# Patient Record
Sex: FEMALE | Race: WHITE | NOT HISPANIC OR LATINO | Employment: OTHER | URBAN - METROPOLITAN AREA
[De-identification: names, ages, dates, MRNs, and addresses within clinical notes are randomized per-mention and may not be internally consistent; named-entity substitution may affect disease eponyms.]

---

## 2017-01-19 ENCOUNTER — GENERIC CONVERSION - ENCOUNTER (OUTPATIENT)
Dept: OTHER | Facility: OTHER | Age: 82
End: 2017-01-19

## 2017-02-08 ENCOUNTER — LAB CONVERSION - ENCOUNTER (OUTPATIENT)
Dept: OTHER | Facility: OTHER | Age: 82
End: 2017-02-08

## 2017-02-08 LAB
HBA1C MFR BLD HPLC: 5.9 % OF TOTAL HGB
TSH SERPL DL<=0.05 MIU/L-ACNC: 5.99 MIU/L (ref 0.4–4.5)

## 2017-02-13 ENCOUNTER — ALLSCRIPTS OFFICE VISIT (OUTPATIENT)
Dept: OTHER | Facility: OTHER | Age: 82
End: 2017-02-13

## 2017-02-13 DIAGNOSIS — M85.80 OTHER SPECIFIED DISORDERS OF BONE DENSITY AND STRUCTURE, UNSPECIFIED SITE: ICD-10-CM

## 2017-02-13 DIAGNOSIS — M89.9 DISORDER OF BONE: ICD-10-CM

## 2017-02-14 ENCOUNTER — HOSPITAL ENCOUNTER (OUTPATIENT)
Dept: RADIOLOGY | Age: 82
Discharge: HOME | End: 2017-02-14
Payer: MEDICARE

## 2017-02-14 DIAGNOSIS — M85.80 OTHER SPECIFIED DISORDERS OF BONE DENSITY AND STRUCTURE, UNSPECIFIED SITE: ICD-10-CM

## 2017-02-14 DIAGNOSIS — M89.9 DISORDER OF BONE: ICD-10-CM

## 2017-02-14 PROCEDURE — 77080 DXA BONE DENSITY AXIAL: CPT

## 2017-02-16 ENCOUNTER — GENERIC CONVERSION - ENCOUNTER (OUTPATIENT)
Dept: OTHER | Facility: OTHER | Age: 82
End: 2017-02-16

## 2017-04-11 ENCOUNTER — HOSPITAL ENCOUNTER (OUTPATIENT)
Dept: RADIOLOGY | Age: 82
Discharge: HOME | End: 2017-04-11
Payer: MEDICARE

## 2017-04-11 DIAGNOSIS — Z12.31 ENCOUNTER FOR SCREENING MAMMOGRAM FOR MALIGNANT NEOPLASM OF BREAST: ICD-10-CM

## 2017-04-11 PROCEDURE — G0202 SCR MAMMO BI INCL CAD: HCPCS

## 2017-06-01 ENCOUNTER — GENERIC CONVERSION - ENCOUNTER (OUTPATIENT)
Dept: OTHER | Facility: OTHER | Age: 82
End: 2017-06-01

## 2017-06-15 ENCOUNTER — LAB CONVERSION - ENCOUNTER (OUTPATIENT)
Dept: OTHER | Facility: OTHER | Age: 82
End: 2017-06-15

## 2017-06-15 LAB
A/G RATIO (HISTORICAL): 1.6 (CALC) (ref 1–2.5)
ALBUMIN SERPL BCP-MCNC: 3.7 G/DL (ref 3.6–5.1)
ALP SERPL-CCNC: 78 U/L (ref 33–130)
ALT SERPL W P-5'-P-CCNC: 21 U/L (ref 6–29)
AST SERPL W P-5'-P-CCNC: 23 U/L (ref 10–35)
B-NP NT PRO (HISTORICAL): 1540 PG/ML
BASOPHILS # BLD AUTO: 0.3 %
BASOPHILS # BLD AUTO: 15 CELLS/UL (ref 0–200)
BILIRUB SERPL-MCNC: 0.5 MG/DL (ref 0.2–1.2)
BUN SERPL-MCNC: 19 MG/DL (ref 7–25)
BUN/CREA RATIO (HISTORICAL): 21 (CALC) (ref 6–22)
CALCIUM SERPL-MCNC: 9 MG/DL (ref 8.6–10.4)
CHLORIDE SERPL-SCNC: 105 MMOL/L (ref 98–110)
CO2 SERPL-SCNC: 32 MMOL/L (ref 20–31)
CREAT SERPL-MCNC: 0.89 MG/DL (ref 0.6–0.88)
DEPRECATED RDW RBC AUTO: 15 % (ref 11–15)
EGFR AFRICAN AMERICAN (HISTORICAL): 69 ML/MIN/1.73M2
EGFR-AMERICAN CALC (HISTORICAL): 60 ML/MIN/1.73M2
EOSINOPHIL # BLD AUTO: 12.7 %
EOSINOPHIL # BLD AUTO: 648 CELLS/UL (ref 15–500)
GAMMA GLOBULIN (HISTORICAL): 2.3 G/DL (CALC) (ref 1.9–3.7)
GLUCOSE (HISTORICAL): 89 MG/DL (ref 65–99)
HCT VFR BLD AUTO: 37.4 % (ref 35–45)
HGB BLD-MCNC: 12.5 G/DL (ref 11.7–15.5)
LYMPHOCYTES # BLD AUTO: 1642 CELLS/UL (ref 850–3900)
LYMPHOCYTES # BLD AUTO: 32.2 %
MCH RBC QN AUTO: 33.5 PG (ref 27–33)
MCHC RBC AUTO-ENTMCNC: 33.4 G/DL (ref 32–36)
MCV RBC AUTO: 100.4 FL (ref 80–100)
MONOCYTES # BLD AUTO: 377 CELLS/UL (ref 200–950)
MONOCYTES (HISTORICAL): 7.4 %
NEUTROPHILS # BLD AUTO: 2417 CELLS/UL (ref 1500–7800)
NEUTROPHILS # BLD AUTO: 47.4 %
PLATELET # BLD AUTO: 221 THOUSAND/UL (ref 140–400)
PMV BLD AUTO: 8.4 FL (ref 7.5–12.5)
POTASSIUM SERPL-SCNC: 3.7 MMOL/L (ref 3.5–5.3)
RBC # BLD AUTO: 3.73 MILLION/UL (ref 3.8–5.1)
SODIUM SERPL-SCNC: 143 MMOL/L (ref 135–146)
TOTAL PROTEIN (HISTORICAL): 6 G/DL (ref 6.1–8.1)
TSH SERPL DL<=0.05 MIU/L-ACNC: 2.98 MIU/L (ref 0.4–4.5)
WBC # BLD AUTO: 5.1 THOUSAND/UL (ref 3.8–10.8)

## 2017-06-20 ENCOUNTER — ALLSCRIPTS OFFICE VISIT (OUTPATIENT)
Dept: OTHER | Facility: OTHER | Age: 82
End: 2017-06-20

## 2017-06-20 ENCOUNTER — TRANSCRIBE ORDERS (OUTPATIENT)
Dept: ADMINISTRATIVE | Facility: HOSPITAL | Age: 82
End: 2017-06-20

## 2017-06-20 DIAGNOSIS — K86.2 PANCREATIC CYST: Primary | ICD-10-CM

## 2017-06-22 ENCOUNTER — TRANSCRIBE ORDERS (OUTPATIENT)
Dept: ADMINISTRATIVE | Facility: HOSPITAL | Age: 82
End: 2017-06-22

## 2017-06-30 ENCOUNTER — HOSPITAL ENCOUNTER (OUTPATIENT)
Dept: RADIOLOGY | Facility: HOSPITAL | Age: 82
Discharge: HOME | End: 2017-06-30
Attending: INTERNAL MEDICINE
Payer: MEDICARE

## 2017-06-30 DIAGNOSIS — K86.2 PANCREATIC CYST: ICD-10-CM

## 2017-06-30 PROCEDURE — 74183 MRI ABD W/O CNTR FLWD CNTR: CPT

## 2017-06-30 PROCEDURE — A9585 GADOBUTROL INJECTION: HCPCS | Performed by: INTERNAL MEDICINE

## 2017-06-30 RX ADMIN — GADOBUTROL 7 ML: 604.72 INJECTION INTRAVENOUS at 14:37

## 2017-10-01 DIAGNOSIS — J45.909 UNCOMPLICATED ASTHMA: ICD-10-CM

## 2017-10-01 DIAGNOSIS — I10 ESSENTIAL (PRIMARY) HYPERTENSION: ICD-10-CM

## 2017-10-01 DIAGNOSIS — E03.9 HYPOTHYROIDISM: ICD-10-CM

## 2017-10-01 DIAGNOSIS — I48.91 ATRIAL FIBRILLATION (HCC): ICD-10-CM

## 2017-10-02 ENCOUNTER — ALLSCRIPTS OFFICE VISIT (OUTPATIENT)
Dept: OTHER | Facility: OTHER | Age: 82
End: 2017-10-02

## 2017-10-03 ENCOUNTER — TRANSCRIBE ORDERS (OUTPATIENT)
Dept: ADMINISTRATIVE | Age: 82
End: 2017-10-03

## 2017-10-03 ENCOUNTER — APPOINTMENT (OUTPATIENT)
Dept: RADIOLOGY | Age: 82
End: 2017-10-03
Payer: MEDICARE

## 2017-10-03 DIAGNOSIS — J45.909 UNCOMPLICATED ASTHMA: ICD-10-CM

## 2017-10-03 PROCEDURE — 71020 HB CHEST X-RAY 2VW FRONTAL&LATL: CPT

## 2017-10-19 ENCOUNTER — LAB CONVERSION - ENCOUNTER (OUTPATIENT)
Dept: OTHER | Facility: OTHER | Age: 82
End: 2017-10-19

## 2017-10-19 LAB
A/G RATIO (HISTORICAL): 1.4 (CALC) (ref 1–2.5)
ALBUMIN SERPL BCP-MCNC: 3.7 G/DL (ref 3.6–5.1)
ALP SERPL-CCNC: 74 U/L (ref 33–130)
ALT SERPL W P-5'-P-CCNC: 18 U/L (ref 6–29)
AST SERPL W P-5'-P-CCNC: 23 U/L (ref 10–35)
B-NP NT PRO (HISTORICAL): 2324 PG/ML
BASOPHILS # BLD AUTO: 0.8 %
BASOPHILS # BLD AUTO: 62 CELLS/UL (ref 0–200)
BILIRUB SERPL-MCNC: 0.5 MG/DL (ref 0.2–1.2)
BUN SERPL-MCNC: 20 MG/DL (ref 7–25)
BUN/CREA RATIO (HISTORICAL): 18 (CALC) (ref 6–22)
CALCIUM SERPL-MCNC: 9.1 MG/DL (ref 8.6–10.4)
CHLORIDE SERPL-SCNC: 103 MMOL/L (ref 98–110)
CO2 SERPL-SCNC: 28 MMOL/L (ref 20–31)
CREAT SERPL-MCNC: 1.14 MG/DL (ref 0.6–0.88)
DEPRECATED RDW RBC AUTO: 12.8 % (ref 11–15)
EGFR AFRICAN AMERICAN (HISTORICAL): 51 ML/MIN/1.73M2
EGFR-AMERICAN CALC (HISTORICAL): 44 ML/MIN/1.73M2
EOSINOPHIL # BLD AUTO: 254 CELLS/UL (ref 15–500)
EOSINOPHIL # BLD AUTO: 3.3 %
GAMMA GLOBULIN (HISTORICAL): 2.7 G/DL (CALC) (ref 1.9–3.7)
GLUCOSE (HISTORICAL): 142 MG/DL (ref 65–99)
HCT VFR BLD AUTO: 39 % (ref 35–45)
HGB BLD-MCNC: 13 G/DL (ref 11.7–15.5)
LYMPHOCYTES # BLD AUTO: 1964 CELLS/UL (ref 850–3900)
LYMPHOCYTES # BLD AUTO: 25.5 %
MCH RBC QN AUTO: 32.4 PG (ref 27–33)
MCHC RBC AUTO-ENTMCNC: 33.3 G/DL (ref 32–36)
MCV RBC AUTO: 97.3 FL (ref 80–100)
MONOCYTES # BLD AUTO: 454 CELLS/UL (ref 200–950)
MONOCYTES (HISTORICAL): 5.9 %
NEUTROPHILS # BLD AUTO: 4967 CELLS/UL (ref 1500–7800)
NEUTROPHILS # BLD AUTO: 64.5 %
PLATELET # BLD AUTO: 281 THOUSAND/UL (ref 140–400)
PMV BLD AUTO: 10.2 FL (ref 7.5–12.5)
POTASSIUM SERPL-SCNC: 3.5 MMOL/L (ref 3.5–5.3)
RBC # BLD AUTO: 4.01 MILLION/UL (ref 3.8–5.1)
SODIUM SERPL-SCNC: 141 MMOL/L (ref 135–146)
TOTAL PROTEIN (HISTORICAL): 6.4 G/DL (ref 6.1–8.1)
TSH SERPL DL<=0.05 MIU/L-ACNC: 1.95 MIU/L (ref 0.4–4.5)
WBC # BLD AUTO: 7.7 THOUSAND/UL (ref 3.8–10.8)

## 2017-10-24 ENCOUNTER — GENERIC CONVERSION - ENCOUNTER (OUTPATIENT)
Dept: OTHER | Facility: OTHER | Age: 82
End: 2017-10-24

## 2017-10-24 ENCOUNTER — GENERIC CONVERSION - ENCOUNTER (OUTPATIENT)
Dept: INTERNAL MEDICINE CLINIC | Facility: CLINIC | Age: 82
End: 2017-10-24

## 2017-10-27 NOTE — PROGRESS NOTES
Assessment    1  Cough (786 2) (R05)  2  Difficulty breathing (786 09) (R06 89)  3  Congestive heart failure (428 0) (I50 9)   #1 upper respiratory tract infection exacerbating underlying asthma  Suspect recurrent shortness of breath is not from CHF  Because of duration of symptoms and expect to infection more likely  She also has underlying COPD-asthma  Placed on doxycycline 100 mg twice a day for week  Placed on Medrol is 20 mg twice a day for 4 days  She will go for chest x-ray  She will continue with her baseline pulmonary regimen  #2 CHF-etiology diastolic dysfunction aggravated by atrial fibrillation  Echo shows LVH with normal EF of 75%, marked right left atrial dilatation, mild aortic sclerosis, moderate TR with pulmonary artery pressure in the vicinity 40 mm  Has adequate rate control  Stable on current dose of diuretic  Do not feel current symptoms are from exacerbation of CHF  All other problems as per note of June 2017, total of 2015, 20/50 May 2014  Medical regimen as per baseline with addition of doxycycline and prednisone  Await results of chest x-ray  This patient will be seen at previously scheduled appointment with previously scheduled labs  Addendum-chest x-ray shows no infiltrate but does show possible small right pleural effusion and cardiomegaly associated with her history of CHF-she will call she does not continue to improve     Plan  Asthma    · * XR CHEST PA & LATERAL; Status:Active - Retrospective Authorization; Requested for:02Oct2017;   Cough    · Start: Doxycycline Monohydrate 100 MG Oral Tablet; TAKE 1 TABLET TWICE DAILY   · Start: PredniSONE 10 MG Oral Tablet; take 2 tablet twice daily  Chest x-ray  Doxycycline 100 mg twice a day for a week  Prednisone 20 mg twice a day for 4 days  History of Present Illness  HPI: This patient is seen today as an emergency appointment  She is felt poorly for 2 weeks and is worsening  She has prominent moist cough  She has not had any fever   She had a sore throat over the last 3-4 weeks   has been over the [facility]I  She has not been visiting any hospitals or skilled nursing facilities  She does live in an assisted living facility  She has baseline asthma as well as CHF  She was afraid this represented CHF and on her own took extra diuretics  This patient denies any systemic symptoms  Specifically there has been no evidence of fever, night sweats, significant weight loss or significant decrease in appetite  ? Her cough is moist and productive  She has not had any rash or diarrhea with the above      Review of Systems  Complete-Female:  Constitutional: feeling tired, but-- No fever, no chills, feels well, no tiredness, no recent weight gain or weight loss,-- no fever,-- not feeling poorly-- and-- no chills  Eyes: No complaints of eye pain, no red eyes, no eyesight problems, no discharge, no dry eyes, no itching of eyes  ENT: Increasing nasal congestion, but-- no complaints of earache, no loss of hearing, no nose bleeds, no nasal discharge, no sore throat, no hoarseness,-- no earache,-- no nosebleeds,-- no sore throat,-- no hearing loss,-- no nasal discharge-- and-- no hoarseness  Cardiovascular: No complaints of slow heart rate, no fast heart rate, no chest pain, no palpitations, no leg claudication, no lower extremity edema  Respiratory: shortness of breath,-- cough,-- wheezing,-- shortness of breath during exertion-- and-- Moist cough, but-- no orthopnea-- and-- no PND  Gastrointestinal: No complaints of abdominal pain, no constipation, no nausea or vomiting, no diarrhea, no bloody stools  Genitourinary: No complaints of dysuria, no incontinence, no pelvic pain, no dysmenorrhea, no vaginal discharge or bleeding  Musculoskeletal: arthralgias,-- joint stiffness-- and-- Bilateral knee pain, but-- no joint swelling,-- no limb pain,-- no myalgias-- and-- no limb swelling    Integumentary: No complaints of skin rash or lesions, no itching, no skin wounds, no breast pain or lump  Neurological: No complaints of headache, no confusion, no convulsions, no numbness, no dizziness or fainting, no tingling, no limb weakness, no difficulty walking  Psychiatric: Not suicidal, no sleep disturbance, no anxiety or depression, no change in personality, no emotional problems  Endocrine: No complaints of proptosis, no hot flashes, no muscle weakness, no deepening of the voice, no feelings of weakness  Hematologic/Lymphatic: No complaints of swollen glands, no swollen glands in the neck, does not bleed easily, does not bruise easily  Active Problems  1  Abdominal pain (789 00) (R10 9)  2  Abnormal findings on diagnostic imaging of other specified body structures (793 99) (R93 8)  3  Abnormal findings on diagnostic imaging of urinary organs (793 5) (R93 49)  4  Abnormal movement in bone (733 90) (M89 9)  5  Allergic rhinitis (477 9) (J30 9)  6  Anticoagulant long-term use (V58 61) (Z79 01)  7  Arthritis (716 90) (M19 90)  8  Asthma (493 90) (J45 909)  9  Atherosclerosis (440 9) (I70 90)  10  Atrial fibrillation (427 31) (I48 91)  11  Backache (724 5) (M54 9)  12  Benign colon polyp (211 3) (K63 5)  13  Bifascicular bundle branch block (426 53) (I45 2)  14  Chronic obstructive pulmonary disease (496) (J44 9)  15  Congestive heart failure (428 0) (I50 9)  16  Cough (786 2) (R05)  17  Cyst of left ovary (620 2) (N83 202)  18  Cyst of ovary, right (620 2) (N83 201)  19  Difficulty breathing (786 09) (R06 89)  20  Edema (782 3) (R60 9)  21  Esophageal reflux (530 81) (K21 9)  22  Essential tremor (333 1) (G25 0)  23  Hoarseness (784 42) (R49 0)  24  Hyperlipidemia (272 4) (E78 5)  25  Hypertension (401 9) (I10)  26  Hypothyroidism (244 9) (E03 9)  27  Knee pain (719 46) (M25 569)  28  Leg pain (729 5) (M79 606)  29  Nasal polyp (471 9) (J33 9)  30  History of Need for vaccination for DTP (V06 1) (Z23)  31  Onychomycosis (110 1) (B35 1)  32  Osteopenia (863 90) (M54 80)  33   Ovarian cyst (620 2) (N83 20)  34  Pancreatic cyst (577 2) (K86 2)  35  Plantar fasciitis (728 71) (M72 2)  36  Pleural effusion (511 9) (J90)  37  Prediabetes (790 29) (R73 09)  38  Pulmonary nodule (793 11) (R91 1)  39  Thyroid nodule (241 0) (E04 1)  40  Vitamin D deficiency (268 9) (E55 9)    Past Medical History  1  History of Abnormal ultrasound (793 99) (R93 8)  2  History of Asthma with acute exacerbation (493 92) (J45 901)  3  History of Asymptomatic menopausal state (V49 81) (Z78 0)  4  History of Atherosclerosis (440 9) (I70 90)  5  History of Cuboid fracture (825 23) (S92 213A)  6  History of Dyspepsia (536 8) (K30)  7  History of Encounter for routine gynecological examination (V72 31) (Z01 419)  8  History of Encounter for screening mammogram for malignant neoplasm of breast (V76 12) (Z12 31)  9  History of Flu-like symptoms (780 99) (R68 89)  10  History of Flu-like symptoms (780 99) (R68 89)  11  History of High risk medication use (V58 69) (Z79 899)  12  History Of 2  Previous Pregnancies (V61 5)  13  History of chronic obstructive lung disease (V12 69) (Z87 09)  14  History of congestive heart failure (V12 59) (Z86 79)  15  History of gastroesophageal reflux (GERD) (V12 79) (Z87 19)  16  History of hypertension (V12 59) (Z86 79)  17  History of hypothyroidism (V12 29) (Z86 39)  18  History of influenza (V12 09) (Z87 09)  19  History of low back pain (V13 59) (Z87 39)  20  History of onychomycosis (V12 09) (Z86 19)  21  History of osteopenia (V13 59) (Z87 39)  22  History of peripheral vascular disease (V12 59) (Z86 79)  23  Impacted cerumen of both ears (380 4) (H61 23)  24  Impacted cerumen of right ear (380 4) (H61 21)  25  History of IPMN (intraductal papillary mucinous neoplasm) (239 0) (D49 0)  26  History of Limb swelling (729 81) (M79 89)  27  History of Navicular fracture (825 22) (S92 737A)  28  History of Need for vaccination for DTP (V06 1) (Z23)  29  History of Osteoarthritis (V13 4)  30  History of Osteoporosis (733 00) (M81 0)  31  History of Pain in upper limb (729 5) (M79 603)  32  History of Pain, ankle (719 47) (M25 579)  33  History of Paronychia of finger, unspecified laterality (681 02) (L03 019)  34  History of Paroxysmal atrial fibrillation (427 31) (I48 0)  35  History of Plantar fasciitis (728 71) (M72 2)  36  History of Postoperative examination (V67 00) (Z09)  37  History of Previous Pregnancies Resulted In 2  Living Children  45  History of Talus fracture (825 21) (S92 109A)  39  History of Vascular headache (784 0) (G44 1)  40  History of Visit for screening mammogram (D62 03) (Z12 31)  Active Problems And Past Medical History Reviewed: The active problems and past medical history were reviewed and updated today  Surgical History  1  History of Appendectomy  2  History of Cataract Surgery  3  History of Cholecystectomy  4  History of Knee Replacement  5  History of Neuroplasty Decompression Median Nerve At Carpal Tunnel  6  History of Sinus Surgery  Surgical History Reviewed: The surgical history was reviewed and updated today  Family History  Mother   1  Family history of Carcinoma Of The Pancreas  Son   2  Family history of Brain Cancer (V16 8)  Family History   3  Family history of Atherosclerosis (V17 49)  4  Family history of Breast Cancer (V16 3)  5  Family history of Breast Disorders  6  Family history of Carcinoma Of The Uterus (V16 49)  7  Family history of Gastrointestinal Cancer  8  Family history of Hyperlipidemia  9  Family history of Osteoarthritis (V17 7)  10  Family history of Ovarian Cancer (V16 41)    Social History     · Being A Social Drinker   · Daily Coffee Consumption (2  Cups/Day)   · Denied: History of Drug Use   · Exercising Regularly   · Never A Smoker  Social History Reviewed: The social history was reviewed and updated today  The social history was reviewed and is unchanged  Current Meds  1  Calcium TABS; 1 tablet a day;  Therapy: (Recorded:12Nov2012) to Recorded  2  Cardizem  MG CP24 (DilTIAZem HCl ER Coated Beads); TAKE 1 CAPSULE DAILY; Therapy: 57KFZ4339 to Recorded  3  Claritin 10 MG Oral Tablet; TAKE 1 TABLET DAILY AS NEEDED; Therapy: (Recorded:12Nov2012) to Recorded  4  Fish Oil 1200 MG Oral Capsule; Take 1 tablet daily; Therapy: (Recorded:08Feb2016) to Recorded  5  Fluticasone Propionate 50 MCG/ACT Nasal Suspension; 2 SQUIRTS IN EACH NOSTRIL DAILY; Therapy: 50Bwg4106 to  Requested for: 47FUI0249 Recorded  6  Furosemide 40 MG Oral Tablet; Take 1 1/2 tablets by mouth daily; Therapy: 04QZD2354 to (Loja Lemon)  Requested for: 84XSD3912; Last Rx:27Hes4061 Ordered  7  Hydrocodone-Acetaminophen 5-300 MG Oral Tablet; take a half tablet before bedtime; Therapy: 65JMQ6424 to Recorded  8  Levothyroxine Sodium 50 MCG Oral Tablet; take 1 by mouth daily , except 1 and a half on Sundays; Therapy: 30DWG8376 to (Last Rx:25Kxd6528)  Requested for: 02EUF5637 Ordered  9  Metoprolol Succinate ER 25 MG Oral Tablet Extended Release 24 Hour; take 1 1/2 tablets daily; Therapy: 18IZF6373 to Recorded  10  Multi-Day Vitamins TABS; TAKE 1 TABLET DAILY; Therapy: (Recorded:12Nov2012) to Recorded  11  Potassium Chloride ER 10 MEQ Oral Capsule Extended Release; take 1 capsule daily; Therapy: 56DXW4801 to Recorded  12  Pronutrients Vitamin D3 1000 UNIT Oral Capsule; Take daily; Therapy: (Recorded:08Feb2016) to Recorded  13  Pulmicort Flexhaler 180 MCG/ACT Inhalation Aerosol Powder Breath Activated; INHALE 1 PUFF  TWICE DAILY  RINSE MOUTH AFTER USE; Therapy: 47YYX5909 to (Evaluate:45Rvk9426)  Requested for: 14Jdm2582; Last Rx:88Yem6835 Ordered  14  Singulair 10 MG Oral Tablet (Montelukast Sodium); TAKE 1 TABLET DAILY; Therapy: 62OHT6999 to (Last Rx:07Zui9757)  Requested for: 96TOR8575 Ordered  15  Ventolin  (90 Base) MCG/ACT Inhalation Aerosol Solution; Therapy: (Recorded:10Nov2012) to Recorded  16   Vitamin D3 2000 UNIT Oral Capsule; take 1 capsule daily; Therapy: 21Jun2017 to (Evaluate:16Jun2018); Last Rx:21Jun2017 Ordered  17  Warfarin Sodium 5 MG Oral Tablet; Therapy: 74MFP5562 to (Last Rx:83Glg7612)  Requested for: 25KHX4748 Ordered  Medication List Reviewed: The medication list was reviewed and updated today  Allergies  1  Accolate TABS  2  Doxycycline Hyclate CAPS  3  Aspirin TABS  4  NSAIDs    Vitals  Vital Signs    Recorded: 29TOG6459 08:11PM Recorded: 61FIZ5017 05:47PM   Temperature  98 2 F   Heart Rate 72    Respiration 14    Systolic 093, RUE, Sitting    Diastolic 80, RUE, Sitting    Height  5 ft 5 in   Weight  148 lb 2 oz   BMI Calculated  24 65   BSA Calculated  1 74       Physical Exam   Constitutional  General appearance: No acute distress, well appearing and well nourished  Head and Face  Head and face: Normal    Palpation of the face and sinuses: No sinus tenderness  Eyes  Conjunctiva and lids: No swelling, erythema or discharge  Pupils and irises: Equal, round, reactive to light  Ears, Nose, Mouth, and Throat  External inspection of ears and nose: Normal    Otoscopic examination: Tympanic membranes translucent with normal light reflex  Canals patent without erythema  Hearing: Normal    Nasal mucosa, septum, and turbinates: Normal without edema or erythema  Lips, teeth, and gums: Normal, good dentition  Oropharynx: Normal with no erythema, edema, exudate or lesions  Neck  Neck: Supple, symmetric, trachea midline, no masses  Thyroid: Normal, no thyromegaly  Pulmonary  Respiratory effort: No increased work of breathing or signs of respiratory distress  Percussion of chest: Normal    Palpation of chest: Normal    Auscultation of lungs: Abnormal  -- Scattered rhonchi-wheezing  Cardiovascular  Palpation of heart: Abnormal  -- Cardiomegaly to percussion  Auscultation of heart: Abnormal  -- Irregular irregular rhythm-heart rate climbs rapidly with walking up and down the steps  Carotid pulses: 2+ bilaterally  Abdominal aorta: Normal    Femoral pulses: 2+ bilaterally  Pedal pulses: 2+ bilaterally  Peripheral vascular exam: Normal    Examination of extremities for edema and/or varicosities: Normal    Chest  Breasts: Normal, no dimpling or skin changes appreciated  Palpation of breasts and axillae: Normal, no masses palpated  Abdomen  Abdomen: Non-tender, no masses  Liver and spleen: No hepatomegaly or splenomegaly  Examination for hernias: No hernia appreciated  Anus, perineum, and rectum: Normal sphincter tone, no masses, no prolapse  Genitourinary  External genitalia and vagina: Normal, no lesions appreciated  Lymphatic  Palpation of lymph nodes in neck: No lymphadenopathy  Palpation of lymph nodes in axillae: No lymphadenopathy  Palpation of lymph nodes in groin: No lymphadenopathy  Palpation of lymph nodes in other areas: No lymphadenopathy  Musculoskeletal  Gait and station: Normal    Digits and nails: Normal without clubbing or cyanosis  Joints, bones, and muscles: Abnormal  -- Heberden's nodes  Range of motion: Normal    Stability: Normal    Muscle strength/tone: Normal    Skin  Skin and subcutaneous tissue: Normal without rashes or lesions  Palpation of skin and subcutaneous tissue: Normal turgor  Neurologic  Cranial nerves: Cranial nerves II-XII intact  Reflexes: 2+ and symmetric  Sensation: No sensory loss  Psychiatric  Judgment and insight: Normal    Orientation to person, place, and time: Normal    Recent and remote memory: Intact  Mood and affect: Normal        Future Appointments    Date/Time Provider Specialty Site   10/24/2017 09:00 AM JONNIE Bradford   [facility] Medicine [facility]       Signatures   Electronically signed by : JONNIE Leon ; Oct  2 2017  8:15PM EST                       (Author)    Electronically signed by : JONNIE Leon ; Oct  9 2017  8:34PM EST                       (Author)    Electronically signed by : Dipti Hooper JONNIE Berkowitz ; Oct 14 2017  8:14AM EST                       (Author)

## 2017-10-31 ENCOUNTER — HOSPITAL ENCOUNTER (OUTPATIENT)
Dept: NON INVASIVE DIAGNOSTICS | Facility: CLINIC | Age: 82
Discharge: HOME | End: 2017-10-31
Payer: MEDICARE

## 2017-10-31 DIAGNOSIS — I48.91 ATRIAL FIBRILLATION (HCC): ICD-10-CM

## 2017-10-31 PROCEDURE — 93226 XTRNL ECG REC<48 HR SCAN A/R: CPT

## 2017-10-31 PROCEDURE — 93225 XTRNL ECG REC<48 HRS REC: CPT

## 2017-11-16 ENCOUNTER — GENERIC CONVERSION - ENCOUNTER (OUTPATIENT)
Dept: OTHER | Facility: OTHER | Age: 82
End: 2017-11-16

## 2017-12-16 ENCOUNTER — ALLSCRIPTS OFFICE VISIT (OUTPATIENT)
Dept: OTHER | Facility: OTHER | Age: 82
End: 2017-12-16

## 2017-12-17 NOTE — PROGRESS NOTES
Assessment   1  Asthma (493 90) (J45 909)  2  Congestive heart failure (428 0) (I50 9)  3  Chronic obstructive pulmonary disease (496) (J44 9)  4  Atrial fibrillation (427 31) (I48 91)  5  Anticoagulant long-term use (V58 61) (Z79 01)  6  Hypertension (401 9) (I10)       1  Shortness of breath-suspect upper respiratory tract infection-bronchitis exacerbating underlying COPD-asthma  Rule out pneumonia  As noted she had been ill several months ago and had a URTI then with chest x-ray benign other than small right pleural effusion felt related to CHF  She will complete doxycycline 100 milligrams b i d  for week  She will have a chest x-ray  I added Ceftin 500 milligrams b i d  for week as well as prednisone 10 milligrams b i d  for 4 days and 10 milligrams daily for 4 days  In addition her dose of diuretic was increased     2  Congestive heart failure-diastolic dysfunction aggravated by atrial fibrillation  Most recent echo shows LVH with EF of 75%, marked right and left atrial dilatation, mild aortic valve disease, moderate TR with pulmonary artery pressure 40 millimeters  Today felt to be in mild volume overload  She will increase her Lasix to 40 milligrams in a m  20 milligrams in the p m  for 3 days and resume baseline dose of 80 milligrams every morning  She will also simultaneously increase her potassium supplement     3  Atrial fibrillation-adequate rate control on current dose of Cardizem and beta-blocker  Remains on anticoagulation with Coumadin     4  Osteopenia-recent DEXA scan shows L-spine -2 2 and hip of -1  Subsequent bone density study do 2 years after last which would be March 2019       Plantar fasciitis-response of the physical therapy and exercise      asthma-stable on current therapy  Shortness of breath felt to be mostly cardiac in origin but stable     #7 atrial t fibrillation-adequate rate control on current dose of Cardizem and beta blocker   Remains on anticoagulation with Coumadin     #8 hypertension-continue current regimen-adequate control     #9 prediabetes-most recent hemoglobin A1c 5 9-continue conservative therapy     #10 hypothyroidism-TSH stable on current dose of thyroid supplement     #11 tremor-likely benign essential tremor-monitor     #12 given degenerative arthritis of the hands that she uses acetaminophen     #13 abnormal area the pancreas and CAT scan-full discussion as per note of every 2015  Follow-up ultrasound unchanged  GI physician ordered MRI without contrast and an MRCP done at every 2016 which showed that the pancreatic cystic lesions in the head, uncinate process and neck is unchanged suggesting sidebranch IPMN-no main pancreatic duct dilatation  Liver enzymes normal  I encouraged her to go back to the GI group     14  Knee pain-had prior bilateral total knee replacement   A 1 point was going back to Ortho to check positioning of her prosthesis-REVIEW NEXT VISIT          All other problems as per note of October 2015, 2015, May 2014          MEDICAL REGIMEN: Fluticasone nasal spray 2 squirts each nostril once daily, Lasix 80 milligrams in a m  and 40 milligrams in the p m  for 3 days then back to baseline dose of 80 milligrams every morning, potassium chloride-10 milliequivalent dose of 30 milliequivalents for 3 days and back to 20 milliequivalents daily, Coumadin with dose adjustments, Singulair using generic 10 mg daily, generic Synthroid 50 Âµg daily but 75 Âµg and Sunday, Claritin 10 mg daily as needed, calcium with vitamin D daily, Ventolin inhaler as needed, metoprolol succinate 25 MGs-2 tablets in the a m  and 1-1/2 tablets in the p m , Cardizem CD-300 mg a day, Pulmicort 100 Âµg-one puff twice a day, multivitamin, vitamin D 3/2000 units daily, fish oil 1200 mg a day, rare use of Vicodin 5-300 half tablet twice a day when necessary for her significant back pain doxycycline 100 milligrams b i d  for week and Ceftin 500 milligrams b i d  for week, prednisone 10 milligrams b i d  for 4 days and 10 milligrams daily for 4 days and Tussionex a teaspoon full before bed as needed for severe cough          Await results of chest x-ray  This patient will be seen at previously scheduled appointment with previously scheduled labs  Addendum-called patient on December 23rd-chest x-ray benign  She is feeling significantly better                                    Plan   Chest x-ray  Complete doxycycline as 100 milligrams b i d  for 1 week  Ceftin 500 milligrams b i d  for 1 week  Increase Lasix to 80 milligrams in a m  and 40 milligrams in the p m  for 3 days then resume baseline dose of 80 milligrams a day  Increase potassium to dose of 30 milliequivalents for 3 days then resume baseline dose  Prednisone 10 milligrams b i d  for 4 days and 10 milligrams daily for 4 days and stop  Take Tussionex as a teaspoon full before bed as needed for severe cough               History of Present Illness   HPI:  patient is seen today as an emergency appointment  She began to feel poorly over the last couple days  She had called the office productive cough and discomfort over the chest and sinuses  She notes worsening shortness of breath  This was more of an issue last night  When she called the office she had been started on doxycycline 100 milligrams b i d  for week  She has some nausea with that  Denies any vomiting  Denies any diarrhea  She has slight lower chest fullness  She is worried that she has esophageal of thoracic cancer  She denies fever chills  She has some questionable wheezing  Has a known history of underlying COPD-asthma but also has a known history of underlying congestive heart failure  She has not been using excess salt  She was not visiting [place] [address] or [facility]  She has not traveled  has a known history of controlled atrial fibrillation with CHF-diastolic   She has not had any major issues lately although does feel more short of breath over the last 2 days  patient denies any systemic symptoms  Specifically there has been no evidence of fever, night sweats, significant weight loss or significant decrease in appetite  She has a history of hypertension and is fearful that would be exacerbated  Avoiding salt and decongestants  Denies hematemesis pounding of her heart sweats and headache   denies severe sore throat  She denies severe ear pain  Her cough is mostly nonproductive   has chest fullness but no definite chest pressure  She denies chest pain or pressure with ambulation  Review of Systems   Complete-Female:      Constitutional: feeling tired, but-- No fever, no chills, feels well, no tiredness, no recent weight gain or weight loss,-- no fever,-- not feeling poorly-- and-- no chills  Eyes: No complaints of eye pain, no red eyes, no eyesight problems, no discharge, no dry eyes, no itching of eyes  ENT: Increasing nasal congestion, but-- no complaints of earache, no loss of hearing, no nose bleeds, no nasal discharge, no sore throat, no hoarseness,-- no earache,-- no nosebleeds,-- no sore throat,-- no hearing loss,-- no nasal discharge-- and-- no hoarseness  Cardiovascular: No complaints of slow heart rate, no fast heart rate, no chest pain, no palpitations, no leg claudication, no lower extremity edema  Respiratory: shortness of breath,-- cough,-- wheezing,-- shortness of breath during exertion-- and-- Moist cough, but-- no orthopnea-- and-- no PND  Gastrointestinal: No complaints of abdominal pain, no constipation, no nausea or vomiting, no diarrhea, no bloody stools  Genitourinary: No complaints of dysuria, no incontinence, no pelvic pain, no dysmenorrhea, no vaginal discharge or bleeding  Musculoskeletal: arthralgias,-- joint stiffness-- and-- Bilateral knee pain, but-- no joint swelling,-- no limb pain,-- no myalgias-- and-- no limb swelling        Integumentary: No complaints of skin rash or lesions, no itching, no skin wounds, no breast pain or lump  Neurological: No complaints of headache, no confusion, no convulsions, no numbness, no dizziness or fainting, no tingling, no limb weakness, no difficulty walking  Psychiatric: Not suicidal, no sleep disturbance, no anxiety or depression, no change in personality, no emotional problems  Endocrine: No complaints of proptosis, no hot flashes, no muscle weakness, no deepening of the voice, no feelings of weakness  Hematologic/Lymphatic: No complaints of swollen glands, no swollen glands in the neck, does not bleed easily, does not bruise easily  Active Problems   1  Abdominal pain (789 00) (R10 9)  2  Abnormal findings on diagnostic imaging of other specified body structures (793 99) (R93 8)  3  Abnormal findings on diagnostic imaging of urinary organs (793 5) (R93 49)  4  Abnormal movement in bone (733 90) (M89 9)  5  Allergic rhinitis (477 9) (J30 9)  6  Anticoagulant long-term use (V58 61) (Z79 01)  7  Arthritis (716 90) (M19 90)  8  Asthma (493 90) (J45 909)  9  Atherosclerosis (440 9) (I70 90)  10  Atrial fibrillation (427 31) (I48 91)  11  Backache (724 5) (M54 9)  12  Benign colon polyp (211 3) (K63 5)  13  Bifascicular bundle branch block (426 53) (I45 2)  14  Chronic obstructive pulmonary disease (496) (J44 9)  15  Congestive heart failure (428 0) (I50 9)  16  Cough (786 2) (R05)  17  Cyst of left ovary (620 2) (N83 202)  18  Cyst of ovary, right (620 2) (N83 201)  19  Difficulty breathing (786 09) (R06 89)  20  Edema (782 3) (R60 9)  21  Esophageal reflux (530 81) (K21 9)  22  Essential tremor (333 1) (G25 0)  23  Hoarseness (784 42) (R49 0)  24  Hyperlipidemia (272 4) (E78 5)  25  Hypertension (401 9) (I10)  26  Hypothyroidism (244 9) (E03 9)  27  Knee pain (719 46) (M25 569)  28  Leg pain (729 5) (M79 606)  29  Nasal polyp (471 9) (J33 9)  30  History of Need for vaccination for DTP (V06 1) (Z23)  31   Onychomycosis (110 1) (B35 1)  32  Osteopenia (733 90) (M85 80)  33  Ovarian cyst (620 2) (N83 20)  34  Pancreatic cyst (577 2) (K86 2)  35  Plantar fasciitis (728 71) (M72 2)  36  Pleural effusion (511 9) (J90)  37  Prediabetes (790 29) (R73 09)  38  Pulmonary nodule (793 11) (R91 1)  39  Thyroid nodule (241 0) (E04 1)  40  Vitamin D deficiency (268 9) (E55 9)    Past Medical History   1  History of Abnormal ultrasound (793 99) (R93 8)  2  History of Asthma with acute exacerbation (493 92) (J45 901)  3  History of Asymptomatic menopausal state (V49 81) (Z78 0)  4  History of Atherosclerosis (440 9) (I70 90)  5  History of Cuboid fracture (825 23) (S92 213A)  6  History of Dyspepsia (536 8) (K30)  7  History of Encounter for routine gynecological examination (V72 31) (Z01 419)  8  History of Encounter for screening mammogram for malignant neoplasm of breast (V76 12) (Z12 31)  9  History of Flu-like symptoms (780 99) (R68 89)  10  History of Flu-like symptoms (780 99) (R68 89)  11  History of High risk medication use (V58 69) (Z79 899)  12  History Of 2  Previous Pregnancies (V61 5)  13  History of chronic obstructive lung disease (V12 69) (Z87 09)  14  History of congestive heart failure (V12 59) (Z86 79)  15  History of gastroesophageal reflux (GERD) (V12 79) (Z87 19)  16  History of hypertension (V12 59) (Z86 79)  17  History of hypothyroidism (V12 29) (Z86 39)  18  History of influenza (V12 09) (Z87 09)  19  History of low back pain (V13 59) (Z87 39)  20  History of onychomycosis (V12 09) (Z86 19)  21  History of osteopenia (V13 59) (Z87 39)  22  History of peripheral vascular disease (V12 59) (Z86 79)  23  Impacted cerumen of both ears (380 4) (H61 23)  24  Impacted cerumen of right ear (380 4) (H61 21)  25  History of IPMN (intraductal papillary mucinous neoplasm) (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 0) (D49 0)  26  History of Limb swelling (729 81) (M79 89)  27  History of Navicular fracture (825 22) (S92 437A)  28   History of Need for vaccination for DTP (V06 1) (Z23)  29  History of Osteoarthritis (V13 4)  30  History of Osteoporosis (733 00) (M81 0)  31  History of Pain in upper limb (729 5) (M79 603)  32  History of Pain, ankle (719 47) (M25 579)  33  History of Paronychia of finger, unspecified laterality (681 02) (L03 019)  34  History of Paroxysmal atrial fibrillation (427 31) (I48 0)  35  History of Plantar fasciitis (728 71) (M72 2)  36  History of Postoperative examination (V67 00) (Z09)  37  History of Previous Pregnancies Resulted In 2  Living Children  45  History of Talus fracture (825 21) (S92 109A)  39  History of Vascular headache (784 0) (G44 1)  40  History of Visit for screening mammogram (P12 07) (Z12 31)  Active Problems And Past Medical History Reviewed: The active problems and past medical history were reviewed and updated today  Surgical History   1  History of Appendectomy  2  History of Cataract Surgery  3  History of Cholecystectomy  4  History of Knee Replacement  5  History of Neuroplasty Decompression Median Nerve At Carpal Tunnel  6  History of Sinus Surgery  Surgical History Reviewed: The surgical history was reviewed and updated today  Family History   Mother   1  Family history of Carcinoma Of The Pancreas  Son   2  Family history of Brain Cancer (V16 8)  Family History   3  Family history of Atherosclerosis (V17 49)  4  Family history of Breast Cancer (V16 3)  5  Family history of Breast Disorders  6  Family history of Carcinoma Of The Uterus (V16 49)  7  Family history of Gastrointestinal Cancer  8  Family history of Hyperlipidemia  9  Family history of Osteoarthritis (V17 7)  10  Family history of Ovarian Cancer (V16 41)    Social History    · Being A Social Drinker   · Daily Coffee Consumption (2  Cups/Day)   · Denied: History of Drug Use   · Exercising Regularly   · Never A Smoker  Social History Reviewed: The social history was reviewed and updated today  The social history was reviewed and is unchanged        Current Meds   1  Calcium TABS; 1 tablet a day; Therapy: (Recorded:12Nov2012) to Recorded  2  Cardizem  MG CP24 (DilTIAZem HCl ER Coated Beads); TAKE 1 CAPSULE DAILY; Therapy: 63PZS9322 to Recorded  3  Claritin 10 MG Oral Tablet; TAKE 1 TABLET DAILY AS NEEDED; Therapy: (Recorded:12Nov2012) to Recorded  4  Doxycycline Hyclate 100 MG Oral Capsule; TAKE 1 CAPSULE TWICE DAILY UNTIL GONE;     Therapy: 86TEZ4184 to (Evaluate:80Mal5139)  Requested for: 74Vfs7192; Last Rx:61Xon8599     Ordered  5  Doxycycline Monohydrate 100 MG Oral Tablet; TAKE 1 TABLET TWICE DAILY; Therapy: 74OWX6761 to (Evaluate:09Oct2017)  Requested for: 03Oct2017; Last Rx:66Zto8555     Ordered  6  Fish Oil 1200 MG Oral Capsule; Take 1 tablet daily; Therapy: (Recorded:42Kje6885) to Recorded  7  Fluticasone Propionate 50 MCG/ACT Nasal Suspension; 2 SQUIRTS IN EACH NOSTRIL DAILY; Therapy: 62Fdm9942 to  Requested for: 55UAQ0487 Recorded  8  Furosemide 40 MG Oral Tablet; Take 1 1/2 tablets by mouth daily; Therapy: 96XEN3591 to (Eri Levin)  Requested for: 87GXZ8942; Last Rx:67Uuw2089     Ordered  9  Hydrocodone-Acetaminophen 5-300 MG Oral Tablet; take a half tablet before bedtime; Therapy: 41DWN3777 to Recorded  10  Levothyroxine Sodium 50 MCG Oral Tablet; take 1 by mouth daily , except 1 and a half on Sundays; Therapy: 86CZY5451 to (Last Rx:78Avb0373)  Requested for: 01LBI2652 Ordered  11  Metoprolol Succinate ER 25 MG Oral Tablet Extended Release 24 Hour; take 1 1/2 tablets daily; Therapy: 90YFH5235 to Recorded  12  Multi-Day Vitamins TABS; TAKE 1 TABLET DAILY; Therapy: (Recorded:12Nov2012) to Recorded  13  Potassium Chloride ER 10 MEQ Oral Capsule Extended Release; take 1 capsule daily; Therapy: 43XJZ0041 to Recorded  14  PredniSONE 10 MG Oral Tablet; take 2 tablet twice daily; Therapy: 53GAD0867 to (Jesus Echeverria)  Requested for: 83KVG6198; Last Rx:02Oct2017      Ordered  15  Pronutrients Vitamin D3 1000 UNIT Oral Capsule; Take daily; Therapy: (Recorded:79Sfm7113) to Recorded  16  Pulmicort Flexhaler 180 MCG/ACT Inhalation Aerosol Powder Breath Activated; INHALE 1 PUFF      TWICE DAILY  RINSE MOUTH AFTER USE; Therapy: 98JCT3599 to (Evaluate:62Ybe6558)  Requested for: 35Rue9799; Last Rx:73Nxe1306 Ordered  17  Singulair 10 MG Oral Tablet (Montelukast Sodium); TAKE 1 TABLET DAILY; Therapy: 63JZE1713 to (Last Rx:38Nth7901)  Requested for: 87SIJ3529 Ordered  18  Ventolin  (90 Base) MCG/ACT Inhalation Aerosol Solution; Therapy: (Recorded:10Nov2012) to Recorded  19  Vitamin D3 2000 UNIT Oral Capsule; take 1 capsule daily; Therapy: 21Jun2017 to (Evaluate:16Jun2018); Last Rx:21Jun2017 Ordered  20  Warfarin Sodium 5 MG Oral Tablet; Therapy: 98LQZ7087 to (Last Rx:33Nes8469)  Requested for: 26HGO8355 Ordered  Medication List Reviewed: The medication list was reviewed and updated today  Allergies   1  Accolate TABS  2  Doxycycline Hyclate CAPS  3  Aspirin TABS  4  NSAIDs    Vitals   Vital Signs    Recorded: 43BVO9506 12:20PM   Heart Rate 88   Respiration 14   Systolic 325, RUE, Sitting   Diastolic 78, RUE, Sitting     Physical Exam        Constitutional      General appearance: No acute distress, well appearing and well nourished  Head and Face      Head and face: Normal        Palpation of the face and sinuses: No sinus tenderness  Eyes      Conjunctiva and lids: No swelling, erythema or discharge  Pupils and irises: Equal, round, reactive to light  Ears, Nose, Mouth, and Throat      External inspection of ears and nose: Normal        Otoscopic examination: Tympanic membranes translucent with normal light reflex  Canals patent without erythema  Hearing: Normal        Nasal mucosa, septum, and turbinates: Normal without edema or erythema  Lips, teeth, and gums: Normal, good dentition         Oropharynx: Normal with no erythema, edema, exudate or lesions  Neck      Neck: Supple, symmetric, trachea midline, no masses  Thyroid: Normal, no thyromegaly  Pulmonary      Respiratory effort: No increased work of breathing or signs of respiratory distress  Percussion of chest: Normal        Palpation of chest: Normal        Auscultation of lungs: Abnormal  -- Rales at the left base  Cardiovascular      Palpation of heart: Abnormal  -- Cardiomegaly to percussion  Auscultation of heart: Abnormal  -- I prominent P2 rregular irregular rhythm-heart rate climbs rapidly with walking up and down the steps  Carotid pulses: 2+ bilaterally  Abdominal aorta: Normal        Femoral pulses: 2+ bilaterally  Pedal pulses: 2+ bilaterally  Peripheral vascular exam: Normal        Examination of extremities for edema and/or varicosities: Normal        Chest      Breasts: Normal, no dimpling or skin changes appreciated  Palpation of breasts and axillae: Normal, no masses palpated  Abdomen      Abdomen: Non-tender, no masses  Liver and spleen: No hepatomegaly or splenomegaly  Examination for hernias: No hernia appreciated  Anus, perineum, and rectum: Normal sphincter tone, no masses, no prolapse  Genitourinary      External genitalia and vagina: Normal, no lesions appreciated  Lymphatic      Palpation of lymph nodes in neck: No lymphadenopathy  Palpation of lymph nodes in axillae: No lymphadenopathy  Palpation of lymph nodes in groin: No lymphadenopathy  Palpation of lymph nodes in other areas: No lymphadenopathy  Musculoskeletal      Gait and station: Normal        Digits and nails: Normal without clubbing or cyanosis  Joints, bones, and muscles: Abnormal  -- Heberden's nodes        Range of motion: Normal        Stability: Normal        Muscle strength/tone: Normal        Skin      Skin and subcutaneous tissue: Normal without rashes or lesions  Palpation of skin and subcutaneous tissue: Normal turgor  Neurologic      Cranial nerves: Cranial nerves II-XII intact  Reflexes: 2+ and symmetric  Sensation: No sensory loss  Psychiatric      Judgment and insight: Normal        Orientation to person, place, and time: Normal        Recent and remote memory: Intact  Mood and affect: Normal        Future Appointments      Date/Time Provider Specialty Site   02/28/2018 09:30 AM JONNIE Clark   Internal Medicine Augustin Payton MD     Signatures    Electronically signed by : JONNIE Arzate ; Dec 16 2017 12:31PM EST                       (Author)     Electronically signed by : JONNIE Arzate ; Dec 23 2017 12:52PM EST                       (Author)

## 2017-12-18 ENCOUNTER — APPOINTMENT (OUTPATIENT)
Dept: RADIOLOGY | Age: 82
End: 2017-12-18
Payer: MEDICARE

## 2017-12-18 ENCOUNTER — TRANSCRIBE ORDERS (OUTPATIENT)
Dept: ADMINISTRATIVE | Age: 82
End: 2017-12-18

## 2017-12-18 DIAGNOSIS — R05.9 COUGH: ICD-10-CM

## 2017-12-18 DIAGNOSIS — R05.9 COUGH: Primary | ICD-10-CM

## 2017-12-18 PROCEDURE — 71020 HB CHEST X-RAY 2VW FRONTAL&LATL: CPT

## 2018-01-11 NOTE — RESULT NOTES
Verified Results  (1) THIN PREP PAP WITH IMAGING 08Apr2016 11:20AM [facility] Mcmullen     Test Name Result Flag Reference   LAB AP CASE REPORT (Report)     Gynecologic Cytology Report            Case: [id number]                  Authorizing Provider: Veronique Yeung MD     Collected:      04/08/2016 1120        First Screen:     Matthias Rogers, CT    Received:      04/10/2016 1120        Specimen:  LIQUID-BASED PAP, SCREENING, Cervix   LAB AP GYN PRIMARY INTERPRETATION      Negative for intraepithelial lesion or malignancy   LAB AP GYN SPECIMEN ADEQUACY      Satisfactory for evaluation  (See note)   LAB AP GYN NOTE      No endocervical cells identified  It is difficult to differentiate between   squamous metaplastic cells and parabasal cells in atrophic specimens due   to numerous causes including menopause, postpartum changes and   progestational agents  LAB AP GYN ADDITIONAL INFORMATION (Report)     [employer] FDA approved ,  and ThinPrep Imaging System are   utilized with strict adherence to the 's instruction manual to   prepare gynecologic and non-gynecologic cytology specimens for the   production of ThinPrep slides as well as for gynecologic ThinPrep imaging  These processes have been validated by our laboratory and/or by the     The Pap test is not a diagnostic procedure and should not be used as the   sole means to detect cervical cancer  It is only a screening procedure to   aid in the detection of cervical cancer and its precursors  Both   false-negative and false-positive results have been experienced  Your   patient's test result should be interpreted in this context together with   the history and clinical findings     LAB AP LMP

## 2018-01-11 NOTE — RESULT NOTES
Verified Results  * MAMMO SCREENING BILATERAL W CAD 32Enx3116 09:10AM Wan Hdez Order Number: [id number]     Test Name Result Flag Reference   MAMMO SCREENING BILATERAL W CAD (Report)     Patient History:   Patient is postmenopausal    Family history of breast cancer at age 48 in maternal cousin,    breast cancer at age 48 in maternal cousin, ovarian cancer at age   52 in maternal niece, pancreatic cancer at age 80 in mother  Took estrogen for 20 years  Patient has never smoked  Patient's BMI is 25 0  Reason for exam: screening, asymptomatic  Mammo Screening Bilateral W CAD: April 11, 2017 - Check In #:    [de-identified]   Bilateral CC and MLO view(s) were taken  Technologist: RT Kerry(R)(M)   Prior study comparison: April 5, 2016, mammo screening bilateral    performed at [facility]  March 31, 2015,    digital bilateral screening mammogram performed at [facility]  March 25, 2014, digital bilateral screening    mammogram performed at [facility]  March 19, 2013, digital bilateral screening mammogram performed at [address] [place] [address]  March 13, 2012, digital bilateral    screening mammogram performed at [facility]  There are scattered fibroglandular densities  The parenchymal pattern appears stable  No dominant soft tissue    mass or suspicious calcifications are noted  The skin and nipple   contours are within normal limits  No mammographic evidence of malignancy  No    significant changes when compared with prior studies  ACR BI-RADSï¾® Assessments: BiRad:1 - Negative     Recommendation:   Routine screening mammogram in 1 year  A reminder letter will be   scheduled  Analyzed by CAD     8-10% of cancers will be missed on mammography  Management of a    palpable abnormality must be based on clinical grounds  Patients   will be notified of their results via letter from our facility  Accredited by [employer] of Radiology and FDA  Transcription Location: [facility]: [id number]     Risk Value(s):   Tyrer-Cuzick 10 Year: 0 400%, Tyrer-Cuzick Lifetime: 0 400%,    Myriad Table: 3 0%, EFREN 5 Year: 1 4%, NCI Lifetime: 1 5%, MRS    : Based on personal and/or family history,    consideration of hereditary risk assessment may be warranted     Signed by:   Shasha Waldrop MD   4/11/17

## 2018-01-12 NOTE — RESULT NOTES
Verified Results  (1) THIN PREP PAP WITH IMAGING 08Apr2016 11:20AM [place]     Test Name Result Flag Reference   LAB AP CASE REPORT (Report)     Gynecologic Cytology Report            Case: [id number]                  Authorizing Provider: Veronique Yeung MD     Collected:      04/08/2016 1120        First Screen:     Matthias Mccloud, CT    Received:      04/10/2016 1120        Specimen:  LIQUID-BASED PAP, SCREENING, Cervix   LAB AP GYN PRIMARY INTERPRETATION      Negative for intraepithelial lesion or malignancy   LAB AP GYN SPECIMEN ADEQUACY      Satisfactory for evaluation  (See note)   LAB AP GYN NOTE      No endocervical cells identified  It is difficult to differentiate between   squamous metaplastic cells and parabasal cells in atrophic specimens due   to numerous causes including menopause, postpartum changes and   progestational agents  LAB AP GYN ADDITIONAL INFORMATION (Report)     [employer]'s FDA approved ,  and ThinPrep Imaging System are   utilized with strict adherence to the 's instruction manual to   prepare gynecologic and non-gynecologic cytology specimens for the   production of ThinPrep slides as well as for gynecologic ThinPrep imaging  These processes have been validated by our laboratory and/or by the     The Pap test is not a diagnostic procedure and should not be used as the   sole means to detect cervical cancer  It is only a screening procedure to   aid in the detection of cervical cancer and its precursors  Both   false-negative and false-positive results have been experienced  Your   patient's test result should be interpreted in this context together with   the history and clinical findings     LAB AP LMP not given     not given

## 2018-01-13 VITALS
DIASTOLIC BLOOD PRESSURE: 80 MMHG | SYSTOLIC BLOOD PRESSURE: 130 MMHG | HEIGHT: 65 IN | HEART RATE: 72 BPM | TEMPERATURE: 98.2 F | BODY MASS INDEX: 24.68 KG/M2 | WEIGHT: 148.13 LBS | RESPIRATION RATE: 14 BRPM

## 2018-01-13 VITALS — SYSTOLIC BLOOD PRESSURE: 130 MMHG | RESPIRATION RATE: 14 BRPM | DIASTOLIC BLOOD PRESSURE: 80 MMHG | HEART RATE: 74 BPM

## 2018-01-14 VITALS
HEIGHT: 65 IN | SYSTOLIC BLOOD PRESSURE: 130 MMHG | RESPIRATION RATE: 14 BRPM | DIASTOLIC BLOOD PRESSURE: 80 MMHG | BODY MASS INDEX: 26.55 KG/M2 | WEIGHT: 159.38 LBS | HEART RATE: 78 BPM

## 2018-01-22 VITALS — HEIGHT: 65 IN | WEIGHT: 149.25 LBS | BODY MASS INDEX: 24.87 KG/M2

## 2018-01-22 VITALS — HEART RATE: 78 BPM | DIASTOLIC BLOOD PRESSURE: 76 MMHG | SYSTOLIC BLOOD PRESSURE: 122 MMHG | RESPIRATION RATE: 14 BRPM

## 2018-01-23 VITALS — SYSTOLIC BLOOD PRESSURE: 122 MMHG | DIASTOLIC BLOOD PRESSURE: 78 MMHG | RESPIRATION RATE: 14 BRPM | HEART RATE: 88 BPM

## 2018-02-03 ENCOUNTER — DOCUMENTATION (OUTPATIENT)
Dept: INTERNAL MEDICINE CLINIC | Facility: CLINIC | Age: 83
End: 2018-02-03

## 2018-02-03 NOTE — PROGRESS NOTES
Patient had an echocardiogram performed by Janeen Valentine in January 2018 showed mild LVH with normal EF, mild mitral valve sclerosis, marked right left atrial dilatation, mild aortic sclerosis-pulmonary artery pressure in the vicinity of 40 millimeters DMITRIY CHART NEXT VISIT PATIENT HAD ECHO IN JANUARY 2018

## 2018-02-22 LAB
ALBUMIN SERPL-MCNC: 3.6 G/DL (ref 3.6–5.1)
ALBUMIN/GLOB SERPL: 1.2 (CALC) (ref 1–2.5)
ALP SERPL-CCNC: 78 U/L (ref 33–130)
ALT SERPL-CCNC: 11 U/L (ref 6–29)
AST SERPL-CCNC: 19 U/L (ref 10–35)
BASOPHILS # BLD AUTO: 38 CELLS/UL (ref 0–200)
BASOPHILS NFR BLD AUTO: 0.6 %
BILIRUB SERPL-MCNC: 0.5 MG/DL (ref 0.2–1.2)
BUN SERPL-MCNC: 17 MG/DL (ref 7–25)
BUN/CREAT SERPL: 17 (CALC) (ref 6–22)
CALCIUM SERPL-MCNC: 9.2 MG/DL (ref 8.6–10.4)
CHLORIDE SERPL-SCNC: 102 MMOL/L (ref 98–110)
CHOLEST SERPL-MCNC: 190 MG/DL
CO2 SERPL-SCNC: 31 MMOL/L (ref 20–31)
CREAT SERPL-MCNC: 0.99 MG/DL (ref 0.6–0.88)
EOSINOPHIL # BLD AUTO: 1714 CELLS/UL (ref 15–500)
EOSINOPHIL NFR BLD AUTO: 27.2 %
ERYTHROCYTE [DISTWIDTH] IN BLOOD BY AUTOMATED COUNT: 12.5 % (ref 11–15)
GLOBULIN SER CALC-MCNC: 2.9 G/DL (CALC) (ref 1.9–3.7)
GLUCOSE SERPL-MCNC: 91 MG/DL (ref 65–99)
HCT VFR BLD AUTO: 37.8 % (ref 35–45)
HDLC SERPL-MCNC: 74 MG/DL
HGB BLD-MCNC: 12.5 G/DL (ref 11.7–15.5)
LDLC SERPL DIRECT ASSAY-MCNC: 102 MG/DL
LYMPHOCYTES # BLD AUTO: 1644 CELLS/UL (ref 850–3900)
LYMPHOCYTES NFR BLD AUTO: 26.1 %
MCH RBC QN AUTO: 32.2 PG (ref 27–33)
MCHC RBC AUTO-ENTMCNC: 33.1 G/DL (ref 32–36)
MCV RBC AUTO: 97.4 FL (ref 80–100)
MONOCYTES # BLD AUTO: 447 CELLS/UL (ref 200–950)
MONOCYTES NFR BLD AUTO: 7.1 %
NEUTROPHILS # BLD AUTO: 2457 CELLS/UL (ref 1500–7800)
NEUTROPHILS NFR BLD AUTO: 39 %
NT-PROBNP SERPL-MCNC: 1720 PG/ML
PLATELET # BLD AUTO: 264 THOUSAND/UL (ref 140–400)
PMV BLD REES-ECKER: 10.2 FL (ref 7.5–12.5)
POTASSIUM SERPL-SCNC: 4.1 MMOL/L (ref 3.5–5.3)
PROT SERPL-MCNC: 6.5 G/DL (ref 6.1–8.1)
RBC # BLD AUTO: 3.88 MILLION/UL (ref 3.8–5.1)
SL AMB EGFR AFRICAN AMERICAN: 60 ML/MIN/1.73M2
SL AMB EGFR NON AFRICAN AMERICAN: 52 ML/MIN/1.73M2
SODIUM SERPL-SCNC: 141 MMOL/L (ref 135–146)
TRIGL SERPL-MCNC: 66 MG/DL
TSH SERPL-ACNC: 4.21 MIU/L (ref 0.4–4.5)
WBC # BLD AUTO: 6.3 THOUSAND/UL (ref 3.8–10.8)

## 2018-02-25 PROBLEM — I50.9 CONGESTIVE HEART FAILURE (HCC): Status: ACTIVE | Noted: 2017-02-13

## 2018-02-25 PROBLEM — I50.9 CONGESTIVE HEART FAILURE (HCC): Chronic | Status: ACTIVE | Noted: 2017-02-13

## 2018-02-27 RX ORDER — PREDNISONE 10 MG/1
2 TABLET ORAL 2 TIMES DAILY
COMMUNITY
Start: 2017-10-02 | End: 2018-04-18 | Stop reason: ALTCHOICE

## 2018-02-27 RX ORDER — LEVOTHYROXINE SODIUM 0.05 MG/1
TABLET ORAL
COMMUNITY
Start: 2017-07-07 | End: 2018-06-26 | Stop reason: SDUPTHER

## 2018-02-27 RX ORDER — ALBUTEROL SULFATE 90 UG/1
AEROSOL, METERED RESPIRATORY (INHALATION)
COMMUNITY

## 2018-02-27 RX ORDER — METOPROLOL SUCCINATE 25 MG/1
25 TABLET, EXTENDED RELEASE ORAL
COMMUNITY
Start: 2016-02-08 | End: 2018-06-26 | Stop reason: SDUPTHER

## 2018-02-27 RX ORDER — WARFARIN SODIUM 5 MG/1
TABLET ORAL
COMMUNITY
Start: 2011-02-16 | End: 2019-05-09 | Stop reason: SDUPTHER

## 2018-02-27 RX ORDER — BIOTIN 1 MG
TABLET ORAL DAILY
COMMUNITY

## 2018-02-27 RX ORDER — DILTIAZEM HYDROCHLORIDE 300 MG/1
1 CAPSULE, COATED, EXTENDED RELEASE ORAL DAILY
COMMUNITY
Start: 2016-02-08 | End: 2018-06-26 | Stop reason: SDUPTHER

## 2018-02-27 RX ORDER — FUROSEMIDE 40 MG/1
40 TABLET ORAL 2 TIMES DAILY
COMMUNITY
Start: 2014-07-01 | End: 2018-04-18 | Stop reason: ALTCHOICE

## 2018-02-27 RX ORDER — DOXYCYCLINE HYCLATE 100 MG/1
1 CAPSULE ORAL 2 TIMES DAILY
COMMUNITY
Start: 2017-12-13 | End: 2018-04-18 | Stop reason: ALTCHOICE

## 2018-02-27 RX ORDER — HYDROCODONE BITARTRATE AND ACETAMINOPHEN 5; 300 MG/1; MG/1
1 TABLET ORAL EVERY 6 HOURS PRN
COMMUNITY
Start: 2016-02-08 | End: 2018-04-18 | Stop reason: ALTCHOICE

## 2018-02-27 RX ORDER — LORATADINE 10 MG/1
1 TABLET ORAL DAILY PRN
COMMUNITY
End: 2018-06-26 | Stop reason: ALTCHOICE

## 2018-02-27 RX ORDER — AMOXICILLIN 500 MG
1 CAPSULE ORAL DAILY
COMMUNITY
End: 2019-03-01

## 2018-02-27 RX ORDER — MONTELUKAST SODIUM 10 MG/1
1 TABLET ORAL DAILY
COMMUNITY
Start: 2011-02-26 | End: 2018-12-21 | Stop reason: SDUPTHER

## 2018-02-27 RX ORDER — POTASSIUM CHLORIDE 750 MG/1
1 CAPSULE, EXTENDED RELEASE ORAL DAILY
COMMUNITY
Start: 2016-02-08 | End: 2018-12-21 | Stop reason: SDUPTHER

## 2018-02-27 RX ORDER — FLUTICASONE PROPIONATE 50 MCG
2 SPRAY, SUSPENSION (ML) NASAL DAILY
COMMUNITY
Start: 2011-02-26 | End: 2019-05-09 | Stop reason: ALTCHOICE

## 2018-02-28 ENCOUNTER — OFFICE VISIT (OUTPATIENT)
Dept: INTERNAL MEDICINE CLINIC | Facility: CLINIC | Age: 83
End: 2018-02-28
Payer: MEDICARE

## 2018-02-28 VITALS
WEIGHT: 146 LBS | DIASTOLIC BLOOD PRESSURE: 72 MMHG | BODY MASS INDEX: 23.46 KG/M2 | SYSTOLIC BLOOD PRESSURE: 120 MMHG | HEART RATE: 78 BPM | HEIGHT: 66 IN | RESPIRATION RATE: 14 BRPM

## 2018-02-28 DIAGNOSIS — I10 ESSENTIAL HYPERTENSION: Chronic | ICD-10-CM

## 2018-02-28 DIAGNOSIS — E55.9 VITAMIN D DEFICIENCY: Chronic | ICD-10-CM

## 2018-02-28 DIAGNOSIS — I50.9 CONGESTIVE HEART FAILURE, UNSPECIFIED CONGESTIVE HEART FAILURE CHRONICITY, UNSPECIFIED CONGESTIVE HEART FAILURE TYPE: ICD-10-CM

## 2018-02-28 DIAGNOSIS — M79.605 PAIN OF LEFT LOWER EXTREMITY: Chronic | ICD-10-CM

## 2018-02-28 DIAGNOSIS — E03.9 HYPOTHYROIDISM, UNSPECIFIED TYPE: Chronic | ICD-10-CM

## 2018-02-28 DIAGNOSIS — M70.62 TROCHANTERIC BURSITIS OF LEFT HIP: Primary | ICD-10-CM

## 2018-02-28 DIAGNOSIS — J44.9 CHRONIC OBSTRUCTIVE PULMONARY DISEASE, UNSPECIFIED COPD TYPE (HCC): Chronic | ICD-10-CM

## 2018-02-28 DIAGNOSIS — I48.20 CHRONIC ATRIAL FIBRILLATION (HCC): Chronic | ICD-10-CM

## 2018-02-28 PROCEDURE — 99215 OFFICE O/P EST HI 40 MIN: CPT | Performed by: INTERNAL MEDICINE

## 2018-02-28 NOTE — PATIENT INSTRUCTIONS
TROCHANTERIC BURSITIS INJECT It    Do exercise  Switch from Furosemide 80mg to Torsemide 40mg (2- 20mg tablets) every morning  Add spirolactone as one twice per week  Decrease Potassium to once per day  Blood work 2 weeks after switch  Continue to weigh yourself daily  Repeat exam in 1 month

## 2018-02-28 NOTE — PROGRESS NOTES
Assessment/Plan:  1  Congestive heart failure-diastolic dysfunction aggravated by atrial fibrillation  Echocardiogram just done in January 2018 shows mild LVH with normal EF, mild mitral valve sclerosis with mild MR, marked left atrial and right atrial dilatation, mild aortic sclerosis without significant aortic valve disease, moderate TR with pulmonary artery pressure 40 millimeters  I suspect she has some ongoing low-grade CHF  I recommended she switch from Lasix 80 milligrams a day to torsemide 40 milligrams daily  She will add spironolactone 25 milligrams every 3 days  In 2 weeks will have follow-up labs  She will restrict salt  She will be re-evaluated in a month  2  Atrial fibrillation-adequate rate control on current dose of Cardizem and beta-blocker  Remains on anticoagulation with Coumadin  Will have a follow-up protime when she has her labs in 2 weeks  3  Shortness of breath-felt related to the above  Has a history COPD asthma but at this point predominantly appears to be from her CHF  4  Left leg pain-clinically this appears to be a trochanteric bursitis  She was counseled on this  She will perform appropriate exercises  Set up for an injection of left trochanteric bursa under fluoroscopic guidance  5  Hypertension-adequate control on current regimen  6  Osteopenia-most recent DEXA scan shows L-spine -2 2 and hip of -1  Subsequent bone density study due 2 years after last which would be March 2019  7  Plantar fasciitis-responded to physical therapy and exercise  8  Asthma-stable on current regimen  9  Pre diabetes-A1c 5 9-continue current therapy  10  Hypothyroidism-TSH stable on current dose of thyroid supplement  11  Tremor-likely benign essential tremor-monitor  12  Degenerative arthritis of the hands-she has seen him in a fan  13  Knee pain-had prior bilateral total knee replacement    1 point she was going back to orthopedic physician to check positioning of her prosthesis-review next visit  14  Abnormal area the pancreas on CT scan-t full discussion as per noted February 2015  Follow-up ultrasound unchanged  GI physician ordered MRI without contrast an MRCP done in 2016 which showed the pancreatic cystic lesions in the head, uncinate process and neck were unchanged suggesting side branch IPMN  There is no main pancreatic duct dilatation  Liver enzymes normal   I HAD ENCOURAGED HER TO GO BACK TO GI-CONSIDER REPEAT RADIOGRAPHIC STUDY NEXT VISIT    All other problems as per note of October 2015, 20 15 May 2014      MEDICAL REGIMEN:      Flonase nasal spray 2 squirts in each nostril once daily, torsemide 40 milligrams every morning using 20 milligram dosing, spironolactone 25 milligrams every 3 days, potassium chloride 10 milliequivalents daily, Coumadin with dose adjustments, generic Singulair 10 milligrams daily, levothyroxine 50 micrograms daily but 75 micrograms on Sunday, Claritin 10 milligrams daily as needed, calcium with vitamin-D daily, Ventolin inhaler as needed, metoprolol succinate 25 milligrams -2 tabs in the a m  and 1/2 tabs in the p m , Cardizem CD 3 100 milligrams a day, Pulmicort 100 micrograms-1 puff b i d , multivitamin, vitamin D3/2000 units a day, fish oil 1200 milligrams a day, rare use of Vicodin 5-300 half tablet b i d  p r n  for severe back pain    In 2 weeks random SMA and protime  Appointment  in 4-5 weeks    Addendum-laboratory testing on approximately March 15th shows BUN climbed to 30 with creatinine 1 18, potassium 4 1,-will continue current medicines and re-evaluate at next visit  No problem-specific Assessment & Plan notes found for this encounter  Diagnoses and all orders for this visit:    Trochanteric bursitis of left hip  -     FL injection left hip (non arthrogram);  Future    Congestive heart failure, unspecified congestive heart failure chronicity, unspecified congestive heart failure type ([facility] [address] )  -     Comprehensive metabolic panel; Future  -     Comprehensive metabolic panel    Hypothyroidism, unspecified type    Chronic obstructive pulmonary disease, unspecified COPD type ([facility] [address] )    Essential hypertension    Chronic atrial fibrillation (HCC)    Vitamin D deficiency    Pain of left lower extremity    Other orders  -     Calcium Carb-Cholecalciferol (CALCIUM 1000 + D) 1000-800 MG-UNIT TABS; Take by mouth  -     diltiazem (CARDIZEM CD) 300 mg 24 hr capsule; Take 1 capsule by mouth daily  -     loratadine (CLARITIN) 10 mg tablet; Take 1 tablet by mouth daily as needed  -     doxycycline hyclate (VIBRAMYCIN) 100 mg capsule; Take 1 capsule by mouth 2 (two) times a day  -     Omega-3 Fatty Acids (FISH OIL) 1200 MG CAPS; Take 1 tablet by mouth daily  -     fluticasone (FLONASE) 50 mcg/act nasal spray; 2 sprays into each nostril daily    -     furosemide (LASIX) 40 mg tablet; Take 40 mg by mouth 2 (two) times a day    -     HYDROcodone-acetaminophen (XODOL) 5-300 MG per tablet; Take 1 tablet by mouth every 6 (six) hours as needed    -     levothyroxine 50 mcg tablet; Take by mouth Take 1 tab daily expect 1 and 1/2 on sunday   -     metoprolol succinate (TOPROL-XL) 25 mg 24 hr tablet; Take 25 mg by mouth Take 2 tablets in the am and 1 and 1/2 in the evening daily   -     Multiple Vitamin (MULTI-DAY PO); Take 1 tablet by mouth daily  -     potassium chloride (MICRO-K) 10 MEQ CR capsule; Take 1 capsule by mouth 2 (two) times a day    -     predniSONE 10 mg tablet; Take 2 tablets by mouth 2 (two) times a day  -     Cholecalciferol (VITAMIN D3) 1000 units CAPS; Take by mouth daily  -     budesonide (PULMICORT FLEXHALER) 180 MCG/ACT inhaler; Inhale 1 puff 2 (two) times a day  -     montelukast (SINGULAIR) 10 mg tablet; Take 1 tablet by mouth daily  -     albuterol (VENTOLIN HFA) 90 mcg/act inhaler; Inhale  -     warfarin (COUMADIN) 5 mg tablet; Take by mouth          Subjective:      Patient ID: Elier Sorenson is a 80 y o  female      We went over multiple issues today  We had a long discussion today regarding her congestive heart failure  She had a follow-up echocardiogram performed by Cardiology that was reviewed  This showed mild LVH with normal EF, mitral valve sclerosis which was mild, marked right and left atrial dilatation, mild aortic sclerosis without significant stenosis with pulmonary artery pressure in the vicinity of 40 millimeters  On exam by I am concerned she still has some degree of volume overload  She had dullness to percussion at the right base and has minimal edema  She says she has increasing edema by the end of the day  She feels as though she is having increasing shortness of breath with activity  She denies pain or pressure in her chest   She denies any palpitations syncope or near syncope  We reviewed that sometimes with long-term Lasix therapy can be less effective when we elected to switch to a loop diuretic  We also elected to supplement with spironolactone  She will begin 25 milligrams twice per week  She will switch to dose equivalent of torsemide-she is currently on Lasix 80 milligrams a day and will switch to torsemide 40 milligrams a day  We reviewed that because of the potassium attention effect of spironolactone she will need less potassium supplement  In 2 weeks she will have follow-up labs    We reviewed mechanism of action of defer diuretics  I quizzed her on any other potential causes of failure  Specifically she denies any symptoms of sleep apnea  She does not have loud snoring, gasping while asleep etc   She adamantly denies any chest pain or pressure  She always has a history of asthma we have to watch that this does not contribute to her shortness of breath but based on exam today I suspect it is CHF  We had a long discussion regarding the above  This was a 45 minutes visit  More than 50% of the time was spent counseling the patient formulating a treatment plan    We went over drawing of the anatomy    She talked about a new left leg pain  She notes that in the upper lateral leg area  It is worse if she goes up steps as opposed to down  She said if she gets an in out of a car it hurts her  She can notice it at night when lying on her left hip  This but present for a month  It was relatively abrupt in onset  She wondered if it did began after exercise class at her assisted living facility  She has stop the exercise but her pain persists  She has occasional low back pain but not frequent  She denies numbness or tingling  She denies any urine or fecal incontinence  She denies any symptoms of the right leg  Laboratory testing prior to this visit reviewed  ProBNP is high at 1700  TSH 4 2 CBC is normal with a hemoglobin of 12 5 HDL 74 BUN 17 with a creatinine of 0 99 and a potassium of 4 1  Triglycerides are 66  Cholesterol 190 with an     She has known hypertension  BP adequately controlled on current regimen  She knows to avoid salt and decongestants  Denies hematemesis pounding of her heart sweats and headache  Her goal BP is under 130/80  She is on a complicated medical regimen and we reviewed that in detail as well  She was grossly wondering as to whether not the loop diuretic was still effective  This patient wanted to know their preferred analgesic agent  Because of their various comorbidities I recommended that this be acetaminophen  This patient has no history of chronic liver disease that would put them at greater risk for use of acetaminophen  This patient may use up to 500-650 mg of acetaminophen at a time and no more than 3 g a day total  Nonsteroidal anti-inflammatory agents have the potential to exacerbate hypertension, hypercoagulability, chronic renal failure, congestive heart failure, and various allergic tendencies  Talked about that previously in reviewed that again today  As noted she is on chronic Coumadin therapy          The following portions of the patient's history were reviewed and updated as appropriate: allergies, current medications, past family history, past medical history, past social history, past surgical history and problem list     Review of Systems   Constitutional: Positive for fatigue  Respiratory: Positive for shortness of breath  Cardiovascular: Positive for leg swelling  Gastrointestinal: Negative  Endocrine: Negative  Genitourinary: Negative  Musculoskeletal: Positive for back pain  Left lateral leg pain   Neurological: Negative  Hematological: Negative  Psychiatric/Behavioral: Negative  Objective:      /72   Pulse 78   Resp 14   Ht 5' 5 8" (1 671 m)   Wt 66 2 kg (146 lb)   BMI 23 71 kg/m²          Physical Exam   Constitutional: She is oriented to person, place, and time  She appears well-developed and well-nourished  No distress  HENT:   Head: Normocephalic and atraumatic  Right Ear: External ear normal    Left Ear: External ear normal    Nose: Nose normal    Mouth/Throat: Oropharynx is clear and moist  No oropharyngeal exudate  Eyes: Conjunctivae and EOM are normal  Pupils are equal, round, and reactive to light  Right eye exhibits no discharge  Left eye exhibits no discharge  No scleral icterus  Neck: Normal range of motion  Neck supple  No JVD present  No tracheal deviation present  No thyromegaly present  Cardiovascular: Normal rate and intact distal pulses  Exam reveals no gallop and no friction rub  No murmur heard  Irregularly irregular rhythm  2nd heart sound increased specifically P2   Pulmonary/Chest: Effort normal  No stridor  No respiratory distress  She has no wheezes  She has no rales  She exhibits no tenderness  Dullness to percussion at the right base   Abdominal: Soft  Bowel sounds are normal  She exhibits no distension and no mass  There is no tenderness  There is no rebound and no guarding  Musculoskeletal: Normal range of motion   She exhibits tenderness  She exhibits no edema or deformity  Tenderness over the left trochanteric bursa-some decrease in range of motion of both hips EVIDENCE OF PRIOR BILATERAL TOTAL KNEE REPLACEMENT   Lymphadenopathy:     She has no cervical adenopathy  Neurological: She is alert and oriented to person, place, and time  She has normal reflexes  She displays normal reflexes  No cranial nerve deficit  She exhibits normal muscle tone  Coordination normal    Skin: Skin is warm and dry  No rash noted  No erythema  No pallor  Psychiatric: She has a normal mood and affect  Her behavior is normal  Judgment and thought content normal    Vitals reviewed

## 2018-03-07 ENCOUNTER — HOSPITAL ENCOUNTER (OUTPATIENT)
Dept: RADIOLOGY | Facility: HOSPITAL | Age: 83
Discharge: HOME | End: 2018-03-07
Admitting: RADIOLOGY
Payer: MEDICARE

## 2018-03-07 DIAGNOSIS — M70.62 TROCHANTERIC BURSITIS OF LEFT HIP: ICD-10-CM

## 2018-03-07 PROCEDURE — 77002 NEEDLE LOCALIZATION BY XRAY: CPT

## 2018-03-07 PROCEDURE — 20610 DRAIN/INJ JOINT/BURSA W/O US: CPT

## 2018-03-07 RX ORDER — METHYLPREDNISOLONE ACETATE 80 MG/ML
80 INJECTION, SUSPENSION INTRA-ARTICULAR; INTRALESIONAL; INTRAMUSCULAR; SOFT TISSUE
Status: COMPLETED | OUTPATIENT
Start: 2018-03-07 | End: 2018-03-07

## 2018-03-07 RX ORDER — LIDOCAINE HYDROCHLORIDE 10 MG/ML
20 INJECTION, SOLUTION INFILTRATION; PERINEURAL
Status: COMPLETED | OUTPATIENT
Start: 2018-03-07 | End: 2018-03-07

## 2018-03-07 RX ORDER — BUPIVACAINE HYDROCHLORIDE 2.5 MG/ML
30 INJECTION, SOLUTION EPIDURAL; INFILTRATION; INTRACAUDAL
Status: COMPLETED | OUTPATIENT
Start: 2018-03-07 | End: 2018-03-07

## 2018-03-07 RX ADMIN — METHYLPREDNISOLONE ACETATE 80 MG: 80 INJECTION, SUSPENSION INTRA-ARTICULAR; INTRALESIONAL; INTRAMUSCULAR; SOFT TISSUE at 13:22

## 2018-03-07 RX ADMIN — IOHEXOL 3 ML: 300 INJECTION, SOLUTION INTRAVENOUS at 13:20

## 2018-03-07 RX ADMIN — BUPIVACAINE HYDROCHLORIDE 2 ML: 2.5 INJECTION, SOLUTION EPIDURAL; INFILTRATION; INTRACAUDAL at 13:21

## 2018-03-07 RX ADMIN — LIDOCAINE HYDROCHLORIDE 2 ML: 10 INJECTION, SOLUTION INFILTRATION; PERINEURAL at 13:21

## 2018-03-15 LAB
ALBUMIN SERPL-MCNC: 3.9 G/DL (ref 3.6–5.1)
ALBUMIN/GLOB SERPL: 1.3 (CALC) (ref 1–2.5)
ALP SERPL-CCNC: 73 U/L (ref 33–130)
ALT SERPL-CCNC: 12 U/L (ref 6–29)
AST SERPL-CCNC: 16 U/L (ref 10–35)
BILIRUB SERPL-MCNC: 0.6 MG/DL (ref 0.2–1.2)
BUN SERPL-MCNC: 30 MG/DL (ref 7–25)
BUN/CREAT SERPL: 25 (CALC) (ref 6–22)
CALCIUM SERPL-MCNC: 9.3 MG/DL (ref 8.6–10.4)
CHLORIDE SERPL-SCNC: 99 MMOL/L (ref 98–110)
CO2 SERPL-SCNC: 34 MMOL/L (ref 20–31)
CREAT SERPL-MCNC: 1.18 MG/DL (ref 0.6–0.88)
GLOBULIN SER CALC-MCNC: 3 G/DL (CALC) (ref 1.9–3.7)
GLUCOSE SERPL-MCNC: 82 MG/DL (ref 65–99)
POTASSIUM SERPL-SCNC: 4.1 MMOL/L (ref 3.5–5.3)
PROT SERPL-MCNC: 6.9 G/DL (ref 6.1–8.1)
SL AMB EGFR AFRICAN AMERICAN: 49 ML/MIN/1.73M2
SL AMB EGFR NON AFRICAN AMERICAN: 42 ML/MIN/1.73M2
SODIUM SERPL-SCNC: 141 MMOL/L (ref 135–146)

## 2018-03-19 ENCOUNTER — TELEPHONE (OUTPATIENT)
Dept: INTERNAL MEDICINE CLINIC | Facility: CLINIC | Age: 83
End: 2018-03-19

## 2018-03-19 NOTE — TELEPHONE ENCOUNTER
----- Message from Raj Sims MD sent at 3/18/2018  8:35 AM EDT -----  Call patient and let her know blood work just done stable-continue current meds    Dr  will review at next visit

## 2018-03-22 DIAGNOSIS — I50.32 CHRONIC DIASTOLIC CONGESTIVE HEART FAILURE (HCC): Primary | ICD-10-CM

## 2018-03-22 RX ORDER — SPIRONOLACTONE 25 MG/1
TABLET ORAL
Qty: 10 TABLET | Refills: 0 | Status: SHIPPED | OUTPATIENT
Start: 2018-03-22 | End: 2018-07-02 | Stop reason: SDUPTHER

## 2018-03-22 NOTE — TELEPHONE ENCOUNTER
PT STATED THAT SHE WANTS A 30 DAY SUPPLY UNTIL SHE KNOWS IF SHE LIKES IT AND WANTS TO CONTINUE TO TAKE IT

## 2018-04-16 RX ORDER — TORSEMIDE 20 MG/1
TABLET ORAL 2 TIMES DAILY
Refills: 1 | COMMUNITY
Start: 2018-03-28 | End: 2018-04-24 | Stop reason: SDUPTHER

## 2018-04-18 ENCOUNTER — OFFICE VISIT (OUTPATIENT)
Dept: INTERNAL MEDICINE CLINIC | Facility: CLINIC | Age: 83
End: 2018-04-18
Payer: MEDICARE

## 2018-04-18 VITALS
DIASTOLIC BLOOD PRESSURE: 76 MMHG | SYSTOLIC BLOOD PRESSURE: 120 MMHG | HEART RATE: 72 BPM | WEIGHT: 143.8 LBS | HEIGHT: 66 IN | BODY MASS INDEX: 23.11 KG/M2 | RESPIRATION RATE: 14 BRPM

## 2018-04-18 DIAGNOSIS — I50.32 CHRONIC DIASTOLIC CONGESTIVE HEART FAILURE (HCC): Chronic | ICD-10-CM

## 2018-04-18 DIAGNOSIS — I10 ESSENTIAL HYPERTENSION: Chronic | ICD-10-CM

## 2018-04-18 DIAGNOSIS — I45.2 BIFASCICULAR BUNDLE BRANCH BLOCK: Chronic | ICD-10-CM

## 2018-04-18 DIAGNOSIS — I48.20 CHRONIC ATRIAL FIBRILLATION (HCC): Chronic | ICD-10-CM

## 2018-04-18 DIAGNOSIS — G25.0 ESSENTIAL TREMOR: Chronic | ICD-10-CM

## 2018-04-18 DIAGNOSIS — E03.9 HYPOTHYROIDISM, UNSPECIFIED TYPE: Primary | Chronic | ICD-10-CM

## 2018-04-18 DIAGNOSIS — J45.20 MILD INTERMITTENT ASTHMA WITHOUT COMPLICATION: Chronic | ICD-10-CM

## 2018-04-18 DIAGNOSIS — E78.00 PURE HYPERCHOLESTEROLEMIA: Chronic | ICD-10-CM

## 2018-04-18 DIAGNOSIS — K21.9 GASTROESOPHAGEAL REFLUX DISEASE, ESOPHAGITIS PRESENCE NOT SPECIFIED: Chronic | ICD-10-CM

## 2018-04-18 DIAGNOSIS — R73.03 PREDIABETES: Chronic | ICD-10-CM

## 2018-04-18 PROCEDURE — 99214 OFFICE O/P EST MOD 30 MIN: CPT | Performed by: INTERNAL MEDICINE

## 2018-04-19 NOTE — PROGRESS NOTES
Assessment/Plan:  1  Congestive heart qwuqrqy-fgutcqdhw-phbidcyfcm by atrial fibrillation  Echo done January 2018 shows mild LVH with normal EF, mild mitral valve sclerosis with mild MR, marked left atrial right atrial dilatation, mild aortic sclerosis without significant aortic valve disease, moderate TR with mild pulmonary artery hypertension with a pressure 40 millimeters  Improved with switch last visit from Lasix to torsemide and addition of spironolactone  Follow-up labs showed minimal elevation of her BU N  She will continue current regimen  She denies any symptoms of sleep apnea  2  Atrial fibrillation-adequate rate control on current dose of Cardizem a beta-blocker  We made no adjustments in that regard today  She remains on anticoagulation with Coumadin  3  Shortness of breath-felt related to the above  Has a history COPD asthma but with treatment of her CHF she is clearly better  4  Left leg pain-clinically appears to be a trochanteric bursitis  Responded to injection under fluoroscopic guidance and is much improved  5  Hypertension-adequate control on current regimen  6  Osteopenia-most recent DEXA scan shows L-spine -2 2 and hip of -1  Subsequent bone density study due 2 years after last which would be March 2019  7  Plantar fasciitis-responded to physical therapy and exercise  8  Asthma-stable on current regimen  9  Pre diabetes-A1c 5 9-continue current therapy  10  Hypothyroidism-TSH stable on current dose of thyroid supplement  11  Tremor-likely benign essential tremor-monitor  12  Degenerative arthritis of the hands-she has seen him in a fan  13  Knee pain-had prior bilateral total knee replacement  1 point she was going back to orthopedic physician to check positioning of her prosthesis-review next visit  14  Abnormal area the pancreas on CT scan-t full discussion as per noted February 2015  Follow-up ultrasound unchanged    GI physician ordered MRI without contrast an MRCP done in 2016 which showed the pancreatic cystic lesions in the head, uncinate process and neck were unchanged suggesting side branch IPMN  There is no main pancreatic duct dilatation  Liver enzymes normal   I HAD ENCOURAGED HER TO GO BACK TO GI-CONSIDER REPEAT RADIOGRAPHIC STUDY NEXT VISIT     All other problems as per note of October 2015, 20 15 May 2014        MEDICAL REGIMEN:                                                  Flonase nasal spray 2 squirts in each nostril once daily, torsemide 40 milligrams every morning using 20 milligram dosing, spironolactone 25 milligrams every 3 days, potassium chloride 10 milliequivalents daily, Coumadin with dose adjustments, generic Singulair 10 milligrams daily, levothyroxine 50 micrograms daily but 75 micrograms on Sunday, Claritin 10 milligrams daily as needed, calcium with vitamin-D daily, Ventolin inhaler as needed, metoprolol succinate 25 milligrams -2 tabs in the a m  and 1/2 tabs in the p m , Cardizem CD 3 100 milligrams a day, Pulmicort 100 micrograms-1 puff b i d , multivitamin, vitamin D3/2000 units a day, fish oil 1200 milligrams a day, rare use of Vicodin 5-300 half tablet b i d  p r n  for severe back pain    Appointment over the next several months with prior chemistry profile cholesterol profile hemoglobin A1c     No problem-specific Assessment & Plan notes found for this encounter  Diagnoses and all orders for this visit:    Hypothyroidism, unspecified type  -     Comprehensive metabolic panel; Future  -     Cholesterol, total; Future  -     Triglycerides; Future  -     HDL cholesterol; Future  -     TSH, 3rd generation; Future  -     LDL cholesterol, direct;  Future  -     Comprehensive metabolic panel  -     Cholesterol, total  -     Triglycerides  -     HDL cholesterol  -     TSH, 3rd generation  -     LDL cholesterol, direct    Chronic diastolic congestive heart failure (HCC)    Chronic atrial fibrillation (HCC)    Bifascicular bundle branch block    Essential hypertension    Gastroesophageal reflux disease, esophagitis presence not specified    Mild intermittent asthma without complication    Essential tremor    Pure hypercholesterolemia  -     Comprehensive metabolic panel; Future  -     Cholesterol, total; Future  -     Triglycerides; Future  -     HDL cholesterol; Future  -     TSH, 3rd generation; Future  -     LDL cholesterol, direct; Future  -     Comprehensive metabolic panel  -     Cholesterol, total  -     Triglycerides  -     HDL cholesterol  -     TSH, 3rd generation  -     LDL cholesterol, direct    Prediabetes    Other orders  -     torsemide (DEMADEX) 20 mg tablet; Take by mouth 2 (two) times a day            Subjective:      Patient ID: Vivien Dey is a 80 y o  female  She returns for repeat evaluation of her congestive heart failure  Prior visit we switched her from Lasix to torsemide  In addition we added spironolactone twice weekly  She says on this regimen she is overall definitely improved  She had follow-up laboratory testing done on March 15th with BUN of 30 and creatinine 1 18  Potassium was normal at 4 1  We reviewed that her BUN is mildly elevated felt related to her diuretic therapy but she clearly feels significantly better  We elected therefore to continue current regimen  She knows to restrict fluids  She knows to restrict salt  He has not had any chest pain or pressure  Her weight is down 3 pounds with treatment of her CHF-she denies fever chills or other systemic symptoms    Her prior visit clinically she was felt to have a trochanteric bursitis  She had been referred to interventional Radiology had a successful left trochanteric bursa injection in March 2018  She says her hip pain is significantly improved since that time  We had a long discussion today regarding [address]  We reviewed the difference between systolic and diastolic heart failure  She clearly appears to understand    She knows we need to control her heart rate with her atrial fib  She knows that she should continue her diuretic therapy  I quizzed her on any symptoms of sleep apnea and she denies this  She is well aware this sleep apnea could exacerbate all the above  She has known pre diabetes  Most recent A1c 5 9  She is trying to substitute fruits and vegetables for breads etc     This was a 30 minutes visit  More than 50 percent of the time was spent counseling the patient today regarding her CHF  Treatment plan formulated  She has known hypertension  BP adequately controlled on current regimen  Avoiding salt and decongestants  Denies hematemesis pounding of her heart sweats and headache        The following portions of the patient's history were reviewed and updated as appropriate: current medications, past family history, past medical history, past social history, past surgical history and problem list     Review of Systems   Constitutional: Negative  Respiratory: Positive for shortness of breath  Cardiovascular: Positive for leg swelling  Gastrointestinal: Negative  Endocrine: Negative  Genitourinary: Negative  Musculoskeletal: Positive for arthralgias  Neurological: Negative  Hematological: Negative  Psychiatric/Behavioral: Negative  Objective:      /76   Pulse 72   Resp 14   Ht 5' 5 8" (1 671 m)   Wt 65 2 kg (143 lb 12 8 oz)   BMI 23 35 kg/m²          Physical Exam   Constitutional: She is oriented to person, place, and time  She appears well-developed and well-nourished  No distress  HENT:   Head: Normocephalic and atraumatic  Right Ear: External ear normal    Left Ear: External ear normal    Nose: Nose normal    Mouth/Throat: Oropharynx is clear and moist  No oropharyngeal exudate  Eyes: Conjunctivae and EOM are normal  Pupils are equal, round, and reactive to light  Right eye exhibits no discharge  Left eye exhibits no discharge  No scleral icterus     Neck: Normal range of motion  Neck supple  No JVD present  No tracheal deviation present  No thyromegaly present  Cardiovascular: Normal rate and intact distal pulses  Exam reveals no gallop and no friction rub  No murmur heard  Irregular rhythm  Increase 2nd heart sound   Pulmonary/Chest: Effort normal and breath sounds normal  No respiratory distress  She has no wheezes  She has no rales  She exhibits no tenderness  Abdominal: Soft  Bowel sounds are normal  She exhibits no distension and no mass  There is no tenderness  There is no rebound and no guarding  Musculoskeletal: Normal range of motion  She exhibits no edema or deformity  Lymphadenopathy:     She has no cervical adenopathy  Neurological: She is alert and oriented to person, place, and time  She has normal reflexes  No cranial nerve deficit  She exhibits normal muscle tone  Coordination normal    Skin: Skin is warm and dry  No rash noted  No erythema  Psychiatric: She has a normal mood and affect  Her behavior is normal  Judgment and thought content normal    Vitals reviewed

## 2018-04-24 DIAGNOSIS — I50.32 CHRONIC DIASTOLIC CONGESTIVE HEART FAILURE (HCC): Primary | ICD-10-CM

## 2018-04-24 RX ORDER — TORSEMIDE 20 MG/1
TABLET ORAL
Qty: 60 TABLET | Refills: 1 | Status: SHIPPED | OUTPATIENT
Start: 2018-04-24 | End: 2018-07-02 | Stop reason: SDUPTHER

## 2018-06-25 ENCOUNTER — TELEPHONE (OUTPATIENT)
Dept: INTERNAL MEDICINE CLINIC | Facility: CLINIC | Age: 83
End: 2018-06-25

## 2018-06-25 NOTE — TELEPHONE ENCOUNTER
[address] for 1 week    -especially while laying down  -head congestion  - brining up clear mucus  - very tired  - edema has been decreased  Pharmacy: [facility]  Medication allergies listed  Patient has not taken anything otc, patient states she hasn't had any medication changes other than discontinuing lasix & putting patient torsemide

## 2018-06-25 NOTE — TELEPHONE ENCOUNTER
A she will need an appointment with her history of CHF-schedule her with Dr James Roblero for Tuesday June 26

## 2018-06-26 ENCOUNTER — APPOINTMENT (OUTPATIENT)
Dept: RADIOLOGY | Age: 83
End: 2018-06-26
Payer: MEDICARE

## 2018-06-26 ENCOUNTER — APPOINTMENT (OUTPATIENT)
Dept: LAB | Age: 83
End: 2018-06-26
Payer: MEDICARE

## 2018-06-26 ENCOUNTER — TELEPHONE (OUTPATIENT)
Dept: INTERNAL MEDICINE CLINIC | Facility: CLINIC | Age: 83
End: 2018-06-26

## 2018-06-26 ENCOUNTER — TRANSCRIBE ORDERS (OUTPATIENT)
Dept: ADMINISTRATIVE | Age: 83
End: 2018-06-26

## 2018-06-26 ENCOUNTER — OFFICE VISIT (OUTPATIENT)
Dept: INTERNAL MEDICINE CLINIC | Facility: CLINIC | Age: 83
End: 2018-06-26
Payer: MEDICARE

## 2018-06-26 VITALS
HEIGHT: 66 IN | OXYGEN SATURATION: 95 % | SYSTOLIC BLOOD PRESSURE: 110 MMHG | DIASTOLIC BLOOD PRESSURE: 80 MMHG | BODY MASS INDEX: 22.92 KG/M2 | HEART RATE: 62 BPM | TEMPERATURE: 97.8 F | WEIGHT: 142.6 LBS

## 2018-06-26 DIAGNOSIS — R06.00 DOE (DYSPNEA ON EXERTION): ICD-10-CM

## 2018-06-26 DIAGNOSIS — I50.32 CHRONIC DIASTOLIC CONGESTIVE HEART FAILURE (HCC): Chronic | ICD-10-CM

## 2018-06-26 DIAGNOSIS — R06.00 DOE (DYSPNEA ON EXERTION): Primary | ICD-10-CM

## 2018-06-26 DIAGNOSIS — J01.90 ACUTE NON-RECURRENT SINUSITIS, UNSPECIFIED LOCATION: ICD-10-CM

## 2018-06-26 DIAGNOSIS — E03.9 HYPOTHYROIDISM, UNSPECIFIED TYPE: Chronic | ICD-10-CM

## 2018-06-26 DIAGNOSIS — I48.20 CHRONIC ATRIAL FIBRILLATION (HCC): Chronic | ICD-10-CM

## 2018-06-26 DIAGNOSIS — I50.32 CHRONIC DIASTOLIC CONGESTIVE HEART FAILURE (HCC): Primary | Chronic | ICD-10-CM

## 2018-06-26 LAB
ANION GAP SERPL CALCULATED.3IONS-SCNC: 8 MMOL/L (ref 4–13)
BASOPHILS # BLD AUTO: 0.04 THOUSANDS/ΜL (ref 0–0.1)
BASOPHILS NFR BLD AUTO: 1 % (ref 0–1)
BUN SERPL-MCNC: 17 MG/DL (ref 5–25)
CALCIUM SERPL-MCNC: 9.2 MG/DL (ref 8.3–10.1)
CHLORIDE SERPL-SCNC: 102 MMOL/L (ref 100–108)
CO2 SERPL-SCNC: 30 MMOL/L (ref 21–32)
CREAT SERPL-MCNC: 1.12 MG/DL (ref 0.6–1.3)
EOSINOPHIL # BLD AUTO: 1.69 THOUSAND/ΜL (ref 0–0.61)
EOSINOPHIL NFR BLD AUTO: 19 % (ref 0–6)
ERYTHROCYTE [DISTWIDTH] IN BLOOD BY AUTOMATED COUNT: 13.3 % (ref 11.6–15.1)
GFR SERPL CREATININE-BSD FRML MDRD: 45 ML/MIN/1.73SQ M
GLUCOSE SERPL-MCNC: 94 MG/DL (ref 65–140)
HCT VFR BLD AUTO: 42.6 % (ref 34.8–46.1)
HGB BLD-MCNC: 13.6 G/DL (ref 11.5–15.4)
IMM GRANULOCYTES # BLD AUTO: 0.03 THOUSAND/UL (ref 0–0.2)
IMM GRANULOCYTES NFR BLD AUTO: 0 % (ref 0–2)
LYMPHOCYTES # BLD AUTO: 1.65 THOUSANDS/ΜL (ref 0.6–4.47)
LYMPHOCYTES NFR BLD AUTO: 19 % (ref 14–44)
MCH RBC QN AUTO: 31.9 PG (ref 26.8–34.3)
MCHC RBC AUTO-ENTMCNC: 31.9 G/DL (ref 31.4–37.4)
MCV RBC AUTO: 100 FL (ref 82–98)
MONOCYTES # BLD AUTO: 0.56 THOUSAND/ΜL (ref 0.17–1.22)
MONOCYTES NFR BLD AUTO: 6 % (ref 4–12)
NEUTROPHILS # BLD AUTO: 4.72 THOUSANDS/ΜL (ref 1.85–7.62)
NEUTS SEG NFR BLD AUTO: 55 % (ref 43–75)
NRBC BLD AUTO-RTO: 0 /100 WBCS
NT-PROBNP SERPL-MCNC: 4231 PG/ML
PLATELET # BLD AUTO: 300 THOUSANDS/UL (ref 149–390)
PMV BLD AUTO: 10.9 FL (ref 8.9–12.7)
POTASSIUM SERPL-SCNC: 3.5 MMOL/L (ref 3.5–5.3)
RBC # BLD AUTO: 4.26 MILLION/UL (ref 3.81–5.12)
SODIUM SERPL-SCNC: 140 MMOL/L (ref 136–145)
WBC # BLD AUTO: 8.69 THOUSAND/UL (ref 4.31–10.16)

## 2018-06-26 PROCEDURE — 36415 COLL VENOUS BLD VENIPUNCTURE: CPT | Performed by: INTERNAL MEDICINE

## 2018-06-26 PROCEDURE — 85025 COMPLETE CBC W/AUTO DIFF WBC: CPT | Performed by: INTERNAL MEDICINE

## 2018-06-26 PROCEDURE — 80048 BASIC METABOLIC PNL TOTAL CA: CPT

## 2018-06-26 PROCEDURE — 99214 OFFICE O/P EST MOD 30 MIN: CPT | Performed by: INTERNAL MEDICINE

## 2018-06-26 PROCEDURE — 71046 X-RAY EXAM CHEST 2 VIEWS: CPT

## 2018-06-26 PROCEDURE — 83880 ASSAY OF NATRIURETIC PEPTIDE: CPT | Performed by: INTERNAL MEDICINE

## 2018-06-26 RX ORDER — METOPROLOL SUCCINATE 25 MG/1
TABLET, EXTENDED RELEASE ORAL
Qty: 315 TABLET | Refills: 1 | Status: SHIPPED | OUTPATIENT
Start: 2018-06-26 | End: 2018-12-21 | Stop reason: SDUPTHER

## 2018-06-26 RX ORDER — CEFUROXIME AXETIL 250 MG/1
250 TABLET ORAL EVERY 12 HOURS SCHEDULED
Qty: 20 TABLET | Refills: 0 | Status: SHIPPED | OUTPATIENT
Start: 2018-06-26 | End: 2018-07-06

## 2018-06-26 RX ORDER — DILTIAZEM HYDROCHLORIDE 300 MG/1
300 CAPSULE, COATED, EXTENDED RELEASE ORAL DAILY
Qty: 90 CAPSULE | Refills: 1 | Status: SHIPPED | OUTPATIENT
Start: 2018-06-26 | End: 2018-12-21 | Stop reason: SDUPTHER

## 2018-06-26 RX ORDER — LEVOTHYROXINE SODIUM 0.05 MG/1
TABLET ORAL
Qty: 97 TABLET | Refills: 1 | Status: SHIPPED | OUTPATIENT
Start: 2018-06-26 | End: 2018-12-21 | Stop reason: SDUPTHER

## 2018-06-26 NOTE — TELEPHONE ENCOUNTER
----- Message from Blair Pimentel DO sent at 6/26/2018  5:07 PM EDT -----  To yonathan ACEVEDO call pt:  BW and x-ray shows signs of CHF  Please continue with current plan and schedule a follow up visit with Dr Delfino Goins next Monday    If symptoms worsen, go to ER

## 2018-06-26 NOTE — PATIENT INSTRUCTIONS
1  Chest x-ray today  2  Blood work today  3  Take antibiotic as prescribed  4  Take an additional torsemide tablet this morning and for next 2 days, then resume previous dose  5  If symptoms worsen, go to Emergency Room    Dyspnea   AMBULATORY CARE:   What is dyspnea? Dyspnea is breathing difficulty or discomfort  You may have labored, painful, or shallow breathing  You may feel breathless or short of breath  Dyspnea can occur during rest or with activity  You may have dyspnea for a short time, or it might become chronic  Dyspnea is often a symptom of a disease or condition  An allergic reaction, anxiety, or travel to high altitudes can increase your risk for dyspnea  Your risk is also increased by a lung condition such as asthma, a heart condition such as heart failure, or a nerve condition  Being overweight or smoking cigarettes can also lead to dyspnea  Signs and symptoms that can occur with dyspnea:   · Chest tightness or pain    · Cough or a coarse or high-pitched noise when you breathe    · Pale and sweaty, cool skin    · Confusion and tiredness    · Bluish-gray lips or nails  Seek care immediately if:   · Your signs and symptoms are the same or worse within 24 hours of treatment  · You have shaking chills or a fever over 102°F      · You have new pain, pressure, or tightness in your chest      · You have a new or worse cough or wheezing, or you cough up blood  · You feel like you cannot get enough air  · The skin over your ribs or on your neck sinks in when you breathe  · You have a severe headache with vomiting and abdominal pain  · You feel confused or dizzy  Contact your healthcare provider if:   · You have questions or concerns about your condition or care  Treatment:  You will work with your healthcare provider to treat the condition causing your dyspnea  You may need the following to improve your symptoms:  · Oxygen therapy  may be used to help you breathe easier   You may need oxygen if your blood oxygen level is lower than it should be  · Medicines  may be used to treat the cause of your dyspnea  Medicines may reduce swelling in your airway or decrease extra fluid from around your heart or lungs  Other medicines may be used to decrease anxiety and help you feel calm and relaxed  · Pulmonary rehabilitation  is used to reduce your symptoms while keeping you active  You may learn breathing techniques, muscle strengthening, and how to pace yourself when you are active  Manage long-term dyspnea:   · Create an action plan  You and your healthcare provider can work together to create a plan for how to handle episodes of dyspnea  The plan can include daily activities, treatment changes, and what to do if you have severe breathing problems  · Lean forward on your elbows when you sit  This helps your lungs expand and may make it easier to breathe  · Use pursed-lip breathing any time you feel short of breath  Breathe in through your nose and then slowly breathe out through your mouth with your lips slightly puckered  It should take you twice as long to breathe out as it did to breathe in  · Do not smoke  Nicotine and other chemicals in cigarettes and cigars can cause lung damage and make it harder to breathe  Ask your healthcare provider for information if you currently smoke and need help to quit  E-cigarettes or smokeless tobacco still contain nicotine  Talk to your healthcare provider before you use these products  · Reach or maintain a healthy weight  Your healthcare provider can help you create a safe weight loss plan if you are overweight  · Exercise as directed  Exercise can help your lungs work more easily  Exercise can also help you lose weight if needed  Try to get at least 30 minutes of exercise most days of the week  Your healthcare provider can help you create an exercise plan that is safe for you    Follow up with your healthcare provider or specialist as directed:  Write down your questions so you remember to ask them during your visits  © 2017 [address] [employer] Information is for End User's use only and may not be sold, redistributed or otherwise used for commercial purposes  All illustrations and images included in CareNotes® are the copyrighted property of A D A M , Inc  or [employer]  The above information is an  only  It is not intended as medical advice for individual conditions or treatments  Talk to your doctor, nurse or pharmacist before following any medical regimen to see if it is safe and effective for you

## 2018-06-26 NOTE — PROGRESS NOTES
Assessment/Plan:     Diagnoses and all orders for this visit:    Chronic diastolic congestive heart failure ([facility] )  Comments:  suspect CHF, BW and X-ray now and increase torsemide dose for 3 days  precuations advised should sx worsen -> ER  Orders:  -     XR chest pa & lateral; Future  -     Basic metabolic panel; Future  -     CBC and differential  -     B-Type Natriuretic Peptide; Future    TANG (dyspnea on exertion)  -     XR chest pa & lateral; Future  -     Basic metabolic panel; Future  -     CBC and differential  -     B-Type Natriuretic Peptide; Future    Acute non-recurrent sinusitis, unspecified location  Comments:  abx rx, rest, nasal saline prn  Orders:  -     cefuroxime (CEFTIN) 250 mg tablet; Take 1 tablet (250 mg total) by mouth every 12 (twelve) hours for 10 days    Hypothyroidism, unspecified type  Comments:  needs refill on TRH, taking daily and no side effects  Orders:  -     levothyroxine 50 mcg tablet; Take 1 tab daily expect 1 and 1/2 on sunday    Chronic atrial fibrillation ([facility] )  Comments:  takes BB/CCB with adequate heart rate control and no side effects, rx refilled per pt request  Orders:  -     diltiazem (CARDIZEM CD) 300 mg 24 hr capsule; Take 1 capsule (300 mg total) by mouth daily  -     metoprolol succinate (TOPROL-XL) 25 mg 24 hr tablet; Take 2 tablets in the am and 1 and 1/2 in the evening daily          Subjective:      Patient ID: Jeevan Pinzon is a 80 y o  female  HPI    New to me here with c/o SOB/TANG for last 1 week  Denies fever, chills, wheezing or chest tightness  No productive cough but coughing and SOB when lying down(orthopnea)  Hx of diastolic HF in past and feels like the same again  No chest pain, hx of COPD Or asthma  Sees Dr Jaqueline Lord as PCP  Cardiology(Dr sanches?) manages her warfarin, for Afib  Had nuclear stress test last year which was WNL per pt    Also having sinus congestion/postnasal drip and sinus pressure with feeling unwell and aches for last 1 week   Pt worried she also has a sinus infection  Able to speak in clear, concise sentences and in no acute distress  Past Medical History:   Diagnosis Date    Abnormal ultrasound     RESOLVED: [id number]    Asthma with acute exacerbation     LAST ASSESSED: [id number]    Asymptomatic menopausal state     Atherosclerosis     Chronic obstructive lung disease (HCC)     Congestive heart failure (HCC)     LAST ASSESSED: [id number]    Cuboid fracture     LAST ASSESSED: [id number]    Dyspepsia     GERD (gastroesophageal reflux disease)     High risk medication use     LAST ASSESSED: [id number]    Hypertension     Hypothyroidism     Impacted cerumen of both ears     LAST ASSESSED: [id number]    Impacted cerumen of right ear     LAST ASSESSED: [id number]    Influenza     LAST ASSESSED: [id number]    IPMN (intraductal papillary mucinous neoplasm)     RESOLVED: [id number]    Limb swelling     LAST ASSESSED: [id number]    Navicular fracture of ankle     LAST ASSESSED: [id number]    Onychomycosis     LAST ASSESSED: [id number]    Osteoarthritis     Osteopenia     Osteoporosis     Paronychia, finger, unspecified laterality     LAST ASSESSED: [id number]    Paroxysmal atrial fibrillation (HCC)     LAST ASSESSED: [id number]    Peripheral vascular disease (HCC)     Plantar fasciitis     Talus fracture     LAST ASSESSED: [id number]    Vascular headache      Vitals:    06/26/18 0859   BP: 110/80   BP Location: Left arm   Patient Position: Sitting   Cuff Size: Adult   Pulse: 62   Temp: 97 8 °F (36 6 °C)   TempSrc: Tympanic   SpO2: 95%   Weight: 64 7 kg (142 lb 9 6 oz)   Height: 5' 5 8" (1 671 m)     Body mass index is 23 16 kg/m²      Current Outpatient Prescriptions:     albuterol (VENTOLIN HFA) 90 mcg/act inhaler, Inhale, Disp: , Rfl:     budesonide (PULMICORT FLEXHALER) 180 MCG/ACT inhaler, Inhale 1 puff 2 (two) times a day, Disp: , Rfl:     Calcium Carb-Cholecalciferol (CALCIUM 1000 + D) 1000-800 MG-UNIT TABS, Take by mouth, Disp: , Rfl:     cefuroxime (CEFTIN) 250 mg tablet, Take 1 tablet (250 mg total) by mouth every 12 (twelve) hours for 10 days, Disp: 20 tablet, Rfl: 0    Cholecalciferol (VITAMIN D3) 1000 units CAPS, Take by mouth daily, Disp: , Rfl:     diltiazem (CARDIZEM CD) 300 mg 24 hr capsule, Take 1 capsule (300 mg total) by mouth daily, Disp: 90 capsule, Rfl: 1    fluticasone (FLONASE) 50 mcg/act nasal spray, 2 sprays into each nostril 2 (two) times a day as needed  , Disp: , Rfl:     levothyroxine 50 mcg tablet, Take 1 tab daily expect 1 and 1/2 on sunday, Disp: 97 tablet, Rfl: 1    metoprolol succinate (TOPROL-XL) 25 mg 24 hr tablet, Take 2 tablets in the am and 1 and 1/2 in the evening daily, Disp: 315 tablet, Rfl: 1    montelukast (SINGULAIR) 10 mg tablet, Take 1 tablet by mouth daily, Disp: , Rfl:     Multiple Vitamin (MULTI-DAY PO), Take 1 tablet by mouth daily, Disp: , Rfl:     Omega-3 Fatty Acids (FISH OIL) 1200 MG CAPS, Take 1 tablet by mouth daily, Disp: , Rfl:     potassium chloride (MICRO-K) 10 MEQ CR capsule, Take 1 capsule by mouth daily  , Disp: , Rfl:     spironolactone (ALDACTONE) 25 mg tablet, TAKE 1 TABLET TWICE WEEKLY, Disp: 10 tablet, Rfl: 0    torsemide (DEMADEX) 20 mg tablet, TAKE 2 TABLETS BY MOUTH IN THE MORNING, Disp: 60 tablet, Rfl: 1    warfarin (COUMADIN) 5 mg tablet, Take by mouth, Disp: , Rfl:   Allergies   Allergen Reactions    Asa [Aspirin]     Doxycycline GI Intolerance    Nsaids     Zafirlukast Headache         Review of Systems   Constitutional: Negative for fever  HENT: Positive for congestion, postnasal drip and sinus pressure  Respiratory: Positive for cough  Negative for wheezing  Cardiovascular: Negative for chest pain  Gastrointestinal: Negative for abdominal pain  Genitourinary: Negative for difficulty urinating  Musculoskeletal: Negative for arthralgias  Allergic/Immunologic: Positive for environmental allergies     Neurological: Negative for headaches  Psychiatric/Behavioral: Negative for dysphoric mood  Objective:      /80 (BP Location: Left arm, Patient Position: Sitting, Cuff Size: Adult)   Pulse 62   Temp 97 8 °F (36 6 °C) (Tympanic)   Ht 5' 5 8" (1 671 m)   Wt 64 7 kg (142 lb 9 6 oz)   SpO2 95%   BMI 23 16 kg/m²          Physical Exam   Constitutional: She appears well-developed and well-nourished  No distress  HENT:   Head: Normocephalic and atraumatic  Nose: Mucosal edema present  Right sinus exhibits maxillary sinus tenderness and frontal sinus tenderness  Left sinus exhibits maxillary sinus tenderness and frontal sinus tenderness  Mouth/Throat: Posterior oropharyngeal erythema present  Eyes: Conjunctivae are normal  Pupils are equal, round, and reactive to light  Cardiovascular: Normal rate, regular rhythm and normal heart sounds  No murmur heard  Pulmonary/Chest: Effort normal  She has decreased breath sounds  She has no wheezes  Abdominal: Soft  Bowel sounds are normal  There is no tenderness  Musculoskeletal: She exhibits no edema  Lymphadenopathy:     She has no cervical adenopathy  Neurological: She is alert  Psychiatric: She has a normal mood and affect  Her behavior is normal    Vitals reviewed

## 2018-06-26 NOTE — TELEPHONE ENCOUNTER
Sp/w pt and made aware of results,  Pt is unable to come at [facility] on monday and unable to come Tuesday to follow up with Cynthia Sharma, however she can come Thursday 7/5  Let me know if this is ok    Thanks

## 2018-06-30 ENCOUNTER — OFFICE VISIT (OUTPATIENT)
Dept: INTERNAL MEDICINE CLINIC | Facility: CLINIC | Age: 83
End: 2018-06-30
Payer: MEDICARE

## 2018-06-30 VITALS
BODY MASS INDEX: 22.67 KG/M2 | SYSTOLIC BLOOD PRESSURE: 128 MMHG | DIASTOLIC BLOOD PRESSURE: 80 MMHG | RESPIRATION RATE: 14 BRPM | HEART RATE: 84 BPM | WEIGHT: 139.6 LBS

## 2018-06-30 DIAGNOSIS — I10 ESSENTIAL HYPERTENSION: Chronic | ICD-10-CM

## 2018-06-30 DIAGNOSIS — J44.9 CHRONIC OBSTRUCTIVE PULMONARY DISEASE, UNSPECIFIED COPD TYPE (HCC): Chronic | ICD-10-CM

## 2018-06-30 DIAGNOSIS — I50.9 CONGESTIVE HEART FAILURE, UNSPECIFIED HF CHRONICITY, UNSPECIFIED HEART FAILURE TYPE (HCC): ICD-10-CM

## 2018-06-30 DIAGNOSIS — E03.9 HYPOTHYROIDISM, UNSPECIFIED TYPE: Chronic | ICD-10-CM

## 2018-06-30 DIAGNOSIS — I48.91 ATRIAL FIBRILLATION, UNSPECIFIED TYPE (HCC): ICD-10-CM

## 2018-06-30 DIAGNOSIS — I50.33 ACUTE ON CHRONIC DIASTOLIC HEART FAILURE (HCC): Primary | ICD-10-CM

## 2018-06-30 PROCEDURE — 99215 OFFICE O/P EST HI 40 MIN: CPT | Performed by: INTERNAL MEDICINE

## 2018-06-30 PROCEDURE — 93000 ELECTROCARDIOGRAM COMPLETE: CPT | Performed by: INTERNAL MEDICINE

## 2018-06-30 NOTE — PROGRESS NOTES
Assessment/Plan:   1  Acute on chronic diastolic heart failure -echo done in January shows mild LVH with normal EF, mild mitral valve sclerosis, mild MR, marked left and right atrial dilatation, mild aortic sclerosis without significant aortic valve disease, moderate TR with mild pulmonary artery hypertension with pressure 40 millimeters  Improved with switch to torsemide but now with exacerbation  She will increase her baseline torsemide 60 milligrams  She will increase spironolactone to daily  She will have repeat Holter monitored demonstrate adequate rate control  Denies symptoms of sleep apnea  Myoview over the last 2 years negative  She will have labs in 2 weeks to make sure we are not over diuresing  2  Atrial fibrillation -adequate rate control on current dose of Cardizem and beta-blocker  Remains on anticoagulation with Coumadin  24 Holter ordered to make sure we have adequate control  3  Shortness of breath -felt related to the above  Has history COPD and asthma but current issues are related to her CHF   4  Recent possible sinusitis -covering physician treated her with Ceftin and she will complete therapy with that  5    Hypertension-adequate control on current regimen  6   Osteopenia-most recent DEXA scan shows L-spine -2 2 and hip of -1   Subsequent bone density study due 2 years after last which would be March 2019  7   Plantar fasciitis-responded to physical therapy and exercise  8   Asthma-stable on current regimen  9   Pre diabetes-A1c 5 9-continue current therapy  10   Hypothyroidism-TSH stable on current dose of thyroid supplement  11   Tremor-likely benign essential tremor-monitor  12   Degenerative arthritis of the hands-she has seen him in a fan  13   Knee pain-had prior bilateral total knee replacement   1 point she was going back to orthopedic physician to check positioning of her prosthesis-review next visit  14   Abnormal area the pancreas on CT scan-t full discussion as per noted February 2015   Follow-up ultrasound unchanged  [address] physician ordered MRI without contrast an MRCP done in 2016 which showed the pancreatic cystic lesions in the head, uncinate process and neck were unchanged suggesting side branch IPMN   There is no main pancreatic duct dilatation   Liver enzymes normal   I HAD ENCOURAGED HER TO GO BACK TO GI-CONSIDER REPEAT RADIOGRAPHIC STUDY NEXT VISIT  5  Left leg pain -felt to be trochanteric bursitis-responded to injection and improved     All other problems as per note of October 2015, 20 15 May 2014        MEDICAL REGIMEN:                                                  Flonase nasal spray 2 squirts in each nostril once daily, torsemide 40 milligrams in a m  And 20 milligrams in the p m  Using 20 milligram dosing -if weight increased 3 pounds  In 1 day or 5 pounds overall will increase to torsemide 40 milligrams b i d  For 3 days spironolactone 25  Milligrams daily potassium chloride 10 milliequivalents daily, Coumadin with dose adjustments, generic Singulair 10 milligrams daily, levothyroxine 50 micrograms daily but 75 micrograms on Sunday, Claritin 10 milligrams daily as needed, calcium with vitamin-D daily, Ventolin inhaler as needed, metoprolol succinate 25 milligrams -2 tabs in the a m  and 1/2 tabs in the p m , Cardizem CD 3 100 milligrams a day, Pulmicort 100 micrograms-1 puff b i d , multivitamin, vitamin D3/2000 units a day, fish oil 1200 milligrams a day, rare use of Vicodin 5-300 half tablet b i d  p r n  for severe back pain    Scheduled for chemistry profile TSH over the next 2-3 weeks    Addendum-24 Holter shows adequate control of her atrial fibrillation-maximal heart rate 120 low heart rate in the high 40s while asleep but overall control appears adequate    Addendum-laboratory testing done in July 2018 shows TSH normal magnesium normal total protein mildly elevated at 8 4    BUN climbed to 41 and creatinine climbed to 1 79-she will decrease her torsemide to an alternating day regimen of 60 milligrams on the 1st day and 40 milligrams on the 2nd day-she will decrease her spironolactone to every other day     No problem-specific Assessment & Plan notes found for this encounter  Diagnoses and all orders for this visit:    Acute on chronic diastolic heart failure (HCC)    Chronic obstructive pulmonary disease, unspecified COPD type ([facility] )    Essential hypertension    Hypothyroidism, unspecified type    Congestive heart failure, unspecified HF chronicity, unspecified heart failure type ([facility] )  -     POCT ECG          Subjective:      Patient ID: Libby Allen is a 80 y o  female  This patient is seen today as an emergency appointment  she recently had worsening shortness of breath  She was seen by the covering physician in felt to have exacerbation of congestive heart failure her  Chest x-ray was done a consistent with CHF  Lab work consistent with CHF  Shortness of breath was gradual in onset - she did not have any associated chest pain or pressure  She normally restrict salt  She will trying restrict fluids more  As a baseline she was on torsemide 40 milligrams with spironolactone 25 milligrams    She has a history of atrial fibrillation  Holter monitor done less than a year ago showed adequate rate control  Had a negative nuclear stress test over the past 2 years  She denies any symptoms of sleep apnea  She denies snoring  She denies any known apnea while asleep  Her  has not noted any symptoms  This patient denies any systemic symptoms  Specifically there has been no evidence of fever, night sweats, significant weight loss or significant decrease in appetite  We had a long discussion today regarding the  Difference between systolic and diastolic heart failure  We went over in detail possible contributing mechanisms  She understands the importance of salt water restriction  She will weigh herself daily at home    We will repeat the 24 Holter  Follow-up labs done also showed stable CBC and SMA  Covering physician also put her on Ceftin for potential sinusitis -she is on a 10 day course  She denies large amounts of purulent sputum  She has baseline asthma  She wanted to know if any of her shortness of breath was from her asthma whether not she should continue her asthma treatment  I explained her she shortness of breath for both issues and she should continue treatment for her asthma  We reviewed the addition of spironolactone  We reviewed that we have to be careful with over diuresis  She will increase her torsemide to daily 60 milligrams and increase her spironolactone to daily  She will have follow-up labs over the next 2 weeks  She will weigh herself daily at home and increase torsemide if for weight is increasing 3 pounds in a day or 5 pounds overall        The following portions of the patient's history were reviewed and updated as appropriate: current medications, past family history, past medical history, past social history, past surgical history and problem list     Review of Systems   Constitutional: Negative  Respiratory: Positive for shortness of breath  Cardiovascular: Negative  Gastrointestinal: Negative  Endocrine: Negative  Genitourinary: Negative  Musculoskeletal: Negative  Neurological: Negative  Hematological: Negative  Psychiatric/Behavioral: Negative  Objective:      /80   Pulse 84   Resp 14   Wt 63 3 kg (139 lb 9 6 oz)   BMI 22 67 kg/m²          Physical Exam   Constitutional: She is oriented to person, place, and time  She appears well-developed and well-nourished  No distress  HENT:   Head: Normocephalic and atraumatic  Right Ear: External ear normal    Left Ear: External ear normal    Nose: Nose normal    Mouth/Throat: Oropharynx is clear and moist  No oropharyngeal exudate     Eyes: Conjunctivae and EOM are normal  Pupils are equal, round, and reactive to light  Right eye exhibits no discharge  Left eye exhibits no discharge  No scleral icterus  Neck: Normal range of motion  Neck supple  No JVD present  No tracheal deviation present  No thyromegaly present  Cardiovascular: Normal rate and intact distal pulses  Exam reveals no gallop and no friction rub  No murmur heard  Increased 2nd heart soundIrregular rhythm   Pulmonary/Chest: Effort normal and breath sounds normal  No respiratory distress  She has no wheezes  She has no rales  She exhibits no tenderness  Abdominal: Soft  Bowel sounds are normal  She exhibits no distension and no mass  There is no tenderness  There is no rebound and no guarding  Musculoskeletal: Normal range of motion  She exhibits no edema or deformity  Lymphadenopathy:     She has no cervical adenopathy  Neurological: She is alert and oriented to person, place, and time  She has normal reflexes  No cranial nerve deficit  She exhibits normal muscle tone  Coordination normal    Skin: Skin is warm and dry  No rash noted  No erythema  Psychiatric: She has a normal mood and affect   Her behavior is normal  Judgment and thought content normal

## 2018-07-02 DIAGNOSIS — I50.32 CHRONIC DIASTOLIC CONGESTIVE HEART FAILURE (HCC): ICD-10-CM

## 2018-07-02 RX ORDER — SPIRONOLACTONE 25 MG/1
25 TABLET ORAL DAILY
Qty: 90 TABLET | Refills: 3 | Status: SHIPPED | OUTPATIENT
Start: 2018-07-02 | End: 2019-12-20 | Stop reason: SDUPTHER

## 2018-07-02 RX ORDER — TORSEMIDE 20 MG/1
TABLET ORAL
Qty: 270 TABLET | Refills: 3 | Status: SHIPPED | OUTPATIENT
Start: 2018-07-02 | End: 2018-08-14 | Stop reason: SDUPTHER

## 2018-07-06 ENCOUNTER — HOSPITAL ENCOUNTER (OUTPATIENT)
Dept: NON INVASIVE DIAGNOSTICS | Facility: CLINIC | Age: 83
Discharge: HOME | End: 2018-07-06
Payer: MEDICARE

## 2018-07-06 DIAGNOSIS — I48.91 ATRIAL FIBRILLATION, UNSPECIFIED TYPE (HCC): ICD-10-CM

## 2018-07-06 DIAGNOSIS — I50.9 CONGESTIVE HEART FAILURE, UNSPECIFIED HF CHRONICITY, UNSPECIFIED HEART FAILURE TYPE (HCC): ICD-10-CM

## 2018-07-06 PROCEDURE — 93226 XTRNL ECG REC<48 HR SCAN A/R: CPT

## 2018-07-06 PROCEDURE — 93225 XTRNL ECG REC<48 HRS REC: CPT

## 2018-07-09 ENCOUNTER — TELEPHONE (OUTPATIENT)
Dept: INTERNAL MEDICINE CLINIC | Facility: CLINIC | Age: 83
End: 2018-07-09

## 2018-07-09 NOTE — TELEPHONE ENCOUNTER
I FAXED A PAPER SCRIPT FOR PT'S SPIRONOLACTONE 25MGS 1 PO EVERY THREE DAYS DISP 90 WITH 3 REFILLS TO [facility]

## 2018-07-10 ENCOUNTER — TELEPHONE (OUTPATIENT)
Dept: INTERNAL MEDICINE CLINIC | Facility: CLINIC | Age: 83
End: 2018-07-10

## 2018-07-10 PROCEDURE — 93227 XTRNL ECG REC<48 HR R&I: CPT | Performed by: INTERNAL MEDICINE

## 2018-07-10 NOTE — TELEPHONE ENCOUNTER
----- Message from Yordan Ramirez MD sent at 7/10/2018  2:25 PM EDT -----  Please call the patient regarding her Holter monitor-shows adequate control of her atrial fibrillation

## 2018-07-10 NOTE — PROGRESS NOTES
Please call the patient regarding her Holter monitor-shows adequate control of her atrial fibrillation

## 2018-07-11 ENCOUNTER — TRANSCRIBE ORDERS (OUTPATIENT)
Dept: ADMINISTRATIVE | Age: 83
End: 2018-07-11

## 2018-07-11 ENCOUNTER — APPOINTMENT (OUTPATIENT)
Dept: LAB | Age: 83
End: 2018-07-11
Payer: MEDICARE

## 2018-07-11 DIAGNOSIS — E03.9 HYPOTHYROIDISM, UNSPECIFIED TYPE: Chronic | ICD-10-CM

## 2018-07-11 DIAGNOSIS — I50.9 CONGESTIVE HEART FAILURE, UNSPECIFIED HF CHRONICITY, UNSPECIFIED HEART FAILURE TYPE (HCC): ICD-10-CM

## 2018-07-11 LAB
ALBUMIN SERPL BCP-MCNC: 3.9 G/DL (ref 3.5–5)
ALP SERPL-CCNC: 83 U/L (ref 46–116)
ALT SERPL W P-5'-P-CCNC: 24 U/L (ref 12–78)
ANION GAP SERPL CALCULATED.3IONS-SCNC: 6 MMOL/L (ref 4–13)
AST SERPL W P-5'-P-CCNC: 27 U/L (ref 5–45)
BILIRUB SERPL-MCNC: 0.65 MG/DL (ref 0.2–1)
BUN SERPL-MCNC: 41 MG/DL (ref 5–25)
CALCIUM SERPL-MCNC: 9.3 MG/DL (ref 8.3–10.1)
CHLORIDE SERPL-SCNC: 100 MMOL/L (ref 100–108)
CO2 SERPL-SCNC: 30 MMOL/L (ref 21–32)
CREAT SERPL-MCNC: 1.79 MG/DL (ref 0.6–1.3)
GFR SERPL CREATININE-BSD FRML MDRD: 25 ML/MIN/1.73SQ M
GLUCOSE SERPL-MCNC: 135 MG/DL (ref 65–140)
MAGNESIUM SERPL-MCNC: 2.5 MG/DL (ref 1.6–2.6)
POTASSIUM SERPL-SCNC: 4 MMOL/L (ref 3.5–5.3)
PROT SERPL-MCNC: 8.4 G/DL (ref 6.4–8.2)
SODIUM SERPL-SCNC: 136 MMOL/L (ref 136–145)
TSH SERPL DL<=0.05 MIU/L-ACNC: 2.74 UIU/ML (ref 0.36–3.74)

## 2018-07-11 PROCEDURE — 84443 ASSAY THYROID STIM HORMONE: CPT

## 2018-07-11 PROCEDURE — 83735 ASSAY OF MAGNESIUM: CPT

## 2018-07-11 PROCEDURE — 80053 COMPREHEN METABOLIC PANEL: CPT

## 2018-07-11 PROCEDURE — 36415 COLL VENOUS BLD VENIPUNCTURE: CPT

## 2018-07-13 NOTE — TELEPHONE ENCOUNTER
Spoke with pharmacist     Patient is on spirolactone 25mg I po daily  She got disp 90 with 3 refills

## 2018-07-16 ENCOUNTER — TELEPHONE (OUTPATIENT)
Dept: INTERNAL MEDICINE CLINIC | Facility: CLINIC | Age: 83
End: 2018-07-16

## 2018-07-16 DIAGNOSIS — I50.9 CONGESTIVE HEART FAILURE, UNSPECIFIED HF CHRONICITY, UNSPECIFIED HEART FAILURE TYPE (HCC): Primary | ICD-10-CM

## 2018-07-16 NOTE — TELEPHONE ENCOUNTER
----- Message from Astrid Otto MD sent at 7/16/2018  7:30 AM EDT -----  Please call patient letter no recent labs show we need to decrease her diuretic dose-she should decrease her spironolactone to every other day-potassium dosing stays the same-she should change her torsemide to an alternating 2 day regimen-day 1  Take 60 milligrams and on day 2    Take 40 milligrams-schedule follow-up random chemistry profile in 3 weeks no fast with diagnosis of CHF

## 2018-07-16 NOTE — TELEPHONE ENCOUNTER
LEFT MESSAGE FOR PT TO CALL BACK SO THAT WE COULD DISCUSS THE MEDICATION CHANGES   I ALSO MAILED HOME THE LAB SLIP SHE'LL USE IN THREE WEEKS WHEN SHE HAS THE REPEAT LABS

## 2018-07-24 ENCOUNTER — ANNUAL EXAM (OUTPATIENT)
Dept: OBGYN CLINIC | Facility: CLINIC | Age: 83
End: 2018-07-24
Payer: MEDICARE

## 2018-07-24 VITALS
WEIGHT: 135 LBS | BODY MASS INDEX: 23.05 KG/M2 | SYSTOLIC BLOOD PRESSURE: 110 MMHG | HEIGHT: 64 IN | DIASTOLIC BLOOD PRESSURE: 64 MMHG

## 2018-07-24 DIAGNOSIS — Z12.31 ENCOUNTER FOR SCREENING MAMMOGRAM FOR MALIGNANT NEOPLASM OF BREAST: ICD-10-CM

## 2018-07-24 DIAGNOSIS — Z01.419 ENCOUNTER FOR GYNECOLOGICAL EXAMINATION (GENERAL) (ROUTINE) WITHOUT ABNORMAL FINDINGS: Primary | ICD-10-CM

## 2018-07-24 PROCEDURE — G0145 SCR C/V CYTO,THINLAYER,RESCR: HCPCS | Performed by: OBSTETRICS & GYNECOLOGY

## 2018-07-24 PROCEDURE — G0101 CA SCREEN;PELVIC/BREAST EXAM: HCPCS | Performed by: OBSTETRICS & GYNECOLOGY

## 2018-07-24 NOTE — PATIENT INSTRUCTIONS
This 80year-old patient was told that her pelvic exam and breast exam are normal  She will call if she sees any vaginal bleeding over the next year  She will return in 2 years for followup gyn evaluation due to her urine stress incontinence

## 2018-07-24 NOTE — PROGRESS NOTES
Assessment/Plan: This 80year-old patient is seen today for yearly gyn evaluation  She occasionally experiences urine stress incontinence if she has respiratory infection  No problem-specific Assessment & Plan notes found for this encounter  Subjective:      Patient ID: Rufino Chacon is a 80 y o  female  This 80year-old patient has had no vaginal bleeding or episodes of vaginitis over the past year  She did lose 25 pounds over the past 2 years which is probably due to eating differently as well as diuretics which she has taken for congestive heart failure  Her bowel function is normal   When she has a respiratory infection she will lose urine does wear pads at that time  She has no chronic headaches or fainting spells or hot flashes  She does to bed about 11 at night and gets up about 7 in the morning  If she wakes up at night she may choose to watch television for short period of time  Review of Systems   Constitutional: Negative  HENT: Negative  Eyes: Negative  Respiratory: Negative  Cardiovascular: Negative  Gastrointestinal: Negative  Endocrine: Negative  Genitourinary: Negative  She may wear pads at times when she has urinary stress incontinence associated with respiratory infections  Musculoskeletal: Negative  Skin: Negative  Allergic/Immunologic: Negative  Neurological: Negative  Hematological: Negative  Psychiatric/Behavioral: Negative  Objective:      /64 (BP Location: Left arm, Patient Position: Sitting, Cuff Size: Standard)   Ht 5' 4" (1 626 m)   Wt 61 2 kg (135 lb)   BMI 23 17 kg/m²          Physical Exam   Constitutional: She is oriented to person, place, and time  She appears well-developed and well-nourished  HENT:   Head: Normocephalic  Neck: Normal range of motion  Neck supple  Cardiovascular: Normal rate, regular rhythm, normal heart sounds and intact distal pulses      Pulmonary/Chest: Effort normal and breath sounds normal    Abdominal: Soft  Bowel sounds are normal    Genitourinary: Uterus normal    Musculoskeletal: Normal range of motion  Neurological: She is alert and oriented to person, place, and time  Skin: Skin is warm and dry  Psychiatric: She has a normal mood and affect  Nursing note and vitals reviewed  breast exam is normal   Her pelvic exam shows months of a normal uterus which is well supported  No adnexal masses are identified  Her cervix is normal to appearance  There is no blood or discharge in the vagina  The vulva is normal  Rectal exam shows no bladder masses in the rectum and no nodularity in the cul-de-sac

## 2018-07-26 LAB
LAB AP GYN PRIMARY INTERPRETATION: NORMAL
Lab: NORMAL

## 2018-07-31 ENCOUNTER — HOSPITAL ENCOUNTER (OUTPATIENT)
Dept: RADIOLOGY | Age: 83
Discharge: HOME | End: 2018-07-31
Payer: MEDICARE

## 2018-07-31 ENCOUNTER — TRANSCRIBE ORDERS (OUTPATIENT)
Dept: ADMINISTRATIVE | Age: 83
End: 2018-07-31

## 2018-07-31 ENCOUNTER — APPOINTMENT (OUTPATIENT)
Dept: LAB | Age: 83
End: 2018-07-31
Payer: MEDICARE

## 2018-07-31 DIAGNOSIS — I48.91 ATRIAL FIBRILLATION, UNSPECIFIED TYPE (HCC): ICD-10-CM

## 2018-07-31 DIAGNOSIS — I48.91 ATRIAL FIBRILLATION, UNSPECIFIED TYPE (HCC): Primary | ICD-10-CM

## 2018-07-31 DIAGNOSIS — Z12.31 ENCOUNTER FOR SCREENING MAMMOGRAM FOR MALIGNANT NEOPLASM OF BREAST: ICD-10-CM

## 2018-07-31 LAB
INR PPP: 4.57 (ref 0.86–1.17)
PROTHROMBIN TIME: 43.2 SECONDS (ref 11.8–14.2)

## 2018-07-31 PROCEDURE — 36415 COLL VENOUS BLD VENIPUNCTURE: CPT

## 2018-07-31 PROCEDURE — 77067 SCR MAMMO BI INCL CAD: CPT

## 2018-07-31 PROCEDURE — 85610 PROTHROMBIN TIME: CPT

## 2018-08-07 ENCOUNTER — APPOINTMENT (OUTPATIENT)
Dept: LAB | Age: 83
End: 2018-08-07
Payer: MEDICARE

## 2018-08-07 DIAGNOSIS — I50.9 CONGESTIVE HEART FAILURE, UNSPECIFIED HF CHRONICITY, UNSPECIFIED HEART FAILURE TYPE (HCC): ICD-10-CM

## 2018-08-07 LAB
ALBUMIN SERPL BCP-MCNC: 3.4 G/DL (ref 3.5–5)
ALP SERPL-CCNC: 72 U/L (ref 46–116)
ALT SERPL W P-5'-P-CCNC: 21 U/L (ref 12–78)
ANION GAP SERPL CALCULATED.3IONS-SCNC: 3 MMOL/L (ref 4–13)
AST SERPL W P-5'-P-CCNC: 23 U/L (ref 5–45)
BILIRUB SERPL-MCNC: 0.5 MG/DL (ref 0.2–1)
BUN SERPL-MCNC: 30 MG/DL (ref 5–25)
CALCIUM SERPL-MCNC: 8.9 MG/DL (ref 8.3–10.1)
CHLORIDE SERPL-SCNC: 102 MMOL/L (ref 100–108)
CO2 SERPL-SCNC: 34 MMOL/L (ref 21–32)
CREAT SERPL-MCNC: 1.23 MG/DL (ref 0.6–1.3)
GFR SERPL CREATININE-BSD FRML MDRD: 40 ML/MIN/1.73SQ M
GLUCOSE P FAST SERPL-MCNC: 95 MG/DL (ref 65–99)
POTASSIUM SERPL-SCNC: 3.6 MMOL/L (ref 3.5–5.3)
PROT SERPL-MCNC: 7.4 G/DL (ref 6.4–8.2)
SODIUM SERPL-SCNC: 139 MMOL/L (ref 136–145)

## 2018-08-07 PROCEDURE — 80053 COMPREHEN METABOLIC PANEL: CPT

## 2018-08-07 PROCEDURE — 36415 COLL VENOUS BLD VENIPUNCTURE: CPT

## 2018-08-14 DIAGNOSIS — I50.32 CHRONIC DIASTOLIC CONGESTIVE HEART FAILURE (HCC): ICD-10-CM

## 2018-08-14 RX ORDER — TORSEMIDE 20 MG/1
TABLET ORAL
Qty: 180 TABLET | Refills: 3 | Status: SHIPPED | OUTPATIENT
Start: 2018-08-14 | End: 2018-10-15 | Stop reason: SDUPTHER

## 2018-08-15 ENCOUNTER — OFFICE VISIT (OUTPATIENT)
Dept: INTERNAL MEDICINE CLINIC | Facility: CLINIC | Age: 83
End: 2018-08-15
Payer: MEDICARE

## 2018-08-15 VITALS
WEIGHT: 139 LBS | BODY MASS INDEX: 22.34 KG/M2 | DIASTOLIC BLOOD PRESSURE: 78 MMHG | HEIGHT: 66 IN | HEART RATE: 72 BPM | RESPIRATION RATE: 14 BRPM | SYSTOLIC BLOOD PRESSURE: 112 MMHG

## 2018-08-15 DIAGNOSIS — E78.01 FAMILIAL HYPERCHOLESTEROLEMIA: ICD-10-CM

## 2018-08-15 DIAGNOSIS — I48.20 CHRONIC ATRIAL FIBRILLATION (HCC): Chronic | ICD-10-CM

## 2018-08-15 DIAGNOSIS — I10 ESSENTIAL HYPERTENSION: Chronic | ICD-10-CM

## 2018-08-15 DIAGNOSIS — E03.9 HYPOTHYROIDISM, UNSPECIFIED TYPE: ICD-10-CM

## 2018-08-15 DIAGNOSIS — I50.33 ACUTE ON CHRONIC DIASTOLIC HEART FAILURE (HCC): ICD-10-CM

## 2018-08-15 DIAGNOSIS — E55.9 VITAMIN D DEFICIENCY: Primary | ICD-10-CM

## 2018-08-15 DIAGNOSIS — I50.32 CHRONIC DIASTOLIC CONGESTIVE HEART FAILURE (HCC): Chronic | ICD-10-CM

## 2018-08-15 PROCEDURE — 99214 OFFICE O/P EST MOD 30 MIN: CPT | Performed by: INTERNAL MEDICINE

## 2018-08-15 NOTE — PROGRESS NOTES
Flonase nasal spray 2 squirts in each nostril once daily, torsemide 40 milligrams in a m  And 20 milligrams in the p m  Using 20 milligram dosing -if weight increased 3 pounds  In 1 day or 5 pounds overall will increase to torsemide 40 milligrams b i d  For 3 days spironolactone 25  Milligrams daily potassium chloride 10 milliequivalents daily, Coumadin with dose adjustments, generic Singulair 10 milligrams daily, levothyroxine 50 micrograms daily but 75 micrograms on Sunday, Claritin 10 milligrams daily as needed, calcium with vitamin-D daily, Ventolin inhaler as needed, metoprolol succinate 25 milligrams -2 tabs in the a m  and 1/2 tabs in the p m , Cardizem CD 3 100 milligrams a day, Pulmicort 100 micrograms-1 puff b i d , multivitamin, vitamin D3/2000 units a day, fish oil 1200 milligrams a day, rare use of Vicodin 5-300 half tablet b i d  p r n  for severe back pain      D/C-  Calcium with vitamin D          Changes:     Torsemide 20mg; Day 1 [Take 2 tabs in the am & 1 tab at pm]  Day 2- Take 1 tab in the am and 1 tab in the pm   spironolactone (ALDACTONE) 25 mg tablet- Takes 1 tab every other day    Multi-vitamin- takes occasional  Fish oil- Takes occasional

## 2018-08-15 NOTE — PROGRESS NOTES
Assessment/Plan:  1  Acute on chronic diastolic heart failure -echo done in January shows mild LVH with normal EF, mild mitral valve sclerosis, mild MR, marked left and right atrial dilatation, mild aortic sclerosis without significant aortic valve disease, moderate TR with mild pulmonary artery hypertension with pressure 40 millimeters  Improved with switch to torsemide but with exacerbation at last visit  Diuretic dose increased-initially spironolactone 25 milligrams daily and torsemide 60 milligrams daily but this led to over diuresis  Now on spironolactone 25 milligrams every other day and alternating day regimen of 60 milligrams of torsemide on the 1st day and 40 milligrams on the 2nd day and clearly improved-for repeat labs prior to next visit  2  Atrial fibrillation -adequate rate control on current dose of Cardizem and beta-blocker  Remains on anticoagulation with Coumadin  24 Holter Holter just done shows adequate rate control with maximal heart rate 120 and low heart rate in the 40s while asleep  Overall control adequate  3  Shortness of breath -felt related to the above  Has history COPD and asthma but current issues are related to her CHF   4   Recent possible sinusitis -covering physician treated her with Ceftin and she will complete therapy with that  5   Hypertension-adequate control on current regimen  6   Osteopenia-most recent DEXA scan shows L-spine -2 2 and hip of -1   Subsequent bone density study due 2 years after last which would be March 2019  7   Plantar fasciitis-responded to physical therapy and exercise  8   Asthma-stable on current regimen  9   Pre diabetes-A1c 5 9-continue current therapy  10   Hypothyroidism-TSH stable on current dose of thyroid supplement  11   Tremor-likely benign essential tremor-monitor  12   Degenerative arthritis of the hands-she has seen him in a fan  13   Knee pain-had prior bilateral total knee replacement   1 point she was going back to orthopedic physician to check positioning of her prosthesis-review next visit  14   Abnormal area the pancreas on CT scan-t full discussion as per noted February 2015   Follow-up ultrasound unchanged  [address] physician ordered MRI without contrast an MRCP done in 2016 which showed the pancreatic cystic lesions in the head, uncinate process and neck were unchanged suggesting side branch IPMN   There is no main pancreatic duct dilatation   Liver enzymes normal   I HAD ENCOURAGED HER TO GO BACK TO GI-CONSIDER REPEAT RADIOGRAPHIC STUDY NEXT VISIT  15  Left leg pain -felt to be trochanteric bursitis-responded to injection and improved  16  Health maintenance-HAS PRESCRIPTION FOR [email or web address] [facility] VACCINE     All other problems as per note of October 2015, 20 15 May 2014      MEDICAL REGIMEN:      Flonase nasal spray used intermittently, torsemide on day 140 milligrams in the a m  and 20 milligrams in the p m  on day 2   40 milligrams in a m , spironolactone 25 milligrams every other day, potassium chloride 10 milliequivalents daily, Coumadin with dose adjustments, generic Singulair 10 milligrams daily, levothyroxine 50 micrograms daily but 75 micrograms on Sunday, Claritin 10 milligrams daily as needed, calcium with vitamin-D daily, Ventolin inhaler as needed, metoprolol succinate 25 milligrams-2 tablets in the a m  and 1/2 tablets in the p m , Cardizem  milligrams a day, Pulmicort 100 micrograms 1 puff b i d , multivitamin, vitamin D3/2000 units a day, fish oil 1200 milligrams a day, rare use of Vicodin 5-300 half tablet b i d  p r n  for severe back pain    Appointment in several months with prior labs    Addendum- saw gi patient requested because of age and comorbidities to stop surveillance of pancreatic cysts- no further ultrasounds or mris        No problem-specific Assessment & Plan notes found for this encounter  Diagnoses and all orders for this visit:    Vitamin D deficiency  -     Comprehensive metabolic panel;  Future  - Cholesterol, total; Future  -     Triglycerides; Future  -     HDL cholesterol; Future  -     LDL cholesterol, direct; Future  -     TSH, 3rd generation; Future  -     Vitamin D 25 hydroxy; Future    Familial hypercholesterolemia  -     Comprehensive metabolic panel; Future  -     Cholesterol, total; Future  -     Triglycerides; Future  -     HDL cholesterol; Future  -     LDL cholesterol, direct; Future  -     TSH, 3rd generation; Future  -     Vitamin D 25 hydroxy; Future    Hypothyroidism, unspecified type  -     Comprehensive metabolic panel; Future  -     Cholesterol, total; Future  -     Triglycerides; Future  -     HDL cholesterol; Future  -     LDL cholesterol, direct; Future  -     TSH, 3rd generation; Future  -     Vitamin D 25 hydroxy; Future    Chronic atrial fibrillation (HCC)    Chronic diastolic congestive heart failure (HCC)    Acute on chronic diastolic heart failure (HCC)    Essential hypertension          Subjective:      Patient ID: Francisco Dennis is a 80 y o  female  She responded to higher dose of diuretics  24 Holter was performed  Patient was called with results-specifically this showed adequate control of her AFib-maximal heart rate 120 low heart rate in the high 40s well as sleep but overall controlled  Adequate  Follow-up labs done in July showed TSH normal, magnesium normal, total protein mildly elevated at 8 4  BUN and climbed to 41 and creatinine was 1 79  We then decreased her spironolactone every other day decreased her torsemide to an alternating day regimen of 60 milligrams on the 1st day and 40 milligrams on the 2nd day  Since that time her numbers are improved  Labs done over the last week show a BUN of 30 with a creatinine 1 23 total protein now normal at 7 4 and albumin of 3 4  She has known diastolic heart failure  This is aggravated by atrial fibrillation  Much improved on higher dose of diuretic therapy    She denies worsening shortness of breath with activity  She denies increasing orthopnea or proximal nocturnal dyspnea  She is ready restricting salt    Talked about the new zoster vaccine  She has a prescription for this will be obtaining that  She understands this is a 2 part vaccine  She understands there is a higher incidence of side effects with this vaccine  This patient denies any systemic symptoms  Specifically there has been no evidence of fever, night sweats, significant weight loss or significant decrease in appetite  She has known hypertension  BP adequately controlled on current regimen  Avoiding salt and decongestants  Denies hematemesis pounding of her heart sweats and headache  She has known osteopenia  Subsequent bone density study due in March 2019  She was also scheduled for follow-up vitamin-D level prior to next visit  Cardiology is monitoring her protime  She recently had climb in her INR  We reviewed about potential interaction with see Mary  She knows her preferred analgesic agent is acetaminophen        The following portions of the patient's history were reviewed and updated as appropriate: current medications, past family history, past medical history, past social history, past surgical history and problem list     Review of Systems   Constitutional: Negative  Respiratory: Positive for shortness of breath  Cardiovascular: Negative  Gastrointestinal: Negative  Endocrine: Negative  Genitourinary: Negative  Musculoskeletal: Positive for arthralgias  Neurological: Negative  Hematological: Negative  Psychiatric/Behavioral: Negative  Objective:      /78   Pulse 72   Resp 14   Ht 5' 5 8" (1 671 m)   Wt 63 kg (139 lb)   BMI 22 57 kg/m²          Physical Exam   Constitutional: She is oriented to person, place, and time  She appears well-developed and well-nourished  No distress  HENT:   Head: Normocephalic and atraumatic     Right Ear: External ear normal    Left Ear: External ear normal    Nose: Nose normal    Mouth/Throat: Oropharynx is clear and moist  No oropharyngeal exudate  Eyes: Conjunctivae and EOM are normal  Pupils are equal, round, and reactive to light  Right eye exhibits no discharge  Left eye exhibits no discharge  No scleral icterus  Neck: Normal range of motion  Neck supple  No JVD present  No tracheal deviation present  No thyromegaly present  Cardiovascular: Normal rate, regular rhythm and intact distal pulses  Exam reveals no gallop and no friction rub  No murmur heard  Irregular rhythm  Increase in P2   Pulmonary/Chest: Effort normal and breath sounds normal  No respiratory distress  She has no wheezes  She has no rales  She exhibits no tenderness  Abdominal: Soft  Bowel sounds are normal  She exhibits no distension and no mass  There is no tenderness  There is no rebound and no guarding  Musculoskeletal: Normal range of motion  She exhibits no edema or deformity  Lymphadenopathy:     She has no cervical adenopathy  Neurological: She is alert and oriented to person, place, and time  She has normal reflexes  No cranial nerve deficit  She exhibits normal muscle tone  Coordination normal    Skin: Skin is warm and dry  No rash noted  No erythema  Psychiatric: She has a normal mood and affect  Her behavior is normal  Judgment and thought content normal    Vitals reviewed

## 2018-08-20 ENCOUNTER — APPOINTMENT (OUTPATIENT)
Dept: LAB | Age: 83
End: 2018-08-20
Payer: MEDICARE

## 2018-08-20 DIAGNOSIS — I48.91 ATRIAL FIBRILLATION, UNSPECIFIED TYPE (HCC): ICD-10-CM

## 2018-08-20 LAB
INR PPP: 1.85 (ref 0.86–1.17)
PROTHROMBIN TIME: 21.4 SECONDS (ref 11.8–14.2)

## 2018-08-20 PROCEDURE — 36415 COLL VENOUS BLD VENIPUNCTURE: CPT

## 2018-08-20 PROCEDURE — 85610 PROTHROMBIN TIME: CPT

## 2018-09-12 ENCOUNTER — APPOINTMENT (OUTPATIENT)
Dept: LAB | Age: 83
End: 2018-09-12
Payer: MEDICARE

## 2018-09-12 ENCOUNTER — TRANSCRIBE ORDERS (OUTPATIENT)
Dept: ADMINISTRATIVE | Age: 83
End: 2018-09-12

## 2018-09-12 DIAGNOSIS — I48.91 ATRIAL FIBRILLATION, UNSPECIFIED TYPE (HCC): Primary | ICD-10-CM

## 2018-09-12 DIAGNOSIS — I48.91 ATRIAL FIBRILLATION, UNSPECIFIED TYPE (HCC): ICD-10-CM

## 2018-09-12 LAB
INR PPP: 2.52 (ref 0.86–1.17)
PROTHROMBIN TIME: 27.2 SECONDS (ref 11.8–14.2)

## 2018-09-12 PROCEDURE — 85610 PROTHROMBIN TIME: CPT

## 2018-09-12 PROCEDURE — 36415 COLL VENOUS BLD VENIPUNCTURE: CPT

## 2018-10-15 DIAGNOSIS — I50.32 CHRONIC DIASTOLIC CONGESTIVE HEART FAILURE (HCC): ICD-10-CM

## 2018-10-15 RX ORDER — TORSEMIDE 20 MG/1
TABLET ORAL
Qty: 135 TABLET | Refills: 3 | Status: SHIPPED | OUTPATIENT
Start: 2018-10-15 | End: 2019-03-01

## 2018-10-23 ENCOUNTER — TRANSCRIBE ORDERS (OUTPATIENT)
Dept: ADMINISTRATIVE | Age: 83
End: 2018-10-23

## 2018-10-23 ENCOUNTER — APPOINTMENT (OUTPATIENT)
Dept: LAB | Age: 83
End: 2018-10-23
Payer: MEDICARE

## 2018-10-23 DIAGNOSIS — I48.91 ATRIAL FIBRILLATION, UNSPECIFIED TYPE (HCC): Primary | ICD-10-CM

## 2018-10-23 DIAGNOSIS — I48.91 ATRIAL FIBRILLATION, UNSPECIFIED TYPE (HCC): ICD-10-CM

## 2018-10-23 LAB
INR PPP: 2.29 (ref 0.86–1.17)
PROTHROMBIN TIME: 25.3 SECONDS (ref 11.8–14.2)

## 2018-10-23 PROCEDURE — 36415 COLL VENOUS BLD VENIPUNCTURE: CPT

## 2018-10-23 PROCEDURE — 85610 PROTHROMBIN TIME: CPT

## 2018-10-24 LAB — INR PPP: 2.29 (ref 0.86–1.17)

## 2018-11-06 ENCOUNTER — ANTICOAG VISIT (OUTPATIENT)
Dept: CARDIOLOGY CLINIC | Facility: CLINIC | Age: 83
End: 2018-11-06

## 2018-12-04 LAB — INR PPP: 2.7 (ref 0.86–1.17)

## 2018-12-05 ENCOUNTER — ANTICOAG VISIT (OUTPATIENT)
Dept: CARDIOLOGY CLINIC | Facility: CLINIC | Age: 83
End: 2018-12-05

## 2018-12-05 DIAGNOSIS — I48.91 ATRIAL FIBRILLATION, UNSPECIFIED TYPE (HCC): ICD-10-CM

## 2018-12-05 NOTE — PROGRESS NOTES
Phone call to patient, home number, left message on answering machine, continue current dose, inr due 4 weeks   carmen

## 2018-12-06 ENCOUNTER — TELEPHONE (OUTPATIENT)
Dept: CARDIOLOGY CLINIC | Facility: CLINIC | Age: 83
End: 2018-12-06

## 2018-12-06 DIAGNOSIS — I48.91 ATRIAL FIBRILLATION, UNSPECIFIED TYPE (HCC): Primary | ICD-10-CM

## 2018-12-12 ENCOUNTER — APPOINTMENT (OUTPATIENT)
Dept: LAB | Age: 83
End: 2018-12-12
Payer: MEDICARE

## 2018-12-12 DIAGNOSIS — E55.9 VITAMIN D DEFICIENCY: ICD-10-CM

## 2018-12-12 DIAGNOSIS — E78.01 FAMILIAL HYPERCHOLESTEROLEMIA: ICD-10-CM

## 2018-12-12 DIAGNOSIS — E03.9 HYPOTHYROIDISM, UNSPECIFIED TYPE: ICD-10-CM

## 2018-12-12 LAB
25(OH)D3 SERPL-MCNC: 21 NG/ML (ref 30–100)
ALBUMIN SERPL BCP-MCNC: 3.4 G/DL (ref 3.5–5)
ALP SERPL-CCNC: 75 U/L (ref 46–116)
ALT SERPL W P-5'-P-CCNC: 21 U/L (ref 12–78)
ANION GAP SERPL CALCULATED.3IONS-SCNC: 8 MMOL/L (ref 4–13)
AST SERPL W P-5'-P-CCNC: 18 U/L (ref 5–45)
BILIRUB SERPL-MCNC: 0.49 MG/DL (ref 0.2–1)
BUN SERPL-MCNC: 27 MG/DL (ref 5–25)
CALCIUM SERPL-MCNC: 8.7 MG/DL (ref 8.3–10.1)
CHLORIDE SERPL-SCNC: 101 MMOL/L (ref 100–108)
CHOLEST SERPL-MCNC: 190 MG/DL (ref 50–200)
CO2 SERPL-SCNC: 33 MMOL/L (ref 21–32)
CREAT SERPL-MCNC: 1.25 MG/DL (ref 0.6–1.3)
GFR SERPL CREATININE-BSD FRML MDRD: 39 ML/MIN/1.73SQ M
GLUCOSE P FAST SERPL-MCNC: 84 MG/DL (ref 65–99)
HDLC SERPL-MCNC: 76 MG/DL (ref 40–60)
LDLC SERPL DIRECT ASSAY-MCNC: 97 MG/DL (ref 0–100)
POTASSIUM SERPL-SCNC: 3.9 MMOL/L (ref 3.5–5.3)
PROT SERPL-MCNC: 7.1 G/DL (ref 6.4–8.2)
SODIUM SERPL-SCNC: 142 MMOL/L (ref 136–145)
TRIGL SERPL-MCNC: 70 MG/DL
TSH SERPL DL<=0.05 MIU/L-ACNC: 5.45 UIU/ML (ref 0.36–3.74)

## 2018-12-12 PROCEDURE — 80053 COMPREHEN METABOLIC PANEL: CPT

## 2018-12-12 PROCEDURE — 36415 COLL VENOUS BLD VENIPUNCTURE: CPT

## 2018-12-12 PROCEDURE — 80061 LIPID PANEL: CPT

## 2018-12-12 PROCEDURE — 83721 ASSAY OF BLOOD LIPOPROTEIN: CPT

## 2018-12-12 PROCEDURE — 82306 VITAMIN D 25 HYDROXY: CPT

## 2018-12-12 PROCEDURE — 84443 ASSAY THYROID STIM HORMONE: CPT

## 2018-12-21 ENCOUNTER — OFFICE VISIT (OUTPATIENT)
Dept: INTERNAL MEDICINE CLINIC | Facility: CLINIC | Age: 83
End: 2018-12-21
Payer: MEDICARE

## 2018-12-21 VITALS
HEIGHT: 66 IN | WEIGHT: 141 LBS | HEART RATE: 78 BPM | SYSTOLIC BLOOD PRESSURE: 120 MMHG | RESPIRATION RATE: 14 BRPM | DIASTOLIC BLOOD PRESSURE: 78 MMHG | BODY MASS INDEX: 22.66 KG/M2

## 2018-12-21 DIAGNOSIS — I50.32 CHRONIC DIASTOLIC CONGESTIVE HEART FAILURE (HCC): Chronic | ICD-10-CM

## 2018-12-21 DIAGNOSIS — I48.91 ATRIAL FIBRILLATION, UNSPECIFIED TYPE (HCC): ICD-10-CM

## 2018-12-21 DIAGNOSIS — E78.01 FAMILIAL HYPERCHOLESTEROLEMIA: ICD-10-CM

## 2018-12-21 DIAGNOSIS — E03.9 HYPOTHYROIDISM, UNSPECIFIED TYPE: Chronic | ICD-10-CM

## 2018-12-21 DIAGNOSIS — I48.20 CHRONIC ATRIAL FIBRILLATION (HCC): Chronic | ICD-10-CM

## 2018-12-21 DIAGNOSIS — M79.642 PAIN OF LEFT HAND: Primary | ICD-10-CM

## 2018-12-21 DIAGNOSIS — R25.2 LEG CRAMPS: ICD-10-CM

## 2018-12-21 DIAGNOSIS — J45.20 MILD INTERMITTENT ASTHMA WITHOUT COMPLICATION: Primary | ICD-10-CM

## 2018-12-21 DIAGNOSIS — E87.6 HYPOKALEMIA: ICD-10-CM

## 2018-12-21 PROCEDURE — 99213 OFFICE O/P EST LOW 20 MIN: CPT | Performed by: INTERNAL MEDICINE

## 2018-12-21 PROCEDURE — G0439 PPPS, SUBSEQ VISIT: HCPCS | Performed by: INTERNAL MEDICINE

## 2018-12-21 RX ORDER — METOPROLOL SUCCINATE 25 MG/1
TABLET, EXTENDED RELEASE ORAL
Qty: 315 TABLET | Refills: 3 | Status: SHIPPED | OUTPATIENT
Start: 2018-12-21 | End: 2019-12-20 | Stop reason: SDUPTHER

## 2018-12-21 RX ORDER — LEVOTHYROXINE SODIUM 0.05 MG/1
TABLET ORAL
Qty: 98 TABLET | Refills: 3 | Status: SHIPPED | OUTPATIENT
Start: 2018-12-21 | End: 2019-12-20 | Stop reason: SDUPTHER

## 2018-12-21 RX ORDER — MONTELUKAST SODIUM 10 MG/1
10 TABLET ORAL DAILY
Qty: 90 TABLET | Refills: 3 | Status: SHIPPED | OUTPATIENT
Start: 2018-12-21 | End: 2019-12-20 | Stop reason: SDUPTHER

## 2018-12-21 RX ORDER — DILTIAZEM HYDROCHLORIDE 300 MG/1
300 CAPSULE, COATED, EXTENDED RELEASE ORAL DAILY
Qty: 90 CAPSULE | Refills: 3 | Status: SHIPPED | OUTPATIENT
Start: 2018-12-21 | End: 2019-12-20 | Stop reason: SDUPTHER

## 2018-12-21 RX ORDER — POTASSIUM CHLORIDE 750 MG/1
10 CAPSULE, EXTENDED RELEASE ORAL DAILY
Qty: 180 CAPSULE | Refills: 3 | Status: SHIPPED | OUTPATIENT
Start: 2018-12-21 | End: 2019-12-30 | Stop reason: SDUPTHER

## 2018-12-21 NOTE — PROGRESS NOTES
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-I offered her referral to a hand surgeon but she deferred  2  Leg cramps-potassium normal prior and she will observe affect of increased activity  She will use tonic water before bed  We also reviewed her low vitamin-D level may contribute to this  3  Abnormal area the pancreas on CT scan  Full discussion as per noted February 2015  Follow-up ultrasound unchanged  MRI without contrast an MRCP 2016 showed pancreatic cystic lesion in the head, uncinate process and neck were unchanged suggesting white branch IPM N  No main pancreatic duct dilatation  Liver enzymes normal   Patient and her GI physician decided that would be no more surveillance including endoscopic ultrasound or MRI  4    Acute on chronic diastolic heart failure -echo done in January shows mild LVH with normal EF, mild mitral valve sclerosis, mild MR, marked left and right atrial dilatation, mild aortic sclerosis without significant aortic valve disease, moderate TR with mild pulmonary artery hypertension with pressure 40 millimeters   Improved with switch to torsemide but with exacerbation at last visit  Diuretic dose increased-initially spironolactone 25 milligrams daily and torsemide 60 milligrams daily but this led to over diuresis  Now on spironolactone 25 milligrams every other day and alternating day regimen of 60 milligrams of torsemide on the 1st day and 40 milligrams on the 2nd day and clearly improved-for repeat labs prior to next visit  5  Atrial fibrillation -adequate rate control on current dose of Cardizem and beta-blocker   Remains on anticoagulation with Coumadin   24 Holter Holter just done shows adequate rate control with maximal heart rate 120 and low heart rate in the 40s while asleep  Overall control adequate  6 Shortness of breath -felt related to the above   Has history COPD and asthma but current issues are related to her CHF  7   Hypertension-adequate control on current regimen  8  Osteopenia-most recent DEXA scan shows L-spine -2 2 and hip of -1   Subsequent bone density study due 2 years after last which would be March 2019 SCHEDULE NEXT VISIT7   Plantar fasciitis-responded to physical therapy and exercise  9   Asthma-stable on current regimen  10   Pre diabetes-A1c 5 9-continue current therapy  11   Hypothyroidism-TSH stable on current dose of thyroid supplement  12   Tremor-likely benign essential tremor-monitor  13   Degenerative arthritis of the hands-she has seen him in a fan  14   Knee pain-had prior bilateral total knee replacement   1 point she was going back to orthopedic physician to check positioning of her prosthesis-review next visit  15  Left leg pain -felt to be trochanteric bursitis-responded to injection and improved  16    Health maintenance-HAS PRESCRIPTION FOR [facility] VACCINE  17   Vitamin-D deficiency-she will in resume her supplement of 2000 units daily     All other problems as per note of October 2015, 20 15 May 2014        MEDICAL REGIMEN:                                                  Flonase nasal spray used intermittently, torsemide on day 140 milligrams in the a m  and 20 milligrams in the p m  on day 2   40 milligrams in a m , spironolactone 25 milligrams every other day, potassium chloride 10 milliequivalents daily, Coumadin with dose adjustments, generic Singulair 10 milligrams daily, levothyroxine 50 micrograms daily but 75 micrograms on Sunday, Claritin 10 milligrams daily as needed, calcium with vitamin-D daily, Ventolin inhaler as needed, metoprolol succinate 25 milligrams-2 tablets in the a m  and 1/2 tablets in the p m , Cardizem  milligrams a day, Pulmicort 100 micrograms 1 puff b i d , multivitamin, vitamin D3/2000 units a day, fish oil 1200 milligrams a day, rare use of Vicodin 5-300 half tablet b i d  p r n  for severe back pain    Appointment in several months with prior CBC random SMA TSH      No problem-specific Assessment & Plan notes found for this encounter  Diagnoses and all orders for this visit:    Pain of left hand    Leg cramps    Chronic atrial fibrillation (HCC)    Chronic diastolic congestive heart failure (HCC)  -     CBC and differential; Future  -     Comprehensive metabolic panel; Future  -     TSH, 3rd generation; Future    Familial hypercholesterolemia  -     CBC and differential; Future  -     Comprehensive metabolic panel; Future  -     TSH, 3rd generation; Future    Atrial fibrillation, unspecified type (HCC)  -     CBC and differential; Future  -     Comprehensive metabolic panel; Future  -     TSH, 3rd generation; Future          Subjective:      Patient ID: Willa Calixto is a 80 y o  female  She was here for her Medicare wellness today wanted to talk about a couple of issues  She has intermittent pain in the left hand in the ulnar area  She denies definite numbness or tingling  She denies pain starting in the neck and shooting down the arm  Pain is worse with certain movements of the left hand  She denies significant pain of the PIP or DI P joints    She also talked about leg cramps  These tend to happen on nights when she has not been exercising  She has tried solid by taking extra potassium  On labs prior to this visit her potassium was normal   Reviewed that these are often responsive to some degree of quinine supplement as well as increased activity of the legs etc   She will add tonic water at bed  She will discontinue use any extra potassium    She had a follow-up visit with GI in a made mutual decision not to proceed with further evaluation of her pancreatic lesion-specifically endoscopic ultrasound and/or MRI patient states that even if an abnormality is found she would not want to      This patient denies any systemic symptoms   Specifically there has been no evidence of fever, night sweats, significant weight loss or significant decrease in appetite  The following portions of the patient's history were reviewed and updated as appropriate: allergies, current medications, past family history, past medical history, past social history, past surgical history and problem list     Review of Systems   Constitutional: Negative  Respiratory: Positive for shortness of breath  Cardiovascular: Negative  Gastrointestinal: Negative  Endocrine: Negative  Genitourinary: Negative  Musculoskeletal: Negative  Pain in the left hand and leg cramps   Neurological: Negative  Hematological: Negative  Psychiatric/Behavioral: Negative  Objective:      Ht 5' 5 8" (1 671 m)   Wt 64 kg (141 lb)   BMI 22 90 kg/m²          Physical Exam   Constitutional: She is oriented to person, place, and time  She appears well-developed and well-nourished  No distress  HENT:   Head: Normocephalic and atraumatic  Right Ear: External ear normal    Left Ear: External ear normal    Nose: Nose normal    Mouth/Throat: Oropharynx is clear and moist  No oropharyngeal exudate  Eyes: Pupils are equal, round, and reactive to light  Conjunctivae and EOM are normal  Right eye exhibits no discharge  Left eye exhibits no discharge  No scleral icterus  Neck: Normal range of motion  Neck supple  No JVD present  No tracheal deviation present  No thyromegaly present  Cardiovascular: Normal rate and intact distal pulses  Exam reveals no gallop and no friction rub  No murmur heard  Irregular rhythm  Increased 2nd heart sound   Pulmonary/Chest: Effort normal and breath sounds normal  No respiratory distress  She has no wheezes  She has no rales  She exhibits no tenderness  Abdominal: Soft  Bowel sounds are normal  She exhibits no distension and no mass  There is no tenderness  There is no rebound and no guarding  Musculoskeletal: Normal range of motion  She exhibits no edema or deformity  Lymphadenopathy:     She has no cervical adenopathy  Neurological: She is alert and oriented to person, place, and time  She has normal reflexes  No cranial nerve deficit  She exhibits normal muscle tone  Coordination normal    Skin: Skin is warm and dry  No rash noted  No erythema  Benign keratoses    Psychiatric: She has a normal mood and affect   Her behavior is normal  Judgment and thought content normal

## 2018-12-21 NOTE — PROGRESS NOTES
Philippe Yeboah is here for her Subsequent Wellness visit  Last Medicare Wellness visit information reviewed, patient interviewed and updates made to the record today  Health Risk Assessment:  Patient rates overall health as very good  Patient feels that their physical health rating is Slightly better  Eyesight was rated as Slightly worse  Hearing was rated as Slightly worse  Patient feels that their emotional and mental health rating is Same  Pain experienced by patient in the last 7 days has been Some  Patient's pain rating has been 3/10  Patient states that she has experienced no weight loss or gain in last 6 months  Emotional/Mental Health:  Patient has been feeling nervous/anxious  PHQ-9 Depression Screening:    Frequency of the following problems over the past two weeks:      1  Little interest or pleasure in doing things: 0 - not at all      2  Feeling down, depressed, or hopeless: 0 - not at all  PHQ-2 Score: 0          Broken Bones/Falls: Fall Risk Assessment:    In the past year, patient has experienced: No history of falling in past year          Bladder/Bowel:  Patient has leaked urine accidently in the last six months  Patient reports no loss of bowel control  Immunizations:  Patient has had a flu vaccination within the last year  Patient has received a pneumonia shot  Patient has received a shingles shot  Home Safety:  Patient has trouble with stairs inside or outside of their home  Patient currently reports that there are no safety hazards present in home, working smoke alarms, no working carbon monoxide detectors  Preventative Screenings:   Breast cancer screening performed, cholesterol screen completed, glaucoma eye exam completed,     Nutrition:  Current diet: Regular, No Added Salt and Limited junk food with servings of the following:    Medications:  Patient is currently taking over-the-counter supplements  Patient is able to manage medications      Lifestyle Choices:  Patient reports no tobacco use  Patient has not smoked or used tobacco in the past   Patient reports alcohol use  Alcohol use per week: INFREQUENTLY  Patient drives a vehicle  Patient wears seat belt  Current level of exercise of physical activity described by patient as: EXERCISE CLASS TWICE WEEKLY, OCCASIONAL WALKS   Activities of Daily Living:  Can get out of bed by his or her self, able to dress self, able to make own meals, able to do own shopping, able to bathe self, can do own laundry/housekeeping, can manage own money, pay bills and track expenses    Previous Hospitalizations:  No hospitalization or ED visit in past 12 months        Advanced Directives:  Patient has decided on a power of   Patient has spoken to designated power of   Patient has completed advanced directive  Preventative Screening/Counseling:      Cardiovascular:      General: Risks and Benefits Discussed and Screening Current          Diabetes:      General: Risks and Benefits Discussed and Screening Current          Colorectal Cancer:      General: Risks and Benefits Discussed and Screening Current          Breast Cancer:      General: Risks and Benefits Discussed and Patient Declines          Cervical Cancer:      General: Risks and Benefits Discussed and Screening Not Indicated          Osteoporosis:      General: Risks and Benefits Discussed and Screening Current          AAA:      General: Risks and Benefits Discussed and Screening Current          Glaucoma:      General: Risks and Benefits Discussed and Screening Current          HIV:      General: Risks and Benefits Discussed and Screening Not Indicated          Hepatitis C:      General: Risks and Benefits Discussed and Screening Not Indicated        Advanced Directives:   Patient has living will for healthcare, has durable POA for healthcare, patient has an advanced directive  Information on ACP and/or AD not provided   No 5 wishes given  End of life assessment reviewed with patient  Provider does not agree with end of life deisions       Immunizations:      Influenza: Risks & Benefits Discussed and Influenza UTD This Year      Pneumococcal: Risks & Benefits Discussed and Lifetime Vaccine Completed      Shingrix: Risks & Benefits Discussed and Shingrix Vaccine UTD      Hepatitis B (Low risk patients): Series Not Indicated      Zostavax: Risks & Benefits Discussed and Zostavax Vaccine UTD      TD: Risks & Benefits Discussed and Td Vaccine UTD      TDAP: Risks & Benefits Discussed and Tdap Vaccine UTD

## 2019-01-03 ENCOUNTER — OFFICE VISIT (OUTPATIENT)
Dept: CARDIOLOGY CLINIC | Facility: CLINIC | Age: 84
End: 2019-01-03
Payer: COMMERCIAL

## 2019-01-03 VITALS
SYSTOLIC BLOOD PRESSURE: 110 MMHG | BODY MASS INDEX: 22.85 KG/M2 | DIASTOLIC BLOOD PRESSURE: 70 MMHG | HEIGHT: 66 IN | HEART RATE: 85 BPM | WEIGHT: 142.2 LBS

## 2019-01-03 DIAGNOSIS — I50.32 CHRONIC DIASTOLIC CONGESTIVE HEART FAILURE (HCC): Chronic | ICD-10-CM

## 2019-01-03 DIAGNOSIS — I10 ESSENTIAL HYPERTENSION: Chronic | ICD-10-CM

## 2019-01-03 DIAGNOSIS — I48.20 CHRONIC ATRIAL FIBRILLATION (HCC): Primary | Chronic | ICD-10-CM

## 2019-01-03 PROCEDURE — 99213 OFFICE O/P EST LOW 20 MIN: CPT | Performed by: INTERNAL MEDICINE

## 2019-01-03 NOTE — LETTER
January 3, 2019     Luciano Silva MD  [address]  [address] [facility], [address]    Patient: Jorgito Fung   YOB: 1932   Date of Visit: 1/3/2019       Dear Dr Yoandy Garcia: Thank you for referring Christi Vasquez to me for evaluation  Below are my notes for this consultation  If you have questions, please do not hesitate to call me  I look forward to following your patient along with you  Sincerely,        Vinod Patel MD        CC: No Recipients  Vinod Patel MD  1/3/2019  9:48 AM  Sign at close encounter  Assessment/Plan:    Atrial fibrillation ([address] )  Atrial fibrillation, controlled heart rate  We will continue the same medications  Congestive heart failure (HCC)  Congestive heart failure, diastolic, stable with no symptoms of dyspnea or ankle edema  Hypertension  Hypertension, stable  Diagnoses and all orders for this visit:    Chronic atrial fibrillation (HCC)    Chronic diastolic congestive heart failure (HCC)    Essential hypertension          Subjective:  Feels rather well  Patient ID: Jorgito Fung is a 80 y o  female  Patient presented to this office for cardiac follow-up  She has a history of chronic atrial fibrillation, hypertension, dyslipidemia and the about of congestive heart failure  She is feeling well denying any symptoms of chest pain, shortness of breath, palpitation, dizziness or lightheadedness  She has no leg edema  The following portions of the patient's history were reviewed and updated as appropriate: allergies, current medications, past family history, past medical history, past social history, past surgical history and problem list     Review of Systems   Respiratory: Negative for apnea, cough, chest tightness, shortness of breath and wheezing  Cardiovascular: Negative for chest pain, palpitations and leg swelling  Gastrointestinal: Negative for abdominal pain     Neurological: Negative for dizziness and light-headedness  Objective:  Stable cardiac-wise  /70 (BP Location: Right arm, Patient Position: Sitting)   Pulse 85   Ht 5' 6" (1 676 m)   Wt 64 5 kg (142 lb 3 2 oz)   BMI 22 95 kg/m²           Physical Exam   Constitutional: She is oriented to person, place, and time  She appears well-developed and well-nourished  No distress  HENT:   Head: Normocephalic and atraumatic  Neck: Normal range of motion  Neck supple  No JVD present  No thyromegaly present  Cardiovascular: Normal rate, regular rhythm, S1 normal and S2 normal   Exam reveals no gallop and no friction rub  Murmur heard  Crescendo systolic murmur is present with a grade of 2/6   Pulmonary/Chest: Effort normal  No respiratory distress  She has no wheezes  She has no rales  She exhibits no tenderness  Abdominal: Soft  Musculoskeletal: She exhibits no edema  Neurological: She is alert and oriented to person, place, and time  Skin: Skin is warm  She is not diaphoretic  Vitals reviewed

## 2019-01-03 NOTE — PROGRESS NOTES
Assessment/Plan:    Atrial fibrillation (HCC)  Atrial fibrillation, controlled heart rate  We will continue the same medications  Congestive heart failure (HCC)  Congestive heart failure, diastolic, stable with no symptoms of dyspnea or ankle edema  Hypertension  Hypertension, stable  Diagnoses and all orders for this visit:    Chronic atrial fibrillation (HCC)    Chronic diastolic congestive heart failure (HCC)    Essential hypertension          Subjective:  Feels rather well  Patient ID: Andrew Cooper is a 80 y o  female  Patient presented to this office for cardiac follow-up  She has a history of chronic atrial fibrillation, hypertension, dyslipidemia and the about of congestive heart failure  She is feeling well denying any symptoms of chest pain, shortness of breath, palpitation, dizziness or lightheadedness  She has no leg edema  The following portions of the patient's history were reviewed and updated as appropriate: allergies, current medications, past family history, past medical history, past social history, past surgical history and problem list     Review of Systems   Respiratory: Negative for apnea, cough, chest tightness, shortness of breath and wheezing  Cardiovascular: Negative for chest pain, palpitations and leg swelling  Gastrointestinal: Negative for abdominal pain  Neurological: Negative for dizziness and light-headedness  Objective:  Stable cardiac-wise  /70 (BP Location: Right arm, Patient Position: Sitting)   Pulse 85   Ht 5' 6" (1 676 m)   Wt 64 5 kg (142 lb 3 2 oz)   BMI 22 95 kg/m²          Physical Exam   Constitutional: She is oriented to person, place, and time  She appears well-developed and well-nourished  No distress  HENT:   Head: Normocephalic and atraumatic  Neck: Normal range of motion  Neck supple  No JVD present  No thyromegaly present     Cardiovascular: Normal rate, regular rhythm, S1 normal and S2 normal  Exam reveals no gallop and no friction rub  Murmur heard  Crescendo systolic murmur is present with a grade of 2/6   Pulmonary/Chest: Effort normal  No respiratory distress  She has no wheezes  She has no rales  She exhibits no tenderness  Abdominal: Soft  Musculoskeletal: She exhibits no edema  Neurological: She is alert and oriented to person, place, and time  Skin: Skin is warm  She is not diaphoretic  Vitals reviewed

## 2019-01-11 ENCOUNTER — ANTICOAG VISIT (OUTPATIENT)
Dept: CARDIOLOGY CLINIC | Facility: CLINIC | Age: 84
End: 2019-01-11

## 2019-01-11 DIAGNOSIS — I48.20 CHRONIC ATRIAL FIBRILLATION (HCC): ICD-10-CM

## 2019-01-11 LAB — INR PPP: 2.6 (ref 0.86–1.17)

## 2019-01-22 ENCOUNTER — TELEPHONE (OUTPATIENT)
Dept: INTERNAL MEDICINE CLINIC | Facility: CLINIC | Age: 84
End: 2019-01-22

## 2019-01-22 DIAGNOSIS — M79.642 PAIN OF LEFT HAND: Primary | ICD-10-CM

## 2019-01-22 NOTE — TELEPHONE ENCOUNTER
LEFT MESSAGE INFORMING PT THAT SHE'LL BE HEARING FROM [facility]TH[facility] TO SCHEDULE HER APPT (REFERRAL WAS PUT IN) AND IF SHE DOESN'T HEAR FROM THEM BY NEXT Monday, TO CALL ME BACK SO I CAN CALL THEM

## 2019-01-22 NOTE — TELEPHONE ENCOUNTER
PT STATED THAT ONE OF THE LAST TIMES SHE SAW YOU, SHE MENTIONED THAT HER LEFT HAND WAS BOTHERING HER  SHE SAID THAT YOU TOLD HER THAT IF IT KEEPS UP OR BECOMES WORSE, "YOU'D SEND HER TO THE HAND RONNA"  SHE'S THINKING THAT THE WEIGHT BANDS THEY USE TO WORK OUT WITH HAVE MADE IT WORSE BECAUSE IT'S NOW BOTHERING HER AT NIGHT TIME AND IS MORE CONSTANT   PLEASE ADVISE AS TO THE NEXT STEP

## 2019-02-19 LAB — INR PPP: 2.6 (ref 0.86–1.17)

## 2019-02-20 ENCOUNTER — ANTICOAG VISIT (OUTPATIENT)
Dept: CARDIOLOGY CLINIC | Facility: CLINIC | Age: 84
End: 2019-02-20

## 2019-02-20 DIAGNOSIS — I48.20 CHRONIC ATRIAL FIBRILLATION (HCC): ICD-10-CM

## 2019-02-20 NOTE — PROGRESS NOTES
2/20/19, tc to pt, left message on answering machine, continue current dose, inr due 4 weeks  Pt reminded to call office if any changes in health, medication or bleeding   carmen

## 2019-03-01 ENCOUNTER — HOSPITAL ENCOUNTER (OUTPATIENT)
Dept: RADIOLOGY | Facility: HOSPITAL | Age: 84
Discharge: HOME | End: 2019-03-01
Attending: ORTHOPAEDIC SURGERY
Payer: COMMERCIAL

## 2019-03-01 ENCOUNTER — OFFICE VISIT (OUTPATIENT)
Dept: OBGYN CLINIC | Facility: HOSPITAL | Age: 84
End: 2019-03-01
Payer: COMMERCIAL

## 2019-03-01 VITALS
HEART RATE: 80 BPM | DIASTOLIC BLOOD PRESSURE: 73 MMHG | SYSTOLIC BLOOD PRESSURE: 121 MMHG | BODY MASS INDEX: 24.36 KG/M2 | HEIGHT: 65 IN | WEIGHT: 146.2 LBS

## 2019-03-01 DIAGNOSIS — M77.8 WRIST TENDONITIS: ICD-10-CM

## 2019-03-01 DIAGNOSIS — M79.642 PAIN OF LEFT HAND: ICD-10-CM

## 2019-03-01 PROCEDURE — 20550 NJX 1 TENDON SHEATH/LIGAMENT: CPT | Performed by: ORTHOPAEDIC SURGERY

## 2019-03-01 PROCEDURE — 99214 OFFICE O/P EST MOD 30 MIN: CPT | Performed by: ORTHOPAEDIC SURGERY

## 2019-03-01 PROCEDURE — 73130 X-RAY EXAM OF HAND: CPT

## 2019-03-01 RX ORDER — BETAMETHASONE SODIUM PHOSPHATE AND BETAMETHASONE ACETATE 3; 3 MG/ML; MG/ML
6 INJECTION, SUSPENSION INTRA-ARTICULAR; INTRALESIONAL; INTRAMUSCULAR; SOFT TISSUE
Status: COMPLETED | OUTPATIENT
Start: 2019-03-01 | End: 2019-03-01

## 2019-03-01 RX ORDER — LIDOCAINE HYDROCHLORIDE 20 MG/ML
1 INJECTION, SOLUTION EPIDURAL; INFILTRATION; INTRACAUDAL; PERINEURAL
Status: COMPLETED | OUTPATIENT
Start: 2019-03-01 | End: 2019-03-01

## 2019-03-01 RX ADMIN — BETAMETHASONE SODIUM PHOSPHATE AND BETAMETHASONE ACETATE 6 MG: 3; 3 INJECTION, SUSPENSION INTRA-ARTICULAR; INTRALESIONAL; INTRAMUSCULAR; SOFT TISSUE at 10:52

## 2019-03-01 RX ADMIN — LIDOCAINE HYDROCHLORIDE 1 ML: 20 INJECTION, SOLUTION EPIDURAL; INFILTRATION; INTRACAUDAL; PERINEURAL at 10:52

## 2019-03-01 NOTE — PROGRESS NOTES
ASSESSMENT/PLAN:    Assessment:   L wrist ECU tendonitis    Plan:   The patient would like to proceed with an ECU steroid injection today  She was offered a brace however would not like to proceed with this option at this time  She may resume activities as tolerated at this time  Follow Up:  2  month(s)    To Do Next Visit:         _____________________________________________________  CHIEF COMPLAINT:  Chief Complaint   Patient presents with    Left Hand - Pain         SUBJECTIVE:  Miguel Ángel Jiang is a RHD 80y o  year old female who presents with Pain  Moderate  Intermittant  Sharp and Aching to the left wrist   This started  2 year(s) ago as Chronic  Patient denies numbness and tingling  Patient describes pain and difficulties while pulling up her bed sheets and ulnar deviation when doing weight dumbbell exercises at her gym     Radiation: Yes to the  forearm  Previous Treatments: Resume activities as tolerated without relief  Associated symptoms: No Complaints    PAST MEDICAL HISTORY:  Past Medical History:   Diagnosis Date    Abnormal ultrasound     RESOLVED: [id number]    Asthma with acute exacerbation     LAST ASSESSED: [id number]    Asymptomatic menopausal state     Atherosclerosis     Atrial fibrillation (HCC)     Chronic obstructive lung disease (HCC)     Congestive heart failure (CHF) (HCC)     Congestive heart failure (HCC)     LAST ASSESSED: [id number]    Cuboid fracture     LAST ASSESSED: [id number]    Dyspepsia     GERD (gastroesophageal reflux disease)     High risk medication use     LAST ASSESSED: [id number]    Hyperlipidemia     Hypertension     Hypertension     Hypokalemia     Hypothyroidism     Impacted cerumen of both ears     LAST ASSESSED: [id number]    Impacted cerumen of right ear     LAST ASSESSED: [id number]    Influenza     LAST ASSESSED: [id number]    IPMN (intraductal papillary mucinous neoplasm)     RESOLVED: [id number]    Limb swelling     LAST ASSESSED: [id number]  Navicular fracture of ankle     LAST ASSESSED: [id number]    Onychomycosis     LAST ASSESSED: [id number]    Osteoarthritis     Osteopenia     Osteoporosis     Paronychia, finger, unspecified laterality     LAST ASSESSED: [id number]    Paroxysmal atrial fibrillation (HCC)     LAST ASSESSED: [id number]    Peripheral vascular disease (HCC)     Plantar fasciitis     Talus fracture     LAST ASSESSED: [id number]    Vascular headache        PAST SURGICAL HISTORY:  Past Surgical History:   Procedure Laterality Date    APPENDECTOMY      CATARACT EXTRACTION Bilateral     CHOLECYSTECTOMY      NEUROPLASTY / TRANSPOSITION MEDIAN NERVE AT CARPAL TUNNEL BILATERAL Bilateral     DECOMPRESSION    PELVIC FLOOR REPAIR  2014    SINUS SURGERY      TOTAL KNEE ARTHROPLASTY Bilateral        FAMILY HISTORY:  Family History   Problem Relation Age of Onset    Pancreatic cancer Mother     Coronary artery disease Family     Breast cancer Family     Other Family         BREAST DISORDER, GASTROINTESTINAL CANCER    Uterine cancer Family     Hyperlipidemia Family     Osteoarthritis Family     Ovarian cancer Family     Brain cancer Son        SOCIAL HISTORY:  Social History     Tobacco Use    Smoking status: Never Smoker    Smokeless tobacco: Never Used   Substance Use Topics    Alcohol use: Yes     Comment: SOCIAL    Drug use: No       MEDICATIONS:    Current Outpatient Medications:     albuterol (VENTOLIN HFA) 90 mcg/act inhaler, Inhale, Disp: , Rfl:     Calcium Carb-Cholecalciferol (CALCIUM 1000 + D) 1000-800 MG-UNIT TABS, Take by mouth, Disp: , Rfl:     Cholecalciferol (VITAMIN D3) 1000 units CAPS, Take by mouth daily, Disp: , Rfl:     diltiazem (CARDIZEM CD) 300 mg 24 hr capsule, Take 1 capsule (300 mg total) by mouth daily, Disp: 90 capsule, Rfl: 3    fluticasone (FLONASE) 50 mcg/act nasal spray, 2 sprays into each nostril daily  , Disp: , Rfl:     levothyroxine 50 mcg tablet, Take 1 tab daily expect 1 and 1/2 on sunday, Disp: 98 tablet, Rfl: 3    metoprolol succinate (TOPROL-XL) 25 mg 24 hr tablet, Take 2 tablets in the am and 1 and 1/2 in the evening daily, Disp: 315 tablet, Rfl: 3    montelukast (SINGULAIR) 10 mg tablet, Take 1 tablet (10 mg total) by mouth daily, Disp: 90 tablet, Rfl: 3    potassium chloride (MICRO-K) 10 MEQ CR capsule, Take 1 capsule (10 mEq total) by mouth daily, Disp: 180 capsule, Rfl: 3    spironolactone (ALDACTONE) 25 mg tablet, Take 1 tablet (25 mg total) by mouth daily TAKE 1 TABLET TWICE WEEKLY (Patient taking differently: Take 25 mg by mouth daily  ), Disp: 90 tablet, Rfl: 3    warfarin (COUMADIN) 5 mg tablet, Take by mouth, Disp: , Rfl:     ALLERGIES:  Allergies   Allergen Reactions    Asa [Aspirin]     Nsaids        REVIEW OF SYSTEMS:  Pertinent items are noted in HPI      LABS:  HgA1c:   Lab Results   Component Value Date    HGBA1C 5 9 (H) 02/07/2017     BMP:   Lab Results   Component Value Date    CALCIUM 8 7 12/12/2018     10/17/2017    K 3 9 12/12/2018    CO2 33 (H) 12/12/2018     12/12/2018    BUN 27 (H) 12/12/2018    CREATININE 1 25 12/12/2018         _____________________________________________________  PHYSICAL EXAMINATION:  General: well developed and well nourished, alert, oriented times 3 and appears comfortable  Psychiatric: Normal  HEENT: Trachea Midline, No torticollis  Cardiovascular: No discernable arrhythmia  Pulmonary: No wheezing or stridor  Skin: No masses, erthema, lacerations, fluctation, ulcerations  Neurovascular: Sensation Intact to the Median, Ulnar, Radial Nerve, Motor Intact to the Median, Ulnar, Radial Nerve and Pulses Intact    MUSCULOSKELETAL EXAMINATION:  LEFT SIDE:  Wrist: No druj tenderness, no crepitation with wrist motion, negative LT shuck, no tfcc tenderness, negative SL shuck, no tenderness pre styloid recess, tenderness ECU tendon, positive Synergy test, no ECU subluxation  _____________________________________________________  STUDIES REVIEWED:  Images were reviewd in PACS: xray of left wrist on 3/1/19 demonstrates arthritic changes throughout her hand into her pip and dip joints        PROCEDURES PERFORMED:  Hand/upper extremity injection: L extensor compartment 6  Date/Time: 3/1/2019 10:52 AM  Consent given by: patient  Site marked: site marked  Supporting Documentation  Indications: pain   Procedure Details  Condition:sixth dorsal compartment tendonitis Site: L extensor compartment 6   Medications administered: 1 mL lidocaine (PF) 2 %; 6 mg betamethasone acetate-betamethasone sodium phosphate 6 (3-3) mg/mL    Patient tolerance: patient tolerated the procedure well with no immediate complications  Dressing:  Sterile dressing applied             Scribe Attestation    I,:   Mason Avila am acting as a scribe while in the presence of the attending physician :        I,:   Joe Nunez MD personally performed the services described in this documentation    as scribed in my presence :

## 2019-03-26 LAB — INR PPP: 2 (ref 0.86–1.17)

## 2019-03-27 ENCOUNTER — ANTICOAG VISIT (OUTPATIENT)
Dept: CARDIOLOGY CLINIC | Facility: CLINIC | Age: 84
End: 2019-03-27

## 2019-03-27 DIAGNOSIS — I48.20 CHRONIC ATRIAL FIBRILLATION (HCC): ICD-10-CM

## 2019-03-27 NOTE — PROGRESS NOTES
3/27/19, tc to pt, continue current dose, inr due 1 month  Reminded pt to call if any changes in health, medication or bleeding   carmen

## 2019-04-04 PROBLEM — H61.23 BILATERAL IMPACTED CERUMEN: Status: ACTIVE | Noted: 2019-04-04

## 2019-04-04 PROBLEM — IMO0001 LEFT ASYMMETRICAL SNHL: Status: ACTIVE | Noted: 2019-04-04

## 2019-04-04 PROBLEM — H90.3 SENSORINEURAL HEARING LOSS (SNHL), BILATERAL: Status: ACTIVE | Noted: 2019-04-04

## 2019-04-17 ENCOUNTER — OFFICE VISIT (OUTPATIENT)
Dept: OCCUPATIONAL THERAPY | Facility: HOSPITAL | Age: 84
End: 2019-04-17
Attending: ORTHOPAEDIC SURGERY
Payer: COMMERCIAL

## 2019-04-17 ENCOUNTER — OFFICE VISIT (OUTPATIENT)
Dept: OBGYN CLINIC | Facility: HOSPITAL | Age: 84
End: 2019-04-17
Payer: COMMERCIAL

## 2019-04-17 VITALS
HEART RATE: 85 BPM | HEIGHT: 65 IN | DIASTOLIC BLOOD PRESSURE: 78 MMHG | WEIGHT: 147 LBS | BODY MASS INDEX: 24.49 KG/M2 | SYSTOLIC BLOOD PRESSURE: 110 MMHG

## 2019-04-17 DIAGNOSIS — M77.8 WRIST TENDONITIS: ICD-10-CM

## 2019-04-17 DIAGNOSIS — M77.8 WRIST TENDONITIS: Primary | ICD-10-CM

## 2019-04-17 PROCEDURE — L3906 WHO W/O JOINTS CF: HCPCS | Performed by: OCCUPATIONAL THERAPIST

## 2019-04-17 PROCEDURE — 99213 OFFICE O/P EST LOW 20 MIN: CPT | Performed by: ORTHOPAEDIC SURGERY

## 2019-05-03 LAB — INR PPP: 2.6 (ref 0.86–1.17)

## 2019-05-06 ENCOUNTER — ANTICOAG VISIT (OUTPATIENT)
Dept: CARDIOLOGY CLINIC | Facility: CLINIC | Age: 84
End: 2019-05-06

## 2019-05-06 DIAGNOSIS — I48.20 CHRONIC ATRIAL FIBRILLATION (HCC): ICD-10-CM

## 2019-05-08 ENCOUNTER — OFFICE VISIT (OUTPATIENT)
Dept: INTERNAL MEDICINE CLINIC | Facility: CLINIC | Age: 84
End: 2019-05-08
Payer: COMMERCIAL

## 2019-05-08 VITALS
HEIGHT: 65 IN | RESPIRATION RATE: 14 BRPM | DIASTOLIC BLOOD PRESSURE: 70 MMHG | BODY MASS INDEX: 23.86 KG/M2 | SYSTOLIC BLOOD PRESSURE: 116 MMHG | WEIGHT: 143.2 LBS | HEART RATE: 78 BPM

## 2019-05-08 DIAGNOSIS — I50.32 CHRONIC DIASTOLIC CONGESTIVE HEART FAILURE (HCC): Chronic | ICD-10-CM

## 2019-05-08 DIAGNOSIS — E03.8 OTHER SPECIFIED HYPOTHYROIDISM: Chronic | ICD-10-CM

## 2019-05-08 DIAGNOSIS — I10 ESSENTIAL HYPERTENSION: Primary | Chronic | ICD-10-CM

## 2019-05-08 DIAGNOSIS — E78.2 MIXED HYPERLIPIDEMIA: Chronic | ICD-10-CM

## 2019-05-08 DIAGNOSIS — I48.20 CHRONIC ATRIAL FIBRILLATION (HCC): Chronic | ICD-10-CM

## 2019-05-08 DIAGNOSIS — J44.9 CHRONIC OBSTRUCTIVE PULMONARY DISEASE, UNSPECIFIED COPD TYPE (HCC): ICD-10-CM

## 2019-05-08 PROCEDURE — 99214 OFFICE O/P EST MOD 30 MIN: CPT | Performed by: INTERNAL MEDICINE

## 2019-05-08 RX ORDER — ERYTHROMYCIN 5 MG/G
OINTMENT OPHTHALMIC
COMMUNITY
Start: 2019-05-07 | End: 2019-09-26 | Stop reason: ALTCHOICE

## 2019-05-08 RX ORDER — TORSEMIDE 20 MG/1
TABLET ORAL
COMMUNITY
Start: 2019-03-18 | End: 2019-12-20 | Stop reason: SDUPTHER

## 2019-05-09 ENCOUNTER — OFFICE VISIT (OUTPATIENT)
Dept: CARDIOLOGY CLINIC | Facility: CLINIC | Age: 84
End: 2019-05-09
Payer: COMMERCIAL

## 2019-05-09 VITALS
RESPIRATION RATE: 18 BRPM | SYSTOLIC BLOOD PRESSURE: 100 MMHG | WEIGHT: 142.6 LBS | DIASTOLIC BLOOD PRESSURE: 65 MMHG | BODY MASS INDEX: 23.76 KG/M2 | HEIGHT: 65 IN | HEART RATE: 75 BPM

## 2019-05-09 DIAGNOSIS — I48.20 CHRONIC ATRIAL FIBRILLATION (HCC): Primary | Chronic | ICD-10-CM

## 2019-05-09 DIAGNOSIS — I50.32 CHRONIC DIASTOLIC CONGESTIVE HEART FAILURE (HCC): Chronic | ICD-10-CM

## 2019-05-09 DIAGNOSIS — E78.2 MIXED HYPERLIPIDEMIA: Chronic | ICD-10-CM

## 2019-05-09 DIAGNOSIS — I10 ESSENTIAL HYPERTENSION: Chronic | ICD-10-CM

## 2019-05-09 PROCEDURE — 99213 OFFICE O/P EST LOW 20 MIN: CPT | Performed by: INTERNAL MEDICINE

## 2019-05-09 RX ORDER — WARFARIN SODIUM 5 MG/1
5 TABLET ORAL
Qty: 90 TABLET | Refills: 3 | Status: SHIPPED | OUTPATIENT
Start: 2019-05-09 | End: 2020-10-21 | Stop reason: SDUPTHER

## 2019-06-05 ENCOUNTER — TELEPHONE (OUTPATIENT)
Dept: INTERNAL MEDICINE CLINIC | Facility: CLINIC | Age: 84
End: 2019-06-05

## 2019-06-05 ENCOUNTER — OFFICE VISIT (OUTPATIENT)
Dept: INTERNAL MEDICINE CLINIC | Facility: CLINIC | Age: 84
End: 2019-06-05
Payer: COMMERCIAL

## 2019-06-05 VITALS
DIASTOLIC BLOOD PRESSURE: 80 MMHG | TEMPERATURE: 98.2 F | HEIGHT: 65 IN | SYSTOLIC BLOOD PRESSURE: 130 MMHG | RESPIRATION RATE: 14 BRPM | HEART RATE: 78 BPM | WEIGHT: 140.2 LBS | BODY MASS INDEX: 23.36 KG/M2

## 2019-06-05 DIAGNOSIS — J44.9 CHRONIC OBSTRUCTIVE PULMONARY DISEASE, UNSPECIFIED COPD TYPE (HCC): Chronic | ICD-10-CM

## 2019-06-05 DIAGNOSIS — I50.32 CHRONIC DIASTOLIC CONGESTIVE HEART FAILURE (HCC): Chronic | ICD-10-CM

## 2019-06-05 DIAGNOSIS — R06.89 DIFFICULTY BREATHING: Primary | ICD-10-CM

## 2019-06-05 DIAGNOSIS — J45.20 MILD INTERMITTENT ASTHMA WITHOUT COMPLICATION: Chronic | ICD-10-CM

## 2019-06-05 PROCEDURE — 1160F RVW MEDS BY RX/DR IN RCRD: CPT | Performed by: INTERNAL MEDICINE

## 2019-06-05 PROCEDURE — 99214 OFFICE O/P EST MOD 30 MIN: CPT | Performed by: INTERNAL MEDICINE

## 2019-06-07 LAB — INR PPP: 2.8 (ref 0.86–1.17)

## 2019-06-10 ENCOUNTER — ANTICOAG VISIT (OUTPATIENT)
Dept: CARDIOLOGY CLINIC | Facility: CLINIC | Age: 84
End: 2019-06-10

## 2019-06-10 DIAGNOSIS — I48.20 CHRONIC ATRIAL FIBRILLATION (HCC): ICD-10-CM

## 2019-06-26 ENCOUNTER — OFFICE VISIT (OUTPATIENT)
Dept: OBGYN CLINIC | Facility: HOSPITAL | Age: 84
End: 2019-06-26
Payer: COMMERCIAL

## 2019-06-26 VITALS
HEIGHT: 65 IN | WEIGHT: 142 LBS | HEART RATE: 76 BPM | SYSTOLIC BLOOD PRESSURE: 119 MMHG | DIASTOLIC BLOOD PRESSURE: 72 MMHG | BODY MASS INDEX: 23.66 KG/M2

## 2019-06-26 DIAGNOSIS — M77.8 WRIST TENDONITIS: Primary | ICD-10-CM

## 2019-06-26 PROCEDURE — 99213 OFFICE O/P EST LOW 20 MIN: CPT | Performed by: ORTHOPAEDIC SURGERY

## 2019-07-09 ENCOUNTER — ANTICOAG VISIT (OUTPATIENT)
Dept: CARDIOLOGY CLINIC | Facility: CLINIC | Age: 84
End: 2019-07-09

## 2019-07-09 LAB — INR PPP: 2 (ref 0.84–1.19)

## 2019-07-23 ENCOUNTER — TELEPHONE (OUTPATIENT)
Dept: OBGYN CLINIC | Facility: CLINIC | Age: 84
End: 2019-07-23

## 2019-07-23 DIAGNOSIS — Z12.31 ENCOUNTER FOR SCREENING MAMMOGRAM FOR MALIGNANT NEOPLASM OF BREAST: Primary | ICD-10-CM

## 2019-08-06 ENCOUNTER — HOSPITAL ENCOUNTER (OUTPATIENT)
Dept: RADIOLOGY | Age: 84
Discharge: HOME | End: 2019-08-06
Payer: COMMERCIAL

## 2019-08-06 VITALS — WEIGHT: 143 LBS | HEIGHT: 65 IN | BODY MASS INDEX: 23.82 KG/M2

## 2019-08-06 DIAGNOSIS — Z12.31 ENCOUNTER FOR SCREENING MAMMOGRAM FOR MALIGNANT NEOPLASM OF BREAST: ICD-10-CM

## 2019-08-06 PROCEDURE — 77067 SCR MAMMO BI INCL CAD: CPT

## 2019-08-06 PROCEDURE — 77063 BREAST TOMOSYNTHESIS BI: CPT

## 2019-08-13 LAB — INR PPP: 2.3 (ref 0.84–1.19)

## 2019-08-14 ENCOUNTER — ANTICOAG VISIT (OUTPATIENT)
Dept: CARDIOLOGY CLINIC | Facility: CLINIC | Age: 84
End: 2019-08-14

## 2019-08-14 DIAGNOSIS — I48.91 ATRIAL FIBRILLATION, UNSPECIFIED TYPE (HCC): ICD-10-CM

## 2019-09-10 LAB — INR PPP: 2 (ref 0.84–1.19)

## 2019-09-11 ENCOUNTER — ANTICOAG VISIT (OUTPATIENT)
Dept: CARDIOLOGY CLINIC | Facility: CLINIC | Age: 84
End: 2019-09-11

## 2019-09-11 DIAGNOSIS — I48.91 ATRIAL FIBRILLATION, UNSPECIFIED TYPE (HCC): ICD-10-CM

## 2019-09-18 ENCOUNTER — OFFICE VISIT (OUTPATIENT)
Dept: INTERNAL MEDICINE CLINIC | Facility: CLINIC | Age: 84
End: 2019-09-18
Payer: COMMERCIAL

## 2019-09-18 VITALS
SYSTOLIC BLOOD PRESSURE: 130 MMHG | RESPIRATION RATE: 14 BRPM | HEIGHT: 65 IN | WEIGHT: 141.6 LBS | BODY MASS INDEX: 23.59 KG/M2 | DIASTOLIC BLOOD PRESSURE: 80 MMHG | HEART RATE: 78 BPM

## 2019-09-18 DIAGNOSIS — I45.2 BIFASCICULAR BUNDLE BRANCH BLOCK: Chronic | ICD-10-CM

## 2019-09-18 DIAGNOSIS — I50.9 CONGESTIVE HEART FAILURE, UNSPECIFIED HF CHRONICITY, UNSPECIFIED HEART FAILURE TYPE (HCC): Primary | ICD-10-CM

## 2019-09-18 DIAGNOSIS — Z23 NEEDS FLU SHOT: ICD-10-CM

## 2019-09-18 DIAGNOSIS — J44.9 CHRONIC OBSTRUCTIVE PULMONARY DISEASE, UNSPECIFIED COPD TYPE (HCC): Chronic | ICD-10-CM

## 2019-09-18 DIAGNOSIS — I48.91 ATRIAL FIBRILLATION, UNSPECIFIED TYPE (HCC): Chronic | ICD-10-CM

## 2019-09-18 DIAGNOSIS — E03.9 HYPOTHYROIDISM, UNSPECIFIED TYPE: Chronic | ICD-10-CM

## 2019-09-18 PROCEDURE — 99215 OFFICE O/P EST HI 40 MIN: CPT | Performed by: INTERNAL MEDICINE

## 2019-09-18 RX ORDER — BUDESONIDE 180 UG/1
AEROSOL, POWDER RESPIRATORY (INHALATION)
COMMUNITY
Start: 2019-08-23 | End: 2019-12-20 | Stop reason: SDUPTHER

## 2019-09-18 NOTE — PROGRESS NOTES
Assessment/Plan:   1  Health maintenance -given influenza vaccine  2  Shortness of breath -felt to be combination COPD and CHF but predominantly CHF  Was treated for exacerbation of COPD in June  ProBNP prior to this visit significantly elevated at 2790  3  Elevated creatinine -felt related to diuresis  Sec if icing some degree a creatinine elevation to help maintain decrease in pulmonary congestion  4  Diastolic heart failure -chronic -echo done  Over the past 2 years showed mild LVH with normal EF, mild mitral valve sclerosis, mild MR, marked left and right atrial dilatation, mild aortic sclerosis without significant aortic valve disease, moderate TR with mild pulmonary artery hypertension at 40 millimeters  Improved with switched to torsemide  Then had an exacerbation  Will continue current dose of spironolactone 25 milligrams every other day and torsemide with an alternating day regimen of 40 milligrams on the 1st day and 60 milligrams on the 2nd day  Associated atrial fibrillation is adequately controlled  Always surgeon for exacerbating factors  Rule out cardiac amyloid  Rule out missed sleep apnea  Scheduled for repeat transthoracic echo, sleep study, nuclear medicine scan for amyloid, serum protein electrophoresis, serum for free light chain ratio  She will be re-evaluated after the above  She is considering a 2nd opinion with another cardiologist   I told her we would discuss this in more detail next visit   5  Fatigue-felt related to the above  Rule out miss sleep apnea -scheduled for sleep study  6  Atrial fibrillation -adequate rate control on current dose of Cardizem and beta-blocker  Remains on anticoagulation with Coumadin  Most recent 24 Holter shows adequate rate control with maximum heart rate of 120 and low heart rate in the 40s while asleep    Overall control adequate    All other problems as per noted May 2019, October 2015, 20 15 May 2014       MEDICAL REGIMEN:      Flonase nasal spray used intermittently, torsemide on an alternating day regimen - on the 1st day 40 milligrams in a m  And 20 milligrams in the p m  And on the 2nd day 40 milligrams in a m , spironolactone 25 milligrams every other day, potassium chloride 10 milliequivalents daily, Coumadin with dose adjustments, generic Singulair 10 milligrams daily, levothyroxine 50 micrograms daily but 75 micrograms on Sunday, Claritin 10 milligrams daily as needed, calcium vitamin-D daily, Ventolin inhaler as needed, metoprolol succinate 25 milligrams -2 tablets in the am and one and a half tablets in the pm  -, Cardizem   Milligrams a day, Pulmicort 100 micrograms 1 puff b i d , multivitamin, vitamin D3 / 2000 units a day, fish oil 1200 milligrams a day, rare use of Vicodin 5-300-half tablet b i d  P r n  For severe back pain    The appointment over the next 2 months after the above studies are completed including transthoracic echo, nuclear medicine study for amyloid, sleep study, serum protein electrophoresis, serum for free light chain ratio    Addendum -workup shows protein electrophoresis with questionable spike but immunofixation shows no monoclonal protein  Immunoglobulin levels normal   She does have an elevated free light chain ratio with cap of 45 normal up to 19 and elevated ratio at 3 19 with normal up to 1 65  Urine protein electrophoresis normal   Her most recent CBC is normal other than mild increase in his senna fills  Chemistry profile is normal other than mild increasing creatinine -no elevation of calcium - this is likely subclinical but will need ongoing observation period in 6-12 months will need repeat immunoglobulin levels and protein electrophoresis    Echocardiogram shows EF of 60%, no regional wall motion abnormalities, left and right atrium moderately dilated, mild-to-moderate tricuspid regurg with an estimated peak PA pressure 45 millimeters, inferior vena cava dilated  No problem-specific Assessment & Plan notes found for this encounter  Diagnoses and all orders for this visit:    Congestive heart failure, unspecified HF chronicity, unspecified heart failure type ([place] [address] )  -     Echo complete with contrast if indicated; Future  -     NM CARDIAC AMYLOIDOSIS; Future  -     Protein electrophoresis, urine  -     Kappa/Lambda Light Chains Free With Ratio, Serum; Future    Needs flu shot  -     influenza vaccine, 3500-2236, high-dose, PF 0 5 mL (FLUZONE HIGH-DOSE)    Bifascicular bundle branch block    Atrial fibrillation, unspecified type ([place] [address] )    Chronic obstructive pulmonary disease, unspecified COPD type ([place] [address] )    Hypothyroidism, unspecified type    Other orders  -     PULMICORT FLEXHALER 180 MCG/ACT inhaler          Subjective:      Patient ID: Karol Alicea is a 80 y o  female  We had a long discussion today about her congestive heart failure  Laboratory testing done prior to this visit shows a proBNP of 2790, TSH 4 75 BUN 23 the creatinine 1 25 with an estimated GFR 39 normal CBC  We talked about her diastolic heart failure in detail  She has not had an echo in nearly 3 years  She is stable on current dose of diuretic  I again quizzed her about potential sleep apnea  She says she is not allowed snore but often feels fatigue after sleeping through the night  She says her  would note here if she was snoring because of his difficulty hearing  She denies major gasping  I told her we should screen for sleep apnea arrangements for this were made  We also talked about the potential for cardiac amyloid  EKG does show some tendency towards small complexes  I felt we should screen for this as well  She will have a serum protein electrophoresis with free light chain ratio as well as a nuclear medicine scan for cardiac amyloid  Repeat echocardiogram ordered  We again reviewed the difference between systolic and diastolic heart failure    She has associated atrial fibrillation which is adequately controlled  I made a drawing of the anatomy  We reviewed the whole concept of amyloid  This was a 40 minutes visit with more than 50% of the time spent counseling the patient formulating a treatment plan  Multiple questions were answered  We reviewed the meaning of a BNP  We reviewed the difference between shortness of breath cardiac origin as well pulmonary in origin  She has had issues with both  She had a follow-up mammogram that was done reviewed and done in August of 2019  -Vaccine status reviewed  She was given influenza vaccine today  She has had both types a pneumococcal vaccine  She has had prior Adacel vaccine  She has had both doses of the new zoster vaccine  She understands this is more effective than prior vaccine but with higher incidence of side effects  This patient denies any systemic symptoms  Specifically there has been no evidence of fever, night sweats, significant weight loss or significant decrease in appetite  This patient has a known history of hypothyroidism  We reviewed the difference between brand and generic thyroid supplements  We reviewed that thyroid supplements need to be taken on an empty stomach  We reviewed recent literature showing that the thyroid supplement can be taken in the morning on an empty stomach or before going to bed on an empty stomach  This patient denies any symptoms of hypothyroidism including significant constipation, dry skin or worsening fatigue  Periodic monitoring of this patient's free T4 and TSH will continue to be done  We reviewed the literature showing TSH of up to 5 is often considered normal for octenegarians- she appears clinically and lab wise euthyroid  We talked about rate control her atrial fibrillation  She has adequate control on current dose of Cardizem and beta-blocker  Most recent Holter shows adequate rate control with maximum heart rate of 120 low heart rate in the 40s while asleep      She had an exacerbation of her COPD after her last visit was treated with doxycycline  She responded to this  She has an elevated creatinine  This is felt related to diuretics  We reviewed we are sacrificing some degree of elevated creatinine to maintain decreased pulmonary congestion  She was appreciative of that  This patient remains on chronic therapy with Coumadin  There has been no evidence of any bleeding from any sites-specifically hematemesis, rectal bleeding, hemoptysis epistaxis, hematuria or spontaneous bruising of the chest or back  We reviewed the interaction of many medications with Coumadin and the need to more frequently check the INR when these medications are introduced  We reviewed that nonsteroidal anti-inflammatories are contraindicated because of the increased risk of GI bleeding that occurs with there simultaneous use with Coumadin  This patient is already aware of their goal INR  Pro times have been adjusted  She had a because of her chronic Coumadin you she should not be using nonsteroidals  This was a 40 minutes visit with more than 50% of the time spent counseling the patient formulating a treatment plan  Multiple questions answered      The following portions of the patient's history were reviewed and updated as appropriate: allergies, current medications, past family history, past medical history, past social history, past surgical history and problem list     Review of Systems   Constitutional: Positive for fatigue  Respiratory: Positive for shortness of breath  Cardiovascular: Negative  Gastrointestinal: Negative  Endocrine: Negative  Genitourinary: Negative  Musculoskeletal: Negative  Neurological: Negative  Hematological: Negative  Psychiatric/Behavioral: Negative            Objective:      Ht 5' 5" (1 651 m)   Wt 64 2 kg (141 lb 9 6 oz)   BMI 23 56 kg/m²          Physical Exam   Constitutional: She is oriented to person, place, and time  She appears well-developed and well-nourished  No distress  HENT:   Head: Normocephalic and atraumatic  Right Ear: External ear normal    Left Ear: External ear normal    Nose: Nose normal    Mouth/Throat: Oropharynx is clear and moist  No oropharyngeal exudate  Eyes: Pupils are equal, round, and reactive to light  Conjunctivae and EOM are normal  Right eye exhibits no discharge  Left eye exhibits no discharge  No scleral icterus  Neck: Normal range of motion  Neck supple  No JVD present  No tracheal deviation present  No thyromegaly present  Cardiovascular: Normal rate and intact distal pulses  Exam reveals no gallop and no friction rub  No murmur heard  Audible click  Irregular rhythm  Cardiomegaly to percussion  Adequate upstroke   Pulmonary/Chest: Effort normal and breath sounds normal  No respiratory distress  She has no wheezes  She has no rales  She exhibits no tenderness  Abdominal: Soft  Bowel sounds are normal  She exhibits no distension and no mass  There is no tenderness  There is no rebound and no guarding  Musculoskeletal: Normal range of motion  She exhibits no edema or deformity  Lymphadenopathy:     She has no cervical adenopathy  Neurological: She is alert and oriented to person, place, and time  She has normal reflexes  She displays normal reflexes  No cranial nerve deficit  She exhibits normal muscle tone  Coordination normal    Skin: Skin is warm and dry  No rash noted  No erythema  Psychiatric: She has a normal mood and affect   Her behavior is normal  Judgment and thought content normal

## 2019-09-19 ENCOUNTER — TELEPHONE (OUTPATIENT)
Dept: INTERNAL MEDICINE CLINIC | Facility: CLINIC | Age: 84
End: 2019-09-19

## 2019-09-19 PROCEDURE — 90662 IIV NO PRSV INCREASED AG IM: CPT

## 2019-09-19 PROCEDURE — G0008 ADMIN INFLUENZA VIRUS VAC: HCPCS

## 2019-09-26 ENCOUNTER — APPOINTMENT (OUTPATIENT)
Dept: LAB | Facility: HOSPITAL | Age: 84
End: 2019-09-26
Payer: COMMERCIAL

## 2019-09-26 ENCOUNTER — OFFICE VISIT (OUTPATIENT)
Dept: CARDIOLOGY CLINIC | Facility: CLINIC | Age: 84
End: 2019-09-26
Payer: COMMERCIAL

## 2019-09-26 VITALS
BODY MASS INDEX: 23.43 KG/M2 | WEIGHT: 140.6 LBS | HEART RATE: 75 BPM | SYSTOLIC BLOOD PRESSURE: 115 MMHG | RESPIRATION RATE: 18 BRPM | HEIGHT: 65 IN | DIASTOLIC BLOOD PRESSURE: 70 MMHG

## 2019-09-26 DIAGNOSIS — J44.9 CHRONIC OBSTRUCTIVE PULMONARY DISEASE, UNSPECIFIED COPD TYPE (HCC): Chronic | ICD-10-CM

## 2019-09-26 DIAGNOSIS — I10 ESSENTIAL HYPERTENSION: Chronic | ICD-10-CM

## 2019-09-26 DIAGNOSIS — I50.9 CONGESTIVE HEART FAILURE, UNSPECIFIED HF CHRONICITY, UNSPECIFIED HEART FAILURE TYPE (HCC): ICD-10-CM

## 2019-09-26 DIAGNOSIS — I48.20 CHRONIC ATRIAL FIBRILLATION (HCC): Chronic | ICD-10-CM

## 2019-09-26 DIAGNOSIS — I50.32 CHRONIC DIASTOLIC CONGESTIVE HEART FAILURE (HCC): Primary | Chronic | ICD-10-CM

## 2019-09-26 PROCEDURE — 84166 PROTEIN E-PHORESIS/URINE/CSF: CPT | Performed by: PATHOLOGY

## 2019-09-26 PROCEDURE — 84166 PROTEIN E-PHORESIS/URINE/CSF: CPT | Performed by: INTERNAL MEDICINE

## 2019-09-26 PROCEDURE — 83883 ASSAY NEPHELOMETRY NOT SPEC: CPT

## 2019-09-26 PROCEDURE — 99213 OFFICE O/P EST LOW 20 MIN: CPT | Performed by: INTERNAL MEDICINE

## 2019-09-26 PROCEDURE — 36415 COLL VENOUS BLD VENIPUNCTURE: CPT

## 2019-09-26 NOTE — ASSESSMENT & PLAN NOTE
Wt Readings from Last 3 Encounters:   09/26/19 63 8 kg (140 lb 9 6 oz)   09/18/19 64 2 kg (141 lb 9 6 oz)   08/06/19 64 9 kg (143 lb)       Diastolic congestive heart failure, stable  Patient continues to have dyspnea on exertion which could be multifactorial related to diastolic congestive heart failure as well as chronic obstructive lung disease  The patient is scheduled for echocardiogram and nuclear study to assess for evidence of amyloidosis

## 2019-09-26 NOTE — ASSESSMENT & PLAN NOTE
Chronic obstructive pulmonary disease, stable  May be contributing to the patient's symptom of dyspnea on exertion

## 2019-09-26 NOTE — PROGRESS NOTES
Assessment/Plan:    Hypertension  Hypertension, stable and adequately controlled  Congestive heart failure (HCC)  Wt Readings from Last 3 Encounters:   09/26/19 63 8 kg (140 lb 9 6 oz)   09/18/19 64 2 kg (141 lb 9 6 oz)   08/06/19 64 9 kg (143 lb)       Diastolic congestive heart failure, stable  Patient continues to have dyspnea on exertion which could be multifactorial related to diastolic congestive heart failure as well as chronic obstructive lung disease  The patient is scheduled for echocardiogram and nuclear study to assess for evidence of amyloidosis  Atrial fibrillation (HCC)  Chronic and stable atrial fibrillation  Heart rate is controlled  We will continue present medications  Chronic obstructive pulmonary disease (HCC)  Chronic obstructive pulmonary disease, stable  May be contributing to the patient's symptom of dyspnea on exertion  Diagnoses and all orders for this visit:    Chronic diastolic congestive heart failure (HCC)    Chronic atrial fibrillation    Essential hypertension    Chronic obstructive pulmonary disease, unspecified [facility] )          Subjective:  Dyspnea on exertion  Patient ID: Dory Packer is a 80 y o  female  Patient presented to this office for the purpose of cardiac follow-up  She has a known history of chronic atrial fibrillation, hypertension, chronic obstructive lung disease and diastolic congestive heart failure  The patient has been complaining of dyspnea on exertion but denies any symptoms of chest pain, palpitation, dizziness or lightheadedness  She has no leg edema  The following portions of the patient's history were reviewed and updated as appropriate: allergies, current medications, past family history, past medical history, past social history, past surgical history and problem list     Review of Systems   Respiratory: Positive for shortness of breath  Negative for apnea, cough, chest tightness and wheezing  Cardiovascular: Negative for chest pain, palpitations and leg swelling  Gastrointestinal: Negative for abdominal pain  Neurological: Negative for dizziness and light-headedness  Objective:  Stable cardiac-wise  /70 (BP Location: Right arm, Patient Position: Sitting)   Pulse 75   Resp 18   Ht 5' 5" (1 651 m)   Wt 63 8 kg (140 lb 9 6 oz)   BMI 23 40 kg/m²          Physical Exam   Constitutional: She is oriented to person, place, and time  She appears well-developed and well-nourished  No distress  HENT:   Head: Normocephalic and atraumatic  Neck: Normal range of motion  Neck supple  No JVD present  No thyromegaly present  Cardiovascular: Normal rate, regular rhythm, S1 normal and S2 normal  Exam reveals no gallop and no friction rub  No murmur heard  Pulmonary/Chest: Effort normal  No respiratory distress  She has no wheezes  She has no rales  She exhibits no tenderness  Abdominal: Soft  Musculoskeletal: She exhibits no edema  Neurological: She is alert and oriented to person, place, and time  Skin: Skin is warm  She is not diaphoretic  Psychiatric: She has a normal mood and affect  Vitals reviewed

## 2019-09-26 NOTE — ASSESSMENT & PLAN NOTE
Chronic and stable atrial fibrillation  Heart rate is controlled  We will continue present medications

## 2019-09-26 NOTE — LETTER
September 26, 2019     Ramiro Marcial MD  [address]  [address]  [address] [place] [address]    Patient: Vivien Dey   YOB: 1932   Date of Visit: 9/26/2019       Dear Dr Farmer Citizen: Thank you for referring Grace Lamb to me for evaluation  Below are my notes for this consultation  If you have questions, please do not hesitate to call me  I look forward to following your patient along with you  Sincerely,        Jeovanny Aguirre MD        CC: No Recipients  Jeovanny Aguirre MD  9/26/2019 10:13 AM  Sign at close encounter  Assessment/Plan:    Hypertension  Hypertension, stable and adequately controlled  Congestive heart failure (HCC)  Wt Readings from Last 3 Encounters:   09/26/19 63 8 kg (140 lb 9 6 oz)   09/18/19 64 2 kg (141 lb 9 6 oz)   08/06/19 64 9 kg (143 lb)       Diastolic congestive heart failure, stable  Patient continues to have dyspnea on exertion which could be multifactorial related to diastolic congestive heart failure as well as chronic obstructive lung disease  The patient is scheduled for echocardiogram and nuclear study to assess for evidence of amyloidosis  Atrial fibrillation (HCC)  Chronic and stable atrial fibrillation  Heart rate is controlled  We will continue present medications  Chronic obstructive pulmonary disease (HCC)  Chronic obstructive pulmonary disease, stable  May be contributing to the patient's symptom of dyspnea on exertion  Diagnoses and all orders for this visit:    Chronic diastolic congestive heart failure (HCC)    Chronic atrial fibrillation    Essential hypertension    Chronic obstructive pulmonary disease, unspecified COPD type ([facility] )          Subjective:  Dyspnea on exertion  Patient ID: Vivien Dey is a 80 y o  female  Patient presented to this office for the purpose of cardiac follow-up    She has a known history of chronic atrial fibrillation, hypertension, chronic obstructive lung disease and diastolic congestive heart failure  The patient has been complaining of dyspnea on exertion but denies any symptoms of chest pain, palpitation, dizziness or lightheadedness  She has no leg edema  The following portions of the patient's history were reviewed and updated as appropriate: allergies, current medications, past family history, past medical history, past social history, past surgical history and problem list     Review of Systems   Respiratory: Positive for shortness of breath  Negative for apnea, cough, chest tightness and wheezing  Cardiovascular: Negative for chest pain, palpitations and leg swelling  Gastrointestinal: Negative for abdominal pain  Neurological: Negative for dizziness and light-headedness  Objective:  Stable cardiac-wise  /70 (BP Location: Right arm, Patient Position: Sitting)   Pulse 75   Resp 18   Ht 5' 5" (1 651 m)   Wt 63 8 kg (140 lb 9 6 oz)   BMI 23 40 kg/m²           Physical Exam   Constitutional: She is oriented to person, place, and time  She appears well-developed and well-nourished  No distress  HENT:   Head: Normocephalic and atraumatic  Neck: Normal range of motion  Neck supple  No JVD present  No thyromegaly present  Cardiovascular: Normal rate, regular rhythm, S1 normal and S2 normal  Exam reveals no gallop and no friction rub  No murmur heard  Pulmonary/Chest: Effort normal  No respiratory distress  She has no wheezes  She has no rales  She exhibits no tenderness  Abdominal: Soft  Musculoskeletal: She exhibits no edema  Neurological: She is alert and oriented to person, place, and time  Skin: Skin is warm  She is not diaphoretic  Psychiatric: She has a normal mood and affect  Vitals reviewed

## 2019-09-27 LAB
KAPPA LC FREE SER-MCNC: 45 MG/L (ref 3.3–19.4)
KAPPA LC FREE/LAMBDA FREE SER: 3.19 {RATIO} (ref 0.26–1.65)
LAMBDA LC FREE SERPL-MCNC: 14.1 MG/L (ref 5.7–26.3)

## 2019-10-01 LAB
ALBUMIN UR ELPH-MCNC: 100 %
ALPHA1 GLOB MFR UR ELPH: 0 %
ALPHA2 GLOB MFR UR ELPH: 0 %
B-GLOBULIN MFR UR ELPH: 0 %
GAMMA GLOB MFR UR ELPH: 0 %
PROT PATTERN UR ELPH-IMP: NORMAL
PROT UR-MCNC: 6 MG/DL

## 2019-10-03 DIAGNOSIS — I50.32 CHRONIC DIASTOLIC CONGESTIVE HEART FAILURE (HCC): Primary | Chronic | ICD-10-CM

## 2019-10-03 NOTE — PROGRESS NOTES
PER DOC PATIENT NEEDS PRO ELECTRO SERUM DONE  NO FAST , DX CHF    MAILING LABS TO PT       MAILED ADDITIONAL LABS

## 2019-10-15 ENCOUNTER — ANTICOAG VISIT (OUTPATIENT)
Dept: CARDIOLOGY CLINIC | Facility: CLINIC | Age: 84
End: 2019-10-15

## 2019-10-15 DIAGNOSIS — I48.91 ATRIAL FIBRILLATION, UNSPECIFIED TYPE (HCC): ICD-10-CM

## 2019-10-15 LAB — INR PPP: 2.6 (ref 0.84–1.19)

## 2019-10-15 NOTE — PROGRESS NOTES
10/15/19, tc to pt, will continue current dose, inr due 1 month  denies any changes in health, medication    carmen

## 2019-10-23 ENCOUNTER — HOSPITAL ENCOUNTER (OUTPATIENT)
Dept: NON INVASIVE DIAGNOSTICS | Facility: HOSPITAL | Age: 84
Discharge: HOME | End: 2019-10-23
Payer: COMMERCIAL

## 2019-10-23 DIAGNOSIS — I50.9 CONGESTIVE HEART FAILURE, UNSPECIFIED HF CHRONICITY, UNSPECIFIED HEART FAILURE TYPE (HCC): ICD-10-CM

## 2019-10-23 PROCEDURE — 93306 TTE W/DOPPLER COMPLETE: CPT | Performed by: INTERNAL MEDICINE

## 2019-10-23 PROCEDURE — 93306 TTE W/DOPPLER COMPLETE: CPT

## 2019-10-25 ENCOUNTER — TELEPHONE (OUTPATIENT)
Dept: INTERNAL MEDICINE CLINIC | Facility: CLINIC | Age: 84
End: 2019-10-25

## 2019-10-25 NOTE — TELEPHONE ENCOUNTER
----- Message from Falguni Grove MD sent at 10/25/2019  5:07 AM EDT -----   Call patient-urine testing normal   All blood work normal other than minimal elevation of 1 protein related to age but not felt clinically significant    Echocardiogram shows normal ejection fraction, changes related to her history of congestive heart failure but overall very stable -Good News- I will review in detail at her next visit

## 2019-10-25 NOTE — TELEPHONE ENCOUNTER
TRIED CALLING PATIENT 3 TIME LINE WAS BUSY AT FIRST , THAN KEPT RINGING ON AND ON    WILL TRY AGAIN LATER

## 2019-11-01 ENCOUNTER — HOSPITAL ENCOUNTER (OUTPATIENT)
Dept: RADIOLOGY | Facility: HOSPITAL | Age: 84
Discharge: HOME | End: 2019-11-01
Payer: COMMERCIAL

## 2019-11-01 DIAGNOSIS — I50.9 CONGESTIVE HEART FAILURE, UNSPECIFIED HF CHRONICITY, UNSPECIFIED HEART FAILURE TYPE (HCC): ICD-10-CM

## 2019-11-01 PROCEDURE — A9538 TC99M PYROPHOSPHATE: HCPCS

## 2019-11-01 PROCEDURE — 78800 RP LOCLZJ TUM 1 AREA 1 D IMG: CPT

## 2019-11-19 ENCOUNTER — TELEPHONE (OUTPATIENT)
Dept: CARDIOLOGY CLINIC | Facility: CLINIC | Age: 84
End: 2019-11-19

## 2019-11-19 DIAGNOSIS — I48.91 ATRIAL FIBRILLATION, UNSPECIFIED TYPE (HCC): Primary | ICD-10-CM

## 2019-11-19 LAB — INR PPP: 2.6 (ref 0.84–1.19)

## 2019-11-20 ENCOUNTER — ANTICOAG VISIT (OUTPATIENT)
Dept: CARDIOLOGY CLINIC | Facility: CLINIC | Age: 84
End: 2019-11-20

## 2019-11-20 DIAGNOSIS — I48.91 ATRIAL FIBRILLATION, UNSPECIFIED TYPE (HCC): ICD-10-CM

## 2019-11-20 NOTE — PROGRESS NOTES
11/20/19, tc to pt, left message on answering machine, continue current dose, inr due 4 weeks   carmen

## 2019-11-27 ENCOUNTER — OFFICE VISIT (OUTPATIENT)
Dept: INTERNAL MEDICINE CLINIC | Facility: CLINIC | Age: 84
End: 2019-11-27
Payer: COMMERCIAL

## 2019-11-27 VITALS — WEIGHT: 145.6 LBS | BODY MASS INDEX: 24.26 KG/M2 | HEIGHT: 65 IN

## 2019-11-27 DIAGNOSIS — I50.9 CONGESTIVE HEART FAILURE, UNSPECIFIED HF CHRONICITY, UNSPECIFIED HEART FAILURE TYPE (HCC): ICD-10-CM

## 2019-11-27 DIAGNOSIS — I50.33 ACUTE ON CHRONIC DIASTOLIC HEART FAILURE (HCC): ICD-10-CM

## 2019-11-27 DIAGNOSIS — I50.30 HEART FAILURE WITH PRESERVED EJECTION FRACTION, UNSPECIFIED HF CHRONICITY (HCC): Primary | ICD-10-CM

## 2019-11-27 DIAGNOSIS — R76.8 ELEVATED SERUM IMMUNOGLOBULIN FREE LIGHT CHAINS: ICD-10-CM

## 2019-11-27 DIAGNOSIS — J44.9 CHRONIC OBSTRUCTIVE PULMONARY DISEASE, UNSPECIFIED COPD TYPE (HCC): Chronic | ICD-10-CM

## 2019-11-27 DIAGNOSIS — I48.91 ATRIAL FIBRILLATION, UNSPECIFIED TYPE (HCC): Chronic | ICD-10-CM

## 2019-11-27 PROCEDURE — 99215 OFFICE O/P EST HI 40 MIN: CPT | Performed by: INTERNAL MEDICINE

## 2019-11-28 PROBLEM — R76.8 ELEVATED SERUM IMMUNOGLOBULIN FREE LIGHT CHAINS: Status: ACTIVE | Noted: 2019-11-28

## 2019-11-28 PROCEDURE — 93000 ELECTROCARDIOGRAM COMPLETE: CPT | Performed by: INTERNAL MEDICINE

## 2019-11-28 NOTE — PROGRESS NOTES
Assessment/Plan:   1  Diastolic heart failure -chronic - has had intermittent exacerbation over the past couple years  Echo just done shows EF is 60%, no regional wall motion abnormalities, right ventricle systolic function normal, left right atrium moderately dilated, mild-to-moderate tricuspid regurg with estimated peak PA pressure 45 millimeters  IVC is dilated  Nuclear medicine study for amyloid is equivocal   She denies any symptoms of sleep apnea  She has associated atrial fibrillation with rate that is adequately controlled  At this point I recommended she continue current dose of torsemide and spironolactone  She is requesting 2nd opinion and she was referred to Dr Vyas  2  Rule out cardiac amyloid -as noted  theTTR  Amyloid studies equivocal-await opinion of Cardiology  3  Elevated serum for free light chains-immunoglobulin levels normal    Serum and urine protein electrophoresis  Without a spike  Reviewed that this could be a subtle hematologic abnormality associated with the aging process  At this point I do not think we need to do anything other than monitor  Always watching for potential transition myeloma  She will have his yearly protein electrophoresis immunoglobulin levels and free light chain ratio  4  Atrial fibrillation -adequate rate control on current dose of Cardizem beta-blocker  Remains on anticoagulation with Coumadin which she prefers  Most recent 24 hour Holter shows adequate rate control with maximum rate of 120 low rate in the 40s while asleep  5  Elevated creatinine -felt related diuretic therapy  Sacrificing some degree of creatinine elevation to help decreased pulmonary congestion  6  Shortness of breath -felt to be combination of COPD and CHF but predominantly CHF  Did have exacerbation of COPD or earlier this year  Has chronic significant elevation of her proBNP consistent with cardiac etiology  7   Left wrist pain -tendinitis -ortho treated her with conservative therapy with rest and bracing -she failed steroid injection  8  Hypertension-adequate control on current regimen  9  Osteopenia-most recent DEXA scan shows L-spine -2 2 and hip of -1   Subsequent bone density study due 2 years after last which would be March 2019 SCHEDULE NEXT VISIT  10   Asthma-stable on current regimen  11   Pre diabetes-A1c continue current therapy  12   Hypothyroidism-TSH stable on current dose of thyroid supplement  13   Tremor-likely benign essential tremor-monitor  14   Knee pain-had prior bilateral total knee replacement   1 point she was going back to orthopedic physician to check positioning of her prosthesis-review next visit  15  Left leg pain -felt to be trochanteric bursitis-responded to injection and improved  16   Health maintenance-HAS PRESCRIPTION FOR SHINGRIX VACCINE  17   Vitamin-D deficiency-she will in resume her supplement of 2000 units daily  18  Plantar fasciitis -responded to physical therapy and exercise   19  Leg cramps- uses tonic water before bed  She knows to replace vitamin D   20  Abnormal area the pancreas and CT scan-full discussion as per noted February 2015  Follow-up ultrasound unchanged  MRI without contrast -MRCP in 2016 showed pancreatic cystic lesion of the head, uncinate process and neck were unchanged suggesting side branch IPMN - no main pancreatic duct dilatation  Liver enzymes remained normal   Patient and her GI physician decided they would not do additional surveillance including MRI or endoscopic ultrasound  21Degenerative arthritis of the hands -monitor     All other problems as per note of October 2015, 20 15 May 2014      MEDICAL REGIMEN:MEDICAL REGIMEN:                                                  Flonase nasal spray used intermittently, torsemide on an alternating day regimen - on the 1st day 40 milligrams in a m  And 20 milligrams in the p m   And on the 2nd day 40 milligrams in a m , spironolactone 25 milligrams every other day, potassium chloride 10 milliequivalents daily, Coumadin with dose adjustments, generic Singulair 10 milligrams daily, levothyroxine 50 micrograms daily but 75 micrograms on Sunday, Claritin 10 milligrams daily as needed, calcium vitamin-D daily, Ventolin inhaler as needed, metoprolol succinate 25 milligrams -2 tablets in the am and one and a half tablets in the pm  -, Cardizem   Milligrams a day, Pulmicort 100 micrograms 1 puff b i d , multivitamin, vitamin D3 / 2000 units a day, fish oil 1200 milligrams a day, rare use of Vicodin 5-300-half tablet b i d  P r n  For severe back pain    Await opinion Cardiology  Appointment over the next several months with prior CBC, chemistry profile, TSH SCHEDULE FOLLOW-UP DEXA SCAN NEXT VISIT    Addendum -patient seen by cardiology-Dr Vyas   On March 2nd- he reviewed her echo personally and feels she has low suggestion for infiltrative myopathy  Feels she is compensated clinically under current diuretic regimen and no further workup is needed  He feels her ongoing shortness of breath is related to her chronic atrial fibrillation -diastolic dysfunction  Talked about Watchman device to avoid anticoagulation in the ambulatory dysfunction years but she is very functional avoids aspirin because her asthma -therefore not ideal candidate for Watchman -he recommended she be re-evaluated in 6 months and made no change in her meds            No problem-specific Assessment & Plan notes found for this encounter  Diagnoses and all orders for this visit:    Heart failure with preserved ejection fraction, unspecified HF chronicity ([facility] [address] )  -     Ambulatory referral to Cardiology; Future    Atrial fibrillation, unspecified type (HCC)  -     CBC and differential; Future  -     Comprehensive metabolic panel; Future  -     TSH, 3rd generation; Future  -     NT-BNP PRO;  Future    Acute on chronic diastolic heart failure (HCC)  -     CBC and differential; Future  -     Comprehensive metabolic panel; Future  -     TSH, 3rd generation; Future  -     NT-BNP PRO; Future    Congestive heart failure, unspecified HF chronicity, unspecified heart failure type ([facility] )  -     POCT ECG    Chronic obstructive pulmonary disease, unspecified COPD type ([facility] )    Elevated serum immunoglobulin free light chains          Subjective:      Patient ID: Ronan Gutierrez is a 80 y o  female  She returns for repeat evaluation  As noted she has a history of chronic diastolic heart failure  She underwent evaluation regarding potential other issues  Search was made for cardiac amyloid  Nuclear medicine study was read as equivocal for  TTR  Amyloidosis  She had a hematologic workup  Protein electrophoresis showed a questionable spite but immunofixation shows no monoclonal protein  Immunoglobulin levels were normal   She does have an elevated free light chain ratio  Urine protein electrophoresis normal   Most recent CBC normal other than mild increase in eosinophils  Chemistry profile normal other than mildly increasing creatinine felt related to diuretic therapy  There was no elevation of calcium  Echocardiogram shows an EF is 60%, no regional wall motion abnormalities, left and right atrium moderately dilated, mild-to-moderate tricuspid regurg with an estimated peak PA pressure 45 millimeters and inferior vena cava dilated  She has known atrial fibrillation with a controlled rate  I quizzed or any symptoms of sleep apnea  She does not snore  A she says she does not awaken with gasping  On most nights when she sleeps of the night she feels refreshed  We had a long discussion today regarding her diastolic heart failure  She wants to consider a 2nd opinion  I recommended she see Dr Octavia Haynes -arrangements made for this  Letter dictated  She denies chest pain or pressure  She says when she goes up and down steps she has significant shortness of breath but otherwise feels it is relatively stable  She denies orthopnea or proximal nocturnal dyspnea  She has not had any palpitations  She denies any weakness of 1 arm and leg compared to the other  She denies any numbness of 1 arm and leg compared to the other  She denies blurred or double vision or difficulty with her speech    She understands the difference between heart failure with decreased verses preserved ejection fraction  We had made a drawing of the anatomy previously and we went over that again today  This was a 40 minutes visit with more than 50% of the time spent counseling the patient formulating a treatment plan  Multiple questions were answered    She has known hypertension  BP adequately controlled on current regimen  Avoiding salt and decongestants  She restrict salt with her CHF in knows to restrict salt over the holidays  She remains on her anticoagulant  We talked about use 1 of the newer agent but she prefers to stay on Coumadin  She says she has been responsive to this drug and is very happy in using it  She does not mind going for her pro times  This patient remains on chronic therapy with Coumadin  There has been no evidence of any bleeding from any sites-specifically hematemesis, rectal bleeding, hemoptysis epistaxis, hematuria or spontaneous bruising of the chest or back  We reviewed the interaction of many medications with Coumadin and the need to more frequently check the INR when these medications are introduced  We reviewed that nonsteroidal anti-inflammatories are contraindicated because of the increased risk of GI bleeding that occurs with there simultaneous use with Coumadin  This patient is already aware of their goal INR  There was concerned about her being charged for labs from a pill on September 10th-specifically labs drawn on that day -reviewed on that date she had chemistry profile proBNP TSH and CBC    If this is more of an issue she will let me know      The following portions of the patient's history were reviewed and updated as appropriate: allergies, current medications, past family history, past medical history, past social history, past surgical history and problem list     Review of Systems   Constitutional: Negative  Respiratory: Positive for shortness of breath  Cardiovascular: Negative  Gastrointestinal: Negative  Endocrine: Negative  Genitourinary: Negative  Musculoskeletal: Negative  Neurological: Negative  Hematological: Negative  Psychiatric/Behavioral: Negative  Objective:      Ht 5' 5" (1 651 m)   Wt 66 kg (145 lb 9 6 oz)   BMI 24 23 kg/m²          Physical Exam   Constitutional: She is oriented to person, place, and time  She appears well-developed and well-nourished  No distress  HENT:   Head: Normocephalic and atraumatic  Right Ear: External ear normal    Left Ear: External ear normal    Nose: Nose normal    Mouth/Throat: Oropharynx is clear and moist  No oropharyngeal exudate  Eyes: Pupils are equal, round, and reactive to light  Conjunctivae and EOM are normal  Right eye exhibits no discharge  Left eye exhibits no discharge  No scleral icterus  Neck: Normal range of motion  Neck supple  No JVD present  No tracheal deviation present  No thyromegaly present  Cardiovascular: Normal rate and intact distal pulses  Exam reveals no gallop and no friction rub  No murmur heard  Cardiomegaly to percussion  Irregular rhythm  Pulmonary/Chest: Effort normal and breath sounds normal  No respiratory distress  She has no wheezes  She has no rales  She exhibits no tenderness  Abdominal: Soft  Bowel sounds are normal  She exhibits no distension and no mass  There is no tenderness  There is no rebound and no guarding  Musculoskeletal: Normal range of motion  She exhibits no edema or deformity  Lymphadenopathy:     She has no cervical adenopathy  Neurological: She is alert and oriented to person, place, and time  She has normal reflexes   She displays normal reflexes  No cranial nerve deficit  She exhibits normal muscle tone  Coordination normal    Skin: Skin is warm and dry  No rash noted  No erythema  Psychiatric: She has a normal mood and affect   Her behavior is normal  Judgment and thought content normal

## 2019-12-17 LAB — INR PPP: 2.9 (ref 0.84–1.19)

## 2019-12-18 ENCOUNTER — ANTICOAG VISIT (OUTPATIENT)
Dept: CARDIOLOGY CLINIC | Facility: CLINIC | Age: 84
End: 2019-12-18

## 2019-12-18 DIAGNOSIS — I48.91 ATRIAL FIBRILLATION, UNSPECIFIED TYPE (HCC): ICD-10-CM

## 2019-12-19 DIAGNOSIS — I50.32 CHRONIC DIASTOLIC CONGESTIVE HEART FAILURE (HCC): ICD-10-CM

## 2019-12-19 DIAGNOSIS — I48.20 CHRONIC ATRIAL FIBRILLATION (HCC): Chronic | ICD-10-CM

## 2019-12-19 DIAGNOSIS — J45.20 MILD INTERMITTENT ASTHMA WITHOUT COMPLICATION: ICD-10-CM

## 2019-12-19 DIAGNOSIS — E03.9 HYPOTHYROIDISM, UNSPECIFIED TYPE: Chronic | ICD-10-CM

## 2019-12-19 NOTE — TELEPHONE ENCOUNTER
Pt said she gave you a list of medications that needed refilled at her last office visit and that you said you'd give it to one of the [facility] but she's hasn't heard anything  Just checking if you still have that list and if you did give it to someone to refill  Please advise

## 2019-12-19 NOTE — TELEPHONE ENCOUNTER
I thought this was already done -please apologize to the patient and have her resubmit the list and we will do it immediately -if necessary just have her read it to you over the phone

## 2019-12-21 RX ORDER — LEVOTHYROXINE SODIUM 0.05 MG/1
TABLET ORAL
Qty: 98 TABLET | Refills: 3 | Status: SHIPPED | OUTPATIENT
Start: 2019-12-21 | End: 2020-12-23 | Stop reason: SDUPTHER

## 2019-12-21 RX ORDER — SPIRONOLACTONE 25 MG/1
25 TABLET ORAL EVERY OTHER DAY
Qty: 45 TABLET | Refills: 3 | Status: SHIPPED | OUTPATIENT
Start: 2019-12-21 | End: 2020-12-23 | Stop reason: SDUPTHER

## 2019-12-21 RX ORDER — DILTIAZEM HYDROCHLORIDE 300 MG/1
300 CAPSULE, COATED, EXTENDED RELEASE ORAL DAILY
Qty: 90 CAPSULE | Refills: 3 | Status: SHIPPED | OUTPATIENT
Start: 2019-12-21 | End: 2020-12-23 | Stop reason: SDUPTHER

## 2019-12-21 RX ORDER — TORSEMIDE 20 MG/1
TABLET ORAL
Qty: 216 TABLET | Refills: 3 | Status: SHIPPED | OUTPATIENT
Start: 2019-12-21 | End: 2020-08-19 | Stop reason: SDUPTHER

## 2019-12-21 RX ORDER — METOPROLOL SUCCINATE 25 MG/1
TABLET, EXTENDED RELEASE ORAL
Qty: 315 TABLET | Refills: 3 | Status: SHIPPED | OUTPATIENT
Start: 2019-12-21 | End: 2020-12-23 | Stop reason: SDUPTHER

## 2019-12-21 RX ORDER — MONTELUKAST SODIUM 10 MG/1
10 TABLET ORAL DAILY
Qty: 90 TABLET | Refills: 3 | Status: SHIPPED | OUTPATIENT
Start: 2019-12-21 | End: 2020-12-23 | Stop reason: SDUPTHER

## 2019-12-21 RX ORDER — BUDESONIDE 180 UG/1
1 AEROSOL, POWDER RESPIRATORY (INHALATION) 2 TIMES DAILY
Qty: 3 INHALER | Refills: 3 | Status: SHIPPED | OUTPATIENT
Start: 2019-12-21 | End: 2020-12-23 | Stop reason: SDUPTHER

## 2019-12-30 DIAGNOSIS — E87.6 HYPOKALEMIA: ICD-10-CM

## 2019-12-30 RX ORDER — POTASSIUM CHLORIDE 750 MG/1
10 CAPSULE, EXTENDED RELEASE ORAL DAILY
Qty: 180 CAPSULE | Refills: 3 | Status: SHIPPED | OUTPATIENT
Start: 2019-12-30

## 2020-01-14 ENCOUNTER — ANTICOAG VISIT (OUTPATIENT)
Dept: CARDIOLOGY CLINIC | Facility: CLINIC | Age: 85
End: 2020-01-14

## 2020-01-14 ENCOUNTER — TELEPHONE (OUTPATIENT)
Dept: CARDIOLOGY CLINIC | Facility: CLINIC | Age: 85
End: 2020-01-14

## 2020-01-14 DIAGNOSIS — I48.91 ATRIAL FIBRILLATION, UNSPECIFIED TYPE (HCC): ICD-10-CM

## 2020-01-14 LAB — INR PPP: 2.2 (ref 0.84–1.19)

## 2020-01-14 NOTE — PROGRESS NOTES
Spoke with patient regarding INR of 2 2    Patient advised to stay on same dose and have another in one month  judy

## 2020-02-18 ENCOUNTER — ANTICOAG VISIT (OUTPATIENT)
Dept: CARDIOLOGY CLINIC | Facility: CLINIC | Age: 85
End: 2020-02-18

## 2020-02-18 LAB — INR PPP: 2.4 (ref 0.84–1.19)

## 2020-03-02 ENCOUNTER — CONSULT (OUTPATIENT)
Dept: CARDIOLOGY CLINIC | Facility: CLINIC | Age: 85
End: 2020-03-02
Payer: COMMERCIAL

## 2020-03-02 VITALS
BODY MASS INDEX: 23.82 KG/M2 | WEIGHT: 143 LBS | SYSTOLIC BLOOD PRESSURE: 124 MMHG | HEIGHT: 65 IN | HEART RATE: 76 BPM | DIASTOLIC BLOOD PRESSURE: 78 MMHG

## 2020-03-02 DIAGNOSIS — I48.21 PERMANENT ATRIAL FIBRILLATION (HCC): Primary | Chronic | ICD-10-CM

## 2020-03-02 DIAGNOSIS — I50.32 CHRONIC DIASTOLIC CONGESTIVE HEART FAILURE (HCC): ICD-10-CM

## 2020-03-02 DIAGNOSIS — E78.2 MIXED HYPERLIPIDEMIA: Chronic | ICD-10-CM

## 2020-03-02 DIAGNOSIS — I45.2 BIFASCICULAR BUNDLE BRANCH BLOCK: Chronic | ICD-10-CM

## 2020-03-02 DIAGNOSIS — I50.30 HEART FAILURE WITH PRESERVED EJECTION FRACTION, UNSPECIFIED HF CHRONICITY (HCC): ICD-10-CM

## 2020-03-02 PROCEDURE — 3008F BODY MASS INDEX DOCD: CPT | Performed by: INTERNAL MEDICINE

## 2020-03-02 PROCEDURE — 4040F PNEUMOC VAC/ADMIN/RCVD: CPT | Performed by: INTERNAL MEDICINE

## 2020-03-02 PROCEDURE — 1160F RVW MEDS BY RX/DR IN RCRD: CPT | Performed by: INTERNAL MEDICINE

## 2020-03-02 PROCEDURE — 1036F TOBACCO NON-USER: CPT | Performed by: INTERNAL MEDICINE

## 2020-03-02 PROCEDURE — 3074F SYST BP LT 130 MM HG: CPT | Performed by: INTERNAL MEDICINE

## 2020-03-02 PROCEDURE — 99214 OFFICE O/P EST MOD 30 MIN: CPT | Performed by: INTERNAL MEDICINE

## 2020-03-02 PROCEDURE — 3078F DIAST BP <80 MM HG: CPT | Performed by: INTERNAL MEDICINE

## 2020-03-02 NOTE — PROGRESS NOTES
[facility]  Follow up note  Chris Gray 80 y o  female MRN: [id number]        Problems    1  Permanent atrial fibrillation     2  Heart failure with preserved ejection fraction, unspecified HF chronicity ([address] )  Ambulatory referral to Cardiology   3  Bifascicular bundle branch block     4  Mixed hyperlipidemia     5  Chronic diastolic congestive heart failure (HCC)         Impression:     Chronic diastolic CHF   o  screen for T TR amyloid, equivocal to negative study  o I reviewed her echo personally, low suggestion for infiltrative myopathy   o Blood work screening UPEP/SPEP was negative     o She is compensated clinically, spironolactone and torsemide appear appropriate choices for her diuretic regimen  o No further workup recommended  o  I suspect her ongoing mild dyspnea symptoms, worse with hills, are likely due to her chronic atrial fibrillation,  Diastolic dysfunction, there is no evidence of any pulmonary arterial notching   Permanent atrial fibrillation  o  present since 2011   o Excellent rate control with low-dose metoprolol and high-dose diltiazem  o Warfarin chronically since 2011, and prefers to remain on warfarin, has been clinically stable on it and no indications to switch to novel anticoagulants  o   She does have dilated atria bilaterally, likely due to her chronic atrial fibrillation  o   Discussed the Watchman device, especially as people age, this becomes advantageous to avoid anticoagulation in the ambulatory dysfunction years, however she is very functional, and avoids aspirin because of her asthma, therefore not an ideal Watchman device candidate   Bifascicular bundle branch block   o   No symptoms consistent with higher degree of heart block     Mixed hyperlipidemia  o  no pharmacologic treatment considering age  Plan:       She can continue following up with me every 6 months     Routine blood work is done by her PCP       She appears well optimized from a chronic diastolic CHF standpoint, I do not believe any further cardiovascular testing is warranted, and her symptom severity considering age 80 is quite mild, as she [place]      HPI:   Miguel Landis is a 80y o  year old female  With chronic diastolic CHF, chronic atrial fibrillation on a rate control strategy and anticoagulation with warfarin, bifascicular bundle branch block, mixed hyperlipidemia who has had a recently extensive workup by TTR amyloid SPECT which was equivocal to negative, echocardiogram with normal LV function, diastolic dysfunction but without any pulmonary artery systolic notching, who is still able to tolerate 2 miles of walking on the [facility],  But is symptomatic with hills  Spironolactone and torsemide are highly effective for her diuretic needs, she denies orthopnea or edema  Metoprolol and diltiazem are her rate control agents, heart rate 76 today and excellent, she has no symptoms consistent with AFib  She takes warfarin chronically has been stable on it and has no interest in switching to novel anticoagulant therapy  She is not have mixed hyperlipidemia treated pharmacologically, likely due to age  She has asthma and is intolerant to aspirin and NSAIDs based on this indication primarily  Review of Systems   Constitutional: Positive for fatigue  HENT: Negative  Eyes: Negative  Respiratory: Positive for shortness of breath  Cardiovascular: Negative  Gastrointestinal: Negative  Endocrine: Negative  Genitourinary: Negative  Musculoskeletal: Positive for arthralgias  Skin: Negative  Neurological: Negative  Hematological: Negative  Psychiatric/Behavioral: Negative          Past Medical History:   Diagnosis Date    Abnormal ultrasound     RESOLVED: [id number]    Asthma with acute exacerbation     LAST ASSESSED: [id number]    Asymptomatic menopausal state     Atherosclerosis     Atrial fibrillation (HCC)     Chronic obstructive lung disease (HCC)     Congestive heart failure (CHF) (HCC)     Congestive heart failure (HCC)     LAST ASSESSED: [id number]    Cuboid fracture     LAST ASSESSED: [id number]    Dyspepsia     GERD (gastroesophageal reflux disease)     High risk medication use     LAST ASSESSED: [id number]    Hyperlipidemia     Hypertension     Hypertension     Hypokalemia     Hypothyroidism     Impacted cerumen of both ears     LAST ASSESSED: [id number]    Impacted cerumen of right ear     LAST ASSESSED: [id number]    Influenza     LAST ASSESSED: [id number]    IPMN (intraductal papillary mucinous neoplasm)     RESOLVED: [id number]    Limb swelling     LAST ASSESSED: [id number]    Navicular fracture of ankle     LAST ASSESSED: [id number]    Onychomycosis     LAST ASSESSED: [id number]    Osteoarthritis     Osteopenia     Osteoporosis     Paronychia, finger, unspecified laterality     LAST ASSESSED: [id number]    Paroxysmal atrial fibrillation (HCC)     LAST ASSESSED: [id number]    Peripheral vascular disease (HCC)     Plantar fasciitis     Talus fracture     LAST ASSESSED: [id number]    Vascular headache      Social History     Substance and Sexual Activity   Alcohol Use Yes    Comment: SOCIAL     Social History     Substance and Sexual Activity   Drug Use No     Social History     Tobacco Use   Smoking Status Never Smoker   Smokeless Tobacco Never Used       Allergies:   Allergies   Allergen Reactions    Asa [Aspirin]     Nsaids        Medications:     Current Outpatient Medications:     albuterol (VENTOLIN HFA) 90 mcg/act inhaler, Inhale, Disp: , Rfl:     Cholecalciferol (VITAMIN D3) 1000 units CAPS, Take by mouth daily, Disp: , Rfl:     diltiazem (CARDIZEM CD) 300 mg 24 hr capsule, Take 1 capsule (300 mg total) by mouth daily, Disp: 90 capsule, Rfl: 3    fluticasone (FLONASE) 50 mcg/act nasal spray, 2 sprays into each nostril daily, Disp: 3 Bottle, Rfl: 3   levothyroxine 50 mcg tablet, Take 1 tab daily expect 1 and 1/2 on sunday, Disp: 98 tablet, Rfl: 3    metoprolol succinate (TOPROL-XL) 25 mg 24 hr tablet, Take 2 tablets in the am and 1 and 1/2 in the evening daily, Disp: 315 tablet, Rfl: 3    montelukast (SINGULAIR) 10 mg tablet, Take 1 tablet (10 mg total) by mouth daily, Disp: 90 tablet, Rfl: 3    potassium chloride (MICRO-K) 10 MEQ CR capsule, Take 1 capsule (10 mEq total) by mouth daily, Disp: 180 capsule, Rfl: 3    PULMICORT FLEXHALER 180 MCG/ACT inhaler, Inhale 1 puff 2 (two) times a day, Disp: 3 Inhaler, Rfl: 3    spironolactone (ALDACTONE) 25 mg tablet, Take 1 tablet (25 mg total) by mouth every other day TAKE 1 TABLET TWICE WEEKLY, Disp: 45 tablet, Rfl: 3    torsemide (DEMADEX) 20 mg tablet, Take 3 tablets (60mg total) on the first day and 2 tablets (40mg total) on the second day in alternating fashion  , Disp: 216 tablet, Rfl: 3    warfarin (COUMADIN) 5 mg tablet, Take 1 tablet (5 mg total) by mouth daily, Disp: 90 tablet, Rfl: 3    Calcium Carb-Cholecalciferol (CALCIUM 1000 + D) 1000-800 MG-UNIT TABS, Take by mouth, Disp: , Rfl:       Vitals:    03/02/20 1303   BP: 124/78   Pulse: 76     Weight (last 2 days)     Date/Time   Weight    03/02/20 1303   64 9 (143)            Physical Exam   Constitutional: No distress  HENT:   Head: Normocephalic and atraumatic  Eyes: Conjunctivae are normal  No scleral icterus  Neck: Normal range of motion  No JVD present  Cardiovascular: Normal rate, normal heart sounds and intact distal pulses  No murmur heard  Irregular   Pulmonary/Chest: Effort normal and breath sounds normal  No respiratory distress  She has no wheezes  She has no rales  Musculoskeletal: She exhibits no edema or tenderness  Skin: Skin is warm and dry  She is not diaphoretic         Laboratory Studies:    Laboratory studies personally reviewed    Cardiac testing:     EKG reviewed personally:         Echocardiogram:    10/19- EF 60%, diastolic dysfunction, un graded due to AFib, normal RV size and function, moderate  Left and right atrial dilatation, mild MR, mild TR, mildly elevated pulmonary pressures     T TR SPECT   11/19-1 26 heart to lung ratio, equivocal to negative    Stress tests:      Catheterization:      Holter:         Carmella Jacques MD    Portions of the record may have been created with voice recognition software  Occasional wrong word or "sound a like" substitutions may have occurred due to the inherent limitations of voice recognition software  Read the chart carefully and recognize, using context, where substitutions have occurred

## 2020-03-24 ENCOUNTER — TELEPHONE (OUTPATIENT)
Dept: CARDIOLOGY CLINIC | Facility: CLINIC | Age: 85
End: 2020-03-24

## 2020-03-24 ENCOUNTER — ANTICOAG VISIT (OUTPATIENT)
Dept: CARDIOLOGY CLINIC | Facility: CLINIC | Age: 85
End: 2020-03-24

## 2020-03-24 DIAGNOSIS — I48.21 PERMANENT ATRIAL FIBRILLATION (HCC): Primary | ICD-10-CM

## 2020-03-24 DIAGNOSIS — I48.21 PERMANENT ATRIAL FIBRILLATION (HCC): ICD-10-CM

## 2020-03-24 LAB — INR PPP: 2 (ref 0.84–1.19)

## 2020-03-24 NOTE — PROGRESS NOTES
PC to patient with good INR of 2 0  Advised to stay on the same dose of coumadin and have another INR in 4 weeks    judy

## 2020-04-02 ENCOUNTER — TELEPHONE (OUTPATIENT)
Dept: INTERNAL MEDICINE CLINIC | Facility: CLINIC | Age: 85
End: 2020-04-02

## 2020-04-06 ENCOUNTER — OFFICE VISIT (OUTPATIENT)
Dept: INTERNAL MEDICINE CLINIC | Facility: CLINIC | Age: 85
End: 2020-04-06
Payer: COMMERCIAL

## 2020-04-06 DIAGNOSIS — R79.89 ELEVATED SERUM CREATININE: ICD-10-CM

## 2020-04-06 DIAGNOSIS — Z71.89 ADVICE GIVEN ABOUT COVID-19 VIRUS INFECTION: ICD-10-CM

## 2020-04-06 DIAGNOSIS — E03.9 HYPOTHYROIDISM, UNSPECIFIED TYPE: Chronic | ICD-10-CM

## 2020-04-06 DIAGNOSIS — I50.32 CHRONIC DIASTOLIC CONGESTIVE HEART FAILURE (HCC): Primary | ICD-10-CM

## 2020-04-06 DIAGNOSIS — J44.9 CHRONIC OBSTRUCTIVE PULMONARY DISEASE, UNSPECIFIED COPD TYPE (HCC): Chronic | ICD-10-CM

## 2020-04-06 DIAGNOSIS — E78.2 MIXED HYPERLIPIDEMIA: Chronic | ICD-10-CM

## 2020-04-06 DIAGNOSIS — M85.859 OSTEOPENIA OF HIP, UNSPECIFIED LATERALITY: ICD-10-CM

## 2020-04-06 DIAGNOSIS — I48.21 PERMANENT ATRIAL FIBRILLATION (HCC): Chronic | ICD-10-CM

## 2020-04-06 PROCEDURE — 3074F SYST BP LT 130 MM HG: CPT | Performed by: INTERNAL MEDICINE

## 2020-04-06 PROCEDURE — 3078F DIAST BP <80 MM HG: CPT | Performed by: INTERNAL MEDICINE

## 2020-04-06 PROCEDURE — 99350 HOME/RES VST EST HIGH MDM 60: CPT | Performed by: INTERNAL MEDICINE

## 2020-04-06 PROCEDURE — 4040F PNEUMOC VAC/ADMIN/RCVD: CPT | Performed by: INTERNAL MEDICINE

## 2020-04-06 PROCEDURE — 1160F RVW MEDS BY RX/DR IN RCRD: CPT | Performed by: INTERNAL MEDICINE

## 2020-04-07 ENCOUNTER — TELEPHONE (OUTPATIENT)
Dept: INTERNAL MEDICINE CLINIC | Facility: CLINIC | Age: 85
End: 2020-04-07

## 2020-04-07 VITALS — SYSTOLIC BLOOD PRESSURE: 128 MMHG | HEART RATE: 78 BPM | RESPIRATION RATE: 14 BRPM | DIASTOLIC BLOOD PRESSURE: 78 MMHG

## 2020-04-07 DIAGNOSIS — I10 ESSENTIAL HYPERTENSION: Chronic | ICD-10-CM

## 2020-04-07 DIAGNOSIS — E78.2 MIXED HYPERLIPIDEMIA: Chronic | ICD-10-CM

## 2020-04-07 DIAGNOSIS — E03.9 HYPOTHYROIDISM, UNSPECIFIED TYPE: Chronic | ICD-10-CM

## 2020-04-07 DIAGNOSIS — M85.80 OSTEOPENIA, UNSPECIFIED LOCATION: Primary | Chronic | ICD-10-CM

## 2020-04-07 DIAGNOSIS — R73.03 PREDIABETES: ICD-10-CM

## 2020-04-07 DIAGNOSIS — I50.32 CHRONIC DIASTOLIC CONGESTIVE HEART FAILURE (HCC): Chronic | ICD-10-CM

## 2020-04-07 PROBLEM — R79.89 ELEVATED SERUM CREATININE: Status: ACTIVE | Noted: 2020-04-07

## 2020-04-07 PROBLEM — M85.859 OSTEOPENIA OF HIP: Chronic | Status: ACTIVE | Noted: 2020-04-07

## 2020-04-07 PROBLEM — H61.23 BILATERAL IMPACTED CERUMEN: Status: RESOLVED | Noted: 2019-04-04 | Resolved: 2020-04-07

## 2020-04-07 PROBLEM — M85.859 OSTEOPENIA OF HIP: Status: ACTIVE | Noted: 2020-04-07

## 2020-04-07 PROBLEM — Z71.89 ADVICE GIVEN ABOUT COVID-19 VIRUS INFECTION: Status: ACTIVE | Noted: 2020-04-07

## 2020-04-28 LAB — INR PPP: 2.7 (ref 0.84–1.19)

## 2020-04-29 ENCOUNTER — ANTICOAG VISIT (OUTPATIENT)
Dept: CARDIOLOGY CLINIC | Facility: CLINIC | Age: 85
End: 2020-04-29

## 2020-04-29 DIAGNOSIS — I48.21 PERMANENT ATRIAL FIBRILLATION (HCC): ICD-10-CM

## 2020-05-26 ENCOUNTER — HOSPITAL ENCOUNTER (OUTPATIENT)
Dept: RADIOLOGY | Age: 85
Discharge: HOME | End: 2020-05-26
Payer: COMMERCIAL

## 2020-05-26 DIAGNOSIS — M85.80 OSTEOPENIA, UNSPECIFIED LOCATION: ICD-10-CM

## 2020-05-26 PROCEDURE — 77080 DXA BONE DENSITY AXIAL: CPT

## 2020-05-27 ENCOUNTER — TELEPHONE (OUTPATIENT)
Dept: INTERNAL MEDICINE CLINIC | Facility: CLINIC | Age: 85
End: 2020-05-27

## 2020-06-02 ENCOUNTER — ANTICOAG VISIT (OUTPATIENT)
Dept: CARDIOLOGY CLINIC | Facility: CLINIC | Age: 85
End: 2020-06-02

## 2020-06-02 LAB — INR PPP: 2.4 (ref 0.84–1.19)

## 2020-07-07 LAB — INR PPP: 2.7 (ref 0.84–1.19)

## 2020-07-08 ENCOUNTER — ANTICOAG VISIT (OUTPATIENT)
Dept: CARDIOLOGY CLINIC | Facility: CLINIC | Age: 85
End: 2020-07-08

## 2020-07-08 DIAGNOSIS — I48.91 ATRIAL FIBRILLATION, UNSPECIFIED TYPE (HCC): ICD-10-CM

## 2020-07-09 ENCOUNTER — TELEPHONE (OUTPATIENT)
Dept: OBGYN CLINIC | Facility: CLINIC | Age: 85
End: 2020-07-09

## 2020-07-09 DIAGNOSIS — Z12.31 VISIT FOR SCREENING MAMMOGRAM: Primary | ICD-10-CM

## 2020-08-07 LAB — HBA1C MFR BLD HPLC: 6 %

## 2020-08-11 ENCOUNTER — HOSPITAL ENCOUNTER (OUTPATIENT)
Dept: RADIOLOGY | Age: 85
Discharge: HOME | End: 2020-08-11
Payer: COMMERCIAL

## 2020-08-11 VITALS — BODY MASS INDEX: 23.82 KG/M2 | WEIGHT: 143 LBS | HEIGHT: 65 IN

## 2020-08-11 DIAGNOSIS — Z12.31 ENCOUNTER FOR SCREENING MAMMOGRAM FOR MALIGNANT NEOPLASM OF BREAST: ICD-10-CM

## 2020-08-11 PROCEDURE — 77063 BREAST TOMOSYNTHESIS BI: CPT

## 2020-08-11 PROCEDURE — 77067 SCR MAMMO BI INCL CAD: CPT

## 2020-08-18 LAB — INR PPP: 2.2 (ref 0.84–1.19)

## 2020-08-19 ENCOUNTER — ANTICOAG VISIT (OUTPATIENT)
Dept: CARDIOLOGY CLINIC | Facility: CLINIC | Age: 85
End: 2020-08-19

## 2020-08-19 ENCOUNTER — OFFICE VISIT (OUTPATIENT)
Dept: INTERNAL MEDICINE CLINIC | Facility: CLINIC | Age: 85
End: 2020-08-19
Payer: COMMERCIAL

## 2020-08-19 VITALS
HEART RATE: 82 BPM | WEIGHT: 142.2 LBS | HEIGHT: 65 IN | SYSTOLIC BLOOD PRESSURE: 130 MMHG | TEMPERATURE: 97.9 F | DIASTOLIC BLOOD PRESSURE: 80 MMHG | BODY MASS INDEX: 23.69 KG/M2 | RESPIRATION RATE: 14 BRPM

## 2020-08-19 DIAGNOSIS — Z23 ENCOUNTER FOR IMMUNIZATION: ICD-10-CM

## 2020-08-19 DIAGNOSIS — E03.9 HYPOTHYROIDISM, UNSPECIFIED TYPE: Chronic | ICD-10-CM

## 2020-08-19 DIAGNOSIS — I50.32 CHRONIC DIASTOLIC CONGESTIVE HEART FAILURE (HCC): Primary | Chronic | ICD-10-CM

## 2020-08-19 DIAGNOSIS — I50.32 CHRONIC DIASTOLIC CONGESTIVE HEART FAILURE (HCC): ICD-10-CM

## 2020-08-19 DIAGNOSIS — I48.91 ATRIAL FIBRILLATION, UNSPECIFIED TYPE (HCC): ICD-10-CM

## 2020-08-19 PROCEDURE — 3075F SYST BP GE 130 - 139MM HG: CPT | Performed by: INTERNAL MEDICINE

## 2020-08-19 PROCEDURE — 3008F BODY MASS INDEX DOCD: CPT | Performed by: INTERNAL MEDICINE

## 2020-08-19 PROCEDURE — 99213 OFFICE O/P EST LOW 20 MIN: CPT | Performed by: INTERNAL MEDICINE

## 2020-08-19 PROCEDURE — 90732 PPSV23 VACC 2 YRS+ SUBQ/IM: CPT

## 2020-08-19 PROCEDURE — 1125F AMNT PAIN NOTED PAIN PRSNT: CPT | Performed by: INTERNAL MEDICINE

## 2020-08-19 PROCEDURE — 3725F SCREEN DEPRESSION PERFORMED: CPT | Performed by: INTERNAL MEDICINE

## 2020-08-19 PROCEDURE — 1160F RVW MEDS BY RX/DR IN RCRD: CPT | Performed by: INTERNAL MEDICINE

## 2020-08-19 PROCEDURE — G0009 ADMIN PNEUMOCOCCAL VACCINE: HCPCS

## 2020-08-19 PROCEDURE — 3079F DIAST BP 80-89 MM HG: CPT | Performed by: INTERNAL MEDICINE

## 2020-08-19 PROCEDURE — 1036F TOBACCO NON-USER: CPT | Performed by: INTERNAL MEDICINE

## 2020-08-19 PROCEDURE — 1170F FXNL STATUS ASSESSED: CPT | Performed by: INTERNAL MEDICINE

## 2020-08-19 PROCEDURE — G0439 PPPS, SUBSEQ VISIT: HCPCS | Performed by: INTERNAL MEDICINE

## 2020-08-19 PROCEDURE — 4040F PNEUMOC VAC/ADMIN/RCVD: CPT | Performed by: INTERNAL MEDICINE

## 2020-08-19 RX ORDER — FLUOCINOLONE ACETONIDE 0.25 MG/G
OINTMENT TOPICAL
COMMUNITY
Start: 2020-07-23 | End: 2022-05-06

## 2020-08-19 RX ORDER — TORSEMIDE 20 MG/1
TABLET ORAL
Qty: 225 TABLET | Refills: 1 | Status: SHIPPED | OUTPATIENT
Start: 2020-08-19 | End: 2021-06-16 | Stop reason: SDUPTHER

## 2020-08-19 NOTE — PROGRESS NOTES
Assessment and Plan:     Problem List Items Addressed This Visit     None           Preventive health issues were discussed with patient, and age appropriate screening tests were ordered as noted in patient's After Visit Summary  Personalized health advice and appropriate referrals for health education or preventive services given if needed, as noted in patient's After Visit Summary       History of Present Illness:     Patient presents for Medicare Annual Wellness visit    Patient Care Team:  Mae Hunter MD as PCP - General     Problem List:     Patient Active Problem List   Diagnosis    Abnormal findings on diagnostic imaging of other specified body structures    Abnormal findings on diagnostic imaging of urinary organs    Allergic rhinitis    Arthritis    Asthma    Atherosclerosis    Atrial fibrillation (HCC)    Backache    Benign colon polyp    Bifascicular bundle branch block    Chronic obstructive pulmonary disease (HCC)    Chronic diastolic congestive heart failure (HCC)    Difficulty breathing    Edema    Esophageal reflux    Essential tremor    Hoarseness    Hyperlipidemia    Hypertension    Hypothyroidism    Leg pain    Nasal polyp    Onychomycosis    Osteopenia    Ovarian cyst    Pancreatic cyst    Plantar fasciitis    Pleural effusion    Prediabetes    Pulmonary nodule    Thyroid nodule    Vitamin D deficiency    Acute on chronic diastolic heart failure (HCC)    Pain of left hand    Leg cramps    Sensorineural hearing loss (SNHL), bilateral    Bilateral impacted cerumen    Left asymmetrical SNHL    Elevated serum immunoglobulin free light chains    Advice given about COVID-19 virus infection    Elevated serum creatinine    Osteopenia of hip      Past Medical and Surgical History:     Past Medical History:   Diagnosis Date    Abnormal ultrasound     RESOLVED: [id number]    Asthma with acute exacerbation     LAST ASSESSED: [id number]    Asymptomatic menopausal state     Atherosclerosis     Atrial fibrillation (HCC)     Chronic obstructive lung disease (HCC)     Congestive heart failure (CHF) (HCC)     Congestive heart failure (HCC)     LAST ASSESSED: [id number]    Cuboid fracture     LAST ASSESSED: [id number]    Dyspepsia     GERD (gastroesophageal reflux disease)     High risk medication use     LAST ASSESSED: [id number]    Hyperlipidemia     Hypertension     Hypertension     Hypokalemia     Hypothyroidism     Impacted cerumen of both ears     LAST ASSESSED: [id number]    Impacted cerumen of right ear     LAST ASSESSED: [id number]    Influenza     LAST ASSESSED: [id number]    IPMN (intraductal papillary mucinous neoplasm)     RESOLVED: [id number]    Limb swelling     LAST ASSESSED: [id number]    Navicular fracture of ankle     LAST ASSESSED: [id number]    Onychomycosis     LAST ASSESSED: [id number]    Osteoarthritis     Osteopenia     Osteoporosis     Paronychia, finger, unspecified laterality     LAST ASSESSED: [id number]    Paroxysmal atrial fibrillation (HCC)     LAST ASSESSED: [id number]    Peripheral vascular disease (HCC)     Plantar fasciitis     Talus fracture     LAST ASSESSED: [id number]    Vascular headache      Past Surgical History:   Procedure Laterality Date    APPENDECTOMY      CATARACT EXTRACTION Bilateral     CHOLECYSTECTOMY      NEUROPLASTY / TRANSPOSITION MEDIAN NERVE AT CARPAL TUNNEL BILATERAL Bilateral     DECOMPRESSION    OOPHORECTOMY Bilateral     age 80    PELVIC FLOOR REPAIR  2014    SINUS SURGERY      TOTAL KNEE ARTHROPLASTY Bilateral       Family History:     Family History   Problem Relation Age of Onset    Pancreatic cancer Mother 80    Heart failure Mother     Coronary artery disease Family     Breast cancer Family     Other Family         BREAST DISORDER, GASTROINTESTINAL CANCER    Uterine cancer Family     Hyperlipidemia Family     Osteoarthritis Family     Ovarian cancer Family     Brain cancer Son     Stroke Father     No Known Problems Sister     No Known Problems Daughter     No Known Problems Maternal Grandmother     No Known Problems Maternal Grandfather     No Known Problems Paternal Grandmother     No Known Problems Paternal Grandfather     Breast cancer Cousin 48    Breast cancer Cousin 48    Ovarian cancer Other 52      Social History:        Social History     Socioeconomic History    Marital status: /Civil Union     Spouse name: Not on file    Number of children: Not on file    Years of education: Not on file    Highest education level: Not on file   Occupational History    Occupation: retired   Social Needs    Financial resource strain: Not on file    Food insecurity     Worry: Not on file     Inability: Not on file   [employer] needs     Medical: Not on file     Non-medical: Not on file   Tobacco Use    Smoking status: Never Smoker    Smokeless tobacco: Never Used   Substance and Sexual Activity    Alcohol use: Yes     Comment: SOCIAL    Drug use: No    Sexual activity: Never     Partners: Male   Lifestyle    Physical activity     Days per week: Not on file     Minutes per session: Not on file    Stress: Not on file   Relationships    Social connections     Talks on phone: Not on file     Gets together: Not on file     Attends Baptism service: Not on file     Active member of club or organization: Not on file     Attends meetings of clubs or organizations: Not on file     Relationship status: Not on file    Intimate partner violence     Fear of current or ex partner: Not on file     Emotionally abused: Not on file     Physically abused: Not on file     Forced sexual activity: Not on file   Other Topics Concern    Not on file   Social History Narrative    DAILY COFFEE CONSUMPTION (2 CUPS/DAY)    EXERCISING REGULARLY      Medications and Allergies:     Current Outpatient Medications   Medication Sig Dispense Refill    albuterol (VENTOLIN HFA) 90 mcg/act inhaler Inhale      Calcium Carb-Cholecalciferol (CALCIUM 1000 + D) 1000-800 MG-UNIT TABS Take by mouth      Cholecalciferol (VITAMIN D3) 1000 units CAPS Take by mouth daily      diltiazem (CARDIZEM CD) 300 mg 24 hr capsule Take 1 capsule (300 mg total) by mouth daily 90 capsule 3    fluticasone (FLONASE) 50 mcg/act nasal spray 2 sprays into each nostril daily 3 Bottle 3    levothyroxine 50 mcg tablet Take 1 tab daily expect 1 and 1/2 on sunday 98 tablet 3    metoprolol succinate (TOPROL-XL) 25 mg 24 hr tablet Take 2 tablets in the am and 1 and 1/2 in the evening daily 315 tablet 3    montelukast (SINGULAIR) 10 mg tablet Take 1 tablet (10 mg total) by mouth daily 90 tablet 3    potassium chloride (MICRO-K) 10 MEQ CR capsule Take 1 capsule (10 mEq total) by mouth daily 180 capsule 3    PULMICORT FLEXHALER 180 MCG/ACT inhaler Inhale 1 puff 2 (two) times a day 3 Inhaler 3    spironolactone (ALDACTONE) 25 mg tablet Take 1 tablet (25 mg total) by mouth every other day TAKE 1 TABLET TWICE WEEKLY 45 tablet 3    torsemide (DEMADEX) 20 mg tablet Take 3 tablets (60mg total) on the first day and 2 tablets (40mg total) on the second day in alternating fashion  216 tablet 3    warfarin (COUMADIN) 5 mg tablet Take 1 tablet (5 mg total) by mouth daily 90 tablet 3     No current facility-administered medications for this visit        Allergies   Allergen Reactions    Asa [Aspirin]     Nsaids       Immunizations:     Immunization History   Administered Date(s) Administered    INFLUENZA 11/11/2005, 11/02/2006, 10/09/2007, 10/15/2009, 10/04/2012, 09/23/2015, 09/28/2016, 10/04/2018    Influenza Quadrivalent Preservative Free 3 years and older IM 09/23/2014    Influenza Quadrivalent, 6-35 Months IM 09/23/2015    Influenza Split High Dose Preservative Free IM 09/28/2016    Influenza TIV (IM) 10/01/2011, 10/04/2012, 10/15/2013, 09/23/2015    Influenza, high dose seasonal 0 5 mL 09/19/2019    Pneumococcal Conjugate 13-Valent 03/06/2015    Pneumococcal Polysaccharide PPV23 01/01/2001    Tdap 01/10/2013    Zoster 01/25/2010, 08/23/2018, 11/24/2018    Zoster Vaccine Recombinant 01/25/2010, 08/23/2018, 11/24/2018      Health Maintenance: There are no preventive care reminders to display for this patient  Topic Date Due    Influenza Vaccine  07/01/2020      Medicare Health Risk Assessment:     Temp 97 9 °F (36 6 °C) (Oral)   Ht 5' 5" (1 651 m)   Wt 64 5 kg (142 lb 3 2 oz)   BMI 23 66 kg/m²      Pablito Cordero is here for her Subsequent Wellness visit  Last Medicare Wellness visit information reviewed, patient interviewed and updates made to the record today  Health Risk Assessment:   Patient rates overall health as good  Patient feels that their physical health rating is same  Eyesight was rated as same  Hearing was rated as slightly worse  Patient feels that their emotional and mental health rating is same  Pain experienced in the last 7 days has been none  Patient states that she has experienced no weight loss or gain in last 6 months  Depression Screening:   PHQ-2 Score: 0      Fall Risk Screening: In the past year, patient has experienced: no history of falling in past year      Urinary Incontinence Screening:   Patient has leaked urine accidently in the last six months  Home Safety:  Patient has trouble with stairs inside or outside of their home  Patient has working smoke alarms and has working carbon monoxide detector  Home safety hazards include: none  Nutrition:   Current diet is Regular, No Added Salt and Limited junk food  Medications:   Patient is currently taking over-the-counter supplements  OTC medications include: see medication list  Patient is able to manage medications       Activities of Daily Living (ADLs)/Instrumental Activities of Daily Living (IADLs):   Walk and transfer into and out of bed and chair?: Yes  Dress and groom yourself?: Yes    Bathe or shower yourself?: Yes    Feed yourself? Yes  Do your laundry/housekeeping?: Yes  Manage your money, pay your bills and track your expenses?: Yes  Make your own meals?: Yes    Do your own shopping?: Yes    Previous Hospitalizations:   Any hospitalizations or ED visits within the last 12 months?: No      Advance Care Planning:   Living will: Yes    Durable POA for healthcare:  Yes    Advanced directive: Yes    Advanced directive counseling given: No    Five wishes given: No    End of Life Decisions reviewed with patient: Yes    Provider agrees with end of life decisions: No      Cognitive Screening:   Provider or family/friend/caregiver concerned regarding cognition?: No    PREVENTIVE SCREENINGS      Cardiovascular Screening:    General: History Lipid Disorder and Risks and Benefits Discussed      Diabetes Screening:     General: Screening Current      Colorectal Cancer Screening:     General: Screening Not Indicated      Breast Cancer Screening:     General: Screening Current      Cervical Cancer Screening:    General: Screening Not Indicated      Osteoporosis Screening:    General: Risks and Benefits Discussed and Screening Current      Abdominal Aortic Aneurysm (AAA) Screening:        General: Risks and Benefits Discussed and Screening Current      Lung Cancer Screening:     General: Screening Not Indicated      Hepatitis C Screening:    General: Risks and Benefits Discussed and Screening Not Indicated      Hazel Sheffield MD

## 2020-08-19 NOTE — PATIENT INSTRUCTIONS
Medicare Preventive Visit Patient Instructions  Thank you for completing your Welcome to Medicare Visit or Medicare Annual Wellness Visit today  Your next wellness visit will be due in one year (8/19/2021)  The screening/preventive services that you may require over the next 5-10 years are detailed below  Some tests may not apply to you based off risk factors and/or age  Screening tests ordered at today's visit but not completed yet may show as past due  Also, please note that scanned in results may not display below  Preventive Screenings:  Service Recommendations Previous Testing/Comments   Colorectal Cancer Screening  * Colonoscopy    * Fecal Occult Blood Test (FOBT)/Fecal Immunochemical Test (FIT)  * Fecal DNA/Cologuard Test  * Flexible Sigmoidoscopy Age: 54-65 years old   Colonoscopy: every 10 years (may be performed more frequently if at higher risk)  OR  FOBT/FIT: every 1 year  OR  Cologuard: every 3 years  OR  Sigmoidoscopy: every 5 years  Screening may be recommended earlier than age 48 if at higher risk for colorectal cancer  Also, an individualized decision between you and your healthcare provider will decide whether screening between the ages of 74-80 would be appropriate  Colonoscopy: 08/06/2015  FOBT/FIT: Not on file  Cologuard: Not on file  Sigmoidoscopy: Not on file    Screening Not Indicated     Breast Cancer Screening Age: 36 years old  Frequency: every 1-2 years  Not required if history of left and right mastectomy Mammogram: 08/11/2020    Screening Current   Cervical Cancer Screening Between the ages of 21-29, pap smear recommended once every 3 years  Between the ages of 33-67, can perform pap smear with HPV co-testing every 5 years     Recommendations may differ for women with a history of total hysterectomy, cervical cancer, or abnormal pap smears in past  Pap Smear: 07/24/2018    Screening Not Indicated   Hepatitis C Screening Once for adults born between 1945 and 1965  More frequently in patients at high risk for Hepatitis C Hep C Antibody: Not on file    Risks and Benefits Discussed  Screening Not Indicated   Diabetes Screening 1-2 times per year if you're at risk for diabetes or have pre-diabetes Fasting glucose: 84 mg/dL   A1C: 6 0 %    Screening Current   Cholesterol Screening Once every 5 years if you don't have a lipid disorder  May order more often based on risk factors  Lipid panel: 02/07/2017    Screening Not Indicated  History Lipid Disorder     Other Preventive Screenings Covered by Medicare:  1  Abdominal Aortic Aneurysm (AAA) Screening: covered once if your at risk  You're considered to be at risk if you have a family history of AAA  2  Lung Cancer Screening: covers low dose CT scan once per year if you meet all of the following conditions: (1) Age 50-69; (2) No signs or symptoms of lung cancer; (3) Current smoker or have quit smoking within the last 15 years; (4) You have a tobacco smoking history of at least 30 pack years (packs per day multiplied by number of years you smoked); (5) You get a written order from a healthcare provider  3  Glaucoma Screening: covered annually if you're considered high risk: (1) You have diabetes OR (2) Family history of glaucoma OR (3)  aged 48 and older OR (3)  American aged 72 and older  3  Osteoporosis Screening: covered every 2 years if you meet one of the following conditions: (1) You're estrogen deficient and at risk for osteoporosis based off medical history and other findings; (2) Have a vertebral abnormality; (3) On glucocorticoid therapy for more than 3 months; (4) Have primary hyperparathyroidism; (5) On osteoporosis medications and need to assess response to drug therapy  · Last bone density test (DXA Scan): 05/26/2020   5  HIV Screening: covered annually if you're between the age of 15-65  Also covered annually if you are younger than 13 and older than 72 with risk factors for HIV infection   For pregnant patients, it is covered up to 3 times per pregnancy  Immunizations:  Immunization Recommendations   Influenza Vaccine Annual influenza vaccination during flu season is recommended for all persons aged >= 6 months who do not have contraindications   Pneumococcal Vaccine (Prevnar and Pneumovax)  * Prevnar = PCV13  * Pneumovax = PPSV23   Adults 25-60 years old: 1-3 doses may be recommended based on certain risk factors  Adults 72 years old: Prevnar (PCV13) vaccine recommended followed by Pneumovax (PPSV23) vaccine  If already received PPSV23 since turning 65, then PCV13 recommended at least one year after PPSV23 dose  Hepatitis B Vaccine 3 dose series if at intermediate or high risk (ex: diabetes, end stage renal disease, liver disease)   Tetanus (Td) Vaccine - COST NOT COVERED BY MEDICARE PART B Following completion of primary series, a booster dose should be given every 10 years to maintain immunity against tetanus  Td may also be given as tetanus wound prophylaxis  Tdap Vaccine - COST NOT COVERED BY MEDICARE PART B Recommended at least once for all adults  For pregnant patients, recommended with each pregnancy  Shingles Vaccine (Shingrix) - COST NOT COVERED BY MEDICARE PART B  2 shot series recommended in those aged 48 and above     Health Maintenance Due:  There are no preventive care reminders to display for this patient  Immunizations Due:      Topic Date Due    Influenza Vaccine  07/01/2020     Advance Directives   What are advance directives? Advance directives are legal documents that state your wishes and plans for medical care  These plans are made ahead of time in case you lose your ability to make decisions for yourself  Advance directives can apply to any medical decision, such as the treatments you want, and if you want to donate organs  What are the types of advance directives? There are many types of advance directives, and each state has rules about how to use them   You may choose a combination of any of the following:  · Living will: This is a written record of the treatment you want  You can also choose which treatments you do not want, which to limit, and which to stop at a certain time  This includes surgery, medicine, IV fluid, and tube feedings  · Durable power of  for healthcare [facility]): This is a written record that states who you want to make healthcare choices for you when you are unable to make them for yourself  This person, called a proxy, is usually a family member or a friend  You may choose more than 1 proxy  · Do not resuscitate (DNR) order:  A DNR order is used in case your heart stops beating or you stop breathing  It is a request not to have certain forms of treatment, such as CPR  A DNR order may be included in other types of advance directives  · Medical directive: This covers the care that you want if you are in a coma, near death, or unable to make decisions for yourself  You can list the treatments you want for each condition  Treatment may include pain medicine, surgery, blood transfusions, dialysis, IV or tube feedings, and a ventilator (breathing machine)  · Values history: This document has questions about your views, beliefs, and how you feel and think about life  This information can help others choose the care that you would choose  Why are advance directives important? An advance directive helps you control your care  Although spoken wishes may be used, it is better to have your wishes written down  Spoken wishes can be misunderstood, or not followed  Treatments may be given even if you do not want them  An advance directive may make it easier for your family to make difficult choices about your care  Urinary Incontinence   Urinary incontinence (UI)  is when you lose control of your bladder  UI develops because your bladder cannot store or empty urine properly   The 3 most common types of UI are stress incontinence, urge incontinence, or both   Medicines:   · May be given to help strengthen your bladder control  Report any side effects of medication to your healthcare provider  Do pelvic muscle exercises often:  Your pelvic muscles help you stop urinating  Squeeze these muscles tight for 5 seconds, then relax for 5 seconds  Gradually work up to squeezing for 10 seconds  Do 3 sets of 15 repetitions a day, or as directed  This will help strengthen your pelvic muscles and improve bladder control  Train your bladder:  Go to the bathroom at set times, such as every 2 hours, even if you do not feel the urge to go  You can also try to hold your urine when you feel the urge to go  For example, hold your urine for 5 minutes when you feel the urge to go  As that becomes easier, hold your urine for 10 minutes  Self-care:   · Keep a UI record  Write down how often you leak urine and how much you leak  Make a note of what you were doing when you leaked urine  · Drink liquids as directed  You may need to limit the amount of liquid you drink to help control your urine leakage  Do not drink any liquid right before you go to bed  Limit or do not have drinks that contain caffeine or alcohol  · Prevent constipation  Eat a variety of high-fiber foods  Good examples are high-fiber cereals, beans, vegetables, and whole-grain breads  Walking is the best way to trigger your intestines to have a bowel movement  · Exercise regularly and maintain a healthy weight  Weight loss and exercise will decrease pressure on your bladder and help you control your leakage  · Use a catheter as directed  to help empty your bladder  A catheter is a tiny, plastic tube that is put into your bladder to drain your urine  · Go to behavior therapy as directed  Behavior therapy may be used to help you learn to control your urge to urinate         © Copyright [employer] 2018 Information is for End User's use only and may not be sold, redistributed or otherwise used for commercial purposes   All illustrations and images included in CareNotes® are the copyrighted property of A [employer] M , [employer]  or [facility]

## 2020-08-19 NOTE — PROGRESS NOTES
Assessment/Plan:  1  A a diastolic heart failure-chronic-intermittent exacerbation over the past couple years-now with slight worsening  Most recent echo shows EF is 60%, no regional wall motion abnormalities, RV systolic function normal, left atrium moderately dilated, mild-to-moderate tricuspid regurg with estimated peak PA pressure 45 millimeters an IVC dilated  Nuclear medicine study for amyloid equivocal   Denies any symptoms of sleep apnea  Has associated atrial fibrillation that is rate controlled  She saw Bonny Salinas who feels that her echo shows low suggestion for infiltrative myopathy  At this point increasing her spironolactone 25 milligrams daily  She will have follow-up BMP in 2-3 weeks and be re-evaluated in several months with prior labs  2  Mild elevation in creatinine-felt related to diuretic therapy-sacrificing some degree of creatinine elevation help decreased pulmonary congestion-rechecking in 3 weeks with addition of spironolactone and is noting discontinuing potassium chloride  3  Health maintenance-given Pneumovax 23 vaccine today    All other problems as per note of April 2020, October 2015, 2015 a May 2014                                                  Flonase nasal spray used intermittently, torsemide on an alternating day regimen - on the 1st day 40 milligrams in a m  And 20 milligrams in the p m   And on the 2nd day 40 milligrams in a m , spironolactone 25 milligrams every  day, , Coumadin with dose adjustments, generic Singulair 10 milligrams daily, levothyroxine 50 micrograms daily but 75 micrograms on Sunday, Claritin 10 milligrams daily as needed, calcium vitamin-D daily, Ventolin inhaler as needed, metoprolol succinate 25 milligrams -2 tablets in the am and one and a half tablets in the pm  -, Cardizem   Milligrams a day, Pulmicort 100 micrograms 1 puff b i d  -trying to decrease to 1 puff daily during summer and winter months, multivitamin, vitamin D3 / 2000 units a day, fish oil 1200 milligrams a day, rare use of Vicodin 5-300-half tablet b i d  P r n  For severe back pain a    BMP in 2-3 weeks  Appointment in several months with prior chemistry profile a CBC with diff a TSH     Addendum-BMP done on September 8th shows potassium of 4 2 BUN 30 creatinine 1 41 -will continue current meds and repeat prior to next visit          No problem-specific Assessment & Plan notes found for this encounter  Diagnoses and all orders for this visit:    Chronic diastolic congestive heart failure ([facility] [address] )  -     Basic metabolic panel; Future  -     CBC and differential; Future  -     Comprehensive metabolic panel; Future  -     TSH, 3rd generation; Future    Hypothyroidism, unspecified type  -     CBC and differential; Future  -     Comprehensive metabolic panel; Future  -     TSH, 3rd generation; Future    Encounter for immunization  -     PNEUMOCOCCAL POLYSACCHARIDE VACCINE 23-VALENT =>1YO SQ IM    Other orders  -     fluocinolone (SYNALAR) 0 025 % ointment; APPLY TO AFFECTED AREA TWICE A DAY FOR 14 DAYS          Subjective:      Patient ID: Charmayne Adams is a 80 y o  female  She was here for her Medicare wellness and is concerned about a recent shortness of breath  Her  commented on the same her  She says that she has increasing shortness of breath with routine activities  She has rare wheezing  She has a known history of COPD as well as CHF  She denies increasing orthopnea or proximal nocturnal dyspnea  She denies weight gain or increasing leg edema  She has known significant diastolic CHF as already on higher dose torsemide plus spironolactone every other day  She has associated atrial fibrillation which is rate controlled  I ambulated her in the escobar today and she did not desaturate and her heart rate did not increase  We had a long discussion today regarding the above  Labs show mild azotemia felt related to her diuretic therapy    We reviewed that we will cautiously sacrifice some degree elevation of her creatinine to help decrease pulmonary congestion with her CHF  Results for orders placed or performed in visit on 08/07/20  -Hemoglobin A1C       Result                      Value             Ref Range           Hemoglobin A1C              6 0                            a-laboratory testing done recently had an outside institution shows chemistry profile normal other than creatinine 1 18, HDL 77  triglycerides 75 TSH 4 66  She is on supplemental potassium  Since she is increasing her spironolactone because of his potassium retaining effects she will discontinue her potassium chloride  She will continue to restrict salt she says she is already doing  This patient denies any systemic symptoms  Specifically there has been no evidence of fever, night sweats, significant weight loss or significant decrease in appetite  The following portions of the patient's history were reviewed and updated as appropriate: allergies, current medications, past family history, past medical history, past social history, past surgical history and problem list     Review of Systems   Constitutional: Negative  HENT: Negative  Respiratory: Positive for shortness of breath  Cardiovascular: Negative  Gastrointestinal: Negative  Endocrine: Negative  Genitourinary: Negative  Musculoskeletal: Negative  Skin: Negative  Neurological: Negative  Hematological: Negative  Psychiatric/Behavioral: Negative  Objective:      Temp 97 9 °F (36 6 °C) (Oral)   Ht 5' 5" (1 651 m)   Wt 64 5 kg (142 lb 3 2 oz)   BMI 23 66 kg/m²          Physical Exam  Vitals signs reviewed  Constitutional:       General: She is not in acute distress  Appearance: Normal appearance  She is normal weight  She is not ill-appearing, toxic-appearing or diaphoretic  HENT:      Head: Normocephalic and atraumatic        Right Ear: Tympanic membrane, ear canal and external ear normal       Left Ear: Tympanic membrane, ear canal and external ear normal       Nose: Nose normal  No congestion or rhinorrhea  Mouth/Throat:      Mouth: Mucous membranes are moist       Pharynx: Oropharynx is clear  No oropharyngeal exudate or posterior oropharyngeal erythema  Eyes:      General: No scleral icterus  Right eye: No discharge  Left eye: No discharge  Extraocular Movements: Extraocular movements intact  Pupils: Pupils are equal, round, and reactive to light  Neck:      Musculoskeletal: Normal range of motion and neck supple  No neck rigidity or muscular tenderness  Vascular: No carotid bruit  Cardiovascular:      Rate and Rhythm: Normal rate  Rhythm irregular  Pulses: Normal pulses  Heart sounds: Normal heart sounds  No murmur  No friction rub  No gallop  Comments: Cardiomegaly to percussion  Adequate upstroke  Pulmonary:      Effort: Pulmonary effort is normal  No respiratory distress  Breath sounds: Normal breath sounds  No stridor  No wheezing, rhonchi or rales  Comments: No adventitious breath sounds heard  Chest:      Chest wall: No tenderness  Abdominal:      General: Abdomen is flat  Bowel sounds are normal  There is no distension  Palpations: Abdomen is soft  There is no mass  Tenderness: There is no abdominal tenderness  There is no right CVA tenderness, left CVA tenderness, guarding or rebound  Hernia: No hernia is present  Musculoskeletal: Normal range of motion  General: No swelling, tenderness, deformity or signs of injury  Right lower leg: Edema present  Left lower leg: Edema present  Comments: Trace edema both lower legs   Lymphadenopathy:      Cervical: No cervical adenopathy  Skin:     General: Skin is warm and dry  Coloration: Skin is not jaundiced or pale  Findings: No bruising, erythema, lesion or rash     Neurological:      General: No focal deficit present  Mental Status: She is alert and oriented to person, place, and time  Mental status is at baseline  Cranial Nerves: No cranial nerve deficit  Sensory: No sensory deficit  Motor: No weakness  Coordination: Coordination normal       Gait: Gait normal       Deep Tendon Reflexes: Reflexes normal    Psychiatric:         Mood and Affect: Mood normal          Behavior: Behavior normal          Thought Content:  Thought content normal          Judgment: Judgment normal

## 2020-09-11 ENCOUNTER — TELEPHONE (OUTPATIENT)
Dept: INTERNAL MEDICINE CLINIC | Facility: CLINIC | Age: 85
End: 2020-09-11

## 2020-09-11 NOTE — TELEPHONE ENCOUNTER
----- Message from Ryanne Smith MD sent at 9/10/2020 12:12 PM EDT -----   Please call patient -blood work stable-continue current meds and go for labs prior to next visit

## 2020-09-16 DIAGNOSIS — J33.9 NASAL POLYP: ICD-10-CM

## 2020-09-16 RX ORDER — FLUTICASONE PROPIONATE 50 MCG
2 SPRAY, SUSPENSION (ML) NASAL DAILY
Qty: 3 BOTTLE | Refills: 3 | Status: SHIPPED | OUTPATIENT
Start: 2020-09-16

## 2020-09-22 LAB — INR PPP: 1.8 (ref 0.84–1.19)

## 2020-09-23 ENCOUNTER — ANTICOAG VISIT (OUTPATIENT)
Dept: CARDIOLOGY CLINIC | Facility: CLINIC | Age: 85
End: 2020-09-23

## 2020-09-23 DIAGNOSIS — I48.91 ATRIAL FIBRILLATION, UNSPECIFIED TYPE (HCC): ICD-10-CM

## 2020-09-23 NOTE — PROGRESS NOTES
Tc to pt, reports she eat more salads this week, and was concerned that she has noticed increase in bruising  patient is due for 5 mg today  Will continue current dose, inr due 2 weeks

## 2020-10-06 DIAGNOSIS — I48.91 ATRIAL FIBRILLATION, UNSPECIFIED TYPE (HCC): Primary | ICD-10-CM

## 2020-10-06 LAB — INR PPP: 1.6 (ref 0.84–1.19)

## 2020-10-07 ENCOUNTER — ANTICOAG VISIT (OUTPATIENT)
Dept: CARDIOLOGY CLINIC | Facility: CLINIC | Age: 85
End: 2020-10-07

## 2020-10-07 DIAGNOSIS — I48.91 ATRIAL FIBRILLATION, UNSPECIFIED TYPE (HCC): ICD-10-CM

## 2020-10-14 ENCOUNTER — CLINICAL SUPPORT (OUTPATIENT)
Dept: INTERNAL MEDICINE CLINIC | Facility: CLINIC | Age: 85
End: 2020-10-14
Payer: COMMERCIAL

## 2020-10-14 DIAGNOSIS — Z23 NEEDS FLU SHOT: Primary | ICD-10-CM

## 2020-10-14 PROCEDURE — G0008 ADMIN INFLUENZA VIRUS VAC: HCPCS

## 2020-10-14 PROCEDURE — 90662 IIV NO PRSV INCREASED AG IM: CPT

## 2020-10-16 LAB — INR PPP: 2.8 (ref 0.84–1.19)

## 2020-10-19 ENCOUNTER — ANTICOAG VISIT (OUTPATIENT)
Dept: CARDIOLOGY CLINIC | Facility: CLINIC | Age: 85
End: 2020-10-19

## 2020-10-19 DIAGNOSIS — I48.91 ATRIAL FIBRILLATION, UNSPECIFIED TYPE (HCC): ICD-10-CM

## 2020-10-21 ENCOUNTER — TELEPHONE (OUTPATIENT)
Dept: CARDIOLOGY CLINIC | Facility: CLINIC | Age: 85
End: 2020-10-21

## 2020-10-21 DIAGNOSIS — I48.20 CHRONIC ATRIAL FIBRILLATION (HCC): Chronic | ICD-10-CM

## 2020-10-21 RX ORDER — WARFARIN SODIUM 5 MG/1
TABLET ORAL
Qty: 90 TABLET | Refills: 3 | Status: SHIPPED | OUTPATIENT
Start: 2020-10-21 | End: 2021-12-20 | Stop reason: SDUPTHER

## 2020-10-22 DIAGNOSIS — I48.21 PERMANENT ATRIAL FIBRILLATION (HCC): Primary | ICD-10-CM

## 2020-11-02 ENCOUNTER — TELEPHONE (OUTPATIENT)
Dept: INTERNAL MEDICINE CLINIC | Facility: CLINIC | Age: 85
End: 2020-11-02

## 2020-11-03 ENCOUNTER — ANTICOAG VISIT (OUTPATIENT)
Dept: CARDIOLOGY CLINIC | Facility: CLINIC | Age: 85
End: 2020-11-03

## 2020-11-03 DIAGNOSIS — I48.91 ATRIAL FIBRILLATION, UNSPECIFIED TYPE (HCC): ICD-10-CM

## 2020-11-03 LAB — INR PPP: 3.8 (ref 0.84–1.19)

## 2020-11-04 ENCOUNTER — OFFICE VISIT (OUTPATIENT)
Dept: CARDIOLOGY CLINIC | Facility: CLINIC | Age: 85
End: 2020-11-04
Payer: COMMERCIAL

## 2020-11-04 ENCOUNTER — TELEPHONE (OUTPATIENT)
Dept: CARDIOLOGY CLINIC | Facility: CLINIC | Age: 85
End: 2020-11-04

## 2020-11-04 VITALS
HEART RATE: 71 BPM | HEIGHT: 65 IN | WEIGHT: 139.5 LBS | TEMPERATURE: 97.1 F | BODY MASS INDEX: 23.24 KG/M2 | DIASTOLIC BLOOD PRESSURE: 70 MMHG | SYSTOLIC BLOOD PRESSURE: 120 MMHG

## 2020-11-04 DIAGNOSIS — E78.2 MIXED HYPERLIPIDEMIA: Chronic | ICD-10-CM

## 2020-11-04 DIAGNOSIS — I45.2 BIFASCICULAR BUNDLE BRANCH BLOCK: Chronic | ICD-10-CM

## 2020-11-04 DIAGNOSIS — I10 ESSENTIAL HYPERTENSION: Chronic | ICD-10-CM

## 2020-11-04 DIAGNOSIS — Z01.810 PREOP CARDIOVASCULAR EXAM: ICD-10-CM

## 2020-11-04 DIAGNOSIS — I48.21 PERMANENT ATRIAL FIBRILLATION (HCC): Primary | Chronic | ICD-10-CM

## 2020-11-04 DIAGNOSIS — I50.32 CHRONIC DIASTOLIC CONGESTIVE HEART FAILURE (HCC): Chronic | ICD-10-CM

## 2020-11-04 PROBLEM — I50.33 ACUTE ON CHRONIC DIASTOLIC HEART FAILURE (HCC): Status: RESOLVED | Noted: 2018-06-30 | Resolved: 2020-11-04

## 2020-11-04 PROCEDURE — 93000 ELECTROCARDIOGRAM COMPLETE: CPT | Performed by: INTERNAL MEDICINE

## 2020-11-04 PROCEDURE — 99214 OFFICE O/P EST MOD 30 MIN: CPT | Performed by: INTERNAL MEDICINE

## 2020-11-05 ENCOUNTER — OFFICE VISIT (OUTPATIENT)
Dept: INTERNAL MEDICINE CLINIC | Facility: CLINIC | Age: 85
End: 2020-11-05
Payer: COMMERCIAL

## 2020-11-05 VITALS
DIASTOLIC BLOOD PRESSURE: 70 MMHG | TEMPERATURE: 98.2 F | RESPIRATION RATE: 14 BRPM | SYSTOLIC BLOOD PRESSURE: 108 MMHG | BODY MASS INDEX: 23.36 KG/M2 | HEIGHT: 65 IN | HEART RATE: 78 BPM | WEIGHT: 140.2 LBS

## 2020-11-05 DIAGNOSIS — I50.32 CHRONIC DIASTOLIC CONGESTIVE HEART FAILURE (HCC): Primary | Chronic | ICD-10-CM

## 2020-11-05 DIAGNOSIS — M79.605 PAIN OF LEFT LOWER EXTREMITY: Chronic | ICD-10-CM

## 2020-11-05 DIAGNOSIS — I48.21 PERMANENT ATRIAL FIBRILLATION (HCC): Chronic | ICD-10-CM

## 2020-11-05 DIAGNOSIS — R79.89 ELEVATED SERUM CREATININE: ICD-10-CM

## 2020-11-05 DIAGNOSIS — Z01.818 PREOPERATIVE EXAMINATION: ICD-10-CM

## 2020-11-05 PROCEDURE — 1160F RVW MEDS BY RX/DR IN RCRD: CPT | Performed by: INTERNAL MEDICINE

## 2020-11-05 PROCEDURE — 1036F TOBACCO NON-USER: CPT | Performed by: INTERNAL MEDICINE

## 2020-11-05 PROCEDURE — 99214 OFFICE O/P EST MOD 30 MIN: CPT | Performed by: INTERNAL MEDICINE

## 2020-11-24 ENCOUNTER — ANTICOAG VISIT (OUTPATIENT)
Dept: CARDIOLOGY CLINIC | Facility: CLINIC | Age: 85
End: 2020-11-24

## 2020-11-24 DIAGNOSIS — I48.21 PERMANENT ATRIAL FIBRILLATION (HCC): ICD-10-CM

## 2020-11-24 LAB — INR PPP: 2.2 (ref 0.84–1.19)

## 2020-12-08 LAB — INR PPP: 3.4 (ref 0.84–1.19)

## 2020-12-09 ENCOUNTER — ANTICOAG VISIT (OUTPATIENT)
Dept: CARDIOLOGY CLINIC | Facility: CLINIC | Age: 85
End: 2020-12-09

## 2020-12-09 DIAGNOSIS — I48.21 PERMANENT ATRIAL FIBRILLATION (HCC): ICD-10-CM

## 2020-12-22 LAB — INR PPP: 2.9 (ref 0.84–1.19)

## 2020-12-23 ENCOUNTER — ANTICOAG VISIT (OUTPATIENT)
Dept: CARDIOLOGY CLINIC | Facility: CLINIC | Age: 85
End: 2020-12-23

## 2020-12-23 DIAGNOSIS — J45.20 MILD INTERMITTENT ASTHMA WITHOUT COMPLICATION: ICD-10-CM

## 2020-12-23 DIAGNOSIS — I50.32 CHRONIC DIASTOLIC CONGESTIVE HEART FAILURE (HCC): ICD-10-CM

## 2020-12-23 DIAGNOSIS — E03.9 HYPOTHYROIDISM, UNSPECIFIED TYPE: Chronic | ICD-10-CM

## 2020-12-23 DIAGNOSIS — I48.21 PERMANENT ATRIAL FIBRILLATION (HCC): ICD-10-CM

## 2020-12-23 DIAGNOSIS — I48.20 CHRONIC ATRIAL FIBRILLATION (HCC): Chronic | ICD-10-CM

## 2020-12-23 RX ORDER — SPIRONOLACTONE 25 MG/1
25 TABLET ORAL DAILY
Qty: 90 TABLET | Refills: 3 | Status: SHIPPED | OUTPATIENT
Start: 2020-12-23 | End: 2021-11-09 | Stop reason: SDUPTHER

## 2020-12-23 RX ORDER — DILTIAZEM HYDROCHLORIDE 300 MG/1
300 CAPSULE, COATED, EXTENDED RELEASE ORAL DAILY
Qty: 90 CAPSULE | Refills: 3 | Status: SHIPPED | OUTPATIENT
Start: 2020-12-23 | End: 2021-12-20 | Stop reason: SDUPTHER

## 2020-12-23 RX ORDER — BUDESONIDE 180 UG/1
1 AEROSOL, POWDER RESPIRATORY (INHALATION) 2 TIMES DAILY
Qty: 3 INHALER | Refills: 3 | Status: SHIPPED | OUTPATIENT
Start: 2020-12-23 | End: 2021-12-20 | Stop reason: SDUPTHER

## 2020-12-23 RX ORDER — MONTELUKAST SODIUM 10 MG/1
10 TABLET ORAL DAILY
Qty: 90 TABLET | Refills: 3 | Status: SHIPPED | OUTPATIENT
Start: 2020-12-23 | End: 2021-12-20 | Stop reason: SDUPTHER

## 2020-12-23 RX ORDER — METOPROLOL SUCCINATE 25 MG/1
TABLET, EXTENDED RELEASE ORAL
Qty: 315 TABLET | Refills: 3 | Status: SHIPPED | OUTPATIENT
Start: 2020-12-23 | End: 2021-12-20 | Stop reason: SDUPTHER

## 2020-12-23 RX ORDER — LEVOTHYROXINE SODIUM 0.05 MG/1
TABLET ORAL
Qty: 98 TABLET | Refills: 3 | Status: SHIPPED | OUTPATIENT
Start: 2020-12-23 | End: 2021-12-20 | Stop reason: SDUPTHER

## 2021-01-04 ENCOUNTER — OFFICE VISIT (OUTPATIENT)
Dept: INTERNAL MEDICINE CLINIC | Facility: CLINIC | Age: 86
End: 2021-01-04
Payer: COMMERCIAL

## 2021-01-04 VITALS
WEIGHT: 137 LBS | BODY MASS INDEX: 22.82 KG/M2 | RESPIRATION RATE: 14 BRPM | DIASTOLIC BLOOD PRESSURE: 78 MMHG | HEART RATE: 82 BPM | SYSTOLIC BLOOD PRESSURE: 118 MMHG | HEIGHT: 65 IN | TEMPERATURE: 97.7 F

## 2021-01-04 DIAGNOSIS — E03.9 HYPOTHYROIDISM, UNSPECIFIED TYPE: Primary | Chronic | ICD-10-CM

## 2021-01-04 DIAGNOSIS — I50.32 CHRONIC DIASTOLIC CONGESTIVE HEART FAILURE (HCC): Chronic | ICD-10-CM

## 2021-01-04 DIAGNOSIS — E55.9 VITAMIN D DEFICIENCY: Chronic | ICD-10-CM

## 2021-01-04 DIAGNOSIS — N18.30 STAGE 3 CHRONIC KIDNEY DISEASE, UNSPECIFIED WHETHER STAGE 3A OR 3B CKD (HCC): ICD-10-CM

## 2021-01-04 PROCEDURE — 99214 OFFICE O/P EST MOD 30 MIN: CPT | Performed by: INTERNAL MEDICINE

## 2021-01-04 PROCEDURE — 1160F RVW MEDS BY RX/DR IN RCRD: CPT | Performed by: INTERNAL MEDICINE

## 2021-01-04 PROCEDURE — 1036F TOBACCO NON-USER: CPT | Performed by: INTERNAL MEDICINE

## 2021-01-04 RX ORDER — CEPHALEXIN 500 MG/1
500 CAPSULE ORAL 2 TIMES DAILY
COMMUNITY
Start: 2020-11-13 | End: 2022-01-05

## 2021-01-04 RX ORDER — ACETAMINOPHEN AND CODEINE PHOSPHATE 300; 30 MG/1; MG/1
TABLET ORAL
COMMUNITY
Start: 2020-11-11 | End: 2022-05-06

## 2021-01-04 NOTE — PROGRESS NOTES
Assessment/Plan:   1  Health maintenance - patient encouraged to obtain COVID vaccine  2  Abnormal left foot -she underwent surgery with left 4th metatarsal head resection, 2nd digital arthroplasty and apparently some evidence of MRSA -will be tracking down formal Podiatry notes  3  Diastolic heart failure -chronic -intermittent exacerbation over the past couple years  Echo done in October 2019 shows an EF is 60%, no regional wall motion are abnormality, RV systolic function normal, left atrium moderately dilated, mild-to-moderate tricuspid regurg with estimated peak PA pressure 45 millimeters and IVC dilated  Nuclear medicine study for amyloid equivocal   Has associated atrial fibrillation that is rate controlled  She sees Dr Vyas who feels that her echo shows low suggestion for infiltrative myopathy  She is now on spironolactone 25 milligrams daily and we reviewed the literature regarding that  She continues to monitor her weight and symptoms closely  4  Chronic renal failure - stage III-felt related to diuretic therapy -echo by seeing some degree of creatinine elevation to help decrease pulmonary congestion-for repeat prior to next visit -creatinine prior to this visit stable at 1 35 with BUN of 30  5  Atrial fibrillation -adequate rate control on current regimen- remains on high dose Cardizem and beta-blocker  Remains on anticoagulation with Coumadin which she prefers  Most recent Holter showed adequate rate control of maximum heart rate of 120 in low rate in the 40s while asleep  6  Shortness of breath -felt to be combination of COPD plus CHF but predominantly CHF  Has chronic significant elevation of her proBNP consistent with her cardiac etiology  7  Hypothyroidism-TSH stable on current dose of thyroid supplement  8   Osteopenia -most recent DEXA scan done in May 2020 shows L-spine -2 1 and hip of -2 1-will need repeat 2 years after last which will be due in June 2022 -vitamin-D level ordered prior to next visit  9  Asthma-stable on current regimen - she will try and decrease her dose of Pulmicort to 1 puff daily rather than b i d  During summer and winter when she tends to have much less in the way of symptoms  10  General care- advice given regarding current COVID 19 pandemic  11  Elevated serum for free light chains-immunoglobulin levels normal    Serum and urine protein electrophoresis  Without a spike   Reviewed that this could be a subtle hematologic abnormality associated with the aging process   At this point I do not think we need to do anything other than monitor   Always watching for potential transition myeloma  Rosalia Greene will have his yearly protein electrophoresis immunoglobulin levels and free light chain ratio -these ordered prior to next visit  12  Hypertension -adequate control on current regimen  13 Left wrist pain -tendinitis -ortho treated her with conservative therapy with rest and bracing -she failed steroid injection  14   Pre diabetes-A1c  ORDERED REPEAT NEXT VISIT  15   Tremor-likely benign essential tremor-monitor  16  Knee pain-had prior bilateral total knee replacement   1 point she was going back to orthopedic physician to check positioning of her prosthesis-review next visit  17   Left leg pain -felt to be trochanteric bursitis-responded to injection and improved  18   Vitamin-D deficiency-she will in resume her supplement of 2000 units daily  19  Plantar fasciitis -responded to physical therapy and exercise   20  Leg cramps- uses tonic water before bed   She knows to replace vitamin D   20   Abnormal area the pancreas and CT scan-full discussion as per noted February 221  Follow-up ultrasound unchanged   MRI without contrast -MRCP in 2016 showed pancreatic cystic lesion of the head, uncinate process and neck were unchanged suggesting side branch IPMN - no main pancreatic duct dilatation   Liver enzymes remained normal   Patient and her GI physician decided they would not do additional surveillance including MRI or endoscopic ultrasound  22Degenerative arthritis of the hands -monitor     All other problems as per note of October 2015, 20 15 May 2014      MEDICAL REGIMEN:      Flonase nasal spray used intermittently, torsemide on an alternating day regimen- on the 1st day 40 milligrams in a m  and 20 milligrams in the p m  and on the 2nd day 40 milligrams in a m , spironolactone 25 milligrams daily, Coumadin with dose adjustments, generic Singulair 10 milligrams daily, levothyroxine 50 micrograms daily but 75 micrograms on Sunday, Claritin 10 milligrams daily as needed, calcium with vitamin-D daily, Ventolin inhaler as needed, metoprolol succinate 25 milligrams-2 tablets in a m  and 1/2 tablets in the p m , Cardizem CD 3 100 milligrams a day, Pulmicort 100 micrograms-1 puff b i d  although she often uses it once daily, multivitamin, vitamin D3/ 2000 units a day, fish oil 1200 milligrams a day, rare use of Vicodin 5-300 half tablet b i d  p r n  for severe back pain     Tracking down results of Podiatry  Appointment in several months prior chemistry profile CBC with diff TSH vitamin-D level free light chain ratio protein electrophoresis immunoglobulin levels    Addendum -obtain notes from Podiatry- she had incision and drainage of left foot abscess, left foot 3rd metatarsal head resection, left foot skin plasty and left foot 2nd digit arthroplasty -she was noted preop to have infection of the left foot beginning on the plantar surface and extending dorsally with some cellulitis of the left foot -patient had been on aware this is       addendum -received notes from Podiatry that she had  Presented in November with cellulitis of the left foot  She had incision and drainage a complicated left foot wound, metatarsal head resection a a and 2nd hammertoe correction  Intraoperative wound later showed MRSA infection    She was treated with antibiotics and has had healing other than mild swelling of her left 2nd digit -have a copy of the culture report showing Stapnato Gray is sensitive to oxacillin so by culture this does not correlate with  MRSA  No problem-specific Assessment & Plan notes found for this encounter  Diagnoses and all orders for this visit:    Hypothyroidism, unspecified type  -     CBC and differential; Future  -     Comprehensive metabolic panel; Future  -     TSH, 3rd generation; Future  -     NT-BNP PRO; Future  -     Protein electrophoresis, serum; Future  -     Vitamin D 25 hydroxy; Future  -     IgG, IgA, IgM; Future  -     Immunoglobulin free LT chains blood; Future    Chronic diastolic congestive heart failure (HCC)  -     CBC and differential; Future  -     Comprehensive metabolic panel; Future  -     TSH, 3rd generation; Future  -     NT-BNP PRO; Future  -     Protein electrophoresis, serum; Future  -     Vitamin D 25 hydroxy; Future  -     IgG, IgA, IgM; Future  -     Immunoglobulin free LT chains blood; Future    Vitamin D deficiency  -     CBC and differential; Future  -     Comprehensive metabolic panel; Future  -     TSH, 3rd generation; Future  -     NT-BNP PRO; Future  -     Protein electrophoresis, serum; Future  -     Vitamin D 25 hydroxy; Future  -     IgG, IgA, IgM; Future  -     Immunoglobulin free LT chains blood; Future    Stage 3 chronic kidney disease, unspecified whether stage 3a or 3b CKD  -     CBC and differential; Future  -     Comprehensive metabolic panel; Future  -     TSH, 3rd generation; Future  -     NT-BNP PRO; Future  -     Protein electrophoresis, serum; Future  -     Vitamin D 25 hydroxy; Future  -     IgG, IgA, IgM; Future  -     Immunoglobulin free LT chains blood; Future    Other orders  -     acetaminophen-codeine (TYLENOL #3) 300-30 mg per tablet; TAKE ONE TABLET EVERY 6 HOURS AS NEEDED FOR SEVERE PAIN ONLY  -     cephalexin (KEFLEX) 500 mg capsule;  Take 500 mg by mouth 2 (two) times a day          Subjective: Patient ID: Adalgisa Aguilera is a 80 y o  female  She feels as though her shortness of breath may be slightly worse  On exam she does not appear volume overloaded  She has a history of asthma / COPD as well as diastolic CHF but over the last several years predominant shortness of breath is cardiac  She is on torsemide alternating day regimen of 60 milligrams on the 1st day and 40 milligrams on the 2nd day and spironolactone 25 milligrams daily  We reviewed the literature that now shows a patient with diastolic CHF are encouraged often to use aldosterone antagonist   She knows to restrict salt and fluids  She has associated elevation of her creatinine -we are sacrificing some degree elevated creatinine to help treat her CHF  She underwent successful left foot surgery  She has had however she was told at the time of surgery that she had some accompanying MRSA  To her knowledge she did not have osteomyelitis  She said at the time of surgery she had some streaking of the leg etcetera  Will be tracking down notes from Podiatry  This patient denies any systemic symptoms  Specifically there has been no evidence of fever, night sweats, significant weight loss or significant decrease in appetite  Laboratory testing done prior to this visit shows normal chemistry profile other than a BUN of 30 with a creatinine 1 35 a TSH of 5 63  CBC is normal   We reviewed the literature also showing that her oxygen areas this TSH may be acceptable  We elected not to change her thyroid supplement dose  This patient has a known history of hypothyroidism  We reviewed the difference between brand and generic thyroid supplements  We reviewed that thyroid supplements need to be taken on an empty stomach  We reviewed recent literature showing that the thyroid supplement can be taken in the morning on an empty stomach or before going to bed on an empty stomach   This patient denies any symptoms of hypothyroidism including significant constipation, dry skin or worsening fatigue  Periodic monitoring of this patient's free T4 and TSH will continue to be done  She is taking her meds as directed  She had a DEXA scan done in May 2020 showing an L-spine -2 1 and hip of -2 1  Subsequent bone density study will be due in 2 years  We reviewed COVID vaccine  I encouraged her to obtain this and she will also hopefully be obtaining this with her   There was an issue previously of elevated free light chain ratio-we reviewed this is affected to some degree by her chronic renal failure  She has not had any anemia or abnormalities of her immunoglobulin levels-these ordered prior to next visit  Testing had been ordered previously to rule out TT are amyloid -reading was equivocal -cardiology felt this was unlikely and not pursuing additional investigation  This patient wanted to know their preferred analgesic agent  Because of their various comorbidities I recommended that this be acetaminophen  This patient has no history of chronic liver disease that would put them at greater risk for use of acetaminophen  This patient may use up to 500-650 mg of acetaminophen at a time and no more than 3 g a day total  Nonsteroidal anti-inflammatory agents have the potential to exacerbate hypertension, hypercoagulability, chronic renal failure, congestive heart failure, and various allergic tendencies  Because of her comorbidities we wanted use acetaminophen rather than nonsteroidals      The following portions of the patient's history were reviewed and updated as appropriate: allergies, current medications, past family history, past medical history, past social history, past surgical history and problem list     Review of Systems   Constitutional: Negative  HENT: Negative  Respiratory: Positive for shortness of breath  Cardiovascular: Negative  Gastrointestinal: Negative  Endocrine: Negative  Genitourinary: Negative  Musculoskeletal: Negative  Skin: Negative  Neurological: Negative  Hematological: Negative  Psychiatric/Behavioral: Negative  Objective:      Temp 97 7 °F (36 5 °C) (Oral)   Ht 5' 5" (1 651 m)   Wt 62 1 kg (137 lb)   BMI 22 80 kg/m²          Physical Exam  Vitals signs reviewed  Constitutional:       General: She is not in acute distress  Appearance: Normal appearance  She is not ill-appearing, toxic-appearing or diaphoretic  HENT:      Head: Normocephalic and atraumatic  Right Ear: Tympanic membrane, ear canal and external ear normal       Left Ear: Tympanic membrane, ear canal and external ear normal       Nose: Nose normal  No congestion or rhinorrhea  Mouth/Throat:      Mouth: Mucous membranes are moist       Pharynx: Oropharynx is clear  No oropharyngeal exudate or posterior oropharyngeal erythema  Eyes:      General: No scleral icterus  Right eye: No discharge  Left eye: No discharge  Extraocular Movements: Extraocular movements intact  Pupils: Pupils are equal, round, and reactive to light  Neck:      Musculoskeletal: Normal range of motion and neck supple  No neck rigidity or muscular tenderness  Vascular: No carotid bruit  Cardiovascular:      Rate and Rhythm: Normal rate  Rhythm irregular  Pulses: Normal pulses  Heart sounds: Normal heart sounds  No murmur  No friction rub  No gallop  Comments:  Cardiomegaly to percussion  Irregularly irregular rhythm  Mid systolic click occurred  Adequate upstroke  Questionable increase in P2  Pulmonary:      Effort: Pulmonary effort is normal  No respiratory distress  Breath sounds: Normal breath sounds  No stridor  No wheezing, rhonchi or rales  Chest:      Chest wall: No tenderness  Abdominal:      General: Abdomen is flat  Bowel sounds are normal  There is no distension  Palpations: Abdomen is soft  There is no mass  Tenderness: There is no abdominal tenderness  There is no right CVA tenderness, left CVA tenderness, guarding or rebound  Hernia: No hernia is present  Musculoskeletal: Normal range of motion  General: No swelling, tenderness, deformity or signs of injury  Right lower leg: No edema  Left lower leg: No edema  Comments:  Evidence of recent left foot surgery   Lymphadenopathy:      Cervical: No cervical adenopathy  Skin:     General: Skin is warm and dry  Coloration: Skin is not jaundiced or pale  Findings: No bruising, erythema, lesion or rash  Neurological:      General: No focal deficit present  Mental Status: She is alert and oriented to person, place, and time  Mental status is at baseline  Cranial Nerves: No cranial nerve deficit  Sensory: No sensory deficit  Motor: No weakness  Coordination: Coordination normal       Gait: Gait normal       Deep Tendon Reflexes: Reflexes normal    Psychiatric:         Mood and Affect: Mood normal          Behavior: Behavior normal          Thought Content:  Thought content normal          Judgment: Judgment normal

## 2021-01-05 LAB — INR PPP: 2.5 (ref 0.84–1.19)

## 2021-01-06 ENCOUNTER — TELEPHONE (OUTPATIENT)
Dept: INTERNAL MEDICINE CLINIC | Facility: CLINIC | Age: 86
End: 2021-01-06

## 2021-01-06 ENCOUNTER — ANTICOAG VISIT (OUTPATIENT)
Dept: CARDIOLOGY CLINIC | Facility: CLINIC | Age: 86
End: 2021-01-06

## 2021-01-06 DIAGNOSIS — I48.21 PERMANENT ATRIAL FIBRILLATION (HCC): ICD-10-CM

## 2021-01-06 NOTE — TELEPHONE ENCOUNTER
2nd attempt- sp/w [facility] states they will try to fax it today if possible because their office is or will be closing for 2 weeks   They will fax to ext [phone]

## 2021-01-06 NOTE — TELEPHONE ENCOUNTER
1st attempt- per doc needs OR notes and last office visit from [address] @ [place] [phone]  Unable to reach their office, no provider line is avail and unable to leave message unless it is an emergency per their voicemail  Will try again later

## 2021-02-05 ENCOUNTER — ANTICOAG VISIT (OUTPATIENT)
Dept: CARDIOLOGY CLINIC | Facility: CLINIC | Age: 86
End: 2021-02-05

## 2021-02-05 DIAGNOSIS — I48.21 PERMANENT ATRIAL FIBRILLATION (HCC): ICD-10-CM

## 2021-02-05 LAB — INR PPP: 2 (ref 0.84–1.19)

## 2021-02-12 DIAGNOSIS — Z23 ENCOUNTER FOR IMMUNIZATION: ICD-10-CM

## 2021-03-05 ENCOUNTER — ANTICOAG VISIT (OUTPATIENT)
Dept: CARDIOLOGY CLINIC | Facility: CLINIC | Age: 86
End: 2021-03-05

## 2021-03-05 DIAGNOSIS — I48.21 PERMANENT ATRIAL FIBRILLATION (HCC): ICD-10-CM

## 2021-03-05 LAB — INR PPP: 2.8 (ref 0.84–1.19)

## 2021-04-02 ENCOUNTER — ANTICOAG VISIT (OUTPATIENT)
Dept: CARDIOLOGY CLINIC | Facility: CLINIC | Age: 86
End: 2021-04-02

## 2021-04-02 DIAGNOSIS — I48.21 PERMANENT ATRIAL FIBRILLATION (HCC): ICD-10-CM

## 2021-04-02 LAB — INR PPP: 2.8 (ref 0.84–1.19)

## 2021-04-30 ENCOUNTER — ANTICOAG VISIT (OUTPATIENT)
Dept: CARDIOLOGY CLINIC | Facility: CLINIC | Age: 86
End: 2021-04-30

## 2021-04-30 DIAGNOSIS — I48.21 PERMANENT ATRIAL FIBRILLATION (HCC): ICD-10-CM

## 2021-04-30 LAB — INR PPP: 2.6 (ref 0.84–1.19)

## 2021-05-05 ENCOUNTER — OFFICE VISIT (OUTPATIENT)
Dept: CARDIOLOGY CLINIC | Facility: CLINIC | Age: 86
End: 2021-05-05
Payer: COMMERCIAL

## 2021-05-05 VITALS
SYSTOLIC BLOOD PRESSURE: 106 MMHG | DIASTOLIC BLOOD PRESSURE: 62 MMHG | WEIGHT: 132.9 LBS | HEIGHT: 65 IN | BODY MASS INDEX: 22.14 KG/M2 | HEART RATE: 72 BPM

## 2021-05-05 DIAGNOSIS — I50.32 CHRONIC DIASTOLIC CONGESTIVE HEART FAILURE (HCC): Chronic | ICD-10-CM

## 2021-05-05 DIAGNOSIS — I48.21 PERMANENT ATRIAL FIBRILLATION (HCC): Primary | Chronic | ICD-10-CM

## 2021-05-05 DIAGNOSIS — J90 PLEURAL EFFUSION: ICD-10-CM

## 2021-05-05 DIAGNOSIS — I45.2 BIFASCICULAR BUNDLE BRANCH BLOCK: Chronic | ICD-10-CM

## 2021-05-05 DIAGNOSIS — E78.2 MIXED HYPERLIPIDEMIA: Chronic | ICD-10-CM

## 2021-05-05 DIAGNOSIS — I10 ESSENTIAL HYPERTENSION: Chronic | ICD-10-CM

## 2021-05-05 DIAGNOSIS — J44.9 CHRONIC OBSTRUCTIVE PULMONARY DISEASE, UNSPECIFIED COPD TYPE (HCC): Chronic | ICD-10-CM

## 2021-05-05 PROCEDURE — 99214 OFFICE O/P EST MOD 30 MIN: CPT | Performed by: INTERNAL MEDICINE

## 2021-05-05 NOTE — PROGRESS NOTES
[facility]  Follow up note  Dg Kan 80 y o  female MRN: [id number]        Problems    1  Permanent atrial fibrillation ([facility] [address] )     2  Bifascicular bundle branch block     3  Chronic diastolic congestive heart failure ([facility] [address] )     4  Mixed hyperlipidemia     5  Chronic obstructive pulmonary disease, unspecified COPD type ([facility] )     6  Essential hypertension     7  Pleural effusion         Impression:    Tisha Nissen me for six-month follow-up     Chronic diastolic CHF   o No significant LVH on echo 10/19   o Euvolemic on torsemide and spironolactone with a creatinine 1 35 as of 12/20  o Infiltrative workup negative by free light chain assessment/PYP scan     Permanent atrial fibrillation  o  present since 2011, chronic and rate controlled  o   On dual AV carlos alberto blockers, low-dose metoprolol succinate, and high-dose diltiazem  o  INR is remains stable and therapeutic on chronic warfarin     Bifascicular bundle branch block   o     No symptoms to suggest higher degree of block     Mixed hyperlipidemia  o    No pharmacologic treatment at current age 80    Plan:      continue six-month visits   No change in medical therapy   Dyspnea is mild-to-moderate, multifactorial, but appears to me that no additional medical therapy or any other changes will help us optimize her dyspnea        HPI:   Dg Kan is a 80y o  year old female  With chronic diastolic CHF, chronic atrial fibrillation on a rate control strategy and anticoagulation with warfarin, bifascicular bundle branch block, mixed hyperlipidemia, and a prior negative infiltrative cardiomyopathy workup returns for a follow-up visit and preoperative evaluation prior to foot surgery      Her blood pressure is excellent   She denies lightheadedness or dizziness   Her AFib remains excellent we rate controlled on diltiazem and metoprolol, and INR stable on warfarin   Her chronic dyspnea on exertion is unchanged, mild-to-moderate, mostly with steps, she denies edema, orthopnea, she is on spironolactone and torsemide with a creatinine 1 35 which is marginally elevated over her prior creatinine  She also has COPD  She has had an unremarkable infiltrative cardiomyopathy workup in the past with unremarkable serum light chains, and a negative PYP scan  Review of Systems   Constitutional: Negative for appetite change, diaphoresis, fatigue and fever  Respiratory: Positive for shortness of breath  Negative for chest tightness and wheezing  Cardiovascular: Negative for chest pain, palpitations and leg swelling  Gastrointestinal: Negative for abdominal pain and blood in stool  Musculoskeletal: Negative for arthralgias and joint swelling  Skin: Negative for rash  Neurological: Negative for dizziness, syncope and light-headedness         Past Medical History:   Diagnosis Date    Abnormal ultrasound     RESOLVED: [id number]    Asthma with acute exacerbation     LAST ASSESSED: [id number]    Asymptomatic menopausal state     Atherosclerosis     Atrial fibrillation (HCC)     Chronic obstructive lung disease (HCC)     Congestive heart failure (CHF) (HCC)     Congestive heart failure (HCC)     LAST ASSESSED: [id number]    Cuboid fracture     LAST ASSESSED: [id number]    Dyspepsia     GERD (gastroesophageal reflux disease)     High risk medication use     LAST ASSESSED: [id number]    Hyperlipidemia     Hypertension     Hypertension     Hypokalemia     Hypothyroidism     Impacted cerumen of both ears     LAST ASSESSED: [id number]    Impacted cerumen of right ear     LAST ASSESSED: [id number]    Influenza     LAST ASSESSED: [id number]    IPMN (intraductal papillary mucinous neoplasm)     RESOLVED: [id number]    Limb swelling     LAST ASSESSED: [id number]    Navicular fracture of ankle     LAST ASSESSED: [id number]    Onychomycosis     LAST ASSESSED: [id number]    Osteoarthritis     Osteopenia     Osteoporosis     Paronychia, finger, unspecified laterality     LAST ASSESSED: [id number]    Paroxysmal atrial fibrillation (HCC)     LAST ASSESSED: [id number]    Peripheral vascular disease (HCC)     Plantar fasciitis     Talus fracture     LAST ASSESSED: 44YPQ1589    Vascular headache      Social History     Substance and Sexual Activity   Alcohol Use Yes    Comment: SOCIAL     Social History     Substance and Sexual Activity   Drug Use No     Social History     Tobacco Use   Smoking Status Never Smoker   Smokeless Tobacco Never Used       Allergies:   Allergies   Allergen Reactions    Asa [Aspirin]     Nsaids        Medications:     Current Outpatient Medications:     acetaminophen-codeine (TYLENOL #3) 300-30 mg per tablet, TAKE ONE TABLET EVERY 6 HOURS AS NEEDED FOR SEVERE PAIN ONLY, Disp: , Rfl:     albuterol (VENTOLIN HFA) 90 mcg/act inhaler, Inhale, Disp: , Rfl:     Calcium Carb-Cholecalciferol (CALCIUM 1000 + D) 1000-800 MG-UNIT TABS, Take by mouth, Disp: , Rfl:     Cholecalciferol (VITAMIN D3) 1000 units CAPS, Take by mouth daily, Disp: , Rfl:     diltiazem (Cardizem CD) 300 mg 24 hr capsule, Take 1 capsule (300 mg total) by mouth daily, Disp: 90 capsule, Rfl: 3    fluticasone (FLONASE) 50 mcg/act nasal spray, 2 sprays into each nostril daily, Disp: 3 Bottle, Rfl: 3    levothyroxine 50 mcg tablet, Take 1 tab daily expect 1 and 1/2 on sunday, Disp: 98 tablet, Rfl: 3    metoprolol succinate (TOPROL-XL) 25 mg 24 hr tablet, Take 2 tablets in the am and 1 and 1/2 in the evening daily, Disp: 315 tablet, Rfl: 3    montelukast (Singulair) 10 mg tablet, Take 1 tablet (10 mg total) by mouth daily, Disp: 90 tablet, Rfl: 3    Pulmicort Flexhaler 180 MCG/ACT inhaler, Inhale 1 puff 2 (two) times a day, Disp: 3 Inhaler, Rfl: 3    spironolactone (ALDACTONE) 25 mg tablet, Take 1 tablet (25 mg total) by mouth daily, Disp: 90 tablet, Rfl: 3    torsemide (DEMADEX) 20 mg tablet, Take 2 doses on the first day and take 3 doses on the second day alternating days, Disp: 225 tablet, Rfl: 1    warfarin (COUMADIN) 5 mg tablet, Take one tablet daily or as directed by MD, Disp: 90 tablet, Rfl: 3    cephalexin (KEFLEX) 500 mg capsule, Take 500 mg by mouth 2 (two) times a day, Disp: , Rfl:     fluocinolone (SYNALAR) 0 025 % ointment, APPLY TO AFFECTED AREA TWICE A DAY FOR 14 DAYS, Disp: , Rfl:     potassium chloride (MICRO-K) 10 MEQ CR capsule, Take 1 capsule (10 mEq total) by mouth daily (Patient not taking: Reported on 11/4/2020), Disp: 180 capsule, Rfl: 3      Vitals:    05/05/21 1001   BP: 106/62   Pulse: 72     Weight (last 2 days)     Date/Time   Weight    05/05/21 1001   60 3 (132 9)            Physical Exam  Constitutional:       General: She is not in acute distress  Appearance: She is not diaphoretic  HENT:      Head: Normocephalic and atraumatic  Eyes:      General: No scleral icterus  Conjunctiva/sclera: Conjunctivae normal    Neck:      Musculoskeletal: Normal range of motion  Vascular: No JVD  Cardiovascular:      Rate and Rhythm: Normal rate  Rhythm irregular  Heart sounds: Normal heart sounds  No murmur  Pulmonary:      Effort: Pulmonary effort is normal  No respiratory distress  Breath sounds: Normal breath sounds  No wheezing or rales  Musculoskeletal:         General: No tenderness  Right lower leg: No edema  Left lower leg: No edema  Skin:     General: Skin is warm and dry  Laboratory Studies:       All laboratory studies personally read    Cardiac testing:     EKG reviewed personally:     11/20-atrial fibrillation, RBBB, LAFB      Echocardiogram:    75/78- EF 16%, diastolic dysfunction, un graded due to AFib, normal RV size and function, moderate  Left and right atrial dilatation, mild MR, mild TR, mildly elevated pulmonary pressures     TTR SPECT   11/19-1 26 heart to lung ratio, equivocal to negative    Stress tests:      Catheterization:      Holter:         James Lerner MD    Portions of the record may have been created with voice recognition software  Occasional wrong word or "sound a like" substitutions may have occurred due to the inherent limitations of voice recognition software  Read the chart carefully and recognize, using context, where substitutions have occurred

## 2021-05-13 ENCOUNTER — RA CDI HCC (OUTPATIENT)
Dept: OTHER | Facility: HOSPITAL | Age: 86
End: 2021-05-13

## 2021-05-13 NOTE — PROGRESS NOTES
[place] [address]  coding opportunities             Chart reviewed, (number of) suggestions sent to provider: 1           Patients insurance company: [employer] (Medicare Advantage only)        DX: [phone] Hypertensive heart and chronic kidney disease with heart failure and stage 1 through stage 4 chronic kidney disease, or unspecified chronic kidney disease     Provider never responded to [place] [address]  coding request  DX: [phone] was not used

## 2021-05-19 ENCOUNTER — OFFICE VISIT (OUTPATIENT)
Dept: INTERNAL MEDICINE CLINIC | Facility: CLINIC | Age: 86
End: 2021-05-19
Payer: COMMERCIAL

## 2021-05-19 ENCOUNTER — TELEPHONE (OUTPATIENT)
Dept: INTERNAL MEDICINE CLINIC | Facility: CLINIC | Age: 86
End: 2021-05-19

## 2021-05-19 VITALS
HEART RATE: 82 BPM | WEIGHT: 133 LBS | BODY MASS INDEX: 22.16 KG/M2 | DIASTOLIC BLOOD PRESSURE: 72 MMHG | HEIGHT: 65 IN | RESPIRATION RATE: 14 BRPM | TEMPERATURE: 97.1 F | SYSTOLIC BLOOD PRESSURE: 128 MMHG

## 2021-05-19 DIAGNOSIS — I50.32 CHRONIC DIASTOLIC CONGESTIVE HEART FAILURE (HCC): Chronic | ICD-10-CM

## 2021-05-19 DIAGNOSIS — R76.8 ELEVATED SERUM IMMUNOGLOBULIN FREE LIGHT CHAINS: ICD-10-CM

## 2021-05-19 DIAGNOSIS — I48.21 PERMANENT ATRIAL FIBRILLATION (HCC): Primary | Chronic | ICD-10-CM

## 2021-05-19 DIAGNOSIS — E55.9 VITAMIN D DEFICIENCY: Chronic | ICD-10-CM

## 2021-05-19 DIAGNOSIS — I50.9 CONGESTIVE HEART FAILURE, UNSPECIFIED HF CHRONICITY, UNSPECIFIED HEART FAILURE TYPE (HCC): ICD-10-CM

## 2021-05-19 DIAGNOSIS — E03.9 HYPOTHYROIDISM, UNSPECIFIED TYPE: Chronic | ICD-10-CM

## 2021-05-19 PROCEDURE — 99214 OFFICE O/P EST MOD 30 MIN: CPT | Performed by: INTERNAL MEDICINE

## 2021-05-19 PROCEDURE — 1036F TOBACCO NON-USER: CPT | Performed by: INTERNAL MEDICINE

## 2021-05-19 PROCEDURE — 3725F SCREEN DEPRESSION PERFORMED: CPT | Performed by: INTERNAL MEDICINE

## 2021-05-19 PROCEDURE — 1101F PT FALLS ASSESS-DOCD LE1/YR: CPT | Performed by: INTERNAL MEDICINE

## 2021-05-19 PROCEDURE — 3288F FALL RISK ASSESSMENT DOCD: CPT | Performed by: INTERNAL MEDICINE

## 2021-05-19 PROCEDURE — 1160F RVW MEDS BY RX/DR IN RCRD: CPT | Performed by: INTERNAL MEDICINE

## 2021-05-19 NOTE — PROGRESS NOTES
Assessment/Plan:    A 1  Health maintenance - patient completed COVID vaccine   2  Abnormal left foot-patient underwent surgery with left 4th metatarsal head resection, 2nd digital arthroplasty  There was associated infection with culture showing a staph organism sensitive oxacillin  She was treated with antibiotics and has had subsequent healing  3  Diastolic heart failure -chronic -intermittent exacerbation over the past couple years  Echo done in October 2019 shows an EF is 60%, no regional wall motion are abnormality, RV systolic function normal, left atrium moderately dilated, mild-to-moderate tricuspid regurg with estimated peak PA pressure 45 millimeters and IVC dilated  Nuclear medicine study for amyloid equivocal   Has associated atrial fibrillation that is rate controlled  She sees Dr Vyas who feels that her echo shows low suggestion for infiltrative myopathy  She is now on spironolactone 25 milligrams daily and we reviewed the literature regarding that  She continues to monitor her weight and symptoms closely  -recently saw Cardiology and felt to be stable  4  Chronic renal failure - stage III-felt related to diuretic therapy -echo by seeing some degree of creatinine elevation to help decrease pulmonary congestion-for repeat prior to next visit -creatinine prior to this visit stable at 1 43 with BUN of 30  5  Atrial fibrillation -adequate rate control on current regimen- remains on high dose Cardizem and beta-blocker  Remains on anticoagulation with Coumadin which she prefers  Most recent Holter showed adequate rate control of maximum heart rate of 120 in low rate in the 40s while asleep  6  Shortness of breath -felt to be combination of COPD plus CHF but predominantly CHF   Has chronic significant elevation of her proBNP consistent with her cardiac etiology  7  Hypothyroidism-TSH stable on current dose of thyroid supplement  8   Osteopenia -most recent DEXA scan done in May 2020 shows L-spine -2 1 and hip of -2 1-will need repeat 2 years after last which will be due in June 2022 -vitamin-D level ordered prior to next visit  9  Asthma-stable on current regimen - she will try and decrease her dose of Pulmicort to 1 puff daily rather than b i d  During summer and winter when she tends to have much less in the way of symptoms  10  General care- advice given regarding current COVID 19 pandemic  11  Elevated serum for free light chains-immunoglobulin levels normal    Serum and urine protein electrophoresis  Without a spike   Reviewed that this could be a subtle hematologic abnormality associated with the aging process   At this point I do not think we need to do anything other than monitor   Always watching for potential transition myeloma  Brie Zimmerman will have his yearly protein electrophoresis immunoglobulin levels and free light chain ratio -these ordered prior to next visit  12  Hypertension -adequate control on current regimen  13 Left wrist pain -tendinitis -ortho treated her with conservative therapy with rest and bracing -she failed steroid injection  14   Pre diabetes-A1c   15   Tremor-likely benign essential tremor-monitor  16  Knee pain-had prior bilateral total knee replacement   1 point she was going back to orthopedic physician to check positioning of her prosthesis-review next visit  17   Left leg pain -felt to be trochanteric bursitis-responded to injection and improved  18   Vitamin-D deficiency-she will in resume her supplement of 2000 units daily  19  Plantar fasciitis -responded to physical therapy and exercise   20  Leg cramps- uses tonic water before bed   She knows to replace vitamin D   20   Abnormal area the pancreas and CT scan-full discussion as per noted February 221  Follow-up ultrasound unchanged   MRI without contrast -MRCP in 2016 showed pancreatic cystic lesion of the head, uncinate process and neck were unchanged suggesting side branch IPMN - no main pancreatic duct dilatation   Liver enzymes remained normal   Patient and her GI physician decided they would not do additional surveillance including MRI or endoscopic ultrasound  22Degenerative arthritis of the hands -monitor     All other problems as per note of October 2015, 20 15 May 2014        MEDICAL REGIMEN:                                                  Flonase nasal spray used intermittently, torsemide on an alternating day regimen- on the 1st day 40 milligrams in a m  and 20 milligrams in the p m  and on the 2nd day 40 milligrams in a m , spironolactone 25 milligrams daily, Coumadin with dose adjustments, generic Singulair 10 milligrams daily, levothyroxine 50 micrograms daily but 75 micrograms on Sunday, Claritin 10 milligrams daily as needed, calcium with vitamin-D daily, Ventolin inhaler as needed, metoprolol succinate 25 milligrams-2 tablets in a m  and 1/2 tablets in the p m , Cardizem CD 3 100 milligrams a day, Pulmicort 100 micrograms-1 puff b i d  although she often uses it once daily, multivitamin, vitamin D3/ 2000 units a day, fish oil 1200 milligrams a day, rare use of Vicodin 5-300 half tablet b i d  p r n  for severe back pain     Appointment in several months with prior CBC random chemistry profile TSH     No problem-specific Assessment & Plan notes found for this encounter  Diagnoses and all orders for this visit:    Permanent atrial fibrillation (HCC)  -     CBC and differential; Future  -     Comprehensive metabolic panel; Future  -     TSH, 3rd generation; Future    Congestive heart failure, unspecified HF chronicity, unspecified heart failure type (HCC)  -     CBC and differential; Future  -     Comprehensive metabolic panel; Future  -     TSH, 3rd generation; Future    Hypothyroidism, unspecified type  -     CBC and differential; Future  -     Comprehensive metabolic panel; Future  -     TSH, 3rd generation;  Future    Chronic diastolic congestive heart failure (HCC)    Vitamin D deficiency    Elevated serum immunoglobulin free light chains          Subjective:      Patient ID: Aurelio Benavides is a 80 y o  female  She is overall relatively stable  She has shortness of breath with activity as before  She saw cardiology who felt her CHF was stable  Labs done prior to this visit show BUN of 30 with a creatinine 1 43 proBNP is 2800  TSH 6 11 vitamin-D therapeutic at 43 hemoglobin normal immunoglobulin levels normal she has elevated free light chains as before with hemoglobin electrophoresis pending       This patient denies any systemic symptoms  Specifically there has been no evidence of fever, night sweats, significant weight loss or significant decrease in appetite  She has shortness of breath predominantly from CHF  She has a history of asthma as well  She has noted slight increase in mucus with the change in season but denies sneezing itchy eyes etcetera     We reviewed notes from Podiatry in detail  She had presented in November with cellulitis of the left foot  Had incision and drainage of a complicated left foot wound, metatarsal head resection and 2nd hammertoe correction  She was treated with antibiotics and potential MRSA infection  Culture report showed a staph organism that was sensitive to oxacillin so this was not MRSA  For TSH is 6  We reviewed this is unacceptable TSH for her age in the late [de-identified]  This patient has a known history of hypothyroidism  We reviewed the difference between brand and generic thyroid supplements  We reviewed that thyroid supplements need to be taken on an empty stomach  We reviewed recent literature showing that the thyroid supplement can be taken in the morning on an empty stomach or before going to bed on an empty stomach  This patient denies any symptoms of hypothyroidism including significant constipation, dry skin or worsening fatigue  Periodic monitoring of this patient's free T4 and TSH will continue to be done  She is clinically and lab wise euthyroid            This patient has known chronic renal failure  We reviewed the difference between acute renal failure, chronic renal failure and exacerbations of chronic renal failure  Nonsteroidal anti-inflammatories are contraindicated because of their potential for exacerbating this condition  This patient has to be very careful with the use of any radiographic dye and knows that if it is needed there may need to be additional treatment at the time of the x-ray procedure  We also reviewed about avoidance of extraneous phosphorus supplements  We discussed the influence of chronic renal failure on metabolism of various medications and that dose adjustments often have to be made based on the status and severity of the chronic renal failure  Appropriate volume status is always important and needs to be monitored on a chronic basis  In addition ,we also reviewed that certain medications including ACE inhibitors and angiotensin receptor blockers can exacerbate this condition and are potentially contraindicated and/or may need dose adjustments  Patients with chronic renal failure need aggressive control of blood pressure will preferred values of 125-130/75-80  We also discussed that chronic renal failure can contribute to exacerbation of atherosclerosis  We are sacrificing some degree of creatinine elevation to help with diuretic therapy  Prior creatinine was 1 35  Creatinine prior to this visit is 1 43     She has some tendency towards bruising of the arms but not other areas  She is on chronic therapy with Coumadin which is managed by the Cardiology Clinic  This patient remains on chronic therapy with Coumadin  There has been no evidence of any bleeding from any sites-specifically hematemesis, rectal bleeding, hemoptysis epistaxis, hematuria or spontaneous bruising of the chest or back   We reviewed the interaction of many medications with Coumadin and the need to more frequently check the INR when these medications are introduced  We reviewed that nonsteroidal anti-inflammatories are contraindicated because of the increased risk of GI bleeding that occurs with there simultaneous use with Coumadin  This patient is already aware of their goal INR  She knows to restrict salt  She does not use nonsteroidals  The following portions of the patient's history were reviewed and updated as appropriate: allergies, current medications, past family history, past medical history, past social history, past surgical history and problem list     Review of Systems   Constitutional: Negative  HENT: Negative  Respiratory: Positive for shortness of breath  Cardiovascular: Negative  Gastrointestinal: Negative  Endocrine: Negative  Genitourinary: Negative  Musculoskeletal: Negative  Skin: Negative  Neurological: Negative  Hematological: Negative  Psychiatric/Behavioral: Negative  Objective:      Temp (!) 97 1 °F (36 2 °C) (Tympanic)   Ht 5' 5" (1 651 m)   Wt 60 3 kg (133 lb)   BMI 22 13 kg/m²          Physical Exam  Vitals signs reviewed  Constitutional:       General: She is not in acute distress  Appearance: Normal appearance  She is not ill-appearing, toxic-appearing or diaphoretic  HENT:      Head: Normocephalic and atraumatic  Right Ear: Ear canal and external ear normal       Left Ear: Ear canal and external ear normal       Nose: Nose normal  No congestion or rhinorrhea  Mouth/Throat:      Mouth: Mucous membranes are moist       Pharynx: Oropharynx is clear  No oropharyngeal exudate or posterior oropharyngeal erythema  Eyes:      General: No scleral icterus  Right eye: No discharge  Left eye: No discharge  Extraocular Movements: Extraocular movements intact  Pupils: Pupils are equal, round, and reactive to light  Neck:      Musculoskeletal: Normal range of motion and neck supple   No neck rigidity or muscular tenderness  Vascular: No carotid bruit  Cardiovascular:      Rate and Rhythm: Normal rate  Rhythm irregular  Pulses: Normal pulses  Heart sounds: Normal heart sounds  No murmur  No friction rub  No gallop  Comments: Mid systolic click heard at the apex  Pulmonary:      Effort: Pulmonary effort is normal  No respiratory distress  Breath sounds: Normal breath sounds  No stridor  No wheezing, rhonchi or rales  Chest:      Chest wall: No tenderness  Abdominal:      General: Abdomen is flat  Bowel sounds are normal  There is no distension  Palpations: Abdomen is soft  There is no mass  Tenderness: There is no abdominal tenderness  There is no right CVA tenderness, left CVA tenderness, guarding or rebound  Hernia: No hernia is present  Musculoskeletal: Normal range of motion  General: No swelling, tenderness, deformity or signs of injury  Right lower leg: No edema  Left lower leg: No edema  Comments: Evidence of prior foot surgery   Lymphadenopathy:      Cervical: No cervical adenopathy  Skin:     General: Skin is warm and dry  Coloration: Skin is not jaundiced or pale  Findings: No bruising, erythema, lesion or rash  Comments: Thin skin of  the forearms   Neurological:      General: No focal deficit present  Mental Status: She is alert and oriented to person, place, and time  Mental status is at baseline  Cranial Nerves: No cranial nerve deficit  Sensory: No sensory deficit  Motor: No weakness  Coordination: Coordination normal       Gait: Gait normal       Deep Tendon Reflexes: Reflexes normal    Psychiatric:         Mood and Affect: Mood normal          Behavior: Behavior normal          Thought Content:  Thought content normal          Judgment: Judgment normal

## 2021-05-28 ENCOUNTER — ANTICOAG VISIT (OUTPATIENT)
Dept: CARDIOLOGY CLINIC | Facility: CLINIC | Age: 86
End: 2021-05-28

## 2021-05-28 DIAGNOSIS — I48.21 PERMANENT ATRIAL FIBRILLATION (HCC): ICD-10-CM

## 2021-05-28 LAB — INR PPP: 2.6 (ref 0.84–1.19)

## 2021-06-16 DIAGNOSIS — I50.32 CHRONIC DIASTOLIC CONGESTIVE HEART FAILURE (HCC): ICD-10-CM

## 2021-06-16 RX ORDER — TORSEMIDE 20 MG/1
TABLET ORAL
Qty: 225 TABLET | Refills: 3 | Status: SHIPPED | OUTPATIENT
Start: 2021-06-16 | End: 2022-01-03 | Stop reason: SDUPTHER

## 2021-06-25 ENCOUNTER — ANTICOAG VISIT (OUTPATIENT)
Dept: CARDIOLOGY CLINIC | Facility: CLINIC | Age: 86
End: 2021-06-25

## 2021-06-25 DIAGNOSIS — I48.11 LONGSTANDING PERSISTENT ATRIAL FIBRILLATION (HCC): Primary | ICD-10-CM

## 2021-06-25 LAB — INR PPP: 2.5 (ref 0.84–1.19)

## 2021-07-23 ENCOUNTER — ANTICOAG VISIT (OUTPATIENT)
Dept: CARDIOLOGY CLINIC | Facility: CLINIC | Age: 86
End: 2021-07-23

## 2021-07-23 DIAGNOSIS — I48.11 LONGSTANDING PERSISTENT ATRIAL FIBRILLATION (HCC): Primary | ICD-10-CM

## 2021-07-23 LAB — INR PPP: 2.3 (ref 0.84–1.19)

## 2021-08-07 ENCOUNTER — OFFICE VISIT (OUTPATIENT)
Dept: URGENT CARE | Age: 86
End: 2021-08-07
Payer: COMMERCIAL

## 2021-08-07 VITALS
SYSTOLIC BLOOD PRESSURE: 124 MMHG | HEART RATE: 93 BPM | TEMPERATURE: 97.4 F | RESPIRATION RATE: 16 BRPM | OXYGEN SATURATION: 98 % | DIASTOLIC BLOOD PRESSURE: 71 MMHG

## 2021-08-07 DIAGNOSIS — S41.112A LACERATION OF LEFT UPPER ARM WITHOUT COMPLICATION, INITIAL ENCOUNTER: Primary | ICD-10-CM

## 2021-08-07 PROCEDURE — 99213 OFFICE O/P EST LOW 20 MIN: CPT | Performed by: NURSE PRACTITIONER

## 2021-08-07 PROCEDURE — 12002 RPR S/N/AX/GEN/TRNK2.6-7.5CM: CPT | Performed by: NURSE PRACTITIONER

## 2021-08-07 PROCEDURE — G0463 HOSPITAL OUTPT CLINIC VISIT: HCPCS | Performed by: NURSE PRACTITIONER

## 2021-08-07 NOTE — PROGRESS NOTES
[facility]        NAME: Yajaira Sparrow is a 80 y o  female  : 1932    MRN: [id number]  DATE: 2021  TIME: 4:18 PM    Assessment and Plan   Laceration of left upper arm without complication, initial encounter [S41 112A]  1  Laceration of left upper arm without complication, initial encounter  Laceration repair         Patient Instructions     Wound care instructions  Info on signs of infection discussed  Follow up with PCP in 8-10 days for suture removal  Proceed to  ER if symptoms worsen  Chief Complaint     Chief Complaint   Patient presents with    Facial Laceration     left forearm; cut on a wooden door frame          History of Present Illness       HPI   Reports laceration on the left forearm  Hit the hand on the door  This was less than 6 hrs ago  On blood thinner  Review of Systems   Review of Systems   Musculoskeletal: Positive for myalgias (left arm)  Skin: Positive for wound (left FA)  Negative for color change  Neurological: Negative for tremors, weakness and numbness           Current Medications       Current Outpatient Medications:     acetaminophen-codeine (TYLENOL #3) 300-30 mg per tablet, TAKE ONE TABLET EVERY 6 HOURS AS NEEDED FOR SEVERE PAIN ONLY, Disp: , Rfl:     albuterol (VENTOLIN HFA) 90 mcg/act inhaler, Inhale, Disp: , Rfl:     Calcium Carb-Cholecalciferol (CALCIUM 1000 + D) 1000-800 MG-UNIT TABS, Take by mouth, Disp: , Rfl:     cephalexin (KEFLEX) 500 mg capsule, Take 500 mg by mouth 2 (two) times a day (Patient not taking: Reported on 2021), Disp: , Rfl:     Cholecalciferol (VITAMIN D3) 1000 units CAPS, Take by mouth daily, Disp: , Rfl:     diltiazem (Cardizem CD) 300 mg 24 hr capsule, Take 1 capsule (300 mg total) by mouth daily, Disp: 90 capsule, Rfl: 3    fluocinolone (SYNALAR) 0 025 % ointment, APPLY TO AFFECTED AREA TWICE A DAY FOR 14 DAYS, Disp: , Rfl:     fluticasone (FLONASE) 50 mcg/act nasal spray, 2 sprays into each nostril daily, Disp: 3 Bottle, Rfl: 3    levothyroxine 50 mcg tablet, Take 1 tab daily expect 1 and 1/2 on sunday, Disp: 98 tablet, Rfl: 3    metoprolol succinate (TOPROL-XL) 25 mg 24 hr tablet, Take 2 tablets in the am and 1 and 1/2 in the evening daily, Disp: 315 tablet, Rfl: 3    montelukast (Singulair) 10 mg tablet, Take 1 tablet (10 mg total) by mouth daily, Disp: 90 tablet, Rfl: 3    potassium chloride (MICRO-K) 10 MEQ CR capsule, Take 1 capsule (10 mEq total) by mouth daily (Patient not taking: Reported on 11/4/2020), Disp: 180 capsule, Rfl: 3    Pulmicort Flexhaler 180 MCG/ACT inhaler, Inhale 1 puff 2 (two) times a day, Disp: 3 Inhaler, Rfl: 3    spironolactone (ALDACTONE) 25 mg tablet, Take 1 tablet (25 mg total) by mouth daily, Disp: 90 tablet, Rfl: 3    torsemide (DEMADEX) 20 mg tablet, Take 2 doses on the first day and take 3 doses on the second day alternating days, Disp: 225 tablet, Rfl: 3    warfarin (COUMADIN) 5 mg tablet, Take one tablet daily or as directed by MD, Disp: 90 tablet, Rfl: 3    Current Allergies     Allergies as of 08/07/2021 - Reviewed 08/07/2021   Allergen Reaction Noted    Asa [aspirin]  08/31/2016    Nsaids  08/31/2016            The following portions of the patient's history were reviewed and updated as appropriate: allergies, current medications, past family history, past medical history, past social history, past surgical history and problem list      Past Medical History:   Diagnosis Date    Abnormal ultrasound     RESOLVED: [id number]    Asthma with acute exacerbation     LAST ASSESSED: 26IHX6250    Asymptomatic menopausal state     Atherosclerosis     [facility] [address]     Chronic obstructive lung disease [facility] [address]     Congestive heart failure (CHF) ([facility] [address] [facility]     Congestive heart failure ([facility] [address] [facility]     LAST ASSESSED: [id number]    Cuboid fracture     LAST ASSESSED: [id number]    Dyspepsia     GERD (gastroesophageal reflux disease)     High risk medication use     LAST ASSESSED: [id number]    Hyperlipidemia     Hypertension     Hypertension     Hypokalemia     Hypothyroidism     Impacted cerumen of both ears     LAST ASSESSED: [id number]    Impacted cerumen of right ear     LAST ASSESSED: 87UKN7886    Influenza     LAST ASSESSED: 90SNU4416    IPMN (intraductal papillary mucinous neoplasm)     RESOLVED: 33UKY3430    Limb swelling     LAST ASSESSED: [id number]    Navicular fracture of ankle     LAST ASSESSED: 48VGI3069    Onychomycosis     LAST ASSESSED: [id number]    Osteoarthritis     Osteopenia     Osteoporosis     Paronychia, finger, unspecified laterality     LAST ASSESSED: [id number]    Paroxysmal atrial fibrillation (HCC)     LAST ASSESSED: 88RXM0259    Peripheral vascular disease (HCC)     Plantar fasciitis     Talus fracture     LAST ASSESSED: 77YAY5683    Vascular headache        Past Surgical History:   Procedure Laterality Date    APPENDECTOMY      CATARACT EXTRACTION Bilateral     CHOLECYSTECTOMY      NEUROPLASTY / TRANSPOSITION MEDIAN NERVE AT CARPAL TUNNEL BILATERAL Bilateral     DECOMPRESSION    OOPHORECTOMY Bilateral     age 80    PELVIC FLOOR REPAIR  2014    SINUS SURGERY      TOTAL KNEE ARTHROPLASTY Bilateral        Family History   Problem Relation Age of Onset    Pancreatic cancer Mother 80    Heart failure Mother     Coronary artery disease Family     Breast cancer Family     Other Family         BREAST DISORDER, GASTROINTESTINAL CANCER    Uterine cancer Family     Hyperlipidemia Family     Osteoarthritis Family     Ovarian cancer Family     Brain cancer Son     Stroke Father     No Known Problems Sister     No Known Problems Daughter     No Known Problems Maternal Grandmother     No Known Problems Maternal Grandfather     No Known Problems Paternal Grandmother     No Known Problems Paternal Grandfather     Breast cancer Cousin 48    Breast cancer Cousin 48    Ovarian cancer Other 49         Medications have been verified  Objective   /71   Pulse 93   Temp (!) 97 4 °F (36 3 °C)   Resp 16   SpO2 98%   No LMP recorded  Patient is postmenopausal        Physical Exam     Physical Exam  Musculoskeletal:         General: Tenderness (mild) present  No swelling  Skin:     Capillary Refill: Capillary refill takes less than 2 seconds  Comments: There is a 5 cm wound on the left arm  Mild bruising present  Laceration repair    Date/Time: 8/7/2021 4:15 PM  Performed by: LUZ Doyle  Authorized by: LUZ Doyle   Consent: Verbal consent obtained  Consent given by: patient and spouse  Patient understanding: patient states understanding of the procedure being performed  Body area: upper extremity  Location details: right lower arm  Laceration length: 5 cm  Foreign bodies: no foreign bodies  Tendon involvement: none  Nerve involvement: none  Vascular damage: no  Anesthesia: local infiltration    Anesthesia:  Local Anesthetic: lidocaine 1% with epinephrine  Anesthetic total: 6 mL    Sedation:  Patient sedated: no      Wound Dehiscence:  Superficial Wound Dehiscence: simple closure      Procedure Details:  Irrigation solution: saline  Amount of cleaning: standard  Debridement: none  Degree of undermining: none  Skin closure: 3-0 nylon  Number of sutures: 8  Technique: simple  Approximation: close  Approximation difficulty: simple  Dressing: 4x4 sterile gauze and antibiotic ointment  Patient tolerance: patient tolerated the procedure well with no immediate complications  Foreign body: no foregn body

## 2021-08-16 ENCOUNTER — OFFICE VISIT (OUTPATIENT)
Dept: URGENT CARE | Age: 86
End: 2021-08-16
Payer: COMMERCIAL

## 2021-08-16 VITALS
DIASTOLIC BLOOD PRESSURE: 67 MMHG | RESPIRATION RATE: 14 BRPM | OXYGEN SATURATION: 99 % | SYSTOLIC BLOOD PRESSURE: 118 MMHG | HEART RATE: 68 BPM | TEMPERATURE: 97 F

## 2021-08-16 DIAGNOSIS — Z48.02 ENCOUNTER FOR REMOVAL OF SUTURES: Primary | ICD-10-CM

## 2021-08-16 PROCEDURE — 99213 OFFICE O/P EST LOW 20 MIN: CPT | Performed by: NURSE PRACTITIONER

## 2021-08-16 PROCEDURE — G0463 HOSPITAL OUTPT CLINIC VISIT: HCPCS | Performed by: NURSE PRACTITIONER

## 2021-08-16 NOTE — PROGRESS NOTES
[facility]        NAME: Rufino Chacon is a 80 y o  female  : 1932    MRN: [id number]  DATE: 2021  TIME: 8:33 AM    Assessment and Plan   Encounter for removal of sutures [Z48 02]  1  Encounter for removal of sutures           Patient Instructions       Follow up with PCP in 3-5 days  Proceed to  ER if symptoms worsen  Chief Complaint     Chief Complaint   Patient presents with    Suture / Staple Removal     sutures, left forearm, placed , denies pain, drainage, fevers  History of Present Illness       Patient is a 80year old female presenting for suture removal  She had 8- sutures placed in the left forearm 9 days ago  She denies redness, pain, drainage, swelling, or fever  Review of Systems   Review of Systems   Constitutional: Negative for activity change, chills and fever  Skin: Positive for wound  Negative for color change           Current Medications       Current Outpatient Medications:     acetaminophen-codeine (TYLENOL #3) 300-30 mg per tablet, TAKE ONE TABLET EVERY 6 HOURS AS NEEDED FOR SEVERE PAIN ONLY, Disp: , Rfl:     albuterol (VENTOLIN HFA) 90 mcg/act inhaler, Inhale, Disp: , Rfl:     Calcium Carb-Cholecalciferol (CALCIUM 1000 + D) 1000-800 MG-UNIT TABS, Take by mouth, Disp: , Rfl:     cephalexin (KEFLEX) 500 mg capsule, Take 500 mg by mouth 2 (two) times a day (Patient not taking: Reported on 2021), Disp: , Rfl:     Cholecalciferol (VITAMIN D3) 1000 units CAPS, Take by mouth daily, Disp: , Rfl:     diltiazem (Cardizem CD) 300 mg 24 hr capsule, Take 1 capsule (300 mg total) by mouth daily, Disp: 90 capsule, Rfl: 3    fluocinolone (SYNALAR) 0 025 % ointment, APPLY TO AFFECTED AREA TWICE A DAY FOR 14 DAYS, Disp: , Rfl:     fluticasone (FLONASE) 50 mcg/act nasal spray, 2 sprays into each nostril daily, Disp: 3 Bottle, Rfl: 3    levothyroxine 50 mcg tablet, Take 1 tab daily expect 1 and 1/2 on , Disp: 98 tablet, Rfl: 3   metoprolol succinate (TOPROL-XL) 25 mg 24 hr tablet, Take 2 tablets in the am and 1 and 1/2 in the evening daily, Disp: 315 tablet, Rfl: 3    montelukast (Singulair) 10 mg tablet, Take 1 tablet (10 mg total) by mouth daily, Disp: 90 tablet, Rfl: 3    potassium chloride (MICRO-K) 10 MEQ CR capsule, Take 1 capsule (10 mEq total) by mouth daily (Patient not taking: Reported on 11/4/2020), Disp: 180 capsule, Rfl: 3    Pulmicort Flexhaler 180 MCG/ACT inhaler, Inhale 1 puff 2 (two) times a day, Disp: 3 Inhaler, Rfl: 3    spironolactone (ALDACTONE) 25 mg tablet, Take 1 tablet (25 mg total) by mouth daily, Disp: 90 tablet, Rfl: 3    torsemide (DEMADEX) 20 mg tablet, Take 2 doses on the first day and take 3 doses on the second day alternating days, Disp: 225 tablet, Rfl: 3    warfarin (COUMADIN) 5 mg tablet, Take one tablet daily or as directed by MD, Disp: 90 tablet, Rfl: 3    Current Allergies     Allergies as of 08/16/2021 - Reviewed 08/16/2021   Allergen Reaction Noted    Asa [aspirin]  08/31/2016    Nsaids  08/31/2016            The following portions of the patient's history were reviewed and updated as appropriate: allergies, current medications, past family history, past medical history, past social history, past surgical history and problem list      Past Medical History:   Diagnosis Date    Abnormal ultrasound     RESOLVED: [id number]    Asthma with acute exacerbation     LAST ASSESSED: 49VAW4949    Asymptomatic menopausal state     Atherosclerosis     [facility] [address]     Chronic obstructive lung disease [facility] [address]     Congestive heart failure (CHF) ([facility] [address]     Congestive heart failure ([facility] [address]     LAST ASSESSED: [id number]    Cuboid fracture     LAST ASSESSED: 69PPI7078    Dyspepsia     GERD (gastroesophageal reflux disease)     High risk medication use     LAST ASSESSED: 30AKC6847    Hyperlipidemia     Hypertension     Hypertension     Hypokalemia     Hypothyroidism     Impacted cerumen of both ears     LAST ASSESSED: 18TQV1381    Impacted cerumen of right ear     LAST ASSESSED: 82XHK6464    Influenza     LAST ASSESSED: 91UMI8029    IPMN (intraductal papillary mucinous neoplasm)     RESOLVED: 79VRU6592    Limb swelling     LAST ASSESSED: 12ZPK6220    Navicular fracture of ankle     LAST ASSESSED: [id number]    Onychomycosis     LAST ASSESSED: 80UMX0225    Osteoarthritis     Osteopenia     Osteoporosis     Paronychia, finger, unspecified laterality     LAST ASSESSED: 01FVM3296    Paroxysmal atrial fibrillation (HCC)     LAST ASSESSED: 13IUG0322    Peripheral vascular disease (HCC)     Plantar fasciitis     Talus fracture     LAST ASSESSED: 62WED2039    Vascular headache        Past Surgical History:   Procedure Laterality Date    APPENDECTOMY      CATARACT EXTRACTION Bilateral     CHOLECYSTECTOMY      NEUROPLASTY / TRANSPOSITION MEDIAN NERVE AT CARPAL TUNNEL BILATERAL Bilateral     DECOMPRESSION    OOPHORECTOMY Bilateral     age 80    PELVIC FLOOR REPAIR  2014    SINUS SURGERY      TOTAL KNEE ARTHROPLASTY Bilateral        Family History   Problem Relation Age of Onset    Pancreatic cancer Mother 80    Heart failure Mother     Coronary artery disease Family     Breast cancer Family     Other Family         BREAST DISORDER, GASTROINTESTINAL CANCER    Uterine cancer Family     Hyperlipidemia Family     Osteoarthritis Family     Ovarian cancer Family     Brain cancer Son     Stroke Father     No Known Problems Sister     No Known Problems Daughter     No Known Problems Maternal Grandmother     No Known Problems Maternal Grandfather     No Known Problems Paternal Grandmother     No Known Problems Paternal Grandfather     Breast cancer Cousin 48    Breast cancer Cousin 48    Ovarian cancer Other 49         Medications have been verified          Objective   /67   Pulse 68   Temp (!) 97 °F (36 1 °C)   Resp 14   SpO2 99%          Physical Exam     Physical Exam  Vitals reviewed  Constitutional:       General: She is awake  She is not in acute distress  Appearance: Normal appearance  She is normal weight  Cardiovascular:      Rate and Rhythm: Normal rate  Pulmonary:      Effort: Pulmonary effort is normal    Skin:     General: Skin is moist           Neurological:      General: No focal deficit present  Mental Status: She is alert, oriented to person, place, and time and easily aroused  Psychiatric:         Behavior: Behavior is cooperative  Suture removal    Date/Time: 8/16/2021 8:32 AM  Performed by: LUZ Espino  Authorized by: LUZ Espino   Universal Protocol:  Consent: Verbal consent obtained  Consent given by: patient  Patient understanding: patient states understanding of the procedure being performed  Patient identity confirmed: verbally with patient        Patient location:  Clinic  Location:     Laterality:  Left    Location:  Upper extremity    Upper extremity location:  Arm    Arm location:  L lower arm  Procedure details: Tools used:  Suture removal kit    Wound appearance:  No sign(s) of infection, good wound healing, clean, moist, pink and nontender    Number of sutures removed:  8  Post-procedure details:     Post-removal:  Dressing applied    Patient tolerance of procedure:   Tolerated well, no immediate complications

## 2021-08-20 ENCOUNTER — ANTICOAG VISIT (OUTPATIENT)
Dept: CARDIOLOGY CLINIC | Facility: CLINIC | Age: 86
End: 2021-08-20

## 2021-08-20 LAB — INR PPP: 2.9 (ref 0.84–1.19)

## 2021-08-24 DIAGNOSIS — I48.21 PERMANENT ATRIAL FIBRILLATION (HCC): Primary | ICD-10-CM

## 2021-09-17 ENCOUNTER — RA CDI HCC (OUTPATIENT)
Dept: OTHER | Facility: HOSPITAL | Age: 86
End: 2021-09-17

## 2021-09-17 LAB — INR PPP: 2.2 (ref 0.84–1.19)

## 2021-09-20 ENCOUNTER — ANTICOAG VISIT (OUTPATIENT)
Dept: CARDIOLOGY CLINIC | Facility: CLINIC | Age: 86
End: 2021-09-20

## 2021-09-20 DIAGNOSIS — I48.11 LONGSTANDING PERSISTENT ATRIAL FIBRILLATION (HCC): Primary | ICD-10-CM

## 2021-09-27 ENCOUNTER — OFFICE VISIT (OUTPATIENT)
Dept: INTERNAL MEDICINE CLINIC | Facility: CLINIC | Age: 86
End: 2021-09-27
Payer: COMMERCIAL

## 2021-09-27 DIAGNOSIS — R06.89 DIFFICULTY BREATHING: ICD-10-CM

## 2021-09-27 DIAGNOSIS — I10 ESSENTIAL HYPERTENSION: Chronic | ICD-10-CM

## 2021-09-27 DIAGNOSIS — E03.9 HYPOTHYROIDISM, UNSPECIFIED TYPE: ICD-10-CM

## 2021-09-27 DIAGNOSIS — I50.32 CHRONIC DIASTOLIC CONGESTIVE HEART FAILURE (HCC): ICD-10-CM

## 2021-09-27 DIAGNOSIS — I48.21 PERMANENT ATRIAL FIBRILLATION (HCC): Chronic | ICD-10-CM

## 2021-09-27 DIAGNOSIS — J44.9 CHRONIC OBSTRUCTIVE PULMONARY DISEASE, UNSPECIFIED COPD TYPE (HCC): Chronic | ICD-10-CM

## 2021-09-27 DIAGNOSIS — Z23 NEED FOR VACCINATION: Primary | ICD-10-CM

## 2021-09-27 PROCEDURE — 1160F RVW MEDS BY RX/DR IN RCRD: CPT | Performed by: INTERNAL MEDICINE

## 2021-09-27 PROCEDURE — 90662 IIV NO PRSV INCREASED AG IM: CPT

## 2021-09-27 PROCEDURE — 99215 OFFICE O/P EST HI 40 MIN: CPT | Performed by: INTERNAL MEDICINE

## 2021-09-27 PROCEDURE — 1036F TOBACCO NON-USER: CPT | Performed by: INTERNAL MEDICINE

## 2021-09-27 PROCEDURE — G0008 ADMIN INFLUENZA VIRUS VAC: HCPCS

## 2021-09-28 ENCOUNTER — APPOINTMENT (OUTPATIENT)
Dept: RADIOLOGY | Age: 86
End: 2021-09-28
Payer: COMMERCIAL

## 2021-09-28 VITALS
SYSTOLIC BLOOD PRESSURE: 118 MMHG | DIASTOLIC BLOOD PRESSURE: 72 MMHG | HEIGHT: 65 IN | RESPIRATION RATE: 14 BRPM | WEIGHT: 126.6 LBS | HEART RATE: 82 BPM | BODY MASS INDEX: 21.09 KG/M2

## 2021-09-28 DIAGNOSIS — I50.32 CHRONIC DIASTOLIC CONGESTIVE HEART FAILURE (HCC): ICD-10-CM

## 2021-09-28 PROCEDURE — 71046 X-RAY EXAM CHEST 2 VIEWS: CPT

## 2021-09-28 NOTE — PROGRESS NOTES
Assessment/Plan:   1  Health maintenance - patient was given influenza vaccine today  2  Health maintenance -reviewed the literature regarding blistering with COVID vaccine - she says this will be provided at her independent living facility   3  Shortness of breath -felt to be predominantly CHF -has a known history of asthma  Has chronic significant elevation of her proBNP consistent with her cardiac etiology  I feel her current symptoms are again manifestation of CHF and this is being treated as dictated below  4  CHF-previously felt to be diastolic-chronic with intermittent exacerbation over the past couple years  Echo in October 2019 shows EF is 60%, no regional wall motion abnormality, RV systolic function normal, left atrium moderately dilated, mild-to-moderate tricuspid regurg with estimated peak PA pressure of 45 millimeters and IVC dilated  Nuclear medicine study for amyloid equivocal   Has associated atrial fibrillation that is rate controlled  She sees Dr Vyas who felt that her echo shows low suggestion for infiltrative myopathy  She is on spironolactone  She is currently on torsemide alternating a regimen of 40 milligrams of 1 day and 60 milligrams in the next day-I increased her to 60 milligrams every day taking 40 milligrams in a m  and 20 milligrams in the p m  Rosella Goldmann Repeat echocardiogram ordered as well as follow-up labs including TSH in proBNP  I also ordered a 24 hour Holter to assess rate control  5  Atrial fibrillation -adequate rate control current regimen  She is concerned beta-blockers affected her pulmonary disease and I cautiously decrease the dose  She will continue current dose of Cardizem  She remains on anticoagulation with Coumadin which she prefers  Prior Holter showed adequate control of heart rate with low rate in the 40s while asleep  She was scheduled for repeat 24 hour Holter today  6  Chronic renal failure- stage III-felt related to diuretic therapy    Accepting some degree of creatinine elevation to help decreased pulmonary congestion  As noted creatinine prior to this visit stable at 1 35  Watching carefully with increase in diuretic dose  7  Hypothyroidism -TSH stable on current dose of thyroid supplement -repeat ordered with her next set of labs  8   abnormal left foot -underwent surgery with left 4th metatarsal head resection is 2nd digital arthroplasty  There was associated infection with culture showing Staph which was sensitive to all  She was treated with antibiotics  By Podiatry and has had subsequent healing  9  Osteopenia -most recent DEXA scan done in May 2020 shows L-spine -2 1 and hip of -2 1-will need repeat 2 years after last which will be due in June 2022 -vitamin-D level ordered prior to next visit  10  Asthma-stable on current regimen -  Currently on Pulmicort 1 puff daily - may need higher dose but I suspect clinically her worsening shortness of breath is cardiac in origin  6  Elevated serum for free light chains-immunoglobulin levels normal    Serum and urine protein electrophoresis  Without a spike   Reviewed that this could be a subtle hematologic abnormality associated with the aging process   At this point I do not think we need to do anything other than monitor   Always watching for potential transition to myeloma  Andry Webster will have his yearly protein electrophoresis immunoglobulin levels and free light chain ratio - these were last done in May of 2021  12   Hypertension -adequate control on current regimen  13 Left wrist pain -tendinitis -ortho treated her with conservative therapy with rest and bracing -she failed steroid injection  14   Pre diabetes-A1c  to beture  15   Tremor-likely benign essential tremor-monitor  16  Knee pain-had prior bilateral total knee replacement   1 point she was going back to orthopedic physician to check positioning of her prosthesis-review next visit  17   Left leg pain -felt to be trochanteric bursitis-responded to injection and improved  18   Vitamin-D deficiency-she will in resume her supplement of 2000 units daily  19  Plantar fasciitis -responded to physical therapy and exercise   20  Leg cramps- uses tonic water before bed   She knows to replace vitamin D   20  Abnormal area the pancreas and CT scan-full discussion as per noted February 221  Follow-up ultrasound unchanged   MRI without contrast -MRCP in 2016 showed pancreatic cystic lesion of the head, uncinate process and neck were unchanged suggesting side branch IPMN - no main pancreatic duct dilatation   Liver enzymes remained normal   Patient and her GI physician decided they would not do additional surveillance including MRI or endoscopic ultrasound  22Degenerative arthritis of the hands And low backmonitor  23Nasal polyps -remains on intranasal steroids  24Abnormal CT scan of the chest with multiple nodule-follow-up CT scan showed 1 nodule resolved another nodules unchanged in 1 area bronchiectasis -additional imaging not felt needed  25Thyroid nodules -noted on exam by ENT -aspirated benign  Thyroid function stable  Ultrasound stable additional studies not felt needed  25I colon polyps- most recent colonoscopy was in August 2015 -deferring additional colonoscopies because of age  26Abnormal radiographic study of the gyn organs with bilateral cystic ovarian masses noted on CT scan as well as thickening of the endometrium  Underwent surgery ventrally with laparoscopic bilateral less than 0, hysteroscopy and D&C -all pathology  27 history of hoarseness -ENT exam benign-not an issue times years  28History rash which she felt was reaction of prednisone  Has had issues in the past with prednisone  If steroid therapy needed this needs to be with steroid up the prednisone  29History of dyspepsia -CT scan of abdomen negative  Gastric biopsy benign but she was H pylori positive  This was treated    Not an issue times months  30Bifascicular heart block -always watching for for Progression to complete heart block             Flonase nasal spray used intermittently, torsemide increasing to 40 milligrams in a m  and 20 milligrams in the p m  every day, spironolactone 25 milligrams daily, Coumadin with dose adjustments, generic Singulair 10 milligrams daily, levothyroxine 50 micrograms daily but 75 micrograms on Sunday, Claritin 10 milligrams daily as needed, calcium with vitamin-D daily, Ventolin inhaler as needed, metoprolol succinate decrease to 25 milligrams -1-1/2 tablets twice daily, Cardizem CD- 300 milligrams a day, Pulmicort 100 micrograms 1 puff daily, multivitamin, vitamin D3 / 2000 units a day, fish oil 1200 milligrams a day, rare use of Vicodin 5-300 half tablet b i d  p r n  For severe back pain    Scheduled for echo, chest x-ray 24 hour Holter, appointment over the next few weeks with prior random chemistry profile TSH proBNP          addendum -chest x-ray done in September 2000 21 shows no acute issues     Echocardiogram done in October 2021 shows normal EF grade 2 diastolic dysfunction with mild concentric hypertrophy normal right ventricle left atrium and right atrium mildly dilated moderate tricuspid regurg pulmonary artery pressure is 52 which is moderately increased    Addendum - 24 hour Holter shows Afib throughout the study with average heart rate is 68 -heart rate in the 40s at 2:46 a m  to max of 102 at 9:47 a m  of breath while her Afib was at 92 beats per minute -no significant pauses     No problem-specific Assessment & Plan notes found for this encounter  Diagnoses and all orders for this visit:    Need for vaccination  -     influenza vaccine, high-dose, PF 0 7 mL (FLUZONE HIGH-DOSE)    Chronic diastolic congestive heart failure (HCC)  -     Holter monitor - 24 hour; Future  -     Echo complete with contrast if indicated; Future  -     Comprehensive metabolic panel; Future  -     TSH, 3rd generation; Future  -     NT-BNP PRO;  Future  - XR chest pa & lateral; Future    Hypothyroidism, unspecified type  -     Comprehensive metabolic panel; Future  -     TSH, 3rd generation; Future  -     NT-BNP PRO; Future    Chronic obstructive pulmonary disease, unspecified COPD type ([facility] [address] )    Essential hypertension    Permanent atrial fibrillation (HCC)    Difficulty breathing          Subjective:      Patient ID: Dory Packer is a 80 y o  female  She feels as though she is having worsening shortness of breath with activity  She says if she walks out of her independent living facility 100 yard she has significant shortness of breath and has to stop and rest   She feels as though she may have some tendency towards increasing orthopnea as well as potential paroxysmal nocturnal dyspnea  She has not had chest pain or pressure  She is concerned that her current dose of meds including her beta-blocker are exacerbating her underlying obstructive pulmonary disease  She was removal medical journals that raise this issue  She has a known history of atrial fibrillation  She is concerned that she is on both digoxin and Cardizem for rate control  She has been seeing Cardiology was agreed with current therapy  She  Also has a known history of obstructive pulmonary disease  She remains on Pulmicort 1 puff daily  She has received COVID vaccine  She tolerated that without difficulty  Laboratory testing done in September 17th shows a hemoglobin which is normal at 13 6 normal white count 7 5  Chemistry profile normal other than a BUN of 30 with creatinine 1 35  Albumin at 3 7  She has known hypothyroidism but lab did not run a TSH  We had a long discussion today regarding the above  She ready restrict salt  She was to the ENT in June for cerumen removal   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 -she qualifies for hearing aid is in considering that        She had been to [facility] for laceration of her left upper arm in August -she had laceration repair then eventual suture removal     She has a history of diastolic CHF  Most recent echo was 2 years ago -this was reviewed in detail  [facility]  [facility], [address] [facility]  [phone]     Transthoracic Echocardiogram  2D, M-mode, Doppler, and Color Doppler     Study date:  23-Oct-2019     Patient: Patricia Richard  MR number: [id number]  Account number: [de-identified]  : 1932  Age: 80 years  Gender: Female  Status: Outpatient  Location: Echo lab  Height: 65 in  Weight: 140 lb  BP: 115/ 70 mmHg     Indications: Congestive heart failure     Diagnoses: I50 9 - Heart failure, unspecified     Sonographer:  FRANCI Macdonald  Primary Physician:  Gladis Esquivel MD  Referring Physician:  Gladis Esquivel MD  Group:  [facility]  Interpreting Physician:  Shaun Carrasco MD     SUMMARY     LEFT VENTRICLE:  Systolic function was normal  Ejection fraction was estimated to be 60 %  There were no regional wall motion abnormalities      RIGHT VENTRICLE:  The size was normal   Systolic function was normal      LEFT ATRIUM:  The atrium was moderately dilated      RIGHT ATRIUM:  The atrium was moderately dilated      MITRAL VALVE:  There was mild regurgitation      TRICUSPID VALVE:  There was mild to moderate regurgitation  Estimated peak PA pressure was 45 mmHg      IVC, HEPATIC VEINS:  The inferior vena cava was dilated      HISTORY: PRIOR HISTORY: COPD,AFIB,CHF,HTN,HLD,GERD,Edema,Asthma,Pleural effusion     PROCEDURE: The procedure was performed in the echo lab  This was a routine study  The transthoracic approach was used  The study included complete 2D imaging, M-mode, complete spectral Doppler, and color Doppler  The heart rate was 68 bpm,  at the start of the study   Image quality was adequate      LEFT VENTRICLE: Size was normal  Systolic function was normal  Ejection fraction was estimated to be 60 %  There were no regional wall motion abnormalities  Wall thickness was normal  DOPPLER: Transmitral flow pattern: atrial fibrillation  Left ventricular diastolic function parameters were abnormal      RIGHT VENTRICLE: The size was normal  Systolic function was normal  Wall thickness was normal      LEFT ATRIUM: The atrium was moderately dilated      RIGHT ATRIUM: The atrium was moderately dilated      MITRAL VALVE: Valve structure was normal  There was normal leaflet separation  DOPPLER: The transmitral velocity was within the normal range  There was no evidence for stenosis  There was mild regurgitation      AORTIC VALVE: The valve was trileaflet  Leaflets exhibited normal thickness and normal cuspal separation  DOPPLER: Transaortic velocity was within the normal range  There was no evidence for stenosis  There was no significant  regurgitation      TRICUSPID VALVE: The valve structure was normal  There was normal leaflet separation  DOPPLER: The transtricuspid velocity was within the normal range  There was no evidence for stenosis  There was mild to moderate regurgitation  Estimated  peak PA pressure was 45 mmHg      PULMONIC VALVE: Leaflets exhibited normal thickness, no calcification, and normal cuspal separation  DOPPLER: The transpulmonic velocity was within the normal range   There was mild regurgitation      PERICARDIUM: There was no pericardial effusion      AORTA: The root exhibited normal size      SYSTEMIC VEINS: IVC: The inferior vena cava was dilated      SYSTEM MEASUREMENT TABLES     2D  %FS: 40 39 %  Ao Diam: 2 92 cm  EDV(Teich): 73 23 ml  EF(Cube): 78 82 %  EF(Teich): 71 59 %  ESV(Cube): 14 35 ml  ESV(Teich): 20 8 ml  IVSd: 1 02 cm  LA Area: 20 38 cm2  LA Diam: 4 18 cm  LVEDV MOD A4C: 53 28 ml  LVEF MOD A4C: 68 31 %  LVESV MOD A4C: 16 88 ml  LVIDd: 4 08 cm  LVIDs: 2 43 cm  LVLd A4C: 5 81 cm  LVLs A4C: 4 71 cm  LVPWd: 0 95 cm  RA Area: 22 72 cm2  SV MOD A4C: 36 39 ml  SV(Cube): 53 4 ml  SV(Teich): 52 43 ml     CW  TR Vmax: 3 08 m/s  TR maxP 97 mmHg     PW  E': 0 06 m/s  E/E': 18 6  MV A Hank: 0 5 m/s  MV Dec [place]: 5 77 m/s2  MV DecT: 179 07 ms  MV E Hank: 1 03 m/s  MV E/A Ratio: 2 07     [facility]     Prepared and electronically signed by  Bronson Rivera MD  Signed 23-Oct-2019 15:09:52     Linked Documents    View Image     a as noted she was felt to have diastolic CHF  I had a sense of exam that upstroke may be decreased  This will be reassessed with her repeat echo  shortness of breath in the past was felt to be a combination of COPD plus CHF but predominantly CHF  She has chronic significant elevation of her proBNP consistent with her cardiac etiology  She has a history of hypertension  BP adequately controlled on current regimen  She avoid salt and decongestants  She is not using frequent nonsteroidals  She denies systemic symptoms  She denies fever, night sweats, decrease in appetite or significant weight loss  Her spirits relatively well despite all the above  She is taking all her meds as directed  She has known hypothyroidism  She appears clinically euthyroid  TSH ordered with her next set of labs  A a because of her long-term anticoagulant use she knows to avoid nonsteroidals  She uses acetaminophen for pain  She remains on chronic Coumadin therapy with dose adjustments  Associated with her shortness of breath she has some cough  Time spent preparing today's chart for the visit 10 minutes      The following portions of the patient's history were reviewed and updated as appropriate: allergies, current medications, past family history, past medical history, past social history, past surgical history and problem list     Review of Systems   Constitutional: Negative  HENT: Negative  Respiratory: Positive for cough and shortness of breath  Cardiovascular: Negative  Gastrointestinal: Negative  Endocrine: Negative  Genitourinary: Negative  Musculoskeletal: Negative  Skin: Negative  Neurological: Negative  Hematological: Negative  Psychiatric/Behavioral: Negative  Objective:      Ht 5' 5" (1 651 m)   Wt 57 4 kg (126 lb 9 6 oz)   BMI 21 07 kg/m²          Physical Exam  Vitals reviewed  Constitutional:       General: She is not in acute distress  Appearance: Normal appearance  She is not ill-appearing, toxic-appearing or diaphoretic  HENT:      Head: Normocephalic and atraumatic  Right Ear: Ear canal and external ear normal       Left Ear: Ear canal and external ear normal       Nose: Nose normal  No congestion or rhinorrhea  Mouth/Throat:      Mouth: Mucous membranes are moist       Pharynx: Oropharynx is clear  No oropharyngeal exudate or posterior oropharyngeal erythema  Eyes:      General: No scleral icterus  Right eye: No discharge  Left eye: No discharge  Extraocular Movements: Extraocular movements intact  Conjunctiva/sclera: Conjunctivae normal       Pupils: Pupils are equal, round, and reactive to light  Neck:      Vascular: No carotid bruit  Cardiovascular:      Rate and Rhythm: Normal rate  Rhythm irregular  Pulses: Normal pulses  Heart sounds: Normal heart sounds  No murmur heard  No friction rub  No gallop  Comments: Decreasing carotid upstroke  Pulmonary:      Effort: Pulmonary effort is normal  No respiratory distress  Breath sounds: Normal breath sounds  No stridor  No wheezing, rhonchi or rales  Chest:      Chest wall: No tenderness  Abdominal:      General: Abdomen is flat  Bowel sounds are normal  There is no distension  Palpations: Abdomen is soft  There is no mass  Tenderness: There is no abdominal tenderness  There is no right CVA tenderness, left CVA tenderness, guarding or rebound  Hernia: No hernia is present     Musculoskeletal: General: No swelling, tenderness, deformity or signs of injury  Normal range of motion  Cervical back: Normal range of motion and neck supple  No rigidity  No muscular tenderness  Right lower leg: No edema  Left lower leg: No edema  Lymphadenopathy:      Cervical: No cervical adenopathy  Skin:     General: Skin is warm and dry  Coloration: Skin is not jaundiced or pale  Findings: No bruising, erythema, lesion or rash  Neurological:      General: No focal deficit present  Mental Status: She is alert and oriented to person, place, and time  Mental status is at baseline  Cranial Nerves: No cranial nerve deficit  Sensory: No sensory deficit  Motor: No weakness  Coordination: Coordination normal       Gait: Gait normal       Deep Tendon Reflexes: Reflexes normal    Psychiatric:         Mood and Affect: Mood normal          Behavior: Behavior normal          Thought Content:  Thought content normal          Judgment: Judgment normal

## 2021-10-04 ENCOUNTER — TELEPHONE (OUTPATIENT)
Dept: INTERNAL MEDICINE CLINIC | Facility: CLINIC | Age: 86
End: 2021-10-04

## 2021-10-12 ENCOUNTER — ANTICOAG VISIT (OUTPATIENT)
Dept: CARDIOLOGY CLINIC | Facility: CLINIC | Age: 86
End: 2021-10-12

## 2021-10-12 LAB — INR PPP: 2.9 (ref 0.84–1.19)

## 2021-10-13 ENCOUNTER — OFFICE VISIT (OUTPATIENT)
Dept: OBGYN CLINIC | Facility: CLINIC | Age: 86
End: 2021-10-13
Payer: COMMERCIAL

## 2021-10-13 VITALS
SYSTOLIC BLOOD PRESSURE: 114 MMHG | DIASTOLIC BLOOD PRESSURE: 60 MMHG | HEIGHT: 66 IN | WEIGHT: 125.8 LBS | BODY MASS INDEX: 20.22 KG/M2

## 2021-10-13 DIAGNOSIS — N90.7 VULVAR CYSTS: Primary | ICD-10-CM

## 2021-10-13 PROCEDURE — 1160F RVW MEDS BY RX/DR IN RCRD: CPT | Performed by: OBSTETRICS & GYNECOLOGY

## 2021-10-13 PROCEDURE — 99213 OFFICE O/P EST LOW 20 MIN: CPT | Performed by: OBSTETRICS & GYNECOLOGY

## 2021-10-15 ENCOUNTER — HOSPITAL ENCOUNTER (OUTPATIENT)
Dept: NON INVASIVE DIAGNOSTICS | Facility: HOSPITAL | Age: 86
Discharge: HOME | End: 2021-10-15
Payer: COMMERCIAL

## 2021-10-15 VITALS
DIASTOLIC BLOOD PRESSURE: 60 MMHG | BODY MASS INDEX: 20.99 KG/M2 | WEIGHT: 126 LBS | SYSTOLIC BLOOD PRESSURE: 114 MMHG | HEIGHT: 65 IN

## 2021-10-15 DIAGNOSIS — I50.32 CHRONIC DIASTOLIC CONGESTIVE HEART FAILURE (HCC): ICD-10-CM

## 2021-10-15 LAB
AORTIC ROOT: 2.9 CM
APICAL FOUR CHAMBER EJECTION FRACTION: 53 %
E WAVE DECELERATION TIME: 161 MS
FRACTIONAL SHORTENING: 33 % (ref 28–44)
INTERVENTRICULAR SEPTUM IN DIASTOLE (PARASTERNAL SHORT AXIS VIEW): 0.9 CM
LEFT INTERNAL DIMENSION IN SYSTOLE: 2.6 CM (ref 2.1–4)
LEFT VENTRICULAR INTERNAL DIMENSION IN DIASTOLE: 3.9 CM (ref 3.76–5.59)
LEFT VENTRICULAR POSTERIOR WALL IN END DIASTOLE: 1 CM
LEFT VENTRICULAR STROKE VOLUME: 43 ML
LV EF: 57 %
MV E'TISSUE VEL-SEP: 6 CM/S
MV PEAK A VEL: 0.31 M/S
MV PEAK E VEL: 115 CM/S
MV STENOSIS PRESSURE HALF TIME: 0 MS
PA SYSTOLIC PRESSURE: 52 MMHG
RIGHT VENTRICLE ID DIMENSION: 2.8 CM
SL CV PED ECHO LEFT VENTRICLE DIASTOLIC VOLUME (MOD BIPLANE) 2D: 67 ML
SL CV PED ECHO LEFT VENTRICLE SYSTOLIC VOLUME (MOD BIPLANE) 2D: 24 ML
TR PEAK VELOCITY: 3.2 M/S
TRICUSPID VALVE PEAK REGURGITATION VELOCITY: 3.22 M/S
TRICUSPID VALVE S': 1.2 CM/S
TV PEAK GRADIENT: 41 MMHG
Z-SCORE OF LEFT VENTRICULAR DIMENSION IN END SYSTOLE: -1.61

## 2021-10-15 PROCEDURE — 93226 XTRNL ECG REC<48 HR SCAN A/R: CPT

## 2021-10-15 PROCEDURE — 93225 XTRNL ECG REC<48 HRS REC: CPT

## 2021-10-15 PROCEDURE — 93306 TTE W/DOPPLER COMPLETE: CPT

## 2021-10-15 PROCEDURE — 93306 TTE W/DOPPLER COMPLETE: CPT | Performed by: INTERNAL MEDICINE

## 2021-10-19 ENCOUNTER — TELEPHONE (OUTPATIENT)
Dept: INTERNAL MEDICINE CLINIC | Facility: CLINIC | Age: 86
End: 2021-10-19

## 2021-10-26 PROCEDURE — 93227 XTRNL ECG REC<48 HR R&I: CPT | Performed by: INTERNAL MEDICINE

## 2021-10-27 ENCOUNTER — TELEPHONE (OUTPATIENT)
Dept: INTERNAL MEDICINE CLINIC | Facility: CLINIC | Age: 86
End: 2021-10-27

## 2021-10-28 ENCOUNTER — RA CDI HCC (OUTPATIENT)
Dept: OTHER | Facility: HOSPITAL | Age: 86
End: 2021-10-28

## 2021-11-09 ENCOUNTER — TELEPHONE (OUTPATIENT)
Dept: INTERNAL MEDICINE CLINIC | Facility: CLINIC | Age: 86
End: 2021-11-09

## 2021-11-09 ENCOUNTER — OFFICE VISIT (OUTPATIENT)
Dept: CARDIOLOGY CLINIC | Facility: CLINIC | Age: 86
End: 2021-11-09
Payer: COMMERCIAL

## 2021-11-09 ENCOUNTER — OFFICE VISIT (OUTPATIENT)
Dept: INTERNAL MEDICINE CLINIC | Facility: CLINIC | Age: 86
End: 2021-11-09
Payer: COMMERCIAL

## 2021-11-09 VITALS
DIASTOLIC BLOOD PRESSURE: 60 MMHG | WEIGHT: 126.3 LBS | SYSTOLIC BLOOD PRESSURE: 110 MMHG | BODY MASS INDEX: 21.04 KG/M2 | HEART RATE: 79 BPM | HEIGHT: 65 IN

## 2021-11-09 VITALS
BODY MASS INDEX: 21.09 KG/M2 | WEIGHT: 126.6 LBS | DIASTOLIC BLOOD PRESSURE: 72 MMHG | RESPIRATION RATE: 14 BRPM | SYSTOLIC BLOOD PRESSURE: 110 MMHG | HEIGHT: 65 IN | HEART RATE: 78 BPM

## 2021-11-09 DIAGNOSIS — J44.9 CHRONIC OBSTRUCTIVE PULMONARY DISEASE, UNSPECIFIED COPD TYPE (HCC): Chronic | ICD-10-CM

## 2021-11-09 DIAGNOSIS — E78.2 MIXED HYPERLIPIDEMIA: Chronic | ICD-10-CM

## 2021-11-09 DIAGNOSIS — I50.32 CHRONIC DIASTOLIC CONGESTIVE HEART FAILURE (HCC): Primary | ICD-10-CM

## 2021-11-09 DIAGNOSIS — I50.32 CHRONIC DIASTOLIC CONGESTIVE HEART FAILURE (HCC): ICD-10-CM

## 2021-11-09 DIAGNOSIS — I48.21 PERMANENT ATRIAL FIBRILLATION (HCC): Primary | ICD-10-CM

## 2021-11-09 DIAGNOSIS — I45.2 BIFASCICULAR BUNDLE BRANCH BLOCK: Chronic | ICD-10-CM

## 2021-11-09 DIAGNOSIS — E03.9 HYPOTHYROIDISM, UNSPECIFIED TYPE: Primary | ICD-10-CM

## 2021-11-09 DIAGNOSIS — I10 PRIMARY HYPERTENSION: Chronic | ICD-10-CM

## 2021-11-09 DIAGNOSIS — I48.21 PERMANENT ATRIAL FIBRILLATION (HCC): Chronic | ICD-10-CM

## 2021-11-09 PROCEDURE — 1170F FXNL STATUS ASSESSED: CPT | Performed by: INTERNAL MEDICINE

## 2021-11-09 PROCEDURE — 1125F AMNT PAIN NOTED PAIN PRSNT: CPT | Performed by: INTERNAL MEDICINE

## 2021-11-09 PROCEDURE — 99213 OFFICE O/P EST LOW 20 MIN: CPT | Performed by: INTERNAL MEDICINE

## 2021-11-09 PROCEDURE — 3288F FALL RISK ASSESSMENT DOCD: CPT | Performed by: INTERNAL MEDICINE

## 2021-11-09 PROCEDURE — 99215 OFFICE O/P EST HI 40 MIN: CPT | Performed by: INTERNAL MEDICINE

## 2021-11-09 PROCEDURE — 93000 ELECTROCARDIOGRAM COMPLETE: CPT | Performed by: INTERNAL MEDICINE

## 2021-11-09 PROCEDURE — G0439 PPPS, SUBSEQ VISIT: HCPCS | Performed by: INTERNAL MEDICINE

## 2021-11-09 PROCEDURE — 1160F RVW MEDS BY RX/DR IN RCRD: CPT | Performed by: INTERNAL MEDICINE

## 2021-11-09 RX ORDER — SPIRONOLACTONE 50 MG/1
50 TABLET, FILM COATED ORAL DAILY
Qty: 90 TABLET | Refills: 3 | Status: SHIPPED | OUTPATIENT
Start: 2021-11-09 | End: 2021-12-20 | Stop reason: SDUPTHER

## 2021-11-12 ENCOUNTER — ANTICOAG VISIT (OUTPATIENT)
Dept: CARDIOLOGY CLINIC | Facility: CLINIC | Age: 86
End: 2021-11-12

## 2021-11-12 LAB — INR PPP: 2.8 (ref 0.84–1.19)

## 2021-12-10 ENCOUNTER — ANTICOAG VISIT (OUTPATIENT)
Dept: CARDIOLOGY CLINIC | Facility: CLINIC | Age: 86
End: 2021-12-10

## 2021-12-10 LAB — INR PPP: 2.8 (ref 0.84–1.19)

## 2021-12-20 ENCOUNTER — OFFICE VISIT (OUTPATIENT)
Dept: INTERNAL MEDICINE CLINIC | Facility: CLINIC | Age: 86
End: 2021-12-20
Payer: COMMERCIAL

## 2021-12-20 VITALS
RESPIRATION RATE: 14 BRPM | DIASTOLIC BLOOD PRESSURE: 70 MMHG | BODY MASS INDEX: 20.12 KG/M2 | HEART RATE: 72 BPM | HEIGHT: 65 IN | WEIGHT: 120.8 LBS | SYSTOLIC BLOOD PRESSURE: 110 MMHG

## 2021-12-20 DIAGNOSIS — I48.20 CHRONIC ATRIAL FIBRILLATION (HCC): Chronic | ICD-10-CM

## 2021-12-20 DIAGNOSIS — J45.20 MILD INTERMITTENT ASTHMA WITHOUT COMPLICATION: ICD-10-CM

## 2021-12-20 DIAGNOSIS — R06.89 DIFFICULTY BREATHING: ICD-10-CM

## 2021-12-20 DIAGNOSIS — J44.9 CHRONIC OBSTRUCTIVE PULMONARY DISEASE, UNSPECIFIED COPD TYPE (HCC): Chronic | ICD-10-CM

## 2021-12-20 DIAGNOSIS — R06.02 SOB (SHORTNESS OF BREATH): Primary | ICD-10-CM

## 2021-12-20 DIAGNOSIS — I50.32 CHRONIC DIASTOLIC CONGESTIVE HEART FAILURE (HCC): ICD-10-CM

## 2021-12-20 DIAGNOSIS — I50.32 CHRONIC DIASTOLIC CONGESTIVE HEART FAILURE (HCC): Chronic | ICD-10-CM

## 2021-12-20 DIAGNOSIS — R05.9 COUGH: ICD-10-CM

## 2021-12-20 DIAGNOSIS — E03.9 HYPOTHYROIDISM, UNSPECIFIED TYPE: Chronic | ICD-10-CM

## 2021-12-20 PROCEDURE — 1036F TOBACCO NON-USER: CPT | Performed by: INTERNAL MEDICINE

## 2021-12-20 PROCEDURE — U0003 INFECTIOUS AGENT DETECTION BY NUCLEIC ACID (DNA OR RNA); SEVERE ACUTE RESPIRATORY SYNDROME CORONAVIRUS 2 (SARS-COV-2) (CORONAVIRUS DISEASE [COVID-19]), AMPLIFIED PROBE TECHNIQUE, MAKING USE OF HIGH THROUGHPUT TECHNOLOGIES AS DESCRIBED BY CMS-2020-01-R: HCPCS | Performed by: INTERNAL MEDICINE

## 2021-12-20 PROCEDURE — 99215 OFFICE O/P EST HI 40 MIN: CPT | Performed by: INTERNAL MEDICINE

## 2021-12-20 PROCEDURE — 3725F SCREEN DEPRESSION PERFORMED: CPT | Performed by: INTERNAL MEDICINE

## 2021-12-20 PROCEDURE — 1160F RVW MEDS BY RX/DR IN RCRD: CPT | Performed by: INTERNAL MEDICINE

## 2021-12-20 PROCEDURE — 93000 ELECTROCARDIOGRAM COMPLETE: CPT | Performed by: INTERNAL MEDICINE

## 2021-12-20 PROCEDURE — U0005 INFEC AGEN DETEC AMPLI PROBE: HCPCS | Performed by: INTERNAL MEDICINE

## 2021-12-20 RX ORDER — DOXYCYCLINE HYCLATE 100 MG/1
100 CAPSULE ORAL EVERY 12 HOURS SCHEDULED
Qty: 14 CAPSULE | Refills: 0 | Status: SHIPPED | OUTPATIENT
Start: 2021-12-20 | End: 2021-12-20

## 2021-12-20 RX ORDER — BUDESONIDE 180 UG/1
1 AEROSOL, POWDER RESPIRATORY (INHALATION) 2 TIMES DAILY
Qty: 1 EACH | Refills: 4 | Status: SHIPPED | OUTPATIENT
Start: 2021-12-20 | End: 2022-01-03 | Stop reason: SDUPTHER

## 2021-12-20 RX ORDER — PREDNISONE 10 MG/1
TABLET ORAL
Qty: 21 TABLET | Refills: 0 | Status: SHIPPED | OUTPATIENT
Start: 2021-12-20 | End: 2021-12-20

## 2021-12-20 RX ORDER — PREDNISONE 10 MG/1
TABLET ORAL
Qty: 21 TABLET | Refills: 0 | Status: SHIPPED | OUTPATIENT
Start: 2021-12-20

## 2021-12-20 RX ORDER — BUDESONIDE 180 UG/1
1 AEROSOL, POWDER RESPIRATORY (INHALATION) 2 TIMES DAILY
Qty: 1 EACH | Refills: 4 | Status: SHIPPED | OUTPATIENT
Start: 2021-12-20 | End: 2021-12-20

## 2021-12-20 RX ORDER — DOXYCYCLINE HYCLATE 100 MG/1
100 CAPSULE ORAL EVERY 12 HOURS SCHEDULED
Qty: 14 CAPSULE | Refills: 0 | Status: SHIPPED | OUTPATIENT
Start: 2021-12-20 | End: 2021-12-27

## 2021-12-21 ENCOUNTER — APPOINTMENT (OUTPATIENT)
Dept: LAB | Age: 86
End: 2021-12-21
Payer: COMMERCIAL

## 2021-12-21 ENCOUNTER — TELEPHONE (OUTPATIENT)
Dept: INTERNAL MEDICINE CLINIC | Facility: CLINIC | Age: 86
End: 2021-12-21

## 2021-12-21 ENCOUNTER — APPOINTMENT (OUTPATIENT)
Dept: RADIOLOGY | Age: 86
End: 2021-12-21
Payer: COMMERCIAL

## 2021-12-21 DIAGNOSIS — R06.02 SOB (SHORTNESS OF BREATH): ICD-10-CM

## 2021-12-21 LAB
ALBUMIN SERPL BCP-MCNC: 3.9 G/DL (ref 3.5–5)
ALP SERPL-CCNC: 65 U/L (ref 46–116)
ALT SERPL W P-5'-P-CCNC: 30 U/L (ref 12–78)
ANION GAP SERPL CALCULATED.3IONS-SCNC: 6 MMOL/L (ref 4–13)
AST SERPL W P-5'-P-CCNC: 34 U/L (ref 5–45)
BASOPHILS # BLD AUTO: 0.05 THOUSANDS/ΜL (ref 0–0.1)
BASOPHILS NFR BLD AUTO: 1 % (ref 0–1)
BILIRUB SERPL-MCNC: 0.57 MG/DL (ref 0.2–1)
BUN SERPL-MCNC: 47 MG/DL (ref 5–25)
CALCIUM SERPL-MCNC: 9.6 MG/DL (ref 8.3–10.1)
CHLORIDE SERPL-SCNC: 100 MMOL/L (ref 100–108)
CO2 SERPL-SCNC: 29 MMOL/L (ref 21–32)
CREAT SERPL-MCNC: 1.84 MG/DL (ref 0.6–1.3)
EOSINOPHIL # BLD AUTO: 0.43 THOUSAND/ΜL (ref 0–0.61)
EOSINOPHIL NFR BLD AUTO: 6 % (ref 0–6)
ERYTHROCYTE [DISTWIDTH] IN BLOOD BY AUTOMATED COUNT: 13.1 % (ref 11.6–15.1)
GFR SERPL CREATININE-BSD FRML MDRD: 23 ML/MIN/1.73SQ M
GLUCOSE SERPL-MCNC: 82 MG/DL (ref 65–140)
HCT VFR BLD AUTO: 45.9 % (ref 34.8–46.1)
HGB BLD-MCNC: 15.1 G/DL (ref 11.5–15.4)
IMM GRANULOCYTES # BLD AUTO: 0.01 THOUSAND/UL (ref 0–0.2)
IMM GRANULOCYTES NFR BLD AUTO: 0 % (ref 0–2)
INR PPP: 2.18 (ref 0.84–1.19)
LYMPHOCYTES # BLD AUTO: 2.64 THOUSANDS/ΜL (ref 0.6–4.47)
LYMPHOCYTES NFR BLD AUTO: 36 % (ref 14–44)
MCH RBC QN AUTO: 33.4 PG (ref 26.8–34.3)
MCHC RBC AUTO-ENTMCNC: 32.9 G/DL (ref 31.4–37.4)
MCV RBC AUTO: 102 FL (ref 82–98)
MONOCYTES # BLD AUTO: 0.51 THOUSAND/ΜL (ref 0.17–1.22)
MONOCYTES NFR BLD AUTO: 7 % (ref 4–12)
NEUTROPHILS # BLD AUTO: 3.71 THOUSANDS/ΜL (ref 1.85–7.62)
NEUTS SEG NFR BLD AUTO: 50 % (ref 43–75)
NRBC BLD AUTO-RTO: 0 /100 WBCS
NT-PROBNP SERPL-MCNC: 4328 PG/ML
PLATELET # BLD AUTO: 219 THOUSANDS/UL (ref 149–390)
PMV BLD AUTO: 11.5 FL (ref 8.9–12.7)
POTASSIUM SERPL-SCNC: 4.3 MMOL/L (ref 3.5–5.3)
PROT SERPL-MCNC: 7.8 G/DL (ref 6.4–8.2)
PROTHROMBIN TIME: 23.2 SECONDS (ref 11.6–14.5)
RBC # BLD AUTO: 4.52 MILLION/UL (ref 3.81–5.12)
SARS-COV-2 RNA RESP QL NAA+PROBE: NEGATIVE
SODIUM SERPL-SCNC: 135 MMOL/L (ref 136–145)
WBC # BLD AUTO: 7.35 THOUSAND/UL (ref 4.31–10.16)

## 2021-12-21 PROCEDURE — 83880 ASSAY OF NATRIURETIC PEPTIDE: CPT

## 2021-12-21 PROCEDURE — 36415 COLL VENOUS BLD VENIPUNCTURE: CPT

## 2021-12-21 PROCEDURE — 80053 COMPREHEN METABOLIC PANEL: CPT

## 2021-12-21 PROCEDURE — 71046 X-RAY EXAM CHEST 2 VIEWS: CPT

## 2021-12-21 PROCEDURE — 85610 PROTHROMBIN TIME: CPT

## 2021-12-21 PROCEDURE — 85025 COMPLETE CBC W/AUTO DIFF WBC: CPT

## 2021-12-21 RX ORDER — LEVOTHYROXINE SODIUM 0.05 MG/1
TABLET ORAL
Qty: 98 TABLET | Refills: 3 | Status: SHIPPED | OUTPATIENT
Start: 2021-12-21 | End: 2022-01-03 | Stop reason: SDUPTHER

## 2021-12-21 RX ORDER — SPIRONOLACTONE 50 MG/1
50 TABLET, FILM COATED ORAL DAILY
Qty: 90 TABLET | Refills: 3 | Status: SHIPPED | OUTPATIENT
Start: 2021-12-21 | End: 2022-01-05 | Stop reason: SDUPTHER

## 2021-12-21 RX ORDER — MONTELUKAST SODIUM 10 MG/1
10 TABLET ORAL DAILY
Qty: 90 TABLET | Refills: 3 | Status: SHIPPED | OUTPATIENT
Start: 2021-12-21 | End: 2022-01-03 | Stop reason: SDUPTHER

## 2021-12-21 RX ORDER — METOPROLOL SUCCINATE 25 MG/1
TABLET, EXTENDED RELEASE ORAL
Qty: 315 TABLET | Refills: 3 | Status: SHIPPED | OUTPATIENT
Start: 2021-12-21 | End: 2022-01-03 | Stop reason: SDUPTHER

## 2021-12-21 RX ORDER — DILTIAZEM HYDROCHLORIDE 300 MG/1
300 CAPSULE, COATED, EXTENDED RELEASE ORAL DAILY
Qty: 90 CAPSULE | Refills: 3 | Status: SHIPPED | OUTPATIENT
Start: 2021-12-21 | End: 2022-01-03 | Stop reason: SDUPTHER

## 2021-12-21 RX ORDER — WARFARIN SODIUM 5 MG/1
TABLET ORAL
Qty: 90 TABLET | Refills: 3 | Status: SHIPPED | OUTPATIENT
Start: 2021-12-21

## 2021-12-22 ENCOUNTER — ANTICOAG VISIT (OUTPATIENT)
Dept: CARDIOLOGY CLINIC | Facility: CLINIC | Age: 86
End: 2021-12-22

## 2021-12-23 ENCOUNTER — TELEPHONE (OUTPATIENT)
Dept: INTERNAL MEDICINE CLINIC | Facility: CLINIC | Age: 86
End: 2021-12-23

## 2022-01-03 DIAGNOSIS — E03.9 HYPOTHYROIDISM, UNSPECIFIED TYPE: Chronic | ICD-10-CM

## 2022-01-03 DIAGNOSIS — I50.32 CHRONIC DIASTOLIC CONGESTIVE HEART FAILURE (HCC): ICD-10-CM

## 2022-01-03 DIAGNOSIS — J45.20 MILD INTERMITTENT ASTHMA WITHOUT COMPLICATION: ICD-10-CM

## 2022-01-03 DIAGNOSIS — I48.20 CHRONIC ATRIAL FIBRILLATION (HCC): Chronic | ICD-10-CM

## 2022-01-03 RX ORDER — TORSEMIDE 20 MG/1
TABLET ORAL
Qty: 225 TABLET | Refills: 3 | Status: SHIPPED | OUTPATIENT
Start: 2022-01-03 | End: 2022-04-27 | Stop reason: SDUPTHER

## 2022-01-03 RX ORDER — METOPROLOL SUCCINATE 25 MG/1
TABLET, EXTENDED RELEASE ORAL
Qty: 315 TABLET | Refills: 3 | Status: SHIPPED | OUTPATIENT
Start: 2022-01-03 | End: 2022-04-27 | Stop reason: SDUPTHER

## 2022-01-03 RX ORDER — BUDESONIDE 180 UG/1
1 AEROSOL, POWDER RESPIRATORY (INHALATION) 2 TIMES DAILY
Qty: 1 EACH | Refills: 4 | Status: SHIPPED | OUTPATIENT
Start: 2022-01-03 | End: 2022-01-17 | Stop reason: CLARIF

## 2022-01-03 RX ORDER — MONTELUKAST SODIUM 10 MG/1
10 TABLET ORAL DAILY
Qty: 90 TABLET | Refills: 3 | Status: SHIPPED | OUTPATIENT
Start: 2022-01-03

## 2022-01-03 RX ORDER — DILTIAZEM HYDROCHLORIDE 300 MG/1
300 CAPSULE, COATED, EXTENDED RELEASE ORAL DAILY
Qty: 90 CAPSULE | Refills: 3 | Status: SHIPPED | OUTPATIENT
Start: 2022-01-03 | End: 2022-04-27

## 2022-01-03 RX ORDER — LEVOTHYROXINE SODIUM 0.05 MG/1
TABLET ORAL
Qty: 98 TABLET | Refills: 3 | Status: SHIPPED | OUTPATIENT
Start: 2022-01-03

## 2022-01-03 NOTE — TELEPHONE ENCOUNTER
SPOKE WITH PT, CONFIRMED THAT SHE DID CANCEL THE ORDER WITH [facility]    PLEASE SIGN OFF ON THESE SO THEY GO TO [facility]

## 2022-01-03 NOTE — TELEPHONE ENCOUNTER
LEFT MESSAGE FOR PT TO CALL BACK   NEED TO MAKE SURE SHE DID NOT GET THESE MEDICATIONS FROM [facility] AS THEY WERE JUST REFILLED 2 WEEKS AGO

## 2022-01-05 ENCOUNTER — OFFICE VISIT (OUTPATIENT)
Dept: CARDIOLOGY CLINIC | Facility: CLINIC | Age: 87
End: 2022-01-05
Payer: COMMERCIAL

## 2022-01-05 VITALS
BODY MASS INDEX: 19.49 KG/M2 | DIASTOLIC BLOOD PRESSURE: 60 MMHG | WEIGHT: 117 LBS | HEIGHT: 65 IN | SYSTOLIC BLOOD PRESSURE: 108 MMHG | HEART RATE: 72 BPM

## 2022-01-05 DIAGNOSIS — I50.32 CHRONIC DIASTOLIC CONGESTIVE HEART FAILURE (HCC): Chronic | ICD-10-CM

## 2022-01-05 DIAGNOSIS — I48.21 PERMANENT ATRIAL FIBRILLATION (HCC): Primary | Chronic | ICD-10-CM

## 2022-01-05 DIAGNOSIS — I45.2 BIFASCICULAR BUNDLE BRANCH BLOCK: Chronic | ICD-10-CM

## 2022-01-05 DIAGNOSIS — N18.30 STAGE 3 CHRONIC KIDNEY DISEASE, UNSPECIFIED WHETHER STAGE 3A OR 3B CKD (HCC): ICD-10-CM

## 2022-01-05 DIAGNOSIS — E78.2 MIXED HYPERLIPIDEMIA: Chronic | ICD-10-CM

## 2022-01-05 DIAGNOSIS — N18.4 CHRONIC RENAL DISEASE, STAGE IV (HCC): ICD-10-CM

## 2022-01-05 PROCEDURE — 99214 OFFICE O/P EST MOD 30 MIN: CPT | Performed by: INTERNAL MEDICINE

## 2022-01-05 RX ORDER — SPIRONOLACTONE 25 MG/1
25 TABLET ORAL DAILY
Qty: 90 TABLET | Refills: 3
Start: 2022-01-05 | End: 2022-01-24 | Stop reason: SDUPTHER

## 2022-01-05 NOTE — PROGRESS NOTES
[facility]  Follow up note  Do Evans 80 y o  female MRN: [id number]        Problems    1  Permanent atrial fibrillation ([facility] [address] )     2  Bifascicular bundle branch block     3  Chronic diastolic congestive heart failure ([facility] [address] )     4  Mixed hyperlipidemia         Impression:    [place] me for six-month follow-up     Chronic diastolic CHF   o  no significant LVH on prior echo in 2019, nor on most recent echo 10/15/2021  o Infiltrative workup negative by free light chain assessment/PYP scan  o  slight hepatojugular reflex and dyspnea on exertion prompted increasing spironolactone at last visit from 25 mg to 50 mg   o Within the last month she has become very tired, aches and pains, developed a creatinine rise from 1 25-1 84, and lost 7 lb  o While dyspnea has improved, she is probably over diuresed     Permanent atrial fibrillation  o  INR stable   o AFib well rate controlled     Bifascicular bundle branch block   o  no concerning symptoms of higher degree AV block     Mixed hyperlipidemia  o      No pharmacologic treatment at age 80    Plan:      significant hypovolemia/acute kidney injury likely related to her escalated diuretic regimen, although we only increased spironolactone from 25-50 mg daily, so the 7 lb weight loss seems a little bit out of proportion to the medication adjustment   None the less, stop all diuretics until Friday, reduce spironolactone on Friday back to 25 mg, and resume home torsemide 40 mg a m , 20 mg p m  dose   Labs to be obtained next week to reassess renal function     She will see me back in 2 weeks      HPI:   Do Evans is a 80y o  year old female  With chronic diastolic CHF, chronic atrial fibrillation on a rate control strategy and anticoagulation with warfarin, bifascicular bundle branch block, mixed hyperlipidemia, and a prior negative infiltrative cardiomyopathy workup returns for a follow-up visit and preoperative evaluation prior to foot surgery   blood pressure is a bit low   She is lightheaded, she feels unwell, she has aches and pains, generally some discomfort across her back and shoulder blades, she has acute kidney injury with a creatinine 1 84 after increasing spironolactone from 25 mg to 50 mg daily in conjunction with her baseline regimen torsemide 40 mg a m , 20 mg p m  Her dyspnea did improve however with a escalated diuretic regimen, she has no orthopnea  She has no edema  AFib rates remain well controlled, anticoagulation remains well tolerated  She is on warfarin      Review of Systems   Constitutional: Positive for fatigue  Negative for appetite change, diaphoresis and fever  Respiratory: Negative for chest tightness, shortness of breath and wheezing  Cardiovascular: Negative for chest pain, palpitations and leg swelling  Gastrointestinal: Negative for abdominal pain and blood in stool  Musculoskeletal: Positive for back pain  Negative for arthralgias and joint swelling  Skin: Negative for rash  Neurological: Positive for weakness and light-headedness  Negative for dizziness and syncope         Past Medical History:   Diagnosis Date    Abnormal ultrasound     RESOLVED: [id number]    Asthma with acute exacerbation     LAST ASSESSED: [id number]    Asymptomatic menopausal state     Atherosclerosis     Atrial fibrillation (HCC)     Chronic obstructive lung disease (HCC)     Congestive heart failure (CHF) (HCC)     Congestive heart failure (HCC)     LAST ASSESSED: [id number]    Cuboid fracture     LAST ASSESSED: [id number]    Dyspepsia     GERD (gastroesophageal reflux disease)     High risk medication use     LAST ASSESSED: [id number]    Hyperlipidemia     Hypertension     Hypertension     Hypokalemia     Hypothyroidism     Impacted cerumen of both ears     LAST ASSESSED: [id number]    Impacted cerumen of right ear     LAST ASSESSED: [id number]    Influenza     LAST ASSESSED: [id number]    IPMN (intraductal papillary mucinous neoplasm)     RESOLVED: [id number]    Limb swelling     LAST ASSESSED: [id number]    Navicular fracture of ankle     LAST ASSESSED: [id number]    Onychomycosis     LAST ASSESSED: 74LAQ3503    Osteoarthritis     Osteopenia     Osteoporosis     Paronychia, finger, unspecified laterality     LAST ASSESSED: 60AEQ3027    Paroxysmal atrial fibrillation (HCC)     LAST ASSESSED: [id number]    Peripheral vascular disease (HCC)     Plantar fasciitis     Talus fracture     LAST ASSESSED: [id number]    Vascular headache      Social History     Substance and Sexual Activity   Alcohol Use Yes    Comment: SOCIAL     Social History     Substance and Sexual Activity   Drug Use No     Social History     Tobacco Use   Smoking Status Never Smoker   Smokeless Tobacco Never Used       Allergies:   Allergies   Allergen Reactions    Asa [Aspirin]     Nsaids        Medications:     Current Outpatient Medications:     albuterol (VENTOLIN HFA) 90 mcg/act inhaler, Inhale, Disp: , Rfl:     Cholecalciferol (VITAMIN D3) 1000 units CAPS, Take by mouth daily, Disp: , Rfl:     diltiazem (Cardizem CD) 300 mg 24 hr capsule, Take 1 capsule (300 mg total) by mouth daily, Disp: 90 capsule, Rfl: 3    fluticasone (FLONASE) 50 mcg/act nasal spray, 2 sprays into each nostril daily, Disp: 3 Bottle, Rfl: 3    levothyroxine 50 mcg tablet, Take 1 tab daily expect 1 and 1/2 on sunday, Disp: 98 tablet, Rfl: 3    metoprolol succinate (TOPROL-XL) 25 mg 24 hr tablet, Take 2 tablets in the am and 1 and 1/2 in the evening daily, Disp: 315 tablet, Rfl: 3    montelukast (Singulair) 10 mg tablet, Take 1 tablet (10 mg total) by mouth daily, Disp: 90 tablet, Rfl: 3    Pulmicort Flexhaler 180 MCG/ACT inhaler, Inhale 1 puff 2 (two) times a day, Disp: 1 each, Rfl: 4    spironolactone (ALDACTONE) 50 mg tablet, Take 1 tablet (50 mg total) by mouth daily, Disp: 90 tablet, Rfl: 3    torsemide (DEMADEX) 20 mg tablet, 2 tab in the am and 1 in the pm, Disp: 225 tablet, Rfl: 3    warfarin (COUMADIN) 5 mg tablet, Take one tablet daily or as directed by MD, Disp: 90 tablet, Rfl: 3    acetaminophen-codeine (TYLENOL #3) 300-30 mg per tablet, TAKE ONE TABLET EVERY 6 HOURS AS NEEDED FOR SEVERE PAIN ONLY, Disp: , Rfl:     Calcium Carb-Cholecalciferol (CALCIUM 1000 + D) 1000-800 MG-UNIT TABS, Take by mouth, Disp: , Rfl:     cephalexin (KEFLEX) 500 mg capsule, Take 500 mg by mouth 2 (two) times a day   (Patient not taking: Reported on 11/9/2021 ), Disp: , Rfl:     fluocinolone (SYNALAR) 0 025 % ointment, APPLY TO AFFECTED AREA TWICE A DAY FOR 14 DAYS (Patient not taking: Reported on 1/5/2022), Disp: , Rfl:     potassium chloride (MICRO-K) 10 MEQ CR capsule, Take 1 capsule (10 mEq total) by mouth daily (Patient not taking: Reported on 11/9/2021 ), Disp: 180 capsule, Rfl: 3    predniSONE 10 mg tablet, 1 po bid for 1 week, then 1 po daily for 1 week, then stop, Disp: 21 tablet, Rfl: 0      Vitals:    01/05/22 1002   BP: 108/60   Pulse: 72     Weight (last 2 days)     Date/Time Weight    01/05/22 1002 53 1 (117)        Physical Exam  Constitutional:       General: She is not in acute distress  Appearance: She is not diaphoretic  HENT:      Head: Normocephalic and atraumatic  Eyes:      General: No scleral icterus  Conjunctiva/sclera: Conjunctivae normal    Neck:      Vascular: No JVD  Cardiovascular:      Rate and Rhythm: Normal rate  Rhythm irregular  Heart sounds: Normal heart sounds  No murmur heard  Pulmonary:      Effort: Pulmonary effort is normal  No respiratory distress  Breath sounds: Normal breath sounds  No wheezing or rales  Musculoskeletal:         General: No tenderness  Cervical back: Normal range of motion  Right lower leg: No edema  Left lower leg: No edema  Skin:     General: Skin is warm and dry           Laboratory Studies:    Lab studies personally reviewed    Cardiac testing:     EKG reviewed personally:   No results found for this visit on 01/05/22  Echocardiogram:    08/12- EF 82%, diastolic dysfunction, un graded due to AFib, normal RV size and function, moderate  Left and right atrial dilatation, mild MR, mild TR, mildly elevated pulmonary pressures  10/21-personally reviewed -EF 96%, diastolic dysfunction, left and right atrial dilation, mild-to-moderate TR, moderately elevated pulmonary pressures, dilated IVC       TTR SPECT   11/19-1 26 heart to lung ratio, equivocal to negative    Stress tests:      Catheterization:      Holter:      10/21-excellent AFib rate control, no significant pauses    Mason Ratliff MD    Portions of the record may have been created with voice recognition software  Occasional wrong word or "sound a like" substitutions may have occurred due to the inherent limitations of voice recognition software  Read the chart carefully and recognize, using context, where substitutions have occurred

## 2022-01-05 NOTE — PATIENT INSTRUCTIONS
I do think that your weight loss, and how you are feeling, probably has something to do with the spironolactone being increased to 50 milligrams daily, and her blood work in December definitely shows that we [facility] of quite a bit of fluid  That being said, this seems like a lot for just a double dose of spironolactone to be responsible for          Do not take anymore torsemide today   Do not take any more torsemide tomorrow/Thursday  Do not take spironolactone tomorrow   Please try to drink half a gallon of fluids today and tomorrow if possible   Your home weight should rise by about 3 or 4 pounds by Friday morning, and on Friday you can resume torsemide 40 milligrams a m , 20 milligrams p m , spironolactone 25 milligrams a m , and have labs done next week

## 2022-01-07 ENCOUNTER — ANTICOAG VISIT (OUTPATIENT)
Dept: CARDIOLOGY CLINIC | Facility: CLINIC | Age: 87
End: 2022-01-07

## 2022-01-07 LAB — INR PPP: 3.7 (ref 0.84–1.19)

## 2022-01-11 ENCOUNTER — ANTICOAG VISIT (OUTPATIENT)
Dept: CARDIOLOGY CLINIC | Facility: CLINIC | Age: 87
End: 2022-01-11

## 2022-01-11 LAB — INR PPP: 2.7 (ref 0.84–1.19)

## 2022-01-17 DIAGNOSIS — J44.9 CHRONIC OBSTRUCTIVE PULMONARY DISEASE, UNSPECIFIED COPD TYPE (HCC): Primary | ICD-10-CM

## 2022-01-17 RX ORDER — DEXAMETHASONE 4 MG/1
1 TABLET ORAL 2 TIMES DAILY
Qty: 36 G | Refills: 3 | Status: SHIPPED | OUTPATIENT
Start: 2022-01-17

## 2022-01-17 NOTE — TELEPHONE ENCOUNTER
PT'S INSURANCE WILL NO LONGER COVER THE PULMICORT INHALER AND SHE NEEDS TO BE SWITCHED    I SPOKE WITH PT, SHE'S AWARE   CAN YOU PLEASE UPDATE HER MEDICAL REGIMEN AND SIGN OFF ON THE NEW INHALER SO IT GOES TO HER MAIL ORDER PHARMACY

## 2022-01-24 ENCOUNTER — OFFICE VISIT (OUTPATIENT)
Dept: CARDIOLOGY CLINIC | Facility: CLINIC | Age: 87
End: 2022-01-24
Payer: COMMERCIAL

## 2022-01-24 VITALS
DIASTOLIC BLOOD PRESSURE: 74 MMHG | HEART RATE: 74 BPM | BODY MASS INDEX: 20.61 KG/M2 | HEIGHT: 65 IN | SYSTOLIC BLOOD PRESSURE: 108 MMHG | WEIGHT: 123.7 LBS

## 2022-01-24 DIAGNOSIS — I45.2 BIFASCICULAR BUNDLE BRANCH BLOCK: Chronic | ICD-10-CM

## 2022-01-24 DIAGNOSIS — E78.2 MIXED HYPERLIPIDEMIA: Chronic | ICD-10-CM

## 2022-01-24 DIAGNOSIS — I10 PRIMARY HYPERTENSION: Chronic | ICD-10-CM

## 2022-01-24 DIAGNOSIS — N18.30 STAGE 3 CHRONIC KIDNEY DISEASE, UNSPECIFIED WHETHER STAGE 3A OR 3B CKD (HCC): ICD-10-CM

## 2022-01-24 DIAGNOSIS — I50.32 CHRONIC DIASTOLIC CONGESTIVE HEART FAILURE (HCC): Chronic | ICD-10-CM

## 2022-01-24 DIAGNOSIS — I48.21 PERMANENT ATRIAL FIBRILLATION (HCC): Chronic | ICD-10-CM

## 2022-01-24 PROCEDURE — 99214 OFFICE O/P EST MOD 30 MIN: CPT | Performed by: INTERNAL MEDICINE

## 2022-01-24 PROCEDURE — 1160F RVW MEDS BY RX/DR IN RCRD: CPT | Performed by: INTERNAL MEDICINE

## 2022-01-24 PROCEDURE — 1036F TOBACCO NON-USER: CPT | Performed by: INTERNAL MEDICINE

## 2022-01-24 PROCEDURE — 93000 ELECTROCARDIOGRAM COMPLETE: CPT | Performed by: INTERNAL MEDICINE

## 2022-01-24 RX ORDER — SPIRONOLACTONE 50 MG/1
50 TABLET, FILM COATED ORAL DAILY
Qty: 90 TABLET | Refills: 3
Start: 2022-01-24 | End: 2022-04-05 | Stop reason: SDUPTHER

## 2022-01-24 NOTE — PATIENT INSTRUCTIONS
Please drink about 50-60 ounces of fluid a day   Please continue spironolactone 50 milligrams, torsemide 20 milligrams   Please get blood work in 2 weeks to ensure that we do not have the same rise in kidney numbers that we had back in December, here kidneys looked good as of the blood work last week, creatinine was 1 36, improved from a prior 1 84

## 2022-01-24 NOTE — PROGRESS NOTES
[facility]  Follow up note  Contreras Bowers 80 y o  female MRN: [id number]        Problems    1  Permanent atrial fibrillation (HCC)  POCT ECG   2  Bifascicular bundle branch block     3  Chronic diastolic congestive heart failure (HCC)  spironolactone (ALDACTONE) 50 mg tablet    Basic metabolic panel   4  Mixed hyperlipidemia     5  Primary hypertension     6  Stage 3 chronic kidney disease, unspecified whether stage 3a or 3b CKD (HCC)         Impression:     Chronic diastolic CHF   o  no significant LVH on prior echo in 2019, nor on most recent echo 10/15/2021  o Infiltrative workup negative by free light chain assessment/PYP scan  o Slight recurrence of leg edema on 25 mg a day of spironolactone, torsemide 40 mg a m , 20 mg p m    o Creatinine had previously become elevated at 1 84 on the to 50 mg spironolactone   o It appears she needs a higher dose diuretic to maintain euvolemia, I asked her to be judicious with fluid intake of at least 60 oz today to ensure she does not have dehydration on top of her diuresis     Permanent atrial fibrillation  o Well rate controlled, continues to be therapeutic on warfarin     Bifascicular bundle branch block   o Unchanged, no lightheadedness or other symptoms to suggest higher degree of heart block/bradyarrhythmia     Mixed hyperlipidemia  o      No pharmacologic treatment at age 80    Plan:     Spironolactone 50 mg a day, torsemide unchanged at 40 mg a m , 20 mg p m   Check BMP in 2 weeks to ensure she does not have recurrence of elevated creatinine as occurred back in December   Three-month follow-up      HPI:   Contreras Bowers is a 80y o  year old female  With chronic diastolic CHF, chronic atrial fibrillation on a rate control strategy and anticoagulation with warfarin, bifascicular bundle branch block, mixed hyperlipidemia, and a prior negative infiltrative cardiomyopathy workup returns for a follow-up visit      She had acute kidney injury a prior visit with a creatinine 1 84 in the setting of 50 mg of spironolactone, torsemide 40 mg a m , 20 mg p m  She did not feel well, she had lost about 9 lb, we held diuretics for a day, and then reduce the spironolactone to 25 mg a day   She has gained 6 lb back, 123 lb today, and has developed mild leg edema in response to those changes  Of her own accord, she put spironolactone back to 50 mg a day just a few days ago  On a good note, the previous reduction in diuretics did improve her creatinine to 1 36  She denies palpitations, AFib rates are controlled, INR levels remain therapeutic on warfarin  Review of Systems   Constitutional: Negative for appetite change, diaphoresis, fatigue and fever  Respiratory: Negative for chest tightness, shortness of breath and wheezing  Cardiovascular: Positive for leg swelling  Negative for chest pain and palpitations  Gastrointestinal: Negative for abdominal pain and blood in stool  Musculoskeletal: Negative for arthralgias and joint swelling  Skin: Negative for rash  Neurological: Negative for dizziness, syncope and light-headedness         Past Medical History:   Diagnosis Date    Abnormal ultrasound     RESOLVED: [id number]    Asthma with acute exacerbation     LAST ASSESSED: [id number]    Asymptomatic menopausal state     Atherosclerosis     Atrial fibrillation (HCC)     Chronic obstructive lung disease (HCC)     Congestive heart failure (CHF) (HCC)     Congestive heart failure (HCC)     LAST ASSESSED: [id number]    Cuboid fracture     LAST ASSESSED: [id number]    Dyspepsia     GERD (gastroesophageal reflux disease)     High risk medication use     LAST ASSESSED: [id number]    Hyperlipidemia     Hypertension     Hypertension     Hypokalemia     Hypothyroidism     Impacted cerumen of both ears     LAST ASSESSED: [id number]    Impacted cerumen of right ear     LAST ASSESSED: [id number]    Influenza     LAST ASSESSED: [id number]    IPMN (intraductal papillary mucinous neoplasm)     RESOLVED: [id number]    Limb swelling     LAST ASSESSED: [id number]    Navicular fracture of ankle     LAST ASSESSED: [id number]    Onychomycosis     LAST ASSESSED: 60ZVW1055    Osteoarthritis     Osteopenia     Osteoporosis     Paronychia, finger, unspecified laterality     LAST ASSESSED: [id number]    Paroxysmal atrial fibrillation (HCC)     LAST ASSESSED: [id number]    Peripheral vascular disease (HCC)     Plantar fasciitis     Talus fracture     LAST ASSESSED: [id number]    Vascular headache      Social History     Substance and Sexual Activity   Alcohol Use Yes    Comment: SOCIAL     Social History     Substance and Sexual Activity   Drug Use No     Social History     Tobacco Use   Smoking Status Never Smoker   Smokeless Tobacco Never Used       Allergies:   Allergies   Allergen Reactions    Asa [Aspirin]     Nsaids        Medications:     Current Outpatient Medications:     albuterol (VENTOLIN HFA) 90 mcg/act inhaler, Inhale, Disp: , Rfl:     Cholecalciferol (VITAMIN D3) 1000 units CAPS, Take by mouth daily, Disp: , Rfl:     diltiazem (Cardizem CD) 300 mg 24 hr capsule, Take 1 capsule (300 mg total) by mouth daily, Disp: 90 capsule, Rfl: 3    fluticasone (FLONASE) 50 mcg/act nasal spray, 2 sprays into each nostril daily, Disp: 3 Bottle, Rfl: 3    fluticasone (Flovent HFA) 110 MCG/ACT inhaler, Inhale 1 puff 2 (two) times a day Rinse mouth after use , Disp: 36 g, Rfl: 3    levothyroxine 50 mcg tablet, Take 1 tab daily expect 1 and 1/2 on sunday, Disp: 98 tablet, Rfl: 3    metoprolol succinate (TOPROL-XL) 25 mg 24 hr tablet, Take 2 tablets in the am and 1 and 1/2 in the evening daily, Disp: 315 tablet, Rfl: 3    montelukast (Singulair) 10 mg tablet, Take 1 tablet (10 mg total) by mouth daily, Disp: 90 tablet, Rfl: 3    torsemide (DEMADEX) 20 mg tablet, 2 tab in the am and 1 in the pm, Disp: 225 tablet, Rfl: 3    warfarin (COUMADIN) 5 mg tablet, Take one tablet daily or as directed by MD, Disp: 90 tablet, Rfl: 3    acetaminophen-codeine (TYLENOL #3) 300-30 mg per tablet, TAKE ONE TABLET EVERY 6 HOURS AS NEEDED FOR SEVERE PAIN ONLY (Patient not taking: Reported on 1/24/2022), Disp: , Rfl:     fluocinolone (SYNALAR) 0 025 % ointment, APPLY TO AFFECTED AREA TWICE A DAY FOR 14 DAYS (Patient not taking: Reported on 1/5/2022), Disp: , Rfl:     potassium chloride (MICRO-K) 10 MEQ CR capsule, Take 1 capsule (10 mEq total) by mouth daily (Patient not taking: Reported on 1/24/2022 ), Disp: 180 capsule, Rfl: 3    predniSONE 10 mg tablet, 1 po bid for 1 week, then 1 po daily for 1 week, then stop (Patient not taking: Reported on 1/24/2022 ), Disp: 21 tablet, Rfl: 0    spironolactone (ALDACTONE) 50 mg tablet, Take 1 tablet (50 mg total) by mouth daily, Disp: 90 tablet, Rfl: 3      Vitals:    01/24/22 1139   BP: 108/74   Pulse: 74     Weight (last 2 days)     Date/Time Weight    01/24/22 1139 56 1 (123 7)        Physical Exam  Constitutional:       General: She is not in acute distress  Appearance: She is not diaphoretic  HENT:      Head: Normocephalic and atraumatic  Eyes:      General: No scleral icterus  Conjunctiva/sclera: Conjunctivae normal    Neck:      Vascular: No JVD  Cardiovascular:      Rate and Rhythm: Normal rate  Rhythm irregular  Heart sounds: Normal heart sounds  No murmur heard  Pulmonary:      Effort: Pulmonary effort is normal  No respiratory distress  Breath sounds: Normal breath sounds  No wheezing or rales  Musculoskeletal:         General: No tenderness  Cervical back: Normal range of motion  Right lower leg: Edema present  Left lower leg: Edema present  Skin:     General: Skin is warm and dry           Laboratory Studies:    Laboratory studies personally reviewed     Cardiac testing:     EKG reviewed personally:   Results for orders placed or performed in visit on 01/24/22   POCT ECG    Impression    Atrial fibrillation, normal ventricular rate, right bundle branch block         Echocardiogram:    34/37- EF 87%, diastolic dysfunction, un graded due to AFib, normal RV size and function, moderate  Left and right atrial dilatation, mild MR, mild TR, mildly elevated pulmonary pressures  10/21-personally reviewed -EF 68%, diastolic dysfunction, left and right atrial dilation, mild-to-moderate TR, moderately elevated pulmonary pressures, dilated IVC       TTR SPECT   11/19-1 26 heart to lung ratio, equivocal to negative    Stress tests:      Catheterization:      Holter:      10/21-excellent AFib rate control, no significant pauses    Shauna Desir MD    Portions of the record may have been created with voice recognition software  Occasional wrong word or "sound a like" substitutions may have occurred due to the inherent limitations of voice recognition software  Read the chart carefully and recognize, using context, where substitutions have occurred

## 2022-02-04 ENCOUNTER — ANTICOAG VISIT (OUTPATIENT)
Dept: CARDIOLOGY CLINIC | Facility: CLINIC | Age: 87
End: 2022-02-04

## 2022-02-04 LAB — INR PPP: 2.6 (ref 0.84–1.19)

## 2022-03-04 ENCOUNTER — ANTICOAG VISIT (OUTPATIENT)
Dept: CARDIOLOGY CLINIC | Facility: CLINIC | Age: 87
End: 2022-03-04

## 2022-03-04 LAB — INR PPP: 2.7 (ref 0.84–1.19)

## 2022-04-01 ENCOUNTER — ANTICOAG VISIT (OUTPATIENT)
Dept: CARDIOLOGY CLINIC | Facility: CLINIC | Age: 87
End: 2022-04-01

## 2022-04-01 LAB — INR PPP: 2.5 (ref 0.84–1.19)

## 2022-04-05 DIAGNOSIS — I50.32 CHRONIC DIASTOLIC CONGESTIVE HEART FAILURE (HCC): Chronic | ICD-10-CM

## 2022-04-05 RX ORDER — SPIRONOLACTONE 50 MG/1
50 TABLET, FILM COATED ORAL DAILY
Qty: 90 TABLET | Refills: 3 | Status: SHIPPED | OUTPATIENT
Start: 2022-04-05

## 2022-04-22 ENCOUNTER — RA CDI HCC (OUTPATIENT)
Dept: OTHER | Facility: HOSPITAL | Age: 87
End: 2022-04-22

## 2022-04-22 NOTE — PROGRESS NOTES
[facility]  coding opportunities          Chart Reviewed number of suggestions sent to Provider: 1    [phone]     Patients Insurance     Medicare Insurance: [facility]

## 2022-04-27 ENCOUNTER — OFFICE VISIT (OUTPATIENT)
Dept: CARDIOLOGY CLINIC | Facility: CLINIC | Age: 87
End: 2022-04-27
Payer: COMMERCIAL

## 2022-04-27 VITALS
DIASTOLIC BLOOD PRESSURE: 58 MMHG | WEIGHT: 120.2 LBS | HEART RATE: 67 BPM | BODY MASS INDEX: 20.03 KG/M2 | SYSTOLIC BLOOD PRESSURE: 96 MMHG | HEIGHT: 65 IN

## 2022-04-27 DIAGNOSIS — J44.9 CHRONIC OBSTRUCTIVE PULMONARY DISEASE, UNSPECIFIED COPD TYPE (HCC): Chronic | ICD-10-CM

## 2022-04-27 DIAGNOSIS — N18.4 CHRONIC RENAL DISEASE, STAGE IV (HCC): ICD-10-CM

## 2022-04-27 DIAGNOSIS — I50.32 CHRONIC DIASTOLIC CONGESTIVE HEART FAILURE (HCC): Chronic | ICD-10-CM

## 2022-04-27 DIAGNOSIS — I48.21 PERMANENT ATRIAL FIBRILLATION (HCC): Primary | Chronic | ICD-10-CM

## 2022-04-27 DIAGNOSIS — E78.2 MIXED HYPERLIPIDEMIA: Chronic | ICD-10-CM

## 2022-04-27 DIAGNOSIS — I48.20 CHRONIC ATRIAL FIBRILLATION (HCC): Chronic | ICD-10-CM

## 2022-04-27 DIAGNOSIS — I45.2 BIFASCICULAR BUNDLE BRANCH BLOCK: Chronic | ICD-10-CM

## 2022-04-27 PROCEDURE — 93000 ELECTROCARDIOGRAM COMPLETE: CPT | Performed by: INTERNAL MEDICINE

## 2022-04-27 PROCEDURE — 99215 OFFICE O/P EST HI 40 MIN: CPT | Performed by: INTERNAL MEDICINE

## 2022-04-27 PROCEDURE — 1036F TOBACCO NON-USER: CPT | Performed by: INTERNAL MEDICINE

## 2022-04-27 PROCEDURE — 1160F RVW MEDS BY RX/DR IN RCRD: CPT | Performed by: INTERNAL MEDICINE

## 2022-04-27 RX ORDER — TORSEMIDE 20 MG/1
40 TABLET ORAL DAILY
Qty: 180 TABLET | Refills: 3
Start: 2022-04-27 | End: 2022-06-29 | Stop reason: SDUPTHER

## 2022-04-27 RX ORDER — TORSEMIDE 20 MG/1
40 TABLET ORAL DAILY
Qty: 180 TABLET | Refills: 3
Start: 2022-04-27 | End: 2022-04-27

## 2022-04-27 RX ORDER — METOPROLOL SUCCINATE 100 MG/1
100 TABLET, EXTENDED RELEASE ORAL DAILY
Qty: 90 TABLET | Refills: 3 | Status: SHIPPED | OUTPATIENT
Start: 2022-04-27 | End: 2022-06-29 | Stop reason: SDUPTHER

## 2022-04-27 NOTE — PROGRESS NOTES
[facility] Cardiology  Follow up note  Maye Gaines 80 y o  female MRN: [id number]        Problems    1  Permanent atrial fibrillation (HCC)  POCT ECG   2  Bifascicular bundle branch block     3  Chronic diastolic congestive heart failure (HCC)  torsemide (DEMADEX) 20 mg tablet    DISCONTINUED: torsemide (DEMADEX) 20 mg tablet   4  Mixed hyperlipidemia     5  Chronic obstructive pulmonary disease, unspecified COPD type ([facility] )     6  Chronic renal disease, stage IV ([facility] )     7  Chronic atrial fibrillation (HCC)  metoprolol succinate (TOPROL-XL) 100 mg 24 hr tablet    takes BB/CCB with adequate heart rate control and no side effects, rx refilled per pt request       Impression:    Chronic diastolic CHF    no significant LVH on prior echo in 2019, nor on most recent echo 10/15/2021  Infiltrative workup negative by free light chain assessment/PYP scan   to optimize euvolemia, she had progressive increase in her diuretic regimen to a current torsemide 40 mg a m , 20 mg p m , spironolactone 25 mg increased to 50 mg in a m , unfortunately her last to [facility] showed renal dysfunction with creatinine 1 84 and 1 9 from December and April, previously a baseline closer to 1 2-1 3  Permanent atrial fibrillation    Rate controlled, on diltiazem, metoprolol, warfarin   at 80years of age she is experiencing pill fatigue    Bifascicular bundle branch block    no symptoms to suggest higher degree of AV block    Mixed hyperlipidemia     Avoiding pharmacologic treatment considering age 80    Plan:     stop diltiazem, by reducing to 1 pill every other day for a week and then discontinuing altogether  Pill fatigue is occurring   Increase metoprolol to 100 mg daily   most recent creatinine 1 9, previously 1 8, prior to that 1 23, all in the setting of aggressive diuresis, will start by discontinuing her p m  torsemide 20 mg dose, continue spironolactone 50 mg a m , torsemide 40 mg a m  Roly Schmidt     2 week follow-up with me      HPI:   Jillian Edwards Allyssa Mehta is a 80y o  year old female  With chronic diastolic CHF, chronic atrial fibrillation on a rate control strategy and anticoagulation with warfarin, bifascicular bundle branch block, mixed hyperlipidemia, and a prior negative infiltrative cardiomyopathy workup returns for a follow-up visit  she has had fluctuating renal dysfunction, creatinine anywhere from 1 23/1 36 up to 1 84 most recently 1 9, slight reductions in diuretics at prompted 6 lb weight gain, current creatinine 1 9 is in the presence of torsemide 40 mg a m , spironolactone 50 mg a m , torsemide 20 mg p m  Thelda Fare She is experiencing pill fatigue   Her lipids are no longer treated with statin therapy at advanced age   She denies edema, orthopnea  AFib rates at 65 beats per minute today on diltiazem and metoprolol, continues on warfarin, has no bleeding she   complains of a dry mouth      Review of Systems   Constitutional: Negative for appetite change, diaphoresis, fatigue and fever  Respiratory: Negative for chest tightness, shortness of breath and wheezing  Cardiovascular: Negative for chest pain, palpitations and leg swelling  Gastrointestinal: Negative for abdominal pain and blood in stool  Musculoskeletal: Negative for arthralgias and joint swelling  Skin: Negative for rash  Neurological: Negative for dizziness, syncope and light-headedness  All other systems reviewed and are negative        Past Medical History:   Diagnosis Date    Abnormal ultrasound     RESOLVED: [id number]    Asthma with acute exacerbation     LAST ASSESSED: [id number]    Asymptomatic menopausal state     Atherosclerosis     Atrial fibrillation (HCC)     Chronic obstructive lung disease (HCC)     Congestive heart failure (CHF) (HCC)     Congestive heart failure (HCC)     LAST ASSESSED: [id number]    Cuboid fracture     LAST ASSESSED: [id number]    Dyspepsia     GERD (gastroesophageal reflux disease)     High risk medication use     LAST ASSESSED: [id number]    Hyperlipidemia     Hypertension     Hypertension     Hypokalemia     Hypothyroidism     Impacted cerumen of both ears     LAST ASSESSED: [id number]    Impacted cerumen of right ear     LAST ASSESSED: [id number]    Influenza     LAST ASSESSED: [id number]    IPMN (intraductal papillary mucinous neoplasm)     RESOLVED: [id number]    Limb swelling     LAST ASSESSED: [id number]    Navicular fracture of ankle     LAST ASSESSED: [id number]    Onychomycosis     LAST ASSESSED: [id number]    Osteoarthritis     Osteopenia     Osteoporosis     Paronychia, finger, unspecified laterality     LAST ASSESSED: [id number]    Paroxysmal atrial fibrillation (HCC)     LAST ASSESSED: [id number]    Peripheral vascular disease (HCC)     Plantar fasciitis     Talus fracture     LAST ASSESSED: [id number]    Vascular headache      Social History     Substance and Sexual Activity   Alcohol Use Yes    Comment: SOCIAL     Social History     Substance and Sexual Activity   Drug Use No     Social History     Tobacco Use   Smoking Status Never Smoker   Smokeless Tobacco Never Used       Allergies:   Allergies   Allergen Reactions    Asa [Aspirin]     Nsaids        Medications:     Current Outpatient Medications:     albuterol (VENTOLIN HFA) 90 mcg/act inhaler, Inhale, Disp: , Rfl:     Cholecalciferol (VITAMIN D3) 1000 units CAPS, Take by mouth daily, Disp: , Rfl:     fluticasone (FLONASE) 50 mcg/act nasal spray, 2 sprays into each nostril daily, Disp: 3 Bottle, Rfl: 3    fluticasone (Flovent HFA) 110 MCG/ACT inhaler, Inhale 1 puff 2 (two) times a day Rinse mouth after use , Disp: 36 g, Rfl: 3    levothyroxine 50 mcg tablet, Take 1 tab daily expect 1 and 1/2 on sunday, Disp: 98 tablet, Rfl: 3    metoprolol succinate (TOPROL-XL) 100 mg 24 hr tablet, Take 1 tablet (100 mg total) by mouth daily, Disp: 90 tablet, Rfl: 3    montelukast (Singulair) 10 mg tablet, Take 1 tablet (10 mg total) by mouth daily, Disp: 90 tablet, Rfl: 3    spironolactone (ALDACTONE) 50 mg tablet, Take 1 tablet (50 mg total) by mouth daily, Disp: 90 tablet, Rfl: 3    torsemide (DEMADEX) 20 mg tablet, Take 2 tablets (40 mg total) by mouth daily, Disp: 180 tablet, Rfl: 3    warfarin (COUMADIN) 5 mg tablet, Take one tablet daily or as directed by MD, Disp: 90 tablet, Rfl: 3    acetaminophen-codeine (TYLENOL #3) 300-30 mg per tablet, TAKE ONE TABLET EVERY 6 HOURS AS NEEDED FOR SEVERE PAIN ONLY (Patient not taking: Reported on 1/24/2022), Disp: , Rfl:     fluocinolone (SYNALAR) 0 025 % ointment, APPLY TO AFFECTED AREA TWICE A DAY FOR 14 DAYS (Patient not taking: Reported on 1/5/2022), Disp: , Rfl:     potassium chloride (MICRO-K) 10 MEQ CR capsule, Take 1 capsule (10 mEq total) by mouth daily (Patient not taking: Reported on 1/24/2022 ), Disp: 180 capsule, Rfl: 3    predniSONE 10 mg tablet, 1 po bid for 1 week, then 1 po daily for 1 week, then stop (Patient not taking: Reported on 4/27/2022 ), Disp: 21 tablet, Rfl: 0      Vitals:    04/27/22 1042   BP: 96/58   Pulse: 67     Weight (last 2 days)     Date/Time Weight    04/27/22 1042 54 5 (120 2)        Physical Exam  Constitutional:       General: She is not in acute distress  Appearance: She is well-developed  She is not diaphoretic  HENT:      Head: Normocephalic and atraumatic  Eyes:      General: No scleral icterus  Conjunctiva/sclera: Conjunctivae normal       Pupils: Pupils are equal, round, and reactive to light  Neck:      Thyroid: No thyromegaly  Vascular: No JVD  Trachea: No tracheal deviation  Cardiovascular:      Rate and Rhythm: Normal rate  Rhythm irregular  Heart sounds: Normal heart sounds  No murmur heard  No friction rub  No gallop  Pulmonary:      Effort: Pulmonary effort is normal  No respiratory distress  Breath sounds: Normal breath sounds  No wheezing or rales  Abdominal:      General: Bowel sounds are normal  There is no distension  Palpations: Abdomen is soft  Tenderness: There is no abdominal tenderness  Musculoskeletal:         General: No tenderness or deformity  Normal range of motion  Cervical back: Normal range of motion and neck supple  Right lower leg: No edema  Left lower leg: No edema  Skin:     General: Skin is warm and dry  Findings: No erythema or rash  Neurological:      Mental Status: She is alert and oriented to person, place, and time  Cranial Nerves: No cranial nerve deficit  Psychiatric:         Judgment: Judgment normal          Laboratory Studies:   laboratory studies personally reviewed     Cardiac testing:     EKG reviewed personally:   Results for orders placed or performed in visit on 04/27/22   POCT ECG    Impression     AFib, right bundle branch block, left anterior fascicular block  Echocardiogram:    24/56- EF 73%, diastolic dysfunction, un graded due to AFib, normal RV size and function, moderate  Left and right atrial dilatation, mild MR, mild TR, mildly elevated pulmonary pressures  10/21-personally reviewed -EF 58%, diastolic dysfunction, left and right atrial dilation, mild-to-moderate TR, moderately elevated pulmonary pressures, dilated IVC       TTR SPECT   11/19-1 26 heart to lung ratio, equivocal to negative    Stress tests:      Catheterization:      Holter:      10/21-excellent AFib rate control, no significant pauses    Raul Mock MD    Portions of the record may have been created with voice recognition software  Occasional wrong word or "sound a like" substitutions may have occurred due to the inherent limitations of voice recognition software  Read the chart carefully and recognize, using context, where substitutions have occurred

## 2022-04-27 NOTE — PATIENT INSTRUCTIONS
Please decrease the diltiazem to every other day for 1 week, and then stop it altogether   Please increase the metoprolol tomorrow to 100 mg daily, this will be 4 pills of the 25 mg, and I have sent in a new prescription for a single 100 mg tablet

## 2022-04-29 ENCOUNTER — TELEPHONE (OUTPATIENT)
Dept: INTERNAL MEDICINE CLINIC | Facility: CLINIC | Age: 87
End: 2022-04-29

## 2022-04-29 ENCOUNTER — OFFICE VISIT (OUTPATIENT)
Dept: INTERNAL MEDICINE CLINIC | Facility: CLINIC | Age: 87
End: 2022-04-29
Payer: COMMERCIAL

## 2022-04-29 VITALS
HEART RATE: 78 BPM | WEIGHT: 121.4 LBS | DIASTOLIC BLOOD PRESSURE: 70 MMHG | BODY MASS INDEX: 20.23 KG/M2 | HEIGHT: 65 IN | RESPIRATION RATE: 18 BRPM | SYSTOLIC BLOOD PRESSURE: 102 MMHG

## 2022-04-29 DIAGNOSIS — N18.30 STAGE 3 CHRONIC KIDNEY DISEASE, UNSPECIFIED WHETHER STAGE 3A OR 3B CKD (HCC): ICD-10-CM

## 2022-04-29 DIAGNOSIS — I48.21 PERMANENT ATRIAL FIBRILLATION (HCC): Chronic | ICD-10-CM

## 2022-04-29 DIAGNOSIS — E55.9 VITAMIN D DEFICIENCY: ICD-10-CM

## 2022-04-29 DIAGNOSIS — J44.9 CHRONIC OBSTRUCTIVE PULMONARY DISEASE, UNSPECIFIED COPD TYPE (HCC): Chronic | ICD-10-CM

## 2022-04-29 DIAGNOSIS — E03.9 HYPOTHYROIDISM, UNSPECIFIED TYPE: Primary | ICD-10-CM

## 2022-04-29 DIAGNOSIS — R76.8 ELEVATED SERUM IMMUNOGLOBULIN FREE LIGHT CHAIN LEVEL: ICD-10-CM

## 2022-04-29 DIAGNOSIS — R73.03 PREDIABETES: ICD-10-CM

## 2022-04-29 DIAGNOSIS — I10 PRIMARY HYPERTENSION: Chronic | ICD-10-CM

## 2022-04-29 DIAGNOSIS — I50.32 CHRONIC DIASTOLIC CONGESTIVE HEART FAILURE (HCC): Chronic | ICD-10-CM

## 2022-04-29 DIAGNOSIS — E03.9 HYPOTHYROIDISM, UNSPECIFIED TYPE: Chronic | ICD-10-CM

## 2022-04-29 DIAGNOSIS — N95.9 UNSPECIFIED MENOPAUSAL AND PERIMENOPAUSAL DISORDER: Primary | ICD-10-CM

## 2022-04-29 PROCEDURE — 99215 OFFICE O/P EST HI 40 MIN: CPT | Performed by: INTERNAL MEDICINE

## 2022-04-29 NOTE — TELEPHONE ENCOUNTER
----- Message from Didier Lou MD sent at 4/29/2022 11:07 AM EDT -----  She has slip for labs in 3 months-please add TSH and hemoglobin A1c with diagnosis of hypothyroidism and prediabetes-can still be nonfasting labs

## 2022-04-29 NOTE — TELEPHONE ENCOUNTER
BLOODWORK ORDERED AND THE SLIP WAS MAILED HOME WITH INSTRUCTIONS TO PUT IT WITH THE SLIP SHE ALREADY HAS FOR NEXT TIME

## 2022-04-29 NOTE — PROGRESS NOTES
Assessment/Plan:  1  Health maintenance-patient received 4 doses of COVID vaccine  2  Congestive heart nozbikr-eyobpitbz-elciccmyqd by atrial fibrillation-chronic-echo done in the fall 2021 shows grade 2 diastolic dysfunction, mild concentric hypertrophy, normal right ventricle, left atrium and right atrium mildly dilated, moderate TR with pulmonary artery pressure 52  She denies symptoms of sleep apnea  Prior nuclear medicine study for amyloid equivocal but cardiology feels that on review of echo she has low suggestion for infiltrate myopathy  Was on a regimen of torsemide 40 milligrams in a m  And 20 milligrams in the p m  And spironolactone 50 milligrams daily  When attempting to decrease diuretic dose she has increasing edema but then has increasing creatinine  On his current regimen recently her creatinine is 1 9 with BUN of 54-cardiology dropped her torsemide to 40 milligrams in a m  Only and cap spironolactone the same  They are seeing her again over the next 2 week  I wonder about introduction of low-dose Jardiance based on the literature and will send a note to Cardiology in reference to that  3  Atrial fibrillation-prior 24 hour Holter showed average heart rate in the 60s with maximum of 102  Cardiology is withdrawing Cardizem to simplify her medical regimen and has her on higher dose of metoprolol  They are seeing her again over the next 2 weeks  4  Chronic renal failure-stage III-felt related to diuretic therapy-as noted creatinine climbed to 1 9 on current regimen of torsemide 40, 20 and spironolactone 50 milligrams daily  Torsemide now decreased to 40 milligrams only in the a m     Will have to watch closely if we are introducing Jardiance  5  Shortness of breath-combination of her CHF and COPD  Was treated for potential infection in December with doxycycline and prednisone  Chest x-ray done then showed COPD but otherwise benign     6  Osteopenia-DEXA scan in May 2020 shows L-spine -2 1 and hip-2 1-needs repeat 2 years after last which is due in June 2022-vitamin-D level will be done prior to next visit  7  Asthma-COPD-stable on current regimen  Was using higher dose of Pulmicort and will review that next visit  8  Hypothyroidism-TSH stable on current dose of thyroid supplement-for repeat prior to next visit  9  Elevated serum for free light chains-serum in urine protein electrophoresis without a spike  Might be related to her chronic renal failure  Rule out other  Repeat labs ordered prior to next visit including SPEP immunoglobulin levels and repeat free light chain ratio  10  Bifascicular block-always watching for progression  11  History of dyspepsia  CT scan of abdomen benign -biopsy benign but she was H pylori positive that was treated  Not an issue times months  12  Hypertension -adequate control on current regimen  13 Left wrist pain -tendinitis -ortho treated her with conservative therapy with rest and bracing -she failed steroid injection  14   Pre diabetes-A1c  to beture  15   Tremor-likely benign essential tremor-monitor  16  Knee pain-had prior bilateral total knee replacement   1 point she was going back to orthopedic physician to check positioning of her prosthesis-review next visit  17   Left leg pain -felt to be trochanteric bursitis-responded to injection and improved  18   Vitamin-D deficiency-she will in resume her supplement of 2000 units daily  19  Plantar fasciitis -responded to physical therapy and exercise   20  Leg cramps- uses tonic water before bed   She knows to replace vitamin D   20   Abnormal area the pancreas and CT scan-full discussion as per noted February 221  Follow-up ultrasound unchanged   MRI without contrast -MRCP in 2016 showed pancreatic cystic lesion of the head, uncinate process and neck were unchanged suggesting side branch IPMN - no main pancreatic duct dilatation   Liver enzymes remained normal   Patient and her GI physician decided they would not do additional surveillance including MRI or endoscopic ultrasound  22Degenerative arthritis of the hands And low backmonitor  23Nasal polyps -remains on intranasal steroids  24Abnormal CT scan of the chest with multiple nodule-follow-up CT scan showed 1 nodule resolved another nodules unchanged in 1 area bronchiectasis -additional imaging not felt needed  25Thyroid nodules -noted on exam by ENT -aspirated benign  Thyroid function stable  Ultrasound stable additional studies not felt needed  25I colon polyps- most recent colonoscopy was in August 2015 -deferring additional colonoscopies because of age  26Abnormal radiographic study of the gyn organs with bilateral cystic ovarian masses noted on CT scan as well as thickening of the endometrium  Underwent surgery ventrally with laparoscopic bilateral less than 0, hysteroscopy and D&C -all pathology  27 history of hoarseness -ENT exam benign-not an issue times years  28History rash which she felt was reaction of prednisone  Has had issues in the past with prednisone  If steroid therapy needed this needs to be with steroid up the prednisone     Medical regimen spironolactone 50 milligrams daily, Coumadin with dose adjustments, generic Singulair 10 milligrams daily, levothyroxine 50 micrograms daily but 75 micrograms on Sunday, torsemide 40 milligrams every morning, levothyroxine 50 micrograms daily but 75 micrograms on Sunday, Claritin 10 milligrams daily as needed, calcium with vitamin-D daily, Ventolin inhaler as needed, metoprolol succinate 100 milligrams daily, Pulmicort use previously 180 micrograms per puff but was then switching to Flovent 110 micrograms 1 puffc b i d  Because of cost-REVIEW NEXT VISIT, multivitamin, vitamin D3/2000 units a day, fish oil 1200 milligrams a day, rare use of Vicodin 5-300 half tab b i d  P r n  For severe back pain    Scheduled with cardiology over the next 2 weeks    Await their opinion regarding use of Jardivenita    We reviewed that I will no longer be seeing patients in the office  Because of her severe CHF as well as her 's multiple issues I will see this patient and her  at home as home visit    Addendum-patient saw Cardiology on May the 6 who stated that she is stable with decreasing torsemide by the 20 milligram p m  pill, stopping Cardizem and increase metoprolol-she does not want to entertain use of Jardiance and will perform follow-up BMP in 2 weeks to assess for improvement in renal function on lower dose of diuretic    Uqnfuaxx-tqpifp-lb labs done in May 13th showed BUN 37 creatinine improved to 1 41-potassium 4 0-patient will continue current meds    Addendum- follow-up labs done in July showed BUN of 35 with creatinine 1 3 A1c is 6 0 CBC normal immunoglobulin levels normal -protein electrophoresis without a spike  Has before she has an elevated kappa free light chain with an elevated ratio TSH is borderline at 4 78 -all labs are overall very stable  No problem-specific Assessment & Plan notes found for this encounter  Diagnoses and all orders for this visit:    Unspecified menopausal and perimenopausal disorder  -     DXA bone density spine hip and pelvis; Future    Vitamin D deficiency  -     CBC and differential; Future  -     Comprehensive metabolic panel; Future  -     Protein electrophoresis, serum; Future  -     IgG, IgA, IgM; Future  -     Vitamin D 25 hydroxy; Future  -     Immunoglobulin free LT chains blood; Future    Elevated serum immunoglobulin free light chain level  -     CBC and differential; Future  -     Comprehensive metabolic panel; Future  -     Protein electrophoresis, serum; Future  -     IgG, IgA, IgM; Future  -     Vitamin D 25 hydroxy; Future  -     Immunoglobulin free LT chains blood;  Future    Chronic diastolic congestive heart failure (HCC)    Chronic obstructive pulmonary disease, unspecified COPD type ([facility] [address] )    Primary hypertension    Permanent atrial fibrillation ([facility] )    Hypothyroidism, unspecified type    Stage 3 chronic kidney disease, unspecified whether stage 3a or 3b CKD ([facility] )          Subjective:      Patient ID: Ras Vasquez is a 80 y o  female  We reviewed multiple issues  She has had her 4th dose of COVID vaccine  She saw cardiology in February and her creatinine did increase from 1 25 up to 1 84-she held her diuretics for couple days and decreased her dose of spironolactone from 50 down to 25 milligrams daily and continue torsemide 40/20    She gained back 6 pounds and had developed leg edema cardiology then increase her spironolactone back to 50 milligrams daily as her creatinine had improved to 1 36    She saw cardiology over the past week and on her labs her creatinine has now climbed to 1 9  Cardiology is concerned about the severity of the chronic renal failure-she was previously on torsemide 40 in the a m  And 20 in the evening and spironolactone 50 milligrams daily  He decreased her to 40 milligrams in the a m  Only  Cardiology is also withdrawing her Cardizem and increased dose of beta-blocker as she was attempting to decrease her meds  This has to be watched carefully with her history of COPD  She has some mild chronic cough aggravated by her allergies in the spring  She has a known history of asthma/COPD as before  We talked about the literature regarding use of Jardiance in diastolic heart failure and potential increase in life expectancy-I gave her copy of the recent medical letter in reference to this  She is scheduled to meet with cardiology over the next couple weeks and I told her I would send a note to her cardiologist in reference to this    She has a history of elevated free light chains which may be related to her chronic renal failure  I did order follow-up labs in reference to this  She has known atrial fibrillation  Rate control is adequate at present    As noted cardiology withdrew Cardizem and increased her dose of beta-blocker  This patient denies any systemic symptoms  Specifically there has been no evidence of fever, night sweats, significant weight loss or significant decrease in appetite  She has a history of osteopenia  She is due for follow-up bone density study and this was scheduled  Most recent DEXA scan reviewed in detail  DXA SCAN     CLINICAL HISTORY:  25-year-old woman  Menopause at age 52  OTHER RISK FACTORS:  None  PHARMACOLOGIC THERAPY FOR OSTEOPOROSIS:  None     TECHNIQUE: Bone densitometry was performed using a Hologic Horizon A  bone densitometer  Regions of interest appear properly placed       COMPARISON: 2/14/2017      RESULTS:      LUMBAR SPINE L1-L4 : BMD  0 820  gm/cm2 / T-score -2 1 / Z score 0 8         LEFT  TOTAL HIP: BMD 0 677  gm/cm2 / T-score -2 2 / Z score 0 2  LEFT  FEMORAL NECK: BMD 0 618  gm/cm2 / T score -2 1 / Z score 0 4     CHANGE: Since the last DXA:  LUMBAR SPINE BMD has INCREASED  0 019 gm/cm2 or 2 4%  This change IS statistically significant  HIP BMD has DECREASED  0 158 gm/cm2 or 19 0%  This change IS statistically significant         IMPRESSION:     1  Low bone mass (osteopenia)      2  Since a DXA study from 2/14/2017,  there has been a [address] in bone mineral density  in the left hip and a STATISTICALLY SIGNIFICANT INCREASE in BMD in the lumbar spine      3  The 10 year risk of hip fracture is 4 5% with the 10 year risk of major osteoporotic fracture being 14% as calculated by the [facility]/WHO fracture risk assessment tool (FRAX)     4  The current NOF guidelines recommend treating patients with a T-score of -2 5 or less in the lumbar spine or hips, or in post-menopausal women and men over the age of 48 with low bone mass (osteopenia) and a FRAX 10 year risk score of >3% for hip   fracture and/or >20% for major osteoporotic fracture      5    The NOF recommends follow-up DXA in 1-2 years after initiating therapy for osteoporosis and every 2 years thereafter  More frequent evaluation is appropriate for patients with conditions associated with rapid bone loss, such as glucocorticoid   therapy  The interval between DXA screenings may be longer for individuals without major risk factors and initial T-score in the normal or upper low bone mass range      The FRAX algorithm has certain limitations:  -FRAX has not been validated in patients currently or previously treated with pharmacotherapy for osteoporosis  In such patients, clinical judgment must be exercised in interpreting FRAX scores  -Prior hip, vertebral and humeral fragility fractures appear to confer greater risk of subsequent fracture than fractures at other sites (this is especially true for individuals with severe vertebral fractures), but quantification of this incremental   risk is not possible with FRAX  -FRAX underestimates fracture risk in patients with history of multiple fragility fractures  -FRAX may underestimate fracture risk in patients with history of frequent falls   -It is not appropriate to use FRAX to monitor treatment response         WHO CLASSIFICATION:  Normal (a T-score of -1 0 or higher)  Low bone mineral density (a T-score of less than -1 0 but higher than -2 5)  Osteoporosis (a T-score of -2 5 or less)  Severe osteoporosis (a T-score of -2 5 or less with a fragility fracture)     LEAST SIGNIFICANT CHANGE (AT 95% C  I):  Lumbar spine 0 014 g/cm2; 1 6%  Total hip: 0 020 g/cm2; 2 4%  Forearm: 0 013 g/cm2; 2 6%           This patient wanted to know their preferred analgesic agent  Because of their various comorbidities I recommended that this be acetaminophen  This patient has no history of chronic liver disease that would put them at greater risk for use of acetaminophen   This patient may use up to 500-650 mg of acetaminophen at a time and no more than 3 g a day total  Nonsteroidal anti-inflammatory agents have the potential to exacerbate hypertension, hypercoagulability, chronic renal failure, congestive heart failure, and various allergic tendencies  As before because of her comorbidities we wanted use acetaminophen  Cardiology notes reviewed in detail  Time spent preparing the chart for today's visit was 15 minutes  She says she has some shortness of breath with activity but it is not extreme  She has not had any recent leg edema  She feels as though if she walks at a shorter pace her dyspnea is not a major issue  She denies increasing orthopnea or paroxysmal nocturnal dyspnea  Most recent echo reviewed   Show images for Echo complete w/ contrast if indicated    Study Details    This transthoracic echocardiogram was performed in the echo lab  This was a routine, outpatient study  Study quality was adequate  A complete 2D, color flow Doppler and spectral Doppler transthoracic echocardiogram was performed  The apical, parasternal, subcostal and suprasternal views were obtained  Indications  Priority: Routine  Dx: Chronic diastolic congestive heart failure (HCC) (I50 32 (ICD-10-CM))    History    COPD,Pleural Effusion,CHF,Hypertension,Atherosclerosis    Interpretation Summary         Left Ventricle: Left ventricular cavity size is normal  Systolic function is normal  Wall motion is normal  Diastolic function is moderately abnormal, consistent with grade II (pseudonormal) relaxation  Wall thickness is mild to moderately increased  There is mild concentric hypertrophy    Right Ventricle: Systolic function is normal  Right ventricular cavity size is normal     Left Atrium: The atrium is mildly dilated    Right Atrium: The atrium is mildly dilated    Mitral Valve: There is mild regurgitation  There is mild annular calcification    Tricuspid Valve: There is moderate regurgitation    Pulmonic Valve: There is mild regurgitation    Pulmonary Artery: The estimated pulmonary artery systolic pressure is 38 0 mmHg   The pulmonary artery systolic pressure is moderately increased          Findings    Left Ventricle Left ventricular cavity size is normal  Wall thickness is mild to moderately increased  There is mild concentric hypertrophy  Systolic function is normal   Wall motion is normal  Diastolic function is moderately abnormal, consistent with grade II (pseudonormal) relaxation  Right Ventricle Right ventricular cavity size is normal  Systolic function is normal  Wall thickness is normal   Left Atrium The atrium is mildly dilated  Right Atrium The atrium is mildly dilated  Aortic Valve The aortic valve is trileaflet  The leaflets are mildly thickened  The leaflets are mildly calcified  The leaflets exhibit normal mobility  There is no evidence of regurgitation  There is no evidence of stenosis  Mitral Valve There is mild annular calcification  There is mild regurgitation  There is no evidence of stenosis  The valve has normal function  Tricuspid Valve Tricuspid valve structure is normal  There is moderate regurgitation  There is no evidence of stenosis  Pulmonic Valve Pulmonic valve structure is normal  There is mild regurgitation  There is no evidence of stenosis  Ascending Aorta The aortic root is normal in size  IVC/SVC The inferior vena cava is dilated  Respirophasic changes were blunted (less than 50% variation)  Pericardium There is no pericardial effusion  The pericardium is normal in appearance  Pulmonary Artery The estimated pulmonary artery systolic pressure is 93 5 mmHg  The pulmonary artery systolic pressure is moderately increased      Left Ventricle Measurements    Function/Volumes  A4C EF   53 %    EF   57 %    Dimensions  LVIDd   3 9 cm    LVIDS   2 6 cm    IVSd   0 9 cm    LVPWd   1 cm    FS   33 %    Diastolic Filling  MV E' Tissue Velocity Septal   6 cm/s    E wave deceleration time   161 ms    MV Peak E Hank   115 cm/s    MV Peak A Hank   0 31 m/s         Right Ventricle Measurements    Dimensions  RVID d   2 8 cm    TV S'   1 2 cm/s         Mitral Valve Measurements    Stenosis  MV stenosis pressure 1/2 time   0 ms         Tricuspid Valve Measurements    RVSP Parameters  TR Peak Hank   3 2 m/s    Stenosis  TV peak gradient   41 mmHg         Aorta Measurements    Aortic Dimensions  Ao root   2 9 cm         Pulmonary Artery Measurements    PA Pressure  PASP   52 mmHg         Exam Details    Performed Procedure Technologist Supporting Staff Performing Physician  Echo janes Marian Jair         Appointment Date/Status Modality Department   10/15/2021     Completed BE ECHO RM 2 BE CAR NON INV        Begin Exam End Exam  End Exam Questionnaires  10/15/2021  8:01 AM 10/15/2021  8:44 AM  PATIENT EDUCATION                 The following portions of the patient's history were reviewed and updated as appropriate: allergies, current medications, past family history, past medical history, past social history, past surgical history and problem list     Review of Systems   Constitutional: Positive for fatigue  HENT: Negative  Respiratory: Positive for shortness of breath  Cardiovascular: Negative  Gastrointestinal: Negative  Endocrine: Negative  Genitourinary: Negative  Musculoskeletal: Negative  Skin: Negative  Neurological: Negative  Hematological: Negative  Psychiatric/Behavioral: Negative  Objective:      Ht 5' 5" (1 651 m)   Wt 55 1 kg (121 lb 6 4 oz)   BMI 20 20 kg/m²          Physical Exam  Vitals reviewed  Constitutional:       General: She is not in acute distress  Appearance: Normal appearance  She is not ill-appearing, toxic-appearing or diaphoretic  HENT:      Head: Normocephalic and atraumatic  Right Ear: External ear normal       Left Ear: External ear normal       Nose: Nose normal  No congestion or rhinorrhea  Mouth/Throat:      Mouth: Mucous membranes are moist       Pharynx: Oropharynx is clear   No oropharyngeal exudate or posterior oropharyngeal erythema  Eyes:      General: No scleral icterus  Right eye: No discharge  Left eye: No discharge  Extraocular Movements: Extraocular movements intact  Conjunctiva/sclera: Conjunctivae normal    Neck:      Vascular: No carotid bruit  Cardiovascular:      Rate and Rhythm: Normal rate  Pulses: Normal pulses  Heart sounds: Normal heart sounds  No murmur heard  No friction rub  No gallop  Comments: Cardiomegaly to percussion  Irregular irregular rhythm  Increase in P2  Pulmonary:      Effort: Pulmonary effort is normal  No respiratory distress  Breath sounds: Normal breath sounds  No stridor  No wheezing, rhonchi or rales  Chest:      Chest wall: No tenderness  Abdominal:      General: Abdomen is flat  Bowel sounds are normal  There is no distension  Palpations: Abdomen is soft  There is no mass  Tenderness: There is no abdominal tenderness  There is no right CVA tenderness, left CVA tenderness, guarding or rebound  Hernia: No hernia is present  Musculoskeletal:         General: No swelling, tenderness, deformity or signs of injury  Normal range of motion  Cervical back: Normal range of motion and neck supple  No rigidity  No muscular tenderness  Right lower leg: No edema  Left lower leg: No edema  Lymphadenopathy:      Cervical: No cervical adenopathy  Skin:     General: Skin is warm and dry  Coloration: Skin is not jaundiced or pale  Findings: No bruising, erythema, lesion or rash  Neurological:      General: No focal deficit present  Mental Status: She is alert and oriented to person, place, and time  Mental status is at baseline  Cranial Nerves: No cranial nerve deficit  Sensory: No sensory deficit  Motor: No weakness        Coordination: Coordination normal       Gait: Gait normal       Deep Tendon Reflexes: Reflexes normal    Psychiatric: Mood and Affect: Mood normal          Behavior: Behavior normal          Thought Content:  Thought content normal          Judgment: Judgment normal

## 2022-05-03 ENCOUNTER — ANTICOAG VISIT (OUTPATIENT)
Dept: CARDIOLOGY CLINIC | Facility: CLINIC | Age: 87
End: 2022-05-03

## 2022-05-03 LAB — INR PPP: 2.6 (ref 0.84–1.19)

## 2022-05-06 ENCOUNTER — OFFICE VISIT (OUTPATIENT)
Dept: CARDIOLOGY CLINIC | Facility: CLINIC | Age: 87
End: 2022-05-06
Payer: COMMERCIAL

## 2022-05-06 VITALS
WEIGHT: 120.6 LBS | DIASTOLIC BLOOD PRESSURE: 60 MMHG | HEIGHT: 65 IN | SYSTOLIC BLOOD PRESSURE: 102 MMHG | HEART RATE: 89 BPM | BODY MASS INDEX: 20.09 KG/M2 | OXYGEN SATURATION: 99 %

## 2022-05-06 DIAGNOSIS — I50.32 CHRONIC DIASTOLIC CONGESTIVE HEART FAILURE (HCC): Chronic | ICD-10-CM

## 2022-05-06 DIAGNOSIS — I48.21 PERMANENT ATRIAL FIBRILLATION (HCC): Primary | Chronic | ICD-10-CM

## 2022-05-06 DIAGNOSIS — N18.30 STAGE 3 CHRONIC KIDNEY DISEASE, UNSPECIFIED WHETHER STAGE 3A OR 3B CKD (HCC): ICD-10-CM

## 2022-05-06 PROCEDURE — 1036F TOBACCO NON-USER: CPT | Performed by: INTERNAL MEDICINE

## 2022-05-06 PROCEDURE — 1160F RVW MEDS BY RX/DR IN RCRD: CPT | Performed by: INTERNAL MEDICINE

## 2022-05-06 PROCEDURE — 99214 OFFICE O/P EST MOD 30 MIN: CPT | Performed by: INTERNAL MEDICINE

## 2022-05-06 NOTE — PROGRESS NOTES
[facility]  Follow up note  Akila Masterson 80 y o  female MRN: [id number]        Problems    1  Permanent atrial fibrillation ([address] )     2  Chronic diastolic congestive heart failure (HCC)  Basic metabolic panel   3  Stage 3 chronic kidney disease, unspecified whether stage 3a or 3b CKD (HCC)         Impression:    Chronic diastolic CHF    no significant LVH on prior echo in 2019, nor on most recent echo 10/15/2021  Infiltrative workup negative by free light chain assessment/PYP scan   pill fatigued and recent increased creatinine prompted discontinuation of p m  torsemide 20 mg  Had a discussion with her PCP to consider [place], she would like to hold off for now    Permanent atrial fibrillation   pill fatigue and excellent AFib rate control at 80years of age prompted discontinuation of diltiazem, slight up titration of metoprolol, AFib rates remain well controlled, she is happy with decreased pills, remains on anticoagulation appropriately    Bifascicular bundle branch block      No Symptoms to suggest higher degree AV block    Mixed hyperlipidemia   avoiding pharmacologic treatment at age 80    Plan:     will not make any further med changes, reducing torsemide by discontinuing the 20 mg p m  pill, discontinuing diltiazem, and slight increase metoprolol to improve overall pill burden has been met with clinical stability, continued AFib rate control, no CHF decompensation, she does not want to entertain Jardiance at this time, and will perform a follow-up basic metabolic panel in a couple weeks to  Assess for any improvement in her renal function with less diuretic      HPI:   Akila Masterson is a 80y o  year old female  With chronic diastolic CHF, chronic atrial fibrillation on a rate control strategy and anticoagulation with warfarin, bifascicular bundle branch block, mixed hyperlipidemia, and a prior negative infiltrative cardiomyopathy workup returns for a follow-up visit       recent creatinine up to 1 84 and subsequently 1 9 prompted discontinuation of her p m  torsemide 20 mg   Pill fatigue had us discontinue diltiazem, up titrate metoprolol slightly without adding any further pills, she was happy with the change, and has not developed any weight gain worsening edema, and AFib rates remain well controlled  Review of Systems   Constitutional: Negative for appetite change, diaphoresis, fatigue and fever  Respiratory: Negative for chest tightness, shortness of breath and wheezing  Cardiovascular: Negative for chest pain, palpitations and leg swelling  Gastrointestinal: Negative for abdominal pain and blood in stool  Musculoskeletal: Negative for arthralgias and joint swelling  Skin: Negative for rash  Neurological: Negative for dizziness, syncope and light-headedness         Past Medical History:   Diagnosis Date    Abnormal ultrasound     RESOLVED: [id number]    Asthma with acute exacerbation     LAST ASSESSED: [id number]    Asymptomatic menopausal state     Atherosclerosis     Atrial fibrillation (HCC)     Chronic obstructive lung disease (HCC)     Congestive heart failure (CHF) (HCC)     Congestive heart failure (HCC)     LAST ASSESSED: [id number]    Cuboid fracture     LAST ASSESSED: [id number]    Dyspepsia     GERD (gastroesophageal reflux disease)     High risk medication use     LAST ASSESSED: [id number]    Hyperlipidemia     Hypertension     Hypertension     Hypokalemia     Hypothyroidism     Impacted cerumen of both ears     LAST ASSESSED: [id number]    Impacted cerumen of right ear     LAST ASSESSED: [id number]    Influenza     LAST ASSESSED: [id number]    IPMN (intraductal papillary mucinous neoplasm)     RESOLVED: [id number]    Limb swelling     LAST ASSESSED: [id number]    Navicular fracture of ankle     LAST ASSESSED: 52KAL7512    Onychomycosis     LAST ASSESSED: [id number]    Osteoarthritis     Osteopenia     Osteoporosis     Paronychia, finger, unspecified laterality     LAST ASSESSED: [id number]    Paroxysmal atrial fibrillation (HCC)     LAST ASSESSED: [id number]    Peripheral vascular disease (HCC)     Plantar fasciitis     Talus fracture     LAST ASSESSED: [id number]    Vascular headache      Social History     Substance and Sexual Activity   Alcohol Use Yes    Comment: SOCIAL     Social History     Substance and Sexual Activity   Drug Use No     Social History     Tobacco Use   Smoking Status Never Smoker   Smokeless Tobacco Never Used       Allergies:   Allergies   Allergen Reactions    Asa [Aspirin]     Nsaids        Medications:     Current Outpatient Medications:     albuterol (VENTOLIN HFA) 90 mcg/act inhaler, Inhale, Disp: , Rfl:     Cholecalciferol (VITAMIN D3) 1000 units CAPS, Take by mouth daily, Disp: , Rfl:     fluticasone (FLONASE) 50 mcg/act nasal spray, 2 sprays into each nostril daily, Disp: 3 Bottle, Rfl: 3    fluticasone (Flovent HFA) 110 MCG/ACT inhaler, Inhale 1 puff 2 (two) times a day Rinse mouth after use , Disp: 36 g, Rfl: 3    levothyroxine 50 mcg tablet, Take 1 tab daily expect 1 and 1/2 on sunday, Disp: 98 tablet, Rfl: 3    metoprolol succinate (TOPROL-XL) 100 mg 24 hr tablet, Take 1 tablet (100 mg total) by mouth daily, Disp: 90 tablet, Rfl: 3    montelukast (Singulair) 10 mg tablet, Take 1 tablet (10 mg total) by mouth daily, Disp: 90 tablet, Rfl: 3    spironolactone (ALDACTONE) 50 mg tablet, Take 1 tablet (50 mg total) by mouth daily, Disp: 90 tablet, Rfl: 3    torsemide (DEMADEX) 20 mg tablet, Take 2 tablets (40 mg total) by mouth daily, Disp: 180 tablet, Rfl: 3    warfarin (COUMADIN) 5 mg tablet, Take one tablet daily or as directed by MD, Disp: 90 tablet, Rfl: 3    potassium chloride (MICRO-K) 10 MEQ CR capsule, Take 1 capsule (10 mEq total) by mouth daily (Patient not taking: Reported on 1/24/2022 ), Disp: 180 capsule, Rfl: 3    predniSONE 10 mg tablet, 1 po bid for 1 week, then 1 po daily for 1 week, then stop (Patient not taking: Reported on 4/27/2022 ), Disp: 21 tablet, Rfl: 0      Vitals:    05/06/22 0908   BP: 102/60   Pulse: 89   SpO2: 99%     Weight (last 2 days)     Date/Time Weight    05/06/22 0908 54 7 (120 6)        Physical Exam  Constitutional:       General: She is not in acute distress  Appearance: She is not diaphoretic  HENT:      Head: Normocephalic and atraumatic  Eyes:      General: No scleral icterus  Conjunctiva/sclera: Conjunctivae normal    Neck:      Vascular: No JVD  Cardiovascular:      Rate and Rhythm: Normal rate  Rhythm irregular  Heart sounds: Normal heart sounds  No murmur heard  Pulmonary:      Effort: Pulmonary effort is normal  No respiratory distress  Breath sounds: Normal breath sounds  No wheezing or rales  Musculoskeletal:         General: No tenderness  Cervical back: Normal range of motion  Right lower leg: No edema  Left lower leg: No edema  Skin:     General: Skin is warm and dry  Laboratory Studies:    Lab studies personally reviewed     Cardiac testing:     EKG reviewed personally:   No results found for this visit on 05/06/22  Echocardiogram:    88/64- EF 06%, diastolic dysfunction, un graded due to AFib, normal RV size and function, moderate  Left and right atrial dilatation, mild MR, mild TR, mildly elevated pulmonary pressures  10/21-personally reviewed -EF 88%, diastolic dysfunction, left and right atrial dilation, mild-to-moderate TR, moderately elevated pulmonary pressures, dilated IVC       TTR SPECT   11/19-1 26 heart to lung ratio, equivocal to negative    Stress tests:      Catheterization:      Holter:      10/21-excellent AFib rate control, no significant pauses    Lilly Newell MD    Portions of the record may have been created with voice recognition software  Occasional wrong word or "sound a like" substitutions may have occurred due to the inherent limitations of voice recognition software  Read the chart carefully and recognize, using context, where substitutions have occurred

## 2022-05-19 ENCOUNTER — TELEPHONE (OUTPATIENT)
Dept: CARDIOLOGY CLINIC | Facility: CLINIC | Age: 87
End: 2022-05-19

## 2022-05-19 NOTE — TELEPHONE ENCOUNTER
----- Message from Reginaldo Rice MD sent at 5/19/2022  3:32 PM EDT -----  Please let her know that her kidney function has improved further after the medication adjustments, kidney function previously 1 8-1 9, now down to 1 4 which is improved, showing better kidney filtration

## 2022-05-31 ENCOUNTER — ANTICOAG VISIT (OUTPATIENT)
Dept: CARDIOLOGY CLINIC | Facility: CLINIC | Age: 87
End: 2022-05-31

## 2022-05-31 DIAGNOSIS — I48.11 LONGSTANDING PERSISTENT ATRIAL FIBRILLATION (HCC): Primary | ICD-10-CM

## 2022-05-31 LAB — INR PPP: 2.1 (ref 0.84–1.19)

## 2022-06-27 ENCOUNTER — OFFICE VISIT (OUTPATIENT)
Dept: OBGYN CLINIC | Facility: CLINIC | Age: 87
End: 2022-06-27
Payer: COMMERCIAL

## 2022-06-27 VITALS
BODY MASS INDEX: 20.66 KG/M2 | WEIGHT: 121 LBS | SYSTOLIC BLOOD PRESSURE: 82 MMHG | DIASTOLIC BLOOD PRESSURE: 62 MMHG | HEIGHT: 64 IN

## 2022-06-27 DIAGNOSIS — N76.4 LABIAL ABSCESS: Primary | ICD-10-CM

## 2022-06-27 PROCEDURE — 99213 OFFICE O/P EST LOW 20 MIN: CPT | Performed by: OBSTETRICS & GYNECOLOGY

## 2022-06-27 PROCEDURE — 10060 I&D ABSCESS SIMPLE/SINGLE: CPT | Performed by: OBSTETRICS & GYNECOLOGY

## 2022-06-27 RX ORDER — SULFAMETHOXAZOLE AND TRIMETHOPRIM 800; 160 MG/1; MG/1
1 TABLET ORAL EVERY 12 HOURS SCHEDULED
Qty: 14 TABLET | Refills: 0 | Status: SHIPPED | OUTPATIENT
Start: 2022-06-27 | End: 2022-07-04

## 2022-06-27 NOTE — PROGRESS NOTES
Assessment/Plan:    Discussed with Simon William the finding of an abscess  Further I&D performed to ensure no loculations  Will start antibiotics due to concern for overlying cellulitis/infection  She will follow up in the office later this week for recheck  Packing placed to allow would to continue to drain  Labial abscess  -     sulfamethoxazole-trimethoprim (BACTRIM DS) 800-160 mg per tablet; Take 1 tablet by mouth every 12 (twelve) hours for 7 days  -     Incision and drain        Subjective:      Patient ID: Elsi Ortega is a 80 y o  female  Simon William presents for a problem visit  Last Tuesday noticed a vulvar lump that progressively got more uncomfortable and painful  She used warm compresses on it  It started draining last night - she notes drainage is bloody/brown  She has history of sebaceous cysts, and this feels nothing like that  The following portions of the patient's history were reviewed and updated as appropriate: allergies, current medications and problem list     Review of Systems   Genitourinary: Positive for genital sores  Objective:      BP (!) 82/62 (BP Location: Left arm, Patient Position: Sitting, Cuff Size: Standard)   Ht 5' 4" (1 626 m)   Wt 54 9 kg (121 lb)   BMI 20 77 kg/m²          Physical Exam  Vitals reviewed  Constitutional:       Appearance: Normal appearance  Genitourinary:      Neurological:      Mental Status: She is alert  Incision and drain    Date/Time: 6/27/2022 8:25 AM  Performed by: Berna Sung MD  Authorized by: Berna Sung MD   Universal Protocol:  Consent: Verbal consent obtained    Risks and benefits: risks, benefits and alternatives were discussed  Consent given by: patient  Patient understanding: patient states understanding of the procedure being performed  Patient identity confirmed: verbally with patient      Patient location:  Bedside  Location:     Type:  Abscess    Location:  Anogenital    Anogenital location: right labial   Pre-procedure details:     Skin preparation:  Betadine  Anesthesia (see MAR for exact dosages): Anesthesia method:  Local infiltration    Local anesthetic:  Lidocaine 1% WITH epi  Procedure details:     Complexity:  Simple    Incision types:  Stab incision    Incision depth:  Subcutaneous    Wound management:  Probed and deloculated    Drainage:  Purulent and bloody    Wound treatment:  Packing placed    Packing materials:  1/4 in gauze  Post-procedure details:     Patient tolerance of procedure:   Tolerated well, no immediate complications

## 2022-06-27 NOTE — PROGRESS NOTES
Patient's Lab: [facility]    OVS-Vaginal Lump    When did you first notice it: 6/21 and ever since 6/22 it's been bothering her  Is it painful: Yes  Symptoms: Painful, red, swollen  Pt   Just reports using warm compresses

## 2022-06-29 DIAGNOSIS — I48.20 CHRONIC ATRIAL FIBRILLATION (HCC): Chronic | ICD-10-CM

## 2022-06-29 DIAGNOSIS — I50.32 CHRONIC DIASTOLIC CONGESTIVE HEART FAILURE (HCC): Chronic | ICD-10-CM

## 2022-06-29 PROBLEM — N90.7 VULVAR CYSTS: Status: ACTIVE | Noted: 2022-06-29

## 2022-06-29 PROBLEM — N76.4 LABIAL ABSCESS: Status: ACTIVE | Noted: 2022-06-29

## 2022-06-29 NOTE — PROGRESS NOTES
Assessment/Plan:  Improving labial abscess  Continue Bactrim  Begin warm soaks b i d  Return to the office in 1 week  Notify Dr Freddy Anand of Bactrim use, has INR tomorrow       Diagnoses and all orders for this visit:    Labial abscess    Vulvar cysts              Subjective:        Patient ID: Sara Garrison is a 80 y o  female  Mrs Monico Taylor presents today for a follow-up status post I and D of a right vulvar abscess 3 days ago  Since then she has noted decreased pain and drainage  She can no longer see the packing  She denies any fever or chills  She did have a fever 1 week ago  She read on the package insert that Bactrim could interfere with warfarin  We discussed this  She does have an INR tomorrow  I suggested she reach out to Dr Freddy Anand so he is aware that she is on Bactrim  The following portions of the patient's history were reviewed and updated as appropriate: She  has a past medical history of Abnormal ultrasound, Asthma with acute exacerbation, Asymptomatic menopausal state, Atherosclerosis, Atrial fibrillation ([address] ), Chronic obstructive lung disease ([address] ), Congestive heart failure (CHF) ([address] ), Congestive heart failure ([address] ), Cuboid fracture, Dyspepsia, GERD (gastroesophageal reflux disease), High risk medication use, Hyperlipidemia, Hypertension, Hypertension, Hypokalemia, Hypothyroidism, Impacted cerumen of both ears, Impacted cerumen of right ear, Influenza, IPMN (intraductal papillary mucinous neoplasm), Limb swelling, Navicular fracture of ankle, Onychomycosis, Osteoarthritis, Osteopenia, Osteoporosis, Paronychia, finger, unspecified laterality, Paroxysmal atrial fibrillation ([address] ), Peripheral vascular disease ([address] ), Plantar fasciitis, Talus fracture, and Vascular headache    Patient Active Problem List    Diagnosis Date Noted    Labial abscess 06/29/2022    Vulvar cysts 06/29/2022    Chronic renal disease, stage IV ([address] ) 01/05/2022    Stage 3 chronic kidney disease, unspecified whether stage 3a or 3b CKD [facility] ) 01/05/2022    Advice given about COVID-19 virus infection 04/07/2020    Elevated serum creatinine 04/07/2020    Osteopenia of hip 04/07/2020    Elevated serum immunoglobulin free light chains 11/28/2019    Sensorineural hearing loss (SNHL), bilateral 04/04/2019    Bilateral impacted cerumen 04/04/2019    Left asymmetrical SNHL 04/04/2019    Pain of left hand 12/21/2018    Leg cramps 12/21/2018    Chronic diastolic congestive heart failure ([facility] ) 02/13/2017    Plantar fasciitis 02/05/2016    Essential tremor 10/02/2015    Vitamin D deficiency 10/02/2015    Onychomycosis 07/21/2015    Pleural effusion 03/06/2015    Pulmonary nodule 02/20/2015    Thyroid nodule 02/20/2015    Prediabetes 09/23/2014    Hypertension 05/19/2014    Leg pain 05/19/2014    Abnormal findings on diagnostic imaging of urinary organs 04/04/2014    Abnormal findings on diagnostic imaging of other specified body structures 03/20/2014    Atherosclerosis 03/06/2014    Ovarian cyst 03/06/2014    Pancreatic cyst 03/06/2014    Backache 02/28/2014    Difficulty breathing 02/28/2014    Hoarseness 02/28/2014    Edema 01/14/2014    Allergic rhinitis 06/11/2013    Hyperlipidemia 04/19/2013    Chronic obstructive pulmonary disease ([facility] ) 03/08/2013    Atrial fibrillation ([place] [address] ) 02/14/2013    Arthritis 11/12/2012    Asthma 11/12/2012    Esophageal reflux 11/12/2012    Benign colon polyp 11/10/2012    Bifascicular bundle branch block 11/10/2012    Hypothyroidism 11/10/2012    Nasal polyp 11/10/2012    Osteopenia 11/10/2012     She  has a past surgical history that includes Appendectomy; Cataract extraction (Bilateral); Cholecystectomy; Total knee arthroplasty (Bilateral); Neuroplasty / transposition median nerve at carpal tunnel bilateral (Bilateral); Sinus surgery; Pelvic floor repair (2014); and Oophorectomy (Bilateral)    Her family history includes Brain cancer in her son; Breast cancer in her family; Breast cancer (age of onset: 48) in her cousin and cousin; Coronary artery disease in her family; Heart failure in her mother; Hyperlipidemia in her family; No Known Problems in her daughter, maternal grandfather, maternal grandmother, paternal grandfather, paternal grandmother, and sister; Osteoarthritis in her family; Other in her family; Ovarian cancer in her family; Ovarian cancer (age of onset: 52) in her other; Pancreatic cancer (age of onset: 80) in her mother; Stroke in her father; Uterine cancer in her family  She  reports that she has never smoked  She has never used smokeless tobacco  She reports current alcohol use  She reports that she does not use drugs  Current Outpatient Medications   Medication Sig Dispense Refill    albuterol (PROVENTIL HFA,VENTOLIN HFA) 90 mcg/act inhaler Inhale      Cholecalciferol (VITAMIN D3) 1000 units CAPS Take by mouth daily      fluticasone (FLONASE) 50 mcg/act nasal spray 2 sprays into each nostril daily 3 Bottle 3    fluticasone (Flovent HFA) 110 MCG/ACT inhaler Inhale 1 puff 2 (two) times a day Rinse mouth after use   36 g 3    levothyroxine 50 mcg tablet Take 1 tab daily expect 1 and 1/2 on sunday 98 tablet 3    metoprolol succinate (TOPROL-XL) 100 mg 24 hr tablet Take 1 tablet (100 mg total) by mouth daily 90 tablet 3    montelukast (Singulair) 10 mg tablet Take 1 tablet (10 mg total) by mouth daily 90 tablet 3    potassium chloride (MICRO-K) 10 MEQ CR capsule Take 1 capsule (10 mEq total) by mouth daily (Patient not taking: No sig reported) 180 capsule 3    predniSONE 10 mg tablet 1 po bid for 1 week, then 1 po daily for 1 week, then stop (Patient not taking: No sig reported) 21 tablet 0    spironolactone (ALDACTONE) 50 mg tablet Take 1 tablet (50 mg total) by mouth daily 90 tablet 3    sulfamethoxazole-trimethoprim (BACTRIM DS) 800-160 mg per tablet Take 1 tablet by mouth every 12 (twelve) hours for 7 days 14 tablet 0    torsemide (DEMADEX) 20 mg tablet Take 2 tablets (40 mg total) by mouth daily 180 tablet 3    warfarin (COUMADIN) 5 mg tablet Take one tablet daily or as directed by MD 90 tablet 3     No current facility-administered medications for this visit  Current Outpatient Medications on File Prior to Visit   Medication Sig    albuterol (PROVENTIL HFA,VENTOLIN HFA) 90 mcg/act inhaler Inhale    Cholecalciferol (VITAMIN D3) 1000 units CAPS Take by mouth daily    fluticasone (FLONASE) 50 mcg/act nasal spray 2 sprays into each nostril daily    fluticasone (Flovent HFA) 110 MCG/ACT inhaler Inhale 1 puff 2 (two) times a day Rinse mouth after use   levothyroxine 50 mcg tablet Take 1 tab daily expect 1 and 1/2 on sunday    metoprolol succinate (TOPROL-XL) 100 mg 24 hr tablet Take 1 tablet (100 mg total) by mouth daily    montelukast (Singulair) 10 mg tablet Take 1 tablet (10 mg total) by mouth daily    potassium chloride (MICRO-K) 10 MEQ CR capsule Take 1 capsule (10 mEq total) by mouth daily (Patient not taking: No sig reported)    predniSONE 10 mg tablet 1 po bid for 1 week, then 1 po daily for 1 week, then stop (Patient not taking: No sig reported)    spironolactone (ALDACTONE) 50 mg tablet Take 1 tablet (50 mg total) by mouth daily    sulfamethoxazole-trimethoprim (BACTRIM DS) 800-160 mg per tablet Take 1 tablet by mouth every 12 (twelve) hours for 7 days    torsemide (DEMADEX) 20 mg tablet Take 2 tablets (40 mg total) by mouth daily    warfarin (COUMADIN) 5 mg tablet Take one tablet daily or as directed by MD     No current facility-administered medications on file prior to visit  She is allergic to asa [aspirin] and nsaids       Review of Systems   Constitutional: Negative for chills and fever  Genitourinary: Positive for vaginal discharge and vaginal pain  Negative for pelvic pain and vaginal bleeding  Objective: There were no vitals filed for this visit           Physical Exam  Vitals and nursing note reviewed  Exam conducted with a chaperone present  Constitutional:       Appearance: Normal appearance  Genitourinary:     Comments: 1 5 cm defect at the base of the right labia majora  Mild tenderness and induration  No drainage  There are 3 small sebaceous cyst in the upper half of the labia  The introitus is normal   Lymphadenopathy:      Lower Body: No right inguinal adenopathy  No left inguinal adenopathy  Neurological:      Mental Status: She is alert  Psychiatric:         Mood and Affect: Mood normal          Behavior: Behavior normal          Thought Content:  Thought content normal          Judgment: Judgment normal

## 2022-06-30 ENCOUNTER — OFFICE VISIT (OUTPATIENT)
Dept: OBGYN CLINIC | Facility: CLINIC | Age: 87
End: 2022-06-30
Payer: COMMERCIAL

## 2022-06-30 VITALS — WEIGHT: 120 LBS | DIASTOLIC BLOOD PRESSURE: 70 MMHG | SYSTOLIC BLOOD PRESSURE: 92 MMHG | BODY MASS INDEX: 20.6 KG/M2

## 2022-06-30 DIAGNOSIS — N76.4 LABIAL ABSCESS: Primary | ICD-10-CM

## 2022-06-30 DIAGNOSIS — N90.7 VULVAR CYSTS: ICD-10-CM

## 2022-06-30 PROCEDURE — 1036F TOBACCO NON-USER: CPT | Performed by: OBSTETRICS & GYNECOLOGY

## 2022-06-30 PROCEDURE — 1160F RVW MEDS BY RX/DR IN RCRD: CPT | Performed by: OBSTETRICS & GYNECOLOGY

## 2022-06-30 PROCEDURE — 99212 OFFICE O/P EST SF 10 MIN: CPT | Performed by: OBSTETRICS & GYNECOLOGY

## 2022-06-30 RX ORDER — METOPROLOL SUCCINATE 100 MG/1
100 TABLET, EXTENDED RELEASE ORAL DAILY
Qty: 90 TABLET | Refills: 3 | Status: SHIPPED | OUTPATIENT
Start: 2022-06-30

## 2022-06-30 RX ORDER — TORSEMIDE 20 MG/1
40 TABLET ORAL DAILY
Qty: 180 TABLET | Refills: 3 | Status: SHIPPED | OUTPATIENT
Start: 2022-06-30

## 2022-07-01 ENCOUNTER — ANTICOAG VISIT (OUTPATIENT)
Dept: CARDIOLOGY CLINIC | Facility: CLINIC | Age: 87
End: 2022-07-01

## 2022-07-01 DIAGNOSIS — I48.11 LONGSTANDING PERSISTENT ATRIAL FIBRILLATION (HCC): Primary | ICD-10-CM

## 2022-07-01 LAB — INR PPP: 4.6 (ref 0.84–1.19)

## 2022-07-11 ENCOUNTER — OFFICE VISIT (OUTPATIENT)
Dept: OBGYN CLINIC | Facility: CLINIC | Age: 87
End: 2022-07-11
Payer: COMMERCIAL

## 2022-07-11 VITALS — SYSTOLIC BLOOD PRESSURE: 104 MMHG | BODY MASS INDEX: 20.63 KG/M2 | DIASTOLIC BLOOD PRESSURE: 80 MMHG | WEIGHT: 120.2 LBS

## 2022-07-11 DIAGNOSIS — Z51.89 WOUND CHECK, ABSCESS: Primary | ICD-10-CM

## 2022-07-11 PROCEDURE — 99213 OFFICE O/P EST LOW 20 MIN: CPT | Performed by: PHYSICIAN ASSISTANT

## 2022-07-11 NOTE — PROGRESS NOTES
Patient here for follow up for labial abscess  She states it is much better  She denies any drainage or pain

## 2022-07-11 NOTE — PROGRESS NOTES
Andrew Cooper  7/22/1932    S:  80 y o  female with for follow up of labial abscess  Had I&D of right labial abscess on 6/27/2022 with packing placement  She completed 7 day course of Bactrim, which she tolerated well  Packing removed 6/30/22 and instructed on warm soaks and to monitor  Feels she has fully healed and denies F/C/S, pus formation, drainage, redness, or pain       Past Medical History:   Diagnosis Date    Abnormal ultrasound     RESOLVED: [id number]    Asthma with acute exacerbation     LAST ASSESSED: 13MQA2180    Asymptomatic menopausal state     Atherosclerosis     Atrial fibrillation (HCC)     Chronic obstructive lung disease (HCC)     Congestive heart failure (CHF) (HCC)     Congestive heart failure (HCC)     LAST ASSESSED: 43BGY2737    Cuboid fracture     LAST ASSESSED: 71AUB4737    Dyspepsia     GERD (gastroesophageal reflux disease)     High risk medication use     LAST ASSESSED: 91XOC1253    Hyperlipidemia     Hypertension     Hypertension     Hypokalemia     Hypothyroidism     Impacted cerumen of both ears     LAST ASSESSED: 38OEQ4150    Impacted cerumen of right ear     LAST ASSESSED: 14ELX4191    Influenza     LAST ASSESSED: 17VWZ2962    IPMN (intraductal papillary mucinous neoplasm)     RESOLVED: 80DIV1052    Limb swelling     LAST ASSESSED: 37NHI6216    Navicular fracture of ankle     LAST ASSESSED: 71ZLD8124    Onychomycosis     LAST ASSESSED: 61VXX6549    Osteoarthritis     Osteopenia     Osteoporosis     Paronychia, finger, unspecified laterality     LAST ASSESSED: 78ZZE8166    Paroxysmal atrial fibrillation (HCC)     LAST ASSESSED: 16HCG4730    Per[facility] vascular [facility] ([facility])     Plantar fasciitis     Talus fracture     LAST ASSESSED: 66NTK9358    Vascular headache      Family History   Problem Relation Age of Onset    Pancreatic cancer Mother 80    Heart failure Mother     Coronary artery disease Family     Breast cancer Family     Other Family         BREAST DISORDER, GASTROINTESTINAL CANCER    Uterine cancer Family     Hyperlipidemia Family     Osteoarthritis Family     Ovarian cancer Family     Brain cancer Son     Stroke Father     No Known Problems Sister     No Known Problems Daughter     No Known Problems Maternal Grandmother     No Known Problems Maternal Grandfather     No Known Problems Paternal Grandmother     No Known Problems Paternal Grandfather     Breast cancer Cousin 48    Breast cancer Cousin 48    Ovarian cancer Other 52     Social History     Socioeconomic History    Marital status: /Civil Union     Spouse name: None    Number of children: None    Years of education: None    Highest education level: None   Occupational History    Occupation: retired   Tobacco Use    Smoking status: Never Smoker    Smokeless tobacco: Never Used   Vaping Use    Vaping Use: Never used   Substance and Sexual Activity    Alcohol use: Not Currently     Comment: SOCIAL    Drug use: No    Sexual activity: Not Currently     Partners: Male     Birth control/protection: Post-menopausal   Other Topics Concern    None   Social History Narrative    DAILY COFFEE CONSUMPTION (2 CUPS/DAY)    EXERCISING REGULARLY     Social Determinants of Health     Financial Resource Strain: Not on file   Food Insecurity: Not on file   Transportation Needs: Not on file   Physical Activity: Not on file   Stress: Not on file   Social Connections: Not on file   Intimate Partner Violence: Not on file   Housing Stability: Not on file       Review of Systems   Constitutional: negative   Respiratory: Negative  Cardiovascular: Negative  Gastrointestinal: Negative for constipation and diarrhea  Genitourinary: Negative for mass, redness, drainage, pain, difficulty urinating, pelvic pain, vaginal bleeding, vaginal discharge, itching or odor      O:  /80 (BP Location: Right arm, Patient Position: Sitting, Cuff Size: Standard)   Wt 54 5 kg (120 lb 3 2 oz)   LMP  (LMP Unknown)   BMI 20 63 kg/m²     She appears well and is in no distress  Normocephalic, atraumatic  External genitals:  0 5 cm defect creating a v shaped skin fold at the base of right labia, consistent with recent I&D  The tissue appears well healed without redness or warmth  The inferior portion of the labia is firm, consistent with likely scar tissue  No redness, warmth, drainage or fluctuance  There are 3 small sebaceous cyst in the upper half of the labia  The introitus is normal   No focal neurological deficits  Normal mood, affect, and behavior  A/P:  1  Wound check, abscess  We reviewed the skinfold / pocket of the right labia  Reviewed careful cleansing/vulvar hygiene and monitoring  This area will likely need some time for healing by secondary intent  To RTO with any persistent or worsening symptoms  William Spicer is agreeable  All questions answered  Precautions given

## 2022-07-12 ENCOUNTER — ANTICOAG VISIT (OUTPATIENT)
Dept: CARDIOLOGY CLINIC | Facility: CLINIC | Age: 87
End: 2022-07-12

## 2022-07-12 DIAGNOSIS — I48.11 LONGSTANDING PERSISTENT ATRIAL FIBRILLATION (HCC): Primary | ICD-10-CM

## 2022-07-12 LAB — INR PPP: 2.2 (ref 0.84–1.19)

## 2022-07-29 LAB — HBA1C MFR BLD HPLC: 6 %

## 2022-08-01 DIAGNOSIS — I48.21 PERMANENT ATRIAL FIBRILLATION (HCC): Primary | ICD-10-CM

## 2022-08-05 ENCOUNTER — TELEPHONE (OUTPATIENT)
Dept: INTERNAL MEDICINE CLINIC | Facility: CLINIC | Age: 87
End: 2022-08-05

## 2022-08-05 NOTE — TELEPHONE ENCOUNTER
----- Message from Mae Hunter MD sent at 8/4/2022  8:17 AM EDT -----   Please call patient and let her know her labs show findings very similar to before  She has mild decrease in her kidney function with a creatinine 1 3 - all other labs very similar to before and stable-she should continue all current meds  Let patient know I will be making a home visit on she and her  over the next 3-4 weeks    She should also please tell her  that his labs were entirely normal without any evidence of anemia and a hemoglobin of 15 3

## 2022-08-09 ENCOUNTER — ANTICOAG VISIT (OUTPATIENT)
Dept: CARDIOLOGY CLINIC | Facility: CLINIC | Age: 87
End: 2022-08-09

## 2022-08-09 DIAGNOSIS — I48.21 PERMANENT ATRIAL FIBRILLATION (HCC): Primary | ICD-10-CM

## 2022-08-09 LAB — INR PPP: 3.2 (ref 0.84–1.19)

## 2022-08-10 ENCOUNTER — OFFICE VISIT (OUTPATIENT)
Dept: CARDIOLOGY CLINIC | Facility: CLINIC | Age: 87
End: 2022-08-10
Payer: COMMERCIAL

## 2022-08-10 VITALS
SYSTOLIC BLOOD PRESSURE: 100 MMHG | BODY MASS INDEX: 20.95 KG/M2 | HEART RATE: 91 BPM | HEIGHT: 64 IN | DIASTOLIC BLOOD PRESSURE: 72 MMHG | WEIGHT: 122.7 LBS

## 2022-08-10 DIAGNOSIS — I50.32 CHRONIC DIASTOLIC CONGESTIVE HEART FAILURE (HCC): Chronic | ICD-10-CM

## 2022-08-10 DIAGNOSIS — I48.21 PERMANENT ATRIAL FIBRILLATION (HCC): Primary | ICD-10-CM

## 2022-08-10 DIAGNOSIS — N18.30 STAGE 3 CHRONIC KIDNEY DISEASE, UNSPECIFIED WHETHER STAGE 3A OR 3B CKD (HCC): ICD-10-CM

## 2022-08-10 DIAGNOSIS — I10 PRIMARY HYPERTENSION: Chronic | ICD-10-CM

## 2022-08-10 DIAGNOSIS — E78.2 MIXED HYPERLIPIDEMIA: Chronic | ICD-10-CM

## 2022-08-10 DIAGNOSIS — I45.2 BIFASCICULAR BUNDLE BRANCH BLOCK: Chronic | ICD-10-CM

## 2022-08-10 DIAGNOSIS — N18.4 CHRONIC RENAL DISEASE, STAGE IV (HCC): ICD-10-CM

## 2022-08-10 PROCEDURE — 93000 ELECTROCARDIOGRAM COMPLETE: CPT | Performed by: INTERNAL MEDICINE

## 2022-08-10 PROCEDURE — 99214 OFFICE O/P EST MOD 30 MIN: CPT | Performed by: INTERNAL MEDICINE

## 2022-08-10 NOTE — PROGRESS NOTES
[facility]  Follow up note  Edith Steve 80 y o  female MRN: [id number]        Problems    1  Permanent atrial fibrillation (HCC)  POCT ECG   2  Bifascicular bundle branch block     3  Chronic diastolic congestive heart failure (HCC)  Basic metabolic panel   4  Mixed hyperlipidemia     5  Primary hypertension     6  Chronic renal disease, stage IV (HCC)     7  Stage 3 chronic kidney disease, unspecified whether stage 3a or 3b CKD (HCC)         Impression:    Chronic diastolic CHF    no significant LVH on prior echo in 2019, nor on most recent echo 10/15/2021  Infiltrative workup negative by free light chain assessment/PYP scan  She has not been interested in SGLT2 inhibitors   she is euvolemic   she is on spironolactone and torsemide   she has class 2 dyspnea likely due to grade 2 diastolic dysfunction    Permanent atrial fibrillation   rates are well controlled with metoprolol  We decreased polypharmacy by discontinuing diltiazem at the last visit    Bifascicular bundle branch block     No symptoms or changes on EKG to suggest higher degree of AV block    Mixed hyperlipidemia   avoiding pharmacologic treatment considering age of 80    Plan:     no changes today   Six-month follow-up      HPI:   Edith Steve is a 80y o  year old female  With chronic diastolic CHF, chronic atrial fibrillation on a rate control strategy and anticoagulation with warfarin, bifascicular bundle branch block, mixed hyperlipidemia, and a prior negative infiltrative cardiomyopathy workup returns for a follow-up visit  her creatinine is improved to 1 3  Previous acute kidney injury was due to over aggressive diuresis  Spironolactone 50 mg a day and torsemide 40 mg a day are her current doses   she is on metoprolol succinate 100 mg a day, AFib rates are well controlled   She has RBBB/ LAFB without concerning symptoms for higher degree AV block  She has class 2 dyspnea with steps   She denies edema, orthopnea      Left ventricular function is normal, she has grade 2 diastolic dysfunction  She is on warfarin, INRs are stable, she denies any significant bleeding    Review of Systems   Constitutional: Negative for appetite change, diaphoresis, fatigue and fever  Respiratory: Positive for shortness of breath  Negative for chest tightness and wheezing  Cardiovascular: Negative for chest pain, palpitations and leg swelling  Gastrointestinal: Negative for abdominal pain and blood in stool  Musculoskeletal: Negative for arthralgias and joint swelling  Skin: Negative for rash  Neurological: Negative for dizziness, syncope and light-headedness         Past Medical History:   Diagnosis Date    Abnormal ultrasound     RESOLVED: [id number]    Asthma with acute exacerbation     LAST ASSESSED: 37LQX9056    Asymptomatic menopausal state     Atherosclerosis     Atrial fibrillation (HCC)     Chronic obstructive lung disease (HCC)     Congestive heart failure (CHF) (HCC)     Congestive heart failure (HCC)     LAST ASSESSED: [id number]    Cuboid fracture     LAST ASSESSED: 55CST7041    Dyspepsia     GERD (gastroesophageal reflux disease)     High risk medication use     LAST ASSESSED: 12GQA2009    Hyperlipidemia     Hypertension     Hypertension     Hypokalemia     Hypothyroidism     Impacted cerumen of both ears     LAST ASSESSED: 50VTY2211    Impacted cerumen of right ear     LAST ASSESSED: 67ZNX1783    Influenza     LAST ASSESSED: 56DQL4255    IPMN (intraductal papillary mucinous neoplasm)     RESOLVED: 01IOX9226    Limb swelling     LAST ASSESSED: 02VES3604    Navicular fracture of ankle     LAST ASSESSED: 00BPH9389    Onychomycosis     LAST ASSESSED: 23GEX7248    Osteoarthritis     Osteopenia     Osteoporosis     Paronychia, finger, unspecified laterality     LAST ASSESSED: 81IOB4852    Paroxysmal atrial fibrillation (HCC)     LAST ASSESSED: 58YPL5470    Peripheral vascular disease (HCC)     Plantar fasciitis     Talus fracture     LAST ASSESSED: 28YFF2343    Vascular headache      Social History     Substance and Sexual Activity   Alcohol Use Not Currently    Comment: SOCIAL     Social History     Substance and Sexual Activity   Drug Use No     Social History     Tobacco Use   Smoking Status Never Smoker   Smokeless Tobacco Never Used       Allergies:   Allergies   Allergen Reactions    Asa [Aspirin]     Nsaids        Medications:     Current Outpatient Medications:     albuterol (PROVENTIL HFA,VENTOLIN HFA) 90 mcg/act inhaler, Inhale, Disp: , Rfl:     Cholecalciferol (VITAMIN D3) 1000 units CAPS, Take by mouth daily, Disp: , Rfl:     fluticasone (FLONASE) 50 mcg/act nasal spray, 2 sprays into each nostril daily, Disp: 3 Bottle, Rfl: 3    fluticasone (Flovent HFA) 110 MCG/ACT inhaler, Inhale 1 puff 2 (two) times a day Rinse mouth after use , Disp: 36 g, Rfl: 3    levothyroxine 50 mcg tablet, Take 1 tab daily expect 1 and 1/2 on sunday, Disp: 98 tablet, Rfl: 3    metoprolol succinate (TOPROL-XL) 100 mg 24 hr tablet, Take 1 tablet (100 mg total) by mouth daily, Disp: 90 tablet, Rfl: 3    montelukast (Singulair) 10 mg tablet, Take 1 tablet (10 mg total) by mouth daily, Disp: 90 tablet, Rfl: 3    spironolactone (ALDACTONE) 50 mg tablet, Take 1 tablet (50 mg total) by mouth daily, Disp: 90 tablet, Rfl: 3    torsemide (DEMADEX) 20 mg tablet, Take 2 tablets (40 mg total) by mouth daily, Disp: 180 tablet, Rfl: 3    warfarin (COUMADIN) 5 mg tablet, Take one tablet daily or as directed by MD, Disp: 90 tablet, Rfl: 3    potassium chloride (MICRO-K) 10 MEQ CR capsule, Take 1 capsule (10 mEq total) by mouth daily (Patient not taking: No sig reported), Disp: 180 capsule, Rfl: 3    predniSONE 10 mg tablet, 1 po bid for 1 week, then 1 po daily for 1 week, then stop (Patient not taking: No sig reported), Disp: 21 tablet, Rfl: 0      Vitals:    08/10/22 1039   BP: 100/72   Pulse: 91     Weight (last 2 days) Date/Time Weight    08/10/22 1039 55 7 (122 7)        Physical Exam  Constitutional:       General: She is not in acute distress  Appearance: She is not diaphoretic  HENT:      Head: Normocephalic and atraumatic  Eyes:      General: No scleral icterus  Conjunctiva/sclera: Conjunctivae normal    Neck:      Vascular: No JVD  Cardiovascular:      Rate and Rhythm: Normal rate  Rhythm irregular  Heart sounds: Normal heart sounds  No murmur heard  Pulmonary:      Effort: Pulmonary effort is normal  No respiratory distress  Breath sounds: Normal breath sounds  No wheezing or rales  Musculoskeletal:         General: No tenderness  Cervical back: Normal range of motion  Right lower leg: No edema  Left lower leg: No edema  Skin:     General: Skin is warm and dry  Laboratory Studies:     lab studies personally reviewed     Cardiac testing:     EKG reviewed personally:   Results for orders placed or performed in visit on 08/10/22   POCT ECG    Impression     Atrial fibrillation, RBBB/ AFB, heart rate 91         Echocardiogram:    20/03- EF 39%, diastolic dysfunction, un graded due to AFib, normal RV size and function, moderate  Left and right atrial dilatation, mild MR, mild TR, mildly elevated pulmonary pressures  10/21-personally reviewed -EF 18%, diastolic dysfunction, left and right atrial dilation, mild-to-moderate TR, moderately elevated pulmonary pressures, dilated IVC       TTR SPECT   11/19-1 26 heart to lung ratio, equivocal to negative    Stress tests:      Catheterization:      Holter:      10/21-excellent AFib rate control, no significant pauses    Pat Roberson MD    Portions of the record may have been created with voice recognition software  Occasional wrong word or "sound a like" substitutions may have occurred due to the inherent limitations of voice recognition software    Read the chart carefully and recognize, using context, where substitutions have occurred

## 2022-08-23 ENCOUNTER — ANTICOAG VISIT (OUTPATIENT)
Dept: CARDIOLOGY CLINIC | Facility: CLINIC | Age: 87
End: 2022-08-23

## 2022-08-23 DIAGNOSIS — I48.21 PERMANENT ATRIAL FIBRILLATION (HCC): Primary | ICD-10-CM

## 2022-08-23 LAB — INR PPP: 1.9 (ref 0.84–1.19)

## 2022-08-25 ENCOUNTER — TELEPHONE (OUTPATIENT)
Dept: INTERNAL MEDICINE CLINIC | Facility: CLINIC | Age: 87
End: 2022-08-25

## 2022-08-25 NOTE — TELEPHONE ENCOUNTER
----- Message from Dina Rodriguez MD sent at 8/25/2022 10:02 AM EDT -----  Please let patient and her  know I will make home visit on Monday August 29 between 9 and noon

## 2022-08-25 NOTE — TELEPHONE ENCOUNTER
SPOKE WITH JOHN, MATT WAS NOT HOME, GAVE HOME VISIT INFO BUT TOLD HIM TO HAVE MATT CALL ME IF ANY QUESTIONS

## 2022-08-30 ENCOUNTER — IN HOME VISIT (OUTPATIENT)
Dept: INTERNAL MEDICINE CLINIC | Facility: CLINIC | Age: 87
End: 2022-08-30
Payer: COMMERCIAL

## 2022-08-30 ENCOUNTER — TELEPHONE (OUTPATIENT)
Dept: INTERNAL MEDICINE CLINIC | Facility: CLINIC | Age: 87
End: 2022-08-30

## 2022-08-30 DIAGNOSIS — M85.859 OSTEOPENIA OF HIP, UNSPECIFIED LATERALITY: ICD-10-CM

## 2022-08-30 DIAGNOSIS — I50.32 CHRONIC DIASTOLIC CONGESTIVE HEART FAILURE (HCC): Primary | Chronic | ICD-10-CM

## 2022-08-30 DIAGNOSIS — J44.9 CHRONIC OBSTRUCTIVE PULMONARY DISEASE, UNSPECIFIED COPD TYPE (HCC): Chronic | ICD-10-CM

## 2022-08-30 DIAGNOSIS — M85.859 OSTEOPENIA OF HIP, UNSPECIFIED LATERALITY: Chronic | ICD-10-CM

## 2022-08-30 DIAGNOSIS — I48.21 PERMANENT ATRIAL FIBRILLATION (HCC): Chronic | ICD-10-CM

## 2022-08-30 DIAGNOSIS — I50.32 CHRONIC DIASTOLIC CONGESTIVE HEART FAILURE (HCC): ICD-10-CM

## 2022-08-30 DIAGNOSIS — E03.9 HYPOTHYROIDISM, UNSPECIFIED TYPE: Primary | ICD-10-CM

## 2022-08-30 DIAGNOSIS — E03.9 HYPOTHYROIDISM, UNSPECIFIED TYPE: Chronic | ICD-10-CM

## 2022-08-30 DIAGNOSIS — N18.30 STAGE 3 CHRONIC KIDNEY DISEASE, UNSPECIFIED WHETHER STAGE 3A OR 3B CKD (HCC): ICD-10-CM

## 2022-08-30 DIAGNOSIS — E55.9 VITAMIN D DEFICIENCY: ICD-10-CM

## 2022-08-30 PROCEDURE — 1160F RVW MEDS BY RX/DR IN RCRD: CPT | Performed by: INTERNAL MEDICINE

## 2022-08-30 PROCEDURE — 99350 HOME/RES VST EST HIGH MDM 60: CPT | Performed by: INTERNAL MEDICINE

## 2022-08-30 NOTE — PROGRESS NOTES
Assessment/Plan:  1  Age-related hearing loss-patient now with hearing aid  2  Congestive heart yzepgdn-cqplffyvs-[id number] by atrial fibrillation-chronic-echo done in the fall 2021 shows grade 2 diastolic dysfunction, mild concentric hypertrophy, normal right ventricle, left atrium and right atrium mildly dilated, moderate TR with pulmonary artery pressure 52  She denies symptoms of sleep apnea  Prior nuclear medicine study for amyloid equivocal but cardiology feels that on review of echo she has low suggestion for infiltrate myopathy  Was on a regimen of torsemide 40 milligrams in a m  And 20 milligrams in the p m  And spironolactone 50 milligrams daily  When attempting to decrease diuretic dose she has increasing edema but then has increasing creatinine  Patient defers on SG LT inhibitor  Appears to be euvolemic on exam on current regimen of spironolactone and torsemide  As noted most recent creatinine 1 3  3  Atrial fibrillation-prior 24 hour Holter showed average heart rate in the 60s with maximum of 102  Cardiology withdrew Cardizem to simplify her medical regimen and has her on higher dose of metoprolol  last seen by Cardiology in August of 2022 and scheduled again in 6 months  4  Chronic renal failure-stage III-felt related to diuretic therapy-as noted creatinine climbed to 1 9 on current regimen of torsemide 40, 20 and spironolactone 50 milligrams daily  on current regimen of torsemide 40 milligrams daily and spironolactone 50 milligrams daily creatinine stable at 1 3  5  Shortness of breath-combination of her CHF and COPD  Was treated for potential infection in December with doxycycline and prednisone  Chest x-ray done then showed COPD but otherwise benign  6  Osteopenia-DEXA scan in May 2020 shows L-spine -2 1 and hip-2 1--patient is scheduled for follow-up DEXA scan over the next month  Will be obtaining follow-up labs including PTH level and vitamin-D level  7   Asthma-COPD-stable on current regimen  remains on Flovent-if she notes increasing wheezing she will double her dose of Flovent  8  Hypothyroidism-TSH stable on current dose of thyroid supplement-for repeat prior to next visit  9  Elevated serum for free light chains-serum in urine protein electrophoresis without a spike  Might be related to her chronic renal failure  Rule out other  Repeat labs ordered prior to next visit including SPEP immunoglobulin levels and repeat free light chain ratio  10  Bifascicular block-always watching for progression-cardiology feels she is stable  11  History of dyspepsia  CT scan of abdomen benign -biopsy benign but she was H pylori positive that was treated  Not an issue times months  12  Hypertension -adequate control on current regimen  13 Left wrist pain -tendinitis -ortho treated her with conservative therapy with rest and bracing -she failed steroid injection  14   Pre diabetes-A1c  to beture  15   Tremor-likely benign essential tremor-monitor  16  Knee pain-had prior bilateral total knee replacement   1 point she was going back to orthopedic physician to check positioning of her prosthesis-review next visit  17   Left leg pain -felt to be trochanteric bursitis-responded to injection and improved  18   Vitamin-D deficiency-she will in resume her supplement of 2000 units daily  19  Plantar fasciitis -responded to physical therapy and exercise   20  Leg cramps- uses tonic water before bed   She knows to replace vitamin D   20   Abnormal area the pancreas and CT scan-full discussion as per noted February 221  Follow-up ultrasound unchanged   MRI without contrast -MRCP in 2016 showed pancreatic cystic lesion of the head, uncinate process and neck were unchanged suggesting side branch IPMN - no main pancreatic duct dilatation   Liver enzymes remained normal   Patient and her GI physician decided they would not do additional surveillance including MRI or endoscopic ultrasound  22Degenerative arthritis of the hands And low backmonitor  23Nasal polyps -remains on intranasal steroids  24Abnormal CT scan of the chest with multiple nodule-follow-up CT scan showed 1 nodule resolved another nodules unchanged in 1 area bronchiectasis -additional imaging not felt needed  25Thyroid nodules -noted on exam by ENT -aspirated benign   Thyroid function stable   Ultrasound stable additional studies not felt needed  25I colon polyps- most recent colonoscopy was in August 2015 -deferring additional colonoscopies because of age  26Abnormal radiographic study of the gyn organs with bilateral cystic ovarian masses noted on CT scan as well as thickening of the endometrium   Underwent surgery ventrally with laparoscopic bilateral less than 0, hysteroscopy and D&C -all pathology  27 history of hoarseness -ENT exam benign-not an issue times years  28History rash which she felt was reaction of prednisone   Has had issues in the past with prednisone   If steroid therapy needed this needs to be with steroid up the prednisone      Medical regimen spironolactone 50 milligrams daily, Coumadin with dose adjustments, generic Singulair 10 milligrams daily, , torsemide 40 milligrams every morning, levothyroxine 50 micrograms daily but 75 micrograms on Sunday, Claritin 10 milligrams daily as needed, calcium with vitamin-D daily, Ventolin inhaler as needed, metoprolol succinate 100 milligrams daily, Flovent 110 micrograms-1 puff b i d , multivitamin, vitamin D3/2000 units a day, fish oil 1200 milligrams a day, rare use of Vicodin 5-300 half tab b i d  P r n  For severe back pain    Scheduled for follow-up DEXA scan  Labs over the next 2 months to include CBC with diff random chemistry profile TSH vitamin-D level PTH level  No problem-specific Assessment & Plan notes found for this encounter         Diagnoses and all orders for this visit:    Chronic diastolic congestive heart failure (HCC)    Chronic obstructive pulmonary disease, unspecified COPD type ([facility] )    Permanent atrial fibrillation (HCC)    Hypothyroidism, unspecified type    Stage 3 chronic kidney disease, unspecified whether stage 3a or 3b CKD ([facility] )    Osteopenia of hip, unspecified laterality          Subjective:      Patient ID: Boston Espinoza is a 80 y o  female  Patient seen today as a follow-up home visit  As noted she and her  now both 80+ with progressive overall decline  They live in an independent living center but it is very difficult for them to leave the facility etcetera  This patient has known significant congestive heart failure with shortness of breath with minimal activity  She feels this is about the same  Her shortness of breath is also complicated by her history of COPD and we always half to differentiate as to which is the major cause of her symptoms  She had a follow-up appointment with Cardiology in August who felt she was relatively stable in reference to her diastolic heart failure  He commented that diltiazem had been discontinued at prior visit  He felt she was euvolemic on spironolactone and torsemide  He felt she had bifascicular block which was stable and EKG recommended she return in 6 months  He commented that she did not have significant LVH that echo and prior workup for infiltrative disease was negative    She denies increasing orthopnea, proximal nocturnal dyspnea or edema  She denies chest pain or pressure  She denies palpitations  As noted she does have shortness of breath with activity  She has not been having audible wheezing  This patient denies any systemic symptoms  Specifically there has been no evidence of fever, night sweats, significant weight loss or significant decrease in appetite  Labs done in July showed CBC normal BUN 35 creatinine 1 3 TSH was 4 78  She has a known history of osteopenia  She is due for follow-up bone density study this was scheduled    She has a known history of peripheral venous disease  She is aware that this could contribute to edema  She has not noted increasing edema    A a  This patient wanted to know their preferred analgesic agent  Because of their various comorbidities I recommended that this be acetaminophen  This patient has no history of chronic liver disease that would put them at greater risk for use of acetaminophen  This patient may use up to 500-650 mg of acetaminophen at a time and no more than 3 g a day total  Nonsteroidal anti-inflammatory agents have the potential to exacerbate hypertension, hypercoagulability, chronic renal failure, congestive heart failure, and various allergic tendencies  Patient is aware because of her long-term anticoagulant use she should avoid nonsteroidals  She has documented decrease in hearing  She is now using a left hearing aid  She has known osteopenia  She is due for follow-up bone density study  This has been scheduled in September  This patient has a known history of hypothyroidism  We reviewed the difference between brand and generic thyroid supplements  We reviewed that thyroid supplements need to be taken on an empty stomach  We reviewed recent literature showing that the thyroid supplement can be taken in the morning on an empty stomach or before going to bed on an empty stomach  This patient denies any symptoms of hypothyroidism including significant constipation, dry skin or worsening fatigue  Periodic monitoring of this patient's free T4 and TSH will continue to be done  Most recent TSH is noted was 4 78  This is acceptable for her age of 80 and she will continue current dose of thyroid supplement  She is bothered by the fact that her daughter lives in [place]  She rarely gets to see her but is very excited that she is coming to see them over the next several weeks  Most recent echo reviewed in detail  Study Details    This transthoracic echocardiogram was performed in the echo lab   This was a routine, outpatient study  Study quality was adequate  A complete 2D, color flow Doppler and spectral Doppler transthoracic echocardiogram was performed  The apical, parasternal, subcostal and suprasternal views were obtained  Indications  Priority: Routine  Dx: Chronic diastolic congestive heart failure (HCC) (I50 32 (ICD-10-CM))    History    COPD,Pleural Effusion,CHF,Hypertension,Atherosclerosis    Interpretation Summary         Left Ventricle: Left ventricular cavity size is normal  Systolic function is normal  Wall motion is normal  Diastolic function is moderately abnormal, consistent with grade II (pseudonormal) relaxation  Wall thickness is mild to moderately increased  There is mild concentric hypertrophy    Right Ventricle: Systolic function is normal  Right ventricular cavity size is normal     Left Atrium: The atrium is mildly dilated    Right Atrium: The atrium is mildly dilated    Mitral Valve: There is mild regurgitation  There is mild annular calcification    Tricuspid Valve: There is moderate regurgitation    Pulmonic Valve: There is mild regurgitation    Pulmonary Artery: The estimated pulmonary artery systolic pressure is 95 7 mmHg  The pulmonary artery systolic pressure is moderately increased          Findings    Left Ventricle Left ventricular cavity size is normal  Wall thickness is mild to moderately increased  There is mild concentric hypertrophy  Systolic function is normal   Wall motion is normal  Diastolic function is moderately abnormal, consistent with grade II (pseudonormal) relaxation  Right Ventricle Right ventricular cavity size is normal  Systolic function is normal  Wall thickness is normal   Left Atrium The atrium is mildly dilated  Right Atrium The atrium is mildly dilated  Aortic Valve The aortic valve is trileaflet  The leaflets are mildly thickened  The leaflets are mildly calcified  The leaflets exhibit normal mobility   There is no evidence of regurgitation  There is no evidence of stenosis  Mitral Valve There is mild annular calcification  There is mild regurgitation  There is no evidence of stenosis  The valve has normal function  Tricuspid Valve Tricuspid valve structure is normal  There is moderate regurgitation  There is no evidence of stenosis  Pulmonic Valve Pulmonic valve structure is normal  There is mild regurgitation  There is no evidence of stenosis  Ascending Aorta The aortic root is normal in size  IVC/SVC The inferior vena cava is dilated  Respirophasic changes were blunted (less than 50% variation)  Pericardium There is no pericardial effusion  The pericardium is normal in appearance  Pulmonary Artery The estimated pulmonary artery systolic pressure is 72 8 mmHg  The pulmonary artery systolic pressure is moderately increased      Left Ventricle Measurements    Function/Volumes  A4C EF   53 %      EF   57 %      Dimensions  LVIDd   3 9 cm      LVIDS   2 6 cm      IVSd   0 9 cm      LVPWd   1 cm      FS   33 %      Diastolic Filling  MV E' Tissue Velocity Septal   6 cm/s      E wave deceleration time   161 ms      MV Peak E Hank   115 cm/s      MV Peak A Hank   0 31 m/s           Right Ventricle Measurements    Dimensions  RVID d   2 8 cm      TV S'   1 2 cm/s           Mitral Valve Measurements    Stenosis  MV stenosis pressure 1/2 time   0 ms           Tricuspid Valve Measurements    RVSP Parameters  TR Peak Hank   3 2 m/s      Stenosis  TV peak gradient   41 mmHg           Aorta Measurements    Aortic Dimensions  Ao root   2 9 cm           Pulmonary Artery Measurements    PA Pressure  PASP   52 mmHg           Exam Details    Performed Procedure Technologist Supporting Staff Performing Physician  Echo janes Whatley         Appointment Date/Status Modality Department   10/15/2021     Completed BE ECHO RM 2 BE CAR NON INV        Begin Exam End Exam  End Exam Questionnaires  10/15/2021  8:01 AM 10/15/2021  8:44 AM  PATIENT EDUCATION               The following portions of the patient's history were reviewed and updated as appropriate: allergies, current medications, past family history, past medical history, past social history, past surgical history and problem list     Review of Systems   Constitutional: Negative  HENT: Positive for hearing loss  Respiratory: Positive for shortness of breath  Cardiovascular: Negative  Gastrointestinal: Negative  Endocrine: Negative  Genitourinary: Negative  Musculoskeletal: Negative  Skin: Negative  Neurological: Negative  Hematological: Negative  Psychiatric/Behavioral: Negative  Objective:      LMP  (LMP Unknown)          Physical Exam  Vitals reviewed  Constitutional:       General: She is not in acute distress  Appearance: Normal appearance  She is not ill-appearing, toxic-appearing or diaphoretic  HENT:      Head: Normocephalic and atraumatic  Right Ear: External ear normal       Left Ear: External ear normal       Ears:      Comments: Hearing aid     Nose: Nose normal  No congestion or rhinorrhea  Mouth/Throat:      Mouth: Mucous membranes are moist       Pharynx: Oropharynx is clear  No oropharyngeal exudate or posterior oropharyngeal erythema  Eyes:      General: No scleral icterus  Right eye: No discharge  Left eye: No discharge  Extraocular Movements: Extraocular movements intact  Conjunctiva/sclera: Conjunctivae normal    Neck:      Vascular: No carotid bruit  Cardiovascular:      Rate and Rhythm: Normal rate  Rhythm irregular  Pulses: Normal pulses  Heart sounds: Normal heart sounds  No murmur heard  No friction rub  No gallop  Comments: Borderline cardiomegaly to percussion  Adequate upstroke  No increase in P2  Irregularly irregular rhythm  Pulmonary:      Effort: Pulmonary effort is normal  No respiratory distress  Breath sounds: Normal breath sounds   No stridor  No wheezing, rhonchi or rales  Chest:      Chest wall: No tenderness  Abdominal:      General: Abdomen is flat  Bowel sounds are normal  There is no distension  Palpations: Abdomen is soft  There is no mass  Tenderness: There is no abdominal tenderness  There is no right CVA tenderness, left CVA tenderness, guarding or rebound  Hernia: No hernia is present  Musculoskeletal:         General: No swelling, tenderness, deformity or signs of injury  Normal range of motion  Cervical back: Normal range of motion and neck supple  No rigidity  No muscular tenderness  Right lower leg: No edema  Left lower leg: No edema  Lymphadenopathy:      Cervical: No cervical adenopathy  Skin:     General: Skin is warm and dry  Coloration: Skin is not jaundiced or pale  Findings: No bruising, erythema, lesion or rash  Comments: Peripheral venous disease   Neurological:      General: No focal deficit present  Mental Status: She is alert and oriented to person, place, and time  Mental status is at baseline  Cranial Nerves: No cranial nerve deficit  Sensory: No sensory deficit  Motor: No weakness  Coordination: Coordination normal       Gait: Gait normal       Deep Tendon Reflexes: Reflexes normal    Psychiatric:         Mood and Affect: Mood normal          Behavior: Behavior normal          Thought Content:  Thought content normal          Judgment: Judgment normal  Behavior normal          Thought Content:  Thought content normal          Judgment: Judgment normal

## 2022-08-30 NOTE — TELEPHONE ENCOUNTER
----- Message from Dina Rodriguez MD sent at 8/30/2022  7:13 AM EDT -----  This patient needs labs in 2 months-CBC with diff, random chemistry profile, vitamin-D level, a serum PTH, TSH-no fast with diagnosis of congestive heart failure, hypothyroidism, vitamin-D deficiency, osteopenia-thank you

## 2022-08-31 RX ORDER — ALBUTEROL SULFATE 90 UG/1
2 AEROSOL, METERED RESPIRATORY (INHALATION) EVERY 6 HOURS PRN
Qty: 20.1 G | Refills: 3 | Status: SHIPPED | OUTPATIENT
Start: 2022-08-31

## 2022-09-20 ENCOUNTER — ANTICOAG VISIT (OUTPATIENT)
Dept: CARDIOLOGY CLINIC | Facility: CLINIC | Age: 87
End: 2022-09-20

## 2022-09-20 DIAGNOSIS — I48.21 PERMANENT ATRIAL FIBRILLATION (HCC): Primary | ICD-10-CM

## 2022-09-20 LAB — INR PPP: 2.4 (ref 0.84–1.19)

## 2022-10-07 ENCOUNTER — OFFICE VISIT (OUTPATIENT)
Dept: OBGYN CLINIC | Facility: CLINIC | Age: 87
End: 2022-10-07
Payer: COMMERCIAL

## 2022-10-07 VITALS
BODY MASS INDEX: 19.96 KG/M2 | WEIGHT: 119.8 LBS | HEIGHT: 65 IN | DIASTOLIC BLOOD PRESSURE: 74 MMHG | SYSTOLIC BLOOD PRESSURE: 96 MMHG

## 2022-10-07 DIAGNOSIS — N76.4 LABIAL ABSCESS: Primary | ICD-10-CM

## 2022-10-07 PROCEDURE — 10060 I&D ABSCESS SIMPLE/SINGLE: CPT | Performed by: PHYSICIAN ASSISTANT

## 2022-10-07 RX ORDER — SULFAMETHOXAZOLE AND TRIMETHOPRIM 800; 160 MG/1; MG/1
1 TABLET ORAL EVERY 12 HOURS SCHEDULED
Qty: 14 TABLET | Refills: 0 | Status: SHIPPED | OUTPATIENT
Start: 2022-10-07 | End: 2022-10-14

## 2022-10-07 NOTE — PROGRESS NOTES
Incision and drain    Date/Time: 10/7/2022 2:40 PM  Performed by: Lopez Cummings PA-C  Authorized by: Lopez Cummings PA-C   Universal Protocol:  Procedure performed by: (Dr Brad Garcia present for assistance)  Consent: Verbal consent obtained  Risks and benefits: risks, benefits and alternatives were discussed  Consent given by: patient  Patient understanding: patient states understanding of the procedure being performed  Assessment/Plan:    No problem-specific Assessment & Plan notes found for this encounter  {Assess/PlanSmartLinks:68514}      Subjective:      Patient ID: Vaishnavi Landeros is a 80 y o  female  HPI    {Common ambulatory SmartLinks:57002}    Review of Systems      Objective:      BP 96/74 (BP Location: Left arm, Patient Position: Sitting, Cuff Size: Standard)   Ht 5' 4 75" (1 645 m)   Wt 54 3 kg (119 lb 12 8 oz)   LMP  (LMP Unknown)   BMI 20 09 kg/m²          Physical Exam      Patient location:  Clinic  Location:     Size:  Left labial abscess    Location:  Anogenital    Anogenital location: left labial abscess  Pre-procedure details:     Skin preparation:  Betadine  Anesthesia (see MAR for exact dosages): Anesthesia method:  Local infiltration    Local anesthetic:  Lidocaine 1% WITH epi  Procedure details:     Complexity:  Simple    Incision types:  Stab incision    Scalpel blade:  11    Approach:  Puncture    Incision depth:  Subcutaneous    Wound management:  Probed and deloculated (Deloculation performed by Dr Brad Garcia)    Drainage:  Bloody and purulent    Drainage amount:  Copious    Wound treatment:  Packing placed    Packing materials:  1/4 in gauze (Packing performed by Dr Brad Garcia)  Post-procedure details:     Patient tolerance of procedure:   Tolerated well, no immediate complications

## 2022-10-07 NOTE — PROGRESS NOTES
Assessment/Plan:    1  Labial abscess  - I&D of left labial cyst performed with assistance from Dr Medina Fischer  Packing placed  Will start Abx to cover for suspected peripheral cellulitis  Advised pt to follow up in the office in one week for recheck  Reviewed precautions  - sulfamethoxazole-trimethoprim (BACTRIM DS) 800-160 mg per tablet; Take 1 tablet by mouth every 12 (twelve) hours for 7 days  Dispense: 14 tablet; Refill: 0      Subjective:      Patient ID: Wan Kendall is a 80 y o  female  Pt presents today for a problem visit  Since Monday she is noted a lump on her left labia  The lump is described as hard, red, painful to touch  She had a similar lesion on her right labia in June  She was seen in the office in June for incision and drainage with packing  After having the right labial abscess drained in June she was placed on Bactrim due to surrounding cellulitis  She tolerated this well and had complete resolution of her symptoms  Today she states the labial abscess on her left side feels just like the prior episode  She believes the lesion started to drain this morning  She denies any fevers but states her temperature this morning was 99  She reported some mild chills this morning  The following portions of the patient's history were reviewed and updated as appropriate: allergies, current medications, past family history, past medical history, past social history, past surgical history and problem list     Review of Systems      Objective:      BP 96/74 (BP Location: Left arm, Patient Position: Sitting, Cuff Size: Standard)   Ht 5' 4 75" (1 645 m)   Wt 54 3 kg (119 lb 12 8 oz)   LMP  (LMP Unknown)   BMI 20 09 kg/m²          Physical Exam  Constitutional:       Appearance: Normal appearance  She is normal weight  HENT:      Head: Normocephalic and atraumatic  Genitourinary:     Labia:         Right: No tenderness or lesion  Left: Tenderness and lesion present  Neurological:      Mental Status: She is alert  Incision and drain    Date/Time: 10/7/2022 4:20 PM  Performed by: Rosmery Ulloa PA-C  Authorized by: Rosmery Ulloa PA-C   Universal Protocol:  Procedure performed by: (Dr Jenny Bonilla present for assistance)  Risks and benefits: risks, benefits and alternatives were discussed  Consent given by: patient  Patient understanding: patient states understanding of the procedure being performed      Patient location:  Clinic  Location:     Type:  Abscess    Location:  Anogenital    Anogenital location: Left labial abscess  Pre-procedure details:     Skin preparation:  Betadine  Anesthesia (see MAR for exact dosages): Anesthesia method:  Local infiltration    Local anesthetic:  Lidocaine 1% WITH epi  Procedure details:     Complexity:  Simple    Incision types:  Stab incision    Scalpel blade:  11    Approach:  Puncture    Incision depth:  Subcutaneous    Wound management:  Probed and deloculated (Deloculation performed by RUBÉN)    Drainage:  Bloody and purulent    Wound treatment:  Packing placed    Packing materials:  1/4 in gauze (Packing by RUBÉN)  Post-procedure details:     Patient tolerance of procedure:   Tolerated well, no immediate complications

## 2022-10-09 ENCOUNTER — APPOINTMENT (OUTPATIENT)
Dept: RADIOLOGY | Facility: HOSPITAL | Age: 87
End: 2022-10-09
Payer: COMMERCIAL

## 2022-10-09 ENCOUNTER — HOSPITAL ENCOUNTER (EMERGENCY)
Facility: HOSPITAL | Age: 87
Discharge: HOME | End: 2022-10-09
Attending: EMERGENCY MEDICINE
Payer: COMMERCIAL

## 2022-10-09 VITALS
SYSTOLIC BLOOD PRESSURE: 125 MMHG | RESPIRATION RATE: 18 BRPM | HEART RATE: 115 BPM | TEMPERATURE: 97.4 F | OXYGEN SATURATION: 97 % | DIASTOLIC BLOOD PRESSURE: 66 MMHG
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
## 2022-10-09 DIAGNOSIS — S51.019A SKIN TEAR OF ELBOW WITHOUT COMPLICATION, INITIAL ENCOUNTER: ICD-10-CM

## 2022-10-09 DIAGNOSIS — S41.119A: ICD-10-CM

## 2022-10-09 PROCEDURE — 73080 X-RAY EXAM OF ELBOW: CPT

## 2022-10-09 PROCEDURE — 99283 EMERGENCY DEPT VISIT LOW MDM: CPT

## 2022-10-09 PROCEDURE — 73130 X-RAY EXAM OF HAND: CPT

## 2022-10-09 PROCEDURE — 99282 EMERGENCY DEPT VISIT SF MDM: CPT | Performed by: EMERGENCY MEDICINE

## 2022-10-10 NOTE — ED PROVIDER NOTES
History  Chief Complaint   Patient presents with   • Fall     Pt reports her  was falling and she went to catch him and fell as well  Pt reports hitting her right arm on the table and has abrasions or skin tears, Bleeding controlled  Pt denies head strike, take coumadin      80 y o F on coumadin presents after a fall today with right arm pain and bleeding  She was attempting to catch her  who was about to fall when she ended up falling herself  She struck her R arm against a coffee table and suffered skin tears to her biceps/triceps area as well as her elbow  She notes pain in her elbow and hand but denies other subjective symptoms  She did not hit her head, no LOC, no other complaints at this time  She put a dressing over the arm and when EMS arrived they redressed it before bringing her here  She is up to date on her tetanus  Prior to Admission Medications   Prescriptions Last Dose Informant Patient Reported? Taking? Cholecalciferol (VITAMIN D3) 1000 units CAPS  Self Yes No   Sig: Take by mouth daily   albuterol (PROVENTIL HFA,VENTOLIN HFA) 90 mcg/act inhaler  Self Yes No   Sig: Inhale   albuterol (Proventil HFA) 90 mcg/act inhaler  Self No No   Sig: Inhale 2 puffs every 6 (six) hours as needed for wheezing   fluticasone (FLONASE) 50 mcg/act nasal spray  Self No No   Si sprays into each nostril daily   fluticasone (Flovent HFA) 110 MCG/ACT inhaler  Self No No   Sig: Inhale 1 puff 2 (two) times a day Rinse mouth after use     levothyroxine 50 mcg tablet  Self No No   Sig: Take 1 tab daily expect  and 2 on    metoprolol succinate (TOPROL-XL) 100 mg 24 hr tablet  Self No No   Sig: Take 1 tablet (100 mg total) by mouth daily   montelukast (Singulair) 10 mg tablet  Self No No   Sig: Take 1 tablet (10 mg total) by mouth daily   potassium chloride (MICRO-K) 10 MEQ CR capsule  Self No No   Sig: Take 1 capsule (10 mEq total) by mouth daily   Patient not taking: No sig reported   predniSONE 10 mg tablet  Self No No   Si po bid for 1 week, then 1 po daily for 1 week, then stop   Patient not taking: Reported on 10/7/2022   spironolactone (ALDACTONE) 50 mg tablet  Self No No   Sig: Take 1 tablet (50 mg total) by mouth daily   sulfamethoxazole-trimethoprim (BACTRIM DS) 800-160 mg per tablet   No No   Sig: Take 1 tablet by mouth every 12 (twelve) hours for 7 days   torsemide (DEMADEX) 20 mg tablet  Self No No   Sig: Take 2 tablets (40 mg total) by mouth daily   warfarin (COUMADIN) 5 mg tablet  Self No No   Sig: Take one tablet daily or as directed by MD      Facility-Administered Medications: None       Past Medical History:   Diagnosis Date   • Abnormal ultrasound     RESOLVED: [id number]   • Asthma with acute exacerbation     LAST ASSESSED: [id number]   • Asymptomatic menopausal state    • Atherosclerosis    • Atrial fibrillation (HCC)    • Chronic obstructive lung disease (HCC)    • Congestive heart failure (CHF) (HCC)    • Congestive heart failure (HCC)     LAST ASSESSED: 53GGS3949   • Cuboid fracture     LAST ASSESSED: 16ETY3943   • Dyspepsia    • GERD (gastroesophageal reflux disease)    • High risk medication use     LAST ASSESSED: 16EBQ1530   • Hyperlipidemia    • Hypertension    • Hypertension    • Hypokalemia    • Hypothyroidism    • Impacted cerumen of both ears     LAST ASSESSED: 92RYE7914   • Impacted cerumen of right ear     LAST ASSESSED: 04KJG7867   • Influenza     LAST ASSESSED: 01VZS7020   • IPMN (intraductal papillary mucinous neoplasm)     RESOLVED: 80VZX3129   • Limb swelling     LAST ASSESSED: 10VAI7645   • Navicular fracture of ankle     LAST ASSESSED: 14JJO6675   • Onychomycosis     LAST ASSESSED: 91OWU1950   • Osteoarthritis    • Osteopenia    • Osteoporosis    • Paronychia, finger, unspecified laterality     LAST ASSESSED: 52TPP9360   • Paroxysmal atrial fibrillation (HCC)     LAST ASSESSED: 54IKS0614   • Peripheral vascular disease (HCC)    • Plantar fasciitis    • Talus fracture     LAST ASSESSED: 54KZC2387   • Vascular headache        Past Surgical History:   Procedure Laterality Date   • APPENDECTOMY     • CATARACT EXTRACTION Bilateral    • CHOLECYSTECTOMY     • NEUROPLASTY / TRANSPOSITION MEDIAN NERVE AT CARPAL TUNNEL BILATERAL Bilateral     DECOMPRESSION   • OOPHORECTOMY Bilateral     age 80   • PELVIC FLOOR REPAIR  2014   • SINUS SURGERY     • TOTAL KNEE ARTHROPLASTY Bilateral        Family History   Problem Relation Age of Onset   • Pancreatic cancer Mother 80   • Heart failure Mother    • Coronary artery disease Family    • Breast cancer Family    • Other Family         BREAST DISORDER, GASTROINTESTINAL CANCER   • Uterine cancer Family    • Hyperlipidemia Family    • Osteoarthritis Family    • Ovarian cancer Family    • Brain cancer Son    • Stroke Father    • No Known Problems Sister    • No Known Problems Daughter    • No Known Problems Maternal Grandmother    • No Known Problems Maternal Grandfather    • No Known Problems Paternal Grandmother    • No Known Problems Paternal Grandfather    • Breast cancer Cousin 48   • Breast cancer Cousin 48   • Ovarian cancer Other 52     I have reviewed and agree with the history as documented  E-Cigarette/Vaping   • E-Cigarette Use Never User      E-Cigarette/Vaping Substances   • Nicotine No    • THC No    • CBD No    • Flavoring No    • Other No    • Unknown No      Social History     Tobacco Use   • Smoking status: Never Smoker   • Smokeless tobacco: Never Used   Vaping Use   • Vaping Use: Never used   Substance Use Topics   • Alcohol use: Not Currently     Comment: SOCIAL   • Drug use: No        Review of Systems   Constitutional: Negative for chills and fever  HENT: Negative for ear pain and sore throat  Eyes: Negative for pain and visual disturbance  Respiratory: Negative for cough and shortness of breath  Cardiovascular: Negative for chest pain and palpitations     Gastrointestinal: Negative for abdominal pain and vomiting  Genitourinary: Negative for dysuria and hematuria  Musculoskeletal: Negative for arthralgias and back pain  Skin: Positive for wound  Negative for color change and rash  Neurological: Negative for seizures and syncope  All other systems reviewed and are negative  Physical Exam  ED Triage Vitals [10/09/22 1943]   Temperature Pulse Respirations Blood Pressure SpO2   (!) 97 4 °F (36 3 °C) (!) 115 18 125/66 97 %      Temp Source Heart Rate Source Patient Position - Orthostatic VS BP Location FiO2 (%)   Oral Monitor -- -- --      Pain Score       --             Orthostatic Vital Signs  Vitals:    10/09/22 1943   BP: 125/66   Pulse: (!) 115       Physical Exam  Vitals and nursing note reviewed  Constitutional:       General: She is not in acute distress  Appearance: She is well-developed  HENT:      Head: Normocephalic and atraumatic  Right Ear: External ear normal       Left Ear: External ear normal       Nose: Nose normal    Eyes:      Conjunctiva/sclera: Conjunctivae normal    Cardiovascular:      Rate and Rhythm: Normal rate and regular rhythm  Heart sounds: No murmur heard  Pulmonary:      Effort: Pulmonary effort is normal  No respiratory distress  Breath sounds: Normal breath sounds  Abdominal:      Palpations: Abdomen is soft  Tenderness: There is no abdominal tenderness  Musculoskeletal:         General: Tenderness (palpation of R elbow and metacarpals of R hand) present  Cervical back: Neck supple  Skin:     General: Skin is warm and dry  Comments: Roughly 7cm skin tear to R lateral upper arm with some oozing of blood  Another 2 cm skin tear to R elbow  Neurological:      General: No focal deficit present  Mental Status: She is alert     Psychiatric:         Mood and Affect: Mood normal          ED Medications  Medications - No data to display    Diagnostic Studies  Results Reviewed     None                 XR elbow 3+ views RIGHT (Results Pending)   XR hand 3+ views RIGHT    (Results Pending)         Procedures  Laceration repair    Date/Time: 10/9/2022 9:37 PM  Performed by: Josef Hair MD  Authorized by: Josef Hair MD   Consent: Verbal consent obtained  Risks and benefits: risks, benefits and alternatives were discussed  Consent given by: patient  Required items: required blood products, implants, devices, and special equipment available  Patient identity confirmed: verbally with patient  Body area: upper extremity  Location details: right upper arm  Laceration length: 7 cm  Foreign bodies: no foreign bodies  Tendon involvement: none  Nerve involvement: none  Vascular damage: no    Wound Dehiscence:  Superficial Wound Dehiscence: simple closure      Procedure Details:  Irrigation solution: saline  Irrigation method: syringe  Amount of cleaning: standard  Skin closure: Steri-Strips  Approximation: close  Dressing: 4x4 sterile gauze and gauze roll  Patient tolerance: patient tolerated the procedure well with no immediate complications            ED Course               Identification of Seniors at Risk    Flowsheet Row Most Recent Value   (ISAR) Identification of Seniors at Risk    Before the illness or injury that brought you to the Emergency, did you need someone to help you on a regular basis? 0 Filed at: 10/09/2022 1944   In the last 24 hours, have you needed more help than usual? 0 Filed at: 10/09/2022 1944   Have you been hospitalized for one or more nights during the past 6 months? 1 Filed at: 10/09/2022 1944   In general, do you see well? 0 Filed at: 10/09/2022 1944   In general, do you have serious problems with your memory? 0 Filed at: 10/09/2022 1944   Do you take more than three different medications every day?  1 Filed at: 10/09/2022 1944   ISAR Score 2 Filed at: 10/09/2022 1944                              MDM  Number of Diagnoses or Management Options  Fall, initial encounter  Skin tear of elbow without complication, initial encounter  Skin tear of upper arm without complication  Diagnosis management comments: 80 y o F w/ R arm pain and skin tears due to a fall today  No concerns at this time to other trauma caused by the event  Will obtain xray to evaluate for injuries of RUE  Xrays negative for acute injury  The skin tear was approximated with steri-strips and the site was dressed  Patient discharged with referral to wound care for close followup and provided further return precautions  Patient agreeable with plan and discharged  Disposition  Final diagnoses:   Fall, initial encounter   Skin tear of elbow without complication, initial encounter   Skin tear of upper arm without complication     Time reflects when diagnosis was documented in both MDM as applicable and the Disposition within this note     Time User Action Codes Description Comment    10/9/2022  9:22 PM Kelsey Randle Add [W19  Leland Wei Fall, initial encounter     10/9/2022  9:23 PM Kelsey Randle Add [R97 148H] Skin tear of elbow without complication, initial encounter     10/9/2022  9:23 PM Kelsey Randle Add [F66 [id number]] Skin tear of upper arm without complication       ED Disposition     ED Disposition   Discharge    Condition   Stable    Date/Time   Sun Oct 9, 2022  9:22 PM    Comment   Sammi Dixon discharge to home/self care                 Follow-up Information     Follow up With Specialties Details Why Contact Info Additional Information    [address] [facility]   [place] [address] [id number]  [address], [address] [facility]          Discharge Medication List as of 10/9/2022  9:24 PM      CONTINUE these medications which have NOT CHANGED    Details   !! albuterol (Proventil HFA) 90 mcg/act inhaler Inhale 2 puffs every 6 (six) hours as needed for wheezing, Starting Wed 8/31/2022, Normal      !! albuterol (PROVENTIL HFA,VENTOLIN HFA) 90 mcg/act inhaler Inhale, Historical Med      Cholecalciferol (VITAMIN D3) 1000 units CAPS Take by mouth daily, Historical Med      fluticasone (FLONASE) 50 mcg/act nasal spray 2 sprays into each nostril daily, Starting Wed 9/16/2020, Normal      fluticasone (Flovent HFA) 110 MCG/ACT inhaler Inhale 1 puff 2 (two) times a day Rinse mouth after use , Starting Mon 1/17/2022, Normal      levothyroxine 50 mcg tablet Take 1 tab daily expect 1 and 1/2 on sunday, Normal      metoprolol succinate (TOPROL-XL) 100 mg 24 hr tablet Take 1 tablet (100 mg total) by mouth daily, Starting Thu 6/30/2022, Normal      montelukast (Singulair) 10 mg tablet Take 1 tablet (10 mg total) by mouth daily, Starting Mon 1/3/2022, Normal      potassium chloride (MICRO-K) 10 MEQ CR capsule Take 1 capsule (10 mEq total) by mouth daily, Starting Mon 12/30/2019, Normal      predniSONE 10 mg tablet 1 po bid for 1 week, then 1 po daily for 1 week, then stop, Normal      spironolactone (ALDACTONE) 50 mg tablet Take 1 tablet (50 mg total) by mouth daily, Starting Tue 4/5/2022, Normal      sulfamethoxazole-trimethoprim (BACTRIM DS) 800-160 mg per tablet Take 1 tablet by mouth every 12 (twelve) hours for 7 days, Starting Fri 10/7/2022, Until Fri 10/14/2022, Normal      torsemide (DEMADEX) 20 mg tablet Take 2 tablets (40 mg total) by mouth daily, Starting Thu 6/30/2022, Normal      warfarin (COUMADIN) 5 mg tablet Take one tablet daily or as directed by MD, Normal       !! - Potential duplicate medications found  Please discuss with provider  PDMP Review     None           ED Provider  Attending physically available and evaluated Vaishnavi Landeros I managed the patient along with the ED Attending      Electronically Signed by         Mccoy Angelucci, MD  10/09/22 [id number]

## 2022-10-10 NOTE — ED ATTENDING ATTESTATION
Zakia Escoto MD, saw and evaluated the patient  I have discussed the patient with the resident and agree with the resident's findings, Plan of Care, and MDM as documented in the resident's note, except where noted  All available labs and Radiology studies were reviewed  I was present for key portions of any procedure(s) performed by the resident and I was immediately available to provide assistance  At this point I agree with the current assessment done in the Emergency Department  I have conducted an independent evaluation of this patient a history and physical is as follows:    79 yo RHD female with a complicated pat medical history including asthma, atrial fibrillation on coumadin, COPD, CHF, HTN, and hyperlipidemia presents to the ED with right upper extremity pain s/p a mechanical fall  The patient says she attempted to catch her  when he fell this evening and was subsequently knocked into a table  She struck her right arm on the table but no head strike or LOC  She is now experiencing pain in her left elbow and hand  (+) Several skin tears to the arm  No active bleeding  She denies neck and back pain  No numbness or weakness  No other injuries/complaints  ROS: per resident physician note    Gen: NAD, AA&Ox3  HEENT: PERRL, EOMI, TMs clear bilaterally  Neck: supple, no midline cervical tenderness  CV: RRR  Lungs: CTA B/L  Abdomen: soft, NT/ND  RUE: (+) ttp over lateral elbow joint, no swelling or deformity, (+) several large skin tears, no active bleeding, normal radial pulses, FROM of all joints, distal sensation intact, normal cap refill  Neuro: 5/5 strength all extremities, sensation grossly intact  Skin: (+) skin tears to RUE    ED Course  The patient is well appearing with stable vital signs  She is only complaining of pain in her right elbow and hand  No head strike during the fall  No repairable lacerations  Will check x-rays of the hand and elbow   Wounds cleaned, dressed, and approximated  Tetanus UTD  If no fractures identified then plan for pain control and discharge to home  The patient is agreeable to this plan  Will continue to monitor in the ED        Critical Care Time  Procedures

## 2022-10-10 NOTE — RESULT ENCOUNTER NOTE
Reviewed results with patient, she states the pain in the elbow is improved and she can indeed fully bend and straighten it    I advised her to keep a close eye on it and if pain does not continue to improve to have it rechecked by her PCP to evaluate further for possible fracture

## 2022-10-11 ENCOUNTER — TELEPHONE (OUTPATIENT)
Dept: INTERNAL MEDICINE CLINIC | Facility: CLINIC | Age: 87
End: 2022-10-11

## 2022-10-11 ENCOUNTER — ANTICOAG VISIT (OUTPATIENT)
Dept: CARDIOLOGY CLINIC | Facility: CLINIC | Age: 87
End: 2022-10-11

## 2022-10-11 DIAGNOSIS — I48.21 PERMANENT ATRIAL FIBRILLATION (HCC): Primary | ICD-10-CM

## 2022-10-11 LAB — INR PPP: 4.9 (ref 0.84–1.19)

## 2022-10-11 NOTE — TELEPHONE ENCOUNTER
PT CALLED, SAID THAT SHE AND JOHN FELL THIS PAST WEEKEND, SHE WENT TO THE ER DUE TO INJURING HERSELF    PT SAID THAT HER ARM IS SORE AND THAT SHE HAS NOT YET CHANGED THE BANDAGE BECAUSE SHE'S WORRIED ABOUT DOING IT    PT IS ASKING IF YOU CAN COME SEE HER, H ER APPT WITH WOUND CARE IS NEXT WEEK    PLEASE ADVISE

## 2022-10-11 NOTE — TELEPHONE ENCOUNTER
PT CALLED BACK, SAID THAT SHE WAS TOLD THAT SHE DID NOT HAVE A FRACTURE BUT THEN GOT A CALL SAYING THAT WITH THE FLUID SEEN ON THE FILMS, THERE COULD BE A FRACTURE

## 2022-10-14 ENCOUNTER — OFFICE VISIT (OUTPATIENT)
Dept: OBGYN CLINIC | Facility: CLINIC | Age: 87
End: 2022-10-14

## 2022-10-14 ENCOUNTER — ANTICOAG VISIT (OUTPATIENT)
Dept: CARDIOLOGY CLINIC | Facility: CLINIC | Age: 87
End: 2022-10-14

## 2022-10-14 VITALS
WEIGHT: 118.4 LBS | SYSTOLIC BLOOD PRESSURE: 98 MMHG | HEIGHT: 65 IN | DIASTOLIC BLOOD PRESSURE: 60 MMHG | BODY MASS INDEX: 19.73 KG/M2

## 2022-10-14 DIAGNOSIS — I48.21 PERMANENT ATRIAL FIBRILLATION (HCC): Primary | ICD-10-CM

## 2022-10-14 DIAGNOSIS — Z51.89 WOUND CHECK, ABSCESS: Primary | ICD-10-CM

## 2022-10-14 LAB — INR PPP: 3.1 (ref 0.84–1.19)

## 2022-10-15 NOTE — PROGRESS NOTES
Assessment/Plan:    1  Wound check, abscess  -follow-up left labial abscess  Appears to be healing well  No evidence of surrounding infection/cellulitis  Reviewed careful vulvar hygiene precautions  Advised avoiding any soaks in tubs  Reviewed infection precautions  Advised patient to return to office next week to check the site  Patient is agreeable to plan  Subjective:      Patient ID: Sammi Dixon is a 80 y o  female  Patient presents for a follow-up visit today  She is 1 week s/p a I&D left labial abscess  Abscess was drained in the office by myself and Dr Patricia Archer 1 week ago  Packing was placed after drainage and patient was started on a course 7 day course of Bactrim  Patient is her last dose of Bactrim today  She states the packing fell out 1 day after drainage  Patient reports doing well  She states the area is only mildly tender as it continues to heal   She denies any abnormal drainage, fever, chills, redness to the site  The following portions of the patient's history were reviewed and updated as appropriate: allergies, current medications, past family history, past medical history, past social history, past surgical history and problem list     Review of Systems      Objective:      BP 98/60 (BP Location: Left arm, Patient Position: Sitting, Cuff Size: Standard)   Ht 5' 4 75" (1 645 m)   Wt 53 7 kg (118 lb 6 4 oz)   LMP  (LMP Unknown)   BMI 19 86 kg/m²          Physical Exam  Vitals reviewed  Constitutional:       Appearance: Normal appearance  HENT:      Head: Normocephalic and atraumatic  Pulmonary:      Effort: Pulmonary effort is normal    Genitourinary:         Comments: approx 1 cm defect at the base of left labia c/w recent I&D  The tissue appears to be healing well  Opening probed with sterile cotton swab to about 1/2 cm depth  No surrounding erythema, induration, warmth  Neurological:      Mental Status: She is alert

## 2022-10-17 ENCOUNTER — OFFICE VISIT (OUTPATIENT)
Dept: WOUND CARE | Facility: HOSPITAL | Age: 87
End: 2022-10-17
Payer: COMMERCIAL

## 2022-10-17 VITALS
TEMPERATURE: 97.4 F | SYSTOLIC BLOOD PRESSURE: 126 MMHG | RESPIRATION RATE: 16 BRPM | WEIGHT: 120 LBS | HEIGHT: 64 IN | DIASTOLIC BLOOD PRESSURE: 89 MMHG | BODY MASS INDEX: 20.49 KG/M2 | HEART RATE: 113 BPM

## 2022-10-17 DIAGNOSIS — S41.101A OPEN WOUND OF RIGHT UPPER ARM, INITIAL ENCOUNTER: Primary | ICD-10-CM

## 2022-10-17 PROCEDURE — 99213 OFFICE O/P EST LOW 20 MIN: CPT | Performed by: SURGERY

## 2022-10-17 NOTE — PATIENT INSTRUCTIONS
Orders Placed This Encounter   Procedures    Wound cleansing and dressings     Right Arm wound:  Wash your hands with soap and water  Cleanse the wound with NSS or wound cleanser prior to applying a clean dressing  Shower no---you may sponge bathe   Apply Adaptic to the wound    Cover with gauze, niecy and tape  Change dressing every other day and as needed      Treatments above were completed today at the [facility]     Standing Status:   Future     Standing Expiration Date:   10/17/2023

## 2022-10-17 NOTE — ASSESSMENT & PLAN NOTE
Old steri strip removed and several areas of skin unrolled and laid out  Will use adaptic and gauze for now  Explained to her that some of this skin may still die, but will have to wait and see what happens

## 2022-10-17 NOTE — PROGRESS NOTES
Patient ID: Do Evans is a 80 y o  female Date of Birth 7/22/1932       Chief Complaint   Patient presents with   • New Patient Visit     Right Arm wound       Allergies:  Asa [aspirin] and Nsaids    Diagnosis:  1  Open wound of right upper arm, initial encounter  Assessment & Plan:  Old steri strip removed and several areas of skin unrolled and laid out  Will use adaptic and gauze for now  Explained to her that some of this skin may still die, but will have to wait and see what happens  Orders:  -     Wound cleansing and dressings; Future       Diagnosis ICD-10-CM Associated Orders   1  Open wound of right upper arm, initial encounter  S41 101A Wound cleansing and dressings          Subjective:   80year old female who was assiting her  and struck her right arm causing a skin tear  She was seen in the ED and steri strips applied      Here for wound follow up  The following portions of the patient's history were reviewed and updated as appropriate:   Patient Active Problem List   Diagnosis   • Abnormal findings on diagnostic imaging of other specified body structures   • Abnormal findings on diagnostic imaging of urinary organs   • Allergic rhinitis   • Arthritis   • Asthma   • Atherosclerosis   • Atrial fibrillation ([address] )   • Backache   • Benign colon polyp   • Bifascicular bundle branch block   • Chronic obstructive pulmonary disease (HCC)   • Chronic diastolic congestive heart failure (HCC)   • Difficulty breathing   • Edema   • Esophageal reflux   • Essential tremor   • Hoarseness   • Hyperlipidemia   • Hypertension   • Hypothyroidism   • Leg pain   • Nasal polyp   • Onychomycosis   • Ovarian cyst   • Pancreatic cyst   • Plantar fasciitis   • Pleural effusion   • Prediabetes   • Pulmonary nodule   • Thyroid nodule   • Vitamin D deficiency   • Pain of left hand   • Leg cramps   • Sensorineural hearing loss (SNHL), bilateral   • Bilateral impacted cerumen   • Left asymmetrical SNHL   • Elevated serum immunoglobulin free light chains   • Advice given about COVID-19 virus infection   • Elevated serum creatinine   • Osteopenia of hip   • Chronic renal disease, stage IV (HCC)   • Stage 3 chronic kidney disease, unspecified whether stage 3a or 3b CKD (HCC)   • Labial abscess   • Vulvar cysts   • Open wound of right upper arm     Past Medical History:   Diagnosis Date   • Abnormal ultrasound     RESOLVED: [id number]   • Asthma with acute exacerbation     LAST ASSESSED: 26LOG2919   • Asymptomatic menopausal state    • Atherosclerosis    • Atrial fibrillation (HCC)    • Chronic obstructive lung disease (HCC)    • Congestive heart failure (CHF) ([facility])    • Congestive heart failure (HCC)     LAST ASSESSED: 88KRN9664   • Cuboid fracture     LAST ASSESSED: 46VBZ9742   • Dyspepsia    • GERD (gastroesophageal reflux disease)    • High risk medication use     LAST ASSESSED: 30MHQ5440   • Hyperlipidemia    • Hypertension    • Hypertension    • Hypokalemia    • Hypothyroidism    • Impacted cerumen of both ears     LAST ASSESSED: 78JGP1231   • Impacted cerumen of right ear     LAST ASSESSED: 19OFN9642   • Influenza     LAST ASSESSED: 72NBQ5739   • IPMN (intraductal papillary mucinous neoplasm)     RESOLVED: 89ULE5851   • Limb swelling     LAST ASSESSED: 14UMX8320   • Navicular fracture of ankle     LAST ASSESSED: 24HCQ6307   • Onychomycosis     LAST ASSESSED: 22CRC0054   • Osteoarthritis    • Osteopenia    • Osteoporosis    • Paronychia, finger, unspecified laterality     LAST ASSESSED: 64XPV5006   • Paroxysmal atrial fibrillation ([facility])     LAST ASSESSED: 01YPS3106   • Peripheral vascular disease ([facility])    • Plantar fasciitis    • Talus fracture     LAST ASSESSED: 69AQK5938   • Vascular headache      Past Surgical History:   Procedure Laterality Date   • APPENDECTOMY     • CATARACT EXTRACTION Bilateral    • CHOLECYSTECTOMY     • NEUROPLASTY / TRANSPOSITION MEDIAN NERVE AT CARPAL TUNNEL BILATERAL Bilateral     DECOMPRESSION   • OOPHORECTOMY Bilateral     age 80   • PELVIC FLOOR REPAIR  2014   • SINUS SURGERY     • TOTAL KNEE ARTHROPLASTY Bilateral      Social History     Socioeconomic History   • Marital status: /Civil Union     Spouse name: None   • Number of children: None   • Years of education: None   • Highest education level: None   Occupational History   • Occupation: retired   Tobacco Use   • Smoking status: Never Smoker   • Smokeless tobacco: Never Used   Vaping Use   • Vaping Use: Never used   Substance and Sexual Activity   • Alcohol use: Not Currently     Comment: SOCIAL   • Drug use: No   • Sexual activity: Not Currently     Partners: Male     Birth control/protection: Post-menopausal   Other Topics Concern   • None   Social History Narrative    DAILY COFFEE CONSUMPTION (2 CUPS/DAY)    EXERCISING REGULARLY     Social Determinants of Health     Financial Resource Strain: Not on file   Food Insecurity: Not on file   Transportation Needs: Not on file   Physical Activity: Not on file   Stress: Not on file   Social Connections: Not on file   Intimate Partner Violence: Not on file   Housing Stability: Not on file        Current Outpatient Medications:   •  albuterol (Proventil HFA) 90 mcg/act inhaler, Inhale 2 puffs every 6 (six) hours as needed for wheezing, Disp: 20 1 g, Rfl: 3  •  albuterol (PROVENTIL HFA,VENTOLIN HFA) 90 mcg/act inhaler, Inhale, Disp: , Rfl:   •  Cholecalciferol (VITAMIN D3) 1000 units CAPS, Take by mouth daily, Disp: , Rfl:   •  fluticasone (FLONASE) 50 mcg/act nasal spray, 2 sprays into each nostril daily, Disp: 3 Bottle, Rfl: 3  •  fluticasone (Flovent HFA) 110 MCG/ACT inhaler, Inhale 1 puff 2 (two) times a day Rinse mouth after use , Disp: 36 g, Rfl: 3  •  levothyroxine 50 mcg tablet, Take 1 tab daily expect 1 and 1/2 on sunday, Disp: 98 tablet, Rfl: 3  •  metoprolol succinate (TOPROL-XL) 100 mg 24 hr tablet, Take 1 tablet (100 mg total) by mouth daily, Disp: 90 tablet, Rfl: 3  •  montelukast (Singulair) 10 mg tablet, Take 1 tablet (10 mg total) by mouth daily, Disp: 90 tablet, Rfl: 3  •  potassium chloride (MICRO-K) 10 MEQ CR capsule, Take 1 capsule (10 mEq total) by mouth daily (Patient not taking: No sig reported), Disp: 180 capsule, Rfl: 3  •  predniSONE 10 mg tablet, 1 po bid for 1 week, then 1 po daily for 1 week, then stop (Patient not taking: No sig reported), Disp: 21 tablet, Rfl: 0  •  spironolactone (ALDACTONE) 50 mg tablet, Take 1 tablet (50 mg total) by mouth daily, Disp: 90 tablet, Rfl: 3  •  torsemide (DEMADEX) 20 mg tablet, Take 2 tablets (40 mg total) by mouth daily, Disp: 180 tablet, Rfl: 3  •  warfarin (COUMADIN) 5 mg tablet, Take one tablet daily or as directed by MD, Disp: 90 tablet, Rfl: 3  Family History   Problem Relation Age of Onset   • Pancreatic cancer Mother 80   • Heart failure Mother    • Coronary artery disease Family    • Breast cancer Family    • Other Family         BREAST DISORDER, GASTROINTESTINAL CANCER   • Uterine cancer Family    • Hyperlipidemia Family    • Osteoarthritis Family    • Ovarian cancer Family    • Brain cancer Son    • Stroke Father    • No Known Problems Sister    • No Known Problems Daughter    • No Known Problems Maternal Grandmother    • No Known Problems Maternal Grandfather    • No Known Problems Paternal Grandmother    • No Known Problems Paternal Grandfather    • Breast cancer Cousin 48   • Breast cancer Cousin 48   • Ovarian cancer Other 49      Review of Systems      Objective:  /89   Pulse (!) 113   Temp (!) 97 4 °F (36 3 °C)   Resp 16   Ht 5' 4" (1 626 m)   Wt 54 4 kg (120 lb)   LMP  (LMP Unknown)   BMI 20 60 kg/m²     Physical Exam        Wound 10/17/22 Traumatic Arm Posterior;Right;Upper (Active)   Wound Image   10/17/22 0922   Wound Description Epithelialization; Beefy red; Other (Comment); Yellow;Pink 10/17/22 0923   Susu-wound Assessment Purple;Fragile 10/17/22 0923   Wound Length (cm) 6 5 cm 10/17/22 0923   Wound Width (cm) 6 3 cm 10/17/22 6676   Wound Depth (cm) 0 1 cm 10/17/22 0923   Wound Surface Area (cm^2) 40 95 cm^2 10/17/22 0923   Wound Volume (cm^3) 4 095 cm^3 10/17/22 0923   Calculated Wound Volume (cm^3) 4 1 cm^3 10/17/22 0923   Drainage Amount Small 10/17/22 0923   Drainage Description Bloody 10/17/22 0923   Non-staged Wound Description Full thickness 10/17/22 0923   Dressing Status Intact 10/17/22 0923       Right arm skin tear mostly in place                  Procedures             Wound Instructions:  Orders Placed This Encounter   Procedures   • Wound cleansing and dressings     Right Arm wound:  Wash your hands with soap and water  Cleanse the wound with NSS or wound cleanser prior to applying a clean dressing  Shower no---you may sponge bathe   Apply Adaptic to the wound  Cover with gauze, niecy and tape  Change dressing every other day and as needed      Treatments above were completed today at the [facility]     Standing Status:   Future     Standing Expiration Date:   10/17/2023         Qasim Cummins      Portions of the record may have been created with voice recognition software  Occasional wrong word or "sound a like" substitutions may have occurred due to the inherent limitations of voice recognition software  Read the chart carefully and recognize, using context, where substitutions have occurred

## 2022-10-21 ENCOUNTER — ANTICOAG VISIT (OUTPATIENT)
Dept: CARDIOLOGY CLINIC | Facility: CLINIC | Age: 87
End: 2022-10-21

## 2022-10-21 DIAGNOSIS — I48.21 PERMANENT ATRIAL FIBRILLATION (HCC): Primary | ICD-10-CM

## 2022-10-21 LAB — INR PPP: 2.6 (ref 0.84–1.19)

## 2022-10-24 ENCOUNTER — OFFICE VISIT (OUTPATIENT)
Dept: OBGYN CLINIC | Facility: CLINIC | Age: 87
End: 2022-10-24

## 2022-10-24 ENCOUNTER — OFFICE VISIT (OUTPATIENT)
Dept: WOUND CARE | Facility: HOSPITAL | Age: 87
End: 2022-10-24
Payer: COMMERCIAL

## 2022-10-24 VITALS — WEIGHT: 120 LBS | BODY MASS INDEX: 20.6 KG/M2 | SYSTOLIC BLOOD PRESSURE: 114 MMHG | DIASTOLIC BLOOD PRESSURE: 64 MMHG

## 2022-10-24 VITALS
TEMPERATURE: 97.5 F | RESPIRATION RATE: 16 BRPM | SYSTOLIC BLOOD PRESSURE: 128 MMHG | DIASTOLIC BLOOD PRESSURE: 77 MMHG | HEART RATE: 96 BPM

## 2022-10-24 DIAGNOSIS — S41.101A OPEN WOUND OF RIGHT UPPER ARM, INITIAL ENCOUNTER: Primary | ICD-10-CM

## 2022-10-24 DIAGNOSIS — Z51.89 WOUND CHECK, ABSCESS: Primary | ICD-10-CM

## 2022-10-24 PROCEDURE — 99212 OFFICE O/P EST SF 10 MIN: CPT | Performed by: SURGERY

## 2022-10-24 PROCEDURE — 99213 OFFICE O/P EST LOW 20 MIN: CPT | Performed by: SURGERY

## 2022-10-24 NOTE — PATIENT INSTRUCTIONS
Orders Placed This Encounter   Procedures    Wound cleansing and dressings     Wound is essentially healed today to right arm  Protect with white sleeve for approx  2 weeks  Scabs will fall off, allow to fall off naturally  Do not pull scabs off  You are discharged from wound management center as of today  Please call office with any questions  Thank you for using our Center       Standing Status:   Future     Standing Expiration Date:   10/24/2023

## 2022-10-24 NOTE — PROGRESS NOTES
Assessment/Plan:       1  Wound check, abscess  Left labia I&D abscess site healing well  No s/s infection cellulitis  No further f/u needed    Right labia with tiny comedome, no s/s infection there  Advised warm compresses to area QID and call if worse  Subjective:      Patient ID: Raudel Brock is a 80 y o  female  She is here for Follow-up (Follow-up labial abscess  She still has a tender area that is open  She is having some issues on the other side now  )    HPI    Hx of labial abscess  I&D 10/7 and was started on bactrim  Recheck 10/14 and here again now for recheck of site again  States site if not painful at present  Still noted as open, but no drainage  Does report a possible problem on the right labia  Recently noted a bump there as well  +scant bldy drainage w/ wiping at times  Menstrual History:  No LMP recorded (lmp unknown)  Patient is postmenopausal           The following portions of the patient's history were reviewed and updated as appropriate: allergies, current medications, past family history, past medical history, past social history, past surgical history, and problem list     Review of Systems  See HPI for pertinent positives          Objective:    /64 (BP Location: Left arm, Patient Position: Sitting, Cuff Size: Standard)   Wt 54 4 kg (120 lb)   LMP  (LMP Unknown)   BMI 20 60 kg/m²      Physical Exam  Constitutional:       General: She is not in acute distress  Appearance: Normal appearance  Genitourinary:      Urethral meatus normal       No lesions in the vagina  Right Labia: No rash, lesions or skin changes  Left Labia: No lesions, skin changes or rash  No vaginal discharge, erythema, tenderness, bleeding or ulceration  Right Adnexa: not tender, not full and no mass present  Left Adnexa: not tender, not full and no mass present  No cervical motion tenderness, discharge, friability or lesion        Uterus is not enlarged or tender  Bladder is not tender  Pelvic exam was performed with patient in the lithotomy position  Rectum:      No external hemorrhoid  HENT:      Head: Normocephalic  Cardiovascular:      Rate and Rhythm: Normal rate  Pulmonary:      Effort: Pulmonary effort is normal    Neurological:      Mental Status: She is alert and oriented to person, place, and time  Psychiatric:         Mood and Affect: Mood normal          Behavior: Behavior normal    Vitals reviewed

## 2022-10-24 NOTE — PROGRESS NOTES
Patient ID: Danielle Magallanes is a 80 y o  female Date of Birth 7/22/1932       Chief Complaint   Patient presents with   • Follow Up Wound Care Visit     RUE Wound       Allergies:  Asa [aspirin] and Nsaids    Diagnosis:  1  Open wound of right upper arm, initial encounter  Assessment & Plan:  Skin tear has mostly healed, see back if needed    Orders:  -     Wound cleansing and dressings; Future       Diagnosis ICD-10-CM Associated Orders   1   Open wound of right upper arm, initial encounter  S41 101A Wound cleansing and dressings          Subjective:   HPI  Here for wound follow up  The following portions of the patient's history were reviewed and updated as appropriate:   Patient Active Problem List   Diagnosis   • Abnormal findings on diagnostic imaging of other specified body structures   • Abnormal findings on diagnostic imaging of urinary organs   • Allergic rhinitis   • Arthritis   • Asthma   • Atherosclerosis   • Atrial fibrillation ([facility] [address] )   • Backache   • Benign colon polyp   • Bifascicular bundle branch block   • Chronic obstructive pulmonary disease (HCC)   • Chronic diastolic congestive heart failure (HCC)   • Difficulty breathing   • Edema   • Esophageal reflux   • Essential tremor   • Hoarseness   • Hyperlipidemia   • Hypertension   • Hypothyroidism   • Leg pain   • Nasal polyp   • Onychomycosis   • Ovarian cyst   • Pancreatic cyst   • Plantar fasciitis   • Pleural effusion   • Prediabetes   • Pulmonary nodule   • Thyroid nodule   • Vitamin D deficiency   • Pain of left hand   • Leg cramps   • Sensorineural hearing loss (SNHL), bilateral   • Bilateral impacted cerumen   • Left asymmetrical SNHL   • Elevated serum immunoglobulin free light chains   • Advice given about COVID-19 virus infection   • Elevated serum creatinine   • Osteopenia of hip   • Chronic renal disease, stage IV (HCC)   • Stage 3 chronic kidney disease, unspecified whether stage 3a or 3b CKD (HCC)   • Labial abscess   • Vulvar cysts • Open wound of right upper arm     Past Medical History:   Diagnosis Date   • Abnormal ultrasound     RESOLVED: [id number]   • Asthma with acute exacerbation     LAST ASSESSED: 42RUP9081   • Asymptomatic menopausal state    • Atherosclerosis    • Atrial fibrillation (HCC)    • Chronic obstructive lung disease (HCC)    • Congestive heart failure (CHF) (HCC)    • Congestive heart failure (HCC)     LAST ASSESSED: 33NFS7152   • Cuboid fracture     LAST ASSESSED: 71RVE7004   • Dyspepsia    • GERD (gastroesophageal reflux disease)    • High risk medication use     LAST ASSESSED: 40DNJ9235   • Hyperlipidemia    • Hypertension    • Hypertension    • Hypokalemia    • Hypothyroidism    • Impacted cerumen of both ears     LAST ASSESSED: 30KLR1829   • Impacted cerumen of right ear     LAST ASSESSED: 06TNQ2319   • Influenza     LAST ASSESSED: 09CCY5490   • IPMN (intraductal papillary mucinous neoplasm)     RESOLVED: 62PIE7236   • Limb swelling     LAST ASSESSED: 79CDG0623   • Navicular fracture of ankle     LAST ASSESSED: 76LFL4659   • Onychomycosis     LAST ASSESSED: 33YVY5864   • Osteoarthritis    • Osteopenia    • Osteoporosis    • Paronychia, finger, unspecified laterality     LAST ASSESSED: 82KYQ5726   • Paroxysmal atrial fibrillation (HCC)     LAST ASSESSED: 10WTE7270   • Peripheral vascular disease (HCC)    • Plantar fasciitis    • Talus fracture     LAST ASSESSED: 30RUB5874   • Vascular headache      Past Surgical History:   Procedure Laterality Date   • APPENDECTOMY     • CATARACT EXTRACTION Bilateral    • CHOLECYSTECTOMY     • NEUROPLASTY / TRANSPOSITION MEDIAN NERVE AT CARPAL TUNNEL BILATERAL Bilateral     DECOMPRESSION   • OOPHORECTOMY Bilateral     age 80   • PELVIC FLOOR REPAIR  2014   • SINUS SURGERY     • TOTAL KNEE ARTHROPLASTY Bilateral      Social History     Socioeconomic History   • Marital status: /Civil Union     Spouse name: None   • Number of children: None   • Years of education: None   • Highest education level: None   Occupational History   • Occupation: retired   Tobacco Use   • Smoking status: Never Smoker   • Smokeless tobacco: Never Used   Vaping Use   • Vaping Use: Never used   Substance and Sexual Activity   • Alcohol use: Not Currently     Comment: SOCIAL   • Drug use: No   • Sexual activity: Not Currently     Partners: Male     Birth control/protection: Post-menopausal   Other Topics Concern   • None   Social History Narrative    DAILY COFFEE CONSUMPTION (2 CUPS/DAY)    EXERCISING REGULARLY     Social Determinants of Health     Financial Resource Strain: Not on file   Food Insecurity: Not on file   Transportation Needs: Not on file   Physical Activity: Not on file   Stress: Not on file   Social Connections: Not on file   Intimate Partner Violence: Not on file   Housing Stability: Not on file        Current Outpatient Medications:   •  albuterol (Proventil HFA) 90 mcg/act inhaler, Inhale 2 puffs every 6 (six) hours as needed for wheezing, Disp: 20 1 g, Rfl: 3  •  albuterol (PROVENTIL HFA,VENTOLIN HFA) 90 mcg/act inhaler, Inhale, Disp: , Rfl:   •  Cholecalciferol (VITAMIN D3) 1000 units CAPS, Take by mouth daily, Disp: , Rfl:   •  fluticasone (FLONASE) 50 mcg/act nasal spray, 2 sprays into each nostril daily, Disp: 3 Bottle, Rfl: 3  •  fluticasone (Flovent HFA) 110 MCG/ACT inhaler, Inhale 1 puff 2 (two) times a day Rinse mouth after use , Disp: 36 g, Rfl: 3  •  levothyroxine 50 mcg tablet, Take 1 tab daily expect 1 and 1/2 on sunday, Disp: 98 tablet, Rfl: 3  •  metoprolol succinate (TOPROL-XL) 100 mg 24 hr tablet, Take 1 tablet (100 mg total) by mouth daily, Disp: 90 tablet, Rfl: 3  •  montelukast (Singulair) 10 mg tablet, Take 1 tablet (10 mg total) by mouth daily, Disp: 90 tablet, Rfl: 3  •  potassium chloride (MICRO-K) 10 MEQ CR capsule, Take 1 capsule (10 mEq total) by mouth daily (Patient not taking: No sig reported), Disp: 180 capsule, Rfl: 3  •  predniSONE 10 mg tablet, 1 po bid for 1 week, then 1 po daily for 1 week, then stop (Patient not taking: No sig reported), Disp: 21 tablet, Rfl: 0  •  spironolactone (ALDACTONE) 50 mg tablet, Take 1 tablet (50 mg total) by mouth daily, Disp: 90 tablet, Rfl: 3  •  torsemide (DEMADEX) 20 mg tablet, Take 2 tablets (40 mg total) by mouth daily, Disp: 180 tablet, Rfl: 3  •  warfarin (COUMADIN) 5 mg tablet, Take one tablet daily or as directed by MD, Disp: 90 tablet, Rfl: 3  Family History   Problem Relation Age of Onset   • Pancreatic cancer Mother 80   • Heart failure Mother    • Coronary artery disease Family    • Breast cancer Family    • Other Family         BREAST DISORDER, GASTROINTESTINAL CANCER   • Uterine cancer Family    • Hyperlipidemia Family    • Osteoarthritis Family    • Ovarian cancer Family    • Brain cancer Son    • Stroke Father    • No Known Problems Sister    • No Known Problems Daughter    • No Known Problems Maternal Grandmother    • No Known Problems Maternal Grandfather    • No Known Problems Paternal Grandmother    • No Known Problems Paternal Grandfather    • Breast cancer Cousin 48   • Breast cancer Cousin 48   • Ovarian cancer Other 49      Review of Systems      Objective:  /77   Pulse 96   Temp 97 5 °F (36 4 °C)   Resp 16   LMP  (LMP Unknown)     Physical Exam        Wound 10/17/22 Traumatic Arm Posterior;Right;Upper (Active)   Wound Image   10/24/22 1447   Wound Description Epithelialization; Beefy red; Other (Comment); Yellow;Pink 10/17/22 0923   Susu-wound Assessment Purple;Fragile 10/17/22 0923   Wound Length (cm) 6 5 cm 10/17/22 0923   Wound Width (cm) 6 3 cm 10/17/22 0923   Wound Depth (cm) 0 1 cm 10/17/22 0923   Wound Surface Area (cm^2) 40 95 cm^2 10/17/22 0923   Wound Volume (cm^3) 4 095 cm^3 10/17/22 0923   Calculated Wound Volume (cm^3) 4 1 cm^3 10/17/22 0923   Drainage Amount Small 10/17/22 0923   Drainage Description Bloody 10/17/22 0923   Non-staged Wound Description Full thickness 10/17/22 0923   Dressing Status Intact 10/17/22 0923         Right arm skin tag has skin that is healing well, 2 areas with more eschar                Procedures             Wound Instructions:  Orders Placed This Encounter   Procedures   • Wound cleansing and dressings     Wound is essentially healed today to right arm  Protect with white sleeve for approx  2 weeks  Scabs will fall off, allow to fall off naturally  Do not pull scabs off  You are discharged from wound management center as of today  Please call office with any questions  Thank you for using our Center  Standing Status:   Future     Standing Expiration Date:   10/24/2023         Sherlyn Randle      Portions of the record may have been created with voice recognition software  Occasional wrong word or "sound a like" substitutions may have occurred due to the inherent limitations of voice recognition software  Read the chart carefully and recognize, using context, where substitutions have occurred

## 2022-11-08 ENCOUNTER — ANTICOAG VISIT (OUTPATIENT)
Dept: CARDIOLOGY CLINIC | Facility: CLINIC | Age: 87
End: 2022-11-08

## 2022-11-08 DIAGNOSIS — I48.21 PERMANENT ATRIAL FIBRILLATION (HCC): Primary | ICD-10-CM

## 2022-11-08 LAB — INR PPP: 2.4 (ref 0.84–1.19)

## 2022-11-11 ENCOUNTER — TELEPHONE (OUTPATIENT)
Dept: INTERNAL MEDICINE CLINIC | Facility: CLINIC | Age: 87
End: 2022-11-11

## 2022-11-11 NOTE — TELEPHONE ENCOUNTER
----- Message from Nina Ma MD sent at 11/11/2022  7:02 AM EST -----  Please call patient-let her know her 's blood work was entirely normal and he should keep his meds the same  Her blood work shows mild decrease in kidney function similar to before, normal vitamin-D level, normal blood count  Her hypothyroidism needs treatment-she is currently on levothyroxine 50 micrograms daily but 1 and half tablets 1 day per week-she should increase to 1 tablet daily but 1 and half tablets on Wednesday and Sunday  Also tell her her parathyroid hormone level is elevated related to her chronic kidney disease but does not need treatment at this level    We will be rechecking that in the future

## 2022-11-15 DIAGNOSIS — I48.20 CHRONIC ATRIAL FIBRILLATION (HCC): Primary | Chronic | ICD-10-CM

## 2022-11-15 RX ORDER — WARFARIN SODIUM 5 MG/1
TABLET ORAL
Qty: 90 TABLET | Refills: 3 | Status: SHIPPED | OUTPATIENT
Start: 2022-11-15

## 2022-12-09 ENCOUNTER — ANTICOAG VISIT (OUTPATIENT)
Dept: CARDIOLOGY CLINIC | Facility: CLINIC | Age: 87
End: 2022-12-09

## 2022-12-09 DIAGNOSIS — I48.21 PERMANENT ATRIAL FIBRILLATION (HCC): Primary | Chronic | ICD-10-CM

## 2022-12-09 LAB — INR PPP: 2.6 (ref 0.84–1.19)

## 2022-12-19 ENCOUNTER — TELEPHONE (OUTPATIENT)
Dept: INTERNAL MEDICINE CLINIC | Facility: CLINIC | Age: 87
End: 2022-12-19

## 2022-12-19 NOTE — TELEPHONE ENCOUNTER
----- Message from Elly Boudreaux MD sent at 12/19/2022  6:44 AM EST -----  Please let patient and her  know I will be making home visit on Monday December the 26 between 10:00 a m  and noon

## 2023-01-02 ENCOUNTER — IN HOME VISIT (OUTPATIENT)
Dept: INTERNAL MEDICINE CLINIC | Facility: CLINIC | Age: 88
End: 2023-01-02

## 2023-01-02 VITALS — DIASTOLIC BLOOD PRESSURE: 70 MMHG | SYSTOLIC BLOOD PRESSURE: 104 MMHG | RESPIRATION RATE: 14 BRPM | HEART RATE: 78 BPM

## 2023-01-02 DIAGNOSIS — J44.9 CHRONIC OBSTRUCTIVE PULMONARY DISEASE, UNSPECIFIED COPD TYPE (HCC): Chronic | ICD-10-CM

## 2023-01-02 DIAGNOSIS — R11.2 NAUSEA AND VOMITING, UNSPECIFIED VOMITING TYPE: Primary | ICD-10-CM

## 2023-01-02 DIAGNOSIS — I50.32 CHRONIC DIASTOLIC CONGESTIVE HEART FAILURE (HCC): Chronic | ICD-10-CM

## 2023-01-02 DIAGNOSIS — N25.81 SECONDARY HYPERPARATHYROIDISM (HCC): ICD-10-CM

## 2023-01-02 DIAGNOSIS — E03.9 HYPOTHYROIDISM, UNSPECIFIED TYPE: Chronic | ICD-10-CM

## 2023-01-02 DIAGNOSIS — M85.859 OSTEOPENIA OF HIP, UNSPECIFIED LATERALITY: Chronic | ICD-10-CM

## 2023-01-02 PROBLEM — R11.10 VOMITING: Status: ACTIVE | Noted: 2023-01-02

## 2023-01-02 NOTE — PROGRESS NOTES
Assessment/Plan:  #1 health maintenance-patient did receive influenza vaccine and bivalent COVID-vaccine  2  Recent episode of nausea-vomiting with loose stool-likely transient gastroenteritis-rule out other-at this point observing  3  Unintentional weight loss-patient feels as though she has lost 30 pounds over the last 2 years although this has been a gradual process  Thyroid function stable and being rechecked  Denies any new abdominal pain or other GI symptoms other than the transient symptoms just described  May be related to aging process with overall decline  Patient prefers we defer an additional investigation unless absolutely needed and at this point we will watch closely  4  Annye Inks she has developed osteoporosis  DEXA scan in May 2020 showed L-spine -2 1 and hip of -2 1  She was due for repeat but the study was canceled and she is now rescheduled in February  She has lost height and I am concerned about progression to osteoporosis  She will have follow-up DEXA scan as well as x-ray of thoracic and lumbar spine in search of occult compression fracture  PTH level is elevated but this is likely secondary hyperparathyroidism  We will be rechecking PTH as well as TSH phosphorus and vitamin D level  5  Recent labial abscess- patient wonders if it was not related to potential MRSA as she had this in the past   Was treated by the GYN with I&D and transient course of Bactrim although culture was not obtained  At this point we will watch carefully and if she has any recurrent soft tissue infections culture needs to be done  We reviewed that if she has recurrent MRSA she should be decolonized  6   Congestive heart [id number] by atrial fibrillation  Echo done in the fall thousand 21 shows grade 2 diastolic dysfunction, mild concentric hypertrophy, normal right ventricle, left atrium and right atrium mildly dilated, moderate TR with pulmonary artery pressure 52  She again denies symptoms of sleep apnea  Prior nuclear medicine study for amyloid equivocal but cardiology felt on review of her echo she has low suggestion for infiltrative myopathy  Remains on torsemide and spironolactone  Patient again defers an SGLT inhibitor and we reviewed the literature in reference to that  She appears euvolemic on exam and will be checking follow-up labs  7  Atrial fibrillation- in the past was on both Cardizem and metoprolol but cardiology withdrew Cardizem and patient now on higher dose of metoprolol  24-hour Holter showed average heart rate in the 60s with maximum 102  She is seeing cardiology every 6 months  8  Chronic renal failure-stage III- felt related to age-related nephron loss plus possible component of cardiorenal syndrome  Patient knows to avoid nonsteroidals  Watching carefully on current diuretic therapy  Follow-up laboratory testing ordered  9  Shortness of breath-combination of her CHF and COPD  Overall relatively stable  10  Asthma-COPD-stable on current regimen  Remains on Flovent  If she has increasing wheezing she knows to double her dose of Flovent during potential exacerbations of asthma/COPD  She had been treated with prednisone for flare over the past 2 years although chart lists that she had questionable reaction to prednisone in the past REVIEW NEXT VISIT  11  Hypothyroidism-as noted recent TSH was mildly elevated and thyroid supplement dose was increased-we will be rechecking  12  Elevated serum for free light chains- serum and urine protein electrophoresis without a spike  Might be related to her chronic renal failure    We will be repeating labs with serum protein electrophoresis, immunoglobulin levels and repeat free light chain ratio    All of the problems as per note of August 30, 2022    Medical regimen spironolactone 50 mg daily, Coumadin with dose adjustments, generic Singulair 10 mg daily, torsemide 40 mg daily, levothyroxine 50 mcg daily but 75 mcg on Wednesday and Sunday, Claritin 10 mg daily as needed, calcium with vitamin D daily, Ventolin inhaler as needed, metoprolol succinate 100 mg daily, Flovent 110 mcg 1 puff twice daily, multivitamin, vitamin D3/2000 units a day, fish oil 1200 mg a day, prior rare use of Vicodin 5-300 half tablet twice daily as needed for severe back pain which she has not used in weeks    Will be scheduled for DEXA scan, x-ray of thoracic and lumbar spine, CBC with differential chemistry profile PTH level vitamin D level phosphorus TSH serum protein electrophoresis serum for free light chain ratio serum immunoglobulin levels hemoglobin A1c  No problem-specific Assessment & Plan notes found for this encounter  Diagnoses and all orders for this visit:    Nausea and vomiting, unspecified vomiting type    Hypothyroidism, unspecified type    Chronic diastolic congestive heart failure ([facility] [address] )    Secondary hyperparathyroidism ([facility] [address] )    Chronic obstructive pulmonary disease, unspecified COPD type ([facility] [address] )    Osteopenia of hip, unspecified laterality          Subjective:      Patient ID: Alyson Lucero is a 80 y o  female  We discussed multiple issues today she had a mechanical fall on October 9  She was attempting to catch her  was about to fall when she fell herself striking her right arm against a coffee table with skin tear  She went to the ER  X-ray of elbow was read as no fracture but slight fluid around the joint  She was treated with appropriate therapy with follow-up healing  She was evaluated by her GYN for a left labial abscess  She required I&D  -Culture was not performed but she was treated with Bactrim  This has recovered  The patient states in the past she had a foot infection with complicating MRSA and she wondered if this was potentially related to the labial abscess  We reviewed about the possibility of recurrent MRSA    If she would have additional soft tissue infection she should have culture and if this is a recurrent issue Endor she has household transmission she would be a candidate for decolonization  She feels as though she has had weight loss  Over the last 1 to 2 years she has had 30 pounds of weight loss with a good appetite  She relates this to her aging process as she is now age 80  She has a known history of hypothyroidism and most recently thyroid supplement was increased because TSH was mildly elevated  She said this has been a slow process which she attributes to living in her independent living facility and advancing age  She has known osteopenia with concerned about osteoporosis  She feels as though she has lost height  She is scheduled for bone density study and at the time of the study will also have thoracic and lumbar spine x-ray to rule out occult compression fracture  She does have hyperparathyroidism but this is felt to be secondary hyperparathyroidism associated with her chronic renal failure  Follow-up vitamin D level and phosphorus level will be obtained  She said earlier this week she had lightheadedness with nausea with dry heaves  She had some increase in her stool and wondered about a transient GI process  She is concerned about overdiuresis with her use of torsemide and spironolactone for her diastolic congestive heart failure  She did not appear dehydrated on examination today but follow-up labs will be obtained  She does have some component of chronic renal failure as per prior notes  This is aggravated by her diuretic therapy  Outpatient laboratory testing was done 2 months ago when showed a BUN of 27 with a creatinine of 1 23 and an estimated GFR 42 TSH was 6 38 and is noted thyroid supplement dose was increased  Vitamin D at lab appointment 50  CBC was normal   PTH level was 140 with normal up to 88  This patient wanted to know their preferred analgesic agent   Because of their various comorbidities I recommended that this be acetaminophen  This patient has no history of chronic liver disease that would put them at greater risk for use of acetaminophen  This patient may use up to 500-650 mg of acetaminophen at a time and no more than 3 g a day total  Nonsteroidal anti-inflammatory agents have the potential to exacerbate hypertension, hypercoagulability, chronic renal failure, congestive heart failure, and various allergic tendencies  We have reviewed that in the past and she is aware to avoid nonsteroidals  In reference to her diastolic congestive heart failure she is now seeing cardiology every 6 months  Most recent echo was again reviewed in detail  We reviewed the literature showing that patients in this group are now treated with mineralocorticoid antagonist as well as SGLT inhibitor-she defers on SGLT inhibitor as she feels she is relatively stable in this regard  She knows to restrict salt      Show images for Echo complete w/ contrast if indicated  Study Details    This transthoracic echocardiogram was performed in the echo lab  This was a routine, outpatient study  Study quality was adequate  A complete 2D, color flow Doppler and spectral Doppler transthoracic echocardiogram was performed  The apical, parasternal, subcostal and suprasternal views were obtained  Indications  Priority: Routine  Dx: Chronic diastolic congestive heart failure (HCC) (I50 32 (ICD-10-CM))    History    COPD,Pleural Effusion,CHF,Hypertension,Atherosclerosis    Interpretation Summary       •  Left Ventricle: Left ventricular cavity size is normal  Systolic function is normal  Wall motion is normal  Diastolic function is moderately abnormal, consistent with grade II (pseudonormal) relaxation  Wall thickness is mild to moderately increased  There is mild concentric hypertrophy  •  Right Ventricle: Systolic function is normal  Right ventricular cavity size is normal   •  Left Atrium: The atrium is mildly dilated    •  Right Atrium: The atrium is mildly dilated  •  Mitral Valve: There is mild regurgitation  There is mild annular calcification  •  Tricuspid Valve: There is moderate regurgitation  •  Pulmonic Valve: There is mild regurgitation  •  Pulmonary Artery: The estimated pulmonary artery systolic pressure is 79 2 mmHg  The pulmonary artery systolic pressure is moderately increased          Findings    Left Ventricle Left ventricular cavity size is normal  Wall thickness is mild to moderately increased  There is mild concentric hypertrophy  Systolic function is normal   Wall motion is normal  Diastolic function is moderately abnormal, consistent with grade II (pseudonormal) relaxation  Right Ventricle Right ventricular cavity size is normal  Systolic function is normal  Wall thickness is normal   Left Atrium The atrium is mildly dilated  Right Atrium The atrium is mildly dilated  Aortic Valve The aortic valve is trileaflet  The leaflets are mildly thickened  The leaflets are mildly calcified  The leaflets exhibit normal mobility  There is no evidence of regurgitation  There is no evidence of stenosis  Mitral Valve There is mild annular calcification  There is mild regurgitation  There is no evidence of stenosis  The valve has normal function  Tricuspid Valve Tricuspid valve structure is normal  There is moderate regurgitation  There is no evidence of stenosis  Pulmonic Valve Pulmonic valve structure is normal  There is mild regurgitation  There is no evidence of stenosis  Ascending Aorta The aortic root is normal in size  IVC/SVC The inferior vena cava is dilated  Respirophasic changes were blunted (less than 50% variation)  Pericardium There is no pericardial effusion  The pericardium is normal in appearance  Pulmonary Artery The estimated pulmonary artery systolic pressure is 45 9 mmHg  The pulmonary artery systolic pressure is moderately increased      Left Ventricle Measurements    Function/Volumes  A4C EF   53 %      EF   57 %      Dimensions  LVIDd   3 9 cm      LVIDS   2 6 cm      IVSd   0 9 cm      LVPWd   1 cm      FS   33 %      Diastolic Filling  MV E' Tissue Velocity Septal   6 cm/s      E wave deceleration time   161 ms      MV Peak E Hank   115 cm/s      MV Peak A Hank   0 31 m/s           Right Ventricle Measurements    Dimensions  RVID d   2 8 cm      TV S'   1 2 cm/s           Mitral Valve Measurements    Stenosis  MV stenosis pressure 1/2 time   0 ms           Tricuspid Valve Measurements    RVSP Parameters  TR Peak Hank   3 2 m/s      Stenosis  TV peak gradient   41 mmHg           Aorta Measurements    Aortic Dimensions  Ao root   2 9 cm           Pulmonary Artery Measurements    PA Pressure  PASP   52 mmHg           Exam Details    Performed Procedure Technologist Supporting Staff Performing Physician  Echo complete Irena Chowdhury         Appointment Date/Status Modality Department   10/15/2021     Completed BE ECHO RM 2 BE CAR NON INV        Begin Exam End Exam  End Exam Questionnaires  10/15/2021  8:01 AM 10/15/2021  8:44 AM  PATIENT EDUCATION         All Reviewers List    Avi Campos MD on 10/16/2021  5:54 AM      Her spirits are relatively well  Immunization status reviewed  She did receive influenza vaccine as well as bivalent covid vaccine  Lengthy discussion was held today regarding the entire situation      The following portions of the patient's history were reviewed and updated as appropriate: allergies, current medications, past family history, past medical history, past social history, past surgical history and problem list     Review of Systems   Constitutional: Positive for fatigue and unexpected weight change  HENT: Negative  Respiratory: Positive for shortness of breath  Cardiovascular: Negative  Gastrointestinal: Positive for diarrhea and nausea  Endocrine: Negative  Genitourinary: Negative  Musculoskeletal: Negative  Skin: Negative  Neurological: Negative  Hematological: Negative  Psychiatric/Behavioral: Negative  Objective:      LMP  (LMP Unknown)          Physical Exam  Vitals reviewed  Constitutional:       General: She is not in acute distress  Appearance: Normal appearance  She is not ill-appearing, toxic-appearing or diaphoretic  HENT:      Head: Normocephalic and atraumatic  Right Ear: External ear normal       Left Ear: External ear normal       Nose: Nose normal  No congestion or rhinorrhea  Mouth/Throat:      Mouth: Mucous membranes are moist       Pharynx: Oropharynx is clear  No oropharyngeal exudate or posterior oropharyngeal erythema  Eyes:      General: No scleral icterus  Right eye: No discharge  Left eye: No discharge  Extraocular Movements: Extraocular movements intact  Pupils: Pupils are equal, round, and reactive to light  Neck:      Vascular: No carotid bruit  Cardiovascular:      Rate and Rhythm: Normal rate  Rhythm irregular  Pulses: Normal pulses  Heart sounds: Normal heart sounds  No murmur heard  No friction rub  No gallop  Comments: Cardiomegaly to percussion  Adequate upstroke  Questionable increase in P2  Irregular rhythm  Pulmonary:      Effort: Pulmonary effort is normal  No respiratory distress  Breath sounds: Normal breath sounds  No stridor  No wheezing, rhonchi or rales  Chest:      Chest wall: No tenderness  Abdominal:      General: Abdomen is flat  Bowel sounds are normal  There is no distension  Palpations: Abdomen is soft  There is no mass  Tenderness: There is no abdominal tenderness  There is no right CVA tenderness, left CVA tenderness, guarding or rebound  Hernia: No hernia is present  Musculoskeletal:         General: No swelling, tenderness, deformity or signs of injury  Normal range of motion  Cervical back: Normal range of motion and neck supple  No rigidity  No muscular tenderness        Right lower leg: No edema  Left lower leg: No edema  Comments: Some tendency towards kyphosis   Lymphadenopathy:      Cervical: No cervical adenopathy  Skin:     General: Skin is warm and dry  Coloration: Skin is not jaundiced or pale  Findings: No bruising, erythema, lesion or rash  Neurological:      General: No focal deficit present  Mental Status: She is alert and oriented to person, place, and time  Mental status is at baseline  Cranial Nerves: No cranial nerve deficit  Sensory: No sensory deficit  Motor: No weakness  Coordination: Coordination normal       Gait: Gait normal       Deep Tendon Reflexes: Reflexes normal    Psychiatric:         Mood and Affect: Mood normal          Behavior: Behavior normal          Thought Content:  Thought content normal          Judgment: Judgment normal

## 2023-01-04 ENCOUNTER — TELEPHONE (OUTPATIENT)
Dept: INTERNAL MEDICINE CLINIC | Facility: CLINIC | Age: 88
End: 2023-01-04

## 2023-01-04 DIAGNOSIS — I50.32 CHRONIC DIASTOLIC CONGESTIVE HEART FAILURE (HCC): Chronic | ICD-10-CM

## 2023-01-04 DIAGNOSIS — N25.81 SECONDARY HYPERPARATHYROIDISM (HCC): ICD-10-CM

## 2023-01-04 DIAGNOSIS — N18.30 STAGE 3 CHRONIC KIDNEY DISEASE, UNSPECIFIED WHETHER STAGE 3A OR 3B CKD (HCC): Chronic | ICD-10-CM

## 2023-01-04 DIAGNOSIS — I48.21 PERMANENT ATRIAL FIBRILLATION (HCC): Chronic | ICD-10-CM

## 2023-01-04 DIAGNOSIS — E03.9 HYPOTHYROIDISM, UNSPECIFIED TYPE: Primary | Chronic | ICD-10-CM

## 2023-01-04 DIAGNOSIS — M81.0 OSTEOPOROSIS, UNSPECIFIED OSTEOPOROSIS TYPE, UNSPECIFIED PATHOLOGICAL FRACTURE PRESENCE: ICD-10-CM

## 2023-01-04 DIAGNOSIS — R73.03 PREDIABETES: Chronic | ICD-10-CM

## 2023-01-04 DIAGNOSIS — M54.9 BACK PAIN, UNSPECIFIED BACK LOCATION, UNSPECIFIED BACK PAIN LATERALITY, UNSPECIFIED CHRONICITY: ICD-10-CM

## 2023-01-04 NOTE — TELEPHONE ENCOUNTER
ALL TESTS ORDERED EXCEPT FOR THE DEXA, THAT IS ALREADY ORDERED AND SCHEDULED FOR FEB    ALL PAPERS MAILED HOME TO PT WITH INSTRUCTIONS     PT IS AWARE

## 2023-01-04 NOTE — TELEPHONE ENCOUNTER
----- Message from Hazel Sheffield MD sent at 1/2/2023  5:31 AM EST -----  Please schedule follow-up labs over the next 2 weeks-patient has them drawn at [facility] which is done by [facility] labs-she needs CBC with differential, random chemistry profile, TSH, serum PTH, vitamin D level, serum phosphorus, serum protein electrophoresis, serum for free light chain ratio, immunoglobulins G, IgA and IgM, hemoglobin A1c-no fast with diagnosis of prediabetes, congestive heart failure with preserved ejection fraction, atrial fibrillation, chronic renal failure stage III, hyperparathyroidism, hypothyroidism--also patient is scheduled for a DEXA scan in February-please put in an order for x-ray of thoracic and lumbar spine with diagnosis of back pain and osteoporosis-explained to the patient that besides having the blood work and previously scheduled DEXA scan I am also ordering x-rays of thoracic and lumbar spine to be done at the time of the DEXA scan to rule out occult compression fractures associated with osteoporosis  Thank you  WTS

## 2023-01-10 ENCOUNTER — ANTICOAG VISIT (OUTPATIENT)
Dept: CARDIOLOGY CLINIC | Facility: CLINIC | Age: 88
End: 2023-01-10

## 2023-01-10 DIAGNOSIS — I48.21 PERMANENT ATRIAL FIBRILLATION (HCC): Primary | Chronic | ICD-10-CM

## 2023-01-10 LAB — INR PPP: 2.7 (ref 0.84–1.19)

## 2023-01-13 DIAGNOSIS — E03.9 HYPOTHYROIDISM, UNSPECIFIED TYPE: Chronic | ICD-10-CM

## 2023-01-13 DIAGNOSIS — J45.20 MILD INTERMITTENT ASTHMA WITHOUT COMPLICATION: ICD-10-CM

## 2023-01-13 RX ORDER — LEVOTHYROXINE SODIUM 0.05 MG/1
TABLET ORAL
Qty: 98 TABLET | Refills: 3 | Status: SHIPPED | OUTPATIENT
Start: 2023-01-13

## 2023-01-13 RX ORDER — MONTELUKAST SODIUM 10 MG/1
10 TABLET ORAL DAILY
Qty: 90 TABLET | Refills: 3 | Status: SHIPPED | OUTPATIENT
Start: 2023-01-13

## 2023-02-07 LAB
HBA1C MFR BLD HPLC: 6.2 %
INR PPP: 2.3 (ref 0.84–1.19)

## 2023-02-08 ENCOUNTER — ANTICOAG VISIT (OUTPATIENT)
Dept: CARDIOLOGY CLINIC | Facility: CLINIC | Age: 88
End: 2023-02-08

## 2023-02-08 DIAGNOSIS — I48.21 PERMANENT ATRIAL FIBRILLATION (HCC): Primary | Chronic | ICD-10-CM

## 2023-02-13 ENCOUNTER — TELEPHONE (OUTPATIENT)
Dept: INTERNAL MEDICINE CLINIC | Facility: CLINIC | Age: 88
End: 2023-02-13

## 2023-02-13 NOTE — TELEPHONE ENCOUNTER
----- Message from Terrie Villanueva MD sent at 2/13/2023  8:33 AM EST -----  Please call patient-recent lab work stable with creatinine of 1 32   TSH is mildly elevated and she needs increase in her thyroid dose-she is currently on levothyroxine 50 mcg daily and 75 mcg on Wednesday and Sunday-she is to change to 50 mcg daily but 75 mcg on Wednesday Friday and Sunday  thank you WTS

## 2023-02-14 ENCOUNTER — HOSPITAL ENCOUNTER (OUTPATIENT)
Dept: RADIOLOGY | Age: 88
Discharge: HOME | End: 2023-02-14

## 2023-02-14 ENCOUNTER — APPOINTMENT (OUTPATIENT)
Dept: RADIOLOGY | Age: 88
End: 2023-02-14

## 2023-02-14 VITALS — WEIGHT: 123 LBS | HEIGHT: 64 IN | BODY MASS INDEX: 21 KG/M2

## 2023-02-14 DIAGNOSIS — M54.9 BACK PAIN, UNSPECIFIED BACK LOCATION, UNSPECIFIED BACK PAIN LATERALITY, UNSPECIFIED CHRONICITY: ICD-10-CM

## 2023-02-14 DIAGNOSIS — N95.9 UNSPECIFIED MENOPAUSAL AND PERIMENOPAUSAL DISORDER: ICD-10-CM

## 2023-02-21 ENCOUNTER — TELEPHONE (OUTPATIENT)
Dept: INTERNAL MEDICINE CLINIC | Facility: CLINIC | Age: 88
End: 2023-02-21

## 2023-02-21 NOTE — TELEPHONE ENCOUNTER
----- Message from Vi Castellanos MD sent at 2/20/2023  4:29 AM EST -----  Please call patient-DEXA scan confirms now she has progressed to osteoporosis of the lumbar spine and near osteoporosis of the hip  X-ray of lumbar spine shows degenerative arthritis but no fractures    She is a candidate for injection with Prolia every 6 months for treatment of this -let her know we are seeking approval from Medicare for this and once this is completed she will then receive injections every 6 months which I will administer at her home WTS

## 2023-02-23 NOTE — TELEPHONE ENCOUNTER
PT CALLED, SAID THAT AFTER LOOKING UP INFORMATION ON PROLIA, SHE DOES NOT WANT TO GET IT    SHE SAID THAT IF YOU'D LIKE TO DISCUSS IT, TO GIVE HER A CALL

## 2023-03-07 LAB — INR PPP: 2.5 (ref 0.84–1.19)

## 2023-03-08 ENCOUNTER — ANTICOAG VISIT (OUTPATIENT)
Dept: CARDIOLOGY CLINIC | Facility: CLINIC | Age: 88
End: 2023-03-08

## 2023-03-08 DIAGNOSIS — I48.21 PERMANENT ATRIAL FIBRILLATION (HCC): Primary | Chronic | ICD-10-CM

## 2023-03-17 ENCOUNTER — OFFICE VISIT (OUTPATIENT)
Dept: CARDIOLOGY CLINIC | Facility: CLINIC | Age: 88
End: 2023-03-17

## 2023-03-17 VITALS
BODY MASS INDEX: 20.83 KG/M2 | HEART RATE: 80 BPM | WEIGHT: 122 LBS | SYSTOLIC BLOOD PRESSURE: 108 MMHG | DIASTOLIC BLOOD PRESSURE: 74 MMHG | HEIGHT: 64 IN | OXYGEN SATURATION: 92 %

## 2023-03-17 DIAGNOSIS — E78.2 MIXED HYPERLIPIDEMIA: Chronic | ICD-10-CM

## 2023-03-17 DIAGNOSIS — J44.9 CHRONIC OBSTRUCTIVE PULMONARY DISEASE, UNSPECIFIED COPD TYPE (HCC): Chronic | ICD-10-CM

## 2023-03-17 DIAGNOSIS — I45.2 BIFASCICULAR BUNDLE BRANCH BLOCK: Chronic | ICD-10-CM

## 2023-03-17 DIAGNOSIS — I10 PRIMARY HYPERTENSION: Chronic | ICD-10-CM

## 2023-03-17 DIAGNOSIS — N18.30 STAGE 3 CHRONIC KIDNEY DISEASE, UNSPECIFIED WHETHER STAGE 3A OR 3B CKD (HCC): Chronic | ICD-10-CM

## 2023-03-17 DIAGNOSIS — I50.32 CHRONIC DIASTOLIC CONGESTIVE HEART FAILURE (HCC): Chronic | ICD-10-CM

## 2023-03-17 DIAGNOSIS — I48.21 PERMANENT ATRIAL FIBRILLATION (HCC): Primary | Chronic | ICD-10-CM

## 2023-03-17 NOTE — PROGRESS NOTES
[facility]  Follow up note  Naty Mcbride 80 y o  female MRN: [id number]        Problems    1  Permanent atrial fibrillation (HCC)  Echo complete w/ contrast if indicated      2  Bifascicular bundle branch block        3  Chronic diastolic congestive heart failure (HCC)  Echo complete w/ contrast if indicated      4  Mixed hyperlipidemia        5  Chronic obstructive pulmonary disease, unspecified COPD type ([facility] [address] )        6  Primary hypertension        7  Stage 3 chronic kidney disease, unspecified whether stage 3a or 3b CKD (HCC)            Impression:    Chronic diastolic CHF   Euvolemic, on spironolactone and torsemide with stable renal function  But continues to complain of dyspnea, cough, sometimes sputum which I think is probably noncardiac  She does have grade 2 diastolic dysfunction without significant LVH, and a SPECT TTR study was equivocal in 2019    Permanent atrial fibrillation  Rates remain reasonably well controlled on metoprolol succinate 100 mg a day  Previously on diltiazem which we discontinued because of low blood pressure  He continues on appropriate anticoagulation with warfarin, no significant bleeding    Bifascicular bundle branch block   Nothing to suggest higher degree AV block    Mixed hyperlipidemia   a avoid statin treatment at age 80    Plan:    She examines euvolemic  A-fib is well rate controlled  She had grade 2 diastolic dysfunction and an equivocal TTR amyloidosis SPECT study in 2019, I think it is reasonable with her ongoing dyspnea to repeat an echo  Otherwise no change to any current medications and I will see her back in 6 months as long as her echo shows no other changes        HPI:   Naty Mcbride is a 80y o  year old female  With chronic diastolic CHF, chronic atrial fibrillation on a rate control strategy and anticoagulation with warfarin, bifascicular bundle branch block, mixed hyperlipidemia, and a prior negative infiltrative cardiomyopathy workup returns for a follow-up visit  Creatinine is stable  CKD 3, creatinine 1 3  Blood pressure low normal but asymptomatic  Cough, shortness of breath with exertion and occasional sputum continue to be her complaints  She has grade 2 diastolic dysfunction without LVH and an equivocal SPECT T TR study in 2019  Her kappa lambda studies were all normal   A-fib is well rate controlled, INR remains perfectly stable and therapeutic on warfarin  She has no strokelike complaints  Review of Systems   Constitutional: Negative for appetite change, diaphoresis, fatigue and fever  Respiratory: Positive for cough and shortness of breath  Negative for chest tightness and wheezing  Cardiovascular: Negative for chest pain, palpitations and leg swelling  Gastrointestinal: Negative for abdominal pain and blood in stool  Musculoskeletal: Negative for arthralgias and joint swelling  Skin: Negative for rash  Neurological: Negative for dizziness, syncope and light-headedness         Past Medical History:   Diagnosis Date   • Abnormal ultrasound     RESOLVED: [id number]   • Asthma with acute exacerbation     LAST ASSESSED: [id number]   • Asymptomatic menopausal state    • Atherosclerosis    • Atrial fibrillation (HCC)    • Chronic obstructive lung disease (HCC)    • Congestive heart failure (CHF) (HCC)    • Congestive heart failure (HCC)     LAST ASSESSED: [id number]   • Cuboid fracture     LAST ASSESSED: 22CFX6571   • Dyspepsia    • GERD (gastroesophageal reflux disease)    • High risk medication use     LAST ASSESSED: 85WPT9217   • Hyperlipidemia    • Hypertension    • Hypertension    • Hypokalemia    • Hypothyroidism    • Impacted cerumen of both ears     LAST ASSESSED: 40LTM7589   • Impacted cerumen of right ear     LAST ASSESSED: 56ODO5640   • Influenza     LAST ASSESSED: 79NFA7219   • IPMN (intraductal papillary mucinous neoplasm)     RESOLVED: 78IRF9368   • Limb swelling     LAST ASSESSED: 11DAX5366   • Navicular fracture of ankle     LAST ASSESSED: 54MPN9195   • Onychomycosis     LAST ASSESSED: 91XTR6693   • Osteoarthritis    • Osteopenia    • Osteoporosis    • Paronychia, finger, unspecified laterality     LAST ASSESSED: 10DLJ4155   • Paroxysmal atrial fibrillation (HCC)     LAST ASSESSED: 55FYO4887   • Peripheral vascular disease (HCC)    • Plantar fasciitis    • Talus fracture     LAST ASSESSED: 58MCL6052   • Vascular headache      Social History     Substance and Sexual Activity   Alcohol Use Not Currently    Comment: SOCIAL     Social History     Substance and Sexual Activity   Drug Use No     Social History     Tobacco Use   Smoking Status Never   Smokeless Tobacco Never       Allergies:   Allergies   Allergen Reactions   • Asa [Aspirin]    • Nsaids        Medications:     Current Outpatient Medications:   •  albuterol (Proventil HFA) 90 mcg/act inhaler, Inhale 2 puffs every 6 (six) hours as needed for wheezing, Disp: 20 1 g, Rfl: 3  •  Cholecalciferol (VITAMIN D3) 1000 units CAPS, Take 2,000 Units by mouth daily, Disp: , Rfl:   •  fluticasone (FLONASE) 50 mcg/act nasal spray, 2 sprays into each nostril daily, Disp: 3 Bottle, Rfl: 3  •  fluticasone (FLOVENT HFA) 110 MCG/ACT inhaler, INHALE 1 PUFF TWICE DAILY (RINSE MOUTH AFTER USE), Disp: 24 g, Rfl: 3  •  levothyroxine 50 mcg tablet, Take 1 tab daily expect 1 and 1/2 on Sunday and Wednesday (Patient taking differently: Take 1 tab daily expect 1 and 1/2 on Sunday, Wednesday, and Friday), Disp: 98 tablet, Rfl: 3  •  metoprolol succinate (TOPROL-XL) 100 mg 24 hr tablet, Take 1 tablet (100 mg total) by mouth daily, Disp: 90 tablet, Rfl: 3  •  montelukast (Singulair) 10 mg tablet, Take 1 tablet (10 mg total) by mouth daily, Disp: 90 tablet, Rfl: 3  •  spironolactone (ALDACTONE) 50 mg tablet, Take 1 tablet (50 mg total) by mouth daily, Disp: 90 tablet, Rfl: 3  •  torsemide (DEMADEX) 20 mg tablet, Take 2 tablets (40 mg total) by mouth daily, Disp: 180 tablet, Rfl: 3  • warfarin (COUMADIN) 5 mg tablet, TAKE 1/2 TO 1 TABLET BY MOUTH OR AS ORDERED BY PHYSICIAN , Disp: 90 tablet, Rfl: 3  •  albuterol (PROVENTIL HFA,VENTOLIN HFA) 90 mcg/act inhaler, Inhale, Disp: , Rfl:   •  potassium chloride (MICRO-K) 10 MEQ CR capsule, Take 1 capsule (10 mEq total) by mouth daily (Patient not taking: Reported on 7/11/2022), Disp: 180 capsule, Rfl: 3  •  predniSONE 10 mg tablet, 1 po bid for 1 week, then 1 po daily for 1 week, then stop (Patient not taking: Reported on 10/7/2022), Disp: 21 tablet, Rfl: 0      Vitals:    03/17/23 1102   BP: 108/74   Pulse: 80   SpO2: 92%     Weight (last 2 days)     Date/Time Weight    03/17/23 1102 55 3 (122)        Physical Exam  Constitutional:       General: She is not in acute distress  Appearance: She is not diaphoretic  HENT:      Head: Normocephalic and atraumatic  Eyes:      General: No scleral icterus  Conjunctiva/sclera: Conjunctivae normal    Neck:      Vascular: No JVD  Cardiovascular:      Rate and Rhythm: Normal rate  Rhythm irregular  Heart sounds: Normal heart sounds  No murmur heard  Pulmonary:      Effort: Pulmonary effort is normal  No respiratory distress  Breath sounds: Normal breath sounds  No wheezing or rales  Musculoskeletal:         General: No tenderness  Cervical back: Normal range of motion  Right lower leg: No edema  Left lower leg: No edema  Skin:     General: Skin is warm and dry  Laboratory Studies:    Labs personally reviewed     Cardiac testing:     EKG reviewed personally:   No results found for this visit on 03/17/23        Echocardiogram:    58/21- EF 47%, diastolic dysfunction, un graded due to AFib, normal RV size and function, moderate  Left and right atrial dilatation, mild MR, mild TR, mildly elevated pulmonary pressures  10/21-personally reviewed -EF 48%, diastolic dysfunction, left and right atrial dilation, mild-to-moderate TR, moderately elevated pulmonary pressures, dilated IVC       TTR SPECT   11/19-1 26 heart to lung ratio, equivocal to negative    Stress tests:      Catheterization:      Holter:      10/21-excellent AFib rate control, no significant pauses    Isaac MD Mukesh    Portions of the record may have been created with voice recognition software  Occasional wrong word or "sound a like" substitutions may have occurred due to the inherent limitations of voice recognition software  Read the chart carefully and recognize, using context, where substitutions have occurred

## 2023-03-25 ENCOUNTER — NURSE TRIAGE (OUTPATIENT)
Dept: OTHER | Facility: OTHER | Age: 88
End: 2023-03-25

## 2023-03-25 ENCOUNTER — TELEMEDICINE (OUTPATIENT)
Dept: INTERNAL MEDICINE CLINIC | Facility: CLINIC | Age: 88
End: 2023-03-25

## 2023-03-25 DIAGNOSIS — U07.1 COVID-19 VIRUS INFECTION: Primary | ICD-10-CM

## 2023-03-25 NOTE — TELEPHONE ENCOUNTER
Reason for Disposition  • Message left with person in household      Protocols used: NO CONTACT OR DUPLICATE CONTACT CALL-ADULT-

## 2023-03-25 NOTE — TELEPHONE ENCOUNTER
Regarding: COVID positive/missed a call earlier/was napping  ----- Message from Regan Azul sent at 3/25/2023  6:43 PM EDT -----  Pt called "I called earlier about testing positive for COVID   I was napping and was asked to call in an hour when I got up "

## 2023-03-25 NOTE — TELEPHONE ENCOUNTER
Reason for Disposition  • HIGH RISK for severe COVID complications (e g , weak immune system, age > 59 years, obesity with BMI > 25, pregnant, chronic lung disease or other chronic medical condition)  (Exception: Already seen by PCP and no new or worsening symptoms )    Answer Assessment - Initial Assessment Questions  Were you within 6 feet or less, for up to 15 minutes or more with a person that has a confirmed COVID-19 test?   Denies    What was the date of your exposure? N/A    Are you experiencing any symptoms attributed to the virus?  (Assess for SOB, cough, fever, difficulty breathing)   Yes, fatigue, runny nose, temp 99, congestion, sometimes SOB  Cough with white phlegm, symptoms started yesterday, tested positive today    HIGH RISK: Do you have any history heart or lung conditions, weakened immune system, diabetes, Asthma, CHF, HIV, COPD, Chemo, renal failure, sickle cell, etc?   Yes, CHF, afib, Asthma     PREGNANCY: Are you pregnant or did you recently give birth?    N/A    VACCINE: "Have you gotten the COVID-19 vaccine?" If Yes ask: "Which one, how many shots, when did you get it?"   yes    Protocols used: CORONAVIRUS (COVID-19) DIAGNOSED OR SUSPECTED-ADULT-

## 2023-03-25 NOTE — TELEPHONE ENCOUNTER
Regarding: [facility]-Covid positive  ----- Message from Umang Trotter sent at 3/25/2023  2:08 PM EDT -----  Pt called, " I just tested positive for covid   I am experiencing tiredness, runny nose, cough, and bringing up mucus "

## 2023-03-26 NOTE — PATIENT INSTRUCTIONS
Problem List Items Addressed This Visit          Other    COVID-19 virus infection - Primary     Reviewed with pt treatment options for COVID including oral antiviral Paxlovid  GFR is 38, reviewed meds and possible interactions of meds with pt, possibility of rebound of symptoms, reviewed possible side effects of Paxlovid  Pt reports that she doesn't feel that bad, and is not enthusiastic about taking a medication for COVID  She would rather ride out the illness  I explained that Paxlovid can be given within 5 days of onset of symptoms should she change her mind  I encouraged pt to reach out with any other questions or concerns should they arise over the weekend, and to follow up with PCP with any issues after that  Pt understood instructions and was satisfied with the plan

## 2023-03-26 NOTE — PROGRESS NOTES
COVID-19 Outpatient Progress Note    Assessment/Plan:    Problem List Items Addressed This Visit        Other    COVID-19 virus infection - Primary     Reviewed with pt treatment options for COVID including oral antiviral Paxlovid  GFR is 38, reviewed meds and possible interactions of meds with pt, possibility of rebound of symptoms, reviewed possible side effects of Paxlovid  Pt reports that she doesn't feel that bad, and is not enthusiastic about taking a medication for COVID  She would rather ride out the illness  I explained that Paxlovid can be given within 5 days of onset of symptoms should she change her mind  I encouraged pt to reach out with any other questions or concerns should they arise over the weekend, and to follow up with PCP with any issues after that  Pt understood instructions and was satisfied with the plan  Disposition:     I have spent a total time of 20 minutes on the day of the encounter for this patient including risks and benefits of treatment options, instructions for management, patient and family education, impressions, counseling/coordination of care, documenting in the medical record, reviewing/ordering tests, medicine, procedures and obtaining or reviewing history  Encounter provider: Amber Roque MD     Provider located at: [address] [place] [id number]     Recent Visits  No visits were found meeting these conditions  Showing recent visits within past 7 days and meeting all other requirements  Today's Visits  Date Type Provider Dept   03/25/23 Telemedicine [facility] today's visits and meeting all other requirements  Future Appointments  No visits were found meeting these conditions  Showing future appointments within next 150 days and meeting all other requirements     This virtual check-in was done via telephone and she agrees to proceed      Patient agrees to participate in a virtual check in via telephone or video visit instead of presenting to the office to address urgent/immediate medical needs  Patient is aware this is a billable service  She acknowledged consent and understanding of privacy and security of the video platform  The patient has agreed to participate and understands they can discontinue the visit at any time  After connecting through Telephone, the patient was identified by name and date of birth  Fabiola Baldwin was informed that this was a telemedicine visit and that the exam was being conducted confidentially over secure lines  My office door was closed  No one else was in the room  Fabiola Baldwin acknowledged consent and understanding of privacy and security of the telemedicine visit  I informed the patient that I have reviewed her record in Epic and presented the opportunity for her to ask any questions regarding the visit today  The patient agreed to participate  It was my intent to perform this visit via video technology but the patient was not able to do a video connection so the visit was completed via audio telephone only  Verification of patient location:  Patient is located in the following state in which I hold an active license: [place]    Subjective:   Fabiola Baldwin is a 80 y o  female who is concerned about COVID-19  Patient's symptoms include fatigue, nasal congestion, rhinorrhea, cough, diarrhea (yesterday) and myalgias  Patient denies fever (low grade), chills, sore throat, shortness of breath, chest tightness, abdominal pain, nausea, vomiting and headaches  - Date of symptom onset: 3/24/2023      COVID-19 vaccination status: Fully vaccinated with booster    Lab Results   Component Value Date    SARSCOV2 Negative 12/20/2021       Review of Systems   Constitutional: Positive for fatigue  Negative for chills and fever (low grade)  HENT: Positive for congestion and rhinorrhea  Negative for sore throat  Respiratory: Positive for cough  Negative for chest tightness and shortness of breath  Gastrointestinal: Positive for diarrhea (yesterday)  Negative for abdominal pain, nausea and vomiting  Musculoskeletal: Positive for myalgias  Neurological: Negative for headaches  Current Outpatient Medications on File Prior to Visit   Medication Sig   • albuterol (Proventil HFA) 90 mcg/act inhaler Inhale 2 puffs every 6 (six) hours as needed for wheezing   • Cholecalciferol (VITAMIN D3) 1000 units CAPS Take 2,000 Units by mouth daily   • fluticasone (FLONASE) 50 mcg/act nasal spray 2 sprays into each nostril daily   • fluticasone (FLOVENT HFA) 110 MCG/ACT inhaler INHALE 1 PUFF TWICE DAILY (RINSE MOUTH AFTER USE)   • levothyroxine 50 mcg tablet Take 1 tab daily expect 1 and 1/2 on Sunday and Wednesday (Patient taking differently: Take 1 tab daily expect 1 and 1/2 on Sunday, Wednesday, and Friday)   • metoprolol succinate (TOPROL-XL) 100 mg 24 hr tablet Take 1 tablet (100 mg total) by mouth daily   • montelukast (Singulair) 10 mg tablet Take 1 tablet (10 mg total) by mouth daily   • spironolactone (ALDACTONE) 50 mg tablet Take 1 tablet (50 mg total) by mouth daily   • torsemide (DEMADEX) 20 mg tablet Take 2 tablets (40 mg total) by mouth daily   • warfarin (COUMADIN) 5 mg tablet TAKE 1/2 TO 1 TABLET BY MOUTH OR AS ORDERED BY PHYSICIAN  • albuterol (PROVENTIL HFA,VENTOLIN HFA) 90 mcg/act inhaler Inhale   • potassium chloride (MICRO-K) 10 MEQ CR capsule Take 1 capsule (10 mEq total) by mouth daily (Patient not taking: Reported on 7/11/2022)   • predniSONE 10 mg tablet 1 po bid for 1 week, then 1 po daily for 1 week, then stop (Patient not taking: Reported on 10/7/2022)       Objective:    LMP  (LMP Unknown)      Physical Exam  Constitutional:       General: She is not in acute distress  Pulmonary:      Effort: Pulmonary effort is normal  No respiratory distress  Neurological:      Mental Status: She is alert  Jeanna Huertas MD

## 2023-03-26 NOTE — ASSESSMENT & PLAN NOTE
Reviewed with pt treatment options for COVID including oral antiviral Paxlovid  GFR is 38, reviewed meds and possible interactions of meds with pt, possibility of rebound of symptoms, reviewed possible side effects of Paxlovid  Pt reports that she doesn't feel that bad, and is not enthusiastic about taking a medication for COVID  She would rather ride out the illness  I explained that Paxlovid can be given within 5 days of onset of symptoms should she change her mind  I encouraged pt to reach out with any other questions or concerns should they arise over the weekend, and to follow up with PCP with any issues after that  Pt understood instructions and was satisfied with the plan

## 2023-04-03 ENCOUNTER — TELEPHONE (OUTPATIENT)
Dept: INTERNAL MEDICINE CLINIC | Facility: CLINIC | Age: 88
End: 2023-04-03

## 2023-04-03 NOTE — TELEPHONE ENCOUNTER
This is Jesus marino  My YOB: 1932  Call back phone [phone]  I'm wondering  I tested positive for COVID on the 25th  Of course I just tested myself again and it's still positive  I want to know, I'm wondering, as long as I test positive, am I contagious? I'm assuming I am  Both my  and I have it and we don't have any fever, just a cough and general aches and pains at this point and I'm just wondering I guess, how long this is going to go on  I appreciate a call back when you have time  I'm in no hurry  I'll be here  Thank you   Bye

## 2023-04-06 ENCOUNTER — HOSPITAL ENCOUNTER (OUTPATIENT)
Dept: NON INVASIVE DIAGNOSTICS | Facility: CLINIC | Age: 88
Discharge: HOME | End: 2023-04-06

## 2023-04-06 VITALS
WEIGHT: 122 LBS | HEIGHT: 64 IN | DIASTOLIC BLOOD PRESSURE: 74 MMHG | BODY MASS INDEX: 20.83 KG/M2 | SYSTOLIC BLOOD PRESSURE: 108 MMHG | HEART RATE: 95 BPM

## 2023-04-06 DIAGNOSIS — I50.32 CHRONIC DIASTOLIC CONGESTIVE HEART FAILURE (HCC): Chronic | ICD-10-CM

## 2023-04-06 DIAGNOSIS — I48.21 PERMANENT ATRIAL FIBRILLATION (HCC): Chronic | ICD-10-CM

## 2023-04-06 LAB
AORTIC ROOT: 3.1 CM
APICAL FOUR CHAMBER EJECTION FRACTION: 64 %
ASCENDING AORTA: 2.5 CM
AV LVOT MEAN GRADIENT: 1 MMHG
AV LVOT PEAK GRADIENT: 2 MMHG
DOP CALC LVOT PEAK VEL VTI: 12.38 CM
DOP CALC LVOT PEAK VEL: 0.68 M/S
FRACTIONAL SHORTENING: 27 (ref 28–44)
INTERVENTRICULAR SEPTUM IN DIASTOLE (PARASTERNAL SHORT AXIS VIEW): 1.1 CM
INTERVENTRICULAR SEPTUM: 1.1 CM (ref 0.6–1.1)
LAAS-AP2: 25.6 CM2
LAAS-AP4: 20.9 CM2
LEFT ATRIUM SIZE: 4.1 CM
LEFT INTERNAL DIMENSION IN SYSTOLE: 2.4 CM (ref 2.1–4)
LEFT VENTRICULAR INTERNAL DIMENSION IN DIASTOLE: 3.3 CM (ref 3.5–6)
LEFT VENTRICULAR POSTERIOR WALL IN END DIASTOLE: 1.2 CM
LEFT VENTRICULAR STROKE VOLUME: 23 ML
LVSV (TEICH): 23 ML
RA PRESSURE ESTIMATED: 15 MMHG
RIGHT ATRIUM AREA SYSTOLE A4C: 23.5 CM2
RIGHT VENTRICLE ID DIMENSION: 4 CM
RV PSP: 51 MMHG
SL CV LEFT ATRIUM LENGTH A2C: 6.1 CM
SL CV LV EF: 60
SL CV PED ECHO LEFT VENTRICLE DIASTOLIC VOLUME (MOD BIPLANE) 2D: 43 ML
SL CV PED ECHO LEFT VENTRICLE SYSTOLIC VOLUME (MOD BIPLANE) 2D: 21 ML
TR MAX PG: 36 MMHG
TR PEAK VELOCITY: 3 M/S
TRICUSPID ANNULAR PLANE SYSTOLIC EXCURSION: 1.1 CM
TRICUSPID VALVE PEAK REGURGITATION VELOCITY: 3.37 M/S

## 2023-04-07 ENCOUNTER — ANTICOAG VISIT (OUTPATIENT)
Dept: CARDIOLOGY CLINIC | Facility: CLINIC | Age: 88
End: 2023-04-07

## 2023-04-07 DIAGNOSIS — I48.21 PERMANENT ATRIAL FIBRILLATION (HCC): Primary | Chronic | ICD-10-CM

## 2023-04-07 LAB — INR PPP: 3.3 (ref 0.84–1.19)

## 2023-04-25 ENCOUNTER — IN HOME VISIT (OUTPATIENT)
Dept: INTERNAL MEDICINE CLINIC | Facility: CLINIC | Age: 88
End: 2023-04-25

## 2023-04-25 VITALS — DIASTOLIC BLOOD PRESSURE: 78 MMHG | SYSTOLIC BLOOD PRESSURE: 110 MMHG | HEART RATE: 92 BPM | RESPIRATION RATE: 16 BRPM

## 2023-04-25 DIAGNOSIS — N18.30 STAGE 3 CHRONIC KIDNEY DISEASE, UNSPECIFIED WHETHER STAGE 3A OR 3B CKD (HCC): Chronic | ICD-10-CM

## 2023-04-25 DIAGNOSIS — N90.7 VULVAR CYSTS: ICD-10-CM

## 2023-04-25 DIAGNOSIS — I10 PRIMARY HYPERTENSION: Chronic | ICD-10-CM

## 2023-04-25 DIAGNOSIS — J44.9 CHRONIC OBSTRUCTIVE PULMONARY DISEASE, UNSPECIFIED COPD TYPE (HCC): Primary | Chronic | ICD-10-CM

## 2023-04-25 DIAGNOSIS — I48.21 PERMANENT ATRIAL FIBRILLATION (HCC): Chronic | ICD-10-CM

## 2023-04-25 DIAGNOSIS — I50.32 CHRONIC DIASTOLIC CONGESTIVE HEART FAILURE (HCC): Chronic | ICD-10-CM

## 2023-04-25 NOTE — PROGRESS NOTES
Assessment/Plan:  #1 recently noted cough-some purulence can be nocturnal-suspect related to exacerbation of COPD  As noted also recently had COVID and did not receive antiviral therapy  She will increase her Flovent to 110 mcg-2 puffs twice daily  She will add doxycycline 100 mg twice daily for 1 week  We reviewed that often only 5 days of therapy is used in the situation but because of her simultaneous issue with labial cyst we will treat with doxycycline for 1 week has baseline asthma-COPD  She had been treated with prednisone for flare of this over the past 2 years but feels she had questionable reaction to prednisone in the past-REVIEW NEXT VISIT  2  Congestive heart failure-diastolic- with elevated BNP-aggravated by atrial fibrillation  Echo done in April 2023 shows EF of 60% with wall thickness mildly increased, moderate right atrial enlargement, estimated right ventricular systolic pressure of 51  Cardiology feels she is stable on current regimen of torsemide and spironolactone  Could consider addition of Jardiance 10 mg per the literature to help decrease hospitalization rate although this has not been proven to affect overall mortality  Would have to watch this however with her history of recurrent labial cyst-at this point holding off on prescribing that  4  Atrial fibrillation-in the past was on both Cardizem and metoprolol per cardiology withdrew Cardizem and has been on higher dose of metoprolol  Follow-up 24-hour Holter showed average heart rate in the 60 with maximum 102  Continues to see cardiology every 6 months-remains on warfarin which is monitored by cardiology  5  Recurrent labial cyst-prior labial abscess  Was treated by the GYN with I&D and transient course of Bactrim although culture was not obtained    There was concerned about potential MRSA as she had this in the past   As noted treating with doxycycline for that as well as her COPD with exacerbation-if unimproved may require referral back to GYN  She is applying warm soaks  6  Osteoporosis-DEXA scan done in February 2023 shows L-spine -2 7 and hip of -2 2  Prolia therapy recommended but patient was reluctant to proceed-now agreeable  Remains on vitamin D  Knows to have calcium intake of 1200 mg/day or more  We will be proceeding with potential Prolia injection pending approval by Medicare  7  Previously noted unintentional weight loss-patient felt as though she is lost 30 pounds over the last 2 years although this has been a gradual process  Thyroid function stable  Weight unchanged over the past several months  8  Chronic renal failure-stage IIIb-felt related to age-related nephron loss plus possible component of cardiorenal syndrome  Knows to avoid nonsteroidals  Most recent GFR stable at 38 with a BUN of 37 creatinine of 1 32   9   Shortness of breath-combination of WCW-MHQB-kafjhar relatively stable  10  Hypothyroidism-TSH had been increased and thyroid dose was increased  11  Elevated serum for free light chains- immunofixation negative  Prior urine electrophoresis without a spike  Some component may be related to chronic renal failure    Rule out light chain MGUS-we will continue to monitor    Follow the problems as per note of August 30, 2022    Medical regimen doxycycline 100 mg twice daily for a week, spironolactone 50 mg daily, Coumadin with dose adjustments,, potentially beginning Prolia every 6 months, Singulair 10 mg daily, torsemide 40 mg daily, levothyroxine 50 mcg daily but 75 mcg 3 days/week, Claritin 10 mg daily as needed, calcium with vitamin D daily, Ventolin inhaler as needed, metoprolol succinate 100 mg daily, Flovent 110 mcg - 2 puffs twice daily, multivitamin, vitamin D3/2000 units a day, fish oil 1200 mg a day, prior rare use of Vicodin-5-300 half tablet twice daily for severe back pain which he has not used in months  No problem-specific Assessment & Plan notes found for this encounter  Diagnoses and all orders for this visit:    Chronic obstructive pulmonary disease, unspecified COPD type ([facility] )    Chronic diastolic congestive heart failure (HCC)    Permanent atrial fibrillation ([facility] )    Stage 3 chronic kidney disease, unspecified whether stage 3a or 3b CKD ([place] [address] )    Vulvar cysts    Primary hypertension          Subjective:      Patient ID: Romina Figueroa is a 80 y o  female  Patient seen as a home visit  Having several ongoing issues  She has a known history of underlying COPD  She has noted cough over the last several weeks  Can intermittently be purulent  Does awaken her at night  Has heard some wheezing  Had recent COVID without antiviral therapy  This may have made the situation worse  She is on baseline Flovent 110 mcg - 1 puff twice daily  She has not had any definite fever  She has known associated CHF  She saw cardiology felt she was hemodynamically stable  We reviewed that she may have had an exacerbation of her underlying COPD and we elected to treat with antibiotic therapy  May require oral corticosteroids but holding off at present with her history of CHF  Has a known history of heart failure with preserved ejection fraction with elevated BNP  Also has atrial fibrillation  On therapy with beta-blocker  Previously was on Cardizem for rate control but this was DC'd and now on higher dose of beta-blocker  On torsemide and spironolactone  We reviewed the literature regarding use of SGLT inhibitors in patients with heart failure with preserved ejection fraction and talked about addition of Jardiance 10 mg daily  We will hold off on this at present  This patient denies any systemic symptoms  Specifically there has been no evidence of fever, night sweats, significant weight loss or significant decrease in appetite  She has had recurrence of her labial cyst   In the past this was treated with Bactrim and was drained by the GYN    She wondered if she could retreat with Bactrim  I recommended that we use antibiotic therapy that will help both the labial area as well as her COPD exacerbation we elected to proceed with doxycycline  Recent labs reviewed in detail  BUN 37 with a creatinine of 1 3 with a GFR 38, A1c 6 2  Phosphorus was normal   TSH had increased to 8 32 and thyroid dose was increased  Vitamin D level 51  CBC normal   Immunoglobulins normal   Kappa free light chain was elevated  Protein electrophoresis showed abnormal gamma area but minimal pheresis negative  She had a DEXA scan which reviewed in detail  L-spine -2 7 and hip of -2 1  DXA SCAN     CLINICAL HISTORY: 80 years postmenopausal female   OTHER RISK FACTORS:  Torsemide therapy      PHARMACOLOGIC THERAPY FOR OSTEOPOROSIS:  None currently      TECHNIQUE: Bone densitometry was performed using a H[employer] Horizon A  bone densitometer  Regions of interest appear properly placed          COMPARISON: 5/26/2020      RESULTS:      LUMBAR SPINE  Level: L1-L4 :   BMD:  0 754  gm/cm2   T-score: -2 7         LEFT  TOTAL HIP:   BMD:  0 627  gm/cm2    T-score:  -2 6     LEFT  FEMORAL NECK:   BMD:  0 601  gm/cm2   T score: -2 2      RIGHT  FOREARM:    33% RADIUS BMD:  0 521  gm/cm2  T-score:  -2 8         IMPRESSION:     1  Osteoporosis      2  Since a DXA study from 5/26/2020, there has been:  A  STATISTICALLY SIGNIFICANT DECREASE in bone mineral density of  0 066 g/cm2 (8 1%) in the lumbar spine  A  STATISTICALLY SIGNIFICANT DECREASE in bone mineral density of  0 051 g/cm2 (7 5%) in the left total hip         3  The 10 year risk of hip fracture is 4 0% with the 10 year risk of major osteoporotic fracture being 11% as calculated by the [facility] fracture risk assessment tool (FRAX, which is based on data generated by the [facility]   for Metabolic Bone Diseases)  The patient's age exceeds the upper limit in the [facility] database    The values have been adjusted to an age of 80 years       4  The current NOF guidelines recommend treating patients with a T-score of -2 5 or less in the lumbar spine or hips, or in post-menopausal women and men over the age of 48 with low bone mass (osteopenia) and a FRAX 10 year risk score of >3% for hip   fracture and/or >20% for major osteoporotic fracture      5  The NOF recommends follow-up DXA in 1-2 years after initiating therapy for osteoporosis and every 2 years thereafter  More frequent evaluation is appropriate for patients with conditions associated with rapid bone loss, such as glucocorticoid   therapy  The interval between DXA screenings may be longer for individuals without major risk factors and initial T-score in the normal or upper low bone mass range         The FRAX algorithm has certain limitations:  -FRAX has not been validated in patients currently or previously treated with pharmacotherapy for osteoporosis  In such patients, clinical judgment must be exercised in interpreting FRAX scores  -Prior hip, vertebral and humeral fragility fractures appear to confer greater risk of subsequent fracture than fractures at other sites (this is especially true for individuals with severe vertebral fractures), but quantification of this incremental   risk is not possible with FRAX  -FRAX underestimates fracture risk in patients with history of multiple fragility fractures  -FRAX may underestimate fracture risk in patients with history of frequent falls   -It is not appropriate to use FRAX to monitor treatment response         WHO CLASSIFICATION:  Normal (a T-score of -1 0 or higher)  Low bone mineral density (a T-score of less than -1 0 but higher than -2 5)  Osteoporosis (a T-score of -2 5 or less)  Severe osteoporosis (a T-score of -2 5 or less with a fragility fracture)        LEAST SIGNIFICANT CHANGE (AT 95% C  I):  Lumbar spine 0 036 g/cm2; 2 2%  Total hip: 0 022 g/cm2; 2 6%  Forearm: 0 017 g/cm2; "3 0%                           Workstation performed: [id number]       Imaging    DXA bone density spine hip and pelvis (Order: [id number]) - 2023  Result History    DXA bone   [facility] [address] [facility]  [address] [facility] [address] [facility]  Gabe Escoto  Echo complete  Order# [id number]  Reading physician: Mac Calderon MD Ordering physician: Chelita Taylor MD Study date: 23    Name: Gabe Escoto                       : 1932  MRN: [id number]                       Age: 80 y o  Patient Status: Outpatient          Gender: female  Vitals    Height Weight BSA (Calculated - m2) BP Pulse  5' 4\" (1 626 m) 55 3 kg (122 lb) 1 59 sq meters 108/74 95    PACS Images     Show images for Echo complete w/ contrast if indicated    Study Details    This transthoracic echocardiogram was performed in the echo lab  This was a routine, outpatient study  Study quality was adequate  A complete 2D, color flow Doppler, spectral Doppler, 2D, color flow Doppler and spectral Doppler transthoracic echocardiogram was performed  The apical, parasternal, subcostal and suprasternal views were obtained  Indications  Priority: Routine  Dx: Permanent atrial fibrillation (HCC) (I48 21 (ICD-10-CM)); Chronic diastolic congestive heart failure (HCC) (I50 32 (ICD-10-CM))    History    Atrial fibrillation  Bifascicular bundle branch block  COPD  CHF  Hyperlipidemia Hypertension  Chronic kidney disease  Interpretation Summary       •  Left Ventricle: Left ventricular cavity size is normal  Wall thickness is mildly increased  The left ventricular ejection fraction is 60%  Systolic function is normal  Wall motion is normal  Unable to assess diastolic function due to atrial fibrillation  •  Right Ventricle: Right ventricular cavity size is normal  Systolic function is mildly reduced  •  Left Atrium: The atrium is moderately dilated  •  Right Atrium: The atrium is moderately dilated    •  " Mitral Valve: There is mild regurgitation  •  Tricuspid Valve: There is mild to moderate regurgitation  The estimated right ventricular systolic pressure is 83 99 mmHg  •  Pulmonic Valve: There is mild regurgitation          Findings    Left Ventricle Left ventricular cavity size is normal  Wall thickness is mildly increased  The left ventricular ejection fraction is 60%  Systolic function is normal   Wall motion is normal  Unable to assess diastolic function due to atrial fibrillation  Right Ventricle Right ventricular cavity size is normal  Systolic function is mildly reduced  Wall thickness is normal   Left Atrium The atrium is moderately dilated  Right Atrium The atrium is moderately dilated  Aortic Valve The aortic valve is trileaflet  The leaflets are mildly thickened  The leaflets are mildly calcified  Mild restriction of the right coronary cusp  There is no evidence of regurgitation  The aortic valve has no significant stenosis  Mitral Valve There is mild annular calcification  There is mild regurgitation  There is no evidence of stenosis  Tricuspid Valve Tricuspid valve structure is normal  There is mild to moderate regurgitation  There is no evidence of stenosis  The estimated right ventricular systolic pressure is 61 94 mmHg  Pulmonic Valve Pulmonic valve structure is normal  There is mild regurgitation  There is no evidence of stenosis  Ascending Aorta The aortic root is normal in size  IVC/SVC The right atrial pressure is estimated at 15 0 mmHg  The inferior vena cava is dilated  Respirophasic changes were blunted (less than 50% variation)  Pericardium There is no pericardial effusion  The pericardium is normal in appearance      Left Ventricle Measurements    Function/Volumes  A4C EF   64 %      Dimensions  LVIDd   3 3 cm      LVIDS   2 4 cm      IVSd   1 1 cm      LVPWd   1 2 cm      FS   27          Report Measurements  AV LVOT peak gradient   2 mmHg          Interventricular Septum Measurements    Shunt Ratio  LVOT peak VTI   12 38 cm      LVOT peak hank   0 68 m/s          Right Ventricle Measurements    Dimensions  RVID d   4 cm      Tricuspid annular plane systolic excursion   1 1 cm           Left Atrium Measurements    Dimensions  LA size   4 1 cm      LA length (A2C)   6 1 cm           Right Atrium Measurements    Dimensions  RAA A4C   23 5 cm2           Atrial Septum Measurements    Shunt Ratio  LVOT peak VTI   12 38 cm      LVOT peak hank   0 68 m/s           Aortic Valve Measurements    Stenosis  LVOT peak hank   0 68 m/s      LVOT peak VTI   12 38 cm      LVOT mn grad   1 mmHg      AV LVOT peak gradient   2 mmHg           Tricuspid Valve Measurements    RVSP Parameters  TR Peak Hank   3 m/s      Est  RA pres   15 mmHg      Triscuspid Valve Regurgitation Peak Gradient   36 mmHg      Right Ventricular Peak Systolic Pressure   51 mmHg           Aorta Measurements    Aortic Dimensions  Ao root   3 1 cm      Asc Ao   2 5 cm           IVC/SVC Measurements    IVC/SVC  Est  RA pres   15 mmHg          Exam Details    Performed Procedure Technologist Supporting Staff Performing Physician  Echo complete Cary Severin         Appointment Date/Status Modality Department   4/6/2023     Completed BE [facility] ECHO 1 BE [facility] CAR NON INV        Begin Exam End Exam  End Exam Questionnaires  4/6/2023  1:31 PM 4/6/2023  2:00 PM  PATIENT EDUCATION         All Reviewers List    Chelita Taylor MD on 4/12/2023  4:52 PM    Signed    Electronically signed by Mac Calderon MD on 4/6/23 at  EDT      Echo complete w/ contrast if indicated: Patient Communication     Edit Comments   Add Notifications      Your heart function overall remains normal, no severely leaky heart valves, but your back chambers called the atria are enlarged and this is due to longstanding atrial fibrillation   Your vein shows signs of slight distention as though you are retaining some water, I think this probably explains your shortness of breath, but more aggressive diuretics in the past have not been favorable to your kidney function so I would be inclined to leave things where they are at the moment  Written by David Sanchez       Repeat echocardiogram similar to before and reviewed as noted        The following portions of the patient's history were reviewed and updated as appropriate: allergies, current medications, past family history, past medical history, past social history, past surgical history and problem list     Review of Systems      Objective:      LMP  (LMP Unknown)          Physical Exam

## 2023-05-02 ENCOUNTER — TELEPHONE (OUTPATIENT)
Dept: INTERNAL MEDICINE CLINIC | Facility: CLINIC | Age: 88
End: 2023-05-02

## 2023-05-02 NOTE — TELEPHONE ENCOUNTER
PROLIA APPROVED        PA Case: [id number], Status: Approved, Coverage Starts on: 4/27/2023 12:00:00 AM, Coverage Ends on: 12/31/2023 12:00:00 AM  Questions? Contact [phone]

## 2023-05-11 ENCOUNTER — TELEPHONE (OUTPATIENT)
Dept: INTERNAL MEDICINE CLINIC | Facility: CLINIC | Age: 88
End: 2023-05-11

## 2023-05-11 NOTE — TELEPHONE ENCOUNTER
----- Message from Sharon Leal MD sent at 5/11/2023 11:04 AM EDT -----  Please call patient and let her know that I will be there to administer Prolia injection on Monday, May 15 between 8:30 AM and 10 AM

## 2023-05-15 DIAGNOSIS — J33.9 NASAL POLYP: ICD-10-CM

## 2023-05-15 NOTE — TELEPHONE ENCOUNTER
Patient left message      This is Cata Hatfieldmauriciough [employer] KAFFRYLIE  Date of birth July 22nd, 1932 Phone number [phone]  I need a new prescription for Flonase nasal spray and I get the drug from a mail order Pharmacy center well and I get three month supply  If you have any questions, please call me  Thank you

## 2023-05-16 ENCOUNTER — ANTICOAG VISIT (OUTPATIENT)
Dept: CARDIOLOGY CLINIC | Facility: CLINIC | Age: 88
End: 2023-05-16

## 2023-05-16 ENCOUNTER — IN HOME VISIT (OUTPATIENT)
Dept: INTERNAL MEDICINE CLINIC | Facility: CLINIC | Age: 88
End: 2023-05-16

## 2023-05-16 ENCOUNTER — CLINICAL SUPPORT (OUTPATIENT)
Dept: INTERNAL MEDICINE CLINIC | Facility: CLINIC | Age: 88
End: 2023-05-16

## 2023-05-16 VITALS — HEART RATE: 78 BPM | RESPIRATION RATE: 14 BRPM | DIASTOLIC BLOOD PRESSURE: 72 MMHG | SYSTOLIC BLOOD PRESSURE: 120 MMHG

## 2023-05-16 DIAGNOSIS — M81.0 AGE-RELATED OSTEOPOROSIS WITHOUT CURRENT PATHOLOGICAL FRACTURE: Primary | Chronic | ICD-10-CM

## 2023-05-16 DIAGNOSIS — J44.9 CHRONIC OBSTRUCTIVE PULMONARY DISEASE, UNSPECIFIED COPD TYPE (HCC): Chronic | ICD-10-CM

## 2023-05-16 DIAGNOSIS — I50.32 CHRONIC DIASTOLIC CONGESTIVE HEART FAILURE (HCC): Chronic | ICD-10-CM

## 2023-05-16 DIAGNOSIS — I48.21 PERMANENT ATRIAL FIBRILLATION (HCC): Primary | Chronic | ICD-10-CM

## 2023-05-16 DIAGNOSIS — R06.89 DIFFICULTY BREATHING: Primary | ICD-10-CM

## 2023-05-16 DIAGNOSIS — M81.0 AGE-RELATED OSTEOPOROSIS WITHOUT CURRENT PATHOLOGICAL FRACTURE: ICD-10-CM

## 2023-05-16 LAB — INR PPP: 3.1 (ref 0.84–1.19)

## 2023-05-16 RX ORDER — FLUTICASONE PROPIONATE 50 MCG
SPRAY, SUSPENSION (ML) NASAL
Qty: 15.8 ML | Refills: 3 | Status: SHIPPED | OUTPATIENT
Start: 2023-05-16

## 2023-05-16 NOTE — PROGRESS NOTES
Assessment/Plan:  #1 health maintenance-discussed the current literature regarding booster with bivalent covid vaccine-this is being recommended for patients over age 72 in those immunocompromised to be done 6 months after prior dose of bivalent covid vaccine-however patient had recent clinical COVID in March- April 2023 and we will therefore postpone this until the fall of this year  2  Osteoporosis-DEXA [place] in [place] shows L-spine -2 7 and hip of -2 2  Remains in vitamin D  Knows to have calcium intake of 1200 mg/day or more  Was given her first injection of Prolia today-will need subsequent DEXA scan in March 2025  She is aware she needs to Prolia every 6 months  3  Cough-shortness of breath-had recent increase in cough with some purulence-felt to be related to exacerbation of COPD which occurred after recent COVID  She did not receive antiviral therapy for the COVID  Has a known history of CHF but this appears to be predominantly pulmonary at this point  Also exacerbated by prominent spring allergies  She has underlying COPD-asthma  Currently on Flovent 110 mcg 2 puffs twice daily  Will increase that to 3 puffs twice daily  She will add daily antihistamine-loratadine 10 mg daily  She will resume Flonase 2 squirts in each nostril once daily  She had been treated with prednisone for flares in the past but had a questionable reaction to prednisone in the past-REVIEW NEXT VISIT  4  Congestive heart failure- diastolic with elevated BNP  Aggravated by atrial fibrillation  Echo done in April 2023 shows EF of 60% with wall thickness mildly increased, moderate right atrial enlargement, estimated right ventricular systolic pressure 51  Cardiology feels she is stable on current regimen of torsemide and spironolactone  Could consider addition of Jardiance 10 mg per the literature to help decrease hospitalization rate although this has not been proven to affect overall mortality    Monitoring for now without addition of Jardiance  5  Atrial fibrillation-in the past was on both Cardizem and metoprolol per cardiology-they withdrew Cardizem and she is now on higher dose of metoprolol  Follow-up Holter shows average heart rate in the 60s with maximum 102  Continues to see cardiology every 6 months  Remains on warfarin which is monitored by cardiology    Follow the problems as per note of April 25, 2023 and August 30, 2022    Medical regimen Flonase nasal spray 2 squirts in each nostril once daily, loratadine 10 mg daily, Flovent 110 mcg/puff - 3 puffs twice daily, spironolactone 50 mg daily, Coumadin with dose adjustments, Prolia with first injection today, Singulair 10 mg daily, torsemide 40 mg daily, levothyroxine 50 mcg daily but 75 mcg 3 days/week, Claritin 10 mg daily as needed, calcium with vitamin D daily, Ventolin inhaler as needed, metoprolol succinate 100 mg daily, multivitamin, vitamin D3/2000 units a day, fish oil 1200 mg a day, prior rare use of Vicodin-5-300 half tablet twice daily for severe back pain which she has not used in months  No problem-specific Assessment & Plan notes found for this encounter  Diagnoses and all orders for this visit:    Difficulty breathing    Chronic diastolic congestive heart failure (HCC)    Chronic obstructive pulmonary disease, unspecified COPD type ([facility] [address] )    Age-related osteoporosis without current pathological fracture          Subjective:      Patient ID: Azar Garcia is a 80 y o  female  She is still noting issues with shortness of breath  At last visit she did describe prominent cough with some purulence  Had had recent COVID and did not receive antiviral therapy  It was felt she likely had an exacerbation of COPD  She had been seen in April 25 as a home visit  At that point she was treated with doxycycline for a week and her dose of Flovent was increased  She feels as though she had minimal relief from these interventions    She is however noticing prominent spring allergies  She is sneezing itchy eyes with rhinitis  Still with some white mucus  She has some intermittent wheezing  She denies chest pain or pressure  She denies edema  She awakens at night with cough  Denies increasing orthopnea or paroxysmal nocturnal dyspnea  She has known congestive heart failure-diastolic with prior echo showing EF of 60% with wall thickness mildly increased, moderate right atrial enlargement, estimated right ventricular systolic pressure of 51  Cardiology felt he was stable on current regimen of torsemide and spironolactone  At prior visit we discussed addition of Jardiance to help decrease hospitalization rate although this has not been proven to affect overall mortality  At this point holding off on that    This patient denies any systemic symptoms  Specifically there has been no evidence of fever, night sweats, significant weight loss or significant decrease in appetite  She has known osteoporosis  We are initiating therapy with Prolia today  We reviewed mechanism of action  We reviewed her most recent DEXA scan  DXA SCAN     CLINICAL HISTORY: 80 years postmenopausal female   OTHER RISK FACTORS:  Torsemide therapy      PHARMACOLOGIC THERAPY FOR OSTEOPOROSIS:  None currently      TECHNIQUE: Bone densitometry was performed using a Hologic Horizon A  bone densitometer  Regions of interest appear properly placed          COMPARISON: 5/26/2020      RESULTS:      LUMBAR SPINE  Level: L1-L4 :   BMD:  0 754  gm/cm2   T-score: -2 7         LEFT  TOTAL HIP:   BMD:  0 627  gm/cm2    T-score:  -2 6     LEFT  FEMORAL NECK:   BMD:  0 601  gm/cm2   T score: -2 2      RIGHT  FOREARM:    33% RADIUS BMD:  0 521  gm/cm2  T-score:  -2 8         IMPRESSION:     1  Osteoporosis      2  Since a DXA study from 5/26/2020, there has been:  A  STATISTICALLY SIGNIFICANT DECREASE in bone mineral density of  0 066 g/cm2 (8 1%) in the lumbar spine    A  STATISTICALLY SIGNIFICANT DECREASE in bone mineral density of  0 051 g/cm2 (7 5%) in the left total hip         3  The 10 year risk of hip fracture is 4 0% with the 10 year risk of major osteoporotic fracture being 11% as calculated by the [facility] fracture risk assessment tool (FRAX, which is based on data generated by the [facility]   for Metabolic Bone Diseases)  The patient's age exceeds the upper limit in the [facility] database  The values have been adjusted to an age of 80 years       4  The current NOF guidelines recommend treating patients with a T-score of -2 5 or less in the lumbar spine or hips, or in post-menopausal women and men over the age of 48 with low bone mass (osteopenia) and a FRAX 10 year risk score of >3% for hip   fracture and/or >20% for major osteoporotic fracture      5  The NOF recommends follow-up DXA in 1-2 years after initiating therapy for osteoporosis and every 2 years thereafter  More frequent evaluation is appropriate for patients with conditions associated with rapid bone loss, such as glucocorticoid   therapy  The interval between DXA screenings may be longer for individuals without major risk factors and initial T-score in the normal or upper low bone mass range         The FRAX algorithm has certain limitations:  -FRAX has not been validated in patients currently or previously treated with pharmacotherapy for osteoporosis  In such patients, clinical judgment must be exercised in interpreting FRAX scores  -Prior hip, vertebral and humeral fragility fractures appear to confer greater risk of subsequent fracture than fractures at other sites (this is especially true for individuals with severe vertebral fractures), but quantification of this incremental   risk is not possible with FRAX  -FRAX underestimates fracture risk in patients with history of multiple fragility fractures    -FRAX may underestimate fracture risk in patients with history of frequent falls   -It is not appropriate to use FRAX to monitor treatment response         WHO CLASSIFICATION:  Normal (a T-score of -1 0 or higher)  Low bone mineral density (a T-score of less than -1 0 but higher than -2 5)  Osteoporosis (a T-score of -2 5 or less)  Severe osteoporosis (a T-score of -2 5 or less with a fragility fracture)        LEAST SIGNIFICANT CHANGE (AT 95% C  I):  Lumbar spine 0 036 g/cm2; 2 2%  Total hip: 0 022 g/cm2; 2 6%  Forearm: 0 017 g/cm2; 3 0%      This was administered today without any side effects    This patient wanted to review the recent CDC recommendations regarding booster with the bivalent-her last dose of this was with bivalent vaccine but she had recent clinical COVID and therefore deferring on the vaccine for 4 to 6 months after the COVID      The following portions of the patient's history were reviewed and updated as appropriate: allergies, current medications, past family history, past medical history, past social history, past surgical history and problem list     Review of Systems   Constitutional: Positive for fatigue  HENT: Negative  Respiratory: Positive for cough and shortness of breath  Cardiovascular: Negative  Gastrointestinal: Negative  Endocrine: Negative  Genitourinary: Negative  Musculoskeletal: Negative  Skin: Negative  Neurological: Negative  Hematological: Negative  Psychiatric/Behavioral: Negative  Objective:      LMP  (LMP Unknown)          Physical Exam  Vitals reviewed  Constitutional:       General: She is not in acute distress  Appearance: Normal appearance  She is not ill-appearing, toxic-appearing or diaphoretic  HENT:      Head: Normocephalic and atraumatic  Right Ear: External ear normal       Left Ear: External ear normal       Nose: Nose normal  No congestion or rhinorrhea        Mouth/Throat:      Mouth: Mucous membranes are moist       Pharynx: No oropharyngeal exudate or posterior oropharyngeal erythema  Eyes:      General: No scleral icterus  Right eye: No discharge  Left eye: No discharge  Extraocular Movements: Extraocular movements intact  Conjunctiva/sclera: Conjunctivae normal    Neck:      Vascular: No carotid bruit  Cardiovascular:      Rate and Rhythm: Normal rate  Rhythm irregular  Pulses: Normal pulses  Heart sounds: Normal heart sounds  No murmur heard  No friction rub  No gallop  Comments: Adequate upstroke-no S3-no increase in P2-known irregular irregular rhythm  Pulmonary:      Effort: Pulmonary effort is normal  No respiratory distress  Breath sounds: No stridor  Rhonchi present  No wheezing or rales  Comments: Trace rhonchi-wheeze  Chest:      Chest wall: No tenderness  Abdominal:      General: Abdomen is flat  Bowel sounds are normal  There is no distension  Palpations: Abdomen is soft  There is no mass  Tenderness: There is no abdominal tenderness  There is no right CVA tenderness, left CVA tenderness, guarding or rebound  Hernia: No hernia is present  Musculoskeletal:         General: No swelling, tenderness, deformity or signs of injury  Normal range of motion  Cervical back: Normal range of motion and neck supple  No rigidity  No muscular tenderness  Right lower leg: No edema  Left lower leg: No edema  Lymphadenopathy:      Cervical: No cervical adenopathy  Skin:     General: Skin is warm and dry  Coloration: Skin is not jaundiced or pale  Findings: No bruising, erythema, lesion or rash  Comments: Peripheral venous disease of the legs without edema   Neurological:      General: No focal deficit present  Mental Status: She is alert and oriented to person, place, and time  Mental status is at baseline  Cranial Nerves: No cranial nerve deficit  Sensory: No sensory deficit  Motor: No weakness        Coordination: Coordination normal       Gait: Gait normal  Deep Tendon Reflexes: Reflexes normal    Psychiatric:         Mood and Affect: Mood normal          Behavior: Behavior normal          Thought Content:  Thought content normal          Judgment: Judgment normal

## 2023-05-30 ENCOUNTER — ANTICOAG VISIT (OUTPATIENT)
Dept: CARDIOLOGY CLINIC | Facility: CLINIC | Age: 88
End: 2023-05-30

## 2023-05-30 DIAGNOSIS — I48.21 PERMANENT ATRIAL FIBRILLATION (HCC): Primary | Chronic | ICD-10-CM

## 2023-05-30 LAB — INR PPP: 2.4 (ref 0.84–1.19)

## 2023-06-13 PROBLEM — R05.3 CHRONIC COUGH: Status: ACTIVE | Noted: 2023-06-13

## 2023-06-20 ENCOUNTER — TELEPHONE (OUTPATIENT)
Dept: INTERNAL MEDICINE CLINIC | Facility: CLINIC | Age: 88
End: 2023-06-20

## 2023-06-20 LAB — INR PPP: 2 (ref 0.84–1.19)

## 2023-06-20 NOTE — TELEPHONE ENCOUNTER
Patient keft message       This is Carlmar Ahumada [employer]  Date of birth July 22nd, 1932  My telephone number is [phone]  I'm a patient of Doctor Roz Nguyen  I am reluctant to call but I've I finally decided I would call  I have had a cough all winter and it continues to play me  It's a loose cough  I don't have a fever, I just have this loose cough  I bring up some white secretions at times but not often  I'm short of breath at times but I have CHF and COPD also and asthma so I'm not sure what that what's going on here but I was wondering if you could talk to Doctor Regan Gates about it and see if he has any suggestions  I guess that's it  So my number is [phone]   Thank you

## 2023-06-21 ENCOUNTER — ANTICOAG VISIT (OUTPATIENT)
Dept: CARDIOLOGY CLINIC | Facility: CLINIC | Age: 88
End: 2023-06-21

## 2023-06-21 DIAGNOSIS — I48.21 PERMANENT ATRIAL FIBRILLATION (HCC): Primary | Chronic | ICD-10-CM

## 2023-06-26 ENCOUNTER — IN HOME VISIT (OUTPATIENT)
Dept: INTERNAL MEDICINE CLINIC | Facility: CLINIC | Age: 88
End: 2023-06-26
Payer: COMMERCIAL

## 2023-06-26 VITALS — SYSTOLIC BLOOD PRESSURE: 100 MMHG | HEART RATE: 78 BPM | RESPIRATION RATE: 18 BRPM | DIASTOLIC BLOOD PRESSURE: 79 MMHG

## 2023-06-26 DIAGNOSIS — J44.9 CHRONIC OBSTRUCTIVE PULMONARY DISEASE, UNSPECIFIED COPD TYPE (HCC): Chronic | ICD-10-CM

## 2023-06-26 DIAGNOSIS — J45.20 MILD INTERMITTENT ASTHMA WITHOUT COMPLICATION: Chronic | ICD-10-CM

## 2023-06-26 DIAGNOSIS — R76.8 ELEVATED SERUM IMMUNOGLOBULIN FREE LIGHT CHAINS: ICD-10-CM

## 2023-06-26 DIAGNOSIS — I48.21 PERMANENT ATRIAL FIBRILLATION (HCC): Chronic | ICD-10-CM

## 2023-06-26 DIAGNOSIS — E03.9 HYPOTHYROIDISM, UNSPECIFIED TYPE: ICD-10-CM

## 2023-06-26 DIAGNOSIS — I50.32 CHRONIC DIASTOLIC CONGESTIVE HEART FAILURE (HCC): Chronic | ICD-10-CM

## 2023-06-26 DIAGNOSIS — R63.4 WEIGHT LOSS: ICD-10-CM

## 2023-06-26 DIAGNOSIS — R05.2 SUBACUTE COUGH: Primary | ICD-10-CM

## 2023-06-26 DIAGNOSIS — D75.89 MACROCYTOSIS: ICD-10-CM

## 2023-06-26 PROCEDURE — 99350 HOME/RES VST EST HIGH MDM 60: CPT | Performed by: INTERNAL MEDICINE

## 2023-06-26 NOTE — PROGRESS NOTES
Assessment/Plan:  1  Health maintenance-we have previously discussed the literature regarding booster with bivalent COVID which has been recommended for patients over age 72 in those who are immunocompromised to be done 6 months after prior dose-this patient had clinical COVID in March - April 2023 and this is therefore being postponed to the fall of this year  #2 cough- present times several months  Wet cough-previously it was felt that some of this may have come on post COVID but she now feels this was present before COVID  Has known asthma-COPD as well as CHF which could potentially contribute to cough  Does not appear volume overloaded on exam   Cough has been unresponsive to increasing dose of Flovent as well as addition of steroid nasal spray patient also had an empiric course of doxycycline  Patient has had 30 pound weight loss but this has been gradual over 6 years since she moved into her new facility  Rule out component of CHF  Rule out component related to her chronic obstructive pulmonary disease  Rule out new pulmonary pathology-with her weight loss rule out lung CTA--rule out NAOMI-rule out TB exposure  I recommended we obtain labs to include CBC, random chemistry profile, thyroid function, QuantiFERON gold for TB, BNP and CT of the chest   She may require pulmonary referral   Further therapy based on clinical course and findings  3  Unintentional weight loss-noted over 6 years since she moved into her new facility-she feels this has been gradual with loss of 30 pounds over the 60 years rather than recent  With her cough need to rule out malignancy  4  Bilateral sensorineural hearing loss- being monitored by the ENT group  They plan on seeing her again in a year which would be in June 2024  5 osteoporosis-DEXA scan in 2023 shows L-spine -2 7 and hip of -2 2  Remains in vitamin D  Knows to have calcium intake of 1200 mg/day or more    Had her first injection of Prolia mid May 2023 and is aware she needs this every 6 months  Subsequent DEXA scan will be due in March 2025  6  Congestive heart failure-diastolic with elevated BNP  Aggravated by atrial fibrillation  Echo in April 2023 shows EF of 60% with wall thickness mildly increased, moderate right atrial enlargement, estimated right ventricular systolic pressure 51  Cardiology feels she is stable on current regimen of torsemide and spironolactone  Could consider addition of Jardiance 10 mg per the literature to help decrease hospitalization rate although this has not been shown to affect overall mortality  Monitoring for now without addition of Jardiance  7  Atrial fibrillation-rate controlled and on anticoagulant-in the past was on both Cardizem and metoprolol-they withdrew Cardizem and she is now on higher dose of metoprolol  Follow-up Holter shows average heart rate in the 60s with maximum 102  Continues to see cardiology every 6 months  Remains on warfarin which is monitored by cardiology  8  Recurrent labial cyst-prior labial abscess  Was treated by GYN with I&D and transient course of Bactrim without culture was never obtained  There was concern about potential MRSA as she had this in the past   Recently had doxycycline at the time of flareup as well as her pulmonary symptoms  Not a major issue at present  9  Chronic renal failure-stage IIIb-felt related to age-related nephron loss plus possible component of cardiorenal syndrome  Knows to avoid nonsteroidals  Most recent GFR was 38 with BUN of 37 and creatinine of 1 32-for repeat  10  Shortness of breath- combination of CHF-COPD- now with exacerbation-full evaluation as listed above  11  Hypothyroidism-TSH had been increased and thyroid dose was increased-for repeat labs  12  Elevated serum for free light chains-immunofixation negative  Prior urine electrophoresis without a spike    Some component may be related to chronic renal disease-rule out light chain MGUS- we will continue to monitor and repeat    All other problems as per note of August 30, 2022    Medical regimen Flonase nasal spray 2 squirts each nostril once daily, loratadine 10 mg daily, Flovent 110 mcg/puff - 3 puffs twice daily, spironolactone 50 mg daily, Coumadin with dose adjustments, Prolia with first injection on May 16, 2023, Singulair 10 mg daily, torsemide 40 mg daily, levothyroxine 50 mcg daily but 75 mcg 3 days/week, calcium and vitamin D daily, Ventolin inhaler as needed, metoprolol succinate 100 mg daily, multivitamin, vitamin D3/2000 units a day, fish oil 1200 mg/day, prior use of Vicodin-5-300 half tablet twice daily for severe back pain which she has not used in months    Will be scheduled for unenhanced CT of the chest, CBC, random chemistry profile, BNP, free T4 TSH, TB for QuantiFERON gold, serum protein electrophoresis, immunoglobulin levels, serum for free light chain ratio    Additional evaluation pending results of the above and overall clinical course-as noted above may require pulmonary referral   No problem-specific Assessment & Plan notes found for this encounter  Diagnoses and all orders for this visit:    Subacute cough    Mild intermittent asthma without complication    Chronic obstructive pulmonary disease, unspecified COPD type ([facility] [address] )    Permanent atrial fibrillation ([facility] [address] )    Chronic diastolic congestive heart failure (HCC)          Subjective:      Patient ID: Julien Momin is a 80 y o  female  Patient is seen as an emergency home visit  As noted she has significant shortness of breath and is basically homebound  She states that she has had chronic cough times months which will not resolve  She had previously implied that this occurred after COVID which she had several months ago which she now states she had prior to the COVID vaccine  She coughs throughout the day both day and night  She does not have any fever  She denies chills  She denies hemoptysis    Her "sputum is occasionally yellow  She denies postnasal drip  Her baseline shortness of breath is worse  She is now sleeping with 2 pillows up from 1  She can wake up during the night coughing  She says if she walks around her apartment she is short of breath  She does not have associated chest pain or pressure  She denies palpitation  She can hear definite wheezing  She has a known history of congestive heart failure but has not noted increase in weight or leg swelling  She has a known history of asthma-chronic obstructive pulmonary disease and had recently increase her dose of Flovent inhaler but this made no difference in her symptomatology  She has had gradual weight loss over the 6-year period that she is currently in her assisted living facility and now weighs approximately 118  She feels as though her appetite is \"okay\"  She denies palpitations  She denies syncope or near syncope  She has not had any known tuberculosis exposure  She is recently being evaluated by ENT and has been told she has sensorineural hearing loss of both ears which has progressed  They recommended she be reevaluated in 1 year  She had a wet cough during her exam with the ENT and had a wet cough during her examination today  Despite all the above her spirits are relatively well  She denies any major anxiety or depressive symptoms  As noted she is now 80years old  We had a long discussion today regarding the above  She had had a course of doxycycline over the past several months without improvement  As noted we have increased her Flovent with addition of steroid nasal spray and her symptoms are unchanged    She did not appear significantly volume overloaded on examination today      The following portions of the patient's history were reviewed and updated as appropriate: allergies, current medications, past family history, past medical history, past social history, past surgical history and problem list     Review of " Systems   Constitutional: Positive for fatigue and unexpected weight change  HENT: Negative  Respiratory: Positive for cough and shortness of breath  Cardiovascular: Negative  Gastrointestinal: Negative  Endocrine: Negative  Genitourinary: Negative  Musculoskeletal: Negative  Skin: Negative  Neurological: Negative  Hematological: Negative  Psychiatric/Behavioral: Negative  Objective:      LMP  (LMP Unknown)          Physical Exam  Vitals reviewed  Constitutional:       General: She is not in acute distress  Appearance: Normal appearance  She is not ill-appearing, toxic-appearing or diaphoretic  HENT:      Head: Normocephalic and atraumatic  Right Ear: Tympanic membrane, ear canal and external ear normal       Left Ear: Tympanic membrane, ear canal and external ear normal       Nose: Nose normal  No congestion or rhinorrhea  Mouth/Throat:      Mouth: Mucous membranes are moist       Pharynx: Oropharynx is clear  No oropharyngeal exudate or posterior oropharyngeal erythema  Eyes:      General: No scleral icterus  Right eye: No discharge  Left eye: No discharge  Extraocular Movements: Extraocular movements intact  Pupils: Pupils are equal, round, and reactive to light  Neck:      Vascular: No carotid bruit  Cardiovascular:      Rate and Rhythm: Normal rate  Rhythm irregular  Pulses: Normal pulses  Heart sounds: Normal heart sounds  No murmur heard  No friction rub  No gallop  Comments: Borderline cardiomegaly to percussion  Irregularly irregular rhythm  Adequate upstroke  Questionable increase in P2  Pulmonary:      Effort: Pulmonary effort is normal  No respiratory distress  Breath sounds: No stridor  Wheezing and rhonchi present  No rales  Comments: Trace Rales which decreased with cough  Scattered rhonchi  Some end expiratory wheezing  Chest:      Chest wall: No tenderness     Abdominal: General: Abdomen is flat  Bowel sounds are normal  There is no distension  Palpations: Abdomen is soft  There is no mass  Tenderness: There is no abdominal tenderness  There is no right CVA tenderness, left CVA tenderness, guarding or rebound  Hernia: No hernia is present  Musculoskeletal:         General: No swelling, tenderness, deformity or signs of injury  Normal range of motion  Cervical back: Normal range of motion and neck supple  No rigidity  No muscular tenderness  Right lower leg: No edema  Left lower leg: No edema  Lymphadenopathy:      Cervical: No cervical adenopathy  Skin:     General: Skin is warm and dry  Coloration: Skin is not jaundiced or pale  Findings: No bruising, erythema, lesion or rash  Comments: Peripheral venous disease of the legs   Neurological:      General: No focal deficit present  Mental Status: She is alert and oriented to person, place, and time  Mental status is at baseline  Cranial Nerves: No cranial nerve deficit  Sensory: No sensory deficit  Motor: No weakness  Coordination: Coordination normal       Gait: Gait normal       Deep Tendon Reflexes: Reflexes normal    Psychiatric:         Mood and Affect: Mood normal          Behavior: Behavior normal          Thought Content:  Thought content normal          Judgment: Judgment normal  deficit present. Mental Status: She is alert and oriented to person, place, and time. Mental status is at baseline. Cranial Nerves: No cranial nerve deficit. Sensory: No sensory deficit. Motor: No weakness. Coordination: Coordination normal.      Gait: Gait normal.      Deep Tendon Reflexes: Reflexes normal.   Psychiatric:         Mood and Affect: Mood normal.         Behavior: Behavior normal.         Thought Content:  Thought content normal.         Judgment: Judgment normal.

## 2023-06-27 ENCOUNTER — TELEPHONE (OUTPATIENT)
Dept: INTERNAL MEDICINE CLINIC | Facility: CLINIC | Age: 88
End: 2023-06-27

## 2023-06-27 NOTE — TELEPHONE ENCOUNTER
----- Message from Felipe Segundo MD sent at 6/26/2023  7:45 AM EDT -----  This patient was seen as a home visit on Sunday, June 25-she has weight loss with a chronic cough-I ordered lab work which has been placed into the system as well as stat CT of the chest-please get in touch with the patient to help her schedule the CT of the chest and remind her to have the lab work done at the time of the CT-also to monitor I ordered a comprehensive metabolic profile but she does not need to fast for this-can the order in the system be changed so that it does not state that she needs to fast? thank you WTS

## 2023-07-03 ENCOUNTER — APPOINTMENT (OUTPATIENT)
Dept: LAB | Age: 88
End: 2023-07-03
Payer: COMMERCIAL

## 2023-07-03 ENCOUNTER — HOSPITAL ENCOUNTER (OUTPATIENT)
Dept: RADIOLOGY | Age: 88
Discharge: HOME | End: 2023-07-03
Payer: COMMERCIAL

## 2023-07-03 ENCOUNTER — TELEPHONE (OUTPATIENT)
Dept: FAMILY MEDICINE CLINIC | Facility: CLINIC | Age: 88
End: 2023-07-03

## 2023-07-03 DIAGNOSIS — R63.4 WEIGHT LOSS: ICD-10-CM

## 2023-07-03 DIAGNOSIS — R05.2 SUBACUTE COUGH: ICD-10-CM

## 2023-07-03 DIAGNOSIS — E03.9 HYPOTHYROIDISM, UNSPECIFIED TYPE: ICD-10-CM

## 2023-07-03 DIAGNOSIS — R76.8 ELEVATED SERUM IMMUNOGLOBULIN FREE LIGHT CHAINS: ICD-10-CM

## 2023-07-03 LAB
ALBUMIN SERPL BCP-MCNC: 3.5 G/DL (ref 3.5–5)
ALP SERPL-CCNC: 54 U/L (ref 46–116)
ALT SERPL W P-5'-P-CCNC: 29 U/L (ref 12–78)
ANION GAP SERPL CALCULATED.3IONS-SCNC: 6 MMOL/L
AST SERPL W P-5'-P-CCNC: 35 U/L (ref 5–45)
BASOPHILS # BLD AUTO: 0.06 THOUSANDS/ÂΜL (ref 0–0.1)
BASOPHILS NFR BLD AUTO: 1 % (ref 0–1)
BILIRUB SERPL-MCNC: 0.53 MG/DL (ref 0.2–1)
BNP SERPL-MCNC: 372 PG/ML (ref 0–100)
BUN SERPL-MCNC: 38 MG/DL (ref 5–25)
CALCIUM SERPL-MCNC: 9.6 MG/DL (ref 8.3–10.1)
CHLORIDE SERPL-SCNC: 105 MMOL/L (ref 96–108)
CO2 SERPL-SCNC: 27 MMOL/L (ref 21–32)
CREAT SERPL-MCNC: 1.5 MG/DL (ref 0.6–1.3)
EOSINOPHIL # BLD AUTO: 0.72 THOUSAND/ÂΜL (ref 0–0.61)
EOSINOPHIL NFR BLD AUTO: 9 % (ref 0–6)
ERYTHROCYTE [DISTWIDTH] IN BLOOD BY AUTOMATED COUNT: 14 % (ref 11.6–15.1)
GFR SERPL CREATININE-BSD FRML MDRD: 30 ML/MIN/1.73SQ M
GLUCOSE SERPL-MCNC: 136 MG/DL (ref 65–140)
HCT VFR BLD AUTO: 43.9 % (ref 34.8–46.1)
HGB BLD-MCNC: 14 G/DL (ref 11.5–15.4)
IGA SERPL-MCNC: 235 MG/DL (ref 70–400)
IGG SERPL-MCNC: 1560 MG/DL (ref 700–1600)
IGM SERPL-MCNC: 152 MG/DL (ref 40–230)
IMM GRANULOCYTES # BLD AUTO: 0.01 THOUSAND/UL (ref 0–0.2)
IMM GRANULOCYTES NFR BLD AUTO: 0 % (ref 0–2)
LYMPHOCYTES # BLD AUTO: 2.6 THOUSANDS/ÂΜL (ref 0.6–4.47)
LYMPHOCYTES NFR BLD AUTO: 32 % (ref 14–44)
MCH RBC QN AUTO: 33 PG (ref 26.8–34.3)
MCHC RBC AUTO-ENTMCNC: 31.9 G/DL (ref 31.4–37.4)
MCV RBC AUTO: 104 FL (ref 82–98)
MONOCYTES # BLD AUTO: 0.52 THOUSAND/ÂΜL (ref 0.17–1.22)
MONOCYTES NFR BLD AUTO: 6 % (ref 4–12)
NEUTROPHILS # BLD AUTO: 4.21 THOUSANDS/ÂΜL (ref 1.85–7.62)
NEUTS SEG NFR BLD AUTO: 52 % (ref 43–75)
NRBC BLD AUTO-RTO: 0 /100 WBCS
PLATELET # BLD AUTO: 259 THOUSANDS/UL (ref 149–390)
PMV BLD AUTO: 11 FL (ref 8.9–12.7)
POTASSIUM SERPL-SCNC: 4.6 MMOL/L (ref 3.5–5.3)
PROT SERPL-MCNC: 7.8 G/DL (ref 6.4–8.4)
RBC # BLD AUTO: 4.24 MILLION/UL (ref 3.81–5.12)
SODIUM SERPL-SCNC: 138 MMOL/L (ref 135–147)
T4 FREE SERPL-MCNC: 0.96 NG/DL (ref 0.61–1.12)
TSH SERPL DL<=0.05 MIU/L-ACNC: 5.08 UIU/ML (ref 0.45–4.5)
WBC # BLD AUTO: 8.12 THOUSAND/UL (ref 4.31–10.16)

## 2023-07-03 PROCEDURE — 80053 COMPREHEN METABOLIC PANEL: CPT

## 2023-07-03 PROCEDURE — 84165 PROTEIN E-PHORESIS SERUM: CPT

## 2023-07-03 PROCEDURE — 84443 ASSAY THYROID STIM HORMONE: CPT

## 2023-07-03 PROCEDURE — G1004 CDSM NDSC: HCPCS

## 2023-07-03 PROCEDURE — 86480 TB TEST CELL IMMUN MEASURE: CPT

## 2023-07-03 PROCEDURE — 84439 ASSAY OF FREE THYROXINE: CPT

## 2023-07-03 PROCEDURE — 85025 COMPLETE CBC W/AUTO DIFF WBC: CPT

## 2023-07-03 PROCEDURE — 83880 ASSAY OF NATRIURETIC PEPTIDE: CPT

## 2023-07-03 PROCEDURE — 71250 CT THORAX DX C-: CPT

## 2023-07-03 PROCEDURE — 36415 COLL VENOUS BLD VENIPUNCTURE: CPT

## 2023-07-03 PROCEDURE — 86334 IMMUNOFIX E-PHORESIS SERUM: CPT

## 2023-07-03 PROCEDURE — 83521 IG LIGHT CHAINS FREE EACH: CPT

## 2023-07-03 PROCEDURE — 82784 ASSAY IGA/IGD/IGG/IGM EACH: CPT

## 2023-07-03 NOTE — TELEPHONE ENCOUNTER
Patient called and left this message: This is Dayanna Roldan, date of birth July 22nd, 1932. Phone number [phone]. I'm calling the I I'd really like to be able to talk to [place], but I'm calling because she had called me last Tuesday the 27th of June and left a message on my machine about getting a CAT scan of the chest and some lab work. She said she would send me the lab work and the information about the CAT scan and that I would be receiving a call probably from scheduling. I have not received any mail from your office and I have not received a call from the scheduling department. So I'm just wondering what the hold up is and what I should do. And I'd appreciate it if you would call me back. Thank you. Returned patient's call    [facility] scheduling patient has an appointment scheduled for today at 1:30 pm directions given to patient. patient will also get her labs done today.

## 2023-07-05 LAB
ALBUMIN SERPL ELPH-MCNC: 4 G/DL (ref 3.2–5.1)
ALBUMIN SERPL ELPH-MCNC: 53.3 % (ref 48–70)
ALPHA1 GLOB SERPL ELPH-MCNC: 0.3 G/DL (ref 0.15–0.47)
ALPHA1 GLOB SERPL ELPH-MCNC: 4 % (ref 1.8–7)
ALPHA2 GLOB SERPL ELPH-MCNC: 0.78 G/DL (ref 0.42–1.04)
ALPHA2 GLOB SERPL ELPH-MCNC: 10.4 % (ref 5.9–14.9)
BETA GLOB ABNORMAL SERPL ELPH-MCNC: 0.45 G/DL (ref 0.31–0.57)
BETA1 GLOB SERPL ELPH-MCNC: 6 % (ref 4.7–7.7)
BETA2 GLOB SERPL ELPH-MCNC: 8.6 % (ref 3.1–7.9)
BETA2+GAMMA GLOB SERPL ELPH-MCNC: 0.65 G/DL (ref 0.2–0.58)
GAMMA GLOB ABNORMAL SERPL ELPH-MCNC: 1.33 G/DL (ref 0.4–1.66)
GAMMA GLOB SERPL ELPH-MCNC: 17.7 % (ref 6.9–22.3)
GAMMA INTERFERON BACKGROUND BLD IA-ACNC: 0.11 IU/ML
IGG/ALB SER: 1.14 {RATIO} (ref 1.1–1.8)
INTERPRETATION UR IFE-IMP: NORMAL
KAPPA LC FREE SER-MCNC: 95.2 MG/L (ref 3.3–19.4)
KAPPA LC FREE/LAMBDA FREE SER: 3.5 {RATIO} (ref 0.26–1.65)
LAMBDA LC FREE SERPL-MCNC: 27.2 MG/L (ref 5.7–26.3)
M PEAK ID 2: 1.1 %
M PROTEIN 1 MFR SERPL ELPH: 3.5 %
M PROTEIN 1 SERPL ELPH-MCNC: 0.26 G/DL
M PROTEIN 2 SERPL ELPH-MCNC: 0.08 G/DL
M TB IFN-G BLD-IMP: NEGATIVE
M TB IFN-G CD4+ BCKGRND COR BLD-ACNC: -0.02 IU/ML
M TB IFN-G CD4+ BCKGRND COR BLD-ACNC: -0.02 IU/ML
MITOGEN IGNF BCKGRD COR BLD-ACNC: >10 IU/ML
PROT PATTERN SERPL ELPH-IMP: ABNORMAL
PROT SERPL-MCNC: 7.5 G/DL (ref 6.4–8.2)

## 2023-07-05 PROCEDURE — 84165 PROTEIN E-PHORESIS SERUM: CPT | Performed by: PATHOLOGY

## 2023-07-05 PROCEDURE — 86334 IMMUNOFIX E-PHORESIS SERUM: CPT | Performed by: PATHOLOGY

## 2023-07-06 ENCOUNTER — TELEPHONE (OUTPATIENT)
Dept: INTERNAL MEDICINE CLINIC | Facility: CLINIC | Age: 88
End: 2023-07-06

## 2023-07-06 NOTE — TELEPHONE ENCOUNTER
----- Message from J Luis Bill MD sent at 7/4/2023  6:49 AM EDT -----  I was in touch with this patient-she needs additional lab work for iron B12 level and folate level-I placed orders in the should have printed at the office-please get those orders to the patient as she likes to have her labs drawn in the building where she lives which is done via a lab other than [address] I placed an order for pulmonary referral-please assist patient with setting that up-chronic cough with bronchiectasis is the diagnosis for the referral thank

## 2023-07-14 ENCOUNTER — ANTICOAG VISIT (OUTPATIENT)
Dept: CARDIOLOGY CLINIC | Facility: CLINIC | Age: 88
End: 2023-07-14

## 2023-07-14 DIAGNOSIS — I48.21 PERMANENT ATRIAL FIBRILLATION (HCC): Primary | Chronic | ICD-10-CM

## 2023-07-14 LAB — INR PPP: 2.6 (ref 0.84–1.19)

## 2023-07-17 DIAGNOSIS — I48.21 PERMANENT ATRIAL FIBRILLATION (HCC): Primary | Chronic | ICD-10-CM

## 2023-07-19 ENCOUNTER — TELEPHONE (OUTPATIENT)
Dept: INTERNAL MEDICINE CLINIC | Facility: CLINIC | Age: 88
End: 2023-07-19

## 2023-07-19 NOTE — TELEPHONE ENCOUNTER
----- Message from Julienne Sung MD sent at 7/18/2023  6:09 AM EDT -----  Please call patient-vitamin R89 level and folic acid levels normal

## 2023-07-24 DIAGNOSIS — J44.9 CHRONIC OBSTRUCTIVE PULMONARY DISEASE, UNSPECIFIED COPD TYPE (HCC): ICD-10-CM

## 2023-07-24 NOTE — TELEPHONE ENCOUNTER
Patient left message      This is Lefty PIZANO. I'm calling because I need a new prescription for Flovent Inhaler. Dr. Priscila Cleveland had increased the dosage and as a result I'm running short and the pharmacy I deal with won't. Well, they're telling me I need to call you about it. So I need Flovent HFA 110MCG Inhaler, a three month supply. My YOB: 1932, phone number [phone] and I get my drugs through [facility] pharmacy, a mail order pharmacy. So I need Flovent 110MCG Inhaler because of an increased dosage that was ordered by Doctor Priscila Cleveland. Thank you.

## 2023-07-25 RX ORDER — FLUTICASONE PROPIONATE 110 UG/1
1 AEROSOL, METERED RESPIRATORY (INHALATION) 2 TIMES DAILY
Qty: 24 G | Refills: 3 | Status: SHIPPED | OUTPATIENT
Start: 2023-07-25

## 2023-07-31 DIAGNOSIS — J33.9 NASAL POLYP: ICD-10-CM

## 2023-08-02 RX ORDER — FLUTICASONE PROPIONATE 50 MCG
SPRAY, SUSPENSION (ML) NASAL
Qty: 48 G | Refills: 3 | Status: SHIPPED | OUTPATIENT
Start: 2023-08-02

## 2023-08-07 DIAGNOSIS — I50.32 CHRONIC DIASTOLIC CONGESTIVE HEART FAILURE (HCC): Chronic | ICD-10-CM

## 2023-08-07 DIAGNOSIS — J44.9 CHRONIC OBSTRUCTIVE PULMONARY DISEASE, UNSPECIFIED COPD TYPE (HCC): ICD-10-CM

## 2023-08-07 DIAGNOSIS — I48.20 CHRONIC ATRIAL FIBRILLATION (HCC): Chronic | ICD-10-CM

## 2023-08-07 NOTE — TELEPHONE ENCOUNTER
patient left message for refill    This is Sebel Bull birth date July 22nd, 1932, telephone number [phone]. I need some prescriptions to be we refilled. I need new prescriptions for several drugs. I need a new prescription and the pharmacy is [facility]. It's a mail order pharmacy and I get three months supply. The drugs are Torsemide 20 milligrams, two daily. Metro metoprolol 100 milligrams one daily and Flovent HFA 110MCG. This is an inhaler 2 puffs twice daily. That's a new dosage, so be careful with that please. Otherwise I run out. If you have any questions, please call me. Thank you.

## 2023-08-08 RX ORDER — METOPROLOL SUCCINATE 100 MG/1
100 TABLET, EXTENDED RELEASE ORAL DAILY
Qty: 90 TABLET | Refills: 3 | Status: SHIPPED | OUTPATIENT
Start: 2023-08-08

## 2023-08-08 RX ORDER — FLUTICASONE PROPIONATE 110 UG/1
2 AEROSOL, METERED RESPIRATORY (INHALATION) 2 TIMES DAILY
Qty: 24 G | Refills: 3 | Status: SHIPPED | OUTPATIENT
Start: 2023-08-08

## 2023-08-08 RX ORDER — TORSEMIDE 20 MG/1
40 TABLET ORAL DAILY
Qty: 180 TABLET | Refills: 3 | Status: SHIPPED | OUTPATIENT
Start: 2023-08-08

## 2023-08-15 ENCOUNTER — ANTICOAG VISIT (OUTPATIENT)
Dept: CARDIOLOGY CLINIC | Facility: CLINIC | Age: 88
End: 2023-08-15

## 2023-08-15 DIAGNOSIS — I48.21 PERMANENT ATRIAL FIBRILLATION (HCC): Primary | Chronic | ICD-10-CM

## 2023-08-15 LAB — INR PPP: 2.6 (ref 0.84–1.19)

## 2023-08-23 ENCOUNTER — CONSULT (OUTPATIENT)
Dept: PULMONOLOGY | Facility: CLINIC | Age: 88
End: 2023-08-23
Payer: COMMERCIAL

## 2023-08-23 VITALS
HEIGHT: 64 IN | BODY MASS INDEX: 19.29 KG/M2 | WEIGHT: 113 LBS | RESPIRATION RATE: 18 BRPM | OXYGEN SATURATION: 99 % | SYSTOLIC BLOOD PRESSURE: 118 MMHG | DIASTOLIC BLOOD PRESSURE: 64 MMHG | HEART RATE: 95 BPM

## 2023-08-23 DIAGNOSIS — R05.3 CHRONIC COUGH: Primary | ICD-10-CM

## 2023-08-23 DIAGNOSIS — J47.9 BRONCHIECTASIS WITHOUT COMPLICATION (HCC): ICD-10-CM

## 2023-08-23 DIAGNOSIS — R06.89 DIFFICULTY BREATHING: ICD-10-CM

## 2023-08-23 DIAGNOSIS — J45.40 MODERATE PERSISTENT ASTHMA WITHOUT COMPLICATION: ICD-10-CM

## 2023-08-23 DIAGNOSIS — R05.2 SUBACUTE COUGH: ICD-10-CM

## 2023-08-23 PROCEDURE — 99204 OFFICE O/P NEW MOD 45 MIN: CPT | Performed by: INTERNAL MEDICINE

## 2023-08-23 RX ORDER — FLUTICASONE PROPIONATE AND SALMETEROL 500; 50 UG/1; UG/1
1 POWDER RESPIRATORY (INHALATION) 2 TIMES DAILY
Qty: 60 BLISTER | Refills: 3 | Status: SHIPPED | OUTPATIENT
Start: 2023-08-23

## 2023-08-23 RX ORDER — GUAIFENESIN 600 MG/1
1200 TABLET, EXTENDED RELEASE ORAL EVERY 12 HOURS SCHEDULED
Qty: 60 TABLET | Refills: 0 | Status: SHIPPED | OUTPATIENT
Start: 2023-08-23

## 2023-08-23 NOTE — PATIENT INSTRUCTIONS
Bronchiectasis   AMBULATORY CARE:   Bronchiectasis  is a condition that causes mucus to collect in your airway. Chronic respiratory infections or inflammation cause the bronchi to become thick. Bronchi are larger airways that help carry air in and out of your lungs. Your lungs make mucus to trap and remove germs and irritants that you breathe. Bronchiectasis prevents your lungs from clearing the mucus. This leads to more infections and inflammation, and scarring in your lungs. Signs and symptoms of bronchiectasis:  Periods of active signs and symptoms are called exacerbations. A chronic cough with mucus that may contain blood    Wheezing or crackling, shortness of breath, or trouble breathing    Foul-smelling mucus from your lungs    Weakness and fatigue    Clubbing of your fingers or toes    Chest pain    Call your local emergency number (911 in the [address]) if:   You have sudden chest pain. You have sudden or more severe trouble breathing. You are confused or feel faint. Call your doctor or pulmonologist if:   Your lips or fingernails turn gray or blue. You cough up blood. You have a fever. You cough more than usual or wheeze. Your medicines do not relieve your symptoms. You have questions or concerns about your condition or care. Treatment  may include any of the following:  Medicines  may help relieve your symptoms. Bronchodilators help open the air passages in your lungs. Antibiotics may be used to treat an infection caused by bacteria. Steroids and some kinds of antibiotics help decrease inflammation in your airway. Medicines may help thin the mucus in your lungs. Thin mucus may be easier to cough up. Oxygen  may be given to help you breathe easier. Oxygen can also decrease the strain on your heart and can help prevent more breathing problems. Surgery  may be used to remove a part of your lung causing your symptoms.  You may also need a lung transplant if your symptoms become severe. Surgery may also be needed to stop heavy bleeding in your airway if you cough up a lot of blood. Manage bronchiectasis:   Go to pulmonary rehabilitation (rehab) as directed. Pulmonary rehab is a program that can help you learn how to manage bronchiectasis and prevent exacerbations. Your plan will include aerobic exercise, such as walking, swimming, or riding a bicycle. Regular exercise helps your lungs work well and helps keep your airway clear. Rehab can help you increase your ability to exercise for as long as recommended. Keep your airway clear and open. Do airway clearance techniques as needed. Healthcare providers will show you how to do these in pulmonary rehab. A saline nasal rinse can help clear irritants from your sinuses. Thin mucus by drinking more liquids or using a cool mist humidifier. Keep your head higher than your body when you sleep. If your bed is not adjustable, you can put extra pillows under your upper body to keep your head up. Do not smoke or be around anyone who is smoking. Nicotine and other chemicals in cigarettes and cigars can cause lung damage and make breathing problems worse. Ask your healthcare provider for information if you currently smoke and need help to quit. E-cigarettes or smokeless tobacco still contain nicotine. Talk to your healthcare provider before you use these products. Prevent the spread of germs:       Wash your hands often. Wash your hands several times each day. Wash after you use the bathroom, change a child's diaper, and before you prepare or eat food. Use soap and water every time. Rub your soapy hands together, lacing your fingers. Wash the front and back of your hands, and in between your fingers. Use the fingers of one hand to scrub under the fingernails of the other hand. Wash for at least 20 seconds. Rinse with warm, running water for several seconds. Then dry your hands with a clean towel or paper towel.  Use hand  that contains alcohol if soap and water are not available. Do not touch your eyes, nose, or mouth without washing your hands first.         Cover a sneeze or cough. Use a tissue that covers your mouth and nose. Throw the tissue away in a trash can right away. Use the bend of your arm if a tissue is not available. Then wash your hands well with soap and water or use a hand . Do not stand close to anyone who is sneezing or coughing. Stay away from anyone who is sick. Avoid crowds as much as possible. Ask anyone who is sick not to come to your home. Ask about vaccines you may need. Vaccines can help prevent some lung infections. Examples include pneumonia, pertussis (whooping cough), diphtheria, and influenza (flu). Get a flu vaccine every year as soon as recommended, usually starting in September or October. Your healthcare provider will tell you if you need other vaccines, and when to get them. Follow up with your doctor or pulmonologist as directed:  Write down your questions so you remember to ask them during your visits. © Copyright [employer] 2022 Information is for End User's use only and may not be sold, redistributed or otherwise used for commercial purposes. The above information is an  only. It is not intended as medical advice for individual conditions or treatments. Talk to your doctor, nurse or pharmacist before following any medical regimen to see if it is safe and effective for you.

## 2023-08-25 PROBLEM — R05.2 SUBACUTE COUGH: Status: RESOLVED | Noted: 2023-06-26 | Resolved: 2023-08-25

## 2023-08-28 ENCOUNTER — APPOINTMENT (OUTPATIENT)
Dept: LAB | Facility: CLINIC | Age: 88
End: 2023-08-28
Payer: COMMERCIAL

## 2023-08-28 DIAGNOSIS — J47.9 BRONCHIECTASIS WITHOUT COMPLICATION (HCC): ICD-10-CM

## 2023-08-28 PROCEDURE — 87077 CULTURE AEROBIC IDENTIFY: CPT

## 2023-08-28 PROCEDURE — 87070 CULTURE OTHR SPECIMN AEROBIC: CPT

## 2023-08-28 PROCEDURE — 87186 SC STD MICRODIL/AGAR DIL: CPT

## 2023-08-28 PROCEDURE — 87205 SMEAR GRAM STAIN: CPT

## 2023-08-30 LAB
BACTERIA SPT RESP CULT: ABNORMAL
BACTERIA SPT RESP CULT: ABNORMAL
GRAM STN SPEC: ABNORMAL

## 2023-09-01 ENCOUNTER — HOSPITAL ENCOUNTER (OUTPATIENT)
Dept: PULMONOLOGY | Facility: HOSPITAL | Age: 88
End: 2023-09-01
Attending: INTERNAL MEDICINE
Payer: COMMERCIAL

## 2023-09-01 ENCOUNTER — TELEPHONE (OUTPATIENT)
Dept: INTERNAL MEDICINE CLINIC | Facility: CLINIC | Age: 88
End: 2023-09-01

## 2023-09-01 DIAGNOSIS — J45.40 MODERATE PERSISTENT ASTHMA WITHOUT COMPLICATION: ICD-10-CM

## 2023-09-01 PROCEDURE — 94760 N-INVAS EAR/PLS OXIMETRY 1: CPT

## 2023-09-01 PROCEDURE — 94729 DIFFUSING CAPACITY: CPT

## 2023-09-01 PROCEDURE — 94726 PLETHYSMOGRAPHY LUNG VOLUMES: CPT | Performed by: INTERNAL MEDICINE

## 2023-09-01 PROCEDURE — 94060 EVALUATION OF WHEEZING: CPT | Performed by: INTERNAL MEDICINE

## 2023-09-01 PROCEDURE — 94726 PLETHYSMOGRAPHY LUNG VOLUMES: CPT

## 2023-09-01 PROCEDURE — 94060 EVALUATION OF WHEEZING: CPT

## 2023-09-01 PROCEDURE — 94729 DIFFUSING CAPACITY: CPT | Performed by: INTERNAL MEDICINE

## 2023-09-01 RX ORDER — ALBUTEROL SULFATE 2.5 MG/3ML
2.5 SOLUTION RESPIRATORY (INHALATION) ONCE
Status: COMPLETED | OUTPATIENT
Start: 2023-09-01 | End: 2023-09-01

## 2023-09-01 RX ADMIN — ALBUTEROL SULFATE 2.5 MG: 2.5 SOLUTION RESPIRATORY (INHALATION) at 10:21

## 2023-09-01 NOTE — TELEPHONE ENCOUNTER
----- Message from Yarelis Leonard MD sent at 9/1/2023  9:33 AM EDT -----  Please elt patient know I will be making home visit to see she and her  on [place] Sept 3 between 10 am and 1130 am- thank you WTS

## 2023-09-04 ENCOUNTER — IN HOME VISIT (OUTPATIENT)
Dept: INTERNAL MEDICINE CLINIC | Facility: CLINIC | Age: 88
End: 2023-09-04
Payer: COMMERCIAL

## 2023-09-04 VITALS — DIASTOLIC BLOOD PRESSURE: 78 MMHG | SYSTOLIC BLOOD PRESSURE: 100 MMHG | HEART RATE: 72 BPM | RESPIRATION RATE: 14 BRPM

## 2023-09-04 DIAGNOSIS — D47.2 MGUS (MONOCLONAL GAMMOPATHY OF UNKNOWN SIGNIFICANCE): ICD-10-CM

## 2023-09-04 DIAGNOSIS — M81.0 AGE-RELATED OSTEOPOROSIS WITHOUT CURRENT PATHOLOGICAL FRACTURE: Chronic | ICD-10-CM

## 2023-09-04 DIAGNOSIS — I50.32 CHRONIC DIASTOLIC CONGESTIVE HEART FAILURE (HCC): Chronic | ICD-10-CM

## 2023-09-04 DIAGNOSIS — R05.3 CHRONIC COUGH: Primary | ICD-10-CM

## 2023-09-04 DIAGNOSIS — J45.20 MILD INTERMITTENT ASTHMA WITHOUT COMPLICATION: Chronic | ICD-10-CM

## 2023-09-04 DIAGNOSIS — E03.9 HYPOTHYROIDISM, UNSPECIFIED TYPE: Chronic | ICD-10-CM

## 2023-09-04 DIAGNOSIS — N18.30 STAGE 3 CHRONIC KIDNEY DISEASE, UNSPECIFIED WHETHER STAGE 3A OR 3B CKD (HCC): Chronic | ICD-10-CM

## 2023-09-04 PROCEDURE — 99350 HOME/RES VST EST HIGH MDM 60: CPT | Performed by: INTERNAL MEDICINE

## 2023-09-04 NOTE — PROGRESS NOTES
Assessment/Plan:  #1 health maintenance-will be receiving influenza vaccine in October. Candidate for RSV vaccine and COVID booster and we reviewed this  2. Cough-felt to be combination of asthma-bronchiectasis. Recent CT of the chest shows mild chronic bronchiectasis of the right middle lobe and both lower lobes-unchanged in 2014 with moderate mucoid debris in the ectatic bronchi. She also has on CT severe cardiomegaly with extensive right atrial enlargement and pulmonary artery enlargement consistent with her known history of pulmonary hypertension. PFTs show obstructive disease with FEV1 of 0.92 and likely normal diffusing capacity. No change with bronchodilator. TB via QuantiFERON gold negative. 3.  Asthma-developing COPD overlap-as noted PFTs now show no bronchodilator change. Pulmonary switched her to ICS/LABA but she is yet to use this-that is Advair because of cost-remains on albuterol as needed and Flovent until she runs out of her Flovent. Remains on Singulair. 4.  Bronchiectasis- CT as noted. Pulmonary ordered a sputum sample which showed Pseudomonas. She was placed on therapy with a flutter valve. They commented in the future she may need vest therapy and hypertonic saline nebs if more conservative measures are ineffective. Await word from pulmonary regarding treatment of her sputum culture positive Pseudomonas -at the time of her consult they stated they wanted to initiate treatment with this  5. Weight loss-likely related to the valve. TSH minimally elevated. CT of the chest as noted. 6. 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   Recent additional laboratory testing shows normal immunoglobulin levels,s serum protein electrophoresis shows biclonal spike with immunofixation identifying both of these is IgG kappa-spike #1 is 0.26 g/dL and spike #2 is 0.08 g/dL- she does not have any crab features other than some CKD as noted in prior notes. No evidence of hypercalcemia bone disease or significant anemia. Likely represents MGUS and we reviewed that today. We will be checking follow-up labs in 6 to 12 months which would be December 2022 to June 2023  7. Bilateral sensorineural hearing loss- being monitored by the ENT group. They plan on seeing her again in a year which would be in June 2024  8 osteoporosis-DEXA scan in 2023 shows L-spine -2.7 and hip of -2.2. Remains in vitamin D. Knows to have calcium intake of 1200 mg/day or more. Had her first injection of Prolia mid May 2023 and is aware she needs this every 6 months. Subsequent DEXA scan will be due in March 2025  9. Congestive heart failure-diastolic with elevated BNP. Aggravated by atrial fibrillation. Echo in April 2023 shows EF of 60% with wall thickness mildly increased, moderate right atrial enlargement, estimated right ventricular systolic pressure 51. Cardiology feels she is stable on current regimen of torsemide and spironolactone. Could consider addition of Jardiance 10 mg per the literature to help decrease hospitalization rate although this has not been shown to affect overall mortality. Monitoring for now without addition of Jardiance-patient will be scheduled to see cardiology in the fall  10. Atrial fibrillation-rate controlled and on anticoagulant-in the past was on both Cardizem and metoprolol-they withdrew Cardizem and she is now on higher dose of metoprolol. Follow-up Holter shows average heart rate in the 60s with maximum 102. Continues to see cardiology every 6 months. Remains on warfarin which is monitored by cardiology  11. Recurrent labial cyst-prior labial abscess. Was treated by GYN with I&D and transient course of Bactrim without culture was never obtained. There was concern about potential MRSA as she had this in the past.  Recently had doxycycline at the time of flareup as well as her pulmonary symptoms. Not a major issue at present  12. Chronic renal failure-stage IIIb-felt related to age-related nephron loss plus possible component of cardiorenal syndrome. Knows to avoid nonsteroidals. Most recent BUN 38 with creatinine 1.5  13. Shortness of breath- combination of CHF-COPD- now with exacerbation-full evaluation as listed above  14. Hypothyroidism- stable on current dose of thyroid supplement-TSH recently done minimally elevated at 5.08-for repeat over the next 6 wt     All other problems as per note of August 30, 2022     Medical regimen Flonase nasal spray 2 squirts each nostril once daily, loratadine 10 mg daily, Flovent 110 mcg/puff - 3 puffs twice daily although as noted pulmonary is switching her to Advair, spironolactone 50 mg daily, Coumadin with dose adjustments, Prolia with first injection on May 16, 2023, Singulair 10 mg daily, torsemide 40 mg daily, levothyroxine 50 mcg daily but 75 mcg 3 days/week, calcium and vitamin D daily, Ventolin inhaler as needed, metoprolol succinate 100 mg daily, multivitamin, vitamin D3/2000 units a day, fish oil 1200 mg/day, prior use of Vicodin-5-300 half tablet twice daily for severe back pain which she has not used in months    Await follow-up appoint with cardiology. In 6 weeks will have CBC with differential chemistry profile TSH  No problem-specific Assessment & Plan notes found for this encounter. Diagnoses and all orders for this visit:    Chronic cough    Chronic diastolic congestive heart failure (HCC)    Mild intermittent asthma without complication    Hypothyroidism, unspecified type    Stage 3 chronic kidney disease, unspecified whether stage 3a or 3b CKD (HCC)    Age-related osteoporosis without current pathological fracture    MGUS (monoclonal gammopathy of unknown significance)          Subjective:      Patient ID: Sara Wu is a 80 y.o. female. Seen as follow-up phone visit. We reviewed multiple issues. As noted she has recently been evaluated for chronic cough.   This has been present for months. She underwent extensive evaluation. CT of the chest showed mild chronic bronchiectasis right middle lobe and both lower lobes stable since 2014 with moderate mucoid debris and ectatic bronchi. On CT she also had severe cardiomegaly with extensive right atrial enlargement. She also pulmonary artery enlargement. CT CHEST WITHOUT IV CONTRAST     INDICATION:   R05.2: Subacute cough. Per my review of the medical record, patient has had a wet cough for several months. Has asthma-COPD and CHF. 30 pound weight loss over 6 years.     COMPARISON: CXR 12/21/2021 and chest CT 2/27/2015. Abdomen CT since 3/4/2014.     TECHNIQUE: Chest CT without intravenous contrast.  Axial, sagittal, coronal 2D reformats and coronal MIPS from source data.     Radiation dose length product (DLP):  147 mGy-cm . Radiation dose exposure minimized using iterative reconstruction and automated exposure control.     FINDINGS:     LUNGS: Mild chronic bronchiectasis in the right middle lobe and both lower lobes, present since the abdomen CT from 2014 with moderate mucoid debris in the ectatic bronchi. Mild benign linear atelectasis in the right middle lobe and right lower lobe. Benign calcified granulomas.     PLEURA:  Unremarkable.     HEART/GREAT VESSELS: Severe chronic cardiomegaly with extensive right atrial enlargement. Moderate coronary artery calcification indicating atherosclerotic heart disease. Pulmonary artery enlargement.     MEDIASTINUM AND CASH:  Unremarkable.     CHEST WALL AND LOWER NECK: Unremarkable.     UPPER ABDOMEN: Left renal cysts.  Benign calcified hepatic and splenic granulomas.     OSSEOUS STRUCTURES: Mild degenerative disease in the spine with mild curvature.     IMPRESSION:     Nothing to indicate malignancy.     Mild chronic bronchiectasis in the right middle lobe and both lower lobes, present since 2014, with moderate mucoid debris in the ectatic bronchi.     Severe chronic cardiomegaly with extensive right atrial enlargement.     Pulmonary artery enlargement which can be seen with pulmonary hypertension.              Workstation performed: [id number]       Pulmonary function studies were performed and showed obstructive disease. FEV1 was 0.92. Likely normal diffusing capacity. She had no change with bronchodilator. Details    Reading Physician Reading Date Result Priority  Aaliyah Burr, [address] 9/1/2023 Routine  Ashley Sanford MD  [phone] 9/1/2023     Narrative & Impression  Pulmonary Function Test Report  Mars Leonard 80 y.o. female MRN: [id number]     Date test performed: 9/1/2023     Date of test interpretation: 9/1/2023     Requesting Physician: Tigist Lei      Reason for Testing: Moderate persistent asthma without complication     Respiratory technician Comments: Good patient effort & cooperation. The results of this test meet the ATS standards for acceptability and repeatability. Nebulizer given with 2.5 mg Albuterol (0.083%) DLCO was corrected for patients Hgb on day of testing . HR 87 bpm , SpO2 97% on RA @ rest.     Reference set for interpretation: [id number]     Procedure: The patient was taken to pulmonary function testing laboratory. The patient demonstrated good effort and cooperation.   The results of this test meet ATS standards for acceptability and repeatability.       Post bronchodilator testing performed after the administration of 2.5mg albuterol in 3cc normal saline administered via nebulizer per bronchodilator protocol.     DLCO was corrected for patient's Hb     Results:     FEV1/FVC Ratio: 66%  Forced Vital Capacity: 1.83 L    80% predicted  FEV1: 0.92 L     54% predicted        After administration of bronchodilator      FVC: 2.01 L, 88% predicted, +9 % change  FEV1: 1.04 L, 61% predicted, +12 % change     Lung volumes by body plethysmography:      Total Lung Capacity 97% predicted   Residual volume 119% predicted  RV/TLC Ratio: 63.3%, 121% predicted     DLCO corrected for patients hemoglobin level: Diffusion capacity completed, however percent predicted not on PFT read      Interpretation:     ? Obstructive airflow limitation on spirometry     ? No significant improvement in airflow or forced vital capacity in response to the administration to bronchodilator per ATS standards.      ? Normal lung volumes      ? Diffusion capacity completed, however percent predicted not on PFT, most likely normal.     ? Flow-volume loop consistent with obstruction      ? Increased airway resistance as indicated by the specific airway conductance.     Aldo lBand,   Pulmonary & Critical Care Fellow, PGY-IV  [facility]     _____________________________________________________     Attending Co-signature:     I have reviewed the pulmonary function test with the pulmonary fellow. I agree with the study interpretation as outlined above     Brunilda Lewis MD  She saw the pulmonary group who felt her chronic cough is a combination of asthma versus bronchiectasis. They felt she had uncontrolled asthma and switched her to therapy with ICS/LABA. She is yet to do this because of expense and is still using her Flovent. She is continuing on Singulair per the recommendation. In reference to her bronchiectasis they recommended a sputum sample which grew Pseudomonas. They also recommended a flutter valve-she received the information in reference to this and is proceeding with this. They commented in the future she may need vest therapy and hypertonic saline nebs if conservative measures fail. She has ongoing cough and was coughing throughout the exam.  She states that she does not have large amounts of purulent sputum but clearly has increased sputum volume compared to in the past.    Follow-up labs were reviewed. Immunoglobulin levels were normal.  TSH minimally elevated at 5.08.   CBC normal other than MCV of 107 with slight increase in eosinophils. B12 level and folate level normal.  Chemistry profile shows BUN of 38 with creatinine of 1.5. BNP of 372. Protein electrophoresis shows a biclonal spike with immunofixation identifying both of these is IgG kappa- spike #1 is 0.26 g/dL M spike #2 is 0.08 g/dL. She does not have any crab features other than some CKD which is longstanding. No evidence of hypercalcemia or bone disease or significant anemia. This patient denies any systemic symptoms. Specifically there has been no evidence of fever, night sweats, significant weight loss or significant decrease in appetite. She has had weight loss as noted. This is likely related to her pulmonary disease. Her appetite is decreased but stable. She has not had any falls    Despite all the above her spirits are relatively well. She and her  live in an independent living facility. When I asked she and her  what they do for fun she told me that they prepared to get ready to go for doctors appointments. Her shortness of breath is at baseline. She denies increasing orthopnea or paroxysmal nocturnal dyspnea. She denies leg edema. She denies chest pain or pressure. We have a long discussion today regarding the above. We reviewed bronchiectasis, asthma, MGUS. The following portions of the patient's history were reviewed and updated as appropriate: allergies, current medications, past family history, past medical history, past social history, past surgical history and problem list.    Review of Systems   Constitutional: Positive for fatigue and unexpected weight change. HENT: Negative. Respiratory: Positive for cough and shortness of breath. Cardiovascular: Negative. Gastrointestinal: Negative. Endocrine: Negative. Genitourinary: Negative. Musculoskeletal: Negative. Skin: Negative. Neurological: Negative. Hematological: Negative. Psychiatric/Behavioral: Negative. Objective:      LMP  (LMP Unknown)          Physical Exam  Vitals reviewed. Constitutional:       General: She is not in acute distress. Appearance: She is ill-appearing. She is not toxic-appearing or diaphoretic. HENT:      Head: Normocephalic and atraumatic. Right Ear: External ear normal.      Left Ear: External ear normal.      Nose: Nose normal. No congestion or rhinorrhea. Mouth/Throat:      Mouth: Mucous membranes are moist.      Pharynx: Oropharynx is clear. No oropharyngeal exudate or posterior oropharyngeal erythema. Eyes:      General: No scleral icterus. Right eye: No discharge. Left eye: No discharge. Extraocular Movements: Extraocular movements intact. Conjunctiva/sclera: Conjunctivae normal.   Neck:      Vascular: No carotid bruit. Cardiovascular:      Rate and Rhythm: Normal rate and regular rhythm. Pulses: Normal pulses. Heart sounds: Normal heart sounds. No murmur heard. No friction rub. No gallop. Comments: Borderline cardiomegaly to percussion. Adequate upstroke.-Palpable PMI-irregular rhythm  Pulmonary:      Effort: Pulmonary effort is normal. No respiratory distress. Breath sounds: No stridor. Wheezing and rhonchi present. No rales. Comments: Scattered wheezes and rhonchi in all lung fields  Chest:      Chest wall: No tenderness. Abdominal:      General: Abdomen is flat. Bowel sounds are normal. There is no distension. Palpations: Abdomen is soft. There is no mass. Tenderness: There is no abdominal tenderness. There is no right CVA tenderness, left CVA tenderness, guarding or rebound. Hernia: No hernia is present. Musculoskeletal:         General: No swelling, tenderness, deformity or signs of injury. Normal range of motion. Cervical back: Normal range of motion and neck supple. No rigidity. No muscular tenderness. Right lower leg: No edema. Left lower leg: No edema. Lymphadenopathy:      Cervical: No cervical adenopathy. Skin:     General: Skin is warm and dry. Coloration: Skin is not jaundiced or pale. Findings: No bruising, erythema, lesion or rash. Comments: Peripheral venous disease   Neurological:      General: No focal deficit present. Mental Status: She is alert and oriented to person, place, and time. Mental status is at baseline. Cranial Nerves: No cranial nerve deficit. Sensory: No sensory deficit. Motor: No weakness. Coordination: Coordination normal.      Gait: Gait normal.      Deep Tendon Reflexes: Reflexes normal.   Psychiatric:         Mood and Affect: Mood normal.         Behavior: Behavior normal.         Thought Content:  Thought content normal.         Judgment: Judgment normal.

## 2023-09-06 ENCOUNTER — TELEPHONE (OUTPATIENT)
Dept: PULMONOLOGY | Facility: CLINIC | Age: 88
End: 2023-09-06

## 2023-09-06 NOTE — TELEPHONE ENCOUNTER
Patient called asking if Dr. Abdirashid Dempsey an call her to discus her PFT and sputum results. Please advise.

## 2023-09-07 NOTE — TELEPHONE ENCOUNTER
Patient called back regarding her results. She is upset that she has not heard back from our office yet because she would like to know her results and she said that she was supposed to get antibiotics after taking these tests. Please advise.

## 2023-09-08 DIAGNOSIS — J47.0 BRONCHIECTASIS WITH ACUTE LOWER RESPIRATORY INFECTION (HCC): Primary | ICD-10-CM

## 2023-09-08 LAB
DME PARACHUTE DELIVERY DATE REQUESTED: NORMAL
DME PARACHUTE ITEM DESCRIPTION: NORMAL
DME PARACHUTE ORDER STATUS: NORMAL
DME PARACHUTE SUPPLIER NAME: NORMAL
DME PARACHUTE SUPPLIER PHONE: NORMAL

## 2023-09-08 RX ORDER — SODIUM CHLORIDE FOR INHALATION 3 %
4 VIAL, NEBULIZER (ML) INHALATION 3 TIMES DAILY
Qty: 360 ML | Refills: 2 | Status: SHIPPED | OUTPATIENT
Start: 2023-09-08

## 2023-09-08 RX ORDER — LEVOFLOXACIN 500 MG/1
500 TABLET, FILM COATED ORAL EVERY 24 HOURS
Qty: 14 TABLET | Refills: 0 | Status: SHIPPED | OUTPATIENT
Start: 2023-09-08 | End: 2023-09-22

## 2023-09-08 NOTE — PROGRESS NOTES
Called patient to discuss her sputum culture results and PFTs. Cough without improvement. Will prescribe levaquin 14 days along with hypertonic saline nebs for bronchiectasis exacerbation. Will repeat sputum culture in 2 weeks when Abx completed.

## 2023-09-11 ENCOUNTER — TELEPHONE (OUTPATIENT)
Dept: INTERNAL MEDICINE CLINIC | Facility: CLINIC | Age: 88
End: 2023-09-11

## 2023-09-11 NOTE — TELEPHONE ENCOUNTER
Patient left message    This is Monday, September 11th 9:45 AM. I'm this is Jesus Roldan. My age is I was just. YOB: 1932. Telephone number [phone]. I'd like to get a message to Doctor Roberts. I've been seeing a pulmonologist. She has recently prescribed a medication that I'm would like to have Doctor Roberts's opinion about. The medication is Levo Floxacin, 500 milligrams, one daily for 14 days. I'd really appreciate it if you could give me a call back whenever he has time.  Thank you

## 2023-09-11 NOTE — TELEPHONE ENCOUNTER
----- Message from Eva Ham MD sent at 9/4/2023  7:22 AM EDT -----  This patient had a home visit over the weekend.   I ordered labs to include CBC with differential, comprehensive metabolic profile, vitamin D level and TSH-these are done at her independent living facility but labs were drawn there by [facility] make sure these orders labs are done in 6 weeks-thank you WTS

## 2023-09-12 ENCOUNTER — ANTICOAG VISIT (OUTPATIENT)
Dept: CARDIOLOGY CLINIC | Facility: CLINIC | Age: 88
End: 2023-09-12

## 2023-09-12 DIAGNOSIS — I48.21 PERMANENT ATRIAL FIBRILLATION (HCC): Primary | Chronic | ICD-10-CM

## 2023-09-12 LAB

## 2023-09-25 ENCOUNTER — TELEPHONE (OUTPATIENT)
Dept: INTERNAL MEDICINE CLINIC | Facility: CLINIC | Age: 88
End: 2023-09-25

## 2023-09-25 NOTE — TELEPHONE ENCOUNTER
----- Message from Rosey Lockhart MD sent at 9/25/2023  7:09 AM EDT -----  Please call patient-blood work stable with mild elevation of her creatinine is similar to before. Other lab work normal other than mild underactive thyroid.   She is currently on levothyroxine 1 daily but 1-1/2 tablets 3 days/week-please confirm this and if this is correct that she should increase to 1 tab daily but 1-1/2 tablets 4 days/week  Please also have patient give message to her  that preliminary of his blood work is entirely normal  thank you WTS

## 2023-09-26 ENCOUNTER — APPOINTMENT (OUTPATIENT)
Dept: LAB | Facility: CLINIC | Age: 88
End: 2023-09-26
Payer: COMMERCIAL

## 2023-09-26 DIAGNOSIS — J47.0 BRONCHIECTASIS WITH ACUTE LOWER RESPIRATORY INFECTION (HCC): ICD-10-CM

## 2023-09-26 PROCEDURE — 87070 CULTURE OTHR SPECIMN AEROBIC: CPT

## 2023-09-26 PROCEDURE — 87205 SMEAR GRAM STAIN: CPT

## 2023-09-28 LAB
BACTERIA SPT RESP CULT: ABNORMAL
GRAM STN SPEC: ABNORMAL

## 2023-10-03 NOTE — PROGRESS NOTES
Pulmonary Follow Up Note   Keny Odom 80 y.o. female MRN: [id number]  10/4/2023      Assessment:  Chronic Cough  Seems to have resolved with treatment of bronchitis. Will continue with airway clearance for bronchiectasis and call if noticed increased cough or mucous     Moderate Persistent Asthma  Reviewed PFTs, improved control with change to Advair. Will decrease Advair dosage to 250/50mcg to decrease ICS. If no further decline then will continue to decrease next visit. She is advised to call if any changes. Bronchiectasis   Patient with bronchiectasis noted on CT chest and Pseudomonas growth on sputum culture. Improved with levaquin treatment and repeat sputum culture showed mixed respiratory aron. Discussed importance of airway clearance in managing this disease. advised to start hypertonic nebs and mucinex again if she notices increased cough or sputum production. Continue with daily flutter valve use. Plan:    Diagnoses and all orders for this visit:    Bronchiectasis with acute lower respiratory infection ([address])    Moderate persistent asthma without complication  -     Fluticasone-Salmeterol (Advair Diskus) 250-50 mcg/dose inhaler; Inhale 1 puff 2 (two) times a day Rinse mouth after use. Chronic cough        Return in about 4 months (around 2/4/2024). History of Present Illness   HPI:  Keny Odom is a 80 y.o. female who with PMH of HFpEF, Afib on coumadin, asthma who presents with productive cough since November. She was found to have Pseudomonas growth on sputum culture was treated with Levaquin for 14 days along with airway clearance. She states that she has improved vastly. She has minimal cough and no sputum production. Is also noticed improved control in her asthma since switching to Advair. She is rarely used her rescue inhaler. We discussed further management of bronchiectasis and she is in agreement.   We discussed decreasing Advair dosage to minimize inhaled corticosteroid and she is in agreement. Review of Systems   Constitutional: Negative for activity change, chills, diaphoresis, fatigue and fever. HENT: Negative for congestion, postnasal drip, rhinorrhea, sinus pressure, sneezing, sore throat and trouble swallowing. Eyes: Negative. Respiratory: Negative for cough, chest tightness and shortness of breath. Cardiovascular: Negative for chest pain, palpitations and leg swelling. Gastrointestinal: Negative for constipation, diarrhea, nausea and vomiting. Endocrine: Negative. Genitourinary: Negative. Musculoskeletal: Negative for back pain, joint swelling and neck pain. Skin: Negative. Allergic/Immunologic: Negative. Neurological: Negative for dizziness, syncope, weakness, light-headedness and headaches. Hematological: Negative. Psychiatric/Behavioral: Negative. All other systems reviewed and are negative.       Historical Information   Past Medical History:   Diagnosis Date   • Abnormal ultrasound     RESOLVED: [id number]   • Asthma with acute exacerbation     LAST ASSESSED: [id number]   • Asymptomatic menopausal state    • Atherosclerosis    • Atrial fibrillation (HCC)    • Chronic obstructive lung disease (HCC)    • Congestive heart failure (CHF) (HCC)    • Congestive heart failure (HCC)     LAST ASSESSED: [id number]   • Cuboid fracture     LAST ASSESSED: [id number]   • Dyspepsia    • GERD (gastroesophageal reflux disease)    • High risk medication use     LAST ASSESSED: 96AQM3302   • Hyperlipidemia    • Hypertension    • Hypertension    • Hypokalemia    • Hypothyroidism    • Impacted cerumen of both ears     LAST ASSESSED: [id number]   • Impacted cerumen of right ear     LAST ASSESSED: 79XVS6322   • Influenza     LAST ASSESSED: 77HHM1852   • IPMN (intraductal papillary mucinous neoplasm)     RESOLVED: [id number]   • Limb swelling     LAST ASSESSED: 23EOI1136   • Navicular fracture of ankle     LAST ASSESSED: [id number]   • Onychomycosis     LAST ASSESSED: 63AQH9019   • Osteoarthritis    • Osteopenia    • Osteoporosis    • Paronychia, finger, unspecified laterality     LAST ASSESSED: 90UEC9332   • Paroxysmal atrial fibrillation (HCC)     LAST ASSESSED: 47XNB8075   • Peripheral vascular disease ([address])    • Plantar fasciitis    • Talus fracture     LAST ASSESSED: 78WIP1130   • Vascular headache      Past Surgical History:   Procedure Laterality Date   • APPENDECTOMY     • CATARACT EXTRACTION Bilateral    • CHOLECYSTECTOMY     • NEUROPLASTY / TRANSPOSITION MEDIAN NERVE AT CARPAL TUNNEL BILATERAL Bilateral     DECOMPRESSION   • OOPHORECTOMY Bilateral     age 80   • PELVIC FLOOR REPAIR  2014   • SINUS SURGERY     • TOTAL KNEE ARTHROPLASTY Bilateral      Family History   Problem Relation Age of Onset   • Pancreatic cancer Mother 80   • Heart failure Mother    • Coronary artery disease Family    • Breast cancer Family    • Other Family         BREAST DISORDER, GASTROINTESTINAL CANCER   • Uterine cancer Family    • Hyperlipidemia Family    • Osteoarthritis Family    • Ovarian cancer Family    • Brain cancer Son    • Stroke Father    • No Known Problems Sister    • No Known Problems Daughter    • No Known Problems Maternal Grandmother    • No Known Problems Maternal Grandfather    • No Known Problems Paternal Grandmother    • No Known Problems Paternal Grandfather    • Breast cancer Cousin 48   • Breast cancer Cousin 48   • Ovarian cancer Other 52         Meds/Allergies     Current Outpatient Medications:   •  albuterol (Proventil HFA) 90 mcg/act inhaler, Inhale 2 puffs every 6 (six) hours as needed for wheezing, Disp: 20.1 g, Rfl: 3  •  albuterol (PROVENTIL HFA,VENTOLIN HFA) 90 mcg/act inhaler, Inhale, Disp: , Rfl:   •  Cholecalciferol (VITAMIN D3) 1000 units CAPS, Take 2,000 Units by mouth daily, Disp: , Rfl:   •  fluticasone (FLONASE) 50 mcg/act nasal spray, USE 2 SPRAYS IN EACH NOSTRIL ONCE DAILY, Disp: 48 g, Rfl: 3  • Fluticasone-Salmeterol (Advair Diskus) 250-50 mcg/dose inhaler, Inhale 1 puff 2 (two) times a day Rinse mouth after use., Disp: 60 blister, Rfl: 2  •  guaiFENesin (MUCINEX) 600 mg 12 hr tablet, Take 2 tablets (1,200 mg total) by mouth every 12 (twelve) hours, Disp: 60 tablet, Rfl: 0  •  metoprolol succinate (TOPROL-XL) 100 mg 24 hr tablet, Take 1 tablet (100 mg total) by mouth daily, Disp: 90 tablet, Rfl: 3  •  montelukast (Singulair) 10 mg tablet, Take 1 tablet (10 mg total) by mouth daily, Disp: 90 tablet, Rfl: 3  •  sodium chloride 3 % inhalation solution, Take 4 mL by nebulization 3 (three) times a day, Disp: 360 mL, Rfl: 2  •  spironolactone (ALDACTONE) 50 mg tablet, Take 1 tablet (50 mg total) by mouth daily, Disp: 90 tablet, Rfl: 3  •  torsemide (DEMADEX) 20 mg tablet, Take 2 tablets (40 mg total) by mouth daily, Disp: 180 tablet, Rfl: 3  •  warfarin (COUMADIN) 5 mg tablet, TAKE 1/2 TO 1 TABLET BY MOUTH OR AS ORDERED BY PHYSICIAN., Disp: 90 tablet, Rfl: 3  •  levothyroxine 50 mcg tablet, Take 1 tab daily expect 1 and 1/2 on Sunday and Wednesday (Patient taking differently: Take 1 tab daily expect 1 and 1/2 on Sunday, Wednesday, and Friday), Disp: 98 tablet, Rfl: 3  Allergies   Allergen Reactions   • Asa [Aspirin]    • Nsaids        Vitals: Blood pressure 112/64, pulse 91, temperature 98.2 °F (36.8 °C), temperature source Tympanic, height 5' 4" (1.626 m), weight 53.5 kg (118 lb), SpO2 99 %. Body mass index is 20.25 kg/m². Physical Exam  Physical Exam  Vitals and nursing note reviewed. Constitutional:       Appearance: Normal appearance. Comments: Thin, frail   HENT:      Head: Normocephalic and atraumatic. Right Ear: External ear normal.      Left Ear: External ear normal.      Nose: Nose normal.      Mouth/Throat:      Mouth: Mucous membranes are moist.      Pharynx: Oropharynx is clear.    Eyes:      Conjunctiva/sclera: Conjunctivae normal.   Cardiovascular:      Rate and Rhythm: Normal rate and regular rhythm. Pulses: Normal pulses. Heart sounds: Normal heart sounds. Pulmonary:      Effort: Pulmonary effort is normal.      Breath sounds: Normal breath sounds. Abdominal:      General: Abdomen is flat. Bowel sounds are normal.      Palpations: Abdomen is soft. Musculoskeletal:         General: No swelling or tenderness. Cervical back: Neck supple. No muscular tenderness. Skin:     General: Skin is warm and dry. Capillary Refill: Capillary refill takes less than 2 seconds. Neurological:      Mental Status: She is alert and oriented to person, place, and time. Mental status is at baseline. Psychiatric:         Mood and Affect: Mood normal.         Behavior: Behavior normal.         Thought Content: Thought content normal.         Judgment: Judgment normal.         Preventative: Influenza vaccine: UTD  Pneumonia vaccine: UTD    Labs: I have personally reviewed pertinent lab results. Lab Results   Component Value Date    WBC 8.12 07/03/2023    HGB 14.0 07/03/2023    HCT 43.9 07/03/2023     (H) 07/03/2023     07/03/2023     Lab Results   Component Value Date    CALCIUM 9.6 07/03/2023     10/17/2017    K 4.6 07/03/2023    CO2 27 07/03/2023     07/03/2023    BUN 38 (H) 07/03/2023    CREATININE 1.50 (H) 07/03/2023     No results found for: "IGE"  Lab Results   Component Value Date    ALT 29 07/03/2023    AST 35 07/03/2023    ALKPHOS 54 07/03/2023    BILITOT 0.5 10/17/2017         Imaging and other studies: I have personally reviewed pertinent reports. and I have personally reviewed pertinent films in PACS  CT Chest 7/3/23: bronchiectasis in RML and B/L lower lobes with bronchial wall thickening and mucous in airways  Pulmonary function testing 9/1/23: moderate obstruction with +BD response with hyperinflation      EKG, Pathology, and Other Studies: I have personally reviewed pertinent reports.     Echo 4/6/23: EF 60% unable to evaluate diastolic function due to Afib, RVSP of Gillie Kanner, MD  Pulmonary/Critical Care Fellow PGY-V  [facility]      Disclaimer: Portions of the record may have been created with voice recognition software. Occasional wrong word or "sound a like" substitutions may have occurred due to the inherent limitations of voice recognition software. Careful consideration should be taken to recognize, using context, where substitutions have occurred.

## 2023-10-04 ENCOUNTER — OFFICE VISIT (OUTPATIENT)
Dept: PULMONOLOGY | Facility: CLINIC | Age: 88
End: 2023-10-04
Payer: COMMERCIAL

## 2023-10-04 VITALS
HEIGHT: 64 IN | OXYGEN SATURATION: 99 % | HEART RATE: 91 BPM | SYSTOLIC BLOOD PRESSURE: 112 MMHG | WEIGHT: 118 LBS | BODY MASS INDEX: 20.14 KG/M2 | DIASTOLIC BLOOD PRESSURE: 64 MMHG | TEMPERATURE: 98.2 F

## 2023-10-04 DIAGNOSIS — J45.40 MODERATE PERSISTENT ASTHMA WITHOUT COMPLICATION: ICD-10-CM

## 2023-10-04 DIAGNOSIS — J47.0 BRONCHIECTASIS WITH ACUTE LOWER RESPIRATORY INFECTION (HCC): Primary | ICD-10-CM

## 2023-10-04 DIAGNOSIS — R05.3 CHRONIC COUGH: ICD-10-CM

## 2023-10-04 PROCEDURE — 99214 OFFICE O/P EST MOD 30 MIN: CPT | Performed by: INTERNAL MEDICINE

## 2023-10-04 RX ORDER — FLUTICASONE PROPIONATE AND SALMETEROL 250; 50 UG/1; UG/1
1 POWDER RESPIRATORY (INHALATION) 2 TIMES DAILY
Qty: 60 BLISTER | Refills: 2 | Status: SHIPPED | OUTPATIENT
Start: 2023-10-04 | End: 2023-11-03

## 2023-10-04 NOTE — PATIENT INSTRUCTIONS
Will decrease Advair dose to 250/50mcg if you notice no increase in albuterol rescue inhaler use then give me a call so I can send 3 month refills [phone]   Continue flutter valve daily and use mucinex and nebulizer when notice increased cough or mucous.

## 2023-10-17 ENCOUNTER — ANTICOAG VISIT (OUTPATIENT)
Dept: CARDIOLOGY CLINIC | Facility: CLINIC | Age: 88
End: 2023-10-17

## 2023-10-17 DIAGNOSIS — I48.21 PERMANENT ATRIAL FIBRILLATION (HCC): Primary | Chronic | ICD-10-CM

## 2023-10-17 LAB — INR PPP: 1.9 (ref 0.84–1.19)

## 2023-10-18 DIAGNOSIS — I48.20 CHRONIC ATRIAL FIBRILLATION (HCC): Chronic | ICD-10-CM

## 2023-10-18 RX ORDER — WARFARIN SODIUM 5 MG/1
TABLET ORAL
Qty: 90 TABLET | Refills: 3 | Status: SHIPPED | OUTPATIENT
Start: 2023-10-18

## 2023-10-31 ENCOUNTER — ANTICOAG VISIT (OUTPATIENT)
Dept: CARDIOLOGY CLINIC | Facility: CLINIC | Age: 88
End: 2023-10-31

## 2023-10-31 DIAGNOSIS — I48.21 PERMANENT ATRIAL FIBRILLATION (HCC): Primary | Chronic | ICD-10-CM

## 2023-10-31 LAB — INR PPP: 2.7 (ref 0.84–1.19)

## 2023-11-06 ENCOUNTER — TELEPHONE (OUTPATIENT)
Dept: PULMONOLOGY | Facility: CLINIC | Age: 88
End: 2023-11-06

## 2023-11-06 DIAGNOSIS — J45.40 MODERATE PERSISTENT ASTHMA WITHOUT COMPLICATION: ICD-10-CM

## 2023-11-07 NOTE — TELEPHONE ENCOUNTER
Patient called the refill line requesting a refill on her advair diskus 250. She is unsure is Lisy Santamaria would like her to continue on the 250, or go back to the 500 please advise.

## 2023-11-08 DIAGNOSIS — J45.40 MODERATE PERSISTENT ASTHMA WITHOUT COMPLICATION: ICD-10-CM

## 2023-11-08 RX ORDER — FLUTICASONE PROPIONATE AND SALMETEROL 250; 50 UG/1; UG/1
1 POWDER RESPIRATORY (INHALATION) 2 TIMES DAILY
Qty: 60 BLISTER | Refills: 6 | Status: SHIPPED | OUTPATIENT
Start: 2023-11-08 | End: 2023-12-08

## 2023-11-09 DIAGNOSIS — E03.9 HYPOTHYROIDISM, UNSPECIFIED TYPE: Chronic | ICD-10-CM

## 2023-11-09 RX ORDER — LEVOTHYROXINE SODIUM 0.05 MG/1
TABLET ORAL
Qty: 98 TABLET | Refills: 10 | Status: SHIPPED | OUTPATIENT
Start: 2023-11-09

## 2023-11-10 ENCOUNTER — TELEPHONE (OUTPATIENT)
Dept: INTERNAL MEDICINE CLINIC | Facility: CLINIC | Age: 88
End: 2023-11-10

## 2023-11-10 NOTE — TELEPHONE ENCOUNTER
----- Message from Hamlet Reilly MD sent at 11/10/2023  4:49 AM EST -----  This patient called about her Prolia injection-it is due mid November-let her know I will be there to make a home visit to administer the Prolia on Friday, November 17 between 11 AM and noon WTS

## 2023-11-14 ENCOUNTER — ANTICOAG VISIT (OUTPATIENT)
Dept: CARDIOLOGY CLINIC | Facility: CLINIC | Age: 88
End: 2023-11-14

## 2023-11-14 DIAGNOSIS — J45.20 MILD INTERMITTENT ASTHMA WITHOUT COMPLICATION: ICD-10-CM

## 2023-11-14 DIAGNOSIS — I48.21 PERMANENT ATRIAL FIBRILLATION (HCC): Primary | Chronic | ICD-10-CM

## 2023-11-14 LAB — INR PPP: 2.4 (ref 0.84–1.19)

## 2023-11-14 RX ORDER — MONTELUKAST SODIUM 10 MG/1
10 TABLET ORAL DAILY
Qty: 90 TABLET | Refills: 10 | Status: SHIPPED | OUTPATIENT
Start: 2023-11-14

## 2023-11-14 NOTE — TELEPHONE ENCOUNTER
Called patient and discussed advair rx. Patient with more cough. Advised to try airway clearance more frequently and will call our office back on Monday to discuss if improved or not

## 2023-11-20 ENCOUNTER — CLINICAL SUPPORT (OUTPATIENT)
Dept: INTERNAL MEDICINE CLINIC | Facility: CLINIC | Age: 88
End: 2023-11-20
Payer: COMMERCIAL

## 2023-11-20 DIAGNOSIS — M81.0 OSTEOPOROSIS, UNSPECIFIED OSTEOPOROSIS TYPE, UNSPECIFIED PATHOLOGICAL FRACTURE PRESENCE: Primary | ICD-10-CM

## 2023-11-20 PROCEDURE — 96372 THER/PROPH/DIAG INJ SC/IM: CPT | Performed by: INTERNAL MEDICINE

## 2023-11-20 NOTE — TELEPHONE ENCOUNTER
Patient is calling, she is doing okay, about the same but not worse. She still has a cough but not as frequent, bringing up mucous mostly white. Thank you.

## 2023-12-12 ENCOUNTER — ANTICOAG VISIT (OUTPATIENT)
Dept: CARDIOLOGY CLINIC | Facility: CLINIC | Age: 88
End: 2023-12-12

## 2023-12-12 DIAGNOSIS — I48.21 PERMANENT ATRIAL FIBRILLATION (HCC): Primary | Chronic | ICD-10-CM

## 2023-12-12 LAB — INR PPP: 2.1 (ref 0.84–1.19)

## 2023-12-18 ENCOUNTER — TELEPHONE (OUTPATIENT)
Dept: INTERNAL MEDICINE CLINIC | Facility: CLINIC | Age: 88
End: 2023-12-18

## 2023-12-18 NOTE — TELEPHONE ENCOUNTER
----- Message from Santana Dillon MD sent at 12/15/2023  5:02 AM EST -----  Please call patient and let her know I will be by to see she and her  as a home visit on Monday, December 18 between 10 and 11 AM-thank you WTS

## 2023-12-19 ENCOUNTER — IN HOME VISIT (OUTPATIENT)
Dept: INTERNAL MEDICINE CLINIC | Facility: CLINIC | Age: 88
End: 2023-12-19
Payer: COMMERCIAL

## 2023-12-19 VITALS — RESPIRATION RATE: 16 BRPM | HEART RATE: 82 BPM

## 2023-12-19 DIAGNOSIS — I50.32 CHRONIC DIASTOLIC CONGESTIVE HEART FAILURE (HCC): Chronic | ICD-10-CM

## 2023-12-19 DIAGNOSIS — Z71.85 VACCINE COUNSELING: ICD-10-CM

## 2023-12-19 DIAGNOSIS — N18.30 STAGE 3 CHRONIC KIDNEY DISEASE, UNSPECIFIED WHETHER STAGE 3A OR 3B CKD (HCC): Chronic | ICD-10-CM

## 2023-12-19 DIAGNOSIS — J45.40 MODERATE PERSISTENT ASTHMA WITHOUT COMPLICATION: Chronic | ICD-10-CM

## 2023-12-19 DIAGNOSIS — J47.0 BRONCHIECTASIS WITH ACUTE LOWER RESPIRATORY INFECTION (HCC): Primary | Chronic | ICD-10-CM

## 2023-12-19 DIAGNOSIS — I48.21 PERMANENT ATRIAL FIBRILLATION (HCC): Chronic | ICD-10-CM

## 2023-12-19 PROCEDURE — 99350 HOME/RES VST EST HIGH MDM 60: CPT | Performed by: INTERNAL MEDICINE

## 2023-12-19 NOTE — PROGRESS NOTES
Assessment/Plan:  #1 health maintenance-patient received influenza vaccine in October.  Also received RSV vaccine and COVID booster  2.  Recently noted prominent cough-felt to be combination of asthma-bronchiectasis.  CT of the chest shows mild chronic bronchiectasis of right middle lobe and both lower lobes-unchanged from 2014 with moderate mucoid debris in the ectatic bronchi.  Has severe cardiomegaly with extensive right atrial enlargement and pulmonary artery enlargement consistent with her known history of pulmonary hypertension also evident on CT.  PFTs show obstructive disease with FEV1 of 0.92 and likely normal diffusing capacity.  No change to bronchodilator.  TB QuantiFERON gold negative.  3.  Bronchiectasis-CT is noted.  Sputum showed Pseudomonas and patient was treated with Levaquin.  Repeat sputum culture showed normal aron.  Patient is using daily flutter valve.  In the future if more conservative measures are ineffective she will need hypertonic saline nebs and you can ask.  Could be considered for vest therapy in the future  4.  Asthma-COPD overlap.  PFTs as above.  On Advair with decrease in strength.  Currently on 50/250.  Remains on Singulair.  Remains on albuterol as needed.  5.  Weight loss-likely related to the valve.  TSH minimally elevated.  CT of the chest as noted.  6.  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  Recent additional laboratory testing shows normal immunoglobulin levels,s serum protein electrophoresis shows biclonal spike with immunofixation identifying both of these is IgG kappa-spike #1 is 0.26 g/dL and spike #2 is 0.08 g/dL- she does not have any crab features other than some CKD as noted in prior notes.  No evidence of hypercalcemia bone disease or significant anemia.  Likely represents MGUS and we reviewed that today.  We will be checking follow-up labs in 6 to 12 months which would be December 2022 to June 2023  7.   Bilateral sensorineural hearing loss- being monitored by the ENT group.  They plan on seeing her again in a year which would be in June 2024  8 osteoporosis-DEXA scan in 2023 shows L-spine -2.7 and hip of -2.2.  Remains in vitamin D.  Knows to have calcium intake of 1200 mg/day or more.    Subsequent DEXA scan will be due in March 2025 patient had her second injection of Prolia on 1120/23  9.  Congestive heart failure-diastolic with elevated BNP.  Aggravated by atrial fibrillation.  Echo in April 2023 shows EF of 60% with wall thickness mildly increased, moderate right atrial enlargement, estimated right ventricular systolic pressure 51.  Cardiology feels she is stable on current regimen of torsemide and spironolactone.  Could consider addition of Jardiance 10 mg per the literature to help decrease hospitalization rate although this has not been shown to affect overall mortality.  Monitoring for now without addition of Jardiance-patient will be scheduled to see cardiology in the fall  10.  Atrial fibrillation-rate controlled and on anticoagulant-in the past was on both Cardizem and metoprolol-they withdrew Cardizem and she is now on higher dose of metoprolol.  Follow-up Holter shows average heart rate in the 60s with maximum 102.  Continues to see cardiology every 6 months.  Remains on warfarin which is monitored by cardiology  11.  Recurrent labial cyst-prior labial abscess.  Was treated by GYN with I&D and transient course of Bactrim without culture was never obtained.  There was concern about potential MRSA as she had this in the past.  Recently had doxycycline at the time of flareup as well as her pulmonary symptoms.  Not a major issue at present  12.  Chronic renal failure-stage IIIb-felt related to age-related nephron loss plus possible component of cardiorenal syndrome.  Knows to avoid nonsteroidals.  Most recent BUN 38 with creatinine 1.5  13.  Shortness of breath- combination of CHF-COPD- now with  "exacerbation-full evaluation as listed above  14.  Hypothyroidism-dose of Synthroid increased recently as noted above     All other problems as per note of August 30, 2022     Medical regimen Flonase nasal spray 2 squirts each nostril once daily, loratadine 10 mg daily,Wixela- puff twice daily, spironolactone 50 mg daily, Coumadin with dose adjustments, Prolia with second injection on 11/20/2023, Singulair 10 mg daily, torsemide 40 mg daily, levothyroxine 50 mcg daily but 75 mcg 4 days/week, calcium and vitamin D daily, Ventolin inhaler as needed, metoprolol succinate 100 mg daily, multivitamin, vitamin D3/2000 units a day, fish oil 1200 mg/day, prior use of Vicodin-5-300 half tablet twice daily for severe back pain which she has not used in months      No problem-specific Assessment & Plan notes found for this encounter.             Subjective:      Patient ID: Rebeca Banegas is a 89 y.o. female.    She is seen as a follow-up pulm visit.  She was last seen by the pulmonary group in October.  They commented that on initial sputum culture associated with her bronchiectasis and exacerbation she grew Pseudomonas.  She was treated with Levaquin and follow-up sputum culture showed mixed aron.  They reviewed the importance of airway clearance.  She continues with daily flutter valve use and says she is very faithful with this.  She says she has daily sputum which is occasionally purulent but not as purulent as before.  She feels overall she has ongoing cough which will be \"permanent\" but it is improved to some degree.  She has baseline shortness of breath a combination of her pulmonary disease as well as her known compensated CHF.  She feels as though this has not changed.  She denies increasing orthopnea or paroxysmal nocturnal dyspnea.  Resting heart rate today was in the 80s-with ambulation in place for 15 to 20 seconds heart rate increased to 100.    This patient denies any systemic symptoms. Specifically " there has been no evidence of fever, night sweats, significant weight loss or significant decrease in appetite.  Patient had follow-up laboratory testing done in September which showed BUN of 35 with creatinine of 1.47 with an estimated GFR of 33.  TSH is elevated at 8.63, vitamin D normal CBC normal.  Levothyroxine was increased to 50 mcg daily but 75 mcg 4 days/week    We reviewed that normal TSH according to the literature in the 80s is up to 7.5.  This patient has a known history of hypothyroidism. We reviewed the difference between brand and generic thyroid supplements. We reviewed that thyroid supplements need to be taken on an empty stomach. We reviewed recent literature showing that the thyroid supplement can be taken in the morning on an empty stomach or before going to bed on an empty stomach. This patient denies any symptoms of hypothyroidism including significant constipation, dry skin or worsening fatigue. Periodic monitoring of this patient's free T4 and TSH will continue to be done.  Most recent CT of the chest reviewed in detail  CT CHEST WITHOUT IV CONTRAST     INDICATION:   R05.2: Subacute cough. Per my review of the medical record, patient has had a wet cough for several months. Has asthma-COPD and CHF. 30 pound weight loss over 6 years.     COMPARISON: CXR 12/21/2021 and chest CT 2/27/2015. Abdomen CT since 3/4/2014.     TECHNIQUE: Chest CT without intravenous contrast.  Axial, sagittal, coronal 2D reformats and coronal MIPS from source data.     Radiation dose length product (DLP):  147 mGy-cm . Radiation dose exposure minimized using iterative reconstruction and automated exposure control.     FINDINGS:     LUNGS: Mild chronic bronchiectasis in the right middle lobe and both lower lobes, present since the abdomen CT from 2014 with moderate mucoid debris in the ectatic bronchi. Mild benign linear atelectasis in the right middle lobe and right lower lobe.   Benign calcified granulomas.      PLEURA:  Unremarkable.     HEART/GREAT VESSELS: Severe chronic cardiomegaly with extensive right atrial enlargement. Moderate coronary artery calcification indicating atherosclerotic heart disease. Pulmonary artery enlargement.     MEDIASTINUM AND CASH:  Unremarkable.     CHEST WALL AND LOWER NECK: Unremarkable.     UPPER ABDOMEN: Left renal cysts. Benign calcified hepatic and splenic granulomas.     OSSEOUS STRUCTURES: Mild degenerative disease in the spine with mild curvature.     IMPRESSION:     Nothing to indicate malignancy.     Mild chronic bronchiectasis in the right middle lobe and both lower lobes, present since 2014, with moderate mucoid debris in the ectatic bronchi.     Severe chronic cardiomegaly with extensive right atrial enlargement.     Pulmonary artery enlargement which can be seen with pulmonary hypertension.           She has no congestive heart failure is noted-this was felt to be in the basis of atrial fibrillation and diastolic dysfunction.  She restricts fluids.  She maintains her diuretic dose.  She remains on torsemide and spironolactone.  She is scheduled for yearly exam with cardiology over the next few weeks.  Most recent echo reviewed in detail   Show images for Echo complete w/ contrast if indicated  Study Details    This transthoracic echocardiogram was performed in the echo lab. This was a routine, outpatient study. Study quality was adequate. A complete 2D, color flow Doppler, spectral Doppler, 2D, color flow Doppler and spectral Doppler transthoracic echocardiogram was performed.  The apical, parasternal, subcostal and suprasternal views were obtained.    History    Atrial fibrillation.Bifascicular bundle branch block. COPD. CHF. Hyperlipidemia Hypertension. Chronic kidney disease.    Interpretation Summary       ·  Left Ventricle: Left ventricular cavity size is normal. Wall thickness is mildly increased. The left ventricular ejection fraction is 60%. Systolic function is  normal. Wall motion is normal. Unable to assess diastolic function due to atrial fibrillation.  ·  Right Ventricle: Right ventricular cavity size is normal. Systolic function is mildly reduced.  ·  Left Atrium: The atrium is moderately dilated.  ·  Right Atrium: The atrium is moderately dilated.  ·  Mitral Valve: There is mild regurgitation.  ·  Tricuspid Valve: There is mild to moderate regurgitation. The estimated right ventricular systolic pressure is 51.00 mmHg.  ·  Pulmonic Valve: There is mild regurgitation.     Findings    Left Ventricle Left ventricular cavity size is normal. Wall thickness is mildly increased. The left ventricular ejection fraction is 60%. Systolic function is normal.  Wall motion is normal. Unable to assess diastolic function due to atrial fibrillation.  Right Ventricle Right ventricular cavity size is normal. Systolic function is mildly reduced. Wall thickness is normal.  Left Atrium The atrium is moderately dilated.  Right Atrium The atrium is moderately dilated.  Aortic Valve The aortic valve is trileaflet. The leaflets are mildly thickened. The leaflets are mildly calcified. Mild restriction of the right coronary cusp. There is no evidence of regurgitation. The aortic valve has no significant stenosis.  Mitral Valve There is mild annular calcification. There is mild regurgitation. There is no evidence of stenosis.  Tricuspid Valve Tricuspid valve structure is normal. There is mild to moderate regurgitation. There is no evidence of stenosis. The estimated right ventricular systolic pressure is 51.00 mmHg.  Pulmonic Valve Pulmonic valve structure is normal. There is mild regurgitation. There is no evidence of stenosis.  Ascending Aorta The aortic root is normal in size.  IVC/SVC The right atrial pressure is estimated at 15.0 mmHg. The inferior vena cava is dilated. Respirophasic changes were blunted (less than 50% variation).  Pericardium There is no pericardial effusion. The pericardium  is normal in appearance.    Left Ventricle Measurements    Function/Volumes  A4C EF   64 %      Dimensions  LVIDd   3.3 cm      LVIDS   2.4 cm      IVSd   1.1 cm      LVPWd   1.2 cm      FS   27         Report Measurements  AV LVOT peak gradient   2 mmHg          Interventricular Septum Measurements    Shunt Ratio  LVOT peak VTI   12.38 cm      LVOT peak hank   0.68 m/s          Right Ventricle Measurements    Dimensions  RVID d   4 cm      Tricuspid annular plane systolic excursion   1.1 cm           Left Atrium Measurements    Dimensions  LA size   4.1 cm      LA length (A2C)   6.1 cm           Right Atrium Measurements    Dimensions  RAA A4C   23.5 cm2           Atrial Septum Measurements    Shunt Ratio  LVOT peak VTI   12.38 cm      LVOT peak hank   0.68 m/s           Aortic Valve Measurements    Stenosis  LVOT peak hank   0.68 m/s      LVOT peak VTI   12.38 cm      LVOT mn grad   1 mmHg      AV LVOT peak gradient   2 mmHg           Tricuspid Valve Measurements    RVSP Parameters  TR Peak Hank   3 m/s      Est. RA pres   15 mmHg      Triscuspid Valve Regurgitation Peak Gradient   36 mmHg      Right Ventricular Peak Systolic Pressure   51 mmHg           Aorta Measurements    Aortic Dimensions  Ao root   3.1 cm      Asc Ao   2.5 cm           IVC/SVC Measurements    IVC/SVC  Est. RA pres   15 mmHg          Exam Details    Performed Procedure Technologist Supporting Staff Performing Physician  Echo complete Brigid [place]         Appointment Date/Status Modality Department   4/6/2023     Completed [facility] ECHO 1 [facility] CAR NON INV        Begin Exam End Exam  End Exam Questionnaires  4/6/2023  1:31 PM 4/6/2023  2:00 PM  PATIENT EDUCATION         All Reviewers List    It was elected to continue her current cardiac regimen.    Vaccine counseling provided.  Patient received influenza vaccine in October.  She also received COVID booster as well as RSV vaccine        The following portions of the patient's history were reviewed  and updated as appropriate: allergies, current medications, past family history, past medical history, past social history, past surgical history, and problem list.    Review of Systems   Constitutional:  Positive for fatigue.   HENT: Negative.     Respiratory:  Positive for cough and shortness of breath.    Cardiovascular: Negative.    Gastrointestinal: Negative.    Endocrine: Negative.    Genitourinary: Negative.    Musculoskeletal: Negative.    Skin: Negative.    Neurological: Negative.    Hematological: Negative.    Psychiatric/Behavioral: Negative.           Objective:      LMP  (LMP Unknown)          Physical Exam  Vitals reviewed.   Constitutional:       General: She is not in acute distress.     Appearance: She is ill-appearing. She is not toxic-appearing or diaphoretic.   HENT:      Head: Normocephalic and atraumatic.      Right Ear: Tympanic membrane, ear canal and external ear normal.      Left Ear: Tympanic membrane, ear canal and external ear normal.      Nose: Nose normal. No congestion or rhinorrhea.      Mouth/Throat:      Mouth: Mucous membranes are moist.      Pharynx: Oropharynx is clear. No oropharyngeal exudate or posterior oropharyngeal erythema.   Eyes:      General: No scleral icterus.        Right eye: No discharge.         Left eye: No discharge.      Extraocular Movements: Extraocular movements intact.      Conjunctiva/sclera: Conjunctivae normal.   Neck:      Vascular: No carotid bruit.   Cardiovascular:      Rate and Rhythm: Normal rate. Rhythm irregular.      Pulses: Normal pulses.      Heart sounds: Normal heart sounds. No murmur heard.     No friction rub. No gallop.      Comments: Cardiomegaly percussion.  Irregular rhythm.  Adequate upstroke.  Heart rate in the 80s-at rest-with walking in place for 15 to 20 seconds heart rate increased to 100  Pulmonary:      Effort: Pulmonary effort is normal. No respiratory distress.      Breath sounds: Normal breath sounds. No stridor. No  wheezing, rhonchi or rales.   Chest:      Chest wall: No tenderness.   Abdominal:      General: Abdomen is flat. Bowel sounds are normal. There is no distension.      Palpations: Abdomen is soft. There is no mass.      Tenderness: There is no abdominal tenderness. There is no right CVA tenderness, left CVA tenderness, guarding or rebound.      Hernia: No hernia is present.   Musculoskeletal:         General: No swelling, tenderness, deformity or signs of injury. Normal range of motion.      Cervical back: Normal range of motion and neck supple. No rigidity. No muscular tenderness.      Right lower leg: Edema present.      Left lower leg: Edema present.      Comments: Minimal edema bilaterally with peripheral venous disease   Lymphadenopathy:      Cervical: No cervical adenopathy.   Skin:     General: Skin is warm and dry.      Coloration: Skin is not jaundiced or pale.      Findings: No bruising, erythema, lesion or rash.   Neurological:      General: No focal deficit present.      Mental Status: She is alert and oriented to person, place, and time. Mental status is at baseline.      Cranial Nerves: No cranial nerve deficit.      Sensory: No sensory deficit.      Motor: No weakness.      Coordination: Coordination normal.      Gait: Gait normal.      Deep Tendon Reflexes: Reflexes normal.   Psychiatric:         Mood and Affect: Mood normal.         Behavior: Behavior normal.         Thought Content: Thought content normal.         Judgment: Judgment normal.

## 2023-12-25 DIAGNOSIS — E03.9 HYPOTHYROIDISM, UNSPECIFIED TYPE: ICD-10-CM

## 2023-12-25 DIAGNOSIS — J44.9 CHRONIC OBSTRUCTIVE PULMONARY DISEASE, UNSPECIFIED COPD TYPE (HCC): ICD-10-CM

## 2023-12-25 DIAGNOSIS — I50.32 CHRONIC DIASTOLIC CONGESTIVE HEART FAILURE (HCC): Primary | ICD-10-CM

## 2023-12-25 NOTE — PROGRESS NOTES
Assessment/Plan:    No problem-specific Assessment & Plan notes found for this encounter.             Subjective:      Patient ID: Rebeca Banegas is a 89 y.o. female.    HPI    The following portions of the patient's history were reviewed and updated as appropriate: allergies, current medications, past family history, past medical history, past social history, past surgical history, and problem list.    Review of Systems      Objective:      LMP  (LMP Unknown)          Physical Exam

## 2023-12-26 ENCOUNTER — TELEPHONE (OUTPATIENT)
Dept: INTERNAL MEDICINE CLINIC | Facility: CLINIC | Age: 88
End: 2023-12-26

## 2023-12-26 NOTE — TELEPHONE ENCOUNTER
----- Message from Santana Dillon MD sent at 12/25/2023  8:42 AM EST -----  Please call patient-there are orders in the system for both she and her  for routine lab work to be done in 2-3 months-thank you

## 2024-01-02 ENCOUNTER — ANTICOAG VISIT (OUTPATIENT)
Dept: CARDIOLOGY CLINIC | Facility: CLINIC | Age: 89
End: 2024-01-02

## 2024-01-02 DIAGNOSIS — I48.21 PERMANENT ATRIAL FIBRILLATION (HCC): Primary | Chronic | ICD-10-CM

## 2024-01-02 LAB — INR PPP: 2 (ref 0.84–1.19)

## 2024-01-08 ENCOUNTER — TELEPHONE (OUTPATIENT)
Dept: INTERNAL MEDICINE CLINIC | Facility: CLINIC | Age: 89
End: 2024-01-08

## 2024-01-08 NOTE — TELEPHONE ENCOUNTER
----- Message from Santana Dillon MD sent at 1/3/2024  6:36 AM EST -----  Please call patient- all bloodwork normal except for decrease in kidney function unchanged from before-- Thyroid level normal for her age- Good news- keep all meds same

## 2024-01-26 ENCOUNTER — ANTICOAG VISIT (OUTPATIENT)
Dept: CARDIOLOGY CLINIC | Facility: CLINIC | Age: 89
End: 2024-01-26

## 2024-01-26 DIAGNOSIS — I48.21 PERMANENT ATRIAL FIBRILLATION (HCC): Primary | Chronic | ICD-10-CM

## 2024-01-26 LAB — INR PPP: 2.6 (ref 0.84–1.19)

## 2024-02-14 DIAGNOSIS — I50.32 CHRONIC DIASTOLIC CONGESTIVE HEART FAILURE (HCC): Chronic | ICD-10-CM

## 2024-02-14 RX ORDER — SPIRONOLACTONE 50 MG/1
50 TABLET, FILM COATED ORAL DAILY
Qty: 90 TABLET | Refills: 3 | Status: SHIPPED | OUTPATIENT
Start: 2024-02-14

## 2024-02-14 NOTE — TELEPHONE ENCOUNTER
Message was left      This is Jesus PIZANO Date of birth July 22nd, 1932. I need a new prescription for Spironolactone 50 milligrams that I take I take one daily. I would like you to order it from [facility], which is a mail order pharmacy and I'd like 3 months supply. My telephone number is [phone]. Thank you.

## 2024-02-21 PROBLEM — H61.23 BILATERAL IMPACTED CERUMEN: Status: RESOLVED | Noted: 2019-04-04 | Resolved: 2024-02-21

## 2024-02-23 ENCOUNTER — ANTICOAG VISIT (OUTPATIENT)
Dept: CARDIOLOGY CLINIC | Facility: CLINIC | Age: 89
End: 2024-02-23

## 2024-02-23 DIAGNOSIS — I48.21 PERMANENT ATRIAL FIBRILLATION (HCC): Primary | Chronic | ICD-10-CM

## 2024-02-23 LAB — INR PPP: 2.2 (ref 0.84–1.19)

## 2024-02-27 ENCOUNTER — OFFICE VISIT (OUTPATIENT)
Dept: CARDIOLOGY CLINIC | Facility: CLINIC | Age: 89
End: 2024-02-27
Payer: COMMERCIAL

## 2024-02-27 VITALS
OXYGEN SATURATION: 98 % | HEIGHT: 64 IN | SYSTOLIC BLOOD PRESSURE: 118 MMHG | BODY MASS INDEX: 19.96 KG/M2 | HEART RATE: 99 BPM | DIASTOLIC BLOOD PRESSURE: 82 MMHG | WEIGHT: 116.9 LBS

## 2024-02-27 DIAGNOSIS — I10 PRIMARY HYPERTENSION: Chronic | ICD-10-CM

## 2024-02-27 DIAGNOSIS — I50.32 CHRONIC DIASTOLIC CONGESTIVE HEART FAILURE (HCC): ICD-10-CM

## 2024-02-27 DIAGNOSIS — E78.2 MIXED HYPERLIPIDEMIA: Chronic | ICD-10-CM

## 2024-02-27 DIAGNOSIS — I45.2 BIFASCICULAR BUNDLE BRANCH BLOCK: Primary | Chronic | ICD-10-CM

## 2024-02-27 DIAGNOSIS — I48.20 CHRONIC ATRIAL FIBRILLATION (HCC): Chronic | ICD-10-CM

## 2024-02-27 DIAGNOSIS — N18.30 STAGE 3 CHRONIC KIDNEY DISEASE, UNSPECIFIED WHETHER STAGE 3A OR 3B CKD (HCC): Chronic | ICD-10-CM

## 2024-02-27 DIAGNOSIS — I48.21 PERMANENT ATRIAL FIBRILLATION (HCC): ICD-10-CM

## 2024-02-27 PROCEDURE — 99214 OFFICE O/P EST MOD 30 MIN: CPT | Performed by: INTERNAL MEDICINE

## 2024-02-27 PROCEDURE — 93000 ELECTROCARDIOGRAM COMPLETE: CPT | Performed by: INTERNAL MEDICINE

## 2024-02-27 RX ORDER — SPIRONOLACTONE 25 MG/1
25 TABLET ORAL DAILY
Qty: 90 TABLET | Refills: 3 | Status: SHIPPED | OUTPATIENT
Start: 2024-02-27

## 2024-02-27 RX ORDER — METOPROLOL SUCCINATE 100 MG/1
50 TABLET, EXTENDED RELEASE ORAL DAILY
Qty: 45 TABLET | Refills: 3 | Status: SHIPPED | OUTPATIENT
Start: 2024-02-27

## 2024-02-27 RX ORDER — TORSEMIDE 20 MG/1
20 TABLET ORAL DAILY
Qty: 90 TABLET | Refills: 3 | Status: SHIPPED | OUTPATIENT
Start: 2024-02-27

## 2024-02-27 NOTE — PROGRESS NOTES
[facility] Cardiology  Follow up note  Rebeca Banegas 89 y.o. female MRN: [id number]        Problems    1. Bifascicular bundle branch block        2. Chronic diastolic congestive heart failure (HCC)  torsemide (DEMADEX) 20 mg tablet    spironolactone (ALDACTONE) 25 mg tablet      3. Permanent atrial fibrillation (HCC)  POCT ECG    AMB extended holter monitor      4. Mixed hyperlipidemia        5. Primary hypertension        6. Stage 3 chronic kidney disease, unspecified whether stage 3a or 3b CKD (HCC)        7. Chronic atrial fibrillation (HCC)  metoprolol succinate (TOPROL-XL) 100 mg 24 hr tablet    takes BB/CCB with adequate heart rate control and no side effects, rx refilled per pt request          Impression:    Chronic diastolic CHF   She has grade 2 diastolic dysfunction, no significant LVH, she had an equivocal SPECT TTR in 2019  She continues on spironolactone 50 mg daily, torsemide 40 mg daily, she feels very fatigued, weak, she is euvolemic to mildly dry appearing today.        Permanent atrial fibrillation  On a chronic rate control strategy with metoprolol succinate 100 mg/day, with heart rates of 80s at home  Previously did not tolerate diltiazem due to low blood pressure  I think there is room for down titration of her metoprolol  She continues on appropriate anticoagulation with warfarin    Bifascicular bundle branch block   No symptoms to suggest higher degree av block    Mixed hyperlipidemia  No statins at 89yo    Plan:    She looks dry on exam, blood pressures are running low, she is on torsemide 40 mg and spironolactone 50 mg.  Her heart rate is 99 bpm, she is in permanent atrial fibrillation  Her home heart rates are all in the 80s and very well-controlled  Fatigue and just feeling very weak and tired is her biggest complaint today  I am suspecting possible mild overdiuresis, and would recommend cutting back on her torsemide to 20 mg a day, spironolactone to 25 mg a day, and also cutting back  on her Toprol to 50 mg a day.  For now she can cut the spironolactone in half, she can cut the Toprol in half.      HPI:   Rebeca Banegas is a 89 y.o. year old female  With chronic diastolic CHF, chronic atrial fibrillation on a rate control strategy and anticoagulation with warfarin, bifascicular bundle branch block, mixed hyperlipidemia, and a prior negative infiltrative cardiomyopathy workup returns for a follow-up visit.    Her creatinine is stable at 1.35  Blood pressures are running low  She complains of fatigue, weakness, just feeling like she has no energy.  Heart rate today is 99 but home heart rates are in the 80s  She is on fairly strong diuretics, torsemide 40 mg daily, spironolactone 50 mg daily.  She does not complain of near syncope.  She continues on appropriate anticoagulation with stable and therapeutic INRs from an A-fib standpoint.    Review of Systems   Constitutional:  Positive for fatigue. Negative for appetite change, diaphoresis and fever.   Respiratory:  Positive for cough and shortness of breath. Negative for chest tightness and wheezing.    Cardiovascular:  Negative for chest pain, palpitations and leg swelling.   Gastrointestinal:  Negative for abdominal pain and blood in stool.   Musculoskeletal:  Negative for arthralgias and joint swelling.   Skin:  Negative for rash.   Neurological:  Positive for weakness. Negative for dizziness, syncope and light-headedness.       Past Medical History:   Diagnosis Date   • Abnormal ultrasound     RESOLVED: 26JUN2015   • Asthma with acute exacerbation     LAST ASSESSED: 21FEB2013   • Asymptomatic menopausal state    • Atherosclerosis    • Atrial fibrillation (HCC)    • Chronic obstructive lung disease (HCC)    • Congestive heart failure (CHF) (HCC)    • Congestive heart failure (HCC)     LAST ASSESSED: 13FEB2017   • Cuboid fracture     LAST ASSESSED: 03JUL2014   • Dyspepsia    • GERD (gastroesophageal reflux disease)    • High risk medication use      LAST ASSESSED: 2014   • Hyperlipidemia    • Hypertension    • Hypertension    • Hypokalemia    • Hypothyroidism    • Impacted cerumen of both ears     LAST ASSESSED: [id number]   • Impacted cerumen of right ear     LAST ASSESSED: [id number]   • Influenza     LAST ASSESSED: [id number]   • IPMN (intraductal papillary mucinous neoplasm)     RESOLVED: 2015   • Limb swelling     LAST ASSESSED: [id number]   • Navicular fracture of ankle     LAST ASSESSED: 2014   • Onychomycosis     LAST ASSESSED: [id number]   • Osteoarthritis    • Osteopenia    • Osteoporosis    • Paronychia, finger, unspecified laterality     LAST ASSESSED: [id number]   • Paroxysmal atrial fibrillation (HCC)     LAST ASSESSED: 2014   • Peripheral vascular disease (HCC)    • Plantar fasciitis    • Talus fracture     LAST ASSESSED: [id number]   • Vascular headache      Social History     Substance and Sexual Activity   Alcohol Use Not Currently    Comment: SOCIAL     Social History     Substance and Sexual Activity   Drug Use No     Social History     Tobacco Use   Smoking Status Former   • Current packs/day: 0.00   • Average packs/day: 0.3 packs/day for 2.0 years (0.5 ttl pk-yrs)   • Types: Cigarettes   • Start date:    • Quit date:    • Years since quittin.2   Smokeless Tobacco Never   Tobacco Comments    On and off socially with friends        Allergies:  Allergies   Allergen Reactions   • Asa [Aspirin]    • Nsaids        Medications:     Current Outpatient Medications:   •  albuterol (Proventil HFA) 90 mcg/act inhaler, Inhale 2 puffs every 6 (six) hours as needed for wheezing, Disp: 20.1 g, Rfl: 3  •  albuterol (PROVENTIL HFA,VENTOLIN HFA) 90 mcg/act inhaler, Inhale, Disp: , Rfl:   •  Cholecalciferol (VITAMIN D3) 1000 units CAPS, Take 2,000 Units by mouth daily, Disp: , Rfl:   •  fluticasone (FLONASE) 50 mcg/act nasal spray, USE 2 SPRAYS IN EACH NOSTRIL ONCE DAILY, Disp: 48 g, Rfl: 3  •  Fluticasone-Salmeterol (Advair  Diskus) 250-50 mcg/dose inhaler, Inhale 1 puff 2 (two) times a day Rinse mouth after use., Disp: 60 blister, Rfl: 6  •  guaiFENesin (MUCINEX) 600 mg 12 hr tablet, Take 2 tablets (1,200 mg total) by mouth every 12 (twelve) hours, Disp: 60 tablet, Rfl: 0  •  levothyroxine 50 mcg tablet, TAKE 1 TABLET DAILY, EXCEPT TAKE 1 AND 1/2 TABLETS ON SUNDAY AND WEDNESDAY, Disp: 98 tablet, Rfl: 10  •  metoprolol succinate (TOPROL-XL) 100 mg 24 hr tablet, Take 0.5 tablets (50 mg total) by mouth daily, Disp: 45 tablet, Rfl: 3  •  montelukast (SINGULAIR) 10 mg tablet, TAKE 1 TABLET EVERY DAY, Disp: 90 tablet, Rfl: 10  •  sodium chloride 3 % inhalation solution, Take 4 mL by nebulization 3 (three) times a day, Disp: 360 mL, Rfl: 2  •  spironolactone (ALDACTONE) 25 mg tablet, Take 1 tablet (25 mg total) by mouth daily, Disp: 90 tablet, Rfl: 3  •  torsemide (DEMADEX) 20 mg tablet, Take 1 tablet (20 mg total) by mouth daily, Disp: 90 tablet, Rfl: 3  •  warfarin (COUMADIN) 5 mg tablet, TAKE 1/2 TO 1 TABLET BY MOUTH DAILY OR AS ORDERED BY PHYSICIAN., Disp: 90 tablet, Rfl: 3      Vitals:    02/27/24 0919   BP: 118/82   Pulse: 99   SpO2: 98%     Weight (last 2 days)     Date/Time Weight    02/27/24 0919 53 (116.9)        Physical Exam  Constitutional:       General: She is not in acute distress.     Appearance: She is not diaphoretic.   HENT:      Head: Normocephalic and atraumatic.   Eyes:      General: No scleral icterus.     Conjunctiva/sclera: Conjunctivae normal.   Neck:      Vascular: No JVD.   Cardiovascular:      Rate and Rhythm: Normal rate. Rhythm irregular.      Heart sounds: Normal heart sounds. No murmur heard.  Pulmonary:      Effort: Pulmonary effort is normal. No respiratory distress.      Breath sounds: Normal breath sounds. No wheezing or rales.   Musculoskeletal:         General: No tenderness.      Cervical back: Normal range of motion.      Right lower leg: No edema.      Left lower leg: No edema.   Skin:     General:  "Skin is warm and dry.         Laboratory Studies:    Labs personally reviewed     Cardiac testing:     EKG reviewed personally:   Results for orders placed or performed in visit on 02/27/24   POCT ECG    Impression    Atrial fibrillation, heart rate 99, RBBB, LAFB         Echocardiogram:    10/19- EF 60%, diastolic dysfunction, un graded due to AFib, normal RV size and function, moderate  Left and right atrial dilatation, mild MR, mild TR, mildly elevated pulmonary pressures  10/21-personally reviewed -EF 60%, diastolic dysfunction, left and right atrial dilation, mild-to-moderate TR, moderately elevated pulmonary pressures, dilated IVC       TTR SPECT   11/19-1.26 heart to lung ratio, equivocal to negative    Stress tests:      Catheterization:      Holter:      10/21-excellent AFib rate control, no significant pauses    Jonas Vyas MD    Portions of the record may have been created with voice recognition software.  Occasional wrong word or \"sound a like\" substitutions may have occurred due to the inherent limitations of voice recognition software.  Read the chart carefully and recognize, using context, where substitutions have occurred.  "

## 2024-03-05 NOTE — PROGRESS NOTES
Pulmonary Follow Up Note   Rebeca Banegas 89 y.o. female MRN: [id number]  3/6/2024      Assessment:  Chronic Cough  Improved after last treatment but still persistent with some relapse. Will recheck sputum culture and reeducated on importance of continuing airway clearance.     Moderate Persistent Asthma  Reviewed PFTs, improved control with change to Advair. Will decrease Advair dosage to 100/50mcg to decrease ICS dosage given bronchiectasis. She is advised to call if any changes. will stop singulair as unsure if any benefit and patient wants to reduce pill burden     Bronchiectasis   Patient with bronchiectasis noted on CT chest and Pseudomonas growth on previous sputum culture. Discussed importance of airway clearance in managing this disease. Advised to restart hypertonic nebs and albuterol nebs with flutter valve 3 times per day. Will repeat sputum culture. Will refer to pulmonary rehab as well.  advised to call in 2 weeks if no improvement.    Allergic Rhinitis  Will start cetirizine along with nasal rinse and flonase    Plan:    Diagnoses and all orders for this visit:    Bronchiectasis without complication (HCC)  -     albuterol (2.5 mg/3 mL) 0.083 % nebulizer solution; Take 3 mL (2.5 mg total) by nebulization every 8 (eight) hours  -     Ambulatory Referral to Pulmonary Rehabilitation; Future  -     Sputum culture and Gram stain; Future    Moderate persistent asthma without complication  -     Fluticasone-Salmeterol (Advair Diskus) 100-50 mcg/dose inhaler; Inhale 1 puff 2 (two) times a day Rinse mouth after use.  -     albuterol (2.5 mg/3 mL) 0.083 % nebulizer solution; Take 3 mL (2.5 mg total) by nebulization every 8 (eight) hours    Non-seasonal allergic rhinitis, unspecified trigger  -     cetirizine (ZyrTEC) 10 mg tablet; Take 1 tablet (10 mg total) by mouth daily    Chronic cough        Return in about 6 weeks (around 4/17/2024) for Next scheduled follow up.    History of Present Illness   HPI:   Rebeca Banegas is a 89 y.o. female with PMH of HFpEF, Afib on coumadin, asthma who presents with productive cough since November. She was found to have Pseudomonas growth on sputum culture was treated with Levaquin for 14 days along with airway clearance.  She states cough is still present with less mucous than she presented with initially but still present. She denies any fevers, chills, sick contacts, recent antibiotic use. She has not been using her airway clearance techniques. We dicussed importance of continuing these. Will also try pulm rehab. She has not had any wheezing or asthma exacerbation symptoms. Uses rescue inhaler seldomly. She also complains of nasal congestion and runny nose. She has been using flonase occasionally. She complains of fatigue and discussed with her cardiologist who recommended cutting her diuretics and BB in half. She has not noticed any difference since then.     Review of Systems   Constitutional:  Positive for fatigue. Negative for activity change, chills, diaphoresis and fever.   HENT:  Positive for congestion and rhinorrhea. Negative for postnasal drip, sinus pressure, sneezing, sore throat and trouble swallowing.    Eyes: Negative.    Respiratory:  Positive for cough and shortness of breath. Negative for chest tightness.    Cardiovascular:  Negative for chest pain, palpitations and leg swelling.   Gastrointestinal:  Negative for constipation, diarrhea, nausea and vomiting.   Endocrine: Negative.    Genitourinary: Negative.    Musculoskeletal:  Negative for back pain, joint swelling and neck pain.   Skin: Negative.    Allergic/Immunologic: Negative.    Neurological:  Negative for dizziness, syncope, weakness, light-headedness and headaches.   Hematological: Negative.    Psychiatric/Behavioral: Negative.     All other systems reviewed and are negative.      Historical Information   Past Medical History:   Diagnosis Date    Abnormal ultrasound     RESOLVED: 26JUN2015    Asthma  with acute exacerbation     LAST ASSESSED: [id number]    Asymptomatic menopausal state     Atherosclerosis     Atrial fibrillation (HCC)     Chronic obstructive lung disease (HCC)     Congestive heart failure (CHF) (HCC)     Congestive heart failure (HCC)     LAST ASSESSED: [id number]    Cuboid fracture     LAST ASSESSED: [id number]    Dyspepsia     GERD (gastroesophageal reflux disease)     High risk medication use     LAST ASSESSED: [id number]    Hyperlipidemia     Hypertension     Hypertension     Hypokalemia     Hypothyroidism     Impacted cerumen of both ears     LAST ASSESSED: [id number]    Impacted cerumen of right ear     LAST ASSESSED: 29QVZ1289    Influenza     LAST ASSESSED: 44PYB9221    IPMN (intraductal papillary mucinous neoplasm)     RESOLVED: 02OCT2015    Limb swelling     LAST ASSESSED: [id number]    Navicular fracture of ankle     LAST ASSESSED: [id number]    Onychomycosis     LAST ASSESSED: [id number]    Osteoarthritis     Osteopenia     Osteoporosis     Paronychia, finger, unspecified laterality     LAST ASSESSED: 40SDY1515    Paroxysmal atrial fibrillation (HCC)     LAST ASSESSED: 02MAR2014    Peripheral vascular disease (HCC)     Plantar fasciitis     Talus fracture     LAST ASSESSED: [id number]    Vascular headache      Past Surgical History:   Procedure Laterality Date    APPENDECTOMY      CATARACT EXTRACTION Bilateral     CHOLECYSTECTOMY      NEUROPLASTY / TRANSPOSITION MEDIAN NERVE AT CARPAL TUNNEL BILATERAL Bilateral     DECOMPRESSION    OOPHORECTOMY Bilateral     age 81    PELVIC FLOOR REPAIR  2014    SINUS SURGERY      TOTAL KNEE ARTHROPLASTY Bilateral      Family History   Problem Relation Age of Onset    Pancreatic cancer Mother 93    Heart failure Mother     Coronary artery disease Family     Breast cancer Family     Other Family         BREAST DISORDER, GASTROINTESTINAL CANCER    Uterine cancer Family     Hyperlipidemia Family     Osteoarthritis Family     Ovarian cancer Family     Brain  cancer Son     Stroke Father     No Known Problems Sister     No Known Problems Daughter     No Known Problems Maternal Grandmother     No Known Problems Maternal Grandfather     No Known Problems Paternal Grandmother     No Known Problems Paternal Grandfather     Breast cancer Cousin 50    Breast cancer Cousin 50    Ovarian cancer Other 49         Meds/Allergies     Current Outpatient Medications:     albuterol (Proventil HFA) 90 mcg/act inhaler, Inhale 2 puffs every 6 (six) hours as needed for wheezing, Disp: 20.1 g, Rfl: 3    albuterol (PROVENTIL HFA,VENTOLIN HFA) 90 mcg/act inhaler, Inhale, Disp: , Rfl:     Cholecalciferol (VITAMIN D3) 1000 units CAPS, Take 2,000 Units by mouth daily, Disp: , Rfl:     fluticasone (FLONASE) 50 mcg/act nasal spray, USE 2 SPRAYS IN EACH NOSTRIL ONCE DAILY, Disp: 48 g, Rfl: 3    guaiFENesin (MUCINEX) 600 mg 12 hr tablet, Take 2 tablets (1,200 mg total) by mouth every 12 (twelve) hours, Disp: 60 tablet, Rfl: 0    levothyroxine 50 mcg tablet, TAKE 1 TABLET DAILY, EXCEPT TAKE 1 AND 1/2 TABLETS ON SUNDAY AND WEDNESDAY, Disp: 98 tablet, Rfl: 10    metoprolol succinate (TOPROL-XL) 100 mg 24 hr tablet, Take 0.5 tablets (50 mg total) by mouth daily, Disp: 45 tablet, Rfl: 3    montelukast (SINGULAIR) 10 mg tablet, TAKE 1 TABLET EVERY DAY, Disp: 90 tablet, Rfl: 10    sodium chloride 3 % inhalation solution, Take 4 mL by nebulization 3 (three) times a day, Disp: 360 mL, Rfl: 2    spironolactone (ALDACTONE) 25 mg tablet, Take 1 tablet (25 mg total) by mouth daily, Disp: 90 tablet, Rfl: 3    torsemide (DEMADEX) 20 mg tablet, Take 1 tablet (20 mg total) by mouth daily, Disp: 90 tablet, Rfl: 3    warfarin (COUMADIN) 5 mg tablet, TAKE 1/2 TO 1 TABLET BY MOUTH DAILY OR AS ORDERED BY PHYSICIAN., Disp: 90 tablet, Rfl: 3    Fluticasone-Salmeterol (Advair Diskus) 250-50 mcg/dose inhaler, Inhale 1 puff 2 (two) times a day Rinse mouth after use., Disp: 60 blister, Rfl: 6  Allergies   Allergen Reactions     "Asa [Aspirin]     Nsaids        Vitals: Blood pressure 118/60, pulse 89, temperature 98.2 °F (36.8 °C), temperature source Tympanic, height 5' 4\" (1.626 m), weight 52.6 kg (116 lb), SpO2 98%. Body mass index is 19.91 kg/m².        Physical Exam  Physical Exam  Vitals and nursing note reviewed.   Constitutional:       Appearance: Normal appearance.      Comments: Thin, frail   HENT:      Head: Normocephalic and atraumatic.      Right Ear: External ear normal.      Left Ear: External ear normal.      Nose: Congestion present.      Comments: Erythematous nasal turbinates     Mouth/Throat:      Mouth: Mucous membranes are moist.      Pharynx: Oropharynx is clear.   Eyes:      Conjunctiva/sclera: Conjunctivae normal.   Cardiovascular:      Rate and Rhythm: Normal rate and regular rhythm.      Pulses: Normal pulses.      Heart sounds: Normal heart sounds.   Pulmonary:      Effort: Pulmonary effort is normal.      Breath sounds: Normal breath sounds.   Abdominal:      General: Abdomen is flat. Bowel sounds are normal.      Palpations: Abdomen is soft.   Musculoskeletal:         General: No swelling or tenderness.      Cervical back: Neck supple. No muscular tenderness.   Skin:     General: Skin is warm and dry.      Capillary Refill: Capillary refill takes less than 2 seconds.   Neurological:      Mental Status: She is alert and oriented to person, place, and time. Mental status is at baseline.   Psychiatric:         Mood and Affect: Mood normal.         Behavior: Behavior normal.         Thought Content: Thought content normal.         Judgment: Judgment normal.         Preventative:  Influenza vaccine: UTD  Pneumonia vaccine: UTD    Labs: I have personally reviewed pertinent lab results.  Lab Results   Component Value Date    WBC 8.12 07/03/2023    HGB 14.0 07/03/2023    HCT 43.9 07/03/2023     (H) 07/03/2023     07/03/2023     Lab Results   Component Value Date    CALCIUM 8.4 (L) 01/02/2024     " "10/17/2017    K 4.0 01/02/2024    CO2 29 01/02/2024     01/02/2024    BUN 33 (H) 01/02/2024    CREATININE 1.35 (H) 01/02/2024     No results found for: \"IGE\"  Lab Results   Component Value Date    ALT 27 01/02/2024    AST 30 01/02/2024    ALKPHOS 51 01/02/2024    BILITOT 0.5 10/17/2017         Imaging and other studies: I have personally reviewed pertinent reports.   and I have personally reviewed pertinent films in PACS  CT Chest 7/3/23: bronchiectasis in RML and B/L lower lobes with bronchial wall thickening and mucous in airways, enlarged pulmonary artery  Pulmonary function testing 9/1/23: moderate obstruction with +BD response with hyperinflation      EKG, Pathology, and Other Studies: I have personally reviewed pertinent reports.    Echo 4/6/23: EF 60% unable to evaluate diastolic function due to Afib, RVSP of 51mmHg    Paulie Wasserman MD  Pulmonary/Critical Care Fellow PGY-V  [facility]      Disclaimer: Portions of the record may have been created with voice recognition software. Occasional wrong word or \"sound a like\" substitutions may have occurred due to the inherent limitations of voice recognition software. Careful consideration should be taken to recognize, using context, where substitutions have occurred.    "

## 2024-03-06 ENCOUNTER — OFFICE VISIT (OUTPATIENT)
Dept: PULMONOLOGY | Facility: CLINIC | Age: 89
End: 2024-03-06
Payer: COMMERCIAL

## 2024-03-06 VITALS
SYSTOLIC BLOOD PRESSURE: 118 MMHG | HEART RATE: 89 BPM | TEMPERATURE: 98.2 F | BODY MASS INDEX: 19.81 KG/M2 | DIASTOLIC BLOOD PRESSURE: 60 MMHG | HEIGHT: 64 IN | OXYGEN SATURATION: 98 % | WEIGHT: 116 LBS

## 2024-03-06 DIAGNOSIS — J47.9 BRONCHIECTASIS WITHOUT COMPLICATION (HCC): Primary | ICD-10-CM

## 2024-03-06 DIAGNOSIS — J30.89 NON-SEASONAL ALLERGIC RHINITIS, UNSPECIFIED TRIGGER: Chronic | ICD-10-CM

## 2024-03-06 DIAGNOSIS — J45.40 MODERATE PERSISTENT ASTHMA WITHOUT COMPLICATION: Chronic | ICD-10-CM

## 2024-03-06 DIAGNOSIS — R05.3 CHRONIC COUGH: ICD-10-CM

## 2024-03-06 PROCEDURE — 99214 OFFICE O/P EST MOD 30 MIN: CPT | Performed by: INTERNAL MEDICINE

## 2024-03-06 RX ORDER — ALBUTEROL SULFATE 2.5 MG/3ML
2.5 SOLUTION RESPIRATORY (INHALATION)
Qty: 270 ML | Refills: 3 | Status: SHIPPED | OUTPATIENT
Start: 2024-03-06

## 2024-03-06 RX ORDER — FLUTICASONE PROPIONATE AND SALMETEROL 100; 50 UG/1; UG/1
1 POWDER RESPIRATORY (INHALATION) 2 TIMES DAILY
Qty: 60 BLISTER | Refills: 3 | Status: SHIPPED | OUTPATIENT
Start: 2024-03-06 | End: 2024-04-05

## 2024-03-06 RX ORDER — CETIRIZINE HYDROCHLORIDE 10 MG/1
10 TABLET ORAL DAILY
Qty: 30 TABLET | Refills: 6 | Status: SHIPPED | OUTPATIENT
Start: 2024-03-06

## 2024-03-06 NOTE — PATIENT INSTRUCTIONS
Please stop singulair. Start cetirizine (Zyrtec) one tablet daily.   We will cut down your inhaler to 100mcg dose, finish out the inhaler you have now and use once daily then when done  the new prescription.  Use nasal saline rinses ( Lux med) twice daily followed by flonase nasal spray once daily  Use hypertonic saline nebulizer along with albuterol nebulizer 2-3 times per day followed by flutter valve   Please schedule pulmonary rehab, call central scheduling [phone]  Please repeat your sputum culture to rule out infection

## 2024-03-07 ENCOUNTER — TELEPHONE (OUTPATIENT)
Age: 89
End: 2024-03-07

## 2024-03-07 NOTE — TELEPHONE ENCOUNTER
Rebeca is calling in regarding her medication - Advair and Albuterol . She would like to know if she has to combine both medication into her nebulizer .     Please advise [phone]

## 2024-03-08 ENCOUNTER — APPOINTMENT (OUTPATIENT)
Dept: LAB | Facility: CLINIC | Age: 89
End: 2024-03-08
Payer: COMMERCIAL

## 2024-03-08 DIAGNOSIS — J47.9 BRONCHIECTASIS WITHOUT COMPLICATION (HCC): ICD-10-CM

## 2024-03-08 PROCEDURE — 87186 SC STD MICRODIL/AGAR DIL: CPT

## 2024-03-08 PROCEDURE — 87070 CULTURE OTHR SPECIMN AEROBIC: CPT

## 2024-03-08 PROCEDURE — 87077 CULTURE AEROBIC IDENTIFY: CPT

## 2024-03-08 PROCEDURE — 87205 SMEAR GRAM STAIN: CPT

## 2024-03-08 NOTE — TELEPHONE ENCOUNTER
Called patient and discussed she can use albuterol nebulizer first followed by hypertonic saline nebs. She expressed understanding and gratitude.

## 2024-03-10 LAB
BACTERIA SPT RESP CULT: ABNORMAL
BACTERIA SPT RESP CULT: ABNORMAL
GRAM STN SPEC: ABNORMAL

## 2024-03-11 DIAGNOSIS — J47.9 BRONCHIECTASIS WITHOUT COMPLICATION (HCC): Primary | ICD-10-CM

## 2024-03-11 RX ORDER — LEVOFLOXACIN 500 MG/1
500 TABLET, FILM COATED ORAL EVERY 24 HOURS
Qty: 14 TABLET | Refills: 0 | Status: SHIPPED | OUTPATIENT
Start: 2024-03-11 | End: 2024-03-25

## 2024-03-18 ENCOUNTER — CLINICAL SUPPORT (OUTPATIENT)
Dept: CARDIOLOGY CLINIC | Facility: CLINIC | Age: 89
End: 2024-03-18
Payer: COMMERCIAL

## 2024-03-18 DIAGNOSIS — I48.21 PERMANENT ATRIAL FIBRILLATION (HCC): ICD-10-CM

## 2024-03-18 PROCEDURE — 93244 EXT ECG>48HR<7D REV&INTERPJ: CPT | Performed by: INTERNAL MEDICINE

## 2024-03-20 ENCOUNTER — TELEPHONE (OUTPATIENT)
Dept: OTHER | Facility: OTHER | Age: 89
End: 2024-03-20

## 2024-03-20 ENCOUNTER — TELEPHONE (OUTPATIENT)
Dept: CARDIOLOGY CLINIC | Facility: CLINIC | Age: 89
End: 2024-03-20

## 2024-03-20 NOTE — TELEPHONE ENCOUNTER
I received a message  from the  stating pt called to inform you Dr Wood increased the Spironolactone to 50 mg and Torsemide to 40 mg due to wt gain and there will be a note to follow.

## 2024-03-20 NOTE — TELEPHONE ENCOUNTER
Patient is calling regarding cancelling an appointment.    Date/Time: 3/20/2024, 8 AM    Patient was rescheduled: YES [] NO [x]    Patient requesting call back to reschedule: YES [x] NO []

## 2024-03-20 NOTE — TELEPHONE ENCOUNTER
She was supposed to see me today, would have been helpful for me to be a part of those decisions.  Not really sure why she canceled her visit this morning.

## 2024-03-21 ENCOUNTER — IN HOME VISIT (OUTPATIENT)
Dept: INTERNAL MEDICINE CLINIC | Facility: CLINIC | Age: 89
End: 2024-03-21
Payer: COMMERCIAL

## 2024-03-21 VITALS — RESPIRATION RATE: 14 BRPM | HEART RATE: 88 BPM | DIASTOLIC BLOOD PRESSURE: 60 MMHG | SYSTOLIC BLOOD PRESSURE: 98 MMHG

## 2024-03-21 DIAGNOSIS — J45.40 MODERATE PERSISTENT ASTHMA WITHOUT COMPLICATION: Chronic | ICD-10-CM

## 2024-03-21 DIAGNOSIS — I45.2 BIFASCICULAR BUNDLE BRANCH BLOCK: Chronic | ICD-10-CM

## 2024-03-21 DIAGNOSIS — J47.9 BRONCHIECTASIS WITHOUT COMPLICATION (HCC): ICD-10-CM

## 2024-03-21 DIAGNOSIS — I50.33 ACUTE ON CHRONIC DIASTOLIC CHF (CONGESTIVE HEART FAILURE) (HCC): Primary | ICD-10-CM

## 2024-03-21 DIAGNOSIS — I48.21 PERMANENT ATRIAL FIBRILLATION (HCC): Chronic | ICD-10-CM

## 2024-03-21 PROBLEM — I50.9 CONGESTIVE HEART DISEASE (HCC): Status: RESOLVED | Noted: 2024-03-21 | Resolved: 2024-03-21

## 2024-03-21 PROBLEM — I50.9 CONGESTIVE HEART DISEASE (HCC): Status: ACTIVE | Noted: 2024-03-21

## 2024-03-21 PROCEDURE — 99349 HOME/RES VST EST MOD MDM 40: CPT | Performed by: INTERNAL MEDICINE

## 2024-03-21 NOTE — PROGRESS NOTES
Assessment/Plan:  #1 acute on chronic diastolic heart failure.  Most recent echo in April 2023 shows EF of 60% with wall thickness mildly increased, moderate right atrial enlargement, estimated right ventricular systolic pressure 51.  Recently there was concern by cardiology about overdiuresis and torsemide was decreased from 40 down to 20 mg daily.  Spironolactone had been decreased from 50 down to 25 mg daily.  Patient now clinically appears to be in recurrent CHF.  This may all be exacerbated by her ongoing respiratory issues as dictated below with recurrent Pseudomonas associated with her bronchiectasis.  On arrival the patient has already increased her torsemide back to 40 mg daily.  I recommended today that she increase her spironolactone back to 50 mg and get in touch with cardiology.  She knows to restrict salt.  We have previously talked about addition of Jardiance to help decrease hospitalization rate although this has not been shown to affect overall mortality-that is associated with her diastolic CHF with elevated BNP-she had deferred on that.  She will be scheduling a follow-up appointment with cardiology  2.  Atrial fibrillation-in the past was on both Cardizem and metoprolol-cardiology would do Cardizem and she remains on metoprolol although recently dose of beta-blocker was decreased as patient had significant associated fatigue  3.  Ongoing cough-felt to be combination of asthma-bronchiectasis.  CT of chest shows mild chronic bronchiectasis of right middle lobe and both lower lobes unchanged from 2014 with moderate mucoid debris in the ectatic bronchi.  Has severe cardiomegaly with extensive right atrial enlargement and pulmonary artery enlargement consistent with her known history of pulmonary hypertension also evident on CT.  PFTs show obstructive disease with FEV1 of 0.92 and likely normal diffusing capacity.  No change to bronchodilator.  TB QuantiFERON gold negative.  4.   Bronchiectasis-CAT scan as noted.  Sputum culture previously showed Pseudomonas and patient was treated with Levaquin.  Repeat sputum culture just done again shows Pseudomonas and patient is on therapy with Levaquin 500 milligrams daily for 2 weeks.  Pulmonary has requested she proceed with pulmonary rehab.  They want her to use hypertonic nebs and albuterol nebs 3 times daily as well as flutter valve 3 times daily.  5.  Asthma-COPD overlap.  PFTs as above.  On Advair.  Had recently tried cetirizine but she felt this was sedating and discontinued that.  Also on Flonase for associated allergic rhinitis continues on Advair  puff twice daily    All of the problems as per note of December 2023 in August 2022    Medical regimen torsemide 40 mg every morning using 20 mg dosing, spironolactone increased back to 50 mg daily-patient will have follow-up conversation with cardiology, Flonase 2 squirts each nostril once daily.  Prior use loratadine 10 mg daily-as noted recent attempted Zyrtec she felt it was over sedating, Advair  puff twice daily, Coumadin with dose adjustments, Prolia with second injection on November 20, 2023, prior use of Singulair 10 mg daily, levothyroxine 50 mcg daily but 75 mcg 4 days/week, calcium and vitamin D daily, Ventolin inhaler as needed, metoprolol succinate recently decreased to 50 mg daily, multivitamin, vitamin D3/2000 units a day, fish oil 1200 mg daily, prior use of Vicodin-5-300 half tablet twice daily as needed for severe back pain which she has not used in months  No problem-specific Assessment & Plan notes found for this encounter.             Subjective:      Patient ID: Rebeca Banegas is a 89 y.o. female.    This patient was seen as an emergency home visit on March 20.  I was visiting her  who had acute respiratory illness and she requested evaluation regarding concern for potential worsening congestive heart failure.  She has a known history of bronchiectasis  with prior culture of Pseudomonas.  She had a follow-up appointment with pulmonary on March 6.  At that time she was advised to start hypertonic nebs and albuterol nebs with flutter valve 3 times per day.  Pulmonary rehab was advised.  Sputum culture was repeated.  This again showed significant growth of Pseudomonas.  Patient has been placed on Levaquin 500 mg 1 p.o. daily for 2 weeks which is due to complete on approximately March 25.  She had a follow-up appointment with her cardiologist as well-this was on February 27.  Because of concern about overdiuresis her torsemide was decreased from 40 mg daily down to 20 mg.  Her spironolactone was decreased from 50 down to 25 mg.    The patient has noted over the last several days significant increase in edema as well as ongoing worsening shortness of breath.  She states her weight is up 3 to 4 pounds.  She has not been consuming excess salt.  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  She denies chest pain or pressure.  She has concerned about her 's concurrent respiratory illness but she does not have any rhinitis, sore throat.  She had a follow-up appointment scheduled with cardiology but canceled that because of her 's illness.  She has known bifascicular block.  She has not had any symptoms to suggest high degree block.  In addition at her most recent visit her dose of metoprolol was decreased    This patient denies any systemic symptoms. Specifically there has been no evidence of fever, night sweats, significant weight loss or significant decrease in appetite.  Most recent labs reviewed Results for orders placed or performed in visit on 03/08/24  -Sputum culture and Gram stain:   Specimen: Sputum       Result                      Value             Ref Range           Sputum Culture                                                3+ Growth of Pseudomonas aeruginosa (A)       Sputum Culture              2+ Growth of                           Gram Stain Result                                             3+ Gram negative rods (A)       Gram Stain Result                                             2+ Gram positive cocci in pairs (A)       Gram Stain Result                                             Rare Polys (A)       Gram Stain Result                                             1+ Epithelial cells per low power field (A)  *Culture results found, see result in Chart Review   As noted she has had influenza vaccine, RSV vaccine and COVID booster.  She knows to avoid nonsteroidals.  She is concerned about her health and her 's health but she denies any major anxiety or depressive symptoms.  She remains on chronic anticoagulant therapy with warfarin and is aware to notify the cardiology group that she is now on Levaquin with potential for drug drug interaction with warfarin.  Note also patient did have labs done in January 2024 which were normal except for BUN of 33 with creatinine of 1.35 with GFR of 37, TSH 6.33 with acceptable TSH for her age group up to 7.5, CBC normal        The following portions of the patient's history were reviewed and updated as appropriate: allergies, current medications, past family history, past medical history, past social history, past surgical history, and problem list.    Review of Systems   Constitutional:  Positive for fatigue and unexpected weight change.   HENT: Negative.     Respiratory:  Positive for shortness of breath.    Cardiovascular:  Positive for leg swelling.   Gastrointestinal: Negative.    Endocrine: Negative.    Genitourinary: Negative.    Musculoskeletal: Negative.    Skin: Negative.    Neurological: Negative.    Hematological: Negative.    Psychiatric/Behavioral: Negative.           Objective:      LMP  (LMP Unknown)          Physical Exam  Vitals reviewed.   Constitutional:       General: She is not in acute distress.     Appearance: She is ill-appearing. She is not  toxic-appearing or diaphoretic.   HENT:      Head: Normocephalic and atraumatic.      Right Ear: External ear normal.      Left Ear: External ear normal.      Nose: Nose normal. No congestion or rhinorrhea.      Mouth/Throat:      Mouth: Mucous membranes are moist.      Pharynx: Oropharynx is clear. No oropharyngeal exudate or posterior oropharyngeal erythema.   Eyes:      General: No scleral icterus.        Right eye: No discharge.         Left eye: No discharge.      Extraocular Movements: Extraocular movements intact.      Conjunctiva/sclera: Conjunctivae normal.   Neck:      Vascular: No carotid bruit.   Cardiovascular:      Rate and Rhythm: Normal rate. Rhythm irregular.      Pulses: Normal pulses.      Heart sounds: Normal heart sounds. No murmur heard.     No friction rub. No gallop.      Comments: Irregular rhythm-adequate upstroke  Pulmonary:      Effort: Pulmonary effort is normal. No respiratory distress.      Breath sounds: No stridor. Rhonchi and rales present. No wheezing.      Comments: Scattered rhonchi.  Trace rales at the right base  Chest:      Chest wall: No tenderness.   Abdominal:      General: Abdomen is flat. Bowel sounds are normal. There is no distension.      Palpations: Abdomen is soft. There is no mass.      Tenderness: There is no abdominal tenderness. There is no right CVA tenderness, left CVA tenderness, guarding or rebound.      Hernia: No hernia is present.   Musculoskeletal:         General: No swelling, tenderness, deformity or signs of injury. Normal range of motion.      Cervical back: Normal range of motion and neck supple. No rigidity. No muscular tenderness.      Right lower leg: Edema present.      Left lower leg: Edema present.      Comments: 2+ edema both lower legs   Lymphadenopathy:      Cervical: No cervical adenopathy.   Skin:     General: Skin is warm and dry.      Coloration: Skin is not jaundiced or pale.      Findings: No bruising, erythema, lesion or rash.    Neurological:      General: No focal deficit present.      Mental Status: She is alert and oriented to person, place, and time. Mental status is at baseline.      Cranial Nerves: No cranial nerve deficit.      Sensory: No sensory deficit.      Motor: No weakness.      Coordination: Coordination normal.      Gait: Gait normal.      Deep Tendon Reflexes: Reflexes normal.   Psychiatric:         Mood and Affect: Mood normal.         Behavior: Behavior normal.         Thought Content: Thought content normal.         Judgment: Judgment normal.

## 2024-03-22 ENCOUNTER — ANTICOAG VISIT (OUTPATIENT)
Dept: CARDIOLOGY CLINIC | Facility: CLINIC | Age: 89
End: 2024-03-22

## 2024-03-22 DIAGNOSIS — I48.21 PERMANENT ATRIAL FIBRILLATION (HCC): Primary | Chronic | ICD-10-CM

## 2024-03-22 LAB
INR PPP: 2.9
INR PPP: 2.9 (ref 0.84–1.19)
PROTHROMBIN TIME: 29.6 SEC (ref 12–14.6)

## 2024-03-27 ENCOUNTER — TELEPHONE (OUTPATIENT)
Dept: INTERNAL MEDICINE CLINIC | Facility: CLINIC | Age: 89
End: 2024-03-27

## 2024-03-27 ENCOUNTER — APPOINTMENT (OUTPATIENT)
Dept: LAB | Facility: CLINIC | Age: 89
End: 2024-03-27
Payer: COMMERCIAL

## 2024-03-27 DIAGNOSIS — J47.9 BRONCHIECTASIS WITHOUT COMPLICATION (HCC): ICD-10-CM

## 2024-03-27 PROCEDURE — 87106 FUNGI IDENTIFICATION YEAST: CPT

## 2024-03-27 PROCEDURE — 87070 CULTURE OTHR SPECIMN AEROBIC: CPT

## 2024-03-27 PROCEDURE — 87205 SMEAR GRAM STAIN: CPT

## 2024-03-27 NOTE — TELEPHONE ENCOUNTER
Message was left  She is having more swelling in her legs and feet. Should she increase her diuretic ?  Please advise       VM    This is Jesus Yuli ARCHANA. My YOB: 1932. My phone number is [phone]. I'm calling to ask Doctor Daly if there's I'm having more edema in my feet and legs, and I'm wondering whether I should increase my diuretic even more. And I get it. That's about it. Appreciate this. Thank you. Bye, bye.

## 2024-03-29 ENCOUNTER — TELEPHONE (OUTPATIENT)
Dept: CARDIOLOGY CLINIC | Facility: CLINIC | Age: 89
End: 2024-03-29

## 2024-03-29 ENCOUNTER — TELEPHONE (OUTPATIENT)
Dept: INTERNAL MEDICINE CLINIC | Facility: CLINIC | Age: 89
End: 2024-03-29

## 2024-03-29 LAB
BACTERIA SPT RESP CULT: ABNORMAL
BACTERIA SPT RESP CULT: ABNORMAL
GRAM STN SPEC: ABNORMAL

## 2024-03-29 NOTE — TELEPHONE ENCOUNTER
This is Jesus Yuli. My YOB: 1932. Telephone number [phone]. I called a couple days ago. I'm having increased edema in my feet and legs and I'm wondering if the solution to this. I'd appreciate a call back. Thank you.

## 2024-03-29 NOTE — TELEPHONE ENCOUNTER
Pt called stating she has had edema  and swelling in her feet for the past few weeks. She spoke to her family doctor and they told her to call to speak with Dr. Vyas. She would like to speak with a nurse.

## 2024-04-01 ENCOUNTER — TELEPHONE (OUTPATIENT)
Dept: CARDIOLOGY CLINIC | Facility: CLINIC | Age: 89
End: 2024-04-01

## 2024-04-01 ENCOUNTER — TELEPHONE (OUTPATIENT)
Age: 89
End: 2024-04-01

## 2024-04-01 ENCOUNTER — IN HOME VISIT (OUTPATIENT)
Dept: INTERNAL MEDICINE CLINIC | Facility: CLINIC | Age: 89
End: 2024-04-01
Payer: COMMERCIAL

## 2024-04-01 VITALS — HEART RATE: 92 BPM | SYSTOLIC BLOOD PRESSURE: 106 MMHG | RESPIRATION RATE: 16 BRPM | DIASTOLIC BLOOD PRESSURE: 78 MMHG

## 2024-04-01 DIAGNOSIS — I50.33 ACUTE ON CHRONIC DIASTOLIC CHF (CONGESTIVE HEART FAILURE) (HCC): Primary | ICD-10-CM

## 2024-04-01 DIAGNOSIS — I48.21 PERMANENT ATRIAL FIBRILLATION (HCC): Chronic | ICD-10-CM

## 2024-04-01 DIAGNOSIS — I50.33 ACUTE ON CHRONIC DIASTOLIC CHF (CONGESTIVE HEART FAILURE) (HCC): ICD-10-CM

## 2024-04-01 DIAGNOSIS — E03.9 HYPOTHYROIDISM, UNSPECIFIED TYPE: ICD-10-CM

## 2024-04-01 DIAGNOSIS — R60.9 EDEMA, UNSPECIFIED TYPE: Primary | ICD-10-CM
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
## 2024-04-01 DIAGNOSIS — J47.9 BRONCHIECTASIS WITHOUT COMPLICATION (HCC): Chronic | ICD-10-CM

## 2024-04-01 DIAGNOSIS — I10 PRIMARY HYPERTENSION: Chronic | ICD-10-CM

## 2024-04-01 DIAGNOSIS — E03.9 HYPOTHYROIDISM, UNSPECIFIED TYPE: Chronic | ICD-10-CM

## 2024-04-01 PROCEDURE — 99350 HOME/RES VST EST HIGH MDM 60: CPT | Performed by: INTERNAL MEDICINE

## 2024-04-01 RX ORDER — LEVOTHYROXINE SODIUM 0.05 MG/1
TABLET ORAL
Qty: 40 TABLET | Refills: 0 | Status: SHIPPED | OUTPATIENT
Start: 2024-04-01

## 2024-04-01 NOTE — TELEPHONE ENCOUNTER
Rebeca called about edema she had left a message on nursing line,     Called and left a message to call office back to discuss further.

## 2024-04-01 NOTE — TELEPHONE ENCOUNTER
Patient called in stating that she is returning  call from Dr. Wasserman regarding her sputum results. Please advise.

## 2024-04-01 NOTE — PROGRESS NOTES
Assessment/Plan:  #1 status post recent fall-mechanical fall-occurred when her  fell on her-she has some right hip tenderness but has no pain in range of motion and is able to walk without pain-does not appear clinically to have a hip fracture but if she has significant ongoing pain she will need imaging  2.  Bilateral leg edema-has known history of chronic diastolic heart failure.  Also has chronic atrial fibrillation also has significant pulmonary disease.  Most recent echo in April 2023 shows EF of 60% with wall thickness mildly increased, moderate right atrial enlargement, estimated right ventricular systolic pressure 51.  There was concern by cardiology about overdiuresis and torsemide was decreased from 40 down to 20 mg daily and spironolactone decreased from 50 down to 25 mg daily.  Over the ensuing 2 weeks patient again noted increasing edema.  On her own she increased her torsemide back to 40 mg daily and is also now on spironolactone 50 mg daily.  There may be a component of edema related to worsening pulmonary status-pulmonary hypertension but suspect there is also component related to her known diastolic dysfunction.  Recommended she increase her torsemide to 40 mg in the a.m. and 20 mg in the p.m. for 2 days-continue spironolactone 50 mg daily-Labs ordered to include BMP and BNP and recommended at that point patient speak to her cardiologist.  Staff message sent to her cardiologist.  She does continue to restrict salt.  As noted O2 sat was 95-97 at rest and with ambulation  3.  Atrial fibrillation-adequate rate control on metoprolol.  Remains on warfarin.  #4 pulmonary disease-felt to be combination of asthma-bronchiectasis.  Most recent CT of the chest shows mild chronic bronchiectasis of right middle lobe and both lower lobes unchanged from 2014 with moderate mucoid debris.  She has severe cardiomegaly with extensive right atrial enlargement and pulmonary artery enlargement consistent  with her known history of pulmonary hypertension.  PFTs show obstructive disease with an FEV1 of 0.92-no response to bronchodilator.  Had recent treatment with Levaquin x 2 for bronchiectasis with associated Pseudomonas on culture-she feels as though her cough is improved and overall shortness of breath is stable  #5 bronchiectasis-CAT scan is noted.  Recent sputum culture showed Pseudomonas was treated with Levaquin.  Repeat sputum culture again showed Pseudomonas and she was retreated with Levaquin for 2 weeks.  Pulmonary has requested to proceed with pulmonary rehab.  They want her to use hypertonic nebs and albuterol nebs 3 times daily as well as flutter valve 3 times daily.  Follow-up sputum culture now is not growing Pseudomonas  #6asthma-COPD overlap.  PFTs as above.  On Advair.  Had tried cetirizine but she felt this was oversedated.  Also on Flonase for associated allergic rhinitis and continues with Advair 50 - 100 - 1 puff twice daily  7.  Abnormal area of the foot-suspect some subcutaneous bleeding associated with trauma-she will be in touch with her podiatrist  8.  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  Recent additional laboratory testing shows normal immunoglobulin levels,s serum protein electrophoresis shows biclonal spike with immunofixation identifying both of these is IgG kappa-spike #1 is 0.26 g/dL and spike #2 is 0.08 g/dL- she does not have any crab features other than some CKD as noted in prior notes.  No evidence of hypercalcemia bone disease or significant anemia.  Likely represents MGUS and we reviewed that today.  We will be checking follow-up labs in 6 to 12 months which would be December 2022 to June 2023  9.  Bilateral sensorineural hearing loss- being monitored by the ENT group.  They plan on seeing her again in a year which would be in June 2024  10 osteoporosis-DEXA scan in 2023 shows L-spine -2.7 and hip of -2.2.  Remains in vitamin D.   Knows to have calcium intake of 1200 mg/day or more.    Subsequent DEXA scan will be due in March 2025 patient had her second injection of Prolia on 1120/23  11.  Recurrent labial cyst-prior labial abscess.  Was treated by GYN with I&D and transient course of Bactrim without culture was never obtained.  There was concern about potential MRSA as she had this in the past.  Recently had doxycycline at the time of flareup as well as her pulmonary symptoms.  Not a major issue at present  12.  Chronic renal failure-stage IIIb-felt related to age-related nephron loss plus possible component of cardiorenal syndrome.  Knows to avoid nonsteroidals.  Most recent BUN 38 with creatinine 1.5-follow-up BUN and creatinine ordered  #13 previous noted weight loss-felt related to the above-  14.  Hypothyroidism-dose of Synthroid increased [id number] TSH ordered     All other problems as per note of August 30, 2022    Medical regimen torsemide 40 mg every morning using 20 mg dosing-additional 20 mg in the evening for 2 days, spironolactone 50 mg daily, Flonase 2 squirts in each nostril once daily, prior use of loratadine 10 mg daily, Advair 50 - 100 - 1 puff twice daily, Coumadin with dose adjustments, Prolia with second injection on November 20, 2023 November decrease, prior use of Singulair 10 mg daily, levothyroxine 50 mcg daily but 75 mcg 4 days/week, calcium and vitamin D daily, Ventolin inhaler as needed, metoprolol succinate 50 mg daily, multivitamin, vitamin D3/2000's a day, fish oil 1200 mg daily, prior use of Vicodin 5-300 half tablet twice daily.  For severe back pain which she has not used in months    Note sent to cardiology.  Prescription called in for levothyroxine.  Patient to have labs to include BMP, BNP, TSH    Addendum-laboratory testing in April 4 shows BUN of 43 with a creatinine of 1.41-3 months ago BUN 33 with creatinine of 1.35-estimated GFR 32 TSH 4.733 and very acceptable for her age of 89 BNP is  elevated at 402 CBC normal other than her known chronic macrocytosis with an MCV of 104 vitamin D stable at 61.3 vitamin B12 level normal at 707 folate level normal-cardiology had temporarily increased her dose of diuretics and other diuretic advice will continue to be given by cardiology at this time  No problem-specific Assessment & Plan notes found for this encounter.       Diagnoses and all orders for this visit:    Edema, unspecified type    Acute on chronic diastolic CHF (congestive heart failure) (HCC)    Permanent atrial fibrillation (HCC)    Primary hypertension    Bronchiectasis without complication (HCC)    Hypothyroidism, unspecified type    Fall, initial encounter          Subjective:      Patient ID: Rebeca Banegas is a 89 y.o. female.    Patient was seen as an emergency home visit on March 31.  Unfortunately her  was hospitalized and is now on hospice-he is in the medical unit of [facility] and is expected to die over the next 2 weeks.  I went to visit with the patient to discuss all of this and she requested a visit because of several medical issues.  She informed me that prior to his recent mission to the hospital her  had fallen on top of her and she had some right hip pain.  She is walking with a cane because of slight pain but denies major pain with walking.  She had no significant pain in range of motion of the hip.  She did have some pain laterally and is concerned she has soft tissue injury.  She needs a new prescription and this was taken care of.     She is most concerned about leg edema.  She has a known history of significant pulmonary disease as well as congestive heart failure.  Recently her dose of diuretic had been decreased and she was taking torsemide as 20 mg daily instead of prior dose of 40 mg daily.  In addition spironolactone had been decreased from 50 to 25 mg.  Within 2 weeks patient noted increasing edema and her dose of torsemide was increased back to  40 mg and the spironolactone back to 50 mg-despite that she has ongoing edema although her weight has not climbed-however weight measurement is difficult with the scale she is using.  She knows to restrict salt.  She has not had any chest pain or pressure.  She has known atrial fibrillation.  She denies paroxysmal nocturnal dyspnea.  She has known history of significant pulmonary disease and recently has been found to have persistent sputum culture for Pseudomonas.  She was treated again with a recent round of Levaquin and feels this has helped.  She had a follow-up sputum culture done showing mixed aron and slight Candida growth.  There was no evidence of recurrent Pseudomonas.    Results for orders placed or performed in visit on 03/27/24  -Sputum culture and Gram stain:   Specimen: Sputum       Result                      Value             Ref Range           Sputum Culture                                                1+ Growth of Candida albicans (A)       Sputum Culture              1+ Growth of                          Gram Stain Result           2+ Polys (A)                          Gram Stain Result                                             2+ Gram positive cocci in pairs (A)       Gram Stain Result                                             3+ Gram positive rods (A)       Gram Stain Result                                             2+ Epithelial Cells (A)       Gram Stain Result                                             Rare Gram negative rods (A)  *Culture results found, see result in Chart Review  O2 sat today on room air was 95-97 and she did not desaturate with walking around her apartment.  Diuretic dosing is being provided by her cardiologist-she had called the cardiologist office and he tried to get in touch with her but I suspect the patient was unable to answer the phone because she was busy with her  at the hospital.  I recommended at this point as dictated below that we  "temporarily increase her torsemide dose and I will send a message to cardiology.  Associated with the edema she has developed a lesion on the bottom of her foot.  She wonders if it is in a \"blood blister\".  She will be in touch with her podiatrist.      She is very upset regarding the recent clinical course of her  and we discussed this in detail.  Large amount of emotional support was provided      She denies significant headache.  She denies weakness of 1 arm or leg compared to the other.  She denies numbness of 1 arm or leg compared to the other.  She denies blurred or double vision or difficulty with her speech.       This patient denies any systemic symptoms. Specifically there has been no evidence of fever, night sweats, significant weight loss or significant decrease in appetite.  We do long discussion today regarding the difference between right and left-sided heart failure.  She is a retired nurse and understands.          The following portions of the patient's history were reviewed and updated as appropriate: allergies, current medications, past family history, past medical history, past social history, past surgical history, and problem list.    Review of Systems   Constitutional:  Positive for fatigue.   HENT: Negative.     Respiratory: Negative.     Cardiovascular:  Positive for leg swelling.   Gastrointestinal: Negative.    Endocrine: Negative.    Genitourinary: Negative.    Musculoskeletal: Negative.         New right-sided leg pain   Skin:  Positive for wound.   Neurological: Negative.    Hematological: Negative.    Psychiatric/Behavioral:  Positive for dysphoric mood.          Objective:      LMP  (LMP Unknown)          Physical Exam  Vitals reviewed.   Constitutional:       General: She is not in acute distress.     Appearance: Normal appearance. She is ill-appearing. She is not toxic-appearing or diaphoretic.   HENT:      Head: Normocephalic and atraumatic.      Right Ear: External ear " normal.      Left Ear: External ear normal.      Nose: Nose normal. No congestion or rhinorrhea.      Mouth/Throat:      Mouth: Mucous membranes are moist.      Pharynx: Oropharynx is clear. No oropharyngeal exudate or posterior oropharyngeal erythema.   Eyes:      General: No scleral icterus.        Right eye: No discharge.         Left eye: No discharge.      Extraocular Movements: Extraocular movements intact.      Conjunctiva/sclera: Conjunctivae normal.   Neck:      Vascular: No carotid bruit.   Cardiovascular:      Rate and Rhythm: Normal rate. Rhythm irregular.      Pulses: Normal pulses.      Heart sounds: Normal heart sounds. No murmur heard.     No friction rub. No gallop.      Comments: Irregular rhythm.  Adequate upstroke.  Pulmonary:      Effort: Pulmonary effort is normal. No respiratory distress.      Breath sounds: Normal breath sounds. No stridor. No wheezing, rhonchi or rales.   Chest:      Chest wall: No tenderness.   Abdominal:      General: Abdomen is flat. Bowel sounds are normal. There is no distension.      Palpations: Abdomen is soft. There is no mass.      Tenderness: There is no abdominal tenderness. There is no right CVA tenderness, left CVA tenderness, guarding or rebound.      Hernia: No hernia is present.   Musculoskeletal:         General: No swelling, tenderness, deformity or signs of injury. Normal range of motion.      Cervical back: Normal range of motion and neck supple. No rigidity. No muscular tenderness.      Right lower leg: Edema present.      Left lower leg: Edema present.      Comments: 2+ edema both legs   Lymphadenopathy:      Cervical: No cervical adenopathy.   Skin:     General: Skin is warm and dry.      Coloration: Skin is not jaundiced or pale.      Findings: No bruising, erythema, lesion or rash.      Comments: Abnormal area of the plantar surface of the left foot suspicious for trauma avoid subcutaneous bleeding   Neurological:      General: No focal deficit  present.      Mental Status: She is alert and oriented to person, place, and time. Mental status is at baseline.      Cranial Nerves: No cranial nerve deficit.      Sensory: No sensory deficit.      Motor: No weakness.      Coordination: Coordination normal.      Gait: Gait normal.      Deep Tendon Reflexes: Reflexes normal.   Psychiatric:         Behavior: Behavior normal.         Thought Content: Thought content normal.         Judgment: Judgment normal.      Comments: Patient very upset regarding the terminal state of her

## 2024-04-03 NOTE — ASSESSMENT & PLAN NOTE
Hypertension, stable  There is no mammographic evidence of malignancy. A 1 year screening mammogram is recommended.

## 2024-04-04 ENCOUNTER — TELEPHONE (OUTPATIENT)
Dept: CARDIOLOGY CLINIC | Facility: CLINIC | Age: 89
End: 2024-04-04

## 2024-04-04 ENCOUNTER — APPOINTMENT (OUTPATIENT)
Dept: LAB | Facility: CLINIC | Age: 89
End: 2024-04-04
Payer: COMMERCIAL

## 2024-04-04 DIAGNOSIS — I48.21 PERMANENT ATRIAL FIBRILLATION (HCC): Primary | Chronic | ICD-10-CM

## 2024-04-04 DIAGNOSIS — I50.33 ACUTE ON CHRONIC DIASTOLIC CHF (CONGESTIVE HEART FAILURE) (HCC): ICD-10-CM

## 2024-04-04 DIAGNOSIS — J44.9 CHRONIC OBSTRUCTIVE PULMONARY DISEASE, UNSPECIFIED COPD TYPE (HCC): ICD-10-CM

## 2024-04-04 DIAGNOSIS — E03.9 HYPOTHYROIDISM, UNSPECIFIED TYPE: ICD-10-CM

## 2024-04-04 DIAGNOSIS — I50.32 CHRONIC DIASTOLIC CONGESTIVE HEART FAILURE (HCC): ICD-10-CM

## 2024-04-04 DIAGNOSIS — M81.0 AGE-RELATED OSTEOPOROSIS WITHOUT CURRENT PATHOLOGICAL FRACTURE: Chronic | ICD-10-CM

## 2024-04-04 DIAGNOSIS — D75.89 MACROCYTOSIS: ICD-10-CM

## 2024-04-04 LAB
25(OH)D3 SERPL-MCNC: 61.3 NG/ML (ref 30–100)
ALBUMIN SERPL BCP-MCNC: 4 G/DL (ref 3.5–5)
ALP SERPL-CCNC: 43 U/L (ref 34–104)
ALT SERPL W P-5'-P-CCNC: 15 U/L (ref 7–52)
ANION GAP SERPL CALCULATED.3IONS-SCNC: 9 MMOL/L (ref 4–13)
AST SERPL W P-5'-P-CCNC: 26 U/L (ref 13–39)
BASOPHILS # BLD AUTO: 0.04 THOUSANDS/ÂΜL (ref 0–0.1)
BASOPHILS NFR BLD AUTO: 1 % (ref 0–1)
BILIRUB SERPL-MCNC: 1.06 MG/DL (ref 0.2–1)
BNP SERPL-MCNC: 402 PG/ML (ref 0–100)
BUN SERPL-MCNC: 43 MG/DL (ref 5–25)
CALCIUM SERPL-MCNC: 9.1 MG/DL (ref 8.4–10.2)
CHLORIDE SERPL-SCNC: 96 MMOL/L (ref 96–108)
CO2 SERPL-SCNC: 30 MMOL/L (ref 21–32)
CREAT SERPL-MCNC: 1.41 MG/DL (ref 0.6–1.3)
EOSINOPHIL # BLD AUTO: 0.2 THOUSAND/ÂΜL (ref 0–0.61)
EOSINOPHIL NFR BLD AUTO: 3 % (ref 0–6)
ERYTHROCYTE [DISTWIDTH] IN BLOOD BY AUTOMATED COUNT: 14.2 % (ref 11.6–15.1)
FOLATE SERPL-MCNC: >22.3 NG/ML
GFR SERPL CREATININE-BSD FRML MDRD: 32 ML/MIN/1.73SQ M
GLUCOSE SERPL-MCNC: 90 MG/DL (ref 65–140)
HCT VFR BLD AUTO: 42 % (ref 34.8–46.1)
HGB BLD-MCNC: 13.5 G/DL (ref 11.5–15.4)
IMM GRANULOCYTES # BLD AUTO: 0.01 THOUSAND/UL (ref 0–0.2)
IMM GRANULOCYTES NFR BLD AUTO: 0 % (ref 0–2)
LYMPHOCYTES # BLD AUTO: 1.61 THOUSANDS/ÂΜL (ref 0.6–4.47)
LYMPHOCYTES NFR BLD AUTO: 21 % (ref 14–44)
MCH RBC QN AUTO: 33.3 PG (ref 26.8–34.3)
MCHC RBC AUTO-ENTMCNC: 32.1 G/DL (ref 31.4–37.4)
MCV RBC AUTO: 104 FL (ref 82–98)
MONOCYTES # BLD AUTO: 0.6 THOUSAND/ÂΜL (ref 0.17–1.22)
MONOCYTES NFR BLD AUTO: 8 % (ref 4–12)
NEUTROPHILS # BLD AUTO: 5.27 THOUSANDS/ÂΜL (ref 1.85–7.62)
NEUTS SEG NFR BLD AUTO: 67 % (ref 43–75)
NRBC BLD AUTO-RTO: 0 /100 WBCS
PLATELET # BLD AUTO: 283 THOUSANDS/UL (ref 149–390)
PMV BLD AUTO: 9.7 FL (ref 8.9–12.7)
POTASSIUM SERPL-SCNC: 3.7 MMOL/L (ref 3.5–5.3)
PROT SERPL-MCNC: 7.2 G/DL (ref 6.4–8.4)
RBC # BLD AUTO: 4.05 MILLION/UL (ref 3.81–5.12)
SODIUM SERPL-SCNC: 135 MMOL/L (ref 135–147)
TSH SERPL DL<=0.05 MIU/L-ACNC: 4.73 UIU/ML (ref 0.45–4.5)
VIT B12 SERPL-MCNC: 707 PG/ML (ref 180–914)
WBC # BLD AUTO: 7.73 THOUSAND/UL (ref 4.31–10.16)

## 2024-04-04 PROCEDURE — 85025 COMPLETE CBC W/AUTO DIFF WBC: CPT

## 2024-04-04 PROCEDURE — 82306 VITAMIN D 25 HYDROXY: CPT

## 2024-04-04 PROCEDURE — 83880 ASSAY OF NATRIURETIC PEPTIDE: CPT

## 2024-04-04 PROCEDURE — 84443 ASSAY THYROID STIM HORMONE: CPT

## 2024-04-04 PROCEDURE — 82746 ASSAY OF FOLIC ACID SERUM: CPT

## 2024-04-04 PROCEDURE — 82607 VITAMIN B-12: CPT

## 2024-04-04 PROCEDURE — 80053 COMPREHEN METABOLIC PANEL: CPT

## 2024-04-04 NOTE — TELEPHONE ENCOUNTER
Have her take Torsemide 40mg twice a day for 2 days and then resume 40mg daily.  Keep the lower dose of spironolactone 25mg which was a change at the last visit.

## 2024-04-04 NOTE — TELEPHONE ENCOUNTER
P/C into office has increased swelling in her LL and feet.     Pt states her  has passed away, has been detailing with that.     Pt had a appt on 2/27/2024    On 3/11   Pt went back to taking metoprolol succinate 100 mg qd, spironolactone 25 mg qd, torsemide 40 mg qd.     Rebeca states that the swelling seem like it is getting worse and not better. She has elevated not really helping.     Has not taken recent BP last BP took was 3/22  /75 .     Denies any chest pains, palpations,dizziness,lightheaded.     She is also see podiatrist tomorrow for a blood blister on the foot that is very painful.     Please advise

## 2024-04-04 NOTE — TELEPHONE ENCOUNTER
Have her take Torsemide 40 mg twice a day for 2 days and then resume 40 mg daily. Keep the lower dose of spironolactone 25 mg which was a change at the last visit.    Spoke to Kin[place] and gave the above instructions on medication, pt gave back the medication changes pt verbally understood     She is feeling better just the swelling and foot pain is bothering her. Advised to follow up if no improvement next week. Pt verbally understood

## 2024-04-04 NOTE — TELEPHONE ENCOUNTER
Left message regarding pts concern of swelling and to see how she is doing. Told her to call back the office to let us know how the swelling is.

## 2024-04-05 ENCOUNTER — ANTICOAG VISIT (OUTPATIENT)
Dept: CARDIOLOGY CLINIC | Facility: CLINIC | Age: 89
End: 2024-04-05

## 2024-04-05 DIAGNOSIS — I48.21 PERMANENT ATRIAL FIBRILLATION (HCC): Primary | Chronic | ICD-10-CM

## 2024-04-16 NOTE — PROGRESS NOTES
Pulmonary Follow Up Note   Rebeca Banegas 89 y.o. female MRN: [id number]  4/16/2024      Assessment:  Chronic Cough  Improved, reeducated on importance of continuing airway clearance.     Mild Intermittent Asthma  Continue with Advair 100mcg inhaler BID along with as needed albuterol rescue inhaler. Counseled on rinsing mouth after advair to decrease risk of thrush.      Bronchiectasis   Patient with bronchiectasis noted on CT chest and Pseudomonas growth on previous sputum cultures. Discussed importance of airway clearance in managing this disease. Advised to restart hypertonic nebs and albuterol nebs with flutter valve 3 times per day. Referral to pulmonary rehab made last visit.      Allergic Rhinitis  Continue cetirizine along with nasal rinse and flonase    Plan:    Diagnoses and all orders for this visit:    Bronchiectasis without complication (HCC)    Mild intermittent asthma without complication    Non-seasonal allergic rhinitis, unspecified trigger    Chronic diastolic congestive heart failure (HCC)        Return in about 16 weeks (around 8/7/2024) for Next scheduled follow up.    History of Present Illness   HPI:  Rebeca Banegas is a 89 y.o. female with PMH of HFpEF, Afib on coumadin, asthma, bronchiectasis who presents for follow up of bronchiectasis exacerbation. This is her 2nd in a 6 month span, sputum cultures grew pseudomonas with pansensitivity each time, treated with Levaquin and cleared on sputum culture. Since last exacerbation she less mucous production, no fevers, cough is less. Has some lower ext edema which she is managing with her cardiologist. Since last visit her  has passed away and states she is coping with that, has a lot of family and social support.   She has been semicompliant with airway clearance regimen since resolution of last exacerbation. She was counseled on importance of continuing that to avoid further exacerbations.    Doing well on advair 100mcg. Using rescue  inhaler about once per week. No nighttime awakenings. No recent steroid use.     Review of Systems   Constitutional:  Negative for activity change, chills, diaphoresis, fatigue and fever.   HENT:  Negative for congestion, postnasal drip, rhinorrhea, sinus pressure, sneezing, sore throat and trouble swallowing.    Eyes: Negative.    Respiratory:  Negative for cough, chest tightness and shortness of breath.    Cardiovascular:  Positive for leg swelling. Negative for chest pain and palpitations.   Gastrointestinal:  Negative for constipation, diarrhea, nausea and vomiting.   Endocrine: Negative.    Genitourinary: Negative.    Musculoskeletal:  Negative for back pain, joint swelling and neck pain.   Skin: Negative.    Allergic/Immunologic: Negative.    Neurological:  Negative for dizziness, syncope, weakness, light-headedness and headaches.   Hematological: Negative.    Psychiatric/Behavioral: Negative.     All other systems reviewed and are negative.      Historical Information   Past Medical History:   Diagnosis Date    Abnormal ultrasound     RESOLVED: [id number]    Asthma with acute exacerbation     LAST ASSESSED: [id number]    Asymptomatic menopausal state     Atherosclerosis     Atrial fibrillation (HCC)     Chronic obstructive lung disease (HCC)     Congestive heart failure (CHF) (HCC)     Congestive heart failure (HCC)     LAST ASSESSED: [id number]    Cuboid fracture     LAST ASSESSED: [id number]    Dyspepsia     GERD (gastroesophageal reflux disease)     High risk medication use     LAST ASSESSED: [id number]    Hyperlipidemia     Hypertension     Hypertension     Hypokalemia     Hypothyroidism     Impacted cerumen of both ears     LAST ASSESSED: 26QJU6584    Impacted cerumen of right ear     LAST ASSESSED: 05YCA3606    Influenza     LAST ASSESSED: 22MPV9083    IPMN (intraductal papillary mucinous neoplasm)     RESOLVED: 02OCT2015    Limb swelling     LAST ASSESSED: 46QJE4115    Navicular fracture of ankle     LAST  ASSESSED: [id number]    Onychomycosis     LAST ASSESSED: 54HXK6686    Osteoarthritis     Osteopenia     Osteoporosis     Paronychia, finger, unspecified laterality     LAST ASSESSED: 74YWK0825    Paroxysmal atrial fibrillation (HCC)     LAST ASSESSED: 02MAR2014    Peripheral vascular disease (HCC)     Plantar fasciitis     Talus fracture     LAST ASSESSED: 15SYG3602    Vascular headache      Past Surgical History:   Procedure Laterality Date    APPENDECTOMY      CATARACT EXTRACTION Bilateral     CHOLECYSTECTOMY      NEUROPLASTY / TRANSPOSITION MEDIAN NERVE AT CARPAL TUNNEL BILATERAL Bilateral     DECOMPRESSION    OOPHORECTOMY Bilateral     age 81    PELVIC FLOOR REPAIR  2014    SINUS SURGERY      TOTAL KNEE ARTHROPLASTY Bilateral      Family History   Problem Relation Age of Onset    Pancreatic cancer Mother 93    Heart failure Mother     Coronary artery disease Family     Breast cancer Family     Other Family         BREAST DISORDER, GASTROINTESTINAL CANCER    Uterine cancer Family     Hyperlipidemia Family     Osteoarthritis Family     Ovarian cancer Family     Brain cancer Son     Stroke Father     No Known Problems Sister     No Known Problems Daughter     No Known Problems Maternal Grandmother     No Known Problems Maternal Grandfather     No Known Problems Paternal Grandmother     No Known Problems Paternal Grandfather     Breast cancer Cousin 50    Breast cancer Cousin 50    Ovarian cancer Other 49         Meds/Allergies     Current Outpatient Medications:     albuterol (2.5 mg/3 mL) 0.083 % nebulizer solution, Take 3 mL (2.5 mg total) by nebulization every 8 (eight) hours, Disp: 270 mL, Rfl: 3    albuterol (Proventil HFA) 90 mcg/act inhaler, Inhale 2 puffs every 6 (six) hours as needed for wheezing, Disp: 20.1 g, Rfl: 3    albuterol (PROVENTIL HFA,VENTOLIN HFA) 90 mcg/act inhaler, Inhale, Disp: , Rfl:     cetirizine (ZyrTEC) 10 mg tablet, Take 1 tablet (10 mg total) by mouth daily, Disp: 30 tablet, Rfl: 6     "Cholecalciferol (VITAMIN D3) 1000 units CAPS, Take 2,000 Units by mouth daily, Disp: , Rfl:     fluticasone (FLONASE) 50 mcg/act nasal spray, USE 2 SPRAYS IN EACH NOSTRIL ONCE DAILY, Disp: 48 g, Rfl: 3    Fluticasone-Salmeterol (Advair Diskus) 100-50 mcg/dose inhaler, Inhale 1 puff 2 (two) times a day Rinse mouth after use., Disp: 60 blister, Rfl: 3    levothyroxine (Euthyrox) 50 mcg tablet, 1 p.o. daily but 1-1/2 tablets 4 days/week, Disp: 40 tablet, Rfl: 0    levothyroxine 50 mcg tablet, TAKE 1 TABLET DAILY, EXCEPT TAKE 1 AND 1/2 TABLETS ON SUNDAY AND WEDNESDAY, Disp: 98 tablet, Rfl: 10    metoprolol succinate (TOPROL-XL) 100 mg 24 hr tablet, Take 0.5 tablets (50 mg total) by mouth daily, Disp: 45 tablet, Rfl: 3    sodium chloride 3 % inhalation solution, Take 4 mL by nebulization 3 (three) times a day (Patient not taking: Reported on 3/6/2024), Disp: 360 mL, Rfl: 2    spironolactone (ALDACTONE) 25 mg tablet, Take 1 tablet (25 mg total) by mouth daily, Disp: 90 tablet, Rfl: 3    torsemide (DEMADEX) 20 mg tablet, Take 1 tablet (20 mg total) by mouth daily, Disp: 90 tablet, Rfl: 3    warfarin (COUMADIN) 5 mg tablet, TAKE 1/2 TO 1 TABLET BY MOUTH DAILY OR AS ORDERED BY PHYSICIAN., Disp: 90 tablet, Rfl: 3  Allergies   Allergen Reactions    Asa [Aspirin]     Nsaids        Vitals: Blood pressure 116/76, pulse 93, temperature (!) 96.4 °F (35.8 °C), temperature source Tympanic, height 5' 4\" (1.626 m), weight 54.9 kg (121 lb), SpO2 96%. Body mass index is 20.77 kg/m².        Physical Exam  Physical Exam  Vitals and nursing note reviewed.   Constitutional:       Comments: Thin, frail   HENT:      Head: Normocephalic and atraumatic.      Right Ear: External ear normal.      Left Ear: External ear normal.      Nose: Nose normal.      Mouth/Throat:      Mouth: Mucous membranes are moist.      Pharynx: Oropharynx is clear.   Eyes:      Conjunctiva/sclera: Conjunctivae normal.   Cardiovascular:      Rate and Rhythm: Normal rate " "and regular rhythm.      Pulses: Normal pulses.      Heart sounds: Normal heart sounds.   Pulmonary:      Effort: Pulmonary effort is normal.      Breath sounds: Normal breath sounds.   Abdominal:      General: Abdomen is flat. Bowel sounds are normal.      Palpations: Abdomen is soft.   Musculoskeletal:         General: No tenderness.      Cervical back: Neck supple. No muscular tenderness.      Right lower leg: Edema present.      Left lower leg: Edema present.   Skin:     General: Skin is warm and dry.      Capillary Refill: Capillary refill takes less than 2 seconds.   Neurological:      Mental Status: She is alert and oriented to person, place, and time. Mental status is at baseline.   Psychiatric:         Mood and Affect: Mood normal.         Behavior: Behavior normal.         Thought Content: Thought content normal.         Judgment: Judgment normal.         Preventative:  Influenza vaccine: 2022  Pneumonia vaccine: 2020 and 2015    Labs: I have personally reviewed pertinent lab results.  Lab Results   Component Value Date    WBC 7.73 04/04/2024    HGB 13.5 04/04/2024    HCT 42.0 04/04/2024     (H) 04/04/2024     04/04/2024     Lab Results   Component Value Date    CALCIUM 9.1 04/04/2024     10/17/2017    K 3.7 04/04/2024    CO2 30 04/04/2024    CL 96 04/04/2024    BUN 43 (H) 04/04/2024    CREATININE 1.41 (H) 04/04/2024     No results found for: \"IGE\"  Lab Results   Component Value Date    ALT 15 04/04/2024    AST 26 04/04/2024    ALKPHOS 43 04/04/2024    BILITOT 0.5 10/17/2017       Imaging and other studies: I have personally reviewed pertinent reports.   and I have personally reviewed pertinent films in PACS  CT Chest 7/3/23: bronchiectasis in RML and B/L lower lobes with bronchial wall thickening and mucous in airways, enlarged pulmonary artery  Pulmonary function testing 9/1/23: moderate obstruction with +BD response with hyperinflation      EKG, Pathology, and Other Studies: I have " "personally reviewed pertinent reports.    Echo 4/6/23: EF 60% unable to evaluate diastolic function due to Afib, RVSP of 51mmHg    Paulie Wasserman MD  Pulmonary/Critical Care Fellow PGY-V  [facility]      Disclaimer: Portions of the record may have been created with voice recognition software. Occasional wrong word or \"sound a like\" substitutions may have occurred due to the inherent limitations of voice recognition software. Careful consideration should be taken to recognize, using context, where substitutions have occurred.    "

## 2024-04-17 ENCOUNTER — OFFICE VISIT (OUTPATIENT)
Dept: PULMONOLOGY | Facility: CLINIC | Age: 89
End: 2024-04-17
Payer: COMMERCIAL

## 2024-04-17 VITALS
WEIGHT: 121 LBS | OXYGEN SATURATION: 96 % | DIASTOLIC BLOOD PRESSURE: 76 MMHG | BODY MASS INDEX: 20.66 KG/M2 | HEIGHT: 64 IN | TEMPERATURE: 96.4 F | SYSTOLIC BLOOD PRESSURE: 116 MMHG | HEART RATE: 93 BPM

## 2024-04-17 DIAGNOSIS — J45.20 MILD INTERMITTENT ASTHMA WITHOUT COMPLICATION: Chronic | ICD-10-CM

## 2024-04-17 DIAGNOSIS — J30.89 NON-SEASONAL ALLERGIC RHINITIS, UNSPECIFIED TRIGGER: Chronic | ICD-10-CM

## 2024-04-17 DIAGNOSIS — J47.9 BRONCHIECTASIS WITHOUT COMPLICATION (HCC): Primary | Chronic | ICD-10-CM

## 2024-04-17 DIAGNOSIS — I50.32 CHRONIC DIASTOLIC CONGESTIVE HEART FAILURE (HCC): Chronic | ICD-10-CM

## 2024-04-17 PROCEDURE — 99214 OFFICE O/P EST MOD 30 MIN: CPT | Performed by: INTERNAL MEDICINE

## 2024-04-26 ENCOUNTER — NURSE TRIAGE (OUTPATIENT)
Age: 89
End: 2024-04-26

## 2024-04-26 NOTE — TELEPHONE ENCOUNTER
----- Message from Maria M Brewer sent at 4/26/2024  3:40 PM EDT -----  Patient has been suffering from severe edema in both legs since March, but has been more significant for a few weeks now. Her next appointment with Dr Vyas is May 10th, but she is wondering if she needs to be seen sooner. Please call her at [phone].

## 2024-04-26 NOTE — TELEPHONE ENCOUNTER
Spoke with patient regarding increasing edema in bilateral legs; weight increased from 120lbs on 4/25 to 124lbs today.  Has previously been instructed to increase dose of Toresemide due to swelling.     Appointment scheduled for May 1st.     Please advise.

## 2024-04-26 NOTE — TELEPHONE ENCOUNTER
"Reason for Disposition  • MILD swelling of both ankles (i.e., pedal edema) AND new-onset or worsening    Answer Assessment - Initial Assessment Questions  1. ONSET: \"When did the swelling start?\" (e.g., minutes, hours, days)      Ongoing, worsening over last few days  2. LOCATION: \"What part of the leg is swollen?\"  \"Are both legs swollen or just one leg?\"      Knees down, left > right  3. SEVERITY: \"How bad is the swelling?\" (e.g., localized; mild, moderate, severe)   - Localized - small area of swelling localized to one leg   - MILD pedal edema - swelling limited to foot and ankle, pitting edema < 1/4 inch (6 mm) deep, rest and elevation eliminate most or all swelling   - MODERATE edema - swelling of lower leg to knee, pitting edema > 1/4 inch (6 mm) deep, rest and elevation only partially reduce swelling   - SEVERE edema - swelling extends above knee, facial or hand swelling present       moderate  4. REDNESS: \"Does the swelling look red or infected?\"      denies  5. PAIN: \"Is the swelling painful to touch?\" If Yes, ask: \"How painful is it?\"   (Scale 1-10; mild, moderate or severe)      Denies pain, states feels tight  6. FEVER: \"Do you have a fever?\" If Yes, ask: \"What is it, how was it measured, and when did it start?\"       deneis  7. CAUSE: \"What do you think is causing the leg swelling?\"      chronic  8. MEDICAL HISTORY: \"Do you have a history of heart failure, kidney disease, liver failure, or cancer?\"      CHF, BUN/Creat elevated  9. RECURRENT SYMPTOM: \"Have you had leg swelling before?\" If Yes, ask: \"When was the last time?\" \"What happened that time?\"      Chronic, worsening  10. OTHER SYMPTOMS: \"Do you have any other symptoms?\" (e.g., chest pain, difficulty breathing)        History of asthma, mild shortness of breath but at baseline    Protocols used: Leg Swelling and Edema-ADULT-[place]    "

## 2024-04-28 DIAGNOSIS — E03.9 HYPOTHYROIDISM, UNSPECIFIED TYPE: Chronic | ICD-10-CM

## 2024-04-28 RX ORDER — LEVOTHYROXINE SODIUM 0.05 MG/1
TABLET ORAL
Qty: 40 TABLET | Refills: 5 | Status: SHIPPED | OUTPATIENT
Start: 2024-04-28

## 2024-05-01 ENCOUNTER — OFFICE VISIT (OUTPATIENT)
Dept: CARDIOLOGY CLINIC | Facility: CLINIC | Age: 89
End: 2024-05-01
Payer: COMMERCIAL

## 2024-05-01 ENCOUNTER — DOCUMENTATION (OUTPATIENT)
Dept: CARDIOLOGY CLINIC | Facility: CLINIC | Age: 89
End: 2024-05-01

## 2024-05-01 VITALS
WEIGHT: 133.7 LBS | HEIGHT: 64 IN | SYSTOLIC BLOOD PRESSURE: 120 MMHG | BODY MASS INDEX: 22.83 KG/M2 | HEART RATE: 101 BPM | OXYGEN SATURATION: 100 % | DIASTOLIC BLOOD PRESSURE: 70 MMHG

## 2024-05-01 DIAGNOSIS — I50.33 ACUTE ON CHRONIC DIASTOLIC CHF (CONGESTIVE HEART FAILURE) (HCC): Chronic | ICD-10-CM

## 2024-05-01 DIAGNOSIS — J45.40 MODERATE PERSISTENT ASTHMA WITHOUT COMPLICATION: Chronic | ICD-10-CM

## 2024-05-01 DIAGNOSIS — E78.2 MIXED HYPERLIPIDEMIA: Chronic | ICD-10-CM

## 2024-05-01 DIAGNOSIS — I45.2 BIFASCICULAR BUNDLE BRANCH BLOCK: Chronic | ICD-10-CM

## 2024-05-01 DIAGNOSIS — I48.21 PERMANENT ATRIAL FIBRILLATION (HCC): Primary | Chronic | ICD-10-CM

## 2024-05-01 DIAGNOSIS — N18.4 CHRONIC RENAL DISEASE, STAGE IV (HCC): ICD-10-CM

## 2024-05-01 PROCEDURE — 1159F MED LIST DOCD IN RCRD: CPT | Performed by: INTERNAL MEDICINE

## 2024-05-01 PROCEDURE — 1160F RVW MEDS BY RX/DR IN RCRD: CPT | Performed by: INTERNAL MEDICINE

## 2024-05-01 PROCEDURE — 96374 THER/PROPH/DIAG INJ IV PUSH: CPT | Performed by: INTERNAL MEDICINE

## 2024-05-01 PROCEDURE — 99215 OFFICE O/P EST HI 40 MIN: CPT | Performed by: INTERNAL MEDICINE

## 2024-05-01 RX ORDER — FUROSEMIDE 10 MG/ML
80 INJECTION INTRAMUSCULAR; INTRAVENOUS ONCE
Status: COMPLETED | OUTPATIENT
Start: 2024-05-01 | End: 2024-05-01

## 2024-05-01 RX ADMIN — FUROSEMIDE 80 MG: 10 INJECTION INTRAMUSCULAR; INTRAVENOUS at 14:00

## 2024-05-01 NOTE — PATIENT INSTRUCTIONS
Starting tomorrow, take 50 mg of spironolactone daily.  Please also take torsemide 40 mg twice daily for 4 days, and then reduce back to 40 mg once a day.  I will have you see one of my heart failure nurse practitioners next week.

## 2024-05-01 NOTE — PROGRESS NOTES
As per order, administered Lasix 80 mg via right forearm site. Patient tolerated well. IV D/C' d without incident & dressing applied to site.    BP - 118/76.    Voided 300 ml of clear yellow urine.    Advised patient she will receive a F/U call tomorrow for estimate of urine output, edema, & other symptoms.    Patient verbalized understanding.

## 2024-05-01 NOTE — TELEPHONE ENCOUNTER
Reason for call:   [x] Refill   [] Prior Auth  [x] Other: pt wants sent to mail order.    Office:   [] PCP/Provider -   [x] Specialty/Provider - Lisy Roberts     Medication: Advair Diskus     Dose/Frequency: 100-50mcg / dose - Inhale 1 puff two times daily     Quantity: 180    Pharmacy: [facility]     Does the patient have enough for 3 days?   [x] Yes   [] No - Send as HP to POD

## 2024-05-01 NOTE — PROGRESS NOTES
[facility] Cardiology  Follow up note  Rebeca Banegas 89 y.o. female MRN: [id number]        Problems    1. Permanent atrial fibrillation (HCC)        2. Chronic renal disease, stage IV (HCC)        3. Bifascicular bundle branch block        4. Acute on chronic diastolic CHF (congestive heart failure) (HCC)  furosemide (LASIX) injection 80 mg      5. Mixed hyperlipidemia            Impression:    Acute on Chronic diastolic CHF   She has grade 2 diastolic dysfunction, no significant LVH, she had an equivocal SPECT TTR in 2019  She is decompensated.  At last visit 2/24, she appeared dry, lightheaded, at 116 pounds we cut her spironolactone and torsemide in half to 25 mg and 40 mg daily respectively  Unfortunately in the context of dealing with her 's acute health issues which led to his passing in April, she has gained 17 pounds, and is currently in acute on chronic decompensated diastolic CHF.  She has failed home escalation of torsemide    Permanent atrial fibrillation  On metoprolol succinate, heart rates well-controlled  Diltiazem intolerance in the past  Continues on warfarin with good INR control    Bifascicular bundle branch block   No symptoms to suggest higher degree AV block    Mixed hyperlipidemia  No statins at 89 years of age    Plan:    80 mg IV Lasix in the office today  Increase torsemide to 40 mg twice daily for 4 days then reduce back to 40 mg daily  Increase spironolactone to 50 mg daily  Nurse practitioner visit next week, I anticipate she might need 2 visits to ensure stability, and then usual follow-up with me.      HPI:   Rebeca Banegas is a 89 y.o. year old female  With chronic diastolic CHF, chronic atrial fibrillation on a rate control strategy and anticoagulation with warfarin, bifascicular bundle branch block, mixed hyperlipidemia, and a prior negative infiltrative cardiomyopathy workup returns for a follow-up visit.    CKD 3 is stable  Blood pressure is stable  Fatigue, low  blood pressure back in February prompted dose reductions in her spironolactone and torsemide.  She had been on torsemide 40 mg daily, this was cut down to 20 mg, and she had been on spironolactone 50 mg daily and this was cut down to 25 mg.  Her  became sick, acute illness led to hospitalization and ultimately referral to hospice and he has since passed, and in this context she is gained 17 pounds, previously 116 pounds now up to 133 pounds with significant bilateral leg edema that she tells me is not responding to escalated doses of torsemide at home.    Review of Systems   Constitutional:  Positive for fatigue. Negative for appetite change, diaphoresis and fever.   Respiratory:  Negative for chest tightness, shortness of breath and wheezing.    Cardiovascular:  Positive for leg swelling. Negative for chest pain and palpitations.   Gastrointestinal:  Negative for abdominal pain and blood in stool.   Musculoskeletal:  Negative for arthralgias and joint swelling.   Skin:  Negative for rash.   Neurological:  Positive for weakness. Negative for dizziness, syncope and light-headedness.       Past Medical History:   Diagnosis Date   • Abnormal ultrasound     RESOLVED: [id number]   • Asthma with acute exacerbation     LAST ASSESSED: [id number]   • Asymptomatic menopausal state    • Atherosclerosis    • Atrial fibrillation (HCC)    • Chronic obstructive lung disease (HCC)    • Congestive heart failure (CHF) (HCC)    • Congestive heart failure (HCC)     LAST ASSESSED: [id number]   • Cuboid fracture     LAST ASSESSED: 83UGU0432   • Dyspepsia    • GERD (gastroesophageal reflux disease)    • High risk medication use     LAST ASSESSED: [id number]   • Hyperlipidemia    • Hypertension    • Hypertension    • Hypokalemia    • Hypothyroidism    • Impacted cerumen of both ears     LAST ASSESSED: 67ZQB1907   • Impacted cerumen of right ear     LAST ASSESSED: 28TUO0608   • Influenza     LAST ASSESSED: 61JCH1715   • IPMN (intraductal  papillary mucinous neoplasm)     RESOLVED: 2015   • Limb swelling     LAST ASSESSED: 04RNH2852   • Navicular fracture of ankle     LAST ASSESSED: 57TGP0565   • Onychomycosis     LAST ASSESSED: 57PNC6079   • Osteoarthritis    • Osteopenia    • Osteoporosis    • Paronychia, finger, unspecified laterality     LAST ASSESSED: 89YYA2105   • Paroxysmal atrial fibrillation (HCC)     LAST ASSESSED: 2014   • Peripheral vascular disease (HCC)    • Plantar fasciitis    • Talus fracture     LAST ASSESSED: 84ZGJ6934   • Vascular headache      Social History     Substance and Sexual Activity   Alcohol Use Not Currently    Comment: SOCIAL     Social History     Substance and Sexual Activity   Drug Use No     Social History     Tobacco Use   Smoking Status Former   • Current packs/day: 0.00   • Average packs/day: 0.3 packs/day for 2.0 years (0.5 ttl pk-yrs)   • Types: Cigarettes   • Start date:    • Quit date:    • Years since quittin.3   Smokeless Tobacco Never   Tobacco Comments    On and off socially with friends        Allergies:  Allergies   Allergen Reactions   • Asa [Aspirin]    • Nsaids        Medications:     Current Outpatient Medications:   •  albuterol (2.5 mg/3 mL) 0.083 % nebulizer solution, Take 3 mL (2.5 mg total) by nebulization every 8 (eight) hours, Disp: 270 mL, Rfl: 3  •  albuterol (Proventil HFA) 90 mcg/act inhaler, Inhale 2 puffs every 6 (six) hours as needed for wheezing, Disp: 20.1 g, Rfl: 3  •  albuterol (PROVENTIL HFA,VENTOLIN HFA) 90 mcg/act inhaler, Inhale, Disp: , Rfl:   •  Cholecalciferol (VITAMIN D3) 1000 units CAPS, Take 2,000 Units by mouth daily, Disp: , Rfl:   •  fluticasone (FLONASE) 50 mcg/act nasal spray, USE 2 SPRAYS IN EACH NOSTRIL ONCE DAILY, Disp: 48 g, Rfl: 3  •  Fluticasone-Salmeterol (Advair Diskus) 100-50 mcg/dose inhaler, Inhale 1 puff 2 (two) times a day Rinse mouth after use., Disp: 60 blister, Rfl: 3  •  levothyroxine 50 mcg tablet, TAKE 1 TABLET DAILY BUT  1-1/2 TABLETS 4 DAYS/WEEK, Disp: 40 tablet, Rfl: 5  •  metoprolol succinate (TOPROL-XL) 100 mg 24 hr tablet, Take 0.5 tablets (50 mg total) by mouth daily, Disp: 45 tablet, Rfl: 3  •  spironolactone (ALDACTONE) 25 mg tablet, Take 1 tablet (25 mg total) by mouth daily, Disp: 90 tablet, Rfl: 3  •  torsemide (DEMADEX) 20 mg tablet, Take 1 tablet (20 mg total) by mouth daily, Disp: 90 tablet, Rfl: 3  •  warfarin (COUMADIN) 5 mg tablet, TAKE 1/2 TO 1 TABLET BY MOUTH DAILY OR AS ORDERED BY PHYSICIAN., Disp: 90 tablet, Rfl: 3  •  cetirizine (ZyrTEC) 10 mg tablet, Take 1 tablet (10 mg total) by mouth daily (Patient not taking: Reported on 5/1/2024), Disp: 30 tablet, Rfl: 6  •  levothyroxine 50 mcg tablet, TAKE 1 TABLET DAILY, EXCEPT TAKE 1 AND 1/2 TABLETS ON SUNDAY AND WEDNESDAY (Patient not taking: Reported on 4/17/2024), Disp: 98 tablet, Rfl: 10  •  sodium chloride 3 % inhalation solution, Take 4 mL by nebulization 3 (three) times a day (Patient not taking: Reported on 3/6/2024), Disp: 360 mL, Rfl: 2    Current Facility-Administered Medications:   •  furosemide (LASIX) injection 80 mg, 80 mg, Intravenous, Once,       Vitals:    05/01/24 1316   BP: 120/70   Pulse: 101   SpO2: 100%     Weight (last 2 days)     Date/Time Weight    05/01/24 1316 60.6 (133.7)        Physical Exam  Constitutional:       General: She is not in acute distress.     Appearance: She is not diaphoretic.   HENT:      Head: Normocephalic and atraumatic.   Eyes:      General: No scleral icterus.     Conjunctiva/sclera: Conjunctivae normal.   Neck:      Vascular: No JVD.   Cardiovascular:      Rate and Rhythm: Normal rate. Rhythm irregular.      Heart sounds: Normal heart sounds. No murmur heard.  Pulmonary:      Effort: Pulmonary effort is normal. No respiratory distress.      Breath sounds: Normal breath sounds. No wheezing or rales.   Musculoskeletal:         General: No tenderness.      Cervical back: Normal range of motion.      Right lower leg:  "Edema present.      Left lower leg: Edema present.   Skin:     General: Skin is warm and dry.         Laboratory Studies:    Labs personally reviewed     Cardiac testing:     EKG reviewed personally:   No results found for this visit on 05/01/24.        Echocardiogram:    10/19- EF 60%, diastolic dysfunction, un graded due to AFib, normal RV size and function, moderate  Left and right atrial dilatation, mild MR, mild TR, mildly elevated pulmonary pressures  10/21-personally reviewed -EF 60%, diastolic dysfunction, left and right atrial dilation, mild-to-moderate TR, moderately elevated pulmonary pressures, dilated IVC       TTR SPECT   11/19-1.26 heart to lung ratio, equivocal to negative    Stress tests:      Catheterization:      Holter:      10/21-excellent AFib rate control, no significant pauses    Jonas Vyas MD    Portions of the record may have been created with voice recognition software.  Occasional wrong word or \"sound a like\" substitutions may have occurred due to the inherent limitations of voice recognition software.  Read the chart carefully and recognize, using context, where substitutions have occurred.  "

## 2024-05-02 ENCOUNTER — TELEPHONE (OUTPATIENT)
Dept: CARDIOLOGY CLINIC | Facility: CLINIC | Age: 89
End: 2024-05-02

## 2024-05-02 RX ORDER — FLUTICASONE PROPIONATE AND SALMETEROL 100; 50 UG/1; UG/1
1 POWDER RESPIRATORY (INHALATION) 2 TIMES DAILY
Qty: 180 BLISTER | Refills: 1 | Status: SHIPPED | OUTPATIENT
Start: 2024-05-02 | End: 2024-10-29

## 2024-05-03 ENCOUNTER — TELEPHONE (OUTPATIENT)
Dept: CARDIOLOGY CLINIC | Facility: CLINIC | Age: 89
End: 2024-05-03

## 2024-05-03 NOTE — TELEPHONE ENCOUNTER
Patient returned call & stated she really did not  see any difference in the amount of edema to her feet & lower legs.    Today's Wt - 133 lb.    Stated she has been taking the Torsemide 40 mg BID since Thursday, 5/2 & will continue through the weekend.    Denied any chest pain or discomfort.     Denied dizziness or fatigue.    Will F/U call on Monday for Wt & symptoms.

## 2024-05-03 NOTE — TELEPHONE ENCOUNTER
Patient Rebeca [phone] contacting office returning Niya Ibrahim's telephone call from yesterday.    Patient can be reached at [phone] after 3 PM today.    Attempted to warm transfer, Niya was assisting another patient.

## 2024-05-06 ENCOUNTER — TELEPHONE (OUTPATIENT)
Dept: CARDIOLOGY CLINIC | Facility: CLINIC | Age: 89
End: 2024-05-06

## 2024-05-06 ENCOUNTER — ANTICOAG VISIT (OUTPATIENT)
Dept: CARDIOLOGY CLINIC | Facility: CLINIC | Age: 89
End: 2024-05-06

## 2024-05-06 DIAGNOSIS — I48.21 PERMANENT ATRIAL FIBRILLATION (HCC): Primary | Chronic | ICD-10-CM

## 2024-05-06 NOTE — TELEPHONE ENCOUNTER
F/U call to patient who stated edema to lower extremities remains the same, swelling from her toes to below her knees. Stated at times edema decreases a little but always returns. It is uncomfortable & she can't get her shoes on.    Today's Wt - 128 lb. Stated she had on less clothing than when she weighed 5/3 when her Wt - 133 lb.    Stated she feels tired, fatigued. Denied SOB. Denied chest pain or discomfort.    Is going to take another extra dose of Torsemide 40 mg today. Is concerned regarding lower extremity edema.    Please advise

## 2024-05-07 NOTE — TELEPHONE ENCOUNTER
I attempted calling, it went straight to voicemail.  Please have her take torsemide 60 mg twice a day, that is 3 tablets in the morning, 3 tablets in the afternoon for the next 4 days and if she reaches 120 pounds, she can cut back to 40 mg once a day, if she does not reach 120 pounds by Friday, she is to call again

## 2024-05-07 NOTE — TELEPHONE ENCOUNTER
Returned call to patient & read her Dr Vyas orders & instructions.    Patient verbalized understanding & wrote down Dr Vyas instructions & orders to refer to this week.    Will call Friday with update on edema & weight.

## 2024-05-10 ENCOUNTER — OFFICE VISIT (OUTPATIENT)
Dept: CARDIOLOGY CLINIC | Facility: CLINIC | Age: 89
End: 2024-05-10
Payer: COMMERCIAL

## 2024-05-10 VITALS
HEART RATE: 109 BPM | DIASTOLIC BLOOD PRESSURE: 64 MMHG | BODY MASS INDEX: 21.31 KG/M2 | SYSTOLIC BLOOD PRESSURE: 110 MMHG | HEIGHT: 64 IN | WEIGHT: 124.8 LBS | OXYGEN SATURATION: 92 %

## 2024-05-10 DIAGNOSIS — I48.20 CHRONIC ATRIAL FIBRILLATION (HCC): Chronic | ICD-10-CM

## 2024-05-10 DIAGNOSIS — I48.21 PERMANENT ATRIAL FIBRILLATION (HCC): Primary | Chronic | ICD-10-CM

## 2024-05-10 DIAGNOSIS — I50.32 CHRONIC DIASTOLIC CONGESTIVE HEART FAILURE (HCC): ICD-10-CM

## 2024-05-10 DIAGNOSIS — I50.33 ACUTE ON CHRONIC DIASTOLIC CHF (CONGESTIVE HEART FAILURE) (HCC): Chronic | ICD-10-CM

## 2024-05-10 PROCEDURE — 99214 OFFICE O/P EST MOD 30 MIN: CPT | Performed by: INTERNAL MEDICINE

## 2024-05-10 PROCEDURE — 93000 ELECTROCARDIOGRAM COMPLETE: CPT | Performed by: INTERNAL MEDICINE

## 2024-05-10 RX ORDER — CEPHALEXIN 500 MG/1
500 CAPSULE ORAL 2 TIMES DAILY
COMMUNITY
Start: 2024-05-06

## 2024-05-10 RX ORDER — METOPROLOL SUCCINATE 100 MG/1
50 TABLET, EXTENDED RELEASE ORAL 2 TIMES DAILY
Start: 2024-05-10

## 2024-05-10 RX ORDER — TORSEMIDE 20 MG/1
TABLET ORAL
Start: 2024-05-10

## 2024-05-10 NOTE — PROGRESS NOTES
[facility] Cardiology  Follow up note  Rebeca Banegas 89 y.o. female MRN: [id number]        Problems    1. Permanent atrial fibrillation (HCC)  POCT ECG      2. Acute on chronic diastolic CHF (congestive heart failure) ([facility])  Basic metabolic panel      3. Chronic atrial fibrillation (HCC)  metoprolol succinate (TOPROL-XL) 100 mg 24 hr tablet    takes BB/CCB with adequate heart rate control and no side effects, rx refilled per pt request      4. Chronic diastolic congestive heart failure (HCC)  torsemide (DEMADEX) 20 mg tablet          Impression:    Acute on Chronic diastolic CHF   She has grade 2 diastolic dysfunction, no significant LVH, she had an equivocal SPECT TTR in 2019  Continues to be decompensated but improved  Prior weight 133 pounds, dry weight 116 218 pounds  Today 124 pounds with improved but still present edema to the mid shins, 2+  Currently taking torsemide 60 mg twice a day since Monday  Also on spironolactone 50 mg daily  Prior stable dose of torsemide was 40 mg once daily    Permanent atrial fibrillation  Poorly controlled today, taking 100 mg of metoprolol succinate tablet, half a tablet daily  Continues on appropriate anticoagulation with warfarin    Bifascicular bundle branch block   No symptoms to suggest a higher degree of AV block    Mixed hyperlipidemia  No statins at 89 years of age    Plan:    On torsemide 60 mg twice daily for the last 4 days she is down to 124 pounds, however her prior dry weight was 116 pounds, I would like her to continue torsemide 60 mg twice daily until she reaches 116 pounds.  I will have her back in approximately 2 weeks, have her check a metabolic panel prior to coming back to my office.  If she develops any lightheadedness or dizziness between now and that appointment I requested she call immediately.  Her A-fib heart rates are uncontrolled, she had a 102 heartbeat by pulse oximeter 2 days ago, today 109 bpm, I will have her temporarily increase her  metoprolol succinate to 100 mg, half a tablet twice a day, she is currently only taking a half a tablet once a day.      HPI:   Rebeca Banegas is a 89 y.o. year old female  With chronic diastolic CHF, chronic atrial fibrillation on a rate control strategy and anticoagulation with warfarin, bifascicular bundle branch block, mixed hyperlipidemia, and a prior negative infiltrative cardiomyopathy workup returns for a follow-up visit.    CKD 3 is stable in the context of decompensated CHF  Blood pressure is stable  A-fib with RVR noted today by vital signs and EKG check  She denies any palpitations  She has a slight cough, but denies fevers chills or productive sputum.  She denies any obvious sick contacts  She is on Keflex for a lower extremity infection on the left  Her edema is improved, but still present to the mid shin and bothersome.  She is taking torsemide 60 mg twice daily since Monday, this has been significant escalated dosing with her decompensation which had reached 133 pounds from a dry weight of approximately 160 218 pounds    Review of Systems   Constitutional:  Positive for fatigue. Negative for appetite change, diaphoresis and fever.   Respiratory:  Positive for cough and shortness of breath. Negative for chest tightness and wheezing.    Cardiovascular:  Positive for leg swelling. Negative for chest pain and palpitations.   Gastrointestinal:  Negative for abdominal pain and blood in stool.   Musculoskeletal:  Negative for arthralgias and joint swelling.   Skin:  Negative for rash.   Neurological:  Positive for weakness. Negative for dizziness, syncope and light-headedness.       Past Medical History:   Diagnosis Date   • Abnormal ultrasound     RESOLVED: [id number]   • Asthma with acute exacerbation     LAST ASSESSED: [id number]   • Asymptomatic menopausal state    • Atherosclerosis    • Atrial fibrillation (HCC)    • Chronic obstructive lung disease (HCC)    • Congestive heart failure (CHF) (HCC)    •  Congestive heart failure (HCC)     LAST ASSESSED: [id number]   • Cuboid fracture     LAST ASSESSED: [id number]   • Dyspepsia    • GERD (gastroesophageal reflux disease)    • High risk medication use     LAST ASSESSED: 2014   • Hyperlipidemia    • Hypertension    • Hypertension    • Hypokalemia    • Hypothyroidism    • Impacted cerumen of both ears     LAST ASSESSED: [id number]   • Impacted cerumen of right ear     LAST ASSESSED: [id number]   • Influenza     LAST ASSESSED: [id number]   • IPMN (intraductal papillary mucinous neoplasm)     RESOLVED: 2015   • Limb swelling     LAST ASSESSED: 2014   • Navicular fracture of ankle     LAST ASSESSED: 2014   • Onychomycosis     LAST ASSESSED: [id number]   • Osteoarthritis    • Osteopenia    • Osteoporosis    • Paronychia, finger, unspecified laterality     LAST ASSESSED: [id number]   • Paroxysmal atrial fibrillation (HCC)     LAST ASSESSED: 2014   • Peripheral vascular disease (HCC)    • Plantar fasciitis    • Talus fracture     LAST ASSESSED: [id number]   • Vascular headache      Social History     Substance and Sexual Activity   Alcohol Use Not Currently    Comment: SOCIAL     Social History     Substance and Sexual Activity   Drug Use No     Social History     Tobacco Use   Smoking Status Former   • Current packs/day: 0.00   • Average packs/day: 0.3 packs/day for 2.0 years (0.5 ttl pk-yrs)   • Types: Cigarettes   • Start date:    • Quit date:    • Years since quittin.4   Smokeless Tobacco Never   Tobacco Comments    On and off socially with friends        Allergies:  Allergies   Allergen Reactions   • Asa [Aspirin]    • Nsaids        Medications:     Current Outpatient Medications:   •  albuterol (2.5 mg/3 mL) 0.083 % nebulizer solution, Take 3 mL (2.5 mg total) by nebulization every 8 (eight) hours, Disp: 270 mL, Rfl: 3  •  albuterol (Proventil HFA) 90 mcg/act inhaler, Inhale 2 puffs every 6 (six) hours as needed for wheezing, Disp: 20.1 g,  Rfl: 3  •  albuterol (PROVENTIL HFA,VENTOLIN HFA) 90 mcg/act inhaler, Inhale, Disp: , Rfl:   •  cephalexin (KEFLEX) 500 mg capsule, Take 500 mg by mouth 2 (two) times a day, Disp: , Rfl:   •  Cholecalciferol (VITAMIN D3) 1000 units CAPS, Take 2,000 Units by mouth daily, Disp: , Rfl:   •  fluticasone (FLONASE) 50 mcg/act nasal spray, USE 2 SPRAYS IN EACH NOSTRIL ONCE DAILY, Disp: 48 g, Rfl: 3  •  Fluticasone-Salmeterol (Advair Diskus) 100-50 mcg/dose inhaler, Inhale 1 puff 2 (two) times a day Rinse mouth after use., Disp: 180 blister, Rfl: 1  •  levothyroxine 50 mcg tablet, TAKE 1 TABLET DAILY BUT 1-1/2 TABLETS 4 DAYS/WEEK, Disp: 40 tablet, Rfl: 5  •  metoprolol succinate (TOPROL-XL) 100 mg 24 hr tablet, Take 0.5 tablets (50 mg total) by mouth 2 (two) times a day, Disp: , Rfl:   •  sodium chloride 3 % inhalation solution, Take 4 mL by nebulization 3 (three) times a day, Disp: 360 mL, Rfl: 2  •  spironolactone (ALDACTONE) 25 mg tablet, Take 1 tablet (25 mg total) by mouth daily, Disp: 90 tablet, Rfl: 3  •  torsemide (DEMADEX) 20 mg tablet, Take 3 tablets first thing in the morning, then again at approximately 2 or 3 PM, for the next 4 to 7 days until weight reaches 116 pounds, Disp: , Rfl:   •  warfarin (COUMADIN) 5 mg tablet, TAKE 1/2 TO 1 TABLET BY MOUTH DAILY OR AS ORDERED BY PHYSICIAN., Disp: 90 tablet, Rfl: 3  •  cetirizine (ZyrTEC) 10 mg tablet, Take 1 tablet (10 mg total) by mouth daily (Patient not taking: Reported on 5/1/2024), Disp: 30 tablet, Rfl: 6  •  levothyroxine 50 mcg tablet, TAKE 1 TABLET DAILY, EXCEPT TAKE 1 AND 1/2 TABLETS ON SUNDAY AND WEDNESDAY (Patient not taking: Reported on 4/17/2024), Disp: 98 tablet, Rfl: 10      Vitals:    05/10/24 0817   BP: 110/64   Pulse: (!) 109   SpO2: 92%     Weight (last 2 days)     Date/Time Weight    05/10/24 0817 56.6 (124.8)        Physical Exam  Constitutional:       General: She is not in acute distress.     Appearance: She is not diaphoretic.   HENT:       "Head: Normocephalic and atraumatic.   Eyes:      General: No scleral icterus.     Conjunctiva/sclera: Conjunctivae normal.   Neck:      Vascular: No JVD.   Cardiovascular:      Rate and Rhythm: Normal rate. Rhythm irregular.      Heart sounds: Normal heart sounds. No murmur heard.  Pulmonary:      Effort: Pulmonary effort is normal. No respiratory distress.      Breath sounds: Normal breath sounds. No wheezing or rales.   Musculoskeletal:         General: No tenderness.      Cervical back: Normal range of motion.      Right lower leg: Edema present.      Left lower leg: Edema present.   Skin:     General: Skin is warm and dry.         Laboratory Studies:    Labs personally reviewed  Cardiac testing:     EKG reviewed personally:   Results for orders placed or performed in visit on 05/10/24   POCT ECG    Impression    A-fib/RVR, heart rate 109, RBBB, LAFB           Echocardiogram:    10/19- EF 60%, diastolic dysfunction, un graded due to AFib, normal RV size and function, moderate  Left and right atrial dilatation, mild MR, mild TR, mildly elevated pulmonary pressures  10/21-personally reviewed -EF 60%, diastolic dysfunction, left and right atrial dilation, mild-to-moderate TR, moderately elevated pulmonary pressures, dilated IVC       TTR SPECT   11/19-1.26 heart to lung ratio, equivocal to negative    Stress tests:      Catheterization:      Holter:      10/21-excellent AFib rate control, no significant pauses    Jonas Vyas MD    Portions of the record may have been created with voice recognition software.  Occasional wrong word or \"sound a like\" substitutions may have occurred due to the inherent limitations of voice recognition software.  Read the chart carefully and recognize, using context, where substitutions have occurred.  "

## 2024-05-13 ENCOUNTER — TELEPHONE (OUTPATIENT)
Age: 89
End: 2024-05-13

## 2024-05-13 NOTE — TELEPHONE ENCOUNTER
Called patient left message about appt this weekend. Let her know details about appt closer tot he weekend

## 2024-05-13 NOTE — TELEPHONE ENCOUNTER
Patient called because she has a non healing ulcer on her foot and she's been seeing a podiatrist, was recommended to see a vascular doctor and would like dr Dillon to give her some recommendations. Please advise

## 2024-05-14 ENCOUNTER — TELEPHONE (OUTPATIENT)
Dept: INTERNAL MEDICINE CLINIC | Facility: CLINIC | Age: 89
End: 2024-05-14

## 2024-05-14 NOTE — TELEPHONE ENCOUNTER
Called and left detailed message for patient about time  IF any questions told her to call and ask for me

## 2024-05-14 NOTE — TELEPHONE ENCOUNTER
----- Message from Santana Dillon MD sent at 5/14/2024  4:28 AM EDT -----  Please call patient- I will be there to make home visit to see her foot ulcer on [place] May 19 between 10 am and noon- thank you

## 2024-05-20 DIAGNOSIS — I50.32 CHRONIC DIASTOLIC CONGESTIVE HEART FAILURE (HCC): ICD-10-CM

## 2024-05-20 NOTE — TELEPHONE ENCOUNTER
Pt never increased to 50 mg as instructed during 5/1/24 appt. The PCP discussed medications with her today and asked her to reach out to cardiology for clarification.   A new request for the 50 mg dose has been sent to Dr Vyas to fill.

## 2024-05-21 ENCOUNTER — IN HOME VISIT (OUTPATIENT)
Dept: INTERNAL MEDICINE CLINIC | Facility: CLINIC | Age: 89
End: 2024-05-21
Payer: COMMERCIAL

## 2024-05-21 VITALS — HEART RATE: 92 BPM | SYSTOLIC BLOOD PRESSURE: 110 MMHG | RESPIRATION RATE: 14 BRPM | DIASTOLIC BLOOD PRESSURE: 70 MMHG

## 2024-05-21 DIAGNOSIS — E03.9 HYPOTHYROIDISM, UNSPECIFIED TYPE: Chronic | ICD-10-CM

## 2024-05-21 DIAGNOSIS — L97.522 CHRONIC FOOT ULCER, LEFT, WITH FAT LAYER EXPOSED (HCC): Primary | ICD-10-CM

## 2024-05-21 DIAGNOSIS — I50.32 CHRONIC DIASTOLIC CONGESTIVE HEART FAILURE (HCC): Chronic | ICD-10-CM

## 2024-05-21 DIAGNOSIS — I50.33 ACUTE ON CHRONIC DIASTOLIC CHF (CONGESTIVE HEART FAILURE) (HCC): Chronic | ICD-10-CM

## 2024-05-21 DIAGNOSIS — I48.21 PERMANENT ATRIAL FIBRILLATION (HCC): Chronic | ICD-10-CM

## 2024-05-21 PROCEDURE — 99350 HOME/RES VST EST HIGH MDM 60: CPT | Performed by: INTERNAL MEDICINE

## 2024-05-21 NOTE — PROGRESS NOTES
Assessment/Plan:  #1 left foot plantar ulcer-present x 2 months.  Remote history of podiatric surgery in the past including MRSA infection.  Longstanding abnormality of the left fourth toe present since childhood.  Had recent treatment of callus and then developed ulcer which has been present for 2 months with intermittent drainage.  Clinical exam suggest underlying peripheral arterial disease which may be contributing to nonhealing.  There is also concern for the potential for osteomyelitis based on duration of ulcer and nonhealing.  Arterial Doppler of the legs ordered-MRI of left foot older-further therapy based on results.  She may require consultation with vascular surgery and or wound care.  As noted had recent 2-week course of cephalexin-we reviewed that with her history of MRSA if there is concerned about ongoing infection she would need antibiotic therapy that would need to be expanded to cover MRSA  2.  Bilateral leg edema-has known history of chronic diastolic heart failure.  Also has chronic atrial fibrillation also has significant pulmonary disease.  Most recent echo in April 2023 shows EF of 60% with wall thickness mildly increased, moderate right atrial enlargement, estimated right ventricular systolic pressure 51.  There was concern by cardiology about overdiuresis and torsemide was decreased from 40 down to 20 mg daily and spironolactone decreased from 50 down to 25 mg daily.  Over the ensuing 2 weeks patient again noted increasing edema.  On her own she increased her torsemide back to 40 mg daily and is also now on spironolactone 50 mg daily.  There may be a component of edema related to worsening pulmonary status-pulmonary hypertension but suspect there is also component related to her known diastolic dysfunction--has been followed closely by cardiology recently and currently on torsemide 60 mg twice daily in hopes of reaching dry weight of 116 pounds.  They feel that CHF is the major  culprit.  Also on spironolactone-there is some confusion as to whether or not cardiology prefers 25 or 50 mg daily and she will be speaking with cardiology group  3.  Atrial fibrillation-adequate rate control on metoprolol.  100 mg daily remains on warfarin.  #4 pulmonary disease-felt to be combination of asthma-bronchiectasis.  Most recent CT of the chest shows mild chronic bronchiectasis of right middle lobe and both lower lobes unchanged from 2014 with moderate mucoid debris.  She has severe cardiomegaly with extensive right atrial enlargement and pulmonary artery enlargement consistent with her known history of pulmonary hypertension.  PFTs show obstructive disease with an FEV1 of 0.92-no response to bronchodilator.  Had recent treatment with Levaquin x 2 for bronchiectasis with associated Pseudomonas on culture-she feels as though her cough is improved and overall shortness of breath is stable  #5 bronchiectasis-CAT scan is noted.  Recent sputum culture showed Pseudomonas was treated with Levaquin.  Repeat sputum culture again showed Pseudomonas and she was retreated with Levaquin for 2 weeks.  Pulmonary has requested to proceed with pulmonary rehab.  They want her to use hypertonic nebs and albuterol nebs 3 times daily as well as flutter valve 3 times daily.  Follow-up sputum culture now is not growing Pseudomonas  #6asthma-COPD overlap.  PFTs as above.  On Advair.  Had tried cetirizine but she felt this was oversedated.  Also on Flonase for associated allergic rhinitis and continues with Advair 50 - 100 - 1 puff twice daily-recently seen by pulmonary in April 2024 and felt to be stable-they feel she should continue current pulmonary regimen and hold off on vest therapy or inhaled tobramycin  7.  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  Recent additional laboratory testing shows normal immunoglobulin levels,s serum protein electrophoresis shows biclonal spike with  immunofixation identifying both of these is IgG kappa-spike #1 is 0.26 g/dL and spike #2 is 0.08 g/dL- she does not have any crab features other than some CKD as noted in prior notes.  No evidence of hypercalcemia bone disease or significant anemia.  Likely represents MGUS and we reviewed that today.  We will be checking follow-up labs in 6 to 12 months which would be December 2022 to June 2023SCHEDULE NEXT VISIT  8.  Bilateral sensorineural hearing loss- being monitored by the ENT group.  They plan on seeing her again in a year which would be in June 2024  9 osteoporosis-DEXA scan in 2023 shows L-spine -2.7 and hip of -2.2.  Remains in vitamin D.  Knows to have calcium intake of 1200 mg/day or more.    Subsequent DEXA scan will be due in March 2025 patient had her second injection of Prolia on 1120/23-given third injection of Prolia today  10.  Recurrent labial cyst-prior labial abscess.  Was treated by GYN with I&D and transient course of Bactrim without culture was never obtained.  There was concern about potential MRSA as she had this in the past.  Recently had doxycycline at the time of flareup as well as her pulmonary symptoms.  Not a major issue at present  11.  Chronic renal failure-stage IIIb-felt related to age-related nephron loss plus possible component of cardiorenal syndrome.  Knows to avoid nonsteroidals.  Most recent BUN 43 with creatinine of 1.41-had labs done over the last 2 days with results pending on higher dose of diuretics  #12 previous noted weight loss-felt related to the above-  13.  Hypothyroidism-dose of Synthroid increased [id number] TSH done in April of 4.73 which is very acceptable in her age group     All other problems as per note of August 30, 2022     Medical regimen torsemide 60 mg twice daily using 20 mg tablets until reaches dry weight of 116 then further dosing based on opinion of cardiology spironolactone 50 mg daily, Flonase 2 squirts in each nostril once daily, prior  use of loratadine 10 mg daily, Advair 50 - 100 - 1 puff twice daily, Coumadin with dose adjustments, Prolia with third injection given today, prior use of Singulair 10 mg daily, levothyroxine 50 mcg daily but 75 mcg 4 days/week, calcium and vitamin D daily, Ventolin inhaler as needed, metoprolol succinate 50 mg twice daily using one half of 100 mg tablet, multivitamin, vitamin D3/2000's a day, fish oil 1200 mg daily, prior use of Vicodin 5-300 half tablet twice daily.  For severe back pain which she has not used in months    Scheduled for arterial Doppler of the legs.  Scheduled for MRI of left foot.  Further therapy based on results of the above     No problem-specific Assessment & Plan notes found for this encounter.             Subjective:      Patient ID: Rebeca Banegas is a 89 y.o. female.    Seen as an emergency home visit.  This patient has a remote history of abnormality of the fourth toe present since early childhood.  She said over the years associated with that she would have some callus formation of the bottom of the foot.  She had podiatric surgery several years ago because of associated abnormalities and at the time of the surgery states that she had some MRSA infection.  Most recently when at the podiatrist she had treatment of the callus and then developed a small ulceration of the left foot.  This is on the plantar surface.  It has persisted despite ongoing local care.  She has been applying topical Betadine.  She had a recent 2-week course of Keflex by the podiatrist.  She has had minimal drainage.  She is concerned as she has associated ongoing pain and nonhealing of the ulcer.  She denies calf claudication.  This patient denies any systemic symptoms. Specifically there has been no evidence of fever, night sweats, significant weight loss or significant decrease in appetite.  She denies any known trauma to the foot.  She denies any severe numbness or tingling.  She asked me if she needs to see a  vascular surgeon.  We do long discussion today regarding the above.  The area is clearly nonhealing-on exam she has decreased pulses and clearly the potential for peripheral arterial disease.  In addition with her prior history we have to worry about potential osteomyelitis and MRI of the foot is warranted as well.     She has known chronic diastolic heart failure with recent exacerbation.  She has been followed closely by cardiology.  She was last seen  On May 10 and they commented that dry weight is 116.  She is currently on torsemide 60 mg twice daily until she gets to that dry weight.  She has a follow-up appointment scheduled this week.  BMP was obtained yesterday with results pending.  She has a known history of chronic atrial fibrillation.  Recently because of elevated heart rate her beta-blocker dose was increased by cardiology and she is now on metoprolol 50 mg twice daily.  She has known bifascicular heart block    She also has ongoing pulmonary issues.  This has been monitored closely.  She is felt to have bronchiectasis with resolved acute exacerbation as well as ongoing pseudomonal colonization.  Pulmonary saw her on April 17 and commented that she had stabilized to some degree and she should continue her regular bronchial hygiene regiment of flutter valve, 3% saline, albuterol.  Since she is improved they elected to hold on vest therapy as well as nebulized tobramycin    Results for orders placed or performed in visit on 05/03/24  -Protime-INR:        Result                      Value             Ref Range           Prothrombin Time            29.4 (H)          12.0 - 14.6 *       INR                         2.8                               She remains on chronic oral anticoagulation with intermittent adjustment of her pro time by the cardiology group.    She has known osteoporosis and remains on therapy with Prolia.  Most recent DEXA scan was reviewed in detail.     CLINICAL HISTORY: 89 years  postmenopausal female .   OTHER RISK FACTORS:  Torsemide therapy.     PHARMACOLOGIC THERAPY FOR OSTEOPOROSIS:  None currently.     TECHNIQUE: Bone densitometry was performed using a H[employer] Horizon A  bone densitometer.  Regions of interest appear properly placed.         COMPARISON: 5/26/2020.     RESULTS:      LUMBAR SPINE  Level: L1-L4 :   BMD:  0.754  gm/cm2   T-score: -2.7         LEFT  TOTAL HIP:   BMD:  0.627  gm/cm2    T-score:  -2.6     LEFT  FEMORAL NECK:   BMD:  0.601  gm/cm2   T score: -2.2      RIGHT  FOREARM:    33% RADIUS BMD:  0.521  gm/cm2  T-score:  -2.8         IMPRESSION:     1.  Osteoporosis.     2.  Since a DXA study from 5/26/2020, there has been:  A  STATISTICALLY SIGNIFICANT DECREASE in bone mineral density of  0.066 g/cm2 (8.1%) in the lumbar spine.  A  STATISTICALLY SIGNIFICANT DECREASE in bone mineral density of  0.051 g/cm2 (7.5%) in the left total hip.        3.  The 10 year risk of hip fracture is 4.0% with the 10 year risk of major osteoporotic fracture being 11% as calculated by the [employer] fracture risk assessment tool (FRAX, which is based on data generated by the WHO Collaborating [place]   for Metabolic Bone Diseases).  The patient's age exceeds the upper limit in the FRAX database.  The values have been adjusted to an age of 89 years.      4.  The current NOF guidelines recommend treating patients with a T-score of -2.5 or less in the lumbar spine or hips, or in post-menopausal women and men over the age of 50 with low bone mass (osteopenia) and a FRAX 10 year risk score of >3% for hip   fracture and/or >20% for major osteoporotic fracture.     5.  The NOF recommends follow-up DXA in 1-2 years after initiating therapy for osteoporosis and every 2 years thereafter. More frequent evaluation is appropriate for patients with conditions associated with rapid bone loss, such as glucocorticoid   therapy. The interval between DXA screenings may be longer for individuals  without major risk factors and initial T-score in the normal or upper low bone mass range.        The FRAX algorithm has certain limitations:  -FRAX has not been validated in patients currently or previously treated with pharmacotherapy for osteoporosis.  In such patients, clinical judgment must be exercised in interpreting FRAX scores.    -Prior hip, vertebral and humeral fragility fractures appear to confer greater risk of subsequent fracture than fractures at other sites (this is especially true for individuals with severe vertebral fractures), but quantification of this incremental   risk is not possible with FRAX.  -FRAX underestimates fracture risk in patients with history of multiple fragility fractures.  -FRAX may underestimate fracture risk in patients with history of frequent falls.  -It is not appropriate to use FRAX to monitor treatment response.        WHO CLASSIFICATION:  Normal (a T-score of -1.0 or higher)  Low bone mineral density (a T-score of less than -1.0 but higher than -2.5)  Osteoporosis (a T-score of -2.5 or less)  Severe osteoporosis (a T-score of -2.5 or less with a fragility fracture)        LEAST SIGNIFICANT CHANGE (AT 95% C.I):  Lumbar spine 0.036 g/cm2; 2.2%  Total hip: 0.022 g/cm2; 2.6%  Forearm: 0.017 g/cm2; 3.0%.           She was given an injection of Prolia today which she tolerated without difficulty.    Unfortunately she also suffered the recent death of her  who she has been  to for years.  This was discussed in detail.        The following portions of the patient's history were reviewed and updated as appropriate: allergies, current medications, past family history, past medical history, past social history, past surgical history, and problem list.    Review of Systems   Constitutional: Negative.    HENT: Negative.     Respiratory:  Positive for shortness of breath.    Cardiovascular:  Positive for leg swelling.   Gastrointestinal: Negative.    Endocrine:  Negative.    Genitourinary: Negative.    Musculoskeletal: Negative.    Skin: Negative.         Ulcer of the left foot-nonhealing   Neurological: Negative.    Hematological: Negative.    Psychiatric/Behavioral: Negative.           Objective:      LMP  (LMP Unknown)          Physical Exam  Vitals reviewed.   Constitutional:       General: She is not in acute distress.     Appearance: Normal appearance. She is not ill-appearing, toxic-appearing or diaphoretic.   HENT:      Head: Normocephalic and atraumatic.      Right Ear: External ear normal.      Left Ear: External ear normal.      Nose: Nose normal. No congestion or rhinorrhea.      Mouth/Throat:      Mouth: Mucous membranes are moist.      Pharynx: Oropharynx is clear. No oropharyngeal exudate or posterior oropharyngeal erythema.   Eyes:      General: No scleral icterus.        Right eye: No discharge.         Left eye: No discharge.      Extraocular Movements: Extraocular movements intact.      Conjunctiva/sclera: Conjunctivae normal.   Neck:      Vascular: No carotid bruit.   Cardiovascular:      Rate and Rhythm: Normal rate. Rhythm irregular.      Pulses: Normal pulses.      Heart sounds: Normal heart sounds. No murmur heard.     No friction rub. No gallop.      Comments: Irregularly irregular rhythm.  Borderline cardiomegaly to percussion.  Adequate upstroke  Pulmonary:      Effort: Pulmonary effort is normal. No respiratory distress.      Breath sounds: No stridor. Rhonchi present. No wheezing or rales.      Comments: Trace scattered rhonchi  Chest:      Chest wall: No tenderness.   Abdominal:      General: Abdomen is flat. Bowel sounds are normal. There is no distension.      Palpations: Abdomen is soft. There is no mass.      Tenderness: There is no abdominal tenderness. There is no right CVA tenderness, left CVA tenderness, guarding or rebound.      Hernia: No hernia is present.   Musculoskeletal:         General: No swelling, tenderness, deformity or  signs of injury. Normal range of motion.      Cervical back: Normal range of motion and neck supple. No rigidity. No muscular tenderness.      Right lower leg: Edema present.      Left lower leg: Edema present.      Comments: Bilateral leg edema-1+   Lymphadenopathy:      Cervical: No cervical adenopathy.   Skin:     General: Skin is warm and dry.      Coloration: Skin is not jaundiced or pale.      Findings: Lesion present. No bruising, erythema or rash.      Comments: Small ulcer of the plantar surface of the left foot with surrounding callus with tenderness   Neurological:      General: No focal deficit present.      Mental Status: She is alert and oriented to person, place, and time. Mental status is at baseline.      Cranial Nerves: No cranial nerve deficit.      Sensory: No sensory deficit.      Motor: No weakness.      Coordination: Coordination normal.      Gait: Gait normal.      Deep Tendon Reflexes: Reflexes normal.   Psychiatric:         Mood and Affect: Mood normal.         Behavior: Behavior normal.         Thought Content: Thought content normal.         Judgment: Judgment normal.

## 2024-05-23 RX ORDER — SPIRONOLACTONE 50 MG/1
50 TABLET, FILM COATED ORAL DAILY
Qty: 90 TABLET | Refills: 3 | Status: SHIPPED | OUTPATIENT
Start: 2024-05-23

## 2024-05-24 ENCOUNTER — TELEPHONE (OUTPATIENT)
Age: 89
End: 2024-05-24

## 2024-05-24 ENCOUNTER — OFFICE VISIT (OUTPATIENT)
Dept: CARDIOLOGY CLINIC | Facility: CLINIC | Age: 89
End: 2024-05-24
Payer: COMMERCIAL

## 2024-05-24 VITALS
SYSTOLIC BLOOD PRESSURE: 110 MMHG | HEART RATE: 90 BPM | WEIGHT: 124.1 LBS | HEIGHT: 64 IN | OXYGEN SATURATION: 96 % | BODY MASS INDEX: 21.19 KG/M2 | DIASTOLIC BLOOD PRESSURE: 60 MMHG

## 2024-05-24 DIAGNOSIS — I50.32 CHRONIC DIASTOLIC CONGESTIVE HEART FAILURE (HCC): Chronic | ICD-10-CM

## 2024-05-24 DIAGNOSIS — I50.32 CHRONIC DIASTOLIC CONGESTIVE HEART FAILURE (HCC): Primary | Chronic | ICD-10-CM

## 2024-05-24 PROCEDURE — 99214 OFFICE O/P EST MOD 30 MIN: CPT | Performed by: INTERNAL MEDICINE

## 2024-05-24 PROCEDURE — G2211 COMPLEX E/M VISIT ADD ON: HCPCS | Performed by: INTERNAL MEDICINE

## 2024-05-24 RX ORDER — METOLAZONE 2.5 MG/1
TABLET ORAL
Qty: 2 TABLET | Refills: 0 | Status: SHIPPED | OUTPATIENT
Start: 2024-05-24 | End: 2024-05-24 | Stop reason: SDUPTHER

## 2024-05-24 RX ORDER — METOLAZONE 2.5 MG/1
TABLET ORAL
Qty: 2 TABLET | Refills: 0 | Status: SHIPPED | OUTPATIENT
Start: 2024-05-24 | End: 2024-05-30

## 2024-05-24 NOTE — PATIENT INSTRUCTIONS
Continue taking torsemide 3 tablets in the morning, 3 tablets in the afternoon.  Continue taking spironolactone 50 mg once a day  Please take metolazone, 2.5 mg, 30 minutes before your afternoon dose of torsemide today, and again on Tuesday.  Please get your blood work done next week after Tuesday, and I will have you see one of our heart failure nurse practitioners in 2 weeks to follow-up.

## 2024-05-24 NOTE — PROGRESS NOTES
[facility] Cardiology  Follow up note  Rebeca Banegas 89 y.o. female MRN: [id number]        Problems    1. Chronic diastolic congestive heart failure (HCC)  metolazone (ZAROXOLYN) 2.5 mg tablet    Basic metabolic panel            Impression:    Acute on Chronic diastolic CHF   She has grade 2 diastolic dysfunction, no significant LVH, she had an equivocal SPECT TTR in 2019  Continues to be decompensated but improved  She was 124 pounds in my office with some improvement in leg edema 2 weeks ago  Prior to that she had been as high as 133 pounds  Her dry weight is considered 116 to 118 pounds  She was inadvertently taking spironolactone 25 mg a day, this was just increased to 50 mg a couple days ago  She is Taking torsemide 60 mg a.m., 60 mg p.m., legs are better but still swollen    Permanent atrial fibrillation  Well-controlled  She continues on warfarin, metoprolol succinate, 100 mg, half a tablet twice a day    Bifascicular bundle branch block   No evidence of higher degree AV block    Mixed hyperlipidemia  No statins at 89 years of age    Plan:    Add metolazone 2.5 mg 30 minutes before today's afternoon torsemide dose, and repeat again on Tuesday afternoon before Tuesday afternoons torsemide dose  Check BMP next Wednesday or Thursday  Nurse practitioner follow-up in 2 weeks  Follow-up with me in 6 to 8 weeks      HPI:   Rebeca Banegas is a 89 y.o. year old female  With chronic diastolic CHF, chronic atrial fibrillation on a rate control strategy and anticoagulation with warfarin, bifascicular bundle branch block, mixed hyperlipidemia, and a prior negative infiltrative cardiomyopathy workup returns for a follow-up visit.    Her creatinine is stable  Her weights have not changed, 124 pounds  This is despite increasing torsemide to 60 mg twice a day  She inadvertently was only taking 25 mg of spironolactone, this was rectified in the last couple days, now on 50 mg a day  Legs are still edematous bilaterally,  A-fib rates are well-controlled, she has no other symptoms of decompensation, and is still largely asymptomatic and ambulating well despite the leg edema.    Review of Systems   Constitutional:  Positive for fatigue. Negative for appetite change, diaphoresis and fever.   Respiratory:  Positive for cough and shortness of breath. Negative for chest tightness and wheezing.    Cardiovascular:  Positive for leg swelling. Negative for chest pain and palpitations.   Gastrointestinal:  Negative for abdominal pain and blood in stool.   Musculoskeletal:  Negative for arthralgias and joint swelling.   Skin:  Negative for rash.   Neurological:  Positive for weakness. Negative for dizziness, syncope and light-headedness.       Past Medical History:   Diagnosis Date   • Abnormal ultrasound     RESOLVED: 26JUN2015   • Asthma with acute exacerbation     LAST ASSESSED: [id number]   • Asymptomatic menopausal state    • Atherosclerosis    • Atrial fibrillation (HCC)    • Chronic obstructive lung disease (HCC)    • Congestive heart failure (CHF) (HCC)    • Congestive heart failure (HCC)     LAST ASSESSED: [id number]   • Cuboid fracture     LAST ASSESSED: [id number]   • Dyspepsia    • GERD (gastroesophageal reflux disease)    • High risk medication use     LAST ASSESSED: [id number]   • Hyperlipidemia    • Hypertension    • Hypertension    • Hypokalemia    • Hypothyroidism    • Impacted cerumen of both ears     LAST ASSESSED: [id number]   • Impacted cerumen of right ear     LAST ASSESSED: 46EAC0664   • Influenza     LAST ASSESSED: 24BGI5654   • IPMN (intraductal papillary mucinous neoplasm)     RESOLVED: 02OCT2015   • Limb swelling     LAST ASSESSED: [id number]   • Navicular fracture of ankle     LAST ASSESSED: 68GEZ8063   • Onychomycosis     LAST ASSESSED: 39OMI2703   • Osteoarthritis    • Osteopenia    • Osteoporosis    • Paronychia, finger, unspecified laterality     LAST ASSESSED: 48SKD6471   • Paroxysmal atrial fibrillation (HCC)     LAST  ASSESSED: 2014   • Peripheral vascular disease (HCC)    • Plantar fasciitis    • Talus fracture     LAST ASSESSED: 49ADL2498   • Vascular headache      Social History     Substance and Sexual Activity   Alcohol Use Not Currently    Comment: SOCIAL     Social History     Substance and Sexual Activity   Drug Use No     Social History     Tobacco Use   Smoking Status Former   • Current packs/day: 0.00   • Average packs/day: 0.3 packs/day for 2.0 years (0.5 ttl pk-yrs)   • Types: Cigarettes   • Start date:    • Quit date:    • Years since quittin.4   Smokeless Tobacco Never   Tobacco Comments    On and off socially with friends        Allergies:  Allergies   Allergen Reactions   • Asa [Aspirin]    • Nsaids        Medications:     Current Outpatient Medications:   •  albuterol (2.5 mg/3 mL) 0.083 % nebulizer solution, Take 3 mL (2.5 mg total) by nebulization every 8 (eight) hours, Disp: 270 mL, Rfl: 3  •  albuterol (Proventil HFA) 90 mcg/act inhaler, Inhale 2 puffs every 6 (six) hours as needed for wheezing, Disp: 20.1 g, Rfl: 3  •  albuterol (PROVENTIL HFA,VENTOLIN HFA) 90 mcg/act inhaler, Inhale, Disp: , Rfl:   •  Cholecalciferol (VITAMIN D3) 1000 units CAPS, Take 2,000 Units by mouth daily, Disp: , Rfl:   •  fluticasone (FLONASE) 50 mcg/act nasal spray, USE 2 SPRAYS IN EACH NOSTRIL ONCE DAILY, Disp: 48 g, Rfl: 3  •  Fluticasone-Salmeterol (Advair Diskus) 100-50 mcg/dose inhaler, Inhale 1 puff 2 (two) times a day Rinse mouth after use., Disp: 180 blister, Rfl: 1  •  levothyroxine 50 mcg tablet, TAKE 1 TABLET DAILY BUT 1-1/2 TABLETS 4 DAYS/WEEK, Disp: 40 tablet, Rfl: 5  •  metolazone (ZAROXOLYN) 2.5 mg tablet, Take 1 tablet 30 min before PM torsemide today, and Tuesday, Disp: 2 tablet, Rfl: 0  •  metoprolol succinate (TOPROL-XL) 100 mg 24 hr tablet, Take 0.5 tablets (50 mg total) by mouth 2 (two) times a day, Disp: , Rfl:   •  sodium chloride 3 % inhalation solution, Take 4 mL by nebulization 3 (three)  times a day, Disp: 360 mL, Rfl: 2  •  spironolactone (ALDACTONE) 50 mg tablet, Take 1 tablet (50 mg total) by mouth daily, Disp: 90 tablet, Rfl: 3  •  torsemide (DEMADEX) 20 mg tablet, Take 3 tablets first thing in the morning, then again at approximately 2 or 3 PM, for the next 4 to 7 days until weight reaches 116 pounds, Disp: , Rfl:   •  warfarin (COUMADIN) 5 mg tablet, TAKE 1/2 TO 1 TABLET BY MOUTH DAILY OR AS ORDERED BY PHYSICIAN., Disp: 90 tablet, Rfl: 3  •  cetirizine (ZyrTEC) 10 mg tablet, Take 1 tablet (10 mg total) by mouth daily (Patient not taking: Reported on 5/1/2024), Disp: 30 tablet, Rfl: 6  •  levothyroxine 50 mcg tablet, TAKE 1 TABLET DAILY, EXCEPT TAKE 1 AND 1/2 TABLETS ON SUNDAY AND WEDNESDAY (Patient not taking: Reported on 5/24/2024), Disp: 98 tablet, Rfl: 10      Vitals:    05/24/24 1040   BP: 110/60   Pulse: 90   SpO2: 96%     Weight (last 2 days)     Date/Time Weight    05/24/24 1040 56.3 (124.1)        Physical Exam  Constitutional:       General: She is not in acute distress.     Appearance: She is not diaphoretic.   HENT:      Head: Normocephalic and atraumatic.   Eyes:      General: No scleral icterus.     Conjunctiva/sclera: Conjunctivae normal.   Neck:      Vascular: No JVD.   Cardiovascular:      Rate and Rhythm: Normal rate. Rhythm irregular.      Heart sounds: Normal heart sounds. No murmur heard.  Pulmonary:      Effort: Pulmonary effort is normal. No respiratory distress.      Breath sounds: Normal breath sounds. No wheezing or rales.   Musculoskeletal:         General: No tenderness.      Cervical back: Normal range of motion.      Right lower leg: Edema present.      Left lower leg: Edema present.   Skin:     General: Skin is warm and dry.         Laboratory Studies:    Labs personally reviewed  Cardiac testing:     EKG reviewed personally:   No results found for this visit on 05/24/24.          Echocardiogram:    10/19- EF 60%, diastolic dysfunction, un graded due to AFib,  "normal RV size and function, moderate  Left and right atrial dilatation, mild MR, mild TR, mildly elevated pulmonary pressures  10/21-personally reviewed -EF 60%, diastolic dysfunction, left and right atrial dilation, mild-to-moderate TR, moderately elevated pulmonary pressures, dilated IVC       TTR SPECT   11/19-1.26 heart to lung ratio, equivocal to negative    Stress tests:      Catheterization:      Holter:      10/21-excellent AFib rate control, no significant pauses    Jonas Vyas MD    Portions of the record may have been created with voice recognition software.  Occasional wrong word or \"sound a like\" substitutions may have occurred due to the inherent limitations of voice recognition software.  Read the chart carefully and recognize, using context, where substitutions have occurred.  "

## 2024-05-24 NOTE — TELEPHONE ENCOUNTER
"Dr Vyas aware metolazone sent to wrong pharmacy and he approved script to local [facility].  Patient made aware that script resent.    Per text message from Dr Vyas: \" if she already took her torsemide this afternoon, she can do the metolazone tomorrow afternoon 30 minutes before the torsemide and Tuesday as planned.\"    I spoke with patient by phone .  She did take the torsemide this afternoon and verbalized understanding of Dr Vyas's instructions regarding metolazone starting tomorrow and Tuesday as planned.   "

## 2024-05-30 ENCOUNTER — APPOINTMENT (OUTPATIENT)
Dept: LAB | Facility: CLINIC | Age: 89
End: 2024-05-30
Payer: COMMERCIAL

## 2024-05-30 DIAGNOSIS — I50.32 CHRONIC DIASTOLIC CONGESTIVE HEART FAILURE (HCC): Chronic | ICD-10-CM

## 2024-05-30 DIAGNOSIS — I50.32 CHRONIC DIASTOLIC CONGESTIVE HEART FAILURE (HCC): ICD-10-CM

## 2024-05-30 LAB
ANION GAP SERPL CALCULATED.3IONS-SCNC: 11 MMOL/L (ref 4–13)
BUN SERPL-MCNC: 73 MG/DL (ref 5–25)
CALCIUM SERPL-MCNC: 9.5 MG/DL (ref 8.4–10.2)
CHLORIDE SERPL-SCNC: 85 MMOL/L (ref 96–108)
CO2 SERPL-SCNC: 35 MMOL/L (ref 21–32)
CREAT SERPL-MCNC: 2.22 MG/DL (ref 0.6–1.3)
GFR SERPL CREATININE-BSD FRML MDRD: 18 ML/MIN/1.73SQ M
GLUCOSE SERPL-MCNC: 67 MG/DL (ref 65–140)
POTASSIUM SERPL-SCNC: 4.4 MMOL/L (ref 3.5–5.3)
SODIUM SERPL-SCNC: 131 MMOL/L (ref 135–147)

## 2024-05-30 PROCEDURE — 80048 BASIC METABOLIC PNL TOTAL CA: CPT

## 2024-05-30 PROCEDURE — 36415 COLL VENOUS BLD VENIPUNCTURE: CPT

## 2024-05-30 RX ORDER — TORSEMIDE 20 MG/1
40 TABLET ORAL 2 TIMES DAILY
Start: 2024-05-30

## 2024-05-30 NOTE — PROGRESS NOTES
Despite in office IV Lasix, escalation of spironolactone and torsemide, she was stuck at 124 pounds without significant diuresis and persistent edema  We gave her 2 doses of metolazone 2.5 mg, 1 last Saturday, 1 on Tuesday, unfortunately this was met with DARIO, but in the absence of any significant symptoms at home.  I will have her skip her torsemide tomorrow morning, and resume torsemide 40 mg twice a day, keep taking spironolactone, and will reobtain blood work next week

## 2024-06-07 ENCOUNTER — ANTICOAG VISIT (OUTPATIENT)
Dept: CARDIOLOGY CLINIC | Facility: CLINIC | Age: 89
End: 2024-06-07

## 2024-06-07 DIAGNOSIS — I48.21 PERMANENT ATRIAL FIBRILLATION (HCC): Primary | Chronic | ICD-10-CM

## 2024-06-09 ENCOUNTER — HOSPITAL ENCOUNTER (OUTPATIENT)
Dept: RADIOLOGY | Facility: HOSPITAL | Age: 89
Discharge: HOME | End: 2024-06-09
Payer: COMMERCIAL

## 2024-06-09 DIAGNOSIS — L97.522 CHRONIC FOOT ULCER, LEFT, WITH FAT LAYER EXPOSED (HCC): ICD-10-CM

## 2024-06-09 PROCEDURE — 73718 MRI LOWER EXTREMITY W/O DYE: CPT

## 2024-06-12 ENCOUNTER — TELEPHONE (OUTPATIENT)
Age: 89
End: 2024-06-12

## 2024-06-12 PROBLEM — U07.1 COVID-19 VIRUS INFECTION: Status: RESOLVED | Noted: 2023-03-25 | Resolved: 2024-06-12

## 2024-06-12 PROBLEM — R11.10 VOMITING: Status: RESOLVED | Noted: 2023-01-02 | Resolved: 2024-06-12

## 2024-06-12 PROBLEM — Z71.89 ADVICE GIVEN ABOUT COVID-19 VIRUS INFECTION: Status: RESOLVED | Noted: 2020-04-07 | Resolved: 2024-06-12
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
## 2024-06-12 PROBLEM — N18.32 STAGE 3B CHRONIC KIDNEY DISEASE (HCC): Chronic | Status: ACTIVE | Noted: 2020-04-07

## 2024-06-12 PROBLEM — N18.30 STAGE 3 CHRONIC KIDNEY DISEASE, UNSPECIFIED WHETHER STAGE 3A OR 3B CKD (HCC): Chronic | Status: ACTIVE | Noted: 2020-04-07

## 2024-06-12 NOTE — TELEPHONE ENCOUNTER
Patient calling in for MRI results. Did let her know they weren't in just yet. She wants to let Dr. Dillon know that the pain is not getting better in the last 2 months.

## 2024-06-12 NOTE — PROGRESS NOTES
General Cardiology Outpatient Visit    Rebeca Banegas 89 y.o. female   MRN: [id number]  Encounter: [id number]    Assessment:  Patient Active Problem List    Diagnosis Date Noted    Chronic foot ulcer, left, with fat layer exposed ([facility]) 05/21/2024    Vaccine counseling 12/19/2023    Bronchiectasis without complication ([facility]) 09/08/2023    MGUS (monoclonal gammopathy of unknown significance) 09/04/2023    Chronic cough 06/13/2023    Age-related osteoporosis without current pathological fracture 05/16/2023    Secondary hyperparathyroidism (HCC) 01/02/2023    Open wound of right upper arm 10/17/2022    Labial abscess 06/29/2022    Vulvar cysts 06/29/2022    Osteopenia of hip 04/07/2020    Stage 3b chronic kidney disease (HCC) 04/07/2020    Elevated serum immunoglobulin free light chains 11/28/2019    Sensorineural hearing loss (SNHL), bilateral 04/04/2019    Left asymmetrical SNHL 04/04/2019    Pain of left hand 12/21/2018    Leg cramps 12/21/2018    Chronic diastolic congestive heart failure (HCC) 02/13/2017    Plantar fasciitis 02/05/2016    Essential tremor 10/02/2015    Vitamin D deficiency 10/02/2015    Onychomycosis 07/21/2015    Pulmonary nodule 02/20/2015    Thyroid nodule 02/20/2015    Prediabetes 09/23/2014    Hypertension 05/19/2014    Leg pain 05/19/2014    Abnormal findings on diagnostic imaging of urinary organs 04/04/2014    Abnormal findings on diagnostic imaging of other specified body structures 03/20/2014    Atherosclerosis 03/06/2014    Ovarian cyst 03/06/2014    Pancreatic cyst 03/06/2014    Backache 02/28/2014    Hoarseness 02/28/2014    Acute on chronic diastolic CHF (congestive heart failure) (HCC) 01/14/2014    Allergic rhinitis 06/11/2013    Hyperlipidemia 04/19/2013    Permanent atrial fibrillation (HCC) 02/14/2013    Arthritis 11/12/2012    Asthma 11/12/2012    Esophageal reflux 11/12/2012    Benign colon polyp 11/10/2012    Bifascicular bundle branch block 11/10/2012    Hypothyroidism  "11/10/2012    Nasal polyp 11/10/2012       Today's Plan:  Down 12 lbs since last visit. Estimated dry weight in mid-110 lbs. Continue current torsemide dosing.  Advised to repeat BMP today or tomorrow given DARIO on CKD. Provided paper lab script for patient.     Plan:  Chronic HFpEF; LVEF 60%   Weight of 124 lbs on 05/24. Today, weighs 112 lbs.   Most recent BMP from 05/30/2024: sodium 131; potassium 4.4; BUN 73; creatinine 2.22; eGFR 18.    Pharmacotherapies:  --Aldosterone Antagonist: spironolactone 50 mg daily.   --SGLT2 Inhibitor: No.   --Diuretic: torsemide 40 mg BID.     Atrial fibrillation, permanent   [id number] = 5 (age, HF, HTN).   Anticoagulation on Coumadin.    Rate control: metoprolol succinate 50 mg q12 hours.   Rhythm control: none.    Hypertension   BP of 100/50 mmHg in office today.   Continue on medications as above.     DARIO on chronic kidney disease, stage IIIb   Baseline creatinine of 1.3-1.6.   Most recent BMP from 05/30/2024: sodium 131; potassium 4.4; BUN 73; creatinine 2.22; eGFR 18.    Bifascicular bundle branch block    Hyperlipidemia  MGUS  Asthma / bronchiectasis   Essential tremor   History of tobacco abuse    HPI:   Rebeca Banegas is a 89-year-old woman with a PMH as above who presents to the office for follow-up. Follows with Dr. Vyas.     05/24/2024 with Dr. Vyas: \"Rebeca Banegas is a 89 y.o. year old female  With chronic diastolic CHF, chronic atrial fibrillation on a rate control strategy and anticoagulation with warfarin, bifascicular bundle branch block, mixed hyperlipidemia, and a prior negative infiltrative cardiomyopathy workup returns for a follow-up visit.     Her creatinine is stable  Her weights have not changed, 124 pounds  This is despite increasing torsemide to 60 mg twice a day  She inadvertently was only taking 25 mg of spironolactone, this was rectified in the last couple days, now on 50 mg a day  Legs are still edematous bilaterally, A-fib rates are " "well-controlled, she has no other symptoms of decompensation, and is still largely asymptomatic and ambulating well despite the leg edema.\" Prescribed PRN metolazone and advised to take 2 times in the following week.     Metolazone stopped on 05/30 due to DARIO. Advised to \"skip her torsemide tomorrow morning, and resume torsemide 40 mg twice a day.\" Repeat labs ordered.     06/13/2024: Patient presents for follow-up. Down 12 lbs since last visit; reports similar weight loss on home scale. Has been taking torsemide 40 mg BID. Weights have plateaued around 112 lbs over past 4-5 days. Denies LHC, dizziness, itching, CP, SOB at rest, LE swelling, PND, and orthopnea. Continues with cough and mild TANG (at baseline). Chronically poor sleeper. Appetite somewhat decreased but overall unchanged. Drinking 50+ oz fluid daily; does not feel she drinks more than 70 oz in a day.     Past Medical History:   Diagnosis Date    Abnormal ultrasound     RESOLVED: 26JUN2015    Advice given about COVID-19 virus infection 04/07/2020    Asthma with acute exacerbation     LAST ASSESSED: [id number]    Asymptomatic menopausal state     Atherosclerosis     Atrial fibrillation (HCC)     Chronic obstructive lung disease (HCC)     Congestive heart failure (HCC)     LAST ASSESSED: [id number]    COVID-19 virus infection 03/25/2023    Cuboid fracture     LAST ASSESSED: [id number]    Dyspepsia     Fall 04/01/2024    GERD (gastroesophageal reflux disease)     High risk medication use     LAST ASSESSED: 20CTI3459    Hyperlipidemia     Hypertension     Hypokalemia     Hypothyroidism     Impacted cerumen of both ears     LAST ASSESSED: [id number]    Impacted cerumen of right ear     LAST ASSESSED: 00OTW0095    Influenza     LAST ASSESSED: 82LLT3999    IPMN (intraductal papillary mucinous neoplasm)     RESOLVED: 02OCT2015    Limb swelling     LAST ASSESSED: [id number]    Navicular fracture of ankle     LAST ASSESSED: 22ZQO9743    Onychomycosis     LAST ASSESSED: " [id number]    Osteoarthritis     Osteopenia     Osteoporosis     Paronychia, finger, unspecified laterality     LAST ASSESSED: 25YWM5913    Paroxysmal atrial fibrillation (HCC)     LAST ASSESSED: 02MAR2014    Peripheral vascular disease (HCC)     Plantar fasciitis     Talus fracture     LAST ASSESSED: 82PQS0348    Vascular headache        Review of Systems   Constitutional:  Negative for activity change, appetite change, fatigue and unexpected weight change.   Respiratory:  Positive for shortness of breath (with heavier exertion). Negative for cough and chest tightness.    Cardiovascular:  Negative for chest pain, palpitations and leg swelling.   Gastrointestinal:  Negative for abdominal distention and abdominal pain.   Genitourinary:  Negative for decreased urine volume, dysuria and urgency.   Musculoskeletal: Negative.    Skin: Negative.    Neurological:  Negative for dizziness, syncope, weakness and light-headedness.   Psychiatric/Behavioral:  Negative for confusion. The patient is not nervous/anxious.        Allergies   Allergen Reactions    Asa [Aspirin]     Nsaids          Current Outpatient Medications:     albuterol (2.5 mg/3 mL) 0.083 % nebulizer solution, Take 3 mL (2.5 mg total) by nebulization every 8 (eight) hours, Disp: 270 mL, Rfl: 3    albuterol (Proventil HFA) 90 mcg/act inhaler, Inhale 2 puffs every 6 (six) hours as needed for wheezing, Disp: 20.1 g, Rfl: 3    albuterol (PROVENTIL HFA,VENTOLIN HFA) 90 mcg/act inhaler, Inhale, Disp: , Rfl:     Cholecalciferol (VITAMIN D3) 1000 units CAPS, Take 2,000 Units by mouth daily, Disp: , Rfl:     fluticasone (FLONASE) 50 mcg/act nasal spray, USE 2 SPRAYS IN EACH NOSTRIL ONCE DAILY, Disp: 48 g, Rfl: 3    Fluticasone-Salmeterol (Advair Diskus) 100-50 mcg/dose inhaler, Inhale 1 puff 2 (two) times a day Rinse mouth after use., Disp: 180 blister, Rfl: 1    levothyroxine 50 mcg tablet, TAKE 1 TABLET DAILY BUT 1-1/2 TABLETS 4 DAYS/WEEK, Disp: 40 tablet, Rfl: 5     metoprolol succinate (TOPROL-XL) 100 mg 24 hr tablet, Take 0.5 tablets (50 mg total) by mouth 2 (two) times a day, Disp: , Rfl:     sodium chloride 3 % inhalation solution, Take 4 mL by nebulization 3 (three) times a day, Disp: 360 mL, Rfl: 2    spironolactone (ALDACTONE) 50 mg tablet, Take 1 tablet (50 mg total) by mouth daily, Disp: 90 tablet, Rfl: 3    torsemide (DEMADEX) 20 mg tablet, Take 2 tablets (40 mg total) by mouth 2 (two) times a day Take 3 tablets first thing in the morning, then again at approximately 2 or 3 PM, for the next 4 to 7 days until weight reaches 116 pounds, Disp: , Rfl:     warfarin (COUMADIN) 5 mg tablet, TAKE 1/2 TO 1 TABLET BY MOUTH DAILY OR AS ORDERED BY PHYSICIAN., Disp: 90 tablet, Rfl: 3    cetirizine (ZyrTEC) 10 mg tablet, Take 1 tablet (10 mg total) by mouth daily (Patient not taking: Reported on 2024), Disp: 30 tablet, Rfl: 6    Social History     Socioeconomic History    Marital status:      Spouse name: Not on file    Number of children: Not on file    Years of education: Not on file    Highest education level: Not on file   Occupational History    Occupation: retired   Tobacco Use    Smoking status: Former     Current packs/day: 0.00     Average packs/day: 0.3 packs/day for 2.0 years (0.5 ttl pk-yrs)     Types: Cigarettes     Start date:      Quit date:      Years since quittin.4    Smokeless tobacco: Never    Tobacco comments:     On and off socially with friends    Vaping Use    Vaping status: Never Used   Substance and Sexual Activity    Alcohol use: Not Currently     Comment: SOCIAL    Drug use: No    Sexual activity: Not Currently     Partners: Male     Birth control/protection: Post-menopausal   Other Topics Concern    Not on file   Social History Narrative    DAILY COFFEE CONSUMPTION (2 CUPS/DAY)    EXERCISING REGULARLY     Social Determinants of Health     Financial Resource Strain: Not on file   Food Insecurity: Not on file   Transportation  "Needs: Not on file   Physical Activity: Not on file   Stress: Not on file   Social Connections: Not on file   Intimate Partner Violence: Not on file   Housing Stability: Not on file     Family History   Problem Relation Age of Onset    Pancreatic cancer Mother 93    Heart failure Mother     Coronary artery disease Family     Breast cancer Family     Other Family         BREAST DISORDER, GASTROINTESTINAL CANCER    Uterine cancer Family     Hyperlipidemia Family     Osteoarthritis Family     Ovarian cancer Family     Brain cancer Son     Stroke Father     No Known Problems Sister     No Known Problems Daughter     No Known Problems Maternal Grandmother     No Known Problems Maternal Grandfather     No Known Problems Paternal Grandmother     No Known Problems Paternal Grandfather     Breast cancer Cousin 50    Breast cancer Cousin 50    Ovarian cancer Other 49       Vitals:   Blood pressure 100/50, pulse 80, height 5' 4\" (1.626 m), weight 51.1 kg (112 lb 9.6 oz), SpO2 100%.    Wt Readings from Last 10 Encounters:   06/13/24 51.1 kg (112 lb 9.6 oz)   06/06/24 56.2 kg (124 lb)   05/24/24 56.3 kg (124 lb 1.6 oz)   05/10/24 56.6 kg (124 lb 12.8 oz)   05/01/24 60.6 kg (133 lb 11.2 oz)   04/17/24 54.9 kg (121 lb)   03/06/24 52.6 kg (116 lb)   02/27/24 53 kg (116 lb 14.4 oz)   10/04/23 53.5 kg (118 lb)   08/23/23 51.3 kg (113 lb)     Vitals:    06/13/24 1202   BP: 100/50   BP Location: Left arm   Patient Position: Sitting   Cuff Size: Standard   Pulse: 80   SpO2: 100%   Weight: 51.1 kg (112 lb 9.6 oz)   Height: 5' 4\" (1.626 m)       Physical Exam  Vitals reviewed.   Constitutional:       General: She is awake. She is not in acute distress.     Appearance: Normal appearance. She is well-developed. She is not toxic-appearing or diaphoretic.      Comments: Ambulating with cane   HENT:      Head: Normocephalic.      Nose: Nose normal.      Mouth/Throat:      Mouth: Mucous membranes are moist.   Eyes:      General: No scleral " icterus.     Conjunctiva/sclera: Conjunctivae normal.   Neck:      Vascular: JVD: ~8 cm.      Trachea: No tracheal deviation.   Cardiovascular:      Rate and Rhythm: Normal rate. Rhythm irregularly irregular.   Pulmonary:      Effort: Pulmonary effort is normal. No tachypnea, bradypnea or respiratory distress.      Breath sounds: Normal air entry. Rhonchi (improved s/p deep cough) present. No rales.   Abdominal:      General: There is no distension.      Palpations: Abdomen is soft.   Musculoskeletal:      Cervical back: Neck supple.      Right lower leg: No edema.      Left lower leg: No edema.   Skin:     General: Skin is warm and dry.      Coloration: Skin is not jaundiced.   Neurological:      General: No focal deficit present.      Mental Status: She is alert and oriented to person, place, and time.   Psychiatric:         Attention and Perception: Attention normal.         Mood and Affect: Mood and affect normal.         Speech: Speech normal.         Behavior: Behavior normal. Behavior is cooperative.         Thought Content: Thought content normal.       Labs & Results:  Lab Results   Component Value Date    WBC 7.73 04/04/2024    HGB 13.5 04/04/2024    HCT 42.0 04/04/2024     (H) 04/04/2024     04/04/2024     Lab Results   Component Value Date    SODIUM 131 (L) 05/30/2024    K 4.4 05/30/2024    CL 85 (L) 05/30/2024    CO2 35 (H) 05/30/2024    BUN 73 (H) 05/30/2024    CREATININE 2.22 (H) 05/30/2024    GLUC 67 05/30/2024    CALCIUM 9.5 05/30/2024     Lab Results   Component Value Date    INR 4.2 06/07/2024    INR 2.8 05/03/2024    INR 3.01 (H) 04/04/2024    PROTIME 39.8 (H) 06/07/2024    PROTIME 29.4 (H) 05/03/2024    PROTIME 30.5 (H) 04/04/2024     Lab Results   Component Value Date     (H) 04/04/2024      Franci Marcial PA-C

## 2024-06-13 ENCOUNTER — TELEPHONE (OUTPATIENT)
Dept: INTERNAL MEDICINE CLINIC | Facility: CLINIC | Age: 89
End: 2024-06-13

## 2024-06-13 ENCOUNTER — APPOINTMENT (OUTPATIENT)
Dept: LAB | Facility: CLINIC | Age: 89
End: 2024-06-13
Payer: COMMERCIAL

## 2024-06-13 ENCOUNTER — OFFICE VISIT (OUTPATIENT)
Dept: CARDIOLOGY CLINIC | Facility: CLINIC | Age: 89
End: 2024-06-13
Payer: COMMERCIAL

## 2024-06-13 VITALS
OXYGEN SATURATION: 100 % | WEIGHT: 112.6 LBS | HEART RATE: 80 BPM | HEIGHT: 64 IN | SYSTOLIC BLOOD PRESSURE: 100 MMHG | BODY MASS INDEX: 19.22 KG/M2 | DIASTOLIC BLOOD PRESSURE: 50 MMHG

## 2024-06-13 DIAGNOSIS — I50.32 CHRONIC DIASTOLIC CONGESTIVE HEART FAILURE (HCC): ICD-10-CM

## 2024-06-13 DIAGNOSIS — L97.522 CHRONIC FOOT ULCER, LEFT, WITH FAT LAYER EXPOSED (HCC): Primary | ICD-10-CM

## 2024-06-13 DIAGNOSIS — I50.32 CHRONIC HEART FAILURE WITH PRESERVED EJECTION FRACTION (HCC): Primary | ICD-10-CM

## 2024-06-13 DIAGNOSIS — M86.9 OSTEOMYELITIS OF FOOT, UNSPECIFIED LATERALITY, UNSPECIFIED TYPE (HCC): Primary | ICD-10-CM

## 2024-06-13 DIAGNOSIS — N17.9 ACUTE KIDNEY INJURY SUPERIMPOSED ON CHRONIC KIDNEY DISEASE  (HCC): ICD-10-CM

## 2024-06-13 DIAGNOSIS — I48.21 PERMANENT ATRIAL FIBRILLATION (HCC): Chronic | ICD-10-CM

## 2024-06-13 DIAGNOSIS — N18.9 ACUTE KIDNEY INJURY SUPERIMPOSED ON CHRONIC KIDNEY DISEASE  (HCC): ICD-10-CM

## 2024-06-13 LAB
ANION GAP SERPL CALCULATED.3IONS-SCNC: 10 MMOL/L (ref 4–13)
BUN SERPL-MCNC: 76 MG/DL (ref 5–25)
CALCIUM SERPL-MCNC: 8.5 MG/DL (ref 8.4–10.2)
CHLORIDE SERPL-SCNC: 93 MMOL/L (ref 96–108)
CO2 SERPL-SCNC: 29 MMOL/L (ref 21–32)
CREAT SERPL-MCNC: 1.83 MG/DL (ref 0.6–1.3)
GFR SERPL CREATININE-BSD FRML MDRD: 23 ML/MIN/1.73SQ M
GLUCOSE SERPL-MCNC: 97 MG/DL (ref 65–140)
POTASSIUM SERPL-SCNC: 5.2 MMOL/L (ref 3.5–5.3)
SODIUM SERPL-SCNC: 132 MMOL/L (ref 135–147)

## 2024-06-13 PROCEDURE — 99214 OFFICE O/P EST MOD 30 MIN: CPT | Performed by: PHYSICIAN ASSISTANT

## 2024-06-13 PROCEDURE — 36415 COLL VENOUS BLD VENIPUNCTURE: CPT

## 2024-06-13 PROCEDURE — 80048 BASIC METABOLIC PNL TOTAL CA: CPT

## 2024-06-13 NOTE — TELEPHONE ENCOUNTER
----- Message from Santana Dillon MD sent at 6/13/2024  8:56 AM EDT -----  I placed an order for CT of the foot on this patient-she has osteomyelitis on her MRI-please have CT done within 1 week--also patient needs referral to Dr.Lachman foot and ankle specialist-I was unable to place an order in the system-she needs to see her in next 2 to 3 weeks with diagnosis of osteomyelitis of the foot-thank you

## 2024-06-13 NOTE — PATIENT INSTRUCTIONS
Complete blood work this week. No need to fast for test ordered by Dr. Vyas    Please weigh yourself every day (after emptying your bladder) and keep a detailed log of weights.   Contact the Heart Failure program at [phone] if you gain 3+ lbs overnight or 5+ lbs in 5-7 days.  Limit daily sodium/salt intake to 2000 mg daily to prevent fluid retention.  Avoid canned foods, fast food/Chinese food, and processed meats (hot dogs, lunch meat, and sausage etc.). Caution with condiments.  Limit fluid intake to 2000 mL or 2 liters (about 60-65 ounces) daily.  Avoid electrolyte replacement drinks (such as Gatorade, Pedialyte, Propel, Liquid IV, etc.).  Bring complete list of medications and log of daily weights to your follow-up appointment.

## 2024-06-18 ENCOUNTER — APPOINTMENT (OUTPATIENT)
Dept: LAB | Facility: HOSPITAL | Age: 89
End: 2024-06-18
Payer: COMMERCIAL

## 2024-06-18 ENCOUNTER — OFFICE VISIT (OUTPATIENT)
Dept: PODIATRY | Facility: CLINIC | Age: 89
End: 2024-06-18
Payer: COMMERCIAL

## 2024-06-18 ENCOUNTER — TELEPHONE (OUTPATIENT)
Dept: PODIATRY | Facility: CLINIC | Age: 89
End: 2024-06-18

## 2024-06-18 VITALS
HEIGHT: 64 IN | SYSTOLIC BLOOD PRESSURE: 113 MMHG | BODY MASS INDEX: 19.12 KG/M2 | DIASTOLIC BLOOD PRESSURE: 82 MMHG | HEART RATE: 90 BPM | WEIGHT: 112 LBS

## 2024-06-18 DIAGNOSIS — M86.9 OSTEOMYELITIS OF FOOT, UNSPECIFIED LATERALITY, UNSPECIFIED TYPE (HCC): ICD-10-CM

## 2024-06-18 DIAGNOSIS — L97.522 NEUROPATHIC ULCER OF LEFT FOOT WITH FAT LAYER EXPOSED (HCC): Primary | ICD-10-CM

## 2024-06-18 PROCEDURE — 11042 DBRDMT SUBQ TIS 1ST 20SQCM/<: CPT | Performed by: PODIATRIST

## 2024-06-18 PROCEDURE — 99204 OFFICE O/P NEW MOD 45 MIN: CPT | Performed by: PODIATRIST

## 2024-06-18 RX ORDER — CLINDAMYCIN HYDROCHLORIDE 300 MG/1
300 CAPSULE ORAL 4 TIMES DAILY
Qty: 40 CAPSULE | Refills: 0 | Status: SHIPPED | OUTPATIENT
Start: 2024-06-18 | End: 2024-06-28

## 2024-06-18 NOTE — PROGRESS NOTES
"Name: Rebeca Banegas      : 1932      MRN: [id number]  Encounter Provider: Jonny Marcial DPM  Encounter Date: 2024   Encounter department: [facility] [place]    Assessment & Plan     1. 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 Osteomyelitis of foot, unspecified laterality, unspecified type (HCC)  -     Ambulatory Referral to Orthopedic Surgery        Debridement    Universal Protocol:  Consent: Verbal consent obtained.  Risks and benefits: risks, benefits and alternatives were discussed  Consent given by: patient  Time out: Immediately prior to procedure a \"time out\" was called to verify the correct patient, procedure, equipment, support staff and site/side marked as required.  Patient understanding: patient states understanding of the procedure being performed  Patient identity confirmed: verbally with patient    Debridement Details  Performed by: physician  Debridement type: surgical  Level of debridement: subcutaneous tissue  Pain control: lidocaine 4%      Post-debridement measurements  Length (cm): 1.2  Width (cm): 1.9  Depth (cm): 0.9  Percent debrided: 100%  Surface Area (cm^2): 2.28  Area Debrided (cm^2): 2.28  Volume (cm^3): 2.05    Tissue and other material debrided: subcutaneous tissue  Devitalized tissue debrided: biofilm, callus and fibrin  Instrument(s) utilized: blade  Bleeding: small  Hemostasis obtained with: pressure  Procedural pain (0-10): insensate  Post-procedural pain: insensate   Response to " treatment: procedure was tolerated well          Surgical debridement today.  Cancel CT.  Ordered ceretec instead.  Will follow up at wound care.  Follow up on Vascular studies scheduled for tomorrow.    Will place patient in surgical shoe today.    Will have patient follow-up with the [facility] for additional management.    I did obtain an x-ray today for continued evaluation, I am wondering if this is more so due to pressure distribution issues given her history of fourth metatarsal deformity and surgical intervention.    I will place her on antibiotic I did obtain a culture today.    Order lab work as well for further evaluation.    If patient's condition worsens notify the office.     I did review patient's x-rays today of the foot which show deformity at the level of the fourth metatarsal and short fourth digit.  There is some erosive changes noted at the level of the third metatarsal head there is some calcifications noted on x-ray examination.      Return in about 1 week (around 6/25/2024) for [facility] [place].    Subjective     Patient has had issues on the left foot since January.  Patient had MRI and is scheduled for doppler for the left foot.  In 2020 patient had surgery on the 2nd toe. 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         Review of Systems   Constitutional:  Negative for chills and fever.   Respiratory:  Negative for chest tightness and shortness of breath.    Gastrointestinal:  Negative for nausea and vomiting.       Current Outpatient Medications on File Prior to Visit   Medication Sig   • albuterol (2.5 mg/3 mL) 0.083 % nebulizer solution Take 3 mL (2.5 mg total) by nebulization every 8 (eight) hours   • albuterol (Proventil HFA) 90 mcg/act inhaler Inhale 2 puffs every 6 (six) hours as needed for wheezing   • albuterol (PROVENTIL HFA,VENTOLIN HFA) 90 mcg/act inhaler Inhale   • Cholecalciferol (VITAMIN D3) 1000 units CAPS Take  "2,000 Units by mouth daily   • fluticasone (FLONASE) 50 mcg/act nasal spray USE 2 SPRAYS IN EACH NOSTRIL ONCE DAILY   • Fluticasone-Salmeterol (Advair Diskus) 100-50 mcg/dose inhaler Inhale 1 puff 2 (two) times a day Rinse mouth after use.   • levothyroxine 50 mcg tablet TAKE 1 TABLET DAILY BUT 1-1/2 TABLETS 4 DAYS/WEEK   • metoprolol succinate (TOPROL-XL) 100 mg 24 hr tablet Take 0.5 tablets (50 mg total) by mouth 2 (two) times a day   • sodium chloride 3 % inhalation solution Take 4 mL by nebulization 3 (three) times a day   • spironolactone (ALDACTONE) 50 mg tablet Take 1 tablet (50 mg total) by mouth daily   • torsemide (DEMADEX) 20 mg tablet Take 2 tablets (40 mg total) by mouth 2 (two) times a day Take 3 tablets first thing in the morning, then again at approximately 2 or 3 PM, for the next 4 to 7 days until weight reaches 116 pounds   • warfarin (COUMADIN) 5 mg tablet TAKE 1/2 TO 1 TABLET BY MOUTH DAILY OR AS ORDERED BY PHYSICIAN.   • cetirizine (ZyrTEC) 10 mg tablet Take 1 tablet (10 mg total) by mouth daily (Patient not taking: Reported on 5/1/2024)       Objective     /82   Pulse 90   Ht 5' 4\" (1.626 m)   Wt 50.8 kg (112 lb)   LMP  (LMP Unknown)   BMI 19.22 kg/m²     Physical Exam  Constitutional:       Appearance: Normal appearance.   Musculoskeletal:        Feet:    Feet:      Comments: Vascular: Intact pedal pulses bilateral DP and PT.  Neurological: Gross protective sensation diminished bilateral  Musculoskeletal: Muscle strength bilateral intact with dorsiflexion, inversion, eversion and plantarflexion.  Limitation of range of motion noted in dorsiflexion with knee extended.  Dermatological: No open lesions or ulcerations noted bilateral.  Ulceration noted in the plantar surface of the foot with callus formation over the wound itself there are some maceration noted to the area.  There is some deep probing this noted to muscle at this point.  I can see some tendon exposure noted on the " plantar surface of the foot.    Neurological:      Mental Status: She is alert.

## 2024-06-19 ENCOUNTER — HOSPITAL ENCOUNTER (OUTPATIENT)
Dept: NON INVASIVE DIAGNOSTICS | Facility: CLINIC | Age: 89
Discharge: HOME | End: 2024-06-19
Payer: COMMERCIAL

## 2024-06-19 ENCOUNTER — APPOINTMENT (OUTPATIENT)
Dept: LAB | Facility: CLINIC | Age: 89
End: 2024-06-19
Payer: COMMERCIAL

## 2024-06-19 ENCOUNTER — APPOINTMENT (OUTPATIENT)
Dept: LAB | Facility: HOSPITAL | Age: 89
End: 2024-06-19
Payer: COMMERCIAL

## 2024-06-19 DIAGNOSIS — L97.522 NEUROPATHIC ULCER OF LEFT FOOT WITH FAT LAYER EXPOSED (HCC): ICD-10-CM

## 2024-06-19 DIAGNOSIS — L97.522 CHRONIC FOOT ULCER, LEFT, WITH FAT LAYER EXPOSED (HCC): ICD-10-CM

## 2024-06-19 LAB
BASOPHILS # BLD AUTO: 0.05 THOUSANDS/ÂΜL (ref 0–0.1)
BASOPHILS NFR BLD AUTO: 1 % (ref 0–1)
CRP SERPL QL: 16.1 MG/L
EOSINOPHIL # BLD AUTO: 0.55 THOUSAND/ÂΜL (ref 0–0.61)
EOSINOPHIL NFR BLD AUTO: 8 % (ref 0–6)
ERYTHROCYTE [DISTWIDTH] IN BLOOD BY AUTOMATED COUNT: 13.4 % (ref 11.6–15.1)
ERYTHROCYTE [SEDIMENTATION RATE] IN BLOOD: 84 MM/HOUR (ref 0–29)
HCT VFR BLD AUTO: 38.8 % (ref 34.8–46.1)
HGB BLD-MCNC: 12.5 G/DL (ref 11.5–15.4)
IMM GRANULOCYTES # BLD AUTO: 0.01 THOUSAND/UL (ref 0–0.2)
IMM GRANULOCYTES NFR BLD AUTO: 0 % (ref 0–2)
LYMPHOCYTES # BLD AUTO: 1.98 THOUSANDS/ÂΜL (ref 0.6–4.47)
LYMPHOCYTES NFR BLD AUTO: 29 % (ref 14–44)
MCH RBC QN AUTO: 33.3 PG (ref 26.8–34.3)
MCHC RBC AUTO-ENTMCNC: 32.2 G/DL (ref 31.4–37.4)
MCV RBC AUTO: 104 FL (ref 82–98)
MONOCYTES # BLD AUTO: 0.66 THOUSAND/ÂΜL (ref 0.17–1.22)
MONOCYTES NFR BLD AUTO: 10 % (ref 4–12)
NEUTROPHILS # BLD AUTO: 3.67 THOUSANDS/ÂΜL (ref 1.85–7.62)
NEUTS SEG NFR BLD AUTO: 52 % (ref 43–75)
NRBC BLD AUTO-RTO: 0 /100 WBCS
PLATELET # BLD AUTO: 242 THOUSANDS/UL (ref 149–390)
PMV BLD AUTO: 10 FL (ref 8.9–12.7)
PREALB SERPL-MCNC: 15.9 MG/DL (ref 17–34)
RBC # BLD AUTO: 3.75 MILLION/UL (ref 3.81–5.12)
WBC # BLD AUTO: 6.92 THOUSAND/UL (ref 4.31–10.16)

## 2024-06-19 PROCEDURE — 93923 UPR/LXTR ART STDY 3+ LVLS: CPT

## 2024-06-19 PROCEDURE — 85652 RBC SED RATE AUTOMATED: CPT

## 2024-06-19 PROCEDURE — 86140 C-REACTIVE PROTEIN: CPT

## 2024-06-19 PROCEDURE — 85025 COMPLETE CBC W/AUTO DIFF WBC: CPT

## 2024-06-19 PROCEDURE — 36415 COLL VENOUS BLD VENIPUNCTURE: CPT

## 2024-06-19 PROCEDURE — 93925 LOWER EXTREMITY STUDY: CPT | Performed by: SURGERY

## 2024-06-19 PROCEDURE — 84134 ASSAY OF PREALBUMIN: CPT

## 2024-06-19 PROCEDURE — 93922 UPR/L XTREMITY ART 2 LEVELS: CPT | Performed by: SURGERY

## 2024-06-19 PROCEDURE — 93925 LOWER EXTREMITY STUDY: CPT

## 2024-06-20 ENCOUNTER — DOCUMENTATION (OUTPATIENT)
Dept: INTERNAL MEDICINE CLINIC | Facility: CLINIC | Age: 89
End: 2024-06-20

## 2024-06-20 NOTE — PROGRESS NOTES
Patient seen by podiatry-inflammatory markers ordered with sed rate of 84.  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 difficile with use of clindamycin-she will notify us if having significant diarrhea.  Scheduled for follow-up in the wound center-wound culture negative    Assessment/Plan:    No problem-specific Assessment & Plan notes found for this encounter.             Subjective:      Patient ID: Rebeca Banegas is a 89 y.o. female.    HPI    The following portions of the patient's history were reviewed and updated as appropriate: allergies, current medications, past family history, past medical history, past social history, past surgical history, and problem list.    Review of Systems      Objective:      LMP  (LMP Unknown)          Physical Exam

## 2024-06-21 ENCOUNTER — TELEPHONE (OUTPATIENT)
Age: 89
End: 2024-06-21

## 2024-06-21 LAB — INR PPP: 5.7 (ref 0.84–1.19)

## 2024-06-21 NOTE — TELEPHONE ENCOUNTER
Received a call from Jonny at [facility] labs with critical INR results. PT-50.7, INR-5.7, Cardiologist-Dr. Vyas. Pt ph#[phone].. HNL ph#[phone]     Secure message sent to Dr. Gonzalez and acknowledged.

## 2024-06-25 ENCOUNTER — ANTICOAG VISIT (OUTPATIENT)
Dept: CARDIOLOGY CLINIC | Facility: CLINIC | Age: 89
End: 2024-06-25

## 2024-06-25 ENCOUNTER — TELEPHONE (OUTPATIENT)
Age: 89
End: 2024-06-25

## 2024-06-25 DIAGNOSIS — I48.21 PERMANENT ATRIAL FIBRILLATION (HCC): Primary | Chronic | ICD-10-CM

## 2024-06-25 LAB
INR PPP: 3.2
PROTHROMBIN TIME: 32.4 SEC (ref 12–14.6)

## 2024-06-25 NOTE — TELEPHONE ENCOUNTER
Caller: Rebeca Banegas    Doctor and/or Office: Dr. Marcial/Ch[facility]    #: [phone]    Escalation: Last office visit Patient would like to know if her culture came back yet? Also, she is having a real problem taking the medication, she has two days left and wants to stop taking it. She would like a return call to discuss all of this. Thank you

## 2024-06-25 NOTE — TELEPHONE ENCOUNTER
Her culture did not show bacterial growth.  I wanted her to be on at least 1 week of the medication so she can stop today if she is having issues with it.

## 2024-06-26 ENCOUNTER — HOSPITAL ENCOUNTER (OUTPATIENT)
Dept: RADIOLOGY | Facility: HOSPITAL | Age: 89
Discharge: HOME | End: 2024-06-26
Attending: PODIATRIST
Payer: COMMERCIAL

## 2024-06-26 DIAGNOSIS — L97.522 ULCER OF LEFT FOOT, WITH FAT LAYER EXPOSED (HCC): ICD-10-CM

## 2024-06-26 DIAGNOSIS — L97.522 NEUROPATHIC ULCER OF LEFT FOOT WITH FAT LAYER EXPOSED (HCC): ICD-10-CM

## 2024-06-26 PROCEDURE — 78800 RP LOCLZJ TUM 1 AREA 1 D IMG: CPT

## 2024-06-26 PROCEDURE — A9569 TECHNETIUM TC-99M AUTO WBC: HCPCS

## 2024-06-26 PROCEDURE — 78832 RP LOCLZJ TUM SPECT W/CT 2: CPT

## 2024-06-27 ENCOUNTER — TELEPHONE (OUTPATIENT)
Age: 89
End: 2024-06-27

## 2024-06-27 ENCOUNTER — HOSPITAL ENCOUNTER (OUTPATIENT)
Dept: RADIOLOGY | Facility: HOSPITAL | Age: 89
Discharge: HOME | End: 2024-06-27
Attending: PODIATRIST
Payer: COMMERCIAL

## 2024-06-27 NOTE — TELEPHONE ENCOUNTER
Caller: [facility] -Radiology    Doctor: Aniket/     Reason for call: Significant findings    Call back#: [phone]

## 2024-06-28 ENCOUNTER — TELEPHONE (OUTPATIENT)
Age: 89
End: 2024-06-28

## 2024-06-28 ENCOUNTER — OFFICE VISIT (OUTPATIENT)
Dept: WOUND CARE | Facility: HOSPITAL | Age: 89
End: 2024-06-28
Payer: COMMERCIAL

## 2024-06-28 VITALS
DIASTOLIC BLOOD PRESSURE: 64 MMHG | WEIGHT: 115 LBS | SYSTOLIC BLOOD PRESSURE: 122 MMHG | TEMPERATURE: 97.5 F | HEIGHT: 64 IN | HEART RATE: 94 BPM | RESPIRATION RATE: 18 BRPM | BODY MASS INDEX: 19.63 KG/M2

## 2024-06-28 DIAGNOSIS — L97.526 NON-PRESSURE CHRONIC ULCER OF OTHER PART OF LEFT FOOT WITH BONE INVOLVEMENT WITHOUT EVIDENCE OF NECROSIS (HCC): ICD-10-CM

## 2024-06-28 DIAGNOSIS — L97.526 NON-PRESSURE CHRONIC ULCER OF OTHER PART OF LEFT FOOT WITH BONE INVOLVEMENT WITHOUT EVIDENCE OF NECROSIS (HCC): Primary | ICD-10-CM

## 2024-06-28 DIAGNOSIS — M21.6X2 EQUINUS DEFORMITY OF LEFT FOOT: ICD-10-CM

## 2024-06-28 PROCEDURE — 99214 OFFICE O/P EST MOD 30 MIN: CPT | Performed by: PODIATRIST

## 2024-06-28 PROCEDURE — 99213 OFFICE O/P EST LOW 20 MIN: CPT | Performed by: PODIATRIST

## 2024-06-28 RX ORDER — CEFAZOLIN SODIUM 2 G/50ML
2000 SOLUTION INTRAVENOUS ONCE
OUTPATIENT
Start: 2024-06-28 | End: 2024-06-28

## 2024-06-28 RX ORDER — CHLORHEXIDINE GLUCONATE ORAL RINSE 1.2 MG/ML
15 SOLUTION DENTAL ONCE
OUTPATIENT
Start: 2024-06-28 | End: 2024-06-28

## 2024-06-28 RX ORDER — LIDOCAINE 40 MG/G
CREAM TOPICAL ONCE
Status: COMPLETED | OUTPATIENT
Start: 2024-06-28 | End: 2024-06-28

## 2024-06-28 RX ADMIN — LIDOCAINE: 40 CREAM TOPICAL at 09:41

## 2024-06-28 NOTE — PROGRESS NOTES
Wound Procedure Treatment Neuropathic Left;Plantar Plantar    Performed by: Kayli Claire RN  Authorized by: Jonny Marcial DPM    Associated wounds:   Wound 06/28/24 Neuropathic Plantar Left;Plantar  Wound cleansed with:  NSS  Applied to periwound:  Skin prep  Applied primary dressing:  Calcium alginate and Silver  Applied secondary dressing:  Cast padding and ABD  Dressing secured with:  Ricky, Tubifast and Tape  Offloading device appllied:  Surgical shoe and Felt padding 1/4 inch

## 2024-06-28 NOTE — TELEPHONE ENCOUNTER
Caller: Meena Solorzano    Doctor and/or Office: Dr. Marcial/[facility]#: [phone]    Escalation: Last office visit Meena calling on behalf of her mom, patient, Rebeca. The patient asked her to give a call to the office to update Meena on her office visit today and what was said. Please return call to Meena. Thank you.

## 2024-06-28 NOTE — PATIENT INSTRUCTIONS
Orders Placed This Encounter   Procedures    Wound cleansing and dressings Neuropathic Left;Plantar Plantar     Wound location left plantar foot.   Change dressing 3x per week.   The dressing must stay dry and intact. You may shower with dressing protected by cast cover or bag. Or you may sponge bathe. Call wound center or home health nurse if dressing becomes wet.   Cleanse the wound with mild soap and water or normal saline, pat dry.   Felt pad applied to periwound for offloading.   Please leave this in place when changing the dressing.    Apply Silver Alginate to wound bed.    Cover with ABD pad.   Secure with cast padding then roll gauze and tape.     Standing Status:   Future     Standing Expiration Date:   7/5/2024

## 2024-06-28 NOTE — PROGRESS NOTES
Patient ID: Rebeca Banegas is a 89 y.o. female Date of Birth 7/22/1932     Diagnosis:  1. 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 Equinus deformity of left foot  -     Wound Procedure Treatment Neuropathic Left;Plantar Plantar  3. 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 Equinus  deformity of left foot  M21.6X2 Wound Procedure Treatment Neuropathic Left;Plantar Plantar      3. 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  She does have elevation in her sed rate and CRP.    Patient is expressing interest in minimally invasive interventions at this point which I agree with based on her age and functional demands.    Discussed with her options of bone biopsy from the second metatarsal and the second proximal phalanx given the locations that were lighting up on the Ceretec scan that correlated with the MRI.  This does correlate with her wound location as well.    She does have significant equinus that is limiting motion at the level of the ankle joint I discussed with the option of tendo Achilles lengthening however she lives at home alone and does not have significant assistance at home and wants to limit interventions.    Discussed with her options of prolonged antibiotic use and additional offloading options.    Will proceed with biopsy and debridement of the wound.    I explained to patient risks and benefits of the procedure in detail.  Conservative options have been evaluated and exhausted. Complications of the procedure can include but are not limited to infection, prolonged pain, prolonged swelling, prolonged numbness, need for further surgery, wound healing problems, loss of function, painful scar, stiffness,  arthritis, medical complications, recurrence, DVT, PE, death, nerve, tendon, ligament or blood vessel injury, need for amputation, limb loss.  Patient understands the procedure and all questions have been answered to the patient satisfaction.  No guarantees have been given to the patient regarding outcome.  Will proceed with surgical intervention.  Patient has signed informed consent and understands the procedure and post operative course.  Will return approximately one week post op for evaluation.      If the patient notices any systemic signs of infection including but not limited to fever, chills, nausea, vomiting or significant worsening of wound with increased drainage, redness or streaking up the foot or leg the patient should notify the office or present to the emergency room.      If wound debridement was completed today this was done in an attempt to promote healing, decrease risk of infection and for limb salvage efforts.     Goal of treatment: wound healing     Efforts to decrease pressure on the wound(s), evaluation and discussion of nutritional status, peripheral vascular status, and infection control have all been addressed with this patient.    Return in about 1 week (around 7/5/2024) for Recheck, Next scheduled follow up.      Chief Complaint   Patient presents with   • New Patient Visit     Left foot plantar wound present since January.           Subjective:   Patient returns for follow-up on subsecond tarsal ulceration.  She does have some pain and discomfort to the area.  She had a surgical shoe she was stating this was uncomfortable.  She had Ceretec scan as well as MRI completed in the past.  She had vascular studies completed as well.  She denies any issues with systemic signs of infection at this time.  She was unable to take the clindamycin for longer than 1 week due to issues with GI upset.  She did have a culture result that did show Candida which was likely contaminant.        The  following portions of the patient's history were reviewed and updated as appropriate:   Patient Active Problem List   Diagnosis   • Abnormal findings on diagnostic imaging of other specified body structures   • Abnormal findings on diagnostic imaging of urinary organs   • Allergic rhinitis   • Arthritis   • Asthma   • Atherosclerosis   • Permanent atrial fibrillation (HCC)   • Backache   • Benign colon polyp   • Bifascicular bundle branch block   • Chronic diastolic congestive heart failure (HCC)   • Esophageal reflux   • Essential tremor   • Hoarseness   • Hyperlipidemia   • Hypertension   • Hypothyroidism   • Leg pain   • Nasal polyp   • Onychomycosis   • Ovarian cyst   • Pancreatic cyst   • Plantar fasciitis   • Prediabetes   • Pulmonary nodule   • Thyroid nodule   • Vitamin D deficiency   • Pain of left hand   • Leg cramps   • Sensorineural hearing loss (SNHL), bilateral   • Left asymmetrical SNHL   • Elevated serum immunoglobulin free light chains   • Osteopenia of hip   • Stage 3b chronic kidney disease (HCC)   • Labial abscess   • Vulvar cysts   • Open wound of right upper arm   • Secondary hyperparathyroidism (HCC)   • Age-related osteoporosis without current pathological fracture   • Chronic cough   • MGUS (monoclonal gammopathy of unknown significance)   • Bronchiectasis without complication (HCC)   • Vaccine counseling   • Acute on chronic diastolic CHF (congestive heart failure) (HCC)   • Chronic foot ulcer, left, with fat layer exposed (HCC)   • 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)     Past Medical History:   Diagnosis Date   • Abnormal ultrasound     RESOLVED: [id number]   • Advice given about COVID-19 virus infection 04/07/2020   • Asthma with acute exacerbation     LAST ASSESSED: [id number]   • Asymptomatic menopausal state    • Atherosclerosis    • Atrial fibrillation (HCC)    • Chronic obstructive lung disease (HCC)    • Congestive heart failure (HCC)     LAST ASSESSED:  [id number]   • COVID-19 virus infection 2023   • Cuboid fracture     LAST ASSESSED: [id number]   • Dyspepsia    • Fall 2024   • GERD (gastroesophageal reflux disease)    • High risk medication use     LAST ASSESSED: 2014   • Hyperlipidemia    • Hypertension    • Hypokalemia    • Hypothyroidism    • Impacted cerumen of both ears     LAST ASSESSED: [id number]   • Impacted cerumen of right ear     LAST ASSESSED: [id number]   • Influenza     LAST ASSESSED: [id number]   • IPMN (intraductal papillary mucinous neoplasm)     RESOLVED: 2015   • Limb swelling     LAST ASSESSED: 2014   • Navicular fracture of ankle     LAST ASSESSED: 2014   • Onychomycosis     LAST ASSESSED: 2015   • Osteoarthritis    • Osteopenia    • Osteoporosis    • Paronychia, finger, unspecified laterality     LAST ASSESSED: 2013   • Paroxysmal atrial fibrillation (HCC)     LAST ASSESSED: 2014   • Peripheral vascular disease (HCC)    • Plantar fasciitis    • Talus fracture     LAST ASSESSED: 2014   • Vascular headache      Past Surgical History:   Procedure Laterality Date   • APPENDECTOMY     • CATARACT EXTRACTION Bilateral    • CHOLECYSTECTOMY     • NEUROPLASTY / TRANSPOSITION MEDIAN NERVE AT CARPAL TUNNEL BILATERAL Bilateral     DECOMPRESSION   • OOPHORECTOMY Bilateral     age 81   • PELVIC FLOOR REPAIR  2014   • SINUS SURGERY     • TOTAL KNEE ARTHROPLASTY Bilateral      Social History     Socioeconomic History   • Marital status:      Spouse name: None   • Number of children: None   • Years of education: None   • Highest education level: None   Occupational History   • Occupation: retired   Tobacco Use   • Smoking status: Former     Current packs/day: 0.00     Average packs/day: 0.3 packs/day for 2.0 years (0.5 ttl pk-yrs)     Types: Cigarettes     Start date:      Quit date:      Years since quittin.5   • Smokeless tobacco: Never   • Tobacco comments:     On and off socially with  friends    Vaping Use   • Vaping status: Never Used   Substance and Sexual Activity   • Alcohol use: Not Currently     Comment: SOCIAL   • Drug use: No   • Sexual activity: Not Currently     Partners: Male     Birth control/protection: Post-menopausal   Other Topics Concern   • None   Social History Narrative    DAILY COFFEE CONSUMPTION (2 CUPS/DAY)    EXERCISING REGULARLY     Social Determinants of Health     Financial Resource Strain: Not on file   Food Insecurity: Not on file   Transportation Needs: Not on file   Physical Activity: Not on file   Stress: Not on file   Social Connections: Not on file   Intimate Partner Violence: Not on file   Housing Stability: Not on file        Current Outpatient Medications:   •  albuterol (2.5 mg/3 mL) 0.083 % nebulizer solution, Take 3 mL (2.5 mg total) by nebulization every 8 (eight) hours, Disp: 270 mL, Rfl: 3  •  albuterol (Proventil HFA) 90 mcg/act inhaler, Inhale 2 puffs every 6 (six) hours as needed for wheezing, Disp: 20.1 g, Rfl: 3  •  albuterol (PROVENTIL HFA,VENTOLIN HFA) 90 mcg/act inhaler, Inhale, Disp: , Rfl:   •  cetirizine (ZyrTEC) 10 mg tablet, Take 1 tablet (10 mg total) by mouth daily (Patient not taking: Reported on 5/1/2024), Disp: 30 tablet, Rfl: 6  •  Cholecalciferol (VITAMIN D3) 1000 units CAPS, Take 2,000 Units by mouth daily, Disp: , Rfl:   •  clindamycin (CLEOCIN) 300 MG capsule, Take 1 capsule (300 mg total) by mouth 4 (four) times a day for 10 days, Disp: 40 capsule, Rfl: 0  •  fluticasone (FLONASE) 50 mcg/act nasal spray, USE 2 SPRAYS IN EACH NOSTRIL ONCE DAILY, Disp: 48 g, Rfl: 3  •  Fluticasone-Salmeterol (Advair Diskus) 100-50 mcg/dose inhaler, Inhale 1 puff 2 (two) times a day Rinse mouth after use., Disp: 180 blister, Rfl: 1  •  levothyroxine 50 mcg tablet, TAKE 1 TABLET DAILY BUT 1-1/2 TABLETS 4 DAYS/WEEK, Disp: 40 tablet, Rfl: 5  •  metoprolol succinate (TOPROL-XL) 100 mg 24 hr tablet, Take 0.5 tablets (50 mg total) by mouth 2 (two) times a  "day, Disp: , Rfl:   •  sodium chloride 3 % inhalation solution, Take 4 mL by nebulization 3 (three) times a day, Disp: 360 mL, Rfl: 2  •  spironolactone (ALDACTONE) 50 mg tablet, Take 1 tablet (50 mg total) by mouth daily, Disp: 90 tablet, Rfl: 3  •  torsemide (DEMADEX) 20 mg tablet, Take 2 tablets (40 mg total) by mouth 2 (two) times a day Take 3 tablets first thing in the morning, then again at approximately 2 or 3 PM, for the next 4 to 7 days until weight reaches 116 pounds, Disp: , Rfl:   •  warfarin (COUMADIN) 5 mg tablet, TAKE 1/2 TO 1 TABLET BY MOUTH DAILY OR AS ORDERED BY PHYSICIAN., Disp: 90 tablet, Rfl: 3  No current facility-administered medications for this visit.  Family History   Problem Relation Age of Onset   • Pancreatic cancer Mother 93   • Heart failure Mother    • Coronary artery disease Family    • Breast cancer Family    • Other Family         BREAST DISORDER, GASTROINTESTINAL CANCER   • Uterine cancer Family    • Hyperlipidemia Family    • Osteoarthritis Family    • Ovarian cancer Family    • Brain cancer Son    • Stroke Father    • No Known Problems Sister    • No Known Problems Daughter    • No Known Problems Maternal Grandmother    • No Known Problems Maternal Grandfather    • No Known Problems Paternal Grandmother    • No Known Problems Paternal Grandfather    • Breast cancer Cousin 50   • Breast cancer Cousin 50   • Ovarian cancer Other 49      Review of Systems  Allergies:  Asa [aspirin] and Nsaids      Objective:  /64   Pulse 94   Temp 97.5 °F (36.4 °C)   Resp 18   Ht 5' 4\" (1.626 m)   Wt 52.2 kg (115 lb)   LMP  (LMP Unknown)   BMI 19.74 kg/m²     Physical Exam      Wound 06/28/24 Neuropathic Plantar Left;Plantar (Active)   Wound Image Images linked 06/28/24 0931   Wound Description Pink 06/28/24 0934   Susu-wound Assessment Scaly;Dry;Callus 06/28/24 0934   Wound Length (cm) 0.4 cm 06/28/24 0934   Wound Width (cm) 0.5 cm 06/28/24 0934   Wound Depth (cm) 0.5 cm 06/28/24 0934 " "  Wound Surface Area (cm^2) 0.2 cm^2 06/28/24 0934   Wound Volume (cm^3) 0.1 cm^3 06/28/24 0934   Calculated Wound Volume (cm^3) 0.1 cm^3 06/28/24 0934   Drainage Amount Small 06/28/24 0934   Drainage Description Serosanguineous 06/28/24 0934   Non-staged Wound Description Full thickness 06/28/24 0934   Dressing Status Intact 06/28/24 0934                  Procedures     Results from last 6 Months   Lab Units 06/18/24  1058   WOUND CULTURE  1+ Growth of Candida albicans*  1+ Growth of           Wound Instructions:  Orders Placed This Encounter   Procedures   • Wound cleansing and dressings Neuropathic Left;Plantar Plantar     Wound location left plantar foot.   Change dressing 3x per week.   The dressing must stay dry and intact. You may shower with dressing protected by cast cover or bag. Or you may sponge bathe. Call wound center or home health nurse if dressing becomes wet.   Cleanse the wound with mild soap and water or normal saline, pat dry.   Felt pad applied to periwound for offloading.   Please leave this in place when changing the dressing.    Apply Silver Alginate to wound bed.    Cover with ABD pad.   Secure with cast padding then roll gauze and tape.     Standing Status:   Future     Standing Expiration Date:   7/5/2024   • Wound Procedure Treatment Neuropathic Left;Plantar Plantar     This order was created via procedure documentation         Jonny Marcial DPM      Portions of the record may have been created with voice recognition software. Occasional wrong word or \"sound a like\" substitutions may have occurred due to the inherent limitations of voice recognition software. Read the chart carefully and recognize, using context, where substitutions have occurred.      "

## 2024-07-02 ENCOUNTER — ANTICOAG VISIT (OUTPATIENT)
Dept: CARDIOLOGY CLINIC | Facility: CLINIC | Age: 89
End: 2024-07-02

## 2024-07-02 DIAGNOSIS — I48.21 PERMANENT ATRIAL FIBRILLATION (HCC): Primary | Chronic | ICD-10-CM

## 2024-07-02 LAB
INR PPP: 2.9
PROTHROMBIN TIME: 30.4 SEC (ref 12–14.6)

## 2024-07-03 ENCOUNTER — TELEPHONE (OUTPATIENT)
Age: 89
End: 2024-07-03

## 2024-07-03 NOTE — TELEPHONE ENCOUNTER
Caller: Jennifer - Pre Admission for Rebcea Banegas    Doctor: Aniket Wallis    Reason for call: Is Rebeca going to be in the OR on Friday?ha  She will need to see her PCP if so.  Can someone reach out to Jennifer re this?  Thank you.     Call back#: [phone]

## 2024-07-08 ENCOUNTER — TELEPHONE (OUTPATIENT)
Dept: CARDIOLOGY CLINIC | Facility: CLINIC | Age: 89
End: 2024-07-08

## 2024-07-08 NOTE — TELEPHONE ENCOUNTER
Jammie Varela RN  P [facility] [place]  In need of warfarin instructions pre op for Left foot debridement of wound and Bone biopsy of proximal phalanx second toe and second metatarsal.  Surgery is on 7/12/2024.          Left foot debridement of wound - Left with Jonny Marcial DPM on 7/12/2024      Dr. Vyas is off.    Please advise

## 2024-07-10 ENCOUNTER — OFFICE VISIT (OUTPATIENT)
Dept: WOUND CARE | Facility: HOSPITAL | Age: 89
End: 2024-07-10
Payer: COMMERCIAL

## 2024-07-10 VITALS
RESPIRATION RATE: 16 BRPM | DIASTOLIC BLOOD PRESSURE: 64 MMHG | TEMPERATURE: 97 F | HEART RATE: 94 BPM | SYSTOLIC BLOOD PRESSURE: 123 MMHG

## 2024-07-10 DIAGNOSIS — L97.526 NON-PRESSURE CHRONIC ULCER OF OTHER PART OF LEFT FOOT WITH BONE INVOLVEMENT WITHOUT EVIDENCE OF NECROSIS (HCC): Primary | ICD-10-CM

## 2024-07-10 PROCEDURE — 97597 DBRDMT OPN WND 1ST 20 CM/<: CPT | Performed by: PODIATRIST

## 2024-07-10 NOTE — PROGRESS NOTES
Wound Procedure Treatment Neuropathic Left;Plantar Plantar    Performed by: Brenda Peter RN  Authorized by: Jonny Marcial DPM    Associated wounds:   Wound 06/28/24 Neuropathic Plantar Left;Plantar  Wound cleansed with:  Wound aggrssively cleansed with NSS and gauze  Applied to periwound:  Skin prep  Applied primary dressing:  Silver and Calcium alginate  Applied secondary dressing:  Cast padding and ABD  Dressing secured with:  Ricky and Tape  Offloading device appllied:  Felt padding 1/4 inch and Surgical shoe

## 2024-07-10 NOTE — PATIENT INSTRUCTIONS
Orders Placed This Encounter   Procedures    Wound cleansing and dressings Neuropathic Left;Plantar Plantar     Wound location left plantar foot.    Change dressing 3x per week.    The dressing must stay dry and intact. You may shower with dressing protected by cast cover or bag. Or you may sponge bathe. Call wound center or home health nurse if dressing becomes wet.  Cleanse the wound with mild soap and water or normal saline, pat dry.    2 layers of felt pad applied to periwound for offloading.   Please leave this in place when changing the dressing.      Apply Silver Alginate to wound bed.    Cover with ABD pad.  Secure with cast padding then roll gauze and tape.      Dr. Marcial will call your daughter to confirm the scheduling for your bone biopsy of the 2nd toe and metatarsal.     Standing Status:   Future     Standing Expiration Date:   7/17/2024

## 2024-07-10 NOTE — PROGRESS NOTES
Patient ID: Rebeca Banegas is a 89 y.o. female Date of Birth 7/22/1932     Diagnosis:  1. 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  She is under more clear understanding of the procedure at this point.    She said that she was overwhelmed with all of the appointments and calls that she had received.    Will plan on proceeding with debridement of the ulceration with bone biopsy evaluation of the second metatarsal in addition to the second proximal phalanx.    We delayed the surgical intervention as the patient wanted to have further discussion regarding the procedure.  At this point given that she wishes to proceed we will plan on proceeding at this time.    Patient's pulses are palpable.  Will continue offloading and protection of the area.    Selective debridement completed today to the area.    Discussed with the patient's daughter the surgical procedure and the patient's current condition.    She did voice the interest in considering physical therapy evaluation as her mother has been less active with ambulation.  The consideration for knee scooter could be entertained, however I would want her to be evaluated by physical therapy to make sure that she was able to tolerate this.      If the patient notices any  systemic signs of infection including but not limited to fever, chills, nausea, vomiting or significant worsening of wound with increased drainage, redness or streaking up the foot or leg the patient should notify the office or present to the emergency room.      If wound debridement was completed today this was done in an attempt to promote healing, decrease risk of infection and for limb salvage efforts.     Goal of treatment: wound healing     Efforts to decrease pressure on the wound(s), evaluation and discussion of nutritional status, peripheral vascular status, and infection control have all been addressed with this patient.    Return in about 2 weeks (around 7/24/2024) for left foot, wound care.      Chief Complaint   Patient presents with   • Follow Up Wound Care Visit     Pt reported she changed dressing last night due to discomfort. Assessment of plantar left foot. Surgical shoe on.           Subjective:   Patient returns for follow-up on ulceration to the second metatarsal area.  She had positive Ceretec as well as MRI there was concern for deeper involvement.  I did schedule her for biopsy of the bone at the second metatarsal and second proximal phalanx in addition to debridement of the ulceration.  She was confused when she received a call, therefore the procedure was delayed.          The following portions of the patient's history were reviewed and updated as appropriate:   Patient Active Problem List   Diagnosis   • Abnormal findings on diagnostic imaging of other specified body structures   • Abnormal findings on diagnostic imaging of urinary organs   • Allergic rhinitis   • Arthritis   • Asthma   • Atherosclerosis   • Permanent atrial fibrillation (HCC)   • Backache   • Benign colon polyp   • Bifascicular bundle branch block   • Chronic diastolic congestive heart failure (HCC)   • Esophageal reflux   • Essential tremor   • Hoarseness   • Hyperlipidemia   • Hypertension   • Hypothyroidism   • Leg  pain   • Nasal polyp   • Onychomycosis   • Ovarian cyst   • Pancreatic cyst   • Plantar fasciitis   • Prediabetes   • Pulmonary nodule   • Thyroid nodule   • Vitamin D deficiency   • Pain of left hand   • Leg cramps   • Sensorineural hearing loss (SNHL), bilateral   • Left asymmetrical SNHL   • Elevated serum immunoglobulin free light chains   • Osteopenia of hip   • Stage 3b chronic kidney disease (HCC)   • Labial abscess   • Vulvar cysts   • Open wound of right upper arm   • Secondary hyperparathyroidism (HCC)   • Age-related osteoporosis without current pathological fracture   • Chronic cough   • MGUS (monoclonal gammopathy of unknown significance)   • Bronchiectasis without complication (HCC)   • Vaccine counseling   • Acute on chronic diastolic CHF (congestive heart failure) (HCC)   • Chronic foot ulcer, left, with fat layer exposed (HCC)   • 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)     Past Medical History:   Diagnosis Date   • Abnormal ultrasound     RESOLVED: [id number]   • Advice given about COVID-19 virus infection 04/07/2020   • Asthma with acute exacerbation     LAST ASSESSED: [id number]   • Asymptomatic menopausal state    • Atherosclerosis    • Atrial fibrillation (HCC)    • Chronic obstructive lung disease (HCC)    • Congestive heart failure (HCC)     LAST ASSESSED: [id number]   • COVID-19 virus infection 03/25/2023   • Cuboid fracture     LAST ASSESSED: [id number]   • Dyspepsia    • Fall 04/01/2024   • GERD (gastroesophageal reflux disease)    • High risk medication use     LAST ASSESSED: [id number]   • Hyperlipidemia    • Hypertension    • Hypokalemia    • Hypothyroidism    • Impacted cerumen of both ears     LAST ASSESSED: [id number]   • Impacted cerumen of right ear     LAST ASSESSED: [id number]   • Influenza     LAST ASSESSED: [id number]   • IPMN (intraductal papillary mucinous neoplasm)     RESOLVED: 02OCT2015   • Limb swelling     LAST ASSESSED: 19MAY2014   • Navicular fracture of  ankle     LAST ASSESSED: 2014   • Onychomycosis     LAST ASSESSED: 2015   • Osteoarthritis    • Osteopenia    • Osteoporosis    • Paronychia, finger, unspecified laterality     LAST ASSESSED: 2013   • Paroxysmal atrial fibrillation (HCC)     LAST ASSESSED: 2014   • Peripheral vascular disease (HCC)    • Plantar fasciitis    • Talus fracture     LAST ASSESSED: 2014   • Vascular headache      Past Surgical History:   Procedure Laterality Date   • APPENDECTOMY     • CATARACT EXTRACTION Bilateral    • CHOLECYSTECTOMY     • NEUROPLASTY / TRANSPOSITION MEDIAN NERVE AT CARPAL TUNNEL BILATERAL Bilateral     DECOMPRESSION   • OOPHORECTOMY Bilateral     age 81   • PELVIC FLOOR REPAIR  2014   • SINUS SURGERY     • TOTAL KNEE ARTHROPLASTY Bilateral      Social History     Socioeconomic History   • Marital status:      Spouse name: None   • Number of children: None   • Years of education: None   • Highest education level: None   Occupational History   • Occupation: retired   Tobacco Use   • Smoking status: Former     Current packs/day: 0.00     Average packs/day: 0.3 packs/day for 2.0 years (0.5 ttl pk-yrs)     Types: Cigarettes     Start date:      Quit date:      Years since quittin.5   • Smokeless tobacco: Never   • Tobacco comments:     On and off socially with friends    Vaping Use   • Vaping status: Never Used   Substance and Sexual Activity   • Alcohol use: Not Currently     Comment: SOCIAL   • Drug use: No   • Sexual activity: Not Currently     Partners: Male     Birth control/protection: Post-menopausal   Other Topics Concern   • None   Social History Narrative    DAILY COFFEE CONSUMPTION (2 CUPS/DAY)    EXERCISING REGULARLY     Social Determinants of Health     Financial Resource Strain: Not on file   Food Insecurity: Not on file   Transportation Needs: Not on file   Physical Activity: Not on file   Stress: Not on file   Social Connections: Not on file   Intimate Partner  Violence: Not on file   Housing Stability: Not on file        Current Outpatient Medications:   •  albuterol (2.5 mg/3 mL) 0.083 % nebulizer solution, Take 3 mL (2.5 mg total) by nebulization every 8 (eight) hours, Disp: 270 mL, Rfl: 3  •  albuterol (Proventil HFA) 90 mcg/act inhaler, Inhale 2 puffs every 6 (six) hours as needed for wheezing, Disp: 20.1 g, Rfl: 3  •  albuterol (PROVENTIL HFA,VENTOLIN HFA) 90 mcg/act inhaler, Inhale, Disp: , Rfl:   •  cetirizine (ZyrTEC) 10 mg tablet, Take 1 tablet (10 mg total) by mouth daily (Patient not taking: Reported on 5/1/2024), Disp: 30 tablet, Rfl: 6  •  Cholecalciferol (VITAMIN D3) 1000 units CAPS, Take 2,000 Units by mouth daily, Disp: , Rfl:   •  fluticasone (FLONASE) 50 mcg/act nasal spray, USE 2 SPRAYS IN EACH NOSTRIL ONCE DAILY, Disp: 48 g, Rfl: 3  •  Fluticasone-Salmeterol (Advair Diskus) 100-50 mcg/dose inhaler, Inhale 1 puff 2 (two) times a day Rinse mouth after use., Disp: 180 blister, Rfl: 1  •  levothyroxine 50 mcg tablet, TAKE 1 TABLET DAILY BUT 1-1/2 TABLETS 4 DAYS/WEEK, Disp: 40 tablet, Rfl: 5  •  metoprolol succinate (TOPROL-XL) 100 mg 24 hr tablet, Take 0.5 tablets (50 mg total) by mouth 2 (two) times a day, Disp: , Rfl:   •  sodium chloride 3 % inhalation solution, Take 4 mL by nebulization 3 (three) times a day, Disp: 360 mL, Rfl: 2  •  spironolactone (ALDACTONE) 50 mg tablet, Take 1 tablet (50 mg total) by mouth daily, Disp: 90 tablet, Rfl: 3  •  torsemide (DEMADEX) 20 mg tablet, Take 2 tablets (40 mg total) by mouth 2 (two) times a day Take 3 tablets first thing in the morning, then again at approximately 2 or 3 PM, for the next 4 to 7 days until weight reaches 116 pounds, Disp: , Rfl:   •  warfarin (COUMADIN) 5 mg tablet, TAKE 1/2 TO 1 TABLET BY MOUTH DAILY OR AS ORDERED BY PHYSICIAN., Disp: 90 tablet, Rfl: 3  Family History   Problem Relation Age of Onset   • Pancreatic cancer Mother 93   • Heart failure Mother    • Coronary artery disease Family    •  Breast cancer Family    • Other Family         BREAST DISORDER, GASTROINTESTINAL CANCER   • Uterine cancer Family    • Hyperlipidemia Family    • Osteoarthritis Family    • Ovarian cancer Family    • Brain cancer Son    • Stroke Father    • No Known Problems Sister    • No Known Problems Daughter    • No Known Problems Maternal Grandmother    • No Known Problems Maternal Grandfather    • No Known Problems Paternal Grandmother    • No Known Problems Paternal Grandfather    • Breast cancer Cousin 50   • Breast cancer Cousin 50   • Ovarian cancer Other 49      Review of Systems  Allergies:  Asa [aspirin] and Nsaids      Objective:  /64   Pulse 94   Temp (!) 97 °F (36.1 °C)   Resp 16   LMP  (LMP Unknown)     Physical Exam      Wound 06/28/24 Neuropathic Plantar Left;Plantar (Active)   Wound Image Images linked 07/10/24 1409   Wound Description Pink;White 07/10/24 1409   Susu-wound Assessment Scaly;Dry;Callus 07/10/24 1409   Wound Length (cm) 0.4 cm 07/10/24 1409   Wound Width (cm) 0.3 cm 07/10/24 1409   Wound Depth (cm) 0.8 cm (probs to bone) 07/10/24 1409   Wound Surface Area (cm^2) 0.12 cm^2 07/10/24 1409   Wound Volume (cm^3) 0.096 cm^3 07/10/24 1409   Calculated Wound Volume (cm^3) 0.1 cm^3 07/10/24 1409   Change in Wound Size % 0 07/10/24 1409   Number of underminings 1 07/10/24 1409   Undermining 1 0.3 07/10/24 1409   Undermining 1 is depth extending from 12-12 07/10/24 1409   Drainage Amount Small 07/10/24 1409   Drainage Description Serosanguineous;Yellow 07/10/24 1409   Non-staged Wound Description Full thickness 07/10/24 1409   Wound packed? No 07/10/24 1409   Dressing Status Intact;Old drainage 07/10/24 1409                  Debridement   Wound 06/28/24 Neuropathic Plantar Left;Plantar    Universal Protocol:  Procedure performed by: (Viviana Saravia PGY1)  Consent: Verbal consent obtained.  Risks and benefits: risks, benefits and alternatives were discussed  Consent given by: patient  Time out:  "Immediately prior to procedure a \"time out\" was called to verify the correct patient, procedure, equipment, support staff and site/side marked as required.  Patient understanding: patient states understanding of the procedure being performed  Patient identity confirmed: verbally with patient    Debridement Details  Performed by: resident  Debridement type: selective  Pain control: lidocaine 4%      Post-debridement measurements  Length (cm): 0.4  Width (cm): 0.3  Depth (cm): 0.8  Percent debrided: 100%  Surface Area (cm^2): 0.12  Area Debrided (cm^2): 0.12  Volume (cm^3): 0.1    Devitalized tissue debrided: biofilm, callus and fibrin  Instrument(s) utilized: blade  Bleeding: small  Hemostasis obtained with: pressure  Procedural pain (0-10): insensate  Post-procedural pain: insensate   Response to treatment: procedure was tolerated well         Results from last 6 Months   Lab Units 06/18/24  1058   WOUND CULTURE  1+ Growth of Candida albicans*  1+ Growth of           Wound Instructions:  Orders Placed This Encounter   Procedures   • Wound cleansing and dressings Neuropathic Left;Plantar Plantar     Wound location left plantar foot.    Change dressing 3x per week.    The dressing must stay dry and intact. You may shower with dressing protected by cast cover or bag. Or you may sponge bathe. Call wound center or home health nurse if dressing becomes wet.  Cleanse the wound with mild soap and water or normal saline, pat dry.    2 layers of felt pad applied to periwound for offloading.   Please leave this in place when changing the dressing.      Apply Silver Alginate to wound bed.    Cover with ABD pad.  Secure with cast padding then roll gauze and tape.      Dr. Marcial will call your daughter to confirm the scheduling for your bone biopsy of the 2nd toe and metatarsal.     Standing Status:   Future     Standing Expiration Date:   7/17/2024   • Wound Procedure Treatment Neuropathic Left;Plantar Plantar     This order " "was created via procedure documentation   • Debridement Neuropathic Left;Plantar Plantar     This order was created via procedure documentation         Jonny Marcial DPM      Portions of the record may have been created with voice recognition software. Occasional wrong word or \"sound a like\" substitutions may have occurred due to the inherent limitations of voice recognition software. Read the chart carefully and recognize, using context, where substitutions have occurred.    "

## 2024-07-16 ENCOUNTER — TELEPHONE (OUTPATIENT)
Dept: PULMONOLOGY | Facility: CLINIC | Age: 89
End: 2024-07-16

## 2024-07-16 ENCOUNTER — ANTICOAG VISIT (OUTPATIENT)
Dept: CARDIOLOGY CLINIC | Facility: CLINIC | Age: 89
End: 2024-07-16

## 2024-07-16 DIAGNOSIS — I48.21 PERMANENT ATRIAL FIBRILLATION (HCC): Primary | Chronic | ICD-10-CM

## 2024-07-16 NOTE — TELEPHONE ENCOUNTER
Called patient to schedule appointment for the 16W FU from the  Recall List and left a voicemail message.

## 2024-07-24 ENCOUNTER — OFFICE VISIT (OUTPATIENT)
Dept: WOUND CARE | Facility: HOSPITAL | Age: 89
End: 2024-07-24
Payer: COMMERCIAL

## 2024-07-24 VITALS
DIASTOLIC BLOOD PRESSURE: 69 MMHG | SYSTOLIC BLOOD PRESSURE: 113 MMHG | TEMPERATURE: 96.7 F | HEART RATE: 94 BPM | RESPIRATION RATE: 16 BRPM

## 2024-07-24 DIAGNOSIS — L97.526 NON-PRESSURE CHRONIC ULCER OF OTHER PART OF LEFT FOOT WITH BONE INVOLVEMENT WITHOUT EVIDENCE OF NECROSIS (HCC): Primary | ICD-10-CM

## 2024-07-24 PROCEDURE — 11042 DBRDMT SUBQ TIS 1ST 20SQCM/<: CPT | Performed by: PODIATRIST

## 2024-07-24 RX ORDER — LIDOCAINE 40 MG/G
CREAM TOPICAL ONCE
Status: COMPLETED | OUTPATIENT
Start: 2024-07-24 | End: 2024-07-24

## 2024-07-24 RX ADMIN — LIDOCAINE: 40 CREAM TOPICAL at 13:00

## 2024-07-24 NOTE — PROGRESS NOTES
Patient ID: Rebeca Banegas is a 89 y.o. female Date of Birth 7/22/1932     Diagnosis:  1. 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    She is scheduled for surgical invention on 8/9/2024 for biopsy of the bone as well as debridement of the ulceration.    Will plan on checking her back for reevaluation in 3 weeks from today here at the wound center after her surgery.          If the patient notices any systemic signs of infection including but not limited to fever, chills, nausea, vomiting or significant worsening of wound with increased drainage, redness or streaking up the foot or leg the patient should notify the office or present to the emergency room.      If wound debridement was completed today this was done in an attempt to promote healing, decrease risk of infection and for limb salvage efforts.     Goal of treatment: wound healing     Efforts to decrease pressure on the wound(s), evaluation and discussion of nutritional status, peripheral vascular status, and infection control have all been addressed with this patient.    Return for Follow up with Dr. Marcial.      Chief Complaint   Patient presents with   • Follow Up Wound Care Visit     Left plantar foot wound. Arrived with dressing  intact and wearing surgical shoe.           Subjective:   Patient returns for follow-up on plantar left foot subtwo ulceration.  She had vascular studies completed which were within normal limits.  She is scheduled for a biopsy of the second metatarsal and second proximal phalanx due to concern for underlying osteomyelitis.  Her wound did improve slightly from offloading and protection to the area.  She denies any fevers chills nausea vomiting or other acute issues.        The following portions of the patient's history were reviewed and updated as appropriate:   Patient Active Problem List   Diagnosis   • Abnormal findings on diagnostic imaging of other specified body structures   • Abnormal findings on diagnostic imaging of urinary organs   • Allergic rhinitis   • Arthritis   • Asthma   • Atherosclerosis   • Permanent atrial fibrillation (HCC)   • Backache   • Benign colon polyp   • Bifascicular bundle branch block   • Chronic diastolic congestive heart failure (HCC)   • Esophageal reflux   • Essential tremor   • Hoarseness   • Hyperlipidemia   • Hypertension   • Hypothyroidism   • Leg pain   • Nasal polyp   • Onychomycosis   • Ovarian cyst   • Pancreatic cyst   • Plantar fasciitis   • Prediabetes   • Pulmonary nodule   • Thyroid nodule   • Vitamin D deficiency   • Pain of left hand   • Leg cramps   • Sensorineural hearing loss (SNHL), bilateral   • Left asymmetrical SNHL   • Elevated serum immunoglobulin free light chains   • Osteopenia of hip   • Stage 3b chronic kidney disease (HCC)   • Labial abscess   • Vulvar cysts   • Open wound of right upper arm   • Secondary hyperparathyroidism ([facility])   • Age-related osteoporosis without current pathological fracture   • Chronic cough   • MGUS (monoclonal gammopathy of unknown significance)   • Bronchiectasis without complication ([facility])   • Vaccine counseling   • Acute on chronic diastolic CHF (congestive heart failure) ([facility])   • Chronic foot ulcer, left, with fat layer  exposed (HCC)   • 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)     Past Medical History:   Diagnosis Date   • Abnormal ultrasound     RESOLVED: 26JUN2015   • Advice given about COVID-19 virus infection 04/07/2020   • Asthma with acute exacerbation     LAST ASSESSED: [id number]   • Asymptomatic menopausal state    • Atherosclerosis    • Atrial fibrillation (HCC)    • Chronic obstructive lung disease (HCC)    • Congestive heart failure (HCC)     LAST ASSESSED: [id number]   • COVID-19 virus infection 03/25/2023   • Cuboid fracture     LAST ASSESSED: 10ABF1232   • Dyspepsia    • Fall 04/01/2024   • GERD (gastroesophageal reflux disease)    • High risk medication use     LAST ASSESSED: 19MAY2014   • Hyperlipidemia    • Hypertension    • Hypokalemia    • Hypothyroidism    • Impacted cerumen of both ears     LAST ASSESSED: [id number]   • Impacted cerumen of right ear     LAST ASSESSED: 88SNO6548   • Influenza     LAST ASSESSED: 67XJU5294   • IPMN (intraductal papillary mucinous neoplasm)     RESOLVED: 02OCT2015   • Limb swelling     LAST ASSESSED: 19MAY2014   • Navicular fracture of ankle     LAST ASSESSED: 22MAY2014   • Onychomycosis     LAST ASSESSED: 74IHM8606   • Osteoarthritis    • Osteopenia    • Osteoporosis    • Paronychia, finger, unspecified laterality     LAST ASSESSED: 27PDB2358   • Paroxysmal atrial fibrillation (HCC)     LAST ASSESSED: 02MAR2014   • Peripheral vascular disease (HCC)    • Plantar fasciitis    • Talus fracture     LAST ASSESSED: 54CGX7808   • Vascular headache      Past Surgical History:   Procedure Laterality Date   • APPENDECTOMY     • CATARACT EXTRACTION Bilateral    • CHOLECYSTECTOMY     • NEUROPLASTY / TRANSPOSITION MEDIAN NERVE AT CARPAL TUNNEL BILATERAL Bilateral     DECOMPRESSION   • OOPHORECTOMY Bilateral     age 81   • PELVIC FLOOR REPAIR  2014   • SINUS SURGERY     • TOTAL KNEE ARTHROPLASTY Bilateral      Social History     Socioeconomic History   • Marital status:       Spouse name: None   • Number of children: None   • Years of education: None   • Highest education level: None   Occupational History   • Occupation: retired   Tobacco Use   • Smoking status: Former     Current packs/day: 0.00     Average packs/day: 0.3 packs/day for 2.0 years (0.5 ttl pk-yrs)     Types: Cigarettes     Start date:      Quit date:      Years since quittin.6   • Smokeless tobacco: Never   • Tobacco comments:     On and off socially with friends    Vaping Use   • Vaping status: Never Used   Substance and Sexual Activity   • Alcohol use: Not Currently     Comment: SOCIAL   • Drug use: No   • Sexual activity: Not Currently     Partners: Male     Birth control/protection: Post-menopausal   Other Topics Concern   • None   Social History Narrative    DAILY COFFEE CONSUMPTION (2 CUPS/DAY)    EXERCISING REGULARLY     Social Determinants of Health     Financial Resource Strain: Not on file   Food Insecurity: Not on file   Transportation Needs: Not on file   Physical Activity: Not on file   Stress: Not on file   Social Connections: Not on file   Intimate Partner Violence: Not on file   Housing Stability: Not on file        Current Outpatient Medications:   •  albuterol (2.5 mg/3 mL) 0.083 % nebulizer solution, Take 3 mL (2.5 mg total) by nebulization every 8 (eight) hours, Disp: 270 mL, Rfl: 3  •  albuterol (Proventil HFA) 90 mcg/act inhaler, Inhale 2 puffs every 6 (six) hours as needed for wheezing, Disp: 20.1 g, Rfl: 3  •  albuterol (PROVENTIL HFA,VENTOLIN HFA) 90 mcg/act inhaler, Inhale, Disp: , Rfl:   •  cetirizine (ZyrTEC) 10 mg tablet, Take 1 tablet (10 mg total) by mouth daily (Patient not taking: Reported on 2024), Disp: 30 tablet, Rfl: 6  •  Cholecalciferol (VITAMIN D3) 1000 units CAPS, Take 2,000 Units by mouth daily, Disp: , Rfl:   •  fluticasone (FLONASE) 50 mcg/act nasal spray, USE 2 SPRAYS IN EACH NOSTRIL ONCE DAILY, Disp: 48 g, Rfl: 3  •  Fluticasone-Salmeterol (Advair  Diskus) 100-50 mcg/dose inhaler, Inhale 1 puff 2 (two) times a day Rinse mouth after use., Disp: 180 blister, Rfl: 1  •  levothyroxine 50 mcg tablet, TAKE 1 TABLET DAILY BUT 1-1/2 TABLETS 4 DAYS/WEEK, Disp: 40 tablet, Rfl: 5  •  metoprolol succinate (TOPROL-XL) 100 mg 24 hr tablet, Take 0.5 tablets (50 mg total) by mouth 2 (two) times a day, Disp: , Rfl:   •  sodium chloride 3 % inhalation solution, Take 4 mL by nebulization 3 (three) times a day, Disp: 360 mL, Rfl: 2  •  spironolactone (ALDACTONE) 50 mg tablet, Take 1 tablet (50 mg total) by mouth daily, Disp: 90 tablet, Rfl: 3  •  torsemide (DEMADEX) 20 mg tablet, Take 2 tablets (40 mg total) by mouth 2 (two) times a day Take 3 tablets first thing in the morning, then again at approximately 2 or 3 PM, for the next 4 to 7 days until weight reaches 116 pounds, Disp: , Rfl:   •  warfarin (COUMADIN) 5 mg tablet, TAKE 1/2 TO 1 TABLET BY MOUTH DAILY OR AS ORDERED BY PHYSICIAN., Disp: 90 tablet, Rfl: 3  No current facility-administered medications for this visit.  Family History   Problem Relation Age of Onset   • Pancreatic cancer Mother 93   • Heart failure Mother    • Coronary artery disease Family    • Breast cancer Family    • Other Family         BREAST DISORDER, GASTROINTESTINAL CANCER   • Uterine cancer Family    • Hyperlipidemia Family    • Osteoarthritis Family    • Ovarian cancer Family    • Brain cancer Son    • Stroke Father    • No Known Problems Sister    • No Known Problems Daughter    • No Known Problems Maternal Grandmother    • No Known Problems Maternal Grandfather    • No Known Problems Paternal Grandmother    • No Known Problems Paternal Grandfather    • Breast cancer Cousin 50   • Breast cancer Cousin 50   • Ovarian cancer Other 49      Review of Systems  Allergies:  Asa [aspirin] and Nsaids      Objective:  /69   Pulse 94   Temp (!) 96.7 °F (35.9 °C)   Resp 16   LMP  (LMP Unknown)     Physical Exam      Wound 06/28/24 Neuropathic Plantar  "Left;Plantar (Active)   Wound Image Images linked 07/24/24 1340   Wound Description [place] 07/24/24 1321   Susu-wound Assessment Scaly;Dry;Callus;Maceration 07/24/24 1321   Wound Length (cm) 0.2 cm 07/24/24 1321   Wound Width (cm) 0.2 cm 07/24/24 1321   Wound Depth (cm) 0.4 cm 07/24/24 1321   Wound Surface Area (cm^2) 0.04 cm^2 07/24/24 1321   Wound Volume (cm^3) 0.016 cm^3 07/24/24 1321   Calculated Wound Volume (cm^3) 0.02 cm^3 07/24/24 1321   Change in Wound Size % 80 07/24/24 1321   Drainage Amount Small 07/24/24 1321   Drainage Description Serosanguineous 07/24/24 1321   Non-staged Wound Description Full thickness 07/24/24 1321                    Debridement   Wound 06/28/24 Neuropathic Plantar Left;Plantar    Universal Protocol:  Consent: Verbal consent obtained.  Risks and benefits: risks, benefits and alternatives were discussed  Consent given by: patient  Time out: Immediately prior to procedure a \"time out\" was called to verify the correct patient, procedure, equipment, support staff and site/side marked as required.  Patient understanding: patient states understanding of the procedure being performed  Patient identity confirmed: verbally with patient    Debridement Details  Performed by: physician  Debridement type: surgical  Level of debridement: subcutaneous tissue  Pain control: lidocaine 4%      Post-debridement measurements  Length (cm): 0.4  Width (cm): 0.4  Depth (cm): 0.4  Percent debrided: 100%  Surface Area (cm^2): 0.16  Area Debrided (cm^2): 0.16  Volume (cm^3): 0.06    Tissue and other material debrided: subcutaneous tissue  Devitalized tissue debrided: biofilm, callus and fibrin  Instrument(s) utilized: blade  Bleeding: small  Hemostasis obtained with: pressure  Procedural pain (0-10): insensate  Post-procedural pain: insensate   Response to treatment: procedure was tolerated well         Results from last 6 Months   Lab Units 06/18/24  1058   WOUND CULTURE  1+ Growth of Candida albicans*  1+ " "Growth of           Wound Instructions:  Orders Placed This Encounter   Procedures   • Wound cleansing and dressings Neuropathic Left;Plantar Plantar     Wound cleansing and dressings Neuropathic Left;Plantar Plantar   Wound location left plantar foot.     Change dressing 3x per week.     The dressing must stay dry and intact. You may shower with dressing protected by cast cover or bag. Or you may sponge bathe. Call wound center or home health nurse if dressing becomes wet.  Cleanse the wound with mild soap and water or normal saline, pat dry.     2 layers of 1/4\" felt pad to the inside sole of the surgical shoe  Please leave this in place when changing the dressing.       Apply Silver Alginate to wound bed.    Cover with gauze and ABD (as needed for drainage)   Secure with cast padding then rolled gauze and tape.    Surgical shoe in left foot at all times your foot will hit the floor      Please eat 3-4 servings protein in diet daily     Bone biopsy scheduled for Aug 9th at [facility]. Dr. Marcial's surgical scheduler will call you with more details  Short Procedure from [facility] will call the night before surgery with surgery time details of what to do on the day of surgery     Follow up with Dr. Marcial in 3 weeks     Standing Status:   Future     Standing Expiration Date:   7/31/2024   • Debridement Neuropathic Left;Plantar Plantar     This order was created via procedure documentation           Jonny Marcial DPM      Portions of the record may have been created with voice recognition software. Occasional wrong word or \"sound a like\" substitutions may have occurred due to the inherent limitations of voice recognition software. Read the chart carefully and recognize, using context, where substitutions have occurred.    "

## 2024-07-26 ENCOUNTER — TELEPHONE (OUTPATIENT)
Age: 89
End: 2024-07-26

## 2024-07-26 ENCOUNTER — APPOINTMENT (OUTPATIENT)
Dept: LAB | Facility: CLINIC | Age: 89
End: 2024-07-26
Payer: COMMERCIAL

## 2024-07-26 DIAGNOSIS — Z01.818 PRE-OP EVALUATION: ICD-10-CM

## 2024-07-26 DIAGNOSIS — Z01.818 PRE-OP EVALUATION: Primary | ICD-10-CM

## 2024-07-26 LAB
ANION GAP SERPL CALCULATED.3IONS-SCNC: 10 MMOL/L (ref 4–13)
BUN SERPL-MCNC: 44 MG/DL (ref 5–25)
CALCIUM SERPL-MCNC: 9.1 MG/DL (ref 8.4–10.2)
CHLORIDE SERPL-SCNC: 98 MMOL/L (ref 96–108)
CO2 SERPL-SCNC: 26 MMOL/L (ref 21–32)
CREAT SERPL-MCNC: 1.52 MG/DL (ref 0.6–1.3)
ERYTHROCYTE [DISTWIDTH] IN BLOOD BY AUTOMATED COUNT: 13.7 % (ref 11.6–15.1)
GFR SERPL CREATININE-BSD FRML MDRD: 29 ML/MIN/1.73SQ M
GLUCOSE SERPL-MCNC: 73 MG/DL (ref 65–140)
HCT VFR BLD AUTO: 40.9 % (ref 34.8–46.1)
HGB BLD-MCNC: 13 G/DL (ref 11.5–15.4)
MCH RBC QN AUTO: 32.2 PG (ref 26.8–34.3)
MCHC RBC AUTO-ENTMCNC: 31.8 G/DL (ref 31.4–37.4)
MCV RBC AUTO: 101 FL (ref 82–98)
PLATELET # BLD AUTO: 243 THOUSANDS/UL (ref 149–390)
PMV BLD AUTO: 9.2 FL (ref 8.9–12.7)
POTASSIUM SERPL-SCNC: 4.6 MMOL/L (ref 3.5–5.3)
RBC # BLD AUTO: 4.04 MILLION/UL (ref 3.81–5.12)
SODIUM SERPL-SCNC: 134 MMOL/L (ref 135–147)
WBC # BLD AUTO: 9.72 THOUSAND/UL (ref 4.31–10.16)

## 2024-07-26 PROCEDURE — 85007 BL SMEAR W/DIFF WBC COUNT: CPT

## 2024-07-26 PROCEDURE — 36415 COLL VENOUS BLD VENIPUNCTURE: CPT

## 2024-07-26 PROCEDURE — 85027 COMPLETE CBC AUTOMATED: CPT

## 2024-07-26 PROCEDURE — 80048 BASIC METABOLIC PNL TOTAL CA: CPT

## 2024-07-26 NOTE — TELEPHONE ENCOUNTER
As per patient she is having a debridement and biopsy of a wound on her left foot. She is scheduled on 8/9 and the office is requiring a letter clearing her to have light anesthesia. Please fax to Dr. Marcial's office when complete. Patient did not provide the fax but did provide the phone number [phone].

## 2024-07-29 ENCOUNTER — TELEPHONE (OUTPATIENT)
Dept: FAMILY MEDICINE CLINIC | Facility: CLINIC | Age: 89
End: 2024-07-29

## 2024-07-29 LAB
ANISOCYTOSIS BLD QL SMEAR: PRESENT
BASOPHILS # BLD MANUAL: 0.1 THOUSAND/UL (ref 0–0.1)
BASOPHILS NFR MAR MANUAL: 1 % (ref 0–1)
DIFFERENTIAL COMMENT: ABNORMAL
EOSINOPHIL # BLD MANUAL: 1.65 THOUSAND/UL (ref 0–0.4)
EOSINOPHIL NFR BLD MANUAL: 17 % (ref 0–6)
ERYTHROCYTE [DISTWIDTH] IN BLOOD BY AUTOMATED COUNT: 13.7 % (ref 11.6–15.1)
HCT VFR BLD AUTO: 40.9 % (ref 34.8–46.1)
HGB BLD-MCNC: 13 G/DL (ref 11.5–15.4)
LYMPHOCYTES # BLD AUTO: 3.4 THOUSAND/UL (ref 0.6–4.47)
LYMPHOCYTES # BLD AUTO: 35 % (ref 14–44)
MACROCYTES BLD QL AUTO: PRESENT
MCH RBC QN AUTO: 32.2 PG (ref 26.8–34.3)
MCHC RBC AUTO-ENTMCNC: 31.8 G/DL (ref 31.4–37.4)
MCV RBC AUTO: 101 FL (ref 82–98)
MONOCYTES # BLD AUTO: 0.49 THOUSAND/UL (ref 0–1.22)
MONOCYTES NFR BLD: 5 % (ref 4–12)
NEUTROPHILS # BLD MANUAL: 4.08 THOUSAND/UL (ref 1.85–7.62)
NEUTS BAND NFR BLD MANUAL: 5 % (ref 0–8)
NEUTS SEG NFR BLD AUTO: 37 % (ref 43–75)
PATHOLOGY REVIEW: YES
PLATELET # BLD AUTO: 243 THOUSANDS/UL (ref 149–390)
PLATELET BLD QL SMEAR: ADEQUATE
PMV BLD AUTO: 9.2 FL (ref 8.9–12.7)
RBC # BLD AUTO: 4.04 MILLION/UL (ref 3.81–5.12)
WBC # BLD AUTO: 9.72 THOUSAND/UL (ref 4.31–10.16)

## 2024-07-29 PROCEDURE — 85060 BLOOD SMEAR INTERPRETATION: CPT | Performed by: PATHOLOGY

## 2024-07-29 NOTE — TELEPHONE ENCOUNTER
PATIENT WILL NEED AN H AND P AND EKG PRIOR TO SURGERY ON 08/08/24  NO FORMS REQUIRED FOR SURG, DOCUMENT IN CHART IF APPROVED OR NOT.     WILL YOU BE TAKING CARE OF THIS? PATIENT ALREADY HAD LABS DONE.      PLEASE ADVISE AND CONTACT PODIATRY [phone] AND ASK FOR IRMA GANN WILL OUT OF OFFICE TILL 08/06 PLEASE RESPOND TO SUNDAR IF ANYTHING NEEDED.

## 2024-07-29 NOTE — TELEPHONE ENCOUNTER
Kiki from [facility] pod[facility] called question a message from Dr. Dillon regarding a note.  Soft transferred to Carley in office.

## 2024-07-30 ENCOUNTER — TELEPHONE (OUTPATIENT)
Age: 89
End: 2024-07-30

## 2024-07-30 ENCOUNTER — APPOINTMENT (OUTPATIENT)
Dept: LAB | Age: 89
End: 2024-07-30
Payer: COMMERCIAL

## 2024-07-30 DIAGNOSIS — Z01.818 PRE-OP EVALUATION: ICD-10-CM

## 2024-07-30 LAB
ATRIAL RATE: 82 BPM
QRS AXIS: -88 DEGREES
QRSD INTERVAL: 128 MS
QT INTERVAL: 394 MS
QTC INTERVAL: 471 MS
T WAVE AXIS: 12 DEGREES
VENTRICULAR RATE: 86 BPM

## 2024-07-30 PROCEDURE — 93010 ELECTROCARDIOGRAM REPORT: CPT | Performed by: INTERNAL MEDICINE

## 2024-07-30 NOTE — TELEPHONE ENCOUNTER
"Hello,    The following message was sent via e-mail to the leadership team:     Please advise if you can help facilitate the following overbook request:    Patient Name: Rebeca Banegas    Patient MRN: [id number]    Call back #: [phone]    Insurance: [facility]    Department:Cardiology    Speciality: General Cardiology    Reason for overbook request: CARDIAC CLEARANCE PRIOR TO PROCEDURE (14-30 DAYS PRIOR TO PROCEDURE)    Comments (Write \"N/a\" if no comments): Kiki with [facility] called to schedule patient for a pre op clearance for an upcoming surgery 8/9/24. There was no sooner appointment before surgery as of right now. Kiki requested for office to call her regarding a sooner appointment ([phone]). Thank you!    Requested doctor and location: Any doctor/ [facility] [place]    Date of current appointment: 8/28/24       Thank you.        "

## 2024-07-30 NOTE — TELEPHONE ENCOUNTER
Called patient she is going to go to [facility] for the EKG.  Per patient its easier for her and parking is a lot better compared to [facility]    I will follow up after to make sure it is scanned into the computer     She is aware of the appointment on Monday with you ( 08/05 )

## 2024-07-31 NOTE — PRE-PROCEDURE INSTRUCTIONS
Pre-Surgery Instructions:   Medication Instructions    albuterol (2.5 mg/3 mL) 0.083 % nebulizer solution Uses PRN- OK to take day of surgery    albuterol (Proventil HFA) 90 mcg/act inhaler Uses PRN- OK to take day of surgery    Cholecalciferol (VITAMIN D3) 1000 units CAPS Stop taking 7 days prior to surgery.    fluticasone (FLONASE) 50 mcg/act nasal spray Take day of surgery.    Fluticasone-Salmeterol (Advair Diskus) 100-50 mcg/dose inhaler Take day of surgery.    levothyroxine 50 mcg tablet Take day of surgery.    metoprolol succinate (TOPROL-XL) 100 mg 24 hr tablet Take day of surgery.    sodium chloride 3 % inhalation solution Uses PRN- OK to take day of surgery    spironolactone (ALDACTONE) 50 mg tablet Hold day of surgery.    torsemide (DEMADEX) 20 mg tablet Hold day of surgery.    warfarin (COUMADIN) 5 mg tablet Stop taking 5 days prior to surgery.    Medication instructions for day surgery reviewed. Please use only a sip of water to take your instructed medications. Avoid all over the counter vitamins, supplements and NSAIDS for one week prior to surgery per anesthesia guidelines. Tylenol is ok to take as needed.     You will receive a call one business day prior to surgery with an arrival time and hospital directions. If your surgery is scheduled on a Monday, the hospital will be calling you on the Friday prior to your surgery. If you have not heard from anyone by 8pm, please call the hospital supervisor through the hospital  at [phone]. ([place] [phone] or [place] [phone]).    Do not eat or drink anything after midnight the night before your surgery, including candy, mints, lifesavers, or chewing gum. Do not drink alcohol 24hrs before your surgery. Try not to smoke at least 24hrs before your surgery.       Follow the pre surgery showering instructions as listed in the “My Surgical Experience Booklet” or otherwise provided by your surgeon's office. Do not use a blade to shave the  surgical area 1 week before surgery. It is okay to use a clean electric clippers up to 24 hours before surgery. Do not apply any lotions, creams, including makeup, cologne, deodorant, or perfumes after showering on the day of your surgery. Do not use dry shampoo, hair spray, hair gel, or any type of hair products.     No contact lenses, eye make-up, or artificial eyelashes. Remove nail polish, including gel polish, and any artificial, gel, or acrylic nails if possible. Remove all jewelry including rings and body piercing jewelry.     Wear causal clothing that is easy to take on and off. Consider your type of surgery.    Keep any valuables, jewelry, piercings at home. Please bring any specially ordered equipment (sling, braces) if indicated.    Arrange for a responsible person to drive you to and from the hospital on the day of your surgery. Please confirm the visitor policy for the day of your procedure when you receive your phone call with an arrival time.     Call the surgeon's office with any new illnesses, exposures, or additional questions prior to surgery.    Please reference your “My Surgical Experience Booklet” for additional information to prepare for your upcoming surgery.

## 2024-08-02 ENCOUNTER — ANTICOAG VISIT (OUTPATIENT)
Dept: CARDIOLOGY CLINIC | Facility: CLINIC | Age: 89
End: 2024-08-02

## 2024-08-02 DIAGNOSIS — I48.21 PERMANENT ATRIAL FIBRILLATION (HCC): Primary | Chronic | ICD-10-CM

## 2024-08-06 ENCOUNTER — IN HOME VISIT (OUTPATIENT)
Dept: INTERNAL MEDICINE CLINIC | Facility: CLINIC | Age: 89
End: 2024-08-06
Payer: COMMERCIAL

## 2024-08-06 VITALS — HEART RATE: 88 BPM | SYSTOLIC BLOOD PRESSURE: 100 MMHG | RESPIRATION RATE: 14 BRPM | DIASTOLIC BLOOD PRESSURE: 72 MMHG

## 2024-08-06 DIAGNOSIS — N18.32 STAGE 3B CHRONIC KIDNEY DISEASE (HCC): Chronic | ICD-10-CM

## 2024-08-06 DIAGNOSIS — I48.21 PERMANENT ATRIAL FIBRILLATION (HCC): Chronic | ICD-10-CM

## 2024-08-06 DIAGNOSIS — J45.20 MILD INTERMITTENT ASTHMA WITHOUT COMPLICATION: Chronic | ICD-10-CM

## 2024-08-06 DIAGNOSIS — E03.9 HYPOTHYROIDISM, UNSPECIFIED TYPE: Chronic | ICD-10-CM

## 2024-08-06 DIAGNOSIS — I50.32 CHRONIC DIASTOLIC CONGESTIVE HEART FAILURE (HCC): Chronic | ICD-10-CM

## 2024-08-06 DIAGNOSIS — Z01.818 PREOPERATIVE EXAMINATION: Primary | ICD-10-CM

## 2024-08-06 DIAGNOSIS — J47.9 BRONCHIECTASIS WITHOUT COMPLICATION (HCC): Chronic | ICD-10-CM

## 2024-08-06 DIAGNOSIS — L97.522 NEUROPATHIC ULCER OF LEFT FOOT WITH FAT LAYER EXPOSED (HCC): ICD-10-CM

## 2024-08-06 PROCEDURE — 99350 HOME/RES VST EST HIGH MDM 60: CPT | Performed by: INTERNAL MEDICINE

## 2024-08-06 NOTE — PROGRESS NOTES
Assessment/Plan:  #1 General Care-medically cleared for surgery-patient able to ambulate in the halls of her assisted living facility without any chest pain or pressure.  Has baseline shortness of breath associated with her stable CHF and pulmonary disease.  Holding warfarin for 5 days before surgery.  She will hold her torsemide and spironolactone morning of surgery and resume usual dose evening of surgery.  Encouraged ongoing use of her pulmonary device on a regular basis including preop.  EKG done on July 30 cardiology shows atrial fibrillation with controlled rate, left axis deviation, right bundle branch block.  This is similar to prior tracing  2.  Left plantar ulcer-present x 3 months.  Remote history of podiatric surgery in the past including MRSA infection.  Longstanding abnormality left fourth toe present since childhood.  Has had intermittent drainage.  Arterial Doppler shows mild bilateral disease with right FREYA of 1.23-diffuse disease without focal stenosis-great toe pressure 64 within healing range.  Left side shows diffuse disease without focal stenosis, FREYA of 1.36 and great toe pressure of 33.  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  MRI suggestive and white cells suggestive of potential osteomyelitis of second metatarsal head-rule out involvement of proximal second phalanx.  Scheduled for extensive debridement and bone biopsy regarding potential underlying osteomyelitis.  #3 thoracic back pain-longstanding with recent exacerbation-always watching for compression fracture with her known osteoporosis but does not appear to be the case.  No point tenderness over the vertebrae.  Pain with prolonged standing and certain positions-will continue acetaminophen as needed and using over-the-counter lidocaine patch 4% Entre 12 hours off for 12 hours.  4.  Chronic diastolic heart failure-most recent echo on 423 shows EF is 60%  with wall thickness mildly increased, moderate right atrial lodgment, estimated right ventricular systolic pressure 51.  Currently stable on her regimen of spironolactone 100 mg daily, torsemide 40 mg twice daily.  Had used 2 doses of Zaroxolyn several weeks ago but has not used it since then.  Weight is down 12 pounds with current dry weight in the mid 110s.  Will continue current regimen.  Patient holding spironolactone and torsemide morning of surgery and resuming it afternoon of surgery  #5 atrial tvclurbzvxig-[id number]-tdfhsiby control on current dose of metoprolol.  Remains on anticoagulant although holding her warfarin for 5 days preop  6.  Pulmonary disease-combination of asthma-bronchiectasis-most recent CT of the chest shows mild bronchiectasis of right middle lobe and both lower lobes unchanged from 2014 with moderate mucoid debris.  Has significant cardiomegaly with extensive right atrial enlargement and pulmonary artery enlargement consistent with her known history of pulmonary hypertension.  PFTs show obstructive disease with an FEV1 of 0.92-no response to bronchodilator.  Had prior treatment with Levaquin x 2 for bronchiectasis with associated Pseudomonas on culture.  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  On Advair.  Was oversedated on his Zyrtec.  On Flonase for associated allergic rhinitis.  7.  Bronchiectasis-CAT scan as noted-initial cultures showed Pseudomonas and then repeat culture showed Pseudomonas and she was retreated with Levaquin for 2 weeks.  Pulmonary had requested pulmonary rehab.  She uses hypertonic nebs and albuterol nebs 3 times daily as well as flutter valve 3 times daily.  Follow-up culture did not show Pseudomonas  8.  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  Recent additional laboratory testing shows normal immunoglobulin levels,s serum protein electrophoresis shows biclonal spike with immunofixation identifying both of these is IgG  kappa-spike #1 is 0.26 g/dL and spike #2 is 0.08 g/dL- she does not have any crab features other than some CKD as noted in prior notes.  No evidence of hypercalcemia bone disease or significant anemia.  Likely represents MGUS and we reviewed that today.  We will be checking follow-up labs in 6 to 12 months which would be December 2022 to June 2023SCHEDULE NEXT VISIT  9.  Bilateral sensorineural hearing loss- being monitored by the ENT group.  Had repeat eval by the ENT group in June 2024 with audiogram showing moderate to profound high-frequency bilateral sensorineural hearing loss-return to office in 1 year which would be June 2025  10 osteoporosis-DEXA scan in 2023 shows L-spine -2.7 and hip of -2.2.  Remains in vitamin D.  Knows to have calcium intake of 1200 mg/day or more.    Subsequent DEXA scan will be due in March 2025 -has had 3 injections of Prolia with most recent on May 21, 2024  11.  Recurrent labial cyst-prior labial abscess.  Was treated by GYN with I&D and transient course of Bactrim without culture was never obtained.  There was concern about potential MRSA as she had this in the past.  Recently had doxycycline at the time of flareup as well as her pulmonary symptoms.  Not a major issue at present  12.  Chronic renal failure-stage IIIb-felt related to age-related nephron loss plus possible component of cardiorenal syndrome.  Knows to avoid nonsteroidals.  As noted labs done show BUN of 44 with creatinine of 1.52-similar to before  13.  Hypothyroidism-dose of Synthroid increased [id number] TSH done in April of 4.73 which is very acceptable in her age group     All other problems as per note of August 30, 2022    Medical regimen torsemide 40 mg twice daily, spironolactone 150 mg daily, dry weight in the mid 100s, Flonase 2 squirts each nostril once daily, prior use of loratadine 10 mg daily, Advair  puff twice daily, Prolia every 6 months with last injection on May 21, 2024, prior use of  Singulair 10 mg daily, levothyroxine 50 mcg daily but 75 mcg 4 days/week, calcium with vitamin D daily, Ventolin inhaler as needed, metoprolol succinate 50 mg twice daily using one half of the 100 mg tablets, multivitamin, vitamin D3/2000 and's a day, fish oil 1200 mg a day, prior use of Vicodin 5-300/2 tablet twice daily which she has not used in months and over-the-counter lidocaine 4% applied 12 hours off for 12 hours  No problem-specific Assessment & Plan notes found for this encounter.             Subjective:      Patient ID: Rebeca Banegas is a 89 y.o. female.    Patient was seen as a home visit on August 5, 2024 as preoperative exam for upcoming foot surgery.  As noted she has had chronic left foot plantar ulcer for over 2 months.  There is a remote history of podiatric surgery in the past including MRSA infection.  Has a longstanding abnormality left fourth toe present since childhood.  On follow-up exam in May there was concern about the potential for underlying osteomyelitis.  She underwent arterial Doppler study  Result Text     [facility] REPORT  CLINICAL:  Indications:  Patient presents with an ulcer on her left foot which has not been healing x  2.5 months.  Patient denies any pain with walking.  Operative History:  No cardiovascular surgeries noted.  Risk Factors  The patient has history of HTN, Hyperlipidemia, A-Fib, and CHF.     FINDINGS:     Segment                Right       Left                                            PSV (cm/s)  PSV (cm/s)    Common Femoral Artery          73          72    Prox Profunda                  55          53    Prox SFA                       73          73    Mid SFA                        65          64    Dist SFA                       57          68    Proximal Pop                   56          75    Distal Pop                     42          69    Tibioperoneal                  34          55    Dist Post Tibial               45          79    Prox.  Ant. Tibial              35          44    Dist. Ant. Tibial              65         187    Dist Peroneal                  44          85             CONCLUSION:  Impression:  RIGHT LOWER LIMB:  Diffuse disease noted throughout the femoral-popliteal arteries without  significant focal stenosis.  Ankle/Brachial index:   1.23  which is in the normal range.  PVR/ PPG tracings are normal or dampened.  Metatarsal pressure of 62 mmHg  Great toe pressure of 64 mmHg, within the healing range     LEFT LOWER LIMB:  Diffuse disease noted throughout the femoral-popliteal arteries without  significant focal stenosis.  Ankle/Brachial index:   1.36  which is in the normal range.  PVR/ PPG tracings are normal or dampened.  Metatarsal pressure of 62 mmHg  Great toe pressure of 33 mmHg, within the healing range     There are no prior studies available for comparison.     SIGNATURE:  Electronically Signed by: MAKENNA RAMOS MD on 2024-06-19 12:53:05 PM    Linked Documents    Reviewed with the patient the details of this today showing some disease of peripheral arterial disease but not severe.  Great toe pressure on the right 64 and on the left 33-felt to be within healing range with FREYA in the left of 1.36 and on the right 1.23.  She had also undergone an x-ray of the left foot which showed congenital abnormalities and prior osteotomy of the second toe.  Narrative & Impression  MRI LEFT FOOT     INDICATION:   L97.522: Non-pressure chronic ulcer of other part 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.     COMPARISON: None.     TECHNIQUE:  Multiplanar/multisequence MR of the left foot was performed.        FINDINGS:     BONES: Remodeling is noted at the third metatarsal head as noted on series 6/10; the margins appear smooth. There is trace bone edema at the distal margin of the third metatarsal, series 5/10. This appearance could be seen with avascular necrosis and   collapse as well as other entities. Unless there is an irregular margin on  x-ray or CT, osteomyelitis is not high in the differential.     Bone marrow edema noted within the second metatarsal head, series 5/12, with no T1 marrow changes     SUBCUTANEOUS TISSUES: Skin irregularity noted just plantar to the second and third metatarsal heads as noted on series 6 images 11-14     MUSCULATURE: Intact     IMPRESSION:     1.  Remodeling is noted of the third metatarsal head, appearance is suggestive of a more chronic process. Recommend correlation with x-ray or CT to evaluate for an irregular margin, which if present would be consistent with osteomyelitis,  2.  Bone marrow edema of the second metatarsal head with adjacent skin defect, consistent with early osteomyelitis.     The study was marked in EPIC for significant notification.     Workstation performed: [id number]    MRI was done showing bone marrow edema of the second metatarsal head consistent with early osteomyelitis.  Patient then underwent white cell study  Result Text  CERETEC WHITE BLOOD CELL STUDY     INDICATION:L97.522: Non-pressure chronic ulcer of other part 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. Neuropathic ulcer of the left plantar foot with fat layer exposed. Clinical concern for osteomyelitis.     COMPARISON:   Radiograph of the left foot 6/18/2024 and MRI of the left forefoot 6/9/2024     TECHNIQUE:  The study was performed following the intravenous administration of 25.9 mCi Tc-99m labeled Ceretec white blood cells.     Static images of the bilateral feet were acquired in multiple projections 3 and 22 hours after injection. SPECT-CT imaging acquired at 3 and 22 hours.     FINDINGS: On 3-hour imaging, there is focal radiotracer uptake in the medial aspect of the left medial forefoot, again demonstrated on 22-hour imaging. On SPECT, this is likely related to the second metatarsal head, cannot exclude involvement of the   second proximal phalanx. This appears somewhat misregistered on both fusion series.     No suspicious  focal uptake at the level of the third metatarsal head.     On low dose CT, small focus of soft tissue gas near the left second proximal phalanx, probably related to known soft tissue wound.     IMPRESSION:     1. Focal uptake suspicious for osteomyelitis in the region of the left second metatarsal head with increased uptake on the early and delayed images. Early osteomyelitis was suspected in this region on recent MRI. Cannot exclude involvement of the   adjacent second proximal phalanx and septic arthritis.     The study was marked in EPIC for significant notification.        Resident: BECK HASTINGS I, the attending radiologist, have reviewed the images and agree with the final report above.     Workstation performed: [id number]       Imaging    This was also suggestive of underlying osteomyelitis of the second metatarsal head-rule out involvement of proximal phalanx and septic arthritis.  She is scheduled for wound debridement and bone biopsy over the next week.  She has a known history of chronic atrial fibrillation and chronic heart failure with preserved ejection fraction-she has been seen by the cardiology group most recently on June 24 and felt to be stable on her current regimen of spironolactone to 100 mg daily and torsemide 40 mg twice daily.  She had taken Zaroxolyn 2.5 mg x 2 doses in late June.  Her weight has decreased 12 pounds with illumination of edema and she is stable on current regimen.  She has shortness of breath associated with her history of pulmonary disease as well as CHF and she notes this when walking about the hallways of her independent living facility but denies any chest pain or pressure.  She denies palpitations.  She denies calf pain with walking.  She denies any episodes of weakness in 1 arm or leg compared to the other.  She denies any episodes of numbness of 1 arm or leg compared to the other.    All imaging studies reviewed in detail as noted.    Results for orders  placed or performed in visit on 08/02/24  -Protime-INR:        Result                      Value             Ref Range           Prothrombin Time            31.8 (H)          12.0 - 14.6 *       INR                         3.1                                 She remains on chronic anticoagulant therapy with warfarin.  She has been holding her warfarin for 5 days prior to surgery.  Labs done recently show BUN of 44 with creatinine of 1.52 note that previously labs had shown BUN of 76 and creatinine of 1.83 in June.  CBC is normal except for chronic borderline macrocytosis with an MCV of 101.  Hemoglobin 13.  She has known eosinophilia which is longstanding associated with her asthma-allergies.  She is anxious to proceed with surgery and is aware the potential for osteomyelitis necessitating long-term antibiotic therapy.  She says her appetite is stable.  Her energy level is decreased but stable.  She has some cough with white sputum.  She denies hemoptysis.  She has a known history of bronchiectasis and continues to use her flutter device.  She talked about her new back pain.  She feels this is in the thoracic area between the scapula.  She notes that with prolonged standing or certain activities.  She said this has been longstanding but she feels it may be exacerbated lately.  She did not have point tenderness over the thoracic or cervical vertebrae.  She has known osteoporosis with some alteration in posture.  She says acetaminophen is of minimal benefit.  She wanted to know about the value of giving over-the-counter lidocaine 4% patches    She continues to recover from the recent death of her  and we talked about this in detail        The following portions of the patient's history were reviewed and updated as appropriate: allergies, current medications, past medical history, past social history, past surgical history, and problem list.    Review of Systems   Constitutional:  Positive for fatigue.   HENT:  Negative.     Respiratory:  Positive for cough and shortness of breath.    Cardiovascular: Negative.    Gastrointestinal: Negative.    Endocrine: Negative.    Genitourinary: Negative.    Musculoskeletal:  Positive for back pain.   Skin:  Positive for wound.   Neurological: Negative.    Hematological: Negative.    Psychiatric/Behavioral: Negative.           Objective:      LMP  (LMP Unknown)          Physical Exam  Vitals reviewed.   Constitutional:       General: She is not in acute distress.     Appearance: Normal appearance. She is ill-appearing. She is not toxic-appearing or diaphoretic.   HENT:      Head: Normocephalic and atraumatic.      Right Ear: External ear normal.      Left Ear: External ear normal.      Nose: Nose normal. No congestion or rhinorrhea.      Mouth/Throat:      Mouth: Mucous membranes are moist.      Pharynx: Oropharynx is clear. No oropharyngeal exudate or posterior oropharyngeal erythema.   Eyes:      General: No scleral icterus.        Right eye: No discharge.         Left eye: No discharge.      Extraocular Movements: Extraocular movements intact.      Conjunctiva/sclera: Conjunctivae normal.   Neck:      Vascular: No carotid bruit.   Cardiovascular:      Rate and Rhythm: Normal rate. Rhythm irregular.      Heart sounds: Normal heart sounds. No murmur heard.     No friction rub. No gallop.      Comments: Palpable PMI not displaced DP pulses 1+ bilaterally  Pulmonary:      Effort: Pulmonary effort is normal. No respiratory distress.      Breath sounds: No stridor. Rhonchi present. No wheezing or rales.      Comments: Scattered rhonchi decreased with cough  Chest:      Chest wall: No tenderness.   Abdominal:      General: Abdomen is flat. Bowel sounds are normal. There is no distension.      Palpations: Abdomen is soft. There is no mass.      Tenderness: There is no abdominal tenderness. There is no right CVA tenderness, left CVA tenderness, guarding or rebound.      Hernia: No hernia is  present.   Musculoskeletal:         General: No swelling, tenderness, deformity or signs of injury. Normal range of motion.      Cervical back: Normal range of motion and neck supple. No rigidity. No muscular tenderness.      Right lower leg: No edema.      Left lower leg: No edema.      Comments: No evidence of point tenderness over cervical or thoracic vertebrae.  Back pain reproduced with certain positions   Lymphadenopathy:      Cervical: No cervical adenopathy.   Skin:     General: Skin is warm and dry.      Coloration: Skin is not jaundiced or pale.      Findings: No bruising, erythema, lesion or rash.      Comments: Known wound of the left foot which is dressed-peripheral venous disease   Neurological:      General: No focal deficit present.      Mental Status: She is alert and oriented to person, place, and time. Mental status is at baseline.      Cranial Nerves: No cranial nerve deficit.      Sensory: No sensory deficit.      Motor: No weakness.      Coordination: Coordination normal.      Gait: Gait normal.      Deep Tendon Reflexes: Reflexes normal.   Psychiatric:         Mood and Affect: Mood normal.         Behavior: Behavior normal.         Thought Content: Thought content normal.         Judgment: Judgment normal.

## 2024-08-07 ENCOUNTER — ANESTHESIA EVENT (OUTPATIENT)
Dept: PERIOP | Facility: HOSPITAL | Age: 89
End: 2024-08-07
Payer: COMMERCIAL

## 2024-08-09 ENCOUNTER — ANESTHESIA (OUTPATIENT)
Dept: PERIOP | Facility: HOSPITAL | Age: 89
End: 2024-08-09
Payer: COMMERCIAL

## 2024-08-09 ENCOUNTER — HOSPITAL ENCOUNTER (OUTPATIENT)
Facility: HOSPITAL | Age: 89
Setting detail: OUTPATIENT SURGERY
Discharge: HOME | End: 2024-08-09
Attending: PODIATRIST | Admitting: PODIATRIST
Payer: COMMERCIAL

## 2024-08-09 ENCOUNTER — TELEPHONE (OUTPATIENT)
Dept: OTHER | Facility: HOSPITAL | Age: 89
End: 2024-08-09

## 2024-08-09 ENCOUNTER — HOSPITAL ENCOUNTER (OUTPATIENT)
Dept: RADIOLOGY | Facility: HOSPITAL | Age: 89
Setting detail: OUTPATIENT SURGERY
Discharge: HOME | End: 2024-08-09
Payer: COMMERCIAL

## 2024-08-09 VITALS
BODY MASS INDEX: 19.72 KG/M2 | SYSTOLIC BLOOD PRESSURE: 109 MMHG | OXYGEN SATURATION: 97 % | HEIGHT: 64 IN | RESPIRATION RATE: 18 BRPM | DIASTOLIC BLOOD PRESSURE: 63 MMHG | HEART RATE: 78 BPM | WEIGHT: 115.52 LBS | TEMPERATURE: 97.2 F

## 2024-08-09 DIAGNOSIS — L97.526 NON-PRESSURE CHRONIC ULCER OF OTHER PART OF LEFT FOOT WITH BONE INVOLVEMENT WITHOUT EVIDENCE OF NECROSIS (HCC): ICD-10-CM

## 2024-08-09 DIAGNOSIS — L97.526 NON-PRESSURE CHRONIC ULCER OF OTHER PART OF LEFT FOOT WITH BONE INVOLVEMENT WITHOUT EVIDENCE OF NECROSIS (HCC): Primary | ICD-10-CM

## 2024-08-09 LAB
INR PPP: 1.18 (ref 0.85–1.19)
PROTHROMBIN TIME: 15.1 SECONDS (ref 12.3–15)

## 2024-08-09 PROCEDURE — 87075 CULTR BACTERIA EXCEPT BLOOD: CPT | Performed by: PODIATRIST

## 2024-08-09 PROCEDURE — 76000 FLUOROSCOPY <1 HR PHYS/QHP: CPT | Performed by: PODIATRIST

## 2024-08-09 PROCEDURE — 11044 DBRDMT BONE 1ST 20 SQ CM/<: CPT | Performed by: PODIATRIST

## 2024-08-09 PROCEDURE — 85610 PROTHROMBIN TIME: CPT | Performed by: ANESTHESIOLOGY

## 2024-08-09 PROCEDURE — 73620 X-RAY EXAM OF FOOT: CPT

## 2024-08-09 PROCEDURE — 88307 TISSUE EXAM BY PATHOLOGIST: CPT | Performed by: STUDENT IN AN ORGANIZED HEALTH CARE EDUCATION/TRAINING PROGRAM

## 2024-08-09 PROCEDURE — 88304 TISSUE EXAM BY PATHOLOGIST: CPT | Performed by: STUDENT IN AN ORGANIZED HEALTH CARE EDUCATION/TRAINING PROGRAM

## 2024-08-09 PROCEDURE — 20240 BONE BIOPSY OPEN SUPERFICIAL: CPT | Performed by: PODIATRIST

## 2024-08-09 PROCEDURE — 87205 SMEAR GRAM STAIN: CPT | Performed by: PODIATRIST

## 2024-08-09 PROCEDURE — 88311 DECALCIFY TISSUE: CPT | Performed by: STUDENT IN AN ORGANIZED HEALTH CARE EDUCATION/TRAINING PROGRAM

## 2024-08-09 PROCEDURE — 87070 CULTURE OTHR SPECIMN AEROBIC: CPT | Performed by: PODIATRIST

## 2024-08-09 PROCEDURE — 87077 CULTURE AEROBIC IDENTIFY: CPT | Performed by: PODIATRIST

## 2024-08-09 RX ORDER — MAGNESIUM HYDROXIDE 1200 MG/15ML
LIQUID ORAL AS NEEDED
Status: DISCONTINUED | OUTPATIENT
Start: 2024-08-09 | End: 2024-08-09 | Stop reason: HOSPADM

## 2024-08-09 RX ORDER — LIDOCAINE HYDROCHLORIDE 20 MG/ML
INJECTION, SOLUTION EPIDURAL; INFILTRATION; INTRACAUDAL; PERINEURAL AS NEEDED
Status: DISCONTINUED | OUTPATIENT
Start: 2024-08-09 | End: 2024-08-09

## 2024-08-09 RX ORDER — ONDANSETRON 2 MG/ML
4 INJECTION INTRAMUSCULAR; INTRAVENOUS ONCE AS NEEDED
Status: DISCONTINUED | OUTPATIENT
Start: 2024-08-09 | End: 2024-08-09 | Stop reason: HOSPADM

## 2024-08-09 RX ORDER — HYDROMORPHONE HCL/PF 1 MG/ML
0.5 SYRINGE (ML) INJECTION
Status: DISCONTINUED | OUTPATIENT
Start: 2024-08-09 | End: 2024-08-09 | Stop reason: HOSPADM

## 2024-08-09 RX ORDER — OXYCODONE HYDROCHLORIDE AND ACETAMINOPHEN 5; 325 MG/1; MG/1
1 TABLET ORAL EVERY 6 HOURS PRN
Qty: 20 TABLET | Refills: 0 | Status: SHIPPED | OUTPATIENT
Start: 2024-08-09

## 2024-08-09 RX ORDER — SODIUM CHLORIDE, SODIUM LACTATE, POTASSIUM CHLORIDE, CALCIUM CHLORIDE 600; 310; 30; 20 MG/100ML; MG/100ML; MG/100ML; MG/100ML
125 INJECTION, SOLUTION INTRAVENOUS CONTINUOUS
Status: DISCONTINUED | OUTPATIENT
Start: 2024-08-09 | End: 2024-08-09 | Stop reason: HOSPADM

## 2024-08-09 RX ORDER — FENTANYL CITRATE/PF 50 MCG/ML
25 SYRINGE (ML) INJECTION
Status: DISCONTINUED | OUTPATIENT
Start: 2024-08-09 | End: 2024-08-09 | Stop reason: HOSPADM

## 2024-08-09 RX ORDER — CHLORHEXIDINE GLUCONATE ORAL RINSE 1.2 MG/ML
15 SOLUTION DENTAL ONCE
Status: COMPLETED | OUTPATIENT
Start: 2024-08-09 | End: 2024-08-09

## 2024-08-09 RX ORDER — PROPOFOL 10 MG/ML
INJECTION, EMULSION INTRAVENOUS AS NEEDED
Status: DISCONTINUED | OUTPATIENT
Start: 2024-08-09 | End: 2024-08-09

## 2024-08-09 RX ORDER — CEFAZOLIN SODIUM 2 G/50ML
2000 SOLUTION INTRAVENOUS ONCE
Status: COMPLETED | OUTPATIENT
Start: 2024-08-09 | End: 2024-08-09

## 2024-08-09 RX ADMIN — CHLORHEXIDINE GLUCONATE 15 ML: 1.2 RINSE ORAL at 11:15

## 2024-08-09 RX ADMIN — PROPOFOL 40 MCG/KG/MIN: 10 INJECTION, EMULSION INTRAVENOUS at 12:56

## 2024-08-09 RX ADMIN — PHENYLEPHRINE HYDROCHLORIDE 40 MCG/MIN: 10 INJECTION INTRAVENOUS at 13:34

## 2024-08-09 RX ADMIN — PROPOFOL 40 MG: 10 INJECTION, EMULSION INTRAVENOUS at 12:52

## 2024-08-09 RX ADMIN — CEFAZOLIN SODIUM 2000 MG: 2 SOLUTION INTRAVENOUS at 12:49

## 2024-08-09 RX ADMIN — LIDOCAINE HYDROCHLORIDE 40 MG: 20 INJECTION, SOLUTION EPIDURAL; INFILTRATION; INTRACAUDAL at 12:52

## 2024-08-09 RX ADMIN — SODIUM CHLORIDE, SODIUM LACTATE, POTASSIUM CHLORIDE, AND CALCIUM CHLORIDE 125 ML/HR: .6; .31; .03; .02 INJECTION, SOLUTION INTRAVENOUS at 11:15

## 2024-08-09 RX ADMIN — LIDOCAINE HYDROCHLORIDE 20 MG: 20 INJECTION, SOLUTION EPIDURAL; INFILTRATION; INTRACAUDAL at 13:27

## 2024-08-09 RX ADMIN — PHENYLEPHRINE HYDROCHLORIDE 40 MCG/MIN: 10 INJECTION INTRAVENOUS at 13:01

## 2024-08-09 NOTE — OP NOTE
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    Specimen(s):  ID Type Source Tests Collected by Time Destination   1 : left 2nd met bone biopsy Tissue Toe, Left TISSUE EXAM Jonny Marcial DPM 2024 1317    2 : left 2nd toe bone biopsy Tissue Toe, Left TISSUE EXAM Jonny Marcial DPM 2024 1318    A : left 2nd met bone biopsy Tissue Toe, Left ANAEROBIC CULTURE AND GRAM STAIN, CULTURE, TISSUE AND GRAM STAIN Jonny Marcial DPM 2024 1317    B : left 2nd toe bone biopsy Tissue Toe, Left ANAEROBIC CULTURE AND GRAM STAIN, CULTURE, TISSUE AND GRAM STAIN Jonny Marcial DPM 2024 1317        Complications:   None    Patient Disposition:  PACU  and hemodynamically stable    Indications for Procedure:   Ms. Banegas had a wound to the plantar aspect of her right foot that were lysed down to level of bone.  MRI and SPECT-CT did show there might be  osteomyelitis in this area in this area.  Patient elected to come to surgery to have bone biopsies taken of both sides of the second metatarsophalangeal joint to confirm this diagnosis and to obtain culture for antibiotic treatment.  The pre-, [place]-, and post-operative risks were discussed with the patient and took place as follows.    Procedure and Technique:     Under mild sedation, the patient was brought into the operating room and placed on the operating room table in the supine position. IV sedation was achieved by anesthesia team and a universal timeout was performed where all parties are in agreement of correct patient, correct procedure and correct site. A pneumatic tourniquet was then applied to the patient's left with ample padding. A digital block was performed consisting of 10 ml of local anesthetic. The foot was then prepped and draped in the usual aseptic manner.     Procedure 1: Excisional debridement down into and including the level of bone-- wound to subleft second metatarsal phalangeal joint  Attention was directed to the wound, where utilizing a 15 blade and/or curette, sharp excisional debridement was performed to the ulceration removing all nonviable, necrotic, and or fibrotic tissue from the wound bed into the level of bone until healthy bleeding margins were achieved.   There is noted to be prominent bone at the level of the wound, so utilizing rongeur and rasp the condyles of the second metatarsal head were removed until there is no longer prominence there.  At that point utilizing intraoperative fluoroscopy attention was directed to the dorsal aspect of the second metatarsal distally.  Stab decision was made, and blunt dissection was used to carry down to level of bone.  A cannula from a [employer] bone biopsy kit was then inserted through the incision and placed directly onto the bone and the second distal second metatarsal head.  Location was confirmed on intraoperative fluoroscopy.  The  "bone biopsy was then collected according manage fracture specifications, is passed off to the back table sent for both bone culture and histopathological analysis.  Wound bed dimension:  - Pre debridement measurement: Overlying hyperkeratotic tissue cm  - Post debridement measurement: 0.6 x 0.6 x 0.4 cm    Procedure 2: Bone biopsy under fluoroscopic guidance, left second digit proximal phalanx  Attention was directed to the left second metatarsal phalangeal joint where a short linear incision was made immediately overlying the joint. Blunt dissection down to level of bone was then performed.  A cannula from a [employer] bone biopsy was then inserted through the incision and placed into the base of the second digit proximal phalanx.  Location was confirmed on intraoperative fluoroscopy.  The collected bone biopsy was then passed off on the back table and sent for bone culture and histopathological analysis.    The operative sites were then flushed with a copious amount of lavage.  Dorsal incision and plantar wound were then dressed with Betadine soaked Adaptic, 4 x 4's, Kerlix, Ace and tape. The patient patient tolerated the procedure and anesthesia well and were transferred to PACU with vital signs stable.  The patient will be heel weightbearing in post op shoe to the operative extremity.  Antibiotic and pain management prescriptions will be sent to patient pharmacy. They will follow up at previously scheduled appointment. They can call with any questions or concerns.     Dr. Marcial was present during the entire procedure and participated in all key aspects.    SIGNATURE: Dawson Pacheco DPM  DATE: August 9, 2024  TIME: 1:51 PM      Portions of the record may have been created with voice recognition software. Occasional wrong word or \"sound a like\" substitutions may have occurred due to the inherent limitations of voice recognition software. Read the chart carefully and recognize, using context, where substitutions have " occurred.

## 2024-08-09 NOTE — QUICK NOTE
"Podiatry - Progress Note  Patient: Rebeca Banegas 89 y.o. female   MRN: [id number]  PCP: Santana Dillon MD  Unit/Bed#: [facility] Encounter: [id number]  Date Of Visit: 24    ASSESSMENT:    Rebeca Banegas is a 89 y.o. 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 Jonny Marcial.  Consent reviewed, noted to be digitally in patient e-chart.  To be reviewed with surgeon and patient prior to procedure.  Confirmed NPO status.  H&P, vitals, and current labs reviewed.  No acute changes noted.  Alternatives, risks, and complications discussed with patient.  All questions answered.  No guarantees given to outcome of procedure.  Rest of medical care per primary team.       SUBJECTIVE:     The patient was seen, evaluated, and assessed at bedside today. The patient was awake, alert, and in no acute distress. Patient confirmed NPO status. All questions and concerns regarding the surgical procedure addressed. Patient understands risks vs benefits of procedure and remains amenable with plan for surgery today. Patient denies N/V/F/chills/SOB/CP.      OBJECTIVE:     Vitals:   /81   Pulse 83   Temp 97.5 °F (36.4 °C) (Temporal)   Resp 18   Ht 5' 4\" (1.626 m)   Wt 52.4 kg (115 lb 8.3 oz)   LMP  (LMP Unknown)   SpO2 98%   BMI 19.83 kg/m²     Temp (24hrs), Av.5 °F (36.4 °C), Min:97.5 °F (36.4 °C), Max:97.5 °F (36.4 °C)      Physical Exam:     General:  Alert, cooperative, and in no distress. Cardiovascular status at baseline.  Neurological status at baseline  Lower extremity exam:  Musculoskeletal status at baseline. No calf tenderness noted bilaterally. Dressing left intact to the Operating Room.  Unchanged from previous examination.      Additional Data:     Labs:              Invalid " "input(s): \"LABALBU\"  Results from last 7 days   Lab Units 08/09/24  1115   INR  1.18       * I Have Reviewed All Lab Data Listed Above.    Recent Cultures (last 7 days):               Imaging: I have personally reviewed pertinent films in PACS  EKG, Pathology, and Other Studies: I have personally reviewed pertinent reports.    ** Please Note: Portions of the record may have been created with voice recognition software. Occasional wrong word or \"sound a like\" substitutions may have occurred due to the inherent limitations of voice recognition software. Read the chart carefully and recognize, using context, where substitutions have occurred. **      "

## 2024-08-09 NOTE — ANESTHESIA POSTPROCEDURE EVALUATION
"Post-Op Assessment Note    CV Status:  Stable    Pain management: adequate       Mental Status:  Alert and awake   Hydration Status:  Euvolemic   PONV Controlled:  Controlled   Airway Patency:  Patent     Post Op Vitals Reviewed: Yes    No anethesia notable event occurred.    Staff: Anesthesiologist               BP      Temp      Pulse     Resp      SpO2      /60   Pulse 90   Temp 98.5 °F (36.9 °C) (Temporal)   Resp 18   Ht 5' 4\" (1.626 m)   Wt 52.4 kg (115 lb 8.3 oz)   LMP  (LMP Unknown)   SpO2 96%   BMI 19.83 kg/m²     "

## 2024-08-09 NOTE — DISCHARGE INSTR - AVS FIRST PAGE
Discharge Instructions - Podiatry    Pain / Swelling  There is expected to be some discomfort, swelling and bruising of the foot. You might see some blood on the bandage. This is not a cause for alarm. However, if there is active or persistent bleeding (blood running out of the bandage while at rest) - call the office at once (or) go to a [facility] ER and ask them to page the podiatry residents.  Apply an ice bag to the top of your ankle for 30 minutes for each waking hour, for the first 72 hours. This should be discontinued when sleeping. This will also work through your cast if you have one. Ice must not leak and wet the dressings. Also, using the ice inappropriately can cause permanent nerve damage.  Your foot should be elevated as much as possible for the first 72 hours. The foot should be above heart level. If your foot is below heart level, throbbing and pain will increase.  When sleeping, elevation can be accomplished by putting a small hard suitcase between the box spring and mattress at the foot of the bed.  Walking and standing will increase pain, throbbing and bleeding.  Persistent pain despite elevation and your pain meds can many times be relieved by removing the tight brown compression layer (called the ACE wrap) that is over the white gauze dressing. If you are elevating and taking your pain meds and pain is still severe, remove this brown stretchy layer but leave the gauze intact. Wait 30 minutes. If the pain subsides, reapply the ACE so it’s not so tight. If pain doesn’t get better, call your doctor.   Dressings / Casts  Do not remove your surgical dressings - they will be changed at your doctor appointment. Do not allow surgical dressings to get wet. Sponge baths should be used until the sutures are removed.  Do not try to keep the foot dry using a garbage bag and tape - this rarely works.  If you get your dressings or cast wet - call your doctor immediately.  If your cast or dressings  feel tight - elevate your foot for 30 minutes. If this doesn’t help and you feel tingling or see toe discoloration - call your doctor or go to a [facility] ER and ask them to page the podiatry residents.   Do not put things in your cast such as powder, coat hangers to scratch, etc.. This can cause skin damage and infections.   Infection  If you have a fever at or above 100 degrees, chills, sweats, or see red streaks rising above the dressing or smell odor / see pus (creamy white drainage), call your doctor immediately or go to a [facility] hospital ER and ask them to page the podiatry residents.  Constipation  If you have severe constipation after surgery, this can be due to the pain medication. Notify your doctor and special medication will be prescribed to deal with this.   Numbness  It is normal for your foot to be numb until about dinner time.When you start to feel pins and needles in the foot - this means the block is wearing off. That is the appropriate time to take your pain medication.   Pain Medication  Please take pain medicine as prescribed.  Do not supplement your pain medication with over the counter drugs, old leftover pain medications, or extra Tylenol. You must discuss any additional medications with your doctor prior to taking them for pain.   Driving  No driving is allowed without discussion with the doctor  Ambulation  Heel touch to surgical foot  If given a flat, stiff shoe / darco wedge shoe, Do not walk at all without it.  Use a device (cane, walker, crutches) to take some weight off of the foot when walking

## 2024-08-09 NOTE — ANESTHESIA PREPROCEDURE EVALUATION
Procedure:  Left foot debridement (Left: Foot)  Bone bx of proximal phalanx second toe & second met (Left: Foot)    Relevant Problems   CARDIO   (+) Bifascicular bundle branch block   (+) Chronic diastolic congestive heart failure (HCC)   (+) Hyperlipidemia   (+) Hypertension   (+) Permanent atrial fibrillation (HCC)      ENDO   (+) Hypothyroidism   (+) Secondary hyperparathyroidism (HCC)      GI/HEPATIC   (+) Esophageal reflux   (+) Pancreatic cyst      /RENAL   (+) Stage 3b chronic kidney disease (HCC)      MUSCULOSKELETAL   (+) Arthritis   (+) Backache      PULMONARY   (+) Asthma        Physical Exam    Airway       Dental       Cardiovascular  Rhythm: regular, Rate: normal    Pulmonary   Breath sounds clear to auscultation    Other Findings  post-pubertal.      Anesthesia Plan  ASA Score- 2     Anesthesia Type- IV sedation with anesthesia with ASA Monitors.         Additional Monitors:     Airway Plan:     Comment: caps.       Plan Factors-Exercise tolerance (METS): <4 METS.    Chart reviewed. EKG reviewed.  Existing labs reviewed. Patient summary reviewed.    Patient is not a current smoker.      There is medical exclusion for perioperative obstructive sleep apnea risk education.        Induction- intravenous.    Postoperative Plan-     Perioperative Resuscitation Plan - Level 1 - Full Code.       Informed Consent- Anesthetic plan and risks discussed with patient.

## 2024-08-11 LAB
BACTERIA SPEC ANAEROBE CULT: NO GROWTH
BACTERIA SPEC ANAEROBE CULT: NORMAL
BACTERIA TISS AEROBE CULT: ABNORMAL
BACTERIA TISS AEROBE CULT: NO GROWTH
GRAM STN SPEC: ABNORMAL
GRAM STN SPEC: NORMAL
GRAM STN SPEC: NORMAL

## 2024-08-12 LAB
BACTERIA SPEC ANAEROBE CULT: NORMAL
BACTERIA TISS AEROBE CULT: ABNORMAL
BACTERIA TISS AEROBE CULT: ABNORMAL
GRAM STN SPEC: ABNORMAL
GRAM STN SPEC: ABNORMAL

## 2024-08-13 ENCOUNTER — TELEPHONE (OUTPATIENT)
Dept: WOUND CARE | Facility: HOSPITAL | Age: 89
End: 2024-08-13

## 2024-08-13 DIAGNOSIS — L97.526 NON-PRESSURE CHRONIC ULCER OF OTHER PART OF LEFT FOOT WITH BONE INVOLVEMENT WITHOUT EVIDENCE OF NECROSIS (HCC): Primary | ICD-10-CM

## 2024-08-13 RX ORDER — LINEZOLID 600 MG/1
600 TABLET, FILM COATED ORAL EVERY 12 HOURS SCHEDULED
Qty: 20 TABLET | Refills: 0 | Status: SHIPPED | OUTPATIENT
Start: 2024-08-13 | End: 2024-08-23

## 2024-08-13 NOTE — RESULT ENCOUNTER NOTE
Notify patient that there was bacteria seen on cultures.  I will place her on antibiotic for 10 days.  We are still awaiting the results of the pathology report.    The antibiotic that I am placing her on can change her blood thinning effects of warfarin.  She should continue getting her blood work for her warfarin closely by cardiology.

## 2024-08-13 NOTE — TELEPHONE ENCOUNTER
Advised pt that Dr. Marcial sent an antibiotic to her pharmacy based on culture results. Pt states she rec'd a call from her pharmacy that it will be ready tomorrow. I also advised pt to continue to get BW for warfarin as scheduled as antibiotic may alter effectiveness, pt verbalized understanding and thanks.

## 2024-08-14 ENCOUNTER — OFFICE VISIT (OUTPATIENT)
Dept: WOUND CARE | Facility: HOSPITAL | Age: 89
End: 2024-08-14
Payer: COMMERCIAL

## 2024-08-14 VITALS
RESPIRATION RATE: 18 BRPM | SYSTOLIC BLOOD PRESSURE: 139 MMHG | HEART RATE: 92 BPM | DIASTOLIC BLOOD PRESSURE: 87 MMHG | TEMPERATURE: 96.8 F

## 2024-08-14 DIAGNOSIS — L97.526 NON-PRESSURE CHRONIC ULCER OF OTHER PART OF LEFT FOOT WITH BONE INVOLVEMENT WITHOUT EVIDENCE OF NECROSIS (HCC): Primary | ICD-10-CM

## 2024-08-14 PROCEDURE — 11042 DBRDMT SUBQ TIS 1ST 20SQCM/<: CPT | Performed by: PODIATRIST

## 2024-08-14 PROCEDURE — 88304 TISSUE EXAM BY PATHOLOGIST: CPT | Performed by: STUDENT IN AN ORGANIZED HEALTH CARE EDUCATION/TRAINING PROGRAM

## 2024-08-14 PROCEDURE — 88307 TISSUE EXAM BY PATHOLOGIST: CPT | Performed by: STUDENT IN AN ORGANIZED HEALTH CARE EDUCATION/TRAINING PROGRAM

## 2024-08-14 PROCEDURE — 88311 DECALCIFY TISSUE: CPT | Performed by: STUDENT IN AN ORGANIZED HEALTH CARE EDUCATION/TRAINING PROGRAM

## 2024-08-14 NOTE — PATIENT INSTRUCTIONS
"Orders Placed This Encounter   Procedures    Wound cleansing and dressings Neuropathic Left;Plantar Plantar     Wound location left plantar foot.     Change dressing 3x per week.     The dressing must stay dry and intact. You may shower with dressing protected by cast cover or bag. Or you may sponge bathe. Call wound center or home health nurse if dressing becomes wet.  Cleanse the wound with mild soap and water or normal saline, pat dry.     1 layer of 1/4\" felt pad to the inside sole of the surgical shoe  Please leave this in place when changing the dressing.       Apply Silver Alginate to wound bed.    Cover with gauze and ABD (as needed for drainage)   Secure with cast padding then rolled gauze and tape.     Surgical shoe in left foot at all times your foot will hit the floor        Please eat 3-4 servings protein in diet daily      Follow up with Dr. Marcial in 1 week     Standing Status:   Future     Standing Expiration Date:   8/21/2024    Wound cleansing and dressings Left Foot     Place a piece of gauze on the sutured area to protect the area.     Standing Status:   Future     Standing Expiration Date:   8/21/2024      "

## 2024-08-14 NOTE — PROGRESS NOTES
Patient ID: Rebeca Banegas is a 89 y.o. adult Date of Birth 7/22/1932     Diagnosis:  1. 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  She has started her antibiotic as prescribed.    Denies any new acute changes at this time.    Will have her continue offloading and protecting the area.  Her wound does appear to be stable.  I reviewed her bone biopsy with her today which was negative for acute osteomyelitis at both the second proximal phalanx as well as the second metatarsal.    Her culture did show some bacterial growth I did place her on Zyvox.    Will plan having her return for reevaluation in 1 week.    Surgical debridement completed to the plantar wound today.        If the patient notices any systemic signs of infection including but not limited to fever, chills, nausea, vomiting or significant worsening of wound with increased drainage, redness or streaking up the foot or leg the patient should notify the office or present to the emergency room.      If wound debridement was completed today this was done in an attempt to promote healing, decrease risk of infection and for limb salvage efforts.     Goal of treatment: wound healing     Efforts to  decrease pressure on the wound(s), evaluation and discussion of nutritional status, peripheral vascular status, and infection control have all been addressed with this patient.    Return in about 1 week (around 8/21/2024) for Recheck, wound assessment.      Chief Complaint   Patient presents with   • Follow Up Wound Care Visit     Left foot wound           Subjective:   HPI    The following portions of the patient's history were reviewed and updated as appropriate:   Patient Active Problem List   Diagnosis   • Abnormal findings on diagnostic imaging of other specified body structures   • Abnormal findings on diagnostic imaging of urinary organs   • Allergic rhinitis   • Arthritis   • Asthma   • Atherosclerosis   • Permanent atrial fibrillation (HCC)   • Backache   • Benign colon polyp   • Bifascicular bundle branch block   • Chronic diastolic congestive heart failure (HCC)   • Esophageal reflux   • Essential tremor   • Hoarseness   • Hyperlipidemia   • Hypertension   • Hypothyroidism   • Leg pain   • Nasal polyp   • Onychomycosis   • Ovarian cyst   • Pancreatic cyst   • Plantar fasciitis   • Prediabetes   • Pulmonary nodule   • Thyroid nodule   • Vitamin D deficiency   • Pain of left hand   • Leg cramps   • Sensorineural hearing loss (SNHL), bilateral   • Left asymmetrical SNHL   • Elevated serum immunoglobulin free light chains   • Osteopenia of hip   • Stage 3b chronic kidney disease (HCC)   • Labial abscess   • Vulvar cysts   • Open wound of right upper arm   • Secondary hyperparathyroidism (HCC)   • Age-related osteoporosis without current pathological fracture   • Chronic cough   • MGUS (monoclonal gammopathy of unknown significance)   • Bronchiectasis without complication (HCC)   • Vaccine counseling   • Acute on chronic diastolic CHF (congestive heart failure) (HCC)   • Chronic foot ulcer, left, with fat layer exposed (HCC)   • 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)   • Preoperative  examination   • Paroxysmal atrial fibrillation (HCC)     Past Medical History:   Diagnosis Date   • Abnormal ultrasound     RESOLVED: 26JUN2015   • Advice given about COVID-19 virus infection 04/07/2020   • Asthma with acute exacerbation     LAST ASSESSED: 21FEB2013   • Asymptomatic menopausal state    • Atherosclerosis    • Atrial fibrillation (HCC)    • Chronic obstructive lung disease (HCC)    • Congestive heart failure (HCC)     LAST ASSESSED: [id number]   • COVID-19 virus infection 03/25/2023   • Cuboid fracture     LAST ASSESSED: [id number]   • Dyspepsia    • Fall 04/01/2024   • GERD (gastroesophageal reflux disease)    • High risk medication use     LAST ASSESSED: 19MAY2014   • Hyperlipidemia    • Hypertension    • Hypokalemia    • Hypothyroidism    • Impacted cerumen of both ears     LAST ASSESSED: [id number]   • Impacted cerumen of right ear     LAST ASSESSED: 14FEB2013   • Influenza     LAST ASSESSED: [id number]   • IPMN (intraductal papillary mucinous neoplasm)     RESOLVED: 02OCT2015   • Limb swelling     LAST ASSESSED: 19MAY2014   • Navicular fracture of ankle     LAST ASSESSED: 22MAY2014   • Onychomycosis     LAST ASSESSED: [id number]   • Osteoarthritis    • Osteopenia    • Osteoporosis    • Paronychia, finger, unspecified laterality     LAST ASSESSED: 19JUL2013   • Paroxysmal atrial fibrillation (HCC)     LAST ASSESSED: 02MAR2014   • Peripheral vascular disease (HCC)    • Plantar fasciitis    • Talus fracture     LAST ASSESSED: 03JUL2014   • Vascular headache      Past Surgical History:   Procedure Laterality Date   • APPENDECTOMY     • BONE BIOPSY Left 8/9/2024    Procedure: Bone bx of proximal phalanx second toe & second met with fluoroscopic guidance;  Surgeon: Jonny Marcial DPM;  Location: [facility];  Service: Podiatry   • CATARACT EXTRACTION Bilateral    • CHOLECYSTECTOMY     • COLONOSCOPY     • JOINT REPLACEMENT Bilateral     knee   • NEUROPLASTY / TRANSPOSITION MEDIAN NERVE AT CARPAL  TUNNEL BILATERAL Bilateral     DECOMPRESSION   • OOPHORECTOMY Bilateral     age 81   • PELVIC FLOOR REPAIR  2014   • SD BIOPSY BONE TROCAR/NEEDLE SUPERFICIAL Left 2024    Procedure: Left foot debridement;  Surgeon: Jonny Marcial DPM;  Location: [facility];  Service: Podiatry   • SD DEBRIDEMENT MUSCLE &/FASCIA 1ST 20 SQ CM/< Left 2024    Procedure: Left foot debridement;  Surgeon: Jonny Marcial DPM;  Location: [facility];  Service: Podiatry   • SINUS SURGERY     • TOTAL KNEE ARTHROPLASTY Bilateral      Social History     Socioeconomic History   • Marital status:      Spouse name: Not on file   • Number of children: Not on file   • Years of education: Not on file   • Highest education level: Not on file   Occupational History   • Occupation: retired   Tobacco Use   • Smoking status: Former     Current packs/day: 0.00     Average packs/day: 0.3 packs/day for 2.0 years (0.5 ttl pk-yrs)     Types: Cigarettes     Start date:      Quit date:      Years since quittin.6   • Smokeless tobacco: Never   • Tobacco comments:     On and off socially with friends    Vaping Use   • Vaping status: Never Used   Substance and Sexual Activity   • Alcohol use: Not Currently     Comment: SOCIAL   • Drug use: No   • Sexual activity: Not Currently     Partners: Male     Birth control/protection: Post-menopausal   Other Topics Concern   • Not on file   Social History Narrative    DAILY COFFEE CONSUMPTION (2 CUPS/DAY)    EXERCISING REGULARLY     Social Determinants of Health     Financial Resource Strain: Not on file   Food Insecurity: Not on file   Transportation Needs: Not on file   Physical Activity: Not on file   Stress: Not on file   Social Connections: Not on file   Intimate Partner Violence: Not on file   Housing Stability: Not on file        Current Outpatient Medications:   •  albuterol (2.5 mg/3 mL) 0.083 % nebulizer solution, Take 3 mL (2.5 mg total) by nebulization every 8 (eight)  hours, Disp: 270 mL, Rfl: 3  •  albuterol (Proventil HFA) 90 mcg/act inhaler, Inhale 2 puffs every 6 (six) hours as needed for wheezing, Disp: 20.1 g, Rfl: 3  •  Cholecalciferol (VITAMIN D3) 1000 units CAPS, Take 2,000 Units by mouth daily, Disp: , Rfl:   •  fluticasone (FLONASE) 50 mcg/act nasal spray, USE 2 SPRAYS IN EACH NOSTRIL ONCE DAILY, Disp: 48 g, Rfl: 3  •  Fluticasone-Salmeterol (Advair Diskus) 100-50 mcg/dose inhaler, Inhale 1 puff 2 (two) times a day Rinse mouth after use., Disp: 180 blister, Rfl: 1  •  levothyroxine 50 mcg tablet, TAKE 1 TABLET DAILY BUT 1-1/2 TABLETS 4 DAYS/WEEK, Disp: 40 tablet, Rfl: 5  •  linezolid (ZYVOX) 600 mg tablet, Take 1 tablet (600 mg total) by mouth every 12 (twelve) hours for 10 days, Disp: 20 tablet, Rfl: 0  •  metoprolol succinate (TOPROL-XL) 100 mg 24 hr tablet, Take 0.5 tablets (50 mg total) by mouth 2 (two) times a day (Patient taking differently: Take 100 mg by mouth daily), Disp: , Rfl:   •  oxyCODONE-acetaminophen (Percocet) 5-325 mg per tablet, Take 1 tablet by mouth every 6 (six) hours as needed for moderate pain for up to 20 doses Max Daily Amount: 4 tablets, Disp: 20 tablet, Rfl: 0  •  sodium chloride 3 % inhalation solution, Take 4 mL by nebulization 3 (three) times a day, Disp: 360 mL, Rfl: 2  •  spironolactone (ALDACTONE) 50 mg tablet, Take 1 tablet (50 mg total) by mouth daily, Disp: 90 tablet, Rfl: 3  •  torsemide (DEMADEX) 20 mg tablet, Take 2 tablets (40 mg total) by mouth 2 (two) times a day Take 3 tablets first thing in the morning, then again at approximately 2 or 3 PM, for the next 4 to 7 days until weight reaches 116 pounds, Disp: , Rfl:   •  warfarin (COUMADIN) 5 mg tablet, TAKE 1/2 TO 1 TABLET BY MOUTH DAILY OR AS ORDERED BY PHYSICIAN., Disp: 90 tablet, Rfl: 3  Family History   Problem Relation Age of Onset   • Pancreatic cancer Mother 93   • Heart failure Mother    • Coronary artery disease Family    • Breast cancer Family    • Other Family          BREAST DISORDER, GASTROINTESTINAL CANCER   • Uterine cancer Family    • Hyperlipidemia Family    • Osteoarthritis Family    • Ovarian cancer Family    • Brain cancer Son    • Stroke Father    • No Known Problems Sister    • No Known Problems Daughter    • No Known Problems Maternal Grandmother    • No Known Problems Maternal Grandfather    • No Known Problems Paternal Grandmother    • No Known Problems Paternal Grandfather    • Breast cancer Cousin 50   • Breast cancer Cousin 50   • Ovarian cancer Other 49      Review of Systems  Allergies:  Asa [aspirin] and Nsaids      Objective:  /87   Pulse 92   Temp (!) 96.8 °F (36 °C)   Resp 18   LMP  (LMP Unknown)     Physical Exam      Wound 06/28/24 Neuropathic Plantar Left;Plantar (Active)   Wound Image Images linked 08/14/24 1318   Wound Description [place]oad [place] 08/14/24 1309   Susu-wound Assessment Intact 08/14/24 1309   Wound Length (cm) 0.2 cm 08/14/24 1309   Wound Width (cm) 0.5 cm 08/14/24 1309   Wound Depth (cm) 0.5 cm 08/14/24 1309   Wound Surface Area (cm^2) 0.1 cm^2 08/14/24 1309   Wound Volume (cm^3) 0.05 cm^3 08/14/24 1309   Calculated Wound Volume (cm^3) 0.05 cm^3 08/14/24 1309   Change in Wound Size % 50 08/14/24 1309   Drainage Amount Moderate 08/14/24 1309   Drainage Description Bloody 08/14/24 1309   Dressing Status Intact 08/14/24 1309       Wound 08/09/24 Foot Left (Active)   Wound Image Images linked 08/14/24 1309   Wound Description Intact;Other (Comment) (sutuuresintact) 08/14/24 1309   Susu-wound Assessment Intact 08/14/24 1309   Wound Length (cm) 0.1 cm 08/14/24 1309   Wound Width (cm) 0.1 cm 08/14/24 1309   Wound Depth (cm) 0.1 cm 08/14/24 1309   Wound Surface Area (cm^2) 0.01 cm^2 08/14/24 1309   Wound Volume (cm^3) 0.001 cm^3 08/14/24 1309   Calculated Wound Volume (cm^3) 0 cm^3 08/14/24 1309   Drainage Amount Scant 08/14/24 1309   Drainage Description Bloody 08/14/24 1309   Dressing Status Intact 08/14/24 1309                      Debridement  "  Wound 06/28/24 Neuropathic Plantar Left;Plantar    Universal Protocol:  procedure performed by consultantConsent: Verbal consent obtained.  Risks and benefits: risks, benefits and alternatives were discussed  Consent given by: patient  Time out: Immediately prior to procedure a \"time out\" was called to verify the correct patient, procedure, equipment, support staff and site/side marked as required.  Patient understanding: patient states understanding of the procedure being performed  Patient identity confirmed: verbally with patient    Debridement Details  Performed by: physician  Debridement type: surgical  Level of debridement: subcutaneous tissue  Pain control: lidocaine 4%      Post-debridement measurements  Length (cm): 0.3  Width (cm): 0.7  Depth (cm): 0.5  Percent debrided: 100%  Surface Area (cm^2): 0.21  Area Debrided (cm^2): 0.21  Volume (cm^3): 0.11    Tissue and other material debrided: subcutaneous tissue  Devitalized tissue debrided: biofilm, callus and fibrin  Instrument(s) utilized: blade  Bleeding: small  Hemostasis obtained with: pressure  Procedural pain (0-10): insensate  Post-procedural pain: insensate   Response to treatment: procedure was tolerated well         Results from last 6 Months   Lab Units 06/18/24  1058   WOUND CULTURE  1+ Growth of Candida albicans*  1+ Growth of           Wound Instructions:  Orders Placed This Encounter   Procedures   • Wound cleansing and dressings Neuropathic Left;Plantar Plantar     Wound location left plantar foot.     Change dressing 3x per week.     The dressing must stay dry and intact. You may shower with dressing protected by cast cover or bag. Or you may sponge bathe. Call wound center or home health nurse if dressing becomes wet.  Cleanse the wound with mild soap and water or normal saline, pat dry.     1 layer of 1/4\" felt pad to the inside sole of the surgical shoe  Please leave this in place when changing the dressing.       Apply Silver Alginate " "to wound bed.    Cover with gauze and ABD (as needed for drainage)   Secure with cast padding then rolled gauze and tape.     Surgical shoe in left foot at all times your foot will hit the floor        Please eat 3-4 servings protein in diet daily      Follow up with Dr. Marcial in 1 week     Standing Status:   Future     Standing Expiration Date:   8/21/2024   • Wound cleansing and dressings Left Foot     Place a piece of gauze on the sutured area to protect the area.     Standing Status:   Future     Standing Expiration Date:   8/21/2024   • Wound Procedure Treatment Neuropathic Left;Plantar Plantar     This order was created via procedure documentation   • Debridement Neuropathic Left;Plantar Plantar     This order was created via procedure documentation         Jonny Marcial DPM      Portions of the record may have been created with voice recognition software. Occasional wrong word or \"sound a like\" substitutions may have occurred due to the inherent limitations of voice recognition software. Read the chart carefully and recognize, using context, where substitutions have occurred.      "

## 2024-08-14 NOTE — PROGRESS NOTES
Wound Procedure Treatment Neuropathic Left;Plantar Plantar    Performed by: Jazmin Sanchez RN  Authorized by: Jonny Marcial DPM    Associated wounds:   Wound 06/28/24 Neuropathic Plantar Left;Plantar  Wound cleansed with:  NSS  Applied primary dressing:  Calcium alginate and Silver  Applied secondary dressing:  Gauze and ABD  Dressing secured with:  Ricky, Tape and Tubifast  Offloading device appllied:  Surgical shoe and Felt padding 1/4 inch  Comments:  1/4 inch felt pad to inside bottom of surgical shoe

## 2024-08-20 DIAGNOSIS — J47.0 BRONCHIECTASIS WITH ACUTE LOWER RESPIRATORY INFECTION (HCC): ICD-10-CM

## 2024-08-20 LAB
INR PPP: 2.5
PROTHROMBIN TIME: 26.8 SEC (ref 12–14.6)

## 2024-08-20 RX ORDER — SODIUM CHLORIDE FOR INHALATION 3 %
4 VIAL, NEBULIZER (ML) INHALATION 3 TIMES DAILY
Qty: 360 ML | Refills: 2 | Status: SHIPPED | OUTPATIENT
Start: 2024-08-20

## 2024-08-20 NOTE — TELEPHONE ENCOUNTER
Reason for call:   [x] Refill   [] Prior Auth  [] Other:     Office:   [] PCP/Provider -   [x] Specialty/Provider - Pulm    Medication: sodium chloride 3 % inhalation solution     Dose/Frequency: Take 4 mL by nebulization 3 (three) times a day     Quantity: 360    Pharmacy: [facility]/pharmacy #[id number] - [place], [place] - [address] [place] [address]     Does the patient have enough for 3 days?   [x] Yes   [] No - Send as HP to POD

## 2024-08-21 ENCOUNTER — ANTICOAG VISIT (OUTPATIENT)
Dept: CARDIOLOGY CLINIC | Facility: CLINIC | Age: 89
End: 2024-08-21

## 2024-08-21 ENCOUNTER — OFFICE VISIT (OUTPATIENT)
Dept: WOUND CARE | Facility: HOSPITAL | Age: 89
End: 2024-08-21
Payer: COMMERCIAL

## 2024-08-21 VITALS
RESPIRATION RATE: 16 BRPM | SYSTOLIC BLOOD PRESSURE: 118 MMHG | TEMPERATURE: 96.3 F | HEART RATE: 99 BPM | DIASTOLIC BLOOD PRESSURE: 71 MMHG

## 2024-08-21 DIAGNOSIS — I48.21 PERMANENT ATRIAL FIBRILLATION (HCC): Primary | Chronic | ICD-10-CM

## 2024-08-21 DIAGNOSIS — L97.526 NON-PRESSURE CHRONIC ULCER OF OTHER PART OF LEFT FOOT WITH BONE INVOLVEMENT WITHOUT EVIDENCE OF NECROSIS (HCC): Primary | ICD-10-CM

## 2024-08-21 PROCEDURE — 11042 DBRDMT SUBQ TIS 1ST 20SQCM/<: CPT | Performed by: PODIATRIST

## 2024-08-21 RX ORDER — LIDOCAINE 40 MG/G
CREAM TOPICAL ONCE
Status: COMPLETED | OUTPATIENT
Start: 2024-08-21 | End: 2024-08-21

## 2024-08-21 RX ADMIN — LIDOCAINE: 40 CREAM TOPICAL at 15:34

## 2024-08-21 NOTE — PROGRESS NOTES
Wound Procedure Treatment Neuropathic Left;Plantar Plantar    Performed by: Kayli Claire RN  Authorized by: Jonny Marcial DPM    Associated wounds:   Wound 06/28/24 Neuropathic Plantar Left;Plantar  Wound cleansed with:  NSS  Applied primary dressing:  Collagen dressing and Silver  Applied secondary dressing:  ABD and Cast padding  Dressing secured with:  Ricky and Tape

## 2024-08-21 NOTE — PATIENT INSTRUCTIONS
"Orders Placed This Encounter   Procedures    Wound cleansing and dressings Neuropathic Left;Plantar Plantar     Wound location left foot   Change dressing 3x per week.   You may remove the dressing and shower. Do not leave wound open to air, apply new dressing immediately.  Please use shower chair so the foot is not directly on the shower floor.  Cleanse the foot last when showering.    Cleanse the wound with wound cleanser or mild soap and water, rinse, pat dry.  Apply Puracol AG to wound bed.  This will dissolve once it meets with drainage.    Cover with gauze or ABD pad.    Secure with cast padding then  roll gauze and tape.        1 layer of 1/4\" felt pad to the inside sole of the surgical shoe  Please leave this in place when changing the dressing.    Surgical shoe in left foot at all times your foot will hit the floor     Standing Status:   Future     Standing Expiration Date:   8/28/2024    Wound Procedure Treatment Neuropathic Left;Plantar Plantar     This order was created via procedure documentation      "

## 2024-08-21 NOTE — PROGRESS NOTES
Patient ID: Rebeca Banegas is a 89 y.o. adult Date of Birth 7/22/1932     Diagnosis:  1. 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  Patient does appear to be stable.    She did receive the antibiotic.  She denies any new acute changes.  She has no fevers chills nausea vomiting her pain is improving.    Will plan on checking her back for reevaluation.  Will add additional padding to the plantar surface of the foot of the shoe.    Notify us of any new acute changes.    If the patient notices any systemic signs of infection including but not limited to fever, chills, nausea, vomiting or significant worsening of wound with increased drainage, redness or streaking up the foot or leg the patient should notify the office or present to the emergency room.          If wound debridement was completed today this was done in an attempt to promote healing, decrease risk of infection and for limb salvage efforts.     Goal of treatment: wound healing     Efforts to decrease pressure on the wound(s), evaluation and discussion of nutritional status, peripheral vascular status, and infection control have all been addressed with this patient.    Return in 1 week (on 8/28/2024) for Next  scheduled follow up, Wound Assessment, recheck.      Chief Complaint   Patient presents with   • Follow Up Wound Care Visit     Left foot wound           Subjective:   Patient had surgical procedure 8/9/2024.  I reviewed the biopsy results with her as well as the culture results.  She was given Zyvox.  Denies any new acute changes.    The following portions of the patient's history were reviewed and updated as appropriate:   Patient Active Problem List   Diagnosis   • Abnormal findings on diagnostic imaging of other specified body structures   • Abnormal findings on diagnostic imaging of urinary organs   • Allergic rhinitis   • Arthritis   • Asthma   • Atherosclerosis   • Permanent atrial fibrillation (HCC)   • Backache   • Benign colon polyp   • Bifascicular bundle branch block   • Chronic diastolic congestive heart failure (HCC)   • Esophageal reflux   • Essential tremor   • Hoarseness   • Hyperlipidemia   • Hypertension   • Hypothyroidism   • Leg pain   • Nasal polyp   • Onychomycosis   • Ovarian cyst   • Pancreatic cyst   • Plantar fasciitis   • Prediabetes   • Pulmonary nodule   • Thyroid nodule   • Vitamin D deficiency   • Pain of left hand   • Leg cramps   • Sensorineural hearing loss (SNHL), bilateral   • Left asymmetrical SNHL   • Elevated serum immunoglobulin free light chains   • Osteopenia of hip   • Stage 3b chronic kidney disease (HCC)   • Labial abscess   • Vulvar cysts   • Open wound of right upper arm   • Secondary hyperparathyroidism (HCC)   • Age-related osteoporosis without current pathological fracture   • Chronic cough   • MGUS (monoclonal gammopathy of unknown significance)   • Bronchiectasis without complication ([facility])   • Vaccine counseling   • Acute on chronic diastolic CHF (congestive heart failure) (HCC)   • Chronic foot ulcer, left, with fat layer exposed (HCC)   • 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 ([facility])   • Preoperative examination   • Paroxysmal atrial fibrillation  (HCC)     Past Medical History:   Diagnosis Date   • Abnormal ultrasound     RESOLVED: [id number]   • Advice given about COVID-19 virus infection 04/07/2020   • Asthma with acute exacerbation     LAST ASSESSED: [id number]   • Asymptomatic menopausal state    • Atherosclerosis    • Atrial fibrillation (HCC)    • Chronic obstructive lung disease (HCC)    • Congestive heart failure (HCC)     LAST ASSESSED: [id number]   • COVID-19 virus infection 03/25/2023   • Cuboid fracture     LAST ASSESSED: [id number]   • Dyspepsia    • Fall 04/01/2024   • GERD (gastroesophageal reflux disease)    • High risk medication use     LAST ASSESSED: 19MAY2014   • Hyperlipidemia    • Hypertension    • Hypokalemia    • Hypothyroidism    • Impacted cerumen of both ears     LAST ASSESSED: [id number]   • Impacted cerumen of right ear     LAST ASSESSED: [id number]   • Influenza     LAST ASSESSED: [id number]   • IPMN (intraductal papillary mucinous neoplasm)     RESOLVED: 02OCT2015   • Limb swelling     LAST ASSESSED: 19MAY2014   • Navicular fracture of ankle     LAST ASSESSED: 22MAY2014   • Onychomycosis     LAST ASSESSED: [id number]   • Osteoarthritis    • Osteopenia    • Osteoporosis    • Paronychia, finger, unspecified laterality     LAST ASSESSED: 19JUL2013   • Paroxysmal atrial fibrillation (HCC)     LAST ASSESSED: 02MAR2014   • Peripheral vascular disease (HCC)    • Plantar fasciitis    • Talus fracture     LAST ASSESSED: 03JUL2014   • Vascular headache      Past Surgical History:   Procedure Laterality Date   • APPENDECTOMY     • BONE BIOPSY Left 8/9/2024    Procedure: Bone bx of proximal phalanx second toe & second met with fluoroscopic guidance;  Surgeon: Jonny Marcial DPM;  Location: [facility];  Service: Podiatry   • CATARACT EXTRACTION Bilateral    • CHOLECYSTECTOMY     • COLONOSCOPY     • JOINT REPLACEMENT Bilateral     knee   • NEUROPLASTY / TRANSPOSITION MEDIAN NERVE AT CARPAL TUNNEL BILATERAL Bilateral     DECOMPRESSION   •  OOPHORECTOMY Bilateral     age 81   • PELVIC FLOOR REPAIR     • AL BIOPSY BONE TROCAR/NEEDLE SUPERFICIAL Left 2024    Procedure: Left foot debridement;  Surgeon: Jonny Marcial DPM;  Location: [facility];  Service: Podiatry   • AL DEBRIDEMENT MUSCLE &/FASCIA 1ST 20 SQ CM/< Left 2024    Procedure: Left foot debridement;  Surgeon: Jonny Marcial DPM;  Location: [facility];  Service: Podiatry   • SINUS SURGERY     • TOTAL KNEE ARTHROPLASTY Bilateral      Social History     Socioeconomic History   • Marital status:      Spouse name: None   • Number of children: None   • Years of education: None   • Highest education level: None   Occupational History   • Occupation: retired   Tobacco Use   • Smoking status: Former     Current packs/day: 0.00     Average packs/day: 0.3 packs/day for 2.0 years (0.5 ttl pk-yrs)     Types: Cigarettes     Start date:      Quit date:      Years since quittin.6   • Smokeless tobacco: Never   • Tobacco comments:     On and off socially with friends    Vaping Use   • Vaping status: Never Used   Substance and Sexual Activity   • Alcohol use: Not Currently     Comment: SOCIAL   • Drug use: No   • Sexual activity: Not Currently     Partners: Male     Birth control/protection: Post-menopausal   Other Topics Concern   • None   Social History Narrative    DAILY COFFEE CONSUMPTION (2 CUPS/DAY)    EXERCISING REGULARLY     Social Determinants of Health     Financial Resource Strain: Not on file   Food Insecurity: Not on file   Transportation Needs: Not on file   Physical Activity: Not on file   Stress: Not on file   Social Connections: Not on file   Intimate Partner Violence: Not on file   Housing Stability: Not on file        Current Outpatient Medications:   •  albuterol (2.5 mg/3 mL) 0.083 % nebulizer solution, Take 3 mL (2.5 mg total) by nebulization every 8 (eight) hours, Disp: 270 mL, Rfl: 3  •  albuterol (Proventil HFA) 90 mcg/act inhaler, Inhale 2  puffs every 6 (six) hours as needed for wheezing, Disp: 20.1 g, Rfl: 3  •  Cholecalciferol (VITAMIN D3) 1000 units CAPS, Take 2,000 Units by mouth daily, Disp: , Rfl:   •  fluticasone (FLONASE) 50 mcg/act nasal spray, USE 2 SPRAYS IN EACH NOSTRIL ONCE DAILY, Disp: 48 g, Rfl: 3  •  Fluticasone-Salmeterol (Advair Diskus) 100-50 mcg/dose inhaler, Inhale 1 puff 2 (two) times a day Rinse mouth after use., Disp: 180 blister, Rfl: 1  •  levothyroxine 50 mcg tablet, TAKE 1 TABLET DAILY BUT 1-1/2 TABLETS 4 DAYS/WEEK, Disp: 40 tablet, Rfl: 5  •  linezolid (ZYVOX) 600 mg tablet, Take 1 tablet (600 mg total) by mouth every 12 (twelve) hours for 10 days, Disp: 20 tablet, Rfl: 0  •  metoprolol succinate (TOPROL-XL) 100 mg 24 hr tablet, Take 0.5 tablets (50 mg total) by mouth 2 (two) times a day (Patient taking differently: Take 100 mg by mouth daily), Disp: , Rfl:   •  oxyCODONE-acetaminophen (Percocet) 5-325 mg per tablet, Take 1 tablet by mouth every 6 (six) hours as needed for moderate pain for up to 20 doses Max Daily Amount: 4 tablets, Disp: 20 tablet, Rfl: 0  •  sodium chloride 3 % inhalation solution, Take 4 mL by nebulization 3 (three) times a day, Disp: 360 mL, Rfl: 2  •  spironolactone (ALDACTONE) 50 mg tablet, Take 1 tablet (50 mg total) by mouth daily, Disp: 90 tablet, Rfl: 3  •  torsemide (DEMADEX) 20 mg tablet, Take 2 tablets (40 mg total) by mouth 2 (two) times a day Take 3 tablets first thing in the morning, then again at approximately 2 or 3 PM, for the next 4 to 7 days until weight reaches 116 pounds, Disp: , Rfl:   •  warfarin (COUMADIN) 5 mg tablet, TAKE 1/2 TO 1 TABLET BY MOUTH DAILY OR AS ORDERED BY PHYSICIAN., Disp: 90 tablet, Rfl: 3  No current facility-administered medications for this visit.  Family History   Problem Relation Age of Onset   • Pancreatic cancer Mother 93   • Heart failure Mother    • Coronary artery disease Family    • Breast cancer Family    • Other Family         BREAST DISORDER,  GASTROINTESTINAL CANCER   • Uterine cancer Family    • Hyperlipidemia Family    • Osteoarthritis Family    • Ovarian cancer Family    • Brain cancer Son    • Stroke Father    • No Known Problems Sister    • No Known Problems Daughter    • No Known Problems Maternal Grandmother    • No Known Problems Maternal Grandfather    • No Known Problems Paternal Grandmother    • No Known Problems Paternal Grandfather    • Breast cancer Cousin 50   • Breast cancer Cousin 50   • Ovarian cancer Other 49      Review of Systems  Allergies:  Asa [aspirin] and Nsaids      Objective:  /71   Pulse 99   Temp (!) 96.3 °F (35.7 °C)   Resp 16   LMP  (LMP Unknown)     Physical Exam      Wound 06/28/24 Neuropathic Plantar Left;Plantar (Active)   Wound Image Images linked 08/21/24 1551   Wound Description Pink 08/21/24 1529   Susu-wound Assessment Intact 08/21/24 1529   Wound Length (cm) 0.1 cm 08/21/24 1529   Wound Width (cm) 0.5 cm 08/21/24 1529   Wound Depth (cm) 0.4 cm 08/21/24 1529   Wound Surface Area (cm^2) 0.05 cm^2 08/21/24 1529   Wound Volume (cm^3) 0.02 cm^3 08/21/24 1529   Calculated Wound Volume (cm^3) 0.02 cm^3 08/21/24 1529   Change in Wound Size % 80 08/21/24 1529   Drainage Amount Scant 08/21/24 1529   Drainage Description Serous 08/21/24 1529   Non-staged Wound Description Full thickness 08/21/24 1529   Dressing Status Intact 08/21/24 1529       [REMOVED] Wound 08/09/24 Foot Left (Removed)   Wound Image Images linked 08/21/24 1528   Wound Description Epithelialization 08/21/24 1530   Susu-wound Assessment Intact 08/21/24 1530   Wound Length (cm) 0 cm 08/21/24 1530   Wound Width (cm) 0 cm 08/21/24 1530   Wound Depth (cm) 0 cm 08/21/24 1530   Wound Surface Area (cm^2) 0 cm^2 08/21/24 1530   Wound Volume (cm^3) 0 cm^3 08/21/24 1530   Calculated Wound Volume (cm^3) 0 cm^3 08/21/24 1530   Drainage Amount None 08/21/24 1530   Non-staged Wound Description Not applicable 08/21/24 1530                      Debridement  "  Wound 06/28/24 Neuropathic Plantar Left;Plantar    Universal Protocol:  procedure performed by consultantConsent: Verbal consent obtained.  Risks and benefits: risks, benefits and alternatives were discussed  Consent given by: patient  Time out: Immediately prior to procedure a \"time out\" was called to verify the correct patient, procedure, equipment, support staff and site/side marked as required.  Patient understanding: patient states understanding of the procedure being performed  Patient identity confirmed: verbally with patient    Debridement Details  Performed by: physician  Debridement type: surgical  Level of debridement: subcutaneous tissue  Pain control: lidocaine 4%      Post-debridement measurements  Length (cm): 0.3  Width (cm): 0.6  Depth (cm): 0.4  Percent debrided: 100%  Surface Area (cm^2): 0.18  Area Debrided (cm^2): 0.18  Volume (cm^3): 0.07    Tissue and other material debrided: subcutaneous tissue  Devitalized tissue debrided: biofilm, callus and fibrin  Instrument(s) utilized: blade  Bleeding: small  Hemostasis obtained with: pressure  Procedural pain (0-10): insensate  Post-procedural pain: insensate   Response to treatment: procedure was tolerated well         Results from last 6 Months   Lab Units 06/18/24  1058   WOUND CULTURE  1+ Growth of Candida albicans*  1+ Growth of           Wound Instructions:  Orders Placed This Encounter   Procedures   • Wound cleansing and dressings Neuropathic Left;Plantar Plantar     Wound location left foot   Change dressing 3x per week.   You may remove the dressing and shower. Do not leave wound open to air, apply new dressing immediately.  Please use shower chair so the foot is not directly on the shower floor.  Cleanse the foot last when showering.    Cleanse the wound with wound cleanser or mild soap and water, rinse, pat dry.  Apply Puracol AG to wound bed.  This will dissolve once it meets with drainage.    Cover with gauze or ABD pad.    Secure with " "cast padding then  roll gauze and tape.        1 layer of 1/4\" felt pad to the inside sole of the surgical shoe  Please leave this in place when changing the dressing.    Surgical shoe in left foot at all times your foot will hit the floor     Standing Status:   Future     Standing Expiration Date:   8/28/2024   • Wound Procedure Treatment Neuropathic Left;Plantar Plantar     This order was created via procedure documentation   • Debridement Neuropathic Left;Plantar Plantar     This order was created via procedure documentation         Jonny Marcial DPM      Portions of the record may have been created with voice recognition software. Occasional wrong word or \"sound a like\" substitutions may have occurred due to the inherent limitations of voice recognition software. Read the chart carefully and recognize, using context, where substitutions have occurred.    "

## 2024-08-27 ENCOUNTER — HOSPITAL ENCOUNTER (INPATIENT)
Facility: HOSPITAL | Age: 89
LOS: 1 days | Discharge: HOME | DRG: 064 | End: 2024-08-29
Attending: EMERGENCY MEDICINE | Admitting: HOSPITALIST
Payer: COMMERCIAL

## 2024-08-27 ENCOUNTER — TELEPHONE (OUTPATIENT)
Age: 89
End: 2024-08-27

## 2024-08-27 ENCOUNTER — TELEPHONE (OUTPATIENT)
Dept: INTERNAL MEDICINE CLINIC | Facility: CLINIC | Age: 89
End: 2024-08-27

## 2024-08-27 ENCOUNTER — APPOINTMENT (EMERGENCY)
Dept: RADIOLOGY | Facility: HOSPITAL | Age: 89
DRG: 064 | End: 2024-08-27
Payer: COMMERCIAL

## 2024-08-27 DIAGNOSIS — I48.21 PERMANENT ATRIAL FIBRILLATION (HCC): Chronic | ICD-10-CM

## 2024-08-27 DIAGNOSIS — R53.1 WEAKNESS: Primary | ICD-10-CM

## 2024-08-27 DIAGNOSIS — K21.9 GASTROESOPHAGEAL REFLUX DISEASE WITHOUT ESOPHAGITIS: ICD-10-CM

## 2024-08-27 DIAGNOSIS — Z86.79 HISTORY OF ATRIAL FIBRILLATION: ICD-10-CM

## 2024-08-27 DIAGNOSIS — I63.9 CEREBROVASCULAR ACCIDENT (CVA), UNSPECIFIED MECHANISM (HCC): ICD-10-CM

## 2024-08-27 DIAGNOSIS — K86.9 PANCREATIC LESION: ICD-10-CM

## 2024-08-27 DIAGNOSIS — H54.62 VISION LOSS OF LEFT EYE: ICD-10-CM

## 2024-08-27 DIAGNOSIS — R20.0 RIGHT ARM NUMBNESS: Primary | ICD-10-CM

## 2024-08-27 DIAGNOSIS — R29.898 WEAKNESS OF RIGHT UPPER EXTREMITY: ICD-10-CM

## 2024-08-27 DIAGNOSIS — I48.20 CHRONIC ATRIAL FIBRILLATION (HCC): Chronic | ICD-10-CM

## 2024-08-27 PROBLEM — N18.32 CKD STAGE 3B, GFR 30-44 ML/MIN (HCC): Status: ACTIVE | Noted: 2020-04-07

## 2024-08-27 LAB
ALBUMIN SERPL BCG-MCNC: 4 G/DL (ref 3.5–5)
ALP SERPL-CCNC: 44 U/L (ref 34–104)
ALT SERPL W P-5'-P-CCNC: 25 U/L (ref 7–52)
ANION GAP SERPL CALCULATED.3IONS-SCNC: 10 MMOL/L (ref 4–13)
AST SERPL W P-5'-P-CCNC: 31 U/L (ref 5–45)
ATRIAL RATE: 147 BPM
BASOPHILS # BLD AUTO: 0.03 THOUSANDS/ÂΜL (ref 0–0.1)
BASOPHILS NFR BLD AUTO: 1 % (ref 0–1)
BILIRUB SERPL-MCNC: 0.75 MG/DL (ref 0.2–1)
BUN SERPL-MCNC: 63 MG/DL (ref 5–25)
CALCIUM SERPL-MCNC: 9.2 MG/DL (ref 8.4–10.2)
CHLORIDE SERPL-SCNC: 96 MMOL/L (ref 96–108)
CO2 SERPL-SCNC: 25 MMOL/L (ref 21–32)
CREAT SERPL-MCNC: 1.9 MG/DL (ref 0.6–1.3)
EOSINOPHIL # BLD AUTO: 0.13 THOUSAND/ÂΜL (ref 0–0.61)
EOSINOPHIL NFR BLD AUTO: 2 % (ref 0–6)
ERYTHROCYTE [DISTWIDTH] IN BLOOD BY AUTOMATED COUNT: 13.5 % (ref 11.6–15.1)
GLUCOSE SERPL-MCNC: 102 MG/DL (ref 65–140)
HCT VFR BLD AUTO: 45 % (ref 36.5–46.1)
HGB BLD-MCNC: 14.7 G/DL (ref 12–15.4)
IMM GRANULOCYTES # BLD AUTO: 0.01 THOUSAND/UL (ref 0–0.2)
IMM GRANULOCYTES NFR BLD AUTO: 0 % (ref 0–2)
INR PPP: 3.9 (ref 0.85–1.19)
LYMPHOCYTES # BLD AUTO: 2.37 THOUSANDS/ÂΜL (ref 0.6–4.47)
LYMPHOCYTES NFR BLD AUTO: 38 % (ref 14–44)
MCH RBC QN AUTO: 32.3 PG (ref 26.8–34.3)
MCHC RBC AUTO-ENTMCNC: 32.7 G/DL (ref 31.4–37.4)
MCV RBC AUTO: 99 FL (ref 82–98)
MONOCYTES # BLD AUTO: 0.42 THOUSAND/ÂΜL (ref 0.17–1.22)
MONOCYTES NFR BLD AUTO: 7 % (ref 4–12)
NEUTROPHILS # BLD AUTO: 3.23 THOUSANDS/ÂΜL (ref 1.85–7.62)
NEUTS SEG NFR BLD AUTO: 52 % (ref 43–75)
NRBC BLD AUTO-RTO: 0 /100 WBCS
PLATELET # BLD AUTO: 102 THOUSANDS/UL (ref 149–390)
PMV BLD AUTO: 10.2 FL (ref 8.9–12.7)
POTASSIUM SERPL-SCNC: 5.1 MMOL/L (ref 3.5–5.3)
PROT SERPL-MCNC: 7.6 G/DL (ref 6.4–8.4)
PROTHROMBIN TIME: 37.5 SECONDS (ref 12.3–15)
QRS AXIS: 267 DEGREES
QRSD INTERVAL: 118 MS
QT INTERVAL: 334 MS
QTC INTERVAL: 435 MS
RBC # BLD AUTO: 4.55 MILLION/UL (ref 3.88–5.12)
SODIUM SERPL-SCNC: 131 MMOL/L (ref 135–147)
T WAVE AXIS: -7 DEGREES
VENTRICULAR RATE: 102 BPM
WBC # BLD AUTO: 6.19 THOUSAND/UL (ref 4.31–10.16)

## 2024-08-27 PROCEDURE — 93010 ELECTROCARDIOGRAM REPORT: CPT | Performed by: INTERNAL MEDICINE

## 2024-08-27 PROCEDURE — 96365 THER/PROPH/DIAG IV INF INIT: CPT

## 2024-08-27 PROCEDURE — 36415 COLL VENOUS BLD VENIPUNCTURE: CPT

## 2024-08-27 PROCEDURE — 70498 CT ANGIOGRAPHY NECK: CPT

## 2024-08-27 PROCEDURE — 80061 LIPID PANEL: CPT

## 2024-08-27 PROCEDURE — 99285 EMERGENCY DEPT VISIT HI MDM: CPT

## 2024-08-27 PROCEDURE — 99223 1ST HOSP IP/OBS HIGH 75: CPT | Performed by: HOSPITALIST

## 2024-08-27 PROCEDURE — 85025 COMPLETE CBC W/AUTO DIFF WBC: CPT

## 2024-08-27 PROCEDURE — 70496 CT ANGIOGRAPHY HEAD: CPT

## 2024-08-27 PROCEDURE — 80053 COMPREHEN METABOLIC PANEL: CPT

## 2024-08-27 PROCEDURE — 83036 HEMOGLOBIN GLYCOSYLATED A1C: CPT

## 2024-08-27 PROCEDURE — 93005 ELECTROCARDIOGRAM TRACING: CPT

## 2024-08-27 PROCEDURE — 99285 EMERGENCY DEPT VISIT HI MDM: CPT | Performed by: EMERGENCY MEDICINE

## 2024-08-27 PROCEDURE — 85610 PROTHROMBIN TIME: CPT | Performed by: HOSPITALIST

## 2024-08-27 RX ORDER — SODIUM CHLORIDE, SODIUM GLUCONATE, SODIUM ACETATE, POTASSIUM CHLORIDE, MAGNESIUM CHLORIDE, SODIUM PHOSPHATE, DIBASIC, AND POTASSIUM PHOSPHATE .53; .5; .37; .037; .03; .012; .00082 G/100ML; G/100ML; G/100ML; G/100ML; G/100ML; G/100ML; G/100ML
500 INJECTION, SOLUTION INTRAVENOUS ONCE
Status: COMPLETED | OUTPATIENT
Start: 2024-08-27 | End: 2024-08-27

## 2024-08-27 RX ORDER — FLUTICASONE PROPIONATE 50 MCG
1 SPRAY, SUSPENSION (ML) NASAL DAILY
Status: DISCONTINUED | OUTPATIENT
Start: 2024-08-28 | End: 2024-08-29 | Stop reason: HOSPADM

## 2024-08-27 RX ORDER — METOPROLOL SUCCINATE 100 MG/1
100 TABLET, EXTENDED RELEASE ORAL DAILY
Status: DISCONTINUED | OUTPATIENT
Start: 2024-08-28 | End: 2024-08-29 | Stop reason: HOSPADM

## 2024-08-27 RX ORDER — ALBUTEROL SULFATE 0.83 MG/ML
2.5 SOLUTION RESPIRATORY (INHALATION)
Status: DISCONTINUED | OUTPATIENT
Start: 2024-08-27 | End: 2024-08-27

## 2024-08-27 RX ORDER — PANTOPRAZOLE SODIUM 40 MG/1
40 TABLET, DELAYED RELEASE ORAL
Status: DISCONTINUED | OUTPATIENT
Start: 2024-08-28 | End: 2024-08-29 | Stop reason: HOSPADM

## 2024-08-27 RX ORDER — WARFARIN SODIUM 3 MG/1
3 TABLET ORAL
Status: DISCONTINUED | OUTPATIENT
Start: 2024-08-27 | End: 2024-08-27

## 2024-08-27 RX ORDER — ATORVASTATIN CALCIUM 40 MG/1
40 TABLET, FILM COATED ORAL EVERY EVENING
Status: DISCONTINUED | OUTPATIENT
Start: 2024-08-27 | End: 2024-08-29 | Stop reason: HOSPADM

## 2024-08-27 RX ORDER — LEVALBUTEROL INHALATION SOLUTION 1.25 MG/3ML
1.25 SOLUTION RESPIRATORY (INHALATION)
Status: DISCONTINUED | OUTPATIENT
Start: 2024-08-27 | End: 2024-08-28

## 2024-08-27 RX ORDER — LEVOTHYROXINE SODIUM 50 UG/1
50 TABLET ORAL
Status: DISCONTINUED | OUTPATIENT
Start: 2024-08-28 | End: 2024-08-29 | Stop reason: HOSPADM

## 2024-08-27 RX ORDER — FLUTICASONE FUROATE AND VILANTEROL 100; 25 UG/1; UG/1
1 POWDER RESPIRATORY (INHALATION)
Status: DISCONTINUED | OUTPATIENT
Start: 2024-08-28 | End: 2024-08-29 | Stop reason: HOSPADM

## 2024-08-27 RX ORDER — SODIUM CHLORIDE 9 MG/ML
50 INJECTION, SOLUTION INTRAVENOUS CONTINUOUS
Status: DISCONTINUED | OUTPATIENT
Start: 2024-08-27 | End: 2024-08-28

## 2024-08-27 RX ORDER — ALBUTEROL SULFATE 90 UG/1
2 AEROSOL, METERED RESPIRATORY (INHALATION) EVERY 6 HOURS PRN
Status: DISCONTINUED | OUTPATIENT
Start: 2024-08-27 | End: 2024-08-29 | Stop reason: HOSPADM

## 2024-08-27 RX ADMIN — LEVALBUTEROL HYDROCHLORIDE 1.25 MG: 1.25 SOLUTION RESPIRATORY (INHALATION) at 20:33

## 2024-08-27 RX ADMIN — SODIUM CHLORIDE, SODIUM GLUCONATE, SODIUM ACETATE, POTASSIUM CHLORIDE, MAGNESIUM CHLORIDE, SODIUM PHOSPHATE, DIBASIC, AND POTASSIUM PHOSPHATE 500 ML: .53; .5; .37; .037; .03; .012; .00082 INJECTION, SOLUTION INTRAVENOUS at 13:12

## 2024-08-27 RX ADMIN — IOHEXOL 85 ML: 350 INJECTION, SOLUTION INTRAVENOUS at 14:39

## 2024-08-27 RX ADMIN — SODIUM CHLORIDE 50 ML/HR: 0.9 INJECTION, SOLUTION INTRAVENOUS at 18:28

## 2024-08-27 NOTE — TELEPHONE ENCOUNTER
Pt called to see if Dr. Dillon could possibly visit her today at her home; she experienced pain in her right arm starting Sunday morning and it has not gone away along with feeling weakness/numbness and having problems lifting it. Please advise pt with confirmation of a time Dr. Dillon can see her, thank you.

## 2024-08-27 NOTE — ED PROVIDER NOTES
History  Chief Complaint   Patient presents with    Extremity Weakness     C/o r arm weakness, numbness, and pain starting Sunday and continuing into today, reports loss of peripheral vision on on L side on Sunday morning     Patient is a 89-year-old female with past medical history of A-fib on Coumadin who presents today with right arm numbness and tingling and weakness.  She also had left eye visual deficits.  She states that 48 hours ago on Sunday morning she woke up with the inability to see out of the left side of her left eye vision.  She also states that she was unable to use her right arm due to weakness and numbness and tingling.  About an hour later the vision resolved however she continues to have right arm numbness and tingling and some weakness that gets progressively worse with use.  Patient states she has been compliant with her Coumadin.  Denies any other neurological complaints.  Denies any chest pain, shortness of breath, nausea, vomiting, new rash.      Extremity Weakness  Associated symptoms: no abdominal pain, no chest pain, no cough, no fever, no headaches, no rash, no shortness of breath and no vomiting        Prior to Admission Medications   Prescriptions Last Dose Informant Patient Reported? Taking?   Cholecalciferol (VITAMIN D3) 1000 units CAPS  Self Yes No   Sig: Take 2,000 Units by mouth daily   Fluticasone-Salmeterol (Advair Diskus) 100-50 mcg/dose inhaler  Self No No   Sig: Inhale 1 puff 2 (two) times a day Rinse mouth after use.   albuterol (2.5 mg/3 mL) 0.083 % nebulizer solution  Self No No   Sig: Take 3 mL (2.5 mg total) by nebulization every 8 (eight) hours   albuterol (Proventil HFA) 90 mcg/act inhaler  Self No No   Sig: Inhale 2 puffs every 6 (six) hours as needed for wheezing   fluticasone (FLONASE) 50 mcg/act nasal spray  Self No No   Sig: USE 2 SPRAYS IN EACH NOSTRIL ONCE DAILY   levothyroxine 50 mcg tablet  Self No No   Sig: TAKE 1 TABLET DAILY BUT 1-1/2 TABLETS 4 DAYS/WEEK    metoprolol succinate (TOPROL-XL) 100 mg 24 hr tablet  Self No No   Sig: Take 0.5 tablets (50 mg total) by mouth 2 (two) times a day   Patient taking differently: Take 100 mg by mouth daily   oxyCODONE-acetaminophen (Percocet) 5-325 mg per tablet   No No   Sig: Take 1 tablet by mouth every 6 (six) hours as needed for moderate pain for up to 20 doses Max Daily Amount: 4 tablets   sodium chloride 3 % inhalation solution   No No   Sig: Take 4 mL by nebulization 3 (three) times a day   spironolactone (ALDACTONE) 50 mg tablet  Self No No   Sig: Take 1 tablet (50 mg total) by mouth daily   torsemide (DEMADEX) 20 mg tablet  Self No No   Sig: Take 2 tablets (40 mg total) by mouth 2 (two) times a day Take 3 tablets first thing in the morning, then again at approximately 2 or 3 PM, for the next 4 to 7 days until weight reaches 116 pounds   warfarin (COUMADIN) 5 mg tablet  Self No No   Sig: TAKE 1/2 TO 1 TABLET BY MOUTH DAILY OR AS ORDERED BY PHYSICIAN.      Facility-Administered Medications: None       Past Medical History:   Diagnosis Date    Abnormal ultrasound     RESOLVED: 26JUN2015    Advice given about COVID-19 virus infection 04/07/2020    Asthma with acute exacerbation     LAST ASSESSED: [id number]    Asymptomatic menopausal state     Atherosclerosis     Atrial fibrillation (HCC)     Chronic obstructive lung disease (HCC)     Congestive heart failure (HCC)     LAST ASSESSED: [id number]    COVID-19 virus infection 03/25/2023    Cuboid fracture     LAST ASSESSED: [id number]    Dyspepsia     Fall 04/01/2024    GERD (gastroesophageal reflux disease)     High risk medication use     LAST ASSESSED: [id number]    Hyperlipidemia     Hypertension     Hypokalemia     Hypothyroidism     Impacted cerumen of both ears     LAST ASSESSED: 36FLB7996    Impacted cerumen of right ear     LAST ASSESSED: [id number]    Influenza     LAST ASSESSED: 12JHS6723    IPMN (intraductal papillary mucinous neoplasm)     RESOLVED: 02OCT2015    Limb  swelling     LAST ASSESSED: 19MAY2014    Navicular fracture of ankle     LAST ASSESSED: 22MAY2014    Onychomycosis     LAST ASSESSED: 44AIZ8829    Osteoarthritis     Osteopenia     Osteoporosis     Paronychia, finger, unspecified laterality     LAST ASSESSED: 19JUL2013    Paroxysmal atrial fibrillation (HCC)     LAST ASSESSED: 02MAR2014    Peripheral vascular disease (HCC)     Plantar fasciitis     Talus fracture     LAST ASSESSED: 03JUL2014    Vascular headache        Past Surgical History:   Procedure Laterality Date    APPENDECTOMY      BONE BIOPSY Left 8/9/2024    Procedure: Bone bx of proximal phalanx second toe & second met with fluoroscopic guidance;  Surgeon: Jonny Marcial DPM;  Location: [facility];  Service: Podiatry    CATARACT EXTRACTION Bilateral     CHOLECYSTECTOMY      COLONOSCOPY      JOINT REPLACEMENT Bilateral     knee    NEUROPLASTY / TRANSPOSITION MEDIAN NERVE AT CARPAL TUNNEL BILATERAL Bilateral     DECOMPRESSION    OOPHORECTOMY Bilateral     age 81    PELVIC FLOOR REPAIR  2014    NC BIOPSY BONE TROCAR/NEEDLE SUPERFICIAL Left 8/9/2024    Procedure: Left foot debridement;  Surgeon: Jonny Marcial DPM;  Location: [facility];  Service: Podiatry    NC DEBRIDEMENT MUSCLE &/FASCIA 1ST 20 SQ CM/< Left 8/9/2024    Procedure: Left foot debridement;  Surgeon: Jonny Marcial DPM;  Location: [facility];  Service: Podiatry    SINUS SURGERY      TOTAL KNEE ARTHROPLASTY Bilateral        Family History   Problem Relation Age of Onset    Pancreatic cancer Mother 93    Heart failure Mother     Coronary artery disease Family     Breast cancer Family     Other Family         BREAST DISORDER, GASTROINTESTINAL CANCER    Uterine cancer Family     Hyperlipidemia Family     Osteoarthritis Family     Ovarian cancer Family     Brain cancer Son     Stroke Father     No Known Problems Sister     No Known Problems Daughter     No Known Problems Maternal Grandmother     No Known Problems Maternal  Grandfather     No Known Problems Paternal Grandmother     No Known Problems Paternal Grandfather     Breast cancer Cousin 50    Breast cancer Cousin 50    Ovarian cancer Other 49     I have reviewed and agree with the history as documented.    E-Cigarette/Vaping    E-Cigarette Use Never User      E-Cigarette/Vaping Substances    Nicotine No     THC No     CBD No     Flavoring No     Other No     Unknown No      Social History     Tobacco Use    Smoking status: Former     Current packs/day: 0.00     Average packs/day: 0.3 packs/day for 2.0 years (0.5 ttl pk-yrs)     Types: Cigarettes     Start date:      Quit date:      Years since quittin.7    Smokeless tobacco: Never    Tobacco comments:     On and off socially with friends    Vaping Use    Vaping status: Never Used   Substance Use Topics    Alcohol use: Not Currently     Comment: SOCIAL    Drug use: No        Review of Systems   Constitutional:  Negative for fever.   Respiratory:  Negative for cough and shortness of breath.    Cardiovascular:  Negative for chest pain and palpitations.   Gastrointestinal:  Negative for abdominal pain and vomiting.   Genitourinary:  Negative for dysuria.   Musculoskeletal:  Positive for extremity weakness.   Skin:  Negative for rash.   Neurological:  Positive for weakness and numbness. Negative for dizziness, syncope, facial asymmetry, speech difficulty and headaches.   All other systems reviewed and are negative.      Physical Exam  ED Triage Vitals   Temperature Pulse Respirations Blood Pressure SpO2   24 1200 24 1200 24 1200 24 1200 24 1200   (!) 97.3 °F (36.3 °C) (!) 51 18 123/95 98 %      Temp Source Heart Rate Source Patient Position - Orthostatic VS BP Location FiO2 (%)   24 1200 24 1200 24 1357 24 1357 --   Tympanic Monitor Lying Left arm       Pain Score       24 1200       5             Orthostatic Vital Signs  Vitals:    24 1200 24 1219  08/27/24 1357   BP: 123/95 124/77 123/70   Pulse: (!) 51 98 87   Patient Position - Orthostatic VS:   Lying       Physical Exam  Vitals and nursing note reviewed.   Constitutional:       General: She is not in acute distress.     Appearance: She is well-developed.   HENT:      Head: Normocephalic and atraumatic.   Eyes:      General: No visual field deficit.     Conjunctiva/sclera: Conjunctivae normal.   Cardiovascular:      Rate and Rhythm: Normal rate and regular rhythm.      Heart sounds: No murmur heard.  Pulmonary:      Effort: Pulmonary effort is normal. No respiratory distress.      Breath sounds: Normal breath sounds.   Abdominal:      Palpations: Abdomen is soft.      Tenderness: There is no abdominal tenderness.   Musculoskeletal:         General: No swelling.      Cervical back: Neck supple.   Skin:     General: Skin is warm and dry.      Capillary Refill: Capillary refill takes less than 2 seconds.   Neurological:      Mental Status: She is alert and oriented to person, place, and time.      GCS: GCS eye subscore is 4. GCS verbal subscore is 5. GCS motor subscore is 6.      Cranial Nerves: Cranial nerves 2-12 are intact. No cranial nerve deficit, dysarthria or facial asymmetry.      Sensory: Sensory deficit (Reports some numbness and tingling in the right arm.  Otherwise sensation is intact throughout the body.) present.      Motor: Weakness (Weakness with wrist extension and shoulder abduction) present.      Coordination: Coordination is intact. Finger-Nose-Finger Test normal.   Psychiatric:         Mood and Affect: Mood normal.         ED Medications  Medications   multi-electrolyte (ISOLYTE-S PH 7.4) bolus 500 mL (0 mL Intravenous Stopped 8/27/24 1357)   iohexol (OMNIPAQUE) 350 MG/ML injection (SINGLE-DOSE) 85 mL (85 mL Intravenous Given 8/27/24 1439)       Diagnostic Studies  Results Reviewed       Procedure Component Value Units Date/Time    Comprehensive metabolic panel [[id number]]  (Abnormal)  Collected: 08/27/24 1307    Lab Status: Final result Specimen: Blood from Arm, Left Updated: 08/27/24 1344     Sodium 131 mmol/L      Potassium 5.1 mmol/L      Chloride 96 mmol/L      CO2 25 mmol/L      ANION GAP 10 mmol/L      BUN 63 mg/dL      Creatinine 1.90 mg/dL      Glucose 102 mg/dL      Calcium 9.2 mg/dL      AST 31 U/L      ALT 25 U/L      Alkaline Phosphatase 44 U/L      Total Protein 7.6 g/dL      Albumin 4.0 g/dL      Total Bilirubin 0.75 mg/dL      eGFR --    Narrative:      Notes:     1. eGFR calculation is only valid for adults 18 years and older.  2. EGFR calculation cannot be performed for patients who are transgender, non-binary, or whose legal sex, sex at birth, and gender identity differ.    CBC and differential [[id number]]  (Abnormal) Collected: 08/27/24 1307    Lab Status: Final result Specimen: Blood from Arm, Left Updated: 08/27/24 1331     WBC 6.19 Thousand/uL      RBC 4.55 Million/uL      Hemoglobin 14.7 g/dL      Hematocrit 45.0 %      MCV 99 fL      MCH 32.3 pg      MCHC 32.7 g/dL      RDW 13.5 %      MPV 10.2 fL      Platelets 102 Thousands/uL      nRBC 0 /100 WBCs      Segmented % 52 %      Immature Grans % 0 %      Lymphocytes % 38 %      Monocytes % 7 %      Eosinophils Relative 2 %      Basophils Relative 1 %      Absolute Neutrophils 3.23 Thousands/µL      Absolute Immature Grans 0.01 Thousand/uL      Absolute Lymphocytes 2.37 Thousands/µL      Absolute Monocytes 0.42 Thousand/µL      Eosinophils Absolute 0.13 Thousand/µL      Basophils Absolute 0.03 Thousands/µL                    CTA head and neck with and without contrast   Final Result by Phong Shoemaker MD (08/27 8807)      CT Brain: No CT evidence for large acute vascular distribution infarct or acute intracranial hemorrhage.      Age-indeterminate right cerebellar lacunar infarct is favored to be chronic in nature. No prior imaging available for direct comparison.      CT Angiography: Atherosclerotic plaque at the carotid bulbs  bilaterally without evidence for high-grade stenosis or focal occlusion of the cervical vasculature.      No evidence for high-grade stenosis or focal occlusion of the intracranial circulation.                  Workstation performed: [id number]               Procedures  Procedures      ED Course  ED Course as of 08/27/24 1602   Tue Aug 27, 2024   1242 ECG 12 lead  A-fib.  Right bundle branch block.  Otherwise normal EKG.                                       Medical Decision Making  CTA to evaluate for TIA.  Patient is allergic to aspirin so no medication necessary at this time.    Patient was admitted to medicine for further TIA workup.  CTA was relatively unremarkable.    Amount and/or Complexity of Data Reviewed  Labs: ordered.  Radiology: ordered.  ECG/medicine tests:  Decision-making details documented in ED Course.    Risk  Prescription drug management.  Decision regarding hospitalization.          Disposition  Final diagnoses:   Weakness     Time reflects when diagnosis was documented in both MDM as applicable and the Disposition within this note       Time User Action Codes Description Comment    8/27/2024  3:58 PM Jose Luis Vuong Add [R53.1] Weakness           ED Disposition       ED Disposition   Admit    Condition   Stable    Date/Time   Tue Aug 27, 2024  3:58 PM    Comment   Case was discussed with JOSESITO MUHAMMAD and the patient's admission status was agreed to be Admission Status: observation status to the service of Dr. Shelby .               Follow-up Information    None         Patient's Medications   Discharge Prescriptions    No medications on file     No discharge procedures on file.    PDMP Review         Value Time User    PDMP Reviewed  Yes 8/9/2024  2:09 PM Jonny Marcial DPM             ED Provider  Attending physically available and evaluated Rebeca Banegas. I managed the patient along with the ED Attending.    Electronically Signed by           Jose Luis Vuong DO  08/27/24 2828        Jose Luis Vuong,   08/27/24 1600

## 2024-08-27 NOTE — ASSESSMENT & PLAN NOTE
Wt Readings from Last 3 Encounters:   08/09/24 52.4 kg (115 lb 8.3 oz)   06/28/24 52.2 kg (115 lb)   06/18/24 50.8 kg (112 lb)     -hold torsemide with DARIO/dehydration  -cont BB

## 2024-08-27 NOTE — ASSESSMENT & PLAN NOTE
Lab Results   Component Value Date    EGFR 29 07/26/2024    EGFR 23 06/13/2024    EGFR 18 05/30/2024    CREATININE 1.90 (H) 08/27/2024    CREATININE 1.52 (H) 07/26/2024    CREATININE 1.83 (H) 06/13/2024     -with DARIO  -s/p 500cc Isolyte in the ER  -gentle IVFs, trend Cr

## 2024-08-27 NOTE — TELEPHONE ENCOUNTER
Spoke with Dr Dillon    ADT 21 placed      Dx hx of a fib , right arm numbness , vision loss l EYE

## 2024-08-27 NOTE — ASSESSMENT & PLAN NOTE
Assessment  Weakness in RUE from elbow down with pain and tingling/numbness since Sunday morning. Visual field cut resolved, but arm symptoms have continued. Never had these symptoms before. Risk factors: hld, htn, afib. Non focal exam without any visual field deficit, right arm weakness or sensory deficit. Possible CVA given persistent symptoms reported. Potentially MSK pain as it is full arm from elbow down affected.  On 8/28/24, patient reports only experiencing the symptoms upon movement of the right upper extremity.    Work-up  8/27/24 CT Brain: No CT evidence for large acute vascular distribution infarct or acute intracranial hemorrhage. Age-indeterminate right cerebellar lacunar infarct is favored to be chronic in nature. No prior imaging available for direct comparison.    8/27/24 CT Angiography: Atherosclerotic plaque at the carotid bulbs bilaterally without evidence for high-grade stenosis or focal occlusion of the cervical vasculature. No evidence for high-grade stenosis or focal occlusion of the intracranial circulation.    8/28/24 MRI brain: official impression pending. reviewed during rounds with Dr. Ochoa: acute strokes in right occipital lobe and right cerebellar lobe.     8/28/24 TTE: left and right atria are dilated. EF is 60%. No thrombus. Same finds as prior echo    INR - 3.95, INR goal according to cardiology is 2-3  A1c - 6.4  LDL - 76    Plan  CT Chest, Abdomen and Pelvis with contrast to determine if any malignancy contributed to hypercoagulable state.  Hold warfarin due to supratherapeutic level. Continue once below 3.  Telemetry  Start lipitor 40 mg  No aspirin, allergic  Normotension, SBP <180  PT/OT  Over the counter lidocaine cream was recommended to patient for right upper extremity symptoms.  Ulnar wrist splint was recommended to patient for right upper extremity symptoms.

## 2024-08-27 NOTE — TELEPHONE ENCOUNTER
Spoke with the patient on 8/27/2024-patient states that she had recent right arm numbness and that it is harder to move her right arm-her arm feels weak.  She also had loss of peripheral vision for 20 minutes of the left eye-has known history of atrial fibrillation-concern for cardioembolic CVA-referred to ER-

## 2024-08-27 NOTE — ASSESSMENT & PLAN NOTE
-presents with stroke-like symptoms  -8/25/24 at 0530 patient began having weakness in her right upper extremity.  She also had an aching sensation and numbness and tingling from her elbow distally.  Those symptoms have improved somewhat but have not resolved.  She also had left temporal field vision deficit which did not last long and has resolved.  -CTA head/neck: CT Brain: No CT evidence for large acute vascular distribution infarct or acute intracranial hemorrhage. Age-indeterminate right cerebellar lacunar infarct is favored to be chronic in nature. No prior imaging available for direct comparison. CT Angiography: Atherosclerotic plaque at the carotid bulbs bilaterally without evidence for high-grade stenosis or focal occlusion of the cervical vasculature. No evidence for high-grade stenosis or focal occlusion of the intracranial circulation.   -admit on stroke pathway  -pt allergic to ASA (mentions this a couple of times during my interview)  -check INR STAT  -check MRI brain  -monitor on tele  -c/s neuro  -check FLP/a1c

## 2024-08-27 NOTE — RESPIRATORY THERAPY NOTE
RT Protocol Note  Rebeca Banegas 89 y.o. adult MRN: [id number]  Unit/Bed#: [facility] Encounter: [id number]    Assessment    Principal Problem:    Weakness of right upper extremity  Active Problems:    Permanent atrial fibrillation (HCC)    Chronic diastolic congestive heart failure (HCC)    Esophageal reflux    Hyperlipidemia    Hypertension    Hypothyroidism    CKD stage 3b, GFR 30-44 ml/min (HCC)    Bronchiectasis without complication (HCC)      Home Pulmonary Medications:  albuterol       Past Medical History:   Diagnosis Date    Abnormal ultrasound     RESOLVED: 26JUN2015    Advice given about COVID-19 virus infection 04/07/2020    Asthma with acute exacerbation     LAST ASSESSED: [id number]    Asymptomatic menopausal state     Atherosclerosis     Atrial fibrillation (HCC)     Chronic obstructive lung disease (HCC)     Congestive heart failure (HCC)     LAST ASSESSED: [id number]    COVID-19 virus infection 03/25/2023    Cuboid fracture     LAST ASSESSED: [id number]    Dyspepsia     Fall 04/01/2024    GERD (gastroesophageal reflux disease)     High risk medication use     LAST ASSESSED: [id number]    Hyperlipidemia     Hypertension     Hypokalemia     Hypothyroidism     Impacted cerumen of both ears     LAST ASSESSED: 57AXQ7579    Impacted cerumen of right ear     LAST ASSESSED: 31OMD1041    Influenza     LAST ASSESSED: 72JHJ4073    IPMN (intraductal papillary mucinous neoplasm)     RESOLVED: 02OCT2015    Limb swelling     LAST ASSESSED: 49JXB4721    Navicular fracture of ankle     LAST ASSESSED: 54RFG2693    Onychomycosis     LAST ASSESSED: 27NJM3924    Osteoarthritis     Osteopenia     Osteoporosis     Paronychia, finger, unspecified laterality     LAST ASSESSED: [id number]    Paroxysmal atrial fibrillation (HCC)     LAST ASSESSED: 02MAR2014    Peripheral vascular disease (HCC)     Plantar fasciitis     Talus fracture     LAST ASSESSED: 42DLB5135    Vascular headache      Social History     Socioeconomic History     Marital status:      Spouse name: None    Number of children: None    Years of education: None    Highest education level: None   Occupational History    Occupation: retired   Tobacco Use    Smoking status: Former     Current packs/day: 0.00     Average packs/day: 0.3 packs/day for 2.0 years (0.5 ttl pk-yrs)     Types: Cigarettes     Start date:      Quit date:      Years since quittin.7    Smokeless tobacco: Never    Tobacco comments:     On and off socially with friends    Vaping Use    Vaping status: Never Used   Substance and Sexual Activity    Alcohol use: Not Currently     Comment: SOCIAL    Drug use: No    Sexual activity: Not Currently     Partners: Male     Birth control/protection: Post-menopausal   Other Topics Concern    None   Social History Narrative    DAILY COFFEE CONSUMPTION (2 CUPS/DAY)    EXERCISING REGULARLY     Social Determinants of Health     Financial Resource Strain: Not on file   Food Insecurity: Not on file   Transportation Needs: Not on file   Physical Activity: Not on file   Stress: Not on file   Social Connections: Not on file   Intimate Partner Violence: Not on file   Housing Stability: Not on file       Subjective         Objective    Physical Exam:   Assessment Type: (P) Assess only  General Appearance: (P) Awake, Alert  Respiratory Pattern: (P) Normal  Chest Assessment: (P) Chest expansion symmetrical  Bilateral Breath Sounds: (P) Clear    Vitals:  Blood pressure 130/88, pulse 86, temperature (!) 97.3 °F (36.3 °C), temperature source Tympanic, resp. rate 20, SpO2 100%.          Imaging and other studies:           Plan    Respiratory Plan: Home Bronchodilator Patient pathway        Resp Comments: (P) pt uses albuterol udn at home q8 pt is not admitted for COPD exacerbation pt is admitted for weakness of right upper extremity no chest xray at present will d/c albuterol q8 and order xopenex tid as per respiratory protocol.

## 2024-08-27 NOTE — ED ATTENDING ATTESTATION
8/27/2024  ISanjana DO, saw and evaluated the patient. I have discussed the patient with the resident/non-physician practitioner and agree with the resident's/non-physician practitioner's findings, Plan of Care, and MDM as documented in the resident's/non-physician practitioner's note, except where noted. All available labs and Radiology studies were reviewed.  I was present for key portions of any procedure(s) performed by the resident/non-physician practitioner and I was immediately available to provide assistance.       At this point I agree with the current assessment done in the Emergency Department.  I have conducted an independent evaluation of this patient a history and physical is as follows:    89-year-old female presents with right arm numbness, tingling and weakness.  Also had left eye visual deficits on Sunday.  Patient states she had difficulty using her right arm secondary to weakness and numbness which has since improved but is still slightly present and states the left eye had peripheral vision loss that was brief on Sunday and has since resolved.  Denies chest pain, no shortness of breath, no nausea or vomiting.  Denies similar symptoms in the past.  On exam-no acute distress, heart regular, no respiratory distress, cranial nerves II through XII intact no focal deficits, muscle strength 5 out of 5 bilateral upper extremities with sensation intact.  Plan-concern for possible TIA, will do CTA head neck, check labs including cardiac labs, likely admit    ED Course         Critical Care Time  Procedures

## 2024-08-27 NOTE — ASSESSMENT & PLAN NOTE
-as well as asthma-COPD overlap as per review of outpatient internal medicine progress note  -Continue albuterol/Advair

## 2024-08-27 NOTE — H&P
[facility]  H&P  Name: Rebeca Banegas 89 y.o. adult I MRN: [id number]  Unit/Bed#: QCD I Date of Admission: 8/27/2024   Date of Service: 8/27/2024 I Hospital Day: 0      Assessment & Plan   Bronchiectasis without complication (HCC)  Assessment & Plan  -as well as asthma-COPD overlap as per review of outpatient internal medicine progress note  -Continue albuterol/Advair    CKD stage 3b, GFR 30-44 ml/min (HCC)  Assessment & Plan  Lab Results   Component Value Date    EGFR 29 07/26/2024    EGFR 23 06/13/2024    EGFR 18 05/30/2024    CREATININE 1.90 (H) 08/27/2024    CREATININE 1.52 (H) 07/26/2024    CREATININE 1.83 (H) 06/13/2024     -with DARIO  -s/p 500cc Isolyte in the ER  -gentle IVFs, trend Cr    Hypothyroidism  Assessment & Plan  -cont Levoxyl    Hyperlipidemia  Assessment & Plan  -check FLP  -start statin    Esophageal reflux  Assessment & Plan  -Start PPI    Chronic diastolic congestive heart failure (HCC)  Assessment & Plan  Wt Readings from Last 3 Encounters:   08/09/24 52.4 kg (115 lb 8.3 oz)   06/28/24 52.2 kg (115 lb)   06/18/24 50.8 kg (112 lb)     -hold torsemide with DARIO/dehydration  -cont BB          Permanent atrial fibrillation (HCC)  Assessment & Plan  ECG (read by me): Afib with RVR @ 102, nl axis and QRS/QTc intervals, no acute ST/TW changes (repolarization abnormalities V1-V3)  -cont coumadin  -check INR STAT  -cont BB      * Weakness of right upper extremity  Assessment & Plan  -presents with stroke-like symptoms  -8/25/24 at 0530 patient began having weakness in her right upper extremity.  She also had an aching sensation and numbness and tingling from her elbow distally.  Those symptoms have improved somewhat but have not resolved.  She also had left temporal field vision deficit which did not last long and has resolved.  -CTA head/neck: CT Brain: No CT evidence for large acute vascular distribution infarct or acute intracranial hemorrhage.  Age-indeterminate right cerebellar lacunar infarct is favored to be chronic in nature. No prior imaging available for direct comparison. CT Angiography: Atherosclerotic plaque at the carotid bulbs bilaterally without evidence for high-grade stenosis or focal occlusion of the cervical vasculature. No evidence for high-grade stenosis or focal occlusion of the intracranial circulation.   -admit on stroke pathway  -pt allergic to ASA (mentions this a couple of times during my interview)  -check INR STAT  -check MRI brain  -monitor on tele  -c/s neuro  -check FLP/a1c           VTE Pharmacologic Prophylaxis: VTE Score: 4 coumadin (INR Pending)  Code Status: DNR  Discussion with family: friend at bedside    Anticipated Length of Stay: Patient will be admitted on an observation basis with an anticipated length of stay of less than 2 midnights secondary to RUE weakness.    Total Time Spent on Date of Encounter in care of patient: 75 mins. This time was spent on one or more of the following: performing physical exam; counseling and coordination of care; obtaining or reviewing history; documenting in the medical record; reviewing/ordering tests, medications or procedures; communicating with other healthcare professionals and discussing with patient's family/caregivers.    Chief Complaint: RUE weakness    History of Present Illness:  Rebeca Banegas is a 89 y.o. adult who presents with CC RUE weakness/numbness/tingling. On 8/25/24 at 0530 patient began having weakness in her right upper extremity.  She also had an aching sensation and numbness and tingling from her elbow distally.  Those symptoms have improved somewhat but have not resolved.  She also had left temporal field vision deficit which did not last long and has resolved.  Denies any other acute complaints. She has had cough for many months. Patient denies chest pain, shortness of breath, palpitations, nausea, vomiting, diarrhea, abdominal pain, fevers, chills,  headache, neck stiffness, dysuria.      Review of Systems:  Review of Systems   All other systems reviewed and are negative.      Past Medical and Surgical History:   Past Medical History:   Diagnosis Date    Abnormal ultrasound     RESOLVED: [id number]    Advice given about COVID-19 virus infection 04/07/2020    Asthma with acute exacerbation     LAST ASSESSED: [id number]    Asymptomatic menopausal state     Atherosclerosis     Atrial fibrillation (HCC)     Chronic obstructive lung disease (HCC)     Congestive heart failure (HCC)     LAST ASSESSED: [id number]    COVID-19 virus infection 03/25/2023    Cuboid fracture     LAST ASSESSED: [id number]    Dyspepsia     Fall 04/01/2024    GERD (gastroesophageal reflux disease)     High risk medication use     LAST ASSESSED: [id number]    Hyperlipidemia     Hypertension     Hypokalemia     Hypothyroidism     Impacted cerumen of both ears     LAST ASSESSED: [id number]    Impacted cerumen of right ear     LAST ASSESSED: [id number]    Influenza     LAST ASSESSED: [id number]    IPMN (intraductal papillary mucinous neoplasm)     RESOLVED: 02OCT2015    Limb swelling     LAST ASSESSED: [id number]    Navicular fracture of ankle     LAST ASSESSED: 61VLV2930    Onychomycosis     LAST ASSESSED: 19ZBF0831    Osteoarthritis     Osteopenia     Osteoporosis     Paronychia, finger, unspecified laterality     LAST ASSESSED: 17FOB3707    Paroxysmal atrial fibrillation (HCC)     LAST ASSESSED: 02MAR2014    Peripheral vascular disease (HCC)     Plantar fasciitis     Talus fracture     LAST ASSESSED: 06FRQ1569    Vascular headache        Past Surgical History:   Procedure Laterality Date    APPENDECTOMY      BONE BIOPSY Left 8/9/2024    Procedure: Bone bx of proximal phalanx second toe & second met with fluoroscopic guidance;  Surgeon: Jonny Marcial DPM;  Location: [facility];  Service: Podiatry    CATARACT EXTRACTION Bilateral     CHOLECYSTECTOMY      COLONOSCOPY      JOINT REPLACEMENT  Bilateral     knee    NEUROPLASTY / TRANSPOSITION MEDIAN NERVE AT CARPAL TUNNEL BILATERAL Bilateral     DECOMPRESSION    OOPHORECTOMY Bilateral     age 81    PELVIC FLOOR REPAIR  2014    IN BIOPSY BONE TROCAR/NEEDLE SUPERFICIAL Left 8/9/2024    Procedure: Left foot debridement;  Surgeon: Jonny Marcial DPM;  Location: [facility];  Service: Podiatry    IN DEBRIDEMENT MUSCLE &/FASCIA 1ST 20 SQ CM/< Left 8/9/2024    Procedure: Left foot debridement;  Surgeon: Jonny Marcial DPM;  Location: [facility];  Service: Podiatry    SINUS SURGERY      TOTAL KNEE ARTHROPLASTY Bilateral        Meds/Allergies:  Prior to Admission medications    Medication Sig Start Date End Date Taking? Authorizing Provider   albuterol (2.5 mg/3 mL) 0.083 % nebulizer solution Take 3 mL (2.5 mg total) by nebulization every 8 (eight) hours 3/6/24   Paulie Wasserman MD   albuterol (Proventil HFA) 90 mcg/act inhaler Inhale 2 puffs every 6 (six) hours as needed for wheezing 8/31/22   Santana Dillon MD   Cholecalciferol (VITAMIN D3) 1000 units CAPS Take 2,000 Units by mouth daily    Historical Provider, MD   fluticasone (FLONASE) 50 mcg/act nasal spray USE 2 SPRAYS IN EACH NOSTRIL ONCE DAILY 8/2/23   Santana Dillon MD   Fluticasone-Salmeterol (Advair Diskus) 100-50 mcg/dose inhaler Inhale 1 puff 2 (two) times a day Rinse mouth after use. 5/2/24 10/29/24  Paulie Wasserman MD   levothyroxine 50 mcg tablet TAKE 1 TABLET DAILY BUT 1-1/2 TABLETS 4 DAYS/WEEK 4/28/24   Santana Dillon MD   metoprolol succinate (TOPROL-XL) 100 mg 24 hr tablet Take 0.5 tablets (50 mg total) by mouth 2 (two) times a day  Patient taking differently: Take 100 mg by mouth daily 5/10/24   Jonas Vyas MD   oxyCODONE-acetaminophen (Percocet) 5-325 mg per tablet Take 1 tablet by mouth every 6 (six) hours as needed for moderate pain for up to 20 doses Max Daily Amount: 4 tablets 8/9/24   Jonny Dirk [place], DPM   sodium chloride 3 % inhalation solution Take 4 mL by  nebulization 3 (three) times a day 24   Paulie Wasserman MD   spironolactone (ALDACTONE) 50 mg tablet Take 1 tablet (50 mg total) by mouth daily 24   Jonas Vyas MD   torsemide (DEMADEX) 20 mg tablet Take 2 tablets (40 mg total) by mouth 2 (two) times a day Take 3 tablets first thing in the morning, then again at approximately 2 or 3 PM, for the next 4 to 7 days until weight reaches 116 pounds 24   Jonas Vyas MD   warfarin (COUMADIN) 5 mg tablet TAKE 1/2 TO 1 TABLET BY MOUTH DAILY OR AS ORDERED BY PHYSICIAN. 10/18/23   Jonas Vyas MD     I have reviewed home medications with a medical source (PCP, Pharmacy, other).    Allergies:   Allergies   Allergen Reactions    Asa [Aspirin] Shortness Of Breath    Nsaids Shortness Of Breath       Social History:  Marital Status:    Substance Use History:   Social History     Substance and Sexual Activity   Alcohol Use Not Currently    Comment: SOCIAL     Social History     Tobacco Use   Smoking Status Former    Current packs/day: 0.00    Average packs/day: 0.3 packs/day for 2.0 years (0.5 ttl pk-yrs)    Types: Cigarettes    Start date:     Quit date:     Years since quittin.7   Smokeless Tobacco Never   Tobacco Comments    On and off socially with friends      Social History     Substance and Sexual Activity   Drug Use No       Family History:  Family History   Problem Relation Age of Onset    Pancreatic cancer Mother 93    Heart failure Mother     Coronary artery disease Family     Breast cancer Family     Other Family         BREAST DISORDER, GASTROINTESTINAL CANCER    Uterine cancer Family     Hyperlipidemia Family     Osteoarthritis Family     Ovarian cancer Family     Brain cancer Son     Stroke Father     No Known Problems Sister     No Known Problems Daughter     No Known Problems Maternal Grandmother     No Known Problems Maternal Grandfather     No Known Problems Paternal Grandmother     No Known Problems Paternal Grandfather      Breast cancer Cousin 50    Breast cancer Cousin 50    Ovarian cancer Other 49       Physical Exam:     Vitals:   Blood Pressure: 123/70 (08/27/24 1357)  Pulse: 87 (08/27/24 1357)  Temperature: (!) 97.3 °F (36.3 °C) (08/27/24 1200)  Temp Source: Tympanic (08/27/24 1200)  Respirations: 18 (08/27/24 1357)  SpO2: 98 % (08/27/24 1357)    Physical Exam   Gen: NAD, AAOx3, well developed, well nourished  Eyes: EOMI, PERRLA, no scleral icterus  ENMT:  no nasal discharge, no otic discharge, moist mucous membranes  Neck:  Supple  Cardiovascular: irreg/irreg rhythm, normal S1-S2, no murmurs, rubs, or gallops  Lungs:  Clear to auscultation bilaterally, no wheezes, or rales, or rhonchi  Abdomen:  Positive bowel sounds, soft, nontender, nondistended  Skin:  Intact, no rashes, no edema  Neuro: Cranial nerves 2-12 are intact, no visual field deficits, non-focal, 5/5 strength in all 4 extremities      Additional Data:     Lab Results:  Results from last 7 days   Lab Units 08/27/24  1307   WBC Thousand/uL 6.19   HEMOGLOBIN g/dL 14.7   HEMATOCRIT % 45.0   PLATELETS Thousands/uL 102*   SEGS PCT % 52   LYMPHO PCT % 38   MONO PCT % 7   EOS PCT % 2     Results from last 7 days   Lab Units 08/27/24  1307   SODIUM mmol/L 131*   POTASSIUM mmol/L 5.1   CHLORIDE mmol/L 96   CO2 mmol/L 25   BUN mg/dL 63*   CREATININE mg/dL 1.90*   ANION GAP mmol/L 10   CALCIUM mg/dL 9.2   ALBUMIN g/dL 4.0   TOTAL BILIRUBIN mg/dL 0.75   ALK PHOS U/L 44   ALT U/L 25   AST U/L 31   GLUCOSE RANDOM mg/dL 102             Lab Results   Component Value Date    HGBA1C 6.2 (H) 02/07/2023    HGBA1C 6.2 02/07/2023    HGBA1C 6.0 (H) 07/29/2022           Lines/Drains:  Invasive Devices       Peripheral Intravenous Line  Duration             Peripheral IV 08/27/24 Left Arm <1 day                        Imaging: Reviewed radiology reports from this admission including: CT head  CTA head and neck with and without contrast   Final Result by Phong Shoemaker MD (08/27 1172)      CT  Brain: No CT evidence for large acute vascular distribution infarct or acute intracranial hemorrhage.      Age-indeterminate right cerebellar lacunar infarct is favored to be chronic in nature. No prior imaging available for direct comparison.      CT Angiography: Atherosclerotic plaque at the carotid bulbs bilaterally without evidence for high-grade stenosis or focal occlusion of the cervical vasculature.      No evidence for high-grade stenosis or focal occlusion of the intracranial circulation.                  Workstation performed: [id number]               ** Please Note: This note has been constructed using a voice recognition system. **

## 2024-08-27 NOTE — Clinical Note
Case was discussed with JSOESITO MUHAMMAD and the patient's admission status was agreed to be Admission Status: observation status to the service of Dr. Cabral

## 2024-08-27 NOTE — CONSULTS
NEUROLOGY RESIDENCY CONSULT NOTE     Name: Rebeca Banegas   Age & Sex: 89 y.o. adult   MRN: [id number]  Unit/Bed#: [facility] 731-01   Encounter: [id number]  Length of Stay: 0    Rebeca Banegas should follow-up with Neurology stroke/neurovascular in 4-6 weeks. Outpatient office has been notified.   Pending for discharge: [facility]AP    ASSESSMENT & PLAN     * CVA (cerebral vascular accident) (HCC)  Assessment & Plan  Assessment  Weakness in RUE from elbow down with pain and tingling/numbness since Sunday morning. Visual field cut resolved, but arm symptoms have continued. Never had these symptoms before. Risk factors: hld, htn, afib. Non focal exam without any visual field deficit, right arm weakness or sensory deficit. Possible CVA given persistent symptoms reported. Potentially MSK pain as it is full arm from elbow down affected.  On 8/28/24, patient reports only experiencing the symptoms upon movement of the right upper extremity.    Work-up  8/27/24 CT Brain: No CT evidence for large acute vascular distribution infarct or acute intracranial hemorrhage. Age-indeterminate right cerebellar lacunar infarct is favored to be chronic in nature. No prior imaging available for direct comparison.  8/27/24 CT Angiography: Atherosclerotic plaque at the carotid bulbs bilaterally without evidence for high-grade stenosis or focal occlusion of the cervical vasculature. No evidence for high-grade stenosis or focal occlusion of the intracranial circulation.  8/28/24 MRI brain: official impression pending. reviewed during rounds with Dr. Ochoa: acute strokes in right occipital lobe and right cerebellar lobe.   8/28/24 TTE: left and right atria are dilated. EF is 60%. No thrombus. Same finds as prior echo  INR - 3.95, INR goal according to cardiology is 2-3  A1c - 6.4  LDL - 76    Acute stroke likely in the setting of fluctuating INR with antibiotics post procedure, but need to rule out hypercoagulable state as a contributor given weight  loss and age.     Plan  CT Chest, Abdomen and Pelvis with contrast to determine if any malignancy contributed to hypercoagulable state.  Hold warfarin due to supratherapeutic level. Continue once below 3.  Telemetry  Continue lipitor 40 mg  No aspirin, allergic  Normotension, SBP <180  PT/OT  Over the counter lidocaine cream and ulnar wrist splint recommended to patient for right upper extremity pain along ulnar distribution from elbow        SUBJECTIVE     Reason for Consult / Principal Problem: RUE weak/numb/pain and resolved left visual field cut  Hx and PE limited by: n/a    HPI: Rebeca Banegas is a 89 y.o. right handed adult with a past medical history of atrial fibrillation treated with warfarin who presents with RUE symptoms. Patient was in normal state of health when she went to sleep Saturday at 11 pm. When she woke up on Sunday at 5:30 in the morning, she had pain, numbness, tingling, and weakness in the right arm distal to elbow and noticed black in the left peripheral vision. Vision resolved within minutes. While arm symptoms have calmed down, she still feels deep aching pain and tingling in all fingers. No additional symptoms noted. She confirms she has not missed any warfarin doses. She notes she had an ulcer biopsied on left foot earlier this month by podiatry. She has never had these symptoms before.    Today (8/28/24), patient reports that she now only has numbness, tingling and an aching pain in her distal arm while she is using it. Patient denies any visual symptoms.  Patient also revealed that she had been taking antibiotics since 8/14 which likely altered her INR levels. Some unexpected weight loss endorsed recently.    Inpatient consult to Neurology  Consult performed by: Inna Parada MD  Consult ordered by: Gilberto Shelby MD            Historical Information   Past Medical History:   Diagnosis Date    Abnormal ultrasound     RESOLVED: [id number]    Advice given about COVID-19 virus  infection 04/07/2020    Asthma with acute exacerbation     LAST ASSESSED: [id number]    Asymptomatic menopausal state     Atherosclerosis     Atrial fibrillation (HCC)     Chronic obstructive lung disease (HCC)     Congestive heart failure (HCC)     LAST ASSESSED: [id number]    COVID-19 virus infection 03/25/2023    Cuboid fracture     LAST ASSESSED: [id number]    Dyspepsia     Fall 04/01/2024    GERD (gastroesophageal reflux disease)     High risk medication use     LAST ASSESSED: [id number]    Hyperlipidemia     Hypertension     Hypokalemia     Hypothyroidism     Impacted cerumen of both ears     LAST ASSESSED: [id number]    Impacted cerumen of right ear     LAST ASSESSED: 48WVH1777    Influenza     LAST ASSESSED: 73BHP3757    IPMN (intraductal papillary mucinous neoplasm)     RESOLVED: 02OCT2015    Limb swelling     LAST ASSESSED: 90YRO3232    Navicular fracture of ankle     LAST ASSESSED: 77FTC7012    Onychomycosis     LAST ASSESSED: 36XPB5687    Osteoarthritis     Osteopenia     Osteoporosis     Paronychia, finger, unspecified laterality     LAST ASSESSED: 44IJF9958    Paroxysmal atrial fibrillation (HCC)     LAST ASSESSED: 02MAR2014    Peripheral vascular disease (HCC)     Plantar fasciitis     Talus fracture     LAST ASSESSED: 37XYH2018    Vascular headache      Past Surgical History:   Procedure Laterality Date    APPENDECTOMY      BONE BIOPSY Left 8/9/2024    Procedure: Bone bx of proximal phalanx second toe & second met with fluoroscopic guidance;  Surgeon: Jonny Marcial DPM;  Location: [facility];  Service: Podiatry    CATARACT EXTRACTION Bilateral     CHOLECYSTECTOMY      COLONOSCOPY      JOINT REPLACEMENT Bilateral     knee    NEUROPLASTY / TRANSPOSITION MEDIAN NERVE AT CARPAL TUNNEL BILATERAL Bilateral     DECOMPRESSION    OOPHORECTOMY Bilateral     age 81    PELVIC FLOOR REPAIR  2014    ND BIOPSY BONE TROCAR/NEEDLE SUPERFICIAL Left 8/9/2024    Procedure: Left foot debridement;  Surgeon: Jonny  Dirk Marcial DPM;  Location: [facility];  Service: Podiatry    MO DEBRIDEMENT MUSCLE &/FASCIA 1ST 20 SQ CM/< Left 2024    Procedure: Left foot debridement;  Surgeon: Jonny Marcial DPM;  Location: [facility];  Service: Podiatry    SINUS SURGERY      TOTAL KNEE ARTHROPLASTY Bilateral      Social History   Social History     Substance and Sexual Activity   Alcohol Use Not Currently    Comment: SOCIAL     Social History     Substance and Sexual Activity   Drug Use No     E-Cigarette/Vaping    E-Cigarette Use Never User      E-Cigarette/Vaping Substances    Nicotine No     THC No     CBD No     Flavoring No     Other No     Unknown No      Social History     Tobacco Use   Smoking Status Former    Current packs/day: 0.00    Average packs/day: 0.3 packs/day for 2.0 years (0.5 ttl pk-yrs)    Types: Cigarettes    Start date:     Quit date:     Years since quittin.7   Smokeless Tobacco Never   Tobacco Comments    On and off socially with friends      Family History: non-contributory  Meds/Allergies   current meds:   Current Facility-Administered Medications   Medication Dose Route Frequency    albuterol (PROVENTIL HFA,VENTOLIN HFA) inhaler 2 puff  2 puff Inhalation Q6H PRN    albuterol inhalation solution 2.5 mg  2.5 mg Nebulization Q6H PRN    atorvastatin (LIPITOR) tablet 40 mg  40 mg Oral QPM    fluticasone (FLONASE) 50 mcg/act nasal spray 1 spray  1 spray Each Nare Daily    Fluticasone Furoate-Vilanterol 100-25 mcg/actuation 1 puff  1 puff Inhalation Daily    levothyroxine tablet 50 mcg  50 mcg Oral Early Morning    metoprolol succinate (TOPROL-XL) 24 hr tablet 100 mg  100 mg Oral Daily    pantoprazole (PROTONIX) EC tablet 40 mg  40 mg Oral Early Morning    sodium chloride 0.9 % infusion  50 mL/hr Intravenous Continuous    and PTA meds:   Prior to Admission Medications   Prescriptions Last Dose Informant Patient Reported? Taking?   Cholecalciferol (VITAMIN D3) 1000 units CAPS 2024 Self Yes  Yes   Sig: Take 2,000 Units by mouth daily   Fluticasone-Salmeterol (Advair Diskus) 100-50 mcg/dose inhaler Past Week Self No Yes   Sig: Inhale 1 puff 2 (two) times a day Rinse mouth after use.   albuterol (2.5 mg/3 mL) 0.083 % nebulizer solution Past Week Self No Yes   Sig: Take 3 mL (2.5 mg total) by nebulization every 8 (eight) hours   albuterol (Proventil HFA) 90 mcg/act inhaler Past Week Self No Yes   Sig: Inhale 2 puffs every 6 (six) hours as needed for wheezing   fluticasone (FLONASE) 50 mcg/act nasal spray Past Week Self No Yes   Sig: USE 2 SPRAYS IN EACH NOSTRIL ONCE DAILY   levothyroxine 50 mcg tablet 8/27/2024 Self No Yes   Sig: TAKE 1 TABLET DAILY BUT 1-1/2 TABLETS 4 DAYS/WEEK   metoprolol succinate (TOPROL-XL) 100 mg 24 hr tablet 8/27/2024 Self No Yes   Sig: Take 0.5 tablets (50 mg total) by mouth 2 (two) times a day   Patient taking differently: Take 100 mg by mouth daily   oxyCODONE-acetaminophen (Percocet) 5-325 mg per tablet   No No   Sig: Take 1 tablet by mouth every 6 (six) hours as needed for moderate pain for up to 20 doses Max Daily Amount: 4 tablets   sodium chloride 3 % inhalation solution Past Week  No Yes   Sig: Take 4 mL by nebulization 3 (three) times a day   spironolactone (ALDACTONE) 50 mg tablet 8/27/2024 Self No Yes   Sig: Take 1 tablet (50 mg total) by mouth daily   torsemide (DEMADEX) 20 mg tablet 8/27/2024 Self No Yes   Sig: Take 2 tablets (40 mg total) by mouth 2 (two) times a day Take 3 tablets first thing in the morning, then again at approximately 2 or 3 PM, for the next 4 to 7 days until weight reaches 116 pounds   warfarin (COUMADIN) 5 mg tablet 8/26/2024 Self No Yes   Sig: TAKE 1/2 TO 1 TABLET BY MOUTH DAILY OR AS ORDERED BY PHYSICIAN.      Facility-Administered Medications: None     Allergies   Allergen Reactions    Asa [Aspirin] Shortness Of Breath    Nsaids Shortness Of Breath       Review of previous medical records was completed.       Review of Systems   Neurological:   "Positive for weakness and numbness. Negative for dizziness, tremors, seizures, syncope, facial asymmetry, speech difficulty, light-headedness and headaches.       OBJECTIVE     Patient ID: Rebeca Banegas is a 89 y.o. adult.    Vitals:   Vitals:    24 0428 24 0546 24 0800 24 0840   BP: 98/63  98/63    Pulse: 85  90    Resp:       Temp:       TempSrc:       SpO2: 97%   97%   Weight:  49.1 kg (108 lb 3.9 oz) 49 kg (108 lb)    Height:   5' 4\" (1.626 m)       Body mass index is 18.54 kg/m².     Intake/Output Summary (Last 24 hours) at 2024 1120  Last data filed at 2024 1357  Gross per 24 hour   Intake 500 ml   Output --   Net 500 ml       Temperature:   Temp (24hrs), Av.3 °F (36.3 °C), Min:97.3 °F (36.3 °C), Max:97.3 °F (36.3 °C)    Temperature: (!) 97.3 °F (36.3 °C)    Invasive Devices:   Invasive Devices       Peripheral Intravenous Line  Duration             Peripheral IV 24 Left Arm <1 day                    Physical Exam  Eyes:      Extraocular Movements: Extraocular movements intact and EOM normal.      Pupils: Pupils are equal, round, and reactive to light.   Musculoskeletal:      Comments: Finger deformity consistent with rheumatoid arthritis  Left foot podiatric boot   Neurological:      Mental Status: She is oriented to person, place, and time.      Motor: Motor strength is normal.     Coordination: Finger-Nose-Finger Test and Heel to Shin Test normal.      Deep Tendon Reflexes:      Reflex Scores:       Bicep reflexes are 1+ on the right side and 1+ on the left side.       Brachioradialis reflexes are 1+ on the right side and 1+ on the left side.       Patellar reflexes are 1+ on the right side and 1+ on the left side.  Psychiatric:         Speech: Speech normal.          Neurologic Exam     Mental Status   Oriented to person, place, and time.   Speech: speech is normal   Level of consciousness: alert  Able to name object.     Cranial Nerves     CN II   Right " visual field deficit: none  Left visual field deficit: none     CN III, IV, VI   Pupils are equal, round, and reactive to light.  Extraocular motions are normal.     CN V   Facial sensation intact.     CN VII   Facial expression full, symmetric.     CN VIII   Hearing: impaired    CN XI   CN XI normal.     CN XII   CN XII normal.     Patient uses hearing devices     Motor Exam   Muscle bulk: decreased  Overall muscle tone: normal  Right arm pronator drift: absent  Left arm pronator drift: absent    Strength   Strength 5/5 throughout.     Sensory Exam   Light touch normal.     Gait, Coordination, and Reflexes     Coordination   Finger to nose coordination: normal  Heel to shin coordination: normal    Reflexes   Right brachioradialis: 1+  Left brachioradialis: 1+  Right biceps: 1+  Left biceps: 1+  Right patellar: 1+  Left patellar: 1+  Right plantar: normal  Left plantar: normal             LABORATORY DATA     Labs: I have personally reviewed pertinent reports.    Results from last 7 days   Lab Units 08/28/24  0458 08/27/24  1307   WBC Thousand/uL 7.37 6.19   HEMOGLOBIN g/dL 13.0 14.7   HEMATOCRIT % 40.1 45.0   PLATELETS Thousands/uL 81* 102*   SEGS PCT %  --  52   MONO PCT %  --  7   EOS PCT %  --  2      Results from last 7 days   Lab Units 08/28/24  0458 08/27/24  1307   POTASSIUM mmol/L 4.4 5.1   CHLORIDE mmol/L 97 96   CO2 mmol/L 29 25   BUN mg/dL 60* 63*   CREATININE mg/dL 1.65* 1.90*   CALCIUM mg/dL 8.8 9.2   ALK PHOS U/L  --  44   ALT U/L  --  25   AST U/L  --  31              Results from last 7 days   Lab Units 08/28/24  0458 08/27/24  1726   INR  3.95* 3.90*               IMAGING & DIAGNOSTIC TESTING     Radiology Results: I have personally reviewed pertinent reports.    MRI brain wo contrast   Final Result by Lalitha Haider MD (08/28 1027)      1.  Foci of acute infarcts in the right occipital lobe and right cerebellum. No mass effect or hemorrhagic transformation.   2.  Chronic microangiopathic change.   3.   Left mastoid effusion.   4.  Sinus disease.      Workstation performed: [id number]         CTA head and neck with and without contrast   Final Result by Phong Shoemaker MD (08/27 1527)      CT Brain: No CT evidence for large acute vascular distribution infarct or acute intracranial hemorrhage.      Age-indeterminate right cerebellar lacunar infarct is favored to be chronic in nature. No prior imaging available for direct comparison.      CT Angiography: Atherosclerotic plaque at the carotid bulbs bilaterally without evidence for high-grade stenosis or focal occlusion of the cervical vasculature.      No evidence for high-grade stenosis or focal occlusion of the intracranial circulation.                  Workstation performed: [id number]         CT chest abdomen pelvis w contrast    (Results Pending)       Other Diagnostic Testing: I have personally reviewed pertinent reports.      ACTIVE MEDICATIONS     Current Facility-Administered Medications   Medication Dose Route Frequency    albuterol (PROVENTIL HFA,VENTOLIN HFA) inhaler 2 puff  2 puff Inhalation Q6H PRN    albuterol inhalation solution 2.5 mg  2.5 mg Nebulization Q6H PRN    atorvastatin (LIPITOR) tablet 40 mg  40 mg Oral QPM    fluticasone (FLONASE) 50 mcg/act nasal spray 1 spray  1 spray Each Nare Daily    Fluticasone Furoate-Vilanterol 100-25 mcg/actuation 1 puff  1 puff Inhalation Daily    levothyroxine tablet 50 mcg  50 mcg Oral Early Morning    metoprolol succinate (TOPROL-XL) 24 hr tablet 100 mg  100 mg Oral Daily    pantoprazole (PROTONIX) EC tablet 40 mg  40 mg Oral Early Morning    sodium chloride 0.9 % infusion  50 mL/hr Intravenous Continuous       Prior to Admission medications    Medication Sig Start Date End Date Taking? Authorizing Provider   albuterol (2.5 mg/3 mL) 0.083 % nebulizer solution Take 3 mL (2.5 mg total) by nebulization every 8 (eight) hours 3/6/24   Paulie Wasserman MD   albuterol (Proventil HFA) 90 mcg/act inhaler Inhale 2 puffs every 6  (six) hours as needed for wheezing 8/31/22   Santana Dillon MD   Cholecalciferol (VITAMIN D3) 1000 units CAPS Take 2,000 Units by mouth daily    Historical Provider, MD   fluticasone (FLONASE) 50 mcg/act nasal spray USE 2 SPRAYS IN EACH NOSTRIL ONCE DAILY 8/2/23   Santana Dillon MD   Fluticasone-Salmeterol (Advair Diskus) 100-50 mcg/dose inhaler Inhale 1 puff 2 (two) times a day Rinse mouth after use. 5/2/24 10/29/24  Paulie Wasserman MD   levothyroxine 50 mcg tablet TAKE 1 TABLET DAILY BUT 1-1/2 TABLETS 4 DAYS/WEEK 4/28/24   Santana Diloln MD   metoprolol succinate (TOPROL-XL) 100 mg 24 hr tablet Take 0.5 tablets (50 mg total) by mouth 2 (two) times a day  Patient taking differently: Take 100 mg by mouth daily 5/10/24   Jonas Vyas MD   oxyCODONE-acetaminophen (Percocet) 5-325 mg per tablet Take 1 tablet by mouth every 6 (six) hours as needed for moderate pain for up to 20 doses Max Daily Amount: 4 tablets 8/9/24   Jonny Marcial DPM   sodium chloride 3 % inhalation solution Take 4 mL by nebulization 3 (three) times a day 8/20/24   Paulie Wasserman MD   spironolactone (ALDACTONE) 50 mg tablet Take 1 tablet (50 mg total) by mouth daily 5/23/24   Jonas Vyas MD   torsemide (DEMADEX) 20 mg tablet Take 2 tablets (40 mg total) by mouth 2 (two) times a day Take 3 tablets first thing in the morning, then again at approximately 2 or 3 PM, for the next 4 to 7 days until weight reaches 116 pounds 5/30/24   Jonas Vyas MD   warfarin (COUMADIN) 5 mg tablet TAKE 1/2 TO 1 TABLET BY MOUTH DAILY OR AS ORDERED BY PHYSICIAN. 10/18/23   Jonas Vyas MD       CODE STATUS & ADVANCED DIRECTIVES     Code Status: Level 3 - DNAR and DNI  Advance Directive and Living Will:      Power of :    POLST:        VTE Pharmacologic Prophylaxis: Warfarin (Coumadin)  VTE Mechanical Prophylaxis: sequential compression device    ======    I have discussed the patient's history, physical exam findings, assessment, and plan in detail  with attending, Dr. Ochoa    Thank you for allowing me to participate in the care of your patient, Rebeca Banegas.      Daphnie James [employer] [facility], MS3    Inna Parada MD  [facility]'s Neurology Residency, PGY-2

## 2024-08-27 NOTE — ASSESSMENT & PLAN NOTE
ECG (read by me): Afib with RVR @ 102, nl axis and QRS/QTc intervals, no acute ST/TW changes (repolarization abnormalities V1-V3)  -cont coumadin  -check INR STAT  -cont BB

## 2024-08-28 ENCOUNTER — APPOINTMENT (OUTPATIENT)
Dept: NON INVASIVE DIAGNOSTICS | Facility: HOSPITAL | Age: 89
DRG: 064 | End: 2024-08-28
Payer: COMMERCIAL

## 2024-08-28 ENCOUNTER — APPOINTMENT (OUTPATIENT)
Dept: RADIOLOGY | Facility: HOSPITAL | Age: 89
DRG: 064 | End: 2024-08-28
Payer: COMMERCIAL

## 2024-08-28 ENCOUNTER — APPOINTMENT (INPATIENT)
Dept: RADIOLOGY | Facility: HOSPITAL | Age: 89
DRG: 064 | End: 2024-08-28
Payer: COMMERCIAL

## 2024-08-28 PROBLEM — I63.9 CVA (CEREBRAL VASCULAR ACCIDENT) (HCC): Status: ACTIVE | Noted: 2024-08-27

## 2024-08-28 LAB
ANION GAP SERPL CALCULATED.3IONS-SCNC: 8 MMOL/L (ref 4–13)
AORTIC ROOT: 2.6 CM
APICAL FOUR CHAMBER EJECTION FRACTION: 57 %
BSA FOR ECHO PROCEDURE: 1.51 M2
BUN SERPL-MCNC: 60 MG/DL (ref 5–25)
CALCIUM SERPL-MCNC: 8.8 MG/DL (ref 8.4–10.2)
CHLORIDE SERPL-SCNC: 97 MMOL/L (ref 96–108)
CHOLEST SERPL-MCNC: 158 MG/DL
CO2 SERPL-SCNC: 29 MMOL/L (ref 21–32)
CREAT SERPL-MCNC: 1.65 MG/DL (ref 0.6–1.3)
E WAVE DECELERATION TIME: 143 MS
E/A RATIO: 3.64
ERYTHROCYTE [DISTWIDTH] IN BLOOD BY AUTOMATED COUNT: 13.4 % (ref 11.6–15.1)
EST. AVERAGE GLUCOSE BLD GHB EST-MCNC: 137 MG/DL
FRACTIONAL SHORTENING: 35 (ref 28–44)
GLUCOSE SERPL-MCNC: 96 MG/DL (ref 65–140)
HBA1C MFR BLD: 6.4 %
HCT VFR BLD AUTO: 40.1 % (ref 36.5–46.1)
HDLC SERPL-MCNC: 66 MG/DL
HGB BLD-MCNC: 13 G/DL (ref 12–15.4)
INR PPP: 3.95 (ref 0.85–1.19)
INTERVENTRICULAR SEPTUM IN DIASTOLE (PARASTERNAL SHORT AXIS VIEW): 1.2 CM
INTERVENTRICULAR SEPTUM: 1.2 CM (ref 0.6–1.1)
LAAS-AP4: 31.4 CM2
LDLC SERPL CALC-MCNC: 76 MG/DL (ref 0–100)
LEFT ATRIUM SIZE: 4.2 CM
LEFT INTERNAL DIMENSION IN SYSTOLE: 2.2 CM (ref 2.1–4)
LEFT VENTRICULAR INTERNAL DIMENSION IN DIASTOLE: 3.4 CM (ref 3.5–6)
LEFT VENTRICULAR POSTERIOR WALL IN END DIASTOLE: 1.3 CM
LEFT VENTRICULAR STROKE VOLUME: 30 ML
LVSV (TEICH): 30 ML
MCH RBC QN AUTO: 32.2 PG (ref 26.8–34.3)
MCHC RBC AUTO-ENTMCNC: 32.4 G/DL (ref 31.4–37.4)
MCV RBC AUTO: 99 FL (ref 82–98)
MV PEAK A VEL: 0.25 M/S
MV PEAK E VEL: 91 CM/S
MV STENOSIS PRESSURE HALF TIME: 41 MS
MV VALVE AREA P 1/2 METHOD: 5.37
PLATELET # BLD AUTO: 81 THOUSANDS/UL (ref 149–390)
PMV BLD AUTO: 10.5 FL (ref 8.9–12.7)
POTASSIUM SERPL-SCNC: 4.4 MMOL/L (ref 3.5–5.3)
PROTHROMBIN TIME: 37.9 SECONDS (ref 12.3–15)
RA PRESSURE ESTIMATED: 8 MMHG
RBC # BLD AUTO: 4.04 MILLION/UL (ref 3.88–5.12)
RIGHT ATRIAL 2D VOLUME: 85 ML
RIGHT ATRIUM AREA SYSTOLE A4C: 26.8 CM2
RIGHT VENTRICLE ID DIMENSION: 3 CM
RV PSP: 41 MMHG
SL CV LV EF: 60
SL CV PED ECHO LEFT VENTRICLE DIASTOLIC VOLUME (MOD BIPLANE) 2D: 46 ML
SL CV PED ECHO LEFT VENTRICLE SYSTOLIC VOLUME (MOD BIPLANE) 2D: 17 ML
SODIUM SERPL-SCNC: 134 MMOL/L (ref 135–147)
TR MAX PG: 33 MMHG
TR PEAK VELOCITY: 2.9 M/S
TRICUSPID ANNULAR PLANE SYSTOLIC EXCURSION: 1.2 CM
TRICUSPID VALVE PEAK REGURGITATION VELOCITY: 2.88 M/S
TRIGL SERPL-MCNC: 82 MG/DL
WBC # BLD AUTO: 7.37 THOUSAND/UL (ref 4.31–10.16)

## 2024-08-28 PROCEDURE — 99232 SBSQ HOSP IP/OBS MODERATE 35: CPT | Performed by: HOSPITALIST

## 2024-08-28 PROCEDURE — 70551 MRI BRAIN STEM W/O DYE: CPT

## 2024-08-28 PROCEDURE — 71260 CT THORAX DX C+: CPT

## 2024-08-28 PROCEDURE — 99205 OFFICE O/P NEW HI 60 MIN: CPT | Performed by: STUDENT IN AN ORGANIZED HEALTH CARE EDUCATION/TRAINING PROGRAM

## 2024-08-28 PROCEDURE — 93325 DOPPLER ECHO COLOR FLOW MAPG: CPT | Performed by: INTERNAL MEDICINE

## 2024-08-28 PROCEDURE — 93308 TTE F-UP OR LMTD: CPT

## 2024-08-28 PROCEDURE — 94640 AIRWAY INHALATION TREATMENT: CPT

## 2024-08-28 PROCEDURE — 80048 BASIC METABOLIC PNL TOTAL CA: CPT | Performed by: HOSPITALIST

## 2024-08-28 PROCEDURE — 93321 DOPPLER ECHO F-UP/LMTD STD: CPT | Performed by: INTERNAL MEDICINE

## 2024-08-28 PROCEDURE — 97162 PT EVAL MOD COMPLEX 30 MIN: CPT

## 2024-08-28 PROCEDURE — 94760 N-INVAS EAR/PLS OXIMETRY 1: CPT

## 2024-08-28 PROCEDURE — 93308 TTE F-UP OR LMTD: CPT | Performed by: INTERNAL MEDICINE

## 2024-08-28 PROCEDURE — 94664 DEMO&/EVAL PT USE INHALER: CPT

## 2024-08-28 PROCEDURE — 93325 DOPPLER ECHO COLOR FLOW MAPG: CPT

## 2024-08-28 PROCEDURE — 93321 DOPPLER ECHO F-UP/LMTD STD: CPT

## 2024-08-28 PROCEDURE — 97166 OT EVAL MOD COMPLEX 45 MIN: CPT

## 2024-08-28 PROCEDURE — 85027 COMPLETE CBC AUTOMATED: CPT | Performed by: HOSPITALIST

## 2024-08-28 PROCEDURE — 74177 CT ABD & PELVIS W/CONTRAST: CPT

## 2024-08-28 PROCEDURE — 85610 PROTHROMBIN TIME: CPT | Performed by: HOSPITALIST

## 2024-08-28 RX ORDER — ALBUTEROL SULFATE 0.83 MG/ML
2.5 SOLUTION RESPIRATORY (INHALATION) EVERY 6 HOURS PRN
Status: DISCONTINUED | OUTPATIENT
Start: 2024-08-28 | End: 2024-08-29 | Stop reason: HOSPADM

## 2024-08-28 RX ADMIN — IOHEXOL 50 ML: 350 INJECTION, SOLUTION INTRAVENOUS at 12:29

## 2024-08-28 RX ADMIN — PANTOPRAZOLE SODIUM 40 MG: 40 TABLET, DELAYED RELEASE ORAL at 06:33

## 2024-08-28 RX ADMIN — LEVOTHYROXINE SODIUM 50 MCG: 50 TABLET ORAL at 06:33

## 2024-08-28 RX ADMIN — ATORVASTATIN CALCIUM 40 MG: 40 TABLET, FILM COATED ORAL at 17:03

## 2024-08-28 RX ADMIN — FLUTICASONE FUROATE AND VILANTEROL TRIFENATATE 1 PUFF: 100; 25 POWDER RESPIRATORY (INHALATION) at 08:28

## 2024-08-28 RX ADMIN — LEVALBUTEROL HYDROCHLORIDE 1.25 MG: 1.25 SOLUTION RESPIRATORY (INHALATION) at 08:40

## 2024-08-28 RX ADMIN — FLUTICASONE PROPIONATE 1 SPRAY: 50 SPRAY, METERED NASAL at 08:28

## 2024-08-28 NOTE — ASSESSMENT & PLAN NOTE
Wt Readings from Last 3 Encounters:   08/28/24 49 kg (108 lb)   08/28/24 49 kg (108 lb)   08/09/24 52.4 kg (115 lb 8.3 oz)     -holding torsemide with DARIO/dehydration, may restart tomorrow   -I/Os, daily weights  -cont BB

## 2024-08-28 NOTE — RESTORATIVE TECHNICIAN NOTE
Restorative Technician Note      Patient Name: Rebeca Banegas     Note Type: Mobility  Patient Position Upon Consult: Bedside chair  Activity Performed: Ambulated; Dangled; Stood  Assistive Device: Cane  Brace Applied: Darco Wedge Shoe (Toe Unloading) (L Darco Wedge Shoe)  Education Provided: Yes  Patient Position at End of Consult: Bedside chair; All needs within reach; Bed/Chair alarm activated    Liza GONZALEZ, Restorative Technician,

## 2024-08-28 NOTE — SPEECH THERAPY NOTE
Consult received and chart reviewed. SLP s/w patient. Pt passed RN dysphagia assessment and is tolerating regular diet with thin liquids with no s/s of aspiration. Speech/Language deficits also denied. Formal speech/swallow evaluation does not appear to be indicated at this time. If new concerns arise, please reconsult. Thank you.

## 2024-08-28 NOTE — UTILIZATION REVIEW
Initial Clinical Review    OBSERVATION 8/27 CHANGED TO INPATIENT ON 8/28 - NEW CVA, STROKE PATHWAY,     Admission: Date/Time/Statement:   Admission Orders (From admission, onward)       Ordered        08/28/24 1127  INPATIENT ADMISSION  Once            08/27/24 1600  Place in Observation  Once                          Orders Placed This Encounter   Procedures    INPATIENT ADMISSION     Standing Status:   Standing     Number of Occurrences:   1     Order Specific Question:   Level of Care     Answer:   Med Surg [16]     Order Specific Question:   Estimated length of stay     Answer:   More than 2 Midnights     Order Specific Question:   Certification     Answer:   I certify that inpatient services are medically necessary for this patient for a duration of greater than two midnights. See H&P and MD Progress Notes for additional information about the patient's course of treatment.     ED Arrival Information       Expected   8/27/2024 11:34    Arrival   8/27/2024 11:54    Acuity   Urgent              Means of arrival   Walk-In    Escorted by   Friend    Service   Hospitalist    Admission type   Emergency              Arrival complaint   right arm numbness             Chief Complaint   Patient presents with    Extremity Weakness     C/o r arm weakness, numbness, and pain starting Sunday and continuing into today, reports loss of peripheral vision on on L side on Sunday morning       Initial Presentation: 89 y.o. adult presents to the ED from home with c/o RUE weakness/numbness/tingling RUE and from R elbow distally since 8/26 AM which were improving but not resolved, L temporal field vision deficit which resolved, cough x months. PMH: PAF, chronic dHF, HLD, Hypothyroidism, CKD 3b, asthma/COPD overlap.  In the ED she was bradycardic and elevated dBP, pain score 5/10.  Labs - elevated INR 3.90, BUN/CR, low platelets.  Imaging - no acute disease, Age-indeterminate right cerebellar lacunar infarct likely chronic.  ECG - A  fib w/ RVR.  Treated with IV fluids, Xopenex.  On exam irreg rhythm, no other deficits, cranial nerves intact, no visual field deficits.  Admitted to Observation Status with Weakness RUE/stroke like symptoms, PAF, esophageal reflux, bronchiectasis - stat INR, MRI brain, tele, Neuro consult, FLP, A1C, continue BB, Coumadin, hold Torsemide, start PPI, statin, gentle IV fluids, home Albuterol and Advair.     Anticipated Length of Stay/Certification Statement: Patient will be admitted on an observation basis with an anticipated length of stay of less than 2 midnights secondary to RUE weakness.     Date: 8/28  CHANGED TO INPATIENT STATUS TODAY:   Stroke like symptoms - neuro consult completed today.  MRI brain shows Foci of acute infarcts in the right occipital lobe and right cerebellum.  Pt with acute CVA.  Concern for underlying malignancy as pt had acute CVA while INR was supra-therapeutic.  Will get CT CAP - pending.  On exam today, irreg rhythm, + bowel sounds, lungs clear. No new deficits.  INR up to 3.95 today. Platelets trending down. Downtrending BUN/CR.  Echo completed.     8/28 Neuro Consult - poss CVA given persistent symptoms. Non focal exam without any visual field deficit, right arm weakness or sensory deficit. Poss MSK pain RUE.  Will need to r/o hypercoagulable state, hold Coumadin, statin,t nasreen, OTC Lidocaine cream and ulnar wrist splint for RUE pain. Oriented x 3.  No other neuro deficits.      Date: 8/29  Day 3: Has surpassed a 2nd midnight with active treatments and services.    Pt is d/c home today in stable condition.  Will need OP f/u for Pancreatic head cyst with GI.      ED Triage Vitals [08/27/24 1200]   Temperature Pulse Respirations Blood Pressure SpO2 Pain Score   (!) 97.3 °F (36.3 °C) (!) 51 18 123/95 98 % 5     Weight (last 2 days)       Date/Time Weight    08/29/24 0600 49.3 (108.69)    08/28/24 0800 49 (108)    08/28/24 0546 49.1 (108.25)            Vital Signs (last 3 days)        Date/Time Temp Pulse Resp BP MAP (mmHg) SpO2 O2 Device Patient Position - Orthostatic VS [place] Coma Scale Score Pain    08/29/24 08:39:08 -- 94 -- 118/74 89 98 % -- -- -- --    08/29/24 0800 -- -- -- -- -- -- -- -- 15 No Pain    08/29/24 0739 98.6 °F (37 °C) 116 16 124/77 93 99 % -- -- -- --    08/29/24 0400 -- -- -- -- -- -- -- -- 15 --    08/29/24 0000 -- -- -- -- -- -- -- -- 15 --    08/28/24 22:29:02 98.2 °F (36.8 °C) 89 17 89/54 66 96 % -- -- -- --    08/28/24 2000 -- -- -- -- -- -- None (Room air) -- 15 No Pain    08/28/24 1948 -- -- -- -- -- 95 % None (Room air) -- -- --    08/28/24 15:48:46 96.7 °F (35.9 °C) 63 17 102/59 73 100 % -- -- -- --    08/28/24 0848 -- -- -- -- -- -- -- -- -- No Pain    08/28/24 0840 -- -- -- -- -- 97 % None (Room air) -- -- --    08/28/24 0828 -- -- -- -- -- -- -- -- -- No Pain    08/28/24 0800 -- 90 -- 98/63 -- -- -- -- -- No Pain    08/28/24 04:28:35 -- 85 -- 98/63 75 97 % -- -- -- --    08/28/24 04:26:46 -- 85 -- -- -- 94 % -- -- -- --    08/28/24 02:28:01 -- 93 -- 105/64 78 94 % -- -- -- --    08/28/24 02:26:11 -- 78 -- -- -- 96 % -- -- -- --    08/28/24 0123 -- 88 -- 102/52 -- -- -- -- -- --    08/28/24 0023 -- 86 -- 102/58 73 -- -- -- -- --    08/27/24 2323 -- 88 -- 109/59 76 -- -- -- -- --    08/27/24 2302 -- -- -- -- -- -- -- -- -- No Pain    08/27/24 22:33:53 97.3 °F (36.3 °C) 70 17 117/73 88 95 % -- -- 15 No Pain    08/27/24 2200 -- 82 20 96/60 72 95 % None (Room air) Lying -- --    08/27/24 2115 -- 76 20 103/58 76 96 % None (Room air) Lying -- --    08/27/24 1900 -- 82 22 109/63 80 99 % None (Room air) Lying -- --    08/27/24 1815 -- 90 22 -- -- 98 % None (Room air) Lying -- --    08/27/24 1715 -- 86 20 130/88 104 100 % None (Room air) Lying -- --    08/27/24 1615 -- 88 20 116/68 86 98 % None (Room air) Lying -- --    08/27/24 1357 -- 87 18 123/70 -- 98 % None (Room air) Lying -- --    08/27/24 1219 -- 98 18 124/77 -- 100 % None (Room air) -- 15 3    08/27/24 1200 97.3  °F (36.3 °C) 51 18 123/95 -- 98 % None (Room air) -- -- 5              Pertinent Labs/Diagnostic Test Results:   Radiology:  MRI brain wo contrast   Final Interpretation by Lalitha Haider MD (08/28 1027)      1.  Foci of acute infarcts in the right occipital lobe and right cerebellum. No mass effect or hemorrhagic transformation.   2.  Chronic microangiopathic change.   3.  Left mastoid effusion.   4.  Sinus disease.      Workstation performed: [id number]         CTA head and neck with and without contrast   Final Interpretation by Phong Shoemaker MD (08/27 1529)      CT Brain: No CT evidence for large acute vascular distribution infarct or acute intracranial hemorrhage.      Age-indeterminate right cerebellar lacunar infarct is favored to be chronic in nature. No prior imaging available for direct comparison.      CT Angiography: Atherosclerotic plaque at the carotid bulbs bilaterally without evidence for high-grade stenosis or focal occlusion of the cervical vasculature.      No evidence for high-grade stenosis or focal occlusion of the intracranial circulation.      CT chest abdomen pelvis w contrast    (Results Pending)        Cardiology:  Echo follow up/limited w/ contrast if indicated   Final Result by Alba Gonzalez DO (08/28 2117)        Left Ventricle: Left ventricular cavity size is normal. Wall thickness    is moderately increased. There is moderate concentric hypertrophy. The    left ventricular ejection fraction is 60%. Systolic function is normal.    Wall motion is normal. Unable to assess diastolic function due to atrial    fibrillation.     Right Ventricle: Right ventricular cavity size is normal. Systolic    function is normal.     Left Atrium: The atrium is moderately dilated.     Right Atrium: The atrium is moderately dilated.     Mitral Valve: There is mild annular calcification. There is mild    regurgitation.     Tricuspid Valve: There is moderate regurgitation. The right ventricular    systolic pressure  is mildly elevated. The estimated right ventricular    systolic pressure is 41.00 mmHg.     Pulmonic Valve: There is mild regurgitation.         ECG 12 lead   Final Result by Yobany Naranjo MD (08/27 1317)   Atrial fibrillation with rapid ventricular response   Right bundle branch block   Anterolateral infarct , age undetermined   Abnormal ECG      Confirmed by Yobany Naranjo ([id number]) on 8/27/2024 1:17:04 PM        GI:  No orders to display           Results from last 7 days   Lab Units 08/28/24  0458 08/27/24  1307   WBC Thousand/uL 7.37 6.19   HEMOGLOBIN g/dL 13.0 14.7   HEMATOCRIT % 40.1 45.0   PLATELETS Thousands/uL 81* 102*   TOTAL NEUT ABS Thousands/µL  --  3.23         Results from last 7 days   Lab Units 08/29/24  0534 08/28/24  0458 08/27/24  1307   SODIUM mmol/L 133* 134* 131*   POTASSIUM mmol/L 4.4 4.4 5.1   CHLORIDE mmol/L 99 97 96   CO2 mmol/L 25 29 25   ANION GAP mmol/L 9 8 10   BUN mg/dL 49* 60* 63*   CREATININE mg/dL 1.49* 1.65* 1.90*   CALCIUM mg/dL 8.7 8.8 9.2     Results from last 7 days   Lab Units 08/27/24  1307   AST U/L 31   ALT U/L 25   ALK PHOS U/L 44   TOTAL PROTEIN g/dL 7.6   ALBUMIN g/dL 4.0   TOTAL BILIRUBIN mg/dL 0.75         Results from last 7 days   Lab Units 08/29/24  0534 08/28/24  0458 08/27/24  1307   GLUCOSE RANDOM mg/dL 91 96 102         Results from last 7 days   Lab Units 08/27/24  1307   HEMOGLOBIN A1C % 6.4*   EAG mg/dl 137     Results from last 7 days   Lab Units 08/29/24  0534 08/28/24  0458 08/27/24  1726   PROTIME seconds 28.9* 37.9* 37.5*   INR  2.75* 3.95* 3.90*     ED Treatment-Medication Administration from 08/27/2024 1134 to 08/27/2024 2224         Date/Time Order Dose Route Action     08/27/2024 1312 multi-electrolyte (ISOLYTE-S PH 7.4) bolus 500 mL 500 mL Intravenous New Bag     08/27/2024 1439 iohexol (OMNIPAQUE) 350 MG/ML injection (SINGLE-DOSE) 85 mL 85 mL Intravenous Given     08/27/2024 1828 sodium chloride 0.9 % infusion 50 mL/hr Intravenous New  Bag     08/27/2024 2033 levalbuterol (XOPENEX) inhalation solution 1.25 mg 1.25 mg Nebulization Given            Past Medical History:   Diagnosis Date    Abnormal ultrasound     RESOLVED: [id number]    Advice given about COVID-19 virus infection 04/07/2020    Asthma with acute exacerbation     LAST ASSESSED: [id number]    Asymptomatic menopausal state     Atherosclerosis     Atrial fibrillation (HCC)     Chronic obstructive lung disease (HCC)     Congestive heart failure (HCC)     LAST ASSESSED: [id number]    COVID-19 virus infection 03/25/2023    Cuboid fracture     LAST ASSESSED: [id number]    Dyspepsia     Fall 04/01/2024    GERD (gastroesophageal reflux disease)     High risk medication use     LAST ASSESSED: 55SGP7255    Hyperlipidemia     Hypertension     Hypokalemia     Hypothyroidism     Impacted cerumen of both ears     LAST ASSESSED: 36VWH9604    Impacted cerumen of right ear     LAST ASSESSED: 15VXE2443    Influenza     LAST ASSESSED: [id number]    IPMN (intraductal papillary mucinous neoplasm)     RESOLVED: 02OCT2015    Limb swelling     LAST ASSESSED: 09HTV6004    Navicular fracture of ankle     LAST ASSESSED: 87SXP7265    Onychomycosis     LAST ASSESSED: 39LZB9555    Osteoarthritis     Osteopenia     Osteoporosis     Paronychia, finger, unspecified laterality     LAST ASSESSED: 08TZL6739    Paroxysmal atrial fibrillation (HCC)     LAST ASSESSED: 02MAR2014    Peripheral vascular disease ([facility])     Plantar fasciitis     Talus fracture     LAST ASSESSED: 01KMW5530    Vascular headache      Present on Admission:   CVA (cerebral vascular accident) ([facility])   Hypothyroidism   Primary hypertension   Hyperlipidemia   Esophageal reflux   CKD stage 3b, GFR 30-44 ml/min ([facility])   Permanent atrial fibrillation (HCC)   Chronic diastolic congestive heart failure (HCC)   Bronchiectasis without complication ([facility])      Admitting Diagnosis: Weakness [R53.1]  Arm numbness [R20.0]  Weakness of right upper extremity  [H58.897]  Age/Sex: 89 y.o. adult  Admission Orders:  Scheduled Medications:  atorvastatin, 40 mg, Oral, QPM  fluticasone, 1 spray, Each Nare, Daily  Fluticasone Furoate-Vilanterol, 1 puff, Inhalation, Daily  levothyroxine, 50 mcg, Oral, Early Morning  metoprolol succinate, 100 mg, Oral, Daily  pantoprazole, 40 mg, Oral, Early Morning      Continuous IV Infusions:    IV NSS @ 50 cc/hr - d/c 8/28     PRN Meds:  albuterol, 2 puff, Inhalation, Q6H PRN  albuterol, 2.5 mg, Nebulization, Q6H PRN    NIHSS  Stroke pathway  Daily wt  Tele  Neuro checks Every 1 hour x 4 hours, then every 2 hours x 4, then every 4 hours x 72 hours                 Ankle/foot brace  CT CAP   Cardiac diet  IP CONSULT TO NEUROLOGY  IP CONSULT TO CASE MANAGEMENT  IP CONSULT TO NUTRITION SERVICES    Network Utilization Review Department  ATTENTION: Please call with any questions or concerns to [phone] and carefully listen to the prompts so that you are directed to the right person. All voicemails are confidential.   For Discharge needs, contact Care Management DC Support Team at [phone] opt. 2  Send all requests for admission clinical reviews, approved or denied determinations and any other requests to dedicated fax number below belonging to the campus where the patient is receiving treatment. List of dedicated fax numbers for the Facilities:  FACILITY NAME UR FAX NUMBER   ADMISSION DENIALS (Administrative/Medical Necessity) [phone]   DISCHARGE SUPPORT TEAM (NETWORK) [phone]   PARENT CHILD HEALTH (Maternity/NICU/Pediatrics) [phone]   [facility] [phone]   [facility] [phone]   [facility] [phone]   [facility] [phone]   [facility] [phone]   [facility] [phone]   [facility] [phone]   [facility]  [facility] [phone]   [facility] [phone]   [facility] [phone]   [facility] [phone]   [facility] [phone]

## 2024-08-28 NOTE — QUICK NOTE
Rebeca Banegas is a 89 y.o. right handed adult with a past medical history of atrial fibrillation treated with warfarin who presented with RUE pain, numbness, tingling, and weakness in the right arm distal to elbow. Patient was worked up for strokes.       MRI revealed foci of acute infarcts in the right occipital lobe and right cerebellum.     CT chest/Ab/pelvis showed Multiple pancreatic cystic lesions, some of which have enlarged since MR 6/30/2017. These will require further workup up with surgical oncology and/or gastroenterology.      - Continue warfarin, noted supratherapeutic levels. Management per primary team and coumadin clinic.  - Continue home statin   - Will defer further workup for the pancreatic lesions to primary team.     No additional recommendations from neurology.  Neurology will follow peripherally, please call us with any questions or concerns. Thank you.      Rebeca Banegas should follow-up with Neurology stroke/neurovascular in 4-6 weeks for a 60 minute appointment. Outpatient office has been notified.

## 2024-08-28 NOTE — PROGRESS NOTES
[facility]  Progress Note  Name: Rebeca Banegas I  MRN: [id number]  Unit/Bed#: [facility] 731-01 I Date of Admission: 8/27/2024   Date of Service: 8/28/2024 I Hospital Day: 0    Assessment & Plan   Bronchiectasis without complication (HCC)  Assessment & Plan  -as well as asthma-COPD overlap as per review of outpatient internal medicine progress note  -Continue albuterol/Advair    CKD stage 3b, GFR 30-44 ml/min (HCC)  Assessment & Plan  Lab Results   Component Value Date    EGFR 29 07/26/2024    EGFR 23 06/13/2024    EGFR 18 05/30/2024    CREATININE 1.65 (H) 08/28/2024    CREATININE 1.90 (H) 08/27/2024    CREATININE 1.52 (H) 07/26/2024     -with DARIO  -s/p 500cc Isolyte in the ER followed by maintenance IVFs  -stop IVFs      Hypothyroidism  Assessment & Plan  -cont Levoxyl    Primary hypertension  Assessment & Plan  -cont BB    Hyperlipidemia  Assessment & Plan  -cont statin    Esophageal reflux  Assessment & Plan  -cont PPI    Chronic diastolic congestive heart failure (HCC)  Assessment & Plan  Wt Readings from Last 3 Encounters:   08/28/24 49 kg (108 lb)   08/28/24 49 kg (108 lb)   08/09/24 52.4 kg (115 lb 8.3 oz)     -holding torsemide with DARIO/dehydration, may restart tomorrow   -I/Os, daily weights  -cont BB          Permanent atrial fibrillation (HCC)  Assessment & Plan  -ECG on admission (read by me): Afib with RVR @ 102, nl axis and QRS/QTc intervals, no acute ST/TW changes (repolarization abnormalities V1-V3)  -coumadin on hold with supratherapeutic INR  -Trend INR and restart Coumadin once INR less than 3 (discussed with neurology)  -cont BB      * CVA (cerebral vascular accident) ([facility])  Assessment & Plan  -presentd with stroke-like symptoms  -8/25/24 at 0530 patient began having weakness in her right upper extremity.  She also had an aching sensation and numbness and tingling from her elbow distally.  Those symptoms have improved somewhat but have not resolved.  She also  had left temporal field vision deficit which did not last long and has resolved.  -CTA head/neck: CT Brain: No CT evidence for large acute vascular distribution infarct or acute intracranial hemorrhage. Age-indeterminate right cerebellar lacunar infarct is favored to be chronic in nature. No prior imaging available for direct comparison. CT Angiography: Atherosclerotic plaque at the carotid bulbs bilaterally without evidence for high-grade stenosis or focal occlusion of the cervical vasculature. No evidence for high-grade stenosis or focal occlusion of the intracranial circulation.   -admitted on stroke pathway  -pt allergic to ASA (mentioned this a couple of times on admission)  -on coumadin with supratherapeutic INR  -MRI brain: Foci of acute infarcts in the right occipital lobe and right cerebellum. No mass effect or hemorrhagic transformation.   -monitor on tele (known afib)  -Echo: EF 60%, normal RV sz/fxn  -LDL 76, A1c 6.4%  -c/s neuro  -case discussed with neurology.  Concern for underlying malignancy as patient had acute CVAs while INR was therapeutic.  Check CT C/A/P.             VTE Pharmacologic Prophylaxis: VTE Score: 4 coumadin    Patient Centered Rounds: I performed bedside rounds with nursing staff today.   Discussions with Specialists or Other Care Team Provider: Neurology    Total Time Spent on Date of Encounter in care of patient: 38 mins. This time was spent on one or more of the following: performing physical exam; counseling and coordination of care; obtaining or reviewing history; documenting in the medical record; reviewing/ordering tests, medications or procedures; communicating with other healthcare professionals and discussing with patient's family/caregivers.    Current Length of Stay: 0 day(s)  Current Patient Status: Inpatient   Certification Statement: The patient will continue to require additional inpatient hospital stay due to acute CVA  Discharge Plan: Anticipate discharge in 24-48  hrs to home with home services.    Code Status: Level 3 - DNAR and DNI    Subjective:   No new complaints.    Objective:     Vitals:   Temp (24hrs), Av.3 °F (36.3 °C), Min:97.3 °F (36.3 °C), Max:97.3 °F (36.3 °C)    Temp:  [97.3 °F (36.3 °C)] 97.3 °F (36.3 °C)  HR:  [51-98] 90  Resp:  [17-22] 17  BP: ()/(52-95) 98/63  SpO2:  [94 %-100 %] 97 %  Body mass index is 18.54 kg/m².     Input and Output Summary (last 24 hours):     Intake/Output Summary (Last 24 hours) at 2024 1128  Last data filed at 2024 1357  Gross per 24 hour   Intake 500 ml   Output --   Net 500 ml       Physical Exam:   Gen: NAD, AAOx3, well developed, well nourished  Eyes: EOMI, PERRLA, no scleral icterus  ENMT:  no nasal discharge, no otic discharge, moist mucous membranes  Neck:  Supple  Cardiovascular: remains irreg/irreg rhythm, normal S1-S2, no murmurs, rubs, or gallops  Lungs:  remains Clear to auscultation bilaterally, no wheezes, or rales, or rhonchi  Abdomen:  Positive bowel sounds, soft, nontender, nondistended  Skin:  Intact, no rashes, no edema  Neuro: Cranial nerves 2-12 are intact, non-focal, 5/5 strength in all 4 extremities       Additional Data:     Labs:  Results from last 7 days   Lab Units 24  0458 24  1307   WBC Thousand/uL 7.37 6.19   HEMOGLOBIN g/dL 13.0 14.7   HEMATOCRIT % 40.1 45.0   PLATELETS Thousands/uL 81* 102*   SEGS PCT %  --  52   LYMPHO PCT %  --  38   MONO PCT %  --  7   EOS PCT %  --  2     Results from last 7 days   Lab Units 24  0458 24  1307   SODIUM mmol/L 134* 131*   POTASSIUM mmol/L 4.4 5.1   CHLORIDE mmol/L 97 96   CO2 mmol/L 29 25   BUN mg/dL 60* 63*   CREATININE mg/dL 1.65* 1.90*   ANION GAP mmol/L 8 10   CALCIUM mg/dL 8.8 9.2   ALBUMIN g/dL  --  4.0   TOTAL BILIRUBIN mg/dL  --  0.75   ALK PHOS U/L  --  44   ALT U/L  --  25   AST U/L  --  31   GLUCOSE RANDOM mg/dL 96 102     Results from last 7 days   Lab Units 24  0458   INR  3.95*         Results from last  7 days   Lab Units 08/27/24  1307   HEMOGLOBIN A1C % 6.4*           Lines/Drains:  Invasive Devices       Peripheral Intravenous Line  Duration             Peripheral IV 08/27/24 Left Arm <1 day                        Recent Cultures (last 7 days):         Last 24 Hours Medication List:   Current Facility-Administered Medications   Medication Dose Route Frequency Provider Last Rate    albuterol  2 puff Inhalation Q6H PRN Gilberto Shelby MD      albuterol  2.5 mg Nebulization Q6H PRN Calvin Meyer MD      atorvastatin  40 mg Oral QPM Gilberto Shelby MD      fluticasone  1 spray Each Nare Daily Gilberto Shelby MD      Fluticasone Furoate-Vilanterol  1 puff Inhalation Daily Gilberto Shelby MD      levothyroxine  50 mcg Oral Early Morning Gilberto Shelby MD      metoprolol succinate  100 mg Oral Daily Gilberto Shelby MD      pantoprazole  40 mg Oral Early Morning Gilberto Shelby MD          Today, Patient Was Seen By: Gilberto Shelby MD    **Please Note: This note may have been constructed using a voice recognition system.**

## 2024-08-28 NOTE — ASSESSMENT & PLAN NOTE
-presentd with stroke-like symptoms  -8/25/24 at 0530 patient began having weakness in her right upper extremity.  She also had an aching sensation and numbness and tingling from her elbow distally.  Those symptoms have improved somewhat but have not resolved.  She also had left temporal field vision deficit which did not last long and has resolved.  -CTA head/neck: CT Brain: No CT evidence for large acute vascular distribution infarct or acute intracranial hemorrhage. Age-indeterminate right cerebellar lacunar infarct is favored to be chronic in nature. No prior imaging available for direct comparison. CT Angiography: Atherosclerotic plaque at the carotid bulbs bilaterally without evidence for high-grade stenosis or focal occlusion of the cervical vasculature. No evidence for high-grade stenosis or focal occlusion of the intracranial circulation.   -admitted on stroke pathway  -pt allergic to ASA (mentioned this a couple of times on admission)  -on coumadin with supratherapeutic INR  -MRI brain: Foci of acute infarcts in the right occipital lobe and right cerebellum. No mass effect or hemorrhagic transformation.   -monitor on tele (known afib)  -Echo: EF 60%, normal RV sz/fxn  -LDL 76, A1c 6.4%  -c/s neuro  -case discussed with neurology.  Concern for underlying malignancy as patient had acute CVAs while INR was therapeutic.  Check CT C/A/P.

## 2024-08-28 NOTE — OCCUPATIONAL THERAPY NOTE
Occupational Therapy Evaluation     Patient Name: Rebeca Banegas  Today's Date: 8/28/2024  Problem List  Principal Problem:    CVA (cerebral vascular accident) (HCC)  Active Problems:    Permanent atrial fibrillation (HCC)    Chronic diastolic congestive heart failure (HCC)    Esophageal reflux    Hyperlipidemia    Primary hypertension    Hypothyroidism    CKD stage 3b, GFR 30-44 ml/min (HCC)    Bronchiectasis without complication (HCC)    Past Medical History  Past Medical History:   Diagnosis Date    Abnormal ultrasound     RESOLVED: [id number]    Advice given about COVID-19 virus infection 04/07/2020    Asthma with acute exacerbation     LAST ASSESSED: [id number]    Asymptomatic menopausal state     Atherosclerosis     Atrial fibrillation (HCC)     Chronic obstructive lung disease (HCC)     Congestive heart failure (HCC)     LAST ASSESSED: [id number]    COVID-19 virus infection 03/25/2023    Cuboid fracture     LAST ASSESSED: [id number]    Dyspepsia     Fall 04/01/2024    GERD (gastroesophageal reflux disease)     High risk medication use     LAST ASSESSED: [id number]    Hyperlipidemia     Hypertension     Hypokalemia     Hypothyroidism     Impacted cerumen of both ears     LAST ASSESSED: [id number]    Impacted cerumen of right ear     LAST ASSESSED: 55TAN0319    Influenza     LAST ASSESSED: 70IBP7182    IPMN (intraductal papillary mucinous neoplasm)     RESOLVED: 02OCT2015    Limb swelling     LAST ASSESSED: 39HUK9351    Navicular fracture of ankle     LAST ASSESSED: [id number]    Onychomycosis     LAST ASSESSED: 77INM6634    Osteoarthritis     Osteopenia     Osteoporosis     Paronychia, finger, unspecified laterality     LAST ASSESSED: 65ZAA8645    Paroxysmal atrial fibrillation (HCC)     LAST ASSESSED: 02MAR2014    Peripheral vascular disease (HCC)     Plantar fasciitis     Talus fracture     LAST ASSESSED: 11TBA4582    Vascular headache      Past Surgical History  Past Surgical History:   Procedure Laterality  Date    APPENDECTOMY      BONE BIOPSY Left 8/9/2024    Procedure: Bone bx of proximal phalanx second toe & second met with fluoroscopic guidance;  Surgeon: Jonny Marcial DPM;  Location: [facility];  Service: Podiatry    CATARACT EXTRACTION Bilateral     CHOLECYSTECTOMY      COLONOSCOPY      JOINT REPLACEMENT Bilateral     knee    NEUROPLASTY / TRANSPOSITION MEDIAN NERVE AT CARPAL TUNNEL BILATERAL Bilateral     DECOMPRESSION    OOPHORECTOMY Bilateral     age 81    PELVIC FLOOR REPAIR  2014    MI BIOPSY BONE TROCAR/NEEDLE SUPERFICIAL Left 8/9/2024    Procedure: Left foot debridement;  Surgeon: Jonny Marcial DPM;  Location: [facility];  Service: Podiatry    MI DEBRIDEMENT MUSCLE &/FASCIA 1ST 20 SQ CM/< Left 8/9/2024    Procedure: Left foot debridement;  Surgeon: Jonny Marcial DPM;  Location: [facility];  Service: Podiatry    SINUS SURGERY      TOTAL KNEE ARTHROPLASTY Bilateral       08/28/24 0828   OT Last Visit   OT Visit Date 08/28/24   Note Type   Note type Evaluation   Pain Assessment   Pain Assessment Tool 0-10   Pain Score No Pain   Restrictions/Precautions   Weight Bearing Precautions Per Order Yes   LLE Weight Bearing Per Order (S)  WBAT   Braces or Orthoses (+Darco shoe with evaluation, Podiatry ordered darco toe offloading shoe for Weight bearing through heel  and was delivered after evaluation)   Home Living   Type of Home Assisted living  ([facility] assisted living -[facility])   Home Layout One level;Performs ADLs on one level;Able to live on main level with bedroom/bathroom   Bathroom Shower/Tub Walk-in shower   Bathroom Toilet Standard   Bathroom Equipment Grab bars in shower;Built-in shower seat   Bathroom Accessibility Accessible   Home Equipment Walker;Cane   Prior Function   Level of [place] Independent with ADLs/IADL's   Lives With Alone   Receives Help From Friend(s);Neighbor   IADLs Independent with medication management;Independent with meal prep;Independent with driving    Falls in the last 6 months 0   Vocational Retired   Lifestyle   Autonomy I with ADL's/IaDL's, +SPC and darco shoe to right LE (since beginning of August), +drives   Reciprocal Relationships friends, neighbors, sister lives in [place], daughter lives in [place]   Service to Others retired   Intrinsic Gratification reading-issued activity booklet #2, newspaper for leisure engagement   General   Family/Caregiver Present No   ADL   Eating Assistance 7  Independent   Grooming Assistance 7  Independent   UB Bathing Assistance 5  Supervision/Setup   LB Bathing Assistance 5  Supervision/Setup   UB Dressing Assistance 5  Supervision/Setup   LB Dressing Assistance 5  Supervision/Setup   Toileting Assistance  5  Supervision/Setup   Bed Mobility   Supine to Sit 5  Supervision   Transfers   Sit to Stand 5  Supervision   Additional items Assist x 1   Stand to Sit 5  Supervision   Additional items Assist x 1   Additional Comments (-) AD functional transfers   Functional Mobility   Functional Mobility 5  Supervision   Additional Comments S with SPC household distance functional mobility into hallway with increased time, no overt LOB.   Additional items SPC   Balance   Static Sitting Normal   Dynamic Sitting Good   Static Standing Fair +   Dynamic Standing Fair   Ambulatory Fair -   Activity Tolerance   Activity Tolerance Patient tolerated treatment well   Medical Staff Made Aware PT Roderick, JOSE Slaughter   Nurse Made Aware RN cleared pt for therapy   RUE Assessment   RUE Assessment X  (4-/5 throughout, pt reports acute weakness with this admission +right hand dominant)   LUE Assessment   LUE Assessment WFL   Hand Function   Gross Motor Coordination Functional   Fine Motor Coordination Functional   Sensation   Light Touch No apparent deficits   Vision-Basic Assessment   Current Vision Wears glasses all the time   Vision - Complex Assessment   Acuity Able to read clock/calendar on wall without difficulty;Able to read employee name  badge without difficulty   Perception   Inattention/Neglect Appears intact   Cognition   Overall Cognitive Status WFL   Arousal/Participation Alert;Responsive;Cooperative   Attention Within functional limits   Orientation Level Oriented X4   Memory Decreased recall of precautions;Within functional limits   Following Commands Follows multistep commands with increased time or repetition   Comments pt pleasant and cooperative, able to recall current medical events, accurate social history, followed multi-step directives.   Assessment   Limitation Decreased ADL status;Decreased cognition;Decreased endurance;Decreased self-care trans;Decreased high-level ADLs;Decreased UE strength   Prognosis Fair   Assessment Pt is a 89 y.o. adult who was admitted to [facility] on 8/27/2024 with right UE weakness,RUE  numbness/tingling,  Left temporal field vision deficit and now here with CVA (cerebral vascular accident) (HCC) bronchiectasis without complication, CKD, hyperlipidemia, CHF, a-fib, RUE weakness . Patient  has a past medical history of Abnormal ultrasound, Advice given about COVID-19 virus infection (04/07/2020), Asthma with acute exacerbation, Asymptomatic menopausal state, Atherosclerosis, Atrial fibrillation (HCC), Chronic obstructive lung disease (HCC), Congestive heart failure (HCC), COVID-19 virus infection (03/25/2023), Cuboid fracture, Dyspepsia, Fall (04/01/2024), GERD (gastroesophageal reflux disease), High risk medication use, Hyperlipidemia, Hypertension, Hypokalemia, Hypothyroidism, Impacted cerumen of both ears, Impacted cerumen of right ear, Influenza, IPMN (intraductal papillary mucinous neoplasm), Limb swelling, Navicular fracture of ankle, Onychomycosis, Osteoarthritis, Osteopenia, Osteoporosis, Paronychia, finger, unspecified laterality, Paroxysmal atrial fibrillation (HCC), Peripheral vascular disease (HCC), Plantar fasciitis, Talus fracture, and Vascular headache.  At baseline pt was  completing I with ADL's/IADL's, +SPC with functional mobility +drives. Pt lives alone in a ILF . Currently pt requires S/set-up for overall ADLS and S with SPC for functional mobility/transfers. Pt currently presents with impairments in the following categories -limited home support activity tolerance and endurance. These impairments, as well as pt's fatigue, decreased caregiver support, and risk for falls  limit pt's ability to safely engage in all baseline areas of occupation, includingfunctional mobility/transfers and community mobility From OT standpoint, recommend min level III upon D/C. The patient's raw score on the -PAC Daily Activity Inpatient Short Form is 20. A raw score of greater than or equal to 19 suggests the patient may benefit from discharge to home. Please refer to the recommendation of the Occupational Therapist for safe discharge planning. The patient's raw score on the -PAC Daily Activity Inpatient Short Form is 20. A raw score of greater than or equal to 19 suggests the patient may benefit from discharge to home. Please refer to the recommendation of the Occupational Therapist for safe discharge planning. OT will continue to follow to address the below stated goals 1-2 additional sessions for RUE HEP and ensure safety/I with ADL's/IaDL's.   Goals   Patient Goals go home   LTG Time Frame 10-14   Long Term Goal #1 see goals below   Plan   Treatment Interventions ADL retraining;Functional transfer training;UE strengthening/ROM;Endurance training;Cognitive reorientation;Patient/family training;Compensatory technique education;Continued evaluation;Energy conservation;Activityengagement   Goal Expiration Date 09/11/24   OT Frequency 2-3x/wk   Discharge Recommendation   Rehab Resource Intensity Level, OT III (Minimum Resource Intensity)   AM-PAC Daily Activity Inpatient   Lower Body Dressing 3   Bathing 3   Toileting 3   Upper Body Dressing 3   Grooming 4   Eating 4   Daily Activity Raw Score 20    Daily Activity Standardized Score (Calc for Raw Score >=11) 42.03   AM-PAC Applied Cognition Inpatient   Following a Speech/Presentation 3   Understanding Ordinary Conversation 4   Taking Medications 4   Remembering Where Things Are Placed or Put Away 4   Remembering List of 4-5 Errands 4   Taking Care of Complicated Tasks 4   Applied Cognition Raw Score 23   Applied Cognition Standardized Score 53.08   Barthel Index   Feeding 10   Bathing 5   Grooming Score 5   Dressing Score 10   Bladder Score 10   Bowels Score 10   Toilet Use Score 10   Transfers (Bed/Chair) Score 10   Mobility (Level Surface) Score 10   Stairs Score 5   Barthel Index Score 85   Modified [place] Scale   Modified David Scale 1   End of Consult   Patient Position at End of Consult Bed/Chair alarm activated;All needs within reach;Bedside chair   Nurse Communication Nurse aware of consult    Occupational Therapy Goals:    *Mod I with bed mobility to engage in functional tasks.  *Mod I Adl's after setup with use of AE PRN  *Mod I toileting and clothing management   *Mod I functional mobility and transfers to/from all surfaces with Fair + dynamic balance and safety for participation in dynamic adls and iadl tasks   *Demonstrate good carryover with safe use of RW during functional tasks   *Assess DME needs   *Increase activity tolerance to 25-30 minutes for participation in adls and enjoyable activities  *Mod I with Simulated IADL management task.  *Demonstrate good carryover of pt/family education and training with good tolerance for increased safety and independence with ADL's/ADl's.  *Pt will improve standing balance to 4-5 minutes with functional tasks to increase I with toileting/transfers.  *Pt will improve R UE ROM 1/2 MMT via AROM/AAROM/PROM in all planes as tolerated in order to participate in functional activities.    Elena Gan OTR/L

## 2024-08-28 NOTE — WOUND OSTOMY CARE
Consult Note - Wound   Rebeca Banegas 89 y.o. adult MRN: [id number]  Unit/Bed#: [facility] 731-01 Encounter: [id number]        History and Present Illness:  Patient is a 89 yo female that was admitted to [facility] for treatment of CVA. Patient has a PMH of CHF, GERD, HLD, HTN,PVD. Patient is a min/mod assist for turning and repositioning. Patient reports continence of bowel and bladder. On assessment, patient is seen sitting OOB in recliner.      Wound Care was consulted for left plantar foot wound     Assessment Findings:   B/L heels are dry intact and olga with no skin loss or wounds present. Recommend preventative Hydraguard Cream and proper offloading/ repositioning.      Patient declined assessment of sacro-buttocks - reports no wounds or pain in area.     Left Plantar Foot Wound: patient goes to outpatient wound center for treatment of wound. Irregular in shape area of intact, purple in color tissue. Susu-wound is fragile and calloused. No skin loss or drainage noted. Recommend foam dressing to area. Patient wearing shoe recommended by therapy.     No induration, fluctuance, odor, warmth/temperature differences, redness, or purulence noted to the above noted wounds and skin areas assessed. New dressings applied per orders listed below. Patient tolerated well- no s/s of non-verbal pain or discomfort observed during the encounter. Bedside nurse aware of plan of care. See flow sheets for more detailed assessment findings.      Orders listed below and wound care will continue to follow, call or Secure Chat Message with questions.     Skin Care Plan:  1-Left Plantar Foot: Cleanse with soap and water. Apply a silicone bordered foam dressing to area. Abimael with T for Treatment and change every other day or PRN  2-Turn/reposition q2h or when medically stable for pressure re-distribution on skin .  3-Elevate heels to offload pressure.  4-Moisturize skin daily with skin nourishing cream  5-Ehob cushion in chair when  out of bed.  6-Preventative Hydraguard to bilateral sacro-buttocks and heels BID and PRN.     Follow-up with outpatient wound center upon discharge for continued treatment of plantar foot wound.     Wounds:  Wound 06/28/24 Neuropathic Plantar Left;Plantar (Active)   Wound Image   08/28/24 1141   Wound Description Light purple;Intact 08/28/24 1141   Susu-wound Assessment Callus;Fragile 08/28/24 1141   Wound Length (cm) 0.5 cm 08/28/24 1141   Wound Width (cm) 1 cm 08/28/24 1141   Wound Depth (cm) 0 cm 08/28/24 1141   Wound Surface Area (cm^2) 0.5 cm^2 08/28/24 1141   Wound Volume (cm^3) 0 cm^3 08/28/24 1141   Calculated Wound Volume (cm^3) 0 cm^3 08/28/24 1141   Change in Wound Size % 100 08/28/24 1141   Drainage Amount None 08/28/24 1141   Non-staged Wound Description Not applicable 08/28/24 1141   Treatments Cleansed;Irrigation with NSS;Site care 08/28/24 1141   Dressing Foam, Silicon (eg. Allevyn, etc) 08/28/24 1141   Dressing Changed New 08/28/24 1141   Patient Tolerance Tolerated well 08/28/24 1141   Dressing Status Clean;Intact;Dry 08/28/24 1141               Antonella Knight RN, BSN, CWOCN

## 2024-08-28 NOTE — PHYSICAL THERAPY NOTE
Physical Therapy Evaluation    Patient Name: Rebeca Banegas    Today's Date: 8/28/2024     Problem List  Principal Problem:    CVA (cerebral vascular accident) (HCC)  Active Problems:    Permanent atrial fibrillation (HCC)    Chronic diastolic congestive heart failure (HCC)    Esophageal reflux    Hyperlipidemia    Primary hypertension    Hypothyroidism    CKD stage 3b, GFR 30-44 ml/min (HCC)    Bronchiectasis without complication (HCC)       Past Medical History  Past Medical History:   Diagnosis Date    Abnormal ultrasound     RESOLVED: [id number]    Advice given about COVID-19 virus infection 04/07/2020    Asthma with acute exacerbation     LAST ASSESSED: [id number]    Asymptomatic menopausal state     Atherosclerosis     Atrial fibrillation (HCC)     Chronic obstructive lung disease (HCC)     Congestive heart failure (HCC)     LAST ASSESSED: [id number]    COVID-19 virus infection 03/25/2023    Cuboid fracture     LAST ASSESSED: [id number]    Dyspepsia     Fall 04/01/2024    GERD (gastroesophageal reflux disease)     High risk medication use     LAST ASSESSED: [id number]    Hyperlipidemia     Hypertension     Hypokalemia     Hypothyroidism     Impacted cerumen of both ears     LAST ASSESSED: [id number]    Impacted cerumen of right ear     LAST ASSESSED: 62XOM9698    Influenza     LAST ASSESSED: 49KIM9009    IPMN (intraductal papillary mucinous neoplasm)     RESOLVED: 02OCT2015    Limb swelling     LAST ASSESSED: 42APP3220    Navicular fracture of ankle     LAST ASSESSED: 61ELV5137    Onychomycosis     LAST ASSESSED: 85UTG8924    Osteoarthritis     Osteopenia     Osteoporosis     Paronychia, finger, unspecified laterality     LAST ASSESSED: 68SNB9734    Paroxysmal atrial fibrillation (HCC)     LAST ASSESSED: 02MAR2014    Peripheral vascular disease (HCC)     Plantar fasciitis     Talus fracture     LAST ASSESSED: 12BLK6426    Vascular headache         Past  Surgical History  Past Surgical History:   Procedure Laterality Date    APPENDECTOMY      BONE BIOPSY Left 8/9/2024    Procedure: Bone bx of proximal phalanx second toe & second met with fluoroscopic guidance;  Surgeon: Jonny Marcial DPM;  Location: [facility];  Service: Podiatry    CATARACT EXTRACTION Bilateral     CHOLECYSTECTOMY      COLONOSCOPY      JOINT REPLACEMENT Bilateral     knee    NEUROPLASTY / TRANSPOSITION MEDIAN NERVE AT CARPAL TUNNEL BILATERAL Bilateral     DECOMPRESSION    OOPHORECTOMY Bilateral     age 81    PELVIC FLOOR REPAIR  2014    NE BIOPSY BONE TROCAR/NEEDLE SUPERFICIAL Left 8/9/2024    Procedure: Left foot debridement;  Surgeon: Jonny Marcial DPM;  [facility]: [facility];  Service: Podiatry    NE DEBRIDEMENT MUSCLE &/FASCIA 1ST 20 SQ CM/< Left 8/9/2024    Procedure: Left foot debridement;  Surgeon: Jonny Marcial DPM;  Location: [facility];  Service: Podiatry    SINUS SURGERY      TOTAL KNEE ARTHROPLASTY Bilateral          08/28/24 0848   PT Last Visit   PT Visit Date 08/28/24   Note Type   Note type Evaluation   Pain Assessment   Pain Assessment Tool 0-10   Pain Score No Pain   Restrictions/Precautions   Weight Bearing Precautions Per Order Yes   LLE Weight Bearing Per Order (S)  WBAT  (to heel per podiatry via [place] connect)   Braces or Orthoses Other (Comment)  (surgical shoe with padding/dressing/cast material to forefoot per podiatry via [place] connect. following eval PT recommending toe offloading darco shoe as pt with difficulty keeping weight to just heel-podiatry placed order for darco shoe)   Home Living   Type of Home Assisted living;Other (Comment)  ([facility])   Home Layout One level;Performs ADLs on one level;Able to live on main level with bedroom/bathroom;Access   Bathroom Shower/Tub Walk-in shower   Bathroom Toilet Standard   Bathroom Equipment Built-in shower seat;Grab bars in shower   Bathroom Accessibility Accessible   Home Equipment  Walker;Cane  (RW and 2 canes, using cane PTA)   Additional Comments pt reports living at [facility] alone in a 1 level apt, 0 TAHIRA   Prior Function   Level of [place] Independent with ADLs;Independent with functional mobility;Independent with IADLS   Lives With Alone   Receives Help From Friend(s);Neighbor   IADLs Independent with driving;Independent with meal prep;Independent with medication management   Falls in the last 6 months 0   Vocational Retired  (nurse)   Comments pt reports independence PTA   General   Family/Caregiver Present No   Cognition   Overall Cognitive Status WFL   Arousal/Participation Cooperative   Orientation Level Oriented X4   Memory Decreased recall of precautions   Following Commands Follows one step commands without difficulty   Comments pt very pleasant and cooperative   Subjective   Subjective pt agreeable to mobilize   RLE Assessment   RLE Assessment WFL   LLE Assessment   LLE Assessment WFL   Light Touch   RLE Light Touch Grossly intact   LLE Light Touch Grossly intact   Bed Mobility   Supine to Sit 5  Supervision   Additional items HOB elevated   Sit to Supine Unable to assess   Additional Comments pt OOb in chair at end of session   Transfers   Sit to Stand 5  Supervision   Stand to Sit 5  Supervision   Additional items Armrests   Additional Comments no AD   Ambulation/Elevation   Gait pattern Excessively slow;Step through pattern   Gait Assistance 5  Supervision   Additional items Verbal cues  (for WB status)   Assistive Device Straight cane   Distance 60'x2   Stair Management Assistance Not tested   Balance   Static Sitting Normal   Dynamic Sitting Good   Static Standing Fair +   Dynamic Standing Fair   Ambulatory Fair   Endurance Deficit   Endurance Deficit No   Activity Tolerance   Activity Tolerance Patient tolerated treatment well   Medical Staff Made Aware PT JAQUI Vaughn Karla   Nurse Made Aware yes-cleared   Assessment   Prognosis Good   Problem List Decreased  strength;Decreased endurance;Decreased mobility;Impaired balance;Decreased coordination   Assessment Pt is an 89 y.o. female presenting to hospitals on 8/27/24 for RUE weakness, numbness and an episode of L sided peripheral vision loss. At time of IE pt reports vision is better and her arm is slowly improving. MRI revealed acute infarct in R occipital lobe and R cerebellum. Pt  has a past medical history of Abnormal ultrasound, Advice given about COVID-19 virus infection, Asthma with acute exacerbation, Asymptomatic menopausal state, Atherosclerosis, Atrial fibrillation (HCC), Chronic obstructive lung disease (HCC), Congestive heart failure (HCC), COVID-19 virus infection, Cuboid fracture, Dyspepsia, Fall, GERD (gastroesophageal reflux disease), High risk medication use, Hyperlipidemia, Hypertension, Hypokalemia, Hypothyroidism, Impacted cerumen of both ears, Impacted cerumen of right ear, Influenza, IPMN (intraductal papillary mucinous neoplasm), Limb swelling, Navicular fracture of ankle, Onychomycosis, Osteoarthritis, Osteopenia, Osteoporosis, Paronychia, finger, unspecified laterality, Paroxysmal atrial fibrillation (HCC), Peripheral vascular disease (HCC), Plantar fasciitis, Talus fracture, and Vascular headache.  Pt presents as a moderate complexity evaluation due to Ongoing medical management for primary dx, Increased reliance on more restrictive AD compared to baseline, Decreased activity tolerance compared to baseline, Fall risk, Increased assistance needed from caregiver at current time, Ongoing telemetry monitoring, Current WBS, Diagnostic imaging pending, Continuous pulse oximetry monitoring . Pt currently requires supervision for bed mobility, supervision for transfers and supervision for ambulating 60'x2 with cane. Pt presents around her functional baseline and has no further acute PT needs. Pt has no concerns returning home at current level or function and reports she has supportive friends and neighbors  who can assist as needed. Pt has no post acute needs. The patient's AM-PAC Basic Mobility Inpatient Short Form Raw Score is 20. A Raw score of greater than or equal to 16 suggests the patient may benefit from discharge to home. Please also refer to the recommendation of the Physical Therapist for safe discharge planning. Pt left upright in chair with chair alarm donned, call bell and personal items within reach and all needs met.   Barriers to Discharge None   Goals   Patient Goals to go home   PT Treatment Day 0   Discharge Recommendation   Rehab Resource Intensity Level, PT No post-acute rehabilitation needs   Equipment Recommended Cane   AM-PAC Basic Mobility Inpatient   Turning in Flat Bed Without Bedrails 4   Lying on Back to Sitting on Edge of Flat Bed Without Bedrails 4   Moving Bed to Chair 3   Standing Up From Chair Using Arms 3   Walk in Room 3   Climb 3-5 Stairs With Railing 3   Basic Mobility Inpatient Raw Score 20   Basic Mobility Standardized Score 43.99   [facility] Highest Level Of Mobility   -HLM Goal 6: Walk 10 steps or more   JH-HLM Achieved 7: Walk 25 feet or more   Modified Elbert Scale   Modified Elbert Scale 1   Barthel Index   Feeding 10   Bathing 5   Grooming Score 5   Dressing Score 10   Bladder Score 10   Bowels Score 10   Toilet Use Score 10   Transfers (Bed/Chair) Score 10   Mobility (Level Surface) Score 0   Stairs Score 0   Barthel Index Score 70   Kasandra Leung, SPT

## 2024-08-28 NOTE — CASE MANAGEMENT
Case Management Assessment & Discharge Planning Note    Patient name Rebeca Banegas  Location [facility] 731/[facility] 731-01 MRN [id number]  : 1932 Date 2024       Current Admission Date: 2024  Current Admission Diagnosis:CVA (cerebral vascular accident) ([facility])   Patient Active Problem List    Diagnosis Date Noted Date Diagnosed    CVA (cerebral vascular accident) ([facility]) 2024     Preoperative examination 2024     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 ([facility]) 2024     Chronic foot ulcer, left, with fat layer exposed ([facility]) 2024     Vaccine counseling 2023     Bronchiectasis without complication ([facility]) 2023     MGUS (monoclonal gammopathy of unknown significance) 2023     Chronic cough 2023     Age-related osteoporosis without current pathological fracture 2023     Secondary hyperparathyroidism ([facility]) 2023     Open wound of right upper arm 10/17/2022     Labial abscess 2022     Vulvar cysts 2022     Osteopenia of hip 2020     CKD stage 3b, GFR 30-44 ml/min ([facility]) 2020     Elevated serum immunoglobulin free light chains 2019     Sensorineural hearing loss (SNHL), bilateral 2019     Left asymmetrical SNHL 2019     Pain of left hand 2018     Leg cramps 2018     Chronic diastolic congestive heart failure (HCC) 2017     Plantar fasciitis 2016     Essential tremor 10/02/2015     Vitamin D deficiency 10/02/2015     Onychomycosis 2015     Pulmonary nodule 2015     Thyroid nodule 2015     Prediabetes 2014     Primary hypertension 2014     Leg pain 2014     Abnormal findings on diagnostic imaging of urinary organs 2014     Abnormal findings on diagnostic imaging of other specified body structures 2014     Atherosclerosis 2014     Ovarian cyst 2014     Pancreatic cyst 2014     Backache 2014     Hoarseness  02/28/2014     Acute on chronic diastolic CHF (congestive heart failure) (HCC) 01/14/2014     Allergic rhinitis 06/11/2013     Hyperlipidemia 04/19/2013     Permanent atrial fibrillation (HCC) 02/14/2013     Arthritis 11/12/2012     Asthma 11/12/2012     Esophageal reflux 11/12/2012     Benign colon polyp 11/10/2012     Bifascicular bundle branch block 11/10/2012     Hypothyroidism 11/10/2012     Nasal polyp 11/10/2012       LOS (days): 0  Geometric Mean LOS (GMLOS) (days): 2.9  Days to GMLOS:2.7     OBJECTIVE:    Risk of Unplanned Readmission Score: 12.77         Current admission status: Inpatient       Preferred Pharmacy:   [id number] - [place], [place] - [address] [place] [address]  [place] [address]  Phone: [phone] Fax: [phone]    [facility]  for [place], [place] - [address] [place] [address]  Phone: [phone] Fax: [phone]    [facility] - [facility] [address]  [address] [facility] [address]  Phone: [phone] Fax: [phone]    Primary Care Provider: Santana Dillon MD    Primary Insurance: [employer]  Secondary Insurance:     ASSESSMENT:  Active Health Care Proxies    There are no active Health Care Proxies on file.                      Patient Information  Admitted from:: Home  Mental Status: Alert  During Assessment patient was accompanied by: Not accompanied during assessment  Assessment information provided by:: Patient  Primary Caregiver: Self  Support Systems: Self, Family members, Friends/neighbors  County of Residence: [place]  What [facility] do you live in?: [place]  Type of Current Residence: Apartment  Floor Level: 2  Upon entering residence, is there a bedroom on the main floor (no further steps)?: Yes  Upon entering residence, is there a bathroom on the main floor (no further steps)?: Yes  Living Arrangements: Lives Alone  Is patient a ?:  No    Activities of Daily Living Prior to Admission  Functional Status: Independent  Completes ADLs independently?: Yes  Ambulates independently?: Yes  Does patient use assisted devices?: Yes  Assisted Devices (DME) used: Straight Cane (recent foot surgery)  Does patient currently own DME?: Yes  What DME does the patient currently own?: Straight Cane  Does patient have a history of Outpatient Therapy (PT/OT)?: No  Does the patient have a history of Short-Term Rehab?: No  Does patient have a history of HHC?: No  Does patient currently have HHC?: No         Patient Information Continued  Income Source: Pension/snf  Does patient have prescription coverage?: Yes  Does patient receive dialysis treatments?: No  Does patient have a history of substance abuse?: No  Does patient have a history of Mental Health Diagnosis?: No         Means of Transportation  Means of Transport to App:: Drives Self      Social Determinants of Health (SDOH)      Flowsheet Row Most Recent Value   Housing Stability    In the last 12 months, was there a time when you were not able to pay the mortgage or rent on time? N   In the past 12 months, how many times have you moved where you were living? 0   At any time in the past 12 months, were you homeless or living in a shelter (including now)? N   Transportation Needs    In the past 12 months, has lack of transportation kept you from medical appointments or from getting medications? no   In the past 12 months, has lack of transportation kept you from meetings, work, or from getting things needed for daily living? No   Food Insecurity    Within the past 12 months, you worried that your food would run out before you got the money to buy more. Never true   Within the past 12 months, the food you bought just didn't last and you didn't have money to get more. Never true   Utilities    In the past 12 months has the electric, gas, oil, or water company threatened to shut off services in your home?  No            DISCHARGE DETAILS:    Discharge planning discussed with:: bedside with patient  Freedom of Choice: Yes  Comments - Freedom of Choice: Discussed FOC no aftercare reccs  CM contacted family/caregiver?: No- see comments (pt is A&O)             Contacts  Patient Contacts: Meena Solorzano  Relationship to Patient:: Family (daughter)  Contact Method: Phone  Phone Number: [phone]  Reason/Outcome: Emergency Contact    Requested Home Health Care         Is the patient interested in HHC at discharge?: No    DME Referral Provided  Referral made for DME?: No

## 2024-08-28 NOTE — RESTORATIVE TECHNICIAN NOTE
Restorative Technician Note      Patient Name: Rebeca Banegas     Restorative Tech Visit Date: 08/28/24  Note Type: Bracing, Initial consult  Patient Position Upon Consult: Bedside chair  Brace Applied: Darco Wedge Shoe (Toe Unloading) (Left medium)  Education Provided: Yes  Patient Position at End of Consult: Bedside chair; All needs within reach  Nurse Communication: Nurse aware of consult, application of brace    Please contact BE PT Restorative tech on Epic Secure Chat  in regards to bracing instruction and/or adjustment.    Sean De Oliveira, Restorative Technician

## 2024-08-28 NOTE — ASSESSMENT & PLAN NOTE
Lab Results   Component Value Date    EGFR 29 07/26/2024    EGFR 23 06/13/2024    EGFR 18 05/30/2024    CREATININE 1.65 (H) 08/28/2024    CREATININE 1.90 (H) 08/27/2024    CREATININE 1.52 (H) 07/26/2024     -with DARIO  -s/p 500cc Isolyte in the ER followed by maintenance IVFs  -stop IVFs

## 2024-08-28 NOTE — DISCHARGE INSTR - OTHER ORDERS
Skin Care Plan:  1-Left Plantar Foot: Cleanse with soap and water. Apply a silicone bordered foam dressing to area. Abimael with T for Treatment and change every other day or PRN  2-Turn/reposition q2h or when medically stable for pressure re-distribution on skin .  3-Elevate heels to offload pressure.  4-Moisturize skin daily with skin nourishing cream  5-Ehob cushion in chair when out of bed.  6-Preventative Hydraguard to bilateral sacro-buttocks and heels BID and PRN.     Follow-up with outpatient wound center upon discharge for continued treatment of plantar foot wound.

## 2024-08-28 NOTE — RESPIRATORY THERAPY NOTE
Respiratory care    08/28/24 0832   Respiratory Protocol   Protocol Selection Respiratory   Respiratory Plan Home Bronchodilator Patient pathway   Respiratory Assessment   Assessment Type Pre-treatment   General Appearance Awake;Alert   Respiratory Pattern Normal   Chest Assessment Chest expansion symmetrical   Bilateral Breath Sounds Clear   Cough None   Resp Comments Pt on RA, udn tx given, bbs clear. Pt states she takes albuterol udn prn at home, d/c TID xopenex and pt ordered albuterol prn.   O2 Device ra   Additional Assessments   SpO2 97 %

## 2024-08-28 NOTE — ASSESSMENT & PLAN NOTE
-ECG on admission (read by me): Afib with RVR @ 102, nl axis and QRS/QTc intervals, no acute ST/TW changes (repolarization abnormalities V1-V3)  -coumadin on hold with supratherapeutic INR  -Trend INR and restart Coumadin once INR less than 3 (discussed with neurology)  -cont BB

## 2024-08-29 VITALS
TEMPERATURE: 98.6 F | DIASTOLIC BLOOD PRESSURE: 70 MMHG | HEIGHT: 64 IN | SYSTOLIC BLOOD PRESSURE: 112 MMHG | WEIGHT: 108.69 LBS | BODY MASS INDEX: 18.56 KG/M2 | RESPIRATION RATE: 16 BRPM | OXYGEN SATURATION: 98 % | HEART RATE: 94 BPM

## 2024-08-29 PROBLEM — E43 SEVERE PROTEIN-CALORIE MALNUTRITION (HCC): Status: ACTIVE | Noted: 2024-08-29

## 2024-08-29 LAB
ANION GAP SERPL CALCULATED.3IONS-SCNC: 9 MMOL/L (ref 4–13)
BUN SERPL-MCNC: 49 MG/DL (ref 5–25)
CALCIUM SERPL-MCNC: 8.7 MG/DL (ref 8.4–10.2)
CHLORIDE SERPL-SCNC: 99 MMOL/L (ref 96–108)
CO2 SERPL-SCNC: 25 MMOL/L (ref 21–32)
CREAT SERPL-MCNC: 1.49 MG/DL (ref 0.6–1.3)
GLUCOSE SERPL-MCNC: 91 MG/DL (ref 65–140)
INR PPP: 2.75 (ref 0.85–1.19)
POTASSIUM SERPL-SCNC: 4.4 MMOL/L (ref 3.5–5.3)
PROTHROMBIN TIME: 28.9 SECONDS (ref 12.3–15)
SODIUM SERPL-SCNC: 133 MMOL/L (ref 135–147)

## 2024-08-29 PROCEDURE — 80048 BASIC METABOLIC PNL TOTAL CA: CPT | Performed by: HOSPITALIST

## 2024-08-29 PROCEDURE — 97110 THERAPEUTIC EXERCISES: CPT

## 2024-08-29 PROCEDURE — 99239 HOSP IP/OBS DSCHRG MGMT >30: CPT | Performed by: HOSPITALIST

## 2024-08-29 PROCEDURE — 94664 DEMO&/EVAL PT USE INHALER: CPT

## 2024-08-29 PROCEDURE — 85610 PROTHROMBIN TIME: CPT | Performed by: HOSPITALIST

## 2024-08-29 RX ORDER — METOPROLOL SUCCINATE 100 MG/1
100 TABLET, EXTENDED RELEASE ORAL DAILY
Qty: 30 TABLET | Refills: 0 | Status: SHIPPED | OUTPATIENT
Start: 2024-08-30 | End: 2024-09-29

## 2024-08-29 RX ORDER — PANTOPRAZOLE SODIUM 40 MG/1
40 TABLET, DELAYED RELEASE ORAL
Qty: 30 TABLET | Refills: 0 | Status: SHIPPED | OUTPATIENT
Start: 2024-08-30

## 2024-08-29 RX ORDER — ATORVASTATIN CALCIUM 40 MG/1
40 TABLET, FILM COATED ORAL EVERY EVENING
Qty: 30 TABLET | Refills: 0 | Status: SHIPPED | OUTPATIENT
Start: 2024-08-29

## 2024-08-29 RX ORDER — WARFARIN SODIUM 2.5 MG/1
2.5 TABLET ORAL
Qty: 30 TABLET | Refills: 0 | Status: SHIPPED | OUTPATIENT
Start: 2024-08-29 | End: 2024-09-28

## 2024-08-29 RX ADMIN — FLUTICASONE PROPIONATE 1 SPRAY: 50 SPRAY, METERED NASAL at 08:39

## 2024-08-29 RX ADMIN — PANTOPRAZOLE SODIUM 40 MG: 40 TABLET, DELAYED RELEASE ORAL at 05:37

## 2024-08-29 RX ADMIN — METOPROLOL SUCCINATE 100 MG: 100 TABLET, EXTENDED RELEASE ORAL at 08:39

## 2024-08-29 RX ADMIN — FLUTICASONE FUROATE AND VILANTEROL TRIFENATATE 1 PUFF: 100; 25 POWDER RESPIRATORY (INHALATION) at 08:39

## 2024-08-29 RX ADMIN — LEVOTHYROXINE SODIUM 50 MCG: 50 TABLET ORAL at 05:37

## 2024-08-29 NOTE — ASSESSMENT & PLAN NOTE
Wt Readings from Last 3 Encounters:   08/29/24 49.3 kg (108 lb 11 oz)   08/28/24 49 kg (108 lb)   08/09/24 52.4 kg (115 lb 8.3 oz)     -holding torsemide with DARIO/dehydration  -currently appears euvolemic, will not restart torsemide at this time  -I/Os, daily weights  -cont BB

## 2024-08-29 NOTE — OCCUPATIONAL THERAPY NOTE
"  Occupational Therapy Progress Note     Patient Name: Rebeca Banegas  Today's Date: 8/29/2024  Problem List  Principal Problem:    CVA (cerebral vascular accident) (HCC)  Active Problems:    Permanent atrial fibrillation (HCC)    Chronic diastolic congestive heart failure (HCC)    Esophageal reflux    Hyperlipidemia    Primary hypertension    Hypothyroidism    CKD stage 3b, GFR 30-44 ml/min (HCC)    Bronchiectasis without complication (HCC)    Severe protein-calorie malnutrition (HCC)     08/29/24 0930   OT Last Visit   OT Visit Date 08/29/24   Note Type   Note Type Treatment   Pain Assessment   Pain Assessment Tool 0-10   Restrictions/Precautions   Weight Bearing Precautions Per Order Yes   LLE Weight Bearing Per Order (S)  WBAT   Braces or Orthoses (left DARCO shoe, pt declined to wear toe offloading shoe)   Lifestyle   Autonomy I with ADL's/IaDL's, +SPC and darco shoe to right LE (since beginning of August), +drives   Reciprocal Relationships friends, neighbors, sister lives in [place], daughter lives in [place]   Service to Others retired   Intrinsic Gratification reading   Therapeutic Excerise-Strength   UE Strength Yes   Right Upper Extremity- Strength   R Shoulder Flexion   R Elbow Elbow extension;Elbow flexion   R Wrist Wrist flexion;Wrist extension   R Hand Little finger;Ring finger;Long finger;Index finger;Thumb  (gross grasp with theraputty)   R Position Seated   R Weight/Reps/Sets 10x   RUE Strength Comment pt issued HEP for right UE strengthening with visual handouts and super soft tan theraputty   Left Upper Extremity-Strength   L Shoulder Flexion   L Elbow Elbow flexion;Elbow extension   L Wrist Wrist flexion;Wrist extension   L Position Seated   L Weights/Reps/Sets 10x   Coordination   Fine Motor impaired right UE, -pt performed key hole pegs with some diffiuclty  manuerving and placing in peg holes. pt stating \"my hand is tired\"   Dexterity impaired in hand manipuation with right UE, pt reports " recent difficulty wtih buttoning.   In Hand Manipulation impaired right   Cognition   Overall Cognitive Status WFL   Arousal/Participation Alert;Responsive;Cooperative   Attention Within functional limits   Orientation Level Oriented X4   Memory Decreased recall of precautions;Within functional limits   Following Commands Follows multistep commands inconsistently   Comments pt pleasant and motivated, good memory, multi-step direction following with session with following, carryover, of HEP>   Assessment   Assessment Patient participated in Skilled OT session this date with interventions consisting of right HEP B/l.UE AROM exercises . Patient agreeable to OT treatment session, upon arrival patient was found seated OOB to Chair.  Patient continues to be functioning below baseline level, occupational performance remains limited secondary to factors listed above and increased risk for falls and injury.   From OT standpoint, recommendation at time of d/c would be min level III with focus on RUE strength, FMC/GMC.   The patient's raw score on the AM-PAC Daily Activity Inpatient Short Form is 20. A raw score of greater than or equal to 19 suggests the patient may benefit from discharge to home. Please refer to the recommendation of the Occupational Therapist for safe discharge planning.  No further acute OT needs indicated at this time - Anticipate d/c home with family support when medically cleared - d/c from caseload   Plan   Treatment Interventions ADL retraining;Functional transfer training;UE strengthening/ROM;Endurance training;Patient/family training   Goal Expiration Date 09/11/24   OT Treatment Day 1   OT Frequency 2-3x/wk   Discharge Recommendation   Rehab Resource Intensity Level, OT III (Minimum Resource Intensity)  (focus on RUE strengthening/FMC/GMC exercises)   AM-PAC Daily Activity Inpatient   Lower Body Dressing 3   Bathing 3   Toileting 3   Upper Body Dressing 3   Grooming 4   Eating 4   Daily Activity  Raw Score 20   Daily Activity Standardized Score (Calc for Raw Score >=11) 42.03   AM-PAC Applied Cognition Inpatient   Following a Speech/Presentation 4   Understanding Ordinary Conversation 4   Taking Medications 4   Remembering Where Things Are Placed or Put Away 4   Remembering List of 4-5 Errands 4   Taking Care of Complicated Tasks 4   Applied Cognition Raw Score 24   Applied Cognition Standardized Score 62.21   End of Consult   Education Provided Yes  (HEP for right UE)   Patient Position at End of Consult All needs within reach;Bed/Chair alarm activated;Bedside chair   Nurse Communication Nurse aware of consult   Elena Gan OTR/L

## 2024-08-29 NOTE — ASSESSMENT & PLAN NOTE
-presentd with stroke-like symptoms  -8/25/24 at 0530 patient began having weakness in her right upper extremity.  She also had an aching sensation and numbness and tingling from her elbow distally.  Those symptoms have improved somewhat but have not resolved.  She also had left temporal field vision deficit which did not last long and has resolved.  -CTA head/neck: CT Brain: No CT evidence for large acute vascular distribution infarct or acute intracranial hemorrhage. Age-indeterminate right cerebellar lacunar infarct is favored to be chronic in nature. No prior imaging available for direct comparison. CT Angiography: Atherosclerotic plaque at the carotid bulbs bilaterally without evidence for high-grade stenosis or focal occlusion of the cervical vasculature. No evidence for high-grade stenosis or focal occlusion of the intracranial circulation.   -admitted on stroke pathway  -pt allergic to ASA (mentioned this a couple of times on admission)  -on coumadin with supratherapeutic INR on admission  -MRI brain: Foci of acute infarcts in the right occipital lobe and right cerebellum. No mass effect or hemorrhagic transformation.   -pt was monitored on tele (known afib)  -Echo: EF 60%, normal RV sz/fxn  -LDL 76, A1c 6.4%  -neuro saw in c/s. There was concern for underlying malignancy as patient had acute CVAs while INR was therapeutic.    CT C/A/P:   1. No definite CT findings to indicate malignancy in the chest, abdomen, or pelvis.   2. Multiple pancreatic cystic lesions, some of which have enlarged since MR 6/30/2017. Dominant conglomerate cystic complex in the pancreatic head measures 3.6 cm (previously 2.8 cm). For simple cyst(s) that have demonstrated significant interval growth (>20% in longest dimension), because cyst >2.0 cm, recommend gastroenterology and/or surgical oncology consult/follow-up. EUS may be considered.   3. The endometrium appears mildly thickened measuring 8 mm. Consider nonemergent pelvic  ultrasound.   4. Chronic bilateral bronchiectasis since 2014. Redemonstrated mucoid debris within the ectatic bronchi, which has decreased in the right middle lobe and increased in the left lower lobe.   5. A 2 mm solid nodule in the left lower lobe is not seen on prior. Multiple additional 3 mm and smaller pulmonary nodules are grossly stable since 2014.   Based on current Fleischner Society 2017 Guidelines on incidental pulmonary nodule, no routine follow-up is needed if the patient is low risk. If the patient is high risk, optional follow-up chest CT at 12 months can be considered.   6. Mild heterogeneity of the right hepatic lobe may be due to passive congestion/elevated right heart pressures noting right atrial dilation.     -outpt GI re:pancreatic cystic lesion > 2.0cm. This was discussed with the pt and she verbally acknowledges understanding.  -neurology has signed off

## 2024-08-29 NOTE — ASSESSMENT & PLAN NOTE
-ECG on admission (read by me): Afib with RVR @ 102, nl axis and QRS/QTc intervals, no acute ST/TW changes (repolarization abnormalities V1-V3)  -coumadin was on hold with supratherapeutic INR  -INR now 2.75, restart coumadin on d/c at 2.5mg daily  -cont BB

## 2024-08-29 NOTE — ASSESSMENT & PLAN NOTE
Lab Results   Component Value Date    EGFR 29 07/26/2024    EGFR 23 06/13/2024    EGFR 18 05/30/2024    CREATININE 1.49 (H) 08/29/2024    CREATININE 1.65 (H) 08/28/2024    CREATININE 1.90 (H) 08/27/2024     -with DARIO which resolved with IVFs

## 2024-08-29 NOTE — DISCHARGE SUMMARY
[facility]  Discharge- Rebeca Banegas 7/22/1932, 89 y.o. adult MRN: [id number]  [facility] Encounter: [id number]  Primary Care Provider: Santana Dillon MD   Date and time admitted to hospital: 8/27/2024 12:06 PM    Bronchiectasis without complication (HCC)  Assessment & Plan  -as well as asthma-COPD overlap as per review of outpatient internal medicine progress note  -Continue albuterol/Advair    CKD stage 3b, GFR 30-44 ml/min (HCC)  Assessment & Plan  Lab Results   Component Value Date    EGFR 29 07/26/2024    EGFR 23 06/13/2024    EGFR 18 05/30/2024    CREATININE 1.49 (H) 08/29/2024    CREATININE 1.65 (H) 08/28/2024    CREATININE 1.90 (H) 08/27/2024     -with DARIO which resolved with IVFs        Hypothyroidism  Assessment & Plan  -cont Levoxyl    Primary hypertension  Assessment & Plan  -cont BB    Hyperlipidemia  Assessment & Plan  -cont statin    Esophageal reflux  Assessment & Plan  -cont PPI    Chronic diastolic congestive heart failure (HCC)  Assessment & Plan  Wt Readings from Last 3 Encounters:   08/29/24 49.3 kg (108 lb 11 oz)   08/28/24 49 kg (108 lb)   08/09/24 52.4 kg (115 lb 8.3 oz)     -holding torsemide with DARIO/dehydration  -currently appears euvolemic, will not restart torsemide at this time  -I/Os, daily weights  -cont BB          Permanent atrial fibrillation (HCC)  Assessment & Plan  -ECG on admission (read by me): Afib with RVR @ 102, nl axis and QRS/QTc intervals, no acute ST/TW changes (repolarization abnormalities V1-V3)  -coumadin was on hold with supratherapeutic INR  -INR now 2.75, restart coumadin on d/c at 2.5mg daily  -cont BB      * CVA (cerebral vascular accident) (HCC)  Assessment & Plan  -presentd with stroke-like symptoms  -8/25/24 at 0530 patient began having weakness in her right upper extremity.  She also had an aching sensation and numbness and tingling from her elbow distally.  Those symptoms have improved somewhat but  have not resolved.  She also had left temporal field vision deficit which did not last long and has resolved.  -CTA head/neck: CT Brain: No CT evidence for large acute vascular distribution infarct or acute intracranial hemorrhage. Age-indeterminate right cerebellar lacunar infarct is favored to be chronic in nature. No prior imaging available for direct comparison. CT Angiography: Atherosclerotic plaque at the carotid bulbs bilaterally without evidence for high-grade stenosis or focal occlusion of the cervical vasculature. No evidence for high-grade stenosis or focal occlusion of the intracranial circulation.   -admitted on stroke pathway  -pt allergic to ASA (mentioned this a couple of times on admission)  -on coumadin with supratherapeutic INR on admission  -MRI brain: Foci of acute infarcts in the right occipital lobe and right cerebellum. No mass effect or hemorrhagic transformation.   -pt was monitored on tele (known afib)  -Echo: EF 60%, normal RV sz/fxn  -LDL 76, A1c 6.4%  -neuro saw in c/s. There was concern for underlying malignancy as patient had acute CVAs while INR was therapeutic.    CT C/A/P:   1. No definite CT findings to indicate malignancy in the chest, abdomen, or pelvis.   2. Multiple pancreatic cystic lesions, some of which have enlarged since MR 6/30/2017. Dominant conglomerate cystic complex in the pancreatic head measures 3.6 cm (previously 2.8 cm). For simple cyst(s) that have demonstrated significant interval growth (>20% in longest dimension), because cyst >2.0 cm, recommend gastroenterology and/or surgical oncology consult/follow-up. EUS may be considered.   3. The endometrium appears mildly thickened measuring 8 mm. Consider nonemergent pelvic ultrasound.   4. Chronic bilateral bronchiectasis since 2014. Redemonstrated mucoid debris within the ectatic bronchi, which has decreased in the right middle lobe and increased in the left lower lobe.   5. A 2 mm solid nodule in the left lower  lobe is not seen on prior. Multiple additional 3 mm and smaller pulmonary nodules are grossly stable since 2014.   Based on current Fleischner Society 2017 Guidelines on incidental pulmonary nodule, no routine follow-up is needed if the patient is low risk. If the patient is high risk, optional follow-up chest CT at 12 months can be considered.   6. Mild heterogeneity of the right hepatic lobe may be due to passive congestion/elevated right heart pressures noting right atrial dilation.     -outpt GI re:pancreatic cystic lesion > 2.0cm. This was discussed with the pt and she verbally acknowledges understanding.  -neurology has signed off      Severe protein-calorie malnutrition in the setting of suspected underlying malignancy, POA, evidenced by BMI 18.66, evidence of significant wt loss (>12%) x 3 months per chart review, and pt endorsed unexpected wt loss.     Medical Problems       Resolved Problems  Date Reviewed: 8/29/2024   None       Discharging Physician / Practitioner: Gilberto Shelby MD  PCP: Santana Dillon MD  Admission Date:   Admission Orders (From admission, onward)       Ordered        08/28/24 1127  INPATIENT ADMISSION  Once            08/27/24 1600  Place in Observation  Once                          Discharge Date: 08/29/24    Consultations During Hospital Stay:  Neurology    Procedures Performed:   None    Significant Findings / Test Results:   See above    Incidental Findings:   Pancreatic cystic lesion as documented in the incidental finding report  I reviewed the above mentioned incidental findings with the patient and/or family and they expressed understanding.    Test Results Pending at Discharge (will require follow up):   None     Outpatient Tests Requested:  None    Complications: None    Reason for Admission: Acute CVA    Hospital Course:   Rebeca Banegas is a 89 y.o. adult patient who originally presented to the hospital on 8/27/2024 due to right upper extremity weakness as outlined  "in the H&P done on admission.  Hospital course by problem list:    Please see above list of diagnoses and related plan for additional information.     Condition at Discharge: good    Discharge Day Visit / Exam:   Subjective: Patient reports she does have persistent weakness and pain in her right upper extremity distal to the elbow  Vitals: Blood Pressure: 118/74 (08/29/24 0839)  Pulse: 94 (08/29/24 0839)  Temperature: 98.6 °F (37 °C) (08/29/24 0739)  Temp Source: Tympanic (08/27/24 1200)  Respirations: 16 (08/29/24 0739)  Height: 5' 4\" (162.6 cm) (08/28/24 1142)  Weight - Scale: 49.3 kg (108 lb 11 oz) (08/29/24 0600)  SpO2: 98 % (08/29/24 0839)  Exam:   Gen: NAD, AAOx3, well developed, well nourished  Eyes: EOMI, PERRLA, no scleral icterus  ENMT:  no nasal discharge, no otic discharge, moist mucous membranes  Neck:  Supple  Cardiovascular: continues to remain irreg/irreg rhythm, normal S1-S2, no murmurs, rubs, or gallops  Lungs:  continues to remain  Clear to auscultation bilaterally, no wheezes, or rales, or rhonchi  Abdomen:  Positive bowel sounds, soft, nontender, nondistended  Skin:  Intact, no rashes, no edema  Neuro: remains Cranial nerves 2-12 are intact, non-focal, 5/5 strength in all 4 extremities    Discharge instructions/Information to patient and family:   See after visit summary for information provided to patient and family.      Provisions for Follow-Up Care:  See after visit summary for information related to follow-up care and any pertinent home health orders.      Mobility at time of Discharge:   Basic Mobility Inpatient Raw Score: 20  JH-HLM Goal: 6: Walk 10 steps or more  JH-HLM Achieved: 8: Walk 250 feet ot more  HLM Goal achieved. Continue to encourage appropriate mobility.     Disposition:   Home       Discharge Statement:  I spent 33 minutes discharging the patient. This time was spent on the day of discharge. I had direct contact with the patient on the day of discharge. Greater than 50% of " the total time was spent examining patient, answering all patient questions, arranging and discussing plan of care with patient as well as directly providing post-discharge instructions.  Additional time then spent on discharge activities.    Discharge Medications:  See after visit summary for reconciled discharge medications provided to patient and/or family.      **Please Note: This note may have been constructed using a voice recognition system**

## 2024-08-29 NOTE — INCIDENTAL FINDINGS
The following findings require follow up:  Radiographic finding   Finding: pancreatic head cyst of 3.6cm   Follow up required: appointment with GI   Follow up should be done within 1-2 week(s)    Please notify the following clinician to assist with the follow up:   Dr. Santana Dillon    Incidental finding results were discussed with the Patient by Gilberto Shelby MD on 08/29/24.   They expressed understanding and all questions answered.

## 2024-08-29 NOTE — PLAN OF CARE
Problem: SAFETY ADULT  Goal: Patient will remain free of falls  Description: INTERVENTIONS:  - Educate patient/family on patient safety including physical limitations  - Instruct patient to call for assistance with activity   - Consult OT/PT to assist with strengthening/mobility   - Keep Call bell within reach  - Keep bed low and locked with side rails adjusted as appropriate  - Keep care items and personal belongings within reach  - Initiate and maintain comfort rounds  - Apply yellow socks and bracelet for high fall risk patients  - Consider moving patient to room near nurses station  Outcome: Progressing     Problem: DISCHARGE PLANNING  Goal: Discharge to home or other facility with appropriate resources  Description: INTERVENTIONS:  - Identify barriers to discharge w/patient and caregiver  - Arrange for needed discharge resources and transportation as appropriate  - Identify discharge learning needs (meds, wound care, etc.)  - Arrange for interpretive services to assist at discharge as needed  - Refer to Case Management Department for coordinating discharge planning if the patient needs post-hospital services based on physician/advanced practitioner order or complex needs related to functional status, cognitive ability, or social support system  Outcome: Progressing

## 2024-08-29 NOTE — ASSESSMENT & PLAN NOTE
Severe protein-calorie malnutrition in the setting of suspected underlying malignancy, POA, evidenced by BMI 18.66, evidence of significant wt loss (>12%) x 3 months per chart review, and pt endorsed unexpected wt loss.

## 2024-08-29 NOTE — RESTORATIVE TECHNICIAN NOTE
Restorative Technician Note      Patient Name: Rebeca Banegas     Note Type: Mobility  Patient Position Upon Consult: Supine  Activity Performed: Ambulated; Dangled; Stood  Assistive Device: Cane  Brace Applied: Darco Wedge Shoe (Toe Unloading) (L LE)  Education Provided: Yes  Patient Position at End of Consult: All needs within reach; Bed/Chair alarm activated; Supine    Liza GONZALEZ, Restorative Technician,

## 2024-08-30 ENCOUNTER — PATIENT OUTREACH (OUTPATIENT)
Dept: CASE MANAGEMENT | Facility: OTHER | Age: 89
End: 2024-08-30

## 2024-08-30 DIAGNOSIS — Z91.89 HIGH RISK FOR READMISSION: ICD-10-CM

## 2024-08-30 DIAGNOSIS — Z71.89 COORDINATION OF COMPLEX CARE: Primary | ICD-10-CM

## 2024-08-30 NOTE — PROGRESS NOTES
Admitted to [facility] with R arm numbness, tingling and weakness.  Also left eye visual disturbance which had resolved.    CT scan shows no evidence for high grade stenosis or focal occlusion of intercranial circulation.  CT scan of C/A/P show no malignancy in chest, abdomen or pelvis.  There is a >2 cm pancreatic cystic lesion for which GI follow up was recommended.      PMH:  A-fib, CHF, Reflux, dyslipidemia, hypothyroidism, HTN, CVA, malnutrition, bronchiectasis, CKD3.    Outreach call to Rebeca who reports that she feels tired, but otherwise okay.  She hasn't yet taken her blood pressure, but states that it is always on the low side and she does take it sporadically.  The  symptoms in her right arm remain and she said it took twice as long to take a shower this morning.  She is doing the exercises that she was shown while in the hospital.     It was recommended that she make follow up appointments with GI and Neuro, however, she has an appointment next week with Dr. Dillon and she wishes to discuss her follow up appointment needs with him first.   She will also discuss home health referral if needed.  She denied this help at this time and will also discuss this with Dr. Dillon.      Medication and discharge instructions reviewed and she had no questions.      Contact information for Juju Keys RN, Outpatient Care Manager given to Rebeca.      Note routed to Juju.

## 2024-09-03 ENCOUNTER — ANTICOAG VISIT (OUTPATIENT)
Dept: CARDIOLOGY CLINIC | Facility: CLINIC | Age: 89
End: 2024-09-03

## 2024-09-03 DIAGNOSIS — I48.21 PERMANENT ATRIAL FIBRILLATION (HCC): Primary | Chronic | ICD-10-CM

## 2024-09-04 ENCOUNTER — OFFICE VISIT (OUTPATIENT)
Dept: PODIATRY | Facility: CLINIC | Age: 89
End: 2024-09-04
Payer: COMMERCIAL

## 2024-09-04 ENCOUNTER — APPOINTMENT (OUTPATIENT)
Dept: LAB | Facility: HOSPITAL | Age: 89
End: 2024-09-04
Payer: COMMERCIAL

## 2024-09-04 ENCOUNTER — TELEPHONE (OUTPATIENT)
Age: 89
End: 2024-09-04

## 2024-09-04 VITALS — HEIGHT: 64 IN | BODY MASS INDEX: 18.51 KG/M2 | WEIGHT: 108.4 LBS

## 2024-09-04 DIAGNOSIS — L03.116 CELLULITIS OF LEFT FOOT: ICD-10-CM

## 2024-09-04 DIAGNOSIS — L97.522 NEUROPATHIC ULCER OF LEFT FOOT WITH FAT LAYER EXPOSED (HCC): Primary | ICD-10-CM

## 2024-09-04 PROCEDURE — 87077 CULTURE AEROBIC IDENTIFY: CPT | Performed by: PODIATRIST

## 2024-09-04 PROCEDURE — 87205 SMEAR GRAM STAIN: CPT | Performed by: PODIATRIST

## 2024-09-04 PROCEDURE — 11042 DBRDMT SUBQ TIS 1ST 20SQCM/<: CPT | Performed by: PODIATRIST

## 2024-09-04 PROCEDURE — 87070 CULTURE OTHR SPECIMN AEROBIC: CPT | Performed by: PODIATRIST

## 2024-09-04 PROCEDURE — 99213 OFFICE O/P EST LOW 20 MIN: CPT | Performed by: PODIATRIST

## 2024-09-04 PROCEDURE — 87186 SC STD MICRODIL/AGAR DIL: CPT | Performed by: PODIATRIST

## 2024-09-04 RX ORDER — CEPHALEXIN 500 MG/1
500 CAPSULE ORAL EVERY 12 HOURS SCHEDULED
Qty: 20 CAPSULE | Refills: 0 | Status: SHIPPED | OUTPATIENT
Start: 2024-09-04 | End: 2024-09-14

## 2024-09-04 NOTE — PROGRESS NOTES
"  Name: Rebeca Banegas      : 1932      MRN: [id number]  Encounter Provider: Jonny Marcial DPM  Encounter Date: 2024   Encounter department: [facility] PODIATRY [place]    Assessment & Plan     1. 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 Cellulitis of left foot  -     cephalexin (KEFLEX) 500 mg capsule; Take 1 capsule (500 mg total) by mouth every 12 (twelve) hours for 10 days  -     Debridement Neuropathic Left;Plantar Plantar        X-rays reviewed today show postsurgical changes noted to the second metatarsal head and second proximal phalanx with decreased prominence from the debridement at the level the plantar surface of the second metatarsal.    No other acute findings noted on examination.    Creatinine was calculated today which was 19 therefore Keflex dosage was decreased to 500 mg twice daily.    Unfortunately patient still has an area of ulceration on the plantar surface of the foot there was some maceration today along with callus formation.    Will see her back at the wound care center for continued management.    Continue offloading and protection to the area.    Surgical debridement completed today to the area.    Debridement   Wound 24 Neuropathic Plantar Left;Plantar    Universal Protocol:  procedure performed by consultantConsent: Verbal consent obtained.  Risks and benefits: risks, benefits and alternatives were discussed  Consent given by: patient  Time out: Immediately prior to procedure a \"time out\" was called to verify the correct patient, procedure, equipment, support staff and site/side marked as required.  Patient understanding: " patient states understanding of the procedure being performed  Patient identity confirmed: verbally with patient    Debridement Details  Performed by: physician  Debridement type: surgical  Level of debridement: subcutaneous tissue  Pain control: lidocaine 4%      Post-debridement measurements  Length (cm): 0.4  Width (cm): 0.3  Depth (cm): 0.4  Percent debrided: 100%  Surface Area (cm^2): 0.12  Area Debrided (cm^2): 0.12  Volume (cm^3): 0.05    Tissue and other material debrided: subcutaneous tissue  Devitalized tissue debrided: biofilm, callus and fibrin  Instrument(s) utilized: blade  Bleeding: small  Hemostasis obtained with: pressure  Procedural pain (0-10): insensate  Post-procedural pain: insensate   Response to treatment: procedure was tolerated well          Culture obtained will start patient on antibiotic as above.  Will have her use alginate dressing silver and dry sterile dressing we will see her back at the wound care center.    Return in about 1 week (around 9/11/2024) for [facility].    Subjective     Patient was feeling better until recently, Saturday noticed swelling and redness to the area.      Patient was recently hospitalized, I reviewed the results of her discharge summary.  The patient had initially presented with strokelike symptoms.        Constitutional:  Negative for chills and fever.   Respiratory:  Negative for chest tightness and shortness of breath.    Gastrointestinal:  Negative for nausea and vomiting.     Current Outpatient Medications on File Prior to Visit   Medication Sig   • albuterol (2.5 mg/3 mL) 0.083 % nebulizer solution Take 3 mL (2.5 mg total) by nebulization every 8 (eight) hours   • albuterol (Proventil HFA) 90 mcg/act inhaler Inhale 2 puffs every 6 (six) hours as needed for wheezing   • atorvastatin (LIPITOR) 40 mg tablet Take 1 tablet (40 mg total) by mouth every evening   • Cholecalciferol (VITAMIN D3) 1000 units CAPS Take 2,000 Units by mouth daily  "(Patient not taking: Reported on 8/30/2024)   • fluticasone (FLONASE) 50 mcg/act nasal spray USE 2 SPRAYS IN EACH NOSTRIL ONCE DAILY   • Fluticasone-Salmeterol (Advair Diskus) 100-50 mcg/dose inhaler Inhale 1 puff 2 (two) times a day Rinse mouth after use.   • levothyroxine 50 mcg tablet TAKE 1 TABLET DAILY BUT 1-1/2 TABLETS 4 DAYS/WEEK   • metoprolol succinate (TOPROL-XL) 100 mg 24 hr tablet Take 1 tablet (100 mg total) by mouth daily   • pantoprazole (PROTONIX) 40 mg tablet Take 1 tablet (40 mg total) by mouth daily in the early morning   • sodium chloride 3 % inhalation solution Take 4 mL by nebulization 3 (three) times a day   • warfarin (Jantoven) 2.5 mg tablet Take 1 tablet (2.5 mg total) by mouth daily       Objective     Ht 5' 4\" (1.626 m)   Wt 49.2 kg (108 lb 6.4 oz)   LMP  (LMP Unknown)   BMI 18.61 kg/m²     Patient has continued ulceration on the plantar surface of the left foot.  There is deep bone exposure noted to this area which was present previously due to patient's previous history of debridement to the area.  I do not see any pus or purulent material there is some mild erythema noted on dorsal aspect of the foot.  There is intact and unchanged vascular status.  There is diminished sensation noted distally.  No other areas of acute issues noted at this time.    Size of the wound is 0.4 cm x 0.3 cm x 0.4 cm.  "

## 2024-09-04 NOTE — TELEPHONE ENCOUNTER
1ST ATTEMPT,     Called pt no answer, LMOM.    Thank you,     Danette       U/ KAYKAY MALDONADO/ DEON    DC- HOME- 8/29/2024    ----- Message from Inna Parada MD sent at 8/28/2024 10:15 AM EDT -----  Regarding: HFU  Rebeca Banegas will need follow-up in in 6 weeks with neurovascular team for Embolic stroke secondary to known diagnosis of atrial fibrillation in 60 minute appointment. They will not require outpatient neurological testing

## 2024-09-06 ENCOUNTER — PATIENT OUTREACH (OUTPATIENT)
Dept: CASE MANAGEMENT | Facility: HOSPITAL | Age: 89
End: 2024-09-06

## 2024-09-06 ENCOUNTER — TELEPHONE (OUTPATIENT)
Dept: NEUROLOGY | Facility: CLINIC | Age: 89
End: 2024-09-06

## 2024-09-06 NOTE — TELEPHONE ENCOUNTER
Post CVA Discharge Follow Up  Hospitalization: 8/27/24-8/29/24    Called patient to obtain an update and to help arrange the neurology HFU appointment. There was no answer. Left a voice message requesting for a return call. Provided the office's phone number.

## 2024-09-06 NOTE — PROGRESS NOTES
Chart reviewed.     PMH: A-fib, CHF, Reflux, dyslipidemia, hypothyroidism, HTN, CVA, malnutrition, bronchiectasis, CKD3. Last RNCM outreach was on  Patient had f/u apt with Podiatry on 9/4 for left foot ulcer. Keflex dosage decreased. Culture obtained will start patient on antibiotic as above. Will have her use alginate dressing silver and dry sterile dressing we will see her back at the wound care center.     Patient has upcoming apt's: 9/9 Cardiology and 8/11 WCC. Patient has not had PCP [facility] apt yet.    This RNCM attempted to call patient. No answer and message left.    RNCM to follow.    IB message sent to PCP and office staff to set up [facility] apt.

## 2024-09-06 NOTE — TELEPHONE ENCOUNTER
Pt called in regards to VM. Pt denies HFU. Pt spoke with family doctor. Pt feels its not necessary.    Thank you

## 2024-09-07 ENCOUNTER — IN HOME VISIT (OUTPATIENT)
Dept: INTERNAL MEDICINE CLINIC | Facility: CLINIC | Age: 89
End: 2024-09-07
Payer: COMMERCIAL

## 2024-09-07 VITALS — RESPIRATION RATE: 14 BRPM | HEART RATE: 78 BPM | SYSTOLIC BLOOD PRESSURE: 108 MMHG | DIASTOLIC BLOOD PRESSURE: 72 MMHG

## 2024-09-07 DIAGNOSIS — I50.32 CHRONIC DIASTOLIC CONGESTIVE HEART FAILURE (HCC): Chronic | ICD-10-CM

## 2024-09-07 DIAGNOSIS — R93.89 ABNORMAL FINDINGS ON DIAGNOSTIC IMAGING OF OTHER SPECIFIED BODY STRUCTURES: ICD-10-CM

## 2024-09-07 DIAGNOSIS — I63.9 CEREBROVASCULAR ACCIDENT (CVA), UNSPECIFIED MECHANISM (HCC): Primary | ICD-10-CM

## 2024-09-07 DIAGNOSIS — L97.522 NEUROPATHIC ULCER OF LEFT FOOT WITH FAT LAYER EXPOSED (HCC): ICD-10-CM

## 2024-09-07 DIAGNOSIS — I48.21 PERMANENT ATRIAL FIBRILLATION (HCC): Chronic | ICD-10-CM

## 2024-09-07 DIAGNOSIS — D47.2 MGUS (MONOCLONAL GAMMOPATHY OF UNKNOWN SIGNIFICANCE): ICD-10-CM

## 2024-09-07 DIAGNOSIS — J47.9 BRONCHIECTASIS WITHOUT COMPLICATION (HCC): Chronic | ICD-10-CM

## 2024-09-07 LAB
BACTERIA WND AEROBE CULT: ABNORMAL
BACTERIA WND AEROBE CULT: ABNORMAL
GRAM STN SPEC: ABNORMAL

## 2024-09-07 PROCEDURE — 99350 HOME/RES VST EST HIGH MDM 60: CPT | Performed by: INTERNAL MEDICINE

## 2024-09-07 NOTE — PROGRESS NOTES
Assessment/Plan:  #1 recent episode of right arm which is persisting and episode of left eye visual symptoms which resolved.  Was admitted to the hospital for CTA of head and neck showing some degree of atherosclerosis without high-grade stenosis of cervical or intracranial circulation.  MRI of the brain shows acute infarcts in the right occipital lobe and right cerebellum.  There was no mass effect or hemorrhagic transformation.  Echocardiogram shows EF of 60%, wall thickness moderately increased, unable to assess diastolic function due to atrial relation, moderate dilatation of right left atrium, moderate tricuspid regurg, right ventricular systolic pressure 40 elevated.  There was concern about malignancy induced hypercoagulability based of her presentation a CAT scan of chest abdomen pelvis was done as dictated below.  This does have some abnormal findings but this felt more likely that her strokelike symptoms were predisposed by being off her oral anticoagulant-as noted she had a normal INR 1 her warfarin for foot surgery within 2 to 3 weeks of onset of her symptoms.  We reviewed that her MRI shows disease of both the cerebellum as well as the occipital lobe suggestive of potential cardioembolic disease.  At this point she will be maintained on warfarin and we reviewed that if she ever has to go off her warfarin in the future she will need bridging with Lovenox.  We also discussed potentially running her INR of 2.5-3 instead of her current recommended value of 2-2.5-I will be in touch with her cardiologist to discuss this.  Note also she was started on atorvastatin 40 mg daily in the hospital with her documented mild atherosclerosis and strokelike symptoms-she will continue this  #2 abnormal imaging-CT chest abdomen pelvis shows no definite findings of malignancy but does show multiple pancreatic cystic lesions some of which have enlarged since June 2017-dominant cystic complex lesion in the pancreatic  head measures 3.6 cm-previously measured 2.8 cm.  Because of the growth of greater than 20% of the longest dimension she should be considered for follow-up with GI and potential endoscopic ultrasound-patient states because of her age of 89 that she does not want additional investigation and defers on seeing GI-she realizes she could have potential underlying pancreatic carcinoma but defers.  #3 abnormal imaging-has a new 2 mm soft nodule left lower lobe not seen on prior CT-also has multiple additional 3 mm as well as pulmonary nodule stable since 2014 as well as her known chronic bilateral bronchiectasis-she is not a smoker and not felt to be high risk-regardless she will likely be having follow-up CT in reference to her pulmonary status so that this nodule can again be evaluated in the future.  4.  Left plantar ulcer-has been present for several months.  Had remote history of MRSA infection of the same foot.  Has longstanding abnormality left fourth toe present since childhood.  Has had intermittent drainage.  Arterial Doppler showed mild bilateral disease with right FREYA 1.23-diffuse disease without focal stenosis-great toe pressure 64 within healing range.  Left side shows diffuse disease without focal stenosis, FREYA 1.36 and great toe pressure of 33.  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  MRI and white cell study suggestive of potential osteomyelitis of second metatarsal head-rule out involvement of proximal second phalanx.  Patient underwent debridement and bone biopsy-bone biopsy was negative for osteomyelitis.  Cultures show few colonies of staph coag negative as well as staph epidermis as well as Dermobacter hominis.-.  She was treated with linezolid twice daily by podiatry for 10 days.  He has been seen by podiatry regularly with evaluation August 21-he underwent additional debridement.-As noted now with some erythema-wound not seen  today because it was dressed-now on therapy with cephalexin with repeat culture obtained and results pending  #5 recent known thoracic back pain-longstanding-always watching for compression fracture but known osteoporosis but did not appear to be the case.  Felt to be positional musculoskeletal-was previously using acetaminophen and over-the-counter lidocaine patch  #6 chronic diastolic heart failure-most recent echo as dictated above-on torsemide 40 mg twice daily.  Previously was on spironolactone 100 mg daily but now on 50 mg daily.  And also used transient Zaroxolyn in the past.  Her weight is at baseline she will continue current diuretic dose  7.  Pulmonary disease/combination of asthma-bronchiectasis-CT of the chest just done shows her known bronchiectasis.  She has known history of pulmonary hypertension.  Prior PFTs show obstructive disease with FEV1 of 0.92-no response to bronchodilator.  Had treatment in the past with Levaquin for bronchiectasis with associated Pseudomonas on culture.  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  On Advair.  Was oversedated on Zyrtec.  On Flonase for associated allergic rhinitis  #8-atrial olxjucubancl-ovawfuxbs-avolplcm control on current dose of metoprolol.  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  Prior laboratory testing showed normal immunoglobulin levels, SPEP showed biclonal spike with immunofixation identified both these is IgG kappa-spike number 1.26 g/dL M spike number 2.08 g/dL-she does not have any crab features other than her known CKD.  He has no hypercalcemia.  Likely represents MGUS but follow-up labs are being obtained  10.  Bilateral sensorineural hearing loss-monitored by the ENT group-had repeat evaluation June 2024 with audiogram showing  moderate to profound high-frequency bilateral sensorineural hearing loss-states that she should return in 1 year which would be June 2025  11.  Osteoporosis-DEXA scan 2023 shows L-spine -2.7 and hip of -2.2.  Remains on vitamin D-has known calcium intake of 1200 mg/day or more.  Subsequent DEXA scan due in March 2025.  Has had 3 injections of Prolia with her most recently on May 21, 2024  12.  Recurrent labial cyst-recurrent labial abscess-was treated by GYN with I&D and treated course of Bactrim-culture was never obtained.  There was concern about potential MRSA she had this in the past.  Not an issue at present  13.  CKD-stage IIIb-felt to be age-related nephron loss plus possible component of cardiorenal syndrome-knows to avoid nonsteroidals.  Patient lites BUN/creatinine in July 2000 2441/1.52  14.  Hypothyroidism-stable on current dose of Synthroid-TSH in April of this year 4.73 which is acceptable for her age group    All other problems as per note of August 30, 2022    Medical regimen-pantoprazole 40 mg daily which she will discontinue when she runs out-this was started in the hospital, atorvastatin 40 mg daily, torsemide 40 mg twice daily to 20 mg dosing, spironolactone 50 mg daily, dry weight in the middle 100s, Flonase 2 squirts each nostril once daily, prior use of loratadine 10 mg daily, Advair 50 - 100 - 1 puff twice daily, Prolia every 6 months with last injection on May 21, 2024, prior use of Singulair 10 mg daily, levothyroxine 50 mcg daily but 75 mcg - 4 days/week, calcium and vitamin D daily,  Inhaler as needed, metoprolol succinate 50 mg twice daily using one half of 100 mg tablet, multivitamin, vitamin D3-2000 units a day, fish oil 1200 mg a day, over-the-counter lidocaine patch-4% on for 12 hours and off for 12 hours, remote use of Vicodin 5 - 300 - 1/2 tablet twice daily as needed which she has not used in months, current cephalexin being provided by podiatry  Will be discussing with cardiology  about goal INR as dictated above.  Arranges will be made for physical therapy for her right arm.  Await results of final recent foot culture           Subjective:      Patient ID: Rebeca Banegas is a 89 y.o. adult.    This patient was seen on 9/6/2024 as a follow-up to recent hospitalization.  She had called the office with symptoms of right arm pain-weakness.  In addition she mentioned that she had had transient difficulty with vision with her left eye.  It was recommended she go to the [facility] and she was admitted.  She did not have right leg weakness.  She did not have difficulty with her speech.  She was admitted and seen by neurology.  CTA of head and neck and CT brain were done showing atherosclerotic plaque at the carotid bulbs bilaterally without evidence for high-grade stenosis or focal occlusion of the cervical vasculature.  There was no evidence for high-grade stenosis or focal occlusion of the intracranial circulation.  MRI of the brain showed acute infarcts in the right occipital lobe and right cerebellum but no mass effect or hemorrhagic transformation.  Echocardiogram was performed.  This showed left ventricular cavity size normal, wall thickness mildly increased, moderate concentric hypertrophy, EF of 60%, unable to assess diastolic dysfunction due to atrial fibrillation, right ventricle normal, moderate dilatation of left and right atrium, moderate tricuspid regurg, elevated systolic pressure of the right ventricle at 41.  This patient is known history of atrial fibrillation and is on chronic anticoagulant therapy.  The anticoagulant therapy however had been stopped for recent foot surgery.  Records reviewed in detail and INR done on August 9 was normal at 1.18.  Thus this patient had been off her oral anticoagulant within 2 weeks of the onset of her symptoms.  In the hospital she was not placed on aspirin because of a history of allergy.  She was placed on statin therapy and seen by  neurology.       All labs from the hospital reviewed in detail.  Results for orders placed or performed in visit on 09/04/24  -Wound culture and Gram stain:   Specimen: Foot, Right; Wound       Result                      Value             Ref Range           Gram Stain Result                                             No Polys or Bacteria seen    *Note: Due to a large number of results and/or encounters for the requested time period, some results have not been displayed. A complete set of results can be found in Results Review.   Echocardiogram reviewed in detail--this patient has a known history of diastolic heart failure and her diuretics were altered in the hospital because of mild bump in her creatinine.  Entrance BUN was 63 with a creatinine of 1.9 discharge BUN was 49 with creatinine 1.49-patient's baseline BUN/creatinine done in July showed a BUN of 44 with a creatinine of 1.52.  I had called the patient after she returned from the hospital and told her to resume her torsemide which she was off in the hospital.  She has been taking 40 mg twice daily which is her baseline regimen.  In addition I told her to resume her spironolactone taken as 50 mg daily-note her usual doses are 100 mg daily.  In the hospital she was also started on atorvastatin 40 mg daily as well as pantoprazole 40 mg daily.  History as noted was reviewed in detail.  She has not had any recurrent neurologic symptoms since leaving the hospital.  She notes some right arm weakness with extended activity.  She denies leg weakness.  She denies any abnormalities of gait or falls.  The left eye visual symptoms have not recurred.     While in the hospital she underwent workup for potential malignancy as there was concern that she may have been hypercoagulable leading to stroke-this showed no definite CT findings on CT of chest abdomen and pelvis to indicate malignancy in the chest abdomen and pelvis.  However she has multiple pancreatic cystic  lesions some of which is enlarged since MRI of 2017-dominant cystic complex in the pancreatic head measures 3.6 cm and was previously 2.8 cm.  It was recommended to the patient that she have GI and/or surgical oncology follow-up.  She has a thickened endometrium measuring 8 mm-she has known bronchiectasis.  She also has a 2 mm solid nodule left lower lobe not seen on prior-multiple additional 3 mm and smaller pulmonary nodules are grossly stable since 2014-she also had mild heterogeneity of the right hepatic lobe which may be due to passive congestion    This patient denies any systemic symptoms. Specifically there has been no evidence of fever, night sweats, significant weight loss or significant decrease in appetite.     We had a long discussion today regarding multiple issues-I have spent a total time of 80 minutes in caring for this patient on the day of the visit/encounter including Diagnostic results, Prognosis, Risks and benefits of tx options, Instructions for management, Patient and family education, Impressions, Counseling / Coordination of care, Documenting in the medical record, and Reviewing / ordering tests, medicine, procedures  --  She is unfortunately also suffered the recent death of her .  She denies major depressive symptoms at present.  She is happy that her daughter will be visiting over the next 2 to 3 weeks.       This patient wanted to know their preferred analgesic agent. Because of their various comorbidities I recommended that this be acetaminophen. This patient has no history of chronic liver disease that would put them at greater risk for use of acetaminophen. This patient may use up to 500-650 mg of acetaminophen at a time and no more than 2 g a day total. Nonsteroidal anti-inflammatory agents have the potential to exacerbate hypertension, hypercoagulability, chronic renal failure, congestive heart failure, and various allergic tendencies.  We have talked about this  previously-as noted history of allergy to aspirin.  She is aware to use acetaminophen because of her comorbidities.     We talked about her pancreatic lesion-she states that even if she has developed pancreatic carcinoma she would not want treatment and she therefore does not want additional investigation.  She is concerned about seeing multiple physicians-she wants to defer on follow-up appointment with neurology.  We reviewed her medical regimen in detail and she is taking her meds as directed.  Note also that since she has had some increased swelling erythema of her left foot wound where she underwent recent foot surgery-she was seen by podiatry and is currently on cephalexin-culture was obtained with results pending             The following portions of the patient's history were reviewed and updated as appropriate: allergies, current medications, past family history, past medical history, past social history, past surgical history, and problem list.    Review of Systems   Constitutional: Negative.    HENT: Negative.     Eyes:  Positive for visual disturbance.   Respiratory:  Positive for cough.    Cardiovascular: Negative.    Gastrointestinal: Negative.    Endocrine: Negative.    Genitourinary: Negative.    Musculoskeletal: Negative.    Skin: Negative.    Neurological:  Positive for weakness.   Hematological: Negative.    Psychiatric/Behavioral: Negative.           Objective:      LMP  (LMP Unknown)          Physical Exam  Vitals reviewed.   Constitutional:       General: She is not in acute distress.     Appearance: She is ill-appearing. She is not toxic-appearing or diaphoretic.   HENT:      Head: Normocephalic and atraumatic.      Right Ear: Ear canal and external ear normal.      Left Ear: Ear canal and external ear normal.      Nose: Nose normal. No congestion or rhinorrhea.      Mouth/Throat:      Mouth: Mucous membranes are moist.      Pharynx: Oropharynx is clear. No oropharyngeal exudate or posterior  oropharyngeal erythema.   Eyes:      General: No scleral icterus.        Right eye: No discharge.         Left eye: No discharge.      Extraocular Movements: Extraocular movements intact.      Pupils: Pupils are equal, round, and reactive to light.   Neck:      Vascular: No carotid bruit.   Cardiovascular:      Rate and Rhythm: Normal rate. Rhythm irregular.      Pulses: Normal pulses.      Heart sounds: Normal heart sounds. No murmur heard.     No friction rub. No gallop.   Pulmonary:      Effort: Pulmonary effort is normal. No respiratory distress.      Breath sounds: No stridor. Rhonchi present. No wheezing or rales.      Comments: Coughing intermittently throughout the interview  Chest:      Chest wall: No tenderness.   Abdominal:      General: Abdomen is flat. Bowel sounds are normal. There is no distension.      Palpations: Abdomen is soft. There is no mass.      Tenderness: There is no abdominal tenderness. There is no right CVA tenderness, left CVA tenderness, guarding or rebound.      Hernia: No hernia is present.   Musculoskeletal:         General: No swelling, tenderness, deformity or signs of injury. Normal range of motion.      Cervical back: Normal range of motion and neck supple. No rigidity. No muscular tenderness.      Right lower leg: No edema.      Left lower leg: No edema.   Lymphadenopathy:      Cervical: No cervical adenopathy.   Skin:     General: Skin is warm and dry.      Coloration: Skin is not jaundiced or pale.      Findings: No bruising, erythema, lesion or rash.   Neurological:      General: No focal deficit present.      Mental Status: She is alert and oriented to person, place, and time. Mental status is at baseline.      Cranial Nerves: No cranial nerve deficit.      Sensory: No sensory deficit.      Motor: No weakness.      Coordination: Coordination normal.      Gait: Gait normal.      Deep Tendon Reflexes: Reflexes normal.   Psychiatric:         Mood and Affect: Mood normal.          Behavior: Behavior normal.         Thought Content: Thought content normal.         Judgment: Judgment normal.

## 2024-09-09 ENCOUNTER — TELEPHONE (OUTPATIENT)
Dept: PODIATRY | Facility: CLINIC | Age: 89
End: 2024-09-09

## 2024-09-09 NOTE — TELEPHONE ENCOUNTER
Can you please call patient's cardiology office as they are the ones that manage her warfarin, I want to place her on antibiotic which may directly affect her Coumadin levels.  She had an issue previously with another medication we used.  I want to get their advice.      Please ask:    I have a wound culture that this patient would benefit from Bactrim use for a foot infection. She is on chronic warfarin and I wanted to ask if this is ok for her to take as long as we monitor closely?  This is for a foot infection.

## 2024-09-10 ENCOUNTER — TELEPHONE (OUTPATIENT)
Dept: FAMILY MEDICINE CLINIC | Facility: CLINIC | Age: 89
End: 2024-09-10

## 2024-09-10 ENCOUNTER — APPOINTMENT (OUTPATIENT)
Dept: LAB | Facility: CLINIC | Age: 89
End: 2024-09-10
Payer: COMMERCIAL

## 2024-09-10 ENCOUNTER — TELEPHONE (OUTPATIENT)
Dept: PODIATRY | Facility: CLINIC | Age: 89
End: 2024-09-10

## 2024-09-10 ENCOUNTER — TELEPHONE (OUTPATIENT)
Age: 89
End: 2024-09-10

## 2024-09-10 DIAGNOSIS — L03.116 CELLULITIS OF LEFT FOOT: ICD-10-CM

## 2024-09-10 DIAGNOSIS — E03.9 HYPOTHYROIDISM, UNSPECIFIED TYPE: ICD-10-CM

## 2024-09-10 DIAGNOSIS — I63.9 CEREBROVASCULAR ACCIDENT (CVA), UNSPECIFIED MECHANISM (HCC): Primary | ICD-10-CM

## 2024-09-10 DIAGNOSIS — I63.9 CEREBROVASCULAR ACCIDENT (CVA), UNSPECIFIED MECHANISM (HCC): ICD-10-CM

## 2024-09-10 DIAGNOSIS — L97.522 NEUROPATHIC ULCER OF LEFT FOOT WITH FAT LAYER EXPOSED (HCC): Primary | ICD-10-CM

## 2024-09-10 DIAGNOSIS — I50.32 CHRONIC DIASTOLIC CONGESTIVE HEART FAILURE (HCC): ICD-10-CM

## 2024-09-10 DIAGNOSIS — D47.2 MGUS (MONOCLONAL GAMMOPATHY OF UNKNOWN SIGNIFICANCE): ICD-10-CM

## 2024-09-10 DIAGNOSIS — I48.21 PERMANENT ATRIAL FIBRILLATION (HCC): Chronic | ICD-10-CM

## 2024-09-10 LAB
INR PPP: 2.9 (ref 0.85–1.19)
PROTHROMBIN TIME: 30.1 SECONDS (ref 12.3–15)

## 2024-09-10 PROCEDURE — 36415 COLL VENOUS BLD VENIPUNCTURE: CPT

## 2024-09-10 PROCEDURE — 85610 PROTHROMBIN TIME: CPT

## 2024-09-10 RX ORDER — SULFAMETHOXAZOLE AND TRIMETHOPRIM 400; 80 MG/1; MG/1
1 TABLET ORAL EVERY 12 HOURS SCHEDULED
Qty: 20 TABLET | Refills: 0 | Status: SHIPPED | OUTPATIENT
Start: 2024-09-10 | End: 2024-09-20

## 2024-09-10 NOTE — TELEPHONE ENCOUNTER
Caller: Rebeca Banegas    Doctor and/or Office: Dr. Marcial/Chuy    #: [phone]    Escalation: Medication/Got the messages but asking if she is to stop the Cephalexin and replace with Bactrim? Just got message when she arrived home, so did not know she was to go to pharmacy to get RX until now. She will go get it after speaking with the office. Please call back and advise. Thanks

## 2024-09-10 NOTE — TELEPHONE ENCOUNTER
----- Message from Santana Dillon MD sent at 9/10/2024  6:15 AM EDT -----  This patient was recently admitted to the hospital and had sustained new strokes with right arm weakness.  Can you please help make arrangements for physical therapy at [facility] with a diagnosis of PT eval and treat with recent right cerebellar and right occipital lobe CVA?    Also there is an order for labs in the system which I forgot to tell the patient about when I made her recent home visit-can you please have them done in approximately 2 weeks?  Please call patient and let her know status of all the above-thank you  WTS

## 2024-09-10 NOTE — TELEPHONE ENCOUNTER
I l/m on both the home and cell phone informing pt of 's message. I asked that she call back to confirm she received my message and started the med.

## 2024-09-10 NOTE — RESULT ENCOUNTER NOTE
Sent Bactrim. Renal dose adjusted.  Also spoke with Cardiology who manages her Warfarin for close monitoring.  They agreed to do this.

## 2024-09-10 NOTE — TELEPHONE ENCOUNTER
Caller: Rebeca Banegas    Doctor: Dr. Marcial    Reason for call: message we left her asked her to call back/I attached to that message for call back as she is already on ABX and is given RX for new one so not sure if she is to stop one for the other    Call back#: NA as they will call her from other message

## 2024-09-10 NOTE — TELEPHONE ENCOUNTER
Called patient and left message to please call me back      Please transfer to me when patient does call

## 2024-09-10 NOTE — TELEPHONE ENCOUNTER
Called patient and spoke with her  We went over everything  Faxed the ref for PT mailed her BW    Also can you look over Dr Marcial encounter from podiatry. She stopped one antibiotic and placed her on bactrim.   She recently did a INR , but is asking her to complete another      Please advise

## 2024-09-10 NOTE — TELEPHONE ENCOUNTER
Pt requesting to just give a message to Ruthie - she wanted a fax # to fax orders over here.  It's [phone].

## 2024-09-10 NOTE — PROGRESS NOTES
Assessment/Plan:    No problem-specific Assessment & Plan notes found for this encounter.             Subjective:      Patient ID: Rebeca Banegas is a 89 y.o. adult.    HPI    The following portions of the patient's history were reviewed and updated as appropriate: allergies, current medications, past family history, past medical history, past social history, past surgical history, and problem list.    Review of Systems      Objective:      LMP  (LMP Unknown)          Physical Exam

## 2024-09-10 NOTE — TELEPHONE ENCOUNTER
Notify patient the results of her culture did show different bacterial growth.  I do need to start her on Bactrim.  I reached out to her cardiologist Dr. Vyas and he stated that he will let the clinic know that they will do a 3 to 5-day INR follow-up.  I sent over the medication for her to get started on today.    Renal dose adjusted Bactrim due to creatinine clearance of 19.

## 2024-09-10 NOTE — TELEPHONE ENCOUNTER
Pt called in requesting to talk to Ruthie CRISTINA. I called the office spoke to aimee who stated she will let ceferino know to returned pt call when she's available.

## 2024-09-11 ENCOUNTER — OFFICE VISIT (OUTPATIENT)
Dept: WOUND CARE | Facility: HOSPITAL | Age: 89
End: 2024-09-11
Payer: COMMERCIAL

## 2024-09-11 ENCOUNTER — ANTICOAG VISIT (OUTPATIENT)
Dept: CARDIOLOGY CLINIC | Facility: CLINIC | Age: 89
End: 2024-09-11

## 2024-09-11 VITALS
TEMPERATURE: 97.2 F | SYSTOLIC BLOOD PRESSURE: 110 MMHG | DIASTOLIC BLOOD PRESSURE: 57 MMHG | HEART RATE: 83 BPM | RESPIRATION RATE: 18 BRPM

## 2024-09-11 DIAGNOSIS — I48.21 PERMANENT ATRIAL FIBRILLATION (HCC): Primary | Chronic | ICD-10-CM

## 2024-09-11 DIAGNOSIS — L97.526 NON-PRESSURE CHRONIC ULCER OF OTHER PART OF LEFT FOOT WITH BONE INVOLVEMENT WITHOUT EVIDENCE OF NECROSIS (HCC): Primary | ICD-10-CM

## 2024-09-11 PROCEDURE — 11042 DBRDMT SUBQ TIS 1ST 20SQCM/<: CPT | Performed by: PODIATRIST

## 2024-09-11 NOTE — PATIENT INSTRUCTIONS
"Orders Placed This Encounter   Procedures    Wound cleansing and dressings Neuropathic Left;Plantar Plantar     Wound location left foot   Change dressing 3x per week.   You may remove the dressing and shower. Do not leave wound open to air, apply new dressing immediately.  Please use shower chair so the foot is not directly on the shower floor.  Cleanse the foot last when showering.    Cleanse the wound with wound cleanser or mild soap and water, rinse, pat dry.  Apply alginate with silver (Melgisorb [employer]) to wound bed.     Cover with gauze or ABD pad.    Secure with cast padding then roll gauze and tape.        1 layer of 1/4\" felt pad to the inside sole of the surgical shoe  Please leave this in place when changing the dressing.    Surgical shoe in left foot at all times your foot will hit the floor     Standing Status:   Future     Standing Expiration Date:   9/18/2024      "

## 2024-09-11 NOTE — PROGRESS NOTES
Wound Procedure Treatment Neuropathic Left;Plantar Plantar    Performed by: Jazmin Sanchez RN  Authorized by: Jonny Marcial DPM    Associated wounds:   Wound 06/28/24 Neuropathic Plantar Left;Plantar  Wound cleansed with:  NSS  Applied primary dressing:  Calcium alginate and Silver  Applied secondary dressing:  ABD  Dressing secured with:  Ricky and Tape

## 2024-09-11 NOTE — PROGRESS NOTES
Patient ID: Rebeca Banegas is a 89 y.o. adult Date of Birth 7/22/1932     Diagnosis:  1. 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 Wound improved.  Redness down.  Notify office of any issues.    Pt to speak with cardiology regarding coumadin management.    Surgical debridement noted to the area.        If the patient notices any systemic signs of infection including but not limited to fever, chills, nausea, vomiting or significant worsening of wound with increased drainage, redness or streaking up the foot or leg the patient should notify the office or present to the emergency room.      If wound debridement was completed today this was done in an attempt to promote healing, decrease risk of infection and for limb salvage efforts.     Goal of treatment: wound healing     Efforts to decrease pressure on the wound(s), evaluation and discussion of nutritional status, peripheral vascular status, and infection control have all been addressed with this patient.    Return in about 1 week (around 9/18/2024) for Recheck, wound assessment.      Chief Complaint   Patient presents with    Follow Up Wound Care Visit     Left foot wound           Subjective:   8/9/24- 1.  Left foot Excisional debridement down to and including bone second metatarsal  2.   Left foot bone biopsy of second proximal phalanx with trochar percutaneous  3. Flouroscopic guidance for bone biopsy left foot  second proximal phalanx    Pt doing well at this point has noticed improvement in erythema noted from previous evaluation.    Pain has improved.  She has stopped keflex and obtained the bactrim.        The following portions of the patient's history were reviewed and updated as appropriate:   Patient Active Problem List   Diagnosis    Abnormal findings on diagnostic imaging of other specified body structures    Abnormal findings on diagnostic imaging of urinary organs    Allergic rhinitis    Arthritis    Asthma    Atherosclerosis    Permanent atrial fibrillation (HCC)    Backache    Benign colon polyp    Bifascicular bundle branch block    Chronic diastolic congestive heart failure (HCC)    Esophageal reflux    Essential tremor    Hoarseness    Hyperlipidemia    Primary hypertension    Hypothyroidism    Leg pain    Nasal polyp    Onychomycosis    Ovarian cyst    Pancreatic cyst    Plantar fasciitis    Prediabetes    Pulmonary nodule    Thyroid nodule    Vitamin D deficiency    Pain of left hand    Leg cramps    Sensorineural hearing loss (SNHL), bilateral    Left asymmetrical SNHL    Elevated serum immunoglobulin free light chains    Osteopenia of hip    CKD stage 3b, GFR 30-44 ml/min (HCC)    Labial abscess    Vulvar cysts    Open wound of right upper arm    Secondary hyperparathyroidism (HCC)    Age-related osteoporosis without current pathological fracture    Chronic cough    MGUS (monoclonal gammopathy of unknown significance)    Bronchiectasis without complication (HCC)    Vaccine counseling    Acute on chronic diastolic CHF (congestive heart failure) (HCC)    Chronic foot ulcer, left, with fat layer exposed (HCC)    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)    Preoperative examination    CVA (cerebral vascular accident) (HCC)    Severe protein-calorie malnutrition (HCC)     Past Medical History:   Diagnosis Date    Abnormal ultrasound     RESOLVED: [id number]    Advice given about COVID-19  virus infection 04/07/2020    Asthma with acute exacerbation     LAST ASSESSED: [id number]    Asymptomatic menopausal state     Atherosclerosis     Atrial fibrillation (HCC)     Chronic obstructive lung disease (HCC)     Congestive heart failure (HCC)     LAST ASSESSED: [id number]    COVID-19 virus infection 03/25/2023    Cuboid fracture     LAST ASSESSED: 19SCO1249    Dyspepsia     Fall 04/01/2024    GERD (gastroesophageal reflux disease)     High risk medication use     LAST ASSESSED: [id number]    Hyperlipidemia     Hypertension     Hypokalemia     Hypothyroidism     Impacted cerumen of both ears     LAST ASSESSED: [id number]    Impacted cerumen of right ear     LAST ASSESSED: 25AJQ0545    Influenza     LAST ASSESSED: 58OCH5581    IPMN (intraductal papillary mucinous neoplasm)     RESOLVED: 02OCT2015    Limb swelling     LAST ASSESSED: 26KDJ0739    Navicular fracture of ankle     LAST ASSESSED: 81LFF5589    Onychomycosis     LAST ASSESSED: 51VLI8600    Osteoarthritis     Osteopenia     Osteoporosis     Paronychia, finger, unspecified laterality     LAST ASSESSED: 91YKB8670    Paroxysmal atrial fibrillation (HCC)     LAST ASSESSED: 02MAR2014    Peripheral vascular disease (HCC)     Plantar fasciitis     Talus fracture     LAST ASSESSED: 26MTL6626    Vascular headache      Past Surgical History:   Procedure Laterality Date    APPENDECTOMY      BONE BIOPSY Left 8/9/2024    Procedure: Bone bx of proximal phalanx second toe & second met with fluoroscopic guidance;  Surgeon: Jonny Marcial DPM;  Location: [facility];  Service: Podiatry    CATARACT EXTRACTION Bilateral     CHOLECYSTECTOMY      COLONOSCOPY      JOINT REPLACEMENT Bilateral     knee    NEUROPLASTY / TRANSPOSITION MEDIAN NERVE AT CARPAL TUNNEL BILATERAL Bilateral     DECOMPRESSION    OOPHORECTOMY Bilateral     age 81    PELVIC FLOOR REPAIR  2014    PA BIOPSY BONE TROCAR/NEEDLE SUPERFICIAL Left 8/9/2024    Procedure: Left foot debridement;  Surgeon:  Jonny Marcial DPM;  Location: [facility];  Service: Podiatry    ID DEBRIDEMENT MUSCLE &/FASCIA 1ST 20 SQ CM/< Left 2024    Procedure: Left foot debridement;  Surgeon: Jonny Marcial DPM;  Location: [facility];  Service: Podiatry    SINUS SURGERY      TOTAL KNEE ARTHROPLASTY Bilateral      Social History     Socioeconomic History    Marital status:      Spouse name: Not on file    Number of children: Not on file    Years of education: Not on file    Highest education level: Not on file   Occupational History    Occupation: retired   Tobacco Use    Smoking status: Former     Current packs/day: 0.00     Average packs/day: 0.3 packs/day for 2.0 years (0.5 ttl pk-yrs)     Types: Cigarettes     Start date:      Quit date:      Years since quittin.7    Smokeless tobacco: Never    Tobacco comments:     On and off socially with friends    Vaping Use    Vaping status: Never Used   Substance and Sexual Activity    Alcohol use: Not Currently     Comment: SOCIAL    Drug use: No    Sexual activity: Not Currently     Partners: Male     Birth control/protection: Post-menopausal   Other Topics Concern    Not on file   Social History Narrative    DAILY COFFEE CONSUMPTION (2 CUPS/DAY)    EXERCISING REGULARLY     Social Determinants of Health     Financial Resource Strain: Not on file   Food Insecurity: No Food Insecurity (2024)    Hunger Vital Sign     Worried About Running Out of Food in the Last Year: Never true     Ran Out of Food in the Last Year: Never true   Transportation Needs: No Transportation Needs (2024)    PRAPARE - Transportation     Lack of Transportation (Medical): No     Lack of Transportation (Non-Medical): No   Physical Activity: Not on file   Stress: Not on file   Social Connections: Not on file   Intimate Partner Violence: Not on file   Housing Stability: Low Risk  (2024)    Housing Stability Vital Sign     Unable to Pay for Housing in the Last Year: No      Number of Times Moved in the Last Year: 0     Homeless in the Last Year: No        Current Outpatient Medications:     albuterol (2.5 mg/3 mL) 0.083 % nebulizer solution, Take 3 mL (2.5 mg total) by nebulization every 8 (eight) hours, Disp: 270 mL, Rfl: 3    albuterol (Proventil HFA) 90 mcg/act inhaler, Inhale 2 puffs every 6 (six) hours as needed for wheezing, Disp: 20.1 g, Rfl: 3    atorvastatin (LIPITOR) 40 mg tablet, Take 1 tablet (40 mg total) by mouth every evening, Disp: 30 tablet, Rfl: 0    cephalexin (KEFLEX) 500 mg capsule, Take 1 capsule (500 mg total) by mouth every 12 (twelve) hours for 10 days, Disp: 20 capsule, Rfl: 0    Cholecalciferol (VITAMIN D3) 1000 units CAPS, Take 2,000 Units by mouth daily (Patient not taking: Reported on 8/30/2024), Disp: , Rfl:     fluticasone (FLONASE) 50 mcg/act nasal spray, USE 2 SPRAYS IN EACH NOSTRIL ONCE DAILY, Disp: 48 g, Rfl: 3    Fluticasone-Salmeterol (Advair Diskus) 100-50 mcg/dose inhaler, Inhale 1 puff 2 (two) times a day Rinse mouth after use., Disp: 180 blister, Rfl: 1    levothyroxine 50 mcg tablet, TAKE 1 TABLET DAILY BUT 1-1/2 TABLETS 4 DAYS/WEEK, Disp: 40 tablet, Rfl: 5    metoprolol succinate (TOPROL-XL) 100 mg 24 hr tablet, Take 1 tablet (100 mg total) by mouth daily, Disp: 30 tablet, Rfl: 0    pantoprazole (PROTONIX) 40 mg tablet, Take 1 tablet (40 mg total) by mouth daily in the early morning, Disp: 30 tablet, Rfl: 0    sodium chloride 3 % inhalation solution, Take 4 mL by nebulization 3 (three) times a day, Disp: 360 mL, Rfl: 2    sulfamethoxazole-trimethoprim (BACTRIM) 400-80 mg per tablet, Take 1 tablet by mouth every 12 (twelve) hours for 10 days, Disp: 20 tablet, Rfl: 0    warfarin (Jantoven) 2.5 mg tablet, Take 1 tablet (2.5 mg total) by mouth daily, Disp: 30 tablet, Rfl: 0  Family History   Problem Relation Age of Onset    Pancreatic cancer Mother 93    Heart failure Mother     Coronary artery disease Family     Breast cancer Family     Other  "Family         BREAST DISORDER, GASTROINTESTINAL CANCER    Uterine cancer Family     Hyperlipidemia Family     Osteoarthritis Family     Ovarian cancer Family     Brain cancer Son     Stroke Father     No Known Problems Sister     No Known Problems Daughter     No Known Problems Maternal Grandmother     No Known Problems Maternal Grandfather     No Known Problems Paternal Grandmother     No Known Problems Paternal Grandfather     Breast cancer Cousin 50    Breast cancer Cousin 50    Ovarian cancer Other 49      Review of Systems  Allergies:  Asa [aspirin] and Nsaids      Objective:  /57   Pulse 83   Temp (!) 97.2 °F (36.2 °C)   Resp 18   LMP  (LMP Unknown)     Physical Exam      Wound 06/28/24 Neuropathic Plantar Left;Plantar (Active)   Wound Image Images linked 09/11/24 1546   Wound Description Pink 09/11/24 1512   Susu-wound Assessment Callus;Intact 09/11/24 1512   Wound Length (cm) 0.1 cm 09/11/24 1512   Wound Width (cm) 0.1 cm 09/11/24 1512   Wound Depth (cm) 0.1 cm 09/11/24 1512   Wound Surface Area (cm^2) 0.01 cm^2 09/11/24 1512   Wound Volume (cm^3) 0.001 cm^3 09/11/24 1512   Calculated Wound Volume (cm^3) 0 cm^3 09/11/24 1512   Change in Wound Size % 100 09/11/24 1512   Drainage Amount None 09/11/24 1512   Non-staged Wound Description Partial thickness 09/11/24 1512   Dressing Status Intact 09/11/24 1512                    Debridement   Wound 06/28/24 Neuropathic Plantar Left;Plantar    Universal Protocol:  procedure performed by consultantConsent: Verbal consent obtained.  Risks and benefits: risks, benefits and alternatives were discussed  Consent given by: patient  Time out: Immediately prior to procedure a \"time out\" was called to verify the correct patient, procedure, equipment, support staff and site/side marked as required.  Patient understanding: patient states understanding of the procedure being performed  Patient identity confirmed: verbally with patient    Debridement Details  Performed " "by: physician  Debridement type: surgical  Level of debridement: subcutaneous tissue  Pain control: lidocaine 4%      Post-debridement measurements  Length (cm): 0.3  Width (cm): 0.3  Depth (cm): 0.4  Percent debrided: 100%  Surface Area (cm^2): 0.09  Area Debrided (cm^2): 0.09  Volume (cm^3): 0.04    Tissue and other material debrided: subcutaneous tissue  Devitalized tissue debrided: biofilm, callus and fibrin  Instrument(s) utilized: blade  Bleeding: small  Hemostasis obtained with: pressure  Procedural pain (0-10): insensate  Post-procedural pain: insensate   Response to treatment: procedure was tolerated well         Results from last 6 Months   Lab Units 09/04/24  1223   WOUND CULTURE  Few Colonies of Proteus mirabilis*  Few Colonies of Acinetobacter baumannii*         Wound Instructions:  Orders Placed This Encounter   Procedures    Wound cleansing and dressings Neuropathic Left;Plantar Plantar     Wound location left foot   Change dressing 3x per week.   You may remove the dressing and shower. Do not leave wound open to air, apply new dressing immediately.  Please use shower chair so the foot is not directly on the shower floor.  Cleanse the foot last when showering.    Cleanse the wound with wound cleanser or mild soap and water, rinse, pat dry.  Apply alginate with silver (Melgisorb AG) to wound bed.     Cover with gauze or ABD pad.    Secure with cast padding then roll gauze and tape.        1 layer of 1/4\" felt pad to the inside sole of the surgical shoe  Please leave this in place when changing the dressing.    Surgical shoe in left foot at all times your foot will hit the floor     Standing Status:   Future     Standing Expiration Date:   9/18/2024    Wound Procedure Treatment Neuropathic Left;Plantar Plantar     This order was created via procedure documentation    Debridement     This order was created via procedure documentation         Jonny Marcial DPM      Portions of the record may " "have been created with voice recognition software. Occasional wrong word or \"sound a like\" substitutions may have occurred due to the inherent limitations of voice recognition software. Read the chart carefully and recognize, using context, where substitutions have occurred.      "

## 2024-09-13 ENCOUNTER — ANTICOAG VISIT (OUTPATIENT)
Dept: CARDIOLOGY CLINIC | Facility: CLINIC | Age: 89
End: 2024-09-13

## 2024-09-13 ENCOUNTER — DOCUMENTATION (OUTPATIENT)
Dept: CARDIOLOGY CLINIC | Facility: MEDICAL CENTER | Age: 89
End: 2024-09-13

## 2024-09-13 DIAGNOSIS — I48.21 PERMANENT ATRIAL FIBRILLATION (HCC): Primary | Chronic | ICD-10-CM

## 2024-09-13 LAB
INR PPP: 3.4
PROTHROMBIN TIME: 33.8 SEC (ref 12–14.6)

## 2024-09-13 NOTE — PROGRESS NOTES
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  This puts her at high risk for future warfarin interruption, she should undergo bridging with Lovenox for future warfarin interruption.

## 2024-09-16 ENCOUNTER — TELEPHONE (OUTPATIENT)
Age: 89
End: 2024-09-16

## 2024-09-16 DIAGNOSIS — I63.9 CEREBROVASCULAR ACCIDENT (CVA), UNSPECIFIED MECHANISM (HCC): ICD-10-CM

## 2024-09-16 RX ORDER — ATORVASTATIN CALCIUM 40 MG/1
40 TABLET, FILM COATED ORAL EVERY EVENING
Qty: 90 TABLET | Refills: 1 | Status: CANCELLED | OUTPATIENT
Start: 2024-09-16

## 2024-09-16 NOTE — TELEPHONE ENCOUNTER
Reason for call:   [x] Refill   [] Prior Auth  [] Other:     Office:   [x] PCP/Provider - [facility]  [] Specialty/Provider -     Medication: atorvastatin (LIPITOR) 40 mg tablet     Dose/Frequency: Take 1 tablet (40 mg total) by mouth every evening     Quantity: 90    Pharmacy: [facility] pharmacy    Does the patient have enough for 3 days?   [x] Yes   [] No - Send as HP to POD

## 2024-09-16 NOTE — TELEPHONE ENCOUNTER
Patient calling regarding Torsemide . States medication was briefly stopped but was then restarted. She is requesting a 90 day script be sent to [facility].

## 2024-09-16 NOTE — TELEPHONE ENCOUNTER
Spoke with patient regarding her previous call requesting refill for Torsemide. Do not see Torsemide on active medication list, was stopped in the hospital end of August.    Patient states her PCP Dr Dillon visited her and stated she should restart the Torsemide 40mg 2x/day and Spironolactone 50mg daily, no documentation in chart stating this.    Please advise if patient should continue with these medications and send new prescriptions to [facility] for 90day supply.    Please update patient at [phone]

## 2024-09-17 ENCOUNTER — ANTICOAG VISIT (OUTPATIENT)
Dept: CARDIOLOGY CLINIC | Facility: CLINIC | Age: 89
End: 2024-09-17

## 2024-09-17 DIAGNOSIS — I50.32 CHRONIC DIASTOLIC CONGESTIVE HEART FAILURE (HCC): Primary | Chronic | ICD-10-CM

## 2024-09-17 DIAGNOSIS — I48.21 PERMANENT ATRIAL FIBRILLATION (HCC): Primary | Chronic | ICD-10-CM

## 2024-09-17 LAB
INR PPP: 2.7
PROTHROMBIN TIME: 28.7 SEC (ref 12–14.6)

## 2024-09-17 RX ORDER — TORSEMIDE 20 MG/1
40 TABLET ORAL 2 TIMES DAILY
Qty: 180 TABLET | Refills: 3 | Status: SHIPPED | OUTPATIENT
Start: 2024-09-17

## 2024-09-17 NOTE — TELEPHONE ENCOUNTER
Torsemide sent in.  No spironolactone for now until she is seen in our office.  BMP in 2 weeks, and please ask what her weight us currently.

## 2024-09-18 ENCOUNTER — OFFICE VISIT (OUTPATIENT)
Dept: WOUND CARE | Facility: HOSPITAL | Age: 89
End: 2024-09-18
Payer: COMMERCIAL

## 2024-09-18 VITALS
TEMPERATURE: 96.8 F | SYSTOLIC BLOOD PRESSURE: 108 MMHG | HEART RATE: 91 BPM | DIASTOLIC BLOOD PRESSURE: 69 MMHG | RESPIRATION RATE: 18 BRPM

## 2024-09-18 DIAGNOSIS — M79.672 LEFT FOOT PAIN: Primary | ICD-10-CM

## 2024-09-18 DIAGNOSIS — L97.526 NON-PRESSURE CHRONIC ULCER OF OTHER PART OF LEFT FOOT WITH BONE INVOLVEMENT WITHOUT EVIDENCE OF NECROSIS (HCC): ICD-10-CM

## 2024-09-18 PROCEDURE — 11042 DBRDMT SUBQ TIS 1ST 20SQCM/<: CPT | Performed by: PODIATRIST

## 2024-09-18 RX ORDER — ATORVASTATIN CALCIUM 40 MG/1
40 TABLET, FILM COATED ORAL EVERY EVENING
Qty: 90 TABLET | Refills: 1 | Status: SHIPPED | OUTPATIENT
Start: 2024-09-18

## 2024-09-18 RX ORDER — LIDOCAINE 40 MG/G
CREAM TOPICAL ONCE
Status: COMPLETED | OUTPATIENT
Start: 2024-09-18 | End: 2024-09-18

## 2024-09-18 RX ADMIN — LIDOCAINE: 40 CREAM TOPICAL at 15:13

## 2024-09-18 NOTE — PROGRESS NOTES
Wound Procedure Treatment Neuropathic Left;Plantar Plantar    Performed by: Joanna Ventura RN  Authorized by: Jonny Marcial DPM    Associated wounds:   Wound 06/28/24 Neuropathic Plantar Left;Plantar  Wound cleansed with:  NSS  Applied primary dressing:  Calcium alginate and Silver  Applied secondary dressing:  ABD and Cast padding  Dressing secured with:  Ricky and Tape  Offloading device appllied:  Surgical shoe

## 2024-09-18 NOTE — TELEPHONE ENCOUNTER
Post CVA Discharge Follow Up  Hospitalization: 8/27/24-8/29/24    Called patient to obtain an update. There was no answer. Left a voice message requesting for a return call. Provided the office's phone number.

## 2024-09-18 NOTE — PROGRESS NOTES
Patient ID: Rebeca Banegas is a 89 y.o. adult Date of Birth 7/22/1932     Diagnosis:  1. Left foot pain  2. 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 Left foot pain  M79.672       2. 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  Surgical debridement today.  Continue offloading with surgical shoe and offloading padding.        Finish out bactrim.    If no improvement can consider football wrap to help offload additionally.      If the patient notices any systemic signs of infection including but not limited to fever, chills, nausea, vomiting or significant worsening of wound with increased drainage, redness or streaking up the foot or leg the patient should notify the office or present to the emergency room.      If wound debridement was completed today this was done in an attempt to promote healing, decrease risk of infection and for limb salvage efforts.     Goal of treatment: wound healing     Efforts to decrease pressure on the wound(s), evaluation and discussion of nutritional status, peripheral vascular status, and infection control have all been addressed with this patient.    Return in about 1 week (around 9/25/2024) for Recheck, Next scheduled follow up.      Chief Complaint   Patient presents with    Follow Up Wound Care Visit     Left  plantar foot wound           Subjective:   Pt has been doing well, has noticed some callus formation to the area.  She denies any pain to the area. Has been taking the bactrim without any issues.      The following portions of the patient's history were reviewed and updated as appropriate:   Patient Active Problem List   Diagnosis    Abnormal findings on diagnostic imaging of other specified body structures    Abnormal findings on diagnostic imaging of urinary organs    Allergic rhinitis    Arthritis    Asthma    Atherosclerosis    Permanent atrial fibrillation (HCC)    Backache    Benign colon polyp    Bifascicular bundle branch block    Chronic diastolic congestive heart failure (HCC)    Esophageal reflux    Essential tremor    Hoarseness    Hyperlipidemia    Primary hypertension    Hypothyroidism    Leg pain    Nasal polyp    Onychomycosis    Ovarian cyst    Pancreatic cyst    Plantar fasciitis    Prediabetes    Pulmonary nodule    Thyroid nodule    Vitamin D deficiency    Pain of left hand    Leg cramps    Sensorineural hearing loss (SNHL), bilateral    Left asymmetrical SNHL    Elevated serum immunoglobulin free light chains    Osteopenia of hip    CKD stage 3b, GFR 30-44 ml/min (HCC)    Labial abscess    Vulvar cysts    Open wound of right upper arm    Secondary hyperparathyroidism (HCC)    Age-related osteoporosis without current pathological fracture    Chronic cough    MGUS (monoclonal gammopathy of unknown significance)    Bronchiectasis without complication (HCC)    Vaccine counseling    Acute on chronic diastolic CHF (congestive heart failure) (HCC)    Chronic foot ulcer, left, with fat layer exposed (HCC)    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)    Preoperative examination    CVA (cerebral vascular accident) (HCC)    Severe protein-calorie malnutrition (HCC)     Past Medical History:   Diagnosis Date    Abnormal ultrasound     RESOLVED: [id number]    Advice given about COVID-19  virus infection 04/07/2020    Asthma with acute exacerbation     LAST ASSESSED: [id number]    Asymptomatic menopausal state     Atherosclerosis     Atrial fibrillation (HCC)     Chronic obstructive lung disease (HCC)     Congestive heart failure (HCC)     LAST ASSESSED: [id number]    COVID-19 virus infection 03/25/2023    Cuboid fracture     LAST ASSESSED: [id number]    Dyspepsia     Fall 04/01/2024    GERD (gastroesophageal reflux disease)     High risk medication use     LAST ASSESSED: [id number]    Hyperlipidemia     Hypertension     Hypokalemia     Hypothyroidism     Impacted cerumen of both ears     LAST ASSESSED: [id number]    Impacted cerumen of right ear     LAST ASSESSED: 62GYM7041    Influenza     LAST ASSESSED: 39OTS5453    IPMN (intraductal papillary mucinous neoplasm)     RESOLVED: 02OCT2015    Limb swelling     LAST ASSESSED: 49OCH9304    Navicular fracture of ankle     LAST ASSESSED: 77OWC4431    Onychomycosis     LAST ASSESSED: 56MVH7372    Osteoarthritis     Osteopenia     Osteoporosis     Paronychia, finger, unspecified laterality     LAST ASSESSED: 40OZX6030    Paroxysmal atrial fibrillation (HCC)     LAST ASSESSED: 02MAR2014    Peripheral vascular disease (HCC)     Plantar fasciitis     Talus fracture     LAST ASSESSED: 88AKT3324    Vascular headache      Past Surgical History:   Procedure Laterality Date    APPENDECTOMY      BONE BIOPSY Left 8/9/2024    Procedure: Bone bx of proximal phalanx second toe & second met with fluoroscopic guidance;  Surgeon: Jonny Marcial DPM;  Location: [facility];  Service: Podiatry    CATARACT EXTRACTION Bilateral     CHOLECYSTECTOMY      COLONOSCOPY      JOINT REPLACEMENT Bilateral     knee    NEUROPLASTY / TRANSPOSITION MEDIAN NERVE AT CARPAL TUNNEL BILATERAL Bilateral     DECOMPRESSION    OOPHORECTOMY Bilateral     age 81    PELVIC FLOOR REPAIR  2014    [place] BIOPSY BONE TROCAR/NEEDLE SUPERFICIAL Left 8/9/2024    Procedure: Left foot debridement;  Surgeon:  Jonny Marcial DPM;  Location: [facility];  Service: Podiatry    AK DEBRIDEMENT MUSCLE &/FASCIA 1ST 20 SQ CM/< Left 2024    Procedure: Left foot debridement;  Surgeon: Jonny Marcial DPM;  Location: [facility];  Service: Podiatry    SINUS SURGERY      TOTAL KNEE ARTHROPLASTY Bilateral      Social History     Socioeconomic History    Marital status:      Spouse name: None    Number of children: None    Years of education: None    Highest education level: None   Occupational History    Occupation: retired   Tobacco Use    Smoking status: Former     Current packs/day: 0.00     Average packs/day: 0.3 packs/day for 2.0 years (0.5 ttl pk-yrs)     Types: Cigarettes     Start date:      Quit date:      Years since quittin.7    Smokeless tobacco: Never    Tobacco comments:     On and off socially with friends    Vaping Use    Vaping status: Never Used   Substance and Sexual Activity    Alcohol use: Not Currently     Comment: SOCIAL    Drug use: No    Sexual activity: Not Currently     Partners: Male     Birth control/protection: Post-menopausal   Other Topics Concern    None   Social History Narrative    DAILY COFFEE CONSUMPTION (2 CUPS/DAY)    EXERCISING REGULARLY     Social Determinants of Health     Financial Resource Strain: Not on file   Food Insecurity: No Food Insecurity (2024)    Hunger Vital Sign     Worried About Running Out of Food in the Last Year: Never true     Ran Out of Food in the Last Year: Never true   Transportation Needs: No Transportation Needs (2024)    PRAPARE - Transportation     Lack of Transportation (Medical): No     Lack of Transportation (Non-Medical): No   Physical Activity: Not on file   Stress: Not on file   Social Connections: Not on file   Intimate Partner Violence: Not on file   Housing Stability: Low Risk  (2024)    Housing Stability Vital Sign     Unable to Pay for Housing in the Last Year: No     Number of Times Moved in the Last  Year: 0     Homeless in the Last Year: No        Current Outpatient Medications:     albuterol (2.5 mg/3 mL) 0.083 % nebulizer solution, Take 3 mL (2.5 mg total) by nebulization every 8 (eight) hours, Disp: 270 mL, Rfl: 3    albuterol (Proventil HFA) 90 mcg/act inhaler, Inhale 2 puffs every 6 (six) hours as needed for wheezing, Disp: 20.1 g, Rfl: 3    atorvastatin (LIPITOR) 40 mg tablet, Take 1 tablet (40 mg total) by mouth every evening, Disp: 90 tablet, Rfl: 1    Cholecalciferol (VITAMIN D3) 1000 units CAPS, Take 2,000 Units by mouth daily (Patient not taking: Reported on 8/30/2024), Disp: , Rfl:     fluticasone (FLONASE) 50 mcg/act nasal spray, USE 2 SPRAYS IN EACH NOSTRIL ONCE DAILY, Disp: 48 g, Rfl: 3    Fluticasone-Salmeterol (Advair Diskus) 100-50 mcg/dose inhaler, Inhale 1 puff 2 (two) times a day Rinse mouth after use., Disp: 180 blister, Rfl: 1    levothyroxine 50 mcg tablet, TAKE 1 TABLET DAILY BUT 1-1/2 TABLETS 4 DAYS/WEEK, Disp: 40 tablet, Rfl: 5    metoprolol succinate (TOPROL-XL) 100 mg 24 hr tablet, Take 1 tablet (100 mg total) by mouth daily, Disp: 30 tablet, Rfl: 0    pantoprazole (PROTONIX) 40 mg tablet, Take 1 tablet (40 mg total) by mouth daily in the early morning, Disp: 30 tablet, Rfl: 0    sodium chloride 3 % inhalation solution, Take 4 mL by nebulization 3 (three) times a day, Disp: 360 mL, Rfl: 2    sulfamethoxazole-trimethoprim (BACTRIM) 400-80 mg per tablet, Take 1 tablet by mouth every 12 (twelve) hours for 10 days, Disp: 20 tablet, Rfl: 0    torsemide (DEMADEX) 20 mg tablet, Take 2 tablets (40 mg total) by mouth 2 (two) times a day, Disp: 180 tablet, Rfl: 3    warfarin (Jantoven) 2.5 mg tablet, Take 1 tablet (2.5 mg total) by mouth daily, Disp: 30 tablet, Rfl: 0  Family History   Problem Relation Age of Onset    Pancreatic cancer Mother 93    Heart failure Mother     Coronary artery disease Family     Breast cancer Family     Other Family         BREAST DISORDER, GASTROINTESTINAL CANCER  "   Uterine cancer Family     Hyperlipidemia Family     Osteoarthritis Family     Ovarian cancer Family     Brain cancer Son     Stroke Father     No Known Problems Sister     No Known Problems Daughter     No Known Problems Maternal Grandmother     No Known Problems Maternal Grandfather     No Known Problems Paternal Grandmother     No Known Problems Paternal Grandfather     Breast cancer Cousin 50    Breast cancer Cousin 50    Ovarian cancer Other 49      Review of Systems  Allergies:  Asa [aspirin] and Nsaids      Objective:  /69   Pulse 91   Temp (!) 96.8 °F (36 °C)   Resp 18   LMP  (LMP Unknown)     Physical Exam      Wound 06/28/24 Neuropathic Plantar Left;Plantar (Active)   Wound Image Images linked 09/18/24 1522   Wound Description Dry;Pink;Brown;Eschar 09/18/24 1511   Susu-wound Assessment Callus;Intact 09/18/24 1511   Wound Length (cm) 0.1 cm 09/18/24 1511   Wound Width (cm) 0.1 cm 09/18/24 1511   Wound Depth (cm) 0.1 cm 09/18/24 1511   Wound Surface Area (cm^2) 0.01 cm^2 09/18/24 1511   Wound Volume (cm^3) 0.001 cm^3 09/18/24 1511   Calculated Wound Volume (cm^3) 0 cm^3 09/18/24 1511   Change in Wound Size % 100 09/18/24 1511   Drainage Amount None 09/18/24 1511   Non-staged Wound Description Full thickness 09/18/24 1511   Dressing Status Intact 09/18/24 1511                    Debridement   Wound 06/28/24 Neuropathic Plantar Left;Plantar    Universal Protocol:  procedure performed by consultantConsent: Verbal consent obtained.  Risks and benefits: risks, benefits and alternatives were discussed  Consent given by: patient  Time out: Immediately prior to procedure a \"time out\" was called to verify the correct patient, procedure, equipment, support staff and site/side marked as required.  Patient understanding: patient states understanding of the procedure being performed  Patient identity confirmed: verbally with patient    Debridement Details  Performed by: physician  Debridement type: " "surgical  Level of debridement: subcutaneous tissue  Pain control: lidocaine 4%      Post-debridement measurements  Length (cm): 0.1  Width (cm): 0.1  Depth (cm): 0.4  Percent debrided: 100%  Surface Area (cm^2): 0.01  Area Debrided (cm^2): 0.01  Volume (cm^3): 0    Tissue and other material debrided: subcutaneous tissue  Devitalized tissue debrided: biofilm, callus and fibrin  Instrument(s) utilized: blade  Bleeding: small  Hemostasis obtained with: pressure  Procedural pain (0-10): insensate  Post-procedural pain: insensate   Response to treatment: procedure was tolerated well         Results from last 6 Months   Lab Units 09/04/24  1223   WOUND CULTURE  Few Colonies of Proteus mirabilis*  Few Colonies of Acinetobacter baumannii*         Wound Instructions:  Orders Placed This Encounter   Procedures    Wound cleansing and dressings Neuropathic Left;Plantar Plantar     Wound location left foot   Change dressing 3x per week.   You may remove the dressing and shower. Do not leave wound open to air, apply new dressing immediately.  Please use shower chair so the foot is not directly on the shower floor.  Cleanse the foot last when showering.    Cleanse the wound with wound cleanser or mild soap and water, rinse, pat dry.  Apply alginate with silver (Melgisorb AG) to wound bed.     Cover with gauze or ABD pad.    Secure with cast padding then roll gauze and tape.        1 layer of 1/4\" felt pad to the inside sole of the surgical shoe  Please leave this in place when changing the dressing.    Surgical shoe in left foot at all times your foot will hit the floor     Standing Status:   Future     Standing Expiration Date:   9/25/2024    Wound Procedure Treatment Neuropathic Left;Plantar Plantar     This order was created via procedure documentation    Debridement Neuropathic Left;Plantar Plantar     This order was created via procedure documentation         Jonny Marcial DPM      Portions of the record may have " "been created with voice recognition software. Occasional wrong word or \"sound a like\" substitutions may have occurred due to the inherent limitations of voice recognition software. Read the chart carefully and recognize, using context, where substitutions have occurred.      " None

## 2024-09-18 NOTE — TELEPHONE ENCOUNTER
She needs a cardiology visit with a nurse practitioner in the next 2 weeks, I do not think anything is currently scheduled.

## 2024-09-18 NOTE — PATIENT INSTRUCTIONS
"Orders Placed This Encounter   Procedures    Wound cleansing and dressings Neuropathic Left;Plantar Plantar     Wound location left foot   Change dressing 3x per week.   You may remove the dressing and shower. Do not leave wound open to air, apply new dressing immediately.  Please use shower chair so the foot is not directly on the shower floor.  Cleanse the foot last when showering.    Cleanse the wound with wound cleanser or mild soap and water, rinse, pat dry.  Apply alginate with silver (Melgisorb AG) to wound bed.     Cover with gauze or ABD pad.    Secure with cast padding then roll gauze and tape.        1 layer of 1/4\" felt pad to the inside sole of the surgical shoe  Please leave this in place when changing the dressing.    Surgical shoe in left foot at all times your foot will hit the floor     Standing Status:   Future     Standing Expiration Date:   9/25/2024      "

## 2024-09-19 NOTE — PROGRESS NOTES
CLARIFY DIAGNOSIS RESPONSE NOTE    Based on the query that was sent to you, please clarify the diagnosis below.    Addendum done.

## 2024-09-20 ENCOUNTER — PATIENT OUTREACH (OUTPATIENT)
Dept: CASE MANAGEMENT | Facility: HOSPITAL | Age: 89
End: 2024-09-20

## 2024-09-20 NOTE — TELEPHONE ENCOUNTER
Patient called back and Insisted that they are well and do not need to see a neurologist at this time     Patient stated they are seen by the PCP and are now in PT and doing well.     Offered our services in the future if the patient changes their mind  Patient agreed and ended the call

## 2024-09-20 NOTE — PROGRESS NOTES
Chart reviewed.   9/7 PCP KENA home visit  9/11 [facility]-Left foot plantar wound I&D   9/18 wt 116 lbs-BMP and needs to see cardiology-10/2  9/18 [facility]-Left foot plantar wound I&D   This RNCM attempted to call patient. There was no answer at either contact numbers and messages left with a request for a call back.  Outreach #2 and an UTR letter sent via My Chart and this RNCM removed self from care team and CCM episode closed.

## 2024-09-20 NOTE — LETTER
Date: 09/20/24  Dear Rebeca Banegas,   My name is Juju; I am a registered nurse care manager working with Dr Dillon's office.  I have not been able to reach you and would like to set a time that I can talk with you over the phone.  My work is to help patients that have complex medical conditions get the care they need. This includes patients who may have been in the hospital or emergency room.    Please call me with any questions you may have. I look forward to speaking with you.  Sincerely,  Juju Keys, RNCM  [phone]  Outpatient Care Manager

## 2024-09-23 ENCOUNTER — APPOINTMENT (OUTPATIENT)
Dept: LAB | Facility: CLINIC | Age: 89
End: 2024-09-23
Payer: COMMERCIAL

## 2024-09-23 ENCOUNTER — ANTICOAG VISIT (OUTPATIENT)
Dept: CARDIOLOGY CLINIC | Facility: CLINIC | Age: 89
End: 2024-09-23

## 2024-09-23 DIAGNOSIS — I50.32 CHRONIC DIASTOLIC CONGESTIVE HEART FAILURE (HCC): Chronic | ICD-10-CM

## 2024-09-23 DIAGNOSIS — I48.21 PERMANENT ATRIAL FIBRILLATION (HCC): Primary | Chronic | ICD-10-CM

## 2024-09-23 DIAGNOSIS — D47.2 MGUS (MONOCLONAL GAMMOPATHY OF UNKNOWN SIGNIFICANCE): Primary | ICD-10-CM

## 2024-09-23 LAB
ALBUMIN SERPL BCG-MCNC: 3.7 G/DL (ref 3.5–5)
ALP SERPL-CCNC: 51 U/L (ref 34–104)
ALT SERPL W P-5'-P-CCNC: 25 U/L (ref 7–52)
ANION GAP SERPL CALCULATED.3IONS-SCNC: 8 MMOL/L (ref 4–13)
AST SERPL W P-5'-P-CCNC: 31 U/L (ref 5–45)
BASOPHILS # BLD AUTO: 0.07 THOUSANDS/ΜL (ref 0–0.1)
BASOPHILS NFR BLD AUTO: 1 % (ref 0–1)
BILIRUB SERPL-MCNC: 0.48 MG/DL (ref 0.2–1)
BUN SERPL-MCNC: 51 MG/DL (ref 5–25)
CALCIUM SERPL-MCNC: 8 MG/DL (ref 8.4–10.2)
CHLORIDE SERPL-SCNC: 98 MMOL/L (ref 96–108)
CO2 SERPL-SCNC: 30 MMOL/L (ref 21–32)
CREAT SERPL-MCNC: 1.84 MG/DL (ref 0.6–1.3)
EOSINOPHIL # BLD AUTO: 1.21 THOUSAND/ΜL (ref 0–0.61)
EOSINOPHIL NFR BLD AUTO: 15 % (ref 0–6)
ERYTHROCYTE [DISTWIDTH] IN BLOOD BY AUTOMATED COUNT: 14.5 % (ref 11.6–15.1)
GLUCOSE P FAST SERPL-MCNC: 97 MG/DL (ref 65–99)
HCT VFR BLD AUTO: 37.7 % (ref 36.5–46.1)
HGB BLD-MCNC: 11.8 G/DL (ref 12–15.4)
IGA SERPL-MCNC: 216 MG/DL (ref 66–433)
IGG SERPL-MCNC: 1137 MG/DL (ref 635–1741)
IGM SERPL-MCNC: 100 MG/DL (ref 45–281)
IMM GRANULOCYTES # BLD AUTO: 0.03 THOUSAND/UL (ref 0–0.2)
IMM GRANULOCYTES NFR BLD AUTO: 0 % (ref 0–2)
LYMPHOCYTES # BLD AUTO: 3.49 THOUSANDS/ΜL (ref 0.6–4.47)
LYMPHOCYTES NFR BLD AUTO: 42 % (ref 14–44)
MCH RBC QN AUTO: 31.6 PG (ref 26.8–34.3)
MCHC RBC AUTO-ENTMCNC: 31.3 G/DL (ref 31.4–37.4)
MCV RBC AUTO: 101 FL (ref 82–98)
MONOCYTES # BLD AUTO: 0.59 THOUSAND/ΜL (ref 0.17–1.22)
MONOCYTES NFR BLD AUTO: 7 % (ref 4–12)
NEUTROPHILS # BLD AUTO: 2.87 THOUSANDS/ΜL (ref 1.85–7.62)
NEUTS SEG NFR BLD AUTO: 35 % (ref 43–75)
NRBC BLD AUTO-RTO: 0 /100 WBCS
PLATELET # BLD AUTO: 244 THOUSANDS/UL (ref 149–390)
PMV BLD AUTO: 9.4 FL (ref 8.9–12.7)
POTASSIUM SERPL-SCNC: 3.7 MMOL/L (ref 3.5–5.3)
PROT SERPL-MCNC: 6.7 G/DL (ref 6.4–8.4)
RBC # BLD AUTO: 3.74 MILLION/UL (ref 3.88–5.12)
SODIUM SERPL-SCNC: 136 MMOL/L (ref 135–147)
TOTAL IGE SMQN RAST: 1345 KU/L (ref 0–113)
TSH SERPL DL<=0.05 MIU/L-ACNC: 4.97 UIU/ML (ref 0.45–4.5)
WBC # BLD AUTO: 8.26 THOUSAND/UL (ref 4.31–10.16)

## 2024-09-23 PROCEDURE — 85025 COMPLETE CBC W/AUTO DIFF WBC: CPT

## 2024-09-23 PROCEDURE — 86335 IMMUNFIX E-PHORSIS/URINE/CSF: CPT

## 2024-09-23 PROCEDURE — 83521 IG LIGHT CHAINS FREE EACH: CPT

## 2024-09-23 PROCEDURE — 84443 ASSAY THYROID STIM HORMONE: CPT

## 2024-09-23 PROCEDURE — 36415 COLL VENOUS BLD VENIPUNCTURE: CPT

## 2024-09-23 PROCEDURE — 86334 IMMUNOFIX E-PHORESIS SERUM: CPT

## 2024-09-23 PROCEDURE — 84165 PROTEIN E-PHORESIS SERUM: CPT

## 2024-09-23 PROCEDURE — 84166 PROTEIN E-PHORESIS/URINE/CSF: CPT

## 2024-09-23 PROCEDURE — 80053 COMPREHEN METABOLIC PANEL: CPT

## 2024-09-23 PROCEDURE — 82784 ASSAY IGA/IGD/IGG/IGM EACH: CPT

## 2024-09-23 PROCEDURE — 82785 ASSAY OF IGE: CPT

## 2024-09-24 ENCOUNTER — TELEPHONE (OUTPATIENT)
Age: 89
End: 2024-09-24

## 2024-09-24 LAB
KAPPA LC FREE SER-MCNC: 81.4 MG/L (ref 3.3–19.4)
KAPPA LC FREE/LAMBDA FREE SER: 2.16 {RATIO} (ref 0.26–1.65)
LAMBDA LC FREE SERPL-MCNC: 37.7 MG/L (ref 5.7–26.3)

## 2024-09-24 NOTE — TELEPHONE ENCOUNTER
----- Message from Santana Dillon MD sent at 9/24/2024  7:01 AM EDT -----  Please call patient and let her know all preliminary blood work stable and she should keep all of her meds the same

## 2024-09-25 ENCOUNTER — OFFICE VISIT (OUTPATIENT)
Dept: WOUND CARE | Facility: HOSPITAL | Age: 89
End: 2024-09-25
Payer: COMMERCIAL

## 2024-09-25 VITALS
HEART RATE: 86 BPM | RESPIRATION RATE: 16 BRPM | TEMPERATURE: 96.3 F | SYSTOLIC BLOOD PRESSURE: 110 MMHG | DIASTOLIC BLOOD PRESSURE: 64 MMHG

## 2024-09-25 DIAGNOSIS — M79.672 LEFT FOOT PAIN: ICD-10-CM

## 2024-09-25 DIAGNOSIS — G60.9 IDIOPATHIC NEUROPATHY: ICD-10-CM

## 2024-09-25 DIAGNOSIS — L97.526 NON-PRESSURE CHRONIC ULCER OF OTHER PART OF LEFT FOOT WITH BONE INVOLVEMENT WITHOUT EVIDENCE OF NECROSIS (HCC): Primary | ICD-10-CM

## 2024-09-25 DIAGNOSIS — M21.6X2 EQUINUS DEFORMITY OF LEFT FOOT: ICD-10-CM

## 2024-09-25 LAB
ALBUMIN SERPL ELPH-MCNC: 3.38 G/DL (ref 3.2–5.1)
ALBUMIN SERPL ELPH-MCNC: 53.7 % (ref 48–70)
ALBUMIN UR ELPH-MCNC: 100 %
ALPHA1 GLOB MFR UR ELPH: 0 %
ALPHA1 GLOB SERPL ELPH-MCNC: 0.28 G/DL (ref 0.15–0.47)
ALPHA1 GLOB SERPL ELPH-MCNC: 4.5 % (ref 1.8–7)
ALPHA2 GLOB MFR UR ELPH: 0 %
ALPHA2 GLOB SERPL ELPH-MCNC: 0.75 G/DL (ref 0.42–1.04)
ALPHA2 GLOB SERPL ELPH-MCNC: 11.9 % (ref 5.9–14.9)
B-GLOBULIN MFR UR ELPH: 0 %
BETA GLOB ABNORMAL SERPL ELPH-MCNC: 0.42 G/DL (ref 0.31–0.57)
BETA1 GLOB SERPL ELPH-MCNC: 6.7 % (ref 4.7–7.7)
BETA2 GLOB SERPL ELPH-MCNC: 9 % (ref 3.1–7.9)
BETA2+GAMMA GLOB SERPL ELPH-MCNC: 0.57 G/DL (ref 0.2–0.58)
GAMMA GLOB ABNORMAL SERPL ELPH-MCNC: 0.89 G/DL (ref 0.4–1.66)
GAMMA GLOB MFR UR ELPH: 0 %
GAMMA GLOB SERPL ELPH-MCNC: 14.2 % (ref 6.9–22.3)
IGG/ALB SER: 1.16 {RATIO} (ref 1.1–1.8)
INTERPRETATION UR IFE-IMP: NORMAL
INTERPRETATION UR IFE-IMP: NORMAL
PROT PATTERN SERPL ELPH-IMP: ABNORMAL
PROT PATTERN UR ELPH-IMP: NORMAL
PROT SERPL-MCNC: 6.3 G/DL (ref 6.4–8.2)
PROT UR-MCNC: 12.3 MG/DL

## 2024-09-25 PROCEDURE — 97597 DBRDMT OPN WND 1ST 20 CM/<: CPT | Performed by: PODIATRIST

## 2024-09-25 PROCEDURE — 84166 PROTEIN E-PHORESIS/URINE/CSF: CPT | Performed by: PATHOLOGY

## 2024-09-25 PROCEDURE — 84165 PROTEIN E-PHORESIS SERUM: CPT | Performed by: PATHOLOGY

## 2024-09-25 PROCEDURE — 86334 IMMUNOFIX E-PHORESIS SERUM: CPT | Performed by: PATHOLOGY

## 2024-09-25 PROCEDURE — 86335 IMMUNFIX E-PHORSIS/URINE/CSF: CPT | Performed by: PATHOLOGY

## 2024-09-25 NOTE — PROGRESS NOTES
Patient ID: Rebeca Banegas is a 89 y.o. adult Date of Birth 7/22/1932     Diagnosis:  1. 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 Left foot pain  -     Ambulatory referral to Physical Therapy; Future  3. Equinus deformity of left foot  -     Ambulatory referral to Physical Therapy; Future  4. Idiopathic neuropathy  -     Ambulatory referral to Physical Therapy; Future     Diagnosis ICD-10-CM Associated Orders   1. 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 Left foot pain  M79.672 Ambulatory referral to Physical Therapy      3. Equinus deformity of left foot  M21.6X2 Ambulatory referral to Physical Therapy      4. Idiopathic neuropathy  G60.9 Ambulatory referral to Physical Therapy           Assessment & Plan:  Selective debridement completed today.  The patient's wound is improving depth is improved dramatically from the last visit.  She is finished with her antibiotics.    Will plan on having patient fitted for customized accommodative insoles.        Notify office of any new acute changes.    If the patient notices any systemic signs of infection including but not limited to fever, chills, nausea, vomiting or significant worsening of wound with increased drainage, redness or streaking up the foot or leg the patient should notify the office or present to the emergency room.      If wound  debridement was completed today this was done in an attempt to promote healing, decrease risk of infection and for limb salvage efforts.     Goal of treatment: wound healing     Efforts to decrease pressure on the wound(s), evaluation and discussion of nutritional status, peripheral vascular status, and infection control have all been addressed with this patient.    Return in about 2 weeks (around 10/9/2024) for Next scheduled follow up, Wound Assessment, recheck.      Chief Complaint   Patient presents with    Follow Up Wound Care Visit     Left foot wound           Subjective:   Patient returns for follow-up on left foot wound.  She is doing well she finished her Bactrim antibiotic.  She has been doing well otherwise denies any significant pain to the foot.    The following portions of the patient's history were reviewed and updated as appropriate:   Patient Active Problem List   Diagnosis    Abnormal findings on diagnostic imaging of other specified body structures    Abnormal findings on diagnostic imaging of urinary organs    Allergic rhinitis    Arthritis    Asthma    Atherosclerosis    Permanent atrial fibrillation (HCC)    Backache    Benign colon polyp    Bifascicular bundle branch block    Chronic diastolic congestive heart failure (HCC)    Esophageal reflux    Essential tremor    Hoarseness    Hyperlipidemia    Primary hypertension    Hypothyroidism    Leg pain    Nasal polyp    Onychomycosis    Ovarian cyst    Pancreatic cyst    Plantar fasciitis    Prediabetes    Pulmonary nodule    Thyroid nodule    Vitamin D deficiency    Pain of left hand    Leg cramps    Sensorineural hearing loss (SNHL), bilateral    Left asymmetrical SNHL    Elevated serum immunoglobulin free light chains    Osteopenia of hip    CKD stage 3b, GFR 30-44 ml/min (HCC)    Labial abscess    Vulvar cysts    Open wound of right upper arm    Secondary hyperparathyroidism (HCC)    Age-related osteoporosis without current pathological  fracture    Chronic cough    MGUS (monoclonal gammopathy of unknown significance)    Bronchiectasis without complication (HCC)    Vaccine counseling    Acute on chronic diastolic CHF (congestive heart failure) (HCC)    Chronic foot ulcer, left, with fat layer exposed (HCC)    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)    Preoperative examination    CVA (cerebral vascular accident) (HCC)    Severe protein-calorie malnutrition (HCC)     Past Medical History:   Diagnosis Date    Abnormal ultrasound     RESOLVED: [id number]    Advice given about COVID-19 virus infection 04/07/2020    Asthma with acute exacerbation     LAST ASSESSED: [id number]    Asymptomatic menopausal state     Atherosclerosis     Atrial fibrillation (HCC)     Chronic obstructive lung disease (HCC)     Congestive heart failure (HCC)     LAST ASSESSED: [id number]    COVID-19 virus infection 03/25/2023    Cuboid fracture     LAST ASSESSED: 98QQH4411    Dyspepsia     Fall 04/01/2024    GERD (gastroesophageal reflux disease)     High risk medication use     LAST ASSESSED: 25AZT5294    Hyperlipidemia     Hypertension     Hypokalemia     Hypothyroidism     Impacted cerumen of both ears     LAST ASSESSED: 86JUY0410    Impacted cerumen of right ear     LAST ASSESSED: 52OGF7056    Influenza     LAST ASSESSED: 41QLH4760    IPMN (intraductal papillary mucinous neoplasm)     RESOLVED: 02OCT2015    Limb swelling     LAST ASSESSED: 08GLO3080    Navicular fracture of ankle     LAST ASSESSED: 36HPN1239    Onychomycosis     LAST ASSESSED: 47KZN7547    Osteoarthritis     Osteopenia     Osteoporosis     Paronychia, finger, unspecified laterality     LAST ASSESSED: 03NVM9630    Paroxysmal atrial fibrillation (HCC)     LAST ASSESSED: 02MAR2014    Peripheral vascular disease (HCC)     Plantar fasciitis     Talus fracture     LAST ASSESSED: 76FLG5953    Vascular headache      Past Surgical History:   Procedure Laterality Date    APPENDECTOMY      BONE BIOPSY  Left 2024    Procedure: Bone bx of proximal phalanx second toe & second met with fluoroscopic guidance;  Surgeon: Jonny Marcial DPM;  Location: [facility];  Service: Podiatry    CATARACT EXTRACTION Bilateral     CHOLECYSTECTOMY      COLONOSCOPY      JOINT REPLACEMENT Bilateral     knee    NEUROPLASTY / TRANSPOSITION MEDIAN NERVE AT CARPAL TUNNEL BILATERAL Bilateral     DECOMPRESSION    OOPHORECTOMY Bilateral     age 81    PELVIC FLOOR REPAIR  2014    MS BIOPSY BONE TROCAR/NEEDLE SUPERFICIAL Left 2024    Procedure: Left foot debridement;  Surgeon: Jonny Marcial DPM;  Location: [facility];  Service: Podiatry    MS DEBRIDEMENT MUSCLE &/FASCIA 1ST 20 SQ CM/< Left 2024    Procedure: Left foot debridement;  Surgeon: Jonny Marcial DPM;  Location: [facility];  Service: Podiatry    SINUS SURGERY      TOTAL KNEE ARTHROPLASTY Bilateral      Social History     Socioeconomic History    Marital status:      Spouse name: None    Number of children: None    Years of education: None    Highest education level: None   Occupational History    Occupation: retired   Tobacco Use    Smoking status: Former     Current packs/day: 0.00     Average packs/day: 0.3 packs/day for 2.0 years (0.5 ttl pk-yrs)     Types: Cigarettes     Start date:      Quit date:      Years since quittin.7    Smokeless tobacco: Never    Tobacco comments:     On and off socially with friends    Vaping Use    Vaping status: Never Used   Substance and Sexual Activity    Alcohol use: Not Currently     Comment: SOCIAL    Drug use: No    Sexual activity: Not Currently     Partners: Male     Birth control/protection: Post-menopausal   Other Topics Concern    None   Social History Narrative    DAILY COFFEE CONSUMPTION (2 CUPS/DAY)    EXERCISING REGULARLY     Social Determinants of Health     Financial Resource Strain: Not on file   Food Insecurity: No Food Insecurity (2024)    Hunger Vital Sign     Worried  About Running Out of Food in the Last Year: Never true     Ran Out of Food in the Last Year: Never true   Transportation Needs: No Transportation Needs (8/28/2024)    PRAPARE - Transportation     Lack of Transportation (Medical): No     Lack of Transportation (Non-Medical): No   Physical Activity: Not on file   Stress: Not on file   Social Connections: Not on file   Intimate Partner Violence: Not on file   Housing Stability: Low Risk  (8/28/2024)    Housing Stability Vital Sign     Unable to Pay for Housing in the Last Year: No     Number of Times Moved in the Last Year: 0     Homeless in the Last Year: No        Current Outpatient Medications:     albuterol (2.5 mg/3 mL) 0.083 % nebulizer solution, Take 3 mL (2.5 mg total) by nebulization every 8 (eight) hours, Disp: 270 mL, Rfl: 3    albuterol (Proventil HFA) 90 mcg/act inhaler, Inhale 2 puffs every 6 (six) hours as needed for wheezing, Disp: 20.1 g, Rfl: 3    atorvastatin (LIPITOR) 40 mg tablet, Take 1 tablet (40 mg total) by mouth every evening, Disp: 90 tablet, Rfl: 1    Cholecalciferol (VITAMIN D3) 1000 units CAPS, Take 2,000 Units by mouth daily (Patient not taking: Reported on 8/30/2024), Disp: , Rfl:     fluticasone (FLONASE) 50 mcg/act nasal spray, USE 2 SPRAYS IN EACH NOSTRIL ONCE DAILY, Disp: 48 g, Rfl: 3    Fluticasone-Salmeterol (Advair Diskus) 100-50 mcg/dose inhaler, Inhale 1 puff 2 (two) times a day Rinse mouth after use., Disp: 180 blister, Rfl: 1    levothyroxine 50 mcg tablet, TAKE 1 TABLET DAILY BUT 1-1/2 TABLETS 4 DAYS/WEEK, Disp: 40 tablet, Rfl: 5    metoprolol succinate (TOPROL-XL) 100 mg 24 hr tablet, Take 1 tablet (100 mg total) by mouth daily, Disp: 30 tablet, Rfl: 0    pantoprazole (PROTONIX) 40 mg tablet, Take 1 tablet (40 mg total) by mouth daily in the early morning, Disp: 30 tablet, Rfl: 0    sodium chloride 3 % inhalation solution, Take 4 mL by nebulization 3 (three) times a day, Disp: 360 mL, Rfl: 2    torsemide (DEMADEX) 20 mg  tablet, Take 2 tablets (40 mg total) by mouth 2 (two) times a day, Disp: 180 tablet, Rfl: 3    warfarin (Jantoven) 2.5 mg tablet, Take 1 tablet (2.5 mg total) by mouth daily, Disp: 30 tablet, Rfl: 0  Family History   Problem Relation Age of Onset    Pancreatic cancer Mother 93    Heart failure Mother     Coronary artery disease Family     Breast cancer Family     Other Family         BREAST DISORDER, GASTROINTESTINAL CANCER    Uterine cancer Family     Hyperlipidemia Family     Osteoarthritis Family     Ovarian cancer Family     Brain cancer Son     Stroke Father     No Known Problems Sister     No Known Problems Daughter     No Known Problems Maternal Grandmother     No Known Problems Maternal Grandfather     No Known Problems Paternal Grandmother     No Known Problems Paternal Grandfather     Breast cancer Cousin 50    Breast cancer Cousin 50    Ovarian cancer Other 49      Review of Systems  Allergies:  Asa [aspirin] and Nsaids      Objective:  /64   Pulse 86   Temp (!) 96.3 °F (35.7 °C)   Resp 16   LMP  (LMP Unknown)     Physical Exam      Wound 06/28/24 Neuropathic Plantar Left;Plantar (Active)   Wound Image Images linked 09/25/24 1544   Wound Description Brown;Eschar;Dry 09/25/24 1546   Susu-wound Assessment Callus;Intact 09/25/24 1546   Wound Length (cm) 0.1 cm 09/25/24 1546   Wound Width (cm) 0.1 cm 09/25/24 1546   Wound Depth (cm) 0.1 cm 09/25/24 1546   Wound Surface Area (cm^2) 0.01 cm^2 09/25/24 1546   Wound Volume (cm^3) 0.001 cm^3 09/25/24 1546   Calculated Wound Volume (cm^3) 0 cm^3 09/25/24 1546   Change in Wound Size % 100 09/25/24 1546   Drainage Amount None 09/25/24 1546   Non-staged Wound Description Full thickness 09/25/24 1546   Patient Tolerance Tolerated well 09/25/24 1546   Dressing Status Intact 09/25/24 1546                  Debridement   Wound 06/28/24 Neuropathic Plantar Left;Plantar    Universal Protocol:  procedure performed by consultantConsent: Verbal consent  "obtained.  Risks and benefits: risks, benefits and alternatives were discussed  Consent given by: patient  Time out: Immediately prior to procedure a \"time out\" was called to verify the correct patient, procedure, equipment, support staff and site/side marked as required.  Patient understanding: patient states understanding of the procedure being performed  Patient identity confirmed: verbally with patient    Debridement Details  Performed by: physician  Debridement type: selective  Pain control: lidocaine 4%      Post-debridement measurements  Length (cm): 0.1  Width (cm): 0.1  Depth (cm): 0.1  Percent debrided: 100%  Surface Area (cm^2): 0.01  Area Debrided (cm^2): 0.01  Volume (cm^3): 0    Devitalized tissue debrided: biofilm, callus and fibrin  Instrument(s) utilized: blade  Bleeding: small  Hemostasis obtained with: pressure  Procedural pain (0-10): insensate  Post-procedural pain: insensate   Response to treatment: procedure was tolerated well         Results from last 6 Months   Lab Units 09/04/24  1223   WOUND CULTURE  Few Colonies of Proteus mirabilis*  Few Colonies of Acinetobacter baumannii*         Wound Instructions:  Orders Placed This Encounter   Procedures    Wound cleansing and dressings Neuropathic Left;Plantar Plantar     Wound location left foot   Change dressing 3x per week.   You may remove the dressing and shower. Do not leave wound open to air, apply new dressing immediately.  Please use shower chair so the foot is not directly on the shower floor.  Cleanse the foot last when showering.    Cleanse the wound with wound cleanser or mild soap and water, rinse, pat dry.  Apply adaptic then alginate with silver (Melgisorb AG) to wound bed.     Cover with gauze or ABD pad.    Secure with cast padding then roll gauze and tape.        1 layer of 1/4\" felt pad to the inside sole of the surgical shoe  Please leave this in place when changing the dressing.    Surgical shoe in left foot at all times " "your foot will hit the floor    Dr. Marcial has given you a script for orthotics.  Please contact [facility] to schedule appt for fitting.     Standing Status:   Future     Standing Expiration Date:   10/2/2024    Wound Procedure Treatment Neuropathic Left;Plantar Plantar     This order was created via procedure documentation    Debridement Neuropathic Left;Plantar Plantar     This order was created via procedure documentation    Ambulatory referral to Physical Therapy     Standing Status:   Future     Standing Expiration Date:   9/25/2025     Referral Priority:   Routine     Referral Type:   Physical Therapy     Referral Reason:   Specialty Services Required     Requested Specialty:   Physical Therapy     Number of Visits Requested:   1     Expiration Date:   9/25/2025         Jonny Marcial DPM      Portions of the record may have been created with voice recognition software. Occasional wrong word or \"sound a like\" substitutions may have occurred due to the inherent limitations of voice recognition software. Read the chart carefully and recognize, using context, where substitutions have occurred.      "

## 2024-09-25 NOTE — PROGRESS NOTES
Wound Procedure Treatment Neuropathic Left;Plantar Plantar    Performed by: Kayli Claire RN  Authorized by: Jonny Marcial DPM    Associated wounds:   Wound 06/28/24 Neuropathic Plantar Left;Plantar  Wound cleansed with:  NSS  Applied primary dressing:  Calcium alginate, Non adherent contact layer and Silver  Applied secondary dressing:  Gauze  Dressing secured with:  Ricky, Tape and Tubifast  Offloading device appllied:  Surgical shoe

## 2024-09-25 NOTE — PATIENT INSTRUCTIONS
"Orders Placed This Encounter   Procedures    Wound cleansing and dressings Neuropathic Left;Plantar Plantar     Wound location left foot   Change dressing 3x per week.   You may remove the dressing and shower. Do not leave wound open to air, apply new dressing immediately.  Please use shower chair so the foot is not directly on the shower floor.  Cleanse the foot last when showering.    Cleanse the wound with wound cleanser or mild soap and water, rinse, pat dry.  Apply adaptic then alginate with silver (Melgisorb AG) to wound bed.     Cover with gauze or ABD pad.    Secure with cast padding then roll gauze and tape.        1 layer of 1/4\" felt pad to the inside sole of the surgical shoe  Please leave this in place when changing the dressing.    Surgical shoe in left foot at all times your foot will hit the floor    Dr. Marcial has given you a script for orthotics.  Please contact [facility] to schedule appt for fitting.     Standing Status:   Future     Standing Expiration Date:   10/2/2024      "

## 2024-10-01 NOTE — PROGRESS NOTES
Cardiology Follow Up    Rebeca Banegas  7/22/1932  [id number]  [facility] [place]  [address] [address] [phone]    Assessment & Plan  Acute on chronic heart failure with preserved ejection fraction (HFpEF) ([facility])  Wt Readings from Last 3 Encounters:   10/02/24 57.1 kg (125 lb 12.8 oz)   09/04/24 49.2 kg (108 lb 6.4 oz)   08/29/24 49.3 kg (108 lb 11 oz)   Basal rales, HJR, 2+ lateral lower extremity edema  Continue torsemide 40 mg twice daily  Metolazone  2.5 mg 30 minutes prior to torsemide in a.m. if lower extremity does not improve in 4 to 5 days take another Metolazone 2.5mg 30 min prior to Torsemide.  BMP in 2 weeks  K dur 20meq one dose only with Metolazone   2gm sodium diet  Follow up in our office in 2 weeks     Orders:    metolazone (ZAROXOLYN) 2.5 mg tablet; Metolazone 2.5mg in am 30 min prior to torsemide, if LE edema does not improve in 4 days repeat    Basic metabolic panel; Future    potassium chloride (Klor-Con M20) 20 mEq tablet; Potassium chloride 20meq once daily tomorrow and in 4-5 days when taking metolazone.    Permanent atrial fibrillation (HCC)  Continue on warfarin goal INR 2.0-3.0 [facility] 9/23/24 INR 2.36   Metoprolol Succinate 100 mg daily       Stage 3 chronic kidney disease, unspecified whether stage 3a or 3b CKD ([facility])  Lab Results   Component Value Date    EGFR 29 07/26/2024    EGFR 23 06/13/2024    EGFR 18 05/30/2024    CREATININE 1.84 (H) 09/23/2024    CREATININE 1.49 (H) 08/29/2024    CREATININE 1.65 (H) 08/28/2024     BMP in 2 weeks   Orders:    potassium chloride (Klor-Con M20) 20 mEq tablet; Potassium chloride 20meq once daily tomorrow and in 4-5 days when taking metolazone.         Interval History:   Ms Rebeca Banegas presents to our office for a follow up visit.  She presents to our office with a one week hx of  fidelia LE edema.  Her weight increased from 116 pounds to  125 pounds.  She admits to baseline shortness of breath with walking a long distance or doing things too fast.  She denies CP, lightheadedness or dizziness.   Rebeca admits to eating a bag of popcorn prior to worsening LE edema.  Last May she experienced similar symptoms.        Medical History   Primary Cardiologist Dr Vyas  Permanent atrial fibrillation [id number]  Chronic HFpEF LVEF 60%   Hyperlipidemia 8/27/24  TG 82 HDL 66 LDL 76  CKD IIIa/b baseline creatinine 1.52-1.90 GFR 29  Bifascicular Block   8/28/24 CVA,  MRI on 8/28/24 foci acute infarcts in the right occipital lobe and right cerebellum, residual right sided weakness undergoing PT     Patient Active Problem List   Diagnosis    Abnormal findings on diagnostic imaging of other specified body structures    Abnormal findings on diagnostic imaging of urinary organs    Allergic rhinitis    Arthritis    Asthma    Atherosclerosis    Permanent atrial fibrillation (HCC)    Backache    Benign colon polyp    Bifascicular bundle branch block    Chronic diastolic congestive heart failure (HCC)    Esophageal reflux    Essential tremor    Hoarseness    Hyperlipidemia    Primary hypertension    Hypothyroidism    Leg pain    Nasal polyp    Onychomycosis    Ovarian cyst    Pancreatic cyst    Plantar fasciitis    Prediabetes    Pulmonary nodule    Thyroid nodule    Vitamin D deficiency    Pain of left hand    Leg cramps    Sensorineural hearing loss (SNHL), bilateral    Left asymmetrical SNHL    Elevated serum immunoglobulin free light chains    Osteopenia of hip    CKD stage 3b, GFR 30-44 ml/min (HCC)    Labial abscess    Vulvar cysts    Open wound of right upper arm    Secondary hyperparathyroidism (HCC)    Age-related osteoporosis without current pathological fracture    Chronic cough    MGUS (monoclonal gammopathy of unknown significance)    Bronchiectasis without complication (HCC)    Vaccine counseling    Acute on chronic diastolic CHF (congestive heart  failure) (HCC)    Chronic foot ulcer, left, with fat layer exposed (HCC)    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)    Preoperative examination    CVA (cerebral vascular accident) (HCC)    Severe protein-calorie malnutrition (HCC)     Past Medical History:   Diagnosis Date    Abnormal ultrasound     RESOLVED: [id number]    Advice given about COVID-19 virus infection 04/07/2020    Asthma with acute exacerbation     LAST ASSESSED: [id number]    Asymptomatic menopausal state     Atherosclerosis     Atrial fibrillation (HCC)     Chronic obstructive lung disease (HCC)     Congestive heart failure (HCC)     LAST ASSESSED: [id number]    COVID-19 virus infection 03/25/2023    Cuboid fracture     LAST ASSESSED: [id number]    Dyspepsia     Fall 04/01/2024    GERD (gastroesophageal reflux disease)     High risk medication use     LAST ASSESSED: [id number]    Hyperlipidemia     Hypertension     Hypokalemia     Hypothyroidism     Impacted cerumen of both ears     LAST ASSESSED: [id number]    Impacted cerumen of right ear     LAST ASSESSED: [id number]    Influenza     LAST ASSESSED: [id number]    IPMN (intraductal papillary mucinous neoplasm)     RESOLVED: 02OCT2015    Limb swelling     LAST ASSESSED: [id number]    Navicular fracture of ankle     LAST ASSESSED: [id number]    Onychomycosis     LAST ASSESSED: [id number]    Osteoarthritis     Osteopenia     Osteoporosis     Paronychia, finger, unspecified laterality     LAST ASSESSED: 31PDV2244    Paroxysmal atrial fibrillation (HCC)     LAST ASSESSED: 02MAR2014    Peripheral vascular disease (HCC)     Plantar fasciitis     Talus fracture     LAST ASSESSED: 40UFO7464    Vascular headache      Social History     Socioeconomic History    Marital status:      Spouse name: Not on file    Number of children: Not on file    Years of education: Not on file    Highest education level: Not on file   Occupational History    Occupation: retired   Tobacco Use    Smoking status:  Former     Current packs/day: 0.00     Average packs/day: 0.3 packs/day for 2.0 years (0.5 ttl pk-yrs)     Types: Cigarettes     Start date:      Quit date:      Years since quittin.8    Smokeless tobacco: Never    Tobacco comments:     On and off socially with friends    Vaping Use    Vaping status: Never Used   Substance and Sexual Activity    Alcohol use: Not Currently     Comment: SOCIAL    Drug use: No    Sexual activity: Not Currently     Partners: Male     Birth control/protection: Post-menopausal   Other Topics Concern    Not on file   Social History Narrative    DAILY COFFEE CONSUMPTION (2 CUPS/DAY)    EXERCISING REGULARLY     Social Determinants of Health     Financial Resource Strain: Not on file   Food Insecurity: No Food Insecurity (2024)    Hunger Vital Sign     Worried About Running Out of Food in the Last Year: Never true     Ran Out of Food in the Last Year: Never true   Transportation Needs: No Transportation Needs (2024)    PRAPARE - Transportation     Lack of Transportation (Medical): No     Lack of Transportation (Non-Medical): No   Physical Activity: Not on file   Stress: Not on file   Social Connections: Not on file   Intimate Partner Violence: Not on file   Housing Stability: Low Risk  (2024)    Housing Stability Vital Sign     Unable to Pay for Housing in the Last Year: No     Number of Times Moved in the Last Year: 0     Homeless in the Last Year: No      Family History   Problem Relation Age of Onset    Pancreatic cancer Mother 93    Heart failure Mother     Coronary artery disease Family     Breast cancer Family     Other Family         BREAST DISORDER, GASTROINTESTINAL CANCER    Uterine cancer Family     Hyperlipidemia Family     Osteoarthritis Family     Ovarian cancer Family     Brain cancer Son     Stroke Father     No Known Problems Sister     No Known Problems Daughter     No Known Problems Maternal Grandmother     No Known Problems Maternal Grandfather      No Known Problems Paternal Grandmother     No Known Problems Paternal Grandfather     Breast cancer Cousin 50    Breast cancer Cousin 50    Ovarian cancer Other 49     Past Surgical History:   Procedure Laterality Date    APPENDECTOMY      BONE BIOPSY Left 8/9/2024    Procedure: Bone bx of proximal phalanx second toe & second met with fluoroscopic guidance;  Surgeon: Jonny Marcial DPM;  Location: [facility];  Service: Podiatry    CATARACT EXTRACTION Bilateral     CHOLECYSTECTOMY      COLONOSCOPY      JOINT REPLACEMENT Bilateral     knee    NEUROPLASTY / TRANSPOSITION MEDIAN NERVE AT CARPAL TUNNEL BILATERAL Bilateral     DECOMPRESSION    OOPHORECTOMY Bilateral     age 81    PELVIC FLOOR REPAIR  2014    TX BIOPSY BONE TROCAR/NEEDLE SUPERFICIAL Left 8/9/2024    Procedure: Left foot debridement;  Surgeon: Jonny Marcial DPM;  Location: [facility];  Service: Podiatry    TX DEBRIDEMENT MUSCLE &/FASCIA 1ST 20 SQ CM/< Left 8/9/2024    Procedure: Left foot debridement;  Surgeon: Jonny Marcial DPM;  Location: [facility];  Service: Podiatry    SINUS SURGERY      TOTAL KNEE ARTHROPLASTY Bilateral        Current Outpatient Medications:     albuterol (2.5 mg/3 mL) 0.083 % nebulizer solution, Take 3 mL (2.5 mg total) by nebulization every 8 (eight) hours, Disp: 270 mL, Rfl: 3    albuterol (Proventil HFA) 90 mcg/act inhaler, Inhale 2 puffs every 6 (six) hours as needed for wheezing, Disp: 20.1 g, Rfl: 3    atorvastatin (LIPITOR) 40 mg tablet, Take 1 tablet (40 mg total) by mouth every evening, Disp: 90 tablet, Rfl: 1    Cholecalciferol (VITAMIN D3) 1000 units CAPS, Take 2,000 Units by mouth daily (Patient not taking: Reported on 8/30/2024), Disp: , Rfl:     fluticasone (FLONASE) 50 mcg/act nasal spray, USE 2 SPRAYS IN EACH NOSTRIL ONCE DAILY, Disp: 48 g, Rfl: 3    Fluticasone-Salmeterol (Advair Diskus) 100-50 mcg/dose inhaler, Inhale 1 puff 2 (two) times a day Rinse mouth after use., Disp: 180 blister,  Rfl: 1    levothyroxine 50 mcg tablet, TAKE 1 TABLET DAILY BUT 1-1/2 TABLETS 4 DAYS/WEEK, Disp: 40 tablet, Rfl: 5    metoprolol succinate (TOPROL-XL) 100 mg 24 hr tablet, Take 1 tablet (100 mg total) by mouth daily, Disp: 30 tablet, Rfl: 0    pantoprazole (PROTONIX) 40 mg tablet, Take 1 tablet (40 mg total) by mouth daily in the early morning, Disp: 30 tablet, Rfl: 0    sodium chloride 3 % inhalation solution, Take 4 mL by nebulization 3 (three) times a day, Disp: 360 mL, Rfl: 2    torsemide (DEMADEX) 20 mg tablet, Take 2 tablets (40 mg total) by mouth 2 (two) times a day, Disp: 180 tablet, Rfl: 3    warfarin (Jantoven) 2.5 mg tablet, Take 1 tablet (2.5 mg total) by mouth daily, Disp: 30 tablet, Rfl: 0  Allergies   Allergen Reactions    Asa [Aspirin] Shortness Of Breath    Nsaids Shortness Of Breath       Labs:  Appointment on 09/23/2024   Component Date Value    Ig Kappa Free Light Chain 09/23/2024 81.4 (H)     Ig Lambda Free Light Wilda* 09/23/2024 37.7 (H)     Kappa/Lambda FluidC Ratio 09/23/2024 2.16 (H)     IGA 09/23/2024 216     IGG 09/23/2024 1,137     IGM 09/23/2024 100     IgE 09/23/2024 1,345 (H)    Ancillary Orders on 09/20/2024   Component Date Value    Protime 09/23/2024 25.8 (H)     INR 09/23/2024 2.36 (H)    Orders Only on 09/17/2024   Component Date Value    Prothrombin Time 09/17/2024 28.7 (H)     INR 09/17/2024 2.7    Orders Only on 09/13/2024   Component Date Value    Prothrombin Time 09/13/2024 33.8 (H)     INR 09/13/2024 3.4    Appointment on 09/10/2024   Component Date Value    Protime 09/10/2024 30.1 (H)     INR 09/10/2024 2.90 (H)     WBC 09/23/2024 8.26     RBC 09/23/2024 3.74 (L)     Hemoglobin 09/23/2024 11.8 (L)     Hematocrit 09/23/2024 37.7     MCV 09/23/2024 101 (H)     MCH 09/23/2024 31.6     MCHC 09/23/2024 31.3 (L)     RDW 09/23/2024 14.5     MPV 09/23/2024 9.4     Platelets 09/23/2024 244     nRBC 09/23/2024 0     Segmented % 09/23/2024 35 (L)     Immature Grans % 09/23/2024 0      Lymphocytes % 09/23/2024 42     Monocytes % 09/23/2024 7     Eosinophils Relative 09/23/2024 15 (H)     Basophils Relative 09/23/2024 1     Absolute Neutrophils 09/23/2024 2.87     Absolute Immature Grans 09/23/2024 0.03     Absolute Lymphocytes 09/23/2024 3.49     Absolute Monocytes 09/23/2024 0.59     Eosinophils Absolute 09/23/2024 1.21 (H)     Basophils Absolute 09/23/2024 0.07     Sodium 09/23/2024 136     Potassium 09/23/2024 3.7     Chloride 09/23/2024 98     CO2 09/23/2024 30     ANION GAP 09/23/2024 8     BUN 09/23/2024 51 (H)     Creatinine 09/23/2024 1.84 (H)     Glucose, Fasting 09/23/2024 97     Calcium 09/23/2024 8.0 (L)     AST 09/23/2024 31     ALT 09/23/2024 25     Alkaline Phosphatase 09/23/2024 51     Total Protein 09/23/2024 6.7     Albumin 09/23/2024 3.7     Total Bilirubin 09/23/2024 0.48     A/G Ratio 09/23/2024 1.16     Albumin Electrophoresis 09/23/2024 53.7     Albumin CONC 09/23/2024 3.38     Alpha 1 09/23/2024 4.5     ALPHA 1 CONC 09/23/2024 0.28     Alpha 2 09/23/2024 11.9     ALPHA 2 CONC 09/23/2024 0.75     Beta-1 09/23/2024 6.7     BETA 1 CONC 09/23/2024 0.42     Beta-2 09/23/2024 9.0 (H)     BETA 2 CONC 09/23/2024 0.57     Gamma Globulin 09/23/2024 14.2     GAMMA CONC 09/23/2024 0.89     Total Protein 09/23/2024 6.3 (L)     SPEP Interpretation 09/23/2024 See Comment     Urine Protein 09/23/2024 12.3     Albumin ELP, Urine 09/23/2024 100.0     Alpha 1, Urine 09/23/2024 0.0     Alpha 2, Urine 09/23/2024 0.0     Beta, Urine 09/23/2024 0.0     Gamma Globulin, Urine 09/23/2024 0.0     UPEP Interp 09/23/2024 See Comment     TSH 3RD GENERATON 09/23/2024 4.971 (H)     Immunofixation Interpret* 09/23/2024 See Comment     Immunofixation Interpret* 09/23/2024 See Comment    Office Visit on 09/04/2024   Component Date Value    Wound Culture 09/04/2024 Few Colonies of Proteus mirabilis (A)     Wound Culture 09/04/2024 Few Colonies of Acinetobacter baumannii (A)     Gram Stain Result 09/04/2024 No  Polys or Bacteria seen    Ancillary Orders on 08/30/2024   Component Date Value    Prothrombin Time 09/03/2024 25.7 (H)     INR 09/03/2024 2.4    Admission on 08/27/2024, Discharged on 08/29/2024   Component Date Value    Ventricular Rate 08/27/2024 102     Atrial Rate 08/27/2024 147     QRSD Interval 08/27/2024 118     QT Interval 08/27/2024 334     QTC Interval 08/27/2024 435     QRS [place] 08/27/2024 267     T Wave Axis 08/27/2024 -7     WBC 08/27/2024 6.19     RBC 08/27/2024 4.55     Hemoglobin 08/27/2024 14.7     Hematocrit 08/27/2024 45.0     MCV 08/27/2024 99 (H)     MCH 08/27/2024 32.3     MCHC 08/27/2024 32.7     RDW 08/27/2024 13.5     MPV 08/27/2024 10.2     Platelets 08/27/2024 102 (L)     nRBC 08/27/2024 0     Segmented % 08/27/2024 52     Immature Grans % 08/27/2024 0     Lymphocytes % 08/27/2024 38     Monocytes % 08/27/2024 7     Eosinophils Relative 08/27/2024 2     Basophils Relative 08/27/2024 1     Absolute Neutrophils 08/27/2024 3.23     Absolute Immature Grans 08/27/2024 0.01     Absolute Lymphocytes 08/27/2024 2.37     Absolute Monocytes 08/27/2024 0.42     Eosinophils Absolute 08/27/2024 0.13     Basophils Absolute 08/27/2024 0.03     Sodium 08/27/2024 131 (L)     Potassium 08/27/2024 5.1     Chloride 08/27/2024 96     CO2 08/27/2024 25     ANION GAP 08/27/2024 10     BUN 08/27/2024 63 (H)     Creatinine 08/27/2024 1.90 (H)     Glucose 08/27/2024 102     Calcium 08/27/2024 9.2     AST 08/27/2024 31     ALT 08/27/2024 25     Alkaline Phosphatase 08/27/2024 44     Total Protein 08/27/2024 7.6     Albumin 08/27/2024 4.0     Total Bilirubin 08/27/2024 0.75     Protime 08/27/2024 37.5 (H)     INR 08/27/2024 3.90 (H)     Hemoglobin A1C 08/27/2024 6.4 (H)     EAG 08/27/2024 137     Cholesterol 08/27/2024 158     Triglycerides 08/27/2024 82     HDL, Direct 08/27/2024 66     LDL Calculated 08/27/2024 76     Triscuspid Valve Regurgi* 08/28/2024 33.0     RAA A4C 08/28/2024 26.8     MV Peak A Hank  08/28/2024 0.25     MV stenosis pressure 1/2* 08/28/2024 41     MV Peak E Hank 08/28/2024 91     E wave deceleration time 08/28/2024 143     E/A ratio 08/28/2024 3.64     MV valve area p 1/2 meth* 08/28/2024 5.37     RA 2D Volume 08/28/2024 85.0     TR Peak Hank 08/28/2024 2.9     RVID d 08/28/2024 3.0     A4C EF 08/28/2024 57     Tricuspid valve peak reg* 08/28/2024 2.88     Left ventricular stroke * 08/28/2024 30.00     IVSd 08/28/2024 1.20     Tricuspid annular plane * 08/28/2024 1.20     Ao root 08/28/2024 2.60     LVPWd 08/28/2024 1.30     LA size 08/28/2024 4.2     FS 08/28/2024 35     LVIDS 08/28/2024 2.20     IVS 08/28/2024 1.2     LVIDd 08/28/2024 3.40     LEFT VENTRICLE SYSTOLIC * 08/28/2024 17     LV DIASTOLIC VOLUME (MOD* 08/28/2024 46     Left Atrium Area-systoli* 08/28/2024 31.4     LVSV, 2D 08/28/2024 30     BSA 08/28/2024 1.51     LV EF 08/28/2024 60     Est. RA pres 08/28/2024 8.0     Right Ventricular Peak S* 08/28/2024 41.00     Sodium 08/28/2024 134 (L)     Potassium 08/28/2024 4.4     Chloride 08/28/2024 97     CO2 08/28/2024 29     ANION GAP 08/28/2024 8     BUN 08/28/2024 60 (H)     Creatinine 08/28/2024 1.65 (H)     Glucose 08/28/2024 96     Calcium 08/28/2024 8.8     WBC 08/28/2024 7.37     RBC 08/28/2024 4.04     Hemoglobin 08/28/2024 13.0     Hematocrit 08/28/2024 40.1     MCV 08/28/2024 99 (H)     MCH 08/28/2024 32.2     MCHC 08/28/2024 32.4     RDW 08/28/2024 13.4     Platelets 08/28/2024 81 (L)     MPV 08/28/2024 10.5     Protime 08/28/2024 37.9 (H)     INR 08/28/2024 3.95 (H)     Sodium 08/29/2024 133 (L)     Potassium 08/29/2024 4.4     Chloride 08/29/2024 99     CO2 08/29/2024 25     ANION GAP 08/29/2024 9     BUN 08/29/2024 49 (H)     Creatinine 08/29/2024 1.49 (H)     Glucose 08/29/2024 91     Calcium 08/29/2024 8.7     Protime 08/29/2024 28.9 (H)     INR 08/29/2024 2.75 (H)    Orders Only on 08/20/2024   Component Date Value    Prothrombin Time 08/20/2024 26.8 (H)     INR 08/20/2024  2.5    Admission on 08/09/2024, Discharged on 08/09/2024   Component Date Value    Protime 08/09/2024 15.1 (H)     INR 08/09/2024 1.18     Anaerobic Culture 08/09/2024 No anaerobes isolated     Anaerobic Culture 08/09/2024 No anaerobes isolated     Tissue Culture 08/09/2024 Growth in Broth culture only Staphylococcus epidermidis (A)     Tissue Culture 08/09/2024 Growth in Broth culture only Dermobacter hominis (A)     Gram Stain Result 08/09/2024 2+ Polys     Gram Stain Result 08/09/2024 No bacteria seen     Tissue Culture 08/09/2024 Few Colonies of Staphylococcus coagulase negative (A)     Gram Stain Result 08/09/2024 No Polys or Bacteria seen     Case Report 08/09/2024                      Value:Surgical Pathology Report                         Case: [id number]                                  Authorizing Provider:  Jonny Marcial DPM Collected:           08/09/2024 1317              Ordering Location:     [facility]        Received:            08/09/2024 [address] [place] [address] Operating Room                                                     Pathologist:           Sebas Mendoza MD                                                          Specimens:   A) - Bone, left 2nd met bone biopsy/Toe, Left                                                       B) - Bone, left 2nd toe bone biopsy/Toe, Left                                              Final Diagnosis 08/09/2024                      Value:A. Bone, metatarsal, 2nd toe, left, biopsy:  -   Bone fragments with chronic reactive changes.  -   Negative for acute osteomyelitis.     B. Bone, 2nd toe, left, biopsy:  -   Bone with chronic reactive changes.  -   Negative for acute osteomyelitis.         Additional Information 08/09/2024                      Value:All reported additional testing was performed with appropriately reactive controls.  These tests were developed and their performance characteristics determined by  "[facility] or appropriate performing facility, though some tests may be performed on tissues which have not been validated for performance characteristics (such as staining performed on alcohol exposed cell blocks and decalcified tissues).  Results should be interpreted with caution and in the context of the patients’ clinical condition. These tests may not be cleared or approved by the U.S. Food and Drug Administration, though the FDA has determined that such clearance or approval is not necessary. These tests are used for clinical purposes and they should not be regarded as investigational or for research. This laboratory has been approved by CLIA 88, designated as a high-complexity laboratory and is qualified to perform these tests.  .Interpretation performed at [facility], [address] [facility] [address]        Gross Description 08/09/2024                      Value:A. The specimen is received in formalin, labeled with the patient's name and hospital number, and is designated \" left second metatarsal bone biopsy, left toe\".  The specimen consists of a tan-brown osseous tissue fragment measuring 0.7 cm.  Entirely submitted. One cassette.  In an embedding bag.  Following decalcification in decal II.  B. The specimen is received in formalin, labeled with the patient's name and hospital number, and is designated \" left second toe bone biopsy\".  The specimen consists of a tan osseous tissue core measuring 1 cm in length by 0.1 to 0.2 cm in diameter.  Entirely submitted. One cassette.  In an embedding bag.  Following decalcification in decal II.    Note: The estimated total formalin fixation time based upon information provided by the submitting clinician and the standard processing schedule is over 72 hours.    MCrites     There may be more visits with results that are not included.     Imaging: XR foot 3+ vw left    Result Date: 9/4/2024  Narrative: LEFT FOOT " INDICATION:   Non-pressure chronic ulcer of other part 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. COMPARISON: 6/18/2024. VIEWS:  XR FOOT 3+ VW LEFT Images: 3 FINDINGS: There is limited by osteopenia. No definite osseous destruction or erosion seen radiographically within this limitation. Redemonstration of a deformed and hypoplastic fourth ray with fragmentation of the fourth metatarsal, similar to the prior. Resection arthroplasty of the second PIP joint present. There is moderate-severe tarsometatarsal osteoarthritis. There is severe second MTP osteoarthritis. There is dorsal soft tissue swelling of the forefoot. There is a plantar calcaneal spur. Vascular calcifications present.     Impression: Limited by osteopenia. No definite osseous destruction or erosion seen to suggest presence of osteomyelitis. Further evaluation can be performed with MRI as clinically indicated. Chronic changes similar to the prior. Electronically signed: 09/04/2024 05:23 PM Lopez Crane MD      Review of Systems:  Review of Systems   Respiratory:  Positive for shortness of breath.    Cardiovascular:  Positive for leg swelling.   Musculoskeletal:  Positive for arthralgias and myalgias.        Using a cane to assist with ambulation   All other systems reviewed and are negative.      Physical Exam:  Physical Exam  Vitals reviewed.   Constitutional:       Appearance: Normal appearance.   Neck:      Comments: +HJR   Cardiovascular:      Rate and Rhythm: Normal rate and regular rhythm.      Pulses: Normal pulses.      Heart sounds: Normal heart sounds.   Pulmonary:      Effort: Pulmonary effort is normal.      Breath sounds: Rales present.      Comments: Bibasilar rales  Musculoskeletal:         General: Normal range of motion.      Cervical back: Normal range of motion and neck supple.      Right lower leg: Edema present.      Left lower leg: Edema present.      Comments: 2+ fidelia LE edema to knees   Skin:     General: Skin is warm and dry.       Capillary Refill: Capillary refill takes less than 2 seconds.   Neurological:      General: No focal deficit present.      Mental Status: She is alert and oriented to person, place, and time.   Psychiatric:         Mood and Affect: Mood normal.         Behavior: Behavior normal.

## 2024-10-02 ENCOUNTER — OFFICE VISIT (OUTPATIENT)
Dept: CARDIOLOGY CLINIC | Facility: CLINIC | Age: 89
End: 2024-10-02
Payer: COMMERCIAL

## 2024-10-02 VITALS
BODY MASS INDEX: 21.48 KG/M2 | HEIGHT: 64 IN | WEIGHT: 125.8 LBS | DIASTOLIC BLOOD PRESSURE: 52 MMHG | SYSTOLIC BLOOD PRESSURE: 92 MMHG | OXYGEN SATURATION: 99 % | HEART RATE: 85 BPM

## 2024-10-02 DIAGNOSIS — I48.21 PERMANENT ATRIAL FIBRILLATION (HCC): ICD-10-CM

## 2024-10-02 DIAGNOSIS — I50.33 ACUTE ON CHRONIC HEART FAILURE WITH PRESERVED EJECTION FRACTION (HFPEF) (HCC): Primary | ICD-10-CM

## 2024-10-02 DIAGNOSIS — N18.30 STAGE 3 CHRONIC KIDNEY DISEASE, UNSPECIFIED WHETHER STAGE 3A OR 3B CKD (HCC): ICD-10-CM

## 2024-10-02 PROCEDURE — 99215 OFFICE O/P EST HI 40 MIN: CPT | Performed by: NURSE PRACTITIONER

## 2024-10-02 RX ORDER — METOLAZONE 2.5 MG/1
TABLET ORAL
Qty: 2 TABLET | Refills: 0 | Status: SHIPPED | OUTPATIENT
Start: 2024-10-02

## 2024-10-02 RX ORDER — POTASSIUM CHLORIDE 1500 MG/1
TABLET, EXTENDED RELEASE ORAL
Qty: 2 TABLET | Refills: 0 | Status: SHIPPED | OUTPATIENT
Start: 2024-10-02

## 2024-10-02 NOTE — PATIENT INSTRUCTIONS
Metolazone 2.5mg in am 30 min prior to Torsemide if LE edema does not improve in 5 days repeat dose

## 2024-10-02 NOTE — ASSESSMENT & PLAN NOTE
Continue on warfarin goal INR 2.0-3.0 followed by [facility], 9/23/24 INR 2.36   Metoprolol Succinate 100 mg daily

## 2024-10-03 ENCOUNTER — NURSE TRIAGE (OUTPATIENT)
Age: 89
End: 2024-10-03

## 2024-10-03 NOTE — TELEPHONE ENCOUNTER
"  Patient call:  Pt stated provider: Dr. Wasserman    Actionable item: Provider review    What is the reason for the call/chief complaint?    Reports chronic cough over months to about 1 year. Clear mucous. Denies any other symptoms. CHF, COPD, and Asthma- chronic SOB-declines worsening.   Worsened cough when laying on left side.   Trying to nebulize frequently.     Dispo: Routing to provider for review and recommendation. No sooner appointment with Dr. Wasserman. Not interested in different provider.   Informed to call [facility] with worsening symptoms.  Agrees with plan.   All questions answered.     Reason for Disposition   Patient wants to be seen    Answer Assessment - Initial Assessment Questions  1. ONSET: \"When did the cough begin?\"       Ongoing for months - 1 year  2. SEVERITY: \"How bad is the cough today?\"       More constant / frequent  3. SPUTUM: \"Describe the color of your sputum\" (none, dry cough; clear, white, yellow, green)      clear  4. HEMOPTYSIS: \"Are you coughing up any blood?\" If so ask: \"How much?\" (flecks, streaks, tablespoons, etc.)      denies  5. DIFFICULTY BREATHING: \"Are you having difficulty breathing?\" If Yes, ask: \"How bad is it?\" (e.g., mild, moderate, severe)     - MILD: No SOB at rest, mild SOB with walking, speaks normally in sentences, can lay down, no retractions, pulse < 100.     - MODERATE: SOB at rest, SOB with minimal exertion and prefers to sit, cannot lie down flat, speaks in phrases, mild retractions, audible wheezing, pulse 100-120.     - SEVERE: Very SOB at rest, speaks in single words, struggling to breathe, sitting hunched forward, retractions, pulse > 120       Yes- chronic conditions  6. FEVER: \"Do you have a fever?\" If Yes, ask: \"What is your temperature, how was it measured, and when did it start?\"      denies  7. CARDIAC HISTORY: \"Do you have any history of heart disease?\" (e.g., heart attack, congestive heart failure)   CHF  8. LUNG HISTORY: \"Do you have any history of " "lung disease?\"  (e.g., pulmonary embolus, asthma, emphysema)      COPD/Asthma   9. PE RISK FACTORS: \"Do you have a history of blood clots?\" (or: recent major surgery, recent prolonged travel, bedridden)      CVA  10. OTHER SYMPTOMS: \"Do you have any other symptoms?\" (e.g., runny nose, wheezing, chest pain)        denies    Protocols used: Cough-ADULT-[place]    "

## 2024-10-03 NOTE — TELEPHONE ENCOUNTER
Pt calling returning Irvin's phone call. States the cough has been worsening over the past couple of months. She has been using her flutter valve and nebulizer treatments usually about once per day. Recommended she use flutter valve, albuterol and saline neb solutions 3 times per day per orders.     She will have her phone if Irvin would like to try to call her again.    verbalization

## 2024-10-03 NOTE — TELEPHONE ENCOUNTER
----- Message from [employer] CROOKS sent at 10/3/2024 11:56 AM EDT -----  Patient is calling to see if they can be seen by one of our doctors because they have a persistant cough and they would like to have it taken care of I did offer them an appt with their doctor who is Dr. Wasserman but they need to be seen sooner they said .

## 2024-10-03 NOTE — TELEPHONE ENCOUNTER
Spoke with her.  Reiterated to use her flutter valve and nebulizer 3 times per day.  Will also see if the change in her diuretics helps.

## 2024-10-03 NOTE — TELEPHONE ENCOUNTER
I left her a message to see if this cough has been stable over the past year or is worsening?  Looks like she was just seen by her cardiologist yesterday and is significantly volume overloaded, wondering if the cough may have something to do with that as well.  I want to make sure she was using her flutter valve as well as her nebulizers as prescribed.

## 2024-10-07 ENCOUNTER — EVALUATION (OUTPATIENT)
Dept: PHYSICAL THERAPY | Facility: OTHER | Age: 89
End: 2024-10-07
Payer: COMMERCIAL

## 2024-10-07 DIAGNOSIS — G60.9 IDIOPATHIC NEUROPATHY: ICD-10-CM

## 2024-10-07 DIAGNOSIS — M79.672 LEFT FOOT PAIN: ICD-10-CM

## 2024-10-07 DIAGNOSIS — M21.6X2 EQUINUS DEFORMITY OF LEFT FOOT: ICD-10-CM

## 2024-10-07 DIAGNOSIS — L97.526 NON-PRESSURE CHRONIC ULCER OF OTHER PART OF LEFT FOOT WITH BONE INVOLVEMENT WITHOUT EVIDENCE OF NECROSIS (HCC): ICD-10-CM

## 2024-10-07 PROCEDURE — 97760 ORTHOTIC MGMT&TRAING 1ST ENC: CPT

## 2024-10-07 NOTE — PROGRESS NOTES
Orthotic Evaluation    Today's date: 10/07/24  Patient name: Rebeca Banegas  : 1932  MRN: [id number]  Referring provider: Jonny Marcial,*  Dx:   Encounter Diagnosis     ICD-10-CM    1. 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 Left foot pain  M79.672 Ambulatory referral to Physical Therapy      3. Equinus deformity of left foot  M21.6X2 Ambulatory referral to Physical Therapy      4. Idiopathic neuropathy  G60.9 Ambulatory referral to Physical Therapy          Start Time: 1200  Stop Time: 1245  Total time in clinic (min): 45 minutes     Subjective:    Rebeca presents today for orthotic evaluation. she complains of healing pressure ulcer on bottom of L foot with functional activities including ambulation. Pt would notice a callous at base of L metatarsals, eventually opened resulting in current ulcer. Notes she tried orthotics previously, found them to be too rigid and uncomfortable. The patient plans to use her orthotic for walking. The orthotic will be placed in a standard, size 8.    Objective:    Age: 89 y.o.  Weight: 116    Foot Posture Index Score    Right Foot  Talar dome - +1  Malleolar curve - 0   Calcaneal eversion - 0  Talonavicular bulge - +1  Medial longitudinal arch - +1  Too many toes - +1  Total Score R = +4    Left Foot  Talar dome - 0  Malleolar curve - 0   Calcaneal eversion - 0  Talonavicular bulge - 0  Medial longitudinal arch - 0  Too many toes - 0  Total Score L = 0    Reference Values  Normal =    0 to +5  Pronated =  +6 to +9 Highly Pronated =  10+  Supinated =   -1 to -4 Highly Supinated = -5 to -12    Objective    Healing, non-draining wound L plantar surface, location of 2nd metatarsal head  Callous 2nd metatarsal head bilaterally, as well as additional callous proximal to wound on L  Equinus deformity L foot  Decreased ankle dorsiflexion ROM  bilaterally    Assessment:    Patient requires an accommodative custom orthotic with poron top cover and 2nd met head cut-out to offload current pathology.Patient was educated on the design of the custom orthotic and it's ability to offload her current pathology. Patient was also educated on potential shoe wear changes with device, break-in period, and skin checks to avoid potential skin break down.     Orthotic goals:  1) Patient will have custom foot orthoses fitted to her shoe.   2) Patient will be compliant with break-in period of custom foot orthoses as prescribed by PT.   3) Patient will be compliant with custom foot orthoses use as prescribed by PT.     Plan:    Planned therapy interventions: orthotic fitting/training  Duration in visits: 2

## 2024-10-09 ENCOUNTER — OFFICE VISIT (OUTPATIENT)
Dept: WOUND CARE | Facility: HOSPITAL | Age: 89
End: 2024-10-09
Payer: COMMERCIAL

## 2024-10-09 VITALS
HEART RATE: 100 BPM | DIASTOLIC BLOOD PRESSURE: 60 MMHG | SYSTOLIC BLOOD PRESSURE: 125 MMHG | TEMPERATURE: 97.1 F | RESPIRATION RATE: 18 BRPM

## 2024-10-09 DIAGNOSIS — L97.526 NON-PRESSURE CHRONIC ULCER OF OTHER PART OF LEFT FOOT WITH BONE INVOLVEMENT WITHOUT EVIDENCE OF NECROSIS (HCC): Primary | ICD-10-CM

## 2024-10-09 PROCEDURE — 11042 DBRDMT SUBQ TIS 1ST 20SQCM/<: CPT | Performed by: PODIATRIST

## 2024-10-09 NOTE — PATIENT INSTRUCTIONS
"Orders Placed This Encounter   Procedures    Wound cleansing and dressings Neuropathic Left;Plantar Plantar     Wound location left foot   Change dressing 3x per week.   Shower: only with cast cover.  Do not leave wound open to air, apply new dressing immediately.  Please use shower chair so the foot is not directly on the shower floor.  Use a cast cover over foot   Cleanse the wound with wound cleanser or mild soap and water, rinse, pat dry.  Apply Dermagran to wound bed.     Cover with gauze  and ABD pad.    Secure with roll gauze and tape.    Foam egg with hole in center to offload wound place hole over wound.    2 layers of 1/4\" felt pad to the inside sole of the surgical shoe  Please leave this in place when changing the dressing.    Surgical shoe in left foot at all times your foot will hit the floor     Standing Status:   Future     Standing Expiration Date:   10/16/2024    Wound Procedure Treatment Neuropathic Left;Plantar Plantar     This order was created via procedure documentation      "

## 2024-10-09 NOTE — PROGRESS NOTES
Patient ID: Rebeca Banegas is a 89 y.o. adult Date of Birth 7/22/1932     Diagnosis:  1. 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  Will add some additional padding directly to the wound today.    Also try Dermagran gauze to the area.    If she does not have improvement I did discuss with the option of total contact casting for additional offloading given her decrease in metatarsal fat pad which is likely causing difficulty with healing.        If the patient notices any systemic signs of infection including but not limited to fever, chills, nausea, vomiting or significant worsening of wound with increased drainage, redness or streaking up the foot or leg the patient should notify the office or present to the emergency room.      If wound debridement was completed today this was done in an attempt to promote healing, decrease risk of infection and for limb salvage efforts.     Goal of treatment: wound healing     Efforts to decrease pressure on the wound(s), evaluation and discussion of nutritional status, peripheral vascular status, and infection control have all been addressed with this patient.    Return in about 1 week (around 10/16/2024) for Recheck, wound assessment.      Chief Complaint   Patient presents with   • Follow Up Wound Care Visit     Left foot wound           Subjective:   Patient returns for follow-up on plantar forefoot  ulceration.  She is doing well at this point denies any pain to the area.  Denies any new acute changes.    The following portions of the patient's history were reviewed and updated as appropriate:   Patient Active Problem List   Diagnosis   • Abnormal findings on diagnostic imaging of other specified body structures   • Abnormal findings on diagnostic imaging of urinary organs   • Allergic rhinitis   • Arthritis   • Asthma   • Atherosclerosis   • Permanent atrial fibrillation (HCC)   • Backache   • Benign colon polyp   • Bifascicular bundle branch block   • Chronic diastolic congestive heart failure (HCC)   • Esophageal reflux   • Essential tremor   • Hoarseness   • Hyperlipidemia   • Primary hypertension   • Hypothyroidism   • Leg pain   • Nasal polyp   • Onychomycosis   • Ovarian cyst   • Pancreatic cyst   • Plantar fasciitis   • Prediabetes   • Pulmonary nodule   • Thyroid nodule   • Vitamin D deficiency   • Pain of left hand   • Leg cramps   • Sensorineural hearing loss (SNHL), bilateral   • Left asymmetrical SNHL   • Elevated serum immunoglobulin free light chains   • Osteopenia of hip   • CKD stage 3b, GFR 30-44 ml/min ([facility])   • Labial abscess   • Vulvar cysts   • Open wound of right upper arm   • Secondary hyperparathyroidism (HCC)   • Age-related osteoporosis without current pathological fracture   • Chronic cough   • MGUS (monoclonal gammopathy of unknown significance)   • Bronchiectasis without complication ([facility])   • Vaccine counseling   • Acute on chronic diastolic CHF (congestive heart failure) (HCC)   • Chronic foot ulcer, left, with fat layer exposed (HCC)   • 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)   • Preoperative examination   • CVA (cerebral vascular accident) (HCC)   • Severe protein-calorie malnutrition (HCC)     Past Medical History:   Diagnosis Date   • Abnormal ultrasound     RESOLVED: 26JUN2015   • Advice given about COVID-19 virus infection 04/07/2020   • Asthma with  acute exacerbation     LAST ASSESSED: 21FEB2013   • Asymptomatic menopausal state    • Atherosclerosis    • Atrial fibrillation (HCC)    • Chronic obstructive lung disease (HCC)    • Congestive heart failure (HCC)     LAST ASSESSED: [id number]   • COVID-19 virus infection 03/25/2023   • Cuboid fracture     LAST ASSESSED: 03JUL2014   • Dyspepsia    • Fall 04/01/2024   • GERD (gastroesophageal reflux disease)    • High risk medication use     LAST ASSESSED: 19MAY2014   • Hyperlipidemia    • Hypertension    • Hypokalemia    • Hypothyroidism    • Impacted cerumen of both ears     LAST ASSESSED: [id number]   • Impacted cerumen of right ear     LAST ASSESSED: [id number]   • Influenza     LAST ASSESSED: 19RLZ8891   • IPMN (intraductal papillary mucinous neoplasm)     RESOLVED: 02OCT2015   • Limb swelling     LAST ASSESSED: 19MAY2014   • Navicular fracture of ankle     LAST ASSESSED: 22MAY2014   • Onychomycosis     LAST ASSESSED: 21JUL2015   • Osteoarthritis    • Osteopenia    • Osteoporosis    • Paronychia, finger, unspecified laterality     LAST ASSESSED: 19JUL2013   • Paroxysmal atrial fibrillation (HCC)     LAST ASSESSED: 02MAR2014   • Peripheral vascular disease (HCC)    • Plantar fasciitis    • Talus fracture     LAST ASSESSED: 03JUL2014   • Vascular headache      Past Surgical History:   Procedure Laterality Date   • APPENDECTOMY     • BONE BIOPSY Left 8/9/2024    Procedure: Bone bx of proximal phalanx second toe & second met with fluoroscopic guidance;  Surgeon: Jonny Marcial DPM;  Location: [facility] OR;  Service: Podiatry   • CATARACT EXTRACTION Bilateral    • CHOLECYSTECTOMY     • COLONOSCOPY     • JOINT REPLACEMENT Bilateral     knee   • NEUROPLASTY / TRANSPOSITION MEDIAN NERVE AT CARPAL TUNNEL BILATERAL Bilateral     DECOMPRESSION   • OOPHORECTOMY Bilateral     age 81   • PELVIC FLOOR REPAIR  2014   • MO BIOPSY BONE TROCAR/NEEDLE SUPERFICIAL Left 8/9/2024    Procedure: Left foot debridement;  Surgeon:  Jonny Marcial DPM;  Location: [facility];  Service: Podiatry   • UT DEBRIDEMENT MUSCLE &/FASCIA 1ST 20 SQ CM/< Left 2024    Procedure: Left foot debridement;  Surgeon: Jonny Marcial DPM;  Location: [facility];  Service: Podiatry   • SINUS SURGERY     • TOTAL KNEE ARTHROPLASTY Bilateral      Social History     Socioeconomic History   • Marital status:      Spouse name: Not on file   • Number of children: Not on file   • Years of education: Not on file   • Highest education level: Not on file   Occupational History   • Occupation: retired   Tobacco Use   • Smoking status: Former     Current packs/day: 0.00     Average packs/day: 0.3 packs/day for 2.0 years (0.5 ttl pk-yrs)     Types: Cigarettes     Start date:      Quit date:      Years since quittin.8   • Smokeless tobacco: Never   • Tobacco comments:     On and off socially with friends    Vaping Use   • Vaping status: Never Used   Substance and Sexual Activity   • Alcohol use: Not Currently     Comment: SOCIAL   • Drug use: No   • Sexual activity: Not Currently     Partners: Male     Birth control/protection: Post-menopausal   Other Topics Concern   • Not on file   Social History Narrative    DAILY COFFEE CONSUMPTION (2 CUPS/DAY)    EXERCISING REGULARLY     Social Determinants of Health     Financial Resource Strain: Not on file   Food Insecurity: No Food Insecurity (2024)    Hunger Vital Sign    • Worried About Running Out of Food in the Last Year: Never true    • Ran Out of Food in the Last Year: Never true   Transportation Needs: No Transportation Needs (2024)    PRAPARE - Transportation    • Lack of Transportation (Medical): No    • Lack of Transportation (Non-Medical): No   Physical Activity: Not on file   Stress: Not on file   Social Connections: Not on file   Intimate Partner Violence: Not on file   Housing Stability: Low Risk  (2024)    Housing Stability Vital Sign    • Unable to Pay for Housing in  the Last Year: No    • Number of Times Moved in the Last Year: 0    • Homeless in the Last Year: No        Current Outpatient Medications:   •  albuterol (2.5 mg/3 mL) 0.083 % nebulizer solution, Take 3 mL (2.5 mg total) by nebulization every 8 (eight) hours, Disp: 270 mL, Rfl: 3  •  albuterol (Proventil HFA) 90 mcg/act inhaler, Inhale 2 puffs every 6 (six) hours as needed for wheezing, Disp: 20.1 g, Rfl: 3  •  atorvastatin (LIPITOR) 40 mg tablet, Take 1 tablet (40 mg total) by mouth every evening, Disp: 90 tablet, Rfl: 1  •  Cholecalciferol (VITAMIN D3) 1000 units CAPS, Take 2,000 Units by mouth daily, Disp: , Rfl:   •  fluticasone (FLONASE) 50 mcg/act nasal spray, USE 2 SPRAYS IN EACH NOSTRIL ONCE DAILY, Disp: 48 g, Rfl: 3  •  Fluticasone-Salmeterol (Advair Diskus) 100-50 mcg/dose inhaler, Inhale 1 puff 2 (two) times a day Rinse mouth after use., Disp: 180 blister, Rfl: 1  •  levothyroxine 50 mcg tablet, TAKE 1 TABLET DAILY BUT 1-1/2 TABLETS 4 DAYS/WEEK, Disp: 40 tablet, Rfl: 5  •  metolazone (ZAROXOLYN) 2.5 mg tablet, Metolazone 2.5mg in am 30 min prior to torsemide, if LE edema does not improve in 4 days repeat, Disp: 2 tablet, Rfl: 0  •  metoprolol succinate (TOPROL-XL) 100 mg 24 hr tablet, Take 1 tablet (100 mg total) by mouth daily, Disp: 30 tablet, Rfl: 0  •  pantoprazole (PROTONIX) 40 mg tablet, Take 1 tablet (40 mg total) by mouth daily in the early morning (Patient not taking: Reported on 10/2/2024), Disp: 30 tablet, Rfl: 0  •  potassium chloride (Klor-Con M20) 20 mEq tablet, Potassium chloride 20meq once daily tomorrow and in 4-5 days when taking metolazone., Disp: 2 tablet, Rfl: 0  •  sodium chloride 3 % inhalation solution, Take 4 mL by nebulization 3 (three) times a day, Disp: 360 mL, Rfl: 2  •  torsemide (DEMADEX) 20 mg tablet, Take 2 tablets (40 mg total) by mouth 2 (two) times a day, Disp: 180 tablet, Rfl: 3  •  warfarin (Jantoven) 2.5 mg tablet, Take 1 tablet (2.5 mg total) by mouth daily, Disp:  "30 tablet, Rfl: 0  Family History   Problem Relation Age of Onset   • Pancreatic cancer Mother 93   • Heart failure Mother    • Coronary artery disease Family    • Breast cancer Family    • Other Family         BREAST DISORDER, GASTROINTESTINAL CANCER   • Uterine cancer Family    • Hyperlipidemia Family    • Osteoarthritis Family    • Ovarian cancer Family    • Brain cancer Son    • Stroke Father    • No Known Problems Sister    • No Known Problems Daughter    • No Known Problems Maternal Grandmother    • No Known Problems Maternal Grandfather    • No Known Problems Paternal Grandmother    • No Known Problems Paternal Grandfather    • Breast cancer Cousin 50   • Breast cancer Cousin 50   • Ovarian cancer Other 49      Review of Systems  Allergies:  Asa [aspirin] and Nsaids      Objective:  /60   Pulse 100   Temp (!) 97.1 °F (36.2 °C)   Resp 18   LMP  (LMP Unknown)     Physical Exam      Wound 06/28/24 Neuropathic Plantar Left;Plantar (Active)   Wound Image Images linked 10/09/24 1332   Wound Description Epithelialization;Eschar 10/09/24 1332   Susu-wound Assessment Callus;Intact 10/09/24 1332   Wound Length (cm) 0.1 cm 10/09/24 1332   Wound Width (cm) 0.1 cm 10/09/24 1332   Wound Depth (cm) 0.1 cm 10/09/24 1332   Wound Surface Area (cm^2) 0.01 cm^2 10/09/24 1332   Wound Volume (cm^3) 0.001 cm^3 10/09/24 1332   Calculated Wound Volume (cm^3) 0 cm^3 10/09/24 1332   Change in Wound Size % 100 10/09/24 1332   Drainage Amount None 10/09/24 1332   Non-staged Wound Description Partial thickness 10/09/24 1332   Dressing Status Intact 10/09/24 1332                    Debridement   Wound 06/28/24 Neuropathic Plantar Left;Plantar    Universal Protocol:  procedure performed by consultantConsent: Verbal consent obtained.  Risks and benefits: risks, benefits and alternatives were discussed  Consent given by: patient  Time out: Immediately prior to procedure a \"time out\" was called to verify the correct patient, " "procedure, equipment, support staff and site/side marked as required.  Patient understanding: patient states understanding of the procedure being performed  Patient identity confirmed: verbally with patient    Debridement Details  Performed by: physician  Debridement type: surgical  Level of debridement: subcutaneous tissue  Pain control: lidocaine 4%      Post-debridement measurements  Length (cm): 0.4  Width (cm): 0.3  Depth (cm): 0.4  Percent debrided: 100%  Surface Area (cm^2): 0.12  Area Debrided (cm^2): 0.12  Volume (cm^3): 0.05    Tissue and other material debrided: subcutaneous tissue  Devitalized tissue debrided: biofilm, callus and fibrin  Instrument(s) utilized: blade  Bleeding: small  Hemostasis obtained with: pressure  Procedural pain (0-10): insensate  Post-procedural pain: insensate   Response to treatment: procedure was tolerated well       Results from last 6 Months   Lab Units 09/04/24  1223   WOUND CULTURE  Few Colonies of Proteus mirabilis*  Few Colonies of Acinetobacter baumannii*         Wound Instructions:  Orders Placed This Encounter   Procedures   • Wound cleansing and dressings Neuropathic Left;Plantar Plantar     Wound location left foot   Change dressing 3x per week.   Shower: only with cast cover.  Do not leave wound open to air, apply new dressing immediately.  Please use shower chair so the foot is not directly on the shower floor.  Use a cast cover over foot   Cleanse the wound with wound cleanser or mild soap and water, rinse, pat dry.  Apply Dermagran to wound bed.     Cover with gauze  and ABD pad.    Secure with roll gauze and tape.    Foam egg with hole in center to offload wound place hole over wound.    2 layers of 1/4\" felt pad to the inside sole of the surgical shoe  Please leave this in place when changing the dressing.    Surgical shoe in left foot at all times your foot will hit the floor     Standing Status:   Future     Standing Expiration Date:   10/16/2024   • Wound " "Procedure Treatment Neuropathic Left;Plantar Plantar     This order was created via procedure documentation   • Debridement     This order was created via procedure documentation         Jonny Marcial DPM      Portions of the record may have been created with voice recognition software. Occasional wrong word or \"sound a like\" substitutions may have occurred due to the inherent limitations of voice recognition software. Read the chart carefully and recognize, using context, where substitutions have occurred.      "

## 2024-10-09 NOTE — PROGRESS NOTES
Wound Procedure Treatment Neuropathic Left;Plantar Plantar    Performed by: Jazmin Sanchez RN  Authorized by: Jonny Marcial DPM    Associated wounds:   Wound 06/28/24 Neuropathic Plantar Left;Plantar  Wound cleansed with:  NSS  Applied primary dressing:  Dermagran  Applied secondary dressing:  ABD  Dressing secured with:  Ricky and Tape  Offloading device appllied:  Foam padding and Surgical shoe  Comments:  Foam egg with hole cut in center to surround wound

## 2024-10-10 DIAGNOSIS — I48.21 PERMANENT ATRIAL FIBRILLATION (HCC): Chronic | ICD-10-CM

## 2024-10-10 NOTE — TELEPHONE ENCOUNTER
Reason for call:   [x] Refill   [] Prior Auth  [] Other:     Office:   [] PCP/Provider -   [x] Specialty/Provider - Card    Medication:  metoprolol succinate (TOPROL-XL) 100 mg 24 hr table    Dose/Frequency:  Take 1 tablet (100 mg total) by mouth daily     Quantity: 90    Pharmacy: [facility] - [facility] [address] [phone]    Does the patient have enough for 3 days?   [x] Yes   [] No - Send as HP to POD

## 2024-10-11 RX ORDER — METOPROLOL SUCCINATE 100 MG/1
100 TABLET, EXTENDED RELEASE ORAL DAILY
Qty: 90 TABLET | Refills: 1 | Status: SHIPPED | OUTPATIENT
Start: 2024-10-11 | End: 2025-04-09

## 2024-10-14 ENCOUNTER — APPOINTMENT (OUTPATIENT)
Dept: LAB | Facility: CLINIC | Age: 89
End: 2024-10-14
Payer: COMMERCIAL

## 2024-10-14 DIAGNOSIS — I50.33 ACUTE ON CHRONIC HEART FAILURE WITH PRESERVED EJECTION FRACTION (HFPEF) (HCC): ICD-10-CM

## 2024-10-14 LAB
ANION GAP SERPL CALCULATED.3IONS-SCNC: 14 MMOL/L (ref 4–13)
BUN SERPL-MCNC: 71 MG/DL (ref 5–25)
CALCIUM SERPL-MCNC: 9.4 MG/DL (ref 8.4–10.2)
CHLORIDE SERPL-SCNC: 80 MMOL/L (ref 96–108)
CO2 SERPL-SCNC: 40 MMOL/L (ref 21–32)
CREAT SERPL-MCNC: 1.92 MG/DL (ref 0.6–1.3)
GLUCOSE SERPL-MCNC: 106 MG/DL (ref 65–140)
POTASSIUM SERPL-SCNC: 2.9 MMOL/L (ref 3.5–5.3)
SODIUM SERPL-SCNC: 134 MMOL/L (ref 135–147)

## 2024-10-14 PROCEDURE — 36415 COLL VENOUS BLD VENIPUNCTURE: CPT

## 2024-10-14 PROCEDURE — 80048 BASIC METABOLIC PNL TOTAL CA: CPT

## 2024-10-15 ENCOUNTER — ANTICOAG VISIT (OUTPATIENT)
Dept: CARDIOLOGY CLINIC | Facility: CLINIC | Age: 89
End: 2024-10-15

## 2024-10-15 ENCOUNTER — TELEPHONE (OUTPATIENT)
Dept: CARDIOLOGY CLINIC | Facility: CLINIC | Age: 89
End: 2024-10-15

## 2024-10-15 DIAGNOSIS — N17.9 AKI (ACUTE KIDNEY INJURY) (HCC): ICD-10-CM

## 2024-10-15 DIAGNOSIS — E87.6 HYPOKALEMIA: Primary | ICD-10-CM

## 2024-10-15 DIAGNOSIS — N18.30 STAGE 3 CHRONIC KIDNEY DISEASE, UNSPECIFIED WHETHER STAGE 3A OR 3B CKD (HCC): ICD-10-CM

## 2024-10-15 DIAGNOSIS — I48.21 PERMANENT ATRIAL FIBRILLATION (HCC): Primary | Chronic | ICD-10-CM

## 2024-10-15 DIAGNOSIS — I50.33 ACUTE ON CHRONIC HEART FAILURE WITH PRESERVED EJECTION FRACTION (HFPEF) (HCC): ICD-10-CM

## 2024-10-15 RX ORDER — POTASSIUM CHLORIDE 1500 MG/1
TABLET, EXTENDED RELEASE ORAL
Qty: 10 TABLET | Refills: 0 | Status: SHIPPED | OUTPATIENT
Start: 2024-10-15 | End: 2024-10-17 | Stop reason: SDUPTHER

## 2024-10-15 NOTE — TELEPHONE ENCOUNTER
Did you want Potassium ordered for patient? Called [facility], [address] P: [phone] & spoke to a pharmacist to verify order for patient. Pharmacist stated that they do not have record of Potassium ordered today.    Patient returned call to office requesting to speak with Jordyn.    Patient stated she received message from Jordyn, but could not understand all of it & had questions. Also, patient stated she wanted to report how she is currently feeling.    Patient stated she is feeling rotten. Stated she is weak, achy, it is an effort to do anything. Lethargy has increased.    Stated she knows from Jordyn's message she is to take Potassium this afternoon & one tonight.    Asked for office to please call her back with instructions & use phone # [phone].    Please advise.

## 2024-10-16 NOTE — PROGRESS NOTES
Cardiology Follow Up    Rebeca Banegas  7/22/1932  [id number]  [facility]  [address] [id number] [phone]    Assessment & Plan         Interval History:   Ms Rebeca Banegas presents to our office for a follow up visit.  She presents to our office with a one week hx of  fidelia LE edema.  Her weight increased from 116 pounds to 125 pounds.  She admits to baseline shortness of breath with walking a long distance or doing things too fast.  She denies CP, lightheadedness or dizziness.   Rebeca admits to eating a bag of popcorn prior to worsening LE edema.  Last May she experienced similar symptoms.        Medical History   Primary Cardiologist Dr Vyas  Permanent atrial fibrillation [id number]=  Chronic HFpEF LVEF 60%   Hyperlipidemia 8/27/24  TG 82 HDL 66 LDL 76  CKD IIIa/b baseline creatinine 1.52-1.90 GFR 29  Bifascicular Block   8/28/24 CVA,  MRI on 8/28/24 foci acute infarcts in the right occipital lobe and right cerebellum, residual right sided weakness undergoing PT     Patient Active Problem List   Diagnosis    Abnormal findings on diagnostic imaging of other specified body structures    Abnormal findings on diagnostic imaging of urinary organs    Allergic rhinitis    Arthritis    Asthma    Atherosclerosis    Permanent atrial fibrillation (HCC)    Backache    Benign colon polyp    Bifascicular bundle branch block    Chronic diastolic congestive heart failure (HCC)    Esophageal reflux    Essential tremor    Hoarseness    Hyperlipidemia    Primary hypertension    Hypothyroidism    Leg pain    Nasal polyp    Onychomycosis    Ovarian cyst    Pancreatic cyst    Plantar fasciitis    Prediabetes    Pulmonary nodule    Thyroid nodule    Vitamin D deficiency    Pain of left hand    Leg cramps    Sensorineural hearing loss (SNHL), bilateral    Left asymmetrical SNHL    Elevated serum immunoglobulin free  light chains    Osteopenia of hip    CKD stage 3b, GFR 30-44 ml/min (HCC)    Labial abscess    Vulvar cysts    Open wound of right upper arm    Secondary hyperparathyroidism (HCC)    Age-related osteoporosis without current pathological fracture    Chronic cough    MGUS (monoclonal gammopathy of unknown significance)    Bronchiectasis without complication (HCC)    Vaccine counseling    Acute on chronic diastolic CHF (congestive heart failure) (HCC)    Chronic foot ulcer, left, with fat layer exposed (HCC)    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)    Preoperative examination    CVA (cerebral vascular accident) (HCC)    Severe protein-calorie malnutrition (HCC)     Past Medical History:   Diagnosis Date    Abnormal ultrasound     RESOLVED: [id number]    Advice given about COVID-19 virus infection 04/07/2020    Asthma with acute exacerbation     LAST ASSESSED: [id number]    Asymptomatic menopausal state     Atherosclerosis     Atrial fibrillation (HCC)     Chronic obstructive lung disease (HCC)     Congestive heart failure (HCC)     LAST ASSESSED: [id number]    COVID-19 virus infection 03/25/2023    Cuboid fracture     LAST ASSESSED: [id number]    Dyspepsia     Fall 04/01/2024    GERD (gastroesophageal reflux disease)     High risk medication use     LAST ASSESSED: [id number]    Hyperlipidemia     Hypertension     Hypokalemia     Hypothyroidism     Impacted cerumen of both ears     LAST ASSESSED: [id number]    Impacted cerumen of right ear     LAST ASSESSED: 35KTA9218    Influenza     LAST ASSESSED: 13LTX2160    IPMN (intraductal papillary mucinous neoplasm)     RESOLVED: 02OCT2015    Limb swelling     LAST ASSESSED: 87BPC2803    Navicular fracture of ankle     LAST ASSESSED: 27VWT5585    Onychomycosis     LAST ASSESSED: 92CAF5751    Osteoarthritis     Osteopenia     Osteoporosis     Paronychia, finger, unspecified laterality     LAST ASSESSED: 69ZRO9963    Paroxysmal atrial fibrillation (HCC)     LAST  ASSESSED: 2014    Peripheral vascular disease (HCC)     Plantar fasciitis     Talus fracture     LAST ASSESSED: 47NLN9474    Vascular headache      Social History     Socioeconomic History    Marital status:      Spouse name: Not on file    Number of children: Not on file    Years of education: Not on file    Highest education level: Not on file   Occupational History    Occupation: retired   Tobacco Use    Smoking status: Former     Current packs/day: 0.00     Average packs/day: 0.3 packs/day for 2.0 years (0.5 ttl pk-yrs)     Types: Cigarettes     Start date:      Quit date:      Years since quittin.8    Smokeless tobacco: Never    Tobacco comments:     On and off socially with friends    Vaping Use    Vaping status: Never Used   Substance and Sexual Activity    Alcohol use: Not Currently     Comment: SOCIAL    Drug use: No    Sexual activity: Not Currently     Partners: Male     Birth control/protection: Post-menopausal   Other Topics Concern    Not on file   Social History Narrative    DAILY COFFEE CONSUMPTION (2 CUPS/DAY)    EXERCISING REGULARLY     Social Determinants of Health     Financial Resource Strain: Not on file   Food Insecurity: No Food Insecurity (2024)    Hunger Vital Sign     Worried About Running Out of Food in the Last Year: Never true     Ran Out of Food in the Last Year: Never true   Transportation Needs: No Transportation Needs (2024)    PRAPARE - Transportation     Lack of Transportation (Medical): No     Lack of Transportation (Non-Medical): No   Physical Activity: Not on file   Stress: Not on file   Social Connections: Not on file   Intimate Partner Violence: Not on file   Housing Stability: Low Risk  (2024)    Housing Stability Vital Sign     Unable to Pay for Housing in the Last Year: No     Number of Times Moved in the Last Year: 0     Homeless in the Last Year: No      Family History   Problem Relation Age of Onset    Pancreatic cancer Mother 93     Heart failure Mother     Coronary artery disease Family     Breast cancer Family     Other Family         BREAST DISORDER, GASTROINTESTINAL CANCER    Uterine cancer Family     Hyperlipidemia Family     Osteoarthritis Family     Ovarian cancer Family     Brain cancer Son     Stroke Father     No Known Problems Sister     No Known Problems Daughter     No Known Problems Maternal Grandmother     No Known Problems Maternal Grandfather     No Known Problems Paternal Grandmother     No Known Problems Paternal Grandfather     Breast cancer Cousin 50    Breast cancer Cousin 50    Ovarian cancer Other 49     Past Surgical History:   Procedure Laterality Date    APPENDECTOMY      BONE BIOPSY Left 8/9/2024    Procedure: Bone bx of proximal phalanx second toe & second met with fluoroscopic guidance;  Surgeon: Jonny Marcial DPM;  Location: [facility];  Service: Podiatry    CATARACT EXTRACTION Bilateral     CHOLECYSTECTOMY      COLONOSCOPY      JOINT REPLACEMENT Bilateral     knee    NEUROPLASTY / TRANSPOSITION MEDIAN NERVE AT CARPAL TUNNEL BILATERAL Bilateral     DECOMPRESSION    OOPHORECTOMY Bilateral     age 81    PELVIC FLOOR REPAIR  2014    CO BIOPSY BONE TROCAR/NEEDLE SUPERFICIAL Left 8/9/2024    Procedure: Left foot debridement;  Surgeon: Jonny Marcial DPM;  Location: [facility];  Service: Podiatry    CO DEBRIDEMENT MUSCLE &/FASCIA 1ST 20 SQ CM/< Left 8/9/2024    Procedure: Left foot debridement;  Surgeon: Jonny Marcial DPM;  Location: [facility];  Service: Podiatry    SINUS SURGERY      TOTAL KNEE ARTHROPLASTY Bilateral        Current Outpatient Medications:     albuterol (2.5 mg/3 mL) 0.083 % nebulizer solution, Take 3 mL (2.5 mg total) by nebulization every 8 (eight) hours, Disp: 270 mL, Rfl: 3    albuterol (Proventil HFA) 90 mcg/act inhaler, Inhale 2 puffs every 6 (six) hours as needed for wheezing, Disp: 20.1 g, Rfl: 3    atorvastatin (LIPITOR) 40 mg tablet, Take 1 tablet (40 mg total) by  mouth every evening, Disp: 90 tablet, Rfl: 1    Cholecalciferol (VITAMIN D3) 1000 units CAPS, Take 2,000 Units by mouth daily, Disp: , Rfl:     fluticasone (FLONASE) 50 mcg/act nasal spray, USE 2 SPRAYS IN EACH NOSTRIL ONCE DAILY, Disp: 48 g, Rfl: 3    Fluticasone-Salmeterol (Advair Diskus) 100-50 mcg/dose inhaler, Inhale 1 puff 2 (two) times a day Rinse mouth after use., Disp: 180 blister, Rfl: 1    levothyroxine 50 mcg tablet, TAKE 1 TABLET DAILY BUT 1-1/2 TABLETS 4 DAYS/WEEK, Disp: 40 tablet, Rfl: 5    metolazone (ZAROXOLYN) 2.5 mg tablet, Metolazone 2.5mg in am 30 min prior to torsemide, if LE edema does not improve in 4 days repeat, Disp: 2 tablet, Rfl: 0    metoprolol succinate (TOPROL-XL) 100 mg 24 hr tablet, Take 1 tablet (100 mg total) by mouth daily, Disp: 90 tablet, Rfl: 1    pantoprazole (PROTONIX) 40 mg tablet, Take 1 tablet (40 mg total) by mouth daily in the early morning (Patient not taking: Reported on 10/2/2024), Disp: 30 tablet, Rfl: 0    potassium chloride (Klor-Con M20) 20 mEq tablet, Potassium chloride 20 mEq p.o. twice daily for 2 days and then as directed, Disp: 10 tablet, Rfl: 0    sodium chloride 3 % inhalation solution, Take 4 mL by nebulization 3 (three) times a day, Disp: 360 mL, Rfl: 2    torsemide (DEMADEX) 20 mg tablet, Take 2 tablets (40 mg total) by mouth 2 (two) times a day, Disp: 180 tablet, Rfl: 3    warfarin (Jantoven) 2.5 mg tablet, Take 1 tablet (2.5 mg total) by mouth daily, Disp: 30 tablet, Rfl: 0  Allergies   Allergen Reactions    Asa [Aspirin] Shortness Of Breath    Nsaids Shortness Of Breath       Labs:  Appointment on 10/14/2024   Component Date Value    Sodium 10/14/2024 134 (L)     Potassium 10/14/2024 2.9 (L)     Chloride 10/14/2024 80 (L)     CO2 10/14/2024 40 (H)     ANION GAP 10/14/2024 14 (H)     BUN 10/14/2024 71 (H)     Creatinine 10/14/2024 1.92 (H)     Glucose 10/14/2024 106     Calcium 10/14/2024 9.4    Ancillary Orders on 09/27/2024   Component Date Value     Protime 10/14/2024 26.9 (H)     INR 10/14/2024 2.51 (H)    Appointment on 09/23/2024   Component Date Value    Ig Kappa Free Light Chain 09/23/2024 81.4 (H)     Ig Lambda Free Light Wilda* 09/23/2024 37.7 (H)     Kappa/Lambda FluidC Ratio 09/23/2024 2.16 (H)     IGA 09/23/2024 216     IGG 09/23/2024 1,137     IGM 09/23/2024 100     IgE 09/23/2024 1,345 (H)    Ancillary Orders on 09/20/2024   Component Date Value    Protime 09/23/2024 25.8 (H)     INR 09/23/2024 2.36 (H)    Orders Only on 09/17/2024   Component Date Value    Prothrombin Time 09/17/2024 28.7 (H)     INR 09/17/2024 2.7    Orders Only on 09/13/2024   Component Date Value    Prothrombin Time 09/13/2024 33.8 (H)     INR 09/13/2024 3.4    Appointment on 09/10/2024   Component Date Value    Protime 09/10/2024 30.1 (H)     INR 09/10/2024 2.90 (H)     WBC 09/23/2024 8.26     RBC 09/23/2024 3.74 (L)     Hemoglobin 09/23/2024 11.8 (L)     Hematocrit 09/23/2024 37.7     MCV 09/23/2024 101 (H)     MCH 09/23/2024 31.6     MCHC 09/23/2024 31.3 (L)     RDW 09/23/2024 14.5     MPV 09/23/2024 9.4     Platelets 09/23/2024 244     nRBC 09/23/2024 0     Segmented % 09/23/2024 35 (L)     Immature Grans % 09/23/2024 0     Lymphocytes % 09/23/2024 42     Monocytes % 09/23/2024 7     Eosinophils Relative 09/23/2024 15 (H)     Basophils Relative 09/23/2024 1     Absolute Neutrophils 09/23/2024 2.87     Absolute Immature Grans 09/23/2024 0.03     Absolute Lymphocytes 09/23/2024 3.49     Absolute Monocytes 09/23/2024 0.59     Eosinophils Absolute 09/23/2024 1.21 (H)     Basophils Absolute 09/23/2024 0.07     Sodium 09/23/2024 136     Potassium 09/23/2024 3.7     Chloride 09/23/2024 98     CO2 09/23/2024 30     ANION GAP 09/23/2024 8     BUN 09/23/2024 51 (H)     Creatinine 09/23/2024 1.84 (H)     Glucose, Fasting 09/23/2024 97     Calcium 09/23/2024 8.0 (L)     AST 09/23/2024 31     ALT 09/23/2024 25     Alkaline Phosphatase 09/23/2024 51     Total Protein 09/23/2024 6.7      Albumin 09/23/2024 3.7     Total Bilirubin 09/23/2024 0.48     A/G Ratio 09/23/2024 1.16     Albumin Electrophoresis 09/23/2024 53.7     Albumin CONC 09/23/2024 3.38     Alpha 1 09/23/2024 4.5     ALPHA 1 CONC 09/23/2024 0.28     Alpha 2 09/23/2024 11.9     ALPHA 2 CONC 09/23/2024 0.75     Beta-1 09/23/2024 6.7     BETA 1 CONC 09/23/2024 0.42     Beta-2 09/23/2024 9.0 (H)     BETA 2 CONC 09/23/2024 0.57     Gamma Globulin 09/23/2024 14.2     GAMMA CONC 09/23/2024 0.89     Total Protein 09/23/2024 6.3 (L)     SPEP Interpretation 09/23/2024 See Comment     Urine Protein 09/23/2024 12.3     Albumin ELP, Urine 09/23/2024 100.0     Alpha 1, Urine 09/23/2024 0.0     Alpha 2, Urine 09/23/2024 0.0     Beta, Urine 09/23/2024 0.0     Gamma Globulin, Urine 09/23/2024 0.0     UPEP Interp 09/23/2024 See Comment     TSH 3RD GENERATON 09/23/2024 4.971 (H)     Immunofixation Interpret* 09/23/2024 See Comment     Immunofixation Interpret* 09/23/2024 See Comment    Office Visit on 09/04/2024   Component Date Value    Wound Culture 09/04/2024 Few Colonies of Proteus mirabilis (A)     Wound Culture 09/04/2024 Few Colonies of Acinetobacter baumannii (A)     Gram Stain Result 09/04/2024 No Polys or Bacteria seen    Ancillary Orders on 08/30/2024   Component Date Value    Prothrombin Time 09/03/2024 25.7 (H)     INR 09/03/2024 2.4    Admission on 08/27/2024, Discharged on 08/29/2024   Component Date Value    Ventricular Rate 08/27/2024 102     Atrial Rate 08/27/2024 147     QRSD Interval 08/27/2024 118     QT Interval 08/27/2024 334     QTC Interval 08/27/2024 435     QRS [place] 08/27/2024 267     T Wave Axis 08/27/2024 -7     WBC 08/27/2024 6.19     RBC 08/27/2024 4.55     Hemoglobin 08/27/2024 14.7     Hematocrit 08/27/2024 45.0     MCV 08/27/2024 99 (H)     MCH 08/27/2024 32.3     MCHC 08/27/2024 32.7     RDW 08/27/2024 13.5     MPV 08/27/2024 10.2     Platelets 08/27/2024 102 (L)     nRBC 08/27/2024 0     Segmented % 08/27/2024 52      Immature Grans % 08/27/2024 0     Lymphocytes % 08/27/2024 38     Monocytes % 08/27/2024 7     Eosinophils Relative 08/27/2024 2     Basophils Relative 08/27/2024 1     Absolute Neutrophils 08/27/2024 3.23     Absolute Immature Grans 08/27/2024 0.01     Absolute Lymphocytes 08/27/2024 2.37     Absolute Monocytes 08/27/2024 0.42     Eosinophils Absolute 08/27/2024 0.13     Basophils Absolute 08/27/2024 0.03     Sodium 08/27/2024 131 (L)     Potassium 08/27/2024 5.1     Chloride 08/27/2024 96     CO2 08/27/2024 25     ANION GAP 08/27/2024 10     BUN 08/27/2024 63 (H)     Creatinine 08/27/2024 1.90 (H)     Glucose 08/27/2024 102     Calcium 08/27/2024 9.2     AST 08/27/2024 31     ALT 08/27/2024 25     Alkaline Phosphatase 08/27/2024 44     Total Protein 08/27/2024 7.6     Albumin 08/27/2024 4.0     Total Bilirubin 08/27/2024 0.75     Protime 08/27/2024 37.5 (H)     INR 08/27/2024 3.90 (H)     Hemoglobin A1C 08/27/2024 6.4 (H)     EAG 08/27/2024 137     Cholesterol 08/27/2024 158     Triglycerides 08/27/2024 82     HDL, Direct 08/27/2024 66     LDL Calculated 08/27/2024 76     Triscuspid Valve Regurgi* 08/28/2024 33.0     RAA A4C 08/28/2024 26.8     MV Peak A Hank 08/28/2024 0.25     MV stenosis pressure 1/2* 08/28/2024 41     MV Peak E Hank 08/28/2024 91     E wave deceleration time 08/28/2024 143     E/A ratio 08/28/2024 3.64     MV valve area p 1/2 meth* 08/28/2024 5.37     RA 2D Volume 08/28/2024 85.0     TR Peak Hank 08/28/2024 2.9     RVID d 08/28/2024 3.0     A4C EF 08/28/2024 57     Tricuspid valve peak reg* 08/28/2024 2.88     Left ventricular stroke * 08/28/2024 30.00     IVSd 08/28/2024 1.20     Tricuspid annular plane * 08/28/2024 1.20     Ao root 08/28/2024 2.60     LVPWd 08/28/2024 1.30     LA size 08/28/2024 4.2     FS 08/28/2024 35     LVIDS 08/28/2024 2.20     IVS 08/28/2024 1.2     LVIDd 08/28/2024 3.40     LEFT VENTRICLE SYSTOLIC * 08/28/2024 17     LV DIASTOLIC VOLUME (MOD* 08/28/2024 46     Left  Atrium Area-systoli* 08/28/2024 31.4     LVSV, 2D 08/28/2024 30     BSA 08/28/2024 1.51     LV EF 08/28/2024 60     Est. RA pres 08/28/2024 8.0     Right Ventricular Peak S* 08/28/2024 41.00     Sodium 08/28/2024 134 (L)     Potassium 08/28/2024 4.4     Chloride 08/28/2024 97     CO2 08/28/2024 29     ANION GAP 08/28/2024 8     BUN 08/28/2024 60 (H)     Creatinine 08/28/2024 1.65 (H)     Glucose 08/28/2024 96     Calcium 08/28/2024 8.8     WBC 08/28/2024 7.37     RBC 08/28/2024 4.04     Hemoglobin 08/28/2024 13.0     Hematocrit 08/28/2024 40.1     MCV 08/28/2024 99 (H)     MCH 08/28/2024 32.2     MCHC 08/28/2024 32.4     RDW 08/28/2024 13.4     Platelets 08/28/2024 81 (L)     MPV 08/28/2024 10.5     Protime 08/28/2024 37.9 (H)     INR 08/28/2024 3.95 (H)     Sodium 08/29/2024 133 (L)     Potassium 08/29/2024 4.4     Chloride 08/29/2024 99     CO2 08/29/2024 25     ANION GAP 08/29/2024 9     BUN 08/29/2024 49 (H)     Creatinine 08/29/2024 1.49 (H)     Glucose 08/29/2024 91     Calcium 08/29/2024 8.7     Protime 08/29/2024 28.9 (H)     INR 08/29/2024 2.75 (H)    There may be more visits with results that are not included.     Imaging: XR foot 3+ vw left    Result Date: 9/4/2024  Narrative: LEFT FOOT INDICATION:   Non-pressure chronic ulcer of other part 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. COMPARISON: 6/18/2024. VIEWS:  XR FOOT 3+ VW LEFT Images: 3 FINDINGS: There is limited by osteopenia. No definite osseous destruction or erosion seen radiographically within this limitation. Redemonstration of a deformed and hypoplastic fourth ray with fragmentation of the fourth metatarsal, similar to the prior. Resection arthroplasty of the second PIP joint present. There is moderate-severe tarsometatarsal osteoarthritis. There is severe second MTP osteoarthritis. There is dorsal soft tissue swelling of the forefoot. There is a plantar calcaneal spur. Vascular calcifications present.     Impression: Limited by osteopenia. No  definite osseous destruction or erosion seen to suggest presence of osteomyelitis. Further evaluation can be performed with MRI as clinically indicated. Chronic changes similar to the prior. Electronically signed: 09/04/2024 05:23 PM Lopez Crane MD      Review of Systems:  Review of Systems   Respiratory:  Positive for shortness of breath.    Cardiovascular:  Positive for leg swelling.   Musculoskeletal:  Positive for arthralgias and myalgias.        Using a cane to assist with ambulation   All other systems reviewed and are negative.      Physical Exam:  Physical Exam  Vitals reviewed.   Constitutional:       Appearance: Normal appearance.   Neck:      Comments: +HJR   Cardiovascular:      Rate and Rhythm: Normal rate and regular rhythm.      Pulses: Normal pulses.      Heart sounds: Normal heart sounds.   Pulmonary:      Effort: Pulmonary effort is normal.      Breath sounds: Rales present.      Comments: Bibasilar rales  Musculoskeletal:         General: Normal range of motion.      Cervical back: Normal range of motion and neck supple.      Right lower leg: Edema present.      Left lower leg: Edema present.      Comments: 2+ fidelia LE edema to knees   Skin:     General: Skin is warm and dry.      Capillary Refill: Capillary refill takes less than 2 seconds.   Neurological:      General: No focal deficit present.      Mental Status: She is alert and oriented to person, place, and time.   Psychiatric:         Mood and Affect: Mood normal.         Behavior: Behavior normal.

## 2024-10-17 ENCOUNTER — OFFICE VISIT (OUTPATIENT)
Dept: CARDIOLOGY CLINIC | Facility: CLINIC | Age: 89
End: 2024-10-17
Payer: COMMERCIAL

## 2024-10-17 VITALS
OXYGEN SATURATION: 99 % | DIASTOLIC BLOOD PRESSURE: 48 MMHG | SYSTOLIC BLOOD PRESSURE: 80 MMHG | WEIGHT: 116.5 LBS | BODY MASS INDEX: 19.89 KG/M2 | HEART RATE: 66 BPM | HEIGHT: 64 IN

## 2024-10-17 DIAGNOSIS — E87.6 HYPOKALEMIA: ICD-10-CM

## 2024-10-17 DIAGNOSIS — I50.32 CHRONIC HEART FAILURE WITH PRESERVED EJECTION FRACTION (HFPEF) (HCC): ICD-10-CM

## 2024-10-17 DIAGNOSIS — N17.9 AKI (ACUTE KIDNEY INJURY) (HCC): ICD-10-CM

## 2024-10-17 PROCEDURE — 99215 OFFICE O/P EST HI 40 MIN: CPT | Performed by: NURSE PRACTITIONER

## 2024-10-17 RX ORDER — POTASSIUM CHLORIDE 1500 MG/1
TABLET, EXTENDED RELEASE ORAL
Qty: 20 TABLET | Refills: 3 | Status: SHIPPED | OUTPATIENT
Start: 2024-10-17 | End: 2024-10-17 | Stop reason: SDUPTHER

## 2024-10-17 NOTE — PATIENT INSTRUCTIONS
Continue on Torsemide 20mg BID for now  K dur 20meq daily for now  Discuss further dosing of Torsemide and K dur after Monday lab studies.

## 2024-10-17 NOTE — TELEPHONE ENCOUNTER
Pt contacted Call Center requested refill of their medication.        Doctor Name: Jordyn      Medication Name: potassium chloride      Dosage of Med: 20mEq      Frequency of Med: daily until instructed to stop      Remaining Medication: *Not duplicate- sent to wrong Pharmacy. Patient would like canceled at [facility] and sent to [facility] instead      Pharmacy and Location: [facility]            Thank you.

## 2024-10-17 NOTE — PROGRESS NOTES
Cardiology Follow Up    Rebeca Banegas  7/22/1932  [id number]  [facility]  [address]  [place] [id number] [phone]    Assessment & Plan  Chronic heart failure with preserved ejection fraction (HFpEF) ([facility])  Wt Readings from Last 3 Encounters:   10/17/24 52.8 kg (116 lb 8 oz)   10/02/24 57.1 kg (125 lb 12.8 oz)   09/04/24 49.2 kg (108 lb 6.4 oz)     Torsemide 20 mg twice daily  BMP on Monday 10/21  Continue metoprolol Succinate 100 mg daily  Daily weights   2gm sodium diet           Orders:    potassium chloride (Klor-Con M20) 20 mEq tablet; Potassium chloride 20 mEq p.o. daily until instructed to stop.    DARIO (acute kidney injury) ([facility])    Orders:    potassium chloride (Klor-Con M20) 20 mEq tablet; Potassium chloride 20 mEq p.o. daily until instructed to stop.  Instructed to decrease torsemide 20 mg twice daily until further instruction  Drink least  6 cups of fluid a day    I would advise avoiding use of Metolazone   Hypokalemia    Orders:    potassium chloride (Klor-Con M20) 20 mEq tablet; Potassium chloride 20 mEq p.o. daily until instructed to stop.  BMP on  Monday 10/21/24       Interval History:   On 10/02/24 Ms Rebeca Banegas was seen in our office for a follow up visit.  She presented to our office with a one week hx of  fidelia LE edema.  Her weight increased from 116 pounds to 125 pounds.  She admitted to baseline shortness of breath with walking a long distance or doing things too fast.  She denies CP, lightheadedness or dizziness.   Rebeca admits to eating a bag of popcorn prior to worsening LE edema.  Last May she experienced similar symptoms.  She was instructed to take Metolazone 2.5mg 30 minutes prior to morning dose in AM.  She was instructed if fidelia LE edema did not improve in 4 to 5 days repeat dose of torsemide.    10/14/24 lab studies showed sodium 134, potassium 2.9, BUN 71, creatinine 1.92,    On  10/15/24 Rebeca was instructed to present to the ED due to DARIO and hypokalemia. She refused.  Rebeca was instructed  to hold torsemide the evening of 10/1 5 and the following day.  Then restart torsemide 20 mg twice daily.  20 mEq twice daily for 2 days.  BMP to be done on Monday 10/21/24     Rebeca presents our office for follow-up visit.  Her weight is stable 116 pounds at home.  She no longer has lower extremity edema.  Jesus continues with a cough.      Medical History   Primary Cardiologist Dr Vyas  Permanent atrial fibrillation [id number]=  Chronic HFpEF LVEF 60%   Hyperlipidemia 8/27/24  TG 82 HDL 66 LDL 76  CKD IIIa/b baseline creatinine 1.52-1.90 GFR 29  Bifascicular Block   8/28/24 CVA,  MRI on 8/28/24 foci acute infarcts in the right occipital lobe and right cerebellum, residual right sided weakness undergoing PT     Patient Active Problem List   Diagnosis    Abnormal findings on diagnostic imaging of other specified body structures    Abnormal findings on diagnostic imaging of urinary organs    Allergic rhinitis    Arthritis    Asthma    Atherosclerosis    Permanent atrial fibrillation (HCC)    Backache    Benign colon polyp    Bifascicular bundle branch block    Chronic diastolic congestive heart failure (HCC)    Esophageal reflux    Essential tremor    Hoarseness    Hyperlipidemia    Primary hypertension    Hypothyroidism    Leg pain    Nasal polyp    Onychomycosis    Ovarian cyst    Pancreatic cyst    Plantar fasciitis    Prediabetes    Pulmonary nodule    Thyroid nodule    Vitamin D deficiency    Pain of left hand    Leg cramps    Sensorineural hearing loss (SNHL), bilateral    Left asymmetrical SNHL    Elevated serum immunoglobulin free light chains    Osteopenia of hip    CKD stage 3b, GFR 30-44 ml/min (HCC)    Labial abscess    Vulvar cysts    Open wound of right upper arm    Secondary hyperparathyroidism (HCC)    Age-related osteoporosis without current pathological fracture     Chronic cough    MGUS (monoclonal gammopathy of unknown significance)    Bronchiectasis without complication (HCC)    Vaccine counseling    Acute on chronic diastolic CHF (congestive heart failure) (HCC)    Chronic foot ulcer, left, with fat layer exposed (HCC)    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)    Preoperative examination    CVA (cerebral vascular accident) (HCC)    Severe protein-calorie malnutrition (HCC)     Past Medical History:   Diagnosis Date    Abnormal ultrasound     RESOLVED: [id number]    Advice given about COVID-19 virus infection 04/07/2020    Asthma with acute exacerbation     LAST ASSESSED: [id number]    Asymptomatic menopausal state     Atherosclerosis     Atrial fibrillation (HCC)     Chronic obstructive lung disease (HCC)     Congestive heart failure (HCC)     LAST ASSESSED: [id number]    COVID-19 virus infection 03/25/2023    Cuboid fracture     LAST ASSESSED: [id number]    Dyspepsia     Fall 04/01/2024    GERD (gastroesophageal reflux disease)     High risk medication use     LAST ASSESSED: [id number]    Hyperlipidemia     Hypertension     Hypokalemia     Hypothyroidism     Impacted cerumen of both ears     LAST ASSESSED: [id number]    Impacted cerumen of right ear     LAST ASSESSED: 33GRQ0669    Influenza     LAST ASSESSED: 78NYU8085    IPMN (intraductal papillary mucinous neoplasm)     RESOLVED: 02OCT2015    Limb swelling     LAST ASSESSED: [id number]    Navicular fracture of ankle     LAST ASSESSED: 48ACJ3580    Onychomycosis     LAST ASSESSED: 91YEP5009    Osteoarthritis     Osteopenia     Osteoporosis     Paronychia, finger, unspecified laterality     LAST ASSESSED: 18SRE0623    Paroxysmal atrial fibrillation (HCC)     LAST ASSESSED: 02MAR2014    Peripheral vascular disease (HCC)     Plantar fasciitis     Talus fracture     LAST ASSESSED: 62WAR5924    Vascular headache      Social History     Socioeconomic History    Marital status:      Spouse name: Not on file     Number of children: Not on file    Years of education: Not on file    Highest education level: Not on file   Occupational History    Occupation: retired   Tobacco Use    Smoking status: Former     Current packs/day: 0.00     Average packs/day: 0.3 packs/day for 2.0 years (0.5 ttl pk-yrs)     Types: Cigarettes     Start date:      Quit date:      Years since quittin.8    Smokeless tobacco: Never    Tobacco comments:     On and off socially with friends    Vaping Use    Vaping status: Never Used   Substance and Sexual Activity    Alcohol use: Not Currently     Comment: SOCIAL    Drug use: No    Sexual activity: Not Currently     Partners: Male     Birth control/protection: Post-menopausal   Other Topics Concern    Not on file   Social History Narrative    DAILY COFFEE CONSUMPTION (2 CUPS/DAY)    EXERCISING REGULARLY     Social Determinants of Health     Financial Resource Strain: Not on file   Food Insecurity: No Food Insecurity (2024)    Hunger Vital Sign     Worried About Running Out of Food in the Last Year: Never true     Ran Out of Food in the Last Year: Never true   Transportation Needs: No Transportation Needs (2024)    PRAPARE - Transportation     Lack of Transportation (Medical): No     Lack of Transportation (Non-Medical): No   Physical Activity: Not on file   Stress: Not on file   Social Connections: Not on file   Intimate Partner Violence: Not on file   Housing Stability: Low Risk  (2024)    Housing Stability Vital Sign     Unable to Pay for Housing in the Last Year: No     Number of Times Moved in the Last Year: 0     Homeless in the Last Year: No      Family History   Problem Relation Age of Onset    Pancreatic cancer Mother 93    Heart failure Mother     Coronary artery disease Family     Breast cancer Family     Other Family         BREAST DISORDER, GASTROINTESTINAL CANCER    Uterine cancer Family     Hyperlipidemia Family     Osteoarthritis Family     Ovarian cancer  Family     Brain cancer Son     Stroke Father     No Known Problems Sister     No Known Problems Daughter     No Known Problems Maternal Grandmother     No Known Problems Maternal Grandfather     No Known Problems Paternal Grandmother     No Known Problems Paternal Grandfather     Breast cancer Cousin 50    Breast cancer Cousin 50    Ovarian cancer Other 49     Past Surgical History:   Procedure Laterality Date    APPENDECTOMY      BONE BIOPSY Left 8/9/2024    Procedure: Bone bx of proximal phalanx second toe & second met with fluoroscopic guidance;  Surgeon: Jonny Marcial DPM;  Location: [facility];  Service: Podiatry    CATARACT EXTRACTION Bilateral     CHOLECYSTECTOMY      COLONOSCOPY      JOINT REPLACEMENT Bilateral     knee    NEUROPLASTY / TRANSPOSITION MEDIAN NERVE AT CARPAL TUNNEL BILATERAL Bilateral     DECOMPRESSION    OOPHORECTOMY Bilateral     age 81    PELVIC FLOOR REPAIR  2014    NC BIOPSY BONE TROCAR/NEEDLE SUPERFICIAL Left 8/9/2024    Procedure: Left foot debridement;  Surgeon: Jonny Marcial DPM;  Location: [facility];  Service: Podiatry    NC DEBRIDEMENT MUSCLE &/FASCIA 1ST 20 SQ CM/< Left 8/9/2024    Procedure: Left foot debridement;  Surgeon: Jonny Marcial DPM;  Location: [facility];  Service: Podiatry    SINUS SURGERY      TOTAL KNEE ARTHROPLASTY Bilateral        Current Outpatient Medications:     albuterol (2.5 mg/3 mL) 0.083 % nebulizer solution, Take 3 mL (2.5 mg total) by nebulization every 8 (eight) hours, Disp: 270 mL, Rfl: 3    albuterol (Proventil HFA) 90 mcg/act inhaler, Inhale 2 puffs every 6 (six) hours as needed for wheezing, Disp: 20.1 g, Rfl: 3    atorvastatin (LIPITOR) 40 mg tablet, Take 1 tablet (40 mg total) by mouth every evening, Disp: 90 tablet, Rfl: 1    Cholecalciferol (VITAMIN D3) 1000 units CAPS, Take 2,000 Units by mouth daily, Disp: , Rfl:     fluticasone (FLONASE) 50 mcg/act nasal spray, USE 2 SPRAYS IN EACH NOSTRIL ONCE DAILY, Disp: 48 g,  Rfl: 3    Fluticasone-Salmeterol (Advair Diskus) 100-50 mcg/dose inhaler, Inhale 1 puff 2 (two) times a day Rinse mouth after use., Disp: 180 blister, Rfl: 1    levothyroxine 50 mcg tablet, TAKE 1 TABLET DAILY BUT 1-1/2 TABLETS 4 DAYS/WEEK, Disp: 40 tablet, Rfl: 5    metoprolol succinate (TOPROL-XL) 100 mg 24 hr tablet, Take 1 tablet (100 mg total) by mouth daily, Disp: 90 tablet, Rfl: 1    pantoprazole (PROTONIX) 40 mg tablet, Take 1 tablet (40 mg total) by mouth daily in the early morning, Disp: 30 tablet, Rfl: 0    potassium chloride (Klor-Con M20) 20 mEq tablet, Potassium chloride 20 mEq p.o. daily until instructed to stop., Disp: 20 tablet, Rfl: 3    sodium chloride 3 % inhalation solution, Take 4 mL by nebulization 3 (three) times a day, Disp: 360 mL, Rfl: 2    torsemide (DEMADEX) 20 mg tablet, Take 2 tablets (40 mg total) by mouth 2 (two) times a day (Patient taking differently: Take 20 mg by mouth 2 (two) times a day), Disp: 180 tablet, Rfl: 3    warfarin (Jantoven) 2.5 mg tablet, Take 1 tablet (2.5 mg total) by mouth daily, Disp: 30 tablet, Rfl: 0  Allergies   Allergen Reactions    Asa [Aspirin] Shortness Of Breath    Nsaids Shortness Of Breath       Labs:  Appointment on 10/14/2024   Component Date Value    Sodium 10/14/2024 134 (L)     Potassium 10/14/2024 2.9 (L)     Chloride 10/14/2024 80 (L)     CO2 10/14/2024 40 (H)     ANION GAP 10/14/2024 14 (H)     BUN 10/14/2024 71 (H)     Creatinine 10/14/2024 1.92 (H)     Glucose 10/14/2024 106     Calcium 10/14/2024 9.4    Ancillary Orders on 09/27/2024   Component Date Value    Protime 10/14/2024 26.9 (H)     INR 10/14/2024 2.51 (H)    Appointment on 09/23/2024   Component Date Value    Ig Kappa Free Light Chain 09/23/2024 81.4 (H)     Ig Lambda Free Light Wilda* 09/23/2024 37.7 (H)     Kappa/Lambda FluidC Ratio 09/23/2024 2.16 (H)     IGA 09/23/2024 216     IGG 09/23/2024 1,137     IGM 09/23/2024 100     IgE 09/23/2024 1,345 (H)    Ancillary Orders on  09/20/2024   Component Date Value    Protime 09/23/2024 25.8 (H)     INR 09/23/2024 2.36 (H)    Orders Only on 09/17/2024   Component Date Value    Prothrombin Time 09/17/2024 28.7 (H)     INR 09/17/2024 2.7    Orders Only on 09/13/2024   Component Date Value    Prothrombin Time 09/13/2024 33.8 (H)     INR 09/13/2024 3.4    Appointment on 09/10/2024   Component Date Value    Protime 09/10/2024 30.1 (H)     INR 09/10/2024 2.90 (H)     WBC 09/23/2024 8.26     RBC 09/23/2024 3.74 (L)     Hemoglobin 09/23/2024 11.8 (L)     Hematocrit 09/23/2024 37.7     MCV 09/23/2024 101 (H)     MCH 09/23/2024 31.6     MCHC 09/23/2024 31.3 (L)     RDW 09/23/2024 14.5     MPV 09/23/2024 9.4     Platelets 09/23/2024 244     nRBC 09/23/2024 0     Segmented % 09/23/2024 35 (L)     Immature Grans % 09/23/2024 0     Lymphocytes % 09/23/2024 42     Monocytes % 09/23/2024 7     Eosinophils Relative 09/23/2024 15 (H)     Basophils Relative 09/23/2024 1     Absolute Neutrophils 09/23/2024 2.87     Absolute Immature Grans 09/23/2024 0.03     Absolute Lymphocytes 09/23/2024 3.49     Absolute Monocytes 09/23/2024 0.59     Eosinophils Absolute 09/23/2024 1.21 (H)     Basophils Absolute 09/23/2024 0.07     Sodium 09/23/2024 136     Potassium 09/23/2024 3.7     Chloride 09/23/2024 98     CO2 09/23/2024 30     ANION GAP 09/23/2024 8     BUN 09/23/2024 51 (H)     Creatinine 09/23/2024 1.84 (H)     Glucose, Fasting 09/23/2024 97     Calcium 09/23/2024 8.0 (L)     AST 09/23/2024 31     ALT 09/23/2024 25     Alkaline Phosphatase 09/23/2024 51     Total Protein 09/23/2024 6.7     Albumin 09/23/2024 3.7     Total Bilirubin 09/23/2024 0.48     A/G Ratio 09/23/2024 1.16     Albumin Electrophoresis 09/23/2024 53.7     Albumin CONC 09/23/2024 3.38     Alpha 1 09/23/2024 4.5     ALPHA 1 CONC 09/23/2024 0.28     Alpha 2 09/23/2024 11.9     ALPHA 2 CONC 09/23/2024 0.75     Beta-1 09/23/2024 6.7     BETA 1 CONC 09/23/2024 0.42     Beta-2 09/23/2024 9.0 (H)     BETA  2 CONC 09/23/2024 0.57     Gamma Globulin 09/23/2024 14.2     GAMMA CONC 09/23/2024 0.89     Total Protein 09/23/2024 6.3 (L)     SPEP Interpretation 09/23/2024 See Comment     Urine Protein 09/23/2024 12.3     Albumin ELP, Urine 09/23/2024 100.0     Alpha 1, Urine 09/23/2024 0.0     Alpha 2, Urine 09/23/2024 0.0     Beta, Urine 09/23/2024 0.0     Gamma Globulin, Urine 09/23/2024 0.0     UPEP Interp 09/23/2024 See Comment     TSH 3RD GENERATON 09/23/2024 4.971 (H)     Immunofixation Interpret* 09/23/2024 See Comment     Immunofixation Interpret* 09/23/2024 See Comment    Office Visit on 09/04/2024   Component Date Value    Wound Culture 09/04/2024 Few Colonies of Proteus mirabilis (A)     Wound Culture 09/04/2024 Few Colonies of Acinetobacter baumannii (A)     Gram Stain Result 09/04/2024 No Polys or Bacteria seen    Ancillary Orders on 08/30/2024   Component Date Value    Prothrombin Time 09/03/2024 25.7 (H)     INR 09/03/2024 2.4    Admission on 08/27/2024, Discharged on 08/29/2024   Component Date Value    Ventricular Rate 08/27/2024 102     Atrial Rate 08/27/2024 147     QRSD Interval 08/27/2024 118     QT Interval 08/27/2024 334     QTC Interval 08/27/2024 435     QRS [place] 08/27/2024 267     T Wave Axis 08/27/2024 -7     WBC 08/27/2024 6.19     RBC 08/27/2024 4.55     Hemoglobin 08/27/2024 14.7     Hematocrit 08/27/2024 45.0     MCV 08/27/2024 99 (H)     MCH 08/27/2024 32.3     MCHC 08/27/2024 32.7     RDW 08/27/2024 13.5     MPV 08/27/2024 10.2     Platelets 08/27/2024 102 (L)     nRBC 08/27/2024 0     Segmented % 08/27/2024 52     Immature Grans % 08/27/2024 0     Lymphocytes % 08/27/2024 38     Monocytes % 08/27/2024 7     Eosinophils Relative 08/27/2024 2     Basophils Relative 08/27/2024 1     Absolute Neutrophils 08/27/2024 3.23     Absolute Immature Grans 08/27/2024 0.01     Absolute Lymphocytes 08/27/2024 2.37     Absolute Monocytes 08/27/2024 0.42     Eosinophils Absolute 08/27/2024 0.13     Basophils  Absolute 08/27/2024 0.03     Sodium 08/27/2024 131 (L)     Potassium 08/27/2024 5.1     Chloride 08/27/2024 96     CO2 08/27/2024 25     ANION GAP 08/27/2024 10     BUN 08/27/2024 63 (H)     Creatinine 08/27/2024 1.90 (H)     Glucose 08/27/2024 102     Calcium 08/27/2024 9.2     AST 08/27/2024 31     ALT 08/27/2024 25     Alkaline Phosphatase 08/27/2024 44     Total Protein 08/27/2024 7.6     Albumin 08/27/2024 4.0     Total Bilirubin 08/27/2024 0.75     Protime 08/27/2024 37.5 (H)     INR 08/27/2024 3.90 (H)     Hemoglobin A1C 08/27/2024 6.4 (H)     EAG 08/27/2024 137     Cholesterol 08/27/2024 158     Triglycerides 08/27/2024 82     HDL, Direct 08/27/2024 66     LDL Calculated 08/27/2024 76     Triscuspid Valve Regurgi* 08/28/2024 33.0     RAA A4C 08/28/2024 26.8     MV Peak A Hank 08/28/2024 0.25     MV stenosis pressure 1/2* 08/28/2024 41     MV Peak E Hank 08/28/2024 91     E wave deceleration time 08/28/2024 143     E/A ratio 08/28/2024 3.64     MV valve area p 1/2 meth* 08/28/2024 5.37     RA 2D Volume 08/28/2024 85.0     TR Peak Hank 08/28/2024 2.9     RVID d 08/28/2024 3.0     A4C EF 08/28/2024 57     Tricuspid valve peak reg* 08/28/2024 2.88     Left ventricular stroke * 08/28/2024 30.00     IVSd 08/28/2024 1.20     Tricuspid annular plane * 08/28/2024 1.20     Ao root 08/28/2024 2.60     LVPWd 08/28/2024 1.30     LA size 08/28/2024 4.2     FS 08/28/2024 35     LVIDS 08/28/2024 2.20     IVS 08/28/2024 1.2     LVIDd 08/28/2024 3.40     LEFT VENTRICLE SYSTOLIC * 08/28/2024 17     LV DIASTOLIC VOLUME (MOD* 08/28/2024 46     Left Atrium Area-systoli* 08/28/2024 31.4     LVSV, 2D 08/28/2024 30     BSA 08/28/2024 1.51     LV EF 08/28/2024 60     Est. RA pres 08/28/2024 8.0     Right Ventricular Peak S* 08/28/2024 41.00     Sodium 08/28/2024 134 (L)     Potassium 08/28/2024 4.4     Chloride 08/28/2024 97     CO2 08/28/2024 29     ANION GAP 08/28/2024 8     BUN 08/28/2024 60 (H)     Creatinine 08/28/2024 1.65 (H)      Glucose 08/28/2024 96     Calcium 08/28/2024 8.8     WBC 08/28/2024 7.37     RBC 08/28/2024 4.04     Hemoglobin 08/28/2024 13.0     Hematocrit 08/28/2024 40.1     MCV 08/28/2024 99 (H)     MCH 08/28/2024 32.2     MCHC 08/28/2024 32.4     RDW 08/28/2024 13.4     Platelets 08/28/2024 81 (L)     MPV 08/28/2024 10.5     Protime 08/28/2024 37.9 (H)     INR 08/28/2024 3.95 (H)     Sodium 08/29/2024 133 (L)     Potassium 08/29/2024 4.4     Chloride 08/29/2024 99     CO2 08/29/2024 25     ANION GAP 08/29/2024 9     BUN 08/29/2024 49 (H)     Creatinine 08/29/2024 1.49 (H)     Glucose 08/29/2024 91     Calcium 08/29/2024 8.7     Protime 08/29/2024 28.9 (H)     INR 08/29/2024 2.75 (H)    There may be more visits with results that are not included.     Imaging: XR foot 3+ vw left    Result Date: 9/4/2024  Narrative: LEFT FOOT INDICATION:   Non-pressure chronic ulcer of other part 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. COMPARISON: 6/18/2024. VIEWS:  XR FOOT 3+ VW LEFT Images: 3 FINDINGS: There is limited by osteopenia. No definite osseous destruction or erosion seen radiographically within this limitation. Redemonstration of a deformed and hypoplastic fourth ray with fragmentation of the fourth metatarsal, similar to the prior. Resection arthroplasty of the second PIP joint present. There is moderate-severe tarsometatarsal osteoarthritis. There is severe second MTP osteoarthritis. There is dorsal soft tissue swelling of the forefoot. There is a plantar calcaneal spur. Vascular calcifications present.     Impression: Limited by osteopenia. No definite osseous destruction or erosion seen to suggest presence of osteomyelitis. Further evaluation can be performed with MRI as clinically indicated. Chronic changes similar to the prior. Electronically signed: 09/04/2024 05:23 PM Lopez Crane MD      Review of Systems:  Review of Systems   Respiratory:  Positive for shortness of breath.    Cardiovascular:  Positive for leg swelling.    Musculoskeletal:  Positive for arthralgias and myalgias.        Using a cane to assist with ambulation   All other systems reviewed and are negative.      Physical Exam:  Physical Exam  Vitals reviewed.   Constitutional:       Appearance: Normal appearance.   Neck:      Comments: +HJR   Cardiovascular:      Rate and Rhythm: Normal rate and regular rhythm.      Pulses: Normal pulses.      Heart sounds: Normal heart sounds.   Pulmonary:      Effort: Pulmonary effort is normal.      Breath sounds: Rales present.      Comments: Bibasilar rales  Musculoskeletal:         General: Normal range of motion.      Cervical back: Normal range of motion and neck supple.      Right lower leg: Edema present.      Left lower leg: Edema present.      Comments: 2+ fidelia LE edema to knees   Skin:     General: Skin is warm and dry.      Capillary Refill: Capillary refill takes less than 2 seconds.   Neurological:      General: No focal deficit present.      Mental Status: She is alert and oriented to person, place, and time.   Psychiatric:         Mood and Affect: Mood normal.         Behavior: Behavior normal.

## 2024-10-18 RX ORDER — POTASSIUM CHLORIDE 1500 MG/1
TABLET, EXTENDED RELEASE ORAL
Qty: 20 TABLET | Refills: 3 | Status: SHIPPED | OUTPATIENT
Start: 2024-10-18 | End: 2024-10-21 | Stop reason: DRUGHIGH

## 2024-10-21 ENCOUNTER — TELEPHONE (OUTPATIENT)
Age: 89
End: 2024-10-21

## 2024-10-21 ENCOUNTER — APPOINTMENT (OUTPATIENT)
Dept: LAB | Facility: CLINIC | Age: 89
End: 2024-10-21
Payer: COMMERCIAL

## 2024-10-21 DIAGNOSIS — N17.9 AKI (ACUTE KIDNEY INJURY) (HCC): ICD-10-CM

## 2024-10-21 DIAGNOSIS — E87.6 HYPOKALEMIA: ICD-10-CM

## 2024-10-21 LAB
ANION GAP SERPL CALCULATED.3IONS-SCNC: 10 MMOL/L (ref 4–13)
BUN SERPL-MCNC: 51 MG/DL (ref 5–25)
CALCIUM SERPL-MCNC: 8.9 MG/DL (ref 8.4–10.2)
CHLORIDE SERPL-SCNC: 94 MMOL/L (ref 96–108)
CO2 SERPL-SCNC: 32 MMOL/L (ref 21–32)
CREAT SERPL-MCNC: 1.52 MG/DL (ref 0.6–1.3)
GLUCOSE P FAST SERPL-MCNC: 88 MG/DL (ref 65–99)
MAGNESIUM SERPL-MCNC: 2.2 MG/DL (ref 1.9–2.7)
POTASSIUM SERPL-SCNC: 3.6 MMOL/L (ref 3.5–5.3)
SODIUM SERPL-SCNC: 136 MMOL/L (ref 135–147)

## 2024-10-21 PROCEDURE — 83735 ASSAY OF MAGNESIUM: CPT

## 2024-10-21 PROCEDURE — 80048 BASIC METABOLIC PNL TOTAL CA: CPT

## 2024-10-21 NOTE — TELEPHONE ENCOUNTER
Received call back from pt.  Jordyn Bermudez's result notes reviewed with the pt.  Pt verbalized understanding and has no further questions at this time.  Her weight today was 116lbs.

## 2024-10-23 ENCOUNTER — OFFICE VISIT (OUTPATIENT)
Dept: WOUND CARE | Facility: HOSPITAL | Age: 89
End: 2024-10-23
Payer: COMMERCIAL

## 2024-10-23 VITALS
DIASTOLIC BLOOD PRESSURE: 82 MMHG | SYSTOLIC BLOOD PRESSURE: 112 MMHG | HEART RATE: 99 BPM | TEMPERATURE: 97.3 F | RESPIRATION RATE: 18 BRPM

## 2024-10-23 DIAGNOSIS — L97.526 NON-PRESSURE CHRONIC ULCER OF OTHER PART OF LEFT FOOT WITH BONE INVOLVEMENT WITHOUT EVIDENCE OF NECROSIS (HCC): Primary | ICD-10-CM

## 2024-10-23 PROCEDURE — 11042 DBRDMT SUBQ TIS 1ST 20SQCM/<: CPT | Performed by: STUDENT IN AN ORGANIZED HEALTH CARE EDUCATION/TRAINING PROGRAM

## 2024-10-23 PROCEDURE — 11042 DBRDMT SUBQ TIS 1ST 20SQCM/<: CPT | Performed by: PODIATRIST

## 2024-10-23 RX ORDER — LIDOCAINE 40 MG/G
CREAM TOPICAL ONCE
Status: COMPLETED | OUTPATIENT
Start: 2024-10-23 | End: 2024-10-23

## 2024-10-23 RX ADMIN — LIDOCAINE: 40 CREAM TOPICAL at 14:34

## 2024-10-23 NOTE — PATIENT INSTRUCTIONS
Orders Placed This Encounter   Procedures    Debridement Neuropathic Left;Plantar Plantar     This order was created via procedure documentation    Wound Procedure Treatment Neuropathic Left;Plantar Plantar     This order was created via procedure documentation    Wound cleansing and dressings Neuropathic Left;Plantar Plantar     Wound location left foot   Total Contact Cast Instructions:  Do not get cast wet.  If you notice foul smelling odor coming from cast, please give the Wound Center a call immediatly.  If leg becomes painful due to swelling or cast being too tight, please give the Wound Center a call immediatly.   Do not walk on cast without walking boot.  Cast will be changed at Wound Center.    Please use walker when walking.     Standing Status:   Future     Standing Expiration Date:   10/25/2024

## 2024-10-23 NOTE — PROGRESS NOTES
Patient ID: Rebeca Banegas is a 89 y.o. adult Date of Birth 7/22/1932     Diagnosis:  1. 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  There is about the same size last week, decent amount of serous drainage, but no other signs of infection.    Discussion was had with the patient regarding offloading of the area.  She has attempted to offload it since her surgical procedure in August, however the wound is recalcitrant to offloading with standard offloading mechanisms.  We recommended to her that she attempt a total contact cast at this time.  She will have family with her for the next several weeks, and will and we encouraged her to use a walker as well to avoid falls..     Patient stated previously that she wants to avoid any large invasive surgery.  Should she fail to heal the wound with total contact casting or has recurrence of the wound, may recommend to her a Achilles lengthening procedure with metatarsal osteotomy to decrease pressure from the area.     Procedure Note:     APPLY RIGID LEG CAST EZ TCC 3 Inch  left Leg     Pre-Procedure care:     Consent was obtained. The procedure and risks were explained in detail. Questions were encouraged and answered.     Procedure Note:     Application of rigid total contact cast was applied to the leg.  Adequate padding was applied to the tibial tuberosity, lateral and medial malleolus with padding along with the heel and posterior heel/achilles.  Stockinette applied under this and against the skin.  Following this tape was used to hold the folded portion of the stockinette over the  "dorsum of the foot.     Complications: none.       Instructions: Patient must limit weight bearing in TCC for the first 24 hours after application.      Patient should wear the boot provided at all times and should never go barefoot in this cast.  Without the boot that is provided, this cast alone can cause significant pressure on spots and there is potential for possible new wound formation.       Dressing/Wound Care: Keep the cast clean, dry, and intact.  Do not remove your own cast. If you do not tolerate cast contact the office for removal.    No guarantees of wound healing were given and I explained clearly that there is some risk with the use of TCC such as cast \"rub\", cast ulcers, or hidden infection but that the benefits of the TCC far outweight the risks if patient compliance is in place.          If the patient notices any systemic signs of infection including but not limited to fever, chills, nausea, vomiting or significant worsening of wound with increased drainage, redness or streaking up the foot or leg the patient should notify the office or present to the emergency room.      If wound debridement was completed today this was done in an attempt to promote healing, decrease risk of infection and for limb salvage efforts.     Goal of treatment: wound healing     Efforts to decrease pressure on the wound(s), evaluation and discussion of nutritional status, peripheral vascular status, and infection control have all been addressed with this patient.    No follow-ups on file.      Chief Complaint   Patient presents with    Follow Up Wound Care Visit     Left plantar foot wound           Subjective:   Patient returns today for left plantar foot wound.  She previously had the wound debrided August 9, 2024.  She has undergone wound care since that time, as the wound has been difficult to get closed fully.  She has been doing the bandage changes and wearing her offloading shoe gear as required.  States that her " accommodative orthotics and shoes are ready for her to .  She denies nausea, fever or chills.    The following portions of the patient's history were reviewed and updated as appropriate:   Patient Active Problem List   Diagnosis    Abnormal findings on diagnostic imaging of other specified body structures    Abnormal findings on diagnostic imaging of urinary organs    Allergic rhinitis    Arthritis    Asthma    Atherosclerosis    Permanent atrial fibrillation (HCC)    Backache    Benign colon polyp    Bifascicular bundle branch block    Chronic diastolic congestive heart failure (HCC)    Esophageal reflux    Essential tremor    Hoarseness    Hyperlipidemia    Primary hypertension    Hypothyroidism    Leg pain    Nasal polyp    Onychomycosis    Ovarian cyst    Pancreatic cyst    Plantar fasciitis    Prediabetes    Pulmonary nodule    Thyroid nodule    Vitamin D deficiency    Pain of left hand    Leg cramps    Sensorineural hearing loss (SNHL), bilateral    Left asymmetrical SNHL    Elevated serum immunoglobulin free light chains    Osteopenia of hip    CKD stage 3b, GFR 30-44 ml/min (HCC)    Labial abscess    Vulvar cysts    Open wound of right upper arm    Secondary hyperparathyroidism (HCC)    Age-related osteoporosis without current pathological fracture    Chronic cough    MGUS (monoclonal gammopathy of unknown significance)    Bronchiectasis without complication (HCC)    Vaccine counseling    Acute on chronic diastolic CHF (congestive heart failure) (HCC)    Chronic foot ulcer, left, with fat layer exposed (HCC)    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)    Preoperative examination    CVA (cerebral vascular accident) (HCC)    Severe protein-calorie malnutrition (HCC)     Past Medical History:   Diagnosis Date    Abnormal ultrasound     RESOLVED: [id number]    Advice given about COVID-19 virus infection 04/07/2020    Asthma with acute exacerbation     LAST ASSESSED: [id number]     Asymptomatic menopausal state     Atherosclerosis     Atrial fibrillation (HCC)     Chronic obstructive lung disease (HCC)     Congestive heart failure (HCC)     LAST ASSESSED: [id number]    COVID-19 virus infection 03/25/2023    Cuboid fracture     LAST ASSESSED: [id number]    Dyspepsia     Fall 04/01/2024    GERD (gastroesophageal reflux disease)     High risk medication use     LAST ASSESSED: [id number]    Hyperlipidemia     Hypertension     Hypokalemia     Hypothyroidism     Impacted cerumen of both ears     LAST ASSESSED: [id number]    Impacted cerumen of right ear     LAST ASSESSED: 50WXR6410    Influenza     LAST ASSESSED: 31ECM0819    IPMN (intraductal papillary mucinous neoplasm)     RESOLVED: 02OCT2015    Limb swelling     LAST ASSESSED: 82YRL1672    Navicular fracture of ankle     LAST ASSESSED: 62ZYX1536    Onychomycosis     LAST ASSESSED: 36GJA1657    Osteoarthritis     Osteopenia     Osteoporosis     Paronychia, finger, unspecified laterality     LAST ASSESSED: [id number]    Paroxysmal atrial fibrillation (HCC)     LAST ASSESSED: 02MAR2014    Peripheral vascular disease (HCC)     Plantar fasciitis     Talus fracture     LAST ASSESSED: 77RLV1309    Vascular headache      Past Surgical History:   Procedure Laterality Date    APPENDECTOMY      BONE BIOPSY Left 8/9/2024    Procedure: Bone bx of proximal phalanx second toe & second met with fluoroscopic guidance;  Surgeon: Jonny Marcial DPM;  Location: [facility];  Service: Podiatry    CATARACT EXTRACTION Bilateral     CHOLECYSTECTOMY      COLONOSCOPY      JOINT REPLACEMENT Bilateral     knee    NEUROPLASTY / TRANSPOSITION MEDIAN NERVE AT CARPAL TUNNEL BILATERAL Bilateral     DECOMPRESSION    OOPHORECTOMY Bilateral     age 81    PELVIC FLOOR REPAIR  2014    MA BIOPSY BONE TROCAR/NEEDLE SUPERFICIAL Left 8/9/2024    Procedure: Left foot debridement;  Surgeon: Jonny Marcial DPM;  Location: [facility];  Service: Podiatry    MA DEBRIDEMENT MUSCLE  &/FASCIA 1ST 20 SQ CM/< Left 2024    Procedure: Left foot debridement;  Surgeon: Jonny Marcial DPM;  Location: [facility];  Service: Podiatry    SINUS SURGERY      TOTAL KNEE ARTHROPLASTY Bilateral      Social History     Socioeconomic History    Marital status:      Spouse name: None    Number of children: None    Years of education: None    Highest education level: None   Occupational History    Occupation: retired   Tobacco Use    Smoking status: Former     Current packs/day: 0.00     Average packs/day: 0.3 packs/day for 2.0 years (0.5 ttl pk-yrs)     Types: Cigarettes     Start date:      Quit date:      Years since quittin.8    Smokeless tobacco: Never    Tobacco comments:     On and off socially with friends    Vaping Use    Vaping status: Never Used   Substance and Sexual Activity    Alcohol use: Not Currently     Comment: SOCIAL    Drug use: No    Sexual activity: Not Currently     Partners: Male     Birth control/protection: Post-menopausal   Other Topics Concern    None   Social History Narrative    DAILY COFFEE CONSUMPTION (2 CUPS/DAY)    EXERCISING REGULARLY     Social Determinants of Health     Financial Resource Strain: Not on file   Food Insecurity: No Food Insecurity (2024)    Nursing - Inadequate Food Risk Classification     Worried About Running Out of Food in the Last Year: Never true     Ran Out of Food in the Last Year: Never true     Ran Out of Food in the Last Year: Not on file   Transportation Needs: No Transportation Needs (2024)    PRAPARE - Transportation     Lack of Transportation (Medical): No     Lack of Transportation (Non-Medical): No   Physical Activity: Not on file   Stress: Not on file   Social Connections: Not on file   Intimate Partner Violence: Not on file   Housing Stability: Low Risk  (2024)    Housing Stability Vital Sign     Unable to Pay for Housing in the Last Year: No     Number of Times Moved in the Last Year: 0      Homeless in the Last Year: No        Current Outpatient Medications:     albuterol (2.5 mg/3 mL) 0.083 % nebulizer solution, Take 3 mL (2.5 mg total) by nebulization every 8 (eight) hours, Disp: 270 mL, Rfl: 3    albuterol (Proventil HFA) 90 mcg/act inhaler, Inhale 2 puffs every 6 (six) hours as needed for wheezing, Disp: 20.1 g, Rfl: 3    atorvastatin (LIPITOR) 40 mg tablet, Take 1 tablet (40 mg total) by mouth every evening, Disp: 90 tablet, Rfl: 1    Cholecalciferol (VITAMIN D3) 1000 units CAPS, Take 2,000 Units by mouth daily, Disp: , Rfl:     fluticasone (FLONASE) 50 mcg/act nasal spray, USE 2 SPRAYS IN EACH NOSTRIL ONCE DAILY, Disp: 48 g, Rfl: 3    Fluticasone-Salmeterol (Advair Diskus) 100-50 mcg/dose inhaler, Inhale 1 puff 2 (two) times a day Rinse mouth after use., Disp: 180 blister, Rfl: 1    levothyroxine 50 mcg tablet, TAKE 1 TABLET DAILY BUT 1-1/2 TABLETS 4 DAYS/WEEK, Disp: 40 tablet, Rfl: 5    metoprolol succinate (TOPROL-XL) 100 mg 24 hr tablet, Take 1 tablet (100 mg total) by mouth daily, Disp: 90 tablet, Rfl: 1    pantoprazole (PROTONIX) 40 mg tablet, Take 1 tablet (40 mg total) by mouth daily in the early morning, Disp: 30 tablet, Rfl: 0    sodium chloride 3 % inhalation solution, Take 4 mL by nebulization 3 (three) times a day, Disp: 360 mL, Rfl: 2    torsemide (DEMADEX) 20 mg tablet, Take 2 tablets (40 mg total) by mouth 2 (two) times a day (Patient taking differently: Take 20 mg by mouth 2 (two) times a day), Disp: 180 tablet, Rfl: 3    warfarin (Jantoven) 2.5 mg tablet, Take 1 tablet (2.5 mg total) by mouth daily, Disp: 30 tablet, Rfl: 0  No current facility-administered medications for this visit.  Family History   Problem Relation Age of Onset    Pancreatic cancer Mother 93    Heart failure Mother     Coronary artery disease Family     Breast cancer Family     Other Family         BREAST DISORDER, GASTROINTESTINAL CANCER    Uterine cancer Family     Hyperlipidemia Family     Osteoarthritis  "Family     Ovarian cancer Family     Brain cancer Son     Stroke Father     No Known Problems Sister     No Known Problems Daughter     No Known Problems Maternal Grandmother     No Known Problems Maternal Grandfather     No Known Problems Paternal Grandmother     No Known Problems Paternal Grandfather     Breast cancer Cousin 50    Breast cancer Cousin 50    Ovarian cancer Other 49      Review of Systems  Allergies:  Asa [aspirin] and Nsaids      Objective:  /82   Pulse 99   Temp (!) 97.3 °F (36.3 °C)   Resp 18   LMP  (LMP Unknown)     Physical Exam      Wound 06/28/24 Neuropathic Plantar Left;Plantar (Active)   Wound Image Images linked 10/23/24 1501   Wound Description Epithelialization;Intact 10/23/24 1432   Susu-wound Assessment Callus;Intact 10/23/24 1432   Wound Length (cm) 0.1 cm 10/23/24 1432   Wound Width (cm) 0.1 cm 10/23/24 1432   Wound Depth (cm) 0.1 cm 10/23/24 1432   Wound Surface Area (cm^2) 0.01 cm^2 10/23/24 1432   Wound Volume (cm^3) 0.001 cm^3 10/23/24 1432   Calculated Wound Volume (cm^3) 0 cm^3 10/23/24 1432   Change in Wound Size % 100 10/23/24 1432   Drainage Amount Scant 10/23/24 1432   Drainage Description Serous;Yellow 10/23/24 1432   Non-staged Wound Description Full thickness 10/23/24 1432   Dressing Status Intact 10/23/24 1432                    Debridement   Wound 06/28/24 Neuropathic Plantar Left;Plantar    Universal Protocol:  procedure performed by consultantConsent: Verbal consent obtained. Written consent obtained.  Risks and benefits: risks, benefits and alternatives were discussed  Consent given by: patient  Time out: Immediately prior to procedure a \"time out\" was called to verify the correct patient, procedure, equipment, support staff and site/side marked as required.  Patient understanding: patient states understanding of the procedure being performed  Patient identity confirmed: verbally with patient    Debridement Details  Performed by: resident  Debridement " "type: surgical  Level of debridement: subcutaneous tissue  Pain control: lidocaine 4%      Post-debridement measurements  Length (cm): 0.3  Width (cm): 0.3  Depth (cm): 0.4  Percent debrided: 100%  Surface Area (cm^2): 0.09  Area Debrided (cm^2): 0.09  Volume (cm^3): 0.04    Tissue and other material debrided: subcutaneous tissue  Devitalized tissue debrided: biofilm, callus and fibrin  Instrument(s) utilized: blade  Technique utilized: excisionalBleeding: small  Hemostasis obtained with: pressure  Procedural pain (0-10): insensate  Post-procedural pain: insensate   Response to treatment: procedure was tolerated well         Results from last 6 Months   Lab Units 09/04/24  1223   WOUND CULTURE  Few Colonies of Proteus mirabilis*  Few Colonies of Acinetobacter baumannii*         Wound Instructions:  No orders of the defined types were placed in this encounter.        Dawson Pacheco DPM      Portions of the record may have been created with voice recognition software. Occasional wrong word or \"sound a like\" substitutions may have occurred due to the inherent limitations of voice recognition software. Read the chart carefully and recognize, using context, where substitutions have occurred.      "

## 2024-10-23 NOTE — PROGRESS NOTES
Wound Procedure Treatment Neuropathic Left;Plantar Plantar    Performed by: Joanna Ventura RN  Authorized by: Jonny Marcial DPM    Associated wounds:   Wound 06/28/24 Neuropathic Plantar Left;Plantar  Wound cleansed with:  NSS  Applied primary dressing:  Calcium alginate and Silver  Applied secondary dressing:  Gauze  Dressing secured with:  Ricky and Tape  Offloading device appllied:  TCC

## 2024-10-25 ENCOUNTER — OFFICE VISIT (OUTPATIENT)
Dept: WOUND CARE | Facility: HOSPITAL | Age: 89
End: 2024-10-25
Payer: COMMERCIAL

## 2024-10-25 VITALS
TEMPERATURE: 96.9 F | HEART RATE: 91 BPM | RESPIRATION RATE: 18 BRPM | SYSTOLIC BLOOD PRESSURE: 116 MMHG | DIASTOLIC BLOOD PRESSURE: 55 MMHG

## 2024-10-25 DIAGNOSIS — L97.526 NON-PRESSURE CHRONIC ULCER OF OTHER PART OF LEFT FOOT WITH BONE INVOLVEMENT WITHOUT EVIDENCE OF NECROSIS (HCC): Primary | ICD-10-CM

## 2024-10-25 PROCEDURE — 11042 DBRDMT SUBQ TIS 1ST 20SQCM/<: CPT | Performed by: PODIATRIST

## 2024-10-25 NOTE — PATIENT INSTRUCTIONS
Orders Placed This Encounter   Procedures    Wound cleansing and dressings Neuropathic Left;Plantar Plantar     Wound location left foot   Total Contact Cast Instructions:   Do not get cast wet.   If you notice foul smelling odor coming from cast, please give the Wound Center a call immediatly.   If leg becomes painful due to swelling or cast being too tight, please give the Wound Center a call immediatly.   Do not walk on cast without walking boot.   Cast will be changed at Wound Center once per week.  Please use walker when walking     Standing Status:   Future     Standing Expiration Date:   11/1/2024

## 2024-10-25 NOTE — PROGRESS NOTES
Wound Procedure Treatment Neuropathic Left;Plantar Plantar    Performed by: Joanna Ventura RN  Authorized by: Jonny Marcial DPM    Associated wounds:   Wound 06/28/24 Neuropathic Plantar Left;Plantar  Wound cleansed with:  Wound aggrssively cleansed with NSS and gauze  Applied primary dressing:  Calcium alginate and Silver  Applied secondary dressing:  Gauze and Cast padding  Dressing secured with:  Tape and Kerlix  Offloading device appllied:  TCC

## 2024-10-25 NOTE — PROGRESS NOTES
Patient ID: Rebeca Banegas is a 89 y.o. adult Date of Birth 7/22/1932     Diagnosis:  1. 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    Will plan on bring her back for reevaluation Wednesday.  Surgical debridement completed today reapplied a total contact cast.    Procedure Note:     APPLY RIGID LEG CAST EZ TCC 3 Inch  left  Leg     Pre-Procedure care:     Consent was obtained. The procedure and risks were explained in detail. Questions were encouraged and answered.     Procedure Note:     Application of rigid total contact cast was applied to the leg.  Adequate padding was applied to the tibial tuberosity, lateral and medial malleolus with padding along with the heel and posterior heel/achilles.  Stockinette applied under this and against the skin.  Following this tape was used to hold the folded portion of the stockinette over the dorsum of the foot.     Complications: none.       Instructions: Patient must limit weight bearing in TCC for the first 24 hours after application.      Patient should wear the boot provided at all times and should never go barefoot in this cast.  Without the boot that is provided, this cast alone can cause significant pressure on spots and there is potential for possible new wound formation.       Dressing/Wound Care: Keep the cast clean, dry, and intact.  Do not remove your own cast. If you do not tolerate cast  "contact the office for removal.    No guarantees of wound healing were given and I explained clearly that there is some risk with the use of TCC such as cast \"rub\", cast ulcers, or hidden infection but that the benefits of the TCC far outweight the risks if patient compliance is in place.          If the patient notices any systemic signs of infection including but not limited to fever, chills, nausea, vomiting or significant worsening of wound with increased drainage, redness or streaking up the foot or leg the patient should notify the office or present to the emergency room.      If wound debridement was completed today this was done in an attempt to promote healing, decrease risk of infection and for limb salvage efforts.     Goal of treatment: wound healing     Efforts to decrease pressure on the wound(s), evaluation and discussion of nutritional status, peripheral vascular status, and infection control have all been addressed with this patient.    Return in about 1 week (around 11/1/2024) for Recheck, Next scheduled follow up.      Chief Complaint   Patient presents with   • Follow Up Wound Care Visit     Left plantar foot wound           Subjective:   Patient returns for follow-up on left foot wound.  She has been in a total contact cast did well with this.  She has no new acute issues no signs or symptoms of infection.    The following portions of the patient's history were reviewed and updated as appropriate:   Patient Active Problem List   Diagnosis   • Abnormal findings on diagnostic imaging of other specified body structures   • Abnormal findings on diagnostic imaging of urinary organs   • Allergic rhinitis   • Arthritis   • Asthma   • Atherosclerosis   • Permanent atrial fibrillation (HCC)   • Backache   • Benign colon polyp   • Bifascicular bundle branch block   • Chronic diastolic congestive heart failure (HCC)   • Esophageal reflux   • Essential tremor   • Hoarseness   • Hyperlipidemia   • Primary " hypertension   • Hypothyroidism   • Leg pain   • Nasal polyp   • Onychomycosis   • Ovarian cyst   • Pancreatic cyst   • Plantar fasciitis   • Prediabetes   • Pulmonary nodule   • Thyroid nodule   • Vitamin D deficiency   • Pain of left hand   • Leg cramps   • Sensorineural hearing loss (SNHL), bilateral   • Left asymmetrical SNHL   • Elevated serum immunoglobulin free light chains   • Osteopenia of hip   • CKD stage 3b, GFR 30-44 ml/min (HCC)   • Labial abscess   • Vulvar cysts   • Open wound of right upper arm   • Secondary hyperparathyroidism (HCC)   • Age-related osteoporosis without current pathological fracture   • Chronic cough   • MGUS (monoclonal gammopathy of unknown significance)   • Bronchiectasis without complication (HCC)   • Vaccine counseling   • Acute on chronic diastolic CHF (congestive heart failure) (HCC)   • Chronic foot ulcer, left, with fat layer exposed (HCC)   • 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)   • Preoperative examination   • CVA (cerebral vascular accident) (HCC)   • Severe protein-calorie malnutrition (HCC)     Past Medical History:   Diagnosis Date   • Abnormal ultrasound     RESOLVED: 26JUN2015   • Advice given about COVID-19 virus infection 04/07/2020   • Asthma with acute exacerbation     LAST ASSESSED: 21FEB2013   • Asymptomatic menopausal state    • Atherosclerosis    • Atrial fibrillation (HCC)    • Chronic obstructive lung disease (HCC)    • Congestive heart failure (HCC)     LAST ASSESSED: [id number]   • COVID-19 virus infection 03/25/2023   • Cuboid fracture     LAST ASSESSED: [id number]   • Dyspepsia    • Fall 04/01/2024   • GERD (gastroesophageal reflux disease)    • High risk medication use     LAST ASSESSED: [id number]   • Hyperlipidemia    • Hypertension    • Hypokalemia    • Hypothyroidism    • Impacted cerumen of both ears     LAST ASSESSED: [id number]   • Impacted cerumen of right ear     LAST ASSESSED: [id number]   • Influenza     LAST ASSESSED:  [id number]   • IPMN (intraductal papillary mucinous neoplasm)     RESOLVED: 02OCT2015   • Limb swelling     LAST ASSESSED: 19MAY2014   • Navicular fracture of ankle     LAST ASSESSED: 22MAY2014   • Onychomycosis     LAST ASSESSED: 21JUL2015   • Osteoarthritis    • Osteopenia    • Osteoporosis    • Paronychia, finger, unspecified laterality     LAST ASSESSED: 19JUL2013   • Paroxysmal atrial fibrillation (HCC)     LAST ASSESSED: 02MAR2014   • Peripheral vascular disease (HCC)    • Plantar fasciitis    • Talus fracture     LAST ASSESSED: 03JUL2014   • Vascular headache      Past Surgical History:   Procedure Laterality Date   • APPENDECTOMY     • BONE BIOPSY Left 8/9/2024    Procedure: Bone bx of proximal phalanx second toe & second met with fluoroscopic guidance;  Surgeon: Jonny Marcial DPM;  Location: [facility];  Service: Podiatry   • CATARACT EXTRACTION Bilateral    • CHOLECYSTECTOMY     • COLONOSCOPY     • JOINT REPLACEMENT Bilateral     knee   • NEUROPLASTY / TRANSPOSITION MEDIAN NERVE AT CARPAL TUNNEL BILATERAL Bilateral     DECOMPRESSION   • OOPHORECTOMY Bilateral     age 81   • PELVIC FLOOR REPAIR  2014   • FL BIOPSY BONE TROCAR/NEEDLE SUPERFICIAL Left 8/9/2024    Procedure: Left foot debridement;  Surgeon: Jonny Marcial DPM;  Location: [facility];  Service: Podiatry   • FL DEBRIDEMENT MUSCLE &/FASCIA 1ST 20 SQ CM/< Left 8/9/2024    Procedure: Left foot debridement;  Surgeon: Jonny Marcial DPM;  Location: [facility];  Service: Podiatry   • SINUS SURGERY     • TOTAL KNEE ARTHROPLASTY Bilateral      Social History     Socioeconomic History   • Marital status:      Spouse name: None   • Number of children: None   • Years of education: None   • Highest education level: None   Occupational History   • Occupation: retired   Tobacco Use   • Smoking status: Former     Current packs/day: 0.00     Average packs/day: 0.3 packs/day for 2.0 years (0.5 ttl pk-yrs)     Types: Cigarettes      Start date:      Quit date:      Years since quittin.8   • Smokeless tobacco: Never   • Tobacco comments:     On and off socially with friends    Vaping Use   • Vaping status: Never Used   Substance and Sexual Activity   • Alcohol use: Not Currently     Comment: SOCIAL   • Drug use: No   • Sexual activity: Not Currently     Partners: Male     Birth control/protection: Post-menopausal   Other Topics Concern   • None   Social History Narrative    DAILY COFFEE CONSUMPTION (2 CUPS/DAY)    EXERCISING REGULARLY     Social Determinants of Health     Financial Resource Strain: Not on file   Food Insecurity: No Food Insecurity (2024)    Nursing - Inadequate Food Risk Classification    • Worried About Running Out of Food in the Last Year: Never true    • Ran Out of Food in the Last Year: Never true    • Ran Out of Food in the Last Year: Not on file   Transportation Needs: No Transportation Needs (2024)    PRAPARE - Transportation    • Lack of Transportation (Medical): No    • Lack of Transportation (Non-Medical): No   Physical Activity: Not on file   Stress: Not on file   Social Connections: Not on file   Intimate Partner Violence: Not on file   Housing Stability: Low Risk  (2024)    Housing Stability Vital Sign    • Unable to Pay for Housing in the Last Year: No    • Number of Times Moved in the Last Year: 0    • Homeless in the Last Year: No        Current Outpatient Medications:   •  albuterol (2.5 mg/3 mL) 0.083 % nebulizer solution, Take 3 mL (2.5 mg total) by nebulization every 8 (eight) hours, Disp: 270 mL, Rfl: 3  •  albuterol (Proventil HFA) 90 mcg/act inhaler, Inhale 2 puffs every 6 (six) hours as needed for wheezing, Disp: 20.1 g, Rfl: 3  •  atorvastatin (LIPITOR) 40 mg tablet, Take 1 tablet (40 mg total) by mouth every evening, Disp: 90 tablet, Rfl: 1  •  Cholecalciferol (VITAMIN D3) 1000 units CAPS, Take 2,000 Units by mouth daily, Disp: , Rfl:   •  fluticasone (FLONASE) 50 mcg/act  nasal spray, USE 2 SPRAYS IN EACH NOSTRIL ONCE DAILY, Disp: 48 g, Rfl: 3  •  Fluticasone-Salmeterol (Advair Diskus) 100-50 mcg/dose inhaler, Inhale 1 puff 2 (two) times a day Rinse mouth after use., Disp: 180 blister, Rfl: 1  •  levothyroxine 50 mcg tablet, TAKE 1 TABLET DAILY BUT 1-1/2 TABLETS 4 DAYS/WEEK, Disp: 40 tablet, Rfl: 5  •  metoprolol succinate (TOPROL-XL) 100 mg 24 hr tablet, Take 1 tablet (100 mg total) by mouth daily, Disp: 90 tablet, Rfl: 1  •  pantoprazole (PROTONIX) 40 mg tablet, Take 1 tablet (40 mg total) by mouth daily in the early morning, Disp: 30 tablet, Rfl: 0  •  sodium chloride 3 % inhalation solution, Take 4 mL by nebulization 3 (three) times a day, Disp: 360 mL, Rfl: 2  •  torsemide (DEMADEX) 20 mg tablet, Take 2 tablets (40 mg total) by mouth 2 (two) times a day (Patient taking differently: Take 20 mg by mouth 2 (two) times a day), Disp: 180 tablet, Rfl: 3  •  warfarin (Jantoven) 2.5 mg tablet, Take 1 tablet (2.5 mg total) by mouth daily, Disp: 30 tablet, Rfl: 0  Family History   Problem Relation Age of Onset   • Pancreatic cancer Mother 93   • Heart failure Mother    • Coronary artery disease Family    • Breast cancer Family    • Other Family         BREAST DISORDER, GASTROINTESTINAL CANCER   • Uterine cancer Family    • Hyperlipidemia Family    • Osteoarthritis Family    • Ovarian cancer Family    • Brain cancer Son    • Stroke Father    • No Known Problems Sister    • No Known Problems Daughter    • No Known Problems Maternal Grandmother    • No Known Problems Maternal Grandfather    • No Known Problems Paternal Grandmother    • No Known Problems Paternal Grandfather    • Breast cancer Cousin 50   • Breast cancer Cousin 50   • Ovarian cancer Other 49      Review of Systems  Allergies:  Asa [aspirin] and Nsaids      Objective:  /55   Pulse 91   Temp (!) 96.9 °F (36.1 °C)   Resp 18   LMP  (LMP Unknown)     Physical Exam      Wound 06/28/24 Neuropathic Plantar Left;Plantar  "(Active)   Wound Image Images linked 10/25/24 0933   Wound Description Epithelialization;Intact 10/25/24 0924   Susu-wound Assessment Callus;Intact 10/25/24 0924   Wound Length (cm) 0.1 cm 10/25/24 0924   Wound Width (cm) 0.1 cm 10/25/24 0924   Wound Depth (cm) 0.1 cm 10/25/24 0924   Wound Surface Area (cm^2) 0.01 cm^2 10/25/24 0924   Wound Volume (cm^3) 0.001 cm^3 10/25/24 0924   Calculated Wound Volume (cm^3) 0 cm^3 10/25/24 0924   Change in Wound Size % 100 10/25/24 0924   Drainage Amount None 10/25/24 0924   Non-staged Wound Description Full thickness 10/25/24 0924   Dressing Status Intact 10/25/24 0924                    Debridement   Wound 06/28/24 Neuropathic Plantar Left;Plantar    Universal Protocol:  procedure performed by consultantConsent: Verbal consent obtained.  Risks and benefits: risks, benefits and alternatives were discussed  Consent given by: patient  Time out: Immediately prior to procedure a \"time out\" was called to verify the correct patient, procedure, equipment, support staff and site/side marked as required.  Patient understanding: patient states understanding of the procedure being performed  Patient identity confirmed: verbally with patient    Debridement Details  Performed by: physician  Debridement type: surgical  Level of debridement: subcutaneous tissue  Pain control: lidocaine 4%      Post-debridement measurements  Length (cm): 0.2  Width (cm): 0.2  Depth (cm): 0.2  Percent debrided: 100%  Surface Area (cm^2): 0.04  Area Debrided (cm^2): 0.04  Volume (cm^3): 0.01    Tissue and other material debrided: subcutaneous tissue  Devitalized tissue debrided: biofilm, callus and fibrin  Instrument(s) utilized: blade  Bleeding: small  Hemostasis obtained with: pressure  Procedural pain (0-10): insensate  Post-procedural pain: insensate   Response to treatment: procedure was tolerated well       Results from last 6 Months   Lab Units 09/04/24  1223   WOUND CULTURE  Few Colonies of Proteus " "mirabilis*  Few Colonies of Acinetobacter baumannii*         Wound Instructions:  Orders Placed This Encounter   Procedures   • Wound cleansing and dressings Neuropathic Left;Plantar Plantar     Wound location left foot   Total Contact Cast Instructions:   Do not get cast wet.   If you notice foul smelling odor coming from cast, please give the Wound Center a call immediatly.   If leg becomes painful due to swelling or cast being too tight, please give the Wound Center a call immediatly.   Do not walk on cast without walking boot.   Cast will be changed at Wound Center once per week.  Please use walker when walking     Standing Status:   Future     Standing Expiration Date:   11/1/2024   • Wound Procedure Treatment Neuropathic Left;Plantar Plantar     This order was created via procedure documentation   • Debridement     This order was created via procedure documentation         Jonny Marcial DPM      Portions of the record may have been created with voice recognition software. Occasional wrong word or \"sound a like\" substitutions may have occurred due to the inherent limitations of voice recognition software. Read the chart carefully and recognize, using context, where substitutions have occurred.      "

## 2024-10-29 ENCOUNTER — APPOINTMENT (OUTPATIENT)
Dept: PHYSICAL THERAPY | Facility: OTHER | Age: 89
End: 2024-10-29
Payer: COMMERCIAL

## 2024-10-30 ENCOUNTER — OFFICE VISIT (OUTPATIENT)
Dept: WOUND CARE | Facility: HOSPITAL | Age: 89
End: 2024-10-30
Payer: COMMERCIAL

## 2024-10-30 VITALS
DIASTOLIC BLOOD PRESSURE: 77 MMHG | TEMPERATURE: 97.1 F | SYSTOLIC BLOOD PRESSURE: 123 MMHG | RESPIRATION RATE: 16 BRPM | HEART RATE: 97 BPM

## 2024-10-30 DIAGNOSIS — S81.801D WOUND OF RIGHT LOWER EXTREMITY, SUBSEQUENT ENCOUNTER: ICD-10-CM

## 2024-10-30 DIAGNOSIS — M21.6X2 EQUINUS DEFORMITY OF LEFT FOOT: ICD-10-CM

## 2024-10-30 DIAGNOSIS — G60.9 IDIOPATHIC NEUROPATHY: ICD-10-CM

## 2024-10-30 DIAGNOSIS — L97.526 NON-PRESSURE CHRONIC ULCER OF OTHER PART OF LEFT FOOT WITH BONE INVOLVEMENT WITHOUT EVIDENCE OF NECROSIS (HCC): Primary | ICD-10-CM

## 2024-10-30 PROCEDURE — 99214 OFFICE O/P EST MOD 30 MIN: CPT | Performed by: PODIATRIST

## 2024-10-30 PROCEDURE — 11042 DBRDMT SUBQ TIS 1ST 20SQCM/<: CPT | Performed by: PODIATRIST

## 2024-10-30 RX ORDER — CEFAZOLIN SODIUM 2 G/50ML
2000 SOLUTION INTRAVENOUS ONCE
OUTPATIENT
Start: 2024-10-30 | End: 2024-10-30

## 2024-10-30 RX ORDER — CHLORHEXIDINE GLUCONATE ORAL RINSE 1.2 MG/ML
15 SOLUTION DENTAL ONCE
OUTPATIENT
Start: 2024-10-30 | End: 2024-10-30

## 2024-10-30 NOTE — PROGRESS NOTES
Wound Procedure Treatment Neuropathic Left;Plantar Plantar    Performed by: Joanna Ventura RN  Authorized by: Jonny Marcial DPM    Associated wounds:   Wound 06/28/24 Neuropathic Plantar Left;Plantar  Wound cleansed with:  Wound aggrssively cleansed with NSS and gauze  Applied primary dressing:  Calcium alginate and Silver  Applied secondary dressing:  Gauze and Cast padding  Dressing secured with:  Ricky and Tape  Offloading device appllied:  TCC

## 2024-10-30 NOTE — PROGRESS NOTES
Patient ID: Rebeca Baengas is a 89 y.o. adult Date of Birth 7/22/1932     Diagnosis:  1. 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 Wound of right lower extremity, subsequent encounter  -     Wound cleansing and dressings; Future  -     Wound Procedure Treatment Skin Tear Right Leg  -     Debridement Neuropathic Left;Plantar Plantar  3. Equinus deformity of left foot  -     Debridement Neuropathic Left;Plantar Plantar  -     Case request operating room: Left foot percutaneous osteotomy of second metatarsal., RECESSION GASTROCNEMIUS OPEN; Standing  -     Case request operating room: Left foot percutaneous osteotomy of second metatarsal., RECESSION GASTROCNEMIUS OPEN  4. Idiopathic neuropathy  -     Debridement Neuropathic Left;Plantar Plantar     Diagnosis ICD-10-CM Associated Orders   1. 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 Wound of right lower extremity, subsequent encounter  S81.801D Wound cleansing and dressings     Wound Procedure Treatment Skin Tear Right Leg      Debridement Neuropathic Left;Plantar Plantar      3. Equinus deformity of left foot  M21.6X2 Debridement Neuropathic Left;Plantar Plantar     Case request operating room: Left foot percutaneous osteotomy of second metatarsal., RECESSION GASTROCNEMIUS OPEN     Case request operating room: Left foot percutaneous osteotomy of second metatarsal., RECESSION GASTROCNEMIUS OPEN      4. Idiopathic neuropathy  G60.9 Debridement Neuropathic Left;Plantar Plantar           Assessment & Plan:  Discussed with patient continue total contact casting.    Unfortunately she still has some deep areas underneath where the callus forms.  This is at the level of the metatarsal head.  I do not see any signs of infection noted currently.  She had debridement and biopsy of this previously.    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  Also discussed with her Achilles tendon lengthening, she is in agreement with this.  She did sign this in initial the area where we added this to the consent form.        Will plan on scheduling her back in 1 week for cast change at that time with Dr. Huddleston.    Surgical debridement completed today to the area.    Discussed with patient's daughter the procedure and recovery..    I explained to patient risks and benefits of the procedure in detail.  Conservative options have been evaluated and exhausted. Complications of the procedure can include but are not limited to infection, prolonged pain, prolonged swelling, prolonged numbness, need for further surgery, wound healing problems, loss of function, painful scar, stiffness, arthritis, medical complications, recurrence, DVT, PE, death, nerve, tendon, ligament or blood vessel injury, need for amputation, limb loss.  Patient understands the  "procedure and all questions have been answered to the patient satisfaction.  No guarantees have been given to the patient regarding outcome.  Will proceed with surgical intervention.  Patient has signed informed consent and understands the procedure and post operative course.  Will return approximately one week post op for evaluation.    Procedure Note:     APPLY RIGID LEG CAST EZ TCC 3 Inch  Left Leg     Pre-Procedure care:     Consent was obtained. The procedure and risks were explained in detail. Questions were encouraged and answered.     Procedure Note:     Application of rigid total contact cast was applied to the leg.  Adequate padding was applied to the tibial tuberosity, lateral and medial malleolus with padding along with the heel and posterior heel/achilles.  Stockinette applied under this and against the skin.  Following this tape was used to hold the folded portion of the stockinette over the dorsum of the foot.     Complications: none.       Instructions: Patient must limit weight bearing in TCC for the first 24 hours after application.      Patient should wear the boot provided at all times and should never go barefoot in this cast.  Without the boot that is provided, this cast alone can cause significant pressure on spots and there is potential for possible new wound formation.       Dressing/Wound Care: Keep the cast clean, dry, and intact.  Do not remove your own cast. If you do not tolerate cast contact the office for removal.    No guarantees of wound healing were given and I explained clearly that there is some risk with the use of TCC such as cast \"rub\", cast ulcers, or hidden infection but that the benefits of the TCC far outweight the risks if patient compliance is in place.            If the patient notices any systemic signs of infection including but not limited to fever, chills, nausea, vomiting or significant worsening of wound with increased drainage, redness or streaking up the foot or " leg the patient should notify the office or present to the emergency room.      If wound debridement was completed today this was done in an attempt to promote healing, decrease risk of infection and for limb salvage efforts.     Goal of treatment: wound healing     Efforts to decrease pressure on the wound(s), evaluation and discussion of nutritional status, peripheral vascular status, and infection control have all been addressed with this patient.    Return in about 1 week (around 11/6/2024) for Recheck, Next scheduled follow up.      Chief Complaint   Patient presents with    Follow Up Wound Care Visit     Chronic ulcer           Subjective:   Patient returns for follow-up on left foot subsecond metatarsal ulceration.  She has continued to form some callus formation over the second metatarsal area however she does have wound open underneath this location that continues to give issues with healing.  She has had debridement of this area as well as biopsy previously.  She does have decreased of the metatarsal fat pad to this area.  We have had vascular studies completed previously which did show adequate perfusion with regards to toe pressures.  Patient has wanted to do minimally invasive interventions.  I did discuss with her options of Achilles tendon lengthening previously however she is not wanting to pursue more aggressive interventions.  I did discuss with her the option of a percutaneous osteotomy at the level of the second metatarsal which hopefully will offload this further and give her additional protection to the area.        The following portions of the patient's history were reviewed and updated as appropriate:   Patient Active Problem List   Diagnosis    Abnormal findings on diagnostic imaging of other specified body structures    Abnormal findings on diagnostic imaging of urinary organs    Allergic rhinitis    Arthritis    Asthma    Atherosclerosis    Permanent atrial fibrillation (HCC)    Backache     Benign colon polyp    Bifascicular bundle branch block    Chronic diastolic congestive heart failure (HCC)    Esophageal reflux    Essential tremor    Hoarseness    Hyperlipidemia    Primary hypertension    Hypothyroidism    Leg pain    Nasal polyp    Onychomycosis    Ovarian cyst    Pancreatic cyst    Plantar fasciitis    Prediabetes    Pulmonary nodule    Thyroid nodule    Vitamin D deficiency    Pain of left hand    Leg cramps    Sensorineural hearing loss (SNHL), bilateral    Left asymmetrical SNHL    Elevated serum immunoglobulin free light chains    Osteopenia of hip    CKD stage 3b, GFR 30-44 ml/min (HCC)    Labial abscess    Vulvar cysts    Open wound of right upper arm    Secondary hyperparathyroidism (HCC)    Age-related osteoporosis without current pathological fracture    Chronic cough    MGUS (monoclonal gammopathy of unknown significance)    Bronchiectasis without complication (HCC)    Vaccine counseling    Acute on chronic diastolic CHF (congestive heart failure) (HCC)    Chronic foot ulcer, left, with fat layer exposed (HCC)    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)    Preoperative examination    CVA (cerebral vascular accident) (HCC)    Severe protein-calorie malnutrition (HCC)     Past Medical History:   Diagnosis Date    Abnormal ultrasound     RESOLVED: [id number]    Advice given about COVID-19 virus infection 04/07/2020    Asthma with acute exacerbation     LAST ASSESSED: [id number]    Asymptomatic menopausal state     Atherosclerosis     Atrial fibrillation (HCC)     Chronic obstructive lung disease (HCC)     Congestive heart failure (HCC)     LAST ASSESSED: [id number]    COVID-19 virus infection 03/25/2023    Cuboid fracture     LAST ASSESSED: 56GTP0296    Dyspepsia     Fall 04/01/2024    GERD (gastroesophageal reflux disease)     High risk medication use     LAST ASSESSED: 67PQB5484    Hyperlipidemia     Hypertension     Hypokalemia     Hypothyroidism     Impacted cerumen  of both ears     LAST ASSESSED: 24SVD2935    Impacted cerumen of right ear     LAST ASSESSED: 02OEH7113    Influenza     LAST ASSESSED: 58EUL4143    IPMN (intraductal papillary mucinous neoplasm)     RESOLVED: 02OCT2015    Limb swelling     LAST ASSESSED: 98OBD9157    Navicular fracture of ankle     LAST ASSESSED: 22MAY2014    Onychomycosis     LAST ASSESSED: 78PUO1933    Osteoarthritis     Osteopenia     Osteoporosis     Paronychia, finger, unspecified laterality     LAST ASSESSED: [id number]    Paroxysmal atrial fibrillation (HCC)     LAST ASSESSED: 02MAR2014    Peripheral vascular disease (HCC)     Plantar fasciitis     Talus fracture     LAST ASSESSED: 83GPN2975    Vascular headache      Past Surgical History:   Procedure Laterality Date    APPENDECTOMY      BONE BIOPSY Left 8/9/2024    Procedure: Bone bx of proximal phalanx second toe & second met with fluoroscopic guidance;  Surgeon: Jonny Marcial DPM;  Location: [facility];  Service: Podiatry    CATARACT EXTRACTION Bilateral     CHOLECYSTECTOMY      COLONOSCOPY      JOINT REPLACEMENT Bilateral     knee    NEUROPLASTY / TRANSPOSITION MEDIAN NERVE AT CARPAL TUNNEL BILATERAL Bilateral     DECOMPRESSION    OOPHORECTOMY Bilateral     age 81    PELVIC FLOOR REPAIR  2014    DE BIOPSY BONE TROCAR/NEEDLE SUPERFICIAL Left 8/9/2024    Procedure: Left foot debridement;  Surgeon: Jonny Marcial DPM;  Location: [facility];  Service: Podiatry    DE DEBRIDEMENT MUSCLE &/FASCIA 1ST 20 SQ CM/< Left 8/9/2024    Procedure: Left foot debridement;  Surgeon: Jonny Marcial DPM;  Location: [facility];  Service: Podiatry    SINUS SURGERY      TOTAL KNEE ARTHROPLASTY Bilateral      Social History     Socioeconomic History    Marital status:      Spouse name: Not on file    Number of children: Not on file    Years of education: Not on file    Highest education level: Not on file   Occupational History    Occupation: retired   Tobacco Use    Smoking  status: Former     Current packs/day: 0.00     Average packs/day: 0.3 packs/day for 2.0 years (0.5 ttl pk-yrs)     Types: Cigarettes     Start date:      Quit date:      Years since quittin.8    Smokeless tobacco: Never    Tobacco comments:     On and off socially with friends    Vaping Use    Vaping status: Never Used   Substance and Sexual Activity    Alcohol use: Not Currently     Comment: SOCIAL    Drug use: No    Sexual activity: Not Currently     Partners: Male     Birth control/protection: Post-menopausal   Other Topics Concern    Not on file   Social History Narrative    DAILY COFFEE CONSUMPTION (2 CUPS/DAY)    EXERCISING REGULARLY     Social Determinants of Health     Financial Resource Strain: Not on file   Food Insecurity: No Food Insecurity (2024)    Nursing - Inadequate Food Risk Classification     Worried About Running Out of Food in the Last Year: Never true     Ran Out of Food in the Last Year: Never true     Ran Out of Food in the Last Year: Not on file   Transportation Needs: No Transportation Needs (2024)    PRAPARE - Transportation     Lack of Transportation (Medical): No     Lack of Transportation (Non-Medical): No   Physical Activity: Not on file   Stress: Not on file   Social Connections: Not on file   Intimate Partner Violence: Not on file   Housing Stability: Low Risk  (2024)    Housing Stability Vital Sign     Unable to Pay for Housing in the Last Year: No     Number of Times Moved in the Last Year: 0     Homeless in the Last Year: No        Current Outpatient Medications:     albuterol (2.5 mg/3 mL) 0.083 % nebulizer solution, Take 3 mL (2.5 mg total) by nebulization every 8 (eight) hours, Disp: 270 mL, Rfl: 3    albuterol (Proventil HFA) 90 mcg/act inhaler, Inhale 2 puffs every 6 (six) hours as needed for wheezing, Disp: 20.1 g, Rfl: 3    atorvastatin (LIPITOR) 40 mg tablet, Take 1 tablet (40 mg total) by mouth every evening, Disp: 90 tablet, Rfl: 1     Cholecalciferol (VITAMIN D3) 1000 units CAPS, Take 2,000 Units by mouth daily, Disp: , Rfl:     fluticasone (FLONASE) 50 mcg/act nasal spray, USE 2 SPRAYS IN EACH NOSTRIL ONCE DAILY, Disp: 48 g, Rfl: 3    Fluticasone-Salmeterol (Advair Diskus) 100-50 mcg/dose inhaler, Inhale 1 puff 2 (two) times a day Rinse mouth after use., Disp: 180 blister, Rfl: 1    levothyroxine 50 mcg tablet, TAKE 1 TABLET DAILY BUT 1-1/2 TABLETS 4 DAYS/WEEK, Disp: 40 tablet, Rfl: 5    metoprolol succinate (TOPROL-XL) 100 mg 24 hr tablet, Take 1 tablet (100 mg total) by mouth daily, Disp: 90 tablet, Rfl: 1    pantoprazole (PROTONIX) 40 mg tablet, Take 1 tablet (40 mg total) by mouth daily in the early morning, Disp: 30 tablet, Rfl: 0    sodium chloride 3 % inhalation solution, Take 4 mL by nebulization 3 (three) times a day, Disp: 360 mL, Rfl: 2    torsemide (DEMADEX) 20 mg tablet, Take 2 tablets (40 mg total) by mouth 2 (two) times a day (Patient taking differently: Take 20 mg by mouth 2 (two) times a day), Disp: 180 tablet, Rfl: 3    warfarin (Jantoven) 2.5 mg tablet, Take 1 tablet (2.5 mg total) by mouth daily, Disp: 30 tablet, Rfl: 0  Family History   Problem Relation Age of Onset    Pancreatic cancer Mother 93    Heart failure Mother     Coronary artery disease Family     Breast cancer Family     Other Family         BREAST DISORDER, GASTROINTESTINAL CANCER    Uterine cancer Family     Hyperlipidemia Family     Osteoarthritis Family     Ovarian cancer Family     Brain cancer Son     Stroke Father     No Known Problems Sister     No Known Problems Daughter     No Known Problems Maternal Grandmother     No Known Problems Maternal Grandfather     No Known Problems Paternal Grandmother     No Known Problems Paternal Grandfather     Breast cancer Cousin 50    Breast cancer Cousin 50    Ovarian cancer Other 49      Review of Systems  Allergies:  Asa [aspirin] and Nsaids      Objective:  /77   Pulse 97   Temp (!) 97.1 °F (36.2 °C)    "Resp 16   LMP  (LMP Unknown)     Physical Exam      Wound 06/28/24 Neuropathic Plantar Left;Plantar (Active)   Wound Image Images linked 10/30/24 1707   Wound Description Epithelialization;Intact 10/30/24 1746   Susu-wound Assessment Callus;Intact 10/30/24 1746   Wound Length (cm) 0.1 cm 10/30/24 1746   Wound Width (cm) 0.1 cm 10/30/24 1746   Wound Depth (cm) 0.1 cm 10/30/24 1746   Wound Surface Area (cm^2) 0.01 cm^2 10/30/24 1746   Wound Volume (cm^3) 0.001 cm^3 10/30/24 1746   Calculated Wound Volume (cm^3) 0 cm^3 10/30/24 1746   Change in Wound Size % 100 10/30/24 1746   Drainage Amount None 10/30/24 1746   Non-staged Wound Description Full thickness 10/30/24 1746   Dressing Status Intact 10/30/24 1746       Wound 10/30/24 Skin Tear Leg Right (Active)   Wound Image Images linked 10/30/24 1654   Wound Description Epithelialization;Pink 10/30/24 1742   Susu-wound Assessment Intact;Pink 10/30/24 1742   Wound Length (cm) 8.5 cm 10/30/24 1742   Wound Width (cm) 2.5 cm 10/30/24 1742   Wound Depth (cm) 0.1 cm 10/30/24 1742   Wound Surface Area (cm^2) 21.25 cm^2 10/30/24 1742   Wound Volume (cm^3) 2.125 cm^3 10/30/24 1742   Calculated Wound Volume (cm^3) 2.13 cm^3 10/30/24 1742   Drainage Amount Small 10/30/24 1742   Drainage Description Serosanguineous 10/30/24 1742   Non-staged Wound Description Full thickness 10/30/24 1742   Dressing Status Intact 10/30/24 1742                      Debridement   Wound 06/28/24 Neuropathic Plantar Left;Plantar    Universal Protocol:  procedure performed by consultantConsent: Verbal consent obtained.  Risks and benefits: risks, benefits and alternatives were discussed  Consent given by: patient  Time out: Immediately prior to procedure a \"time out\" was called to verify the correct patient, procedure, equipment, support staff and site/side marked as required.  Patient understanding: patient states understanding of the procedure being performed  Patient identity confirmed: verbally with " patient    Debridement Details  Performed by: physician  Debridement type: surgical  Level of debridement: subcutaneous tissue  Pain control: lidocaine 4%      Post-debridement measurements  Length (cm): 0.2  Width (cm): 0.2  Depth (cm): 0.4  Percent debrided: 100%  Surface Area (cm^2): 0.04  Area Debrided (cm^2): 0.04  Volume (cm^3): 0.02    Tissue and other material debrided: subcutaneous tissue  Devitalized tissue debrided: biofilm, callus and fibrin  Instrument(s) utilized: blade  Bleeding: small  Hemostasis obtained with: pressure  Procedural pain (0-10): insensate  Post-procedural pain: insensate   Response to treatment: procedure was tolerated well         Results from last 6 Months   Lab Units 09/04/24  1223   WOUND CULTURE  Few Colonies of Proteus mirabilis*  Few Colonies of Acinetobacter baumannii*         Wound Instructions:  Orders Placed This Encounter   Procedures    Wound cleansing and dressings     Wound location left foot   Total Contact Cast Instructions:   Do not get cast wet.   If you notice foul smelling odor coming from cast, please give the Wound Center a call immediatly.   If leg becomes painful due to swelling or cast being too tight, please give the Wound Center a call immediatly.   Do not walk on cast without walking boot.   Cast will be changed at Wound Center once per week.  Please use walker when walking  Please wear pillow case over cast at night to prevent rubbing.      Wound Location: Right leg  Change dressing once a day  You may remove the dressing and shower. Do not leave wound open to air, apply new dressing immediately.  Cleanse the wound with wound cleanser or mild soap and water, rinse, pat dry.  Apply Adaptic to the wound. ( Rest of supplies sent home with patient. )  Cover with ABD  Secure with rolled gauze and tape.     Standing Status:   Future     Standing Expiration Date:   11/6/2024    Wound Procedure Treatment Neuropathic Left;Plantar Plantar     This order was  "created via procedure documentation    Wound Procedure Treatment Skin Tear Right Leg     This order was created via procedure documentation    Debridement Neuropathic Left;Plantar Plantar     This order was created via procedure documentation         Jonny Marcial DPM      Portions of the record may have been created with voice recognition software. Occasional wrong word or \"sound a like\" substitutions may have occurred due to the inherent limitations of voice recognition software. Read the chart carefully and recognize, using context, where substitutions have occurred.    "

## 2024-10-30 NOTE — PATIENT INSTRUCTIONS
Orders Placed This Encounter   Procedures    Wound cleansing and dressings     Wound location left foot   Total Contact Cast Instructions:   Do not get cast wet.   If you notice foul smelling odor coming from cast, please give the Wound Center a call immediatly.   If leg becomes painful due to swelling or cast being too tight, please give the Wound Center a call immediatly.   Do not walk on cast without walking boot.   Cast will be changed at Wound Center once per week.  Please use walker when walking  Please wear pillow case over cast at night to prevent rubbing.      Wound Location: Right leg  Change dressing once a day  You may remove the dressing and shower. Do not leave wound open to air, apply new dressing immediately.  Cleanse the wound with wound cleanser or mild soap and water, rinse, pat dry.  Apply Adaptic to the wound. ( Rest of supplies sent home with patient. )  Cover with ABD  Secure with rolled gauze and tape.     Standing Status:   Future     Standing Expiration Date:   11/6/2024

## 2024-10-30 NOTE — PROGRESS NOTES
Wound Procedure Treatment Skin Tear Right Leg    Performed by: Joanna Ventura RN  Authorized by: Jonny Marcial DPM    Associated wounds:   Wound 10/30/24 Skin Tear Leg Right  Wound cleansed with:  Wound aggrssively cleansed with NSS and gauze  Applied primary dressing:  Non adherent contact layer  Applied secondary dressing:  ABD  Dressing secured with:  Ricky and Tape

## 2024-11-05 ENCOUNTER — OFFICE VISIT (OUTPATIENT)
Dept: WOUND CARE | Facility: HOSPITAL | Age: 89
End: 2024-11-05
Payer: COMMERCIAL

## 2024-11-05 VITALS
SYSTOLIC BLOOD PRESSURE: 115 MMHG | TEMPERATURE: 98.1 F | DIASTOLIC BLOOD PRESSURE: 66 MMHG | RESPIRATION RATE: 18 BRPM | HEART RATE: 104 BPM

## 2024-11-05 DIAGNOSIS — G60.9 IDIOPATHIC NEUROPATHY: ICD-10-CM

## 2024-11-05 DIAGNOSIS — M21.6X2 EQUINUS DEFORMITY OF LEFT FOOT: ICD-10-CM

## 2024-11-05 DIAGNOSIS — J45.40 MODERATE PERSISTENT ASTHMA WITHOUT COMPLICATION: Chronic | ICD-10-CM

## 2024-11-05 DIAGNOSIS — L97.526 NON-PRESSURE CHRONIC ULCER OF OTHER PART OF LEFT FOOT WITH BONE INVOLVEMENT WITHOUT EVIDENCE OF NECROSIS (HCC): ICD-10-CM

## 2024-11-05 DIAGNOSIS — S81.801D WOUND OF RIGHT LOWER EXTREMITY, SUBSEQUENT ENCOUNTER: Primary | ICD-10-CM

## 2024-11-05 PROCEDURE — 97597 DBRDMT OPN WND 1ST 20 CM/<: CPT | Performed by: PODIATRIST

## 2024-11-05 RX ORDER — LIDOCAINE 40 MG/G
CREAM TOPICAL ONCE
Status: COMPLETED | OUTPATIENT
Start: 2024-11-05 | End: 2024-11-05

## 2024-11-05 RX ORDER — FLUTICASONE PROPIONATE AND SALMETEROL 100; 50 UG/1; UG/1
1 POWDER RESPIRATORY (INHALATION) 2 TIMES DAILY
Qty: 180 BLISTER | Refills: 3 | Status: SHIPPED | OUTPATIENT
Start: 2024-11-05 | End: 2025-10-31

## 2024-11-05 RX ADMIN — LIDOCAINE: 40 CREAM TOPICAL at 10:40

## 2024-11-05 NOTE — PATIENT INSTRUCTIONS
Orders Placed This Encounter   Procedures    Wound cleansing and dressings     Wound location left foot     Do not get dressing wet--keep dry clean and intact -call wound care center if dressing becomes wet   Use cast cover if you shower   Felt was applied to your foot for offloading and a silicone bordered foam dressing applied to great toe for protection          Wound Location: Right leg  Change dressing 3 times a week   You may remove the dressing and shower. Do not leave wound open to air, apply new dressing immediately.  Cleanse the wound with wound cleanser or mild soap and water, rinse, pat dry.  Apply xeroform to the wound.   Cover with ABD  Secure with rolled gauze and tape.     Standing Status:   Future     Standing Expiration Date:   11/12/2024

## 2024-11-05 NOTE — PROGRESS NOTES
Wound Procedure Treatment Neuropathic Left;Plantar Plantar    Performed by: Sharon Alba RN  Authorized by: Valentina Huddleston DPM    Associated wounds:   Wound 06/28/24 Neuropathic Plantar Left;Plantar  Wound cleansed with:  Soap and water  Applied primary dressing:  Other  Applied secondary dressing:  ABD and Cast padding  Dressing secured with:  Kerlix and Coban  Offloading device appllied:  Surgical shoe and Felt padding 1/4 inch      Felt applied to foot for offloading   Silicone bordered foam applied to great toe for protection   Xeroform applied to wound. Covered by ABD and 2 rolls of cast padding

## 2024-11-05 NOTE — PROGRESS NOTES
Patient ID: Rebeca Banegas is a 89 y.o. adult Date of Birth 7/22/1932       Chief Complaint   Patient presents with    Follow Up Wound Care Visit     Left foot wound and right leg       Allergies:  Asa [aspirin] and Nsaids    Diagnosis:  1. Wound of right lower extremity, subsequent encounter  -     lidocaine (LMX) 4 % cream  -     Wound cleansing and dressings; Future  -     Wound Procedure Treatment Skin Tear Right Leg  2. 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 Equinus deformity of left foot  -     Wound cleansing and dressings; Future  -     Wound Procedure Treatment Neuropathic Left;Plantar Plantar  4. Idiopathic neuropathy  -     Wound cleansing and dressings; Future  -     Wound Procedure Treatment Neuropathic Left;Plantar Plantar     Diagnosis ICD-10-CM Associated Orders   1. Wound of right lower extremity, subsequent encounter  S81.801D lidocaine (LMX) 4 % cream     Wound cleansing and dressings     Wound Procedure Treatment Skin Tear Right Leg      2. 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 Equinus deformity of left foot  M21.6X2 Wound cleansing and dressings     Wound Procedure Treatment Neuropathic Left;Plantar Plantar      4. Idiopathic neuropathy  G60.9 Wound cleansing and dressings     Wound Procedure Treatment Neuropathic Left;Plantar Plantar           Assessment & Plan:  Chronic nonpressure ulceration plantar aspect third metatarsal left foot, ulceration was debrided through selective tissues and dressed with 1/4 inch felt offloading, Dermagran gauze dry dressing to the wound base, foam protection to left great toe and football dressing  "was applied.  I have held her total contact cast secondary to her lower extremity edema and irritation of her great toe.  Patient was dispensed surgical shoe, she states this feels very good and very comfortable.  Traumatic ulceration anterior right lower extremity, wound was debrided through selective tissues and dressed with Xeroform dry dressing, family will do the same 3 times a week.  Today reviewed frequent elevation, low-sodium diet, proper protein intake, strict pressure and friction reduction.  I personally viewed patient's medical records, previous podiatry/wound care notes, pertinent blood work and imaging.  At this time patient understands and agrees with the plan and she will follow-up with Dr. Marcial in 1 week.    Debridement   Wound 06/28/24 Neuropathic Plantar Left;Plantar    Universal Protocol:  procedure performed by consultantConsent: Verbal consent obtained.  Risks and benefits: risks, benefits and alternatives were discussed  Consent given by: patient  Time out: Immediately prior to procedure a \"time out\" was called to verify the correct patient, procedure, equipment, support staff and site/side marked as required.  Patient understanding: patient states understanding of the procedure being performed  Patient identity confirmed: verbally with patient    Debridement Details  Performed by: physician  Debridement type: selective  Pain control: lidocaine 4%      Post-debridement measurements  Length (cm): 0.1  Width (cm): 0.1  Depth (cm): 0.1  Percent debrided: 100%  Surface Area (cm^2): 0.01  Area Debrided (cm^2): 0.01  Volume (cm^3): 0    Devitalized tissue debrided: biofilm, callus and slough  Instrument(s) utilized: blade  Bleeding: none  Hemostasis obtained with: not applicable  Procedural pain (0-10): insensate  Post-procedural pain: insensate   Response to treatment: procedure was tolerated well    Debridement   Wound 10/30/24 Skin Tear Leg Right    Universal Protocol:  procedure performed by " "consultantConsent: Verbal consent obtained.  Risks and benefits: risks, benefits and alternatives were discussed  Consent given by: patient  Time out: Immediately prior to procedure a \"time out\" was called to verify the correct patient, procedure, equipment, support staff and site/side marked as required.  Patient understanding: patient states understanding of the procedure being performed  Patient identity confirmed: verbally with patient    Debridement Details  Performed by: physician  Debridement type: selective  Pain control: lidocaine 4%      Post-debridement measurements  Length (cm): 6.2  Width (cm): 3  Depth (cm): 0.1  Percent debrided: 100%  Surface Area (cm^2): 18.6  Area Debrided (cm^2): 18.6  Volume (cm^3): 1.86    Devitalized tissue debrided: biofilm, callus and slough  Instrument(s) utilized: blade  Bleeding: small  Hemostasis obtained with: pressure  Procedural pain (0-10): insensate  Post-procedural pain: insensate   Response to treatment: procedure was tolerated well               Subjective:   Rebeca presents today for evaluation care of bilateral lower extremity ulcerations and wounds, she states she is having difficulty with her left total contact cast, it seem like it turns, lots of pressure over her great toe, she is having swelling of bilateral lower extremities for which she is following with cardiology tomorrow.  She also has a new wound on her right anterior leg from the cast rubbing likely in her sleep.          The following portions of the patient's history were reviewed and updated as appropriate:   Patient Active Problem List   Diagnosis    Abnormal findings on diagnostic imaging of other specified body structures    Abnormal findings on diagnostic imaging of urinary organs    Allergic rhinitis    Arthritis    Asthma    Atherosclerosis    Permanent atrial fibrillation (HCC)    Backache    Benign colon polyp    Bifascicular bundle branch block    Chronic diastolic congestive heart " failure (HCC)    Esophageal reflux    Essential tremor    Hoarseness    Hyperlipidemia    Primary hypertension    Hypothyroidism    Leg pain    Nasal polyp    Onychomycosis    Ovarian cyst    Pancreatic cyst    Plantar fasciitis    Prediabetes    Pulmonary nodule    Thyroid nodule    Vitamin D deficiency    Pain of left hand    Leg cramps    Sensorineural hearing loss (SNHL), bilateral    Left asymmetrical SNHL    Elevated serum immunoglobulin free light chains    Osteopenia of hip    CKD stage 3b, GFR 30-44 ml/min (HCC)    Labial abscess    Vulvar cysts    Open wound of right upper arm    Secondary hyperparathyroidism (HCC)    Age-related osteoporosis without current pathological fracture    Chronic cough    MGUS (monoclonal gammopathy of unknown significance)    Bronchiectasis without complication (HCC)    Vaccine counseling    Acute on chronic diastolic CHF (congestive heart failure) (HCC)    Chronic foot ulcer, left, with fat layer exposed (HCC)    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)    Preoperative examination    CVA (cerebral vascular accident) (HCC)    Severe protein-calorie malnutrition (HCC)     Past Medical History:   Diagnosis Date    Abnormal ultrasound     RESOLVED: [id number]    Advice given about COVID-19 virus infection 04/07/2020    Asthma with acute exacerbation     LAST ASSESSED: [id number]    Asymptomatic menopausal state     Atherosclerosis     Atrial fibrillation (HCC)     Chronic obstructive lung disease (HCC)     Congestive heart failure (HCC)     LAST ASSESSED: 14HWZ3761    COVID-19 virus infection 03/25/2023    Cuboid fracture     LAST ASSESSED: 20SOE2122    Dyspepsia     Fall 04/01/2024    GERD (gastroesophageal reflux disease)     High risk medication use     LAST ASSESSED: 06ZKH0883    Hyperlipidemia     Hypertension     Hypokalemia     Hypothyroidism     Impacted cerumen of both ears     LAST ASSESSED: [id number]    Impacted cerumen of right ear     LAST ASSESSED:  22LYM8603    Influenza     LAST ASSESSED: 53BEJ5839    IPMN (intraductal papillary mucinous neoplasm)     RESOLVED: 02OCT2015    Limb swelling     LAST ASSESSED: 82SYD3514    Navicular fracture of ankle     LAST ASSESSED: 94WGQ9665    Onychomycosis     LAST ASSESSED: 65BBZ1353    Osteoarthritis     Osteopenia     Osteoporosis     Paronychia, finger, unspecified laterality     LAST ASSESSED: 60ABY8758    Paroxysmal atrial fibrillation (HCC)     LAST ASSESSED: 02MAR2014    Peripheral vascular disease (HCC)     Plantar fasciitis     Talus fracture     LAST ASSESSED: 88ALW8776    Vascular headache      Past Surgical History:   Procedure Laterality Date    APPENDECTOMY      BONE BIOPSY Left 8/9/2024    Procedure: Bone bx of proximal phalanx second toe & second met with fluoroscopic guidance;  Surgeon: Jonny Marcial DPM;  Location: [facility];  Service: Podiatry    CATARACT EXTRACTION Bilateral     CHOLECYSTECTOMY      COLONOSCOPY      JOINT REPLACEMENT Bilateral     knee    NEUROPLASTY / TRANSPOSITION MEDIAN NERVE AT CARPAL TUNNEL BILATERAL Bilateral     DECOMPRESSION    OOPHORECTOMY Bilateral     age 81    PELVIC FLOOR REPAIR  2014    DC BIOPSY BONE TROCAR/NEEDLE SUPERFICIAL Left 8/9/2024    Procedure: Left foot debridement;  Surgeon: Jonny Marcial DPM;  Location: [facility];  Service: Podiatry    DC DEBRIDEMENT MUSCLE &/FASCIA 1ST 20 SQ CM/< Left 8/9/2024    Procedure: Left foot debridement;  Surgeon: Jonny Marcial DPM;  Location: [facility];  Service: Podiatry    SINUS SURGERY      TOTAL KNEE ARTHROPLASTY Bilateral      Social History     Socioeconomic History    Marital status:      Spouse name: None    Number of children: None    Years of education: None    Highest education level: None   Occupational History    Occupation: retired   Tobacco Use    Smoking status: Former     Current packs/day: 0.00     Average packs/day: 0.3 packs/day for 2.0 years (0.5 ttl pk-yrs)     Types:  Cigarettes     Start date:      Quit date:      Years since quittin.8    Smokeless tobacco: Never    Tobacco comments:     On and off socially with friends    Vaping Use    Vaping status: Never Used   Substance and Sexual Activity    Alcohol use: Not Currently     Comment: SOCIAL    Drug use: No    Sexual activity: Not Currently     Partners: Male     Birth control/protection: Post-menopausal   Other Topics Concern    None   Social History Narrative    DAILY COFFEE CONSUMPTION (2 CUPS/DAY)    EXERCISING REGULARLY     Social Determinants of Health     Financial Resource Strain: Not on file   Food Insecurity: No Food Insecurity (2024)    Nursing - Inadequate Food Risk Classification     Worried About Running Out of Food in the Last Year: Never true     Ran Out of Food in the Last Year: Never true     Ran Out of Food in the Last Year: Not on file   Transportation Needs: No Transportation Needs (2024)    PRAPARE - Transportation     Lack of Transportation (Medical): No     Lack of Transportation (Non-Medical): No   Physical Activity: Not on file   Stress: Not on file   Social Connections: Not on file   Intimate Partner Violence: Not on file   Housing Stability: Low Risk  (2024)    Housing Stability Vital Sign     Unable to Pay for Housing in the Last Year: No     Number of Times Moved in the Last Year: 0     Homeless in the Last Year: No        Current Outpatient Medications:     albuterol (2.5 mg/3 mL) 0.083 % nebulizer solution, Take 3 mL (2.5 mg total) by nebulization every 8 (eight) hours, Disp: 270 mL, Rfl: 3    albuterol (Proventil HFA) 90 mcg/act inhaler, Inhale 2 puffs every 6 (six) hours as needed for wheezing, Disp: 20.1 g, Rfl: 3    atorvastatin (LIPITOR) 40 mg tablet, Take 1 tablet (40 mg total) by mouth every evening, Disp: 90 tablet, Rfl: 1    Cholecalciferol (VITAMIN D3) 1000 units CAPS, Take 2,000 Units by mouth daily, Disp: , Rfl:     fluticasone (FLONASE) 50 mcg/act nasal  spray, USE 2 SPRAYS IN EACH NOSTRIL ONCE DAILY, Disp: 48 g, Rfl: 3    Fluticasone-Salmeterol (Advair Diskus) 100-50 mcg/dose inhaler, Inhale 1 puff 2 (two) times a day Rinse mouth after use., Disp: 180 blister, Rfl: 1    levothyroxine 50 mcg tablet, TAKE 1 TABLET DAILY BUT 1-1/2 TABLETS 4 DAYS/WEEK, Disp: 40 tablet, Rfl: 5    metoprolol succinate (TOPROL-XL) 100 mg 24 hr tablet, Take 1 tablet (100 mg total) by mouth daily, Disp: 90 tablet, Rfl: 1    pantoprazole (PROTONIX) 40 mg tablet, Take 1 tablet (40 mg total) by mouth daily in the early morning, Disp: 30 tablet, Rfl: 0    sodium chloride 3 % inhalation solution, Take 4 mL by nebulization 3 (three) times a day, Disp: 360 mL, Rfl: 2    torsemide (DEMADEX) 20 mg tablet, Take 2 tablets (40 mg total) by mouth 2 (two) times a day (Patient taking differently: Take 20 mg by mouth 2 (two) times a day), Disp: 180 tablet, Rfl: 3    warfarin (Jantoven) 2.5 mg tablet, Take 1 tablet (2.5 mg total) by mouth daily, Disp: 30 tablet, Rfl: 0  No current facility-administered medications for this visit.  Family History   Problem Relation Age of Onset    Pancreatic cancer Mother 93    Heart failure Mother     Coronary artery disease Family     Breast cancer Family     Other Family         BREAST DISORDER, GASTROINTESTINAL CANCER    Uterine cancer Family     Hyperlipidemia Family     Osteoarthritis Family     Ovarian cancer Family     Brain cancer Son     Stroke Father     No Known Problems Sister     No Known Problems Daughter     No Known Problems Maternal Grandmother     No Known Problems Maternal Grandfather     No Known Problems Paternal Grandmother     No Known Problems Paternal Grandfather     Breast cancer Cousin 50    Breast cancer Cousin 50    Ovarian cancer Other 49       Review of Systems   Constitutional:  Negative for chills and fever.   HENT:  Negative for ear pain and sore throat.    Eyes:  Negative for pain and visual disturbance.   Respiratory:  Negative for cough  and shortness of breath.    Cardiovascular:  Negative for chest pain and palpitations.   Gastrointestinal:  Negative for abdominal pain and vomiting.   Genitourinary:  Negative for dysuria and hematuria.   Musculoskeletal:  Positive for gait problem. Negative for arthralgias and back pain.   Skin:  Positive for color change and wound. Negative for rash.   Neurological:  Negative for seizures and syncope.   Psychiatric/Behavioral: Negative.     All other systems reviewed and are negative.        Objective:  /66   Pulse 104   Temp 98.1 °F (36.7 °C)   Resp 18   LMP  (LMP Unknown)     Physical Exam  Constitutional:       Appearance: Normal appearance. She is normal weight.   HENT:      Head: Normocephalic and atraumatic.      Right Ear: External ear normal.      Left Ear: External ear normal.      Nose: Nose normal.      Mouth/Throat:      Mouth: Mucous membranes are moist.      Pharynx: Oropharynx is clear.   Eyes:      Conjunctiva/sclera: Conjunctivae normal.      Pupils: Pupils are equal, round, and reactive to light.   Cardiovascular:      Pulses: Normal pulses.           Dorsalis pedis pulses are 2+ on the right side and 2+ on the left side.        Posterior tibial pulses are 2+ on the right side and 2+ on the left side.   Pulmonary:      Effort: Pulmonary effort is normal.   Musculoskeletal:      Cervical back: Normal range of motion.      Right lower le+ Pitting Edema present.      Left lower le+ Pitting Edema present.      Comments: Equina varus deformity left foot, fat pad atrophy submetatarsal heads 1 through 5 left foot   Skin:     General: Skin is warm and dry.      Capillary Refill: Capillary refill takes less than 2 seconds.   Neurological:      General: No focal deficit present.      Mental Status: She is alert and oriented to person, place, and time. Mental status is at baseline.      Sensory: Sensory deficit present.      Coordination: Coordination abnormal.      Gait: Gait abnormal.       Deep Tendon Reflexes: Reflexes abnormal.   Psychiatric:         Mood and Affect: Mood normal.         Behavior: Behavior normal.         Thought Content: Thought content normal.         Judgment: Judgment normal.           Wound 06/28/24 Neuropathic Plantar Left;Plantar (Active)   Wound Image Images linked 11/05/24 1036   Wound Description Dry;Pink 11/05/24 1039   Susu-wound Assessment Dry;Callus 11/05/24 1039   Wound Length (cm) 0.1 cm 11/05/24 1039   Wound Width (cm) 0.1 cm 11/05/24 1039   Wound Depth (cm) 0.1 cm 11/05/24 1039   Wound Surface Area (cm^2) 0.01 cm^2 11/05/24 1039   Wound Volume (cm^3) 0.001 cm^3 11/05/24 1039   Calculated Wound Volume (cm^3) 0 cm^3 11/05/24 1039   Change in Wound Size % 100 11/05/24 1039   Drainage Amount Scant 11/05/24 1039   Drainage Description Serous 11/05/24 1039   Dressing Status Intact 11/05/24 1039       Wound 10/30/24 Skin Tear Leg Right (Active)   Wound Image Images linked 11/05/24 1036   Wound Description Pink;Yellow;Black;Epithelialization;Eschar 11/05/24 1038   Susu-wound Assessment Pink;Purple 11/05/24 1038   Wound Length (cm) 6.2 cm 11/05/24 1038   Wound Width (cm) 3 cm 11/05/24 1038   Wound Depth (cm) 0.1 cm 11/05/24 1038   Wound Surface Area (cm^2) 18.6 cm^2 11/05/24 1038   Wound Volume (cm^3) 1.86 cm^3 11/05/24 1038   Calculated Wound Volume (cm^3) 1.86 cm^3 11/05/24 1038   Change in Wound Size % 12.68 11/05/24 1038   Drainage Amount Moderate 11/05/24 1038   Drainage Description Serosanguineous;Yellow 11/05/24 1038   Non-staged Wound Description Full thickness 11/05/24 1038   Dressing Status Intact 11/05/24 1038          Results from last 6 Months   Lab Units 09/04/24  1223   WOUND CULTURE  Few Colonies of Proteus mirabilis*  Few Colonies of Acinetobacter baumannii*       No results found.    Wound Instructions:  Orders Placed This Encounter   Procedures    Wound cleansing and dressings     Wound location left foot     Do not get dressing wet--keep dry clean  "and intact -call wound care center if dressing becomes wet   Use cast cover if you shower   Felt was applied to your foot for offloading and a silicone bordered foam dressing applied to great toe for protection          Wound Location: Right leg  Change dressing 3 times a week   You may remove the dressing and shower. Do not leave wound open to air, apply new dressing immediately.  Cleanse the wound with wound cleanser or mild soap and water, rinse, pat dry.  Apply xeroform to the wound.   Cover with ABD  Secure with rolled gauze and tape.     Standing Status:   Future     Standing Expiration Date:   11/12/2024    Wound Procedure Treatment Skin Tear Right Leg     This order was created via procedure documentation    Wound Procedure Treatment Neuropathic Left;Plantar Plantar     This order was created via procedure documentation    Debridement     This order was created via procedure documentation    Debridement     This order was created via procedure documentation         Valentina Huddleston, CIRO, DPM, FACFAS    Portions of the record may have been created with voice recognition software. Occasional wrong word or \"sound a like\" substitutions may have occurred due to the inherent limitations of voice recognition software. Read the chart carefully and recognize, using context, where substitutions have occurred.      "

## 2024-11-05 NOTE — PROGRESS NOTES
Wound Procedure Treatment Skin Tear Right Leg    Performed by: Sharon Alba RN  Authorized by: Valentina Huddleston DPM    Associated wounds:   Wound 10/30/24 Skin Tear Leg Right  Wound cleansed with:  NSS  Applied primary dressing:  Other  Applied secondary dressing:  ABD  Dressing secured with:  Ricky and Tape    Xeroform applied to wound

## 2024-11-06 ENCOUNTER — OFFICE VISIT (OUTPATIENT)
Dept: CARDIOLOGY CLINIC | Facility: CLINIC | Age: 89
End: 2024-11-06
Payer: COMMERCIAL

## 2024-11-06 VITALS
BODY MASS INDEX: 22.36 KG/M2 | SYSTOLIC BLOOD PRESSURE: 120 MMHG | WEIGHT: 131 LBS | DIASTOLIC BLOOD PRESSURE: 68 MMHG | HEIGHT: 64 IN | HEART RATE: 83 BPM | OXYGEN SATURATION: 98 %

## 2024-11-06 DIAGNOSIS — I48.21 PERMANENT ATRIAL FIBRILLATION (HCC): Primary | Chronic | ICD-10-CM

## 2024-11-06 DIAGNOSIS — N18.32 CKD STAGE 3B, GFR 30-44 ML/MIN (HCC): ICD-10-CM

## 2024-11-06 DIAGNOSIS — I50.32 CHRONIC DIASTOLIC CONGESTIVE HEART FAILURE (HCC): Chronic | ICD-10-CM

## 2024-11-06 DIAGNOSIS — I10 PRIMARY HYPERTENSION: ICD-10-CM

## 2024-11-06 DIAGNOSIS — I50.33 ACUTE ON CHRONIC DIASTOLIC CHF (CONGESTIVE HEART FAILURE) (HCC): Chronic | ICD-10-CM

## 2024-11-06 DIAGNOSIS — I63.9 CEREBROVASCULAR ACCIDENT (CVA), UNSPECIFIED MECHANISM (HCC): ICD-10-CM

## 2024-11-06 DIAGNOSIS — E78.2 MIXED HYPERLIPIDEMIA: Chronic | ICD-10-CM

## 2024-11-06 PROCEDURE — 99215 OFFICE O/P EST HI 40 MIN: CPT | Performed by: INTERNAL MEDICINE

## 2024-11-06 PROCEDURE — G2211 COMPLEX E/M VISIT ADD ON: HCPCS | Performed by: INTERNAL MEDICINE

## 2024-11-06 RX ORDER — TORSEMIDE 20 MG/1
60 TABLET ORAL 2 TIMES DAILY
Qty: 180 TABLET | Refills: 3 | Status: SHIPPED | OUTPATIENT
Start: 2024-11-06

## 2024-11-06 RX ORDER — SPIRONOLACTONE 50 MG/1
50 TABLET, FILM COATED ORAL DAILY
Qty: 90 TABLET | Refills: 3 | Status: SHIPPED | OUTPATIENT
Start: 2024-11-06

## 2024-11-06 NOTE — PATIENT INSTRUCTIONS
Start taking torsemide 60 mg twice a day (3 tablets a.m., 3 tablets p.m.)  Start taking spironolactone 50 mg a day (one 50 mg tablet if you have these at home, or two 25 mg tablets if you have those at home)  I will see you in 1 week.

## 2024-11-06 NOTE — PROGRESS NOTES
[facility]iology  Follow up note  Rebeca Banegas 89 y.o. adult MRN: [id number]        Impression:    Acute on Chronic diastolic CHF   She has grade 2 diastolic dysfunction, no significant LVH, she had an equivocal SPECT TTR in 2019  Her dry weight has always been around 118 pounds  Recently decompensated, she received metolazone x 2, resulting in mild DARIO which had happened previously, she had a potassium of 2.9 and her loop diuretic was scaled back too much, unfortunately allowing her to creep up to 131 pounds with significant 2-3+ bilateral edema today and decompensated.  That being said, with the cut back of her diuretics, her renal function did improve with a creatinine of 1.52 and her hypokalemia resolved.    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/24 in the context of warfarin interruption  She will need bridging AC with Lovenox from now on     Permanent atrial fibrillation  Continues to be well rate controlled, she is on warfarin, INRs currently stable    Bifascicular bundle branch block   So far no evidence of a higher degree of AV block or symptoms to warrant further ambulatory monitoring    Mixed hyperlipidemia  No statins at 89 years of age    Plan:    Will avoid metolazone for now considering how effective it is for her, and often resulting in hypokalemia, but may keep this as an option for next week if she does not respond to the below plan  Increase torsemide to 60 mg in the morning, 60 mg in the afternoon, and resume spironolactone 50 mg a day  I messaged our warfarin clinic with Lovenox instructions, although no immediate date planned for surgery on her foot.  1 week follow-up with me      HPI:   Rebeca Banegas is a 89 y.o. year old adult  With chronic diastolic CHF, chronic atrial fibrillation on a rate control strategy and anticoagulation with warfarin, bifascicular bundle branch block, mixed hyperlipidemia, and a prior negative infiltrative cardiomyopathy workup returns for a follow-up visit.    She  has had a lot of fluctuations with her weight, CHF lately, partly dealing with a foot wound that required surgery and the warfarin interruption which was about 7 days long resulted in an embolic CVA with vision and unilateral weakness symptoms, both thankfully resolved.  We discussed the fact that bridging Lovenox will be imperative from now on.  She is back on warfarin and therapeutic.  In the interim she has also developed acute on chronic diastolic CHF, metolazone was attempted, it was very potent, she developed hypokalemia and mild DARIO, although as in the past this completely resolved after a week, creatinine back to baseline 1.52, potassium back up to 3.6.  She is currently taking torsemide 40 mg twice a day, no spironolactone, and unfortunately this has not been a great baseline regimen for her, in the past typically 60 mg twice a day with spironolactone has been her diuretic regimen.  She currently has significant edema, slight weeping, denies shortness of breath however, using a walker.    Review of Systems   Constitutional:  Negative for appetite change, diaphoresis, fatigue and fever.   Respiratory:  Negative for chest tightness, shortness of breath and wheezing.    Cardiovascular:  Positive for leg swelling. Negative for chest pain and palpitations.   Gastrointestinal:  Negative for abdominal pain and blood in stool.   Musculoskeletal:  Negative for arthralgias and joint swelling.   Skin:  Negative for rash.   Neurological:  Negative for dizziness, syncope and light-headedness.       Past Medical History:   Diagnosis Date    Abnormal ultrasound     RESOLVED: [id number]    Advice given about COVID-19 virus infection 04/07/2020    Asthma with acute exacerbation     LAST ASSESSED: [id number]    Asymptomatic menopausal state     Atherosclerosis     Atrial fibrillation (HCC)     Chronic obstructive lung disease (HCC)     Congestive heart failure (HCC)     LAST ASSESSED: [id number]    COVID-19 virus infection  2023    Cuboid fracture     LAST ASSESSED: [id number]    Dyspepsia     Fall 2024    GERD (gastroesophageal reflux disease)     High risk medication use     LAST ASSESSED: [id number]    Hyperlipidemia     Hypertension     Hypokalemia     Hypothyroidism     Impacted cerumen of both ears     LAST ASSESSED: [id number]    Impacted cerumen of right ear     LAST ASSESSED: [id number]    Influenza     LAST ASSESSED: [id number]    IPMN (intraductal papillary mucinous neoplasm)     RESOLVED: 2015    Limb swelling     LAST ASSESSED: [id number]    Navicular fracture of ankle     LAST ASSESSED: [id number]    Onychomycosis     LAST ASSESSED: [id number]    Osteoarthritis     Osteopenia     Osteoporosis     Paronychia, finger, unspecified laterality     LAST ASSESSED: [id number]    Paroxysmal atrial fibrillation (HCC)     LAST ASSESSED: 2014    Peripheral vascular disease (HCC)     Plantar fasciitis     Talus fracture     LAST ASSESSED: [id number]    Vascular headache      Social History     Substance and Sexual Activity   Alcohol Use Not Currently    Comment: SOCIAL     Social History     Substance and Sexual Activity   Drug Use No     Social History     Tobacco Use   Smoking Status Former    Current packs/day: 0.00    Average packs/day: 0.3 packs/day for 2.0 years (0.5 ttl pk-yrs)    Types: Cigarettes    Start date:     Quit date:     Years since quittin.8   Smokeless Tobacco Never   Tobacco Comments    On and off socially with friends        Allergies:  Allergies   Allergen Reactions    Asa [Aspirin] Shortness Of Breath    Nsaids Shortness Of Breath       Medications:     Current Outpatient Medications:     albuterol (2.5 mg/3 mL) 0.083 % nebulizer solution, Take 3 mL (2.5 mg total) by nebulization every 8 (eight) hours, Disp: 270 mL, Rfl: 3    albuterol (Proventil HFA) 90 mcg/act inhaler, Inhale 2 puffs every 6 (six) hours as needed for wheezing, Disp: 20.1 g, Rfl: 3    atorvastatin (LIPITOR) 40 mg  tablet, Take 1 tablet (40 mg total) by mouth every evening, Disp: 90 tablet, Rfl: 1    Cholecalciferol (VITAMIN D3) 1000 units CAPS, Take 2,000 Units by mouth daily, Disp: , Rfl:     fluticasone (FLONASE) 50 mcg/act nasal spray, USE 2 SPRAYS IN EACH NOSTRIL ONCE DAILY, Disp: 48 g, Rfl: 3    Fluticasone-Salmeterol (Advair) 100-50 mcg/dose inhaler, INHALE 1 PUFF 2 (TWO) TIMES A DAY. RINSE MOUTH AFTER USE., Disp: 180 blister, Rfl: 3    levothyroxine 50 mcg tablet, TAKE 1 TABLET DAILY BUT 1-1/2 TABLETS 4 DAYS/WEEK, Disp: 40 tablet, Rfl: 5    metoprolol succinate (TOPROL-XL) 100 mg 24 hr tablet, Take 1 tablet (100 mg total) by mouth daily, Disp: 90 tablet, Rfl: 1    sodium chloride 3 % inhalation solution, Take 4 mL by nebulization 3 (three) times a day, Disp: 360 mL, Rfl: 2    torsemide (DEMADEX) 20 mg tablet, Take 2 tablets (40 mg total) by mouth 2 (two) times a day (Patient taking differently: Take 20 mg by mouth 2 (two) times a day 20mg in the morning and 20mg in the evening), Disp: 180 tablet, Rfl: 3    warfarin (Jantoven) 2.5 mg tablet, Take 1 tablet (2.5 mg total) by mouth daily, Disp: 30 tablet, Rfl: 0    pantoprazole (PROTONIX) 40 mg tablet, Take 1 tablet (40 mg total) by mouth daily in the early morning (Patient not taking: Reported on 11/6/2024), Disp: 30 tablet, Rfl: 0      Vitals:    11/06/24 1533   BP: 120/68   Pulse: 83   SpO2: 98%     Weight (last 2 days)       Date/Time Weight    11/06/24 1533 59.4 (131)          Physical Exam  Constitutional:       General: She is not in acute distress.     Appearance: She is not diaphoretic.   HENT:      Head: Normocephalic and atraumatic.   Eyes:      General: No scleral icterus.     Conjunctiva/sclera: Conjunctivae normal.   Neck:      Vascular: No JVD.   Cardiovascular:      Rate and Rhythm: Normal rate. Rhythm irregular.      Heart sounds: Normal heart sounds. No murmur heard.  Pulmonary:      Effort: Pulmonary effort is normal.      Breath sounds: Normal breath  "sounds. No wheezing or rales.   Musculoskeletal:      Cervical back: Normal range of motion.      Right lower leg: Edema present.      Left lower leg: Edema present.   Skin:     General: Skin is warm and dry.   Neurological:      Mental Status: She is alert and oriented to person, place, and time.         Laboratory Studies:    Laboratory studies personally reviewed    Cardiac testing:     EKG reviewed personally:   No results found for this visit on 11/06/24.          Echocardiogram:    10/19- EF 60%, diastolic dysfunction, un graded due to AFib, normal RV size and function, moderate  Left and right atrial dilatation, mild MR, mild TR, mildly elevated pulmonary pressures  10/21-EF 60%, diastolic dysfunction, left and right atrial dilation, mild-to-moderate TR, moderately elevated pulmonary pressures, dilated IVC  8/24-personally interpreted by me-moderate LVH, EF 60/normal, moderately dilated atria bilaterally, mild MR, moderate TR.     TTR SPECT   11/19-1.26 heart to lung ratio, equivocal to negative    Stress tests:      Catheterization:      Holter:      10/21-excellent AFib rate control, no significant pauses    Jonas Vyas MD    Portions of the record may have been created with voice recognition software.  Occasional wrong word or \"sound a like\" substitutions may have occurred due to the inherent limitations of voice recognition software.  Read the chart carefully and recognize, using context, where substitutions have occurred.  I have spent a total time of 40 minutes in caring for this patient on the day of the visit/encounter including Diagnostic results, Instructions for management, Importance of tx compliance, and Impressions.   "

## 2024-11-11 ENCOUNTER — TELEPHONE (OUTPATIENT)
Dept: PODIATRY | Facility: CLINIC | Age: 89
End: 2024-11-11

## 2024-11-11 DIAGNOSIS — L97.522 NEUROPATHIC ULCER OF LEFT FOOT WITH FAT LAYER EXPOSED (HCC): Primary | ICD-10-CM

## 2024-11-11 NOTE — TELEPHONE ENCOUNTER
Patient called and stated that she had some redness and swelling to the foot.  I will start her on antibiotic I sent this over to her pharmacy.  If redness worsens or streaking occurs up the foot she should be seen in the emergency department.    CrCl 22.15    Sent to [facility] [address]

## 2024-11-12 ENCOUNTER — ANTICOAG VISIT (OUTPATIENT)
Dept: CARDIOLOGY CLINIC | Facility: CLINIC | Age: 89
End: 2024-11-12

## 2024-11-12 ENCOUNTER — TELEPHONE (OUTPATIENT)
Dept: WOUND CARE | Facility: HOSPITAL | Age: 89
End: 2024-11-12

## 2024-11-12 DIAGNOSIS — I48.21 PERMANENT ATRIAL FIBRILLATION (HCC): Primary | Chronic | ICD-10-CM

## 2024-11-12 LAB
INR PPP: 2.6
PROTHROMBIN TIME: 27.9 SEC (ref 12–14.6)

## 2024-11-12 NOTE — TELEPHONE ENCOUNTER
Message from Dr. Marcial: Patient called and stated that she had some redness and swelling to the foot.  I will start her on antibiotic I sent this over to her pharmacy.  If redness worsens or streaking occurs up the foot she should be seen in the emergency department.     CrCl 22.15     Sent to [facility] [address]

## 2024-11-13 ENCOUNTER — OFFICE VISIT (OUTPATIENT)
Dept: WOUND CARE | Facility: HOSPITAL | Age: 89
End: 2024-11-13
Payer: COMMERCIAL

## 2024-11-13 VITALS
RESPIRATION RATE: 16 BRPM | HEART RATE: 104 BPM | DIASTOLIC BLOOD PRESSURE: 62 MMHG | SYSTOLIC BLOOD PRESSURE: 98 MMHG | TEMPERATURE: 97.9 F

## 2024-11-13 DIAGNOSIS — L97.526 NON-PRESSURE CHRONIC ULCER OF OTHER PART OF LEFT FOOT WITH BONE INVOLVEMENT WITHOUT EVIDENCE OF NECROSIS (HCC): ICD-10-CM

## 2024-11-13 DIAGNOSIS — G60.9 IDIOPATHIC NEUROPATHY: ICD-10-CM

## 2024-11-13 DIAGNOSIS — M21.6X2 EQUINUS DEFORMITY OF LEFT FOOT: ICD-10-CM

## 2024-11-13 DIAGNOSIS — Z01.818 PRE-OP EVALUATION: Primary | ICD-10-CM

## 2024-11-13 DIAGNOSIS — S81.801D WOUND OF RIGHT LOWER EXTREMITY, SUBSEQUENT ENCOUNTER: Primary | ICD-10-CM

## 2024-11-13 PROCEDURE — 11042 DBRDMT SUBQ TIS 1ST 20SQCM/<: CPT | Performed by: PODIATRIST

## 2024-11-13 PROCEDURE — 97597 DBRDMT OPN WND 1ST 20 CM/<: CPT | Performed by: PODIATRIST

## 2024-11-13 RX ORDER — LIDOCAINE 40 MG/G
CREAM TOPICAL ONCE
Status: COMPLETED | OUTPATIENT
Start: 2024-11-13 | End: 2024-11-13

## 2024-11-13 RX ADMIN — LIDOCAINE: 40 CREAM TOPICAL at 14:24

## 2024-11-13 NOTE — PROGRESS NOTES
Patient ID: Rebeca Banegas is a 89 y.o. adult Date of Birth 7/22/1932     Diagnosis:  1. Wound of right lower extremity, subsequent encounter  -     lidocaine (LMX) 4 % cream  -     Wound cleansing and dressings; Future  -     Wound Procedure Treatment Skin Tear Right Leg  -     Debridement Skin Tear Right Leg  2. 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 Equinus deformity of left foot  -     Wound cleansing and dressings; Future  -     Wound Procedure Treatment Neuropathic Left;Plantar Plantar  4. Idiopathic neuropathy  -     Wound cleansing and dressings; Future  -     Wound Procedure Treatment Neuropathic Left;Plantar Plantar     Diagnosis ICD-10-CM Associated Orders   1. Wound of right lower extremity, subsequent encounter  S81.801D lidocaine (LMX) 4 % cream     Wound cleansing and dressings     Wound Procedure Treatment Skin Tear Right Leg     Debridement Skin Tear Right Leg      2. 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 Equinus deformity of left foot  M21.6X2 Wound cleansing and dressings     Wound Procedure Treatment Neuropathic Left;Plantar Plantar      4. Idiopathic neuropathy  G60.9 Wound cleansing and dressings     Wound Procedure Treatment Neuropathic Left;Plantar Plantar           Assessment & Plan:  Patient continues to have issues with healing of her left foot ulceration.  We had discussed surgical intervention for percutaneous osteotomy to the second metatarsal in addition to a gastrocnemius recession.  The patient does want to pursue  this.  She is going to need clearance for the operating room to pursue this.    Blood thinner management per her primary care.        Will plan on having patient return next week to continue monitoring her.  She was unable to heal the wound with total contact casting, she is in a surgical shoe currently with offloading.  Will proceed with intervention once we have clearance.    Selective debridement completed to the right lower leg wound abrasion.  Additionally the surgical debridement completed to the left foot wound.        If the patient notices any systemic signs of infection including but not limited to fever, chills, nausea, vomiting or significant worsening of wound with increased drainage, redness or streaking up the foot or leg the patient should notify the office or present to the emergency room.      If wound debridement was completed today this was done in an attempt to promote healing, decrease risk of infection and for limb salvage efforts.     Goal of treatment: wound healing     Efforts to decrease pressure on the wound(s), evaluation and discussion of nutritional status, peripheral vascular status, and infection control have all been addressed with this patient.    Return in about 1 week (around 11/20/2024) for Recheck, wound assessment.      Chief Complaint   Patient presents with   • Follow Up Wound Care Visit     Left foot wound and right leg wound            Subjective:   Patient returns for follow-up on left foot subsecond metatarsal wound.  Patient has continued to have issues with complete healing of this wound given her significant metatarsal fat pad atrophy as well as position of the second metatarsal and equinus deformity.    The following portions of the patient's history were reviewed and updated as appropriate:   Patient Active Problem List   Diagnosis   • Abnormal findings on diagnostic imaging of other specified body structures   • Abnormal findings on diagnostic imaging of urinary  organs   • Allergic rhinitis   • Arthritis   • Asthma   • Atherosclerosis   • Permanent atrial fibrillation (HCC)   • Backache   • Benign colon polyp   • Bifascicular bundle branch block   • Chronic diastolic congestive heart failure (HCC)   • Esophageal reflux   • Essential tremor   • Hoarseness   • Hyperlipidemia   • Primary hypertension   • Hypothyroidism   • Leg pain   • Nasal polyp   • Onychomycosis   • Ovarian cyst   • Pancreatic cyst   • Plantar fasciitis   • Prediabetes   • Pulmonary nodule   • Thyroid nodule   • Vitamin D deficiency   • Pain of left hand   • Leg cramps   • Sensorineural hearing loss (SNHL), bilateral   • Left asymmetrical SNHL   • Elevated serum immunoglobulin free light chains   • Osteopenia of hip   • CKD stage 3b, GFR 30-44 ml/min (HCC)   • Labial abscess   • Vulvar cysts   • Open wound of right upper arm   • Secondary hyperparathyroidism (HCC)   • Age-related osteoporosis without current pathological fracture   • Chronic cough   • MGUS (monoclonal gammopathy of unknown significance)   • Bronchiectasis without complication (HCC)   • Vaccine counseling   • Acute on chronic diastolic CHF (congestive heart failure) (HCC)   • Chronic foot ulcer, left, with fat layer exposed (HCC)   • 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)   • Preoperative examination   • CVA (cerebral vascular accident) (HCC)   • Severe protein-calorie malnutrition (HCC)     Past Medical History:   Diagnosis Date   • Abnormal ultrasound     RESOLVED: 26JUN2015   • Advice given about COVID-19 virus infection 04/07/2020   • Asthma with acute exacerbation     LAST ASSESSED: 21FEB2013   • Asymptomatic menopausal state    • Atherosclerosis    • Atrial fibrillation (HCC)    • Chronic obstructive lung disease (HCC)    • Congestive heart failure (HCC)     LAST ASSESSED: [id number]   • COVID-19 virus infection 03/25/2023   • Cuboid fracture     LAST ASSESSED: [id number]   • Dyspepsia    • Fall 04/01/2024   • GERD  (gastroesophageal reflux disease)    • High risk medication use     LAST ASSESSED: 19MAY2014   • Hyperlipidemia    • Hypertension    • Hypokalemia    • Hypothyroidism    • Impacted cerumen of both ears     LAST ASSESSED: 14FEB2013   • Impacted cerumen of right ear     LAST ASSESSED: 14FEB2013   • Influenza     LAST ASSESSED: 29DEC2014   • IPMN (intraductal papillary mucinous neoplasm)     RESOLVED: 02OCT2015   • Limb swelling     LAST ASSESSED: 19MAY2014   • Navicular fracture of ankle     LAST ASSESSED: 22MAY2014   • Onychomycosis     LAST ASSESSED: 21JUL2015   • Osteoarthritis    • Osteopenia    • Osteoporosis    • Paronychia, finger, unspecified laterality     LAST ASSESSED: 19JUL2013   • Paroxysmal atrial fibrillation (HCC)     LAST ASSESSED: 02MAR2014   • Peripheral vascular disease (HCC)    • Plantar fasciitis    • Talus fracture     LAST ASSESSED: 03JUL2014   • Vascular headache      Past Surgical History:   Procedure Laterality Date   • APPENDECTOMY     • BONE BIOPSY Left 8/9/2024    Procedure: Bone bx of proximal phalanx second toe & second met with fluoroscopic guidance;  Surgeon: Jonny Marcial DPM;  Location: [facility];  Service: Podiatry   • CATARACT EXTRACTION Bilateral    • CHOLECYSTECTOMY     • COLONOSCOPY     • JOINT REPLACEMENT Bilateral     knee   • NEUROPLASTY / TRANSPOSITION MEDIAN NERVE AT CARPAL TUNNEL BILATERAL Bilateral     DECOMPRESSION   • OOPHORECTOMY Bilateral     age 81   • PELVIC FLOOR REPAIR  2014   • CO BIOPSY BONE TROCAR/NEEDLE SUPERFICIAL Left 8/9/2024    Procedure: Left foot debridement;  Surgeon: Jonny Marcial DPM;  Location: [facility];  Service: Podiatry   • CO DEBRIDEMENT MUSCLE &/FASCIA 1ST 20 SQ CM/< Left 8/9/2024    Procedure: Left foot debridement;  Surgeon: Jonny Marcial DPM;  Location: [facility];  Service: Podiatry   • SINUS SURGERY     • TOTAL KNEE ARTHROPLASTY Bilateral      Social History     Socioeconomic History   • Marital status:       Spouse name: None   • Number of children: None   • Years of education: None   • Highest education level: None   Occupational History   • Occupation: retired   Tobacco Use   • Smoking status: Former     Current packs/day: 0.00     Average packs/day: 0.3 packs/day for 2.0 years (0.5 ttl pk-yrs)     Types: Cigarettes     Start date:      Quit date:      Years since quittin.9   • Smokeless tobacco: Never   • Tobacco comments:     On and off socially with friends    Vaping Use   • Vaping status: Never Used   Substance and Sexual Activity   • Alcohol use: Not Currently     Comment: SOCIAL   • Drug use: No   • Sexual activity: Not Currently     Partners: Male     Birth control/protection: Post-menopausal   Other Topics Concern   • None   Social History Narrative    DAILY COFFEE CONSUMPTION (2 CUPS/DAY)    EXERCISING REGULARLY     Social Drivers of Health     Financial Resource Strain: Not on file   Food Insecurity: No Food Insecurity (2024)    Nursing - Inadequate Food Risk Classification    • Worried About Running Out of Food in the Last Year: Never true    • Ran Out of Food in the Last Year: Never true    • Ran Out of Food in the Last Year: Not on file   Transportation Needs: No Transportation Needs (2024)    PRAPARE - Transportation    • Lack of Transportation (Medical): No    • Lack of Transportation (Non-Medical): No   Physical Activity: Not on file   Stress: Not on file   Social Connections: Not on file   Intimate Partner Violence: Not on file   Housing Stability: Low Risk  (2024)    Housing Stability Vital Sign    • Unable to Pay for Housing in the Last Year: No    • Number of Times Moved in the Last Year: 0    • Homeless in the Last Year: No        Current Outpatient Medications:   •  albuterol (2.5 mg/3 mL) 0.083 % nebulizer solution, Take 3 mL (2.5 mg total) by nebulization every 8 (eight) hours, Disp: 270 mL, Rfl: 3  •  albuterol (Proventil HFA) 90 mcg/act inhaler, Inhale  2 puffs every 6 (six) hours as needed for wheezing, Disp: 20.1 g, Rfl: 3  •  atorvastatin (LIPITOR) 40 mg tablet, Take 1 tablet (40 mg total) by mouth every evening, Disp: 90 tablet, Rfl: 1  •  cephalexin (KEFLEX) 250 mg capsule, Take 1 capsule (250 mg total) by mouth every 8 (eight) hours for 7 days, Disp: 21 capsule, Rfl: 0  •  Cholecalciferol (VITAMIN D3) 1000 units CAPS, Take 2,000 Units by mouth daily, Disp: , Rfl:   •  fluticasone (FLONASE) 50 mcg/act nasal spray, USE 2 SPRAYS IN EACH NOSTRIL ONCE DAILY, Disp: 48 g, Rfl: 3  •  Fluticasone-Salmeterol (Advair) 100-50 mcg/dose inhaler, INHALE 1 PUFF 2 (TWO) TIMES A DAY. RINSE MOUTH AFTER USE., Disp: 180 blister, Rfl: 3  •  levothyroxine 50 mcg tablet, TAKE 1 TABLET DAILY BUT 1-1/2 TABLETS 4 DAYS/WEEK, Disp: 40 tablet, Rfl: 5  •  metoprolol succinate (TOPROL-XL) 100 mg 24 hr tablet, Take 1 tablet (100 mg total) by mouth daily, Disp: 90 tablet, Rfl: 1  •  pantoprazole (PROTONIX) 40 mg tablet, Take 1 tablet (40 mg total) by mouth daily in the early morning (Patient not taking: Reported on 11/6/2024), Disp: 30 tablet, Rfl: 0  •  sodium chloride 3 % inhalation solution, Take 4 mL by nebulization 3 (three) times a day, Disp: 360 mL, Rfl: 2  •  spironolactone (ALDACTONE) 50 mg tablet, Take 1 tablet (50 mg total) by mouth daily, Disp: 90 tablet, Rfl: 3  •  torsemide (DEMADEX) 20 mg tablet, Take 3 tablets (60 mg total) by mouth 2 (two) times a day, Disp: 180 tablet, Rfl: 3  •  warfarin (Jantoven) 2.5 mg tablet, Take 1 tablet (2.5 mg total) by mouth daily, Disp: 30 tablet, Rfl: 0  No current facility-administered medications for this visit.  Family History   Problem Relation Age of Onset   • Pancreatic cancer Mother 93   • Heart failure Mother    • Coronary artery disease Family    • Breast cancer Family    • Other Family         BREAST DISORDER, GASTROINTESTINAL CANCER   • Uterine cancer Family    • Hyperlipidemia Family    • Osteoarthritis Family    • Ovarian cancer  Family    • Brain cancer Son    • Stroke Father    • No Known Problems Sister    • No Known Problems Daughter    • No Known Problems Maternal Grandmother    • No Known Problems Maternal Grandfather    • No Known Problems Paternal Grandmother    • No Known Problems Paternal Grandfather    • Breast cancer Cousin 50   • Breast cancer Cousin 50   • Ovarian cancer Other 49      Review of Systems  Allergies:  Asa [aspirin] and Nsaids      Objective:  BP 98/62 Comment: pt asymptomatic, follows up with cardiology on friday  Pulse 104   Temp 97.9 °F (36.6 °C)   Resp 16   LMP  (LMP Unknown)     Physical Exam      Wound 06/28/24 Neuropathic Plantar Left;Plantar (Active)   Wound Image Images linked 11/13/24 1449   Wound Description Brown;Eschar 11/13/24 1422   Susu-wound Assessment Dry;Callus 11/13/24 1422   Wound Length (cm) 0.1 cm 11/13/24 1422   Wound Width (cm) 0.1 cm 11/13/24 1422   Wound Depth (cm) 0 cm 11/13/24 1422   Wound Surface Area (cm^2) 0.01 cm^2 11/13/24 1422   Wound Volume (cm^3) 0 cm^3 11/13/24 1422   Calculated Wound Volume (cm^3) 0 cm^3 11/13/24 1422   Change in Wound Size % 100 11/13/24 1422   Drainage Amount None 11/13/24 1422   Dressing Status Intact 11/13/24 1422       Wound 10/30/24 Skin Tear Leg Right (Active)   Wound Image Images linked 11/13/24 1418   Susu-wound Assessment Pink;Purple;Edema 11/13/24 1422   Wound Length (cm) 4.5 cm 11/13/24 1422   Wound Width (cm) 1.4 cm 11/13/24 1422   Wound Depth (cm) 0.1 cm 11/13/24 1422   Wound Surface Area (cm^2) 6.3 cm^2 11/13/24 1422   Wound Volume (cm^3) 0.63 cm^3 11/13/24 1422   Calculated Wound Volume (cm^3) 0.63 cm^3 11/13/24 1422   Change in Wound Size % 70.42 11/13/24 1422   Drainage Amount Moderate 11/13/24 1422   Drainage Description Serosanguineous;Yellow 11/13/24 1422   Non-staged Wound Description Full thickness 11/13/24 1422   Dressing Status Intact 11/13/24 1422                      Debridement   Wound 10/30/24 Skin Tear Leg Right    Universal  "Protocol:  procedure performed by consultantConsent: Verbal consent obtained.  Risks and benefits: risks, benefits and alternatives were discussed  Consent given by: patient  Time out: Immediately prior to procedure a \"time out\" was called to verify the correct patient, procedure, equipment, support staff and site/side marked as required.  Patient understanding: patient states understanding of the procedure being performed  Patient identity confirmed: verbally with patient    Debridement Details  Performed by: physician  Debridement type: selective  Pain control: lidocaine 4%      Post-debridement measurements  Length (cm): 4.5  Width (cm): 1.4  Depth (cm): 0.1  Percent debrided: 100%  Surface Area (cm^2): 6.3  Area Debrided (cm^2): 6.3  Volume (cm^3): 0.63    Devitalized tissue debrided: biofilm, callus and fibrin  Instrument(s) utilized: blade  Bleeding: small  Hemostasis obtained with: pressure  Procedural pain (0-10): insensate  Post-procedural pain: insensate   Response to treatment: procedure was tolerated well    Debridement   Wound 06/28/24 Neuropathic Plantar Left;Plantar    Universal Protocol:  procedure performed by consultantConsent: Verbal consent obtained.  Risks and benefits: risks, benefits and alternatives were discussed  Consent given by: patient  Time out: Immediately prior to procedure a \"time out\" was called to verify the correct patient, procedure, equipment, support staff and site/side marked as required.  Patient understanding: patient states understanding of the procedure being performed  Patient identity confirmed: verbally with patient    Debridement Details  Performed by: physician  Debridement type: surgical  Level of debridement: subcutaneous tissue  Pain control: lidocaine 4%      Post-debridement measurements  Length (cm): 0.1  Width (cm): 0.1  Depth (cm): 0.2  Percent debrided: 100%  Surface Area (cm^2): 0.01  Area Debrided (cm^2): 0.01  Volume (cm^3): 0    Tissue and other material " "debrided: subcutaneous tissue  Devitalized tissue debrided: biofilm, callus and fibrin  Instrument(s) utilized: blade  Bleeding: small  Hemostasis obtained with: pressure  Procedural pain (0-10): insensate  Post-procedural pain: insensate   Response to treatment: procedure was tolerated well       Results from last 6 Months   Lab Units 09/04/24  1223   WOUND CULTURE  Few Colonies of Proteus mirabilis*  Few Colonies of Acinetobacter baumannii*         Wound Instructions:  Orders Placed This Encounter   Procedures   • Wound cleansing and dressings     Wound location left foot      Do not get dressing wet--keep dry clean and intact -call wound care center if dressing becomes wet   Use cast cover if you shower   Felt was applied to your foot for offloading            Wound Location: Right leg  Change dressing 3 times a week   You may remove the dressing and shower. Do not leave wound open to air, apply new dressing immediately.  Cleanse the wound with wound cleanser or mild soap and water, rinse, pat dry.  Apply xeroform to the wound.   Cover with ABD  Secure with rolled gauze and tape.     Standing Status:   Future     Expiration Date:   11/20/2024   • Wound Procedure Treatment Skin Tear Right Leg     This order was created via procedure documentation   • Wound Procedure Treatment Neuropathic Left;Plantar Plantar     This order was created via procedure documentation   • Debridement Skin Tear Right Leg     This order was created via procedure documentation   • Debridement Neuropathic Left;Plantar Plantar     This order was created via procedure documentation         Jonny Marcial DPM      Portions of the record may have been created with voice recognition software. Occasional wrong word or \"sound a like\" substitutions may have occurred due to the inherent limitations of voice recognition software. Read the chart carefully and recognize, using context, where substitutions have occurred.    "

## 2024-11-13 NOTE — PATIENT INSTRUCTIONS
Orders Placed This Encounter   Procedures    Wound cleansing and dressings     Wound location left foot      Do not get dressing wet--keep dry clean and intact -call wound care center if dressing becomes wet   Use cast cover if you shower   Felt was applied to your foot for offloading            Wound Location: Right leg  Change dressing 3 times a week   You may remove the dressing and shower. Do not leave wound open to air, apply new dressing immediately.  Cleanse the wound with wound cleanser or mild soap and water, rinse, pat dry.  Apply xeroform to the wound.   Cover with ABD  Secure with rolled gauze and tape.     Standing Status:   Future     Expiration Date:   11/20/2024

## 2024-11-13 NOTE — PROGRESS NOTES
Wound Procedure Treatment Skin Tear Right Leg    Performed by: Sharon Alba RN  Authorized by: Jonny Marcial DPM    Associated wounds:   Wound 10/30/24 Skin Tear Leg Right  Wound cleansed with:  NSS  Applied primary dressing:  Other  Applied secondary dressing:  ABD  Dressing secured with:  Ricky and Tape    Xeroform applied to wound

## 2024-11-13 NOTE — PROGRESS NOTES
Wound Procedure Treatment Neuropathic Left;Plantar Plantar    Performed by: Sharon Alba RN  Authorized by: Jonny Marcial DPM    Associated wounds:   Wound 06/28/24 Neuropathic Plantar Left;Plantar  Wound cleansed with:  Soap and water  Applied to periwound:  Skin prep  Applied primary dressing:  Other  Applied secondary dressing:  ABD and Cast padding  Dressing secured with:  Coban and Kerlix  Offloading device appllied:  Felt padding 1/4 inch and Surgical shoe    Felt pad to plantar foot for offloading    Xeroform applied to wound     Cast padding x 2 followed by kerlix and coban

## 2024-11-14 DIAGNOSIS — E03.9 HYPOTHYROIDISM, UNSPECIFIED TYPE: Chronic | ICD-10-CM

## 2024-11-14 RX ORDER — LEVOTHYROXINE SODIUM 50 UG/1
TABLET ORAL
Qty: 100 TABLET | Refills: 1 | Status: SHIPPED | OUTPATIENT
Start: 2024-11-14

## 2024-11-15 ENCOUNTER — OFFICE VISIT (OUTPATIENT)
Dept: CARDIOLOGY CLINIC | Facility: CLINIC | Age: 89
End: 2024-11-15
Payer: COMMERCIAL

## 2024-11-15 VITALS
HEIGHT: 64 IN | HEART RATE: 96 BPM | SYSTOLIC BLOOD PRESSURE: 114 MMHG | BODY MASS INDEX: 21.51 KG/M2 | DIASTOLIC BLOOD PRESSURE: 70 MMHG | WEIGHT: 126 LBS | OXYGEN SATURATION: 97 %

## 2024-11-15 DIAGNOSIS — I50.33 ACUTE ON CHRONIC DIASTOLIC CHF (CONGESTIVE HEART FAILURE) (HCC): Primary | Chronic | ICD-10-CM

## 2024-11-15 DIAGNOSIS — I50.32 CHRONIC DIASTOLIC CONGESTIVE HEART FAILURE (HCC): Chronic | ICD-10-CM

## 2024-11-15 DIAGNOSIS — I45.2 BIFASCICULAR BUNDLE BRANCH BLOCK: Chronic | ICD-10-CM

## 2024-11-15 DIAGNOSIS — I48.21 PERMANENT ATRIAL FIBRILLATION (HCC): Chronic | ICD-10-CM

## 2024-11-15 PROCEDURE — 99215 OFFICE O/P EST HI 40 MIN: CPT | Performed by: INTERNAL MEDICINE

## 2024-11-15 PROCEDURE — G2211 COMPLEX E/M VISIT ADD ON: HCPCS | Performed by: INTERNAL MEDICINE

## 2024-11-15 RX ORDER — ENOXAPARIN SODIUM 100 MG/ML
1 INJECTION SUBCUTANEOUS EVERY 12 HOURS
Qty: 20 ML | Refills: 0 | Status: SHIPPED | OUTPATIENT
Start: 2024-11-15

## 2024-11-15 RX ORDER — METOLAZONE 2.5 MG/1
TABLET ORAL
Qty: 5 TABLET | Refills: 0 | Status: SHIPPED | OUTPATIENT
Start: 2024-11-15

## 2024-11-15 NOTE — PATIENT INSTRUCTIONS
For Saturday morning.....    Take 2.5mg tablet metolazone  Do not take spironolactone  Only take 40mg of torsemide.  Do not take afternoon dose of torsemide    For Sunday on going forward......    Keep taking Torsemide 60mg twice a day and Spironolactone 50mg.      For surgery 11/29......    Last dose of Warfarin 11/23/24  Start Lovenox shots on 11/25/24, and last dose morning of 11/28/24.  Resume Warfarin the evening of 11/29/24 and resume Lovenox shots evening of 11/29/24.  Check INR 12/2/24 and Rosa will let you know when to stop Lovenox, which will be when your INR >2.

## 2024-11-15 NOTE — PROGRESS NOTES
[facility] Cardiology  Follow up note  Rebeca Banegas 89 y.o. 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/24 in the context of warfarin interruption  Lifelong recommendation for bridging anticoagulation with Lovenox     Permanent atrial fibrillation  Well rate controlled  On warfarin, INR stable    Bifascicular bundle branch block   No symptoms to suggest a higher degree of AV block or bradycardia    Mixed hyperlipidemia  No statins at 89 years of age    Plan:    Perioperative recommendations for left foot surgery on 11/29  Last dose of warfarin 11/23  Start taking Lovenox 60 mg SQ twice daily on the morning of 11/25  Last dose of Lovenox on the morning of 11/28  Resume warfarin and Lovenox the evening of 11/29 postoperatively  Check INR 12/2  INR goal 2-3, continue Lovenox until INR greater than 2 then can stop  20 syringes were provided by prescription for Lovenox [facility]  From a heart failure perspective, metolazone 2.5 mg tomorrow morning, 30 minutes later take 40 mg of torsemide, do not take spironolactone tomorrow, do not take the p.m. dose of torsemide tomorrow  Resume normal spironolactone 50 mg daily on Sunday, and normal torsemide 60 mg twice daily on Sunday, blood work to be obtained on Wednesday next week.  I have asked for a 1 week heart failure nurse practitioner visit to ensure that she is stable and more euvolemic with hopefully a weight less than 120 pounds and resolved left greater than right leg edema in anticipation of surgery on her left foot  11/29      HPI:   Rebeca Banegas is a 89 y.o. year old adult  With chronic diastolic CHF, chronic atrial fibrillation on a rate control strategy and anticoagulation with warfarin, bifascicular bundle branch block, mixed hyperlipidemia, and a prior negative infiltrative cardiomyopathy workup returns for a follow-up visit.    Blood pressure is stable  Creatinine stable at 1.52  Weight improved from 131 down to 126 pounds with intensification of her diuretic regimen to torsemide 60 mg twice daily, spironolactone 50 mg once daily.  A-fib is well rate controlled, she is stable on warfarin, but recent likely embolic CVA in the context of warfarin interruption has is recommending lifelong Lovenox bridging.      Review of Systems   Constitutional:  Negative for appetite change, diaphoresis, fatigue and fever.   Respiratory:  Negative for chest tightness, shortness of breath and wheezing.    Cardiovascular:  Positive for leg swelling. Negative for chest pain and palpitations.   Gastrointestinal:  Negative for abdominal pain and blood in stool.   Musculoskeletal:  Negative for arthralgias and joint swelling.   Skin:  Negative for rash.   Neurological:  Negative for dizziness, syncope and light-headedness.       Past Medical History:   Diagnosis Date    Abnormal ultrasound     RESOLVED: [id number]    Advice given about COVID-19 virus infection 04/07/2020    Asthma with acute exacerbation     LAST ASSESSED: [id number]    Asymptomatic menopausal state     Atherosclerosis     Atrial fibrillation (HCC)     Chronic obstructive lung disease (HCC)     Congestive heart failure (HCC)     LAST ASSESSED: [id number]    COVID-19 virus infection 03/25/2023    Cuboid fracture     LAST ASSESSED: [id number]    Dyspepsia     Fall 04/01/2024    GERD (gastroesophageal reflux disease)     High risk medication use     LAST ASSESSED: [id number]    Hyperlipidemia     Hypertension     Hypokalemia     Hypothyroidism     Impacted cerumen of both ears      LAST ASSESSED: [id number]    Impacted cerumen of right ear     LAST ASSESSED: [id number]    Influenza     LAST ASSESSED: [id number]    IPMN (intraductal papillary mucinous neoplasm)     RESOLVED: 2015    Limb swelling     LAST ASSESSED: [id number]    Navicular fracture of ankle     LAST ASSESSED: [id number]    Onychomycosis     LAST ASSESSED: [id number]    Osteoarthritis     Osteopenia     Osteoporosis     Paronychia, finger, unspecified laterality     LAST ASSESSED: [id number]    Paroxysmal atrial fibrillation (HCC)     LAST ASSESSED: 2014    Peripheral vascular disease (HCC)     Plantar fasciitis     Talus fracture     LAST ASSESSED: [id number]    Vascular headache      Social History     Substance and Sexual Activity   Alcohol Use Not Currently    Comment: SOCIAL     Social History     Substance and Sexual Activity   Drug Use No     Social History     Tobacco Use   Smoking Status Former    Current packs/day: 0.00    Average packs/day: 0.3 packs/day for 2.0 years (0.5 ttl pk-yrs)    Types: Cigarettes    Start date:     Quit date:     Years since quittin.9   Smokeless Tobacco Never   Tobacco Comments    On and off socially with friends        Allergies:  Allergies   Allergen Reactions    Asa [Aspirin] Shortness Of Breath    Nsaids Shortness Of Breath       Medications:     Current Outpatient Medications:     albuterol (2.5 mg/3 mL) 0.083 % nebulizer solution, Take 3 mL (2.5 mg total) by nebulization every 8 (eight) hours, Disp: 270 mL, Rfl: 3    albuterol (Proventil HFA) 90 mcg/act inhaler, Inhale 2 puffs every 6 (six) hours as needed for wheezing, Disp: 20.1 g, Rfl: 3    atorvastatin (LIPITOR) 40 mg tablet, Take 1 tablet (40 mg total) by mouth every evening, Disp: 90 tablet, Rfl: 1    cephalexin (KEFLEX) 250 mg capsule, Take 1 capsule (250 mg total) by mouth every 8 (eight) hours for 7 days, Disp: 21 capsule, Rfl: 0    Cholecalciferol (VITAMIN D3) 1000 units CAPS, Take 2,000 Units by mouth daily,  Disp: , Rfl:     fluticasone (FLONASE) 50 mcg/act nasal spray, USE 2 SPRAYS IN EACH NOSTRIL ONCE DAILY, Disp: 48 g, Rfl: 3    Fluticasone-Salmeterol (Advair) 100-50 mcg/dose inhaler, INHALE 1 PUFF 2 (TWO) TIMES A DAY. RINSE MOUTH AFTER USE., Disp: 180 blister, Rfl: 3    levothyroxine 50 mcg tablet, TAKE 1 TABLET DAILY, EXCEPT TAKE 1 AND 1/2 TABLETS ON SUNDAY AND WEDNESDAY, Disp: 100 tablet, Rfl: 1    metoprolol succinate (TOPROL-XL) 100 mg 24 hr tablet, Take 1 tablet (100 mg total) by mouth daily, Disp: 90 tablet, Rfl: 1    sodium chloride 3 % inhalation solution, Take 4 mL by nebulization 3 (three) times a day, Disp: 360 mL, Rfl: 2    spironolactone (ALDACTONE) 50 mg tablet, Take 1 tablet (50 mg total) by mouth daily, Disp: 90 tablet, Rfl: 3    torsemide (DEMADEX) 20 mg tablet, Take 3 tablets (60 mg total) by mouth 2 (two) times a day, Disp: 180 tablet, Rfl: 3    warfarin (Jantoven) 2.5 mg tablet, Take 1 tablet (2.5 mg total) by mouth daily, Disp: 30 tablet, Rfl: 0    pantoprazole (PROTONIX) 40 mg tablet, Take 1 tablet (40 mg total) by mouth daily in the early morning (Patient not taking: Reported on 11/6/2024), Disp: 30 tablet, Rfl: 0      Vitals:    11/15/24 1004   BP: 114/70   Pulse: 96   SpO2: 97%     Weight (last 2 days)       Date/Time Weight    11/15/24 1004 57.2 (126)          Physical Exam  Constitutional:       General: She is not in acute distress.     Appearance: She is not diaphoretic.   HENT:      Head: Normocephalic and atraumatic.   Eyes:      General: No scleral icterus.     Conjunctiva/sclera: Conjunctivae normal.   Neck:      Vascular: No JVD.   Cardiovascular:      Rate and Rhythm: Normal rate. Rhythm irregular.      Heart sounds: Normal heart sounds. No murmur heard.  Pulmonary:      Effort: Pulmonary effort is normal.      Breath sounds: Normal breath sounds. No wheezing or rales.   Musculoskeletal:      Cervical back: Normal range of motion.      Right lower leg: Edema present.      Left  "lower leg: Edema present.   Skin:     General: Skin is warm and dry.   Neurological:      Mental Status: She is alert and oriented to person, place, and time.       Laboratory Studies:    Laboratory studies personally reviewed    Cardiac testing:     EKG reviewed personally:   No results found for this visit on 11/15/24.          Echocardiogram:    10/19- EF 60%, diastolic dysfunction, un graded due to AFib, normal RV size and function, moderate  Left and right atrial dilatation, mild MR, mild TR, mildly elevated pulmonary pressures  10/21-EF 60%, diastolic dysfunction, left and right atrial dilation, mild-to-moderate TR, moderately elevated pulmonary pressures, dilated IVC  8/24-personally interpreted by me-moderate LVH, EF 60/normal, moderately dilated atria bilaterally, mild MR, moderate TR.     TTR SPECT   11/19-1.26 heart to lung ratio, equivocal to negative    Stress tests:      Catheterization:      Holter:      10/21-excellent AFib rate control, no significant pauses    Jonas Vyas MD    Portions of the record may have been created with voice recognition software.  Occasional wrong word or \"sound a like\" substitutions may have occurred due to the inherent limitations of voice recognition software.  Read the chart carefully and recognize, using context, where substitutions have occurred.  I have spent a total time of 42 minutes in caring for this patient on the day of the visit/encounter including Diagnostic results, Risks and benefits of tx options, Instructions for management, Importance of tx compliance, Counseling / Coordination of care, Documenting in the medical record, and Reviewing / ordering tests, medicine, procedures  .   "

## 2024-11-19 ENCOUNTER — TELEPHONE (OUTPATIENT)
Age: 89
End: 2024-11-19

## 2024-11-19 NOTE — TELEPHONE ENCOUNTER
----- Message from Santana Dillon MD sent at 11/16/2024  5:22 AM EST -----  I will be making tentative home visit on this patient on Friday 11/22/29- will need dose of Prolia for visit that day-please have it available- thank you

## 2024-11-20 ENCOUNTER — APPOINTMENT (OUTPATIENT)
Dept: LAB | Facility: CLINIC | Age: 89
End: 2024-11-20
Payer: COMMERCIAL

## 2024-11-20 ENCOUNTER — ANTICOAG VISIT (OUTPATIENT)
Dept: CARDIOLOGY CLINIC | Facility: CLINIC | Age: 89
End: 2024-11-20

## 2024-11-20 ENCOUNTER — OFFICE VISIT (OUTPATIENT)
Dept: WOUND CARE | Facility: HOSPITAL | Age: 89
End: 2024-11-20
Payer: COMMERCIAL

## 2024-11-20 VITALS
SYSTOLIC BLOOD PRESSURE: 110 MMHG | HEART RATE: 82 BPM | TEMPERATURE: 97.2 F | DIASTOLIC BLOOD PRESSURE: 71 MMHG | RESPIRATION RATE: 16 BRPM

## 2024-11-20 DIAGNOSIS — I48.21 PERMANENT ATRIAL FIBRILLATION (HCC): Primary | Chronic | ICD-10-CM

## 2024-11-20 DIAGNOSIS — L97.526 NON-PRESSURE CHRONIC ULCER OF OTHER PART OF LEFT FOOT WITH BONE INVOLVEMENT WITHOUT EVIDENCE OF NECROSIS (HCC): ICD-10-CM

## 2024-11-20 DIAGNOSIS — Z01.818 PRE-OP EVALUATION: ICD-10-CM

## 2024-11-20 DIAGNOSIS — S81.801D WOUND OF RIGHT LOWER EXTREMITY, SUBSEQUENT ENCOUNTER: Primary | ICD-10-CM

## 2024-11-20 LAB
ANION GAP SERPL CALCULATED.3IONS-SCNC: 13 MMOL/L (ref 4–13)
BASOPHILS # BLD AUTO: 0.06 THOUSANDS/ÂΜL (ref 0–0.1)
BASOPHILS NFR BLD AUTO: 1 % (ref 0–1)
BUN SERPL-MCNC: 59 MG/DL (ref 5–25)
CALCIUM SERPL-MCNC: 9.6 MG/DL (ref 8.4–10.2)
CHLORIDE SERPL-SCNC: 88 MMOL/L (ref 96–108)
CO2 SERPL-SCNC: 35 MMOL/L (ref 21–32)
CREAT SERPL-MCNC: 1.94 MG/DL (ref 0.6–1.3)
EOSINOPHIL # BLD AUTO: 1.36 THOUSAND/ÂΜL (ref 0–0.61)
EOSINOPHIL NFR BLD AUTO: 18 % (ref 0–6)
ERYTHROCYTE [DISTWIDTH] IN BLOOD BY AUTOMATED COUNT: 14.4 % (ref 11.6–15.1)
GLUCOSE P FAST SERPL-MCNC: 104 MG/DL (ref 65–99)
HCT VFR BLD AUTO: 40.3 % (ref 36.5–46.1)
HGB BLD-MCNC: 13.1 G/DL (ref 12–15.4)
IMM GRANULOCYTES # BLD AUTO: 0.01 THOUSAND/UL (ref 0–0.2)
IMM GRANULOCYTES NFR BLD AUTO: 0 % (ref 0–2)
LYMPHOCYTES # BLD AUTO: 2.82 THOUSANDS/ÂΜL (ref 0.6–4.47)
LYMPHOCYTES NFR BLD AUTO: 37 % (ref 14–44)
MCH RBC QN AUTO: 32.8 PG (ref 26.8–34.3)
MCHC RBC AUTO-ENTMCNC: 32.5 G/DL (ref 31.4–37.4)
MCV RBC AUTO: 101 FL (ref 82–98)
MONOCYTES # BLD AUTO: 0.54 THOUSAND/ÂΜL (ref 0.17–1.22)
MONOCYTES NFR BLD AUTO: 7 % (ref 4–12)
NEUTROPHILS # BLD AUTO: 2.79 THOUSANDS/ÂΜL (ref 1.85–7.62)
NEUTS SEG NFR BLD AUTO: 37 % (ref 43–75)
NRBC BLD AUTO-RTO: 0 /100 WBCS
PLATELET # BLD AUTO: 292 THOUSANDS/UL (ref 149–390)
PMV BLD AUTO: 10.3 FL (ref 8.9–12.7)
POTASSIUM SERPL-SCNC: 3.6 MMOL/L (ref 3.5–5.3)
RBC # BLD AUTO: 3.99 MILLION/UL (ref 3.88–5.12)
SODIUM SERPL-SCNC: 136 MMOL/L (ref 135–147)
WBC # BLD AUTO: 7.58 THOUSAND/UL (ref 4.31–10.16)

## 2024-11-20 PROCEDURE — 11042 DBRDMT SUBQ TIS 1ST 20SQCM/<: CPT | Performed by: PODIATRIST

## 2024-11-20 PROCEDURE — 80048 BASIC METABOLIC PNL TOTAL CA: CPT

## 2024-11-20 PROCEDURE — 97597 DBRDMT OPN WND 1ST 20 CM/<: CPT | Performed by: PODIATRIST

## 2024-11-20 PROCEDURE — 85025 COMPLETE CBC W/AUTO DIFF WBC: CPT

## 2024-11-20 RX ORDER — LIDOCAINE 40 MG/G
CREAM TOPICAL ONCE
Status: COMPLETED | OUTPATIENT
Start: 2024-11-20 | End: 2024-11-20

## 2024-11-20 RX ADMIN — LIDOCAINE: 40 CREAM TOPICAL at 13:20

## 2024-11-20 NOTE — PROGRESS NOTES
Heart Failure Outpatient Progress Note - Rebeca Banegas 89 y.o. 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 ([facility]) 06/18/2024    Chronic foot ulcer, left, with fat layer exposed ([facility]) 05/21/2024    Vaccine counseling 12/19/2023    Bronchiectasis without complication ([facility]) 09/08/2023    MGUS (monoclonal gammopathy of unknown significance) 09/04/2023    Chronic cough 06/13/2023    Age-related osteoporosis without current pathological fracture 05/16/2023    Secondary hyperparathyroidism ([facility]) 01/02/2023    Open wound of right upper arm 10/17/2022    Labial abscess 06/29/2022    Vulvar cysts 06/29/2022    Osteopenia of hip 04/07/2020    CKD stage 3b, GFR 30-44 ml/min ([facility]) 04/07/2020    Elevated serum immunoglobulin free light chains 11/28/2019    Sensorineural hearing loss (SNHL), bilateral 04/04/2019    Left asymmetrical SNHL 04/04/2019    Pain of left hand 12/21/2018    Leg cramps 12/21/2018    Chronic diastolic congestive heart failure (HCC) 02/13/2017    Plantar fasciitis 02/05/2016    Essential tremor 10/02/2015    Vitamin D deficiency 10/02/2015    Onychomycosis 07/21/2015    Pulmonary nodule 02/20/2015    Thyroid nodule 02/20/2015    Prediabetes 09/23/2014    Primary hypertension 05/19/2014    Leg pain 05/19/2014    Abnormal findings on diagnostic imaging of urinary organs 04/04/2014    Abnormal findings on diagnostic imaging of other specified body structures 03/20/2014    Atherosclerosis 03/06/2014    Ovarian cyst 03/06/2014    Pancreatic cyst 03/06/2014    Backache 02/28/2014    Hoarseness 02/28/2014    Acute on chronic diastolic CHF (congestive heart failure) ([facility]) 01/14/2014    Allergic rhinitis 06/11/2013     Hyperlipidemia 04/19/2013    Permanent atrial fibrillation (HCC) 02/14/2013    Arthritis 11/12/2012    Asthma 11/12/2012    Esophageal reflux 11/12/2012    Benign colon polyp 11/10/2012    Bifascicular bundle branch block 11/10/2012    Hypothyroidism 11/10/2012    Nasal polyp 11/10/2012   Chronic diastolic CHF   -She has grade 2 diastolic dysfunction, no significant LVH, she had an equivocal SPECT TTR in 2019    -Volume status substantially improved.  Her weight is down another 12 pounds.  Neck veins are down  -Would reduce her torsemide back to 40 mg twice a day and continue spironolactone 50 mg daily  -I asked her to monitor her weights and symptoms closely.  If she begins to notice weight gain and/or signs of worsening congestion or leg edema she can increase her torsemide back to 60 mg twice daily and should call the office.  --Presumed dry weight 118, 131, 126, today, 114 lbs.      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 year old adult  With chronic diastolic CHF, chronic atrial fibrillation on a rate control strategy and anticoagulation with warfarin, bifascicular bundle branch block, mixed hyperlipidemia, and a prior negative infiltrative cardiomyopathy workup returns for a follow-up visit.     Blood pressure is stable  Creatinine stable at 1.52  Weight improved from 131 down to 126 pounds with intensification of her diuretic regimen to torsemide 60 mg twice daily, spironolactone 50 mg once daily.  A-fib is well rate controlled, she is stable on warfarin, but recent likely embolic CVA in the context of warfarin interruption has  is recommending lifelong Lovenox bridging.    11/22/2024 patient presents for 1 week follow-up.  At last visit was found to be volume overloaded but progressing in comparison to the visit prior.  she was continued on a higher dose of her torsemide, i.e. 60 mg twice daily along with a one-time dose of metolazone as well as spironolactone 50 mg daily..  Today she feels much improved.  Her leg swelling is almost completely resolved.  Her shortness of breath is at baseline.  She attributes her recent decompensation to dietary indiscretions.  He has since made changes to this.  Follow-up lab work stable    Past Medical History:   Diagnosis Date    Abnormal ultrasound     RESOLVED: [id number]    Advice given about COVID-19 virus infection 04/07/2020    Asthma with acute exacerbation     LAST ASSESSED: [id number]    Asymptomatic menopausal state     Atherosclerosis     Atrial fibrillation (HCC)     Chronic obstructive lung disease (HCC)     Chronic ulcer of left foot (HCC)     Congestive heart failure (HCC)     LAST ASSESSED: [id number]    COVID-19 virus infection 03/25/2023    Cuboid fracture     LAST ASSESSED: [id number]    Dyspepsia     Edema of both lower extremities     Equinus deformity of left foot     Fall 04/01/2024    GERD (gastroesophageal reflux disease)     High risk medication use     LAST ASSESSED: [id number]    Hyperlipidemia     Hypertension     Hypokalemia     Hypothyroidism     Impacted cerumen of both ears     LAST ASSESSED: [id number]    Impacted cerumen of right ear     LAST ASSESSED: 19MQN1671    Influenza     LAST ASSESSED: [id number]    IPMN (intraductal papillary mucinous neoplasm)     RESOLVED: [id number]    Irregular heart beat     afib. Warfarin.    Limb swelling     LAST ASSESSED: 56YCL1439    Navicular fracture of ankle     LAST ASSESSED: [id number]    Onychomycosis     LAST ASSESSED: 96DSN0910    Osteoarthritis     Osteopenia     Osteoporosis     Paronychia, finger, unspecified laterality     LAST  ASSESSED: [id number]    Paroxysmal atrial fibrillation (HCC)     LAST ASSESSED: 02MAR2014    Peripheral vascular disease (HCC)     Plantar fasciitis     Stroke (HCC)     Talus fracture     LAST ASSESSED: [id number]    Vascular headache     Wound of right foot        12 point ROS negative other than that stated in HPI    Allergies   Allergen Reactions    Asa [Aspirin] Shortness Of Breath    Nsaids Shortness Of Breath     .    Current Outpatient Medications:     albuterol (2.5 mg/3 mL) 0.083 % nebulizer solution, Take 3 mL (2.5 mg total) by nebulization every 8 (eight) hours, Disp: 270 mL, Rfl: 3    albuterol (Proventil HFA) 90 mcg/act inhaler, Inhale 2 puffs every 6 (six) hours as needed for wheezing, Disp: 20.1 g, Rfl: 3    atorvastatin (LIPITOR) 40 mg tablet, Take 1 tablet (40 mg total) by mouth every evening, Disp: 90 tablet, Rfl: 1    Cholecalciferol (VITAMIN D3) 1000 units CAPS, Take 2,000 Units by mouth daily, Disp: , Rfl:     enoxaparin (LOVENOX) 60 mg/0.6 mL, Inject 0.6 mL (60 mg total) under the skin every 12 (twelve) hours, Disp: 20 mL, Rfl: 0    fluticasone (FLONASE) 50 mcg/act nasal spray, USE 2 SPRAYS IN EACH NOSTRIL ONCE DAILY, Disp: 48 g, Rfl: 3    Fluticasone-Salmeterol (Advair) 100-50 mcg/dose inhaler, INHALE 1 PUFF 2 (TWO) TIMES A DAY. RINSE MOUTH AFTER USE., Disp: 180 blister, Rfl: 3    levothyroxine 50 mcg tablet, TAKE 1 TABLET DAILY, EXCEPT TAKE 1 AND 1/2 TABLETS ON SUNDAY AND WEDNESDAY, Disp: 100 tablet, Rfl: 1    metolazone (ZAROXOLYN) 2.5 mg tablet, Only take one tablet tomorrow morning 11/16/24 before AM torsemide dose, Disp: 5 tablet, Rfl: 0    metoprolol succinate (TOPROL-XL) 100 mg 24 hr tablet, Take 1 tablet (100 mg total) by mouth daily, Disp: 90 tablet, Rfl: 1    pantoprazole (PROTONIX) 40 mg tablet, Take 1 tablet (40 mg total) by mouth daily in the early morning (Patient not taking: Reported on 11/6/2024), Disp: 30 tablet, Rfl: 0    sodium chloride 3 % inhalation solution, Take 4 mL by  nebulization 3 (three) times a day, Disp: 360 mL, Rfl: 2    spironolactone (ALDACTONE) 50 mg tablet, Take 1 tablet (50 mg total) by mouth daily, Disp: 90 tablet, Rfl: 3    torsemide (DEMADEX) 20 mg tablet, Take 3 tablets (60 mg total) by mouth 2 (two) times a day, Disp: 180 tablet, Rfl: 3    warfarin (Jantoven) 2.5 mg tablet, Take 1 tablet (2.5 mg total) by mouth daily, Disp: 30 tablet, Rfl: 0  No current facility-administered medications for this visit.    Social History     Socioeconomic History    Marital status:      Spouse name: Not on file    Number of children: Not on file    Years of education: Not on file    Highest education level: Not on file   Occupational History    Occupation: retired   Tobacco Use    Smoking status: Former     Current packs/day: 0.00     Average packs/day: 0.3 packs/day for 2.0 years (0.5 ttl pk-yrs)     Types: Cigarettes     Start date:      Quit date:      Years since quittin.9    Smokeless tobacco: Never    Tobacco comments:     On and off socially with friends    Vaping Use    Vaping status: Never Used   Substance and Sexual Activity    Alcohol use: Not Currently     Comment: SOCIAL    Drug use: No    Sexual activity: Not Currently     Partners: Male     Birth control/protection: Post-menopausal   Other Topics Concern    Not on file   Social History Narrative    DAILY COFFEE CONSUMPTION (2 CUPS/DAY)    EXERCISING REGULARLY     Social Drivers of Health     Financial Resource Strain: Not on file   Food Insecurity: No Food Insecurity (2024)    Nursing - Inadequate Food Risk Classification     Worried About Running Out of Food in the Last Year: Never true     Ran Out of Food in the Last Year: Never true     Ran Out of Food in the Last Year: Not on file   Transportation Needs: No Transportation Needs (2024)    PRAPARE - Transportation     Lack of Transportation (Medical): No     Lack of Transportation (Non-Medical): No   Physical Activity: Not on file  "  Stress: Not on file   Social Connections: Not on file   Intimate Partner Violence: Not on file   Housing Stability: Low Risk  (8/28/2024)    Housing Stability Vital Sign     Unable to Pay for Housing in the Last Year: No     Number of Times Moved in the Last Year: 0     Homeless in the Last Year: No       Family History   Problem Relation Age of Onset    Pancreatic cancer Mother 93    Heart failure Mother     Coronary artery disease Family     Breast cancer Family     Other Family         BREAST DISORDER, GASTROINTESTINAL CANCER    Uterine cancer Family     Hyperlipidemia Family     Osteoarthritis Family     Ovarian cancer Family     Brain cancer Son     Stroke Father     No Known Problems Sister     No Known Problems Daughter     No Known Problems Maternal Grandmother     No Known Problems Maternal Grandfather     No Known Problems Paternal Grandmother     No Known Problems Paternal Grandfather     Breast cancer Cousin 50    Breast cancer Cousin 50    Ovarian cancer Other 49       Physical Exam:    Vitals: /72 (BP Location: Left arm, Patient Position: Sitting, Cuff Size: Child)   Pulse 71   Ht 5' 4\" (1.626 m)   Wt 52 kg (114 lb 11.2 oz)   LMP  (LMP Unknown)   SpO2 98%   BMI 19.69 kg/m²     Wt Readings from Last 3 Encounters:   11/15/24 57.2 kg (126 lb)   11/06/24 59.4 kg (131 lb)   10/17/24 52.8 kg (116 lb 8 oz)       GEN: Rebeca Banegas appears well, alert and oriented x 3, pleasant and cooperative   HEENT: pupils equal, round, and reactive to light; extraocular muscles intact  NECK: supple, no carotid bruits   HEART: regular rhythm, normal S1 and S2, no murmurs, clicks, gallops or rubs, JVP is  down  LUNGS: scattered rhonchi, clears somewhat with cough  ABDOMEN: normal bowel sounds, soft, no tenderness, no distention  EXTREMITIES: peripheral pulses normal; no clubbing, cyanosis, trace BLLE leg edema, right foot wound wrapped.   NEURO: no focal findings   SKIN: normal without suspicious lesions on " exposed skin    Labs & Results:  Lab Results   Component Value Date     10/17/2017    SODIUM 136 11/20/2024    K 3.6 11/20/2024    CL 88 (L) 11/20/2024    CO2 35 (H) 11/20/2024    AGAP 13 11/20/2024    BUN 59 (H) 11/20/2024    CREATININE 1.94 (H) 11/20/2024    GLUC 106 10/14/2024    GLUF 104 (H) 11/20/2024    CALCIUM 9.6 11/20/2024    AST 31 09/23/2024    ALT 25 09/23/2024    ALKPHOS 51 09/23/2024    PROT 6.4 10/17/2017    TP 6.7 09/23/2024    TP 6.3 (L) 09/23/2024    BILITOT 0.5 10/17/2017    TBILI 0.48 09/23/2024    EGFR 29 07/26/2024     Lab Results   Component Value Date     (H) 04/04/2024      Lab Results   Component Value Date    WBC 7.58 11/20/2024    HGB 13.1 11/20/2024    HCT 40.3 11/20/2024     (H) 11/20/2024     11/20/2024     Lab Results   Component Value Date    LDLCALC 76 08/27/2024     Lab Results   Component Value Date    OIA5KPZPYOBZ 4.971 (H) 09/23/2024    TSH 6.33 (H) 01/02/2024     Lab Results   Component Value Date    HGBA1C 6.4 (H) 08/27/2024         EKG personally reviewed by LUZ Tee.   No results found for this visit on 11/22/24.     Counseling / Coordination of Care  Total face to face time spent with patient 20 minutes.  An additional 10 minutes was spent for chart/data review and visit preparation.       Thank you for the opportunity to participate in the care of this patient.    LUZ Tee

## 2024-11-20 NOTE — PATIENT INSTRUCTIONS
Orders Placed This Encounter   Procedures    Wound cleansing and dressings Neuropathic Left;Plantar Plantar     Wound location left foot      Do not get dressing wet--keep dry clean and intact -call wound care center if dressing becomes wet   Use cast cover if you shower   Felt was applied to your foot for offloading     Standing Status:   Future     Expiration Date:   11/27/2024    Wound cleansing and dressings Skin Tear Right Leg     Wound Location: Right leg  Change dressing 3 times a week   You may remove the dressing and shower. Do not leave wound open to air, apply new dressing immediately.  Cleanse the wound with wound cleanser or mild soap and water, rinse, pat dry.  Apply xeroform to the wound.   Cover with ABD  Secure with rolled gauze and tape.     Standing Status:   Future     Expiration Date:   11/27/2024

## 2024-11-20 NOTE — PROGRESS NOTES
Patient ID: Rebeca Banegas is a 89 y.o. adult Date of Birth 7/22/1932     Diagnosis:  1. Wound of right lower extremity, subsequent encounter  -     lidocaine (LMX) 4 % cream  -     Wound cleansing and dressings Neuropathic Left;Plantar Plantar; Future  -     Wound cleansing and dressings Skin Tear Right Leg; Future  -     Wound Procedure Treatment Skin Tear Right Leg  2. 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 Wound of right lower extremity, subsequent encounter  S81.801D lidocaine (LMX) 4 % cream     Wound cleansing and dressings Neuropathic Left;Plantar Plantar     Wound cleansing and dressings Skin Tear Right Leg     Wound Procedure Treatment Skin Tear Right Leg      2. 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  Surgical debridement completed to the left foot wound.    Will plan on having patient return for evaluation after her surgical invention which is planned for 1129.    If the patient notices any systemic signs of infection including but not limited to fever, chills, nausea, vomiting or significant worsening of wound with increased drainage, redness or streaking up the foot or leg the patient should notify the office or present to the emergency room.      If wound debridement  was completed today this was done in an attempt to promote healing, decrease risk of infection and for limb salvage efforts.     Goal of treatment: wound healing     Efforts to decrease pressure on the wound(s), evaluation and discussion of nutritional status, peripheral vascular status, and infection control have all been addressed with this patient.        Return for Next scheduled follow up, Wound Assessment, recheck.      Chief Complaint   Patient presents with    Follow Up Wound Care Visit     Left foot wound.  Bulky dressing           Subjective:   Patient returns for follow-up she has been in a bulky dressing.  She is doing well denies any significant pain to the foot or ankle.  She has been doing well with regards to her right lower leg wound as well.  Denies any other new acute changes.    The following portions of the patient's history were reviewed and updated as appropriate:   Patient Active Problem List   Diagnosis    Abnormal findings on diagnostic imaging of other specified body structures    Abnormal findings on diagnostic imaging of urinary organs    Allergic rhinitis    Arthritis    Asthma    Atherosclerosis    Permanent atrial fibrillation (HCC)    Backache    Benign colon polyp    Bifascicular bundle branch block    Chronic diastolic congestive heart failure (HCC)    Esophageal reflux    Essential tremor    Hoarseness    Hyperlipidemia    Primary hypertension    Hypothyroidism    Leg pain    Nasal polyp    Onychomycosis    Ovarian cyst    Pancreatic cyst    Plantar fasciitis    Prediabetes    Pulmonary nodule    Thyroid nodule    Vitamin D deficiency    Pain of left hand    Leg cramps    Sensorineural hearing loss (SNHL), bilateral    Left asymmetrical SNHL    Elevated serum immunoglobulin free light chains    Osteopenia of hip    CKD stage 3b, GFR 30-44 ml/min (HCC)    Labial abscess    Vulvar cysts    Open wound of right upper arm    Secondary hyperparathyroidism (HCC)    Age-related  osteoporosis without current pathological fracture    Chronic cough    MGUS (monoclonal gammopathy of unknown significance)    Bronchiectasis without complication (HCC)    Vaccine counseling    Acute on chronic diastolic CHF (congestive heart failure) (HCC)    Chronic foot ulcer, left, with fat layer exposed (HCC)    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)    Preoperative examination    CVA (cerebral vascular accident) (HCC)    Severe protein-calorie malnutrition (HCC)     Past Medical History:   Diagnosis Date    Abnormal ultrasound     RESOLVED: [id number]    Advice given about COVID-19 virus infection 04/07/2020    Asthma with acute exacerbation     LAST ASSESSED: [id number]    Asymptomatic menopausal state     Atherosclerosis     Atrial fibrillation (HCC)     Chronic obstructive lung disease (HCC)     Chronic ulcer of left foot (HCC)     Congestive heart failure (HCC)     LAST ASSESSED: [id number]    COVID-19 virus infection 03/25/2023    Cuboid fracture     LAST ASSESSED: 50SCM0007    Dyspepsia     Edema of both lower extremities     Equinus deformity of left foot     Fall 04/01/2024    GERD (gastroesophageal reflux disease)     High risk medication use     LAST ASSESSED: [id number]    Hyperlipidemia     Hypertension     Hypokalemia     Hypothyroidism     Impacted cerumen of both ears     LAST ASSESSED: 11JUY5410    Impacted cerumen of right ear     LAST ASSESSED: 09XOZ4621    Influenza     LAST ASSESSED: 09XZG5683    IPMN (intraductal papillary mucinous neoplasm)     RESOLVED: 02OCT2015    Irregular heart beat     afib. Warfarin.    Limb swelling     LAST ASSESSED: 66IEO8038    Navicular fracture of ankle     LAST ASSESSED: 87THJ9543    Onychomycosis     LAST ASSESSED: [id number]    Osteoarthritis     Osteopenia     Osteoporosis     Paronychia, finger, unspecified laterality     LAST ASSESSED: 62ZWG3594    Paroxysmal atrial fibrillation (HCC)     LAST ASSESSED: 02MAR2014    Peripheral vascular  disease (HCC)     Plantar fasciitis     Stroke (HCC)     Talus fracture     LAST ASSESSED: [id number]    Vascular headache     Wound of right foot      Past Surgical History:   Procedure Laterality Date    APPENDECTOMY      BONE BIOPSY Left 2024    Procedure: Bone bx of proximal phalanx second toe & second met with fluoroscopic guidance;  Surgeon: Jonny Marcial DPM;  Location: [facility];  Service: Podiatry    CATARACT EXTRACTION Bilateral     CHOLECYSTECTOMY      COLONOSCOPY      JOINT REPLACEMENT Bilateral     knee    NEUROPLASTY / TRANSPOSITION MEDIAN NERVE AT CARPAL TUNNEL BILATERAL Bilateral     DECOMPRESSION    OOPHORECTOMY Bilateral     age 81    PELVIC FLOOR REPAIR  2014    SC BIOPSY BONE TROCAR/NEEDLE SUPERFICIAL Left 2024    Procedure: Left foot debridement;  Surgeon: Jonny Marcial DPM;  Location: [facility];  Service: Podiatry    SC DEBRIDEMENT MUSCLE &/FASCIA 1ST 20 SQ CM/< Left 2024    Procedure: Left foot debridement;  Surgeon: Jonny Marcial DPM;  Location: [facility];  Service: Podiatry    SINUS SURGERY      TOTAL KNEE ARTHROPLASTY Bilateral      Social History     Socioeconomic History    Marital status:      Spouse name: Not on file    Number of children: Not on file    Years of education: Not on file    Highest education level: Not on file   Occupational History    Occupation: retired   Tobacco Use    Smoking status: Former     Current packs/day: 0.00     Average packs/day: 0.3 packs/day for 2.0 years (0.5 ttl pk-yrs)     Types: Cigarettes     Start date:      Quit date:      Years since quittin.9    Smokeless tobacco: Never    Tobacco comments:     On and off socially with friends    Vaping Use    Vaping status: Never Used   Substance and Sexual Activity    Alcohol use: Not Currently     Comment: SOCIAL    Drug use: No    Sexual activity: Not Currently     Partners: Male     Birth control/protection: Post-menopausal   Other Topics Concern     Not on file   Social History Narrative    DAILY COFFEE CONSUMPTION (2 CUPS/DAY)    EXERCISING REGULARLY     Social Drivers of Health     Financial Resource Strain: Not on file   Food Insecurity: No Food Insecurity (8/28/2024)    Nursing - Inadequate Food Risk Classification     Worried About Running Out of Food in the Last Year: Never true     Ran Out of Food in the Last Year: Never true     Ran Out of Food in the Last Year: Not on file   Transportation Needs: No Transportation Needs (8/28/2024)    PRAPARE - Transportation     Lack of Transportation (Medical): No     Lack of Transportation (Non-Medical): No   Physical Activity: Not on file   Stress: Not on file   Social Connections: Not on file   Intimate Partner Violence: Not on file   Housing Stability: Low Risk  (8/28/2024)    Housing Stability Vital Sign     Unable to Pay for Housing in the Last Year: No     Number of Times Moved in the Last Year: 0     Homeless in the Last Year: No        Current Outpatient Medications:     albuterol (2.5 mg/3 mL) 0.083 % nebulizer solution, Take 3 mL (2.5 mg total) by nebulization every 8 (eight) hours, Disp: 270 mL, Rfl: 3    albuterol (Proventil HFA) 90 mcg/act inhaler, Inhale 2 puffs every 6 (six) hours as needed for wheezing, Disp: 20.1 g, Rfl: 3    atorvastatin (LIPITOR) 40 mg tablet, Take 1 tablet (40 mg total) by mouth every evening, Disp: 90 tablet, Rfl: 1    Cholecalciferol (VITAMIN D3) 1000 units CAPS, Take 2,000 Units by mouth daily, Disp: , Rfl:     enoxaparin (LOVENOX) 60 mg/0.6 mL, Inject 0.6 mL (60 mg total) under the skin every 12 (twelve) hours, Disp: 20 mL, Rfl: 0    fluticasone (FLONASE) 50 mcg/act nasal spray, USE 2 SPRAYS IN EACH NOSTRIL ONCE DAILY, Disp: 48 g, Rfl: 3    Fluticasone-Salmeterol (Advair) 100-50 mcg/dose inhaler, INHALE 1 PUFF 2 (TWO) TIMES A DAY. RINSE MOUTH AFTER USE., Disp: 180 blister, Rfl: 3    levothyroxine 50 mcg tablet, TAKE 1 TABLET DAILY, EXCEPT TAKE 1 AND 1/2 TABLETS ON SUNDAY  AND WEDNESDAY, Disp: 100 tablet, Rfl: 1    metolazone (ZAROXOLYN) 2.5 mg tablet, Only take one tablet tomorrow morning 11/16/24 before AM torsemide dose, Disp: 5 tablet, Rfl: 0    metoprolol succinate (TOPROL-XL) 100 mg 24 hr tablet, Take 1 tablet (100 mg total) by mouth daily, Disp: 90 tablet, Rfl: 1    pantoprazole (PROTONIX) 40 mg tablet, Take 1 tablet (40 mg total) by mouth daily in the early morning (Patient not taking: Reported on 11/6/2024), Disp: 30 tablet, Rfl: 0    sodium chloride 3 % inhalation solution, Take 4 mL by nebulization 3 (three) times a day, Disp: 360 mL, Rfl: 2    spironolactone (ALDACTONE) 50 mg tablet, Take 1 tablet (50 mg total) by mouth daily, Disp: 90 tablet, Rfl: 3    torsemide (DEMADEX) 20 mg tablet, Take 3 tablets (60 mg total) by mouth 2 (two) times a day, Disp: 180 tablet, Rfl: 3    warfarin (Jantoven) 2.5 mg tablet, Take 1 tablet (2.5 mg total) by mouth daily, Disp: 30 tablet, Rfl: 0  No current facility-administered medications for this visit.  Family History   Problem Relation Age of Onset    Pancreatic cancer Mother 93    Heart failure Mother     Coronary artery disease Family     Breast cancer Family     Other Family         BREAST DISORDER, GASTROINTESTINAL CANCER    Uterine cancer Family     Hyperlipidemia Family     Osteoarthritis Family     Ovarian cancer Family     Brain cancer Son     Stroke Father     No Known Problems Sister     No Known Problems Daughter     No Known Problems Maternal Grandmother     No Known Problems Maternal Grandfather     No Known Problems Paternal Grandmother     No Known Problems Paternal Grandfather     Breast cancer Cousin 50    Breast cancer Cousin 50    Ovarian cancer Other 49      Review of Systems  Allergies:  Asa [aspirin] and Nsaids      Objective:  /71   Pulse 82   Temp (!) 97.2 °F (36.2 °C)   Resp 16   LMP  (LMP Unknown)     Physical Exam      Wound 06/28/24 Neuropathic Plantar Left;Plantar (Active)   Wound Image Images linked  "11/20/24 1331   Wound Description Brown;Eschar 11/20/24 1318   Susu-wound Assessment Dry;Callus 11/20/24 1318   Wound Length (cm) 0.1 cm 11/20/24 1318   Wound Width (cm) 0.1 cm 11/20/24 1318   Wound Depth (cm) 0.1 cm 11/20/24 1318   Wound Surface Area (cm^2) 0.01 cm^2 11/20/24 1318   Wound Volume (cm^3) 0.001 cm^3 11/20/24 1318   Calculated Wound Volume (cm^3) 0 cm^3 11/20/24 1318   Change in Wound Size % 100 11/20/24 1318   Drainage Amount None 11/20/24 1318   Non-staged Wound Description Full thickness 11/20/24 1318   Wound packed? No 11/20/24 1318   Patient Tolerance Tolerated well 11/20/24 1318   Dressing Status Intact 11/20/24 1318       Wound 10/30/24 Skin Tear Leg Right (Active)   Wound Image Images linked 11/20/24 1311   Wound Description Pink;Yellow;Epithelialization;Granulation tissue;Bleeding 11/20/24 1318   Susu-wound Assessment Intact 11/20/24 1318   Wound Length (cm) 2.1 cm 11/20/24 1318   Wound Width (cm) 1 cm 11/20/24 1318   Wound Depth (cm) 0.1 cm 11/20/24 1318   Wound Surface Area (cm^2) 2.1 cm^2 11/20/24 1318   Wound Volume (cm^3) 0.21 cm^3 11/20/24 1318   Calculated Wound Volume (cm^3) 0.21 cm^3 11/20/24 1318   Change in Wound Size % 90.14 11/20/24 1318   Drainage Amount Moderate 11/20/24 1318   Drainage Description Bloody;Serosanguineous 11/20/24 1318   Non-staged Wound Description Full thickness 11/20/24 1318   Dressing Status Intact 11/20/24 1318                      Debridement   Wound 10/30/24 Skin Tear Leg Right    Universal Protocol:  procedure performed by consultantConsent: Verbal consent obtained.  Risks and benefits: risks, benefits and alternatives were discussed  Consent given by: patient  Time out: Immediately prior to procedure a \"time out\" was called to verify the correct patient, procedure, equipment, support staff and site/side marked as required.  Patient understanding: patient states understanding of the procedure being performed  Patient identity confirmed: verbally with " "patient    Debridement Details  Performed by: physician  Debridement type: selective  Pain control: lidocaine 4%      Post-debridement measurements  Length (cm): 2.1  Width (cm): 1  Depth (cm): 0.1  Percent debrided: 100%  Surface Area (cm^2): 2.1  Area Debrided (cm^2): 2.1  Volume (cm^3): 0.21    Devitalized tissue debrided: biofilm, callus and fibrin  Instrument(s) utilized: blade  Bleeding: small  Hemostasis obtained with: pressure  Procedural pain (0-10): insensate  Post-procedural pain: insensate   Response to treatment: procedure was tolerated well    Debridement   Wound 06/28/24 Neuropathic Plantar Left;Plantar    Universal Protocol:  procedure performed by consultantConsent: Verbal consent obtained.  Risks and benefits: risks, benefits and alternatives were discussed  Consent given by: patient  Time out: Immediately prior to procedure a \"time out\" was called to verify the correct patient, procedure, equipment, support staff and site/side marked as required.  Patient understanding: patient states understanding of the procedure being performed  Patient identity confirmed: verbally with patient    Debridement Details  Performed by: physician  Debridement type: surgical  Level of debridement: subcutaneous tissue  Pain control: lidocaine 4%      Post-debridement measurements  Length (cm): 0.1  Width (cm): 0.1  Depth (cm): 0.2  Percent debrided: 100%  Surface Area (cm^2): 0.01  Area Debrided (cm^2): 0.01  Volume (cm^3): 0    Tissue and other material debrided: subcutaneous tissue  Devitalized tissue debrided: biofilm, callus and fibrin  Instrument(s) utilized: blade  Bleeding: small  Hemostasis obtained with: pressure  Procedural pain (0-10): insensate  Post-procedural pain: insensate   Response to treatment: procedure was tolerated well         Results from last 6 Months   Lab Units 09/04/24  1223   WOUND CULTURE  Few Colonies of Proteus mirabilis*  Few Colonies of Acinetobacter baumannii*         Wound " "Instructions:  Orders Placed This Encounter   Procedures    Wound cleansing and dressings Neuropathic Left;Plantar Plantar     Wound location left foot      Do not get dressing wet--keep dry clean and intact -call wound care center if dressing becomes wet   Use cast cover if you shower   Felt was applied to your foot for offloading     Standing Status:   Future     Expiration Date:   11/27/2024    Wound cleansing and dressings Skin Tear Right Leg     Wound Location: Right leg  Change dressing 3 times a week   You may remove the dressing and shower. Do not leave wound open to air, apply new dressing immediately.  Cleanse the wound with wound cleanser or mild soap and water, rinse, pat dry.  Apply xeroform to the wound.   Cover with ABD  Secure with rolled gauze and tape.     Standing Status:   Future     Expiration Date:   11/27/2024    Wound Procedure Treatment Neuropathic Left;Plantar Plantar     This order was created via procedure documentation    Wound Procedure Treatment Skin Tear Right Leg     This order was created via procedure documentation    Debridement     This order was created via procedure documentation    Debridement     This order was created via procedure documentation         Jonny Marcial DPM      Portions of the record may have been created with voice recognition software. Occasional wrong word or \"sound a like\" substitutions may have occurred due to the inherent limitations of voice recognition software. Read the chart carefully and recognize, using context, where substitutions have occurred.      "

## 2024-11-20 NOTE — PROGRESS NOTES
Wound Procedure Treatment Neuropathic Left;Plantar Plantar    Performed by: Kayli Claire RN  Authorized by: Jonny Marcial DPM    Associated wounds:   Wound 06/28/24 Neuropathic Plantar Left;Plantar  Wound cleansed with:  Soap and water  Applied to periwound:  Moisture lotion and Skin prep  Applied primary dressing:  Other  Applied secondary dressing:  ABD and Cast padding  Dressing secured with:  Ricky, Coban, Kerlix and Tubifast  Offloading device appllied:  Felt padding 1/4 inch  Wound Procedure Treatment Skin Tear Right Leg    Performed by: Kayli Claire RN  Authorized by: Jonny Marcial DPM    Associated wounds:   Wound 10/30/24 Skin Tear Leg Right  Applied primary dressing:  Other  Applied secondary dressing:  ABD  Dressing secured with:  Ricky, Tape and Tubifast    Xeroform to wound bed on right leg and left foot.

## 2024-11-21 ENCOUNTER — ANESTHESIA EVENT (OUTPATIENT)
Dept: PERIOP | Facility: HOSPITAL | Age: 89
End: 2024-11-21
Payer: COMMERCIAL

## 2024-11-21 NOTE — PRE-PROCEDURE INSTRUCTIONS
Pre-Surgery Instructions:   Medication Instructions    albuterol (2.5 mg/3 mL) 0.083 % nebulizer solution Uses PRN- OK to take day of surgery    albuterol (Proventil HFA) 90 mcg/act inhaler Uses PRN- OK to take day of surgery    atorvastatin (LIPITOR) 40 mg tablet Take night before surgery    Cholecalciferol (VITAMIN D3) 1000 units CAPS Hold day of surgery.    enoxaparin (LOVENOX) 60 mg/0.6 mL Instructions provided by MD    fluticasone (FLONASE) 50 mcg/act nasal spray Uses PRN- OK to take day of surgery    Fluticasone-Salmeterol (Advair) 100-50 mcg/dose inhaler Take day of surgery.    levothyroxine 50 mcg tablet Take day of surgery.    metoprolol succinate (TOPROL-XL) 100 mg 24 hr tablet Take day of surgery.    sodium chloride 3 % inhalation solution Uses PRN- OK to take day of surgery    spironolactone (ALDACTONE) 50 mg tablet Hold day of surgery.    torsemide (DEMADEX) 20 mg tablet Hold day of surgery.    warfarin (Jantoven) 2.5 mg tablet Instructions provided by MD   Medication instructions for day surgery reviewed. Please use only a sip of water to take your instructed medications. Avoid all over the counter vitamins, supplements and NSAIDS for one week prior to surgery per anesthesia guidelines. Tylenol is ok to take as needed.     You will receive a call one business day prior to surgery with an arrival time and hospital directions. If your surgery is scheduled on a Monday, the hospital will be calling you on the Friday prior to your surgery. If you have not heard from anyone by 8pm, please call the hospital supervisor through the hospital  at [phone]. ([place] [phone] or [place] [phone]).    Do not eat or drink anything after midnight the night before your surgery, including candy, mints, lifesavers, or chewing gum. Do not drink alcohol 24hrs before your surgery. Try not to smoke at least 24hrs before your surgery.       Follow the pre surgery showering instructions as listed in the  “My Surgical Experience Booklet” or otherwise provided by your surgeon's office. Do not use a blade to shave the surgical area 1 week before surgery. It is okay to use a clean electric clippers up to 24 hours before surgery. Do not apply any lotions, creams, including makeup, cologne, deodorant, or perfumes after showering on the day of your surgery. Do not use dry shampoo, hair spray, hair gel, or any type of hair products.     No contact lenses, eye make-up, or artificial eyelashes. Remove nail polish, including gel polish, and any artificial, gel, or acrylic nails if possible. Remove all jewelry including rings and body piercing jewelry.     Wear causal clothing that is easy to take on and off. Consider your type of surgery.    Keep any valuables, jewelry, piercings at home. Please bring any specially ordered equipment (sling, braces) if indicated.    Arrange for a responsible person to drive you to and from the hospital on the day of your surgery. Please confirm the visitor policy for the day of your procedure when you receive your phone call with an arrival time.     Call the surgeon's office with any new illnesses, exposures, or additional questions prior to surgery.    Please reference your “My Surgical Experience Booklet” for additional information to prepare for your upcoming surgery.

## 2024-11-22 ENCOUNTER — OFFICE VISIT (OUTPATIENT)
Dept: CARDIOLOGY CLINIC | Facility: CLINIC | Age: 89
End: 2024-11-22
Payer: COMMERCIAL

## 2024-11-22 VITALS
HEART RATE: 71 BPM | BODY MASS INDEX: 19.58 KG/M2 | DIASTOLIC BLOOD PRESSURE: 72 MMHG | SYSTOLIC BLOOD PRESSURE: 108 MMHG | HEIGHT: 64 IN | OXYGEN SATURATION: 98 % | WEIGHT: 114.7 LBS

## 2024-11-22 DIAGNOSIS — I50.32 CHRONIC DIASTOLIC CONGESTIVE HEART FAILURE (HCC): Chronic | ICD-10-CM

## 2024-11-22 PROCEDURE — 99214 OFFICE O/P EST MOD 30 MIN: CPT | Performed by: NURSE PRACTITIONER

## 2024-11-22 RX ORDER — TORSEMIDE 20 MG/1
40 TABLET ORAL 2 TIMES DAILY
Qty: 180 TABLET | Refills: 3 | Status: SHIPPED | OUTPATIENT
Start: 2024-11-22

## 2024-11-22 NOTE — PATIENT INSTRUCTIONS
Weigh yourself daily  If you gain 3 lbs in one day or 5 lbs in one week, please call the office at [phone] and ask for a nurse or the heart failure nurse  Keep your sodium intake to <2 grams, (2000 mg) per day, and fluids <2 Liters (2000 ml) per day. This is around 6-7, 8 oz glasses of fluid per day    Can reduce the Torsemide back to 40 mg twice daily  Please call if any significant change in weight or re accumulation of fluid

## 2024-11-23 ENCOUNTER — IN HOME VISIT (OUTPATIENT)
Dept: INTERNAL MEDICINE CLINIC | Facility: CLINIC | Age: 89
End: 2024-11-23
Payer: COMMERCIAL

## 2024-11-23 VITALS — HEART RATE: 82 BPM | DIASTOLIC BLOOD PRESSURE: 72 MMHG | SYSTOLIC BLOOD PRESSURE: 104 MMHG | RESPIRATION RATE: 16 BRPM

## 2024-11-23 DIAGNOSIS — I48.21 PERMANENT ATRIAL FIBRILLATION (HCC): Chronic | ICD-10-CM

## 2024-11-23 DIAGNOSIS — E03.9 HYPOTHYROIDISM, UNSPECIFIED TYPE: Chronic | ICD-10-CM

## 2024-11-23 DIAGNOSIS — J47.9 BRONCHIECTASIS WITHOUT COMPLICATION (HCC): Chronic | ICD-10-CM

## 2024-11-23 DIAGNOSIS — Z01.818 PREOP EXAM FOR INTERNAL MEDICINE: Primary | ICD-10-CM

## 2024-11-23 DIAGNOSIS — J45.20 MILD INTERMITTENT ASTHMA WITHOUT COMPLICATION: Chronic | ICD-10-CM

## 2024-11-23 DIAGNOSIS — I50.33 ACUTE ON CHRONIC DIASTOLIC CHF (CONGESTIVE HEART FAILURE) (HCC): Chronic | ICD-10-CM

## 2024-11-23 DIAGNOSIS — I63.9 CEREBROVASCULAR ACCIDENT (CVA), UNSPECIFIED MECHANISM (HCC): ICD-10-CM

## 2024-11-23 PROCEDURE — 99350 HOME/RES VST EST HIGH MDM 60: CPT | Performed by: INTERNAL MEDICINE

## 2024-11-23 NOTE — PROGRESS NOTES
Assessment/Plan:  #1 health maintenance-patient has received influenza vaccine.  Had COVID booster this year.  Had RSV vaccine in November 2023  2.  Preoperative exam-medically clear for surgery.  Has already been instructed on bridging with Lovenox-warfarin via cardiology.  Holding her spironolactone and torsemide morning of surgery.  Will take usual dose of beta-blocker and thyroid supplement morning of surgery  #3 recent episode of right arm which is persisting and episode of left eye visual symptoms which resolved.  Was admitted to the hospital for CTA of head and neck showing some degree of atherosclerosis without high-grade stenosis of cervical or intracranial circulation.  MRI of the brain shows acute infarcts in the right occipital lobe and right cerebellum.  There was no mass effect or hemorrhagic transformation.  Echocardiogram shows EF of 60%, wall thickness moderately increased, unable to assess diastolic function due to atrial relation, moderate dilatation of right left atrium, moderate tricuspid regurg, right ventricular systolic pressure 40 elevated.  There was concern about malignancy induced hypercoagulability based of her presentation a CAT scan of chest abdomen pelvis was done as dictated below.  This does have some abnormal findings but this felt more likely that her strokelike symptoms were predisposed by being off her oral anticoagulant-as noted she had a normal INR 1 her warfarin for foot surgery within 2 to 3 weeks of onset of her symptoms.  We reviewed that her MRI shows disease of both the cerebellum as well as the occipital lobe suggestive of potential cardioembolic disease.  As noted now scheduled for surgery and bridging with Lovenox-warfarin note also she was started on atorvastatin 40 mg daily in the hospital with her documented mild atherosclerosis and strokelike symptoms-she will continue this  #4 abnormal imaging-CT chest abdomen pelvis shows no definite findings of malignancy  but does show multiple pancreatic cystic lesions some of which have enlarged since June 2017-dominant cystic complex lesion in the pancreatic head measures 3.6 cm-previously measured 2.8 cm.  Because of the growth of greater than 20% of the longest dimension she should be considered for follow-up with GI and potential endoscopic ultrasound-patient states because of her age of 89 that she does not want additional investigation and defers on seeing GI-she realizes she could have potential underlying pancreatic carcinoma but defers.  #5 abnormal imaging-has a new 2 mm soft nodule left lower lobe not seen on prior CT-also has multiple additional 3 mm as well as pulmonary nodule stable since 2014 as well as her known chronic bilateral bronchiectasis-she is not a smoker and not felt to be high risk-regardless she will likely be having follow-up CT in reference to her pulmonary status so that this nodule can again be evaluated in the future.  6  Left plantar ulcer-has been present for several months.  Had remote history of MRSA infection of the same foot.  Has longstanding abnormality left fourth toe present since childhood.  Has had intermittent drainage.  Arterial Doppler showed mild bilateral disease with right FREYA 1.23-diffuse disease without focal stenosis-great toe pressure 64 within healing range.  Left side shows diffuse disease without focal stenosis, FREYA 1.36 and great toe pressure of 33.  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  MRI and white cell study suggestive of potential osteomyelitis of second metatarsal head-rule out involvement of proximal second phalanx.  Patient underwent debridement and bone biopsy-bone biopsy was negative for osteomyelitis.  Cultures show few colonies of staph coag negative as well as staph epidermis as well as Dermobacter hominis.-.  She was treated with linezolid twice daily by podiatry for 10 days.  Patient  had been in cast and now in walking boot-as noted scheduled for additional surgery with percutaneous osteotomy to the second metatarsal in addition to gastrocnemius recession  #7recent known thoracic back pain-longstanding-always watching for compression fracture but known osteoporosis but did not appear to be the case.  Felt to be positional musculoskeletal-was previously using acetaminophen and over-the-counter lidocaine patch  8 chronic diastolic heart failure-most recent echo as dictated above-had recent exacerbation which may have been contributed to by salt intake-on higher dose of torsemide weight has decreased 15 pounds-saw cardiology today and torsemide decreased to 40 mg twice daily-continuing same dose of spironolactone-50 mg daily  9  Pulmonary disease/combination of asthma-bronchiectasis-CT of the chest just done shows her known bronchiectasis.  She has known history of pulmonary hypertension.  Prior PFTs show obstructive disease with FEV1 of 0.92-no response to bronchodilator.  Had treatment in the past with Levaquin for bronchiectasis with associated Pseudomonas on culture.  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  On Advair.  Was oversedated on Zyrtec.  On Flonase for associated allergic rhinitis  #10atrial aenrcccvzmvb-caftitwgr-xlwpzwpr control on current dose of metoprolol.  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  Prior laboratory testing showed normal immunoglobulin levels, SPEP showed biclonal spike with immunofixation identified both these is IgG kappa-spike number 1.26 g/dL M spike number 2.08 g/dL-she does not have any crab features other than her known CKD.  Recent labs show in September 2024 on free light chain ratio Elevated at 81.4, lambda elevated at 37.7 with a ratio of 2.16 although decreased ratio from 1 year prior when it was 3.5-SPEP with  immunofixation shows biclonal IgG kappa, UPEP negativenegative-immunoglobulin levels show all normal other than elevated IgE at 1345  12  Bilateral sensorineural hearing loss-monitored by the ENT group-had repeat evaluation June 2024 with audiogram showing moderate to profound high-frequency bilateral sensorineural hearing loss-states that she should return in 1 year which would be June 2025  13.  Osteoporosis-DEXA scan 2023 shows L-spine -2.7 and hip of -2.2.  Remains on vitamin D-has known calcium intake of 1200 mg/day or more.  Subsequent DEXA scan due in March 2025.  Has had 3 injections of Prolia with her most recent given today  14  Recurrent labial cyst-recurrent labial abscess-was treated by GYN with I&D and treated course of Bactrim-culture was never obtained.  There was concern about potential MRSA she had this in the past.  Not an issue at present  15.  CKD-stage IIIb-felt to be age-related nephron loss plus possible component of cardiorenal syndrome-knows to avoid nonsteroidals.  BUN and creatinine had climbed on higher dose of torsemide-most recently labs done on 10/21/2024 showed BUN of 51 with creatinine 1.52  16.  Hypothyroidism-stable on current dose -TSH done in September this year 4.97 which is very acceptable for her age-continuing current dose of levothyroxine     All other problems as per note of August 30, 2022     Medical regimen-atorvastatin 40 mg daily, torsemide 40 mg twice daily using 20 mg dosing, spironolactone 50 mg daily, dry weight in the middle 100s, Flonase 2 squirts each nostril once daily, prior use of loratadine 10 mg daily, Advair 50 - 100 - 1 puff twice daily, Prolia every 6 months with last injection on 11/22/24, prior use of Singulair 10 mg daily, levothyroxine 50 mcg daily but 75 mcg - 4 days/week, calcium and vitamin D daily,  Inhaler as needed, metoprolol succinate 50 mg twice daily using one half of 100 mg tablet, multivitamin, vitamin D3-2000 units a day, fish oil 1200  mg a day, over-the-counter lidocaine patch-4% on for 12 hours and off for 12 hours, remote use of Vicodin 5 - 300 - 1/2 tablet twice daily as needed which she has not used in months, warfarin with dose adjustment             Objective:        LMP  (LMP Unknown)                                    Additional Documentation    Vitals: /72     Pulse 78     Resp 14     LMP  (LMP Unknown)   Encounter Info: Billing Info,     History,     Allergies,     Detailed Report     Orders Placed    None  Medication Changes      None  Medication List  Visit Diagnosesuctures  Problem List  No problem-specific Assessment & Plan notes found for this encounter.             Subjective:      Patient ID: Rebeca Banegas is a 89 y.o. adult.    This patient was seen as a home visit on 11/22/2024-she requires preoperative exam for additional plan podiatric surgery.  She has a chronic nonpressure ulcer of the left foot with bone involvement without evidence of necrosis.  She has had ongoing issues and podiatry feels at this point she should have surgical intervention for percutaneous osteotomy to the second metatarsal in addition to a gastrocnemius resection.  She has been unable to heal the wound with total contact casting-currently in a surgical shoe with offloading.  She had also developed a right lower leg wound abrasion from the cast she was wearing on her left leg.  As per prior notes with her most recent surgery she was off her anticoagulant and suffered a CVA-she is seeing cardiology and already has planned therapy to bridge with Lovenox but then resume her warfarin.  She has a known history of chronic diastolic heart failure with recent exacerbation.  Her weight had climbed significantly.  She was on higher dose of loop diuretic and spironolactone.  Most recently she has dropped 15+ pounds.  Patient saw cardiology on 11/22/2024 and they decreased her torsemide to a dose of 40 mg twice daily while continuing spironolactone at  current dose.  She is not on potassium supplement    Results for orders placed or performed in visit on 11/20/24  -CBC and differential:   Collection Time: 11/20/24  9:35 AM       Result                      Value             Ref Range           WBC                         7.58              4.31 - 10.16*       RBC                         3.99              3.88 - 5.12 *       Hemoglobin                  13.1              12.0 - 15.4 *       Hematocrit                  40.3              36.5 - 46.1 %       MCV                         101 (H)           82 - 98 fL          MCH                         32.8              26.8 - 34.3 *       MCHC                        32.5              31.4 - 37.4 *       RDW                         14.4              11.6 - 15.1 %       MPV                         10.3              8.9 - 12.7 fL       Platelets                   292               149 - 390 Th*       nRBC                        0                 /100 WBCs           Segmented %                 37 (L)            43 - 75 %           Immature Grans %            0                 0 - 2 %             Lymphocytes %               37                14 - 44 %           Monocytes %                 7                 4 - 12 %            Eosinophils Relative        18 (H)            0 - 6 %             Basophils Relative          1                 0 - 1 %             Absolute Neutrophils        2.79              1.85 - 7.62 *       Absolute Immature Grans     0.01              0.00 - 0.20 *       Absolute Lymphocytes        2.82              0.60 - 4.47 *       Absolute Monocytes          0.54              0.17 - 1.22 *       Eosinophils Absolute        1.36 (H)          0.00 - 0.61 *       Basophils Absolute          0.06              0.00 - 0.10 *  -Basic metabolic panel:   Collection Time: 11/20/24  9:35 AM       Result                      Value             Ref Range           Sodium                      136               135 - 147  mm*       Potassium                   3.6               3.5 - 5.3 mm*       Chloride                    88 (L)            96 - 108 mmo*       CO2                         35 (H)            21 - 32 mmol*       ANION GAP                   13                4 - 13 mmol/L       BUN                         59 (H)            5 - 25 mg/dL        Creatinine                  1.94 (H)          0.60 - 1.30 *       Glucose, Fasting            104 (H)           65 - 99 mg/dL       Calcium                     9.6               8.4 - 10.2 m*       eGFR                                                           *Note: Due to a large number of results and/or encounters for the requested time period, some results have not been displayed. A complete set of results can be found in Results Review.    Most recent labs in detail.  She has had climbed a creatinine associated with diuresis/cardiorenal syndrome.  Labs done most recently show BUN improved to 51 with creatinine of 1.52.  Previously BUN was 71 with a creatinine of 1.92.    Vaccine status reviewed.  She received annual influenza vaccine.  She had COVID booster and has received RSV vaccine the year prior.  We discussed this in detail    She has not had any additional neurologic symptoms.  It was felt previously that it was related to being off her anticoagulant-that is the most recent CVA    Recent labs show TSH of 4.97 which is acceptable for her age.  Free light chain ratio elevated at 81.4, lambda elevated at 37.7, ratio elevated at 2.16 although decreased from 1 year prior when it was 3.5.  SPEP shows biclonal gammopathy-both IgG kappa.  UPEP shows no monoclonal gammopathy    She has known osteoporosis.  She is due for an injection of Prolia and this was administered today.  She remains on this every 6 months.    We reviewed what to do with her meds for surgery.  She has been instructed previously to hold her spironolactone and torsemide.  She will take her thyroid supplement,  beta-blocker morning of surgery with sips of water  She asked me today about the etiology of her chronic cough-this is felt to be predominantly from her pulmonary disease-bronchiectasis.  We reviewed that cough could also be a symptom of CHF but she appears to be hemodynamically stable at present    I have spent a total time of 60 minutes in caring for this patient on the day of the visit/encounter including Diagnostic results, Prognosis, Risks and benefits of tx options, Instructions for management, Patient and family education, Importance of tx compliance, Risk factor reductions, Impressions, Counseling / Coordination of care, Documenting in the medical record, Reviewing / ordering tests, medicine, procedures  , Obtaining or reviewing history  , and Communicating with other healthcare professionals          The following portions of the patient's history were reviewed and updated as appropriate: allergies, current medications, past family history, past medical history, past social history, past surgical history, and problem list.    Review of Systems   Constitutional: Negative.    HENT: Negative.     Respiratory:  Positive for cough and shortness of breath.    Cardiovascular:  Positive for leg swelling.   Gastrointestinal: Negative.    Endocrine: Negative.    Genitourinary: Negative.    Musculoskeletal:  Positive for gait problem.   Skin:  Positive for wound.   Hematological: Negative.    Psychiatric/Behavioral: Negative.           Objective:      LMP  (LMP Unknown)          Physical Exam  Vitals reviewed.   Constitutional:       General: She is not in acute distress.     Appearance: She is ill-appearing. She is not toxic-appearing or diaphoretic.   HENT:      Head: Normocephalic and atraumatic.      Right Ear: External ear normal.      Left Ear: External ear normal.      Nose: Nose normal. No congestion or rhinorrhea.      Mouth/Throat:      Mouth: Mucous membranes are moist.      Pharynx: Oropharynx is clear. No  oropharyngeal exudate or posterior oropharyngeal erythema.   Eyes:      General: No scleral icterus.        Right eye: No discharge.         Left eye: No discharge.      Extraocular Movements: Extraocular movements intact.   Neck:      Vascular: No carotid bruit.   Cardiovascular:      Rate and Rhythm: Normal rate. Rhythm irregular.      Pulses: Normal pulses.      Heart sounds: Normal heart sounds. No murmur heard.     No friction rub. No gallop.      Comments: Irregular rhythm.  Questionable increase in P2  Pulmonary:      Effort: Pulmonary effort is normal. No respiratory distress.      Breath sounds: Normal breath sounds. No stridor. No wheezing, rhonchi or rales.      Comments: Scattered rhonchi  Chest:      Chest wall: No tenderness.   Abdominal:      General: Abdomen is flat. Bowel sounds are normal. There is no distension.      Palpations: Abdomen is soft. There is no mass.      Tenderness: There is no abdominal tenderness. There is no right CVA tenderness, left CVA tenderness, guarding or rebound.      Hernia: No hernia is present.   Musculoskeletal:         General: No swelling, tenderness, deformity or signs of injury. Normal range of motion.      Cervical back: Normal range of motion and neck supple. No rigidity. No muscular tenderness.      Right lower leg: No edema.      Left lower leg: No edema.   Lymphadenopathy:      Cervical: No cervical adenopathy.   Skin:     General: Skin is warm and dry.      Coloration: Skin is not jaundiced or pale.      Findings: No bruising, erythema, lesion or rash.      Comments: Dressing of the right lower leg.  Dressing of left foot   Neurological:      General: No focal deficit present.      Mental Status: She is alert and oriented to person, place, and time. Mental status is at baseline.      Cranial Nerves: No cranial nerve deficit.      Sensory: No sensory deficit.      Motor: No weakness.      Coordination: Coordination normal.      Gait: Gait normal.      Deep  Tendon Reflexes: Reflexes normal.   Psychiatric:         Mood and Affect: Mood normal.         Behavior: Behavior normal.         Thought Content: Thought content normal.         Judgment: Judgment normal.

## 2024-11-25 ENCOUNTER — CLINICAL SUPPORT (OUTPATIENT)
Dept: FAMILY MEDICINE CLINIC | Facility: CLINIC | Age: 89
End: 2024-11-25
Payer: COMMERCIAL

## 2024-11-25 ENCOUNTER — TELEPHONE (OUTPATIENT)
Dept: FAMILY MEDICINE CLINIC | Facility: CLINIC | Age: 89
End: 2024-11-25

## 2024-11-25 DIAGNOSIS — M81.0 AGE-RELATED OSTEOPOROSIS WITHOUT CURRENT PATHOLOGICAL FRACTURE: Primary | Chronic | ICD-10-CM

## 2024-11-25 PROCEDURE — 99211 OFF/OP EST MAY X REQ PHY/QHP: CPT | Performed by: INTERNAL MEDICINE

## 2024-11-25 NOTE — DISCHARGE INSTR - AVS FIRST PAGE
Dr. Jonny Marcial, DPM  [facility]  Post-Operative Instructions    1. Take your prescribed medication as directed. You can take ibuprofen in between doses of the narcotic if breakthrough pain occurs.  2. Upon arrival at home, lie down and elevate your surgical foot on two pillows. Unless you're moving from the couch, bed, or bathroom, make sure to elevate the foot to limit swelling.  3. Remain off your feet as much as possible for the first 24-48 hours. This is when your feet first swell and may become painful. After 48 hours you may begin limited walking following these restrictions     - Weight bear as tolerated to surgical foot (RIGHT foot)    4. Drink large quantities of water. Please avoid alcohol. Continue a well-balanced diet.  5. Report any unusual discomfort or fever to this office.  6. A limited amount of discomfort and swelling is to be expected. In some cases the skin may take on a bruised appearance. The surgical solution that was applied to your foot prior to the operation is dark in color and the operation site may appear to be oozing when it actually is not.  7. A slight amount of blood is to be expected, and is no cause for alarm. Do not remove the dressings. If there is active bleeding and if the bleeding persists, add additional gauze to the bandage, apply direct pressure, elevate your feet and call the office.  8. Do not get the dressings wet. As regular bathing may be inconvenient, sponge baths are recommended. If you shower, keep the dressing dry.   9. When anesthesia wears off and if any discomfort should be present, apply an ice pack directly over the operated area for 15 minute intervals for several hours or until the pain leaves. (USE IN EXCESS OF 15 MINUTES COULD CAUSE FROSTBITE). Do not use hot water bags or electric pads. A convenient icepack can be made by placing ice cubes in a plastic bag and covering this with a towel.  10. If necessary, take a mild laxative before  retiring.  11. Wear your special shoe anytime you put weight on your foot, even if it is just to walk to the bathroom and back. It will probably be a few weeks before you will be permitted to try regular shoes.  12. Having performed the operation, we are interested in a prompt recovery. Please cooperate by following the above instructions.  13. Please call to confirm your post-op appointment within one week of surgery. Please call the office with any other questions.

## 2024-11-27 NOTE — PROGRESS NOTES
"Pediatric Therapy at [facility]  Pediatric Occupational Therapy Treatment Note    Patient: Wes Nolan Today's Date: 24   MRN: [id number] Time:            : 2017 Therapist: SHERYL Butler   Age: 6 y.o. Referring Provider: Ny Odom MD     Diagnosis:  Encounter Diagnosis     ICD-10-CM    1. Development delay  R62.50           SUBJECTIVE  Wes Nolan arrived to therapy session with Mother who reported the following medical/social updates: discussed episodic care in the new year. Mom reports Wes is not writing in school. He continues to have OT and speech at school.   Others present in the treatment area include: not applicable.    Patient Observations:  Required frequent redirection back to tasksimpulsive sensory seeking movements throughout the session.  Patient is responding to therapeutic strategies to improve participation Difficulty with visual motor skills with letter formation with name         Authorization Tracking  Visit:   Insurance: [employer] by [employer]illy [employer]  No Shows: 1  Initial Evaluation: Re eval: 10/2024  Plan of Care Due: 2025    Goals:   Short Term Goals:   Goal Goal Status   Wes will trace a variety of simple shapes within 1/2\" of lines with minimal (1-2) prompts across 3 consecutive sessions.  [] New goal         [] Goal in progress   [x] Goal met         [] Goal modified  [] Goal targeted  [] Goal not targeted   Comments:    Wes will attend to an adult-directed table task for 4-5 minutes with 2 or fewer redirections in 75% of opportunities. [] New goal         [x] Goal in progress   [] Goal met         [] Goal modified  [] Goal targeted  [] Goal not targeted   Comments:    Wes will transition between therapist directed activities with no more than Min A and without the presence of a behavioral over reaction in 75% of given opportunities. [] New goal         [] Goal in progress   [] Goal met         [] Goal modified  [] Goal targeted  [] Goal not " Pulmonary Consultation   Deidre Sebastian 80 y.o. female MRN: [id number]  8/23/2023      Reason for Consultation:  Cough and shortness of breath    Requested by: Dr. Susie Caldera     Primary care provider: Yarelis Leonard MD    Assessment:  Chronic Cough  Likely cause would be uncontrolled asthma vs bronchiectasis. Will obtain sputum sample and send for culture and treat depending on growth. Will start mucinex to assist with mucous expectoration. Med recc doesn't show any meds that may be contributing to cough. Asthma, uncontrolled  Patient with complaints of SOB, likely uncontrolled asthma. Will D/C Flovent and start on ICS/LABA as that is the current MANJU guidelines. Will obtain PFTs. Continue with rescue inhaler as needed. Continue rescue inhaler as needed and singulair. Bronchiectasis   Patient with bronchiectasis noted on CT chest. Will obtain sputum sample and treat if has growth. Given flutter valve to help with airway clearance. May need vest therapy and hypertonic saline nebs if more conservative measures are ineffective. Discussed importance of airway clearance in managing this disease. Plan:    Diagnoses and all orders for this visit:    Chronic cough  -     guaiFENesin (MUCINEX) 600 mg 12 hr tablet; Take 2 tablets (1,200 mg total) by mouth every 12 (twelve) hours    Subacute cough  -     Ambulatory Referral to Pulmonology    Moderate persistent asthma without complication  -     Complete PFT with post bronchodilator; Future  -     Fluticasone-Salmeterol (Wixela Inhub) 500-50 mcg/dose inhaler; Inhale 1 puff 2 (two) times a day Rinse mouth after use. Bronchiectasis without complication (HCC)  -     Sputum culture and Gram stain; Future  -     guaiFENesin (MUCINEX) 600 mg 12 hr tablet;  Take 2 tablets (1,200 mg total) by mouth every 12 (twelve) hours    Difficulty breathing        History of Present Illness   HPI:  Deidre Sebastian is a 80 y.o. female with PMH of HFpEF, Afib on coumadin, asthma "targeted   Comments:     Wes will safely complete a 3 minute motor activity in a large environment (e.g. open gym) without eloping with moderate (3-4) prompts/redirections in order to promote safety and body awareness necessary for participating in school and community environments.  [] New goal         [] Goal in progress   [] Goal met         [] Goal modified  [] Goal targeted  [] Goal not targeted   Comments:    Wes will participate in a variety of bilateral coordination tasks (e.g. zipper, catching a ball, stabilizing paper while coloring) with moderate assistance in 90% of opportunities to promote increased independence with self-care and play activities.  [] New goal         [] Goal in progress   [] Goal met         [] Goal modified  [] Goal targeted  [] Goal not targeted   Comments:       Wes will print first name without a visual guide and remaining within boundaries on three-lined paper with 75% accuracy on 2/3 trials.  [] New goal         [] Goal in progress   [] Goal met         [] Goal modified  [] Goal targeted  [] Goal not targeted   Comments:      Wes will cut along a variety of straight and gently curved lines within 1/2-1/4\" of boundaries independently or stabilizing paper on 75% of given opportunities.  [] New goal         [] Goal in progress   [] Goal met         [] Goal modified  [] Goal targeted  [] Goal not targeted   Comments:      Wes will be able to copy simple shapes with minimal prompts across 3 consecutive sessions [] New goal         [] Goal in progress   [x] Goal met         [] Goal modified  [] Goal targeted  [] Goal not targeted   Comments:      Long Term Goals  Goal Goal Status   Wes will improve social emotional skills and sensory processing abilities to interact effectively with people and objects in his environment, 75% of given opportunities. [] New goal         [x] Goal in progress   [] Goal met         [] Goal modified  [] Goal targeted  [] Goal not targeted " who presents with productive cough since November. She coughs intermittent throughout the day, can go into coughing fits about 5 mins at a time, coughs at night can wake her up occasionally. She has tried Doxycycline for 1 week in April without relief. She has also noted TANG with one flight stairs when previously last year she was able to go up the stairs without stopping. She has also noted unintentional weight loss over the last few years. She was diagnosed with asthma in 1992 on PFTs, was difficult to control, she was placed on prednisone for awhile to control but now has been off for several years. Has been on flovent for several years. Humidity, strong smells, smoke trigger her asthma. Has been using rescue inhaler once per day every day, without much relief in SOB. Was diagnosed with COVID March 2023 and did symptomatic treatment at home. Denies orthopnea, heartburn symptoms, post-nasal drip. Noted to have high eosinophil count and BNP. She states eosinophils have been high for years. Review of Systems   Constitutional: Positive for appetite change and unexpected weight change. Negative for activity change, chills, diaphoresis, fatigue and fever. HENT: Positive for congestion. Negative for postnasal drip, rhinorrhea, sinus pressure, sneezing, sore throat and trouble swallowing. Eyes: Negative. Respiratory: Positive for cough and shortness of breath. Negative for chest tightness. Cardiovascular: Negative for chest pain, palpitations and leg swelling. Gastrointestinal: Negative for constipation, diarrhea, nausea and vomiting. Endocrine: Negative. Genitourinary: Negative. Musculoskeletal: Negative for back pain, joint swelling and neck pain. Skin: Negative. Allergic/Immunologic: Negative. Neurological: Negative for dizziness, syncope, weakness, light-headedness and headaches. Hematological: Negative. Psychiatric/Behavioral: Negative.     All other systems reviewed and   Comments:  continue    Wes will improve FM skills, visual-perceptual-skills, and bilateral integration for increased participation in developmentally appropriate play skills, 75% of given opportunities.  [] New goal         [x] Goal in progress   [] Goal met         [] Goal modified  [] Goal targeted  [] Goal not targeted   Comments:      Intervention Comments:  Billing Code Interventions Performed   Therapeutic Activity    Therapeutic Exercise    Neuromuscular Re-Education    Manual    Self-Care    Sensory Integration    Cognitive Skills    Group    Other:            Patient and Family Training and Education:  Topics: Exercise/Activity  Methods: Discussion  Response: Demonstrated understanding  Recipient: Mother    ASSESSMENT  Wes Nolan participated in the treatment session well.  Barriers to engagement include: dysregulation, hyperactivity, impulsivity, and poor transitions.  Skilled pediatric occupational therapy intervention continues to be required at the recommended frequency due to deficits in sensory regulation .  During today’s treatment session, Wes Nolan demonstrated progress in the areas of ------.      PLAN  Continue per plan of care. 1-2x/week to improve performance and independence of fine motor, visual skills, ADL's and sensory regulation         are negative.       Historical Information   Past Medical History:   Diagnosis Date   • Abnormal ultrasound     RESOLVED: [id number]   • Asthma with acute exacerbation     LAST ASSESSED: [id number]   • Asymptomatic menopausal state    • Atherosclerosis    • Atrial fibrillation (HCC)    • Chronic obstructive lung disease (HCC)    • Congestive heart failure (CHF) (HCC)    • Congestive heart failure (HCC)     LAST ASSESSED: [id number]   • Cuboid fracture     LAST ASSESSED: [id number]   • Dyspepsia    • GERD (gastroesophageal reflux disease)    • High risk medication use     LAST ASSESSED: [id number]   • Hyperlipidemia    • Hypertension    • Hypertension    • Hypokalemia    • Hypothyroidism    • Impacted cerumen of both ears     LAST ASSESSED: 36MYC6946   • Impacted cerumen of right ear     LAST ASSESSED: 36NNN3830   • Influenza     LAST ASSESSED: 28PAM3232   • IPMN (intraductal papillary mucinous neoplasm)     RESOLVED: [id number]   • Limb swelling     LAST ASSESSED: [id number]   • Navicular fracture of ankle     LAST ASSESSED: 01WUD0426   • Onychomycosis     LAST ASSESSED: 96KAD2597   • Osteoarthritis    • Osteopenia    • Osteoporosis    • Paronychia, finger, unspecified laterality     LAST ASSESSED: 68TAZ6387   • Paroxysmal atrial fibrillation (HCC)     LAST ASSESSED: [id number]   • Peripheral vascular disease (HCC)    • Plantar fasciitis    • Talus fracture     LAST ASSESSED: [id number]   • Vascular headache      Past Surgical History:   Procedure Laterality Date   • APPENDECTOMY     • CATARACT EXTRACTION Bilateral    • CHOLECYSTECTOMY     • NEUROPLASTY / TRANSPOSITION MEDIAN NERVE AT CARPAL TUNNEL BILATERAL Bilateral     DECOMPRESSION   • OOPHORECTOMY Bilateral     age 80   • PELVIC FLOOR REPAIR  2014   • SINUS SURGERY     • TOTAL KNEE ARTHROPLASTY Bilateral      Family History   Problem Relation Age of Onset   • Pancreatic cancer Mother 80   • Heart failure Mother    • Coronary artery disease Family    • Breast cancer Family    • Other Family         BREAST DISORDER, GASTROINTESTINAL CANCER   • Uterine cancer Family    • Hyperlipidemia Family    • Osteoarthritis Family    • Ovarian cancer Family    • Brain cancer Son    • Stroke Father    • No Known Problems Sister    • No Known Problems Daughter    • No Known Problems Maternal Grandmother    • No Known Problems Maternal Grandfather    • No Known Problems Paternal Grandmother    • No Known Problems Paternal Grandfather    • Breast cancer Cousin 48   • Breast cancer Cousin 48   • Ovarian cancer Other 52       Occupational History: retired, RN    Social History:   Alcohol: occasionally  Smokin pack per week for 3 years  Illicit drugs: none   Home heating system: hot air   Pets:none   Hobbies: puzzles, reading  Exposures: none (amio, nitrofurantoin, cyclophos, asbestos, beryllium, glass cutting, mining, sandblasting, farming, woodwork, Birds, down bedding, hot tubs)    Preventative:   Influenza vaccine: UTD  Pneumonia vaccine: UTD    Meds/Allergies     Current Outpatient Medications:   •  albuterol (Proventil HFA) 90 mcg/act inhaler, Inhale 2 puffs every 6 (six) hours as needed for wheezing, Disp: 20.1 g, Rfl: 3  •  Cholecalciferol (VITAMIN D3) 1000 units CAPS, Take 2,000 Units by mouth daily, Disp: , Rfl:   •  fluticasone (FLONASE) 50 mcg/act nasal spray, USE 2 SPRAYS IN EACH NOSTRIL ONCE DAILY, Disp: 48 g, Rfl: 3  •  fluticasone (FLOVENT HFA) 110 MCG/ACT inhaler, Inhale 2 puffs 2 (two) times a day Rinse mouth after use, Disp: 24 g, Rfl: 3  •  levothyroxine 50 mcg tablet, Take 1 tab daily expect 1 and 1/2 on  and Wednesday (Patient taking differently: Take 1 tab daily expect 1 and 1/2 on , Wednesday, and Friday), Disp: 98 tablet, Rfl: 3  •  metoprolol succinate (TOPROL-XL) 100 mg 24 hr tablet, Take 1 tablet (100 mg total) by mouth daily, Disp: 90 tablet, Rfl: 3  •  montelukast (Singulair) 10 mg tablet, Take 1 tablet (10 mg total) by mouth daily, Disp: 90 tablet, Rfl: 3  •  spironolactone (ALDACTONE) 50 mg tablet, Take 1 tablet (50 mg total) by mouth daily, Disp: 90 tablet, Rfl: 3  •  torsemide (DEMADEX) 20 mg tablet, Take 2 tablets (40 mg total) by mouth daily, Disp: 180 tablet, Rfl: 3  •  warfarin (COUMADIN) 5 mg tablet, TAKE 1/2 TO 1 TABLET BY MOUTH OR AS ORDERED BY PHYSICIAN., Disp: 90 tablet, Rfl: 3  •  albuterol (PROVENTIL HFA,VENTOLIN HFA) 90 mcg/act inhaler, Inhale, Disp: , Rfl:   •  potassium chloride (MICRO-K) 10 MEQ CR capsule, Take 1 capsule (10 mEq total) by mouth daily (Patient not taking: Reported on 7/11/2022), Disp: 180 capsule, Rfl: 3  •  predniSONE 10 mg tablet, 1 po bid for 1 week, then 1 po daily for 1 week, then stop (Patient not taking: Reported on 10/7/2022), Disp: 21 tablet, Rfl: 0  Allergies   Allergen Reactions   • Asa [Aspirin]    • Nsaids        Vitals: Blood pressure 118/64, pulse 95, resp. rate 18, height 5' 4" (1.626 m), weight 51.3 kg (113 lb), SpO2 99 %. , Body mass index is 19.4 kg/m². Oxygen Therapy  SpO2: 99 %  Oxygen Therapy: None (Room air)    Physical Exam  Physical Exam  Vitals and nursing note reviewed. Constitutional:       Appearance: Normal appearance. Comments: Thin, frail   HENT:      Head: Normocephalic and atraumatic. Right Ear: External ear normal.      Left Ear: External ear normal.      Nose: Nose normal.      Mouth/Throat:      Mouth: Mucous membranes are moist.      Pharynx: Oropharynx is clear. Eyes:      Conjunctiva/sclera: Conjunctivae normal.   Cardiovascular:      Rate and Rhythm: Normal rate and regular rhythm. Pulses: Normal pulses. Heart sounds: Normal heart sounds. Pulmonary:      Effort: Pulmonary effort is normal.      Breath sounds: Rhonchi present. Abdominal:      General: Abdomen is flat. Bowel sounds are normal.      Palpations: Abdomen is soft. Musculoskeletal:         General: No swelling or tenderness. Cervical back: Neck supple. No muscular tenderness.    Skin:     General: Skin is warm and dry. Capillary Refill: Capillary refill takes less than 2 seconds. Neurological:      Mental Status: She is alert and oriented to person, place, and time. Mental status is at baseline. Psychiatric:         Mood and Affect: Mood normal.         Behavior: Behavior normal.         Thought Content: Thought content normal.         Judgment: Judgment normal.         Labs: I have personally reviewed pertinent lab results. Lab Results   Component Value Date    WBC 8.12 07/03/2023    HGB 14.0 07/03/2023    HCT 43.9 07/03/2023     (H) 07/03/2023     07/03/2023     Lab Results   Component Value Date    CALCIUM 9.6 07/03/2023     10/17/2017    K 4.6 07/03/2023    CO2 27 07/03/2023     07/03/2023    BUN 38 (H) 07/03/2023    CREATININE 1.50 (H) 07/03/2023     No results found for: "IGE"  Lab Results   Component Value Date    ALT 29 07/03/2023    AST 35 07/03/2023    ALKPHOS 54 07/03/2023    BILITOT 0.5 10/17/2017       Imaging and other studies: I have personally reviewed pertinent reports. and I have personally reviewed pertinent films in PACS  CT Chest 7/3/23: bronchiectasis in RML and B/L lower lobes with bronchial wall thickening and mucous in airways  Pulmonary function testing: N/A    EKG, Pathology, and Other Studies: I have personally reviewed pertinent reports. Echo 4/6/23: EF 60% unable to evaluate diastolic function due to Afib, RVSP of Adi Meyers MD  Pulmonary/Critical Care Fellow PGY-V  [facility]      Disclaimer: Portions of the record may have been created with voice recognition software. Occasional wrong word or "sound a like" substitutions may have occurred due to the inherent limitations of voice recognition software. Careful consideration should be taken to recognize, using context, where substitutions have occurred.

## 2024-11-29 ENCOUNTER — HOSPITAL ENCOUNTER (OUTPATIENT)
Facility: HOSPITAL | Age: 89
Setting detail: OUTPATIENT SURGERY
Discharge: HOME | End: 2024-11-29
Attending: PODIATRIST | Admitting: PODIATRIST
Payer: COMMERCIAL

## 2024-11-29 ENCOUNTER — APPOINTMENT (OUTPATIENT)
Dept: RADIOLOGY | Facility: HOSPITAL | Age: 89
End: 2024-11-29
Payer: COMMERCIAL

## 2024-11-29 ENCOUNTER — ANESTHESIA (OUTPATIENT)
Dept: PERIOP | Facility: HOSPITAL | Age: 89
End: 2024-11-29
Payer: COMMERCIAL

## 2024-11-29 ENCOUNTER — HOSPITAL ENCOUNTER (OUTPATIENT)
Dept: RADIOLOGY | Facility: HOSPITAL | Age: 89
Setting detail: OUTPATIENT SURGERY
Discharge: HOME | End: 2024-11-29
Payer: COMMERCIAL

## 2024-11-29 VITALS
OXYGEN SATURATION: 95 % | HEART RATE: 80 BPM | BODY MASS INDEX: 19.72 KG/M2 | RESPIRATION RATE: 18 BRPM | WEIGHT: 114.86 LBS | TEMPERATURE: 97.2 F | SYSTOLIC BLOOD PRESSURE: 98 MMHG | DIASTOLIC BLOOD PRESSURE: 58 MMHG

## 2024-11-29 DIAGNOSIS — M21.6X2 EQUINUS DEFORMITY OF LEFT FOOT: ICD-10-CM

## 2024-11-29 DIAGNOSIS — Z98.890 POST-OPERATIVE STATE: Primary | ICD-10-CM

## 2024-11-29 LAB
APTT PPP: 32 SECONDS (ref 23–34)
INR PPP: 1.1 (ref 0.85–1.19)
PROTHROMBIN TIME: 14.4 SECONDS (ref 12.3–15)

## 2024-11-29 PROCEDURE — 94640 AIRWAY INHALATION TREATMENT: CPT

## 2024-11-29 PROCEDURE — 73630 X-RAY EXAM OF FOOT: CPT

## 2024-11-29 PROCEDURE — 85610 PROTHROMBIN TIME: CPT | Performed by: ANESTHESIOLOGY

## 2024-11-29 PROCEDURE — 85730 THROMBOPLASTIN TIME PARTIAL: CPT | Performed by: ANESTHESIOLOGY

## 2024-11-29 PROCEDURE — 94760 N-INVAS EAR/PLS OXIMETRY 1: CPT

## 2024-11-29 PROCEDURE — NC001 PR NO CHARGE: Performed by: PODIATRIST

## 2024-11-29 PROCEDURE — 99024 POSTOP FOLLOW-UP VISIT: CPT | Performed by: PODIATRIST

## 2024-11-29 PROCEDURE — 94664 DEMO&/EVAL PT USE INHALER: CPT

## 2024-11-29 PROCEDURE — 28309 INCISION OF METATARSALS: CPT | Performed by: PODIATRIST

## 2024-11-29 RX ORDER — ACETAMINOPHEN 325 MG/1
650 TABLET ORAL EVERY 4 HOURS PRN
Status: DISCONTINUED | OUTPATIENT
Start: 2024-11-29 | End: 2024-11-29 | Stop reason: HOSPADM

## 2024-11-29 RX ORDER — MAGNESIUM HYDROXIDE 1200 MG/15ML
LIQUID ORAL AS NEEDED
Status: DISCONTINUED | OUTPATIENT
Start: 2024-11-29 | End: 2024-11-29 | Stop reason: HOSPADM

## 2024-11-29 RX ORDER — CHLORHEXIDINE GLUCONATE ORAL RINSE 1.2 MG/ML
15 SOLUTION DENTAL ONCE
Status: COMPLETED | OUTPATIENT
Start: 2024-11-29 | End: 2024-11-29

## 2024-11-29 RX ORDER — PROPOFOL 10 MG/ML
INJECTION, EMULSION INTRAVENOUS CONTINUOUS PRN
Status: DISCONTINUED | OUTPATIENT
Start: 2024-11-29 | End: 2024-11-29

## 2024-11-29 RX ORDER — SODIUM CHLORIDE 9 MG/ML
125 INJECTION, SOLUTION INTRAVENOUS CONTINUOUS
Status: DISCONTINUED | OUTPATIENT
Start: 2024-11-29 | End: 2024-11-29 | Stop reason: HOSPADM

## 2024-11-29 RX ORDER — CEFAZOLIN SODIUM 2 G/50ML
2000 SOLUTION INTRAVENOUS ONCE
Status: COMPLETED | OUTPATIENT
Start: 2024-11-29 | End: 2024-11-29

## 2024-11-29 RX ORDER — LIDOCAINE HYDROCHLORIDE 20 MG/ML
INJECTION, SOLUTION EPIDURAL; INFILTRATION; INTRACAUDAL; PERINEURAL AS NEEDED
Status: DISCONTINUED | OUTPATIENT
Start: 2024-11-29 | End: 2024-11-29

## 2024-11-29 RX ORDER — ONDANSETRON 2 MG/ML
4 INJECTION INTRAMUSCULAR; INTRAVENOUS ONCE AS NEEDED
Status: DISCONTINUED | OUTPATIENT
Start: 2024-11-29 | End: 2024-11-29 | Stop reason: HOSPADM

## 2024-11-29 RX ORDER — OXYCODONE AND ACETAMINOPHEN 5; 325 MG/1; MG/1
1 TABLET ORAL EVERY 6 HOURS PRN
Qty: 30 TABLET | Refills: 0 | Status: SHIPPED | OUTPATIENT
Start: 2024-11-29

## 2024-11-29 RX ORDER — ALBUTEROL SULFATE 0.83 MG/ML
2.5 SOLUTION RESPIRATORY (INHALATION) ONCE
Status: DISCONTINUED | OUTPATIENT
Start: 2024-11-29 | End: 2024-11-29

## 2024-11-29 RX ORDER — ALBUTEROL SULFATE 0.83 MG/ML
2.5 SOLUTION RESPIRATORY (INHALATION) ONCE
Status: COMPLETED | OUTPATIENT
Start: 2024-11-29 | End: 2024-11-29

## 2024-11-29 RX ORDER — DEXAMETHASONE SODIUM PHOSPHATE 10 MG/ML
INJECTION, SOLUTION INTRAMUSCULAR; INTRAVENOUS AS NEEDED
Status: DISCONTINUED | OUTPATIENT
Start: 2024-11-29 | End: 2024-11-29

## 2024-11-29 RX ORDER — FENTANYL CITRATE 50 UG/ML
INJECTION, SOLUTION INTRAMUSCULAR; INTRAVENOUS AS NEEDED
Status: DISCONTINUED | OUTPATIENT
Start: 2024-11-29 | End: 2024-11-29

## 2024-11-29 RX ORDER — FENTANYL CITRATE/PF 50 MCG/ML
25 SYRINGE (ML) INJECTION
Status: DISCONTINUED | OUTPATIENT
Start: 2024-11-29 | End: 2024-11-29 | Stop reason: HOSPADM

## 2024-11-29 RX ORDER — PROPOFOL 10 MG/ML
INJECTION, EMULSION INTRAVENOUS AS NEEDED
Status: DISCONTINUED | OUTPATIENT
Start: 2024-11-29 | End: 2024-11-29

## 2024-11-29 RX ORDER — ONDANSETRON 2 MG/ML
INJECTION INTRAMUSCULAR; INTRAVENOUS AS NEEDED
Status: DISCONTINUED | OUTPATIENT
Start: 2024-11-29 | End: 2024-11-29

## 2024-11-29 RX ORDER — OXYCODONE AND ACETAMINOPHEN 5; 325 MG/1; MG/1
1 TABLET ORAL EVERY 4 HOURS PRN
Status: DISCONTINUED | OUTPATIENT
Start: 2024-11-29 | End: 2024-11-29 | Stop reason: HOSPADM

## 2024-11-29 RX ADMIN — PROPOFOL 50 MG: 10 INJECTION, EMULSION INTRAVENOUS at 13:04

## 2024-11-29 RX ADMIN — SODIUM CHLORIDE 125 ML/HR: 0.9 INJECTION, SOLUTION INTRAVENOUS at 11:02

## 2024-11-29 RX ADMIN — ALBUTEROL SULFATE 2.5 MG: 2.5 SOLUTION RESPIRATORY (INHALATION) at 12:03

## 2024-11-29 RX ADMIN — PHENYLEPHRINE HYDROCHLORIDE 20 MCG/MIN: 50 INJECTION INTRAVENOUS at 13:14

## 2024-11-29 RX ADMIN — CEFAZOLIN SODIUM 2000 MG: 2 SOLUTION INTRAVENOUS at 13:10

## 2024-11-29 RX ADMIN — PROPOFOL 80 MCG/KG/MIN: 10 INJECTION, EMULSION INTRAVENOUS at 13:04

## 2024-11-29 RX ADMIN — CHLORHEXIDINE GLUCONATE 15 ML: 1.2 RINSE ORAL at 10:55

## 2024-11-29 RX ADMIN — PROPOFOL 100 MG: 10 INJECTION, EMULSION INTRAVENOUS at 13:03

## 2024-11-29 RX ADMIN — FENTANYL CITRATE 50 MCG: 50 INJECTION INTRAMUSCULAR; INTRAVENOUS at 13:06

## 2024-11-29 RX ADMIN — PHENYLEPHRINE HYDROCHLORIDE 100 MCG: 10 INJECTION INTRAVENOUS at 13:13

## 2024-11-29 RX ADMIN — PHENYLEPHRINE HYDROCHLORIDE 100 MCG: 10 INJECTION INTRAVENOUS at 13:15

## 2024-11-29 RX ADMIN — DEXAMETHASONE SODIUM PHOSPHATE 5 MG: 10 INJECTION INTRAMUSCULAR; INTRAVENOUS at 13:03

## 2024-11-29 RX ADMIN — PHENYLEPHRINE HYDROCHLORIDE 100 MCG: 10 INJECTION INTRAVENOUS at 13:27

## 2024-11-29 RX ADMIN — ONDANSETRON 4 MG: 2 INJECTION INTRAMUSCULAR; INTRAVENOUS at 13:46

## 2024-11-29 RX ADMIN — LIDOCAINE HYDROCHLORIDE 50 MG: 20 INJECTION, SOLUTION EPIDURAL; INFILTRATION; INTRACAUDAL at 13:03

## 2024-11-29 NOTE — ANESTHESIA POSTPROCEDURE EVALUATION
Post-Op Assessment Note    CV Status:  Stable    Pain management: adequate       Mental Status:  Alert and awake   Hydration Status:  Euvolemic   PONV Controlled:  Controlled   Airway Patency:  Patent     Post Op Vitals Reviewed: Yes    No anethesia notable event occurred.    Staff: Anesthesiologist           Last Filed PACU Vitals:  Vitals Value Taken Time   Temp 96.9 °F (36.1 °C) 11/29/24 1501   Pulse 85 11/29/24 1501   BP 87/51 11/29/24 1501   Resp 18 11/29/24 1501   SpO2 95 % 11/29/24 1501       Modified Kendell:  Activity: 2 (11/29/2024  2:43 PM)  Respiration: 2 (11/29/2024  2:43 PM)  Circulation: 2 (11/29/2024  2:43 PM)  Consciousness: 2 (11/29/2024  2:43 PM)  Oxygen Saturation: 2 (11/29/2024  2:43 PM)  Modified Kendell Score: 10 (11/29/2024  2:43 PM)

## 2024-11-29 NOTE — ANESTHESIA PREPROCEDURE EVALUATION
Procedure:  Left foot percutaneous osteotomy of second metatarsal. (Left: Foot)  RECESSION GASTROCNEMIUS OPEN (Left: Leg Lower)    Relevant Problems   CARDIO  2D Echo 08/2024    Interpretation Summary       ·  Left Ventricle: Left ventricular cavity size is normal. Wall thickness is moderately increased. There is moderate concentric hypertrophy. The left ventricular ejection fraction is 60%. Systolic function is normal. Wall motion is normal. Unable to assess diastolic function due to atrial fibrillation.  ·  Right Ventricle: Right ventricular cavity size is normal. Systolic function is normal.  ·  Left Atrium: The atrium is moderately dilated.  ·  Right Atrium: The atrium is moderately dilated.  ·  Mitral Valve: There is mild annular calcification. There is mild regurgitation.  ·  Tricuspid Valve: There is moderate regurgitation. The right ventricular systolic pressure is mildly elevated. The estimated right ventricular systolic pressure is 41.00 mmHg.  ·  Pulmonic Valve: There is mild regurgitation.        (+) Atherosclerosis   (+) Bifascicular bundle branch block   (+) Chronic diastolic congestive heart failure (HCC)   (+) Hyperlipidemia   (+) Permanent atrial fibrillation (HCC)   (+) Primary hypertension      ENDO   (+) Hypothyroidism   (+) Secondary hyperparathyroidism (HCC)      GI/HEPATIC   (+) Esophageal reflux   (+) Pancreatic cyst      /RENAL   (+) CKD stage 3b, GFR 30-44 ml/min (HCC)      MUSCULOSKELETAL   (+) Arthritis   (+) Backache      NEURO/PSYCH   (+) CVA (cerebral vascular accident) (HCC)      PULMONARY  Hx of Bronchiectasis   (+) Asthma        Physical Exam    Airway    Mallampati score: II  TM Distance: >3 FB  Neck ROM: limited     Dental   No notable dental hx     Cardiovascular  Rhythm: regular, Rate: normal, Cardiovascular exam normal    Pulmonary   Rhonchi, Wheezes    Other Findings  post-pubertal.      Anesthesia Plan  ASA Score- 3     Anesthesia Type- general with ASA Monitors.          Additional Monitors:     Airway Plan: ETT.    Comment: Patient had bleeding nose yesterday with clots coughed up.       Plan Factors-Exercise tolerance (METS): >4 METS.    Chart reviewed. EKG reviewed.  Existing labs reviewed. Patient summary reviewed.    Patient is not a current smoker.              Induction- intravenous and rapid sequence induction.    Postoperative Plan- Plan for postoperative opioid use.         Informed Consent- Anesthetic plan and risks discussed with patient.

## 2024-11-29 NOTE — ANESTHESIA POSTPROCEDURE EVALUATION
Post-Op Assessment Note    CV Status:  Stable  Pain Score: 0    Pain management: adequate       Mental Status:  Sleepy   Hydration Status:  Stable   PONV Controlled:  None   Airway Patency:  Patent     Post Op Vitals Reviewed: Yes    No anethesia notable event occurred.    Staff: CRNA           Last Filed PACU Vitals:  Vitals Value Taken Time   Temp     Pulse 84 11/29/24 1408   BP 88/49 11/29/24 1403   Resp 14 11/29/24 1408   SpO2 100 % 11/29/24 1408   Vitals shown include unfiled device data.    Modified Kendell:  No data recorded

## 2024-11-29 NOTE — OP NOTE
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 (Left)    Specimen(s):  * No specimens in log *    Estimated Blood Loss:   Minimal    Drains:  None    Anesthesia Type:   Choice with 10 ml of 1% Lidocaine and 0.5% Bupivacaine in a 1:1 mixture    Hemostasis:  Direct compression, electrocautery    Materials:  4-0 Nylon    Injectables:  None    Operative Findings:  - Significant reduction in left plantar forefoot prominent metatarsal heads at conclusion of procedure    Complications:   None    Procedure and Technique:     Under mild sedation, the patient was brought into the operating room and placed on the operating room table in the supine position. IV sedation was achieved by anesthesia team and a universal timeout was performed where all parties are in agreement of correct patient, correct procedure and correct site. A local block was performed consisting of 10 ml of 1% Lidocaine and 0.5% Bupivacaine in a 1:1 mixture. The foot was then prepped and draped in the usual aseptic manner.    Attention was directed to the dorsal left foot.  Utilizing C arm, the surgical neck of the second, third, and fourth metatarsals were visualized and marked.  Incisions were drawn out approximately 1 cm each just lateral to  the fourth metatarsal neck, third metatarsal neck, and second metatarsal neck.  Starting with the fourth metatarsal, a sterile 15 blade was used to make a stab incision. This was further deepened with blunt dissection using hemostat.  The Arthrex elevator tool was then utilized to reflect any soft tissue attachments from the surgical neck.  Then utilizing the Arthrex logan, a transverse osteotomy was performed plantar to dorsal along the fourth metatarsal surgical neck to allow for a floating metatarsal head.  Attention was then directed to the lateral aspect of the third metatarsal.  Another stab incision was made lateral to the third metatarsal head, this was deepened with blunt dissection using hemostat.  Then using the Arthrex elevator tool, soft tissue attachments were reflected from the surgical neck.  Then using the Arthrex [place], a transverse osteotomy was performed plantar to dorsal along the third metatarsal surgical neck to left for a floating metatarsal head. Lastly, attention was then directed to the lateral aspect of the second metatarsal.  Another stab incision was made lateral to the third metatarsal head, this was deepened with blunt dissection using hemostat.  Then using the Arthrex elevator tool, soft tissue attachments were reflected from the surgical neck.  Then using the Arthrex [place], a transverse osteotomy was performed plantar to dorsal along the second metatarsal surgical neck to left for a floating metatarsal head.     The surgical incision was irrigated with copious amounts of normal sterile saline. Skin edges were reapproximated and closure was obtained utilizing 4-0 Nylon. The foot was then cleansed and dried. The incision site was dressed with xeroform, gauze. This was then covered with a ABD, kerlex, and an ACE wrap.     The patient tolerated the procedure and anesthesia well without immediate complications and transferred to PACU with vital signs stable.     Dr. Marcial was present  "during the entire procedure and participated in all key aspects.    SIGNATURE: Alicja Corona DPM  DATE: November 29, 2024  TIME: 2:10 PM      Portions of the record may have been created with voice recognition software. Occasional wrong word or \"sound a like\" substitutions may have occurred due to the inherent limitations of voice recognition software. Read the chart carefully and recognize, using context, where substitutions have occurred.    "

## 2024-11-29 NOTE — DISCHARGE SUMMARY
Discharge Summary Outpatient Procedure Podiatry -   Rebeca PETTIT Brittneylainard 89 y.o. 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      Provisions for Follow-Up Care/Important appointments:  See after visit summary for information related to follow-up care and any pertinent home health orders.      Discharge Medications:  See after visit summary for reconciled discharge medications provided to patient and family.

## 2024-11-29 NOTE — PROGRESS NOTES
Progress Note - Podiatry   Name: Rebeca Banegas 89 y.o. adult I MRN: [id number]  Unit/Bed#: OR POOL I Date of Admission: 11/29/2024   Date of Service: 11/29/2024 I Hospital Day: 0     Assessment & Plan          D/w patient plan for surgery today.  We discussed after a gastroc recession the need for wearing CAM boot. She did not tolerate this well with the EZ TCC.  Given this finding I discussed with her 2,3,4 percutaneous osteotomy to allow for elevation of the 2,3,4 metatarsals as she does have some callus formation to the 4th metatarsal that is short as well as the 2nd metatarsal area that has the wound.  I d/w her risks/benefits of the procedure. We will hold off on the Gastroc achilles procedure due to my concern for her to be in boot and risk falls etc.  With this procedure she will be able to be weight bearing with surgical shoe after the procedure.  I believe this will address the issues that she has been having with less invasive procedure.

## 2024-12-03 ENCOUNTER — ANTICOAG VISIT (OUTPATIENT)
Dept: CARDIOLOGY CLINIC | Facility: CLINIC | Age: 89
End: 2024-12-03

## 2024-12-03 DIAGNOSIS — I48.21 PERMANENT ATRIAL FIBRILLATION (HCC): Primary | Chronic | ICD-10-CM

## 2024-12-03 LAB
INR PPP: 1.4
PROTHROMBIN TIME: 17.3 SEC (ref 12–14.6)

## 2024-12-04 ENCOUNTER — OFFICE VISIT (OUTPATIENT)
Dept: WOUND CARE | Facility: HOSPITAL | Age: 89
End: 2024-12-04
Payer: COMMERCIAL

## 2024-12-04 VITALS
DIASTOLIC BLOOD PRESSURE: 56 MMHG | TEMPERATURE: 97.6 F | SYSTOLIC BLOOD PRESSURE: 119 MMHG | RESPIRATION RATE: 18 BRPM | HEART RATE: 88 BPM

## 2024-12-04 DIAGNOSIS — L97.526 NON-PRESSURE CHRONIC ULCER OF OTHER PART OF LEFT FOOT WITH BONE INVOLVEMENT WITHOUT EVIDENCE OF NECROSIS (HCC): ICD-10-CM

## 2024-12-04 DIAGNOSIS — S81.801D WOUND OF RIGHT LOWER EXTREMITY, SUBSEQUENT ENCOUNTER: Primary | ICD-10-CM

## 2024-12-04 PROCEDURE — 99024 POSTOP FOLLOW-UP VISIT: CPT | Performed by: PODIATRIST

## 2024-12-04 PROCEDURE — 99213 OFFICE O/P EST LOW 20 MIN: CPT | Performed by: PODIATRIST

## 2024-12-04 PROCEDURE — 99212 OFFICE O/P EST SF 10 MIN: CPT | Performed by: PODIATRIST

## 2024-12-04 NOTE — PROGRESS NOTES
Patient ID: Rebeca Banegas is a 89 y.o. adult Date of Birth 7/22/1932     Diagnosis:  1. Wound of right lower extremity, subsequent encounter  -     Wound cleansing and dressings; Future  2. 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 Wound of right lower extremity, subsequent encounter  S81.801D Wound cleansing and dressings      2. 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  Her ulceration does appear to be stable.  She has no significant discomfort on palpation at the level of the ulcerations.    Will plan on seeing her back at the podiatry office next week for continued follow-up and management.    Continue surgical shoe.  She may get the foot wet and wash with soap and water continue dressings dry sterile dressing to the area.      Incision sites are well-healing on the dorsal aspect sutures were left in place today.    Will follow-up with the podiatry office.        If the patient notices any systemic signs of infection including but not limited to fever, chills, nausea, vomiting or significant worsening of wound with increased drainage, redness or streaking up the foot or leg the patient should notify the office or present to the emergency room.      If wound debridement was completed today this was done in an attempt to promote healing, decrease risk of infection and for limb salvage efforts.     Goal of treatment: wound healing     Efforts to decrease pressure on the wound(s), evaluation and discussion of nutritional status, peripheral vascular status, and infection control have all been addressed with this patient.    Return if symptoms worsen or fail to improve.      Chief  Complaint   Patient presents with   • Follow Up Wound Care Visit     RLE skin tear, left foot wounds           Subjective:   11/29/24- 1. Left foot percutaneous osteotomy second metatarsal.  2. Left foot percutaneous osteotomy Third metatarsal  3. Left foot percutaneous osteotomy fourth metatarsal.    Patient doing well at this point.    Stable appearance of the foot.      The following portions of the patient's history were reviewed and updated as appropriate:   Patient Active Problem List   Diagnosis   • Abnormal findings on diagnostic imaging of other specified body structures   • Abnormal findings on diagnostic imaging of urinary organs   • Allergic rhinitis   • Arthritis   • Asthma   • Atherosclerosis   • Permanent atrial fibrillation (HCC)   • Backache   • Benign colon polyp   • Bifascicular bundle branch block   • Chronic diastolic congestive heart failure (HCC)   • Esophageal reflux   • Essential tremor   • Hoarseness   • Hyperlipidemia   • Primary hypertension   • Hypothyroidism   • Leg pain   • Nasal polyp   • Onychomycosis   • Ovarian cyst   • Pancreatic cyst   • Plantar fasciitis   • Prediabetes   • Pulmonary nodule   • Thyroid nodule   • Vitamin D deficiency   • Pain of left hand   • Leg cramps   • Sensorineural hearing loss (SNHL), bilateral   • Left asymmetrical SNHL   • Elevated serum immunoglobulin free light chains   • Osteopenia of hip   • CKD stage 3b, GFR 30-44 ml/min ([facility])   • Labial abscess   • Vulvar cysts   • Open wound of right upper arm   • Secondary hyperparathyroidism ([facility])   • Age-related osteoporosis without current pathological fracture   • Chronic cough   • MGUS (monoclonal gammopathy of unknown significance)   • Bronchiectasis without complication ([facility])   • Vaccine counseling   • Acute on chronic diastolic CHF (congestive heart failure) ([facility])   • Chronic foot ulcer, left, with fat layer exposed ([facility])   • 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 ([facility])   • Preoperative  examination   • CVA (cerebral vascular accident) (HCC)   • Severe protein-calorie malnutrition (HCC)   • Preop exam for internal medicine     Past Medical History:   Diagnosis Date   • Abnormal ultrasound     RESOLVED: 26JUN2015   • Advice given about COVID-19 virus infection 04/07/2020   • Ambulates with cane    • Asthma with acute exacerbation     LAST ASSESSED: [id number]   • Asymptomatic menopausal state    • Atherosclerosis    • Atrial fibrillation (HCC)    • Chronic obstructive lung disease (HCC)     w/ chonic cough   • Chronic ulcer of left foot (HCC)    • Colon polyp    • Congestive heart failure (HCC)     LAST ASSESSED: [id number]   • COVID-19 virus infection 03/25/2023   • Cuboid fracture     LAST ASSESSED: 03EUO7248   • Disease of thyroid gland    • Dyspepsia    • Edema of both lower extremities    • Equinus deformity of left foot    • Fall 04/01/2024   • Falls     5/2024   • GERD (gastroesophageal reflux disease)    • High risk medication use     LAST ASSESSED: 19MAY2014   • Hyperlipidemia    • Hypertension    • Hypokalemia    • Hypothyroidism    • Impacted cerumen of both ears     LAST ASSESSED: 02QGT5677   • Impacted cerumen of right ear     LAST ASSESSED: 39GQY3609   • Influenza     LAST ASSESSED: 83YWX5022   • IPMN (intraductal papillary mucinous neoplasm)     RESOLVED: 02OCT2015   • Irregular heart beat     afib. Warfarin.   • Limb swelling     LAST ASSESSED: 19MAY2014   • Navicular fracture of ankle     LAST ASSESSED: 22MAY2014   • Onychomycosis     LAST ASSESSED: 21JUL2015   • Osteoarthritis    • Osteopenia    • Osteoporosis    • Paronychia, finger, unspecified laterality     LAST ASSESSED: 45UUY7754   • Paroxysmal atrial fibrillation (HCC)     LAST ASSESSED: 02MAR2014   • Peripheral vascular disease (HCC)    • Plantar fasciitis    • SOBOE (shortness of breath on exertion)    • Stroke (HCC)     Right arm weakness that has resolved   • Talus fracture     LAST ASSESSED: 03JUL2014   • Vascular headache     • Walker as ambulation aid    • Wound of right foot      Past Surgical History:   Procedure Laterality Date   • APPENDECTOMY     • BONE BIOPSY Left 2024    Procedure: Bone bx of proximal phalanx second toe & second met with fluoroscopic guidance;  Surgeon: Jonny Marcial DPM;  Location: [facility];  Service: Podiatry   • CATARACT EXTRACTION Bilateral    • CHOLECYSTECTOMY     • COLONOSCOPY     • JOINT REPLACEMENT Bilateral     knee   • NEUROPLASTY / TRANSPOSITION MEDIAN NERVE AT CARPAL TUNNEL BILATERAL Bilateral     DECOMPRESSION   • OOPHORECTOMY Bilateral     age 81   • PELVIC FLOOR REPAIR     • MI BIOPSY BONE TROCAR/NEEDLE SUPERFICIAL Left 2024    Procedure: Left foot debridement;  Surgeon: Jonny Marcial DPM;  Location: [facility];  Service: Podiatry   • MI DEBRIDEMENT MUSCLE &/FASCIA 1ST 20 SQ CM/< Left 2024    Procedure: Left foot debridement;  Surgeon: Jonny Marcial DPM;  Location: [facility];  Service: Podiatry   • MI OSTEOT W/WO LNGTH SHRT/CORRJ 1ST METAR Left 2024    Procedure: Left foot percutaneous osteotomy of second, third and fourth metatarsal.;  Surgeon: Jonny Marcial DPM;  Location: [facility];  Service: Podiatry   • SALPINGECTOMY Bilateral    • SINUS SURGERY     • TONSILLECTOMY     • TOTAL KNEE ARTHROPLASTY Bilateral      Social History     Socioeconomic History   • Marital status:      Spouse name: Not on file   • Number of children: Not on file   • Years of education: Not on file   • Highest education level: Not on file   Occupational History   • Occupation: retired   Tobacco Use   • Smoking status: Former     Current packs/day: 0.00     Average packs/day: 0.3 packs/day for 2.0 years (0.5 ttl pk-yrs)     Types: Cigarettes     Start date:      Quit date:      Years since quittin.9   • Smokeless tobacco: Never   • Tobacco comments:     On and off socially with friends    Vaping Use   • Vaping status: Never Used    Substance and Sexual Activity   • Alcohol use: Not Currently     Comment: SOCIAL   • Drug use: No   • Sexual activity: Not Currently     Partners: Male     Birth control/protection: Post-menopausal   Other Topics Concern   • Not on file   Social History Narrative    DAILY COFFEE CONSUMPTION (2 CUPS/DAY)    EXERCISING REGULARLY     Social Drivers of Health     Financial Resource Strain: Not on file   Food Insecurity: No Food Insecurity (8/28/2024)    Nursing - Inadequate Food Risk Classification    • Worried About Running Out of Food in the Last Year: Never true    • Ran Out of Food in the Last Year: Never true    • Ran Out of Food in the Last Year: Not on file   Transportation Needs: No Transportation Needs (8/28/2024)    PRAPARE - Transportation    • Lack of Transportation (Medical): No    • Lack of Transportation (Non-Medical): No   Physical Activity: Not on file   Stress: Not on file   Social Connections: Not on file   Intimate Partner Violence: Not on file   Housing Stability: Low Risk  (8/28/2024)    Housing Stability Vital Sign    • Unable to Pay for Housing in the Last Year: No    • Number of Times Moved in the Last Year: 0    • Homeless in the Last Year: No        Current Outpatient Medications:   •  albuterol (2.5 mg/3 mL) 0.083 % nebulizer solution, Take 3 mL (2.5 mg total) by nebulization every 8 (eight) hours, Disp: 270 mL, Rfl: 3  •  albuterol (Proventil HFA) 90 mcg/act inhaler, Inhale 2 puffs every 6 (six) hours as needed for wheezing, Disp: 20.1 g, Rfl: 3  •  atorvastatin (LIPITOR) 40 mg tablet, Take 1 tablet (40 mg total) by mouth every evening, Disp: 90 tablet, Rfl: 1  •  Cholecalciferol (VITAMIN D3) 1000 units CAPS, Take 2,000 Units by mouth daily, Disp: , Rfl:   •  enoxaparin (LOVENOX) 60 mg/0.6 mL, Inject 0.6 mL (60 mg total) under the skin every 12 (twelve) hours, Disp: 20 mL, Rfl: 0  •  fluticasone (FLONASE) 50 mcg/act nasal spray, USE 2 SPRAYS IN EACH NOSTRIL ONCE DAILY, Disp: 48 g, Rfl:  3  •  Fluticasone-Salmeterol (Advair) 100-50 mcg/dose inhaler, INHALE 1 PUFF 2 (TWO) TIMES A DAY. RINSE MOUTH AFTER USE., Disp: 180 blister, Rfl: 3  •  Klor-Con M20 20 MEQ tablet, TAKE 1 TABLET BY MOUTH DAILY UNTIL INSTRUCTED TO STOP. (Patient not taking: Reported on 11/21/2024), Disp: , Rfl:   •  levothyroxine 50 mcg tablet, TAKE 1 TABLET DAILY, EXCEPT TAKE 1 AND 1/2 TABLETS ON SUNDAY AND WEDNESDAY, Disp: 100 tablet, Rfl: 1  •  metolazone (ZAROXOLYN) 2.5 mg tablet, Only take one tablet tomorrow morning 11/16/24 before AM torsemide dose (Patient not taking: Reported on 11/21/2024), Disp: 5 tablet, Rfl: 0  •  metoprolol succinate (TOPROL-XL) 100 mg 24 hr tablet, Take 1 tablet (100 mg total) by mouth daily, Disp: 90 tablet, Rfl: 1  •  oxyCODONE-acetaminophen (Percocet) 5-325 mg per tablet, Take 1 tablet by mouth every 6 (six) hours as needed for moderate pain for up to 30 doses Max Daily Amount: 4 tablets, Disp: 30 tablet, Rfl: 0  •  pantoprazole (PROTONIX) 40 mg tablet, Take 1 tablet (40 mg total) by mouth daily in the early morning (Patient not taking: Reported on 11/6/2024), Disp: 30 tablet, Rfl: 0  •  sodium chloride 3 % inhalation solution, Take 4 mL by nebulization 3 (three) times a day, Disp: 360 mL, Rfl: 2  •  spironolactone (ALDACTONE) 50 mg tablet, Take 1 tablet (50 mg total) by mouth daily, Disp: 90 tablet, Rfl: 3  •  torsemide (DEMADEX) 20 mg tablet, Take 2 tablets (40 mg total) by mouth 2 (two) times a day, Disp: 180 tablet, Rfl: 3  •  warfarin (Jantoven) 2.5 mg tablet, Take 1 tablet (2.5 mg total) by mouth daily, Disp: 30 tablet, Rfl: 0  Family History   Problem Relation Age of Onset   • Pancreatic cancer Mother 93   • Heart failure Mother    • Coronary artery disease Family    • Breast cancer Family    • Other Family         BREAST DISORDER, GASTROINTESTINAL CANCER   • Uterine cancer Family    • Hyperlipidemia Family    • Osteoarthritis Family    • Ovarian cancer Family    • Brain cancer Son    • Stroke  Father    • No Known Problems Sister    • No Known Problems Daughter    • No Known Problems Maternal Grandmother    • No Known Problems Maternal Grandfather    • No Known Problems Paternal Grandmother    • No Known Problems Paternal Grandfather    • Breast cancer Cousin 50   • Breast cancer Cousin 50   • Ovarian cancer Other 49      Review of Systems  Allergies:  Asa [aspirin], Nsaids, and Medical tape      Objective:  /56   Pulse 88   Temp 97.6 °F (36.4 °C)   Resp 18   LMP  (LMP Unknown)     Physical Exam      [REMOVED] Wound 06/28/24 Neuropathic Plantar Left;Plantar (Removed)   Wound Image Images linked 12/04/24 1317   Wound Description Epithelialization;Intact 12/04/24 1346   Susu-wound Assessment Intact 12/04/24 1346   Wound Length (cm) 0 cm 12/04/24 1346   Wound Width (cm) 0 cm 12/04/24 1346   Wound Depth (cm) 0 cm 12/04/24 1346   Wound Surface Area (cm^2) 0 cm^2 12/04/24 1346   Wound Volume (cm^3) 0 cm^3 12/04/24 1346   Calculated Wound Volume (cm^3) 0 cm^3 12/04/24 1346   Change in Wound Size % 100 12/04/24 1346   Drainage Amount None 12/04/24 1346   Non-staged Wound Description Not applicable 12/04/24 1346   Dressing Status Intact 12/04/24 1346       [REMOVED] Wound 10/30/24 Skin Tear Leg Right (Removed)   Wound Image Images linked 12/04/24 1313   Wound Description Epithelialization;Intact 12/04/24 1346   Susu-wound Assessment Intact 12/04/24 1346   Wound Length (cm) 0 cm 12/04/24 1346   Wound Width (cm) 0 cm 12/04/24 1346   Wound Depth (cm) 0 cm 12/04/24 1346   Wound Surface Area (cm^2) 0 cm^2 12/04/24 1346   Wound Volume (cm^3) 0 cm^3 12/04/24 1346   Calculated Wound Volume (cm^3) 0 cm^3 12/04/24 1346   Change in Wound Size % 100 12/04/24 1346   Drainage Amount None 12/04/24 1346   Non-staged Wound Description Not applicable 12/04/24 1346   Dressing Status Intact 12/04/24 1346                    Procedures     Results from last 6 Months   Lab Units 09/04/24  1223   WOUND CULTURE  Few Colonies of  "Proteus mirabilis*  Few Colonies of Acinetobacter baumannii*         Wound Instructions:  Orders Placed This Encounter   Procedures   • Wound cleansing and dressings     Left plantar foot wound and right leg wounds are now healed!  May remove dressings and shower.  Change dressing when showering. Use gauze on bottom of left foot and top of left foot to keep areas protected until seen in Dr. Giordano office.   Secure with ace wrap. Rewrap ace wrap daily with dressing changes.     Please follow up with Dr. Marcial at his office in 1 week.    Thank you for coming to our Center!     Standing Status:   Future     Expiration Date:   12/4/2024         Jonny Marcial, CIRO      Portions of the record may have been created with voice recognition software. Occasional wrong word or \"sound a like\" substitutions may have occurred due to the inherent limitations of voice recognition software. Read the chart carefully and recognize, using context, where substitutions have occurred.    "

## 2024-12-04 NOTE — PATIENT INSTRUCTIONS
Orders Placed This Encounter   Procedures    Wound cleansing and dressings     Left plantar foot wound and right leg wounds are now healed!  May remove dressings and shower.  Change dressing when showering. Use gauze on bottom of left foot and top of left foot to keep areas protected until seen in Dr. Giordano office.   Secure with ace wrap. Rewrap ace wrap daily with dressing changes.     Please follow up with Dr. Marcial at his office in 1 week.    Thank you for coming to our Center!     Standing Status:   Future     Expiration Date:   12/4/2024

## 2024-12-06 ENCOUNTER — TELEPHONE (OUTPATIENT)
Age: 89
End: 2024-12-06

## 2024-12-06 ENCOUNTER — ANTICOAG VISIT (OUTPATIENT)
Dept: CARDIOLOGY CLINIC | Facility: CLINIC | Age: 89
End: 2024-12-06

## 2024-12-06 DIAGNOSIS — I48.21 PERMANENT ATRIAL FIBRILLATION (HCC): Primary | Chronic | ICD-10-CM

## 2024-12-10 ENCOUNTER — ANTICOAG VISIT (OUTPATIENT)
Dept: CARDIOLOGY CLINIC | Facility: CLINIC | Age: 89
End: 2024-12-10

## 2024-12-10 DIAGNOSIS — I48.21 PERMANENT ATRIAL FIBRILLATION (HCC): Primary | Chronic | ICD-10-CM

## 2024-12-10 LAB
INR PPP: 1.9
PROTHROMBIN TIME: 21.5 SEC (ref 12–14.6)

## 2024-12-12 ENCOUNTER — OFFICE VISIT (OUTPATIENT)
Dept: PODIATRY | Facility: CLINIC | Age: 89
End: 2024-12-12

## 2024-12-12 ENCOUNTER — RESULTS FOLLOW-UP (OUTPATIENT)
Dept: OTHER | Facility: HOSPITAL | Age: 89
End: 2024-12-12

## 2024-12-12 VITALS
HEIGHT: 64 IN | DIASTOLIC BLOOD PRESSURE: 69 MMHG | BODY MASS INDEX: 19.97 KG/M2 | SYSTOLIC BLOOD PRESSURE: 105 MMHG | HEART RATE: 93 BPM | WEIGHT: 117 LBS

## 2024-12-12 DIAGNOSIS — Z98.890 S/P FOOT SURGERY: ICD-10-CM

## 2024-12-12 DIAGNOSIS — M79.672 LEFT FOOT PAIN: Primary | ICD-10-CM

## 2024-12-12 PROCEDURE — 99024 POSTOP FOLLOW-UP VISIT: CPT | Performed by: PODIATRIST

## 2024-12-12 NOTE — PROGRESS NOTES
Name: Rebeca Banegas      : 1932      MRN: [id number]  Encounter Provider: Jonny Marcial DPM  Encounter Date: 2024   Encounter department: [facility] PODIAT[facility] [place]    Assessment & Plan     1. Left foot pain  -     XR foot 3+ vw left  2. S/P foot surgery  -     XR foot 3+ vw left      X-rays were reviewed which do show stable appearance with osteotomies through the second third and fourth metatarsals.  No other adverse changes on x-ray examination.      The patient is doing well at this point.  Removed sutures today.  We removed surgical dressings today and replaced with new dressings.  Continue the same weightbearing restrictions that were given to you after surgery.  Patient should not place moisturizing lotions around the surgical incision.  Patient may  wash the area with antibacterial soap and water.  I do not recommend soaking of the foot or ankle.    Notify the office immediately of any signs of infection including but not limited to: redness around incision, streaking red line up foot or leg, drainage, increased pain, fever, chills, sweats, nausea, vomiting, shortness of breath and/or chest pain.  If you are concerned for any reason, we have a physician on call 24 hours a day 7 days a week that can answer your questions.  Please call [phone]      Subjective      HPI    24- 1. Left foot percutaneous osteotomy second metatarsal.  2. Left foot percutaneous osteotomy Third metatarsal  3. Left foot percutaneous osteotomy fourth metatarsal.          Review of Systems    Constitutional: Negative for fever.   Respiratory: Negative for shortness of breath.    Cardiovascular: Negative for chest pain.  Gastrointestinal: Negative for nausea and vomiting.     Current Outpatient Medications on File Prior to Visit   Medication Sig   • albuterol (2.5 mg/3 mL) 0.083 % nebulizer solution Take 3 mL (2.5 mg total) by nebulization every 8 (eight) hours   • albuterol (Proventil HFA) 90  "mcg/act inhaler Inhale 2 puffs every 6 (six) hours as needed for wheezing   • atorvastatin (LIPITOR) 40 mg tablet Take 1 tablet (40 mg total) by mouth every evening   • Cholecalciferol (VITAMIN D3) 1000 units CAPS Take 2,000 Units by mouth daily   • fluticasone (FLONASE) 50 mcg/act nasal spray USE 2 SPRAYS IN EACH NOSTRIL ONCE DAILY   • Fluticasone-Salmeterol (Advair) 100-50 mcg/dose inhaler INHALE 1 PUFF 2 (TWO) TIMES A DAY. RINSE MOUTH AFTER USE.   • levothyroxine 50 mcg tablet TAKE 1 TABLET DAILY, EXCEPT TAKE 1 AND 1/2 TABLETS ON SUNDAY AND WEDNESDAY   • metoprolol succinate (TOPROL-XL) 100 mg 24 hr tablet Take 1 tablet (100 mg total) by mouth daily   • oxyCODONE-acetaminophen (Percocet) 5-325 mg per tablet Take 1 tablet by mouth every 6 (six) hours as needed for moderate pain for up to 30 doses Max Daily Amount: 4 tablets   • sodium chloride 3 % inhalation solution Take 4 mL by nebulization 3 (three) times a day   • spironolactone (ALDACTONE) 50 mg tablet Take 1 tablet (50 mg total) by mouth daily   • torsemide (DEMADEX) 20 mg tablet Take 2 tablets (40 mg total) by mouth 2 (two) times a day   • enoxaparin (LOVENOX) 60 mg/0.6 mL Inject 0.6 mL (60 mg total) under the skin every 12 (twelve) hours (Patient not taking: Reported on 12/12/2024)   • Klor-Con M20 20 MEQ tablet TAKE 1 TABLET BY MOUTH DAILY UNTIL INSTRUCTED TO STOP. (Patient not taking: Reported on 11/21/2024)   • metolazone (ZAROXOLYN) 2.5 mg tablet Only take one tablet tomorrow morning 11/16/24 before AM torsemide dose (Patient not taking: Reported on 12/16/2024)   • pantoprazole (PROTONIX) 40 mg tablet Take 1 tablet (40 mg total) by mouth daily in the early morning (Patient not taking: Reported on 11/6/2024)   • warfarin (Jantoven) 2.5 mg tablet Take 1 tablet (2.5 mg total) by mouth daily       Objective     /69   Pulse 93   Ht 5' 4\" (1.626 m)   Wt 53.1 kg (117 lb)   LMP  (LMP Unknown)   BMI 20.08 kg/m²     Physical Exam      Incision site " appears well healing.  No abnormal warmth or redness.  There are no signs of necrosis.  There is some expected swelling.  No calf pain or tenderness on palpation.  Neurological status is at baseline.  Vascular status is at baseline.    No recurrence of callus formation on the plantar surface of the foot.  There is no tenderness on palpation noted to the areas that were previously painful.  There is no significant changes noted at the level of the plantar aspect of the foot.  Overall pain is controlled.

## 2024-12-13 NOTE — PROGRESS NOTES
Pulmonary Follow Up Note   Rebeca Banegas 89 y.o. adult MRN: [id number]  12/16/2024      Assessment:  Chronic Cough  Improved, reeducated on importance of continuing airway clearance. Will add vest therapy     Mild Intermittent Asthma  Continue with Advair 100mcg inhaler BID along with as needed albuterol rescue inhaler. Counseled on rinsing mouth after advair to decrease risk of thrush.      Bronchiectasis   Patient with bronchiectasis noted on CT chest and Pseudomonas growth on previous sputum cultures. Discussed importance of airway clearance in managing this disease. Advised to continue hypertonic saline nebs and albuterol nebs with flutter valve 3 times per day. Will add percussion vest therapy for ongoing sputum production.     Allergic Rhinitis  Continue cetirizine along with nasal rinse and flonase      Plan:    Diagnoses and all orders for this visit:    Bronchiectasis without complication (HCC)  -     Chest Percussion Vest    Mild intermittent asthma without complication    Severe protein-calorie malnutrition (HCC)    Chronic cough        Return in about 3 months (around 3/16/2025) for Next scheduled follow up.    History of Present Illness   HPI:  Rebeca Banegas is a 89 y.o. adult  with PMH of HFpEF, Afib on coumadin, asthma, bronchiectasis who presents for follow up of bronchiectasis and asthma.    She has been semicompliant with airway clearance regimen since resolution of last exacerbation. She was counseled on importance of continuing that to avoid further exacerbations.    Doing well on advair 100mcg. Using rescue inhaler about once per week. No nighttime awakenings. No recent steroid use.  Has been doing well overall. Trying to do hypersal TID but mostly BID. Has been compliant with flutter valve as well, still with cough and phlegm production that is bothersome. Discussed adding vest therapy which she is amenable to.    Review of Systems   Constitutional:  Negative for activity change,  chills, diaphoresis, fatigue and fever.   HENT:  Positive for congestion. Negative for postnasal drip, rhinorrhea, sinus pressure, sneezing, sore throat and trouble swallowing.    Eyes: Negative.    Respiratory:  Positive for cough. Negative for chest tightness and shortness of breath.    Cardiovascular:  Negative for chest pain, palpitations and leg swelling.   Gastrointestinal:  Negative for constipation, diarrhea, nausea and vomiting.   Endocrine: Negative.    Genitourinary: Negative.    Musculoskeletal:  Negative for back pain, joint swelling and neck pain.   Skin: Negative.    Allergic/Immunologic: Negative.    Neurological:  Negative for dizziness, syncope, weakness, light-headedness and headaches.   Hematological: Negative.    Psychiatric/Behavioral: Negative.     All other systems reviewed and are negative.      Historical Information   Past Medical History:   Diagnosis Date    Abnormal ultrasound     RESOLVED: 26JUN2015    Advice given about COVID-19 virus infection 04/07/2020    Ambulates with cane     Asthma with acute exacerbation     LAST ASSESSED: [id number]    Asymptomatic menopausal state     Atherosclerosis     Atrial fibrillation (HCC)     Chronic obstructive lung disease (HCC)     w/ chonic cough    Chronic ulcer of left foot (HCC)     Colon polyp     Congestive heart failure (HCC)     LAST ASSESSED: [id number]    COVID-19 virus infection 03/25/2023    Cuboid fracture     LAST ASSESSED: [id number]    Disease of thyroid gland     Dyspepsia     Edema of both lower extremities     Equinus deformity of left foot     Fall 04/01/2024    Falls     5/2024    GERD (gastroesophageal reflux disease)     High risk medication use     LAST ASSESSED: [id number]    Hyperlipidemia     Hypertension     Hypokalemia     Hypothyroidism     Impacted cerumen of both ears     LAST ASSESSED: [id number]    Impacted cerumen of right ear     LAST ASSESSED: [id number]    Influenza     LAST ASSESSED: [id number]    IPMN (intraductal  papillary mucinous neoplasm)     RESOLVED: 02OCT2015    Irregular heart beat     afib. Warfarin.    Limb swelling     LAST ASSESSED: 19MAY2014    Navicular fracture of ankle     LAST ASSESSED: 22MAY2014    Onychomycosis     LAST ASSESSED: 21JUL2015    Osteoarthritis     Osteopenia     Osteoporosis     Paronychia, finger, unspecified laterality     LAST ASSESSED: 19JUL2013    Paroxysmal atrial fibrillation (HCC)     LAST ASSESSED: 02MAR2014    Peripheral vascular disease (HCC)     Plantar fasciitis     SOBOE (shortness of breath on exertion)     Stroke (HCC)     Right arm weakness that has resolved    Talus fracture     LAST ASSESSED: 03JUL2014    Vascular headache     Walker as ambulation aid     Wound of right foot      Past Surgical History:   Procedure Laterality Date    APPENDECTOMY      BONE BIOPSY Left 08/09/2024    Procedure: Bone bx of proximal phalanx second toe & second met with fluoroscopic guidance;  Surgeon: Jonny Marcial DPM;  Location: [facility];  Service: Podiatry    CATARACT EXTRACTION Bilateral     CHOLECYSTECTOMY      COLONOSCOPY      JOINT REPLACEMENT Bilateral     knee    NEUROPLASTY / TRANSPOSITION MEDIAN NERVE AT CARPAL TUNNEL BILATERAL Bilateral     DECOMPRESSION    OOPHORECTOMY Bilateral     age 81    PELVIC FLOOR REPAIR  2014    AL BIOPSY BONE TROCAR/NEEDLE SUPERFICIAL Left 08/09/2024    Procedure: Left foot debridement;  Surgeon: Jonny Marcial DPM;  Location[facility];  Service: Podiatry    AL DEBRIDEMENT MUSCLE &/FASCIA 1ST 20 SQ CM/< Left 08/09/2024    Procedure: Left foot debridement;  Surgeon: Jonny Marcial DPM;  Location[facility];  Service: Podiatry    AL OSTEOT W/WO LNGTH SHRT/CORRJ 1ST METAR Left 11/29/2024    Procedure: Left foot percutaneous osteotomy of second, third and fourth metatarsal.;  Surgeon: Jonny Marcial DPM;  Location[facility];  Service: Podiatry    SALPINGECTOMY Bilateral     SINUS SURGERY      TONSILLECTOMY      TOTAL KNEE  ARTHROPLASTY Bilateral      Family History   Problem Relation Age of Onset    Pancreatic cancer Mother 93    Heart failure Mother     Coronary artery disease Family     Breast cancer Family     Other Family         BREAST DISORDER, GASTROINTESTINAL CANCER    Uterine cancer Family     Hyperlipidemia Family     Osteoarthritis Family     Ovarian cancer Family     Brain cancer Son     Stroke Father     No Known Problems Sister     No Known Problems Daughter     No Known Problems Maternal Grandmother     No Known Problems Maternal Grandfather     No Known Problems Paternal Grandmother     No Known Problems Paternal Grandfather     Breast cancer Cousin 50    Breast cancer Cousin 50    Ovarian cancer Other 49         Meds/Allergies     Current Outpatient Medications:     albuterol (2.5 mg/3 mL) 0.083 % nebulizer solution, Take 3 mL (2.5 mg total) by nebulization every 8 (eight) hours, Disp: 270 mL, Rfl: 3    albuterol (Proventil HFA) 90 mcg/act inhaler, Inhale 2 puffs every 6 (six) hours as needed for wheezing, Disp: 20.1 g, Rfl: 3    atorvastatin (LIPITOR) 40 mg tablet, Take 1 tablet (40 mg total) by mouth every evening, Disp: 90 tablet, Rfl: 1    Cholecalciferol (VITAMIN D3) 1000 units CAPS, Take 2,000 Units by mouth daily, Disp: , Rfl:     enoxaparin (LOVENOX) 60 mg/0.6 mL, Inject 0.6 mL (60 mg total) under the skin every 12 (twelve) hours (Patient not taking: Reported on 12/12/2024), Disp: 20 mL, Rfl: 0    fluticasone (FLONASE) 50 mcg/act nasal spray, USE 2 SPRAYS IN EACH NOSTRIL ONCE DAILY, Disp: 48 g, Rfl: 3    Fluticasone-Salmeterol (Advair) 100-50 mcg/dose inhaler, INHALE 1 PUFF 2 (TWO) TIMES A DAY. RINSE MOUTH AFTER USE., Disp: 180 blister, Rfl: 3    Klor-Con M20 20 MEQ tablet, TAKE 1 TABLET BY MOUTH DAILY UNTIL INSTRUCTED TO STOP. (Patient not taking: Reported on 11/21/2024), Disp: , Rfl:     levothyroxine 50 mcg tablet, TAKE 1 TABLET DAILY, EXCEPT TAKE 1 AND 1/2 TABLETS ON SUNDAY AND WEDNESDAY, Disp: 100 tablet,  Rfl: 1    metolazone (ZAROXOLYN) 2.5 mg tablet, Only take one tablet tomorrow morning 11/16/24 before AM torsemide dose (Patient not taking: Reported on 11/21/2024), Disp: 5 tablet, Rfl: 0    metoprolol succinate (TOPROL-XL) 100 mg 24 hr tablet, Take 1 tablet (100 mg total) by mouth daily, Disp: 90 tablet, Rfl: 1    oxyCODONE-acetaminophen (Percocet) 5-325 mg per tablet, Take 1 tablet by mouth every 6 (six) hours as needed for moderate pain for up to 30 doses Max Daily Amount: 4 tablets, Disp: 30 tablet, Rfl: 0    pantoprazole (PROTONIX) 40 mg tablet, Take 1 tablet (40 mg total) by mouth daily in the early morning (Patient not taking: Reported on 11/6/2024), Disp: 30 tablet, Rfl: 0    sodium chloride 3 % inhalation solution, Take 4 mL by nebulization 3 (three) times a day, Disp: 360 mL, Rfl: 2    spironolactone (ALDACTONE) 50 mg tablet, Take 1 tablet (50 mg total) by mouth daily, Disp: 90 tablet, Rfl: 3    torsemide (DEMADEX) 20 mg tablet, Take 2 tablets (40 mg total) by mouth 2 (two) times a day, Disp: 180 tablet, Rfl: 3    warfarin (Jantoven) 2.5 mg tablet, Take 1 tablet (2.5 mg total) by mouth daily, Disp: 30 tablet, Rfl: 0  Allergies   Allergen Reactions    Asa [Aspirin] Shortness Of Breath    Nsaids Shortness Of Breath    Medical Tape Other (See Comments)     Skin tears with the removal of tape        Vitals: Blood pressure 118/80, pulse 87, temperature (!) 95.9 °F (35.5 °C), weight 54 kg (119 lb), SpO2 96%. Body mass index is 20.43 kg/m².        Physical Exam  Physical Exam  Vitals and nursing note reviewed.   Constitutional:       Appearance: Normal appearance.      Comments: Thin, frail   HENT:      Head: Normocephalic and atraumatic.      Right Ear: External ear normal.      Left Ear: External ear normal.      Nose: Nose normal.      Mouth/Throat:      Mouth: Mucous membranes are moist.      Pharynx: Oropharynx is clear.   Eyes:      Conjunctiva/sclera: Conjunctivae normal.   Cardiovascular:      Rate and  Rhythm: Normal rate and regular rhythm.      Pulses: Normal pulses.      Heart sounds: Normal heart sounds.   Pulmonary:      Effort: Pulmonary effort is normal.      Breath sounds: Normal breath sounds.   Abdominal:      General: Abdomen is flat. Bowel sounds are normal.      Palpations: Abdomen is soft.   Musculoskeletal:         General: No swelling or tenderness.      Cervical back: Neck supple. No muscular tenderness.   Skin:     General: Skin is warm and dry.      Capillary Refill: Capillary refill takes less than 2 seconds.   Neurological:      Mental Status: She is alert and oriented to person, place, and time. Mental status is at baseline.   Psychiatric:         Mood and Affect: Mood normal.         Behavior: Behavior normal.         Thought Content: Thought content normal.         Judgment: Judgment normal.         Preventative:  Influenza vaccine: 2024  Pneumonia vaccine: 2020 and 2015    Labs: I have personally reviewed pertinent lab results.  Lab Results   Component Value Date    WBC 7.58 11/20/2024    HGB 13.1 11/20/2024    HCT 40.3 11/20/2024     (H) 11/20/2024     11/20/2024     Lab Results   Component Value Date    CALCIUM 9.6 11/20/2024     10/17/2017    K 3.6 11/20/2024    CO2 35 (H) 11/20/2024    CL 88 (L) 11/20/2024    BUN 59 (H) 11/20/2024    CREATININE 1.94 (H) 11/20/2024     Lab Results   Component Value Date    IGE 1,345 (H) 09/23/2024     Lab Results   Component Value Date    ALT 25 09/23/2024    AST 31 09/23/2024    ALKPHOS 51 09/23/2024    BILITOT 0.5 10/17/2017         Imaging and other studies: Results Review Statement: I personally reviewed the following image studies in PACS and associated radiology reports: CT chest and Echocardiogram. My interpretation of the radiology images/reports is:  .  CT CAP 8/28/24: Redemonstrated bilateral chronic bronchiectasis in the right middle lobe and both lower lobes present since 2014. Redemonstrated mucoid debris within the  "ectatic bronchi, which has decreased in the right middle lobe and increased in the left lower lobe. Small amount of aerosolized secretions in the right mainstem bronchus. Similar bibasilar linear atelectasis and/or scarring.Scattered 3 mm and smaller solid pulmonary nodules mostly without significant change since 3/13/2014. For example a 3 mm nodule in the left lower lobe (series 604 image 216), unchanged. A 2 mm solid nodule in the left lower lobe (series 604 image 220) is not seen on prior. Scattered calcified granulomata.  CT Chest 7/3/23: bronchiectasis in RML and B/L lower lobes with bronchial wall thickening and mucous in airways, enlarged pulmonary artery  Pulmonary function testing 9/1/23: moderate obstruction with +BD response with hyperinflation       EKG, Pathology, and Other Studies: Results Review Statement: I personally reviewed the following image studies in PACS and associated radiology reports: Echocardiogram. My interpretation of the radiology images/reports is:    Echo 8/28/24: EF 60% with dilated RA & LA, mod TR with RVSP of 41mmHg.      Paulie Wasserman MD  Pulmonary/Critical Care Fellow PGY-VI  [facility]      Disclaimer: Portions of the record may have been created with voice recognition software. Occasional wrong word or \"sound a like\" substitutions may have occurred due to the inherent limitations of voice recognition software. Careful consideration should be taken to recognize, using context, where substitutions have occurred.    "

## 2024-12-16 ENCOUNTER — OFFICE VISIT (OUTPATIENT)
Dept: PULMONOLOGY | Facility: CLINIC | Age: 89
End: 2024-12-16
Payer: COMMERCIAL

## 2024-12-16 VITALS
HEART RATE: 87 BPM | SYSTOLIC BLOOD PRESSURE: 118 MMHG | BODY MASS INDEX: 20.43 KG/M2 | TEMPERATURE: 95.9 F | OXYGEN SATURATION: 96 % | WEIGHT: 119 LBS | DIASTOLIC BLOOD PRESSURE: 80 MMHG

## 2024-12-16 DIAGNOSIS — R05.3 CHRONIC COUGH: ICD-10-CM

## 2024-12-16 DIAGNOSIS — J47.9 BRONCHIECTASIS WITHOUT COMPLICATION (HCC): Primary | Chronic | ICD-10-CM

## 2024-12-16 DIAGNOSIS — E43 SEVERE PROTEIN-CALORIE MALNUTRITION (HCC): ICD-10-CM

## 2024-12-16 DIAGNOSIS — J45.20 MILD INTERMITTENT ASTHMA WITHOUT COMPLICATION: Chronic | ICD-10-CM

## 2024-12-16 PROCEDURE — G2211 COMPLEX E/M VISIT ADD ON: HCPCS | Performed by: INTERNAL MEDICINE

## 2024-12-16 PROCEDURE — 99214 OFFICE O/P EST MOD 30 MIN: CPT | Performed by: INTERNAL MEDICINE

## 2024-12-16 NOTE — PROGRESS NOTES
Attending Statement:  I have seen and examined the patient. I have discussed the patient's care with the pulmonary fellow.    I agree with the findings, assessment, and plan as outlined in the note by Dr Wasserman with the addition of the following:    Imaging Studies were reviewed personally on the PAC system:   PFTs 2023: Mod obstruction. + BD    Ct Chest 8/2024: Chronic bilateral bronchiectasis since 2014. Redemonstrated mucoid debris within the ectatic bronchi, which has decreased in the right middle lobe and increased in the left lower lobe.      EXAMINATION:   Scattered rhonchi     A/P:  89F hx CHF, CVA, asthma, bronchiectasis, chronic cough, CKD2, MGUS, presenting for follow up of bronchiectasis.     # Chronic Cough  # Bronchiectasis  # Asthma  Hx bronchiectasis w pseudomonas colonization (pansensitive, 2/2024)  Pfts w mod obstruction, + BD response  Pt using mucous clearance therapy, no recent exacerbations (last in March 2024)    - continue mucous clearance w flutter valve and 3% saline  -- increase to TID  -- consider adding vest therapy  - continue Advair, lose dose for asthma  - albuterol prn  - up to date w vaccines    Lorin Contreras MD  [facility]

## 2024-12-17 ENCOUNTER — ANTICOAG VISIT (OUTPATIENT)
Dept: CARDIOLOGY CLINIC | Facility: CLINIC | Age: 89
End: 2024-12-17

## 2024-12-17 DIAGNOSIS — I48.21 PERMANENT ATRIAL FIBRILLATION (HCC): Primary | Chronic | ICD-10-CM

## 2024-12-17 LAB
INR PPP: 2.5
PROTHROMBIN TIME: 26.3 SEC (ref 12–14.6)

## 2024-12-23 ENCOUNTER — NURSE TRIAGE (OUTPATIENT)
Age: 89
End: 2024-12-23

## 2024-12-23 ENCOUNTER — VBI (OUTPATIENT)
Dept: ADMINISTRATIVE | Facility: OTHER | Age: 89
End: 2024-12-23

## 2024-12-23 LAB
DME PARACHUTE DELIVERY DATE ACTUAL: NORMAL
DME PARACHUTE DELIVERY DATE REQUESTED: NORMAL
DME PARACHUTE ITEM DESCRIPTION: NORMAL
DME PARACHUTE ORDER STATUS: NORMAL
DME PARACHUTE SUPPLIER NAME: NORMAL
DME PARACHUTE SUPPLIER PHONE: NORMAL

## 2024-12-23 NOTE — TELEPHONE ENCOUNTER
"Dr. Wasserman    Pt has received vest and she is using.  Pt states felt fine at OV last week., feels worse now.  Pt states not sure what a Bronchiectasis Exacerbation would feel like, but \"has a lot of sinus drainage\", and \"some brown sputum when I can cough it up\". Pt describing S/S to be more related to sinus/nasal congestion/drainage.  "

## 2024-12-23 NOTE — TELEPHONE ENCOUNTER
"Pt stated Pulm Provider: Dr. Wasserman    Actionable item: Homecare - Common Cold    What is the reason for the call/chief complaint? S/S started about a week ago with nasal congested, worsened, now lessened, PND. \"Runny nose\" has decreased in drainage, thick, green. Chronic cough, chronic tan/yellowish sputum. Feels miserable. Denies fever or change in normal SOB d/t bronchiectasis.     Disposition:  Homecare - Advised per Cold Protocol. Pt acknowledged instructions. Patient had no further questions and/or concerns at this time. Please advise further.    Priority: Low    Reason for Disposition   Colds with no complications    Answer Assessment - Initial Assessment Questions  1. ONSET: \"When did the nasal discharge start?\"       About a week  2. AMOUNT: \"How much discharge is there?\"       Moderate amount, thick, green  3. COUGH: \"Do you have a cough?\" If Yes, ask: \"Describe the color of your mucus.\" (e.g., clear, white, yellow, green)      Chronic cough, tan/yellowish  4. RESPIRATORY DISTRESS: \"Describe your breathing.\"       No change  5. FEVER: \"Do you have a fever?\" If Yes, ask: \"What is your temperature, how was it measured, and when did it start?\"      No  6. SEVERITY: \"Overall, how bad are you feeling right now?\" (e.g., doesn't interfere with normal activities, staying home from school/work, staying in bed)       Feel miserable, staying home, tired  7. OTHER SYMPTOMS: \"Do you have any other symptoms?\" (e.g., earache, mouth sores, sore throat, wheezing)      Denies    Protocols used: Common Cold-Adult-[place]    "

## 2024-12-23 NOTE — TELEPHONE ENCOUNTER
12/23/24 9:38 AM     Chart reviewed for  ; nothing is submitted to the patient's insurance at this time.     Darlyn Pearce   [facility]

## 2024-12-24 DIAGNOSIS — J47.9 BRONCHIECTASIS WITHOUT COMPLICATION (HCC): Primary | Chronic | ICD-10-CM

## 2024-12-24 RX ORDER — LEVOFLOXACIN 500 MG/1
500 TABLET, FILM COATED ORAL EVERY 24 HOURS
Qty: 14 TABLET | Refills: 0 | Status: SHIPPED | OUTPATIENT
Start: 2024-12-24 | End: 2024-12-28

## 2024-12-24 NOTE — TELEPHONE ENCOUNTER
Called patient and she feels fatigued, weak, with cough and increased thick phlegm. Will obtain sputum culture and order antibiotics she used last exacerbation. If culture grows something different then will tailor antibiotics accordingly.

## 2024-12-26 ENCOUNTER — APPOINTMENT (OUTPATIENT)
Dept: LAB | Facility: CLINIC | Age: 89
DRG: 177 | End: 2024-12-26
Payer: COMMERCIAL

## 2024-12-26 DIAGNOSIS — J47.9 BRONCHIECTASIS WITHOUT COMPLICATION (HCC): Chronic | ICD-10-CM

## 2024-12-26 LAB
BACTERIA SPT RESP CULT: ABNORMAL
GRAM STN SPEC: ABNORMAL

## 2024-12-26 PROCEDURE — 87205 SMEAR GRAM STAIN: CPT

## 2024-12-27 ENCOUNTER — TELEPHONE (OUTPATIENT)
Age: 89
End: 2024-12-27

## 2024-12-27 NOTE — TELEPHONE ENCOUNTER
Patient is calling to let Dr. Wasserman know that she was the doctor on Monday and she is not getting any relief from the medication she gave her and she is weak tired and he bones ache would Dr. Wasserman please call her back and she is also bringing up a lot of mucus.

## 2024-12-28 ENCOUNTER — HOSPITAL ENCOUNTER (INPATIENT)
Facility: HOSPITAL | Age: 89
LOS: 4 days | Discharge: HOME | DRG: 177 | End: 2025-01-01
Attending: EMERGENCY MEDICINE
Payer: COMMERCIAL

## 2024-12-28 ENCOUNTER — NURSE TRIAGE (OUTPATIENT)
Dept: OTHER | Facility: OTHER | Age: 89
End: 2024-12-28

## 2024-12-28 ENCOUNTER — APPOINTMENT (EMERGENCY)
Dept: RADIOLOGY | Facility: HOSPITAL | Age: 89
DRG: 177 | End: 2024-12-28
Payer: COMMERCIAL

## 2024-12-28 DIAGNOSIS — E87.1 HYPONATREMIA: ICD-10-CM

## 2024-12-28 DIAGNOSIS — N17.9 AKI (ACUTE KIDNEY INJURY) (HCC): ICD-10-CM

## 2024-12-28 DIAGNOSIS — U07.1 COVID-19: Primary | ICD-10-CM

## 2024-12-28 LAB
ANION GAP SERPL CALCULATED.3IONS-SCNC: 12 MMOL/L (ref 4–13)
BASOPHILS # BLD AUTO: 0.03 THOUSANDS/ÂΜL (ref 0–0.1)
BASOPHILS NFR BLD AUTO: 0 % (ref 0–1)
BUN SERPL-MCNC: 69 MG/DL (ref 5–25)
CALCIUM SERPL-MCNC: 8.6 MG/DL (ref 8.4–10.2)
CHLORIDE SERPL-SCNC: 88 MMOL/L (ref 96–108)
CO2 SERPL-SCNC: 28 MMOL/L (ref 21–32)
CREAT SERPL-MCNC: 2.23 MG/DL (ref 0.6–1.3)
D DIMER PPP FEU-MCNC: 0.5 UG/ML FEU
EOSINOPHIL # BLD AUTO: 0.06 THOUSAND/ÂΜL (ref 0–0.61)
EOSINOPHIL NFR BLD AUTO: 1 % (ref 0–6)
ERYTHROCYTE [DISTWIDTH] IN BLOOD BY AUTOMATED COUNT: 13.8 % (ref 11.6–15.1)
FLUAV AG UPPER RESP QL IA.RAPID: NEGATIVE
FLUBV AG UPPER RESP QL IA.RAPID: NEGATIVE
GLUCOSE SERPL-MCNC: 103 MG/DL (ref 65–140)
HCT VFR BLD AUTO: 41.7 % (ref 36.5–46.1)
HGB BLD-MCNC: 14 G/DL (ref 12–15.4)
IMM GRANULOCYTES # BLD AUTO: 0.03 THOUSAND/UL (ref 0–0.2)
IMM GRANULOCYTES NFR BLD AUTO: 0 % (ref 0–2)
LYMPHOCYTES # BLD AUTO: 2.38 THOUSANDS/ÂΜL (ref 0.6–4.47)
LYMPHOCYTES NFR BLD AUTO: 22 % (ref 14–44)
MCH RBC QN AUTO: 32.3 PG (ref 26.8–34.3)
MCHC RBC AUTO-ENTMCNC: 33.6 G/DL (ref 31.4–37.4)
MCV RBC AUTO: 96 FL (ref 82–98)
MONOCYTES # BLD AUTO: 0.7 THOUSAND/ÂΜL (ref 0.17–1.22)
MONOCYTES NFR BLD AUTO: 7 % (ref 4–12)
NEUTROPHILS # BLD AUTO: 7.55 THOUSANDS/ÂΜL (ref 1.85–7.62)
NEUTS SEG NFR BLD AUTO: 70 % (ref 43–75)
NRBC BLD AUTO-RTO: 0 /100 WBCS
PLATELET # BLD AUTO: 282 THOUSANDS/UL (ref 149–390)
PMV BLD AUTO: 8.7 FL (ref 8.9–12.7)
POTASSIUM SERPL-SCNC: 4 MMOL/L (ref 3.5–5.3)
RBC # BLD AUTO: 4.33 MILLION/UL (ref 3.88–5.12)
SARS-COV+SARS-COV-2 AG RESP QL IA.RAPID: POSITIVE
SODIUM SERPL-SCNC: 128 MMOL/L (ref 135–147)
WBC # BLD AUTO: 10.75 THOUSAND/UL (ref 4.31–10.16)

## 2024-12-28 PROCEDURE — 80048 BASIC METABOLIC PNL TOTAL CA: CPT

## 2024-12-28 PROCEDURE — 71046 X-RAY EXAM CHEST 2 VIEWS: CPT

## 2024-12-28 PROCEDURE — 85025 COMPLETE CBC W/AUTO DIFF WBC: CPT

## 2024-12-28 PROCEDURE — 96360 HYDRATION IV INFUSION INIT: CPT

## 2024-12-28 PROCEDURE — 36415 COLL VENOUS BLD VENIPUNCTURE: CPT

## 2024-12-28 PROCEDURE — 87804 INFLUENZA ASSAY W/OPTIC: CPT

## 2024-12-28 PROCEDURE — 87811 SARS-COV-2 COVID19 W/OPTIC: CPT

## 2024-12-28 PROCEDURE — 99285 EMERGENCY DEPT VISIT HI MDM: CPT | Performed by: EMERGENCY MEDICINE

## 2024-12-28 PROCEDURE — 99285 EMERGENCY DEPT VISIT HI MDM: CPT

## 2024-12-28 PROCEDURE — 85379 FIBRIN DEGRADATION QUANT: CPT

## 2024-12-28 RX ORDER — ACETAMINOPHEN 325 MG/1
650 TABLET ORAL EVERY 6 HOURS PRN
Status: DISCONTINUED | OUTPATIENT
Start: 2024-12-28 | End: 2025-01-01 | Stop reason: HOSPADM

## 2024-12-28 RX ORDER — SODIUM CHLORIDE, SODIUM LACTATE, POTASSIUM CHLORIDE, CALCIUM CHLORIDE 600; 310; 30; 20 MG/100ML; MG/100ML; MG/100ML; MG/100ML
75 INJECTION, SOLUTION INTRAVENOUS CONTINUOUS
Status: DISCONTINUED | OUTPATIENT
Start: 2024-12-28 | End: 2024-12-30

## 2024-12-28 RX ORDER — ALBUTEROL SULFATE 0.83 MG/ML
2.5 SOLUTION RESPIRATORY (INHALATION)
Status: CANCELLED | OUTPATIENT
Start: 2024-12-29

## 2024-12-28 RX ORDER — ONDANSETRON 2 MG/ML
4 INJECTION INTRAMUSCULAR; INTRAVENOUS EVERY 6 HOURS PRN
Status: DISCONTINUED | OUTPATIENT
Start: 2024-12-28 | End: 2025-01-01 | Stop reason: HOSPADM

## 2024-12-28 RX ORDER — PREDNISONE 20 MG/1
40 TABLET ORAL ONCE
Status: COMPLETED | OUTPATIENT
Start: 2024-12-28 | End: 2024-12-28

## 2024-12-28 RX ORDER — ACETAMINOPHEN 325 MG/1
650 TABLET ORAL ONCE
Status: COMPLETED | OUTPATIENT
Start: 2024-12-28 | End: 2024-12-28

## 2024-12-28 RX ORDER — POLYETHYLENE GLYCOL 3350 17 G/17G
17 POWDER, FOR SOLUTION ORAL DAILY PRN
Status: DISCONTINUED | OUTPATIENT
Start: 2024-12-28 | End: 2025-01-01 | Stop reason: HOSPADM

## 2024-12-28 RX ADMIN — SODIUM CHLORIDE 1000 ML: 0.9 INJECTION, SOLUTION INTRAVENOUS at 19:41

## 2024-12-28 RX ADMIN — DEXAMETHASONE 6 MG: 4 TABLET ORAL at 23:33

## 2024-12-28 RX ADMIN — ACETAMINOPHEN 650 MG: 325 TABLET, FILM COATED ORAL at 19:07

## 2024-12-28 RX ADMIN — PREDNISONE 40 MG: 20 TABLET ORAL at 19:07

## 2024-12-28 RX ADMIN — SODIUM CHLORIDE, SODIUM LACTATE, POTASSIUM CHLORIDE, AND CALCIUM CHLORIDE 1000 ML: .6; .31; .03; .02 INJECTION, SOLUTION INTRAVENOUS at 23:33

## 2024-12-28 RX ADMIN — SODIUM CHLORIDE, SODIUM LACTATE, POTASSIUM CHLORIDE, AND CALCIUM CHLORIDE 75 ML/HR: .6; .31; .03; .02 INJECTION, SOLUTION INTRAVENOUS at 23:34

## 2024-12-28 NOTE — ED ATTENDING ATTESTATION
12/28/2024  I, Adonay Hurd MD, saw and evaluated the patient. I have discussed the patient with the resident/non-physician practitioner and agree with the resident's/non-physician practitioner's findings, Plan of Care, and MDM as documented in the resident's/non-physician practitioner's note, except where noted. All available labs and Radiology studies were reviewed.  I was present for key portions of any procedure(s) performed by the resident/non-physician practitioner and I was immediately available to provide assistance.       At this point I agree with the current assessment done in the Emergency Department.  I have conducted an independent evaluation of this patient a history and physical is as follows:  H/o asthma  bronchectesis   Seen by pcp and started on levaquin on 12/24     for bronchitis/clinical pneumonia   No fever  no cp mild sob only with walking   Not on home o2      Uses nebulizer and albuterol   Not on chronic steroids  Exam nad     no increased work of breathing   Neck no jvd  lungs dec bs  b/l  heart rrr  Abd soft nt  ext no edema   Imp  asthma /bronchitis    Beta-2 agonist steroids viral swab  Chest x-ray IV fluids  Reevaluation  ED Course         Critical Care Time  Procedures

## 2024-12-28 NOTE — TELEPHONE ENCOUNTER
"Answer Assessment - Initial Assessment Questions  1. DESCRIPTION: \"Describe how you are feeling.\"      Bones are achy, weakness- especially when standing or walking around, chronic cough  2. SEVERITY: \"How bad is it?\"  \"Can you stand and walk?\"      Feels weak when she stands up and can't be up for a long time because of how weak she is  3. ONSET: \"When did these symptoms begin?\" (e.g., hours, days, weeks, months)      Pt has a chronic cough, bronchiectasis   4. CAUSE: \"What do you think is causing the weakness or fatigue?\" (e.g., not drinking enough fluids, medical problem, trouble sleeping)      Started on antibiotic on 12/24. Worried its the abx or that she's getting worse  5. NEW MEDICINES:  \"Have you started on any new medicines recently?\" (e.g., opioid pain medicines, benzodiazepines, muscle relaxants, antidepressants, antihistamines, neuroleptics, beta blockers)      Levaquin on 12/24  6. OTHER SYMPTOMS: \"Do you have any other symptoms?\" (e.g., chest pain, fever, cough, SOB, vomiting, diarrhea, bleeding, other areas of pain)  Cough, body aches, has SOB when up and moving around but pt says its chronic    Last peed this morning    Pt lives alone at [facility]. Daughter is with her right now though.    Protocols used: Weakness (Generalized) and Fatigue-Adult-AH    "

## 2024-12-28 NOTE — TELEPHONE ENCOUNTER
Reason for Disposition  • Patient sounds very sick or weak to the triager    Protocols used: Weakness (Generalized) and Fatigue-Adult-AH

## 2024-12-28 NOTE — ED PROVIDER NOTES
Time reflects when diagnosis was documented in both MDM as applicable and the Disposition within this note       Time User Action Codes Description Comment    12/28/2024  8:37 PM RavalliMelisa gilbert Add [U07.1] COVID-19     12/28/2024  8:37 PM RavalliOly gilberte Add [E87.1] Hyponatremia     12/28/2024  9:00 PM RavalliOly gilberte Add [N17.9] DARIO (acute kidney injury) ([facility])           ED Disposition       ED Disposition   Admit    Condition   Stable    Date/Time   Sat Dec 28, 2024  9:00 PM    Comment   Case was discussed with [facility] and the patient's admission status was agreed to be Admission Status: inpatient status to the service of Dr. Marte .               Assessment & Plan       Medical Decision Making  Patient is a 89-year-old female who presents today for evaluation of cough, shortness of breath and fatigue.  Vitals on arrival blood pressure 112/68, temp 97.6, heart rate 98 satting 97% on room air.  On my examination patient appears fatigued but in no acute distress.  Patient satting 95% on room air during my evaluation.  Physical exam reveals normal rate and rhythm, lungs with decreased breath sounds bilaterally, abdomen is soft nontender nondistended.  No lower extremity edema bilaterally.  Differential diagnosis includes but is not limited to, asthma exacerbation, pneumonia, COVID/flu/RSV, CHF exacerbation.  Plan to obtain CBC to evaluate for leukocytosis and anemia, BMP to evaluate for electrolyte abnormalities and renal function, flu/COVID, will obtain chest x-ray to evaluate for pneumonia and pleural effusion.     Workup in the emergency department revealed hyponatremia to 128, leukocytosis of 10.7, acute kidney with creatinine of 2.2, patient found to be COVID-positive.  Patient received 1 L of IV fluids, Tylenol and prednisone.  Patient admitted to [facility] for hyponatremia, COVID-19, DARIO bronchospasms.    Amount and/or Complexity of Data Reviewed  Labs: ordered. Decision-making details documented in ED  Course.  Radiology: ordered.    Risk  OTC drugs.  Prescription drug management.  Decision regarding hospitalization.        ED Course as of 12/28/24 2359   Sat Dec 28, 2024   1957 WBC(!): 10.75  Leukocytosis   2020 Creatinine(!): 2.23  Above patient's baseline.   2020 Sodium(!): 128  Hyponatremia most likely in setting of dehydration.   2125 Patient admitted to [facility] for hyponatremia, COVID-19 and DARIO.       Medications   lactated ringers infusion (75 mL/hr Intravenous New Bag 12/28/24 2334)   lactated ringers bolus 1,000 mL (1,000 mL Intravenous New Bag 12/28/24 2333)   dexamethasone (DECADRON) tablet 6 mg (6 mg Oral Given 12/28/24 2333)   acetaminophen (TYLENOL) tablet 650 mg (has no administration in time range)   polyethylene glycol (MIRALAX) packet 17 g (has no administration in time range)   ondansetron (ZOFRAN) injection 4 mg (has no administration in time range)   sodium chloride 0.9 % bolus 1,000 mL (0 mL Intravenous Stopped 12/28/24 2230)   acetaminophen (TYLENOL) tablet 650 mg (650 mg Oral Given 12/28/24 1907)   predniSONE tablet 40 mg (40 mg Oral Given 12/28/24 1907)       ED Risk Strat Scores                  Identification of Seniors at Risk      Flowsheet Row Most Recent Value   (ISAR) Identification of Seniors at Risk    Before the illness or injury that brought you to the Emergency, did you need someone to help you on a regular basis? 0 Filed at: 12/28/2024 1617   In the last 24 hours, have you needed more help than usual? 0 Filed at: 12/28/2024 1617   Have you been hospitalized for one or more nights during the past 6 months? 1 Filed at: 12/28/2024 1617   In general, do you see well? 0 Filed at: 12/28/2024 1617   In general, do you have serious problems with your memory? 0 Filed at: 12/28/2024 1617   Do you take more than three different medications every day? 1 Filed at: 12/28/2024 1617   ISAR Score 2 Filed at: 12/28/2024 1617                SBIRT 20yo+      Flowsheet Row Most Recent Value    Initial Alcohol Screen: US AUDIT-C     1. How often do you have a drink containing alcohol? 0 Filed at: 12/28/2024 1828   2. How many drinks containing alcohol do you have on a typical day you are drinking?  0 Filed at: 12/28/2024 1828   3a. Male UNDER 65: How often do you have five or more drinks on one occasion? 0 Filed at: 12/28/2024 1828   3b. FEMALE Any Age, or MALE 65+: How often do you have 4 or more drinks on one occassion? 0 Filed at: 12/28/2024 1828   Audit-C Score 0 Filed at: 12/28/2024 1828   LIMA: How many times in the past year have you...    Used an illegal drug or used a prescription medication for non-medical reasons? Never Filed at: 12/28/2024 1828                            History of Present Illness       Chief Complaint   Patient presents with    Nasal Congestion     Cough, and congestion for over 2 weeks has been on ABX without improvement, feels worse.        Past Medical History:   Diagnosis Date    Abnormal ultrasound     RESOLVED: [id number]    Advice given about COVID-19 virus infection 04/07/2020    Ambulates with cane     Asthma with acute exacerbation     LAST ASSESSED: [id number]    Asymptomatic menopausal state     Atherosclerosis     Atrial fibrillation (HCC)     Chronic obstructive lung disease (HCC)     w/ chonic cough    Chronic ulcer of left foot (HCC)     Colon polyp     Congestive heart failure (HCC)     LAST ASSESSED: [id number]    COVID-19 virus infection 03/25/2023    Cuboid fracture     LAST ASSESSED: [id number]    Disease of thyroid gland     Dyspepsia     Edema of both lower extremities     Equinus deformity of left foot     Fall 04/01/2024    Falls     5/2024    GERD (gastroesophageal reflux disease)     High risk medication use     LAST ASSESSED: [id number]    Hyperlipidemia     Hypertension     Hypokalemia     Hypothyroidism     Impacted cerumen of both ears     LAST ASSESSED: [id number]    Impacted cerumen of right ear     LAST ASSESSED: [id number]    Influenza     LAST  ASSESSED: [id number]    IPMN (intraductal papillary mucinous neoplasm)     RESOLVED: 02OCT2015    Irregular heart beat     afib. Warfarin.    Limb swelling     LAST ASSESSED: 19MAY2014    Navicular fracture of ankle     LAST ASSESSED: 22MAY2014    Onychomycosis     LAST ASSESSED: 21JUL2015    Osteoarthritis     Osteopenia     Osteoporosis     Paronychia, finger, unspecified laterality     LAST ASSESSED: [id number]    Paroxysmal atrial fibrillation (HCC)     LAST ASSESSED: 02MAR2014    Peripheral vascular disease (HCC)     Plantar fasciitis     SOBOE (shortness of breath on exertion)     Stroke ([facility])     Right arm weakness that has resolved    Talus fracture     LAST ASSESSED: 03JUL2014    Vascular headache     Walker as ambulation aid     Wound of right foot       Past Surgical History:   Procedure Laterality Date    APPENDECTOMY      BONE BIOPSY Left 08/09/2024    Procedure: Bone bx of proximal phalanx second toe & second met with fluoroscopic guidance;  Surgeon: Jonny Marcial DPM;  Location: [facility];  Service: Podiatry    CATARACT EXTRACTION Bilateral     CHOLECYSTECTOMY      COLONOSCOPY      JOINT REPLACEMENT Bilateral     knee    NEUROPLASTY / TRANSPOSITION MEDIAN NERVE AT CARPAL TUNNEL BILATERAL Bilateral     DECOMPRESSION    OOPHORECTOMY Bilateral     age 81    PELVIC FLOOR REPAIR  2014    NH BIOPSY BONE TROCAR/NEEDLE SUPERFICIAL Left 08/09/2024    Procedure: Left foot debridement;  Surgeon: Jonny Marcial DPM;  Location: [facility];  Service: Podiatry    NH DEBRIDEMENT MUSCLE &/FASCIA 1ST 20 SQ CM/< Left 08/09/2024    Procedure: Left foot debridement;  Surgeon: Jonny Marcial DPM;  Location: [facility];  Service: Podiatry    NH OSTEOT W/WO LNGTH SHRT/CORRJ 1ST METAR Left 11/29/2024    Procedure: Left foot percutaneous osteotomy of second, third and fourth metatarsal.;  Surgeon: Jonny Marcial DPM;  Location: [facility];  Service: Podiatry    SALPINGECTOMY Bilateral     SINUS  SURGERY      TONSILLECTOMY      TOTAL KNEE ARTHROPLASTY Bilateral       Family History   Problem Relation Age of Onset    Pancreatic cancer Mother 93    Heart failure Mother     Coronary artery disease Family     Breast cancer Family     Other Family         BREAST DISORDER, GASTROINTESTINAL CANCER    Uterine cancer Family     Hyperlipidemia Family     Osteoarthritis Family     Ovarian cancer Family     Brain cancer Son     Stroke Father     No Known Problems Sister     No Known Problems Daughter     No Known Problems Maternal Grandmother     No Known Problems Maternal Grandfather     No Known Problems Paternal Grandmother     No Known Problems Paternal Grandfather     Breast cancer Cousin 50    Breast cancer Cousin 50    Ovarian cancer Other 49      Social History     Tobacco Use    Smoking status: Former     Current packs/day: 0.00     Average packs/day: 0.3 packs/day for 2.0 years (0.5 ttl pk-yrs)     Types: Cigarettes     Start date:      Quit date:      Years since quittin.0    Smokeless tobacco: Never    Tobacco comments:     On and off socially with friends    Vaping Use    Vaping status: Never Used   Substance Use Topics    Alcohol use: Not Currently    Drug use: No      E-Cigarette/Vaping    E-Cigarette Use Never User       E-Cigarette/Vaping Substances    Nicotine No     THC No     CBD No     Flavoring No     Other No     Unknown No       I have reviewed and agree with the history as documented.     Patient is a 89-year-old female with past medical history of asthma, bronchiectasis, CHF, hypertension, GERD and atrial fibrillation who presents today for evaluation of cough, shortness of breath and fatigue.  The patient was seen and evaluated on the  for similar symptoms and was told to do nebulizers at home.  Her symptoms have continued to progress.  She states she has shortness of breath while sitting but it is worse when she walks around.  She was seen on  by her pulmonologist and  was started on Levaquin.  She has been taking the medication as prescribed but states her symptoms have been getting worse.  She denies fevers or chills at home.  She denies chest pain, abdominal pain, nausea or vomiting.  She states the past few days she has been feeling more fatigued and been unable to eat due to loss of appetite.  She states she feels dehydrated.  She denies any recent sick contacts but she does live at [facility] and is around a lot of people.          Review of Systems   Constitutional:  Positive for chills and fatigue. Negative for fever.   HENT:  Positive for congestion and rhinorrhea. Negative for sore throat.    Respiratory:  Positive for cough and shortness of breath.    Cardiovascular:  Negative for chest pain.   Gastrointestinal:  Negative for abdominal pain, diarrhea, nausea and vomiting.   Genitourinary:  Negative for dysuria, hematuria and urgency.   Musculoskeletal:  Negative for back pain.   Neurological:  Negative for dizziness, light-headedness and headaches.           Objective       ED Triage Vitals [12/28/24 1615]   Temperature Pulse Blood Pressure Respirations SpO2 Patient Position - Orthostatic VS   97.6 °F (36.4 °C) 98 112/66 20 97 % --      Temp Source Heart Rate Source BP Location FiO2 (%) Pain Score    Temporal -- -- -- 5      Vitals      Date and Time Temp Pulse SpO2 Resp BP Pain Score FACES Pain Rating User   12/28/24 1615 97.6 °F (36.4 °C) 98 97 % 20 112/66 5 -- HK            Physical Exam  Vitals and nursing note reviewed.   Constitutional:       General: She is not in acute distress.     Appearance: Normal appearance. She is well-developed. She is not ill-appearing.   HENT:      Head: Normocephalic and atraumatic.      Nose: Nose normal.      Mouth/Throat:      Mouth: Mucous membranes are dry.   Eyes:      Conjunctiva/sclera: Conjunctivae normal.   Cardiovascular:      Rate and Rhythm: Normal rate and regular rhythm.      Heart sounds: Normal heart sounds. No  murmur heard.  Pulmonary:      Effort: Pulmonary effort is normal. No respiratory distress.      Comments: Decreased breath sound bilaterally.  Abdominal:      General: Abdomen is flat. There is no distension.      Palpations: Abdomen is soft.      Tenderness: There is no abdominal tenderness.   Musculoskeletal:      Right lower leg: No edema.      Left lower leg: No edema.   Skin:     General: Skin is warm and dry.      Capillary Refill: Capillary refill takes less than 2 seconds.   Neurological:      General: No focal deficit present.      Mental Status: She is alert and oriented to person, place, and time.   Psychiatric:         Mood and Affect: Mood normal.         Behavior: Behavior normal.         Results Reviewed       Procedure Component Value Units Date/Time    D-dimer, quantitative [[id number]]  (Abnormal) Collected: 12/28/24 2333    Lab Status: Final result Specimen: Blood from Arm, Left Updated: 12/28/24 2358     D-Dimer, Quant 0.50 ug/ml FEU     Narrative:      In the evaluation for possible pulmonary embolism, in the appropriate (Well's Score of 4 or less) patient, the age adjusted d-dimer cutoff for this patient can be calculated as:    Age x 0.01 (in ug/mL) for Age-adjusted D-dimer exclusion threshold for a patient over 50 years.    UA w Reflex to Microscopic w Reflex to Culture [[id number]]     Lab Status: No result Specimen: Urine, Clean Catch     FLU/COVID Rapid Antigen (30 min. TAT) - Preferred screening test in ED [[id number]]  (Abnormal) Collected: 12/28/24 1940    Lab Status: Final result Specimen: Nares from Nose Updated: 12/28/24 2032     SARS COV Rapid Antigen Positive     Influenza A Rapid Antigen Negative     Influenza B Rapid Antigen Negative    Narrative:      This test has been performed using the Section 101 Lizz 2 FLU+SARS Antigen test under the Emergency Use Authorization (EUA). This test has been validated by the  and verified by the performing laboratory. The Lizz uses  lateral flow immunofluorescent sandwich assay to detect SARS-COV, Influenza A and Influenza B Antigen.     The Quidel Lizz 2 SARS Antigen test does not differentiate between SARS-CoV and SARS-CoV-2.     Negative results are presumptive and may be confirmed with a molecular assay, if necessary, for patient management. Negative results do not rule out SARS-CoV-2 or influenza infection and should not be used as the sole basis for treatment or patient management decisions. A negative test result may occur if the level of antigen in a sample is below the limit of detection of this test.     Positive results are indicative of the presence of viral antigens, but do not rule out bacterial infection or co-infection with other viruses.     All test results should be used as an adjunct to clinical observations and other information available to the provider.    FOR PEDIATRIC PATIENTS - copy/paste COVID Guidelines URL to browser: [email or web address]    Basic metabolic panel [[id number]]  (Abnormal) Collected: 12/28/24 1940    Lab Status: Final result Specimen: Blood from Arm, Left Updated: 12/28/24 2008     Sodium 128 mmol/L      Potassium 4.0 mmol/L      Chloride 88 mmol/L      CO2 28 mmol/L      ANION GAP 12 mmol/L      BUN 69 mg/dL      Creatinine 2.23 mg/dL      Glucose 103 mg/dL      Calcium 8.6 mg/dL      eGFR --    Narrative:      Notes:     1. eGFR calculation is only valid for adults 18 years and older.  2. EGFR calculation cannot be performed for patients who are transgender, non-binary, or whose legal sex, sex at birth, and gender identity differ.    CBC and differential [[id number]]  (Abnormal) Collected: 12/28/24 1940    Lab Status: Final result Specimen: Blood from Arm, Left Updated: 12/28/24 1948     WBC 10.75 Thousand/uL      RBC 4.33 Million/uL      Hemoglobin 14.0 g/dL      Hematocrit 41.7 %      MCV 96 fL      MCH 32.3 pg      MCHC 33.6 g/dL      RDW 13.8  %      MPV 8.7 fL      Platelets 282 Thousands/uL      nRBC 0 /100 WBCs      Segmented % 70 %      Immature Grans % 0 %      Lymphocytes % 22 %      Monocytes % 7 %      Eosinophils Relative 1 %      Basophils Relative 0 %      Absolute Neutrophils 7.55 Thousands/µL      Absolute Immature Grans 0.03 Thousand/uL      Absolute Lymphocytes 2.38 Thousands/µL      Absolute Monocytes 0.70 Thousand/µL      Eosinophils Absolute 0.06 Thousand/µL      Basophils Absolute 0.03 Thousands/µL             XR chest 2 views    (Results Pending)       Procedures    ED Medication and Procedure Management   Prior to Admission Medications   Prescriptions Last Dose Informant Patient Reported? Taking?   Cholecalciferol (VITAMIN D3) 1000 units CAPS  Self Yes No   Sig: Take 2,000 Units by mouth daily   Fluticasone-Salmeterol (Advair) 100-50 mcg/dose inhaler  Self No No   Sig: INHALE 1 PUFF 2 (TWO) TIMES A DAY. RINSE MOUTH AFTER USE.   albuterol (2.5 mg/3 mL) 0.083 % nebulizer solution  Self No No   Sig: Take 3 mL (2.5 mg total) by nebulization every 8 (eight) hours   albuterol (Proventil HFA) 90 mcg/act inhaler  Self No No   Sig: Inhale 2 puffs every 6 (six) hours as needed for wheezing   atorvastatin (LIPITOR) 40 mg tablet  Self No No   Sig: Take 1 tablet (40 mg total) by mouth every evening   enoxaparin (LOVENOX) 60 mg/0.6 mL  Self No No   Sig: Inject 0.6 mL (60 mg total) under the skin every 12 (twelve) hours   Patient not taking: Reported on 12/12/2024   fluticasone (FLONASE) 50 mcg/act nasal spray  Self No No   Sig: USE 2 SPRAYS IN EACH NOSTRIL ONCE DAILY   levothyroxine 50 mcg tablet  Self No No   Sig: TAKE 1 TABLET DAILY, EXCEPT TAKE 1 AND 1/2 TABLETS ON SUNDAY AND WEDNESDAY   metoprolol succinate (TOPROL-XL) 100 mg 24 hr tablet  Self No No   Sig: Take 1 tablet (100 mg total) by mouth daily   spironolactone (ALDACTONE) 50 mg tablet  Self No No   Sig: Take 1 tablet (50 mg total) by mouth daily   torsemide (DEMADEX) 20 mg tablet   No No    Sig: Take 2 tablets (40 mg total) by mouth 2 (two) times a day   warfarin (Jantoven) 2.5 mg tablet  Self No No   Sig: Take 1 tablet (2.5 mg total) by mouth daily      Facility-Administered Medications: None     Patient's Medications   Discharge Prescriptions    No medications on file     No discharge procedures on file.  ED SEPSIS DOCUMENTATION   Time reflects when diagnosis was documented in both MDM as applicable and the Disposition within this note       Time User Action Codes Description Comment    12/28/2024  8:37 PM Melisa Rivera [U07.1] COVID-19     12/28/2024  8:37 PM Melisa Rivera [E87.1] Hyponatremia     12/28/2024  9:00 PM Melisa Rivera [N17.9] DARIO (acute kidney injury) (HCC)                  Melisa Rivera DO  12/28/24 0534

## 2024-12-28 NOTE — TELEPHONE ENCOUNTER
"Regarding: achy bones / No relief from medication  ----- Message from Jessica XIONG sent at 12/28/2024 12:47 PM EST -----  \" I called in yesterday regarding my concerns of achy bones and feeling weak, I would like some advice\"    "

## 2024-12-29 PROBLEM — N17.9 ACUTE KIDNEY FAILURE (HCC): Status: ACTIVE | Noted: 2024-12-29

## 2024-12-29 PROBLEM — I63.9 CVA (CEREBRAL VASCULAR ACCIDENT) (HCC): Chronic | Status: ACTIVE | Noted: 2024-08-27

## 2024-12-29 PROBLEM — E87.1 HYPONATREMIA: Status: ACTIVE | Noted: 2024-12-29

## 2024-12-29 PROBLEM — J12.82 PNEUMONIA DUE TO COVID-19 VIRUS: Status: ACTIVE | Noted: 2023-03-25

## 2024-12-29 LAB
ANION GAP SERPL CALCULATED.3IONS-SCNC: 10 MMOL/L (ref 4–13)
BASOPHILS # BLD AUTO: 0.01 THOUSANDS/ΜL (ref 0–0.1)
BASOPHILS NFR BLD AUTO: 0 % (ref 0–1)
BILIRUB UR QL STRIP: NEGATIVE
BUN SERPL-MCNC: 66 MG/DL (ref 5–25)
CALCIUM SERPL-MCNC: 8.2 MG/DL (ref 8.4–10.2)
CHLORIDE SERPL-SCNC: 93 MMOL/L (ref 96–108)
CLARITY UR: CLEAR
CO2 SERPL-SCNC: 27 MMOL/L (ref 21–32)
COLOR UR: NORMAL
CREAT SERPL-MCNC: 2.18 MG/DL (ref 0.6–1.3)
EOSINOPHIL # BLD AUTO: 0.01 THOUSAND/ΜL (ref 0–0.61)
EOSINOPHIL NFR BLD AUTO: 0 % (ref 0–6)
ERYTHROCYTE [DISTWIDTH] IN BLOOD BY AUTOMATED COUNT: 13.7 % (ref 11.6–15.1)
GLUCOSE SERPL-MCNC: 125 MG/DL (ref 65–140)
GLUCOSE UR STRIP-MCNC: NEGATIVE MG/DL
HCT VFR BLD AUTO: 39.2 % (ref 36.5–46.1)
HGB BLD-MCNC: 12.7 G/DL (ref 12–15.4)
HGB UR QL STRIP.AUTO: NEGATIVE
IMM GRANULOCYTES # BLD AUTO: 0.07 THOUSAND/UL (ref 0–0.2)
IMM GRANULOCYTES NFR BLD AUTO: 1 % (ref 0–2)
INR PPP: 2.16 (ref 0.85–1.19)
KETONES UR STRIP-MCNC: NEGATIVE MG/DL
LEUKOCYTE ESTERASE UR QL STRIP: NEGATIVE
LYMPHOCYTES # BLD AUTO: 1.21 THOUSANDS/ΜL (ref 0.6–4.47)
LYMPHOCYTES NFR BLD AUTO: 12 % (ref 14–44)
MAGNESIUM SERPL-MCNC: 2.3 MG/DL (ref 1.9–2.7)
MCH RBC QN AUTO: 31.4 PG (ref 26.8–34.3)
MCHC RBC AUTO-ENTMCNC: 32.4 G/DL (ref 31.4–37.4)
MCV RBC AUTO: 97 FL (ref 82–98)
MONOCYTES # BLD AUTO: 0.1 THOUSAND/ΜL (ref 0.17–1.22)
MONOCYTES NFR BLD AUTO: 1 % (ref 4–12)
NEUTROPHILS # BLD AUTO: 8.48 THOUSANDS/ΜL (ref 1.85–7.62)
NEUTS SEG NFR BLD AUTO: 86 % (ref 43–75)
NITRITE UR QL STRIP: NEGATIVE
NRBC BLD AUTO-RTO: 0 /100 WBCS
PH UR STRIP.AUTO: 5 [PH]
PHOSPHATE SERPL-MCNC: 4.4 MG/DL (ref 2.3–4.1)
PLATELET # BLD AUTO: 263 THOUSANDS/UL (ref 149–390)
PMV BLD AUTO: 8.9 FL (ref 8.9–12.7)
POTASSIUM SERPL-SCNC: 4.6 MMOL/L (ref 3.5–5.3)
PROT UR STRIP-MCNC: NEGATIVE MG/DL
PROTHROMBIN TIME: 24.1 SECONDS (ref 12.3–15)
RBC # BLD AUTO: 4.04 MILLION/UL (ref 3.88–5.12)
SODIUM SERPL-SCNC: 130 MMOL/L (ref 135–147)
SP GR UR STRIP.AUTO: 1.01 (ref 1–1.03)
UROBILINOGEN UR STRIP-ACNC: <2 MG/DL
WBC # BLD AUTO: 9.88 THOUSAND/UL (ref 4.31–10.16)

## 2024-12-29 PROCEDURE — 99232 SBSQ HOSP IP/OBS MODERATE 35: CPT | Performed by: FAMILY MEDICINE

## 2024-12-29 PROCEDURE — 81003 URINALYSIS AUTO W/O SCOPE: CPT

## 2024-12-29 PROCEDURE — 85025 COMPLETE CBC W/AUTO DIFF WBC: CPT

## 2024-12-29 PROCEDURE — 80048 BASIC METABOLIC PNL TOTAL CA: CPT

## 2024-12-29 PROCEDURE — 83735 ASSAY OF MAGNESIUM: CPT

## 2024-12-29 PROCEDURE — 85610 PROTHROMBIN TIME: CPT

## 2024-12-29 PROCEDURE — 36415 COLL VENOUS BLD VENIPUNCTURE: CPT

## 2024-12-29 PROCEDURE — 84100 ASSAY OF PHOSPHORUS: CPT

## 2024-12-29 RX ORDER — ZINC SULFATE 50(220)MG
220 CAPSULE ORAL DAILY
Status: DISCONTINUED | OUTPATIENT
Start: 2024-12-29 | End: 2025-01-01 | Stop reason: HOSPADM

## 2024-12-29 RX ORDER — TORSEMIDE 20 MG/1
40 TABLET ORAL 2 TIMES DAILY
Status: DISCONTINUED | OUTPATIENT
Start: 2024-12-29 | End: 2024-12-29

## 2024-12-29 RX ORDER — ASCORBIC ACID 500 MG
500 TABLET ORAL DAILY
Status: DISCONTINUED | OUTPATIENT
Start: 2024-12-29 | End: 2025-01-01 | Stop reason: HOSPADM

## 2024-12-29 RX ORDER — ATORVASTATIN CALCIUM 40 MG/1
40 TABLET, FILM COATED ORAL EVERY EVENING
Status: DISCONTINUED | OUTPATIENT
Start: 2024-12-29 | End: 2025-01-01 | Stop reason: HOSPADM

## 2024-12-29 RX ORDER — LEVOTHYROXINE SODIUM 50 UG/1
50 TABLET ORAL
Status: DISCONTINUED | OUTPATIENT
Start: 2024-12-30 | End: 2025-01-01 | Stop reason: HOSPADM

## 2024-12-29 RX ORDER — LEVOTHYROXINE SODIUM 25 UG/1
25 TABLET ORAL
Status: DISCONTINUED | OUTPATIENT
Start: 2024-12-29 | End: 2024-12-29

## 2024-12-29 RX ORDER — WARFARIN SODIUM 2.5 MG/1
2.5 TABLET ORAL
Status: DISCONTINUED | OUTPATIENT
Start: 2024-12-29 | End: 2025-01-01 | Stop reason: HOSPADM

## 2024-12-29 RX ORDER — ALBUTEROL SULFATE 90 UG/1
2 INHALANT RESPIRATORY (INHALATION) EVERY 6 HOURS PRN
Status: DISCONTINUED | OUTPATIENT
Start: 2024-12-29 | End: 2025-01-01 | Stop reason: HOSPADM

## 2024-12-29 RX ORDER — FLUTICASONE FUROATE AND VILANTEROL 100; 25 UG/1; UG/1
1 POWDER RESPIRATORY (INHALATION)
Status: DISCONTINUED | OUTPATIENT
Start: 2024-12-30 | End: 2025-01-01 | Stop reason: HOSPADM

## 2024-12-29 RX ORDER — METOPROLOL SUCCINATE 100 MG/1
100 TABLET, EXTENDED RELEASE ORAL DAILY
Status: DISCONTINUED | OUTPATIENT
Start: 2024-12-30 | End: 2025-01-01 | Stop reason: HOSPADM

## 2024-12-29 RX ORDER — SPIRONOLACTONE 50 MG/1
50 TABLET, FILM COATED ORAL DAILY
Status: DISCONTINUED | OUTPATIENT
Start: 2024-12-30 | End: 2024-12-29

## 2024-12-29 RX ORDER — LEVOTHYROXINE SODIUM 25 UG/1
25 TABLET ORAL
Status: DISCONTINUED | OUTPATIENT
Start: 2025-01-01 | End: 2024-12-29

## 2024-12-29 RX ADMIN — WARFARIN SODIUM 2.5 MG: 2.5 TABLET ORAL at 17:51

## 2024-12-29 RX ADMIN — DEXAMETHASONE 6 MG: 4 TABLET ORAL at 13:27

## 2024-12-29 RX ADMIN — LEVOTHYROXINE SODIUM 25 MCG: 25 TABLET ORAL at 13:28

## 2024-12-29 RX ADMIN — IPRATROPIUM BROMIDE 2 PUFF: 17 AEROSOL, METERED RESPIRATORY (INHALATION) at 21:47

## 2024-12-29 RX ADMIN — ATORVASTATIN CALCIUM 40 MG: 40 TABLET, FILM COATED ORAL at 17:52

## 2024-12-29 RX ADMIN — IPRATROPIUM BROMIDE 2 PUFF: 17 AEROSOL, METERED RESPIRATORY (INHALATION) at 17:52

## 2024-12-29 RX ADMIN — ZINC SULFATE 220 MG (50 MG) CAPSULE 220 MG: CAPSULE at 13:28

## 2024-12-29 RX ADMIN — OXYCODONE HYDROCHLORIDE AND ACETAMINOPHEN 500 MG: 500 TABLET ORAL at 13:28

## 2024-12-29 NOTE — ASSESSMENT & PLAN NOTE
The patient presented with DARIO on CKD (SCr 2.23 (baseline 1.4)).  BUN to creatinine ratio is 31 supportive of her clinical suspicion of prerenal DARIO on CKD due to poor p.o. intake due to 10 days of viral illness.  She was given 1 L bolus IVF in the ED.  -Ordered 1 additional liter bolus IVF  -Will trend renal function which we expect to improve  -If her renal function does not improve in the next 24 to 48 hours, we will consult nephrology  Creatinine 2.18.    Also noted to have hyponatremia is improving with fluids

## 2024-12-29 NOTE — RESPIRATORY THERAPY NOTE
RT Protocol Note  Rebeca Banegas 89 y.o. adult MRN: [id number]  Unit/Bed#: ED 17 Encounter: [id number]    Assessment    Principal Problem:    Pneumonia due to COVID-19 virus  Active Problems:    Arthritis    Asthma    Permanent atrial fibrillation (HCC)    Esophageal reflux    Hyperlipidemia    Primary hypertension    Hypothyroidism    Vitamin D deficiency    CKD stage 3b, GFR 30-44 ml/min (HCC)    Hyponatremia    Acute kidney failure (HCC)      Home Pulmonary Medications:  Albuterol advair       Past Medical History:   Diagnosis Date    Abnormal ultrasound     RESOLVED: [id number]    Advice given about COVID-19 virus infection 04/07/2020    Ambulates with cane     Asthma with acute exacerbation     LAST ASSESSED: [id number]    Asymptomatic menopausal state     Atherosclerosis     Atrial fibrillation (HCC)     Chronic obstructive lung disease (HCC)     w/ chonic cough    Chronic ulcer of left foot (HCC)     Colon polyp     Congestive heart failure (HCC)     LAST ASSESSED: [id number]    COVID-19 virus infection 03/25/2023    Cuboid fracture     LAST ASSESSED: 62EBQ3637    Disease of thyroid gland     Dyspepsia     Edema of both lower extremities     Equinus deformity of left foot     Fall 04/01/2024    Falls     5/2024    GERD (gastroesophageal reflux disease)     High risk medication use     LAST ASSESSED: [id number]    Hyperlipidemia     Hypertension     Hypokalemia     Hypothyroidism     Impacted cerumen of both ears     LAST ASSESSED: 51QUN9712    Impacted cerumen of right ear     LAST ASSESSED: 33YGY3349    Influenza     LAST ASSESSED: 16HWL7962    IPMN (intraductal papillary mucinous neoplasm)     RESOLVED: 02OCT2015    Irregular heart beat     afib. Warfarin.    Limb swelling     LAST ASSESSED: [id number]    Navicular fracture of ankle     LAST ASSESSED: [id number]    Onychomycosis     LAST ASSESSED: [id number]    Osteoarthritis     Osteopenia     Osteoporosis     Paronychia, finger, unspecified laterality      LAST ASSESSED: 2013    Paroxysmal atrial fibrillation (HCC)     LAST ASSESSED: 2014    Peripheral vascular disease (HCC)     Plantar fasciitis     SOBOE (shortness of breath on exertion)     Stroke (HCC)     Right arm weakness that has resolved    Talus fracture     LAST ASSESSED: 2014    Vascular headache     Walker as ambulation aid     Wound of right foot      Social History     Socioeconomic History    Marital status:      Spouse name: None    Number of children: None    Years of education: None    Highest education level: None   Occupational History    Occupation: retired   Tobacco Use    Smoking status: Former     Current packs/day: 0.00     Average packs/day: 0.3 packs/day for 2.0 years (0.5 ttl pk-yrs)     Types: Cigarettes     Start date:      Quit date:      Years since quittin.0    Smokeless tobacco: Never    Tobacco comments:     On and off socially with friends    Vaping Use    Vaping status: Never Used   Substance and Sexual Activity    Alcohol use: Not Currently    Drug use: No    Sexual activity: Not Currently     Partners: Male     Birth control/protection: Post-menopausal   Other Topics Concern    None   Social History Narrative    DAILY COFFEE CONSUMPTION (2 CUPS/DAY)    EXERCISING REGULARLY     Social Drivers of Health     Financial Resource Strain: Not on file   Food Insecurity: No Food Insecurity (2024)    Nursing - Inadequate Food Risk Classification     Worried About Running Out of Food in the Last Year: Never true     Ran Out of Food in the Last Year: Never true     Ran Out of Food in the Last Year: Not on file   Transportation Needs: No Transportation Needs (2024)    PRAPARE - Transportation     Lack of Transportation (Medical): No     Lack of Transportation (Non-Medical): No   Physical Activity: Not on file   Stress: Not on file   Social Connections: Not on file   Intimate Partner Violence: Not on file   Housing Stability: Low Risk  (2024)     Housing Stability Vital Sign     Unable to Pay for Housing in the Last Year: No     Number of Times Moved in the Last Year: 0     Homeless in the Last Year: No       Subjective         Objective    Physical Exam:   Assessment Type: (P) Assess only  General Appearance: (P) Awake  Respiratory Pattern: (P) Normal  Chest Assessment: (P) Chest expansion symmetrical    Vitals:  Blood pressure 109/57, pulse 88, temperature 97.6 °F (36.4 °C), temperature source Temporal, resp. rate 20, SpO2 98%.          Imaging and other studies: Results Review Statement: I reviewed radiology reports from this admission including: chest xray.          Plan    Respiratory Plan: (P) Home Bronchodilator Patient pathway        Resp Comments: (P) pt orderd atrovent udn qid, pt is covid positive and does not take atrovent at home. per dr note atrovent orderd for afib, will d/c atrovent udns and order atrovent mdi qid as per Covid protocol pt does have albuterol mdi prn as she takes at home. no other respriatory intervention is needed at [facility] [place] nikole will continue with respriatory protocool per x 24 hours per protocol

## 2024-12-29 NOTE — ASSESSMENT & PLAN NOTE
At admission her sodium was 128 (baseline 135).  We suspect this is due to decreased oral intake due to her 10 days of illness.  She is s/p 2L IVF.  We will monitor her sodium and encourage oral intake.

## 2024-12-29 NOTE — ASSESSMENT & PLAN NOTE
Patient is roughly 10 days from the onset of her respiratory symptoms which are persistent.  Patient was found COVID-positive on nasal swab first tested on 12/28.  She is not requiring oxygen.  She has DARIO on CKD (SCr 2.23 (baseline 1.4)) which limits utilization of remdesivir.  She received prednisone 40 in the ED.  -Ordered dexamethasone 6 mg daily  -Cannot use remdesivir due to DARIO on CKD-patient has been symptomatic for  about 10 days  -Ordered vitamin C and zinc p.o. daily  -Ordered 1L LR bolus  -O2 by NC as needed  -Ipratropium only nebs due to A-fib, fluticasone-vilanterol inhaler daily, and as needed albuterol inhaler  -Airborne precautions  -Supportive care  -Symptomatically improving

## 2024-12-29 NOTE — ASSESSMENT & PLAN NOTE
Lab Results   Component Value Date    EGFR 29 07/26/2024    EGFR 23 06/13/2024    EGFR 18 05/30/2024    CREATININE 2.23 (H) 12/28/2024    CREATININE 1.94 (H) 11/20/2024    CREATININE 1.52 (H) 10/21/2024     See above

## 2024-12-29 NOTE — ASSESSMENT & PLAN NOTE
At admission her sodium was 128 (baseline 135).  We suspect this is due to decreased oral intake due to her 10 days of illness.  She is s/p 2L IVF.  We will monitor her sodium and encourage oral intake.  Sodium 130.  Trend for now

## 2024-12-29 NOTE — H&P
H&P - Hospitalist   Name: Rebeca Banegas 89 y.o. adult I MRN: [id number]  Unit/Bed#: ED 17 I Date of Admission: 12/28/2024   Date of Service: 12/29/2024 I Hospital Day: 1     Assessment & Plan  Asthma  See COVID  Permanent atrial fibrillation (HCC)  Restarted home warfarin 2.5 daily and ordered INR with morning labs.  Hyperlipidemia  Home atorvastatin 40  Hypothyroidism  Home levothyroxine 50 daily but 75 on Sunday and Wednesday  Vitamin D deficiency  On vitamin D  Primary hypertension  Home metoprolol 100, spironolactone 50, and torsemide 40  CKD stage 3b, GFR 30-44 ml/min ([facility])  Lab Results   Component Value Date    EGFR 29 07/26/2024    EGFR 23 06/13/2024    EGFR 18 05/30/2024    CREATININE 2.23 (H) 12/28/2024    CREATININE 1.94 (H) 11/20/2024    CREATININE 1.52 (H) 10/21/2024     See above  Pneumonia due to COVID-19 virus  Patient is roughly 10 days from the onset of her respiratory symptoms which are persistent.  Patient was found COVID-positive on nasal swab first tested on 12/28.  She is not requiring oxygen.  She has DARIO on CKD (SCr 2.23 (baseline 1.4)) which limits utilization of remdesivir.  She received prednisone 40 in the ED.  -Ordered dexamethasone 6 mg daily  -Cannot use remdesivir due to DARIO on CKD  -Ordered vitamin C and zinc p.o. daily  -Ordered 1L LR bolus  -O2 by NC as needed  -Ipratropium only nebs due to A-fib, fluticasone-vilanterol inhaler daily, and as needed albuterol inhaler  -Airborne precautions  -Supportive care  -She is stable and we expect improvement with the above plan.  If she does not improve, or her condition worsens, we will consult pulmonology.  Hyponatremia  At admission her sodium was 128 (baseline 135).  We suspect this is due to decreased oral intake due to her 10 days of illness.  She is s/p 2L IVF.  We will monitor her sodium and encourage oral intake.  Acute kidney failure (HCC)  The patient presented with DARIO on CKD (SCr 2.23 (baseline 1.4)).  BUN to creatinine ratio  is 31 supportive of her clinical suspicion of prerenal DARIO on CKD due to poor p.o. intake due to 10 days of viral illness.  She was given 1 L bolus IVF in the ED.  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  She went to her PCP 4 days ago and was prescribed Levaquin which did not improve her symptoms.  In the ED vitals were significant for adequate SpO2 on RA, RR 20, afebrile, HR borderline tachycardic at 98, and /66.  Labs showed mild leukocytosis of 10.8 (Neut 70, lymph 22), hyponatremia at 128 (baseline 135), DARIO on CKD with SCR 2.23 (baseline 1.4).  Nasal swab was positive for COVID and negative for flu.  Sputum culture is polymicrobial with ID and susceptibilities pending.  CXR was performed with official radiology read pending, but upon my evaluation her lungs are without clear evidence of consolidation and show increase lung volume with flattened diaphragm, and mildly increased interstitial markings.  She was given 1 L IV fluids, prednisone 40, and Tylenol in the ED.    Review of Systems   Constitutional:  Positive for appetite change and fatigue. Negative for chills and fever.   HENT:  Positive for congestion. Negative for ear pain and sore throat.    Eyes:  Negative for pain and visual disturbance.   Respiratory:  Positive for cough, shortness of breath and wheezing.    Cardiovascular:  Negative for chest pain and palpitations.   Gastrointestinal:  Negative for abdominal pain and vomiting.   Genitourinary:  Negative for dysuria and hematuria.    Musculoskeletal:  Positive for arthralgias and back pain.   Skin:  Negative for color change and rash.   Neurological:  Negative for seizures and syncope.   All other systems reviewed and are negative.    Historical Information   I have reviewed the patient's PMH, PSH, Social History, Family History, Meds, and Allergies    Historical Information   Past Medical History:   Diagnosis Date    Abnormal ultrasound     RESOLVED: 26JUN2015    Advice given about COVID-19 virus infection 04/07/2020    Ambulates with cane     Asthma with acute exacerbation     LAST ASSESSED: [id number]    Asymptomatic menopausal state     Atherosclerosis     Atrial fibrillation (HCC)     Chronic obstructive lung disease (HCC)     w/ chonic cough    Chronic ulcer of left foot (HCC)     Colon polyp     Congestive heart failure (HCC)     LAST ASSESSED: [id number]    COVID-19 virus infection 03/25/2023    Cuboid fracture     LAST ASSESSED: [id number]    Disease of thyroid gland     Dyspepsia     Edema of both lower extremities     Equinus deformity of left foot     Fall 04/01/2024    Falls     5/2024    GERD (gastroesophageal reflux disease)     High risk medication use     LAST ASSESSED: 17QZH6343    Hyperlipidemia     Hypertension     Hypokalemia     Hypothyroidism     Impacted cerumen of both ears     LAST ASSESSED: [id number]    Impacted cerumen of right ear     LAST ASSESSED: 98ETY8495    Influenza     LAST ASSESSED: 41UCA7107    IPMN (intraductal papillary mucinous neoplasm)     RESOLVED: 02OCT2015    Irregular heart beat     afib. Warfarin.    Limb swelling     LAST ASSESSED: 11NAQ2533    Navicular fracture of ankle     LAST ASSESSED: 11SUU9388    Onychomycosis     LAST ASSESSED: [id number]    Osteoarthritis     Osteopenia     Osteoporosis     Paronychia, finger, unspecified laterality     LAST ASSESSED: 40TNO1849    Paroxysmal atrial fibrillation (HCC)     LAST ASSESSED: 02MAR2014    Peripheral vascular disease (HCC)     Plantar fasciitis      SOBOE (shortness of breath on exertion)     Stroke (HCC)     Right arm weakness that has resolved    Talus fracture     LAST ASSESSED: 02YRN4086    Vascular headache     Walker as ambulation aid     Wound of right foot      Past Surgical History:   Procedure Laterality Date    APPENDECTOMY      BONE BIOPSY Left 2024    Procedure: Bone bx of proximal phalanx second toe & second met with fluoroscopic guidance;  Surgeon: Jonny Marcial DPM;  Location: [facility];  Service: Podiatry    CATARACT EXTRACTION Bilateral     CHOLECYSTECTOMY      COLONOSCOPY      JOINT REPLACEMENT Bilateral     knee    NEUROPLASTY / TRANSPOSITION MEDIAN NERVE AT CARPAL TUNNEL BILATERAL Bilateral     DECOMPRESSION    OOPHORECTOMY Bilateral     age 81    PELVIC FLOOR REPAIR  2014    DE BIOPSY BONE TROCAR/NEEDLE SUPERFICIAL Left 2024    Procedure: Left foot debridement;  Surgeon: Jonny Marcial DPM;  Location: [facility];  Service: Podiatry    DE DEBRIDEMENT MUSCLE &/FASCIA 1ST 20 SQ CM/< Left 2024    Procedure: Left foot debridement;  Surgeon: Jonny Marcial DPM;  Location: [facility];  Service: Podiatry    DE OSTEOT W/WO LNGTH SHRT/CORRJ 1ST METAR Left 2024    Procedure: Left foot percutaneous osteotomy of second, third and fourth metatarsal.;  Surgeon: Jonny Marcial DPM;  Location: [facility];  Service: Podiatry    SALPINGECTOMY Bilateral     SINUS SURGERY      TONSILLECTOMY      TOTAL KNEE ARTHROPLASTY Bilateral      Social History     Tobacco Use    Smoking status: Former     Current packs/day: 0.00     Average packs/day: 0.3 packs/day for 2.0 years (0.5 ttl pk-yrs)     Types: Cigarettes     Start date:      Quit date:      Years since quittin.0    Smokeless tobacco: Never    Tobacco comments:     On and off socially with friends    Vaping Use    Vaping status: Never Used   Substance and Sexual Activity    Alcohol use: Not Currently    Drug use: No    Sexual activity: Not  Currently     Partners: Male     Birth control/protection: Post-menopausal     E-Cigarette/Vaping    E-Cigarette Use Never User      E-Cigarette/Vaping Substances    Nicotine No     THC No     CBD No     Flavoring No     Other No     Unknown No      Family history non-contributory  Social History     Tobacco Use    Smoking status: Former     Current packs/day: 0.00     Average packs/day: 0.3 packs/day for 2.0 years (0.5 ttl pk-yrs)     Types: Cigarettes     Start date:      Quit date:      Years since quittin.0    Smokeless tobacco: Never    Tobacco comments:     On and off socially with friends    Vaping Use    Vaping status: Never Used   Substance and Sexual Activity    Alcohol use: Not Currently    Drug use: No    Sexual activity: Not Currently     Partners: Male     Birth control/protection: Post-menopausal     Current Facility-Administered Medications:     acetaminophen (TYLENOL) tablet 650 mg, Q6H PRN    ascorbic acid (VITAMIN C) tablet 500 mg, Daily    dexamethasone (DECADRON) tablet 6 mg, Daily    ipratropium (ATROVENT) 0.02 % inhalation solution 0.5 mg, 4x Daily    lactated ringers infusion, Continuous, Last Rate: 75 mL/hr (24 4494)    levothyroxine tablet 25 mcg, Q7 Days    [START ON 2025] levothyroxine tablet 25 mcg, Q7 Days    ondansetron (ZOFRAN) injection 4 mg, Q6H PRN    polyethylene glycol (MIRALAX) packet 17 g, Daily PRN    zinc sulfate (ZINCATE) capsule 220 mg, Daily    Prior to Admission Medications   Prescriptions Last Dose Informant Patient Reported? Taking?   Cholecalciferol (VITAMIN D3) 1000 units CAPS  Self Yes No   Sig: Take 2,000 Units by mouth daily   Fluticasone-Salmeterol (Advair) 100-50 mcg/dose inhaler  Self No No   Sig: INHALE 1 PUFF 2 (TWO) TIMES A DAY. RINSE MOUTH AFTER USE.   albuterol (2.5 mg/3 mL) 0.083 % nebulizer solution  Self No No   Sig: Take 3 mL (2.5 mg total) by nebulization every 8 (eight) hours   albuterol (Proventil HFA) 90 mcg/act inhaler  Self  No No   Sig: Inhale 2 puffs every 6 (six) hours as needed for wheezing   atorvastatin (LIPITOR) 40 mg tablet  Self No No   Sig: Take 1 tablet (40 mg total) by mouth every evening   enoxaparin (LOVENOX) 60 mg/0.6 mL  Self No No   Sig: Inject 0.6 mL (60 mg total) under the skin every 12 (twelve) hours   Patient not taking: Reported on 12/12/2024   fluticasone (FLONASE) 50 mcg/act nasal spray  Self No No   Sig: USE 2 SPRAYS IN EACH NOSTRIL ONCE DAILY   levothyroxine 50 mcg tablet  Self No No   Sig: TAKE 1 TABLET DAILY, EXCEPT TAKE 1 AND 1/2 TABLETS ON SUNDAY AND WEDNESDAY   metoprolol succinate (TOPROL-XL) 100 mg 24 hr tablet  Self No No   Sig: Take 1 tablet (100 mg total) by mouth daily   spironolactone (ALDACTONE) 50 mg tablet  Self No No   Sig: Take 1 tablet (50 mg total) by mouth daily   torsemide (DEMADEX) 20 mg tablet   No No   Sig: Take 2 tablets (40 mg total) by mouth 2 (two) times a day   warfarin (Jantoven) 2.5 mg tablet  Self No No   Sig: Take 1 tablet (2.5 mg total) by mouth daily      Facility-Administered Medications: None     Asa [aspirin], Nsaids, and Medical tape    Oncology History:   Cancer Staging   No matching staging information was found for the patient.    Oncology History    No history exists.     Current Facility-Administered Medications   Medication Dose Route Frequency Provider Last Rate Last Admin    acetaminophen (TYLENOL) tablet 650 mg  650 mg Oral Q6H PRN Madi Marte DO        ascorbic acid (VITAMIN C) tablet 500 mg  500 mg Oral Daily Madi Marte DO        dexamethasone (DECADRON) tablet 6 mg  6 mg Oral Daily Madi Marte DO   6 mg at 12/28/24 2333    ipratropium (ATROVENT) 0.02 % inhalation solution 0.5 mg  0.5 mg Nebulization 4x Daily Madi Marte DO        lactated ringers infusion  75 mL/hr Intravenous Continuous Madi Marte DO 75 mL/hr at 12/28/24 2334 75 mL/hr at 12/28/24 2334    levothyroxine tablet 25 mcg  25 mcg Oral Q7 Days Madi Marte DO        [START ON 1/1/2025]  levothyroxine tablet 25 mcg  25 mcg Oral Q7 Days Madi Marte DO        ondansetron (ZOFRAN) injection 4 mg  4 mg Intravenous Q6H PRN Madi Marte DO        polyethylene glycol (MIRALAX) packet 17 g  17 g Oral Daily PRN Madi Marte DO        zinc sulfate (ZINCATE) capsule 220 mg  220 mg Oral Daily Madi Marte DO         Current Outpatient Medications   Medication Sig Dispense Refill    albuterol (2.5 mg/3 mL) 0.083 % nebulizer solution Take 3 mL (2.5 mg total) by nebulization every 8 (eight) hours 270 mL 3    albuterol (Proventil HFA) 90 mcg/act inhaler Inhale 2 puffs every 6 (six) hours as needed for wheezing 20.1 g 3    atorvastatin (LIPITOR) 40 mg tablet Take 1 tablet (40 mg total) by mouth every evening 90 tablet 1    Cholecalciferol (VITAMIN D3) 1000 units CAPS Take 2,000 Units by mouth daily      enoxaparin (LOVENOX) 60 mg/0.6 mL Inject 0.6 mL (60 mg total) under the skin every 12 (twelve) hours (Patient not taking: Reported on 12/12/2024) 20 mL 0    fluticasone (FLONASE) 50 mcg/act nasal spray USE 2 SPRAYS IN EACH NOSTRIL ONCE DAILY 48 g 3    Fluticasone-Salmeterol (Advair) 100-50 mcg/dose inhaler INHALE 1 PUFF 2 (TWO) TIMES A DAY. RINSE MOUTH AFTER USE. 180 blister 3    levothyroxine 50 mcg tablet TAKE 1 TABLET DAILY, EXCEPT TAKE 1 AND 1/2 TABLETS ON SUNDAY AND WEDNESDAY 100 tablet 1    metoprolol succinate (TOPROL-XL) 100 mg 24 hr tablet Take 1 tablet (100 mg total) by mouth daily 90 tablet 1    spironolactone (ALDACTONE) 50 mg tablet Take 1 tablet (50 mg total) by mouth daily 90 tablet 3    torsemide (DEMADEX) 20 mg tablet Take 2 tablets (40 mg total) by mouth 2 (two) times a day 180 tablet 3    warfarin (Jantoven) 2.5 mg tablet Take 1 tablet (2.5 mg total) by mouth daily 30 tablet 0     Objective :  Temp:  [97.6 °F (36.4 °C)] 97.6 °F (36.4 °C)  HR:  [94-98] 94  BP: ()/(56-66) 99/56  Resp:  [20] 20  SpO2:  [97 %-99 %] 99 %  O2 Device: None (Room air)    General: Pleasant, younger than stated  "age 89-year-old female, ill-appearing, no acute distress  HEENT: PERRLA, moist mucosa, atraumatic  Respiratory: Mild wheeze in all lung fields, no rales, rhonchi, or increased work of breathing  Cardiovascular: RRR w/o murmurs, 2+ radial and pedal pulses, no b/l LE edema  Abdomen: Thin, soft, non-tender, non-distended, no hepatomegaly or splenomegaly  /Rectal: deferred  Musculoskeletal: moves all four extremities with normal strength and ROM  Integumentary: warm, dry, no rash or bruising  Neurological: no gross or focal deficits identified, normal sensation  Psychiatric: pleasant and cooperative with normal mood, affect, and cognition      Lab Results: I have reviewed the following results:CBC/BMP:     .     12/28/24 1940   WBC 10.75*   HGB 14.0   HCT 41.7      SODIUM 128*   K 4.0   CL 88*   CO2 28   BUN 69*   CREATININE 2.23*   GLUC 103      , PTT/INR:No new results in last 24 hours. , Sputum Culture: No results found for: \"SPUTUMCULTUR\", FLU: No components found for: \"INFLUENZA\"    Lab Results   Component Value Date     10/17/2017    K 4.0 12/28/2024    CL 88 (L) 12/28/2024    CO2 28 12/28/2024    BUN 69 (H) 12/28/2024    CREATININE 2.23 (H) 12/28/2024    GLUF 104 (H) 11/20/2024    CALCIUM 8.6 12/28/2024    AST 31 09/23/2024    ALT 25 09/23/2024    ALKPHOS 51 09/23/2024    PROT 6.4 10/17/2017    BILITOT 0.5 10/17/2017    EGFR 29 07/26/2024     Lab Results   Component Value Date    WBC 10.75 (H) 12/28/2024    HGB 14.0 12/28/2024    HCT 41.7 12/28/2024    MCV 96 12/28/2024     12/28/2024     Lab Results   Component Value Date    NEUTROABS 7.55 12/28/2024     Imaging Results Review: I personally reviewed the following image studies in PACS and associated radiology reports: chest xray. My interpretation of the radiology images/reports is: lungs are without clear evidence of consolidation and show increase lung volume with flattened diaphragm, and mildly increased interstitial markings.  Other " Study Results Review: No additional pertinent studies reviewed.    Administrative Statements   I have spent a total time of 75 minutes in caring for this patient on the day of the visit/encounter including Diagnostic results, Prognosis, Risks and benefits of tx options, Instructions for management, Patient and family education, Impressions, Counseling / Coordination of care, Reviewing / ordering tests, medicine, procedures  , Obtaining or reviewing history  , and Communicating with other healthcare professionals . Topics discussed with the patient / family include medication review, advanced directives, and goals of care.

## 2024-12-29 NOTE — PROGRESS NOTES
Progress Note - Hospitalist   Name: Rebeca Banegas 89 y.o. adult I MRN: [id number]  Unit/Bed#: ED 17 I Date of Admission: 12/28/2024   Date of Service: 12/29/2024 I Hospital Day: 1    Assessment & Plan  Pneumonia due to COVID-19 virus  Patient is roughly 10 days from the onset of her respiratory symptoms which are persistent.  Patient was found COVID-positive on nasal swab first tested on 12/28.  She is not requiring oxygen.  She has DARIO on CKD (SCr 2.23 (baseline 1.4)) which limits utilization of remdesivir.  She received prednisone 40 in the ED.  -Ordered dexamethasone 6 mg daily  -Cannot use remdesivir due to DARIO on CKD-patient has been symptomatic for  about 10 days  -Ordered vitamin C and zinc p.o. daily  -Ordered 1L LR bolus  -O2 by NC as needed  -Ipratropium only nebs due to A-fib, fluticasone-vilanterol inhaler daily, and as needed albuterol inhaler  -Airborne precautions  -Supportive care  -Symptomatically improving  Acute kidney failure (HCC)  The patient presented with DARIO on CKD (SCr 2.23 (baseline 1.4)).  BUN to creatinine ratio is 31 supportive of her clinical suspicion of prerenal DARIO on CKD due to poor p.o. intake due to 10 days of viral illness.  She was given 1 L bolus IVF in the ED.  -Ordered 1 additional liter bolus IVF  -Will trend renal function which we expect to improve  -If her renal function does not improve in the next 24 to 48 hours, we will consult nephrology  Creatinine 2.18.    Also noted to have hyponatremia is improving with fluids  Asthma  See COVID  Permanent atrial fibrillation (HCC)  Restarted home warfarin 2.5 daily and ordered INR with morning labs.  Hyperlipidemia  Home atorvastatin 40  Hypothyroidism  Home levothyroxine 50 daily but 75 on Sunday and Wednesday  Vitamin D deficiency  On vitamin D  Primary hypertension  Home metoprolol 100, spironolactone 50, and torsemide 40  CKD stage 3b, GFR 30-44 ml/min ([facility])  Lab Results   Component Value Date    EGFR 29 07/26/2024     EGFR 23 06/13/2024    EGFR 18 05/30/2024    CREATININE 2.18 (H) 12/29/2024    CREATININE 2.23 (H) 12/28/2024    CREATININE 1.94 (H) 11/20/2024     See above  Hyponatremia  At admission her sodium was 128 (baseline 135).  We suspect this is due to decreased oral intake due to her 10 days of illness.  She is s/p 2L IVF.  We will monitor her sodium and encourage oral intake.  Sodium 130.  Trend for now     VTE Pharmacologic Prophylaxis:   Moderate Risk (Score 3-4) - Pharmacological DVT Prophylaxis Ordered: warfarin (Coumadin).    Mobility:      [facility] Goal achieved. Continue to encourage appropriate mobility.    Patient Centered Rounds: I performed bedside rounds with nursing staff today.   Discussions with Specialists or Other Care Team Provider:     Education and Discussions with Family / Patient: Updated  (daughter) at bedside.    Current Length of Stay: 1 day(s)  Current Patient Status: Inpatient   Certification Statement: The patient will continue to require additional inpatient hospital stay due to covid 19  Discharge Plan: Anticipate discharge in 24-48 hrs to home with home services.    Code Status: Level 3 - DNAR and DNI    Subjective   Patient seen and examined , feeling better today.  Shortness of breath is improving.  Unfortunately patient has been symptomatic for 2 weeks out of window for antiviral agents    Objective :  Temp:  [97.4 °F (36.3 °C)-97.6 °F (36.4 °C)] 97.4 °F (36.3 °C)  HR:  [88-98] 90  BP: ()/(54-66) 84/54  Resp:  [18-20] 18  SpO2:  [97 %-99 %] 97 %  O2 Device: None (Room air)    There is no height or weight on file to calculate BMI.     Input and Output Summary (last 24 hours):   No intake or output data in the 24 hours ending 12/29/24 0822    Physical Exam  Constitutional:       General: She is not in acute distress.     Appearance: She is not ill-appearing.   HENT:      Head: Normocephalic and atraumatic.      Nose: Nose normal.   Eyes:      General: No scleral  icterus.  Cardiovascular:      Rate and Rhythm: Normal rate and regular rhythm.      Heart sounds: No murmur heard.  Pulmonary:      Effort: No respiratory distress.      Comments: Decreased breath sounds bilateral  Abdominal:      General: There is no distension.   Musculoskeletal:         General: No swelling. Normal range of motion.   Skin:     General: Skin is warm.      Coloration: Skin is not jaundiced.   Neurological:      Mental Status: She is alert. Mental status is at baseline.           Lines/Drains:              Lab Results: I have reviewed the following results:   Results from last 7 days   Lab Units 12/29/24  0440   WBC Thousand/uL 9.88   HEMOGLOBIN g/dL 12.7   HEMATOCRIT % 39.2   PLATELETS Thousands/uL 263   SEGS PCT % 86*   LYMPHO PCT % 12*   MONO PCT % 1*   EOS PCT % 0     Results from last 7 days   Lab Units 12/29/24  0440   SODIUM mmol/L 130*   POTASSIUM mmol/L 4.6   CHLORIDE mmol/L 93*   CO2 mmol/L 27   BUN mg/dL 66*   CREATININE mg/dL 2.18*   ANION GAP mmol/L 10   CALCIUM mg/dL 8.2*   GLUCOSE RANDOM mg/dL 125     Results from last 7 days   Lab Units 12/29/24  0440   INR  2.16*                   Recent Cultures (last 7 days):   Results from last 7 days   Lab Units 12/26/24  1409   SPUTUM CULTURE  Test not performed. Suggest repeat specimen.   GRAM STAIN RESULT  >10 squamous epithelial cells/lpf, indicating orophayngeal contamination.*  No polys seen*  3+ Gram positive cocci in pairs*  2+ Gram negative rods*  Rare Yeast*             Last 24 Hours Medication List:     Current Facility-Administered Medications:     acetaminophen (TYLENOL) tablet 650 mg, Q6H PRN    ascorbic acid (VITAMIN C) tablet 500 mg, Daily    dexamethasone (DECADRON) tablet 6 mg, Daily    ipratropium (ATROVENT HFA) inhaler 2 puff, 4x Daily    lactated ringers infusion, Continuous, Last Rate: 75 mL/hr (12/28/24 1304)    levothyroxine tablet 25 mcg, Q7 Days    [START ON 1/1/2025] levothyroxine tablet 25 mcg, Q7 Days     ondansetron (ZOFRAN) injection 4 mg, Q6H PRN    polyethylene glycol (MIRALAX) packet 17 g, Daily PRN    zinc sulfate (ZINCATE) capsule 220 mg, Daily    Administrative Statements   Today, Patient Was Seen By: Ashley Magdaleno MD      **Please Note: This note may have been constructed using a voice recognition system.**

## 2024-12-29 NOTE — ASSESSMENT & PLAN NOTE
Patient is roughly 10 days from the onset of her respiratory symptoms which are persistent.  Patient was found COVID-positive on nasal swab first tested on 12/28.  She is not requiring oxygen.  She has DARIO on CKD (SCr 2.23 (baseline 1.4)) which limits utilization of remdesivir.  She received prednisone 40 in the ED.  -Ordered dexamethasone 6 mg daily  -Cannot use remdesivir due to DARIO on CKD  -Ordered vitamin C and zinc p.o. daily  -Ordered 1L LR bolus  -O2 by NC as needed  -Ipratropium only nebs due to A-fib, fluticasone-vilanterol inhaler daily, and as needed albuterol inhaler  -Airborne precautions  -Supportive care  -She is stable and we expect improvement with the above plan.  If she does not improve, or her condition worsens, we will consult pulmonology.

## 2024-12-29 NOTE — ASSESSMENT & PLAN NOTE
The patient presented with DARIO on CKD (SCr 2.23 (baseline 1.4)).  BUN to creatinine ratio is 31 supportive of her clinical suspicion of prerenal DARIO on CKD due to poor p.o. intake due to 10 days of viral illness.  She was given 1 L bolus IVF in the ED.  -Ordered 1 additional liter bolus IVF  -Will trend renal function which we expect to improve  -If her renal function does not improve in the next 24 to 48 hours, we will consult nephrology

## 2024-12-30 ENCOUNTER — TELEPHONE (OUTPATIENT)
Age: 89
End: 2024-12-30

## 2024-12-30 LAB
ANION GAP SERPL CALCULATED.3IONS-SCNC: 9 MMOL/L (ref 4–13)
BASOPHILS # BLD AUTO: 0.02 THOUSANDS/ΜL (ref 0–0.1)
BASOPHILS NFR BLD AUTO: 0 % (ref 0–1)
BUN SERPL-MCNC: 56 MG/DL (ref 5–25)
CALCIUM SERPL-MCNC: 8.6 MG/DL (ref 8.4–10.2)
CHLORIDE SERPL-SCNC: 96 MMOL/L (ref 96–108)
CO2 SERPL-SCNC: 28 MMOL/L (ref 21–32)
CREAT SERPL-MCNC: 1.53 MG/DL (ref 0.6–1.3)
EOSINOPHIL # BLD AUTO: 0.01 THOUSAND/ΜL (ref 0–0.61)
EOSINOPHIL NFR BLD AUTO: 0 % (ref 0–6)
ERYTHROCYTE [DISTWIDTH] IN BLOOD BY AUTOMATED COUNT: 13.9 % (ref 11.6–15.1)
GFR SERPL CREATININE-BSD FRML MDRD: 29 ML/MIN/1.73SQ M
GLUCOSE SERPL-MCNC: 89 MG/DL (ref 65–140)
HCT VFR BLD AUTO: 41.5 % (ref 34.8–46.1)
HGB BLD-MCNC: 13.7 G/DL (ref 11.5–15.4)
IMM GRANULOCYTES # BLD AUTO: 0.08 THOUSAND/UL (ref 0–0.2)
IMM GRANULOCYTES NFR BLD AUTO: 1 % (ref 0–2)
INR PPP: 2.61 (ref 0.85–1.19)
LYMPHOCYTES # BLD AUTO: 2.34 THOUSANDS/ΜL (ref 0.6–4.47)
LYMPHOCYTES NFR BLD AUTO: 15 % (ref 14–44)
MCH RBC QN AUTO: 32.2 PG (ref 26.8–34.3)
MCHC RBC AUTO-ENTMCNC: 33 G/DL (ref 31.4–37.4)
MCV RBC AUTO: 98 FL (ref 82–98)
MONOCYTES # BLD AUTO: 1.22 THOUSAND/ΜL (ref 0.17–1.22)
MONOCYTES NFR BLD AUTO: 8 % (ref 4–12)
NEUTROPHILS # BLD AUTO: 11.55 THOUSANDS/ΜL (ref 1.85–7.62)
NEUTS SEG NFR BLD AUTO: 76 % (ref 43–75)
NRBC BLD AUTO-RTO: 0 /100 WBCS
PLATELET # BLD AUTO: 263 THOUSANDS/UL (ref 149–390)
PMV BLD AUTO: 8.9 FL (ref 8.9–12.7)
POTASSIUM SERPL-SCNC: 4 MMOL/L (ref 3.5–5.3)
PROTHROMBIN TIME: 27.8 SECONDS (ref 12.3–15)
RBC # BLD AUTO: 4.25 MILLION/UL (ref 3.81–5.12)
SODIUM SERPL-SCNC: 133 MMOL/L (ref 135–147)
WBC # BLD AUTO: 15.22 THOUSAND/UL (ref 4.31–10.16)

## 2024-12-30 PROCEDURE — 85610 PROTHROMBIN TIME: CPT | Performed by: FAMILY MEDICINE

## 2024-12-30 PROCEDURE — 97163 PT EVAL HIGH COMPLEX 45 MIN: CPT

## 2024-12-30 PROCEDURE — 85025 COMPLETE CBC W/AUTO DIFF WBC: CPT

## 2024-12-30 PROCEDURE — 97167 OT EVAL HIGH COMPLEX 60 MIN: CPT

## 2024-12-30 PROCEDURE — 80048 BASIC METABOLIC PNL TOTAL CA: CPT

## 2024-12-30 PROCEDURE — 99232 SBSQ HOSP IP/OBS MODERATE 35: CPT | Performed by: FAMILY MEDICINE

## 2024-12-30 RX ORDER — TORSEMIDE 20 MG/1
20 TABLET ORAL DAILY
Status: DISCONTINUED | OUTPATIENT
Start: 2024-12-30 | End: 2025-01-01 | Stop reason: HOSPADM

## 2024-12-30 RX ORDER — PREDNISONE 20 MG/1
40 TABLET ORAL DAILY
Status: DISCONTINUED | OUTPATIENT
Start: 2024-12-31 | End: 2025-01-01 | Stop reason: HOSPADM

## 2024-12-30 RX ADMIN — Medication 1000 UNITS: at 09:51

## 2024-12-30 RX ADMIN — ZINC SULFATE 220 MG (50 MG) CAPSULE 220 MG: CAPSULE at 09:51

## 2024-12-30 RX ADMIN — METOPROLOL SUCCINATE 100 MG: 100 TABLET, EXTENDED RELEASE ORAL at 09:51

## 2024-12-30 RX ADMIN — DEXAMETHASONE 6 MG: 4 TABLET ORAL at 09:51

## 2024-12-30 RX ADMIN — IPRATROPIUM BROMIDE 2 PUFF: 17 AEROSOL, METERED RESPIRATORY (INHALATION) at 09:51

## 2024-12-30 RX ADMIN — TORSEMIDE 20 MG: 20 TABLET ORAL at 17:31

## 2024-12-30 RX ADMIN — IPRATROPIUM BROMIDE 2 PUFF: 17 AEROSOL, METERED RESPIRATORY (INHALATION) at 17:32

## 2024-12-30 RX ADMIN — WARFARIN SODIUM 2.5 MG: 2.5 TABLET ORAL at 17:31

## 2024-12-30 RX ADMIN — IPRATROPIUM BROMIDE 2 PUFF: 17 AEROSOL, METERED RESPIRATORY (INHALATION) at 21:39

## 2024-12-30 RX ADMIN — LEVOTHYROXINE SODIUM 50 MCG: 0.05 TABLET ORAL at 06:24

## 2024-12-30 RX ADMIN — ATORVASTATIN CALCIUM 40 MG: 40 TABLET, FILM COATED ORAL at 17:31

## 2024-12-30 RX ADMIN — IPRATROPIUM BROMIDE 2 PUFF: 17 AEROSOL, METERED RESPIRATORY (INHALATION) at 13:58

## 2024-12-30 RX ADMIN — OXYCODONE HYDROCHLORIDE AND ACETAMINOPHEN 500 MG: 500 TABLET ORAL at 09:51

## 2024-12-30 RX ADMIN — FLUTICASONE FUROATE AND VILANTEROL TRIFENATATE 1 PUFF: 100; 25 POWDER RESPIRATORY (INHALATION) at 09:51

## 2024-12-30 NOTE — ASSESSMENT & PLAN NOTE
Lab Results   Component Value Date    EGFR 29 12/30/2024    EGFR 29 07/26/2024    EGFR 23 06/13/2024    CREATININE 1.53 (H) 12/30/2024    CREATININE 2.18 (H) 12/29/2024    CREATININE 2.23 (H) 12/28/2024     See above

## 2024-12-30 NOTE — ASSESSMENT & PLAN NOTE
Home metoprolol 100, spironolactone 50, and torsemide 40  Spironolactone and torsemide was on hold due to DARIO and poor p.o. intake.  Restart torsemide at a lower dose and monitor

## 2024-12-30 NOTE — PLAN OF CARE
Problem: OCCUPATIONAL THERAPY ADULT  Goal: Performs self-care activities at highest level of function for planned discharge setting.  See evaluation for individualized goals.  Description: Treatment Interventions: ADL retraining, Functional transfer training, UE strengthening/ROM, Endurance training, Patient/family training, Equipment evaluation/education, Compensatory technique education, Continued evaluation, Energy conservation, Activityengagement          See flowsheet documentation for full assessment, interventions and recommendations.   Outcome: Progressing  Note: Limitation: Decreased ADL status, Decreased endurance, Decreased self-care trans, Decreased high-level ADLs  Prognosis: Good  Assessment: Pt is a 89 y.o. female seen for OT evaluation s/p admission to [facility] on 12/28/2024 due to SOB. Pt diagnosed with Pneumonia due to COVID-19 virus. Pt's PMH is outlined above. Pt with active OT evaluation/treatment and activity orders. Pt reports living alone in [facility] apartment. PTA, Pt was Ind/Supervision w/ ADL, assist w/ IADL and Mod I functional mobility, was not driving and was using SPC at baseline. Pt agreeable and willing to participate in OT evaluation. Pt was greeted and left OOB in chair w/ alarm activated and all needs within reach. During evaluation, pt is Supervision UB ADLs, bed mobility, STS; CGA functional mobility; Min A LB ADLs. Pt currently presents with impairments in the following categories -limited home support, difficulty performing ADLS, and difficulty performing IADLS  activity tolerance, endurance, and standing balance/tolerance. These impairments, as well as pt's fatigue, SOB, TANG, and risk for falls  limit pt's ability to safely engage in all baseline areas of occupation, includingeating, grooming, bathing, dressing, toileting, functional mobility/transfers, community mobility, social participation , and leisure activities . The patient's raw score on the AM-PAC Daily  Activity Inpatient Short Form is 19. A raw score of greater than or equal to 19 suggests the patient may benefit from discharge to home. Please refer to the recommendation of the Occupational Therapist for safe discharge planning.    Pt would benefit from continued acute OT services throughout hospital course in order to maximize Pt's independence and overall occupational performance. Plan for OT interventions 2-3x per week. From OT standpoint,  recommend Level III (Minimum Resource Intensity) upon d/c when pt medically stable to d/c from acute care. Will continue to follow.     Rehab Resource Intensity Level, OT: III (Minimum Resource Intensity)           show

## 2024-12-30 NOTE — PHYSICAL THERAPY NOTE
Physical Therapy Evaluation    Patient Name: Rebeca Banegas    Today's Date: 12/30/2024     Problem List  Principal Problem:    Pneumonia due to COVID-19 virus  Active Problems:    Arthritis    Asthma    Permanent atrial fibrillation (HCC)    Esophageal reflux    Hyperlipidemia    Primary hypertension    Hypothyroidism    Vitamin D deficiency    CKD stage 3b, GFR 30-44 ml/min (HCC)    Hyponatremia    Acute kidney failure (HCC)       Past Medical History  Past Medical History:   Diagnosis Date    Abnormal ultrasound     RESOLVED: [id number]    Advice given about COVID-19 virus infection 04/07/2020    Ambulates with cane     Asthma with acute exacerbation     LAST ASSESSED: [id number]    Asymptomatic menopausal state     Atherosclerosis     Atrial fibrillation (HCC)     Chronic obstructive lung disease (HCC)     w/ chonic cough    Chronic ulcer of left foot (HCC)     Colon polyp     Congestive heart failure (HCC)     LAST ASSESSED: [id number]    COVID-19 virus infection 03/25/2023    Cuboid fracture     LAST ASSESSED: [id number]    Disease of thyroid gland     Dyspepsia     Edema of both lower extremities     Equinus deformity of left foot     Fall 04/01/2024    Falls     5/2024    GERD (gastroesophageal reflux disease)     High risk medication use     LAST ASSESSED: [id number]    Hyperlipidemia     Hypertension     Hypokalemia     Hypothyroidism     Impacted cerumen of both ears     LAST ASSESSED: [id number]    Impacted cerumen of right ear     LAST ASSESSED: [id number]    Influenza     LAST ASSESSED: [id number]    IPMN (intraductal papillary mucinous neoplasm)     RESOLVED: 02OCT2015    Irregular heart beat     afib. Warfarin.    Limb swelling     LAST ASSESSED: [id number]    Navicular fracture of ankle     LAST ASSESSED: [id number]    Onychomycosis     LAST ASSESSED: [id number]    Osteoarthritis     Osteopenia     Osteoporosis     Paronychia, finger, unspecified  laterality     LAST ASSESSED: [id number]    Paroxysmal atrial fibrillation (HCC)     LAST ASSESSED: [id number]    Peripheral vascular disease (HCC)     Plantar fasciitis     SOBOE (shortness of breath on exertion)     Stroke (HCC)     Right arm weakness that has resolved    Talus fracture     LAST ASSESSED: [id number]    Vascular headache     Walker as ambulation aid     Wound of right foot         Past Surgical History  Past Surgical History:   Procedure Laterality Date    APPENDECTOMY      BONE BIOPSY Left 08/09/2024    Procedure: Bone bx of proximal phalanx second toe & second met with fluoroscopic guidance;  Surgeon: Jonny Marcial DPM;  [facility];  Service: Podiatry    CATARACT EXTRACTION Bilateral     CHOLECYSTECTOMY      COLONOSCOPY      JOINT REPLACEMENT Bilateral     knee    NEUROPLASTY / TRANSPOSITION MEDIAN NERVE AT CARPAL TUNNEL BILATERAL Bilateral     DECOMPRESSION    OOPHORECTOMY Bilateral     age 81    PELVIC FLOOR REPAIR  2014    TN BIOPSY BONE TROCAR/NEEDLE SUPERFICIAL Left 08/09/2024    Procedure: Left foot debridement;  Surgeon: Jonny Marcial DPM;  [facility]: [facility];  Service: Podiatry    TN DEBRIDEMENT MUSCLE &/FASCIA 1ST 20 SQ CM/< Left 08/09/2024    Procedure: Left foot debridement;  Surgeon: Jonny Marcial DPM;  [facility];  Service: Podiatry    TN OSTEOT W/WO LNGTH SHRT/CORRJ 1ST METAR Left 11/29/2024    Procedure: Left foot percutaneous osteotomy of second, third and fourth metatarsal.;  Surgeon: Jonny Marical DPM;  [facility]: [facility];  Service: Podiatry    SALPINGECTOMY Bilateral     SINUS SURGERY      TONSILLECTOMY      TOTAL KNEE ARTHROPLASTY Bilateral          12/30/24 0851   PT Last Visit   PT Visit Date 12/30/24   Note Type   Note type Evaluation   Pain Assessment   Pain Assessment Tool 0-10   Pain Score No Pain   Restrictions/Precautions   Weight Bearing Precautions Per Order No   Other Precautions Contact/isolation;Chair  Alarm;Bed Alarm;Airborne/isolation;Fall Risk   Home Living   Type of Home Assisted living  ([facility])   Home Layout One level;Performs ADLs on one level;Able to live on main level with bedroom/bathroom;Elevator   Bathroom Shower/Tub Walk-in shower   Bathroom Toilet Standard   Bathroom Equipment Grab bars in shower;Built-in shower seat   Home Equipment Walker;Cane  (pt reports using cane PTA)   Additional Comments pt reports living alone at [facility]. pt reports that her daughter is currently staying in an apt nearby   Prior Function   Level of [place] Independent with ADLs;Independent with functional mobility;Needs assistance with IADLS   Lives With Alone;Facility staff   Receives Help From Personal care attendant   IADLs Family/Friend/Other provides transportation;Family/Friend/Other provides meals;Family/Friend/Other provides medication management   Falls in the last 6 months 0   Vocational Retired  (retired)   Comments pt reports driving up until 1 month ago. has assist/supervision for bathing. has measl delivered vs goes to dining [employer] for meals.   General   Family/Caregiver Present No   Cognition   Overall Cognitive Status WFL   Arousal/Participation Cooperative   Attention Within functional limits   Orientation Level Oriented X4   Memory Within functional limits   Following Commands Follows one step commands without difficulty   Comments pt pleasant and cooperative   Subjective   Subjective pt agreeable to mobilize   RLE Assessment   RLE Assessment WFL   LLE Assessment   LLE Assessment WFL   Bed Mobility   Supine to Sit Unable to assess   Sit to Supine Unable to assess   Additional Comments pt greeted OOB in chair and seated in chair at end of session   Transfers   Sit to Stand 5  Supervision   Additional items Armrests;Increased time required   Stand to Sit 5  Supervision   Additional items Armrests;Increased time required;Verbal cues   Additional Comments c cane    Ambulation/Elevation   Gait pattern Inconsistent jacklyn;Short stride;Excessively slow   Gait Assistance 4  Minimal assist  (CGA)   Additional items Assist x 1   Assistive Device Straight cane   Distance 70'x2   Stair Management Assistance Not tested   Balance   Static Sitting Good   Dynamic Sitting Fair +   Static Standing Fair   Dynamic Standing Fair -   Ambulatory Poor +   Endurance Deficit   Endurance Deficit Yes   Endurance Deficit Description pt limited by decreased activity tolerance, fatigue and SOB   Activity Tolerance   Activity Tolerance Patient limited by fatigue;Patient tolerated treatment well   Medical Staff Made Aware OT Jessica   Nurse Made Aware yes-RN cleared   Assessment   Prognosis Good   Problem List Decreased endurance;Decreased mobility   Assessment Pt is an 89 y.o. female presenting to [facility] on 12/28/24 for primary medical dx of pneumonia due to COVID-19 virus. Pt  has a past medical history of Abnormal ultrasound, Advice given about COVID-19 virus infection, Ambulates with cane, Asthma with acute exacerbation, Asymptomatic menopausal state, Atherosclerosis, Atrial fibrillation (HCC), Chronic obstructive lung disease (HCC), Chronic ulcer of left foot (HCC), Colon polyp, Congestive heart failure (HCC), COVID-19 virus infection, Cuboid fracture, Disease of thyroid gland, Dyspepsia, Edema of both lower extremities, Equinus deformity of left foot, Fall, Falls, GERD (gastroesophageal reflux disease), High risk medication use, Hyperlipidemia, Hypertension, Hypokalemia, Hypothyroidism, Impacted cerumen of both ears, Impacted cerumen of right ear, Influenza, IPMN (intraductal papillary mucinous neoplasm), Irregular heart beat, Limb swelling, Navicular fracture of ankle, Onychomycosis, Osteoarthritis, Osteopenia, Osteoporosis, Paronychia, finger, unspecified laterality, Paroxysmal atrial fibrillation (HCC), Peripheral vascular disease (HCC), Plantar fasciitis, SOBOE (shortness of breath on exertion),  Stroke (HCC), Talus fracture, Vascular headache, Walker as ambulation aid, and Wound of right foot. Pt presents as a high complexity evaluation due to Ongoing medical management for primary dx, Decreased activity tolerance compared to baseline, Fall risk, Increased assistance needed from caregiver at current time, Trending lab values, Continuous pulse oximetry monitoring . Pt lives alone at [facility] and was independent with mobility and ADLs PTA. Pt currently requires supervision for transfers with cane and CGA for ambulating 70'x2 with cane. Pt is limited by SOB and deficits in endurance, activity tolerance and mobility limiting their ability to participate in the community. Pt would benefit from continued skilled acute care PT services to address impairments and promote functional independence. Recommend level 3 resources to improve mobility and promote PLOF. The patient's AM-PAC Basic Mobility Inpatient Short Form Raw Score is 18. A Raw score of greater than or equal to 16 suggests the patient may benefit from discharge to home. Please also refer to the recommendation of the Physical Therapist for safe discharge planning. Pt left upright in chair with chair alarm donned, call bell and personal items within reach and all needs met.   Barriers to Discharge None   Goals   Patient Goals to get better   STG Expiration Date 01/13/25   Short Term Goal #1 In 14 days pt will complete bed mobility independently to decrease caregiver burden and prevent skin breakdown. Pt will complete transfers at mod I to promote safe dc planning. Pt will ambulate 300' with LRAD at mod I to promote safe access to home and community. Pt will participate in PT for at least 25 minutes with SpO2 >94% to build endurance and activity tolerance.   PT Treatment Day 0   Plan   Treatment/Interventions Functional transfer training;LE strengthening/ROM;Elevations;Therapeutic exercise;Endurance training;Equipment eval/education;Bed  mobility;Gait training;Spoke to nursing;OT   PT Frequency 2-3x/wk   Discharge Recommendation   Rehab Resource Intensity Level, PT III (Minimum Resource Intensity)   Equipment Recommended Cane   AM-PAC Basic Mobility Inpatient   Turning in Flat Bed Without Bedrails 4   Lying on Back to Sitting on Edge of Flat Bed Without Bedrails 3   Moving Bed to Chair 3   Standing Up From Chair Using Arms 3   Walk in Room 3   Climb 3-5 Stairs With Railing 2   Basic Mobility Inpatient Raw Score 18   Basic Mobility Standardized Score 41.05   [facility] Highest Level Of Mobility   -HLM Goal 6: Walk 10 steps or more   -HLM Achieved 7: Walk 25 feet or more   Modified Yeso Scale   Modified Yeso Scale 4   MANDY CarterT

## 2024-12-30 NOTE — ASSESSMENT & PLAN NOTE
Patient is roughly 10 days from the onset of her respiratory symptoms which are persistent.  Patient was found COVID-positive on nasal swab first tested on 12/28.  She is not requiring oxygen.  She has DARIO on CKD (SCr 2.23 (baseline 1.4)) which limits utilization of remdesivir.  She received prednisone 40 in the ED.  -Ordered dexamethasone 6 mg daily  -Cannot use remdesivir due to DARIO on CKD-patient has been symptomatic for  about 2 weeks  Patient received IV hydration due to poor p.o. intake.  -Ordered vitamin C and zinc p.o. daily  -Ipratropium only nebs due to A-fib, fluticasone-vilanterol inhaler daily, and as needed albuterol inhaler  -Airborne precautions  -Supportive care  -Symptomatically improving  Still getting short of breath with minimal activity-on examination with decreased air movement. Change   Dexamethasone to prednisone given decreased breath sounds  Chest x-ray was reviewed

## 2024-12-30 NOTE — UTILIZATION REVIEW
Initial Clinical Review    Admission: Date/Time/Statement:   Admission Orders (From admission, onward)       Ordered        12/28/24 2101  INPATIENT ADMISSION  Once                          Orders Placed This Encounter   Procedures    INPATIENT ADMISSION     Standing Status:   Standing     Number of Occurrences:   1     Level of Care:   Med Surg [16]     Estimated length of stay:   More than 2 Midnights     Certification:   I certify that inpatient services are medically necessary for this patient for a duration of greater than two midnights. See H&P and MD Progress Notes for additional information about the patient's course of treatment.     ED Arrival Information       Expected   -    Arrival   12/28/2024 15:50    Acuity   Urgent              Means of arrival   Walk-In    Escorted by   Family Member    Service   Hospitalist    Admission type   Emergency              Arrival complaint   SOB / Cough             Chief Complaint   Patient presents with    Nasal Congestion     Cough, and congestion for over 2 weeks has been on ABX without improvement, feels worse.        12/28/24 Initial Presentation: 89 y.o. female  with extensive PMHx indicated below but particularly pertinent for bronchiectasis x2 years not related to tobacco use, A-fib on warfarin, and CHF who presented to [facility] on 12/28/2024 with SOB, fatigue, and cough x10 days.  She went to her PCP 4 days ago and was prescribed Levaquin which did not improve her symptoms.  In the ED vitals were significant for adequate SpO2 on RA, RR 20, afebrile, HR borderline tachycardic at 98, and /66.  Labs showed mild leukocytosis of 10.8 (Neut 70, lymph 22), hyponatremia at 128 (baseline 135), DARIO on CKD with SCR 2.23 (baseline 1.4).  Nasal swab was positive for COVID and negative for flu.  Sputum culture is polymicrobial with ID and susceptibilities pending.  CXR was performed reveals increase lung volume with flattened diaphragm, and mildly increased  interstitial markings. In the ED pt given IVF's 1 liter of NS, 1 liter os LR Prednisone 40 mg , acetaminophen 650 mg and Decadron 6 mg. Plan: Admit for Pneumonia due to Covid 19, IVF's, IV ABX, Steroids, Ipratropium nebulizers, MDI, monitor BMP for hyponatremi.       Date: 12/29/24   Day 2: Hospitalist: pt is symptomatically improving.  feeling better today.  Shortness of breath is improving. SPO2 97% on room air. Decreased breath sounds bilaterally. , CL 93, BUN 66 ,Creat 2.18 ,ordered 1 liter fluid bolus, repeat BMP 12/31, if worsens will consult Nephrology. Hyponatremia improved with IVF's.     Date: 12/30/24   Day 3: Has surpassed a 2nd midnight with active treatments and services.    Hospitalist: AM PAC 18, PT/OT александр dc recommendation for level 3 minimum resource intensity. Still getting short of breath with minimal activity-on examination with decreased air movement. Decreased breath sounds and Rhonchi bilaterally. Change   Dexamethasone to prednisone given decreased breath sounds. Creatinine improved 1.5 after IVF's. INR 2.6, will receive 1 mg coumadin. Restarting torsemide at lower dose today and monitor renal function. Repeat labs 12/31. Continue to encourage PO intake, Continue MDI, Atrovent Nebulizer QID.     Certification Statement: The patient will continue to require additional inpatient hospital stay due to COVID-19  Discharge Plan: Anticipate discharge in 24-48 hrs to home with home services       ED Treatment-Medication Administration from 12/28/2024 1550 to 12/29/2024 1239         Date/Time Order Dose Route Action     12/28/2024 1941 sodium chloride 0.9 % bolus 1,000 mL 1,000 mL Intravenous New Bag     12/28/2024 1907 acetaminophen (TYLENOL) tablet 650 mg 650 mg Oral Given     12/28/2024 1907 predniSONE tablet 40 mg 40 mg Oral Given     12/28/2024 2334 lactated ringers infusion 75 mL/hr Intravenous New Bag     12/28/2024 2333 lactated ringers bolus 1,000 mL 1,000 mL Intravenous New Bag      12/28/2024 2333 dexamethasone (DECADRON) tablet 6 mg 6 mg Oral Given            Scheduled Medications:  ascorbic acid, 500 mg, Oral, Daily  atorvastatin, 40 mg, Oral, QPM  Cholecalciferol, 1,000 Units, Oral, Daily  Fluticasone Furoate-Vilanterol, 1 puff, Inhalation, Daily  ipratropium, 2 puff, Inhalation, 4x Daily  levothyroxine, 50 mcg, Oral, Early Morning  metoprolol succinate, 100 mg, Oral, Daily  [START ON 12/31/2024] predniSONE, 40 mg, Oral, Daily  torsemide, 20 mg, Oral, Daily  warfarin, 2.5 mg, Oral, Daily (warfarin)  zinc sulfate, 220 mg, Oral, Daily    Continuous IV Infusions:   lactated ringers infusion  Rate: 75 mL/hr Dose: 75 mL/hr  Freq: Continuous Route: IV  Indications of Use: IV Hydration,IV Resuscitation  Last Dose: Stopped (12/30/24 1600)  Start: 12/28/24 2330 End: 12/30/24 1527          PRN Meds:  acetaminophen, 650 mg, Oral, Q6H PRN  albuterol, 2 puff, Inhalation, Q6H PRN  ondansetron, 4 mg, Intravenous, Q6H PRN  polyethylene glycol, 17 g, Oral, Daily PRN      ED Triage Vitals [12/28/24 1615]   Temperature Pulse Respirations Blood Pressure SpO2 Pain Score   97.6 °F (36.4 °C) 98 20 112/66 97 % 5     Weight (last 2 days)       None            Vital Signs (last 3 days)       Date/Time Temp Pulse Resp BP MAP (mmHg) SpO2 O2 Device Patient Position - Orthostatic VS Pain    12/30/24 15:46:28 97.8 °F (36.6 °C) 91 17 116/76 89 96 % -- -- --    12/30/24 0851 -- -- -- -- -- -- -- -- No Pain    12/30/24 0848 -- -- -- -- -- -- -- -- No Pain    12/30/24 07:42:39 97.1 °F (36.2 °C) 81 -- 112/71 85 97 % -- -- --    12/30/24 07:42:01 -- 83 -- 112/71 85 97 % -- -- --    12/29/24 23:21:07 97.9 °F (36.6 °C) 94 16 103/65 78 95 % -- -- --    12/29/24 1352 -- -- -- -- -- -- -- -- No Pain    12/29/24 12:48:53 97.7 °F (36.5 °C) 98 18 110/73 85 98 % -- -- --    12/29/24 0801 97.4 °F (36.3 °C) 90 18 84/54 -- 97 % -- -- --    12/29/24 0600 -- 88 20 109/57 77 98 % None (Room air) Lying --    12/29/24 0500 -- 92 20 99/66 76 99  % None (Room air) Sitting --    12/29/24 0100 -- 94 20 99/56 73 99 % None (Room air) Lying --    12/28/24 1917 -- -- -- -- -- -- None (Room air) -- --    12/28/24 1615 97.6 °F (36.4 °C) 98 20 112/66 -- 97 % None (Room air) -- 5              Pertinent Labs/Diagnostic Test Results:   Radiology:  XR chest 2 views   Final Interpretation by Ruth Sinha MD (12/29 0739)      Linear opacity in the left base likely due to chronic mucoid impaction shown on CT with no definite acute disease.               Workstation performed: [id number]               Results from last 7 days   Lab Units 12/30/24  1057 12/29/24 0440 12/28/24 1940   WBC Thousand/uL 15.22* 9.88 10.75*   HEMOGLOBIN g/dL 13.7 12.7 14.0   HEMATOCRIT % 41.5 39.2 41.7   PLATELETS Thousands/uL 263 263 282   TOTAL NEUT ABS Thousands/µL 11.55* 8.48* 7.55         Results from last 7 days   Lab Units 12/30/24  1057 12/29/24  0440 12/28/24 1940   SODIUM mmol/L 133* 130* 128*   POTASSIUM mmol/L 4.0 4.6 4.0   CHLORIDE mmol/L 96 93* 88*   CO2 mmol/L 28 27 28   ANION GAP mmol/L 9 10 12   BUN mg/dL 56* 66* 69*   CREATININE mg/dL 1.53* 2.18* 2.23*   EGFR ml/min/1.73sq m 29  --   --    CALCIUM mg/dL 8.6 8.2* 8.6   MAGNESIUM mg/dL  --  2.3  --    PHOSPHORUS mg/dL  --  4.4*  --              Results from last 7 days   Lab Units 12/30/24  1057 12/29/24 0440 12/28/24  1940   GLUCOSE RANDOM mg/dL 89 125 103                 Results from last 7 days   Lab Units 12/28/24  2333   D-DIMER QUANTITATIVE ug/ml FEU 0.50*     Results from last 7 days   Lab Units 12/30/24  0650 12/29/24 0440   PROTIME seconds 27.8* 24.1*   INR  2.61* 2.16*               Results from last 7 days   Lab Units 12/29/24  0340   CLARITY UA  Clear   COLOR UA  Light Yellow   SPEC GRAV UA  1.011   PH UA  5.0   GLUCOSE UA mg/dl Negative   KETONES UA mg/dl Negative   BLOOD UA  Negative   PROTEIN UA mg/dl Negative   NITRITE UA  Negative   BILIRUBIN UA  Negative   UROBILINOGEN UA (BE) mg/dl <2.0   LEUKOCYTES UA   Negative                                 Results from last 7 days   Lab Units 12/26/24  1409   SPUTUM CULTURE  Test not performed. Suggest repeat specimen.   GRAM STAIN RESULT  >10 squamous epithelial cells/lpf, indicating orophayngeal contamination.*  No polys seen*  3+ Gram positive cocci in pairs*  2+ Gram negative rods*  Rare Yeast*                   Past Medical History:   Diagnosis Date    Abnormal ultrasound     RESOLVED: [id number]    Advice given about COVID-19 virus infection 04/07/2020    Ambulates with cane     Asthma with acute exacerbation     LAST ASSESSED: [id number]    Asymptomatic menopausal state     Atherosclerosis     Atrial fibrillation (HCC)     Chronic obstructive lung disease (HCC)     w/ chonic cough    Chronic ulcer of left foot (HCC)     Colon polyp     Congestive heart failure (HCC)     LAST ASSESSED: [id number]    COVID-19 virus infection 03/25/2023    Cuboid fracture     LAST ASSESSED: [id number]    Disease of thyroid gland     Dyspepsia     Edema of both lower extremities     Equinus deformity of left foot     Fall 04/01/2024    Falls     5/2024    GERD (gastroesophageal reflux disease)     High risk medication use     LAST ASSESSED: [id number]    Hyperlipidemia     Hypertension     Hypokalemia     Hypothyroidism     Impacted cerumen of both ears     LAST ASSESSED: [id number]    Impacted cerumen of right ear     LAST ASSESSED: [id number]    Influenza     LAST ASSESSED: 07JQX8865    IPMN (intraductal papillary mucinous neoplasm)     RESOLVED: 02OCT2015    Irregular heart beat     afib. Warfarin.    Limb swelling     LAST ASSESSED: 19MAY2014    Navicular fracture of ankle     LAST ASSESSED: [id number]    Onychomycosis     LAST ASSESSED: [id number]    Osteoarthritis     Osteopenia     Osteoporosis     Paronychia, finger, unspecified laterality     LAST ASSESSED: [id number]    Paroxysmal atrial fibrillation (HCC)     LAST ASSESSED: 02MAR2014    Peripheral vascular disease (HCC)     Plantar  fasciitis     SOBOE (shortness of breath on exertion)     Stroke (HCC)     Right arm weakness that has resolved    Talus fracture     LAST ASSESSED: [id number]    Vascular headache     Walker as ambulation aid     Wound of right foot      Present on Admission:   Asthma   Permanent atrial fibrillation (HCC)   Arthritis   Esophageal reflux   Hyperlipidemia   Hypothyroidism   Vitamin D deficiency   Primary hypertension   CKD stage 3b, GFR 30-44 ml/min (HCC)   Pneumonia due to COVID-19 virus   Hyponatremia   Acute kidney failure (HCC)      Admitting Diagnosis: Hyponatremia [E87.1]  SOB (shortness of breath) [R06.02]  DARIO (acute kidney injury) (HCC) [N17.9]  COVID-19 [U07.1]  Age/Sex: 89 y.o. female    Network Utilization Review Department  ATTENTION: Please call with any questions or concerns to [phone] and carefully listen to the prompts so that you are directed to the right person. All voicemails are confidential.   For Discharge needs, contact Care Management DC Support Team at [phone] opt. [phone]  Send all requests for admission clinical reviews, approved or denied determinations and any other requests to dedicated fax number below belonging to the [place] where the patient is receiving treatment. List of dedicated fax numbers for the Facilities:  FACILITY NAME UR FAX NUMBER   ADMISSION DENIALS (Administrative/Medical Necessity) [phone]   DISCHARGE SUPPORT TEAM (NETWORK) [phone]   PARENT CHILD HEALTH (Maternity/NICU/Pediatrics) [phone]   [facility] [phone]   [facility] [phone]   [facility] [phone]   [facility] [phone]   [facility] [phone]   [facility] [phone]   [facility] [phone]   [facility] [phone]   [facility]  [place] [phone]   [facility] [phone]   [facility] [phone]   [facility] [phone]

## 2024-12-30 NOTE — TELEPHONE ENCOUNTER
Attempted to call patient, went to . I see patient is admitted and being treated for covid pneumonia.

## 2024-12-30 NOTE — PLAN OF CARE
Problem: PHYSICAL THERAPY ADULT  Goal: Performs mobility at highest level of function for planned discharge setting.  See evaluation for individualized goals.  Description: Treatment/Interventions: Functional transfer training, LE strengthening/ROM, Elevations, Therapeutic exercise, Endurance training, Equipment eval/education, Bed mobility, Gait training, Spoke to nursing, OT  Equipment Recommended: Cane       See flowsheet documentation for full assessment, interventions and recommendations.  Note: Prognosis: Good  Problem List: Decreased endurance, Decreased mobility  Assessment: Pt is an 89 y.o. female presenting to [facility] on 12/28/24 for primary medical dx of pneumonia due to COVID-19 virus. Pt  has a past medical history of Abnormal ultrasound, Advice given about COVID-19 virus infection, Ambulates with cane, Asthma with acute exacerbation, Asymptomatic menopausal state, Atherosclerosis, Atrial fibrillation (HCC), Chronic obstructive lung disease (HCC), Chronic ulcer of left foot (HCC), Colon polyp, Congestive heart failure (HCC), COVID-19 virus infection, Cuboid fracture, Disease of thyroid gland, Dyspepsia, Edema of both lower extremities, Equinus deformity of left foot, Fall, Falls, GERD (gastroesophageal reflux disease), High risk medication use, Hyperlipidemia, Hypertension, Hypokalemia, Hypothyroidism, Impacted cerumen of both ears, Impacted cerumen of right ear, Influenza, IPMN (intraductal papillary mucinous neoplasm), Irregular heart beat, Limb swelling, Navicular fracture of ankle, Onychomycosis, Osteoarthritis, Osteopenia, Osteoporosis, Paronychia, finger, unspecified laterality, Paroxysmal atrial fibrillation (HCC), Peripheral vascular disease (HCC), Plantar fasciitis, SOBOE (shortness of breath on exertion), Stroke (HCC), Talus fracture, Vascular headache, Walker as ambulation aid, and Wound of right foot. Pt presents as a high complexity evaluation due to Ongoing medical management for primary dx,  Decreased activity tolerance compared to baseline, Fall risk, Increased assistance needed from caregiver at current time, Trending lab values, Continuous pulse oximetry monitoring . Pt lives alone at [facility] and was independent with mobility and ADLs PTA. Pt currently requires supervision for transfers with cane and CGA for ambulating 70'x2 with cane. Pt is limited by SOB and deficits in endurance, activity tolerance and mobility limiting their ability to participate in the community. Pt would benefit from continued skilled acute care PT services to address impairments and promote functional independence. Recommend level 3 resources to improve mobility and promote PLOF. The patient's AM-PAC Basic Mobility Inpatient Short Form Raw Score is 18. A Raw score of greater than or equal to 16 suggests the patient may benefit from discharge to home. Please also refer to the recommendation of the Physical Therapist for safe discharge planning. Pt left upright in chair with chair alarm donned, call bell and personal items within reach and all needs met.  Barriers to Discharge: None     Rehab Resource Intensity Level, PT: III (Minimum Resource Intensity)    See flowsheet documentation for full assessment.

## 2024-12-30 NOTE — ASSESSMENT & PLAN NOTE
Restarted home warfarin 2.5 daily and ordered INR with morning labs-INR is2.6 -will give 1 mg of Coumadin

## 2024-12-30 NOTE — ASSESSMENT & PLAN NOTE
The patient presented with DARIO on CKD (SCr 2.23 (baseline 1.4)).  BUN to creatinine ratio is 31 supportive of her clinical suspicion of prerenal DARIO on CKD due to poor p.o. intake due to 10 days of viral illness.  She was given 1 L bolus IVF in the ED.  Reaction and improved with IV fluids.  Creatinine down to 1.5

## 2024-12-30 NOTE — PROGRESS NOTES
Progress Note - Hospitalist   Name: Rebeca Banegas 89 y.o. female I MRN: [id number]  Unit/Bed#: -01 I Date of Admission: 12/28/2024   Date of Service: 12/30/2024 I Hospital Day: 2    Assessment & Plan  Pneumonia due to COVID-19 virus  Patient is roughly 10 days from the onset of her respiratory symptoms which are persistent.  Patient was found COVID-positive on nasal swab first tested on 12/28.  She is not requiring oxygen.  She has DARIO on CKD (SCr 2.23 (baseline 1.4)) which limits utilization of remdesivir.  She received prednisone 40 in the ED.  -Ordered dexamethasone 6 mg daily  -Cannot use remdesivir due to DARIO on CKD-patient has been symptomatic for  about 2 weeks  Patient received IV hydration due to poor p.o. intake.  -Ordered vitamin C and zinc p.o. daily  -Ipratropium only nebs due to A-fib, fluticasone-vilanterol inhaler daily, and as needed albuterol inhaler  -Airborne precautions  -Supportive care  -Symptomatically improving  Still getting short of breath with minimal activity-on examination with decreased air movement. Change   Dexamethasone to prednisone given decreased breath sounds  Chest x-ray was reviewed  Acute kidney failure (HCC)  The patient presented with DARIO on CKD (SCr 2.23 (baseline 1.4)).  BUN to creatinine ratio is 31 supportive of her clinical suspicion of prerenal DARIO on CKD due to poor p.o. intake due to 10 days of viral illness.  She was given 1 L bolus IVF in the ED.  Reaction and improved with IV fluids.  Creatinine down to 1.5    Asthma  See COVID  Prednisone taper  Permanent atrial fibrillation (HCC)  Restarted home warfarin 2.5 daily and ordered INR with morning labs-INR is2.6 -will give 1 mg of Coumadin  Hyperlipidemia  Home atorvastatin 40  Hypothyroidism  Home levothyroxine 50 daily but 75 on Sunday and Wednesday  Vitamin D deficiency  On vitamin D  Primary hypertension  Home metoprolol 100, spironolactone 50, and torsemide 40  Spironolactone and torsemide was on hold  due to DARIO and poor p.o. intake.  Restart torsemide at a lower dose and monitor  CKD stage 3b, GFR 30-44 ml/min ([facility])  Lab Results   Component Value Date    EGFR 29 12/30/2024    EGFR 29 07/26/2024    EGFR 23 06/13/2024    CREATININE 1.53 (H) 12/30/2024    CREATININE 2.18 (H) 12/29/2024    CREATININE 2.23 (H) 12/28/2024     See above  Hyponatremia  At admission her sodium was 128 (baseline 135).  We suspect this is due to decreased oral intake due to her 10 days of illness.  She is s/p 2L IVF.  We will monitor her sodium and encourage oral intake.  Sodium 130.  Trend for now     VTE Pharmacologic Prophylaxis:   Moderate Risk (Score 3-4) - Pharmacological DVT Prophylaxis Ordered: warfarin (Coumadin).    Mobility:   Basic Mobility Inpatient Raw Score: 18  JH-HLM Goal: 6: Walk 10 steps or more  JH-HLM Achieved: 7: Walk 25 feet or more  JH-HLM Goal achieved. Continue to encourage appropriate mobility.    Patient Centered Rounds:    Discussions with Specialists or Other Care Team Provider:     Education and Discussions with Family / Patient: Updated  (daughter) via phone.    Current Length of Stay: 2 day(s)  Current Patient Status: Inpatient   Certification Statement: The patient will continue to require additional inpatient hospital stay due to COVID-19  Discharge Plan: Anticipate discharge in 24-48 hrs to home with home services.    Code Status: Level 3 - DNAR and DNI    Subjective   Patient seen and examined.  Events overnight.  Patient reported that she is feeling better.  Appetite is improving.  Discussed with daughter.  Daughter is concerned about her recent foot surgery.  PT evaluation appreciated    Objective :  Temp:  [97.1 °F (36.2 °C)-97.9 °F (36.6 °C)] 97.8 °F (36.6 °C)  HR:  [81-94] 91  BP: (103-116)/(65-76) 116/76  Resp:  [16-17] 17  SpO2:  [95 %-97 %] 96 %    There is no height or weight on file to calculate BMI.     Input and Output Summary (last 24 hours):     Intake/Output Summary (Last 24  hours) at 12/30/2024 1609  Last data filed at 12/29/2024 1700  Gross per 24 hour   Intake 360 ml   Output --   Net 360 ml       Physical Exam  Constitutional:       General: She is not in acute distress.  HENT:      Head: Normocephalic and atraumatic.      Nose: Nose normal.   Eyes:      General:         Right eye: No discharge.   Cardiovascular:      Rate and Rhythm: Normal rate and regular rhythm.      Pulses: Normal pulses.   Pulmonary:      Breath sounds: Rhonchi present.      Comments: Decreased breath sounds bilateral -  Abdominal:      General: There is no distension.      Tenderness: There is no abdominal tenderness.   Musculoskeletal:         General: No swelling.   Skin:     General: Skin is warm.      Coloration: Skin is not jaundiced.   Neurological:      Mental Status: She is alert. Mental status is at baseline.           Lines/Drains:              Lab Results: I have reviewed the following results:   Results from last 7 days   Lab Units 12/30/24  1057   WBC Thousand/uL 15.22*   HEMOGLOBIN g/dL 13.7   HEMATOCRIT % 41.5   PLATELETS Thousands/uL 263   SEGS PCT % 76*   LYMPHO PCT % 15   MONO PCT % 8   EOS PCT % 0     Results from last 7 days   Lab Units 12/30/24  1057   SODIUM mmol/L 133*   POTASSIUM mmol/L 4.0   CHLORIDE mmol/L 96   CO2 mmol/L 28   BUN mg/dL 56*   CREATININE mg/dL 1.53*   ANION GAP mmol/L 9   CALCIUM mg/dL 8.6   GLUCOSE RANDOM mg/dL 89     Results from last 7 days   Lab Units 12/30/24  0650   INR  2.61*                   Recent Cultures (last 7 days):   Results from last 7 days   Lab Units 12/26/24  1409   SPUTUM CULTURE  Test not performed. Suggest repeat specimen.   GRAM STAIN RESULT  >10 squamous epithelial cells/lpf, indicating orophayngeal contamination.*  No polys seen*  3+ Gram positive cocci in pairs*  2+ Gram negative rods*  Rare Yeast*             Last 24 Hours Medication List:     Current Facility-Administered Medications:     acetaminophen (TYLENOL) tablet 650 mg, Q6H  PRN    albuterol (PROVENTIL HFA,VENTOLIN HFA) inhaler 2 puff, Q6H PRN    ascorbic acid (VITAMIN C) tablet 500 mg, Daily    atorvastatin (LIPITOR) tablet 40 mg, QPM    Cholecalciferol (VITAMIN D3) tablet 1,000 Units, Daily    dexamethasone (DECADRON) tablet 6 mg, Daily    Fluticasone Furoate-Vilanterol 100-25 mcg/actuation 1 puff, Daily    ipratropium (ATROVENT HFA) inhaler 2 puff, 4x Daily    levothyroxine tablet 50 mcg, Early Morning    metoprolol succinate (TOPROL-XL) 24 hr tablet 100 mg, Daily    ondansetron (ZOFRAN) injection 4 mg, Q6H PRN    polyethylene glycol (MIRALAX) packet 17 g, Daily PRN    [Held by provider] warfarin (COUMADIN) tablet 2.5 mg, Daily (warfarin)    zinc sulfate (ZINCATE) capsule 220 mg, Daily    Administrative Statements   Today, Patient Was Seen By: Ashley Magdaleno MD      **Please Note: This note may have been constructed using a voice recognition system.**

## 2024-12-30 NOTE — TELEPHONE ENCOUNTER
Sanjana from  laboratory called in to inform our office that patient cancelled the sputum culture and gram stain tests.

## 2024-12-30 NOTE — OCCUPATIONAL THERAPY NOTE
Occupational Therapy Evaluation     Patient Name: Rebeca Banegas  Today's Date: 12/30/2024  Problem List  Principal Problem:    Pneumonia due to COVID-19 virus  Active Problems:    Arthritis    Asthma    Permanent atrial fibrillation (HCC)    Esophageal reflux    Hyperlipidemia    Primary hypertension    Hypothyroidism    Vitamin D deficiency    CKD stage 3b, GFR 30-44 ml/min (HCC)    Hyponatremia    Acute kidney failure (HCC)    Past Medical History  Past Medical History:   Diagnosis Date    Abnormal ultrasound     RESOLVED: [id number]    Advice given about COVID-19 virus infection 04/07/2020    Ambulates with cane     Asthma with acute exacerbation     LAST ASSESSED: [id number]    Asymptomatic menopausal state     Atherosclerosis     Atrial fibrillation (HCC)     Chronic obstructive lung disease (HCC)     w/ chonic cough    Chronic ulcer of left foot (HCC)     Colon polyp     Congestive heart failure (HCC)     LAST ASSESSED: [id number]    COVID-19 virus infection 03/25/2023    Cuboid fracture     LAST ASSESSED: [id number]    Disease of thyroid gland     Dyspepsia     Edema of both lower extremities     Equinus deformity of left foot     Fall 04/01/2024    Falls     5/2024    GERD (gastroesophageal reflux disease)     High risk medication use     LAST ASSESSED: [id number]    Hyperlipidemia     Hypertension     Hypokalemia     Hypothyroidism     Impacted cerumen of both ears     LAST ASSESSED: [id number]    Impacted cerumen of right ear     LAST ASSESSED: 26KCK7235    Influenza     LAST ASSESSED: [id number]    IPMN (intraductal papillary mucinous neoplasm)     RESOLVED: 02OCT2015    Irregular heart beat     afib. Warfarin.    Limb swelling     LAST ASSESSED: [id number]    Navicular fracture of ankle     LAST ASSESSED: [id number]    Onychomycosis     LAST ASSESSED: [id number]    Osteoarthritis     Osteopenia     Osteoporosis     Paronychia, finger, unspecified laterality     LAST ASSESSED: 45QPC4577    Paroxysmal atrial  fibrillation (HCC)     LAST ASSESSED: 02MAR2014    Peripheral vascular disease (HCC)     Plantar fasciitis     SOBOE (shortness of breath on exertion)     Stroke (HCC)     Right arm weakness that has resolved    Talus fracture     LAST ASSESSED: [id number]    Vascular headache     Walker as ambulation aid     Wound of right foot      Past Surgical History  Past Surgical History:   Procedure Laterality Date    APPENDECTOMY      BONE BIOPSY Left 08/09/2024    Procedure: Bone bx of proximal phalanx second toe & second met with fluoroscopic guidance;  Surgeon: Jonny Marcial DPM;  Location: [facility];  Service: Podiatry    CATARACT EXTRACTION Bilateral     CHOLECYSTECTOMY      COLONOSCOPY      JOINT REPLACEMENT Bilateral     knee    NEUROPLASTY / TRANSPOSITION MEDIAN NERVE AT CARPAL TUNNEL BILATERAL Bilateral     DECOMPRESSION    OOPHORECTOMY Bilateral     age 81    PELVIC FLOOR REPAIR  2014    WY BIOPSY BONE TROCAR/NEEDLE SUPERFICIAL Left 08/09/2024    Procedure: Left foot debridement;  Surgeon: Jonny Marcial DPM;  [facility];  Service: Podiatry    WY DEBRIDEMENT MUSCLE &/FASCIA 1ST 20 SQ CM/< Left 08/09/2024    Procedure: Left foot debridement;  Surgeon: Jonny Marcial DPM;  [facility];  Service: Podiatry    WY OSTEOT W/WO LNGTH SHRT/CORRJ 1ST METAR Left 11/29/2024    Procedure: Left foot percutaneous osteotomy of second, third and fourth metatarsal.;  Surgeon: Jonny Marcial DPM;  [facility];  Service: Podiatry    SALPINGECTOMY Bilateral     SINUS SURGERY      TONSILLECTOMY      TOTAL KNEE ARTHROPLASTY Bilateral          12/30/24 0848   OT Last Visit   OT Visit Date 12/30/24   Note Type   Note type Evaluation   Pain Assessment   Pain Assessment Tool 0-10   Pain Score No Pain   Restrictions/Precautions   Weight Bearing Precautions Per Order No   Other Precautions Contact/isolation;Airborne/isolation;Chair Alarm;Bed Alarm;Fall Risk   Home Living   Type of  "Home Assisted living  ([facility])   Home Layout One level;Performs ADLs on one level   Bathroom Shower/Tub Walk-in shower   Bathroom Toilet Standard   Bathroom Equipment Grab bars in shower;Built-in shower seat   Home Equipment Walker;Cane   Additional Comments Pt reports living alone in [facility] apartment. Pt used SPC PTA.   Prior Function   Level of [place] Independent with ADLs;Independent with functional mobility;Needs assistance with IADLS   Lives With Alone;Facility staff   Receives Help From Personal care attendant   IADLs Family/Friend/Other provides transportation;Family/Friend/Other provides meals;Family/Friend/Other provides medication management   Falls in the last 6 months 0   Vocational Retired   Comments Pt reports being I w/ ADLs though has supervision for bathing. Requires assist w/ IADLs. Previously would go to dining room for meals however more recently has them delivered. Was driving up until 1 month ago.   Lifestyle   Autonomy PTA, Pt reports Ind/Supervision ADLs, assist w/ IADLs   Reciprocal Relationships Facility staff   Service to Others Retired RN   General   Family/Caregiver Present No   Subjective   Subjective \"My breathing feels a little shaky still\"   ADL   Where Assessed Chair   Eating Assistance 6  Modified independent   Grooming Assistance 5  Supervision/Setup   UB Bathing Assistance 5  Supervision/Setup   LB Bathing Assistance 4  Minimal Assistance   UB Dressing Assistance 5  Supervision/Setup   LB Dressing Assistance 4  Minimal Assistance   Toileting Assistance  4  Minimal Assistance   Functional Assistance 4  Minimal Assistance   Bed Mobility   Supine to Sit Unable to assess   Sit to Supine Unable to assess   Additional Comments Pt greeted and left OOB in chair w/ alarm on and all needs witihn reach   Transfers   Sit to Stand 5  Supervision   Additional items Increased time required;Verbal cues   Stand to Sit 5  Supervision   Additional items Increased time " required;Verbal cues   Functional Mobility   Functional Mobility 4  Minimal assistance  (CGA)   Additional Comments Pt completes long household distance mobility w/ CGA using SPC. Pt reports mild SOB w/ increased ambulation.   Additional items SPC   Balance   Static Sitting Good   Dynamic Sitting Fair +   Static Standing Fair   Dynamic Standing Fair -   Ambulatory Fair -   Activity Tolerance   Activity Tolerance Patient tolerated treatment well;Patient limited by fatigue   Medical Staff Made Aware PT hardeep Slaughter w/ PT due to medical complexity and multiple comorbidities   Nurse Made Aware RN cleared   RUE Assessment   RUE Assessment WFL   LUE Assessment   LUE Assessment WFL   Hand Function   Gross Motor Coordination Functional   Fine Motor Coordination Functional   Sensation   Light Touch No apparent deficits   Cognition   Arousal/Participation Alert;Responsive;Cooperative   Attention Within functional limits   Orientation Level Oriented X4   Memory Within functional limits   Following Commands Follows one step commands without difficulty   Comments Pt is pleasant and cooperative. Overall fair safety awarness and insight.   Assessment   Limitation Decreased ADL status;Decreased endurance;Decreased self-care trans;Decreased high-level ADLs   Prognosis Good   Assessment Pt is a 89 y.o. female seen for OT evaluation s/p admission to [facility] on 12/28/2024 due to SOB. Pt diagnosed with Pneumonia due to COVID-19 virus. Pt's PMH is outlined above. Pt with active OT evaluation/treatment and activity orders. Pt reports living alone in [facility] apartment. PTA, Pt was Ind/Supervision w/ ADL, assist w/ IADL and Mod I functional mobility, was not driving and was using SPC at baseline. Pt agreeable and willing to participate in OT evaluation. Pt was greeted and left OOB in chair w/ alarm activated and all needs within reach. During evaluation, pt is Supervision UB ADLs, bed mobility, STS; CGA functional mobility;  Min A LB ADLs. Pt currently presents with impairments in the following categories -limited home support, difficulty performing ADLS, and difficulty performing IADLS  activity tolerance, endurance, and standing balance/tolerance. These impairments, as well as pt's fatigue, SOB, TANG, and risk for falls  limit pt's ability to safely engage in all baseline areas of occupation, includingeating, grooming, bathing, dressing, toileting, functional mobility/transfers, community mobility, social participation , and leisure activities . The patient's raw score on the AM-PAC Daily Activity Inpatient Short Form is 19. A raw score of greater than or equal to 19 suggests the patient may benefit from discharge to home. Please refer to the recommendation of the Occupational Therapist for safe discharge planning.    Pt would benefit from continued acute OT services throughout hospital course in order to maximize Pt's independence and overall occupational performance. Plan for OT interventions 2-3x per week. From OT standpoint,  recommend Level III (Minimum Resource Intensity) upon d/c when pt medically stable to d/c from acute care. Will continue to follow.   Goals   Patient Goals to get better   LTG Time Frame 10-14   Long Term Goal See goals below   Plan   Treatment Interventions ADL retraining;Functional transfer training;UE strengthening/ROM;Endurance training;Patient/family training;Equipment evaluation/education;Compensatory technique education;Continued evaluation;Energy conservation;Activityengagement   Goal Expiration Date 01/13/25   OT Treatment Day 0   OT Frequency 2-3x/wk   Discharge Recommendation   Rehab Resource Intensity Level, OT III (Minimum Resource Intensity)   AM-PAC Daily Activity Inpatient   Lower Body Dressing 3   Bathing 3   Toileting 3   Upper Body Dressing 3   Grooming 3   Eating 4   Daily Activity Raw Score 19   Daily Activity Standardized Score (Calc for Raw Score >=11) 40.22   AM-PAC Applied Cognition  Inpatient   Following a Speech/Presentation 4   Understanding Ordinary Conversation 4   Taking Medications 4   Remembering Where Things Are Placed or Put Away 4   Remembering List of 4-5 Errands 3   Taking Care of Complicated Tasks 3   Applied Cognition Raw Score 22   Applied Cognition Standardized Score 47.83   End of Consult   Education Provided Yes   Patient Position at End of Consult Bedside chair;Bed/Chair alarm activated;All needs within reach   Nurse Communication Nurse aware of consult     OT Goals:     - Pt will be Mod I with LB ADL by the time of discharge.    - Pt will be Mod I with UB ADL by the time of discharge.    - Pt will complete toileting routine (transfers, hygiene, and clothing management) with Mod I  to maximize independence and return to prior level of function.    - Pt will complete bed mobility supine >< sit w/ Mod I to maximize independence and return home.    - Pt will transfer to bed, chair, and toilet w/ Mod I using AD / DME as needed to maximize independence and reduce burden of care.     - Pt will ambulate household distances w/ Mod I using least restrictive device to maximize independence and return home.     - Pt will increase activity tolerance (and sitting tolerance) by eating all meals OOB in the chair.     - Pt will increase standing tolerance to 20 minutes to maximize independence w/ grooming tasks standing at the sink.     - Pt will tolerate therapeutic activities for greater than 30 minutes in order to increase tolerance for functional activities.     - Pt will participate in ongoing OT assessment of cognitive skills to assist with safe d/c planning/recommendations.       JIGAR Daniel, OTR/L

## 2024-12-31 LAB
ANION GAP SERPL CALCULATED.3IONS-SCNC: 8 MMOL/L (ref 4–13)
BUN SERPL-MCNC: 55 MG/DL (ref 5–25)
CALCIUM SERPL-MCNC: 8 MG/DL (ref 8.4–10.2)
CHLORIDE SERPL-SCNC: 100 MMOL/L (ref 96–108)
CO2 SERPL-SCNC: 26 MMOL/L (ref 21–32)
CREAT SERPL-MCNC: 1.42 MG/DL (ref 0.6–1.3)
ERYTHROCYTE [DISTWIDTH] IN BLOOD BY AUTOMATED COUNT: 13.8 % (ref 11.6–15.1)
GFR SERPL CREATININE-BSD FRML MDRD: 32 ML/MIN/1.73SQ M
GLUCOSE SERPL-MCNC: 121 MG/DL (ref 65–140)
HCT VFR BLD AUTO: 35.7 % (ref 34.8–46.1)
HGB BLD-MCNC: 11.8 G/DL (ref 11.5–15.4)
INR PPP: 2.6 (ref 0.85–1.19)
MCH RBC QN AUTO: 31.6 PG (ref 26.8–34.3)
MCHC RBC AUTO-ENTMCNC: 33.1 G/DL (ref 31.4–37.4)
MCV RBC AUTO: 96 FL (ref 82–98)
PLATELET # BLD AUTO: 213 THOUSANDS/UL (ref 149–390)
PMV BLD AUTO: 9.2 FL (ref 8.9–12.7)
POTASSIUM SERPL-SCNC: 3.7 MMOL/L (ref 3.5–5.3)
PROTHROMBIN TIME: 27.7 SECONDS (ref 12.3–15)
RBC # BLD AUTO: 3.74 MILLION/UL (ref 3.81–5.12)
SODIUM SERPL-SCNC: 134 MMOL/L (ref 135–147)
WBC # BLD AUTO: 11.01 THOUSAND/UL (ref 4.31–10.16)

## 2024-12-31 PROCEDURE — 85027 COMPLETE CBC AUTOMATED: CPT | Performed by: FAMILY MEDICINE

## 2024-12-31 PROCEDURE — 80048 BASIC METABOLIC PNL TOTAL CA: CPT | Performed by: FAMILY MEDICINE

## 2024-12-31 PROCEDURE — 85610 PROTHROMBIN TIME: CPT | Performed by: FAMILY MEDICINE

## 2024-12-31 PROCEDURE — 99233 SBSQ HOSP IP/OBS HIGH 50: CPT

## 2024-12-31 RX ADMIN — IPRATROPIUM BROMIDE 2 PUFF: 17 AEROSOL, METERED RESPIRATORY (INHALATION) at 19:05

## 2024-12-31 RX ADMIN — ZINC SULFATE 220 MG (50 MG) CAPSULE 220 MG: CAPSULE at 08:00

## 2024-12-31 RX ADMIN — PREDNISONE 40 MG: 20 TABLET ORAL at 08:00

## 2024-12-31 RX ADMIN — IPRATROPIUM BROMIDE 2 PUFF: 17 AEROSOL, METERED RESPIRATORY (INHALATION) at 12:58

## 2024-12-31 RX ADMIN — IPRATROPIUM BROMIDE 2 PUFF: 17 AEROSOL, METERED RESPIRATORY (INHALATION) at 22:09

## 2024-12-31 RX ADMIN — FLUTICASONE FUROATE AND VILANTEROL TRIFENATATE 1 PUFF: 100; 25 POWDER RESPIRATORY (INHALATION) at 08:00

## 2024-12-31 RX ADMIN — Medication 1000 UNITS: at 08:01

## 2024-12-31 RX ADMIN — WARFARIN SODIUM 2.5 MG: 2.5 TABLET ORAL at 19:05

## 2024-12-31 RX ADMIN — ATORVASTATIN CALCIUM 40 MG: 40 TABLET, FILM COATED ORAL at 19:04

## 2024-12-31 RX ADMIN — IPRATROPIUM BROMIDE 2 PUFF: 17 AEROSOL, METERED RESPIRATORY (INHALATION) at 08:13

## 2024-12-31 RX ADMIN — OXYCODONE HYDROCHLORIDE AND ACETAMINOPHEN 500 MG: 500 TABLET ORAL at 08:02

## 2024-12-31 RX ADMIN — LEVOTHYROXINE SODIUM 50 MCG: 0.05 TABLET ORAL at 05:51

## 2024-12-31 NOTE — PLAN OF CARE
Problem: Prexisting or High Potential for Compromised Skin Integrity  Goal: Skin integrity is maintained or improved  Description: INTERVENTIONS:  - Identify patients at risk for skin breakdown  - Assess and monitor skin integrity  - Assess and monitor nutrition and hydration status  - Monitor labs   - Assess for incontinence   - Turn and reposition patient  - Assist with mobility/ambulation  - Relieve pressure over bony prominences  - Avoid friction and shearing  - Provide appropriate hygiene as needed including keeping skin clean and dry  - Evaluate need for skin moisturizer/barrier cream  - Collaborate with interdisciplinary team   - Patient/family teaching  - Consider wound care consult   Outcome: Progressing     Problem: PAIN - ADULT  Goal: Verbalizes/displays adequate comfort level or baseline comfort level  Description: Interventions:  - Encourage patient to monitor pain and request assistance  - Assess pain using appropriate pain scale  - Administer analgesics based on type and severity of pain and evaluate response  - Implement non-pharmacological measures as appropriate and evaluate response  - Consider cultural and social influences on pain and pain management  - Notify physician/advanced practitioner if interventions unsuccessful or patient reports new pain  Outcome: Progressing     Problem: INFECTION - ADULT  Goal: Absence or prevention of progression during hospitalization  Description: INTERVENTIONS:  - Assess and monitor for signs and symptoms of infection  - Monitor lab/diagnostic results  - Monitor all insertion sites, i.e. indwelling lines, tubes, and drains  - Monitor endotracheal if appropriate and nasal secretions for changes in amount and color  - [place] appropriate cooling/warming therapies per order  - Administer medications as ordered  - Instruct and encourage patient and family to use good hand hygiene technique  - Identify and instruct in appropriate isolation precautions for  identified infection/condition  Outcome: Progressing     Problem: SAFETY ADULT  Goal: Patient will remain free of falls  Description: INTERVENTIONS:  - Educate patient/family on patient safety including physical limitations  - Instruct patient to call for assistance with activity   - Consult OT/PT to assist with strengthening/mobility   - Keep Call bell within reach  - Keep bed low and locked with side rails adjusted as appropriate  - Keep care items and personal belongings within reach  - Initiate and maintain comfort rounds  - Make Fall Risk Sign visible to staff  - Offer Toileting every 2 Hours, in advance of need  - Apply yellow socks and bracelet for high fall risk patients  - Consider moving patient to room near nurses station  Outcome: Progressing

## 2024-12-31 NOTE — PROGRESS NOTES
Progress Note - Hospitalist   Name: Rebeca Banegas 89 y.o. female I MRN: [id number]  Unit/Bed#: -01 I Date of Admission: 12/28/2024   Date of Service: 12/31/2024 I Hospital Day: 3    Assessment & Plan  Pneumonia due to COVID-19 virus  Patient is roughly 10 days from the onset of her respiratory symptoms which are persistent.  Patient was found COVID-positive on nasal swab first tested on 12/28.  She is not requiring oxygen.  She has DARIO on CKD (SCr 2.23 (baseline 1.4)) which limits utilization of remdesivir.  She received prednisone 40 in the ED.  -Ordered dexamethasone 6 mg daily  -Cannot use remdesivir due to DARIO on CKD-patient has been symptomatic for  about 2 weeks  Appetite better.  Monitor off fluids  -Ordered vitamin C and zinc p.o. daily  -Ipratropium only nebs due to A-fib, fluticasone-vilanterol inhaler daily, and as needed albuterol inhaler  -Airborne precautions  -Supportive care  -Symptomatically improving  Still getting short of breath with minimal activity-on examination with decreased air movement. Change   Dexamethasone to prednisone given decreased breath sounds  Chest x-ray was reviewed  Acute kidney failure (HCC)  Lab Results   Component Value Date    CREATININE 1.42 (H) 12/31/2024    CREATININE 1.53 (H) 12/30/2024    CREATININE 2.18 (H) 12/29/2024       Lab Results   Component Value Date    EGFR 32 12/31/2024    EGFR 29 12/30/2024    EGFR 29 07/26/2024       Creatinine has trended down nicely  Patient's feels much better  Will recheck tomorrow    Asthma  See COVID  Prednisone taper  Permanent atrial fibrillation (HCC)  Restarted home warfarin 2.5 daily and ordered INR with morning labs-INR is2.6 -will give 1 mg of Coumadin  Hyperlipidemia  Home atorvastatin 40  Hypothyroidism  Home levothyroxine 50 daily but 75 on Sunday and Wednesday  Vitamin D deficiency  On vitamin D  Primary hypertension  Home metoprolol 100, spironolactone 50, and torsemide 40  Spironolactone and torsemide was on  "hold due to DARIO and poor p.o. intake.  Restart torsemide at a lower dose and monitor  CKD stage 3b, GFR 30-44 ml/min ([facility])  Lab Results   Component Value Date    EGFR 32 12/31/2024    EGFR 29 12/30/2024    EGFR 29 07/26/2024    CREATININE 1.42 (H) 12/31/2024    CREATININE 1.53 (H) 12/30/2024    CREATININE 2.18 (H) 12/29/2024     See above  Hyponatremia  Recent Labs     12/29/24  0440 12/30/24  1057 12/31/24  0507   SODIUM 130* 133* 134*     No results found for: \"OSMOUA\", \"NAUR\", \"OSMOLALITSER\"  Patient appears euvolemic.  Much improved now  Continue diet.      VTE Pharmacologic Prophylaxis:   Moderate Risk (Score 3-4) - Pharmacological DVT Prophylaxis Ordered: warfarin (Coumadin).    Mobility:   Basic Mobility Inpatient Raw Score: 18  JH-HLM Goal: 6: Walk 10 steps or more  JH-HLM Achieved: 7: Walk 25 feet or more  JH-HLM Goal achieved. Continue to encourage appropriate mobility.    Patient Centered Rounds:    Discussions with Specialists or Other Care Team Provider:     Education and Discussions with Family / Patient: Updated  (daughter) via phone.    Current Length of Stay: 3 day(s)  Current Patient Status: Inpatient   Certification Statement: The patient will continue to require additional inpatient hospital stay due to COVID-19  Discharge Plan: Anticipate discharge in 24-48 hrs to home with home services.    Code Status: Level 3 - DNAR and DNI    Subjective   Patient still feels fatigued however overall better.  Agreeable for discharge tomorrow    Objective :  Temp:  [97.4 °F (36.3 °C)-97.8 °F (36.6 °C)] 97.5 °F (36.4 °C)  HR:  [82-91] 82  BP: ()/(52-76) 90/52  Resp:  [16-18] 18  SpO2:  [96 %-99 %] 99 %  O2 Device: None (Room air)    There is no height or weight on file to calculate BMI.     Input and Output Summary (last 24 hours):     Intake/Output Summary (Last 24 hours) at 12/31/2024 1157  Last data filed at 12/31/2024 0601  Gross per 24 hour   Intake 2140 ml   Output --   Net 2140 ml "       Physical Exam  Constitutional:       General: She is not in acute distress.  HENT:      Head: Normocephalic and atraumatic.      Nose: Nose normal.   Eyes:      General:         Right eye: No discharge.   Cardiovascular:      Rate and Rhythm: Normal rate and regular rhythm.      Pulses: Normal pulses.   Pulmonary:      Breath sounds: Rhonchi present.      Comments: Decreased breath sounds bilateral -  Abdominal:      General: There is no distension.      Tenderness: There is no abdominal tenderness.   Musculoskeletal:         General: No swelling.   Skin:     General: Skin is warm.      Coloration: Skin is not jaundiced.   Neurological:      Mental Status: She is alert. Mental status is at baseline.           Lines/Drains:              Lab Results: I have reviewed the following results:   Results from last 7 days   Lab Units 12/31/24  0507 12/30/24  1057   WBC Thousand/uL 11.01* 15.22*   HEMOGLOBIN g/dL 11.8 13.7   HEMATOCRIT % 35.7 41.5   PLATELETS Thousands/uL 213 263   SEGS PCT %  --  76*   LYMPHO PCT %  --  15   MONO PCT %  --  8   EOS PCT %  --  0     Results from last 7 days   Lab Units 12/31/24  0507   SODIUM mmol/L 134*   POTASSIUM mmol/L 3.7   CHLORIDE mmol/L 100   CO2 mmol/L 26   BUN mg/dL 55*   CREATININE mg/dL 1.42*   ANION GAP mmol/L 8   CALCIUM mg/dL 8.0*   GLUCOSE RANDOM mg/dL 121     Results from last 7 days   Lab Units 12/31/24  0507   INR  2.60*                   Recent Cultures (last 7 days):   Results from last 7 days   Lab Units 12/26/24  1409   SPUTUM CULTURE  Test not performed. Suggest repeat specimen.   GRAM STAIN RESULT  >10 squamous epithelial cells/lpf, indicating orophayngeal contamination.*  No polys seen*  3+ Gram positive cocci in pairs*  2+ Gram negative rods*  Rare Yeast*             Last 24 Hours Medication List:     Current Facility-Administered Medications:     acetaminophen (TYLENOL) tablet 650 mg, Q6H PRN    albuterol (PROVENTIL HFA,VENTOLIN HFA) inhaler 2 puff, Q6H  PRN    ascorbic acid (VITAMIN C) tablet 500 mg, Daily    atorvastatin (LIPITOR) tablet 40 mg, QPM    Cholecalciferol (VITAMIN D3) tablet 1,000 Units, Daily    Fluticasone Furoate-Vilanterol 100-25 mcg/actuation 1 puff, Daily    ipratropium (ATROVENT HFA) inhaler 2 puff, 4x Daily    levothyroxine tablet 50 mcg, Early Morning    metoprolol succinate (TOPROL-XL) 24 hr tablet 100 mg, Daily    ondansetron (ZOFRAN) injection 4 mg, Q6H PRN    polyethylene glycol (MIRALAX) packet 17 g, Daily PRN    predniSONE tablet 40 mg, Daily    torsemide (DEMADEX) tablet 20 mg, Daily    warfarin (COUMADIN) tablet 2.5 mg, Daily (warfarin)    zinc sulfate (ZINCATE) capsule 220 mg, Daily    Administrative Statements   Today, Patient Was Seen By: Mich Hare MD      **Please Note: This note may have been constructed using a voice recognition system.**

## 2024-12-31 NOTE — CASE MANAGEMENT
Case Management Assessment & Discharge Planning Note    Patient name Rebeca Banegas  Location /-01 MRN [id number]  : 1932 Date 2024       Current Admission Date: 2024  Current Admission Diagnosis:Pneumonia due to COVID-19 virus   Patient Active Problem List    Diagnosis Date Noted Date Diagnosed    Hyponatremia 2024     Acute kidney failure (HCC) 2024     Preop exam for internal medicine 2024     Severe protein-calorie malnutrition (HCC) 2024     CVA (cerebral vascular accident) ([facility]) 2024     Preoperative examination 2024     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 ([facility]) 2024     Chronic foot ulcer, left, with fat layer exposed ([facility]) 2024     Vaccine counseling 2023     Bronchiectasis without complication ([facility]) 2023     MGUS (monoclonal gammopathy of unknown significance) 2023     Chronic cough 2023     Age-related osteoporosis without current pathological fracture 2023     Pneumonia due to COVID-19 virus 2023     Secondary hyperparathyroidism (HCC) 2023     Open wound of right upper arm 10/17/2022     Labial abscess 2022     Vulvar cysts 2022     Osteopenia of hip 2020     CKD stage 3b, GFR 30-44 ml/min ([facility]) 2020     Elevated serum immunoglobulin free light chains 2019     Sensorineural hearing loss (SNHL), bilateral 2019     Left asymmetrical SNHL 2019     Pain of left hand 2018     Leg cramps 2018     Chronic diastolic congestive heart failure (HCC) 2017     Plantar fasciitis 2016     Essential tremor 10/02/2015     Vitamin D deficiency 10/02/2015     Onychomycosis 2015     Pulmonary nodule 2015     Thyroid nodule 2015     Prediabetes 2014     Primary hypertension 2014     Leg pain 2014     Abnormal findings on diagnostic imaging of urinary organs 2014      Abnormal findings on diagnostic imaging of other specified body structures 03/20/2014     Atherosclerosis 03/06/2014     Ovarian cyst 03/06/2014     Pancreatic cyst 03/06/2014     Backache 02/28/2014     Hoarseness 02/28/2014     Acute on chronic diastolic CHF (congestive heart failure) (HCC) 01/14/2014     Allergic rhinitis 06/11/2013     Hyperlipidemia 04/19/2013     Permanent atrial fibrillation (HCC) 02/14/2013     Arthritis 11/12/2012     Asthma 11/12/2012     Esophageal reflux 11/12/2012     Benign colon polyp 11/10/2012     Bifascicular bundle branch block 11/10/2012     Hypothyroidism 11/10/2012     Nasal polyp 11/10/2012       LOS (days): 3  Geometric Mean LOS (GMLOS) (days):   Days to GMLOS:     OBJECTIVE:    Risk of Unplanned Readmission Score: 24.45         Current admission status: Inpatient       Preferred Pharmacy:   [facility] - [facility] [address] [facility]  [address] [facility] [address]  Phone: [phone] Fax: [phone]    [facility]/pharmacy #[id number] - [place], [place] - [address] [place], [place] [address] [address]  Phone: [phone] Fax: [phone]    Primary Care Provider: Santana Dillon MD    Primary Insurance: [employer]  Secondary Insurance:     ASSESSMENT:  Active Health Care Proxies       Meena Solorzano Health Care Representative - Daughter   Primary Phone: [phone] (Mobile)                 Advance Directives  Does patient have a Health Care POA?: Yes  Does patient have Advance Directives?: Yes  Advance Directives: Living will, Power of  for health care  Primary Contact: Meena Daniel    Patient Information  Admitted from:: Facility ([facility])  Mental Status: Alert  During Assessment patient was accompanied by: Not accompanied during assessment  Assessment information provided by:: Patient  Primary Caregiver: Self  Support Systems: Family members, Daughter  County of Residence: [place]  What city do you  live in?: [place]  Home entry access options. Select all that apply.: No steps to enter home  Type of Current Residence: Apartment  Upon entering residence, is there a bedroom on the main floor (no further steps)?: Yes  Upon entering residence, is there a bathroom on the main floor (no further steps)?: Yes  Living Arrangements: Lives in Facility ([facility])  Is patient a ?: No    Activities of Daily Living Prior to Admission  Functional Status: Independent  Completes ADLs independently?: Yes  Ambulates independently?: Yes  Does patient use assisted devices?: Yes  Assisted Devices (DME) used: Walker, Straight Cane  Does patient have a history of Outpatient Therapy (PT/OT)?: Yes (through [facility])  Does the patient have a history of Short-Term Rehab?: No  Does patient have a history of HHC?: No  Does patient currently have HHC?: No    Patient Information Continued  Income Source: Pension/MC[employer]  Does patient have prescription coverage?: Yes  Does patient receive dialysis treatments?: No  Does patient have a history of substance abuse?: No  Does patient have a history of Mental Health Diagnosis?: No    Means of Transportation  Means of Transport to Appts:: Drives Self          DISCHARGE DETAILS:    Discharge planning discussed with:: Patient  Freedom of Choice: Yes  Comments - Freedom of Choice: Discussed FOC  CM contacted family/caregiver?: No- see comments (declined)  Were Treatment Team discharge recommendations reviewed with patient/caregiver?: Yes  Did patient/caregiver verbalize understanding of patient care needs?: Yes  Were patient/caregiver advised of the risks associated with not following Treatment Team discharge recommendations?: Yes    This CM introduced self and role to patient.  Patient lives at [facility] in [place] (has been there for about 7 years).  IADLS, has walker and cane at home (uses both occasionally when needed).  History of OP therapy  through [facility] (would like to do post hospital stay- will need ambulatory referrals on AVS), no hx of HH or STR noted.  Daughter, Meena is POA.  Denies any falls in past 6 months, reports that she drives herself

## 2024-12-31 NOTE — ASSESSMENT & PLAN NOTE
Lab Results   Component Value Date    EGFR 32 12/31/2024    EGFR 29 12/30/2024    EGFR 29 07/26/2024    CREATININE 1.42 (H) 12/31/2024    CREATININE 1.53 (H) 12/30/2024    CREATININE 2.18 (H) 12/29/2024     See above

## 2024-12-31 NOTE — ASSESSMENT & PLAN NOTE
Lab Results   Component Value Date    CREATININE 1.42 (H) 12/31/2024    CREATININE 1.53 (H) 12/30/2024    CREATININE 2.18 (H) 12/29/2024       Lab Results   Component Value Date    EGFR 32 12/31/2024    EGFR 29 12/30/2024    EGFR 29 07/26/2024       Creatinine has trended down nicely  Patient's feels much better  Will recheck tomorrow

## 2024-12-31 NOTE — ASSESSMENT & PLAN NOTE
Patient is roughly 10 days from the onset of her respiratory symptoms which are persistent.  Patient was found COVID-positive on nasal swab first tested on 12/28.  She is not requiring oxygen.  She has DARIO on CKD (SCr 2.23 (baseline 1.4)) which limits utilization of remdesivir.  She received prednisone 40 in the ED.  -Ordered dexamethasone 6 mg daily  -Cannot use remdesivir due to DARIO on CKD-patient has been symptomatic for  about 2 weeks  Appetite better.  Monitor off fluids  -Ordered vitamin C and zinc p.o. daily  -Ipratropium only nebs due to A-fib, fluticasone-vilanterol inhaler daily, and as needed albuterol inhaler  -Airborne precautions  -Supportive care  -Symptomatically improving  Still getting short of breath with minimal activity-on examination with decreased air movement. Change   Dexamethasone to prednisone given decreased breath sounds  Chest x-ray was reviewed

## 2024-12-31 NOTE — UTILIZATION REVIEW
NOTIFICATION OF INPATIENT ADMISSION      AUTHORIZATION REQUEST   SERVICING FACILITY:   [facility]  Address: [address] [place] [address]  Tax ID: [id number]  NPI: [id number] ATTENDING PROVIDER:  Attending Name and NPI#: Mich Hare Md [[id number]]  Address: [address] [place] [address]  Phone: [phone]   ADMISSION INFORMATION:  Place of Service: Inpatient [facility]  Place of Service Code: 21  Inpatient Admission Date/Time: 12/28/24  9:01 PM  Discharge Date/Time: No discharge date for patient encounter.  Admitting Diagnosis Code/Description:  Hyponatremia [E87.1]  SOB (shortness of breath) [R06.02]  DARIO (acute kidney injury) (HCC) [N17.9]  COVID-19 [U07.1]     UTILIZATION REVIEW CONTACT:  Christiana Mendiola, Utilization   Network Utilization Review Department  Phone: [phone]  Fax: [phone]  Email: [email or web address]  Contact for approvals/pending authorizations, clinical reviews, and discharge.     PHYSICIAN ADVISORY SERVICES:  Medical Necessity Denial & Ujnj-ql-Pejh Review  Phone: [phone]  Fax: [phone]  Email: [email or web address]     DISCHARGE SUPPORT TEAM:  For Patients Discharge Needs & Updates  Phone: [phone] opt. 2 Fax: [phone]  Email: [email or web address]

## 2024-12-31 NOTE — ASSESSMENT & PLAN NOTE
"Recent Labs     12/29/24  0440 12/30/24  1057 12/31/24  0507   SODIUM 130* 133* 134*     No results found for: \"OSMOUA\", \"NAUR\", \"OSMOLALITSER\"  Patient appears euvolemic.  Much improved now  Continue diet.    "

## 2025-01-01 VITALS
DIASTOLIC BLOOD PRESSURE: 78 MMHG | RESPIRATION RATE: 18 BRPM | HEART RATE: 92 BPM | SYSTOLIC BLOOD PRESSURE: 124 MMHG | OXYGEN SATURATION: 97 % | TEMPERATURE: 97.3 F

## 2025-01-01 LAB
ANION GAP SERPL CALCULATED.3IONS-SCNC: 9 MMOL/L (ref 4–13)
BUN SERPL-MCNC: 54 MG/DL (ref 5–25)
CALCIUM SERPL-MCNC: 8.2 MG/DL (ref 8.4–10.2)
CHLORIDE SERPL-SCNC: 99 MMOL/L (ref 96–108)
CO2 SERPL-SCNC: 27 MMOL/L (ref 21–32)
CREAT SERPL-MCNC: 1.24 MG/DL (ref 0.6–1.3)
ERYTHROCYTE [DISTWIDTH] IN BLOOD BY AUTOMATED COUNT: 13.8 % (ref 11.6–15.1)
GFR SERPL CREATININE-BSD FRML MDRD: 37 ML/MIN/1.73SQ M
GLUCOSE SERPL-MCNC: 123 MG/DL (ref 65–140)
HCT VFR BLD AUTO: 35.2 % (ref 34.8–46.1)
HGB BLD-MCNC: 11.9 G/DL (ref 11.5–15.4)
MCH RBC QN AUTO: 32.2 PG (ref 26.8–34.3)
MCHC RBC AUTO-ENTMCNC: 33.8 G/DL (ref 31.4–37.4)
MCV RBC AUTO: 95 FL (ref 82–98)
PLATELET # BLD AUTO: 221 THOUSANDS/UL (ref 149–390)
PMV BLD AUTO: 9 FL (ref 8.9–12.7)
POTASSIUM SERPL-SCNC: 3.8 MMOL/L (ref 3.5–5.3)
RBC # BLD AUTO: 3.7 MILLION/UL (ref 3.81–5.12)
SODIUM SERPL-SCNC: 135 MMOL/L (ref 135–147)
WBC # BLD AUTO: 10.57 THOUSAND/UL (ref 4.31–10.16)

## 2025-01-01 PROCEDURE — 99239 HOSP IP/OBS DSCHRG MGMT >30: CPT

## 2025-01-01 PROCEDURE — 80048 BASIC METABOLIC PNL TOTAL CA: CPT

## 2025-01-01 PROCEDURE — 85027 COMPLETE CBC AUTOMATED: CPT

## 2025-01-01 RX ORDER — PREDNISONE 20 MG/1
TABLET ORAL
Qty: 10 TABLET | Refills: 0 | Status: SHIPPED | OUTPATIENT
Start: 2025-01-02 | End: 2025-01-10

## 2025-01-01 RX ADMIN — FLUTICASONE FUROATE AND VILANTEROL TRIFENATATE 1 PUFF: 100; 25 POWDER RESPIRATORY (INHALATION) at 08:16

## 2025-01-01 RX ADMIN — IPRATROPIUM BROMIDE 2 PUFF: 17 AEROSOL, METERED RESPIRATORY (INHALATION) at 08:16

## 2025-01-01 RX ADMIN — LEVOTHYROXINE SODIUM 50 MCG: 0.05 TABLET ORAL at 05:49

## 2025-01-01 RX ADMIN — Medication 1000 UNITS: at 08:17

## 2025-01-01 RX ADMIN — METOPROLOL SUCCINATE 100 MG: 100 TABLET, EXTENDED RELEASE ORAL at 08:16

## 2025-01-01 RX ADMIN — PREDNISONE 40 MG: 20 TABLET ORAL at 08:17

## 2025-01-01 RX ADMIN — ZINC SULFATE 220 MG (50 MG) CAPSULE 220 MG: CAPSULE at 08:16

## 2025-01-01 RX ADMIN — TORSEMIDE 20 MG: 20 TABLET ORAL at 08:17

## 2025-01-01 RX ADMIN — OXYCODONE HYDROCHLORIDE AND ACETAMINOPHEN 500 MG: 500 TABLET ORAL at 08:17

## 2025-01-01 NOTE — PLAN OF CARE
Problem: Prexisting or High Potential for Compromised Skin Integrity  Goal: Skin integrity is maintained or improved  Description: INTERVENTIONS:  - Identify patients at risk for skin breakdown  - Assess and monitor skin integrity  - Assess and monitor nutrition and hydration status  - Monitor labs   - Assess for incontinence   - Turn and reposition patient  - Assist with mobility/ambulation  - Relieve pressure over bony prominences  - Avoid friction and shearing  - Provide appropriate hygiene as needed including keeping skin clean and dry  - Evaluate need for skin moisturizer/barrier cream  - Collaborate with interdisciplinary team   - Patient/family teaching  - Consider wound care consult   Outcome: Progressing     Problem: PAIN - ADULT  Goal: Verbalizes/displays adequate comfort level or baseline comfort level  Description: Interventions:  - Encourage patient to monitor pain and request assistance  - Assess pain using appropriate pain scale  - Administer analgesics based on type and severity of pain and evaluate response  - Implement non-pharmacological measures as appropriate and evaluate response  - Consider cultural and social influences on pain and pain management  - Notify physician/advanced practitioner if interventions unsuccessful or patient reports new pain  Outcome: Progressing     Problem: INFECTION - ADULT  Goal: Absence or prevention of progression during hospitalization  Description: INTERVENTIONS:  - Assess and monitor for signs and symptoms of infection  - Monitor lab/diagnostic results  - Monitor all insertion sites, i.e. indwelling lines, tubes, and drains  - Monitor endotracheal if appropriate and nasal secretions for changes in amount and color  - [place] appropriate cooling/warming therapies per order  - Administer medications as ordered  - Instruct and encourage patient and family to use good hand hygiene technique  - Identify and instruct in appropriate isolation precautions for  identified infection/condition  Outcome: Progressing  Goal: Absence of fever/infection during neutropenic period  Description: INTERVENTIONS:  - Monitor WBC    Outcome: Progressing     Problem: SAFETY ADULT  Goal: Patient will remain free of falls  Description: INTERVENTIONS:  - Educate patient/family on patient safety including physical limitations  - Instruct patient to call for assistance with activity   - Consult OT/PT to assist with strengthening/mobility   - Keep Call bell within reach  - Keep bed low and locked with side rails adjusted as appropriate  - Keep care items and personal belongings within reach  - Initiate and maintain comfort rounds  - Make Fall Risk Sign visible to staff  - Offer Toileting every 2 Hours, in advance of need  - Initiate/Maintain bed alarm  - Apply yellow socks and bracelet for high fall risk patients  - Consider moving patient to room near nurses station  Outcome: Progressing  Goal: Maintain or return to baseline ADL function  Description: INTERVENTIONS:  -  Assess patient's ability to carry out ADLs; assess patient's baseline for ADL function and identify physical deficits which impact ability to perform ADLs (bathing, care of mouth/teeth, toileting, grooming, dressing, etc.)  - Assess/evaluate cause of self-care deficits   - Assess range of motion  - Assess patient's mobility; develop plan if impaired  - Assess patient's need for assistive devices and provide as appropriate  - Encourage maximum independence but intervene and supervise when necessary  - Involve family in performance of ADLs  - Assess for home care needs following discharge   - Consider OT consult to assist with ADL evaluation and planning for discharge  - Provide patient education as appropriate  Outcome: Progressing  Goal: Maintains/Returns to pre admission functional level  Description: INTERVENTIONS:  - Perform AM-PAC 6 Click Basic Mobility/ Daily Activity assessment daily.  - Set and communicate daily mobility  goal to care team and patient/family/caregiver.   - Collaborate with rehabilitation services on mobility goals if consulted  - Perform Range of Motion 3 times a day.  - Reposition patient every 2 hours.  - Dangle patient 3 times a day  - Stand patient 3 times a day  - Ambulate patient 3 times a day  - Out of bed to chair 3 times a day   - Out of bed for meals 3 times a day  - Out of bed for toileting  - Record patient progress and toleration of activity level   Outcome: Progressing     Problem: DISCHARGE PLANNING  Goal: Discharge to home or other facility with appropriate resources  Description: INTERVENTIONS:  - Identify barriers to discharge w/patient and caregiver  - Arrange for needed discharge resources and transportation as appropriate  - Identify discharge learning needs (meds, wound care, etc.)  - Arrange for interpretive services to assist at discharge as needed  - Refer to Case Management Department for coordinating discharge planning if the patient needs post-hospital services based on physician/advanced practitioner order or complex needs related to functional status, cognitive ability, or social support system  Outcome: Progressing     Problem: Knowledge Deficit  Goal: Patient/family/caregiver demonstrates understanding of disease process, treatment plan, medications, and discharge instructions  Description: Complete learning assessment and assess knowledge base.  Interventions:  - Provide teaching at level of understanding  - Provide teaching via preferred learning methods  Outcome: Progressing

## 2025-01-01 NOTE — ASSESSMENT & PLAN NOTE
Patient is roughly 10 days from the onset of her respiratory symptoms which are persistent.  Patient was found COVID-positive on nasal swab first tested on 12/28.  She is not requiring oxygen.  She has DARIO on CKD (SCr 2.23 (baseline 1.4)) which limits utilization of remdesivir.    Found 40 mg tapered off 10 mg every 2 days  Follow-up with PCP  Continue inhaler therapy  PT OT in the ambulatory setting  Agreeable for discharge

## 2025-01-01 NOTE — DISCHARGE SUMMARY
Discharge Summary - Hospitalist   Name: Rebeca Banegas 89 y.o. female I MRN: [id number]  Unit/Bed#: -01 I Date of Admission: 12/28/2024   Date of Service: 1/1/2025 I Hospital Day: 4     Assessment & Plan  Pneumonia due to COVID-19 virus  Patient is roughly 10 days from the onset of her respiratory symptoms which are persistent.  Patient was found COVID-positive on nasal swab first tested on 12/28.  She is not requiring oxygen.  She has DARIO on CKD (SCr 2.23 (baseline 1.4)) which limits utilization of remdesivir.    Found 40 mg tapered off 10 mg every 2 days  Follow-up with PCP  Continue inhaler therapy  PT OT in the ambulatory setting  Agreeable for discharge  Acute kidney failure (HCC)  Lab Results   Component Value Date    CREATININE 1.24 01/01/2025    CREATININE 1.42 (H) 12/31/2024    CREATININE 1.53 (H) 12/30/2024       Lab Results   Component Value Date    EGFR 37 01/01/2025    EGFR 32 12/31/2024    EGFR 29 12/30/2024       Creatinine has trended down nicely  Patient's feels much better  Patient on PTA medications noted to take spironolactone.  Will opt to hold this for now due to DARIO and blood pressures being soft in the 110s over 180 despite not being on it.  Follow-up with PCP.    Asthma  See COVID  Prednisone taper  Permanent atrial fibrillation (HCC)  Restarted home warfarin 2.5 daily and ordered INR with morning labs-INR is2.6 -will give 1 mg of Coumadin  Hyperlipidemia  Home atorvastatin 40  Hypothyroidism  Home levothyroxine 50 daily but 75 on Sunday and Wednesday  Vitamin D deficiency  On vitamin D  Primary hypertension  Home metoprolol 100, spironolactone 50, and torsemide 40  Spironolactone and torsemide was on hold due to DARIO and poor p.o. intake.  Restart torsemide at a lower dose and monitor  CKD stage 3b, GFR 30-44 ml/min ([facility])  Lab Results   Component Value Date    EGFR 37 01/01/2025    EGFR 32 12/31/2024    EGFR 29 12/30/2024    CREATININE 1.24 01/01/2025    CREATININE 1.42 (H)  "12/31/2024    CREATININE 1.53 (H) 12/30/2024     See above  Hyponatremia  Recent Labs     12/30/24  1057 12/31/24  0507 01/01/25  0448   SODIUM 133* 134* 135     No results found for: \"OSMOUA\", \"NAUR\", \"OSMOLALITSER\"  Patient appears euvolemic.  Much improved now  Resolved       Medical Problems       Resolved Problems  Date Reviewed: 12/29/2024   None       Discharging Physician / Practitioner: Mich Hare MD  PCP: Santana Dillon MD  Admission Date:   Admission Orders (From admission, onward)       Ordered        12/28/24 2101  INPATIENT ADMISSION  Once                          Discharge Date: 01/01/25    Consultations During Hospital Stay:  none    Procedures Performed:   none    Significant Findings / Test Results:   XR chest 2 views  Result Date: 12/29/2024  Impression: Linear opacity in the left base likely due to chronic mucoid impaction shown on CT with no definite acute disease. Workstation performed: [id number]       No Chest XR results available for this patient.       Incidental Findings:   none     Test Results Pending at Discharge (will require follow up):   none     Outpatient Tests Requested:  none    Complications:  none    Reason for Admission: Cough    Hospital Course:   Rebeca Banegas is a 89 y.o. female patient who originally presented to the hospital on 12/28/2024 due to cough and shortness of breath.  Was noted to have pneumonia of COVID-19.  Started on Decadron and unable to take remdesivir due to DARIO.  Patient had marked improvement and deemed fit for discharge today.  Agreeable for plan.          Please see above list of diagnoses and related plan for additional information.     Condition at Discharge: good    Discharge Day Visit / Exam:   Subjective:  Patient does not report and headaches, shortness of breath, chest pain, abdominal pain, nausea vomiting, lower leg edema. Also reports good sleep    Vitals: Blood Pressure: 124/78 (01/01/25 0736)  Pulse: 92 (01/01/25 " 0736)  Temperature: (!) 97.3 °F (36.3 °C) (01/01/25 0736)  Temp Source: Tympanic (12/29/24 0801)  Respirations: 18 (12/31/24 0731)  SpO2: 97 % (01/01/25 0736)  Physical Exam  Vitals and nursing note reviewed.   Constitutional:       General: She is not in acute distress.     Appearance: She is well-developed.   HENT:      Head: Normocephalic and atraumatic.      Nose: Nose normal.      Mouth/Throat:      Mouth: Mucous membranes are moist.   Eyes:      Conjunctiva/sclera: Conjunctivae normal.   Cardiovascular:      Rate and Rhythm: Normal rate and regular rhythm.      Heart sounds: No murmur heard.  Pulmonary:      Effort: Pulmonary effort is normal. No respiratory distress.      Breath sounds: Normal breath sounds.   Abdominal:      Palpations: Abdomen is soft.      Tenderness: There is no abdominal tenderness.   Musculoskeletal:      Cervical back: Neck supple.      Right lower leg: No edema.      Left lower leg: No edema.   Skin:     General: Skin is warm and dry.      Capillary Refill: Capillary refill takes less than 2 seconds.   Neurological:      General: No focal deficit present.      Mental Status: She is alert and oriented to person, place, and time.   Psychiatric:         Mood and Affect: Mood normal.          Discussion with Family: Updated  (daughter) via phone.    Discharge instructions/Information to patient and family:   See after visit summary for information provided to patient and family.      Provisions for Follow-Up Care:  See after visit summary for information related to follow-up care and any pertinent home health orders.      Mobility at time of Discharge:   Basic Mobility Inpatient Raw Score: 18  JH-HLM Goal: 6: Walk 10 steps or more  JH-HLM Achieved: 7: Walk 25 feet or more  HLM Goal achieved. Continue to encourage appropriate mobility.     Disposition:   Home    Planned Readmission: none    Discharge Medications:  See after visit summary for reconciled discharge medications  provided to patient and/or family.      Administrative Statements   Discharge Statement:  I have spent a total time of 35 minutes in caring for this patient on the day of the visit/encounter. >30 minutes of time was spent on: Diagnostic results, Prognosis, Risks and benefits of tx options, Instructions for management, Patient and family education, Importance of tx compliance, Risk factor reductions, Impressions, Counseling / Coordination of care, Documenting in the medical record, Reviewing / ordering tests, medicine, procedures  , and Communicating with other healthcare professionals .    **Please Note: This note may have been constructed using a voice recognition system**

## 2025-01-01 NOTE — ASSESSMENT & PLAN NOTE
Lab Results   Component Value Date    EGFR 37 01/01/2025    EGFR 32 12/31/2024    EGFR 29 12/30/2024    CREATININE 1.24 01/01/2025    CREATININE 1.42 (H) 12/31/2024    CREATININE 1.53 (H) 12/30/2024     See above

## 2025-01-01 NOTE — PLAN OF CARE
Problem: Prexisting or High Potential for Compromised Skin Integrity  Goal: Skin integrity is maintained or improved  Description: INTERVENTIONS:  - Identify patients at risk for skin breakdown  - Assess and monitor skin integrity  - Assess and monitor nutrition and hydration status  - Monitor labs   - Assess for incontinence   - Turn and reposition patient  - Assist with mobility/ambulation  - Relieve pressure over bony prominences  - Avoid friction and shearing  - Provide appropriate hygiene as needed including keeping skin clean and dry  - Evaluate need for skin moisturizer/barrier cream  - Collaborate with interdisciplinary team   - Patient/family teaching  - Consider wound care consult   Outcome: Progressing     Problem: PAIN - ADULT  Goal: Verbalizes/displays adequate comfort level or baseline comfort level  Description: Interventions:  - Encourage patient to monitor pain and request assistance  - Assess pain using appropriate pain scale  - Administer analgesics based on type and severity of pain and evaluate response  - Implement non-pharmacological measures as appropriate and evaluate response  - Consider cultural and social influences on pain and pain management  - Notify physician/advanced practitioner if interventions unsuccessful or patient reports new pain  Outcome: Progressing     Problem: INFECTION - ADULT  Goal: Absence or prevention of progression during hospitalization  Description: INTERVENTIONS:  - Assess and monitor for signs and symptoms of infection  - Monitor lab/diagnostic results  - Monitor all insertion sites, i.e. indwelling lines, tubes, and drains  - Monitor endotracheal if appropriate and nasal secretions for changes in amount and color  - [place] appropriate cooling/warming therapies per order  - Administer medications as ordered  - Instruct and encourage patient and family to use good hand hygiene technique  - Identify and instruct in appropriate isolation precautions for  identified infection/condition  Outcome: Progressing     Problem: SAFETY ADULT  Goal: Patient will remain free of falls  Description: INTERVENTIONS:  - Educate patient/family on patient safety including physical limitations  - Instruct patient to call for assistance with activity   - Consult OT/PT to assist with strengthening/mobility   - Keep Call bell within reach  - Keep bed low and locked with side rails adjusted as appropriate  - Keep care items and personal belongings within reach  - Initiate and maintain comfort rounds  - Make Fall Risk Sign visible to staff  - Apply yellow socks and bracelet for high fall risk patients  - Consider moving patient to room near nurses station  Outcome: Progressing

## 2025-01-01 NOTE — ASSESSMENT & PLAN NOTE
Lab Results   Component Value Date    CREATININE 1.24 01/01/2025    CREATININE 1.42 (H) 12/31/2024    CREATININE 1.53 (H) 12/30/2024       Lab Results   Component Value Date    EGFR 37 01/01/2025    EGFR 32 12/31/2024    EGFR 29 12/30/2024       Creatinine has trended down nicely  Patient's feels much better  Patient on PTA medications noted to take spironolactone.  Will opt to hold this for now due to DARIO and blood pressures being soft in the 110s over 180 despite not being on it.  Follow-up with PCP.

## 2025-01-01 NOTE — ASSESSMENT & PLAN NOTE
"Recent Labs     12/30/24  1057 12/31/24  0507 01/01/25  0448   SODIUM 133* 134* 135     No results found for: \"OSMOUA\", \"NAUR\", \"OSMOLALITSER\"  Patient appears euvolemic.  Much improved now  Resolved    "

## 2025-01-02 ENCOUNTER — TELEPHONE (OUTPATIENT)
Age: OVER 89
End: 2025-01-02

## 2025-01-02 ENCOUNTER — PATIENT OUTREACH (OUTPATIENT)
Dept: CASE MANAGEMENT | Facility: OTHER | Age: OVER 89
End: 2025-01-02

## 2025-01-02 DIAGNOSIS — Z71.89 COMPLEX CARE COORDINATION: Primary | ICD-10-CM

## 2025-01-02 NOTE — PROGRESS NOTES
"Pt presented to [facility] with cough, SOB.  Admitted with COVID pneumonia DARIO resolved, but limits use of remdesivir.   Started on prednisone taper to be completed 1/9/25.    PMH:  Asthma, A-fib, hyperlipidemia, hypothyroidism, HTN, Vit D deficiency, CKD 3.    OT/PT referrals given.  Needs follow up PCP appointment.    Spoke with Rebeca who states she is feeling \"kind of tired and weak\".  She felt \"queasy\" overnight, but said that has resolved.    She lives at [facility] and will set up PT/OT follow up with them.  She will also be calling her PCP today to make follow up appointment.      Assessment done and review of medications done.  She feels that her medical conditions are under control at this time, but welcomes outreach phone calls.      Contact information given to Rebeca for Claire Mcintyre RN, Outpatient Care Manager.     Note routed to Claire.            "

## 2025-01-02 NOTE — TELEPHONE ENCOUNTER
Pt called to sched a home TCM with Dr Dillon, s/p hospitalization DC 1/1/25. Pt had covid and pneumonia. Please call pt to schedule. #[phone]

## 2025-01-02 NOTE — UTILIZATION REVIEW
NOTIFICATION OF ADMISSION DISCHARGE   This is a Notification of Discharge from [facility]. Please be advised that this patient has been discharge from our facility. Below you will find the admission and discharge date and time including the patient’s disposition.   UTILIZATION REVIEW CONTACT:  Christiana Mendiola  Utilization   Network Utilization Review Department  Phone: [phone] x carefully listen to the prompts. All voicemails are confidential.  Email: [email or web address]     ADMISSION INFORMATION  PRESENTATION DATE: 12/28/2024  6:28 PM  OBERVATION ADMISSION DATE: N/A  INPATIENT ADMISSION DATE: 12/28/24  9:01 PM   DISCHARGE DATE: 1/1/2025  1:18 PM   DISPOSITION:Home/Self Care    Network Utilization Review Department  ATTENTION: Please call with any questions or concerns to [phone] and carefully listen to the prompts so that you are directed to the right person. All voicemails are confidential.   For Discharge needs, contact Care Management DC Support Team at [phone] opt. 2  Send all requests for admission clinical reviews, approved or denied determinations and any other requests to dedicated fax number below belonging to the campus where the patient is receiving treatment. List of dedicated fax numbers for the Facilities:  FACILITY NAME UR FAX NUMBER   ADMISSION DENIALS (Administrative/Medical Necessity) [phone]   DISCHARGE SUPPORT TEAM ([facility]) [phone]   PARENT CHILD HEALTH (Maternity/NICU/Pediatrics) [phone]   [facility] [phone]   [facility] [phone]   [facility] [phone]   [facility] [phone]   [facility] [phone]   [facility] [phone]   [facility] [phone]   [facility] [phone]    [facility] [phone]   [facility] [phone]   [facility] [phone]   [facility] [phone]

## 2025-01-03 ENCOUNTER — TELEPHONE (OUTPATIENT)
Age: OVER 89
End: 2025-01-03

## 2025-01-06 ENCOUNTER — IN HOME VISIT (OUTPATIENT)
Dept: INTERNAL MEDICINE CLINIC | Facility: CLINIC | Age: OVER 89
End: 2025-01-06
Payer: COMMERCIAL

## 2025-01-06 VITALS — SYSTOLIC BLOOD PRESSURE: 120 MMHG | DIASTOLIC BLOOD PRESSURE: 72 MMHG | HEART RATE: 96 BPM | RESPIRATION RATE: 14 BRPM

## 2025-01-06 DIAGNOSIS — I48.21 PERMANENT ATRIAL FIBRILLATION (HCC): ICD-10-CM

## 2025-01-06 DIAGNOSIS — J47.9 BRONCHIECTASIS WITHOUT COMPLICATION (HCC): ICD-10-CM

## 2025-01-06 DIAGNOSIS — N18.32 CKD STAGE 3B, GFR 30-44 ML/MIN (HCC): ICD-10-CM

## 2025-01-06 DIAGNOSIS — I50.32 CHRONIC DIASTOLIC CONGESTIVE HEART FAILURE (HCC): Primary | ICD-10-CM

## 2025-01-06 DIAGNOSIS — N17.9 ACUTE RENAL FAILURE, UNSPECIFIED ACUTE RENAL FAILURE TYPE (HCC): ICD-10-CM

## 2025-01-06 DIAGNOSIS — I50.32 CHRONIC DIASTOLIC CONGESTIVE HEART FAILURE (HCC): Chronic | ICD-10-CM

## 2025-01-06 DIAGNOSIS — J47.9 BRONCHIECTASIS WITHOUT COMPLICATION (HCC): Chronic | ICD-10-CM

## 2025-01-06 DIAGNOSIS — I48.21 PERMANENT ATRIAL FIBRILLATION (HCC): Chronic | ICD-10-CM

## 2025-01-06 DIAGNOSIS — U07.1 COVID-19: Primary | ICD-10-CM

## 2025-01-06 PROCEDURE — 99350 HOME/RES VST EST HIGH MDM 60: CPT | Performed by: INTERNAL MEDICINE

## 2025-01-06 NOTE — PROGRESS NOTES
Assessment/Plan:    No problem-specific Assessment & Plan notes found for this encounter.             Subjective:      Patient ID: Rebeca Banegas is a 89 y.o. female.    HPI    The following portions of the patient's history were reviewed and updated as appropriate: allergies, current medications, past family history, past medical history, past social history, past surgical history, and problem list.    Review of Systems      Objective:      LMP  (LMP Unknown)          Physical Exam

## 2025-01-06 NOTE — PROGRESS NOTES
Assessment/Plan:  #1 COVID-19-status posthospitalization-patient had associated DARIO and because of this remdesivir could not be used.  She was given Decadron and now remains on an oral prednisone taper.  Chest x-ray was read as linear opacity left base likely from mucus without definite pneumonia.  She will complete her prednisone taper.  O2 sat done today on room air was 97 and with minimal ambulation around the room did not decrease  #2 DARIO-associated with acute illness superimposed on baseline CKD-peak creatinine 2.23-discharge creatinine 1.24 with a BUN of 54.  Patient will have follow-up laboratory testing on 1/8/2025 and I will then be in touch with the patient  3.  Chronic pulmonary disease-combination of asthma-bronchiectasis-most recent CT of the chest does show her known bronchiectasis but also shows new-2 mm soft nodule left lower lobe not seen on prior CT-she has multiple additional 3 mm nodules stable since 2014 as well as her known chronic bilateral bronchiectasis-non-smoker.  Has known history of pulmonary hypertension.  Prior PFT showed obstructive disease with an FEV1 of 0.92 and no response to bronchodilator.  She has had associated Pseudomonas in the past with her bronchiectasis and required treatment with Levaquin which was also prescribed prior to her recent documented COVID.  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  On lower dose Advair to try and decrease incidence of pulmonary infection.  Has used Flonase for associated allergic rhinitis.  Zyrtec.  4.  Bronchiectasis-CT as above.  On flutter valve 3 times daily.  Has a vest to use at home to mobilize secretions but is reluctant to do that at present because of weakness and hopefully will initiate that in the near future.    #5 CKD-stage IIIb-felt to be age-related nephron loss close possible component of cardiorenal syndrome.  Knows to avoid nonsteroidals.  Labs done last October showed baseline BUN 51 creatinine 1.52.  Is noted  recent DARIO while hospitalized-labs done prior to discharge on 1/1/2025 show BUN of 54 with creatinine 1.24-4 repeat labs as noted on 1/8/2025  6.  Chronic diastolic heart failure-most recent echo done over the last year shows EF of 60%, wall thickness mildly increased, unable to assess diastolic function due to atrial fibrillation, moderate dilatation of right and left atrium, moderate tricuspid regurg, RVSP elevated at 40.  On baseline therapy with torsemide 40 mg twice daily and spironolactone 50 mg daily but spironolactone currently on hold because of DARIO-will patient will continue to monitor her weight carefully and based on overall clinical course, weight and laboratory data will determine as to whether or not spironolactone can be reinitiated  7.  Left plantar foot ulcer-has remote history of MRSA the same foot.  Has longstanding abnormality of the left fourth toe present since childhood which podiatry feels represents predisposing equinus deformity.  Arterial Doppler showed mild bilateral disease with right FREYA 1.23-diffuse disease without focal stenosis and great toe pressure within healing range.  Left side showed diffuse disease without focal stenosis and FREYA 1.36.  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  MRI and white cell study suggested potential osteomyelitis of second metatarsal head rule out involvement of proximal second phalanx.  Patient underwent debridement and bone biopsy and bone biopsy was negative for OM.  Cultures showed few colonies of staph coag negative as well as staph epidermis as well as derma bacteria hominis.  Was treated with linezolid for 10 days.  Then recently underwent additional surgery on 11/29/2024 with left foot percutaneous osteotomy of second-3rd-4th metatarsal for treatment of the predisposing equinus deformity.  Wound has healed.  Most recent x-ray read as possible second metatarsal head osteonecrosis or  osteomyelitis-podiatry commented that they will observe with serial x-rays  #8 recent episode of right arm numbnessand episode of left eye visual symptoms Was admitted to the hospital for CTA of head and neck showing some degree of atherosclerosis without high-grade stenosis of cervical or intracranial circulation.  MRI of the brain shows acute infarcts in the right occipital lobe and right cerebellum.  There was no mass effect or hemorrhagic transformation.  Echocardiogram shows EF of 60%, wall thickness moderately increased, unable to assess diastolic function due to atrial relation, moderate dilatation of right left atrium, moderate tricuspid regurg, right ventricular systolic pressure 40 elevated.  There was concern about malignancy induced hypercoagulability based of her presentation a CAT scan of chest abdomen pelvis was done as dictated below.  This does have some abnormal findings but this felt more likely that her strokelike symptoms were predisposed by being off her oral anticoagulant for her podiatric surgery -had a normal INR 1 her warfarin for foot surgery within 2 to 3 weeks of onset of her symptoms.  We reviewed that her MRI shows disease of both the cerebellum as well as the occipital lobe suggestive of potential cardioembolic disease.  Neurology placed the patient on a statin because of her recent stroke of mild atherosclerosis.  For additional surgery in the future patient needs bridging with Lovenox  #9 abnormal imaging-CT chest abdomen pelvis shows no definite findings of malignancy but does show multiple pancreatic cystic lesions some of which have enlarged since June 2017-dominant cystic complex lesion in the pancreatic head measures 3.6 cm-previously measured 2.8 cm.  Because of the growth of greater than 20% of the longest dimension she should be considered for follow-up with GI and potential endoscopic ultrasound-patient states because of her age of 89 that she does not want additional  investigation and defers on seeing GI-she realizes she could have potential underlying pancreatic carcinoma but defers.  #10 abnormal imaging-has a new 2 mm soft nodule left lower lobe not seen on prior CT-also has multiple additional 3 mm as well as pulmonary nodule stable since 2014 as well as her known chronic bilateral bronchiectasis-she is not a smoker and not felt to be high risk-will need follow-up CT of the chest 1 year after her last which would be in August 2025  #11recent known thoracic back pain-longstanding-always watching for compression fracture but known osteoporosis but did not appear to be the case.  Felt to be positional musculoskeletal-was previously using acetaminophen and over-the-counter lidocaine patch  #12atrial [id number] control on current dose of metoprolol.  Remains on anticoagulant-as noted she was off anticoagulant for recent foot surgery at 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  Prior laboratory testing showed normal immunoglobulin levels, SPEP showed biclonal spike with immunofixation identified both these is IgG kappa-spike number 1.26 g/dL M spike number 2.08 g/dL-she does not have any crab features other than her known CKD.  Recent labs show in September 2024 on free light chain ratio Elevated at 81.4, lambda elevated at 37.7 with a ratio of 2.16 although decreased ratio from 1 year prior when it was 3.5-SPEP with immunofixation shows biclonal IgG kappa, UPEP negativenegative-immunoglobulin levels show all normal other than elevated IgE at 1345  14 Bilateral sensorineural hearing loss-monitored by the ENT group-had repeat evaluation June 2024 with audiogram showing moderate to profound high-frequency bilateral sensorineural hearing loss-states that she should return in 1 year which would be June 2025  15.  Osteoporosis-DEXA scan 2023 shows L-spine -2.7 and hip of -2.2.  Remains on vitamin D-has known calcium  intake of 1200 mg/day or more.  Subsequent DEXA scan due in March 2025.  Has had 3 injections of Prolia with her most recent given 11/25/24  16  Recurrent labial cyst-recurrent labial abscess-was treated by GYN with I&D and treated course of Bactrim-culture was never obtained.  There was concern about potential MRSA she had this in the past.  Not an issue at present  17.  Hypothyroidism-stable on current dose -TSH done in September this year 4.97 which is very acceptable for her age-continuing current dose of levothyroxine     All other problems as per note of August 30, 2022    Medical regimen-atorvastatin 40 mg daily, torsemide 40 mg twice daily using 20 mg dosing, spironolactone 50 mg daily currently on hold with dry weight in the middle 100s, prior use of Flonase 2 squirts each nostril once daily, prior use of loratadine 10 mg daily, Advair 50/100--1 puff daily, Prolia every 6 months with last injection in November 2024, prior use of Singulair 10 mg daily, levothyroxine 50 mcg daily but 75 mcg 4 days/week, calcium with vitamin D, albuterol inhaler as needed, metoprolol succinate 50 mg twice daily using one half of 100 mg tablet, multivitamin, vitamin D3/2000 units a day, fish oil 1200 mg a day, prior use of over-the-counter lidocaine patch-4%-on for 12 hours and off for 12 hours, remote use of Vicodin 5-300/2 tablet twice daily as needed for extreme pain, warfarin with dose adjustments based on INR    Scheduled for BMP and CBC with differential on 1/7/2025 or 1/8/2025        No problem-specific Assessment & Plan notes found for this encounter.             Subjective:      Patient ID: Rebeca Banegas is a 89 y.o. female.    This patient was seen posthospitalization for COVID on 1/5/2025 as an in-home visit-she lives alone in an apartment and requires assist to get out of her apartment because of her multiple medical problems.  She has a history of chronic bronchiectasis and recently had increasing shortness of  breath which she assumed might be a problem with that.  She has had prior culture positive Pseudomonas and required treatment with antimicrobial therapy for this that is complicating her underlying bronchiectasis.  She thought she may have had the same situation and called her pulmonary group who ordered a sputum culture and placed her empirically on Levaquin.  She began to worsen and was hospitalized and found to be COVID-positive.  She had had annual influenza vaccine, RSV vaccine as well as COVID-vaccine.  Her hospitalization was accompanied by acute DARIO with a creatinine increased to 2.23 which limited use of remdesivir.  She was given Decadron and is on a current prednisone taper postdischarge.  Chest x-ray was read as a linear opacity at the left base likely from mucous.  Narrative & Impression  XR CHEST PA AND LATERAL DUAL ENERGY SUBTRACTION.     INDICATION:  SOB, Cough. COVID-positive 12/28/2024.     COMPARISON:  CXR 12/21/2021, chest CT 8/28/2024.     FINDINGS:     Linear opacity in the left base likely due to chronic mucoid impaction on CT. No definite acute disease. No pneumothorax or pleural effusion. Benign calcified granulomas.     Mild cardiomegaly.     Bones are unremarkable for age.     Upper abdomen normal. Cholecystectomy.     IMPRESSION:     Linear opacity in the left base likely due to chronic mucoid impaction shown on CT with no definite acute disease.             Results for orders placed or performed during the hospital encounter of 12/28/24  -FLU/COVID Rapid Antigen (30 min. TAT) - Preferred screening test in ED:   Collection Time: 12/28/24  7:40 PM  Specimen: Nose; Nares       Result                      Value             Ref Range           SARS COV Rapid Antigen      Positive (A)      Negative            Influenza A Rapid Anti*     Negative          Negative            Influenza B Rapid Anti*     Negative          Negative       -CBC and differential:   Collection Time: 12/28/24  7:40  PM       Result                      Value             Ref Range           WBC                         10.75 (H)         4.31 - 10.16*       RBC                         4.33              3.88 - 5.12 *       Hemoglobin                  14.0              12.0 - 15.4 *       Hematocrit                  41.7              36.5 - 46.1 %       MCV                         96                82 - 98 fL          MCH                         32.3              26.8 - 34.3 *       MCHC                        33.6              31.4 - 37.4 *       RDW                         13.8              11.6 - 15.1 %       MPV                         8.7 (L)           8.9 - 12.7 fL       Platelets                   282               149 - 390 Th*       nRBC                        0                 /100 WBCs           Segmented %                 70                43 - 75 %           Immature Grans %            0                 0 - 2 %             Lymphocytes %               22                14 - 44 %           Monocytes %                 7                 4 - 12 %            Eosinophils Relative        1                 0 - 6 %             Basophils Relative          0                 0 - 1 %             Absolute Neutrophils        7.55              1.85 - 7.62 *       Absolute Immature Grans     0.03              0.00 - 0.20 *       Absolute Lymphocytes        2.38              0.60 - 4.47 *       Absolute Monocytes          0.70              0.17 - 1.22 *       Eosinophils Absolute        0.06              0.00 - 0.61 *       Basophils Absolute          0.03              0.00 - 0.10 *  -Basic metabolic panel:   Collection Time: 12/28/24  7:40 PM       Result                      Value             Ref Range           Sodium                      128 (L)           135 - 147 mm*       Potassium                   4.0               3.5 - 5.3 mm*       Chloride                    88 (L)            96 - 108 mmo*       CO2                         28                 21 - 32 mmol*       ANION GAP                   12                4 - 13 mmol/L       BUN                         69 (H)            5 - 25 mg/dL        Creatinine                  2.23 (H)          0.60 - 1.30 *       Glucose                     103               65 - 140 mg/*       Calcium                     8.6               8.4 - 10.2 m*       eGFR                                                         -D-dimer, quantitative:   Collection Time: 12/28/24 11:33 PM       Result                      Value             Ref Range           D-Dimer, Quant              0.50 (H)          <0.50 ug/ml *  -UA w Reflex to Microscopic w Reflex to Culture:   Collection Time: 12/29/24  3:40 AM  Specimen: Urine, Clean Catch       Result                      Value             Ref Range           Color, UA                   Light Yellow                          Clarity, UA                 Clear                                 Specific Beaver, UA        1.011             1.003 - 1.030       pH, UA                      5.0               4.5, 5.0, 5.*       Leukocytes, UA              Negative          Negative            Nitrite, UA                 Negative          Negative            Protein, UA                 Negative          Negative mg/*       Glucose, UA                 Negative          Negative mg/*       Ketones, UA                 Negative          Negative mg/*       Urobilinogen, UA            <2.0              <2.0 mg/dl m*       Bilirubin, UA               Negative          Negative            Occult Blood, UA            Negative          Negative       -Basic metabolic panel:   Collection Time: 12/29/24  4:40 AM       Result                      Value             Ref Range           Sodium                      130 (L)           135 - 147 mm*       Potassium                   4.6               3.5 - 5.3 mm*       Chloride                    93 (L)            96 - 108 mmo*       CO2                          27                21 - 32 mmol*       ANION GAP                   10                4 - 13 mmol/L       BUN                         66 (H)            5 - 25 mg/dL        Creatinine                  2.18 (H)          0.60 - 1.30 *       Glucose                     125               65 - 140 mg/*       Calcium                     8.2 (L)           8.4 - 10.2 m*       eGFR                                                         -CBC and differential:   Collection Time: 12/29/24  4:40 AM       Result                      Value             Ref Range           WBC                         9.88              4.31 - 10.16*       RBC                         4.04              3.88 - 5.12 *       Hemoglobin                  12.7              12.0 - 15.4 *       Hematocrit                  39.2              36.5 - 46.1 %       MCV                         97                82 - 98 fL          MCH                         31.4              26.8 - 34.3 *       MCHC                        32.4              31.4 - 37.4 *       RDW                         13.7              11.6 - 15.1 %       MPV                         8.9               8.9 - 12.7 fL       Platelets                   263               149 - 390 Th*       nRBC                        0                 /100 WBCs           Segmented %                 86 (H)            43 - 75 %           Immature Grans %            1                 0 - 2 %             Lymphocytes %               12 (L)            14 - 44 %           Monocytes %                 1 (L)             4 - 12 %            Eosinophils Relative        0                 0 - 6 %             Basophils Relative          0                 0 - 1 %             Absolute Neutrophils        8.48 (H)          1.85 - 7.62 *       Absolute Immature Grans     0.07              0.00 - 0.20 *       Absolute Lymphocytes        1.21              0.60 - 4.47 *       Absolute Monocytes          0.10 (L)          0.17 - 1.22 *        Eosinophils Absolute        0.01              0.00 - 0.61 *       Basophils Absolute          0.01              0.00 - 0.10 *  -Magnesium:   Collection Time: 12/29/24  4:40 AM       Result                      Value             Ref Range           Magnesium                   2.3               1.9 - 2.7 mg*  -Phosphorus:   Collection Time: 12/29/24  4:40 AM       Result                      Value             Ref Range           Phosphorus                  4.4 (H)           2.3 - 4.1 mg*  -Protime-INR:   Collection Time: 12/29/24  4:40 AM       Result                      Value             Ref Range           Protime                     24.1 (H)          12.3 - 15.0 *       INR                         2.16 (H)          0.85 - 1.19    -Protime-INR:   Collection Time: 12/30/24  6:50 AM       Result                      Value             Ref Range           Protime                     27.8 (H)          12.3 - 15.0 *       INR                         2.61 (H)          0.85 - 1.19    -Basic metabolic panel:   Collection Time: 12/30/24 10:57 AM       Result                      Value             Ref Range           Sodium                      133 (L)           135 - 147 mm*       Potassium                   4.0               3.5 - 5.3 mm*       Chloride                    96                96 - 108 mmo*       CO2                         28                21 - 32 mmol*       ANION GAP                   9                 4 - 13 mmol/L       BUN                         56 (H)            5 - 25 mg/dL        Creatinine                  1.53 (H)          0.60 - 1.30 *       Glucose                     89                65 - 140 mg/*       Calcium                     8.6               8.4 - 10.2 m*       eGFR                        29                ml/min/1.73s*  -CBC and differential:   Collection Time: 12/30/24 10:57 AM       Result                      Value             Ref Range           WBC                         15.22  (H)         4.31 - 10.16*       RBC                         4.25              3.81 - 5.12 *       Hemoglobin                  13.7              11.5 - 15.4 *       Hematocrit                  41.5              34.8 - 46.1 %       MCV                         98                82 - 98 fL          MCH                         32.2              26.8 - 34.3 *       MCHC                        33.0              31.4 - 37.4 *       RDW                         13.9              11.6 - 15.1 %       MPV                         8.9               8.9 - 12.7 fL       Platelets                   263               149 - 390 Th*       nRBC                        0                 /100 WBCs           Segmented %                 76 (H)            43 - 75 %           Immature Grans %            1                 0 - 2 %             Lymphocytes %               15                14 - 44 %           Monocytes %                 8                 4 - 12 %            Eosinophils Relative        0                 0 - 6 %             Basophils Relative          0                 0 - 1 %             Absolute Neutrophils        11.55 (H)         1.85 - 7.62 *       Absolute Immature Grans     0.08              0.00 - 0.20 *       Absolute Lymphocytes        2.34              0.60 - 4.47 *       Absolute Monocytes          1.22              0.17 - 1.22 *       Eosinophils Absolute        0.01              0.00 - 0.61 *       Basophils Absolute          0.02              0.00 - 0.10 *  -CBC:   Collection Time: 12/31/24  5:07 AM       Result                      Value             Ref Range           WBC                         11.01 (H)         4.31 - 10.16*       RBC                         3.74 (L)          3.81 - 5.12 *       Hemoglobin                  11.8              11.5 - 15.4 *       Hematocrit                  35.7              34.8 - 46.1 %       MCV                         96                82 - 98 fL          MCH                          31.6              26.8 - 34.3 *       MCHC                        33.1              31.4 - 37.4 *       RDW                         13.8              11.6 - 15.1 %       Platelets                   213               149 - 390 Th*       MPV                         9.2               8.9 - 12.7 fL  -Basic metabolic panel:   Collection Time: 12/31/24  5:07 AM       Result                      Value             Ref Range           Sodium                      134 (L)           135 - 147 mm*       Potassium                   3.7               3.5 - 5.3 mm*       Chloride                    100               96 - 108 mmo*       CO2                         26                21 - 32 mmol*       ANION GAP                   8                 4 - 13 mmol/L       BUN                         55 (H)            5 - 25 mg/dL        Creatinine                  1.42 (H)          0.60 - 1.30 *       Glucose                     121               65 - 140 mg/*       Calcium                     8.0 (L)           8.4 - 10.2 m*       eGFR                        32                ml/min/1.73s*  -Protime-INR:   Collection Time: 12/31/24  5:07 AM       Result                      Value             Ref Range           Protime                     27.7 (H)          12.3 - 15.0 *       INR                         2.60 (H)          0.85 - 1.19    -Basic metabolic panel:   Collection Time: 01/01/25  4:48 AM       Result                      Value             Ref Range           Sodium                      135               135 - 147 mm*       Potassium                   3.8               3.5 - 5.3 mm*       Chloride                    99                96 - 108 mmo*       CO2                         27                21 - 32 mmol*       ANION GAP                   9                 4 - 13 mmol/L       BUN                         54 (H)            5 - 25 mg/dL        Creatinine                  1.24              0.60 - 1.30 *       Glucose                      123               65 - 140 mg/*       Calcium                     8.2 (L)           8.4 - 10.2 m*       eGFR                        37                ml/min/1.73s*  -CBC:   Collection Time: 01/01/25  4:48 AM       Result                      Value             Ref Range           WBC                         10.57 (H)         4.31 - 10.16*       RBC                         3.70 (L)          3.81 - 5.12 *       Hemoglobin                  11.9              11.5 - 15.4 *       Hematocrit                  35.2              34.8 - 46.1 %       MCV                         95                82 - 98 fL          MCH                         32.2              26.8 - 34.3 *       MCHC                        33.8              31.4 - 37.4 *       RDW                         13.8              11.6 - 15.1 %       Platelets                   221               149 - 390 Th*       MPV                         9.0               8.9 - 12.7 fL    *Note: Due to a large number of results and/or encounters for the requested time period, some results have not been displayed. A complete set of results can be found in Results Review.   All labs were reviewed in detail.  As noted P creatinine was 2.23.  Prior to discharge creatinine had improved to 1.24 with a BUN of 54.  This patient is on chronic therapy with torsemide as well as spironolactone for her diastolic CHF.  Because the level was elected to hold her spironolactone and she remains off this.  She has not noted weight gain since she is home.      She is bothered very much however by insomnia and some emotional lability which I explained could easily be related to the corticosteroid use with tapering dose.  Her appetite is increased associated with the increased steroid use.     When she saw the pulmonary group last in early December she was told to increase her flutter valve for treatment of her bronchiectasis to 3 times daily.  She also now has a vest to use to aid in her  bronchiectasis but she has been reluctant to do this.  She feels extremely weak associated with her recent illness and is hoping to do this in the future.  She remains on low-dose Advair trying to minimize inhaled corticosteroid use the potential for worsening infection.  She had had recent foot surgery for nonhealing ulcer.  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     On her last foot x-ray there was concern about second metatarsal head osteonecrosis or osteomyelitis-podiatry made the comment that her wound was healed and they will observe with serial x-rays.  Narrative & Impression        LEFT FOOT     INDICATION:   Pain in left foot. Other specified postprocedural states.      COMPARISON: 11/29/2024.     VIEWS:  XR FOOT 3+ VW LEFT   Images: 3     FINDINGS:     Redemonstration of postsurgical changes in the second and third distal metatarsals. Deformity of the fourth metatarsal with foreshortening likely postsurgical as well.     There is severe osteopenia which limits the study.     There is severe soft tissue edema in the forefoot.     Moderate tarsometatarsal osteoarthritis. Degenerative change present in the interphalangeal and MTP joints as well.     Compared to the prior there is increased sclerosis of the second metatarsal head concerning for underlying osteonecrosis or osteomyelitis in the setting of ulcer     IMPRESSION:        Increased sclerosis of the second metatarsal head concerning for underlying osteonecrosis or osteomyelitis in the presence of ulcer.  Severe soft tissue edema at the forefoot.  Extensive post traumatic/postsurgical changes again seen    I have spent a total time of 70 minutes in caring for this patient on the day of the visit/encounter including Diagnostic results, Prognosis, Risks and benefits of tx options, Instructions for management, Patient and family education,  Importance of tx compliance, Impressions, Counseling / Coordination of care, Documenting in the medical record, Reviewing / ordering tests, medicine, procedures  , Obtaining or reviewing history  , and Communicating with other healthcare professionals               The following portions of the patient's history were reviewed and updated as appropriate: allergies, current medications, past family history, past medical history, past social history, past surgical history, and problem list.    Review of Systems   Constitutional:  Positive for fatigue.   HENT: Negative.     Respiratory:  Positive for cough and shortness of breath.    Cardiovascular: Negative.    Gastrointestinal: Negative.    Endocrine: Negative.    Genitourinary: Negative.    Musculoskeletal: Negative.    Skin: Negative.    Neurological: Negative.    Hematological: Negative.    Psychiatric/Behavioral:  Positive for sleep disturbance.          Objective:      LMP  (LMP Unknown)          Physical Exam  Vitals reviewed.   Constitutional:       General: She is not in acute distress.     Appearance: She is ill-appearing. She is not toxic-appearing or diaphoretic.   HENT:      Head: Normocephalic and atraumatic.      Right Ear: External ear normal.      Left Ear: Ear canal and external ear normal.      Nose: Nose normal. No congestion or rhinorrhea.      Mouth/Throat:      Mouth: Mucous membranes are moist.      Pharynx: Oropharynx is clear. No oropharyngeal exudate or posterior oropharyngeal erythema.   Eyes:      General: No scleral icterus.        Right eye: No discharge.         Left eye: No discharge.      Extraocular Movements: Extraocular movements intact.      Conjunctiva/sclera: Conjunctivae normal.      Pupils: Pupils are equal, round, and reactive to light.   Neck:      Vascular: No carotid bruit.   Cardiovascular:      Rate and Rhythm: Normal rate. Rhythm irregular.      Pulses: Normal pulses.      Heart sounds: Normal heart sounds. No murmur  heard.     No friction rub. No gallop.   Pulmonary:      Effort: Pulmonary effort is normal. No respiratory distress.      Breath sounds: Normal breath sounds. No stridor. No wheezing, rhonchi or rales.   Chest:      Chest wall: No tenderness.   Abdominal:      General: Abdomen is flat. Bowel sounds are normal. There is no distension.      Palpations: Abdomen is soft. There is no mass.      Tenderness: There is no abdominal tenderness. There is no right CVA tenderness, left CVA tenderness, guarding or rebound.      Hernia: No hernia is present.   Musculoskeletal:         General: No swelling, tenderness, deformity or signs of injury. Normal range of motion.      Cervical back: Normal range of motion and neck supple. No rigidity. No muscular tenderness.      Right lower leg: No edema.      Left lower leg: No edema.   Lymphadenopathy:      Cervical: No cervical adenopathy.   Skin:     General: Skin is warm and dry.      Coloration: Skin is not jaundiced or pale.      Findings: No bruising, erythema, lesion or rash.      Comments: Foot dressing intact   Neurological:      General: No focal deficit present.      Mental Status: She is alert and oriented to person, place, and time. Mental status is at baseline.      Cranial Nerves: No cranial nerve deficit.      Sensory: No sensory deficit.      Motor: No weakness.      Coordination: Coordination normal.      Gait: Gait normal.      Deep Tendon Reflexes: Reflexes normal.   Psychiatric:         Mood and Affect: Mood normal.         Behavior: Behavior normal.         Thought Content: Thought content normal.         Judgment: Judgment normal.

## 2025-01-07 ENCOUNTER — ANTICOAG VISIT (OUTPATIENT)
Dept: CARDIOLOGY CLINIC | Facility: CLINIC | Age: OVER 89
End: 2025-01-07

## 2025-01-07 DIAGNOSIS — I48.21 PERMANENT ATRIAL FIBRILLATION (HCC): Primary | Chronic | ICD-10-CM

## 2025-01-07 LAB
ACANTHOCYTES BLD QL SMEAR: ABNORMAL
ANION GAP SERPL CALCULATED.3IONS-SCNC: 11 MMOL/L (ref 3–11)
BASOPHILS # BLD AUTO: 0 THOU/CMM (ref 0–0.1)
BASOPHILS NFR BLD AUTO: 0 %
BUN SERPL-MCNC: 49 MG/DL (ref 7–25)
BURR CELLS BLD QL SMEAR: SLIGHT
CALCIUM SERPL-MCNC: 8 MG/DL (ref 8.5–10.5)
CHLORIDE SERPL-SCNC: 95 MMOL/L (ref 100–109)
CO2 SERPL-SCNC: 32 MMOL/L (ref 21–31)
CREAT SERPL-MCNC: 1.15 MG/DL (ref 0.4–1.1)
CYTOLOGY CMNT CVX/VAG CYTO-IMP: ABNORMAL
DIFFERENTIAL METHOD BLD: ABNORMAL
EOSINOPHIL # BLD AUTO: 0 THOU/CMM (ref 0–0.5)
EOSINOPHIL NFR BLD AUTO: 0 %
ERYTHROCYTE [DISTWIDTH] IN BLOOD BY AUTOMATED COUNT: 14.7 % (ref 12–16)
GFR/BSA.PRED SERPLBLD CYS-BASED-ARV: 45 ML/MIN/{1.73_M2}
GLUCOSE SERPL-MCNC: 85 MG/DL (ref 65–99)
HCT VFR BLD AUTO: 39.1 % (ref 35–43)
HGB BLD-MCNC: 13.4 G/DL (ref 11.5–14.5)
LYMPHOCYTES # BLD AUTO: 4.6 THOU/CMM (ref 1–3)
LYMPHOCYTES NFR BLD AUTO: 42 %
MCH RBC QN AUTO: 33.1 PG (ref 26–34)
MCHC RBC AUTO-ENTMCNC: 34.3 G/DL (ref 32–37)
MCV RBC AUTO: 97 FL (ref 80–100)
MONOCYTES # BLD AUTO: 0.7 THOU/CMM (ref 0.3–1)
MONOCYTES NFR BLD AUTO: 7 %
MORPHOLOGY BLD-IMP: ABNORMAL
NEUTROPHILS # BLD AUTO: 5.7 THOU/CMM (ref 1.8–7.8)
NEUTROPHILS NFR BLD AUTO: 51 %
OVALOCYTES BLD QL SMEAR: SLIGHT
PLATELET # BLD AUTO: 279 THOU/CMM (ref 140–350)
PMV BLD REES-ECKER: 7.9 FL (ref 7.5–11.3)
POTASSIUM SERPL-SCNC: 3.6 MMOL/L (ref 3.5–5.2)
RBC # BLD AUTO: 4.06 MILL/CMM (ref 3.7–4.7)
SODIUM SERPL-SCNC: 138 MMOL/L (ref 135–145)
WBC # BLD AUTO: 11.1 THOU/CMM (ref 4–10)

## 2025-01-07 NOTE — TELEPHONE ENCOUNTER
Called patient and we discussed her hospital course, she is improving but still feels very weak. She is doing her hypersal nebs and flutter valve. She would like to hold off on vest therapy at this time until she has more strength back and I agreed she should wait until she feels up to using the vest again.

## 2025-01-09 ENCOUNTER — DOCUMENTATION (OUTPATIENT)
Dept: INTERNAL MEDICINE CLINIC | Facility: CLINIC | Age: OVER 89
End: 2025-01-09

## 2025-01-09 ENCOUNTER — PATIENT OUTREACH (OUTPATIENT)
Dept: CASE MANAGEMENT | Facility: OTHER | Age: OVER 89
End: 2025-01-09

## 2025-01-09 NOTE — PROGRESS NOTES
Spoke with patient on 1/8/2025.  She had follow-up labs done on January 7 showing hemoglobin of 13.4 BMP shows BUN improved to 49 with a creatinine of 1.15 her weight is stable and she feels better overall.  She took her last dose of prednisone.  Recommended that she restart spironolactone on 1/13/2025-she will use one half of her 50 mg tablets and watch her weight closely-if she notes climbing weight or increasing shortness of breath she will be in touch with this office and/or cardiology-[facility]  Assessment/Plan:    No problem-specific Assessment & Plan notes found for this encounter.             Subjective:      Patient ID: Rebeca Banegas is a 89 y.o. female.    HPI    The following portions of the patient's history were reviewed and updated as appropriate: allergies, current medications, past family history, past medical history, past social history, past surgical history, and problem list.    Review of Systems      Objective:      LMP  (LMP Unknown)          Physical Exam

## 2025-01-09 NOTE — PROGRESS NOTES
Contacted patient for f/u.  She had recent hospitalization for covid-19.   She resides at [facility] and is independent with ADL's.  She reports she finished prednisone.  She denies breathing difficulties or a cough.  She is using nebulizer treatments.  She reports she isn't sleeping well at night.  She monitors her weight and it has been stable between 114-115.  She denies chest pain, sob or LE edema.  She has meals brought to her room from the facility and she is tolerating her diet and staying well hydrated.  She takes all medications as prescribed.  She was recently seen by PCP and other f/u appointments scheduled.  She has a friend who will transport her to appointments.  She feels she is doing well at this time and denies any needs.  Will close to complex care management.

## 2025-01-10 ENCOUNTER — TELEPHONE (OUTPATIENT)
Age: OVER 89
End: 2025-01-10

## 2025-01-10 DIAGNOSIS — U07.1 COVID-19: Primary | ICD-10-CM

## 2025-01-10 NOTE — TELEPHONE ENCOUNTER
Patient called to check up on the status of the PT orders s/p discharge from ED. There is an active PT order in the system dated 01/01/2025 ordered by Med surg.  Can we please update the order and have Dr Dillon place the PT order, patient wishes to have the PT through [facility], PT  is Ana Paula her fax number is [phone] Per pt report thank you

## 2025-01-15 ENCOUNTER — OFFICE VISIT (OUTPATIENT)
Dept: PODIATRY | Facility: CLINIC | Age: OVER 89
End: 2025-01-15

## 2025-01-15 VITALS — WEIGHT: 123 LBS | BODY MASS INDEX: 21 KG/M2 | HEIGHT: 64 IN

## 2025-01-15 DIAGNOSIS — Z98.890 S/P FOOT SURGERY: Primary | ICD-10-CM

## 2025-01-15 PROCEDURE — 99024 POSTOP FOLLOW-UP VISIT: CPT | Performed by: PODIATRIST

## 2025-01-15 NOTE — PROGRESS NOTES
Name: Rebeca Banegas      : 1932      MRN: [id number]  Encounter Provider: Jonny Marcial DPM  Encounter Date: 1/15/2025   Encounter department: [facility] [place]    Assessment & Plan     1. S/P foot surgery  -     XR foot 3+ vw left      My personal interpretation today of the x-rays that were taken show X-rays reviewed today of the left foot show previous history of osteotomies at the level of the second third and fourth metatarsals that appears stable, second and third shows some mild new bone growth noted to the area however the osteotomies are visible.  Fourth metatarsal appears stable without significant change in position or alignment.  No complete union of the osteotomy noted today on examination.      Patient has noticed great improvement from her preoperative pain level.  She denies any discomfort or issues she has not noticed any drainage to the area.    Today I did trim down some the callus that was subsecond metatarsal the other callus of the third metatarsal appears to have resolved at this time.    She has had no deep bone exposure to the current there is a defect in the area due to the chronic ulceration however I do not see any deep bone exposure or signs of infection.    Recommend antibiotic ointment and Band-Aid to the area continue surgical shoe.  She is going to be fitted for custom inserts which she had made already.    Will plan on checking patient back for reevaluation in 3 weeks.  If she notices any signs or symptoms of the office.          Return in about 3 weeks (around 2025).    Subjective     Patient returns for follow-up after surgical procedure on 2024.  She is doing well at this point denies any significant pain or discomfort.  She has been using a surgical shoe.  She has not noticed any drainage to the area.  She was unfortunately admitted to the hospital in late December due to COVID-19.        Constitutional:  Negative for chills and  "fever.   Respiratory:  Negative for chest tightness and shortness of breath.    Gastrointestinal:  Negative for nausea and vomiting.     Current Outpatient Medications on File Prior to Visit   Medication Sig   • albuterol (2.5 mg/3 mL) 0.083 % nebulizer solution Take 3 mL (2.5 mg total) by nebulization every 8 (eight) hours   • albuterol (Proventil HFA) 90 mcg/act inhaler Inhale 2 puffs every 6 (six) hours as needed for wheezing   • atorvastatin (LIPITOR) 40 mg tablet Take 1 tablet (40 mg total) by mouth every evening   • Cholecalciferol (VITAMIN D3) 1000 units CAPS Take 2,000 Units by mouth daily   • Fluticasone-Salmeterol (Advair) 100-50 mcg/dose inhaler INHALE 1 PUFF 2 (TWO) TIMES A DAY. RINSE MOUTH AFTER USE.   • levothyroxine 50 mcg tablet TAKE 1 TABLET DAILY, EXCEPT TAKE 1 AND 1/2 TABLETS ON SUNDAY AND WEDNESDAY   • metoprolol succinate (TOPROL-XL) 100 mg 24 hr tablet Take 1 tablet (100 mg total) by mouth daily   • torsemide (DEMADEX) 20 mg tablet Take 2 tablets (40 mg total) by mouth 2 (two) times a day   • warfarin (Jantoven) 2.5 mg tablet Take 1 tablet (2.5 mg total) by mouth daily       Objective     Ht 5' 4\" (1.626 m)   Wt 55.8 kg (123 lb)   LMP  (LMP Unknown)   BMI 21.11 kg/m²     Examination shows stable appearance of the foot there is some callus formation noted at the level of the second metatarsal plantar aspect.  On close inspection of this area I do not feel any bone exposure noted currently.  The metatarsals 2 3 and 4 have shifted dorsally as intended with the surgical intervention.  Her callus that she had at the third metatarsal has completely resolved.  No significant signs of infection or draining ulceration noted currently.  "

## 2025-01-16 ENCOUNTER — TELEPHONE (OUTPATIENT)
Dept: CARDIOLOGY CLINIC | Facility: CLINIC | Age: OVER 89
End: 2025-01-16

## 2025-01-16 ENCOUNTER — TELEPHONE (OUTPATIENT)
Age: OVER 89
End: 2025-01-16

## 2025-01-16 DIAGNOSIS — I50.33 ACUTE ON CHRONIC DIASTOLIC CHF (CONGESTIVE HEART FAILURE) (HCC): Primary | ICD-10-CM

## 2025-01-16 RX ORDER — SPIRONOLACTONE 50 MG/1
50 TABLET, FILM COATED ORAL DAILY
Qty: 30 TABLET | Refills: 6 | Status: SHIPPED | OUTPATIENT
Start: 2025-01-16

## 2025-01-16 NOTE — TELEPHONE ENCOUNTER
Yes please, and take a double dose of torsemide 80mg tomorrow Am and then continue 40mg twice daily.

## 2025-01-16 NOTE — TELEPHONE ENCOUNTER
Pt called to report she had gained 5lbs in one week and is very concerned. She weighs herself daily in the am at the same time in her pj's. She watches her sodium intake and does not feel this has increased and is taking meds as prescribed. Warm transf pt to [facility] in HF clinic.

## 2025-01-16 NOTE — TELEPHONE ENCOUNTER
P/c'd she has gained 5 lbs(120 lb today) since Saturday.  She c/o blle edema. Denies any other CHF symptoms.  SOB unchanged. She was in the hospital for Pneumonia due to Covid.  Discharged on 1/1/25.    She follows her low sodium diet and fluid restriction.    She was not able to get BP. Her monitor was not working.    1/7/25 cr- 1.15( 1.24 on 1/1)  Bun-49(54)  k-3.6(3.8), GFR-45(37), HBG- 13.4    Taking Torsemide 40 mg bid               Aldactone 25 mg qd- restarted 1/13/25   she had been on 50 mg qd( D/c'd in the hospital)    Do you want her to go back to the Aldactone 50 mg qd?    Please advise

## 2025-01-16 NOTE — TELEPHONE ENCOUNTER
Patient returned call to W[facility]. Spoke with patient & communicated to patient Dr Colin orders & instructions.    Patient verbalized understanding.

## 2025-01-22 ENCOUNTER — DOCUMENTATION (OUTPATIENT)
Dept: CARDIOLOGY CLINIC | Facility: CLINIC | Age: OVER 89
End: 2025-01-22

## 2025-01-22 ENCOUNTER — TELEPHONE (OUTPATIENT)
Dept: CARDIOLOGY CLINIC | Facility: CLINIC | Age: OVER 89
End: 2025-01-22

## 2025-01-22 ENCOUNTER — APPOINTMENT (OUTPATIENT)
Dept: LAB | Facility: CLINIC | Age: OVER 89
End: 2025-01-22
Payer: COMMERCIAL

## 2025-01-22 ENCOUNTER — OFFICE VISIT (OUTPATIENT)
Dept: CARDIOLOGY CLINIC | Facility: CLINIC | Age: OVER 89
End: 2025-01-22
Payer: COMMERCIAL

## 2025-01-22 VITALS — DIASTOLIC BLOOD PRESSURE: 68 MMHG | BODY MASS INDEX: 22.14 KG/M2 | SYSTOLIC BLOOD PRESSURE: 112 MMHG | WEIGHT: 129 LBS

## 2025-01-22 DIAGNOSIS — N18.32 CKD STAGE 3B, GFR 30-44 ML/MIN (HCC): ICD-10-CM

## 2025-01-22 DIAGNOSIS — J47.9 BRONCHIECTASIS WITHOUT COMPLICATION (HCC): ICD-10-CM

## 2025-01-22 DIAGNOSIS — I48.21 PERMANENT ATRIAL FIBRILLATION (HCC): Chronic | ICD-10-CM

## 2025-01-22 DIAGNOSIS — N17.9 ACUTE RENAL FAILURE, UNSPECIFIED ACUTE RENAL FAILURE TYPE (HCC): ICD-10-CM

## 2025-01-22 DIAGNOSIS — I50.33 ACUTE ON CHRONIC HEART FAILURE WITH PRESERVED EJECTION FRACTION (HCC): Primary | ICD-10-CM

## 2025-01-22 DIAGNOSIS — I50.32 CHRONIC DIASTOLIC CONGESTIVE HEART FAILURE (HCC): Primary | ICD-10-CM

## 2025-01-22 DIAGNOSIS — I50.32 CHRONIC DIASTOLIC CONGESTIVE HEART FAILURE (HCC): ICD-10-CM

## 2025-01-22 DIAGNOSIS — I48.21 PERMANENT ATRIAL FIBRILLATION (HCC): ICD-10-CM

## 2025-01-22 PROBLEM — R25.2 LEG CRAMPS: Status: RESOLVED | Noted: 2018-12-21 | Resolved: 2025-01-22

## 2025-01-22 PROBLEM — S41.101A OPEN WOUND OF RIGHT UPPER ARM: Status: RESOLVED | Noted: 2022-10-17 | Resolved: 2025-01-22

## 2025-01-22 PROBLEM — Z71.85 VACCINE COUNSELING: Status: RESOLVED | Noted: 2023-12-19 | Resolved: 2025-01-22

## 2025-01-22 PROBLEM — Z01.818 PREOP EXAM FOR INTERNAL MEDICINE: Status: RESOLVED | Noted: 2024-11-23 | Resolved: 2025-01-22

## 2025-01-22 PROBLEM — Z01.818 PREOPERATIVE EXAMINATION: Status: RESOLVED | Noted: 2024-08-06 | Resolved: 2025-01-22

## 2025-01-22 PROBLEM — I70.293 OTHER ATHEROSCLEROSIS OF NATIVE ARTERIES OF EXTREMITIES, BILATERAL LEGS (HCC): Status: ACTIVE | Noted: 2025-01-22

## 2025-01-22 LAB
ANION GAP SERPL CALCULATED.3IONS-SCNC: 9 MMOL/L (ref 4–13)
BASOPHILS # BLD AUTO: 0.02 THOUSANDS/ΜL (ref 0–0.1)
BASOPHILS NFR BLD AUTO: 0 % (ref 0–1)
BNP SERPL-MCNC: 494 PG/ML (ref 0–100)
BUN SERPL-MCNC: 45 MG/DL (ref 5–25)
CALCIUM SERPL-MCNC: 8.6 MG/DL (ref 8.4–10.2)
CHLORIDE SERPL-SCNC: 97 MMOL/L (ref 96–108)
CO2 SERPL-SCNC: 31 MMOL/L (ref 21–32)
CREAT SERPL-MCNC: 1.65 MG/DL (ref 0.6–1.3)
EOSINOPHIL # BLD AUTO: 0.19 THOUSAND/ΜL (ref 0–0.61)
EOSINOPHIL NFR BLD AUTO: 3 % (ref 0–6)
ERYTHROCYTE [DISTWIDTH] IN BLOOD BY AUTOMATED COUNT: 14.6 % (ref 11.6–15.1)
GFR SERPL CREATININE-BSD FRML MDRD: 26 ML/MIN/1.73SQ M
GLUCOSE SERPL-MCNC: 141 MG/DL (ref 65–140)
HCT VFR BLD AUTO: 37.6 % (ref 34.8–46.1)
HGB BLD-MCNC: 12 G/DL (ref 11.5–15.4)
IMM GRANULOCYTES # BLD AUTO: 0.03 THOUSAND/UL (ref 0–0.2)
IMM GRANULOCYTES NFR BLD AUTO: 0 % (ref 0–2)
LYMPHOCYTES # BLD AUTO: 2.1 THOUSANDS/ΜL (ref 0.6–4.47)
LYMPHOCYTES NFR BLD AUTO: 32 % (ref 14–44)
MCH RBC QN AUTO: 31.7 PG (ref 26.8–34.3)
MCHC RBC AUTO-ENTMCNC: 31.9 G/DL (ref 31.4–37.4)
MCV RBC AUTO: 99 FL (ref 82–98)
MONOCYTES # BLD AUTO: 0.5 THOUSAND/ΜL (ref 0.17–1.22)
MONOCYTES NFR BLD AUTO: 8 % (ref 4–12)
NEUTROPHILS # BLD AUTO: 3.83 THOUSANDS/ΜL (ref 1.85–7.62)
NEUTS SEG NFR BLD AUTO: 57 % (ref 43–75)
NRBC BLD AUTO-RTO: 0 /100 WBCS
PLATELET # BLD AUTO: 244 THOUSANDS/UL (ref 149–390)
PMV BLD AUTO: 9.9 FL (ref 8.9–12.7)
POTASSIUM SERPL-SCNC: 4.1 MMOL/L (ref 3.5–5.3)
RBC # BLD AUTO: 3.79 MILLION/UL (ref 3.81–5.12)
SODIUM SERPL-SCNC: 137 MMOL/L (ref 135–147)
WBC # BLD AUTO: 6.67 THOUSAND/UL (ref 4.31–10.16)

## 2025-01-22 PROCEDURE — 96374 THER/PROPH/DIAG INJ IV PUSH: CPT

## 2025-01-22 PROCEDURE — 80048 BASIC METABOLIC PNL TOTAL CA: CPT

## 2025-01-22 PROCEDURE — 85025 COMPLETE CBC W/AUTO DIFF WBC: CPT

## 2025-01-22 PROCEDURE — 99214 OFFICE O/P EST MOD 30 MIN: CPT | Performed by: PHYSICIAN ASSISTANT

## 2025-01-22 PROCEDURE — 83880 ASSAY OF NATRIURETIC PEPTIDE: CPT

## 2025-01-22 RX ORDER — FUROSEMIDE 10 MG/ML
80 INJECTION INTRAMUSCULAR; INTRAVENOUS ONCE
Status: COMPLETED | OUTPATIENT
Start: 2025-01-22 | End: 2025-01-22

## 2025-01-22 RX ADMIN — FUROSEMIDE 80 MG: 10 INJECTION INTRAMUSCULAR; INTRAVENOUS at 14:40

## 2025-01-22 NOTE — PROGRESS NOTES
General Cardiology Acute Visit    Rebeca Banegas 89 y.o. female   MRN: [id number]  Encounter: [id number]    Assessment:  Patient Active Problem List    Diagnosis Date Noted    Other atherosclerosis of native arteries of extremities, bilateral legs (Formerly [facility]) 01/22/2025    COVID-19 01/06/2025    Hyponatremia 12/29/2024    Acute kidney failure (Formerly [facility]) 12/29/2024    Severe protein-calorie malnutrition (Formerly [facility]) 08/29/2024    CVA (cerebral vascular accident) (Formerly [facility]) 08/27/2024    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 (Formerly [facility]) 06/18/2024    Chronic foot ulcer, left, with fat layer exposed (Formerly [facility]) 05/21/2024    Bronchiectasis without complication (Formerly [facility]) 09/08/2023    MGUS (monoclonal gammopathy of unknown significance) 09/04/2023    Chronic cough 06/13/2023    Age-related osteoporosis without current pathological fracture 05/16/2023    Pneumonia due to COVID-19 virus 03/25/2023    Secondary hyperparathyroidism (Formerly [facility]) 01/02/2023    Labial abscess 06/29/2022    Vulvar cysts 06/29/2022    Osteopenia of hip 04/07/2020    CKD stage 3b, GFR 30-44 ml/min (Formerly [facility]) 04/07/2020    Elevated serum immunoglobulin free light chains 11/28/2019    Sensorineural hearing loss (SNHL), bilateral 04/04/2019    Left asymmetrical SNHL 04/04/2019    Pain of left hand 12/21/2018    Chronic diastolic congestive heart failure (Formerly [facility]) 02/13/2017    Plantar fasciitis 02/05/2016    Essential tremor 10/02/2015    Vitamin D deficiency 10/02/2015    Onychomycosis 07/21/2015    Pulmonary nodule 02/20/2015    Thyroid nodule 02/20/2015    Prediabetes 09/23/2014    Primary hypertension 05/19/2014    Leg pain 05/19/2014    Abnormal findings on diagnostic imaging of urinary organs 04/04/2014    Abnormal findings on diagnostic imaging of other specified body structures 03/20/2014    Atherosclerosis 03/06/2014    Ovarian cyst 03/06/2014    Pancreatic cyst 03/06/2014    Backache 02/28/2014    Hoarseness 02/28/2014    Allergic rhinitis 06/11/2013    Hyperlipidemia  "04/19/2013    Permanent atrial fibrillation (HCC) 02/14/2013    Arthritis 11/12/2012    Asthma 11/12/2012    Esophageal reflux 11/12/2012    Benign colon polyp 11/10/2012    Bifascicular bundle branch block 11/10/2012    Hypothyroidism 11/10/2012    Nasal polyp 11/10/2012       Today's Plan:  Volume notably up on exam today. Will give 80 mg IV Lasix in office today.   Advised to begin torsemide to 60 mg BID starting tomorrow AM.   BMP and BNP pending at time of visit. May require K supplementation if hypokalemic.   Estimated dry weight in mid-110 lbs in 06/2024.    Plan:  Acute on chronic HFpEF; LVEF 60%   Weight of 114 lbs on 11/22. Today, weighs 129 lbs.   Most recent BMP from 01/07/2025: sodium 138; potassium 3.6; BUN 49; creatinine 1.15; eGFR 45.    Guideline-Directed Medical Therapy:  --Aldosterone Antagonist: spironolactone 50 mg daily.  --SGLT2 Inhibitor: No.    Volume Management:  --Diuretic: torsemide 40 mg BID --> increase to 60 mg BID this week.     Atrial fibrillation, permanent   [id number] = 5 (age, HF, HTN).   Anticoagulation on Coumadin.    Rate control: metoprolol succinate 100 mg daily.     Chronic kidney disease, stage IIIb   Baseline creatinine of 1.3-1.6.   Most recent BMP from 01/07/2025: sodium 138; potassium 3.6; BUN 49; creatinine 1.15; eGFR 45.    Recent COVID-19 pneumonia: requiring hospitalization in 12/2024.  Hypertension  Bifascicular bundle branch block    Hyperlipidemia  MGUS  Asthma / bronchiectasis   Essential tremor   History of tobacco abuse    HPI:   Rebeca Banegas is a 89-year-old woman with a PMH as above who presents to the office for an acute visit. Follows with Dr. Vyas.     11/22/2024 with TH: \"patient presents for 1 week follow-up. At last visit was found to be volume overloaded but progressing in comparison to the visit prior.  she was continued on a higher dose of her torsemide, i.e. 60 mg twice daily along with a one-time dose of metolazone as well as " "spironolactone 50 mg daily. Today she feels much improved. Her leg swelling is almost completely resolved. Her shortness of breath is at baseline. She attributes her recent decompensation to dietary indiscretions. He has since made changes to this.  Follow-up lab work stable.\"    Patient contacted office on 01/16 with reported 5 lbs weight gain in 5 days with reported bilateral LE swelling. Advised to double AM torsemide dose (to 80 mg) on 01/17 continue on torsemide 40 mg BID. Patient contacted office again on 01/22 with reports of persistent weight gain and ongoing fatigue and TANG s/p COVID-pneumonia. Acute visit scheduled.    01/22/2025: Patient presents for an acute visit. Reports worsening bilateral LE swelling and rapid weight gain since admission for COVID pneumonia in December 2024. Estimates gaining 8-9 lbs on her home scale.  Reports decreased urine output with torsemide over the past few weeks. Denies chest pain, palpitations, SOB at rest, PND, and orthopnea.  Denies any recent dietary indiscretion or increased fluid intake.    Past Medical History:   Diagnosis Date    Abnormal ultrasound     RESOLVED: [id number]    Advice given about COVID-19 virus infection 04/07/2020    Ambulates with cane     Asthma with acute exacerbation     LAST ASSESSED: [id number]    Asymptomatic menopausal state     Atherosclerosis     Atrial fibrillation (HCC)     Chronic obstructive lung disease (HCC)     w/ chonic cough    Chronic ulcer of left foot (HCC)     Colon polyp     Congestive heart failure (HCC)     LAST ASSESSED: [id number]    COVID-19 virus infection 03/25/2023    Cuboid fracture     LAST ASSESSED: [id number]    Disease of thyroid gland     Dyspepsia     Edema of both lower extremities     Equinus deformity of left foot     Fall 04/01/2024    Falls     5/2024    GERD (gastroesophageal reflux disease)     High risk medication use     LAST ASSESSED: [id number]    Hyperlipidemia     Hypertension     Hypokalemia     " Hypothyroidism     Impacted cerumen of both ears     LAST ASSESSED: [id number]    Impacted cerumen of right ear     LAST ASSESSED: [id number]    Influenza     LAST ASSESSED: [id number]    IPMN (intraductal papillary mucinous neoplasm)     RESOLVED: 02OCT2015    Irregular heart beat     afib. Warfarin.    Leg cramps 12/21/2018    Limb swelling     LAST ASSESSED: [id number]    Navicular fracture of ankle     LAST ASSESSED: [id number]    Onychomycosis     LAST ASSESSED: [id number]    Open wound of right upper arm 10/17/2022    Osteoarthritis     Osteopenia     Osteoporosis     Paronychia, finger, unspecified laterality     LAST ASSESSED: [id number]    Paroxysmal atrial fibrillation (HCC)     LAST ASSESSED: 02MAR2014    Peripheral vascular disease (HCC)     Plantar fasciitis     SOBOE (shortness of breath on exertion)     Stroke (HCC)     Right arm weakness that has resolved    Talus fracture     LAST ASSESSED: [id number]    Vaccine counseling 12/19/2023    Vascular headache     Walker as ambulation aid     Wound of right foot      Review of Systems   Constitutional:  Negative for activity change, appetite change, fatigue, fever and unexpected weight change.   Respiratory:  Negative for cough, chest tightness and shortness of breath.    Cardiovascular:  Positive for leg swelling. Negative for chest pain and palpitations.   Gastrointestinal:  Negative for abdominal distention and abdominal pain.   Genitourinary:  Negative for decreased urine volume, dysuria and urgency.   Neurological:  Negative for dizziness, syncope, weakness and light-headedness.   Psychiatric/Behavioral:  Negative for confusion and sleep disturbance. The patient is not nervous/anxious.        Allergies   Allergen Reactions    Asa [Aspirin] Shortness Of Breath    Nsaids Shortness Of Breath    Medical Tape Other (See Comments)     Skin tears with the removal of tape          Current Outpatient Medications:     albuterol (2.5 mg/3 mL) 0.083 % nebulizer  solution, Take 3 mL (2.5 mg total) by nebulization every 8 (eight) hours, Disp: 270 mL, Rfl: 3    atorvastatin (LIPITOR) 40 mg tablet, Take 1 tablet (40 mg total) by mouth every evening, Disp: 90 tablet, Rfl: 1    Cholecalciferol (VITAMIN D3) 1000 units CAPS, Take 2,000 Units by mouth daily, Disp: , Rfl:     Fluticasone-Salmeterol (Advair) 100-50 mcg/dose inhaler, INHALE 1 PUFF 2 (TWO) TIMES A DAY. RINSE MOUTH AFTER USE., Disp: 180 blister, Rfl: 3    levothyroxine 50 mcg tablet, TAKE 1 TABLET DAILY, EXCEPT TAKE 1 AND 1/2 TABLETS ON  AND WEDNESDAY, Disp: 100 tablet, Rfl: 1    metoprolol succinate (TOPROL-XL) 100 mg 24 hr tablet, Take 1 tablet (100 mg total) by mouth daily, Disp: 90 tablet, Rfl: 1    spironolactone (ALDACTONE) 50 mg tablet, Take 1 tablet (50 mg total) by mouth daily, Disp: 30 tablet, Rfl: 6    torsemide (DEMADEX) 20 mg tablet, Take 2 tablets (40 mg total) by mouth 2 (two) times a day, Disp: 180 tablet, Rfl: 3    warfarin (Jantoven) 2.5 mg tablet, Take 1 tablet (2.5 mg total) by mouth daily, Disp: 30 tablet, Rfl: 0    albuterol (Proventil HFA) 90 mcg/act inhaler, Inhale 2 puffs every 6 (six) hours as needed for wheezing, Disp: 20.1 g, Rfl: 3  No current facility-administered medications for this visit.    Social History     Socioeconomic History    Marital status:      Spouse name: Not on file    Number of children: Not on file    Years of education: Not on file    Highest education level: Not on file   Occupational History    Occupation: retired   Tobacco Use    Smoking status: Former     Current packs/day: 0.00     Average packs/day: 0.3 packs/day for 2.0 years (0.5 ttl pk-yrs)     Types: Cigarettes     Start date:      Quit date:      Years since quittin.1    Smokeless tobacco: Never    Tobacco comments:     On and off socially with friends    Vaping Use    Vaping status: Never Used   Substance and Sexual Activity    Alcohol use: Not Currently    Drug use: No    Sexual  activity: Not Currently     Partners: Male     Birth control/protection: Post-menopausal   Other Topics Concern    Not on file   Social History Narrative    DAILY COFFEE CONSUMPTION (2 CUPS/DAY)    EXERCISING REGULARLY     Social Drivers of Health     Financial Resource Strain: Not on file   Food Insecurity: No Food Insecurity (2024)    Nursing - Inadequate Food Risk Classification     Worried About Running Out of Food in the Last Year: Never true     Ran Out of Food in the Last Year: Never true     Ran Out of Food in the Last Year: Never true   Transportation Needs: No Transportation Needs (2024)    Nursing - Transportation Risk Classification     Lack of Transportation: Not on file     Lack of Transportation: No   Physical Activity: Not on file   Stress: Not on file   Social Connections: Not on file   Intimate Partner Violence: Unknown (2024)    Nursing IPS     Feels Physically and Emotionally Safe: Not on file     Physically Hurt by Someone: Not on file     Humiliated or Emotionally Abused by Someone: Not on file     Physically Hurt by Someone: No     Hurt or Threatened by Someone: No   Housing Stability: Unknown (2024)    Nursing: Inadequate Housing Risk Classification     Has Housing: Not on file     Worried About Losing Housing: Not on file     Unable to Get Utilities: Not on file     Unable to Pay for Housing in the Last Year: No     Has Housin     Family History   Problem Relation Age of Onset    Pancreatic cancer Mother 93    Heart failure Mother     Coronary artery disease Family     Breast cancer Family     Other Family         BREAST DISORDER, GASTROINTESTINAL CANCER    Uterine cancer Family     Hyperlipidemia Family     Osteoarthritis Family     Ovarian cancer Family     Brain cancer Son     Stroke Father     No Known Problems Sister     No Known Problems Daughter     No Known Problems Maternal Grandmother     No Known Problems Maternal Grandfather     No Known Problems  Paternal Grandmother     No Known Problems Paternal Grandfather     Breast cancer Cousin 50    Breast cancer Cousin 50    Ovarian cancer Other 49       Vitals:   Blood pressure 112/68, weight 58.5 kg (129 lb).    Wt Readings from Last 10 Encounters:   25 58.5 kg (129 lb)   01/15/25 55.8 kg (123 lb)   24 54 kg (119 lb)   24 53.1 kg (117 lb)   24 52.1 kg (114 lb 13.8 oz)   24 52 kg (114 lb 11.2 oz)   11/15/24 57.2 kg (126 lb)   24 59.4 kg (131 lb)   10/17/24 52.8 kg (116 lb 8 oz)   10/02/24 57.1 kg (125 lb 12.8 oz)     Vitals:    25 1348   BP: 112/68   BP Location: Left arm   Patient Position: Sitting   Cuff Size: Standard   Weight: 58.5 kg (129 lb)       Physical Exam  Vitals reviewed.   Constitutional:       General: She is awake. She is not in acute distress.     Appearance: Normal appearance. She is well-developed and normal weight. She is not ill-appearing, toxic-appearing or diaphoretic.   HENT:      Head: Normocephalic.      Nose: Nose normal.      Mouth/Throat:      Mouth: Mucous membranes are moist.   Eyes:      General: No scleral icterus.     Conjunctiva/sclera: Conjunctivae normal.   Neck:      Vascular: JVD present.   Cardiovascular:      Rate and Rhythm: Normal rate and regular rhythm.      Heart sounds: No murmur heard.  Pulmonary:      Effort: Pulmonary effort is normal. No tachypnea, bradypnea, accessory muscle usage or respiratory distress.      Breath sounds: Normal air entry. Wheezing and rales present.   Abdominal:      Palpations: Abdomen is soft.      Tenderness: There is no abdominal tenderness.   Musculoskeletal:      Cervical back: Neck supple.      Right lower le+ Pitting Edema present.      Left lower le+ Pitting Edema present.   Skin:     General: Skin is warm and dry.      Coloration: Skin is not jaundiced or pale.   Neurological:      Mental Status: She is alert and oriented to person, place, and time.   Psychiatric:         Attention and  Perception: Attention normal.         Mood and Affect: Mood and affect normal.         Speech: Speech normal.         Behavior: Behavior normal. Behavior is cooperative.         Thought Content: Thought content normal.       Labs & Results:  Lab Results   Component Value Date    WBC 6.67 01/22/2025    HGB 12.0 01/22/2025    HCT 37.6 01/22/2025    MCV 99 (H) 01/22/2025     01/22/2025     Lab Results   Component Value Date    SODIUM 138 01/07/2025    K 3.6 01/07/2025    CL 95 (L) 01/07/2025    CO2 32 (H) 01/07/2025    BUN 49 (H) 01/07/2025    CREATININE 1.15 (H) 01/07/2025    GLUC 85 01/07/2025    CALCIUM 8.0 (L) 01/07/2025     Lab Results   Component Value Date    INR 2.8 01/07/2025    INR 2.60 (H) 12/31/2024    INR 2.61 (H) 12/30/2024    PROTIME 29.2 (H) 01/07/2025    PROTIME 27.7 (H) 12/31/2024    PROTIME 27.8 (H) 12/30/2024     Lab Results   Component Value Date     (H) 04/04/2024      Franci Marcial PA-C

## 2025-01-22 NOTE — TELEPHONE ENCOUNTER
Pt called back stating no improvement since last week, even after increasing diuretic as directed. LE edema worsening, weight climbing to 124 lbs, baseline is 116 lbs.  TANG and fatigue ongoing since the beginning of the year after having pneumonia  Pt scheduled today with Franci @ 2:00 pm

## 2025-01-22 NOTE — PROGRESS NOTES
Pt given 80 mg IV lasix without difficulty.  IV d/c'd cath intact.    Post BP-96/60\    UO-225 ml yellow urine.    Pt is aware she will get a call tomorrow for F/u wt.    Talked about low sodium diet and fluid restriction. She follows both.

## 2025-01-22 NOTE — PATIENT INSTRUCTIONS
You received IV Lasix in the office today.   Please do not take your evening dose of torsemide.  Increase torsemide to 60 mg twice a day starting Thursday morning.   Our nurse will call you tomorrow to see how you're doing.   If blood work shows low potassium levels, we weill call you to dose potassium pills.   We will see you again in the office early next week.    Please weigh yourself every day (after emptying your bladder) and keep a detailed log of weights.   Contact the Heart Failure program at [phone] if you gain 3+ lbs overnight or 5+ lbs in 5-7 days.  Limit daily sodium/salt intake to 2000 mg daily to prevent fluid retention.  Avoid canned foods, fast food/Chinese food, and processed meats (hot dogs, lunch meat, and sausage etc.). Caution with condiments.  Limit fluid intake to 2000 mL or 2 liters (about 60-65 ounces) daily.  Avoid electrolyte replacement drinks (such as Gatorade, Pedialyte, Propel, Liquid IV, etc.).  Bring complete list of medications and log of daily weights to your follow-up appointment.

## 2025-01-23 ENCOUNTER — TELEPHONE (OUTPATIENT)
Dept: CARDIOLOGY CLINIC | Facility: CLINIC | Age: OVER 89
End: 2025-01-23

## 2025-01-23 ENCOUNTER — ANTICOAG VISIT (OUTPATIENT)
Dept: CARDIOLOGY CLINIC | Facility: CLINIC | Age: OVER 89
End: 2025-01-23

## 2025-01-23 DIAGNOSIS — I48.21 PERMANENT ATRIAL FIBRILLATION (HCC): Primary | Chronic | ICD-10-CM

## 2025-01-24 ENCOUNTER — TELEPHONE (OUTPATIENT)
Dept: CARDIOLOGY CLINIC | Facility: CLINIC | Age: OVER 89
End: 2025-01-24

## 2025-01-24 NOTE — TELEPHONE ENCOUNTER
Patient returned call to office stating the IV Lasix 80 mg administered on Wednesday, 1/22, did not help her. Stated her weight is not coming down.  Started Torsemide 60 mg BID yesterday.    Stated yesterday's weight on home scale   Wt - 124.6.  Today's Wt - 125 lb  Stated at office weight on 1/22, she had cloths & shoes on. Wt - 129 lb    Stated her lower extremities were very swollen last night, more swollen last night, than when she was in office 1/22. Slept with her legs elevated last night.  This AM lower extremity edema is a little better than last night.  More SOB than baseline.    Stated she does not know what to do next.    Please advise.

## 2025-01-24 NOTE — TELEPHONE ENCOUNTER
Called patient who stated she did not have an increase in urine output after IV Lasix administration Wednesday, 1/22. Stated she is not constipated currently.    Please advise.

## 2025-01-27 NOTE — TELEPHONE ENCOUNTER
F/U call to patient who stated her legs are still extremely swollen, slightly less in the AM.  Today's Wt - 122 lb  1/26 Wt - 124 lb  1/25 Wt - 125 lb    Stated she did have much activity over the weekend. Edema to legs is limiting her mobility. Experiences exertional dyspnea. Denies chest pain or discomfort. Has chronic cough, more dry & much less productive than had been several weeks ago.    Urine output is not a whole lot more  since Torsemide increase. Has not noticed any big changes. Knows she is not putting out as much as she did several weeks ago.    Taking Torsemide 60 mg twice daily.    Continues to sleep with legs elevated.     Trying to stick to 2 G sodium diet.   Adhering to 7 - 8, 8 oz cups of fluid per day.    Please advise.

## 2025-01-27 NOTE — TELEPHONE ENCOUNTER
Spoke with representative at [facility] for pricing on Jardiance 10mg daily    #30/30       #90/90  $175.00      PA needed - None needed    They could not test run a claim for 30 day supply.

## 2025-01-27 NOTE — TELEPHONE ENCOUNTER
Returned call to patient & read to her orders & instructions from LUZ Reed.    Patient verbalized understanding.    Patient stated she does not have a pre - disposal to bad UTI's & would be willing to try Jardiance once price is checked & benefits discussed with Roseline @ Wednesdays OV.    Reviewed with patient the benefits of Jardiance.

## 2025-01-28 PROBLEM — J12.82 PNEUMONIA DUE TO COVID-19 VIRUS: Status: RESOLVED | Noted: 2023-03-25 | Resolved: 2025-01-28

## 2025-01-28 PROBLEM — U07.1 PNEUMONIA DUE TO COVID-19 VIRUS: Status: RESOLVED | Noted: 2023-03-25 | Resolved: 2025-01-28

## 2025-01-28 NOTE — PROGRESS NOTES
Heart Failure Outpatient Progress Note - Rebeca Banegas 89 y.o. female MRN: [id number]    @ Encounter: [id number]      Assessment/Plan:    Patient Active Problem List    Diagnosis Date Noted    Other atherosclerosis of native arteries of extremities, bilateral legs ([facility]) 01/22/2025    COVID-19 01/06/2025    Hyponatremia 12/29/2024    Acute kidney failure ([facility]) 12/29/2024    Severe protein-calorie malnutrition ([facility]) 08/29/2024    CVA (cerebral vascular accident) ([facility]) 08/27/2024    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 ([facility]) 06/18/2024    Chronic foot ulcer, left, with fat layer exposed ([facility]) 05/21/2024    Bronchiectasis without complication ([facility]) 09/08/2023    MGUS (monoclonal gammopathy of unknown significance) 09/04/2023    Chronic cough 06/13/2023    Age-related osteoporosis without current pathological fracture 05/16/2023    Secondary hyperparathyroidism ([facility]) 01/02/2023    Labial abscess 06/29/2022    Vulvar cysts 06/29/2022    Osteopenia of hip 04/07/2020    CKD stage 3b, GFR 30-44 ml/min ([facility]) 04/07/2020    Elevated serum immunoglobulin free light chains 11/28/2019    Sensorineural hearing loss (SNHL), bilateral 04/04/2019    Left asymmetrical SNHL 04/04/2019    Pain of left hand 12/21/2018    Chronic diastolic congestive heart failure ([facility]) 02/13/2017    Plantar fasciitis 02/05/2016    Essential tremor 10/02/2015    Vitamin D deficiency 10/02/2015    Onychomycosis 07/21/2015    Pulmonary nodule 02/20/2015    Thyroid nodule 02/20/2015    Prediabetes 09/23/2014    Primary hypertension 05/19/2014    Leg pain 05/19/2014    Abnormal findings on diagnostic imaging of urinary organs 04/04/2014    Abnormal findings on diagnostic imaging of other specified body structures 03/20/2014    Atherosclerosis 03/06/2014    Ovarian cyst 03/06/2014    Pancreatic cyst 03/06/2014    Backache 02/28/2014    Hoarseness 02/28/2014    Allergic rhinitis 06/11/2013    Hyperlipidemia 04/19/2013    Permanent atrial  fibrillation (HCC) 02/14/2013    Arthritis 11/12/2012    Asthma 11/12/2012    Esophageal reflux 11/12/2012    Benign colon polyp 11/10/2012    Bifascicular bundle branch block 11/10/2012    Hypothyroidism 11/10/2012    Nasal polyp 11/10/2012      Chronic diastolic CHF, LVEF 60%  -Grade 2 diastolic dysfunction, no significant LVH, she had an equivocal SPECT TTR in 2019          Diuretic Rx: s/p IV Lasix in clinic 01/22--> torsemide increased  to 80 mg BID (consider decreasing once Jardiance started)  Rx: Spironolactone 50 mg QD + Jardiance 10 mg QD (ordered 01/29--mail delivery)    --Presumed dry weight 118, 131, 126, 114 lbs, 129-->119 today    Volume status: JVD stable.  +2 LE edema.  SOB at BL.  Weight down 10 lbs from last visit--(down about 4 lbs at home)    Chronic kidney disease, stage IIIb  Baseline creatinine of 1.3-1.6.--last creat 1.65    Recent COVID-19 pneumonia: requiring hospitalization in 12/2024.    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 year old adult  With chronic diastolic CHF, chronic atrial fibrillation on a rate control strategy and anticoagulation with warfarin, bifascicular bundle branch block, mixed hyperlipidemia, and a prior negative infiltrative cardiomyopathy workup returns for a follow-up visit.     Blood pressure is stable  Creatinine stable at 1.52  Weight improved from 131 down to 126 pounds with intensification of her diuretic regimen to torsemide 60 mg twice daily, spironolactone 50 mg once daily.  A-fib is well rate controlled, she is stable on warfarin, but recent  likely embolic CVA in the context of warfarin interruption has is recommending lifelong Lovenox bridging.    11/22/2024 patient presents for 1 week follow-up.  At last visit was found to be volume overloaded but progressing in comparison to the visit prior.  she was continued on a higher dose of her torsemide, i.e. 60 mg twice daily along with a one-time dose of metolazone as well as spironolactone 50 mg daily..  Today she feels much improved.  Her leg swelling is almost completely resolved.  Her shortness of breath is at baseline.  She attributes her recent decompensation to dietary indiscretions.  He has since made changes to this.  Follow-up lab work stable    Patient contacted office on 01/16 with reported 5 lbs weight gain in 5 days with reported bilateral LE swelling. Advised to double AM torsemide dose (to 80 mg) on 01/17 continue on torsemide 40 mg BID. Patient contacted office again on 01/22 with reports of persistent weight gain and ongoing fatigue and TANG s/p COVID-pneumonia. Acute visit scheduled.     01/22/2025: MJ: Patient presents for an acute visit. Reports worsening bilateral LE swelling and rapid weight gain since admission for COVID pneumonia in December 2024. Estimates gaining 8-9 lbs on her home scale.  Reports decreased urine output with torsemide over the past few weeks. Denies chest pain, palpitations, SOB at rest, PND, and orthopnea.  Denies any recent dietary indiscretion or increased fluid intake.    01/29/25: [facility]  States her LE edema is still up and not much better than last week.  SOB is chronic and at BL.  Weights trending down at home from 124 lbs-->120 lbs this morning.  Pt states her dry weight is around 115 lbs. Taking Torsemide 80 BID--feels her urine output has an initial great response x 1 occurrence, then tapers off.  She is also drinking less than 2 liters/day and feels she could drink more.  Meals at  taste salty at times, norris the soup.  Agreeable to  Jardiance start.  Discussed medication benefits and side effects.  Pamphlet given.    Physical Exam  Constitutional:       Appearance: Normal appearance.   Neck:      Vascular: No JVD.      Comments: Above clavicle  Cardiovascular:      Rate and Rhythm: Normal rate. Rhythm irregular. Extrasystoles are present.     Heart sounds: Normal heart sounds, S1 normal and S2 normal.   Pulmonary:      Effort: Respiratory distress present.      Breath sounds: Normal air entry. Wheezing and rhonchi present.   Musculoskeletal:      Right lower le+ Edema present.      Left lower le+ Edema present.   Skin:     General: Skin is warm and dry.      Comments: PVD LE    Neurological:      Mental Status: She is alert and oriented to person, place, and time. Mental status is at baseline.   Psychiatric:         Behavior: Behavior is cooperative.         Cognition and Memory: Cognition normal.        Review of Systems   Constitutional:  Positive for activity change and fatigue.   HENT:  Positive for congestion.    Eyes: Negative.    Respiratory:  Positive for cough and shortness of breath.         SOB at BL   Cardiovascular:  Positive for leg swelling. Negative for chest pain and palpitations.   Gastrointestinal: Negative.    Endocrine: Negative.    Genitourinary:  Positive for decreased urine volume.   Musculoskeletal: Negative.    Allergic/Immunologic: Negative.    Neurological:  Negative for dizziness.   Hematological: Negative.    Psychiatric/Behavioral: Negative.     Past Medical History:   Diagnosis Date    Abnormal ultrasound     RESOLVED: 2015    Advice given about COVID-19 virus infection 2020    Ambulates with cane     Asthma with acute exacerbation     LAST ASSESSED: [id number]    Asymptomatic menopausal state     Atherosclerosis     Atrial fibrillation (HCC)     Chronic obstructive lung disease (HCC)     w/ chonic cough    Chronic ulcer of left foot (HCC)     Colon polyp     Congestive heart failure (HCC)      LAST ASSESSED: [id number]    COVID-19 virus infection 03/25/2023    Cuboid fracture     LAST ASSESSED: [id number]    Disease of thyroid gland     Dyspepsia     Edema of both lower extremities     Equinus deformity of left foot     Fall 04/01/2024    Falls     5/2024    GERD (gastroesophageal reflux disease)     High risk medication use     LAST ASSESSED: [id number]    Hyperlipidemia     Hypertension     Hypokalemia     Hypothyroidism     Impacted cerumen of both ears     LAST ASSESSED: [id number]    Impacted cerumen of right ear     LAST ASSESSED: [id number]    Influenza     LAST ASSESSED: [id number]    IPMN (intraductal papillary mucinous neoplasm)     RESOLVED: 02OCT2015    Irregular heart beat     afib. Warfarin.    Leg cramps 12/21/2018    Limb swelling     LAST ASSESSED: [id number]    Navicular fracture of ankle     LAST ASSESSED: [id number]    Onychomycosis     LAST ASSESSED: [id number]    Open wound of right upper arm 10/17/2022    Osteoarthritis     Osteopenia     Osteoporosis     Paronychia, finger, unspecified laterality     LAST ASSESSED: [id number]    Paroxysmal atrial fibrillation (HCC)     LAST ASSESSED: 02MAR2014    Peripheral vascular disease (HCC)     Plantar fasciitis     SOBOE (shortness of breath on exertion)     Stroke (HCC)     Right arm weakness that has resolved    Talus fracture     LAST ASSESSED: [id number]    Vaccine counseling 12/19/2023    Vascular headache     Walker as ambulation aid     Wound of right foot        12 point ROS negative other than that stated in HPI    Allergies   Allergen Reactions    Asa [Aspirin] Shortness Of Breath    Nsaids Shortness Of Breath    Medical Tape Other (See Comments)     Skin tears with the removal of tape      .    Current Outpatient Medications:     albuterol (2.5 mg/3 mL) 0.083 % nebulizer solution, Take 3 mL (2.5 mg total) by nebulization every 8 (eight) hours, Disp: 270 mL, Rfl: 3    albuterol (Proventil HFA) 90 mcg/act inhaler, Inhale 2 puffs every 6 (six)  hours as needed for wheezing, Disp: 20.1 g, Rfl: 3    atorvastatin (LIPITOR) 40 mg tablet, Take 1 tablet (40 mg total) by mouth every evening, Disp: 90 tablet, Rfl: 1    Cholecalciferol (VITAMIN D3) 1000 units CAPS, Take 2,000 Units by mouth daily, Disp: , Rfl:     Empagliflozin (Jardiance) 10 MG TABS tablet, Take 1 tablet (10 mg total) by mouth every morning, Disp: 90 tablet, Rfl: 1    Fluticasone-Salmeterol (Advair) 100-50 mcg/dose inhaler, INHALE 1 PUFF 2 (TWO) TIMES A DAY. RINSE MOUTH AFTER USE., Disp: 180 blister, Rfl: 3    levothyroxine 50 mcg tablet, TAKE 1 TABLET DAILY, EXCEPT TAKE 1 AND 1/2 TABLETS ON  AND WEDNESDAY, Disp: 100 tablet, Rfl: 1    metoprolol succinate (TOPROL-XL) 100 mg 24 hr tablet, Take 1 tablet (100 mg total) by mouth daily, Disp: 90 tablet, Rfl: 1    spironolactone (ALDACTONE) 50 mg tablet, Take 1 tablet (50 mg total) by mouth daily, Disp: 30 tablet, Rfl: 6    torsemide (DEMADEX) 20 mg tablet, Take 4 tablets (80 mg total) by mouth 2 (two) times a day, Disp: , Rfl:     warfarin (Jantoven) 2.5 mg tablet, Take 1 tablet (2.5 mg total) by mouth daily, Disp: 30 tablet, Rfl: 0    Social History     Socioeconomic History    Marital status:      Spouse name: Not on file    Number of children: Not on file    Years of education: Not on file    Highest education level: Not on file   Occupational History    Occupation: retired   Tobacco Use    Smoking status: Former     Current packs/day: 0.00     Average packs/day: 0.3 packs/day for 2.0 years (0.5 ttl pk-yrs)     Types: Cigarettes     Start date:      Quit date:      Years since quittin.1    Smokeless tobacco: Never    Tobacco comments:     On and off socially with friends    Vaping Use    Vaping status: Never Used   Substance and Sexual Activity    Alcohol use: Not Currently    Drug use: No    Sexual activity: Not Currently     Partners: Male     Birth control/protection: Post-menopausal   Other Topics Concern    Not on file    Social History Narrative    DAILY COFFEE CONSUMPTION (2 CUPS/DAY)    EXERCISING REGULARLY     Social Drivers of Health     Financial Resource Strain: Not on file   Food Insecurity: No Food Insecurity (2024)    Nursing - Inadequate Food Risk Classification     Worried About Running Out of Food in the Last Year: Never true     Ran Out of Food in the Last Year: Never true     Ran Out of Food in the Last Year: Never true   Transportation Needs: No Transportation Needs (2024)    Nursing - Transportation Risk Classification     Lack of Transportation: Not on file     Lack of Transportation: No   Physical Activity: Not on file   Stress: Not on file   Social Connections: Not on file   Intimate Partner Violence: Unknown (2024)    Nursing IPS     Feels Physically and Emotionally Safe: Not on file     Physically Hurt by Someone: Not on file     Humiliated or Emotionally Abused by Someone: Not on file     Physically Hurt by Someone: No     Hurt or Threatened by Someone: No   Housing Stability: Unknown (2024)    Nursing: Inadequate Housing Risk Classification     Has Housing: Not on file     Worried About Losing Housing: Not on file     Unable to Get Utilities: Not on file     Unable to Pay for Housing in the Last Year: No     Has Housin       Family History   Problem Relation Age of Onset    Pancreatic cancer Mother 93    Heart failure Mother     Coronary artery disease Family     Breast cancer Family     Other Family         BREAST DISORDER, GASTROINTESTINAL CANCER    Uterine cancer Family     Hyperlipidemia Family     Osteoarthritis Family     Ovarian cancer Family     Brain cancer Son     Stroke Father     No Known Problems Sister     No Known Problems Daughter     No Known Problems Maternal Grandmother     No Known Problems Maternal Grandfather     No Known Problems Paternal Grandmother     No Known Problems Paternal Grandfather     Breast cancer Cousin 50    Breast cancer Cousin 50    Ovarian  "cancer Other 49       Physical Exam:    Vitals: /70 (BP Location: Left arm, Patient Position: Sitting, Cuff Size: Standard)   Pulse 73   Ht 5' 4\" (1.626 m)   Wt 54 kg (119 lb)   LMP  (LMP Unknown)   SpO2 94%   BMI 20.43 kg/m²     Wt Readings from Last 3 Encounters:   01/29/25 54 kg (119 lb)   01/22/25 58.5 kg (129 lb)   01/15/25 55.8 kg (123 lb)       GEN: Rebeca Banegas appears well, alert and oriented x 3, pleasant and cooperative   HEENT: pupils equal, round, and reactive to light; extraocular muscles intact  NECK: supple, no carotid bruits   HEART: regular rhythm, normal S1 and S2, no murmurs, clicks, gallops or rubs, JVP is  down  LUNGS: scattered rhonchi, clears somewhat with cough  ABDOMEN: normal bowel sounds, soft, no tenderness, no distention  EXTREMITIES: peripheral pulses normal; no clubbing, cyanosis, trace BLLE leg edema, right foot wound wrapped.   NEURO: no focal findings   SKIN: normal without suspicious lesions on exposed skin    Labs & Results:  Lab Results   Component Value Date     10/17/2017    SODIUM 137 01/22/2025    K 4.1 01/22/2025    CL 97 01/22/2025    CO2 31 01/22/2025    AGAP 9 01/22/2025    BUN 45 (H) 01/22/2025    CREATININE 1.65 (H) 01/22/2025    GLUC 141 (H) 01/22/2025    GLUF 104 (H) 11/20/2024    CALCIUM 8.6 01/22/2025    AST 31 09/23/2024    ALT 25 09/23/2024    ALKPHOS 51 09/23/2024    PROT 6.4 10/17/2017    TP 6.7 09/23/2024    TP 6.3 (L) 09/23/2024    BILITOT 0.5 10/17/2017    TBILI 0.48 09/23/2024    EGFR 26 01/22/2025     Lab Results   Component Value Date     (H) 01/22/2025      Lab Results   Component Value Date    WBC 6.67 01/22/2025    HGB 12.0 01/22/2025    HCT 37.6 01/22/2025    MCV 99 (H) 01/22/2025     01/22/2025     Lab Results   Component Value Date    LDLCALC 76 08/27/2024     Lab Results   Component Value Date    FWB2ARXTRNHU 4.971 (H) 09/23/2024    TSH 6.33 (H) 01/02/2024     Lab Results   Component Value Date    HGBA1C 6.4 (H) " 08/27/2024         EKG personally reviewed by LUZ Brown.   No results found for this visit on 01/29/25.     Counseling / Coordination of Care  Total face to face time spent with patient 20 minutes.  An additional 10 minutes was spent for chart/data review and visit preparation.       Thank you for the opportunity to participate in the care of this patient.    LUZ Brown

## 2025-01-29 ENCOUNTER — OFFICE VISIT (OUTPATIENT)
Dept: CARDIOLOGY CLINIC | Facility: CLINIC | Age: OVER 89
End: 2025-01-29
Payer: COMMERCIAL

## 2025-01-29 VITALS
WEIGHT: 119 LBS | SYSTOLIC BLOOD PRESSURE: 100 MMHG | BODY MASS INDEX: 20.32 KG/M2 | DIASTOLIC BLOOD PRESSURE: 70 MMHG | HEIGHT: 64 IN | HEART RATE: 73 BPM | OXYGEN SATURATION: 94 %

## 2025-01-29 DIAGNOSIS — I48.21 PERMANENT ATRIAL FIBRILLATION (HCC): Chronic | ICD-10-CM

## 2025-01-29 DIAGNOSIS — I50.32 CHRONIC DIASTOLIC CONGESTIVE HEART FAILURE (HCC): Chronic | ICD-10-CM

## 2025-01-29 DIAGNOSIS — I50.32 CHRONIC HEART FAILURE WITH PRESERVED EJECTION FRACTION (HCC): Primary | ICD-10-CM

## 2025-01-29 PROCEDURE — 99214 OFFICE O/P EST MOD 30 MIN: CPT

## 2025-01-29 RX ORDER — TORSEMIDE 20 MG/1
80 TABLET ORAL 2 TIMES DAILY
Start: 2025-01-29

## 2025-01-29 NOTE — PATIENT INSTRUCTIONS
Continue to trend weights    Start Jardiance once delivered to your house--1 tablet every day    Blood work prior to next appt in 2 weeks    Please weigh yourself every day (after emptying your bladder) and keep a detailed log of weights.     Contact the Heart Failure program at [phone] if you gain 3+ lbs overnight or 5+ lbs in 5-7 days.    Limit daily sodium/salt intake to 2000 mg daily to prevent fluid retention.  Avoid canned foods, fast food/Chinese food, and processed meats (hot dogs, lunch meat, and sausage etc.). Caution with condiments.  Limit fluid intake to 2000 mL or 2 liters (about 60-65 ounces) daily.  Avoid electrolyte replacement drinks (such as Gatorade, Pedialyte, Propel, Liquid IV, etc.).  Bring complete list of medications and log of daily weights to your follow-up appointment.

## 2025-02-03 ENCOUNTER — VBI (OUTPATIENT)
Dept: ADMINISTRATIVE | Facility: OTHER | Age: OVER 89
End: 2025-02-03

## 2025-02-03 NOTE — TELEPHONE ENCOUNTER
02/03/25 12:10 PM     Chart reviewed for  ; nothing is submitted to the patient's insurance at this time.     Darlyn Pearce   [facility]

## 2025-02-04 LAB
INR PPP: 2.6
PROTHROMBIN TIME: 27.6 SEC (ref 12–14.6)

## 2025-02-05 ENCOUNTER — ANTICOAG VISIT (OUTPATIENT)
Dept: CARDIOLOGY CLINIC | Facility: CLINIC | Age: OVER 89
End: 2025-02-05

## 2025-02-05 ENCOUNTER — OFFICE VISIT (OUTPATIENT)
Dept: PODIATRY | Facility: CLINIC | Age: OVER 89
End: 2025-02-05
Payer: COMMERCIAL

## 2025-02-05 VITALS — HEIGHT: 64 IN | BODY MASS INDEX: 21 KG/M2 | WEIGHT: 123 LBS

## 2025-02-05 DIAGNOSIS — I48.21 PERMANENT ATRIAL FIBRILLATION (HCC): Primary | Chronic | ICD-10-CM

## 2025-02-05 DIAGNOSIS — R60.0 LOCALIZED EDEMA: ICD-10-CM

## 2025-02-05 DIAGNOSIS — M25.572 ACUTE LEFT ANKLE PAIN: Primary | ICD-10-CM

## 2025-02-05 DIAGNOSIS — L85.3 DRY SKIN: ICD-10-CM

## 2025-02-05 DIAGNOSIS — M79.89 LEFT LEG SWELLING: ICD-10-CM

## 2025-02-05 PROCEDURE — 99213 OFFICE O/P EST LOW 20 MIN: CPT | Performed by: PODIATRIST

## 2025-02-05 RX ORDER — AMMONIUM LACTATE 12 G/100G
LOTION TOPICAL 2 TIMES DAILY PRN
Qty: 225 G | Refills: 5 | Status: SHIPPED | OUTPATIENT
Start: 2025-02-05

## 2025-02-05 NOTE — PROGRESS NOTES
Name: Rebeca Banegas      : 1932      MRN: [id number]  Encounter Provider: Jonny Marcial DPM  Encounter Date: 2025   Encounter department: [facility] [place]    Assessment & Plan     1. Acute left ankle pain  -     XR ankle 3+ vw left  2. Left leg swelling  -     VAS VENOUS DUPLEX -LOWER LIMB UNILATERAL; Future; Expected date: 2025  3. Dry skin  -     ammonium lactate (LAC-HYDRIN) 12 % lotion; Apply topically 2 (two) times a day as needed for dry skin  4. Localized edema  -     VAS VENOUS DUPLEX -LOWER LIMB UNILATERAL; Future; Expected date: 2025      Will order venous ultrasound of her left lower leg as she is having some swelling to this area.  This may be due to her transitioning from a boot and cast into the regular shoe gear.    In addition we will order x-ray today for further evaluation of the ankle.    Ankle x-ray was reviewed today did not show any acute fractures or dislocations there is some narrowing of the joint space noted with arthritis formation.    Discussed with patient that given that this started bothering her after her transition would recommend her going back into the boot and slowly transitioning out of the boot into a sneaker.  If the pain continues we may need to consider additional imaging with MRI.    Will plan on having patient return for reevaluation in 4 weeks.    Will give her ammonium lactate to help with some of the dry skin.    Subjective      HPI    24- 1. Left foot percutaneous osteotomy second metatarsal.  2. Left foot percutaneous osteotomy Third metatarsal  3. Left foot percutaneous osteotomy fourth metatarsal.      Patient returns for follow-up after left foot surgical intervention she is having no pain to the left foot however she does have pain to the medial ankle.  She also has some swelling to the lower leg left side.  She denies any trauma to the area.  She has transition into regular shoe gear.    Review of  "Systems      Constitutional: Negative for fever.   Respiratory: Negative for shortness of breath.    Cardiovascular: Negative for chest pain.  Gastrointestinal: Negative for nausea and vomiting.     Current Outpatient Medications on File Prior to Visit   Medication Sig   • albuterol (2.5 mg/3 mL) 0.083 % nebulizer solution Take 3 mL (2.5 mg total) by nebulization every 8 (eight) hours   • albuterol (Proventil HFA) 90 mcg/act inhaler Inhale 2 puffs every 6 (six) hours as needed for wheezing   • atorvastatin (LIPITOR) 40 mg tablet Take 1 tablet (40 mg total) by mouth every evening   • Cholecalciferol (VITAMIN D3) 1000 units CAPS Take 2,000 Units by mouth daily   • Empagliflozin (Jardiance) 10 MG TABS tablet Take 1 tablet (10 mg total) by mouth every morning   • Fluticasone-Salmeterol (Advair) 100-50 mcg/dose inhaler INHALE 1 PUFF 2 (TWO) TIMES A DAY. RINSE MOUTH AFTER USE.   • levothyroxine 50 mcg tablet TAKE 1 TABLET DAILY, EXCEPT TAKE 1 AND 1/2 TABLETS ON SUNDAY AND WEDNESDAY   • metoprolol succinate (TOPROL-XL) 100 mg 24 hr tablet Take 1 tablet (100 mg total) by mouth daily   • spironolactone (ALDACTONE) 50 mg tablet Take 1 tablet (50 mg total) by mouth daily   • torsemide (DEMADEX) 20 mg tablet Take 4 tablets (80 mg total) by mouth 2 (two) times a day   • warfarin (Jantoven) 2.5 mg tablet Take 1 tablet (2.5 mg total) by mouth daily       Objective     Ht 5' 4\" (1.626 m)   Wt 55.8 kg (123 lb)   LMP  (LMP Unknown)   BMI 21.11 kg/m²     Physical Exam      Incision site appears well healing, without any warmth or redness.  There are no signs of necrosis.  There is some expected swelling.  No calf pain or tenderness on palpation.  Neurological status is at baseline.  Vascular status is at baseline.    No recurrence of ulceration noted currently.  There is some tenderness noted on palpation at the posterior aspect of the ankle medially.  There are no other areas of discomfort or tenderness noted on examination.  " There is some swelling this noted to the lower leg on the left side.

## 2025-02-10 ENCOUNTER — HOSPITAL ENCOUNTER (OUTPATIENT)
Dept: NON INVASIVE DIAGNOSTICS | Facility: HOSPITAL | Age: OVER 89
Discharge: HOME | End: 2025-02-10
Attending: PODIATRIST
Payer: COMMERCIAL

## 2025-02-10 ENCOUNTER — RESULTS FOLLOW-UP (OUTPATIENT)
Dept: PODIATRY | Facility: CLINIC | Age: OVER 89
End: 2025-02-10

## 2025-02-10 ENCOUNTER — APPOINTMENT (OUTPATIENT)
Dept: LAB | Facility: CLINIC | Age: OVER 89
End: 2025-02-10
Payer: COMMERCIAL

## 2025-02-10 DIAGNOSIS — I50.32 CHRONIC HEART FAILURE WITH PRESERVED EJECTION FRACTION (HCC): ICD-10-CM

## 2025-02-10 DIAGNOSIS — R60.0 LOCALIZED EDEMA: ICD-10-CM

## 2025-02-10 DIAGNOSIS — M79.89 LEFT LEG SWELLING: ICD-10-CM

## 2025-02-10 PROBLEM — N17.9 ACUTE KIDNEY FAILURE (HCC): Status: RESOLVED | Noted: 2024-12-29 | Resolved: 2025-02-10

## 2025-02-10 PROBLEM — U07.1 COVID-19: Status: RESOLVED | Noted: 2025-01-06 | Resolved: 2025-02-10

## 2025-02-10 PROCEDURE — 93971 EXTREMITY STUDY: CPT | Performed by: SURGERY

## 2025-02-10 PROCEDURE — 80048 BASIC METABOLIC PNL TOTAL CA: CPT

## 2025-02-10 PROCEDURE — 36415 COLL VENOUS BLD VENIPUNCTURE: CPT

## 2025-02-10 PROCEDURE — 93971 EXTREMITY STUDY: CPT

## 2025-02-10 NOTE — RESULT ENCOUNTER NOTE
No evidence of deep vein clot.  There was superficial inflammation in the veins that does not look occlusive.  This can be treated with compression stockings, cool compress with elevation.

## 2025-02-11 ENCOUNTER — RESULTS FOLLOW-UP (OUTPATIENT)
Dept: CARDIOLOGY CLINIC | Facility: CLINIC | Age: OVER 89
End: 2025-02-11

## 2025-02-11 LAB
ANION GAP SERPL CALCULATED.3IONS-SCNC: 12 MMOL/L (ref 4–13)
BUN SERPL-MCNC: 50 MG/DL (ref 5–25)
CALCIUM SERPL-MCNC: 9.2 MG/DL (ref 8.4–10.2)
CHLORIDE SERPL-SCNC: 97 MMOL/L (ref 96–108)
CO2 SERPL-SCNC: 27 MMOL/L (ref 21–32)
CREAT SERPL-MCNC: 1.66 MG/DL (ref 0.6–1.3)
GFR SERPL CREATININE-BSD FRML MDRD: 26 ML/MIN/1.73SQ M
GLUCOSE SERPL-MCNC: 90 MG/DL (ref 65–140)
POTASSIUM SERPL-SCNC: 4.1 MMOL/L (ref 3.5–5.3)
SODIUM SERPL-SCNC: 136 MMOL/L (ref 135–147)

## 2025-02-11 NOTE — PROGRESS NOTES
General Cardiology Follow-up Visit    Rebeca Banegsa 89 y.o. female   MRN: [id number]  Encounter: [id number]    Assessment:  Patient Active Problem List    Diagnosis Date Noted    Other atherosclerosis of native arteries of extremities, bilateral legs ([facility]) 01/22/2025    Hyponatremia 12/29/2024    Severe protein-calorie malnutrition ([facility]) 08/29/2024    CVA (cerebral vascular accident) ([facility]) 08/27/2024    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 ([facility]) 06/18/2024    Chronic foot ulcer, left, with fat layer exposed ([facility]) 05/21/2024    Bronchiectasis without complication ([facility]) 09/08/2023    MGUS (monoclonal gammopathy of unknown significance) 09/04/2023    Chronic cough 06/13/2023    Age-related osteoporosis without current pathological fracture 05/16/2023    Secondary hyperparathyroidism ([facility]) 01/02/2023    Labial abscess 06/29/2022    Vulvar cysts 06/29/2022    Osteopenia of hip 04/07/2020    Stage 3b chronic kidney disease ([facility]) 04/07/2020    Elevated serum immunoglobulin free light chains 11/28/2019    Sensorineural hearing loss (SNHL), bilateral 04/04/2019    Left asymmetrical SNHL 04/04/2019    Pain of left hand 12/21/2018    Chronic heart failure with preserved ejection fraction ([facility]) 02/13/2017    Plantar fasciitis 02/05/2016    Essential tremor 10/02/2015    Vitamin D deficiency 10/02/2015    Onychomycosis 07/21/2015    Pulmonary nodule 02/20/2015    Thyroid nodule 02/20/2015    Prediabetes 09/23/2014    Primary hypertension 05/19/2014    Leg pain 05/19/2014    Abnormal findings on diagnostic imaging of urinary organs 04/04/2014    Abnormal findings on diagnostic imaging of other specified body structures 03/20/2014    Atherosclerosis 03/06/2014    Ovarian cyst 03/06/2014    Pancreatic cyst 03/06/2014    Backache 02/28/2014    Hoarseness 02/28/2014    Allergic rhinitis 06/11/2013    Hyperlipidemia 04/19/2013    Permanent atrial fibrillation (HCC) 02/14/2013    Arthritis 11/12/2012    Asthma  11/12/2012    Esophageal reflux 11/12/2012    Benign colon polyp 11/10/2012    Bifascicular bundle branch block 11/10/2012    Hypothyroidism 11/10/2012    Nasal polyp 11/10/2012       Today's Plan:  Stop torsemide and switch to Bumex 4 mg twice daily.  Has yet to receive Jardiance in the mail.  Will ask office RN to touch base with patient later this week for updates on symptoms, weights, and response to Bumex.  RTC in 2-3 weeks for reevaluation of volume status and assess response to new diuretic.    Plan:  Acute on chronic HFpEF; LVEF 60%   Weight of 119 lbs on 01/29. Today, weighs 124 lbs.   Most recent BMP from 02/10/2025: sodium 136; potassium 4.1; BUN 50; creatinine 1.66; eGFR 26.    Guideline-Directed Medical Therapy:  --Aldosterone Antagonist: spironolactone 50 mg daily.  --SGLT2 Inhibitor: empagliflozin 10 mg daily - has yet to receive in mail.    Volume Management:  --Diuretic: Bumex 4 mg BID.    Atrial fibrillation, permanent   [id number] = 5 (age, HF, HTN).   Anticoagulation on Coumadin.    Rate control: metoprolol succinate 100 mg daily.     Chronic kidney disease, stage IIIb   Baseline creatinine of 1.3-1.6.   Most recent BMP from 02/10/2025: sodium 136; potassium 4.1; BUN 50; creatinine 1.66; eGFR 26.    Recent COVID-19 pneumonia: requiring hospitalization in 12/2024.  Hypertension  Bifascicular bundle branch block    Hyperlipidemia  MGUS  Asthma / bronchiectasis   Essential tremor   History of tobacco abuse    HPI:   Rebeca Banegas is a 89-year-old woman with a PMH as above who presents to the office for follow-up visit. Follows with Dr. Vyas.     Patient contacted office on 01/16 with reported 5 lbs weight gain in 5 days with reported bilateral LE swelling. Advised to double AM torsemide dose (to 80 mg) on 01/17 continue on torsemide 40 mg BID. Patient contacted office again on 01/22 with reports of persistent weight gain and ongoing fatigue and TANG s/p COVID-pneumonia. Acute visit  "scheduled.    01/22/2025: Patient presents for an acute visit. Reports worsening bilateral LE swelling and rapid weight gain since admission for COVID pneumonia in December 2024. Estimates gaining 8-9 lbs on her home scale.  Reports decreased urine output with torsemide over the past few weeks. Denies chest pain, palpitations, SOB at rest, PND, and orthopnea.  Denies any recent dietary indiscretion or increased fluid intake.    01/29/2025 with TA: \"[facility].  States her LE edema is still up and not much better than last week.  SOB is chronic and at BL.  Weights trending down at home from 124 lbs-->120 lbs this morning.  Pt states her dry weight is around 115 lbs. Taking Torsemide 80 BID--feels her urine output has an initial great response x 1 occurrence, then tapers off.  She is also drinking less than 2 liters/day and feels she could drink more.  Meals at  taste salty at times, norris the soup.  Agreeable to Jardiance start.  Discussed medication benefits and side effects.  Pamphlet given.\"    02/12/2025: Patient presents for follow-up. Denies any significant improvement in her TANG or bilateral LE swelling. Weight up 5 pounds since last office visit. Reports after taking torsemide having initial increase in urine output but that it quickly diminishes thereafter. Has been taking torsemide for nearly 7 years now. Denies any recent dietary indiscretions. Reviewed home weight log (weighed 118 lbs on 01/30; now 122 lbs today on home scale). Has yet to receive Jardiance from her mail order pharmacy.    Past Medical History:   Diagnosis Date    Abnormal ultrasound     RESOLVED: [id number]    Acute kidney failure (HCC) 12/29/2024    Advice given about COVID-19 virus infection 04/07/2020    Ambulates with cane     Asthma with acute exacerbation     LAST ASSESSED: [id number]    Asymptomatic menopausal state     Atherosclerosis     Atrial fibrillation (HCC)     Chronic obstructive lung disease (HCC)     w/ " chonic cough    Chronic ulcer of left foot (HCC)     Colon polyp     Congestive heart failure (HCC)     LAST ASSESSED: [id number]    COVID-19 01/06/2025    COVID-19 virus infection 03/25/2023    Cuboid fracture     LAST ASSESSED: [id number]    Disease of thyroid gland     Dyspepsia     Edema of both lower extremities     Equinus deformity of left foot     Fall 04/01/2024    Falls     5/2024    GERD (gastroesophageal reflux disease)     High risk medication use     LAST ASSESSED: [id number]    Hyperlipidemia     Hypertension     Hypokalemia     Hypothyroidism     Impacted cerumen of both ears     LAST ASSESSED: [id number]    Impacted cerumen of right ear     LAST ASSESSED: [id number]    Influenza     LAST ASSESSED: [id number]    IPMN (intraductal papillary mucinous neoplasm)     RESOLVED: 02OCT2015    Irregular heart beat     afib. Warfarin.    Leg cramps 12/21/2018    Limb swelling     LAST ASSESSED: [id number]    Navicular fracture of ankle     LAST ASSESSED: [id number]    Onychomycosis     LAST ASSESSED: [id number]    Open wound of right upper arm 10/17/2022    Osteoarthritis     Osteopenia     Osteoporosis     Paronychia, finger, unspecified laterality     LAST ASSESSED: [id number]    Paroxysmal atrial fibrillation (HCC)     LAST ASSESSED: [id number]    Peripheral vascular disease (HCC)     Plantar fasciitis     SOBOE (shortness of breath on exertion)     Stroke (HCC)     Right arm weakness that has resolved    Talus fracture     LAST ASSESSED: [id number]    Vaccine counseling 12/19/2023    Vascular headache     Walker as ambulation aid     Wound of right foot      Review of Systems   Constitutional:  Negative for activity change, appetite change, fatigue and unexpected weight change.   Respiratory:  Positive for cough and shortness of breath. Negative for chest tightness.    Cardiovascular:  Positive for leg swelling. Negative for chest pain and palpitations.   Gastrointestinal:  Negative for abdominal distention and  abdominal pain.   Genitourinary:  Positive for decreased urine volume. Negative for dysuria and urgency.   Neurological:  Negative for dizziness, syncope, weakness and light-headedness.   Psychiatric/Behavioral:  Negative for confusion and sleep disturbance. The patient is not nervous/anxious.        Allergies   Allergen Reactions    Asa [Aspirin] Shortness Of Breath    Nsaids Shortness Of Breath    Medical Tape Other (See Comments)     Skin tears with the removal of tape          Current Outpatient Medications:     albuterol (2.5 mg/3 mL) 0.083 % nebulizer solution, Take 3 mL (2.5 mg total) by nebulization every 8 (eight) hours, Disp: 270 mL, Rfl: 3    albuterol (Proventil HFA) 90 mcg/act inhaler, Inhale 2 puffs every 6 (six) hours as needed for wheezing, Disp: 20.1 g, Rfl: 3    ammonium lactate (LAC-HYDRIN) 12 % lotion, Apply topically 2 (two) times a day as needed for dry skin, Disp: 225 g, Rfl: 5    atorvastatin (LIPITOR) 40 mg tablet, Take 1 tablet (40 mg total) by mouth every evening, Disp: 90 tablet, Rfl: 1    bumetanide (BUMEX) 2 mg tablet, Take 2 tablets (4 mg total) by mouth 2 (two) times a day, Disp: 120 tablet, Rfl: 1    Cholecalciferol (VITAMIN D3) 1000 units CAPS, Take 2,000 Units by mouth daily, Disp: , Rfl:     Fluticasone-Salmeterol (Advair) 100-50 mcg/dose inhaler, INHALE 1 PUFF 2 (TWO) TIMES A DAY. RINSE MOUTH AFTER USE., Disp: 180 blister, Rfl: 3    levothyroxine 50 mcg tablet, TAKE 1 TABLET DAILY, EXCEPT TAKE 1 AND 1/2 TABLETS ON SUNDAY AND WEDNESDAY, Disp: 100 tablet, Rfl: 1    metoprolol succinate (TOPROL-XL) 100 mg 24 hr tablet, Take 1 tablet (100 mg total) by mouth daily, Disp: 90 tablet, Rfl: 1    spironolactone (ALDACTONE) 50 mg tablet, Take 1 tablet (50 mg total) by mouth daily, Disp: 30 tablet, Rfl: 6    warfarin (Jantoven) 2.5 mg tablet, Take 1 tablet (2.5 mg total) by mouth daily, Disp: 30 tablet, Rfl: 0    Empagliflozin (Jardiance) 10 MG TABS tablet, Take 1 tablet (10 mg total) by  mouth every morning (Patient not taking: Reported on 2025), Disp: 90 tablet, Rfl: 1    Social History     Socioeconomic History    Marital status:      Spouse name: Not on file    Number of children: Not on file    Years of education: Not on file    Highest education level: Not on file   Occupational History    Occupation: retired   Tobacco Use    Smoking status: Former     Current packs/day: 0.00     Average packs/day: 0.3 packs/day for 2.0 years (0.5 ttl pk-yrs)     Types: Cigarettes     Start date:      Quit date:      Years since quittin.1    Smokeless tobacco: Never    Tobacco comments:     On and off socially with friends    Vaping Use    Vaping status: Never Used   Substance and Sexual Activity    Alcohol use: Not Currently    Drug use: No    Sexual activity: Not Currently     Partners: Male     Birth control/protection: Post-menopausal   Other Topics Concern    Not on file   Social History Narrative    DAILY COFFEE CONSUMPTION (2 CUPS/DAY)    EXERCISING REGULARLY     Social Drivers of Health     Financial Resource Strain: Not on file   Food Insecurity: No Food Insecurity (2024)    Nursing - Inadequate Food Risk Classification     Worried About Running Out of Food in the Last Year: Never true     Ran Out of Food in the Last Year: Never true     Ran Out of Food in the Last Year: Never true   Transportation Needs: No Transportation Needs (2024)    Nursing - Transportation Risk Classification     Lack of Transportation: Not on file     Lack of Transportation: No   Physical Activity: Not on file   Stress: Not on file   Social Connections: Not on file   Intimate Partner Violence: Unknown (2024)    Nursing IPS     Feels Physically and Emotionally Safe: Not on file     Physically Hurt by Someone: Not on file     Humiliated or Emotionally Abused by Someone: Not on file     Physically Hurt by Someone: No     Hurt or Threatened by Someone: No   Housing Stability: Unknown  "(2024)    Nursing: Inadequate Housing Risk Classification     Has Housing: Not on file     Worried About Losing Housing: Not on file     Unable to Get Utilities: Not on file     Unable to Pay for Housing in the Last Year: No     Has Housin     Family History   Problem Relation Age of Onset    Pancreatic cancer Mother 93    Heart failure Mother     Coronary artery disease Family     Breast cancer Family     Other Family         BREAST DISORDER, GASTROINTESTINAL CANCER    Uterine cancer Family     Hyperlipidemia Family     Osteoarthritis Family     Ovarian cancer Family     Brain cancer Son     Stroke Father     No Known Problems Sister     No Known Problems Daughter     No Known Problems Maternal Grandmother     No Known Problems Maternal Grandfather     No Known Problems Paternal Grandmother     No Known Problems Paternal Grandfather     Breast cancer Cousin 50    Breast cancer Cousin 50    Ovarian cancer Other 49       Vitals:   Blood pressure 110/68, pulse 80, height 5' 4\" (1.626 m), weight 56.4 kg (124 lb 4.8 oz), SpO2 93%.    Wt Readings from Last 10 Encounters:   25 56.4 kg (124 lb 4.8 oz)   25 55.8 kg (123 lb)   25 54 kg (119 lb)   25 58.5 kg (129 lb)   01/15/25 55.8 kg (123 lb)   24 54 kg (119 lb)   24 53.1 kg (117 lb)   24 52.1 kg (114 lb 13.8 oz)   24 52 kg (114 lb 11.2 oz)   11/15/24 57.2 kg (126 lb)     Vitals:    25 1132   BP: 110/68   BP Location: Left arm   Patient Position: Sitting   Cuff Size: Child   Pulse: 80   SpO2: 93%   Weight: 56.4 kg (124 lb 4.8 oz)   Height: 5' 4\" (1.626 m)       Physical Exam  Vitals reviewed.   Constitutional:       General: She is awake. She is not in acute distress.     Appearance: Normal appearance. She is well-developed and normal weight. She is not toxic-appearing or diaphoretic.      Comments: Ambulating with cane   HENT:      Head: Normocephalic.      Nose: Nose normal.   Cardiovascular:      Rate and " Rhythm: Normal rate. Rhythm irregularly irregular.   Pulmonary:      Effort: Pulmonary effort is normal. No tachypnea, bradypnea, accessory muscle usage or respiratory distress.      Breath sounds: Normal air entry. Wheezing and rales present.   Abdominal:      General: There is no distension.      Palpations: Abdomen is soft.   Musculoskeletal:      Cervical back: Neck supple.      Right lower leg: Pitting Edema (trace, hitting mid shin) present.      Left lower le+ Pitting Edema (hitting 1-2 inches below knee) present.   Skin:     General: Skin is warm and dry.      Coloration: Skin is not jaundiced or pale.   Neurological:      Mental Status: She is alert and oriented to person, place, and time.   Psychiatric:         Attention and Perception: Attention normal.         Mood and Affect: Mood and affect normal.         Speech: Speech normal.         Behavior: Behavior normal. Behavior is cooperative.         Thought Content: Thought content normal.        Labs & Results:  Lab Results   Component Value Date    WBC 6.67 2025    HGB 12.0 2025    HCT 37.6 2025    MCV 99 (H) 2025     2025     Lab Results   Component Value Date    SODIUM 136 02/10/2025    K 4.1 02/10/2025    CL 97 02/10/2025    CO2 27 02/10/2025    BUN 50 (H) 02/10/2025    CREATININE 1.66 (H) 02/10/2025    GLUC 90 02/10/2025    CALCIUM 9.2 02/10/2025     Lab Results   Component Value Date    INR 2.6 2025    INR 2.28 (H) 2025    INR 2.8 2025    PROTIME 27.6 (H) 2025    PROTIME 25.1 (H) 2025    PROTIME 29.2 (H) 2025     Lab Results   Component Value Date     (H) 2025      Franci Marcial PA-C

## 2025-02-12 ENCOUNTER — OFFICE VISIT (OUTPATIENT)
Dept: CARDIOLOGY CLINIC | Facility: CLINIC | Age: OVER 89
End: 2025-02-12
Payer: COMMERCIAL

## 2025-02-12 VITALS
HEART RATE: 80 BPM | DIASTOLIC BLOOD PRESSURE: 68 MMHG | SYSTOLIC BLOOD PRESSURE: 110 MMHG | OXYGEN SATURATION: 93 % | BODY MASS INDEX: 21.22 KG/M2 | WEIGHT: 124.3 LBS | HEIGHT: 64 IN

## 2025-02-12 DIAGNOSIS — N18.32 STAGE 3B CHRONIC KIDNEY DISEASE (HCC): ICD-10-CM

## 2025-02-12 DIAGNOSIS — I48.21 PERMANENT ATRIAL FIBRILLATION (HCC): Chronic | ICD-10-CM

## 2025-02-12 DIAGNOSIS — I50.33 ACUTE ON CHRONIC HEART FAILURE WITH PRESERVED EJECTION FRACTION (HCC): Primary | ICD-10-CM

## 2025-02-12 PROCEDURE — 99214 OFFICE O/P EST MOD 30 MIN: CPT | Performed by: PHYSICIAN ASSISTANT

## 2025-02-12 RX ORDER — BUMETANIDE 2 MG/1
4 TABLET ORAL 2 TIMES DAILY
Qty: 120 TABLET | Refills: 1 | Status: SHIPPED | OUTPATIENT
Start: 2025-02-12

## 2025-02-12 NOTE — PATIENT INSTRUCTIONS
STOP torsemide.   Begin Bumex 4 mg (2 tablets) twice a day.   If and when you get Jardiance, okay to start.     Recommend compression stockings.    Please weigh yourself every day (after emptying your bladder) and keep a detailed log of weights.   Contact the Heart Failure program at [phone] if you gain 3+ lbs overnight or 5+ lbs in 5-7 days.  Limit daily sodium/salt intake to 2000 mg daily to prevent fluid retention.  Avoid canned foods, fast food/Chinese food, and processed meats (hot dogs, lunch meat, and sausage etc.). Caution with condiments.  Limit fluid intake to 2000 mL or 2 liters (about 60-65 ounces) daily.  Avoid electrolyte replacement drinks (such as Gatorade, Pedialyte, Propel, Liquid IV, etc.).  Bring complete list of medications and log of daily weights to your follow-up appointment.

## 2025-02-14 ENCOUNTER — TELEPHONE (OUTPATIENT)
Dept: CARDIOLOGY CLINIC | Facility: CLINIC | Age: OVER 89
End: 2025-02-14

## 2025-02-14 DIAGNOSIS — I63.9 CEREBROVASCULAR ACCIDENT (CVA), UNSPECIFIED MECHANISM (HCC): ICD-10-CM

## 2025-02-14 RX ORDER — ATORVASTATIN CALCIUM 40 MG/1
40 TABLET, FILM COATED ORAL EVERY EVENING
Qty: 90 TABLET | Refills: 3 | Status: SHIPPED | OUTPATIENT
Start: 2025-02-14

## 2025-02-14 NOTE — TELEPHONE ENCOUNTER
Spoke with pt. She was seen on 2/12/25 and torsemide was change to Bumex due to fluid overload.   The bumex 4 mg bid has not made any change. She has not urinated anymore on that medication. Wt is still 122 lbs and edema blle unchanged. Wt on 2/1/25 was 116 lbs. Pt does have a loose sounding cough, but states she has had that for a while. Also is fatigued. Otherwise, she denies any other CHF Symptoms.    She said she thinks she will receive the Jardiance in the mail today. She will start it as soon as she has the medication.    BP-98/68, 81     2/10/25  Cr-1.66(1.65 on 1/22), k-4.1(4.1), bun-50(45), GFR-26(26)    1/22/25 BNP-494,  HBG-12.0    Taking - Bumex 4 mg bid                Aldactone 50 mg qd                 Jardiance-  will start when she has the med                  Toprol-xl 100 mg qd    Please advise

## 2025-02-14 NOTE — TELEPHONE ENCOUNTER
----- Message from Franci Marcial PA-C sent at 2/12/2025 12:26 PM EST -----  Regarding: Clinical update  Patient seen in office this week and was switched from torsemide to Bumex.    Please contact patient today to review home weight log, HF symptoms, and response to new diuretic.    Thanks!  SHARA REZA I will be out of office today, but patient is also known to Genie and Dr. Vyas.

## 2025-02-14 NOTE — TELEPHONE ENCOUNTER
Spoke with pt and advised.  Also ask her to keep an eye on BP and if it goes lower then today's, drink water.  Let office know if that does not work.  Verbally understood.   I will call her back on Monday for f/u

## 2025-02-19 DIAGNOSIS — J44.9 CHRONIC OBSTRUCTIVE PULMONARY DISEASE, UNSPECIFIED COPD TYPE (HCC): Chronic | ICD-10-CM

## 2025-02-19 RX ORDER — ALBUTEROL SULFATE 90 UG/1
2 INHALANT RESPIRATORY (INHALATION) EVERY 6 HOURS PRN
Qty: 20.1 G | Refills: 6 | Status: SHIPPED | OUTPATIENT
Start: 2025-02-19

## 2025-02-19 NOTE — TELEPHONE ENCOUNTER
Spoke with pt today. Wt is 118lbs( down  4 lbs)., Blle edema still there L>R.  SOB unchanged and fatigue.    She states her Dry wt is 115 lbs.    Bp-91/62   100  Encouraged Fluids.  She does feel dry( thirsty)    2/10/25  Cr-1.66,k-4.1,bun-50, GFR-26    Do you want a BMP?    Has F/u on 2/28/25.    Please advise

## 2025-02-19 NOTE — TELEPHONE ENCOUNTER
Reason for call:   [x] Refill   [] Prior Auth  [] Other:     Office:   [x] PCP/Provider - Santana Dillon  [] Specialty/Provider -     Medication: albuterol (Proventil HFA) 90 mcg/act inhaler     Dose/Frequency:     Inhale 2 puffs every 6 (six) hours as needed for wheezing       Quantity: 20.1g    Pharmacy: [facility] - [address] [phone]     Does the patient have enough for 3 days?   [] Yes   [x] No - Send as HP to POD

## 2025-02-20 ENCOUNTER — APPOINTMENT (OUTPATIENT)
Dept: LAB | Facility: CLINIC | Age: OVER 89
End: 2025-02-20
Payer: COMMERCIAL

## 2025-02-20 DIAGNOSIS — I50.32 CHRONIC DIASTOLIC CONGESTIVE HEART FAILURE (HCC): Primary | ICD-10-CM

## 2025-02-20 DIAGNOSIS — I50.32 CHRONIC DIASTOLIC CONGESTIVE HEART FAILURE (HCC): ICD-10-CM

## 2025-02-20 LAB
ANION GAP SERPL CALCULATED.3IONS-SCNC: 9 MMOL/L (ref 4–13)
BUN SERPL-MCNC: 48 MG/DL (ref 5–25)
CALCIUM SERPL-MCNC: 9.8 MG/DL (ref 8.4–10.2)
CHLORIDE SERPL-SCNC: 91 MMOL/L (ref 96–108)
CO2 SERPL-SCNC: 34 MMOL/L (ref 21–32)
CREAT SERPL-MCNC: 1.82 MG/DL (ref 0.6–1.3)
GFR SERPL CREATININE-BSD FRML MDRD: 23 ML/MIN/1.73SQ M
GLUCOSE SERPL-MCNC: 113 MG/DL (ref 65–140)
POTASSIUM SERPL-SCNC: 4.2 MMOL/L (ref 3.5–5.3)
SODIUM SERPL-SCNC: 134 MMOL/L (ref 135–147)

## 2025-02-20 PROCEDURE — 36415 COLL VENOUS BLD VENIPUNCTURE: CPT

## 2025-02-20 PROCEDURE — 80048 BASIC METABOLIC PNL TOTAL CA: CPT

## 2025-02-20 NOTE — TELEPHONE ENCOUNTER
Pt will get bmp today after her doctors appt.  She repeated back to me the instructions as mentioned by Genie ESCOBAR.

## 2025-02-27 NOTE — PROGRESS NOTES
General Cardiology Outpatient Visit    Rebeca Banegas 89 y.o. female   MRN: [id number]  Encounter: [id number]    Assessment:  Patient Active Problem List    Diagnosis Date Noted    Other atherosclerosis of native arteries of extremities, bilateral legs ([facility]) 01/22/2025    Hyponatremia 12/29/2024    Severe protein-calorie malnutrition ([facility]) 08/29/2024    CVA (cerebral vascular accident) ([facility]) 08/27/2024    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 ([facility]) 06/18/2024    Chronic foot ulcer, left, with fat layer exposed ([facility]) 05/21/2024    Bronchiectasis without complication ([facility]) 09/08/2023    MGUS (monoclonal gammopathy of unknown significance) 09/04/2023    Chronic cough 06/13/2023    Age-related osteoporosis without current pathological fracture 05/16/2023    Secondary hyperparathyroidism ([facility]) 01/02/2023    Labial abscess 06/29/2022    Vulvar cysts 06/29/2022    Osteopenia of hip 04/07/2020    Stage 3b chronic kidney disease ([facility]) 04/07/2020    Elevated serum immunoglobulin free light chains 11/28/2019    Sensorineural hearing loss (SNHL), bilateral 04/04/2019    Left asymmetrical SNHL 04/04/2019    Pain of left hand 12/21/2018    Chronic heart failure with preserved ejection fraction ([facility]) 02/13/2017    Plantar fasciitis 02/05/2016    Essential tremor 10/02/2015    Vitamin D deficiency 10/02/2015    Onychomycosis 07/21/2015    Pulmonary nodule 02/20/2015    Thyroid nodule 02/20/2015    Prediabetes 09/23/2014    Primary hypertension 05/19/2014    Leg pain 05/19/2014    Abnormal findings on diagnostic imaging of urinary organs 04/04/2014    Abnormal findings on diagnostic imaging of other specified body structures 03/20/2014    Atherosclerosis 03/06/2014    Ovarian cyst 03/06/2014    Pancreatic cyst 03/06/2014    Backache 02/28/2014    Hoarseness 02/28/2014    Allergic rhinitis 06/11/2013    Hyperlipidemia 04/19/2013    Permanent atrial fibrillation (HCC) 02/14/2013    Arthritis 11/12/2012    Asthma  11/12/2012    Esophageal reflux 11/12/2012    Benign colon polyp 11/10/2012    Bifascicular bundle branch block 11/10/2012    Hypothyroidism 11/10/2012    Nasal polyp 11/10/2012       Today's Plan:  Decrease Bumex to 3 mg BID.   Continue on Jardiance.  RTC in 1 month.    Plan:  Chronic HFpEF; LVEF 60%   Weight of 124 lbs on 02/12. Today, weighs 117 lbs.   Most recent BMP from 02/20/2025: sodium 134; potassium 4.2; BUN 48; creatinine 1.82; eGFR 23.    Guideline-Directed Medical Therapy:  --Aldosterone Antagonist: spironolactone 50 mg daily.  --SGLT2 Inhibitor: empagliflozin 10 mg daily.    Volume Management:  --Diuretic: Bumex 3 mg BID.    Atrial fibrillation, permanent   [id number] = 5 (age, HF, HTN).   Anticoagulation on Coumadin.    Rate control: metoprolol succinate 100 mg daily.     Chronic kidney disease, stage IIIb   Baseline creatinine of 1.3-1.6.   Most recent BMP from 02/20/2025: sodium 134; potassium 4.2; BUN 48; creatinine 1.82; eGFR 23.    Recent COVID-19 pneumonia: requiring hospitalization in 12/2024.  Hypertension  Bifascicular bundle branch block    Hyperlipidemia  MGUS  Asthma / bronchiectasis   Essential tremor   History of tobacco abuse    HPI:   Rebeca Banegas is a 89-year-old woman with a PMH as above who presents to the office for follow-up visit. Follows with Dr. Vyas.     Patient contacted office on 01/16 with reported 5 lbs weight gain in 5 days with reported bilateral LE swelling. Advised to double AM torsemide dose (to 80 mg) on 01/17 continue on torsemide 40 mg BID. Patient contacted office again on 01/22 with reports of persistent weight gain and ongoing fatigue and TANG s/p COVID-pneumonia. Acute visit scheduled.    01/22/2025: Patient presents for an acute visit. Reports worsening bilateral LE swelling and rapid weight gain since admission for COVID pneumonia in December 2024. Estimates gaining 8-9 lbs on her home scale.  Reports decreased urine output with torsemide over the past  "few weeks. Denies chest pain, palpitations, SOB at rest, PND, and orthopnea.  Denies any recent dietary indiscretion or increased fluid intake.    01/29/2025 with TA: \"[facility].  States her LE edema is still up and not much better than last week.  SOB is chronic and at BL.  Weights trending down at home from 124 lbs-->120 lbs this morning.  Pt states her dry weight is around 115 lbs. Taking Torsemide 80 BID--feels her urine output has an initial great response x 1 occurrence, then tapers off.  She is also drinking less than 2 liters/day and feels she could drink more.  Meals at  taste salty at times, norris the soup.  Agreeable to Jardiance start.  Discussed medication benefits and side effects.  Pamphlet given.\"    02/12/2025: Patient presents for follow-up. Denies any significant improvement in her TANG or bilateral LE swelling. Weight up 5 pounds since last office visit. Reports after taking torsemide having initial increase in urine output but that it quickly diminishes thereafter. Has been taking torsemide for nearly 7 years now. Denies any recent dietary indiscretions. Reviewed home weight log (weighed 118 lbs on 01/30; now 122 lbs today on home scale). Has yet to receive Jardiance from her mail order pharmacy.    02/28/2025: Patient presents for follow-up. Down 7 lbs since last visit. Reports good improvement in bilateral LE swelling since last visit.  Continues with chronic TANG and denies any acute worsening since last visit. Reports more robust urinary response to Bumex. Jardiance started on 02/14; provided education on indications and benefits of Jardiance. Continues to complete daily weights; reviewed home weight log (down 7 lbs on home scale also; 115 lbs today).     Past Medical History:   Diagnosis Date    Abnormal ultrasound     RESOLVED: [id number]    Acute kidney failure (HCC) 12/29/2024    Advice given about COVID-19 virus infection 04/07/2020    Ambulates with cane     Asthma with " acute exacerbation     LAST ASSESSED: [id number]    Asymptomatic menopausal state     Atherosclerosis     Atrial fibrillation (HCC)     Chronic obstructive lung disease (HCC)     w/ chonic cough    Chronic ulcer of left foot (HCC)     Colon polyp     Congestive heart failure (HCC)     LAST ASSESSED: [id number]    COVID-19 01/06/2025    COVID-19 virus infection 03/25/2023    Cuboid fracture     LAST ASSESSED: [id number]    Disease of thyroid gland     Dyspepsia     Edema of both lower extremities     Equinus deformity of left foot     Fall 04/01/2024    Falls     5/2024    GERD (gastroesophageal reflux disease)     High risk medication use     LAST ASSESSED: [id number]    Hyperlipidemia     Hypertension     Hypokalemia     Hypothyroidism     Impacted cerumen of both ears     LAST ASSESSED: [id number]    Impacted cerumen of right ear     LAST ASSESSED: [id number]    Influenza     LAST ASSESSED: [id number]    IPMN (intraductal papillary mucinous neoplasm)     RESOLVED: 02OCT2015    Irregular heart beat     afib. Warfarin.    Leg cramps 12/21/2018    Limb swelling     LAST ASSESSED: [id number]    Navicular fracture of ankle     LAST ASSESSED: [id number]    Onychomycosis     LAST ASSESSED: [id number]    Open wound of right upper arm 10/17/2022    Osteoarthritis     Osteopenia     Osteoporosis     Paronychia, finger, unspecified laterality     LAST ASSESSED: [id number]    Paroxysmal atrial fibrillation (HCC)     LAST ASSESSED: 02MAR2014    Peripheral vascular disease (HCC)     Plantar fasciitis     SOBOE (shortness of breath on exertion)     Stroke (HCC)     Right arm weakness that has resolved    Talus fracture     LAST ASSESSED: [id number]    Vaccine counseling 12/19/2023    Vascular headache     Walker as ambulation aid     Wound of right foot      Review of Systems   Constitutional:  Negative for activity change, appetite change, chills, fatigue, fever and unexpected weight change.   Respiratory:  Positive for shortness of  breath. Negative for cough and chest tightness.    Cardiovascular:  Positive for leg swelling (mild, much improved). Negative for chest pain and palpitations.   Gastrointestinal:  Negative for abdominal distention and abdominal pain.   Genitourinary:  Negative for dysuria.   Neurological:  Negative for syncope, weakness and light-headedness.   Psychiatric/Behavioral:  Negative for confusion and sleep disturbance. The patient is not nervous/anxious.        Allergies   Allergen Reactions    Asa [Aspirin] Shortness Of Breath    Nsaids Shortness Of Breath    Medical Tape Other (See Comments)     Skin tears with the removal of tape          Current Outpatient Medications:     albuterol (2.5 mg/3 mL) 0.083 % nebulizer solution, Take 3 mL (2.5 mg total) by nebulization every 8 (eight) hours, Disp: 270 mL, Rfl: 3    albuterol (Proventil HFA) 90 mcg/act inhaler, Inhale 2 puffs every 6 (six) hours as needed for wheezing, Disp: 20.1 g, Rfl: 6    ammonium lactate (LAC-HYDRIN) 12 % lotion, Apply topically 2 (two) times a day as needed for dry skin, Disp: 225 g, Rfl: 5    atorvastatin (LIPITOR) 40 mg tablet, TAKE 1 TABLET EVERY EVENING, Disp: 90 tablet, Rfl: 3    bumetanide (BUMEX) 2 mg tablet, Take 2 tablets (4 mg total) by mouth 2 (two) times a day, Disp: 120 tablet, Rfl: 1    Cholecalciferol (VITAMIN D3) 1000 units CAPS, Take 2,000 Units by mouth daily, Disp: , Rfl:     Empagliflozin (Jardiance) 10 MG TABS tablet, Take 1 tablet (10 mg total) by mouth every morning (Patient not taking: Reported on 2/12/2025), Disp: 90 tablet, Rfl: 1    Fluticasone-Salmeterol (Advair) 100-50 mcg/dose inhaler, INHALE 1 PUFF 2 (TWO) TIMES A DAY. RINSE MOUTH AFTER USE., Disp: 180 blister, Rfl: 3    levothyroxine 50 mcg tablet, TAKE 1 TABLET DAILY, EXCEPT TAKE 1 AND 1/2 TABLETS ON SUNDAY AND WEDNESDAY, Disp: 100 tablet, Rfl: 1    metoprolol succinate (TOPROL-XL) 100 mg 24 hr tablet, Take 1 tablet (100 mg total) by mouth daily, Disp: 90 tablet, Rfl:  1    spironolactone (ALDACTONE) 50 mg tablet, Take 1 tablet (50 mg total) by mouth daily, Disp: 30 tablet, Rfl: 6    warfarin (Jantoven) 2.5 mg tablet, Take 1 tablet (2.5 mg total) by mouth daily, Disp: 30 tablet, Rfl: 0    Social History     Socioeconomic History    Marital status:      Spouse name: Not on file    Number of children: Not on file    Years of education: Not on file    Highest education level: Not on file   Occupational History    Occupation: retired   Tobacco Use    Smoking status: Former     Current packs/day: 0.00     Average packs/day: 0.3 packs/day for 2.0 years (0.5 ttl pk-yrs)     Types: Cigarettes     Start date:      Quit date:      Years since quittin.2    Smokeless tobacco: Never    Tobacco comments:     On and off socially with friends    Vaping Use    Vaping status: Never Used   Substance and Sexual Activity    Alcohol use: Not Currently    Drug use: No    Sexual activity: Not Currently     Partners: Male     Birth control/protection: Post-menopausal   Other Topics Concern    Not on file   Social History Narrative    DAILY COFFEE CONSUMPTION (2 CUPS/DAY)    EXERCISING REGULARLY     Social Drivers of Health     Financial Resource Strain: Not on file   Food Insecurity: No Food Insecurity (2024)    Nursing - Inadequate Food Risk Classification     Worried About Running Out of Food in the Last Year: Never true     Ran Out of Food in the Last Year: Never true     Ran Out of Food in the Last Year: Never true   Transportation Needs: No Transportation Needs (2024)    Nursing - Transportation Risk Classification     Lack of Transportation: Not on file     Lack of Transportation: No   Physical Activity: Not on file   Stress: Not on file   Social Connections: Not on file   Intimate Partner Violence: Unknown (2024)    Nursing IPS     Feels Physically and Emotionally Safe: Not on file     Physically Hurt by Someone: Not on file     Humiliated or Emotionally Abused  by Someone: Not on file     Physically Hurt by Someone: No     Hurt or Threatened by Someone: No   Housing Stability: Unknown (2024)    Nursing: Inadequate Housing Risk Classification     Has Housing: Not on file     Worried About Losing Housing: Not on file     Unable to Get Utilities: Not on file     Unable to Pay for Housing in the Last Year: No     Has Housin     Family History   Problem Relation Age of Onset    Pancreatic cancer Mother 93    Heart failure Mother     Coronary artery disease Family     Breast cancer Family     Other Family         BREAST DISORDER, GASTROINTESTINAL CANCER    Uterine cancer Family     Hyperlipidemia Family     Osteoarthritis Family     Ovarian cancer Family     Brain cancer Son     Stroke Father     No Known Problems Sister     No Known Problems Daughter     No Known Problems Maternal Grandmother     No Known Problems Maternal Grandfather     No Known Problems Paternal Grandmother     No Known Problems Paternal Grandfather     Breast cancer Cousin 50    Breast cancer Cousin 50    Ovarian cancer Other 49       Vitals:   There were no vitals taken for this visit.    Wt Readings from Last 10 Encounters:   25 56.4 kg (124 lb 4.8 oz)   25 55.8 kg (123 lb)   25 54 kg (119 lb)   25 58.5 kg (129 lb)   01/15/25 55.8 kg (123 lb)   24 54 kg (119 lb)   24 53.1 kg (117 lb)   24 52.1 kg (114 lb 13.8 oz)   24 52 kg (114 lb 11.2 oz)   11/15/24 57.2 kg (126 lb)     There were no vitals filed for this visit.    Physical Exam  Vitals reviewed.   Constitutional:       General: She is awake. She is not in acute distress.     Appearance: Normal appearance. She is well-developed and normal weight. She is not ill-appearing, toxic-appearing or diaphoretic.      Comments: Ambulating with cane   HENT:      Head: Normocephalic.      Nose: Nose normal.   Cardiovascular:      Rate and Rhythm: Normal rate. Rhythm irregularly irregular.   Pulmonary:       Effort: Pulmonary effort is normal. No tachypnea, bradypnea, accessory muscle usage or respiratory distress.      Breath sounds: Decreased air movement present. Rhonchi present. No rales.   Abdominal:      General: There is no distension.      Palpations: Abdomen is soft.   Musculoskeletal:      Cervical back: Neck supple.      Right lower leg: Pitting Edema (trace) present.      Left lower leg: Left lower leg edema: trace.   Skin:     General: Skin is warm and dry.      Coloration: Skin is not jaundiced or pale.   Neurological:      Mental Status: She is alert and oriented to person, place, and time.   Psychiatric:         Attention and Perception: Attention normal.         Mood and Affect: Mood and affect normal.         Speech: Speech normal.         Behavior: Behavior normal. Behavior is cooperative.         Thought Content: Thought content normal.       Labs & Results:  Lab Results   Component Value Date    WBC 6.67 01/22/2025    HGB 12.0 01/22/2025    HCT 37.6 01/22/2025    MCV 99 (H) 01/22/2025     01/22/2025     Lab Results   Component Value Date    SODIUM 134 (L) 02/20/2025    K 4.2 02/20/2025    CL 91 (L) 02/20/2025    CO2 34 (H) 02/20/2025    BUN 48 (H) 02/20/2025    CREATININE 1.82 (H) 02/20/2025    GLUC 113 02/20/2025    CALCIUM 9.8 02/20/2025     Lab Results   Component Value Date    INR 2.6 02/04/2025    INR 2.28 (H) 01/22/2025    INR 2.8 01/07/2025    PROTIME 27.6 (H) 02/04/2025    PROTIME 25.1 (H) 01/22/2025    PROTIME 29.2 (H) 01/07/2025     Lab Results   Component Value Date     (H) 01/22/2025      Franci Marcial PA-C

## 2025-02-28 ENCOUNTER — OFFICE VISIT (OUTPATIENT)
Dept: CARDIOLOGY CLINIC | Facility: CLINIC | Age: OVER 89
End: 2025-02-28
Payer: COMMERCIAL

## 2025-02-28 VITALS
SYSTOLIC BLOOD PRESSURE: 100 MMHG | HEIGHT: 64 IN | BODY MASS INDEX: 19.97 KG/M2 | WEIGHT: 117 LBS | OXYGEN SATURATION: 98 % | DIASTOLIC BLOOD PRESSURE: 70 MMHG | HEART RATE: 93 BPM

## 2025-02-28 DIAGNOSIS — I50.32 CHRONIC HEART FAILURE WITH PRESERVED EJECTION FRACTION (HCC): Primary | ICD-10-CM

## 2025-02-28 DIAGNOSIS — I50.33 ACUTE ON CHRONIC HEART FAILURE WITH PRESERVED EJECTION FRACTION (HCC): ICD-10-CM

## 2025-02-28 DIAGNOSIS — N18.32 STAGE 3B CHRONIC KIDNEY DISEASE (HCC): ICD-10-CM

## 2025-02-28 DIAGNOSIS — I48.21 PERMANENT ATRIAL FIBRILLATION (HCC): Chronic | ICD-10-CM

## 2025-02-28 PROCEDURE — 99214 OFFICE O/P EST MOD 30 MIN: CPT | Performed by: PHYSICIAN ASSISTANT

## 2025-02-28 RX ORDER — BUMETANIDE 2 MG/1
3 TABLET ORAL 2 TIMES DAILY
Qty: 270 TABLET | Refills: 1 | Status: SHIPPED | OUTPATIENT
Start: 2025-02-28

## 2025-03-04 LAB — INR PPP: 2.4 (ref 0.85–1.19)

## 2025-03-05 ENCOUNTER — ANTICOAG VISIT (OUTPATIENT)
Dept: CARDIOLOGY CLINIC | Facility: CLINIC | Age: OVER 89
End: 2025-03-05

## 2025-03-05 ENCOUNTER — OFFICE VISIT (OUTPATIENT)
Dept: PODIATRY | Facility: CLINIC | Age: OVER 89
End: 2025-03-05
Payer: COMMERCIAL

## 2025-03-05 VITALS — HEIGHT: 64 IN | BODY MASS INDEX: 20.69 KG/M2 | WEIGHT: 121.2 LBS

## 2025-03-05 DIAGNOSIS — I48.21 PERMANENT ATRIAL FIBRILLATION (HCC): Primary | Chronic | ICD-10-CM

## 2025-03-05 DIAGNOSIS — L84 CORNS: ICD-10-CM

## 2025-03-05 DIAGNOSIS — M20.41 HAMMERTOE OF RIGHT FOOT: ICD-10-CM

## 2025-03-05 DIAGNOSIS — Z98.890 S/P FOOT SURGERY: Primary | ICD-10-CM

## 2025-03-05 PROCEDURE — 99213 OFFICE O/P EST LOW 20 MIN: CPT | Performed by: PODIATRIST

## 2025-03-05 NOTE — PROGRESS NOTES
Name: Rebeca Banegas      : 1932      MRN: [id number]  Encounter Provider: Jonny Marcial DPM  Encounter Date: 3/5/2025   Encounter department: [facility] PODIATRY [place]    Assessment & Plan     1. S/P foot surgery  -     XR foot 3+ vw left  2. Hammertoe of right foot  3. Corns    My personal interpretation today of the x-rays that were taken show X-rays reviewed today show stable rest of the second third and fourth metatarsals with floating osteotomies.  There is some additional new bone formation noted between the osteotomy sites of the second third and fourth metatarsals.  No significant adverse change in position otherwise.    Patient is happy with surgical result and is wound free!    Patient has hammertoe contractures to the lesser digits on the right foot.  She has corn formation to the second digit distal aspect as well as fourth digit dorsal aspect the proximal interphalangeal joint causes some pain and discomfort.    Her left foot is completely healed at this time.  No signs of open ulcerations or lesions.  Will plan on seeing her back as needed.  She is back to her normal activities without any current open ulcerations or lesions.      Return if symptoms worsen or fail to improve.    Subjective     24- 1. Left foot percutaneous osteotomy second metatarsal.  2. Left foot percutaneous osteotomy Third metatarsal  3. Left foot percutaneous osteotomy fourth metatarsal.    Patient is doing well with regards to her left foot denies any pain or discomfort.  She does relate some swelling to the left lower leg however no significant pain to the area.  She does relate some pain to the right foot at the level of the fourth digit as well as the second digit distally she has had hammertoe contractures noted to these areas.    Constitutional:  Negative for chills and fever.   Respiratory:  Negative for chest tightness and shortness of breath.    Gastrointestinal:  Negative for nausea and  "vomiting.     Current Outpatient Medications on File Prior to Visit   Medication Sig    albuterol (2.5 mg/3 mL) 0.083 % nebulizer solution Take 3 mL (2.5 mg total) by nebulization every 8 (eight) hours    albuterol (Proventil HFA) 90 mcg/act inhaler Inhale 2 puffs every 6 (six) hours as needed for wheezing    ammonium lactate (LAC-HYDRIN) 12 % lotion Apply topically 2 (two) times a day as needed for dry skin    atorvastatin (LIPITOR) 40 mg tablet TAKE 1 TABLET EVERY EVENING    bumetanide (BUMEX) 2 mg tablet Take 1.5 tablets (3 mg total) by mouth 2 (two) times a day    Cholecalciferol (VITAMIN D3) 1000 units CAPS Take 2,000 Units by mouth daily    Empagliflozin (Jardiance) 10 MG TABS tablet Take 1 tablet (10 mg total) by mouth every morning    Fluticasone-Salmeterol (Advair) 100-50 mcg/dose inhaler INHALE 1 PUFF 2 (TWO) TIMES A DAY. RINSE MOUTH AFTER USE.    levothyroxine 50 mcg tablet TAKE 1 TABLET DAILY, EXCEPT TAKE 1 AND 1/2 TABLETS ON SUNDAY AND WEDNESDAY    metoprolol succinate (TOPROL-XL) 100 mg 24 hr tablet Take 1 tablet (100 mg total) by mouth daily    spironolactone (ALDACTONE) 50 mg tablet Take 1 tablet (50 mg total) by mouth daily    warfarin (Jantoven) 2.5 mg tablet Take 1 tablet (2.5 mg total) by mouth daily       Objective     Ht 5' 4\" (1.626 m)   Wt 55 kg (121 lb 3.2 oz)   LMP  (LMP Unknown)   BMI 20.80 kg/m²     Examination shows no open ulcerations or lesions noted to the left foot.  There is stable appearance of the incision sites without significant signs of infection noted currently.  No tenderness on palpation no soreness with range of motion or movement to the level of the metatarsophalangeal joints.  On the right foot there is hammertoe contractures noted to the lesser digits and corn formation noted to the fourth digit dorsal aspect as well as at the level of the distal aspect of the second digit.  There is no significant signs of infection or ulceration is noted otherwise.  Intact pedal " pulses.  Neurological sensation is diminished distally

## 2025-03-24 ENCOUNTER — OFFICE VISIT (OUTPATIENT)
Dept: CARDIOLOGY CLINIC | Facility: CLINIC | Age: OVER 89
End: 2025-03-24
Payer: COMMERCIAL

## 2025-03-24 VITALS
BODY MASS INDEX: 20.49 KG/M2 | HEIGHT: 64 IN | DIASTOLIC BLOOD PRESSURE: 80 MMHG | HEART RATE: 92 BPM | WEIGHT: 120 LBS | SYSTOLIC BLOOD PRESSURE: 110 MMHG

## 2025-03-24 DIAGNOSIS — I45.2 BIFASCICULAR BUNDLE BRANCH BLOCK: Chronic | ICD-10-CM

## 2025-03-24 DIAGNOSIS — I10 PRIMARY HYPERTENSION: Chronic | ICD-10-CM

## 2025-03-24 DIAGNOSIS — I50.32 CHRONIC HEART FAILURE WITH PRESERVED EJECTION FRACTION (HCC): ICD-10-CM

## 2025-03-24 DIAGNOSIS — I48.21 PERMANENT ATRIAL FIBRILLATION (HCC): Primary | Chronic | ICD-10-CM

## 2025-03-24 DIAGNOSIS — N18.32 STAGE 3B CHRONIC KIDNEY DISEASE (HCC): ICD-10-CM

## 2025-03-24 PROCEDURE — G2211 COMPLEX E/M VISIT ADD ON: HCPCS | Performed by: INTERNAL MEDICINE

## 2025-03-24 PROCEDURE — 99214 OFFICE O/P EST MOD 30 MIN: CPT | Performed by: INTERNAL MEDICINE

## 2025-03-24 NOTE — PROGRESS NOTES
[facility] Cardiology  Follow up note  Rebeca Banegas 89 y.o. female MRN: [id number]        Impression:    Acute on Chronic diastolic CHF   She has grade 2 diastolic dysfunction, no significant LVH, she had an equivocal SPECT TTR in 2019  Her dry weight has always been around 118 pounds  She was recently up to 131 pounds, now back down to 118 pounds based on her home weights, euvolemic, no edema, recent Jardiance addition, still on spironolactone 50 mg, and after multiple nurse practitioner visits and excellent care, now switched to Bumex which she is taking 4 mg a.m., 2 mg p.m., with significant difficulty cutting them to try to get a 3 mg twice daily dose.  Her rescue plan is to increase to 4 mg twice daily on the Bumex if her weight goes over 120 pounds at home, and then once she is back to under 120 pounds, she can cut back to 4 mg a.m., 2 mg p.m.    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 year old female  With chronic diastolic CHF, chronic atrial fibrillation on a rate control strategy and anticoagulation with warfarin, bifascicular bundle branch block, mixed hyperlipidemia, and a prior negative infiltrative cardiomyopathy workup returns for a follow-up visit.    She had acute on chronic diastolic heart failure throughout the winter months, having had 4 nurse practitioner visits for management of her fluid overload, with eventual introduction of Jardiance 10 mg daily which has been fantastic and has stabilized her  weights between 118 and 120 pounds at home which is her dry weight.  She has no edema, no respiratory complaints, just feels very fatigued, but she has a chronic bronchiectasis cough, does not get good sleep, ambulates with a cane but has no change in her ambulatory function, denies any significant falling, and continues to be stable from an A-fib standpoint with good rate control and stable INRs.      Review of Systems   Constitutional:  Positive for fatigue. Negative for appetite change, diaphoresis and fever.   Respiratory:  Positive for cough and shortness of breath. Negative for chest tightness and wheezing.    Cardiovascular:  Negative for chest pain, palpitations and leg swelling.   Gastrointestinal:  Negative for abdominal pain and blood in stool.   Musculoskeletal:  Positive for gait problem. Negative for arthralgias and joint swelling.   Skin:  Negative for rash.   Neurological:  Negative for dizziness, syncope and light-headedness.       Past Medical History:   Diagnosis Date   • Abnormal ultrasound     RESOLVED: [id number]   • Acute kidney failure (HCC) 12/29/2024   • Advice given about COVID-19 virus infection 04/07/2020   • Ambulates with cane    • Asthma with acute exacerbation     LAST ASSESSED: [id number]   • Asymptomatic menopausal state    • Atherosclerosis    • Atrial fibrillation (HCC)    • Chronic obstructive lung disease (HCC)     w/ chonic cough   • Chronic ulcer of left foot (HCC)    • Colon polyp    • Congestive heart failure (HCC)     LAST ASSESSED: [id number]   • COVID-19 01/06/2025   • COVID-19 virus infection 03/25/2023   • Cuboid fracture     LAST ASSESSED: [id number]   • Disease of thyroid gland    • Dyspepsia    • Edema of both lower extremities    • Equinus deformity of left foot    • Fall 04/01/2024   • Falls     5/2024   • GERD (gastroesophageal reflux disease)    • High risk medication use     LAST ASSESSED: [id number]   • Hyperlipidemia    • Hypertension    • Hypokalemia    •  Hypothyroidism    • Impacted cerumen of both ears     LAST ASSESSED: [id number]   • Impacted cerumen of right ear     LAST ASSESSED: 29GBF5121   • Influenza     LAST ASSESSED: 36PNR7580   • IPMN (intraductal papillary mucinous neoplasm)     RESOLVED: 2015   • Irregular heart beat     afib. Warfarin.   • Leg cramps 2018   • Limb swelling     LAST ASSESSED: [id number]   • Navicular fracture of ankle     LAST ASSESSED: [id number]   • Onychomycosis     LAST ASSESSED: [id number]   • Open wound of right upper arm 10/17/2022   • Osteoarthritis    • Osteopenia    • Osteoporosis    • Paronychia, finger, unspecified laterality     LAST ASSESSED: 60FKA5252   • Paroxysmal atrial fibrillation (HCC)     LAST ASSESSED: 2014   • Peripheral vascular disease (HCC)    • Plantar fasciitis    • SOBOE (shortness of breath on exertion)    • Stroke (HCC)     Right arm weakness that has resolved   • Talus fracture     LAST ASSESSED: [id number]   • Vaccine counseling 2023   • Vascular headache    • Walker as ambulation aid    • Wound of right foot      Social History     Substance and Sexual Activity   Alcohol Use Not Currently     Social History     Substance and Sexual Activity   Drug Use No     Social History     Tobacco Use   Smoking Status Former   • Current packs/day: 0.00   • Average packs/day: 0.3 packs/day for 2.0 years (0.5 ttl pk-yrs)   • Types: Cigarettes   • Start date:    • Quit date:    • Years since quittin.2   Smokeless Tobacco Never   Tobacco Comments    On and off socially with friends        Allergies:  Allergies   Allergen Reactions   • Asa [Aspirin] Shortness Of Breath   • Nsaids Shortness Of Breath   • Medical Tape Other (See Comments)     Skin tears with the removal of tape        Medications:     Current Outpatient Medications:   •  albuterol (2.5 mg/3 mL) 0.083 % nebulizer solution, Take 3 mL (2.5 mg total) by nebulization every 8 (eight) hours, Disp: 270 mL, Rfl: 3  •  albuterol  (Proventil HFA) 90 mcg/act inhaler, Inhale 2 puffs every 6 (six) hours as needed for wheezing, Disp: 20.1 g, Rfl: 6  •  ammonium lactate (LAC-HYDRIN) 12 % lotion, Apply topically 2 (two) times a day as needed for dry skin, Disp: 225 g, Rfl: 5  •  atorvastatin (LIPITOR) 40 mg tablet, TAKE 1 TABLET EVERY EVENING, Disp: 90 tablet, Rfl: 3  •  bumetanide (BUMEX) 2 mg tablet, Take 1.5 tablets (3 mg total) by mouth 2 (two) times a day, Disp: 270 tablet, Rfl: 1  •  Cholecalciferol (VITAMIN D3) 1000 units CAPS, Take 2,000 Units by mouth daily, Disp: , Rfl:   •  Empagliflozin (Jardiance) 10 MG TABS tablet, Take 1 tablet (10 mg total) by mouth every morning, Disp: 90 tablet, Rfl: 1  •  Fluticasone-Salmeterol (Advair) 100-50 mcg/dose inhaler, INHALE 1 PUFF 2 (TWO) TIMES A DAY. RINSE MOUTH AFTER USE., Disp: 180 blister, Rfl: 3  •  levothyroxine 50 mcg tablet, TAKE 1 TABLET DAILY, EXCEPT TAKE 1 AND 1/2 TABLETS ON SUNDAY AND WEDNESDAY, Disp: 100 tablet, Rfl: 1  •  metoprolol succinate (TOPROL-XL) 100 mg 24 hr tablet, Take 1 tablet (100 mg total) by mouth daily, Disp: 90 tablet, Rfl: 1  •  spironolactone (ALDACTONE) 50 mg tablet, Take 1 tablet (50 mg total) by mouth daily, Disp: 30 tablet, Rfl: 6  •  warfarin (Jantoven) 2.5 mg tablet, Take 1 tablet (2.5 mg total) by mouth daily, Disp: 30 tablet, Rfl: 0      Vitals:    03/24/25 1353   BP: 110/80   Pulse: 92     Weight (last 2 days)     Date/Time Weight    03/24/25 1353 54.4 (120)        Physical Exam  Constitutional:       General: She is not in acute distress.     Appearance: She is not diaphoretic.   HENT:      Head: Normocephalic and atraumatic.   Eyes:      General: No scleral icterus.     Conjunctiva/sclera: Conjunctivae normal.   Neck:      Vascular: No JVD.   Cardiovascular:      Rate and Rhythm: Normal rate. Rhythm irregular.      Heart sounds: Normal heart sounds. No murmur heard.  Pulmonary:      Effort: Pulmonary effort is normal. No respiratory distress.      Breath  "sounds: Examination of the right-lower field reveals rhonchi. Examination of the left-lower field reveals rhonchi. Rhonchi present. No wheezing or rales.   Musculoskeletal:         General: No tenderness.      Cervical back: Normal range of motion.      Right lower leg: No edema.      Left lower leg: No edema.   Skin:     General: Skin is warm and dry.         Laboratory Studies:    Laboratory studies personally reviewed    Cardiac testing:     EKG reviewed personally:   No results found for this visit on 03/24/25.          Echocardiogram:    10/19- EF 60%, diastolic dysfunction, un graded due to AFib, normal RV size and function, moderate  Left and right atrial dilatation, mild MR, mild TR, mildly elevated pulmonary pressures  10/21-EF 60%, diastolic dysfunction, left and right atrial dilation, mild-to-moderate TR, moderately elevated pulmonary pressures, dilated IVC  8/24-personally interpreted by me-moderate LVH, EF 60/normal, moderately dilated atria bilaterally, mild MR, moderate TR.     TTR SPECT   11/19-1.26 heart to lung ratio, equivocal to negative    Stress tests:      Catheterization:      Holter:      10/21-excellent AFib rate control, no significant pauses    Jonas Vyas MD    Portions of the record may have been created with voice recognition software.  Occasional wrong word or \"sound a like\" substitutions may have occurred due to the inherent limitations of voice recognition software.  Read the chart carefully and recognize, using context, where substitutions have occurred.  I have spent a total time of 42 minutes in caring for this patient on the day of the visit/encounter including Diagnostic results, Risks and benefits of tx options, Instructions for management, Importance of tx compliance, Counseling / Coordination of care, Documenting in the medical record, and Reviewing / ordering tests, medicine, procedures  .   "

## 2025-03-25 ENCOUNTER — APPOINTMENT (OUTPATIENT)
Dept: LAB | Facility: CLINIC | Age: OVER 89
End: 2025-03-25
Payer: COMMERCIAL

## 2025-03-25 ENCOUNTER — OFFICE VISIT (OUTPATIENT)
Dept: PULMONOLOGY | Facility: CLINIC | Age: OVER 89
End: 2025-03-25
Payer: COMMERCIAL

## 2025-03-25 VITALS
HEART RATE: 83 BPM | BODY MASS INDEX: 20.9 KG/M2 | TEMPERATURE: 97.3 F | HEIGHT: 64 IN | OXYGEN SATURATION: 99 % | WEIGHT: 122.4 LBS | SYSTOLIC BLOOD PRESSURE: 118 MMHG | DIASTOLIC BLOOD PRESSURE: 64 MMHG

## 2025-03-25 DIAGNOSIS — J45.20 MILD INTERMITTENT ASTHMA WITHOUT COMPLICATION: Chronic | ICD-10-CM

## 2025-03-25 DIAGNOSIS — J47.9 BRONCHIECTASIS WITHOUT COMPLICATION (HCC): Primary | Chronic | ICD-10-CM

## 2025-03-25 DIAGNOSIS — J30.89 NON-SEASONAL ALLERGIC RHINITIS, UNSPECIFIED TRIGGER: Chronic | ICD-10-CM

## 2025-03-25 DIAGNOSIS — I10 PRIMARY HYPERTENSION: Chronic | ICD-10-CM

## 2025-03-25 LAB
ANION GAP SERPL CALCULATED.3IONS-SCNC: 19 MMOL/L (ref 4–13)
BUN SERPL-MCNC: 59 MG/DL (ref 5–25)
CALCIUM SERPL-MCNC: 10.1 MG/DL (ref 8.4–10.2)
CHLORIDE SERPL-SCNC: 99 MMOL/L (ref 96–108)
CO2 SERPL-SCNC: 25 MMOL/L (ref 21–32)
CREAT SERPL-MCNC: 2.16 MG/DL (ref 0.6–1.3)
GFR SERPL CREATININE-BSD FRML MDRD: 19 ML/MIN/1.73SQ M
GLUCOSE P FAST SERPL-MCNC: 70 MG/DL (ref 65–99)
POTASSIUM SERPL-SCNC: 4.2 MMOL/L (ref 3.5–5.3)
SODIUM SERPL-SCNC: 143 MMOL/L (ref 135–147)

## 2025-03-25 PROCEDURE — G2211 COMPLEX E/M VISIT ADD ON: HCPCS | Performed by: INTERNAL MEDICINE

## 2025-03-25 PROCEDURE — 80048 BASIC METABOLIC PNL TOTAL CA: CPT

## 2025-03-25 PROCEDURE — 36415 COLL VENOUS BLD VENIPUNCTURE: CPT

## 2025-03-25 PROCEDURE — 99214 OFFICE O/P EST MOD 30 MIN: CPT | Performed by: INTERNAL MEDICINE

## 2025-03-25 RX ORDER — FLUTICASONE PROPIONATE AND SALMETEROL 250; 50 UG/1; UG/1
1 POWDER RESPIRATORY (INHALATION) 2 TIMES DAILY
Qty: 180 BLISTER | Refills: 0 | Status: SHIPPED | OUTPATIENT
Start: 2025-03-25 | End: 2025-04-24

## 2025-03-25 NOTE — PROGRESS NOTES
Pulmonary Follow Up Note   Rebeca Banegas 89 y.o. female MRN: [id number]  3/25/2025      Assessment:  Chronic Cough  Improved from bronchiectasis standpoint. Now with thinner clear mucous throughout the day, likely related to asthma. Will increase advair. Advised to call if no improvement in 1 week of starting higher dose. Continue airway clearance along with vest therapy.     Mild Intermittent Asthma  Increase Advair to 250mcg inhaler BID along with as needed albuterol rescue inhaler. Counseled on rinsing mouth after advair to decrease risk of thrush. Will deescalate next visit.     Bronchiectasis   Patient with bronchiectasis noted on CT chest and Pseudomonas growth on previous sputum cultures. Last exacerbation March 2024. Discussed importance of airway clearance in managing this disease. Advised to continue hypertonic saline nebs and albuterol nebs with flutter valve 3 times per day, along with percussion vest therapy for ongoing sputum production.     Allergic Rhinitis  Continue cetirizine along with nasal rinse and flonase      Plan:    Diagnoses and all orders for this visit:    Bronchiectasis without complication (HCC)    Mild intermittent asthma without complication  -     Fluticasone-Salmeterol (Advair Diskus) 250-50 mcg/dose inhaler; Inhale 1 puff 2 (two) times a day Rinse mouth after use.    Non-seasonal allergic rhinitis, unspecified trigger        Return in about 3 months (around 6/25/2025) for Next scheduled follow up.    History of Present Illness   HPI:  Rebeca Banegas is a 89 y.o. female with PMH of HFpEF, Afib on coumadin, asthma, bronchiectasis who presents for follow up of bronchiectasis and asthma. She has been compliant with her airway clearance and feels her mucous has thinned and more clear. She has noticed her cough frequency has increased somewhat the last couple weeks. She has had difficulty sleeping at night due to this. She denies fevers, chills, recent illness. She feels it may  be her asthma. Denies reflux or post nasal drip.     Review of Systems   Constitutional:  Negative for activity change, chills, diaphoresis, fatigue and fever.   HENT:  Negative for congestion, postnasal drip, rhinorrhea, sinus pressure, sneezing, sore throat and trouble swallowing.    Eyes: Negative.    Respiratory:  Positive for cough. Negative for chest tightness and shortness of breath.    Cardiovascular:  Negative for chest pain, palpitations and leg swelling.   Gastrointestinal:  Negative for constipation, diarrhea, nausea and vomiting.   Endocrine: Negative.    Genitourinary: Negative.    Musculoskeletal:  Negative for back pain, joint swelling and neck pain.   Skin: Negative.    Allergic/Immunologic: Negative.    Neurological:  Negative for dizziness, syncope, weakness, light-headedness and headaches.   Hematological: Negative.    Psychiatric/Behavioral: Negative.     All other systems reviewed and are negative.      Historical Information   Past Medical History:   Diagnosis Date    Abnormal ultrasound     RESOLVED: [id number]    Acute kidney failure (HCC) 12/29/2024    Advice given about COVID-19 virus infection 04/07/2020    Ambulates with cane     Asthma with acute exacerbation     LAST ASSESSED: [id number]    Asymptomatic menopausal state     Atherosclerosis     Atrial fibrillation (HCC)     Chronic obstructive lung disease (HCC)     w/ chonic cough    Chronic ulcer of left foot (HCC)     Colon polyp     Congestive heart failure (HCC)     LAST ASSESSED: [id number]    COVID-19 01/06/2025    COVID-19 virus infection 03/25/2023    Cuboid fracture     LAST ASSESSED: 59OYH8684    Disease of thyroid gland     Dyspepsia     Edema of both lower extremities     Equinus deformity of left foot     Fall 04/01/2024    Falls     5/2024    GERD (gastroesophageal reflux disease)     High risk medication use     LAST ASSESSED: 15GDE4656    Hyperlipidemia     Hypertension     Hypokalemia     Hypothyroidism     Impacted  cerumen of both ears     LAST ASSESSED: 73BQI7524    Impacted cerumen of right ear     LAST ASSESSED: 89YIZ8913    Influenza     LAST ASSESSED: 14CGI2439    IPMN (intraductal papillary mucinous neoplasm)     RESOLVED: 02OCT2015    Irregular heart beat     afib. Warfarin.    Leg cramps 12/21/2018    Limb swelling     LAST ASSESSED: 06YSP0319    Navicular fracture of ankle     LAST ASSESSED: 14YUX2432    Onychomycosis     LAST ASSESSED: 41LQJ4121    Open wound of right upper arm 10/17/2022    Osteoarthritis     Osteopenia     Osteoporosis     Paronychia, finger, unspecified laterality     LAST ASSESSED: 50HLQ0065    Paroxysmal atrial fibrillation (HCC)     LAST ASSESSED: 02MAR2014    Peripheral vascular disease (HCC)     Plantar fasciitis     SOBOE (shortness of breath on exertion)     Stroke (HCC)     Right arm weakness that has resolved    Talus fracture     LAST ASSESSED: 44UKV8323    Vaccine counseling 12/19/2023    Vascular headache     Walker as ambulation aid     Wound of right foot      Past Surgical History:   Procedure Laterality Date    APPENDECTOMY      BONE BIOPSY Left 08/09/2024    Procedure: Bone bx of proximal phalanx second toe & second met with fluoroscopic guidance;  Surgeon: Jonny Marcial DPM;  Location: [facility];  Service: Podiatry    CATARACT EXTRACTION Bilateral     CHOLECYSTECTOMY      COLONOSCOPY      JOINT REPLACEMENT Bilateral     knee    NEUROPLASTY / TRANSPOSITION MEDIAN NERVE AT CARPAL TUNNEL BILATERAL Bilateral     DECOMPRESSION    OOPHORECTOMY Bilateral     age 81    PELVIC FLOOR REPAIR  2014    DC BIOPSY BONE TROCAR/NEEDLE SUPERFICIAL Left 08/09/2024    Procedure: Left foot debridement;  Surgeon: Jonny Marcial DPM;  Location: [facility];  Service: Podiatry    DC DEBRIDEMENT MUSCLE &/FASCIA 1ST 20 SQ CM/< Left 08/09/2024    Procedure: Left foot debridement;  Surgeon: Jonny Marcial DPM;  Location: [facility];  Service: Podiatry    DC OSTEOT W/WO LNGTH  SHRT/CORRJ 1ST METAR Left 11/29/2024    Procedure: Left foot percutaneous osteotomy of second, third and fourth metatarsal.;  Surgeon: Jonny Marcial DPM;  Location: [facility];  Service: Podiatry    SALPINGECTOMY Bilateral     SINUS SURGERY      TONSILLECTOMY      TOTAL KNEE ARTHROPLASTY Bilateral      Family History   Problem Relation Age of Onset    Pancreatic cancer Mother 93    Heart failure Mother     Coronary artery disease Family     Breast cancer Family     Other Family         BREAST DISORDER, GASTROINTESTINAL CANCER    Uterine cancer Family     Hyperlipidemia Family     Osteoarthritis Family     Ovarian cancer Family     Brain cancer Son     Stroke Father     No Known Problems Sister     No Known Problems Daughter     No Known Problems Maternal Grandmother     No Known Problems Maternal Grandfather     No Known Problems Paternal Grandmother     No Known Problems Paternal Grandfather     Breast cancer Cousin 50    Breast cancer Cousin 50    Ovarian cancer Other 49         Meds/Allergies     Current Outpatient Medications:     albuterol (2.5 mg/3 mL) 0.083 % nebulizer solution, Take 3 mL (2.5 mg total) by nebulization every 8 (eight) hours, Disp: 270 mL, Rfl: 3    albuterol (Proventil HFA) 90 mcg/act inhaler, Inhale 2 puffs every 6 (six) hours as needed for wheezing, Disp: 20.1 g, Rfl: 6    ammonium lactate (LAC-HYDRIN) 12 % lotion, Apply topically 2 (two) times a day as needed for dry skin, Disp: 225 g, Rfl: 5    atorvastatin (LIPITOR) 40 mg tablet, TAKE 1 TABLET EVERY EVENING, Disp: 90 tablet, Rfl: 3    bumetanide (BUMEX) 2 mg tablet, Take 1.5 tablets (3 mg total) by mouth 2 (two) times a day, Disp: 270 tablet, Rfl: 1    Cholecalciferol (VITAMIN D3) 1000 units CAPS, Take 2,000 Units by mouth daily, Disp: , Rfl:     Empagliflozin (Jardiance) 10 MG TABS tablet, Take 1 tablet (10 mg total) by mouth every morning, Disp: 90 tablet, Rfl: 1    Fluticasone-Salmeterol (Advair) 100-50 mcg/dose inhaler,  "INHALE 1 PUFF 2 (TWO) TIMES A DAY. RINSE MOUTH AFTER USE., Disp: 180 blister, Rfl: 3    levothyroxine 50 mcg tablet, TAKE 1 TABLET DAILY, EXCEPT TAKE 1 AND 1/2 TABLETS ON SUNDAY AND WEDNESDAY, Disp: 100 tablet, Rfl: 1    metoprolol succinate (TOPROL-XL) 100 mg 24 hr tablet, Take 1 tablet (100 mg total) by mouth daily, Disp: 90 tablet, Rfl: 1    spironolactone (ALDACTONE) 50 mg tablet, Take 1 tablet (50 mg total) by mouth daily, Disp: 30 tablet, Rfl: 6    warfarin (Jantoven) 2.5 mg tablet, Take 1 tablet (2.5 mg total) by mouth daily, Disp: 30 tablet, Rfl: 0  Allergies   Allergen Reactions    Asa [Aspirin] Shortness Of Breath    Nsaids Shortness Of Breath    Medical Tape Other (See Comments)     Skin tears with the removal of tape        Vitals: Blood pressure 118/64, pulse 83, temperature (!) 97.3 °F (36.3 °C), temperature source Tympanic Core, height 5' 4\" (1.626 m), weight 55.5 kg (122 lb 6.4 oz), SpO2 99%. Body mass index is 21.01 kg/m².        Physical Exam  Physical Exam  Vitals and nursing note reviewed.   Constitutional:       Comments: Thin, frail   HENT:      Head: Normocephalic and atraumatic.      Right Ear: External ear normal.      Left Ear: External ear normal.      Nose: Nose normal.      Mouth/Throat:      Mouth: Mucous membranes are moist.      Pharynx: Oropharynx is clear.   Eyes:      Conjunctiva/sclera: Conjunctivae normal.   Cardiovascular:      Rate and Rhythm: Normal rate and regular rhythm.      Pulses: Normal pulses.      Heart sounds: Normal heart sounds.   Pulmonary:      Effort: Pulmonary effort is normal.      Comments: Mild expiratory wheezing  Abdominal:      General: Abdomen is flat. Bowel sounds are normal.      Palpations: Abdomen is soft.   Musculoskeletal:         General: No swelling or tenderness.      Cervical back: Neck supple. No muscular tenderness.   Skin:     General: Skin is warm and dry.      Capillary Refill: Capillary refill takes less than 2 seconds.   Neurological:    "   Mental Status: She is alert and oriented to person, place, and time. Mental status is at baseline.   Psychiatric:         Mood and Affect: Mood normal.         Behavior: Behavior normal.         Thought Content: Thought content normal.         Judgment: Judgment normal.         Preventative:  Influenza vaccine: 2024  Pneumonia vaccine: 2020, 2015    Labs: I have personally reviewed pertinent lab results.  Lab Results   Component Value Date    WBC 6.67 01/22/2025    HGB 12.0 01/22/2025    HCT 37.6 01/22/2025    MCV 99 (H) 01/22/2025     01/22/2025     Lab Results   Component Value Date    CALCIUM 9.8 02/20/2025     10/17/2017    K 4.2 02/20/2025    CO2 34 (H) 02/20/2025    CL 91 (L) 02/20/2025    BUN 48 (H) 02/20/2025    CREATININE 1.82 (H) 02/20/2025     Lab Results   Component Value Date    IGE 1,345 (H) 09/23/2024     Lab Results   Component Value Date    ALT 25 09/23/2024    AST 31 09/23/2024    ALKPHOS 51 09/23/2024    BILITOT 0.5 10/17/2017         Imaging and other studies: Results Review Statement: I personally reviewed the following image studies in PACS and associated radiology reports: CT chest and Echocardiogram. My interpretation of the radiology images/reports is:    CXR 12/28/24: Linear opacity in the left base likely due to chronic mucoid impaction shown on CT with no definite acute disease.   CT CAP 8/28/24: Redemonstrated bilateral chronic bronchiectasis in the right middle lobe and both lower lobes present since 2014. Redemonstrated mucoid debris within the ectatic bronchi, which has decreased in the right middle lobe and increased in the left lower lobe. Small amount of aerosolized secretions in the right mainstem bronchus. Similar bibasilar linear atelectasis and/or scarring.Scattered 3 mm and smaller solid pulmonary nodules mostly without significant change since 3/13/2014. For example a 3 mm nodule in the left lower lobe (series 604 image 216), unchanged. A 2 mm solid nodule in  "the left lower lobe (series 604 image 220) is not seen on prior. Scattered calcified granulomata.  CT Chest 7/3/23: bronchiectasis in RML and B/L lower lobes with bronchial wall thickening and mucous in airways, enlarged pulmonary artery  Pulmonary function testing 9/1/23: moderate obstruction with +BD response with hyperinflation         EKG, Pathology, and Other Studies: Results Review Statement: I personally reviewed the following image studies in PACS and associated radiology reports: Echocardiogram. My interpretation of the radiology images/reports is:    Echo 8/28/24: EF 60% with dilated RA & LA, mod TR with RVSP of 41mmHg.    Paulie Wasserman MD  Pulmonary/Critical Care Fellow PGY-VI  [facility]      Disclaimer: Portions of the record may have been created with voice recognition software. Occasional wrong word or \"sound a like\" substitutions may have occurred due to the inherent limitations of voice recognition software. Careful consideration should be taken to recognize, using context, where substitutions have occurred.    "

## 2025-03-28 DIAGNOSIS — I48.21 PERMANENT ATRIAL FIBRILLATION (HCC): Chronic | ICD-10-CM

## 2025-03-28 NOTE — TELEPHONE ENCOUNTER
Reason for call:   [x] Refill   [] Prior Auth  [x] Other: pt is waiting for her mail order. She just needs a short term supply    Office:   [] PCP/Provider -   [x] Specialty/Provider - cardio/Gilberto Shelby MD     Medication: metoprolol succinate (TOPROL-XL) 100 mg 24 hr tablet     Dose/Frequency:     Take 1 tablet (100 mg total) by mouth daily       Quantity: 7    Pharmacy: [facility]/pharmacy #[id number] - [place], [place] - [address] [place] [address] [phone]     Local Pharmacy   Does the patient have enough for 3 days?   [] Yes   [x] No - Send as HP to POD    Mail Away Pharmacy   Does the patient have enough for 10 days?   [] Yes   [] No - Send as HP to POD

## 2025-03-31 RX ORDER — METOPROLOL SUCCINATE 100 MG/1
100 TABLET, EXTENDED RELEASE ORAL DAILY
Qty: 90 TABLET | Refills: 3 | Status: SHIPPED | OUTPATIENT
Start: 2025-03-31 | End: 2025-09-27

## 2025-04-01 ENCOUNTER — TELEPHONE (OUTPATIENT)
Dept: PULMONOLOGY | Facility: CLINIC | Age: OVER 89
End: 2025-04-01

## 2025-04-01 ENCOUNTER — ANTICOAG VISIT (OUTPATIENT)
Dept: CARDIOLOGY CLINIC | Facility: CLINIC | Age: OVER 89
End: 2025-04-01

## 2025-04-01 DIAGNOSIS — J47.9 BRONCHIECTASIS WITHOUT COMPLICATION (HCC): ICD-10-CM

## 2025-04-01 DIAGNOSIS — I48.21 PERMANENT ATRIAL FIBRILLATION (HCC): Primary | Chronic | ICD-10-CM

## 2025-04-01 DIAGNOSIS — J45.40 MODERATE PERSISTENT ASTHMA WITHOUT COMPLICATION: Chronic | ICD-10-CM

## 2025-04-01 LAB — INR PPP: 1.8 (ref 0.85–1.19)

## 2025-04-01 RX ORDER — ALBUTEROL SULFATE 0.83 MG/ML
2.5 SOLUTION RESPIRATORY (INHALATION)
Qty: 270 ML | Refills: 3 | Status: SHIPPED | OUTPATIENT
Start: 2025-04-01

## 2025-04-01 RX ORDER — SODIUM CHLORIDE FOR INHALATION 3 %
4 VIAL, NEBULIZER (ML) INHALATION 3 TIMES DAILY
Qty: 360 ML | Refills: 11 | Status: SHIPPED | OUTPATIENT
Start: 2025-04-01

## 2025-04-01 NOTE — TELEPHONE ENCOUNTER
Patient called the RX Refill Line. Message is being forwarded to the office.     Patient is requesting a refill of the sodium chloride solution for the nebulizer, she ran out of it so she's not sure of the dose and it's not on her active med list     She's hoping to get a refill today to the [facility] on [address]     Please contact patient at

## 2025-04-26 DIAGNOSIS — I50.32 CHRONIC HEART FAILURE WITH PRESERVED EJECTION FRACTION (HCC): ICD-10-CM

## 2025-04-28 RX ORDER — EMPAGLIFLOZIN 10 MG/1
10 TABLET, FILM COATED ORAL EVERY MORNING
Qty: 90 TABLET | Refills: 1 | Status: SHIPPED | OUTPATIENT
Start: 2025-04-28

## 2025-04-29 LAB — INR PPP: 1.5 (ref 0.85–1.19)

## 2025-04-30 ENCOUNTER — ANTICOAG VISIT (OUTPATIENT)
Dept: CARDIOLOGY CLINIC | Facility: CLINIC | Age: OVER 89
End: 2025-04-30

## 2025-04-30 DIAGNOSIS — I48.21 PERMANENT ATRIAL FIBRILLATION (HCC): Primary | Chronic | ICD-10-CM

## 2025-05-13 LAB — INR PPP: 2 (ref 0.85–1.19)

## 2025-05-14 ENCOUNTER — ANTICOAG VISIT (OUTPATIENT)
Dept: CARDIOLOGY CLINIC | Facility: CLINIC | Age: OVER 89
End: 2025-05-14

## 2025-05-14 DIAGNOSIS — I48.21 PERMANENT ATRIAL FIBRILLATION (HCC): Primary | Chronic | ICD-10-CM

## 2025-05-15 ENCOUNTER — OFFICE VISIT (OUTPATIENT)
Dept: PODIATRY | Facility: CLINIC | Age: OVER 89
End: 2025-05-15
Payer: COMMERCIAL

## 2025-05-15 VITALS — BODY MASS INDEX: 20.69 KG/M2 | HEIGHT: 64 IN | WEIGHT: 121.2 LBS

## 2025-05-15 DIAGNOSIS — L84 CORNS: ICD-10-CM

## 2025-05-15 DIAGNOSIS — M20.41 HAMMERTOE OF RIGHT FOOT: Primary | ICD-10-CM

## 2025-05-15 DIAGNOSIS — L84 CALLUS: ICD-10-CM

## 2025-05-15 PROCEDURE — 99213 OFFICE O/P EST LOW 20 MIN: CPT | Performed by: PODIATRIST

## 2025-05-15 NOTE — PROGRESS NOTES
"  Name: Rebeca Banegas      : 1932      MRN: [id number]  Encounter Provider: Jonny Marcial DPM  Encounter Date: 5/15/2025   Encounter department: [facility] PODIATRY [facility]    Assessment & Plan     1. Hammertoe of right foot  -     XR foot 3+ vw right  2. Corns  3. Callus    Left foot mild corn formation noted on the plantar forefoot no signs of ulceration currently.    Right foot callus formation to the distal tip of the third toe as well as at the dorsal aspect of the fourth toe proximal interphalangeal joint.    Callus trimming was completed today I discussed with her toe crest pad as well as lambswool for around the fourth toe to help offload this area.    Will plan on checking her back for reevaluation in 2 months.        Return in about 2 months (around 7/15/2025).    Subjective     Patient has pain and discomfort noted to the digits as above as well as the plantar aspect of the left foot mild discomfort.  She denies any open ulcerations or lesions.        Constitutional:  Negative for chills and fever.   Respiratory:  Negative for chest tightness and shortness of breath.    Gastrointestinal:  Negative for nausea and vomiting.     Medications[1]    Objective     Ht 5' 4\" (1.626 m)   Wt 55 kg (121 lb 3.2 oz)   LMP  (LMP Unknown)   BMI 20.80 kg/m²     There is callus formation noted distal aspect of third digit hammertoe contractures noted as well as callus formation on the dorsal aspect of the fourth digit at the proximal to phalangeal joint.  There is some mild callus formation noted plantar surface of the left foot midfoot.  No other areas of acute discomfort tenderness intact pedal pulses.  Neurologic sensation is diminished distally.         [1]  Current Outpatient Medications on File Prior to Visit   Medication Sig   • albuterol (2.5 mg/3 mL) 0.083 % nebulizer solution Take 3 mL (2.5 mg total) by nebulization every 8 (eight) hours   • albuterol (Proventil HFA) 90 mcg/act " inhaler Inhale 2 puffs every 6 (six) hours as needed for wheezing   • ammonium lactate (LAC-HYDRIN) 12 % lotion Apply topically 2 (two) times a day as needed for dry skin   • atorvastatin (LIPITOR) 40 mg tablet TAKE 1 TABLET EVERY EVENING   • bumetanide (BUMEX) 2 mg tablet Take 1.5 tablets (3 mg total) by mouth 2 (two) times a day   • Cholecalciferol (VITAMIN D3) 1000 units CAPS Take 2,000 Units by mouth daily   • Empagliflozin (Jardiance) 10 MG TABS tablet TAKE 1 TABLET EVERY MORNING   • levothyroxine 50 mcg tablet TAKE 1 TABLET DAILY, EXCEPT TAKE 1 AND 1/2 TABLETS ON SUNDAY AND WEDNESDAY   • metoprolol succinate (TOPROL-XL) 100 mg 24 hr tablet Take 1 tablet (100 mg total) by mouth daily   • sodium chloride 3 % inhalation solution Take 4 mL by nebulization 3 (three) times a day   • spironolactone (ALDACTONE) 50 mg tablet Take 1 tablet (50 mg total) by mouth daily   • Fluticasone-Salmeterol (Advair Diskus) 250-50 mcg/dose inhaler Inhale 1 puff 2 (two) times a day Rinse mouth after use.   • warfarin (Jantoven) 2.5 mg tablet Take 1 tablet (2.5 mg total) by mouth daily

## 2025-05-20 ENCOUNTER — TELEPHONE (OUTPATIENT)
Age: OVER 89
End: 2025-05-20

## 2025-05-20 DIAGNOSIS — E03.9 HYPOTHYROIDISM, UNSPECIFIED TYPE: Chronic | ICD-10-CM

## 2025-05-20 NOTE — TELEPHONE ENCOUNTER
Reason for call:   [x] Refill   [] Prior Auth  [] Other:     Office:   [x] PCP/Provider - Santana  [] Specialty/Provider -     Medication: Levothyroxine 50mcg    Dose/Frequency: 1 tab daily 1.5 on Sunday and Wednesday     Quantity: 100    Pharmacy:[facility] - [facility] [address] [phone]      Mail Away Pharmacy   Does the patient have enough for 10 days?   [x] Yes   [] No - Send as HP to POD

## 2025-05-20 NOTE — TELEPHONE ENCOUNTER
Patient would like to be scheduled for a Prolia injection that is due this month. She stated the last one was given to her by Dr. Dillon at her home in November 2024. Please call patient and advise.

## 2025-05-21 RX ORDER — LEVOTHYROXINE SODIUM 50 UG/1
TABLET ORAL
Qty: 100 TABLET | Refills: 1 | Status: SHIPPED | OUTPATIENT
Start: 2025-05-21

## 2025-05-22 ENCOUNTER — OFFICE VISIT (OUTPATIENT)
Dept: PHYSICAL THERAPY | Facility: OTHER | Age: OVER 89
End: 2025-05-22
Payer: COMMERCIAL

## 2025-05-22 DIAGNOSIS — M79.672 LEFT FOOT PAIN: Primary | ICD-10-CM

## 2025-05-22 PROCEDURE — L3010 FOOT LONGITUDINAL ARCH SUPPO: HCPCS

## 2025-05-23 DIAGNOSIS — I50.33 ACUTE ON CHRONIC DIASTOLIC CHF (CONGESTIVE HEART FAILURE) (HCC): ICD-10-CM

## 2025-05-23 RX ORDER — SPIRONOLACTONE 50 MG/1
50 TABLET, FILM COATED ORAL DAILY
Qty: 30 TABLET | Refills: 6 | Status: SHIPPED | OUTPATIENT
Start: 2025-05-23

## 2025-05-27 NOTE — TELEPHONE ENCOUNTER
[employer] APPROVED      Appro[facility] May 21 [facility]    PA Case: [id number], Status: Approved, Coverage Starts on: 4/27/2023 12:00:00 AM, Coverage Ends on: 12/31/2025 12:00:00 AM. Questions? Contact [phone].

## 2025-05-29 NOTE — TELEPHONE ENCOUNTER
Rebeca would like to know when Dr Dillon is coming to her apartment for her prolia shot. Please let her know. Thanks

## 2025-05-30 NOTE — TELEPHONE ENCOUNTER
Left message with home visit date      Asked if she can call back to confirm         Closing task

## 2025-05-30 NOTE — TELEPHONE ENCOUNTER
Patient is ok for June 1st between 11-12 noon. She confirmed. Tried reaching to Ruthie.    Santana Dillon MD to Fern[facility] Sav         5/29/25 11:46 AM  Sunday June 1 between 11 and noon      Thank you!!

## 2025-06-02 ENCOUNTER — ANTICOAG VISIT (OUTPATIENT)
Dept: CARDIOLOGY CLINIC | Facility: CLINIC | Age: OVER 89
End: 2025-06-02

## 2025-06-02 ENCOUNTER — APPOINTMENT (OUTPATIENT)
Dept: LAB | Facility: CLINIC | Age: OVER 89
End: 2025-06-02
Attending: INTERNAL MEDICINE
Payer: COMMERCIAL

## 2025-06-02 ENCOUNTER — IN HOME VISIT (OUTPATIENT)
Dept: INTERNAL MEDICINE CLINIC | Facility: CLINIC | Age: OVER 89
End: 2025-06-02
Payer: COMMERCIAL

## 2025-06-02 ENCOUNTER — HOSPITAL ENCOUNTER (OUTPATIENT)
Dept: RADIOLOGY | Facility: HOSPITAL | Age: OVER 89
Discharge: HOME | End: 2025-06-02
Payer: COMMERCIAL

## 2025-06-02 VITALS — DIASTOLIC BLOOD PRESSURE: 60 MMHG | HEART RATE: 78 BPM | RESPIRATION RATE: 14 BRPM | SYSTOLIC BLOOD PRESSURE: 90 MMHG

## 2025-06-02 DIAGNOSIS — G89.29 CHRONIC MIDLINE LOW BACK PAIN WITHOUT SCIATICA: ICD-10-CM

## 2025-06-02 DIAGNOSIS — G89.29 CHRONIC THORACIC BACK PAIN, UNSPECIFIED BACK PAIN LATERALITY: ICD-10-CM

## 2025-06-02 DIAGNOSIS — I48.21 PERMANENT ATRIAL FIBRILLATION (HCC): Chronic | ICD-10-CM

## 2025-06-02 DIAGNOSIS — M54.6 CHRONIC THORACIC BACK PAIN, UNSPECIFIED BACK PAIN LATERALITY: ICD-10-CM

## 2025-06-02 DIAGNOSIS — M54.50 CHRONIC MIDLINE LOW BACK PAIN WITHOUT SCIATICA: ICD-10-CM

## 2025-06-02 DIAGNOSIS — M25.50 ARTHRALGIA, UNSPECIFIED JOINT: ICD-10-CM

## 2025-06-02 DIAGNOSIS — R25.2 MUSCLE CRAMP: Primary | ICD-10-CM

## 2025-06-02 DIAGNOSIS — M79.10 MYALGIA: ICD-10-CM

## 2025-06-02 DIAGNOSIS — J47.9 BRONCHIECTASIS WITHOUT COMPLICATION (HCC): Chronic | ICD-10-CM

## 2025-06-02 DIAGNOSIS — J45.20 MILD INTERMITTENT ASTHMA WITHOUT COMPLICATION: Chronic | ICD-10-CM

## 2025-06-02 DIAGNOSIS — I48.21 PERMANENT ATRIAL FIBRILLATION (HCC): Primary | Chronic | ICD-10-CM

## 2025-06-02 DIAGNOSIS — R25.2 MUSCLE CRAMP: ICD-10-CM

## 2025-06-02 DIAGNOSIS — I50.32 CHRONIC HEART FAILURE WITH PRESERVED EJECTION FRACTION (HCC): ICD-10-CM

## 2025-06-02 DIAGNOSIS — E03.9 HYPOTHYROIDISM, UNSPECIFIED TYPE: ICD-10-CM

## 2025-06-02 LAB
ALBUMIN SERPL BCG-MCNC: 3.9 G/DL (ref 3.5–5)
ALP SERPL-CCNC: 65 U/L (ref 34–104)
ALT SERPL W P-5'-P-CCNC: 26 U/L (ref 7–52)
ANION GAP SERPL CALCULATED.3IONS-SCNC: 9 MMOL/L (ref 4–13)
AST SERPL W P-5'-P-CCNC: 33 U/L (ref 13–39)
B BURGDOR IGG+IGM SER QL IA: NEGATIVE
BILIRUB SERPL-MCNC: 0.66 MG/DL (ref 0.2–1)
BUN SERPL-MCNC: 57 MG/DL (ref 5–25)
CALCIUM SERPL-MCNC: 9.3 MG/DL (ref 8.4–10.2)
CHLORIDE SERPL-SCNC: 90 MMOL/L (ref 96–108)
CK SERPL-CCNC: 66 U/L (ref 26–192)
CO2 SERPL-SCNC: 35 MMOL/L (ref 21–32)
CREAT SERPL-MCNC: 1.75 MG/DL (ref 0.6–1.3)
CRP SERPL QL: 1 MG/L
GFR SERPL CREATININE-BSD FRML MDRD: 24 ML/MIN/1.73SQ M
GLUCOSE SERPL-MCNC: 74 MG/DL (ref 65–140)
POTASSIUM SERPL-SCNC: 4.3 MMOL/L (ref 3.5–5.3)
PROT SERPL-MCNC: 7.4 G/DL (ref 6.4–8.4)
RHEUMATOID FACT SERPL-ACNC: 22 IU/ML
SODIUM SERPL-SCNC: 134 MMOL/L (ref 135–147)
TSH SERPL DL<=0.05 MIU/L-ACNC: 4.6 UIU/ML (ref 0.45–4.5)

## 2025-06-02 PROCEDURE — 86225 DNA ANTIBODY NATIVE: CPT

## 2025-06-02 PROCEDURE — 86140 C-REACTIVE PROTEIN: CPT

## 2025-06-02 PROCEDURE — 82550 ASSAY OF CK (CPK): CPT

## 2025-06-02 PROCEDURE — 72100 X-RAY EXAM L-S SPINE 2/3 VWS: CPT

## 2025-06-02 PROCEDURE — 99350 HOME/RES VST EST HIGH MDM 60: CPT | Performed by: INTERNAL MEDICINE

## 2025-06-02 PROCEDURE — 72072 X-RAY EXAM THORAC SPINE 3VWS: CPT

## 2025-06-02 PROCEDURE — 86200 CCP ANTIBODY: CPT

## 2025-06-02 PROCEDURE — 86431 RHEUMATOID FACTOR QUANT: CPT

## 2025-06-02 PROCEDURE — 86618 LYME DISEASE ANTIBODY: CPT

## 2025-06-02 PROCEDURE — 86038 ANTINUCLEAR ANTIBODIES: CPT

## 2025-06-02 PROCEDURE — 73521 X-RAY EXAM HIPS BI 2 VIEWS: CPT

## 2025-06-02 PROCEDURE — 80053 COMPREHEN METABOLIC PANEL: CPT

## 2025-06-02 PROCEDURE — 84443 ASSAY THYROID STIM HORMONE: CPT

## 2025-06-02 PROCEDURE — 73564 X-RAY EXAM KNEE 4 OR MORE: CPT

## 2025-06-02 PROCEDURE — 73110 X-RAY EXAM OF WRIST: CPT

## 2025-06-02 NOTE — PROGRESS NOTES
Assessment/Plan:  #1 thoracic and lumbar back pain-patient has known osteoporosis and always watching for potential new compression fractures.  Suspect some component of degenerative arthritis.  No definite tenderness over the thoracic vertebrae or lumbar spine vertebrae also concerned about potential statin associated side effects.  Will check x-ray of thoracic spine, lumbar spine  2.  Other arthralgias/myalgias-bilateral wrist pain, myalgias, muscle cramping-she has evidence of Heberden's nodes on exam but no active synovitis.  Has history of bilateral TKR will temporarily hold her atorvastatin-we will check x-ray of wrists, both hips, both knees sed rate CRP rheumatoid factor anti-CCP, HEATHER.  3.  Chronic pulmonary disease-combination of asthma-bronchiectasis-most recent CT of the chest does show her known bronchiectasis but also shows new-2 mm soft nodule left lower lobe not seen on prior CT-she has multiple additional 3 mm nodules stable since 2014 as well as her known chronic bilateral bronchiectasis-non-smoker.  Has known history of pulmonary hypertension.  Prior PFT showed obstructive disease with an FEV1 of 0.92 and no response to bronchodilator.  She has had associated Pseudomonas in the past with her bronchiectasis and required treatment with Levaquin which was also prescribed prior to her recent documented COVID.  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  Most recently pulmonary increased her dose of Advair.  4.  Bronchiectasis-CT as above.  Uses nebulizer with albuterol and saline 3 times daily-on flutter valve 3 times daily.  Continues with vest therapy to mobilize secretions-he is using this 1-2 times daily-pulmonary prefers to be used 3 times daily   #5 CKD-stage IIIb-felt to be age-related nephron loss close possible component of cardiorenal syndrome.  Knows to avoid nonsteroidals.  Has had fluctuating creatinine based on degree of diuresis with her heart failure.  Most recent labs done on  3/25/2025 show BUN of 59 and creatinine of 2.16-for repeat  6.  Chronic diastolic heart failure-most recent echo done over the last year shows EF of 60%, wall thickness mildly increased, unable to assess diastolic function due to atrial fibrillation, moderate dilatation of right and left atrium, moderate tricuspid regurg, RVSP elevated at 40.  Recently switched her regimen of twice daily Bumex in place of prior torsemide-on 4 mg in the a.m. and 2 mg in the p.m. as patient has difficulty cutting tablets to give prior recommended dose of 3 mg twice daily-if patient's weight increases above 120 she is to increase her Bumex to 4 mg twice daily until her weight drops below 120.  Also now on Jardiance 10 mg daily  7.  Left plantar foot ulcer-has remote history of MRSA the same foot.  Has longstanding abnormality of the left fourth toe present since childhood which podiatry feels represents predisposing equinus deformity.  Arterial Doppler showed mild bilateral disease with right FREYA 1.23-diffuse disease without focal stenosis and great toe pressure within healing range.  Left side showed diffuse disease without focal stenosis and FREYA 1.36.  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  MRI and white cell study suggested potential osteomyelitis of second metatarsal head rule out involvement of proximal second phalanx.  Patient underwent debridement and bone biopsy and bone biopsy was negative for OM.  Cultures showed few colonies of staph coag negative as well as staph epidermis as well as derma bacteria hominis.  Was treated with linezolid for 10 days.  Then recently underwent additional surgery on 11/29/2024 with left foot percutaneous osteotomy of second-3rd-4th metatarsal for treatment of the predisposing equinus deformity.  Wound has healed.  Follow-up recent x-ray read as possible second metatarsal head osteonecrosis or osteomyelitis-podiatry commented that  they will observe with serial x-rays-most recent x-ray shows only postop changes  #8 recent episode of right arm numbnessand episode of left eye visual symptoms Was admitted to the hospital for CTA of head and neck showing some degree of atherosclerosis without high-grade stenosis of cervical or intracranial circulation.  MRI of the brain shows acute infarcts in the right occipital lobe and right cerebellum.  There was no mass effect or hemorrhagic transformation.  Echocardiogram shows EF of 60%, wall thickness moderately increased, unable to assess diastolic function due to atrial relation, moderate dilatation of right left atrium, moderate tricuspid regurg, right ventricular systolic pressure 40 elevated.  There was concern about malignancy induced hypercoagulability based of her presentation a CAT scan of chest abdomen pelvis was done as dictated below.  This does have some abnormal findings but this felt more likely that her strokelike symptoms were predisposed by being off her oral anticoagulant for her podiatric surgery -had a normal INR 1 her warfarin for foot surgery within 2 to 3 weeks of onset of her symptoms.  We reviewed that her MRI shows disease of both the cerebellum as well as the occipital lobe suggestive of potential cardioembolic disease.  Neurology placed the patient on a statin because of her recent stroke of mild atherosclerosis.  For additional surgery in the future patient needs bridging with Lovenox-as noted now with concern about statin associated side effects and atorvastatin being held  #9 abnormal imaging-CT chest abdomen pelvis shows no definite findings of malignancy but does show multiple pancreatic cystic lesions some of which have enlarged since June 2017-dominant cystic complex lesion in the pancreatic head measures 3.6 cm-previously measured 2.8 cm.  Because of the growth of greater than 20% of the longest dimension she should be considered for follow-up with GI and potential  endoscopic ultrasound-patient states because of her age of 89 that she does not want additional investigation and defers on seeing GI-she realizes she could have potential underlying pancreatic carcinoma but defers.  #10 abnormal imaging-has a new 2 mm soft nodule left lower lobe not seen on prior CT-also has multiple additional 3 mm as well as pulmonary nodule stable since 2014 as well as her known chronic bilateral bronchiectasis-she is not a smoker and not felt to be high risk-will need follow-up CT of the chest 1 year after her last which would be in August 2025  #11recent known thoracic back pain-longstanding-always watching for compression fracture but known osteoporosis but did not appear to be the case.  Felt to be positional musculoskeletal-was previously using acetaminophen and over-the-counter lidocaine patch-as noted above becoming more of an issue and imaging will be obtained  #12atrial zoxobqkuowvu-hnlitntby-wykhhjrl control on current dose of metoprolol.  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  Prior laboratory testing showed normal immunoglobulin levels, SPEP showed biclonal spike with immunofixation identified both these is IgG kappa-spike number 1.26 g/dL M spike number 2.08 g/dL-she does not have any crab features other than her known CKD.  Recent labs show in September 2024 on free light chain ratio Elevated at 81.4, lambda elevated at 37.7 with a ratio of 2.16 although decreased ratio from 1 year prior when it was 3.5-SPEP with immunofixation shows biclonal IgG kappa, UPEP negativenegative-immunoglobulin levels show all normal other than elevated IgE at 1345  14 Bilateral sensorineural hearing loss-monitored by the ENT group-had repeat evaluation June 2024 with audiogram showing moderate to profound high-frequency bilateral sensorineural hearing loss-states  that she should return in 1 year which would be June 2025  15.  Osteoporosis-DEXA scan 2023 shows L-spine -2.7 and hip of -2.2.  Remains on vitamin D-has known calcium intake of 1200 mg/day or more.  Subsequent DEXA scan due in March 2025.  Has had 4 injections of Prolia with her most recent given 6/1/25  16  Recurrent labial cyst-recurrent labial abscess-was treated by GYN with I&D and treated course of Bactrim-culture was never obtained.  There was concern about potential MRSA she had this in the past.  Not an issue at present  17.  Hypothyroidism-stable on current dose -TSH done in September this year 4.97 which is very acceptable for her age-continuing current dose of levothyroxine     All other problems as per note of August 30, 2022     Medical regimen-atorvastatin 40 mg daily on hold-Bumex 4 mg in the a.m. and 2 mg in the evening-if weight goes above 120 increase to 4 mg twice daily until weight below 120 spironolactone 50 mg daily c, prior use of Flonase 2 squirts each nostril once daily, prior use of loratadine 10 mg daily, Advair 50/ 250--1 puff twice daily Prolia every 6 months with last injection on 6/1/2025, prior use of Singulair 10 mg daily, levothyroxine 50 mcg daily but 75 mcg 4 days/week, calcium with vitamin D, albuterol inhaler as needed, metoprolol succinate 100 mg daily multivitamin, vitamin D3/2000 units a day, fish oil 1200 mg a day, prior use of over-the-counter lidocaine patch-4%-on for 12 hours and off for 12 hours, warfarin with dose adjustments based on INR        No problem-specific Assessment & Plan notes found for this encounter.             Subjective:      Patient ID: Rebeca Baengas is a 89 y.o. female.    This patient was seen as a home visit on 6/1/2025.  We reviewed multiple issues.  She has known heart failure with preserved ejection fraction.  She has been followed closely by cardiology and has recently transition to both Bumex as well as addition of Jardiance 10 mg.  She is  "currently on a regimen of Bumex 4 mg in the a.m. and 2 mg in the p.m. (she is unable to take 3 mg twice daily because of difficulty cutting tablets)-if her weight goes above 120 she has been instructed to increase her Bumex to 4 mg twice daily until the weight of 120 is reobtained.  Most recent echo reviewed in detail      She stated today that she \"hurts all over\" she denies severe shoulder pain but describes significant pain in both groins.  She also describes bilateral wrist pain.  She has obvious Heberden's nodes on exam but denies significant pain in the MCPs PIPs or DIPs.  She notes chronic midline thoracic back pain and chronic low back pain without lumbar radiculopathy.  She is also worried about \"her muscles\".  She says she can have severe cramping of her hands as well as diffuse cramping elsewhere.  She feels as though she may have pain in the muscles.  We reviewed about the potential for these being statin associated side effects with relatively recent introduction of medium dose statin to her regimen after prior CVA which was felt to be cardioembolic.    She has known significant bronchiectasis.  She continues to be monitored closely by pulmonary.  She remains on a regimen of nebulizer with saline and albuterol 3 times daily-she also uses her pulmonary vest to aid with secretion.  She also uses a flutter valve.  She demonstrated all these to me today.  She was last seen by the pulmonary group over the last 3 months.     He is continue to recover from her foot surgery and feels overall that is relatively stable.  She has orthotics for her feet.  Most recent x-ray of her foot was felt to be stable.  Narrative & Impression        LEFT FOOT     INDICATION:   Other specified postprocedural states.      COMPARISON: [facility] 2025     VIEWS:  XR FOOT 3+ VW LEFT   Images: 3     FINDINGS:     As seen on prior exam there is what appear to be osteotomies of the second and third metatarsals with resection of the " fourth toe and metatarsal.     No significant degenerative changes. Calcaneal spurring is noted.     No lytic or blastic osseous lesion.     Soft tissues are unremarkable.     IMPRESSION:        Postoperative changes as described.  Most recent echo reviewed in detail     Show images for Echo follow up/limited w/ contrast if indicated  Study Details    This transthoracic echocardiogram was performed at bedside. This was a routine, inpatient study. Study quality was adequate. A limited 2D, color flow Doppler, spectral Doppler, 2D, color flow Doppler and spectral Doppler transthoracic echocardiogram was performed.  The apical, parasternal and subcostal views were obtained.    History    Permanent Afib, ChronicDiastolic CHF,HLD, HTN, CKD    Interpretation Summary  Show Result Comparison   ·  Left Ventricle: Left ventricular cavity size is normal. Wall thickness is moderately increased. There is moderate concentric hypertrophy. The left ventricular ejection fraction is 60%. Systolic function is normal. Wall motion is normal. Unable to assess diastolic function due to atrial fibrillation.  ·  Right Ventricle: Right ventricular cavity size is normal. Systolic function is normal.  ·  Left Atrium: The atrium is moderately dilated.  ·  Right Atrium: The atrium is moderately dilated.  ·  Mitral Valve: There is mild annular calcification. There is mild regurgitation.  ·  Tricuspid Valve: There is moderate regurgitation. The right ventricular systolic pressure is mildly elevated. The estimated right ventricular systolic pressure is 41.00 mmHg.  ·  Pulmonic Valve: There is mild regurgitation.     Findings    Left Ventricle Left ventricular cavity size is normal. Wall thickness is moderately increased. There is moderate concentric hypertrophy. The left ventricular ejection fraction is 60%. Systolic function is normal. Wall motion is normal. Unable to assess diastolic function due to atrial fibrillation.  Right Ventricle Right  ventricular cavity size is normal. Systolic function is normal. Wall thickness is normal.  Left Atrium The atrium is moderately dilated.  Right Atrium The atrium is moderately dilated.  Atrial Septum No patent foramen ovale detected, confirmed at rest using color doppler.  Aortic Valve The aortic valve is trileaflet. The leaflets are mildly thickened. The leaflets are mildly calcified. The leaflets exhibit normal mobility. There is no evidence of regurgitation. The aortic valve has no significant stenosis.  Mitral Valve There is mild annular calcification.  There is mild regurgitation. There is no evidence of stenosis.  Tricuspid Valve Tricuspid valve structure is normal. There is moderate regurgitation. There is no evidence of stenosis. The right ventricular systolic pressure is mildly elevated. The estimated right ventricular systolic pressure is 41.00 mmHg.  Pulmonic Valve Pulmonic valve structure is normal. There is mild regurgitation. There is no evidence of stenosis.  Ascending Aorta The aortic root is normal in size.  IVC/SVC The right atrial pressure is estimated at 8.0 mmHg. The inferior vena cava is dilated. Respirophasic changes were normal.  Pericardium There is no pericardial effusion. The pericardium is normal in appearance.    Left Ventricle Measurements    Function/Volumes  A4C EF   57 %      Left ventricular stroke volume (2D)   30 mL      Dimensions  LVIDd   3.4 cm      LVIDS   2.2 cm      IVSd   1.2 cm      LVPWd   1.3 cm      FS   35      Diastolic Filling  E/A ratio   3.64      E wave deceleration time   143 ms      MV Peak E Hank   91 cm/s      MV Peak A Hank   0.25 m/s           Right Ventricle Measurements    Dimensions  RVID d   3 cm      Tricuspid annular plane systolic excursion   1.2 cm           Left Atrium Measurements    Dimensions  LA size   4.2 cm           Right Atrium Measurements    Dimensions  RA 2D Volume   85 mL      RAA A4C   26.8 cm2           Mitral Valve  Measurements    Stenosis  MV stenosis pressure 1/2 time   41 ms      MV valve area p 1/2 method   5.37           Tricuspid Valve Measurements    RVSP Parameters  TR Peak Hank   2.9 m/s      Est. RA pres   8 mmHg      Triscuspid Valve Regurgitation Peak Gradient   33 mmHg      Right Ventricular Peak Systolic Pressure   41 mmHg           Aorta Measurements    Aortic Dimensions  Ao root   2.6 cm           IVC/SVC Measurements    IVC/SVC  Est. RA pres   8 mmHg          Exam Details    Performed Procedure Technologist Supporting Staff Performing Physician  Echo follow up/limited w/ color and doppler Vicki Sadler          Appointment Date/Status Modality Department   8/28/2024     Completed BE ECHO PORT 4 BE CAR NON INV        Begin Exam End Exam  End Exam Questionnaires  8/28/2024  7:45 AM 8/28/2024  8:15 AM  PATIENT EDUCATION         Signed    Electronically signed by Alba Gonzalez DO on 8/28/24 at 0849 EDT      Echo follow up/limited w/ contrast if indicated: Patient Communication     Add Comments   Add Notifications     Printable Result Report      Result Report    Results for orders placed or performed in visit on 05/14/25  -Protime-INR:        Result                      Value             Ref Range           INR                         2.00 (A)          0.85 - 1.19      *Note: Due to a large number of results and/or encounters for the requested time period, some results have not been displayed. A complete set of results can be found in Results Review.  She continues on her anticoagulant with management via cardiology.     She has known osteoporosis.  She was due for follow-up Prolia injection and this was administered today.  She wanted to know if there is any chance that her arthralgias-myalgias are related to her Prolia and I told her this was unlikely but we will monitor closely.  She denies symptoms over the temporal arteries.  She denies jaw claudication.  Also has history of bilateral total knee replacement  and having ongoing knee arthralgias     She states her appetite is stable.  She denies fever or night sweats.     Medical regimen was reviewed in detail.I have spent a total time of 80 minutes in caring for this patient on the day of the visit/encounter including Diagnostic results, Prognosis, Risks and benefits of tx options, Instructions for management, Patient and family education, Importance of tx compliance, Risk factor reductions, Impressions, Counseling / Coordination of care, Documenting in the medical record, Reviewing/placing orders in the medical record (including tests, medications, and/or procedures), Obtaining or reviewing history  , and Communicating with other healthcare professionals                 The following portions of the patient's history were reviewed and updated as appropriate: allergies, current medications, past family history, past medical history, past social history, past surgical history, and problem list.    Review of Systems   Constitutional:  Positive for fatigue.   HENT: Negative.     Respiratory:  Positive for cough and shortness of breath.    Cardiovascular: Negative.    Gastrointestinal: Negative.    Endocrine: Negative.    Genitourinary: Negative.    Musculoskeletal:  Positive for arthralgias, back pain and myalgias.   Skin: Negative.    Neurological: Negative.    Hematological: Negative.    Psychiatric/Behavioral: Negative.           Objective:      LMP  (LMP Unknown)          Physical Exam  Vitals reviewed.   Constitutional:       General: She is not in acute distress.     Appearance: She is ill-appearing. She is not toxic-appearing or diaphoretic.   HENT:      Head: Normocephalic and atraumatic.      Right Ear: External ear normal.      Left Ear: External ear normal.      Nose: Nose normal. No congestion or rhinorrhea.      Mouth/Throat:      Mouth: Mucous membranes are moist.      Pharynx: Oropharynx is clear. No oropharyngeal exudate or posterior oropharyngeal erythema.      Eyes:      General: No scleral icterus.        Right eye: No discharge.         Left eye: No discharge.      Extraocular Movements: Extraocular movements intact.      Conjunctiva/sclera: Conjunctivae normal.     Neck:      Vascular: No carotid bruit.     Cardiovascular:      Rate and Rhythm: Normal rate. Rhythm irregular.      Pulses: Normal pulses.      Heart sounds: Normal heart sounds. No murmur heard.     No friction rub. No gallop.      Comments: Palpable PMI with borderline displacement-irregular rhythm  Pulmonary:      Effort: Pulmonary effort is normal. No respiratory distress.      Breath sounds: No stridor. Rhonchi present. No wheezing or rales.      Comments: Scattered basal rhonchi-patient coughing throughout the exam  Chest:      Chest wall: No tenderness.   Abdominal:      General: Abdomen is flat. Bowel sounds are normal. There is no distension.      Palpations: Abdomen is soft. There is no mass.      Tenderness: There is no abdominal tenderness. There is no right CVA tenderness, left CVA tenderness, guarding or rebound.      Hernia: No hernia is present.     Musculoskeletal:         General: No swelling, tenderness, deformity or signs of injury. Normal range of motion.      Cervical back: Normal range of motion and neck supple. No rigidity. No muscular tenderness.      Right lower leg: No edema.      Left lower leg: No edema.      Comments: Heberden's nodes.  No definite active synovitis.  No tenderness over the areas of mid thoracic and lumbar pain-changes related to prior foot surgery   Lymphadenopathy:      Cervical: No cervical adenopathy.     Skin:     General: Skin is warm and dry.      Coloration: Skin is not jaundiced or pale.      Findings: No bruising, erythema, lesion or rash.     Neurological:      General: No focal deficit present.      Mental Status: She is alert and oriented to person, place, and time. Mental status is at baseline.      Cranial Nerves: No cranial nerve deficit.       Sensory: No sensory deficit.      Motor: No weakness.      Coordination: Coordination normal.      Gait: Gait normal.      Deep Tendon Reflexes: Reflexes normal.     Psychiatric:         Mood and Affect: Mood normal.         Behavior: Behavior normal.         Thought Content: Thought content normal.         Judgment: Judgment normal.

## 2025-06-03 LAB
CCP AB SER IA-ACNC: 1.2 (ref ?–10)
DSDNA IGG SERPL IA-ACNC: <0.9 IU/ML (ref ?–15)
NUCLEAR IGG SER IA-RTO: <0.09 RATIO (ref ?–1)

## 2025-06-05 ENCOUNTER — DOCUMENTATION (OUTPATIENT)
Dept: INTERNAL MEDICINE CLINIC | Facility: CLINIC | Age: OVER 89
End: 2025-06-05

## 2025-06-05 NOTE — PROGRESS NOTES
Results of recent testing-called and spoke with the patient on 6/5/2025-TSH 4.6 and patient will continue current dose of levothyroxine with goal TSH 7-8 in her age group, CMP shows BUN of 57 with creatinine of 1.75-improved from 59/2.16 with prior testing.  Lyme titer negative.  CRP normal.  HEATHER negative.  Anti-double-stranded DNA negative.  Rheumatoid factor elevated at 22 but this can be an age-related phenomena and it is not felt clinically that she has RA.  Left knee shows satisfactory postsurgery TKA, right knee shows satisfactory postsurgery right TKA.  Lumbar spine shows advanced multilevel DJD.  Left wrist shows DJD of the first CMC and distal radial ulnar joint, right wrist also shows advanced DJD of first CMCu and distal radial ulnar joint.  Hips show mild bilateral DJD.  T-spine reading not yet available.  Patient felt Tylenol minimal benefit in the past but was only using it as needed.  Will try extra strength 500 mg x 2 for total dose of 1000 mg every 8 hours.  Could be considered for physical therapy-avoiding topical diclofenac with her CKD and gfr less than 30-at this point patient not noticing any difference off the statin.  Will wait another 2 weeks.  I am then to let cardiology know status of her statin use and potential statin side effects  WTS  Assessment/Plan:    No problem-specific Assessment & Plan notes found for this encounter.             Subjective:      Patient ID: Rebeca Banegas is a 89 y.o. female.    HPI    The following portions of the patient's history were reviewed and updated as appropriate: allergies, current medications, past family history, past medical history, past social history, past surgical history, and problem list.    Review of Systems      Objective:      LMP  (LMP Unknown)          Physical Exam

## 2025-06-10 DIAGNOSIS — I48.21 PERMANENT ATRIAL FIBRILLATION (HCC): Chronic | ICD-10-CM

## 2025-06-10 RX ORDER — METOPROLOL SUCCINATE 100 MG/1
100 TABLET, EXTENDED RELEASE ORAL DAILY
Qty: 90 TABLET | Refills: 1 | Status: SHIPPED | OUTPATIENT
Start: 2025-06-10 | End: 2025-12-07

## 2025-06-10 NOTE — TELEPHONE ENCOUNTER
Not a duplicate- change in pharmacy           Reason for call:   [ ] Refill   [ ] Prior Auth  [ ] Other:     Office:   [ ] Specialty/Provider - Authorizing Provider:  Jonas Vyas MD, cardiology [place]      Medication:   metoprolol succinate (TOPROL-XL) 100 mg 24 hr tablet        Dose/Frequency:  Take 1 tablet (100 mg total) by mouth daily    Quantity: 90 day     Pharmacy: [facility] pharmacy       Mail Away Pharmacy   Does the patient have enough for 10 days?   [ ] Yes   [ ] No - Send as HP to POD

## 2025-06-17 ENCOUNTER — RA CDI HCC (OUTPATIENT)
Dept: OTHER | Facility: HOSPITAL | Age: OVER 89
End: 2025-06-17

## 2025-06-17 DIAGNOSIS — Z86.73 HISTORY OF CVA (CEREBROVASCULAR ACCIDENT): Primary | ICD-10-CM

## 2025-06-18 ENCOUNTER — OFFICE VISIT (OUTPATIENT)
Dept: PULMONOLOGY | Facility: CLINIC | Age: OVER 89
End: 2025-06-18
Payer: COMMERCIAL

## 2025-06-18 VITALS
OXYGEN SATURATION: 98 % | SYSTOLIC BLOOD PRESSURE: 124 MMHG | HEART RATE: 90 BPM | DIASTOLIC BLOOD PRESSURE: 64 MMHG | WEIGHT: 126.2 LBS | TEMPERATURE: 96.5 F | BODY MASS INDEX: 21.54 KG/M2 | HEIGHT: 64 IN

## 2025-06-18 DIAGNOSIS — J47.9 BRONCHIECTASIS WITHOUT COMPLICATION (HCC): Primary | ICD-10-CM

## 2025-06-18 DIAGNOSIS — J45.20 MILD INTERMITTENT ASTHMA WITHOUT COMPLICATION: Chronic | ICD-10-CM

## 2025-06-18 DIAGNOSIS — R05.3 CHRONIC COUGH: ICD-10-CM

## 2025-06-18 PROCEDURE — G2211 COMPLEX E/M VISIT ADD ON: HCPCS | Performed by: INTERNAL MEDICINE

## 2025-06-18 PROCEDURE — 99214 OFFICE O/P EST MOD 30 MIN: CPT | Performed by: INTERNAL MEDICINE

## 2025-06-18 RX ORDER — GUAIFENESIN 600 MG/1
1200 TABLET, EXTENDED RELEASE ORAL EVERY 12 HOURS SCHEDULED
Qty: 120 TABLET | Refills: 0 | Status: SHIPPED | OUTPATIENT
Start: 2025-06-18 | End: 2025-06-18

## 2025-06-18 NOTE — PATIENT INSTRUCTIONS
It was a pleasure taking care of you today.     Please take your nebulizer (albuterol and 3% saline) three times daily.   Please use your Vest at least 2-3 times daily.   Continue flutter valve.   Start Mucinex, may get over the counter.   Continue Advair twice daily.     We will see you in follow up in 1 week.

## 2025-06-18 NOTE — ASSESSMENT & PLAN NOTE
Patient with worsened cough and mucus production as noted above. She has diffuse rhonchi and expiratory wheezing on exam. I believe that her symptoms are largely related to history of bronchiectasis, however, asthma exacerbation is in the differential.   - Increased mucociliary clearance as above   - If continued or worsened symptoms in 1 week, will obtain CXR and sputum culture   - Will consider systemic steroids at follow up appointment pending above interventions   - Continue Advair 250 mcg for now

## 2025-06-18 NOTE — ASSESSMENT & PLAN NOTE
Patient with bronchiectasis noted on CT chest and Pseudomonas growth on previous sputum culture (3/8/2024). Last exacerbation was March 2024, now presenting with worsening SOB and increased cough. No systemic symptoms or purulent sputum. She has been taking nebulized treatments twice daily and using her vest therapy only once daily.   - We discussed the importance of continued aggressive mucociliary clearance   - Recommend increasing nebulized albuterol and 3% saline to three time daily   - Continue use of flutter valve   - Increase vest therapy from once daily to 2-3 times daily  - Start Mucinex   - I will plan to see Ms. Banegas in follow up in one week   - If symptoms have not improved with aggressive mucociliary clearance, I will order CXR and sputum culture

## 2025-06-18 NOTE — PROGRESS NOTES
Follow-up  Visit - Pulmonary Medicine   Name: Rebeca Banegas      : 1932      MRN: [id number]  Encounter Provider: Gonzalo Waters DO  Encounter Date: 2025   Encounter department[facility]  :  Assessment & Plan  Bronchiectasis without complication (HCC)  Patient with bronchiectasis noted on CT chest and Pseudomonas growth on previous sputum culture (3/8/2024). Last exacerbation was 2024, now presenting with worsening SOB and increased cough. No systemic symptoms or purulent sputum. She has been taking nebulized treatments twice daily and using her vest therapy only once daily.   - We discussed the importance of continued aggressive mucociliary clearance   - Recommend increasing nebulized albuterol and 3% saline to three time daily   - Continue use of flutter valve   - Increase vest therapy from once daily to 2-3 times daily  - Start Mucinex   - I will plan to see Ms. Banegas in follow up in one week   - If symptoms have not improved with aggressive mucociliary clearance, I will order CXR and sputum culture        Mild intermittent asthma without complication  Patient with worsened cough and mucus production as noted above. She has diffuse rhonchi and expiratory wheezing on exam. I believe that her symptoms are largely related to history of bronchiectasis, however, asthma exacerbation is in the differential.   - Increased mucociliary clearance as above   - If continued or worsened symptoms in 1 week, will obtain CXR and sputum culture   - Will consider systemic steroids at follow up appointment pending above interventions   - Continue Advair 250 mcg for now        Chronic cough  Likely related to bronchiectasis and underlying asthma   - as above            Return in about 8 days (around 2025).    History of Present Illness {?Quick Links Encounters * My Last Note * Last Note in Specialty * Snapshot * Since Last Visit * History :00779}  Rebeca Banegas is  "a 89 y.o. female HFpEF, Afib on coumadin, asthma, bronchiectasis who presents for follow up appointment. Last seen Dr. Wasserman on 3/25/2025.     Since her last appointment, patient reports worsening shortness of breath with exertion, cough, and fatigue. Her mucus is mostly clear, sometimes tan. She denied hemoptysis. She denied recent weight loss/gain, fevers, chills, or peripheral edema. She is still able to complete ADLs but gets short of breath walking to the dining escobar and to go get her mail.     Home mucociliary clearance:   Albuterol neb twice daily   3% saline twice daily   Flutter valve after nebulizer   VEST therapy at least once daily   Advair 2 puffs bid     Sputum cultures positive for pseudomonas on 3/8/2024. Repeat cultures growing mixed respiratory aron post treatment with Levaquin.       Review of systems:  12 point review of systems was completed and was otherwise negative except as listed in HPI.      Objective {?Quick Links Trend Vitals * Enter New Vitals * Results Review * Timeline (Adult) * Labs * Imaging * Cardiology * Procedures * Lung Cancer Screening * Surgical eConsent :81073}  /64   Pulse 90   Temp (!) 96.5 °F (35.8 °C) (Tympanic)   Ht 5' 4\" (1.626 m)   Wt 57.2 kg (126 lb 3.2 oz)   LMP  (LMP Unknown)   SpO2 98%   BMI 21.66 kg/m²     Physical Exam:  General: No acute distress  HENT: Normocephalic, atraumatic. EOMI, Moist mucous membranes.  Neck: Supple  Lungs: Diffuse rhonchi and expiratory wheezing.  On room air  Heart: Regular rate and rhythm, S1-S2 present, no murmurs rubs or gallops  Extremities: Warm and well perfused, no cyanosis, clubbing, or edema  Skin: Warm, dry, no rashes or lesions  Neurologic: No focal neurologic deficits      Diagnostic Data:  Labs: I personally reviewed the most recent laboratory data pertinent to today's visit.  Sputum cultures reviewed    Radiology results:  CT chest 8/28/2024  Chronic bilateral bronchiectasis since 2014. Redemonstrated mucoid " debris within the ectatic bronchi, which has decreased in the right middle lobe and increased in the left lower lobe.   A 2 mm solid nodule in the left lower lobe is not seen on prior. Multiple additional 3 mm and smaller pulmonary nodules are grossly stable since 2014.  Based on current Fleischner Society 2017 Guidelines on incidental pulmonary nodule, no routine follow-up is needed if the patient is low risk. If the patient is high risk, optional follow-up chest CT at 12 months can be considered.    PFT/spirometry results:  Left Ventricle Left ventricular cavity size is normal. Wall thickness is moderately increased. There is moderate concentric hypertrophy. The left ventricular ejection fraction is 60%. Systolic function is normal. Wall motion is normal. Unable to assess diastolic function due to atrial fibrillation.   Right Ventricle Right ventricular cavity size is normal. Systolic function is normal. Wall thickness is normal.   Left Atrium The atrium is moderately dilated.   Right Atrium The atrium is moderately dilated.   Atrial Septum No patent foramen ovale detected, confirmed at rest using color doppler.   Aortic Valve The aortic valve is trileaflet. The leaflets are mildly thickened. The leaflets are mildly calcified. The leaflets exhibit normal mobility. There is no evidence of regurgitation. The aortic valve has no significant stenosis.   Mitral Valve There is mild annular calcification.  There is mild regurgitation. There is no evidence of stenosis.   Tricuspid Valve Tricuspid valve structure is normal. There is moderate regurgitation. There is no evidence of stenosis. The right ventricular systolic pressure is mildly elevated. The estimated right ventricular systolic pressure is 41.00 mmHg.   Pulmonic Valve Pulmonic valve structure is normal. There is mild regurgitation. There is no evidence of stenosis.   Ascending Aorta The aortic root is normal in size.   IVC/SVC The right atrial pressure is  estimated at 8.0 mmHg. The inferior vena cava is dilated. Respirophasic changes were normal.   Pericardium There is no pericardial effusion. The pericardium is normal in appearance.       Gonzalo Waters,   Pulmonary & Critical Care, PGY V

## 2025-06-24 ENCOUNTER — HOSPITAL ENCOUNTER (INPATIENT)
Facility: HOSPITAL | Age: OVER 89
LOS: 11 days | Discharge: NURSING, REHAB OR LONG-TERM CARE | DRG: 252 | End: 2025-07-05
Attending: EMERGENCY MEDICINE | Admitting: SURGERY
Payer: COMMERCIAL

## 2025-06-24 ENCOUNTER — APPOINTMENT (INPATIENT)
Dept: RADIOLOGY | Facility: HOSPITAL | Age: OVER 89
DRG: 252 | End: 2025-06-24
Payer: COMMERCIAL

## 2025-06-24 ENCOUNTER — ANESTHESIA (EMERGENCY)
Dept: PERIOP | Facility: HOSPITAL | Age: OVER 89
End: 2025-06-24
Payer: COMMERCIAL

## 2025-06-24 ENCOUNTER — ANESTHESIA EVENT (INPATIENT)
Dept: PERIOP | Facility: HOSPITAL | Age: OVER 89
End: 2025-06-24
Payer: COMMERCIAL

## 2025-06-24 ENCOUNTER — APPOINTMENT (EMERGENCY)
Dept: RADIOLOGY | Facility: HOSPITAL | Age: OVER 89
DRG: 252 | End: 2025-06-24
Payer: COMMERCIAL

## 2025-06-24 ENCOUNTER — ANESTHESIA (INPATIENT)
Dept: PERIOP | Facility: HOSPITAL | Age: OVER 89
End: 2025-06-24
Payer: COMMERCIAL

## 2025-06-24 ENCOUNTER — ANESTHESIA EVENT (EMERGENCY)
Dept: PERIOP | Facility: HOSPITAL | Age: OVER 89
End: 2025-06-24
Payer: COMMERCIAL

## 2025-06-24 ENCOUNTER — TELEPHONE (OUTPATIENT)
Dept: OTHER | Facility: OTHER | Age: OVER 89
End: 2025-06-24

## 2025-06-24 DIAGNOSIS — N17.9 AKI (ACUTE KIDNEY INJURY) (HCC): ICD-10-CM

## 2025-06-24 DIAGNOSIS — M79.606 ISCHEMIC REST PAIN OF LOWER EXTREMITY: Primary | ICD-10-CM

## 2025-06-24 DIAGNOSIS — Z79.01 CURRENT USE OF LONG TERM ANTICOAGULATION: ICD-10-CM

## 2025-06-24 DIAGNOSIS — J45.909 UNCOMPLICATED ASTHMA, UNSPECIFIED ASTHMA SEVERITY, UNSPECIFIED WHETHER PERSISTENT: ICD-10-CM

## 2025-06-24 DIAGNOSIS — I63.9 CEREBROVASCULAR ACCIDENT (CVA), UNSPECIFIED MECHANISM (HCC): ICD-10-CM

## 2025-06-24 DIAGNOSIS — J47.1 BRONCHIECTASIS WITH ACUTE EXACERBATION (HCC): ICD-10-CM

## 2025-06-24 DIAGNOSIS — I99.8 ACUTE LOWER LIMB ISCHEMIA: ICD-10-CM

## 2025-06-24 DIAGNOSIS — I48.21 PERMANENT ATRIAL FIBRILLATION (HCC): ICD-10-CM

## 2025-06-24 DIAGNOSIS — I50.33 ACUTE ON CHRONIC HEART FAILURE WITH PRESERVED EJECTION FRACTION (HCC): ICD-10-CM

## 2025-06-24 DIAGNOSIS — I45.2 BIFASCICULAR BUNDLE BRANCH BLOCK: ICD-10-CM

## 2025-06-24 DIAGNOSIS — I99.8 ISCHEMIC REST PAIN OF LOWER EXTREMITY: Primary | ICD-10-CM

## 2025-06-24 DIAGNOSIS — N18.32 STAGE 3B CHRONIC KIDNEY DISEASE (HCC): ICD-10-CM

## 2025-06-24 DIAGNOSIS — I50.32 CHRONIC HEART FAILURE WITH PRESERVED EJECTION FRACTION (HCC): ICD-10-CM

## 2025-06-24 LAB
ABO GROUP BLD: NORMAL
ALBUMIN SERPL BCG-MCNC: 3.8 G/DL (ref 3.5–5)
ALP SERPL-CCNC: 52 U/L (ref 34–104)
ALT SERPL W P-5'-P-CCNC: 18 U/L (ref 7–52)
ANION GAP SERPL CALCULATED.3IONS-SCNC: 12 MMOL/L (ref 4–13)
ANISOCYTOSIS BLD QL SMEAR: PRESENT
ANISOCYTOSIS BLD QL SMEAR: PRESENT
APTT PPP: 28 SECONDS (ref 23–34)
APTT PPP: >210 SECONDS (ref 23–34)
AST SERPL W P-5'-P-CCNC: 24 U/L (ref 13–39)
ATRIAL RATE: 119 BPM
BASE EXCESS BLDA CALC-SCNC: -3 MMOL/L (ref -2–3)
BASOPHILS # BLD MANUAL: 0 THOUSAND/UL (ref 0–0.1)
BASOPHILS NFR MAR MANUAL: 0 % (ref 0–1)
BILIRUB SERPL-MCNC: 0.5 MG/DL (ref 0.2–1)
BLD GP AB SCN SERPL QL: NEGATIVE
BUN SERPL-MCNC: 63 MG/DL (ref 5–25)
BURR CELLS BLD QL SMEAR: PRESENT
BURR CELLS BLD QL SMEAR: PRESENT
CA-I BLD-SCNC: 0.95 MMOL/L (ref 1.12–1.32)
CALCIUM SERPL-MCNC: 8.6 MG/DL (ref 8.4–10.2)
CHLORIDE SERPL-SCNC: 96 MMOL/L (ref 96–108)
CO2 SERPL-SCNC: 25 MMOL/L (ref 21–32)
CREAT SERPL-MCNC: 1.87 MG/DL (ref 0.6–1.3)
DIFFERENTIAL COMMENT: ABNORMAL
DIFFERENTIAL COMMENT: ABNORMAL
EOSINOPHIL # BLD AUTO: 1.87 THOUSAND/UL (ref 0–0.61)
EOSINOPHIL # BLD MANUAL: 1.87 THOUSAND/UL (ref 0–0.4)
EOSINOPHIL NFR BLD MANUAL: 19 % (ref 0–6)
EOSINOPHIL NFR BLD MANUAL: 19 % (ref 0–6)
ERYTHROCYTE [DISTWIDTH] IN BLOOD BY AUTOMATED COUNT: 15.7 % (ref 11.6–15.1)
GFR SERPL CREATININE-BSD FRML MDRD: 23 ML/MIN/1.73SQ M
GLUCOSE SERPL-MCNC: 130 MG/DL (ref 65–140)
GLUCOSE SERPL-MCNC: 135 MG/DL (ref 65–140)
GLUCOSE SERPL-MCNC: 139 MG/DL (ref 65–140)
HCO3 BLDA-SCNC: 22.4 MMOL/L (ref 22–28)
HCT VFR BLD AUTO: 38.2 % (ref 34.8–46.1)
HCT VFR BLD CALC: 26 % (ref 34.8–46.1)
HGB BLD-MCNC: 12.5 G/DL (ref 11.5–15.4)
HGB BLDA-MCNC: 8.8 G/DL (ref 11.5–15.4)
INR PPP: 1.47 (ref 0.85–1.19)
KCT BLD-ACNC: 178 SEC (ref 89–137)
KCT BLD-ACNC: 219 SEC (ref 89–137)
KCT BLD-ACNC: 243 SEC (ref 89–137)
KCT BLD-ACNC: 268 SEC (ref 89–137)
LYMPHOCYTES # BLD AUTO: 3.35 THOUSAND/UL (ref 0.6–4.47)
LYMPHOCYTES # BLD AUTO: 3.35 THOUSAND/UL (ref 0.6–4.47)
LYMPHOCYTES # BLD AUTO: 30 %
LYMPHOCYTES # BLD AUTO: 30 % (ref 14–44)
MACROCYTES BLD QL AUTO: PRESENT
MACROCYTES BLD QL AUTO: PRESENT
MCH RBC QN AUTO: 31.7 PG (ref 26.8–34.3)
MCHC RBC AUTO-ENTMCNC: 32.7 G/DL (ref 31.4–37.4)
MCV RBC AUTO: 97 FL (ref 82–98)
MONOCYTES # BLD AUTO: 0.79 THOUSAND/UL (ref 0–1.22)
MONOCYTES # BLD AUTO: 0.79 THOUSAND/UL (ref 0–1.22)
MONOCYTES NFR BLD AUTO: 8 % (ref 4–12)
MONOCYTES NFR BLD: 8 % (ref 4–12)
NEUTROPHILS # BLD MANUAL: 3.85 THOUSAND/UL (ref 1.85–7.62)
NEUTS SEG # BLD: 3.85 THOUSAND/UL (ref 1.81–6.82)
NEUTS SEG NFR BLD AUTO: 39 %
NEUTS SEG NFR BLD AUTO: 39 % (ref 43–75)
OVALOCYTES BLD QL SMEAR: PRESENT
OVALOCYTES BLD QL SMEAR: PRESENT
PATHOLOGY REVIEW: YES
PATHOLOGY REVIEW: YES
PCO2 BLD: 24 MMOL/L (ref 21–32)
PCO2 BLD: 40.7 MM HG (ref 36–44)
PH BLD: 7.35 [PH] (ref 7.35–7.45)
PLATELET # BLD AUTO: 220 THOUSANDS/UL (ref 149–390)
PLATELET BLD QL SMEAR: ADEQUATE
PLATELET BLD QL SMEAR: ADEQUATE
PMV BLD AUTO: 9.4 FL (ref 8.9–12.7)
PO2 BLD: 198 MM HG (ref 75–129)
POIKILOCYTOSIS BLD QL SMEAR: PRESENT
POIKILOCYTOSIS BLD QL SMEAR: PRESENT
POLYCHROMASIA BLD QL SMEAR: PRESENT
POLYCHROMASIA BLD QL SMEAR: PRESENT
POTASSIUM BLD-SCNC: 4.2 MMOL/L (ref 3.5–5.3)
POTASSIUM SERPL-SCNC: 4.3 MMOL/L (ref 3.5–5.3)
PROT SERPL-MCNC: 7.4 G/DL (ref 6.4–8.4)
PROTHROMBIN TIME: 18 SECONDS (ref 12.3–15)
QRS AXIS: 266 DEGREES
QRSD INTERVAL: 130 MS
QT INTERVAL: 334 MS
QTC INTERVAL: 458 MS
RBC # BLD AUTO: 3.94 MILLION/UL (ref 3.81–5.12)
RBC MORPH BLD: PRESENT
RBC MORPH BLD: PRESENT
RH BLD: POSITIVE
SAO2 % BLD FROM PO2: 100 % (ref 60–85)
SODIUM BLD-SCNC: 136 MMOL/L (ref 136–145)
SODIUM SERPL-SCNC: 133 MMOL/L (ref 135–147)
SPECIMEN EXPIRATION DATE: NORMAL
SPECIMEN SOURCE: ABNORMAL
T WAVE AXIS: 45 DEGREES
TOTAL CELLS COUNTED SPEC: 100
TOTAL CELLS COUNTED SPEC: 100
VARIANT LYMPHS # BLD AUTO: 4 %
VARIANT LYMPHS # BLD AUTO: 4 % (ref 0–0)
VENTRICULAR RATE: 113 BPM
WBC # BLD AUTO: 9.86 THOUSAND/UL (ref 4.31–10.16)

## 2025-06-24 PROCEDURE — NC001 PR NO CHARGE: Performed by: SURGERY

## 2025-06-24 PROCEDURE — 84132 ASSAY OF SERUM POTASSIUM: CPT

## 2025-06-24 PROCEDURE — 85610 PROTHROMBIN TIME: CPT

## 2025-06-24 PROCEDURE — 27600 DECOMPRESSION OF LOWER LEG: CPT | Performed by: SURGERY

## 2025-06-24 PROCEDURE — 88304 TISSUE EXAM BY PATHOLOGIST: CPT | Performed by: PATHOLOGY

## 2025-06-24 PROCEDURE — 86901 BLOOD TYPING SEROLOGIC RH(D): CPT

## 2025-06-24 PROCEDURE — 34201 REMOVAL OF ARTERY CLOT: CPT | Performed by: SURGERY

## 2025-06-24 PROCEDURE — 82947 ASSAY GLUCOSE BLOOD QUANT: CPT

## 2025-06-24 PROCEDURE — C1769 GUIDE WIRE: HCPCS | Performed by: STUDENT IN AN ORGANIZED HEALTH CARE EDUCATION/TRAINING PROGRAM

## 2025-06-24 PROCEDURE — 94668 MNPJ CHEST WALL SBSQ: CPT

## 2025-06-24 PROCEDURE — 99223 1ST HOSP IP/OBS HIGH 75: CPT | Performed by: INTERNAL MEDICINE

## 2025-06-24 PROCEDURE — 96366 THER/PROPH/DIAG IV INF ADDON: CPT

## 2025-06-24 PROCEDURE — 30233N1 TRANSFUSION OF NONAUTOLOGOUS RED BLOOD CELLS INTO PERIPHERAL VEIN, PERCUTANEOUS APPROACH: ICD-10-PCS | Performed by: ANESTHESIOLOGY

## 2025-06-24 PROCEDURE — 75635 CT ANGIO ABDOMINAL ARTERIES: CPT

## 2025-06-24 PROCEDURE — 96374 THER/PROPH/DIAG INJ IV PUSH: CPT

## 2025-06-24 PROCEDURE — 94760 N-INVAS EAR/PLS OXIMETRY 1: CPT

## 2025-06-24 PROCEDURE — 04CM0ZZ EXTIRPATION OF MATTER FROM RIGHT POPLITEAL ARTERY, OPEN APPROACH: ICD-10-PCS | Performed by: SURGERY

## 2025-06-24 PROCEDURE — P9016 RBC LEUKOCYTES REDUCED: HCPCS

## 2025-06-24 PROCEDURE — 99291 CRITICAL CARE FIRST HOUR: CPT | Performed by: EMERGENCY MEDICINE

## 2025-06-24 PROCEDURE — 85007 BL SMEAR W/DIFF WBC COUNT: CPT

## 2025-06-24 PROCEDURE — 85347 COAGULATION TIME ACTIVATED: CPT

## 2025-06-24 PROCEDURE — 99223 1ST HOSP IP/OBS HIGH 75: CPT | Performed by: SURGERY

## 2025-06-24 PROCEDURE — 96376 TX/PRO/DX INJ SAME DRUG ADON: CPT

## 2025-06-24 PROCEDURE — 94640 AIRWAY INHALATION TREATMENT: CPT

## 2025-06-24 PROCEDURE — 34203 REMOVAL OF LEG ARTERY CLOT: CPT | Performed by: STUDENT IN AN ORGANIZED HEALTH CARE EDUCATION/TRAINING PROGRAM

## 2025-06-24 PROCEDURE — 80053 COMPREHEN METABOLIC PANEL: CPT

## 2025-06-24 PROCEDURE — 93010 ELECTROCARDIOGRAM REPORT: CPT | Performed by: INTERNAL MEDICINE

## 2025-06-24 PROCEDURE — 86900 BLOOD TYPING SEROLOGIC ABO: CPT

## 2025-06-24 PROCEDURE — 36415 COLL VENOUS BLD VENIPUNCTURE: CPT

## 2025-06-24 PROCEDURE — 86850 RBC ANTIBODY SCREEN: CPT

## 2025-06-24 PROCEDURE — 82803 BLOOD GASES ANY COMBINATION: CPT

## 2025-06-24 PROCEDURE — 0KNQ0ZZ RELEASE RIGHT UPPER LEG MUSCLE, OPEN APPROACH: ICD-10-PCS | Performed by: SURGERY

## 2025-06-24 PROCEDURE — 99285 EMERGENCY DEPT VISIT HI MDM: CPT

## 2025-06-24 PROCEDURE — 94664 DEMO&/EVAL PT USE INHALER: CPT

## 2025-06-24 PROCEDURE — 82948 REAGENT STRIP/BLOOD GLUCOSE: CPT

## 2025-06-24 PROCEDURE — 85730 THROMBOPLASTIN TIME PARTIAL: CPT

## 2025-06-24 PROCEDURE — 86923 COMPATIBILITY TEST ELECTRIC: CPT

## 2025-06-24 PROCEDURE — 96365 THER/PROPH/DIAG IV INF INIT: CPT

## 2025-06-24 PROCEDURE — C1757 CATH, THROMBECTOMY/EMBOLECT: HCPCS | Performed by: SURGERY

## 2025-06-24 PROCEDURE — 84295 ASSAY OF SERUM SODIUM: CPT

## 2025-06-24 PROCEDURE — 88304 TISSUE EXAM BY PATHOLOGIST: CPT | Performed by: STUDENT IN AN ORGANIZED HEALTH CARE EDUCATION/TRAINING PROGRAM

## 2025-06-24 PROCEDURE — 85014 HEMATOCRIT: CPT

## 2025-06-24 PROCEDURE — 04CK0ZZ EXTIRPATION OF MATTER FROM RIGHT FEMORAL ARTERY, OPEN APPROACH: ICD-10-PCS | Performed by: STUDENT IN AN ORGANIZED HEALTH CARE EDUCATION/TRAINING PROGRAM

## 2025-06-24 PROCEDURE — 82330 ASSAY OF CALCIUM: CPT

## 2025-06-24 PROCEDURE — C1757 CATH, THROMBECTOMY/EMBOLECT: HCPCS | Performed by: STUDENT IN AN ORGANIZED HEALTH CARE EDUCATION/TRAINING PROGRAM

## 2025-06-24 PROCEDURE — 85027 COMPLETE CBC AUTOMATED: CPT

## 2025-06-24 PROCEDURE — 96375 TX/PRO/DX INJ NEW DRUG ADDON: CPT

## 2025-06-24 PROCEDURE — 04CT0ZZ EXTIRPATION OF MATTER FROM RIGHT PERONEAL ARTERY, OPEN APPROACH: ICD-10-PCS | Performed by: SURGERY

## 2025-06-24 PROCEDURE — 93005 ELECTROCARDIOGRAM TRACING: CPT

## 2025-06-24 RX ORDER — LIDOCAINE HYDROCHLORIDE 10 MG/ML
INJECTION, SOLUTION EPIDURAL; INFILTRATION; INTRACAUDAL; PERINEURAL AS NEEDED
Status: DISCONTINUED | OUTPATIENT
Start: 2025-06-24 | End: 2025-06-24

## 2025-06-24 RX ORDER — SPIRONOLACTONE 50 MG/1
50 TABLET, FILM COATED ORAL DAILY
Status: DISCONTINUED | OUTPATIENT
Start: 2025-06-25 | End: 2025-06-25

## 2025-06-24 RX ORDER — HEPARIN SODIUM 1000 [USP'U]/ML
4400 INJECTION, SOLUTION INTRAVENOUS; SUBCUTANEOUS ONCE
Status: COMPLETED | OUTPATIENT
Start: 2025-06-24 | End: 2025-06-24

## 2025-06-24 RX ORDER — ONDANSETRON 2 MG/ML
4 INJECTION INTRAMUSCULAR; INTRAVENOUS ONCE AS NEEDED
Status: DISCONTINUED | OUTPATIENT
Start: 2025-06-24 | End: 2025-06-24 | Stop reason: HOSPADM

## 2025-06-24 RX ORDER — EPHEDRINE SULFATE 50 MG/ML
INJECTION INTRAVENOUS AS NEEDED
Status: DISCONTINUED | OUTPATIENT
Start: 2025-06-24 | End: 2025-06-24

## 2025-06-24 RX ORDER — DIPHENHYDRAMINE HYDROCHLORIDE 50 MG/ML
12.5 INJECTION, SOLUTION INTRAMUSCULAR; INTRAVENOUS ONCE AS NEEDED
Status: DISCONTINUED | OUTPATIENT
Start: 2025-06-24 | End: 2025-06-25

## 2025-06-24 RX ORDER — SUCCINYLCHOLINE/SOD CL,ISO/PF 100 MG/5ML
SYRINGE (ML) INTRAVENOUS AS NEEDED
Status: DISCONTINUED | OUTPATIENT
Start: 2025-06-24 | End: 2025-06-24

## 2025-06-24 RX ORDER — FENTANYL CITRATE 50 UG/ML
INJECTION, SOLUTION INTRAMUSCULAR; INTRAVENOUS AS NEEDED
Status: DISCONTINUED | OUTPATIENT
Start: 2025-06-24 | End: 2025-06-24

## 2025-06-24 RX ORDER — LEVALBUTEROL INHALATION SOLUTION 1.25 MG/3ML
1.25 SOLUTION RESPIRATORY (INHALATION)
Status: DISCONTINUED | OUTPATIENT
Start: 2025-06-24 | End: 2025-07-01

## 2025-06-24 RX ORDER — CEFAZOLIN SODIUM 2 G/50ML
2000 SOLUTION INTRAVENOUS ONCE
Status: DISCONTINUED | OUTPATIENT
Start: 2025-06-24 | End: 2025-06-24 | Stop reason: HOSPADM

## 2025-06-24 RX ORDER — CEFAZOLIN SODIUM 2 G/50ML
SOLUTION INTRAVENOUS AS NEEDED
Status: DISCONTINUED | OUTPATIENT
Start: 2025-06-24 | End: 2025-06-24

## 2025-06-24 RX ORDER — SODIUM CHLORIDE 9 MG/ML
INJECTION, SOLUTION INTRAVENOUS CONTINUOUS PRN
Status: DISCONTINUED | OUTPATIENT
Start: 2025-06-24 | End: 2025-06-24

## 2025-06-24 RX ORDER — ACETAMINOPHEN 325 MG/1
975 TABLET ORAL EVERY 8 HOURS SCHEDULED
Status: DISCONTINUED | OUTPATIENT
Start: 2025-06-24 | End: 2025-07-05 | Stop reason: HOSPADM

## 2025-06-24 RX ORDER — SENNOSIDES 8.6 MG
1 TABLET ORAL DAILY
Status: DISCONTINUED | OUTPATIENT
Start: 2025-06-24 | End: 2025-07-04

## 2025-06-24 RX ORDER — HYDROMORPHONE HCL IN WATER/PF 6 MG/30 ML
0.2 PATIENT CONTROLLED ANALGESIA SYRINGE INTRAVENOUS
Status: DISCONTINUED | OUTPATIENT
Start: 2025-06-24 | End: 2025-06-24 | Stop reason: HOSPADM

## 2025-06-24 RX ORDER — FENTANYL CITRATE/PF 50 MCG/ML
25 SYRINGE (ML) INJECTION
Status: DISCONTINUED | OUTPATIENT
Start: 2025-06-24 | End: 2025-06-25

## 2025-06-24 RX ORDER — FENTANYL CITRATE/PF 50 MCG/ML
25 SYRINGE (ML) INJECTION
Status: DISCONTINUED | OUTPATIENT
Start: 2025-06-24 | End: 2025-06-24 | Stop reason: HOSPADM

## 2025-06-24 RX ORDER — SODIUM CHLORIDE FOR INHALATION 3 %
4 VIAL, NEBULIZER (ML) INHALATION
Status: DISPENSED | OUTPATIENT
Start: 2025-06-24 | End: 2025-06-27

## 2025-06-24 RX ORDER — HEPARIN SODIUM 10000 [USP'U]/100ML
3-30 INJECTION, SOLUTION INTRAVENOUS
Status: DISCONTINUED | OUTPATIENT
Start: 2025-06-24 | End: 2025-06-30

## 2025-06-24 RX ORDER — HYDROMORPHONE HCL/PF 1 MG/ML
0.5 SYRINGE (ML) INJECTION ONCE
Status: COMPLETED | OUTPATIENT
Start: 2025-06-24 | End: 2025-06-24

## 2025-06-24 RX ORDER — DEXAMETHASONE SODIUM PHOSPHATE 10 MG/ML
INJECTION, SOLUTION INTRAMUSCULAR; INTRAVENOUS AS NEEDED
Status: DISCONTINUED | OUTPATIENT
Start: 2025-06-24 | End: 2025-06-24

## 2025-06-24 RX ORDER — FLUTICASONE FUROATE AND VILANTEROL 200; 25 UG/1; UG/1
1 POWDER RESPIRATORY (INHALATION)
Status: DISCONTINUED | OUTPATIENT
Start: 2025-06-24 | End: 2025-06-24

## 2025-06-24 RX ORDER — HYDROMORPHONE HCL/PF 1 MG/ML
0.5 SYRINGE (ML) INJECTION
Status: DISCONTINUED | OUTPATIENT
Start: 2025-06-24 | End: 2025-06-25

## 2025-06-24 RX ORDER — OXYCODONE HYDROCHLORIDE 5 MG/1
5 TABLET ORAL EVERY 4 HOURS PRN
Refills: 0 | Status: DISCONTINUED | OUTPATIENT
Start: 2025-06-24 | End: 2025-07-05 | Stop reason: HOSPADM

## 2025-06-24 RX ORDER — CALCIUM CHLORIDE 100 MG/ML
INJECTION INTRAVENOUS; INTRAVENTRICULAR AS NEEDED
Status: DISCONTINUED | OUTPATIENT
Start: 2025-06-24 | End: 2025-06-24

## 2025-06-24 RX ORDER — HEPARIN SODIUM 1000 [USP'U]/ML
INJECTION, SOLUTION INTRAVENOUS; SUBCUTANEOUS AS NEEDED
Status: DISCONTINUED | OUTPATIENT
Start: 2025-06-24 | End: 2025-06-24

## 2025-06-24 RX ORDER — METOPROLOL SUCCINATE 100 MG/1
100 TABLET, EXTENDED RELEASE ORAL DAILY
Status: DISCONTINUED | OUTPATIENT
Start: 2025-06-24 | End: 2025-06-25

## 2025-06-24 RX ORDER — PROPOFOL 10 MG/ML
INJECTION, EMULSION INTRAVENOUS AS NEEDED
Status: DISCONTINUED | OUTPATIENT
Start: 2025-06-24 | End: 2025-06-24

## 2025-06-24 RX ORDER — LEVOTHYROXINE SODIUM 50 UG/1
50 TABLET ORAL
Status: DISCONTINUED | OUTPATIENT
Start: 2025-06-25 | End: 2025-07-05 | Stop reason: HOSPADM

## 2025-06-24 RX ORDER — SODIUM CHLORIDE, SODIUM LACTATE, POTASSIUM CHLORIDE, CALCIUM CHLORIDE 600; 310; 30; 20 MG/100ML; MG/100ML; MG/100ML; MG/100ML
100 INJECTION, SOLUTION INTRAVENOUS CONTINUOUS
Status: DISCONTINUED | OUTPATIENT
Start: 2025-06-24 | End: 2025-06-25

## 2025-06-24 RX ORDER — ALBUTEROL SULFATE 0.83 MG/ML
2.5 SOLUTION RESPIRATORY (INHALATION)
Status: DISCONTINUED | OUTPATIENT
Start: 2025-06-24 | End: 2025-06-24

## 2025-06-24 RX ORDER — FENTANYL CITRATE/PF 50 MCG/ML
50 SYRINGE (ML) INJECTION
Status: DISCONTINUED | OUTPATIENT
Start: 2025-06-24 | End: 2025-06-25

## 2025-06-24 RX ORDER — ONDANSETRON 2 MG/ML
INJECTION INTRAMUSCULAR; INTRAVENOUS AS NEEDED
Status: DISCONTINUED | OUTPATIENT
Start: 2025-06-24 | End: 2025-06-24

## 2025-06-24 RX ORDER — SODIUM CHLORIDE, SODIUM LACTATE, POTASSIUM CHLORIDE, CALCIUM CHLORIDE 600; 310; 30; 20 MG/100ML; MG/100ML; MG/100ML; MG/100ML
INJECTION, SOLUTION INTRAVENOUS CONTINUOUS PRN
Status: DISCONTINUED | OUTPATIENT
Start: 2025-06-24 | End: 2025-06-24

## 2025-06-24 RX ORDER — GUAIFENESIN 600 MG/1
600 TABLET, EXTENDED RELEASE ORAL EVERY 12 HOURS SCHEDULED
Status: DISCONTINUED | OUTPATIENT
Start: 2025-06-24 | End: 2025-07-05 | Stop reason: HOSPADM

## 2025-06-24 RX ORDER — ONDANSETRON 2 MG/ML
4 INJECTION INTRAMUSCULAR; INTRAVENOUS ONCE AS NEEDED
Status: DISCONTINUED | OUTPATIENT
Start: 2025-06-24 | End: 2025-06-25

## 2025-06-24 RX ORDER — BUDESONIDE 0.5 MG/2ML
0.5 INHALANT ORAL
Status: DISCONTINUED | OUTPATIENT
Start: 2025-06-24 | End: 2025-07-05 | Stop reason: HOSPADM

## 2025-06-24 RX ORDER — SODIUM CHLORIDE FOR INHALATION 3 %
4 VIAL, NEBULIZER (ML) INHALATION 3 TIMES DAILY
Status: DISCONTINUED | OUTPATIENT
Start: 2025-06-24 | End: 2025-06-24

## 2025-06-24 RX ORDER — ROCURONIUM BROMIDE 10 MG/ML
INJECTION, SOLUTION INTRAVENOUS AS NEEDED
Status: DISCONTINUED | OUTPATIENT
Start: 2025-06-24 | End: 2025-06-24

## 2025-06-24 RX ORDER — CHLORHEXIDINE GLUCONATE ORAL RINSE 1.2 MG/ML
15 SOLUTION DENTAL ONCE
Status: DISCONTINUED | OUTPATIENT
Start: 2025-06-24 | End: 2025-06-24 | Stop reason: HOSPADM

## 2025-06-24 RX ORDER — ONDANSETRON 2 MG/ML
4 INJECTION INTRAMUSCULAR; INTRAVENOUS EVERY 6 HOURS PRN
Status: DISCONTINUED | OUTPATIENT
Start: 2025-06-24 | End: 2025-06-25 | Stop reason: ALTCHOICE

## 2025-06-24 RX ADMIN — NOREPINEPHRINE BITARTRATE 3 MCG/MIN: 1 INJECTION, SOLUTION, CONCENTRATE INTRAVENOUS at 19:01

## 2025-06-24 RX ADMIN — SODIUM CHLORIDE, SODIUM LACTATE, POTASSIUM CHLORIDE, AND CALCIUM CHLORIDE: .6; .31; .03; .02 INJECTION, SOLUTION INTRAVENOUS at 07:32

## 2025-06-24 RX ADMIN — DEXAMETHASONE SODIUM PHOSPHATE 10 MG: 10 INJECTION, SOLUTION INTRAMUSCULAR; INTRAVENOUS at 07:41

## 2025-06-24 RX ADMIN — BUDESONIDE 0.5 MG: 0.5 INHALANT RESPIRATORY (INHALATION) at 23:10

## 2025-06-24 RX ADMIN — PROPOFOL 100 MG: 10 INJECTION, EMULSION INTRAVENOUS at 07:38

## 2025-06-24 RX ADMIN — SODIUM CHLORIDE, SODIUM LACTATE, POTASSIUM CHLORIDE, AND CALCIUM CHLORIDE 500 ML: .6; .31; .03; .02 INJECTION, SOLUTION INTRAVENOUS at 15:46

## 2025-06-24 RX ADMIN — ONDANSETRON 4 MG: 2 INJECTION INTRAMUSCULAR; INTRAVENOUS at 07:41

## 2025-06-24 RX ADMIN — FENTANYL CITRATE 50 MCG: 50 INJECTION INTRAMUSCULAR; INTRAVENOUS at 07:38

## 2025-06-24 RX ADMIN — SODIUM CHLORIDE SOLN NEBU 3% 4 ML: 3 NEBU SOLN at 23:10

## 2025-06-24 RX ADMIN — PHENYLEPHRINE HYDROCHLORIDE 50 MCG/MIN: 50 INJECTION INTRAVENOUS at 08:05

## 2025-06-24 RX ADMIN — SUGAMMADEX 114 MG: 100 INJECTION, SOLUTION INTRAVENOUS at 09:09

## 2025-06-24 RX ADMIN — SODIUM CHLORIDE: 0.9 INJECTION, SOLUTION INTRAVENOUS at 07:44

## 2025-06-24 RX ADMIN — FENTANYL CITRATE 25 MCG: 50 INJECTION INTRAMUSCULAR; INTRAVENOUS at 21:59

## 2025-06-24 RX ADMIN — HEPARIN SODIUM 18 UNITS/KG/HR: 10000 INJECTION, SOLUTION INTRAVENOUS at 04:22

## 2025-06-24 RX ADMIN — LIDOCAINE HYDROCHLORIDE 50 MG: 10 INJECTION, SOLUTION EPIDURAL; INFILTRATION; INTRACAUDAL; PERINEURAL at 18:53

## 2025-06-24 RX ADMIN — HEPARIN SODIUM 15 UNITS/KG/HR: 10000 INJECTION, SOLUTION INTRAVENOUS at 21:49

## 2025-06-24 RX ADMIN — CEFAZOLIN SODIUM 2000 MG: 2 SOLUTION INTRAVENOUS at 08:01

## 2025-06-24 RX ADMIN — SODIUM CHLORIDE, SODIUM LACTATE, POTASSIUM CHLORIDE, AND CALCIUM CHLORIDE 100 ML/HR: .6; .31; .03; .02 INJECTION, SOLUTION INTRAVENOUS at 23:14

## 2025-06-24 RX ADMIN — CEFAZOLIN SODIUM 2000 MG: 2 SOLUTION INTRAVENOUS at 19:17

## 2025-06-24 RX ADMIN — HEPARIN SODIUM 5000 UNITS: 1000 INJECTION, SOLUTION INTRAVENOUS; SUBCUTANEOUS at 20:25

## 2025-06-24 RX ADMIN — LEVALBUTEROL HYDROCHLORIDE 1.25 MG: 1.25 SOLUTION RESPIRATORY (INHALATION) at 14:20

## 2025-06-24 RX ADMIN — Medication 100 MG: at 18:53

## 2025-06-24 RX ADMIN — SODIUM CHLORIDE, SODIUM LACTATE, POTASSIUM CHLORIDE, AND CALCIUM CHLORIDE: .6; .31; .03; .02 INJECTION, SOLUTION INTRAVENOUS at 09:12

## 2025-06-24 RX ADMIN — LIDOCAINE HYDROCHLORIDE 50 MG: 10 INJECTION, SOLUTION EPIDURAL; INFILTRATION; INTRACAUDAL; PERINEURAL at 07:38

## 2025-06-24 RX ADMIN — SODIUM CHLORIDE, SODIUM LACTATE, POTASSIUM CHLORIDE, AND CALCIUM CHLORIDE: .6; .31; .03; .02 INJECTION, SOLUTION INTRAVENOUS at 18:41

## 2025-06-24 RX ADMIN — ROCURONIUM BROMIDE 50 MG: 10 INJECTION, SOLUTION INTRAVENOUS at 19:09

## 2025-06-24 RX ADMIN — LEVALBUTEROL HYDROCHLORIDE 1.25 MG: 1.25 SOLUTION RESPIRATORY (INHALATION) at 23:10

## 2025-06-24 RX ADMIN — Medication 60 MG: at 07:38

## 2025-06-24 RX ADMIN — CALCIUM CHLORIDE 0.5 G: 100 INJECTION INTRAVENOUS; INTRAVENTRICULAR at 21:21

## 2025-06-24 RX ADMIN — PROPOFOL 20 MG: 10 INJECTION, EMULSION INTRAVENOUS at 18:53

## 2025-06-24 RX ADMIN — CALCIUM CHLORIDE 0.5 G: 100 INJECTION INTRAVENOUS; INTRAVENTRICULAR at 19:57

## 2025-06-24 RX ADMIN — HEPARIN SODIUM 2000 UNITS: 1000 INJECTION, SOLUTION INTRAVENOUS; SUBCUTANEOUS at 08:20

## 2025-06-24 RX ADMIN — HEPARIN SODIUM 4400 UNITS: 1000 INJECTION, SOLUTION INTRAVENOUS; SUBCUTANEOUS at 04:21

## 2025-06-24 RX ADMIN — IOHEXOL 120 ML: 350 INJECTION, SOLUTION INTRAVENOUS at 04:23

## 2025-06-24 RX ADMIN — HYDROMORPHONE HYDROCHLORIDE 0.5 MG: 1 INJECTION, SOLUTION INTRAMUSCULAR; INTRAVENOUS; SUBCUTANEOUS at 04:25

## 2025-06-24 RX ADMIN — EPHEDRINE SULFATE 5 MG: 50 INJECTION, SOLUTION INTRAVENOUS at 07:46

## 2025-06-24 RX ADMIN — ACETAMINOPHEN 975 MG: 325 TABLET ORAL at 14:21

## 2025-06-24 RX ADMIN — HYDROMORPHONE HYDROCHLORIDE 0.5 MG: 1 INJECTION, SOLUTION INTRAMUSCULAR; INTRAVENOUS; SUBCUTANEOUS at 05:17

## 2025-06-24 RX ADMIN — OXYCODONE HYDROCHLORIDE 5 MG: 5 TABLET ORAL at 14:21

## 2025-06-24 RX ADMIN — ROCURONIUM BROMIDE 30 MG: 10 INJECTION, SOLUTION INTRAVENOUS at 07:51

## 2025-06-24 RX ADMIN — SODIUM CHLORIDE: 9 INJECTION, SOLUTION INTRAVENOUS at 19:59

## 2025-06-24 RX ADMIN — SUGAMMADEX 150 MG: 100 INJECTION, SOLUTION INTRAVENOUS at 21:24

## 2025-06-24 RX ADMIN — FENTANYL CITRATE 50 MCG: 50 INJECTION INTRAMUSCULAR; INTRAVENOUS at 19:33

## 2025-06-24 NOTE — ASSESSMENT & PLAN NOTE
Pt is status post right femoral cutdown, thromboembolectomy, and right lower extremity fasciotomies. Management per vascular surgery and primary team.

## 2025-06-24 NOTE — ASSESSMENT & PLAN NOTE
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 Clinical exam and imaging concerning for [place] 2B acute limb ischemia    6/24/25 - CTA concerning for R femoral acute arterial occlusion without significant distal reconstitution. L popliteal occlusion    Recommendations:  - Continue hep gtt and pain medication as needed for ischemic rest pain  - Patient planned for OR for emergent R femoral cutdown, thromboembolectomy, RLE fasciotomies, possible completion angiogram.   - Extensive discussion was had regarding risks, benefits, alternatives including palliative care, high risk given patient's age and comorbidities, she provided informed consent to proceed with operative intervention. Attempts made to call daughter regarding her mother's care, VM left. Patient requested input of her PCP who understands her chronic medical care and he was able to be reached for discussion  - She states that she would not be amenable to more proximal amputation and should she have a complicated post-operative course she would not want to be maintained on mechanical ventilation  - NPO  - T&C 2upRBCs  - Patient seen and examined with vascular fellow on call, case discussed with vascular surgeon on call  - Will discuss with Dr. Wei

## 2025-06-24 NOTE — ANESTHESIA PREPROCEDURE EVALUATION
Procedure:  EMBOLECTOMY/THROMBECTOMY LOWER EXTREMITY (Right: Leg Upper)  FASCIOTOMY (Right: Leg Lower)    Relevant Problems   CARDIO   (+) Acute lower limb ischemia   (+) Atherosclerosis   (+) Bifascicular bundle branch block   (+) Hyperlipidemia   (+) Other atherosclerosis of native arteries of extremities, bilateral legs (HCC)   (+) Permanent atrial fibrillation (HCC)   (+) Primary hypertension      ENDO   (+) Hypothyroidism   (+) Secondary hyperparathyroidism (HCC)      GI/HEPATIC   (+) Esophageal reflux   (+) Pancreatic cyst      /RENAL   (+) Stage 3b chronic kidney disease (HCC)      MUSCULOSKELETAL   (+) Arthritis   (+) Backache   (+) Chronic midline low back pain without sciatica   (+) Chronic thoracic back pain      NEURO/PSYCH   (+) Chronic midline low back pain without sciatica   (+) Chronic thoracic back pain      PULMONARY   (+) Asthma    8/28/24: left Ventricle: Left ventricular cavity size is normal. Wall thickness is moderately increased. There is moderate concentric hypertrophy. The left ventricular ejection fraction is 60%. Systolic function is normal. Wall motion is normal. Unable to assess diastolic function due to atrial fibrillation.    Right Ventricle: Right ventricular cavity size is normal. Systolic function is normal.    Left Atrium: The atrium is moderately dilated.    Right Atrium: The atrium is moderately dilated.    Mitral Valve: There is mild annular calcification. There is mild regurgitation.    Tricuspid Valve: There is moderate regurgitation. The right ventricular systolic pressure is mildly elevated. The estimated right ventricular systolic pressure is 41.00 mmHg.    Pulmonic Valve: There is mild regurgitation.    Physical Exam    Airway     Mallampati score: III  TM Distance: >3 FB  Neck ROM: full      Cardiovascular      Dental   No notable dental hx     Pulmonary      Neurological      Other Findings  post-pubertal.      Anesthesia Plan  ASA Score- 4 Emergent    Anesthesia  Type-          Additional Monitors: arterial line.    Airway Plan: Oral ETT.           Plan Factors-    Chart reviewed. EKG reviewed. Imaging results reviewed. Existing labs reviewed. Patient summary reviewed.    Patient is not a current smoker.              Induction- intravenous and rapid sequence.    Postoperative Plan- .   Monitoring Plan - Monitoring plan - standard ASA monitoring, train of four monitoring and processed EEG monitor  Post Operative Pain Plan - multimodal analgesia        Informed Consent- Anesthetic plan and risks discussed with patient.  I personally reviewed this patient with the CRNA. Discussed and agreed on the Anesthesia Plan with the CRNA..      NPO Status:  Vitals Value Taken Time   Date of last liquid 06/24/25 06/24/25 06:56   Time of last liquid 0100 06/24/25 06:56   Date of last solid 06/23/25 06/24/25 06:56   Time of last solid 2300 06/24/25 06:56

## 2025-06-24 NOTE — CONSULTS
Consultation - Cardiology   Name: Rebeca Banegas 89 y.o. female I MRN: [id number]  Unit/Bed#: [facility] 527-01 I Date of Admission: 6/24/2025   Date of Service: 6/24/2025 I Hospital Day: 0   Consults  Physician Requesting Evaluation: Arthur Wei MD   Reason for Evaluation / Principal Problem: Permanent atrial fibrillation management    Assessment & Plan  Permanent atrial fibrillation (HCC)  Rebeca Banegas is a 89 y.o. female with a PMHx significant for permanent atrial fibrillation on chronic warfarin therapy, hyperlipidemia, hypertension, heart failure with preserved ejection fraction, asthma, chronic kidney disease, and history of CVA after warfarin pause who presents with acute lower limb ischemia. Pt presented 6/24 for right lower extremity pain. CTA showed right femoral acute arterial occlusion. Pt is status post right femoral cutdown, thromboembolectomy, and right lower extremity fasciotomies.     Labs are significant for subtherapeutic INR levels. Pt reports compliance with her warfarin and denies any recent changes in medications or diet. Pt is agreeable to switching to another anticoagulant such as Eliquis at this time to decrease risk of embolism.      Plan:  Continue heparin gtt  Will price check xarelto  Asthma  Defer to pulmonology recommendations  Bronchiectasis (HCC)  Defer to pulmonology recommendations  Acute lower limb ischemia  Pt is status post right femoral cutdown, thromboembolectomy, and right lower extremity fasciotomies. Management per vascular surgery and primary team.   Bifascicular bundle branch block  Continue home dose of metoprolol succinate 100 mg.   Chronic heart failure with preserved ejection fraction (HCC)  Wt Readings from Last 3 Encounters:   06/18/25 57.2 kg (126 lb 3.2 oz)   06/12/25 54.9 kg (121 lb)   05/15/25 55 kg (121 lb 3.2 oz)     Continue home dose of bumetanide 3 mg BID, Jardiance 10 mg, and spironolactone 50 mg.   Hyperlipidemia  Patient is not currently on  statin therapy.   Primary hypertension  Continue home dose of metoprolol succinate 100 mg.   Stage 3b chronic kidney disease (HCC)  Lab Results   Component Value Date    EGFR 23 06/24/2025    EGFR 24 06/02/2025    EGFR 19 03/25/2025    CREATININE 1.87 (H) 06/24/2025    CREATININE 1.75 (H) 06/02/2025    CREATININE 2.16 (H) 03/25/2025   Patient appears to be slightly higher than baseline. Continue to monitor and trend creatinine.   History of CVA (cerebrovascular accident)  See above.       History of Present Illness   Rebeca Banegas is a 89 y.o. female who presents with acute lower limb ischemia. She has a PMHx significant for permanent atrial fibrillation on chronic warfarin therapy, hyperlipidemia, hypertension, heart failure with preserved ejection fraction, asthma, chronic kidney disease, and history of CVA after warfarin pause.  Pt presented 6/24 for right lower extremity pain. CTA showed right femoral acute arterial occlusion. Pt is status post right femoral cutdown, thromboembolectomy, and right lower extremity fasciotomies.     Labs are significant for subtherapeutic INR levels. Pt reports that she has been compliant with her warfarin. She denies any recent changes in medications or diet that could have contributed to subtherapeutic levels. She reports that she has not tried any additional anticoagulants in the past. Pt is agreeable to switching to another anticoagulant such as Xarelto at this time to decrease risk of embolism.      Review of Systems   All other systems reviewed and are negative.    Medical History Review: I have reviewed the patient's PMH, PSH, Social History, Family History, Meds, and Allergies     Objective :  Temp:  [97.6 °F (36.4 °C)-98.9 °F (37.2 °C)] 97.6 °F (36.4 °C)  HR:  [] 94  BP: ()/(52-87) 96/52  Resp:  [11-30] 20  SpO2:  [90 %-98 %] 96 %  O2 Device: None (Room air)  Nasal Cannula O2 Flow Rate (L/min):  [2 L/min] 2 L/min  Orthostatic Blood Pressures      Flowsheet  Row Most Recent Value   Blood Pressure 96/52 filed at 06/24/2025 1345   Patient Position - Orthostatic VS Lying filed at 06/24/2025 0630          First Weight:  There were no vitals filed for this visit.    Physical Exam  Vitals reviewed.   Constitutional:       General: She is not in acute distress.    Cardiovascular:      Rate and Rhythm: Normal rate and regular rhythm.      Pulses:           Radial pulses are 2+ on the right side and 2+ on the left side.        Dorsalis pedis pulses are 0 on the right side and 0 on the left side.      Heart sounds: No murmur heard.     No friction rub. No gallop.   Pulmonary:      Breath sounds: Wheezing present.     Musculoskeletal:      Right lower leg: No edema.      Left lower leg: No edema.   Feet:      Comments: There was significant mottling and poikilothermia of L lower extremity.     Skin:     General: Skin is warm and dry.      Capillary Refill: Capillary refill takes less than 2 seconds.     Neurological:      Mental Status: She is alert and oriented to person, place, and time.         Lab Results: I have reviewed the following results:PTT/INR:  .     06/24/25  0412 06/24/25  1030   PTT 28 >210*   INR 1.47*  --       Results from last 7 days   Lab Units 06/24/25  0412   WBC Thousand/uL 9.86   HEMOGLOBIN g/dL 12.5   HEMATOCRIT % 38.2   PLATELETS Thousands/uL 220     Results from last 7 days   Lab Units 06/24/25  0412   POTASSIUM mmol/L 4.3   CHLORIDE mmol/L 96   CO2 mmol/L 25   BUN mg/dL 63*   CREATININE mg/dL 1.87*   CALCIUM mg/dL 8.6     Results from last 7 days   Lab Units 06/24/25  1030 06/24/25  0412   INR   --  1.47*   PTT seconds >210* 28     Lab Results   Component Value Date    HGBA1C 6.4 (H) 08/27/2024     Lab Results   Component Value Date    CKTOTAL 66 06/02/2025       Imaging Results Review: No pertinent imaging studies reviewed.  Other Study Results Review: EKG was reviewed.     VTE Prophylaxis: VTE covered by:  heparin (porcine), Intravenous, 15  Units/kg/hr at 06/24/25 1306

## 2025-06-24 NOTE — ANESTHESIA PROCEDURE NOTES
Arterial Line Insertion    Performed by: Vale Esparza MD  Authorized by: Vicki Hackett MD  Consent: The procedure was performed in an emergent situation. Verbal consent obtained. Written consent obtained  Risks and benefits: risks, benefits and alternatives were discussed  Consent given by: patient  Patient understanding: patient states understanding of the procedure being performed  Patient consent: the patient's understanding of the procedure matches consent given  Procedure consent: procedure consent matches procedure scheduled  Relevant documents: relevant documents present and verified  Required items: required blood products, implants, devices, and special equipment available  Patient identity confirmed: arm band  Preparation: Patient was prepped and draped in the usual sterile fashion.  Indications: hemodynamic monitoring  Orientation:  Left  Location: radial artery  Sedation:  Patient sedated: general anesthesia.    Procedure Details:      Line Type: Arterial Line  Karri's test normal: yes  Needle gauge: 20  Placement technique:  Ultrasound guided  Ultrasound image availability:  Vascular entry visualized in real time  Number of attempts: 1    Post-procedure:  Post-procedure: dressing applied  Waveform: good waveform and waveform confirmed  Post-procedure CNS: normal  Patient tolerance: Patient tolerated the procedure well with no immediate complications and patient tolerated the procedure well with no immediate complications

## 2025-06-24 NOTE — ASSESSMENT & PLAN NOTE
Patient presented early on 6/24/2025 for right lower extremity pain.  She was found to have mottling, decreased sensation, and decreased strength.  CTA showing right femoral acute arterial occlusion.   Status post right femoral cutdown, thromboembolectomy, and right lower extremity fasciotomies.  Currently on heparin drip  Management per primary

## 2025-06-24 NOTE — OP NOTE
OPERATIVE REPORT  PATIENT NAME: Rebeca Banegas    :  1932  MRN: [id number]  Pt Location: [facility] OR ROOM 17    SURGERY DATE: 2025    Surgeons and Role:  Panel 1:     * Arthur Wei MD - Primary     * Vale Esparza MD - Assisting  Panel 2:     * Arthur Wei MD - Primary    Preop Diagnosis:  Acute lower limb ischemia [I99.8]    Post-Op Diagnosis Codes:     * Acute lower limb ischemia [I99.8]    Procedure(s):  Right - EMBOLECTOMY/THROMBECTOMY LOWER EXTREMITY VIA FEMORAL CUTDOWN  Right - FASCIOTOMY    Specimen(s):  ID Type Source Tests Collected by Time Destination   1 : RIGHT LEG CLOT Tissue Hematoma TISSUE EXAM Arthur Wei MD 2025 [id number]        Estimated Blood Loss:   50 mL    Drains:  Urethral Catheter Non-latex;Straight-tip 16 Fr. (Active)   Number of days: 0       Anesthesia Type:   General    Operative Indications:  Acute lower limb ischemia [I99.8]      Operative Findings:  Acute embolic occlusion of right distal common femoral, sfa, tibials and profunda.        Complications:   None    Procedure and Technique:  The patient was brought to the operating room and placed on the operating table in the supine position. The patient was identified by verbal confirmation and armband identification. Ancef was given iv abx.   A line and xiong were placed. The patient was prepped and draped in usual sterile fashion with ioban and a formal timeout was called.    A longitudinal incision was made in a skin fold overlying the previously marked right femoral artery in the groin. Dissection was carried through the subcutaneous tissue and fascia to reach the femoral sheath.  The inguinal ligament was retracted cephalad.  The femoral sheath was incised to expose the femoral artery.  We dissected proximal and distal and all individual side branches were controlled with vessel loops.    The SFA and profunda femoris was dissected and encircled with loops.      Patient was already on heaprin   with additional bolus given to get ACT>250.    Vessel loops were tightened for control and a transverse arteriotomy was made on the distal common femoral artery.  Thrombus was extracted and inflow was restored.  Then we passed #2,#3 and #4 Fogarties it the profunda, SFA, popliteal and peroneal arteries to clear out all embolic clot with return of adequate back bleeding.      We flushed with hep saline and closed arteriotomy with 6-0 prolene.    The anterior compartment felt tight so we proceeded with fasciotomy of the anterior and lateral compartments. A 7 cm  incision was made between the tibia and the fibula using 15 blade. Then the fascia overlying the anterior compartment was divided along the leg sharply. There was diffuse bulging of the muscles of the anterior compartment. There were contractile but weak. They were not grossly necrotic. We then raised subcutaneous flaps and expose the lateral compartment. An incision was made over the lateral compartment using Bovie electrocautery and these muscles were also bulging. And then the fasciotomy incision was extended proximally and distally to completely release the lateral compartment as well. Care was taken to avoid the area of the peroneal nerve proximally.     The posterior and medial compartments felt soft so will be closely monitored.    Fibrillar procoagulant was instilled in the operative field. The skin edges were infiltrated with quater percent Marcaine and lidocaine and closure was then achieved with a running 2-0 vicryl , 3-0 Monocryl suture for the femoral sheath and jaelyn's. Skin was closed with 4-0 monocryl. Skin glue was applied. Fasciotomy wound was partially closed 2-0 nylon vertical mattress but the distal portion was kept open with sutures in place.  These will be closed at bedside over next few days.   Kerlix and light compression wrap was applied on leg. A Mepilex bandage was applied.  At end of case instrument, sponge and needle counts were  found to be correct.    The patient awoke and had excellent triphasic anterior tibial signals on right foot. The patient was transferred to recovery in stable condition     I was present for the entire procedure.    Patient Disposition:  PACU  and patient will return to OR for planned surgery as a staged procedure for fasciotomy closure if needed.         SIGNATURE: Arthur Wei MD  DATE: June 24, 2025  TIME: 9:55 AM

## 2025-06-24 NOTE — ED PROVIDER NOTES
Time reflects when diagnosis was documented in both MDM as applicable and the Disposition within this note       Time User Action Codes Description Comment    6/24/2025  3:54 AM Hao Zapata Add [M79.606,  I99.8] Ischemic rest pain of lower extremity     6/24/2025  5:58 AM Yusuf Majano Add [I99.8] Acute lower limb ischemia     6/24/2025  8:43 AM Saiad Ayala Modify [I99.8] Acute lower limb ischemia     6/24/2025  9:26 AM Ayleen Shelton Add [I48.21] Permanent atrial fibrillation (HCC)     6/24/2025  9:26 AM Ayleen Shelton Add [I45.2] Bifascicular bundle branch block     6/24/2025  9:27 AM Ayleen Shelton [I63.9] Cerebrovascular accident (CVA), unspecified mechanism (HCC)     6/24/2025  9:27 AM Ayleen Shelton Add [Z79.01] Current use of long term anticoagulation     6/24/2025  9:28 AM Ayleen Shelton [J47.1] Bronchiectasis with acute exacerbation (HCC)     6/24/2025  9:29 AM Ayleen Shelton Add [J45.909] Uncomplicated asthma, unspecified asthma severity, unspecified whether persistent     6/24/2025  8:35 PM Sherrie Euceda Modify [M79.606,  I99.8] Ischemic rest pain of lower extremity     6/25/2025  3:44 AM Joy Jiang Add [I50.32] Chronic heart failure with preserved ejection fraction (HCC)           ED Disposition       ED Disposition   Admit    Condition   Stable    Date/Time   Tue Jun 24, 2025  6:38 AM    Comment   --             Assessment & Plan       Medical Decision Making  Patient is a 89 year old female with right lower extremity pain.  Vital signs: Hemodynamically stable     Pertinent physical exam: Alert and oriented x 3 uncomfortable appearing.  Significant mottling of right lower extremity, bilateral feet are cool to the touch with decreased sensation.  Unable to obtain Doppler signals in right DP, PT, popliteal and femoral arteries.  Unable to obtain Dopplers and left PT, DP, faint popliteal femoral Dopplers.     Differential diagnosis and plan:   Differential diagnosis  "includes but is not limited to ischemic bilateral lower extremities.  IV access obtained plan for CTA with runoff emergently.  Reached out to vascular surgery.      View ED course above for further discussion on patient workup.      All labs reviewed and utilized in the medical decision making process  All radiology studies independently viewed by me and interpreted by the radiologist.  I reviewed all testing with the patient.      ED course:  CTA per my interpretation revealed occlusion of the right common femoral artery and occlusion of left popliteal artery.  Patient started on heparin drip with bolus.  Vascular surgery came and evaluated the patient and recommended emergent surgical intervention this morning.    Disposition: Patient taken directly to operating room.     Portions of the record may have been created with voice recognition software. Occasional wrong word or \"sound a like\" substitutions may have occurred due to the inherent limitations of voice recognition software. Read the chart carefully and recognize, using context, where substitutions have occurred.    Amount and/or Complexity of Data Reviewed  Labs: ordered.  Radiology: ordered.    Risk  Prescription drug management.  Decision regarding hospitalization.        ED Course as of 06/26/25 0659   Tue Jun 24, 2025   0507 Update from vascular surgery they are discussing goals of care with the patient given comorbidities and advanced age.  Waiting fellow arrival to continue discussions.       Medications   heparin (porcine) 25,000 units in 0.45% NaCl 250 mL infusion (premix) (18 Units/kg/hr × 55 kg (Order-Specific) Intravenous New Bag 6/24/25 0422)   heparin (porcine) injection 4,400 Units (4,400 Units Intravenous Given 6/24/25 0421)   HYDROmorphone (DILAUDID) injection 0.5 mg (0.5 mg Intravenous Given 6/24/25 0425)   iohexol (OMNIPAQUE) 350 MG/ML injection (MULTI-DOSE) 120 mL (120 mL Intravenous Given 6/24/25 0423)   HYDROmorphone (DILAUDID) " injection 0.5 mg (0.5 mg Intravenous Given 6/24/25 0517)   heparin (porcine) 2,000 Units, papaverine 60 mg in multi-electrolyte (Plasmalyte-A/Isolyte-S PH 7.4/Normosol-R) 500 mL irrigation (has no administration in time range)       ED Risk Strat Scores                    No data recorded        SBIRT 20yo+      Flowsheet Row Most Recent Value   Initial Alcohol Screen: US AUDIT-C     1. How often do you have a drink containing alcohol? 0 Filed at: 06/24/2025 0614   2. How many drinks containing alcohol do you have on a typical day you are drinking?  0 Filed at: 06/24/2025 0614   3b. FEMALE Any Age, or MALE 65+: How often do you have 4 or more drinks on one occassion? 0 Filed at: 06/24/2025 0614   Audit-C Score 0 Filed at: 06/24/2025 0614   LIMA: How many times in the past year have you...    Used an illegal drug or used a prescription medication for non-medical reasons? Never Filed at: 06/24/2025 0614                            History of Present Illness       Chief Complaint   Patient presents with    Leg Pain     Patient woke up with R sided leg pain, non ambulatory at baseline, pt now unable to move R toes, and feels pins and needles, both LE cold and purple in color, color has improved with elevation of extremities per EMS       Past Medical History[1]   Past Surgical History[2]   Family History[3]   Social History[4]   E-Cigarette/Vaping    E-Cigarette Use Never User       E-Cigarette/Vaping Substances    Nicotine No     THC No     CBD No     Flavoring No     Other No     Unknown No       I have reviewed and agree with the history as documented.     Patient is a 89-year-old female with past medical history of chronic diastolic CHF, atrial fibrillation on warfarin, hyperlipidemia, asthma, hypothyroidism, hypertension, CKD who presents today for evaluation of right lower extremity pain.  Patient woke up this morning a few hours prior to arrival endorses significant right lower extremity pain.  She was unable to  move her right toes and feels pins-and-needles in her feet.  She states prior to going to bed she did not have any pain in her right lower extremity.  She is nonambulatory at baseline.  She is on warfarin and denies missing any doses.  On arrival to the room patient's lower extremity mottled and bilateral feet are cool.  Unable to obtain Doppler signals in entire right lower extremity.  Faint Doppler signals in the left popliteal and femoral arteries.  Patient was emergently taken to CT scan for CTA abdomen pelvis with runoff.          Review of Systems   Constitutional:  Negative for chills and fever.   HENT:  Negative for congestion, rhinorrhea and sore throat.    Respiratory:  Negative for shortness of breath.    Cardiovascular:  Negative for chest pain.   Gastrointestinal:  Negative for abdominal pain, diarrhea, nausea and vomiting.   Genitourinary:  Negative for dysuria, hematuria and urgency.   Musculoskeletal:  Negative for back pain.        Right leg pain, decreased sensation in bilateral feet   Neurological:  Negative for dizziness, light-headedness and headaches.           Objective       ED Triage Vitals   Temperature Pulse Blood Pressure Respirations SpO2 Patient Position - Orthostatic VS   06/24/25 0409 06/24/25 0351 06/24/25 0400 06/24/25 0351 06/24/25 0400 06/24/25 0400   98.9 °F (37.2 °C) 95 160/87 20 95 % Lying      Temp Source Heart Rate Source BP Location FiO2 (%) Pain Score    06/24/25 0409 06/24/25 0351 06/24/25 0400 -- 06/24/25 0502    Oral Monitor Left arm  10 - Worst Possible Pain      Vitals      Date and Time Temp Pulse SpO2 Resp BP Pain Score FACES Pain Rating User   06/26/25 0650 97.3 °F (36.3 °C) 90 -- 20 86/48 -- --    06/26/25 0639 -- 88 -- 15 98/52 -- --    06/26/25 0630 -- 86 -- 27 77/35 -- --    06/26/25 0615 -- 89 -- 15 -- -- --    06/26/25 0600 -- 88 94 % 13 118/68 -- --    06/26/25 0545 -- 91 -- 14 110/76 -- --    06/26/25 0530 -- 89 -- 14 108/56 -- -- FABIAN   06/26/25  0515 -- 86 -- 17 95/49 -- --    06/26/25 0500 -- 93 -- 14 115/63 -- --    06/26/25 0445 -- 96 -- 11 116/64 -- --    06/26/25 0430 -- 100 -- 21 85/54 -- --    06/26/25 0415 -- 97 -- 21 118/56 -- --    06/26/25 0400 -- 94 -- 14 126/62 -- --    06/26/25 0345 -- 94 -- 14 116/74 -- --    06/26/25 0330 -- 93 -- 14 123/68 -- --    06/26/25 0315 -- 95 -- 17 128/67 -- --    06/26/25 0300 -- 94 97 % 15 130/70 -- --    06/26/25 0245 -- 90 98 % 16 125/64 -- --    06/26/25 0230 -- 91 92 % 16 120/67 -- --    06/26/25 0215 -- 90 92 % 15 116/74 -- --    06/26/25 0200 -- 95 85 % 16 111/59 -- --    06/26/25 0145 -- 94 79 % 15 120/58 -- --    06/26/25 0130 -- 94 100 % 15 126/58 -- --    06/26/25 0115 -- 93 100 % 15 119/57 -- --    06/26/25 0109 -- -- -- -- -- 8 --    06/26/25 0100 -- 98 85 % 26 108/80 -- --    06/26/25 0045 -- 96 93 % 17 -- -- --    06/26/25 0030 -- 97 98 % 22 94/47 -- --    06/26/25 0025 -- -- 98 % -- -- -- -- [facility]   06/26/25 0015 -- 96 100 % 21 121/56 -- --    06/26/25 0000 97.5 °F (36.4 °C) 94 99 % 21 110/68 -- --    06/25/25 2345 -- 96 98 % 16 127/57 -- -- JS   06/25/25 2330 -- 88 96 % 22 119/56 -- -- JS 06/25/25 2315 -- 92 92 % 21 115/58 -- -- JS 06/25/25 2300 -- 102 100 % 23 110/56 -- -- JS   06/25/25 2245 -- 97 98 % 20 112/59 -- --    06/25/25 2230 -- 99 89 % 22 104/55 -- -- JS   06/25/25 2215 -- 98 93 % 21 120/47 -- --    06/25/25 2200 -- 95 91 % 28 137/62 -- --    06/25/25 2157 -- 113 90 % 31 109/66 8 --    06/25/25 2145 -- 98 92 % 19 109/66 -- --    06/25/25 2130 -- 94 90 % 22 100/54 -- --    06/25/25 2115 -- 96 92 % 16 113/62 -- --    06/25/25 2100 97.5 °F (36.4 °C) 93 93 % 21 113/57 -- --    06/25/25 2045 -- 93 93 % 19 96/59 -- -- JS 06/25/25 2030 97.5 °F (36.4 °C) 95 94 % 27 87/55 -- --    06/25/25 2015 97.4 °F (36.3 °C) 96 91 % 19 89/60 -- --    06/25/25 2005 -- 92 -- 17 -- -- --    06/25/25 2000 -- 90 93 % 26 83/53 3 --     06/25/25 1955 98 °F (36.7 °C) 89 -- 22 93/45 -- -- JS   06/25/25 1945 -- 97 89 % 24 93/45 -- -- JS   06/25/25 1930 -- 100 86 % 21 104/58 -- -- JS   06/25/25 1900 -- 101 99 % 20 100/59 -- -- JS   06/25/25 1850 -- 94 100 % 19 103/53 -- -- EG   06/25/25 1830 -- 96 100 % 24 89/54 -- -- EG   06/25/25 1815 -- 91 100 % 24 88/50 -- -- EG   06/25/25 1810 -- 98 100 % 17 82/50 -- -- EG   06/25/25 1801 -- 94 -- 22 82/50 -- -- EG   06/25/25 1745 -- 90 100 % 19 96/46 -- -- EG   06/25/25 1730 -- 92 99 % 17 87/52 -- -- EG   06/25/25 1720 -- 92 98 % 18 82/45 -- -- EG   06/25/25 1700 -- 89 91 % 18 79/43 -- -- EG   06/25/25 1650 -- 93 85 % 24 83/45 -- -- EG   06/25/25 1630 -- 88 100 % 15 88/52 -- -- EG   06/25/25 1620 -- 95 99 % 16 82/48 -- -- EG   06/25/25 1610 -- -- -- -- 84/55 -- -- EG   06/25/25 1600 97.3 °F (36.3 °C) 94 97 % 16 84/55 -- -- EG   06/25/25 1402 -- -- -- -- -- 8 -- EG   06/25/25 1400 -- 104 89 % 19 95/67 -- -- EG   06/25/25 1320 -- 108 84 % 15 93/56 -- -- EG   06/25/25 1300 -- 107 81 % 15 92/43 -- -- EG   06/25/25 1245 -- 105 85 % 17 87/51 -- -- EG   06/25/25 1230 -- 113 78 % 12 100/56 -- -- EG   06/25/25 1220 -- 110 88 % 17 90/54 -- -- EG   06/25/25 1201 -- 120 -- -- 86/51 -- -- EG   06/25/25 1200 97.4 °F (36.3 °C) 110 90 % 17 86/51 -- -- EG   06/25/25 1140 -- 118 99 % 21 71/45 -- -- EG   06/25/25 1130 -- 116 67 % 17 63/47 -- -- EG   06/25/25 1100 -- 141 97 % 16 117/68 -- -- EG   06/25/25 1030 -- 147 97 % 19 118/61 -- -- EG   06/25/25 1022 -- 142 -- -- 114/59 -- -- EG   06/25/25 1000 -- 141 100 % 24 113/63 -- -- EG   06/25/25 0940 -- -- -- -- -- 7 -- EG   06/25/25 0900 -- 137 96 % 23 92/55 -- -- EG   06/25/25 0845 98.1 °F (36.7 °C) 137 96 % 18 123/58 -- -- EG   06/25/25 0830 -- 137 -- -- 92/55 -- -- EMF   06/25/25 0805 -- 144 -- 31 95/69 -- -- JS   06/25/25 0800 -- 140 -- 18 78/58 -- -- EG   06/25/25 0750 -- 126 97 % 16 62/44 -- -- EG   06/25/25 0700 -- 134 96 % 19 86/50 -- -- EG   06/25/25 0630 -- 141 93 % 28  80/53 -- -- JS   06/25/25 0625 -- 137 92 % 24 94/52 -- -- JS   06/25/25 0617 97.5 °F (36.4 °C) 119 -- 21 94/52 -- -- JS   06/25/25 0545 -- 112 -- -- 75/40 -- -- GEA   06/25/25 0543 -- -- -- -- 69/42 -- -- GEA   06/25/25 0515 -- 100 -- -- 70/46 -- -- GEA   06/25/25 0445 -- 110 -- -- 68/41 -- -- GEA   06/25/25 0442 -- -- -- -- -- 6 -- GEA   06/25/25 0415 -- 114 -- -- 95/50 -- -- GEA   06/25/25 0400 -- 98 -- -- 76/49 -- -- GEA   06/25/25 0330 -- 98 -- -- 80/50 -- -- GEA   06/25/25 0315 -- 100 -- -- 75/51 -- -- GEA   06/25/25 0246 -- 100 -- -- 84/53 -- -- GEA   06/25/25 0246 97.3 °F (36.3 °C) -- 100 % 15 -- -- -- DII   06/25/25 0230 -- 94 -- -- 93/50 -- -- GEA   06/25/25 0215 -- 102 -- -- 108/50 -- -- GEA   06/25/25 0150 -- 100 -- -- 85/53 -- -- GEA   06/25/25 0130 -- 106 -- -- 89/53 -- -- GEA   06/25/25 0101 -- -- -- -- -- 8 -- GEA   06/25/25 0047 -- 100 -- -- 110/60 -- -- GEA   06/25/25 0030 -- 102 -- -- 79/56 -- -- GEA   06/25/25 0000 -- 104 -- -- 93/63 -- -- GEA   06/24/25 2330 -- 96 -- -- 84/52 -- -- GEA   06/24/25 2311 -- -- 98 % -- -- -- -- SRS   06/24/25 2300 97.2 °F (36.2 °C) 98 100 % 16 114/53 No Pain -- GEA   06/24/25 2217 -- 104 99 % 18 102/58 -- -- VB   06/24/25 2215 -- 112 100 % 20 102/58 2 -- VB   06/24/25 2200 -- 109 99 % 18 120/65 5 -- VB   06/24/25 2159 -- -- -- 20 -- 5 -- VB   06/24/25 2150 97.2 °F (36.2 °C) 114 98 % 18 120/73 No Pain -- VB   06/24/25 1828 -- -- -- -- -- No Pain --    06/24/25 1754 -- -- -- -- 92/58 -- --    06/24/25 1643 -- 103 94 % 16 87/52 -- -- PM   06/24/25 1543 -- 90 -- -- 84/51 -- --    06/24/25 1500 -- 96 91 % 20 84/51 -- -- AE   06/24/25 1444 -- 92 97 % 20 107/67 -- --    06/24/25 1430 -- -- -- -- -- No Pain --    06/24/25 1428 97.6 °F (36.4 °C) 94 96 % 20 91/68 -- --    06/24/25 1422 -- -- 96 % -- -- -- --    06/24/25 1421 -- -- -- -- -- 8 --    06/24/25 1400 -- 92 96 % 30 -- No Pain -- VB   06/24/25 1345 -- 84 94 % 18 96/52 No Pain -- VB   06/24/25 1333 --  80 94 % 18 -- -- -- VB   06/24/25 1330 -- 90 94 % 18 91/52 No Pain -- VB   06/24/25 1315 -- 87 94 % 14 104/60 No Pain -- VB   06/24/25 1300 -- 98 93 % 20 103/60 No Pain -- VB   06/24/25 1245 -- 96 94 % 20 111/59 No Pain -- VB   06/24/25 1230 -- 98 95 % 20 115/76 No Pain -- VB   06/24/25 1215 -- 96 96 % 20 110/59 No Pain -- VB   06/24/25 1200 -- 89 94 % 13 106/55 No Pain -- AB   06/24/25 1145 -- 97 95 % 20 111/65 No Pain -- AB   06/24/25 1130 -- 89 93 % 13 115/59 No Pain -- AB   06/24/25 1115 -- 91 92 % 11 115/59 No Pain -- AB   06/24/25 1100 97.7 °F (36.5 °C) 89 92 % 12 108/54 No Pain -- AB   06/24/25 1045 -- 96 92 % 16 -- No Pain -- AB   06/24/25 1030 -- 95 94 % 14 111/58 No Pain -- AB   06/24/25 1015 -- 101 94 % 14 137/61 No Pain -- AB   06/24/25 1000 -- 97 95 % 18 111/68 No Pain -- AB   06/24/25 0958 97.7 °F (36.5 °C) 98 98 % 11 147/67 No Pain -- AB   06/24/25 0655 -- -- -- -- -- No Pain -- LKH   06/24/25 0630 -- 94 96 % 19 119/61 -- -- KS   06/24/25 0615 -- 96 96 % 16 130/76 -- -- KS   06/24/25 0600 -- 105 95 % 20 126/77 -- -- ED   06/24/25 0545 -- 108 96 % 22 145/71 -- -- KS   06/24/25 0515 -- 107 92 % 22 142/87 -- -- KS   06/24/25 0502 -- -- -- -- -- 10 - Worst Possible Pain -- KS   06/24/25 0445 -- 111 90 % 22 143/74 -- -- KS   06/24/25 0430 -- 108 90 % 20 120/67 -- -- KS   06/24/25 0409 98.9 °F (37.2 °C) -- -- -- -- -- -- KS   06/24/25 0400 -- 98 95 % 20 160/87 -- -- KS   06/24/25 0351 -- 95 -- 20 -- -- -- KS            Physical Exam  Vitals and nursing note reviewed.   Constitutional:       General: She is not in acute distress.     Appearance: Normal appearance. She is well-developed. She is not ill-appearing.   HENT:      Head: Normocephalic and atraumatic.      Nose: Nose normal.      Mouth/Throat:      Mouth: Mucous membranes are moist.     Eyes:      Conjunctiva/sclera: Conjunctivae normal.       Cardiovascular:      Rate and Rhythm: Normal rate and regular rhythm.      Heart sounds: Normal heart  sounds. No murmur heard.     Comments: No Doppler signals in right DP, PT, popliteal or femoral arteries.    No Doppler signals in left DP, PT.  Faint signal in popliteal and femoral arteries.  Pulmonary:      Effort: Pulmonary effort is normal. No respiratory distress.      Breath sounds: Normal breath sounds.   Abdominal:      General: Abdomen is flat. There is no distension.      Palpations: Abdomen is soft.      Tenderness: There is no abdominal tenderness.     Musculoskeletal:      Right lower leg: No edema.      Left lower leg: No edema.      Comments: Decreased sensation in bilateral feet.  Bilateral feet cool to the touch.    Significant mottling of entire right lower extremity.    Patient able to move bilateral lower extremities.     Skin:     General: Skin is warm and dry.      Capillary Refill: Capillary refill takes less than 2 seconds.     Neurological:      General: No focal deficit present.      Mental Status: She is alert and oriented to person, place, and time.     Psychiatric:         Mood and Affect: Mood normal.         Behavior: Behavior normal.         Results Reviewed       Procedure Component Value Units Date/Time    APTT [[id number]]  (Abnormal) Collected: 06/24/25 1030    Lab Status: Final result Specimen: Blood from Line, Arterial Updated: 06/24/25 1131     PTT >210 seconds     RBC Morphology Reflex Test [[id number]] Collected: 06/24/25 0412    Lab Status: Final result Specimen: Blood from Arm, Left Updated: 06/24/25 0601    CBC and differential [[id number]]  (Abnormal) Collected: 06/24/25 0412    Lab Status: Final result Specimen: Blood from Arm, Left Updated: 06/24/25 0519     WBC 9.86 Thousand/uL      RBC 3.94 Million/uL      Hemoglobin 12.5 g/dL      Hematocrit 38.2 %      MCV 97 fL      MCH 31.7 pg      MCHC 32.7 g/dL      RDW 15.7 %      MPV 9.4 fL      Platelets 220 Thousands/uL     Manual Differential (Non CWS) [[id number]]  (Abnormal) Collected: 06/24/25 0412    Lab Status: Final  result Specimen: Blood from Arm, Left Updated: 06/24/25 0519     Segmented % 39 %      Lymphocytes % 30 %      Monocytes % 8 %      Eosinophils % 19 %      Atypical Lymphocytes % 4 %      Absolute Neutrophils 3.85 Thousand/uL      Absolute Lymphocytes 3.35 Thousand/uL      Absolute Monocytes 0.79 Thousand/uL      Absolute Eosinophils 1.87 Thousand/uL      Total Counted 100     RBC Morphology Present     Platelet Estimate Adequate     Pathology Review Yes     Anisocytosis Present     Nae Cells Present     Macrocytes Present     Ovalocytes Present     Poikilocytes Present     Polychromasia Present     Differential Comment see note    Manual Differential(PHLEBS Do Not Order) [[id number]]  (Abnormal) Collected: 06/24/25 0412    Lab Status: Final result Specimen: Blood from Arm, Left Updated: 06/24/25 0519     Segmented % 39 %      Lymphocytes % 30 %      Monocytes % 8 %      Eosinophils % 19 %      Basophils % 0 %      Atypical Lymphocytes % 4 %      Absolute Neutrophils 3.85 Thousand/uL      Absolute Lymphocytes 3.35 Thousand/uL      Absolute Monocytes 0.79 Thousand/uL      Absolute Eosinophils 1.87 Thousand/uL      Absolute Basophils 0.00 Thousand/uL      Total Counted 100     RBC Morphology Present     Platelet Estimate Adequate     Pathology Review Yes     Differential Comment see note     Anisocytosis Present     Nae Cells Present     Macrocytes Present     Ovalocytes Present     Poikilocytes Present     Polychromasia Present    APTT [[id number]]  (Normal) Collected: 06/24/25 0412    Lab Status: Final result Specimen: Blood from Arm, Left Updated: 06/24/25 0459     PTT 28 seconds     Protime-INR [[id number]]  (Abnormal) Collected: 06/24/25 0412    Lab Status: Final result Specimen: Blood from Arm, Left Updated: 06/24/25 0459     Protime 18.0 seconds      INR 1.47    Narrative:      INR Therapeutic Range    Indication                                             INR Range      Atrial Fibrillation                                                2.0-3.0  Hypercoagulable State                                    2.0.2.3  Left Ventricular Asist Device                            2.0-3.0  Mechanical Heart Valve                                  -    Aortic(with afib, MI, embolism, HF, LA enlargement,    and/or coagulopathy)                                     2.0-3.0 (2.5-3.5)     Mitral                                                             2.5-3.5  Prosthetic/Bioprosthetic Heart Valve               2.0-3.0  Venous thromboembolism (VTE: VT, PE        2.0-3.0    Comprehensive metabolic panel [[id number]]  (Abnormal) Collected: 06/24/25 0412    Lab Status: Final result Specimen: Blood from Arm, Left Updated: 06/24/25 0442     Sodium 133 mmol/L      Potassium 4.3 mmol/L      Chloride 96 mmol/L      CO2 25 mmol/L      ANION GAP 12 mmol/L      BUN 63 mg/dL      Creatinine 1.87 mg/dL      Glucose 130 mg/dL      Calcium 8.6 mg/dL      AST 24 U/L      ALT 18 U/L      Alkaline Phosphatase 52 U/L      Total Protein 7.4 g/dL      Albumin 3.8 g/dL      Total Bilirubin 0.50 mg/dL      eGFR 23 ml/min/1.73sq m     Narrative:      [employer] guidelines for Chronic Kidney Disease (CKD):     Stage 1 with normal or high GFR (GFR > 90 mL/min/1.73 square meters)    Stage 2 Mild CKD (GFR = 60-89 mL/min/1.73 square meters)    Stage 3A Moderate CKD (GFR = 45-59 mL/min/1.73 square meters)    Stage 3B Moderate CKD (GFR = 30-44 mL/min/1.73 square meters)    Stage 4 Severe CKD (GFR = 15-29 mL/min/1.73 square meters)    Stage 5 End Stage CKD (GFR <15 mL/min/1.73 square meters)  Note: GFR calculation is accurate only with a steady state creatinine            XR chest portable   Final Interpretation by Magy Rivera MD (06/25 5268)   Small pleural effusions.                  Workstation performed: [id number]         XR chest portable ICU   Final Interpretation by Magy Rivera MD (06/25 0142)   No acute cardiopulmonary findings.             Workstation performed: [id number]         CTA abdominal w run off w wo contrast   Final Interpretation by Bailey Rodarte MD (06/24 1129)      RIGHT: Occlusion of the right common femoral artery with nonopacification of right lower extremity. Additional partial occlusion of the internal iliac artery as above.      LEFT: Mild to moderate calcified plaque. Nonopacification of the popliteal artery in the popliteal fossa with faint reconstitution of the tibioperoneal trunk and anterior tibial arteries although no flow to the ankle/foot.      Incidental indeterminate pancreatic cystic lesions as above appear similar to prior CT dated 8/28/2024.. Recommend continued attention on follow-up imaging/surgical evaluation.            Resident: HALIMA GARAY I, the attending radiologist, have reviewed the images and agree with the final report above.      Workstation performed: [id number]         IR lower extremity angiogram    (Results Pending)   XR chest portable ICU    (Results Pending)       Procedures    ED Medication and Procedure Management   Prior to Admission Medications   Prescriptions Last Dose Informant Patient Reported? Taking?   Cholecalciferol (VITAMIN D3) 1000 units CAPS Unknown Self Yes No   Sig: Take 2,000 Units by mouth in the morning.   Empagliflozin (Jardiance) 10 MG TABS tablet Unknown Self No No   Sig: TAKE 1 TABLET EVERY MORNING   Fluticasone-Salmeterol (Advair Diskus) 250-50 mcg/dose inhaler  Self No No   Sig: Inhale 1 puff 2 (two) times a day Rinse mouth after use.   albuterol (2.5 mg/3 mL) 0.083 % nebulizer solution  Self No No   Sig: Take 3 mL (2.5 mg total) by nebulization every 8 (eight) hours   albuterol (Proventil HFA) 90 mcg/act inhaler Unknown Self No No   Sig: Inhale 2 puffs every 6 (six) hours as needed for wheezing   ammonium lactate (LAC-HYDRIN) 12 % lotion  Self No No   Sig: Apply topically 2 (two) times a day as needed for dry skin   atorvastatin (LIPITOR) 40 mg tablet  Self No No    Sig: TAKE 1 TABLET EVERY EVENING   Patient not taking: Reported on 6/18/2025   bumetanide (BUMEX) 2 mg tablet Unknown Self No No   Sig: Take 1.5 tablets (3 mg total) by mouth 2 (two) times a day   levothyroxine 50 mcg tablet Unknown Self No No   Sig: TAKE 1 TABLET DAILY, EXCEPT TAKE 1 AND 1/2 TABLETS ON SUNDAY AND WEDNESDAY   metoprolol succinate (TOPROL-XL) 100 mg 24 hr tablet Unknown Self No No   Sig: Take 1 tablet (100 mg total) by mouth daily   sodium chloride 3 % inhalation solution Unknown Self No No   Sig: Take 4 mL by nebulization 3 (three) times a day   spironolactone (ALDACTONE) 50 mg tablet Unknown Self No No   Sig: Take 1 tablet (50 mg total) by mouth daily   warfarin (Jantoven) 2.5 mg tablet  Self No No   Sig: Take 1 tablet (2.5 mg total) by mouth daily      Facility-Administered Medications: None     Current Discharge Medication List        START taking these medications    Details   apixaban (Eliquis) 2.5 mg Take 1 tablet (2.5 mg total) by mouth 2 (two) times a day  Qty: 60 tablet, Refills: 0    Comments: Price check only.  Associated Diagnoses: Permanent atrial fibrillation (HCC)      rivaroxaban (Xarelto) 20 mg tablet Take 1 tablet (20 mg total) by mouth daily with breakfast  Qty: 30 tablet, Refills: 0    Comments: Price check only.  Associated Diagnoses: Permanent atrial fibrillation (HCC)           CONTINUE these medications which have NOT CHANGED    Details   albuterol (2.5 mg/3 mL) 0.083 % nebulizer solution Take 3 mL (2.5 mg total) by nebulization every 8 (eight) hours  Qty: 270 mL, Refills: 3    Associated Diagnoses: Bronchiectasis without complication (HCC); Moderate persistent asthma without complication      albuterol (Proventil HFA) 90 mcg/act inhaler Inhale 2 puffs every 6 (six) hours as needed for wheezing  Qty: 20.1 g, Refills: 6    Comments: Substitution to a formulary equivalent within the same pharmaceutical class is authorized.  Associated Diagnoses: Chronic obstructive pulmonary  disease, unspecified COPD type (HCC)      ammonium lactate (LAC-HYDRIN) 12 % lotion Apply topically 2 (two) times a day as needed for dry skin  Qty: 225 g, Refills: 5    Associated Diagnoses: Dry skin      atorvastatin (LIPITOR) 40 mg tablet TAKE 1 TABLET EVERY EVENING  Qty: 90 tablet, Refills: 3    Associated Diagnoses: Cerebrovascular accident (CVA), unspecified mechanism (HCC)      bumetanide (BUMEX) 2 mg tablet Take 1.5 tablets (3 mg total) by mouth 2 (two) times a day  Qty: 270 tablet, Refills: 1    Associated Diagnoses: Acute on chronic heart failure with preserved ejection fraction ([facility])      Cholecalciferol (VITAMIN D3) 1000 units CAPS Take 2,000 Units by mouth in the morning.      Empagliflozin (Jardiance) 10 MG TABS tablet TAKE 1 TABLET EVERY MORNING  Qty: 90 tablet, Refills: 1    Associated Diagnoses: Chronic heart failure with preserved ejection fraction ([facility])      Fluticasone-Salmeterol (Advair Diskus) 250-50 mcg/dose inhaler Inhale 1 puff 2 (two) times a day Rinse mouth after use.  Qty: 180 blister, Refills: 0    Comments: Substitution to a formulary equivalent within the same pharmaceutical class is authorized.  Associated Diagnoses: Mild intermittent asthma without complication      levothyroxine 50 mcg tablet TAKE 1 TABLET DAILY, EXCEPT TAKE 1 AND 1/2 TABLETS ON SUNDAY AND WEDNESDAY  Qty: 100 tablet, Refills: 1    Associated Diagnoses: Hypothyroidism, unspecified type      metoprolol succinate (TOPROL-XL) 100 mg 24 hr tablet Take 1 tablet (100 mg total) by mouth daily  Qty: 90 tablet, Refills: 1    Associated Diagnoses: Permanent atrial fibrillation (HCC)      sodium chloride 3 % inhalation solution Take 4 mL by nebulization 3 (three) times a day  Qty: 360 mL, Refills: 11    Associated Diagnoses: Bronchiectasis without complication ([facility])      spironolactone (ALDACTONE) 50 mg tablet Take 1 tablet (50 mg total) by mouth daily  Qty: 30 tablet, Refills: 6    Associated Diagnoses: Acute on chronic  diastolic CHF (congestive heart failure) (HCC)      warfarin (Jantoven) 2.5 mg tablet Take 1 tablet (2.5 mg total) by mouth daily  Qty: 30 tablet, Refills: 0    Associated Diagnoses: Cerebrovascular accident (CVA), unspecified mechanism (HCC); Permanent atrial fibrillation (HCC)           No discharge procedures on file.  ED SEPSIS DOCUMENTATION   Time reflects when diagnosis was documented in both MDM as applicable and the Disposition within this note       Time User Action Codes Description Comment    6/24/2025  3:54 AM Hao Zapata Add [M79.606,  I99.8] Ischemic rest pain of lower extremity     6/24/2025  5:58 AM Yusuf Majano Add [I99.8] Acute lower limb ischemia     6/24/2025  8:43 AM Saida Ayala Modify [I99.8] Acute lower limb ischemia     6/24/2025  9:26 AM Ayleen Shelton Add [I48.21] Permanent atrial fibrillation (HCC)     6/24/2025  9:26 AM Ayleen Shelton [I45.2] Bifascicular bundle branch block     6/24/2025  9:27 AM Ayleen Shelton Add [I63.9] Cerebrovascular accident (CVA), unspecified mechanism (HCC)     6/24/2025  9:27 AM Ayleen Shelton Add [Z79.01] Current use of long term anticoagulation     6/24/2025  9:28 AM Ayleen Shelton [J47.1] Bronchiectasis with acute exacerbation (HCC)     6/24/2025  9:29 AM Ayleen Shelton [J45.909] Uncomplicated asthma, unspecified asthma severity, unspecified whether persistent     6/24/2025  8:35 PM Sherrie Euceda Modify [M79.606,  I99.8] Ischemic rest pain of lower extremity     6/25/2025  3:44 AM Joy Jiang Add [I50.32] Chronic heart failure with preserved ejection fraction (HCC)            Initial Sepsis Screening       Row Name 06/25/25 2638                Is the patient's history suggestive of a new or worsening infection? No  -EM                  User Key  (r) = Recorded By, (t) = Taken By, (c) = Cosigned By      Initials Name Provider Type    EM Angi Kelly MD Resident                           [1]   Past Medical  History:  Diagnosis Date    Abnormal ECG     Abnormal ultrasound     RESOLVED: [id number]    Acute kidney failure (HCC) 12/29/2024    Advice given about COVID-19 virus infection 04/07/2020    Ambulates with cane     Arrhythmia     Arthritis     Asthma     Asthma with acute exacerbation     LAST ASSESSED: [id number]    Asymptomatic menopausal state     Atherosclerosis     Atrial fibrillation (HCC)     Chronic kidney disease     Chronic obstructive lung disease (HCC)     w/ chonic cough    Chronic ulcer of left foot (HCC)     Colon polyp     Congestive heart failure (HCC)     LAST ASSESSED: [id number]    COPD (chronic obstructive pulmonary disease) (HCC)     Coronary artery disease     COVID-19 01/06/2025    COVID-19 virus infection 03/25/2023    Cuboid fracture     LAST ASSESSED: [id number]    Disease of thyroid gland     Dyspepsia     Edema of both lower extremities     Equinus deformity of left foot     Fall 04/01/2024    Falls     5/2024    GERD (gastroesophageal reflux disease)     Headache(784.0)     High risk medication use     LAST ASSESSED: 16AMK2748    Hyperlipidemia     Hypertension     Hypokalemia     Hypothyroidism     Impacted cerumen of both ears     LAST ASSESSED: 40AGF0197    Impacted cerumen of right ear     LAST ASSESSED: [id number]    Influenza     LAST ASSESSED: 00RBK3082    IPMN (intraductal papillary mucinous neoplasm)     RESOLVED: 02OCT2015    Irregular heart beat     afib. Warfarin.    Leg cramps 12/21/2018    Limb swelling     LAST ASSESSED: 26PRN5714    Navicular fracture of ankle     LAST ASSESSED: 38VKS4286    Onychomycosis     LAST ASSESSED: 56UWM0177    Open wound of right upper arm 10/17/2022    Osteoarthritis     Osteopenia     Osteoporosis     Paronychia, finger, unspecified laterality     LAST ASSESSED: 65IXL7645    Paroxysmal atrial fibrillation (HCC)     LAST ASSESSED: 02MAR2014    Peripheral vascular disease (HCC)     Plantar fasciitis     SOBOE (shortness of breath on exertion)      Stroke (HCC)     Right arm weakness that has resolved    Talus fracture     LAST ASSESSED: 35SEX4293    Vaccine counseling 12/19/2023    Vascular headache     Walker as ambulation aid     Wound of right foot    [2]   Past Surgical History:  Procedure Laterality Date    APPENDECTOMY      BONE BIOPSY Left 08/09/2024    Procedure: Bone bx of proximal phalanx second toe & second met with fluoroscopic guidance;  Surgeon: Jonny Marcial DPM;  Location: [facility];  Service: Podiatry    CATARACT EXTRACTION Bilateral     CHOLECYSTECTOMY      COLONOSCOPY      FASCIOTOMY Right 6/24/2025    Procedure: FASCIOTOMY;  Surgeon: Arthur Wei MD;  Location: [facility];  Service: Vascular    JOINT REPLACEMENT Bilateral     knee    NEUROPLASTY / TRANSPOSITION MEDIAN NERVE AT CARPAL TUNNEL BILATERAL Bilateral     DECOMPRESSION    OOPHORECTOMY Bilateral     age 81    PELVIC FLOOR REPAIR  2014    CA BIOPSY BONE TROCAR/NEEDLE SUPERFICIAL Left 08/09/2024    Procedure: Left foot debridement;  Surgeon: Jonny Marcial DPM;  Location: [facility];  Service: Podiatry    CA DEBRIDEMENT MUSCLE &/FASCIA 1ST 20 SQ CM/< Left 08/09/2024    Procedure: Left foot debridement;  Surgeon: Jonny Marcial DPM;  [facility];  Service: Podiatry    CA OSTEOT W/WO LNGTH SHRT/CORRJ 1ST METAR Left 11/29/2024    Procedure: Left foot percutaneous osteotomy of second, third and fourth metatarsal.;  Surgeon: Jonny Marcial DPM;  Location: [facility];  Service: Podiatry    SALPINGECTOMY Bilateral     SINUS SURGERY      THROMBECTOMY W/ EMBOLECTOMY Left 6/24/2025    Procedure: LEFT LOWER EXTREMITY EMBOLECTOMY/THROMBECTOMY;  Surgeon: Aodnay Raymond MD;  [facility];  Service: Vascular    THROMBECTOMY W/ EMBOLECTOMY Right 6/24/2025    Procedure: EMBOLECTOMY/THROMBECTOMY LOWER EXTREMITY;  Surgeon: Arthur Wei MD;  [facility];  Service: Vascular    TONSILLECTOMY  1941    TOTAL KNEE ARTHROPLASTY  Bilateral    [3]   Family History  Problem Relation Name Age of Onset    Pancreatic cancer Mother Christiana 93    Heart failure Mother Christiana     Cancer Mother Christiana     Hypertension Mother Christiana     Coronary artery disease Family      Breast cancer Family      Uterine cancer Family      Hyperlipidemia Family      Osteoarthritis Family      Ovarian cancer Family      Brain cancer Son      Stroke Father Isaac     Hypertension Father Isaac     No Known Problems Sister      No Known Problems Daughter      No Known Problems Maternal Grandmother      No Known Problems Maternal Grandfather      No Known Problems Paternal Grandmother      No Known Problems Paternal Grandfather      Breast cancer Cousin  50    Breast cancer Cousin  50    Ovarian cancer Other niece 49   [4]   Social History  Tobacco Use    Smoking status: Former     Current packs/day: 0.00     Average packs/day: 0.3 packs/day for 2.0 years (0.5 ttl pk-yrs)     Types: Cigarettes     Start date:      Quit date:      Years since quittin.5    Smokeless tobacco: Never    Tobacco comments:     On and off socially with friends    Vaping Use    Vaping status: Never Used   Substance Use Topics    Alcohol use: Not Currently    Drug use: No        Melisa Jones DO  [phone]

## 2025-06-24 NOTE — ASSESSMENT & PLAN NOTE
Patient with history of bronchiectasis noted on CT chest and Pseudomonas growth on previous sputum culture (38/2024 - treated with levaquin with improvement).  Last exacerbation was in March 2024, she was seen in the office on 6/18/2025.  At that time her airway clearance was increased.  Continue with aggressive pulmonary toilet  Airway clearance protocol  Respiratory protocol  Xopenex and 3% saline 4 times daily  Continue flutter valve, incentive spirometry  High frequency chest wall oscillation  Mucinex  Wean O2 for an SpO2 goal of greater than 88% or per patient comfort -if the patient continuously requires oxygen, would perform home O2 eval prior to discharge  Consider obtaining chest x-ray now that the patient is out of the OR  If the patient does not improve, will obtain sputum culture and consider bronchoscopy  No indication for inhaled tobramycin at this point or other antibiotics, but low threshold to start

## 2025-06-24 NOTE — ANESTHESIA POSTPROCEDURE EVALUATION
Post-Op Assessment Note    CV Status:  Stable  Pain Score: 0    Pain management: adequate       Mental Status:  Alert and awake   Hydration Status:  Euvolemic   PONV Controlled:  Controlled   Airway Patency:  Patent     Post Op Vitals Reviewed: Yes    No anethesia notable event occurred.    Staff: CRNA           Last Filed PACU Vitals:  Vitals Value Taken Time   Temp     Pulse 94 06/24/25 09:58   /67 06/24/25 09:58   Resp 12 06/24/25 09:58   SpO2 100 % 06/24/25 09:58   Vitals shown include unfiled device data.

## 2025-06-24 NOTE — ASSESSMENT & PLAN NOTE
Lab Results   Component Value Date    EGFR 23 06/24/2025    EGFR 24 06/02/2025    EGFR 19 03/25/2025    CREATININE 1.87 (H) 06/24/2025    CREATININE 1.75 (H) 06/02/2025    CREATININE 2.16 (H) 03/25/2025   Patient appears to be slightly higher than baseline. Continue to monitor and trend creatinine.

## 2025-06-24 NOTE — TELEPHONE ENCOUNTER
Dr. Majano with vascular surgery at [facility] requesting a call back from Dr. Dillon to discuss surgery.  Call back number [phone].    Dr. Dillon messaged via ESC. Read receipt confirmed.

## 2025-06-24 NOTE — QUICK NOTE
Post-Op Check - Vascular Surgery   Rebeca Banegas 89 y.o. female MRN: [id number]  Unit/Bed#: [facility] 527-01 Encounter: [id number]    Assessment:  89yoF presenting with R2B RLE ALI now s/p R femoral cutdown, thromboembolectomy, R anterior and lateral fasciotomies, partial proximal fasciotomy closure     Plan:  - Incentive spirometry, patient currently working with flutter valve, appreciate pulmonary recommendations, continue aggressive pulmonary toilet  - Diet: NPO, continued decreased sensation and endorsing more difficulty with moving L toes, further consideration for revascularization versus more conservative management with maintenance on anticoagulation discussed with attending; plan for OR for LLE thromboembolectomy, possible fasciotomies, possible completion angiogram, possible LE bypass           - Patient gave informed consent to proceed  - PRN analgesia, anti-emetics  - Maintain A-Line for close hemodynamic monitoring  - I/Os; Dillon in place  - Continue hep gtt  - continue frequent Neurovascular checks  - Appreciate cardiology recommendations  - Receiving IVF bolus per hypotension protocol    Subjective: Patient with significant improvement in symptoms to RLE, R foot pink, warm with appropriate cap refill, with expected post-op pain to RLE secondary to reperfusion, inprovement in RLE motor function. Still states LLE feels outside her normal sensation and function, more difficulty with movement to L foot/toes.    Vitals:  BP (!) 84/51   Pulse 90   Temp 97.6 °F (36.4 °C) (Oral)   Resp 20   LMP  (LMP Unknown)   SpO2 91%     Physical Exam:  General appearance: Pleasant, alert and oriented, in no acute distress  Neurologic: Sensation improved to R foot, now with movement of R toes, dorsi/plantarflexion of R foot not present preoperatively. LLE with diminished sensation distal to L knee, weak but present movement or L toes, with dorsi/plantarflexion  Neck: supple, symmetrical, trachea midline  Lungs: Normal  work of breathing, coughing   Heart: Regular rate  Abdomen: soft, non-distended, R groin with c/d/I mepilex without noted strikethrough  Extremities: RLE with c/d/I ACE wrap in place, compartments soft and compressible. LLE with mottling       Pulse exam:  Radial: Right: 2+ Left:: 2+  Femoral: Right: 2+ Left: 2+  Popliteal:  Left: doppler signal  AT: multiphasic doppler signal  DP: Left: no signal  PT:  Left: no signal    I/Os:  No intake/output data recorded.  I/O this shift:  In: 1889.7 [P.O.:100; I.V.:1789.7]  Out: 1000 [Urine:950; Blood:50]    Invasive Lines/Tubes:  Invasive Devices       Peripheral Intravenous Line  Duration             Peripheral IV 06/24/25 Left;Ventral (anterior) Forearm <1 day    Peripheral IV 06/24/25 Right Antecubital <1 day    Peripheral IV 06/24/25 Right Hand <1 day              Arterial Line  Duration             Arterial Line 06/24/25 Left Radial <1 day              Drain  Duration             Urethral Catheter Non-latex;Straight-tip 16 Fr. <1 day                    VTE Prophylaxis: Reason for no pharmacologic prophylaxis hep gtt    Joy Jiang PA-C  6/24/2025

## 2025-06-24 NOTE — H&P
H&P - Vascular Surgery   Name: Rebeca Banegas 89 y.o. female I MRN: [id number]  Unit/Bed#: [facility] I Date of Admission: 6/24/2025   Date of Service: 6/24/2025 I Hospital Day: 0       Please accept Consultation at 0400 as H&P for OR for RLE femoral cutdown, thromboembolectomy, RLE fasciotomies, possible completion angiogram    Joy Jiang PA-C  6/24/25

## 2025-06-24 NOTE — ASSESSMENT & PLAN NOTE
Rebeca Banegas is a 89 y.o. female with a PMHx significant for permanent atrial fibrillation on chronic warfarin therapy, hyperlipidemia, hypertension, heart failure with preserved ejection fraction, asthma, chronic kidney disease, and history of CVA after warfarin pause who presents with acute lower limb ischemia. Pt presented 6/24 for right lower extremity pain. CTA showed right femoral acute arterial occlusion. Pt is status post right femoral cutdown, thromboembolectomy, and right lower extremity fasciotomies.     Labs are significant for subtherapeutic INR levels. Pt reports compliance with her warfarin and denies any recent changes in medications or diet. Pt is agreeable to switching to another anticoagulant such as Eliquis at this time to decrease risk of embolism.      Plan:  Continue heparin gtt  Will price check xarelto

## 2025-06-24 NOTE — ASSESSMENT & PLAN NOTE
Wt Readings from Last 3 Encounters:   06/18/25 57.2 kg (126 lb 3.2 oz)   06/12/25 54.9 kg (121 lb)   05/15/25 55 kg (121 lb 3.2 oz)     Continue home dose of bumetanide 3 mg BID, Jardiance 10 mg, and spironolactone 50 mg.

## 2025-06-24 NOTE — ANESTHESIA PREPROCEDURE EVALUATION
Procedure:  LEFT LOWER EXTREMITY EMBOLECTOMY/THROMBECTOMY LOWER EXTREMITY possible LLE fasciotomies (Left: Leg Lower)  LLE ARTERIOGRAM (Left: Abdomen)    Relevant Problems   CARDIO   (+) Acute lower limb ischemia   (+) Atherosclerosis   (+) Bifascicular bundle branch block   (+) Hyperlipidemia   (+) Other atherosclerosis of native arteries of extremities, bilateral legs (HCC)   (+) Permanent atrial fibrillation (HCC)   (+) Primary hypertension      ENDO   (+) Hypothyroidism   (+) Secondary hyperparathyroidism (HCC)      GI/HEPATIC   (+) Esophageal reflux   (+) Pancreatic cyst      /RENAL   (+) Stage 3b chronic kidney disease (HCC)      MUSCULOSKELETAL   (+) Arthritis   (+) Backache   (+) Chronic midline low back pain without sciatica   (+) Chronic thoracic back pain      NEURO/PSYCH   (+) Chronic midline low back pain without sciatica   (+) Chronic thoracic back pain      PULMONARY   (+) Asthma        Physical Exam    Airway     Mallampati score: II    Neck ROM: full      Cardiovascular      Dental       Pulmonary      Neurological      Other Findings  post-pubertal.      Anesthesia Plan  ASA Score- 3 Emergent    Anesthesia Type- general with ASA Monitors.         Additional Monitors: arterial line.    Airway Plan: Oral ETT.    Comment: I, Dr. Clemons, the attending physician, have personally seen and evaluated the patient prior to anesthetic care.  I have reviewed the pre-anesthetic record, and other medical records if appropriate to the anesthetic care.  If a CRNA is involved in the case, I have reviewed the CRNA assessment, if present, and agree.  The patient is in a suitable condition to proceed with my formulated anesthetic plan.  .       Plan Factors-    Chart reviewed.                      Induction- intravenous and rapid sequence.    Postoperative Plan-         Informed Consent- Anesthetic plan and risks discussed with patient.  I personally reviewed this patient with the CRNA. Discussed and agreed on the  Anesthesia Plan with the CRNA..      NPO Status:  Vitals Value Taken Time   Date of last liquid 06/24/25 06/24/25 06:56   Time of last liquid 0100 06/24/25 06:56   Date of last solid 06/23/25 06/24/25 06:56   Time of last solid 2300 06/24/25 06:56

## 2025-06-24 NOTE — CONSULTS
Consultation - Vascular Surgery   Name: Rebeca Banegas 89 y.o. 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 Clinical exam and imaging concerning for [place] 2B acute limb ischemia    6/24/25 - CTA concerning for R femoral acute arterial occlusion without significant distal reconstitution. L popliteal occlusion    Recommendations:  - Continue hep gtt and pain medication as needed for ischemic rest pain  - Patient planned for OR for emergent R femoral cutdown, thromboembolectomy, RLE fasciotomies, possible completion angiogram.   - Extensive discussion was had regarding risks, benefits, alternatives including palliative care, high risk given patient's age and comorbidities, she provided informed consent to proceed with operative intervention. Attempts made to call daughter regarding her mother's care, VM left. Patient requested input of her PCP who understands her chronic medical care and he was able to be reached for discussion  - She states that she would not be amenable to more proximal amputation and should she have a complicated post-operative course she would not want to be maintained on mechanical ventilation  - NPO  - T&C 2upRBCs  - Patient seen and examined with vascular fellow on call, case discussed with vascular surgeon on call  -  "Will discuss with Dr. Wei      Please contact the SecureChat role,\"BE Vascular Surgery Resident/AP\", with any questions/concerns.    History of Present Illness   Rebeca Banegas is a 89 y.o. female who ambulates with a cane presents with hours history of R>L lower extremity pain since 0200, has never had such pain before, endorses compliance with warfarin that she takes for chronic persistent Afib. Subtherapeutic INR on presentation. On presentation in the ED with RLE mottling, insensate past midthigh, without active dorsiflexion/plantarflexion. Weakly able flex at hip and knee. LLE motor intact with some diminished sensation to L mid tibia and foot.     Review of Systems   Constitutional:  Negative for chills and fever.   Respiratory:  Negative for shortness of breath.    Cardiovascular:  Negative for chest pain.   Musculoskeletal:  Positive for myalgias.   Skin:  Positive for color change and pallor. Negative for wound.        RLE mottling   Neurological:  Positive for weakness and numbness.     Historical Information   Past Medical History[1]  Past Surgical History[2]  Social History[3]  E-Cigarette/Vaping    E-Cigarette Use Never User      E-Cigarette/Vaping Substances    Nicotine No     THC No     CBD No     Flavoring No     Other No     Unknown No      Family History[4]  Social History[5]  Allergies: Asa [aspirin], Nsaids, and Medical tape    Objective :  Temp:  [98.9 °F (37.2 °C)] 98.9 °F (37.2 °C)  HR:  [] 111  BP: (120-160)/(67-87) 143/74  Resp:  [20-22] 22  SpO2:  [90 %-95 %] 90 %  O2 Device: None (Room air)    I/O       None            Physical Exam  Constitutional:       Appearance: She is ill-appearing.   HENT:      Head: Normocephalic and atraumatic.     Eyes:      Extraocular Movements: Extraocular movements intact.       Cardiovascular:      Rate and Rhythm: Tachycardia present. Rhythm irregularly irregular.      Pulses:           Femoral pulses are 0 on the right side and 2+ on the left " side.       Popliteal pulses are 0 on the right side and 0 on the left side.        Dorsalis pedis pulses are 0 on the right side and 0 on the left side.        Posterior tibial pulses are 0 on the right side and 0 on the left side.      Comments: R femoral signal  L popliteal signal    No noted DP/PT doppler signals bilaterally  Pulmonary:      Effort: Pulmonary effort is normal. No respiratory distress.   Abdominal:      Palpations: Abdomen is soft.      Tenderness: There is no abdominal tenderness.     Musculoskeletal:      Cervical back: Neck supple.      Comments: RLE with numbness from mid thigh and distal, weak hip/knee flexion. No active movement at toes, dorsi/plantarflexion    LLE decreased sensation to foot and proximal ankle, motor intact with movement of toes, dorsi/plantarflexion, hip/knee flexion     Skin:     Comments: Upper extremities warm and dry; RLE mottling up to mid thigh, LLE with some mottling distally with poor capillary refill     Neurological:      Mental Status: She is alert.     Psychiatric:         Behavior: Behavior is cooperative.            Lab Results: I have reviewed the following results:  Recent Labs     06/24/25  0412   WBC 9.86   HGB 12.5   HCT 38.2      SODIUM 133*   K 4.3   CL 96   CO2 25   BUN 63*   CREATININE 1.87*   GLUC 130   AST 24   ALT 18   ALB 3.8   TBILI 0.50   ALKPHOS 52   PTT 28   INR 1.47*     CTA reviewed as above, previous venous duplex noted with LLE with triphasic signals in pop, PT, and AT in February    VTE Prophylaxis: Reason for no pharmacologic prophylaxis hep gtt         [1]   Past Medical History:  Diagnosis Date    Abnormal ECG     Abnormal ultrasound     RESOLVED: [id number]    Acute kidney failure (HCC) 12/29/2024    Advice given about COVID-19 virus infection 04/07/2020    Ambulates with cane     Arrhythmia     Arthritis     Asthma     Asthma with acute exacerbation     LAST ASSESSED: [id number]    Asymptomatic menopausal state      Atherosclerosis     Atrial fibrillation (HCC)     Chronic kidney disease     Chronic obstructive lung disease (HCC)     w/ chonic cough    Chronic ulcer of left foot (HCC)     Colon polyp     Congestive heart failure (HCC)     LAST ASSESSED: [id number]    COPD (chronic obstructive pulmonary disease) (HCC)     Coronary artery disease     COVID-19 01/06/2025    COVID-19 virus infection 03/25/2023    Cuboid fracture     LAST ASSESSED: [id number]    Disease of thyroid gland     Dyspepsia     Edema of both lower extremities     Equinus deformity of left foot     Fall 04/01/2024    Falls     5/2024    GERD (gastroesophageal reflux disease)     Headache(784.0)     High risk medication use     LAST ASSESSED: [id number]    Hyperlipidemia     Hypertension     Hypokalemia     Hypothyroidism     Impacted cerumen of both ears     LAST ASSESSED: [id number]    Impacted cerumen of right ear     LAST ASSESSED: 10UEH4402    Influenza     LAST ASSESSED: 07VYI7676    IPMN (intraductal papillary mucinous neoplasm)     RESOLVED: 02OCT2015    Irregular heart beat     afib. Warfarin.    Leg cramps 12/21/2018    Limb swelling     LAST ASSESSED: 67AJL9128    Navicular fracture of ankle     LAST ASSESSED: [id number]    Onychomycosis     LAST ASSESSED: [id number]    Open wound of right upper arm 10/17/2022    Osteoarthritis     Osteopenia     Osteoporosis     Paronychia, finger, unspecified laterality     LAST ASSESSED: 88RDZ3426    Paroxysmal atrial fibrillation (HCC)     LAST ASSESSED: 02MAR2014    Peripheral vascular disease (HCC)     Plantar fasciitis     SOBOE (shortness of breath on exertion)     Stroke (HCC)     Right arm weakness that has resolved    Talus fracture     LAST ASSESSED: 20HES7677    Vaccine counseling 12/19/2023    Vascular headache     Walker as ambulation aid     Wound of right foot    [2]   Past Surgical History:  Procedure Laterality Date    APPENDECTOMY      BONE BIOPSY Left 08/09/2024    Procedure: Bone bx of proximal  phalanx second toe & second met with fluoroscopic guidance;  Surgeon: Jonny Marcial DPM;  Location: [facility];  Service: Podiatry    CATARACT EXTRACTION Bilateral     CHOLECYSTECTOMY      COLONOSCOPY      JOINT REPLACEMENT Bilateral     knee    NEUROPLASTY / TRANSPOSITION MEDIAN NERVE AT CARPAL TUNNEL BILATERAL Bilateral     DECOMPRESSION    OOPHORECTOMY Bilateral     age 81    PELVIC FLOOR REPAIR  2014    MN BIOPSY BONE TROCAR/NEEDLE SUPERFICIAL Left 2024    Procedure: Left foot debridement;  Surgeon: Jonny Marcial DPM;  Location: [facility];  Service: Podiatry    MN DEBRIDEMENT MUSCLE &/FASCIA 1ST 20 SQ CM/< Left 2024    Procedure: Left foot debridement;  Surgeon: Jonny Marcial DPM;  Location: [facility];  Service: Podiatry    MN OSTEOT W/WO LNGTH SHRT/CORRJ 1ST METAR Left 2024    Procedure: Left foot percutaneous osteotomy of second, third and fourth metatarsal.;  Surgeon: Jonny Marcial DPM;  Location: [facility];  Service: Podiatry    SALPINGECTOMY Bilateral     SINUS SURGERY      TONSILLECTOMY  194    TOTAL KNEE ARTHROPLASTY Bilateral    [3]   Social History  Tobacco Use    Smoking status: Former     Current packs/day: 0.00     Average packs/day: 0.3 packs/day for 2.0 years (0.5 ttl pk-yrs)     Types: Cigarettes     Start date:      Quit date:      Years since quittin.5    Smokeless tobacco: Never    Tobacco comments:     On and off socially with friends    Vaping Use    Vaping status: Never Used   Substance and Sexual Activity    Alcohol use: Not Currently    Drug use: No    Sexual activity: Not Currently     Partners: Male     Birth control/protection: Post-menopausal   [4]   Family History  Problem Relation Name Age of Onset    Pancreatic cancer Mother Christiana 93    Heart failure Mother Christiana     Cancer Mother Christiana     Hypertension Mother Christiana     Coronary artery disease Family      Breast cancer Family      Uterine cancer  Family      Hyperlipidemia Family      Osteoarthritis Family      Ovarian cancer Family      Brain cancer Son      Stroke Father Isaac     Hypertension Father Isaac     No Known Problems Sister      No Known Problems Daughter      No Known Problems Maternal Grandmother      No Known Problems Maternal Grandfather      No Known Problems Paternal Grandmother      No Known Problems Paternal Grandfather      Breast cancer Cousin  50    Breast cancer Cousin  50    Ovarian cancer Other niece 49   [5]   Social History  Tobacco Use    Smoking status: Former     Current packs/day: 0.00     Average packs/day: 0.3 packs/day for 2.0 years (0.5 ttl pk-yrs)     Types: Cigarettes     Start date:      Quit date:      Years since quittin.5    Smokeless tobacco: Never    Tobacco comments:     On and off socially with friends    Vaping Use    Vaping status: Never Used   Substance and Sexual Activity    Alcohol use: Not Currently    Drug use: No    Sexual activity: Not Currently     Partners: Male     Birth control/protection: Post-menopausal

## 2025-06-24 NOTE — CONSULTS
Consultation - Pulmonology   Name: Rebeca Banegas 89 y.o. female I MRN: [id number]  Unit/Bed#: OR POOL I Date of Admission: 6/24/2025   Date of Service: 6/24/2025 I Hospital Day: 0       Inpatient consult to Pulmonology     Date/Time  6/24/2025 10:07 AM     Performed by  Santana Recinos DO   Authorized by  Ayleen Shelton PA-C           Physician Requesting Evaluation: Arthur Wei MD   Reason for Evaluation / Principal Problem: History of bronchiectasis    Assessment & Plan  Acute lower limb ischemia  Patient presented early on 6/24/2025 for right lower extremity pain.  She was found to have mottling, decreased sensation, and decreased strength.  CTA showing right femoral acute arterial occlusion.   Status post right femoral cutdown, thromboembolectomy, and right lower extremity fasciotomies.  Currently on heparin drip  Management per primary  Bronchiectasis (HCC)  Patient with history of bronchiectasis noted on CT chest and Pseudomonas growth on previous sputum culture (38/2024 - treated with levaquin with improvement).  Last exacerbation was in March 2024, she was seen in the office on 6/18/2025.  At that time her airway clearance was increased.  Continue with aggressive pulmonary toilet  Airway clearance protocol  Respiratory protocol  Xopenex and 3% saline 4 times daily  Continue flutter valve, incentive spirometry  High frequency chest wall oscillation  Mucinex  Wean O2 for an SpO2 goal of greater than 88% or per patient comfort -if the patient continuously requires oxygen, would perform home O2 eval prior to discharge  Consider obtaining chest x-ray now that the patient is out of the OR  If the patient does not improve, will obtain sputum culture and consider bronchoscopy  No indication for inhaled tobramycin at this point or other antibiotics, but low threshold to start  Asthma  Patient's last PFTs in September 2023 revealing a fixed airway obstruction of moderate severity, without any  significant improvement on bronchodilators.  Patient has limited tobacco abuse in the past.  She possibly has a fixed obstruction phenotype of asthma versus COPD.  Switch home ICS/LABA to budesonide and Xopenex  No indication right now for intravenous or oral steroids  Can switch back to home Advair on discharge  Patient has follow-up with our office in 2 days, if she is not discharged by then we will arrange a new appointment  I have discussed the above management plan in detail with the primary service.     History of Present Illness   Rebeca Banegas is a 89 y.o. female with a past medical history of diastolic heart failure, A-fib on Coumadin, asthma, bronchiectasis who presented with right greater than left lower extremity pain earlier this morning.  She was found to have right lower extremity mottling, decreased sensation, and decreased strength in her right lower extremity.  CTA showed right femoral acute arterial occlusion, and she was taken to the OR for emergent right femoral cutdown, thromboembolectomy, right lower extremity fasciotomies.  Pulmonary was consulted if the patient came out of the OR for management of her bronchiectasis and asthma.    Review of Systems   Constitutional:  Negative for chills and fatigue.   HENT:  Negative for congestion, sinus pressure, sinus pain and sneezing.    Respiratory:  Positive for cough and shortness of breath.    Cardiovascular:  Negative for chest pain and leg swelling.   Gastrointestinal:  Negative for abdominal distention and abdominal pain.   Genitourinary:  Negative for dysuria.   Musculoskeletal:  Positive for gait problem.   Skin:  Positive for pallor.   Neurological:  Negative for dizziness and headaches.   Psychiatric/Behavioral:  Negative for agitation and confusion.        Historical Information   Medical History Review: I have reviewed the patient's PMH, PSH, Social History, Family History, Meds, and Allergies   Tobacco History: Former remote smoker,  quit over 70 years ago  Occupational History: Noncontributory  Family History:non-contributory    Objective :  Temp:  [97.7 °F (36.5 °C)-98.9 °F (37.2 °C)] 97.7 °F (36.5 °C)  HR:  [] 98  BP: (119-160)/(61-87) 147/67  Resp:  [11-22] 11  SpO2:  [90 %-98 %] 98 %  O2 Device: Simple mask  Nasal Cannula O2 Flow Rate (L/min):  [2 L/min] 2 L/min    Physical Exam  Vitals reviewed.   Constitutional:       General: She is not in acute distress.  HENT:      Head: Normocephalic and atraumatic.      Right Ear: External ear normal.      Left Ear: External ear normal.      Nose: Nose normal.      Mouth/Throat:      Mouth: Mucous membranes are moist.      Pharynx: Oropharynx is clear.     Eyes:      Extraocular Movements: Extraocular movements intact.      Pupils: Pupils are equal, round, and reactive to light.       Cardiovascular:      Rate and Rhythm: Normal rate and regular rhythm.   Pulmonary:      Effort: Pulmonary effort is normal.      Breath sounds: Wheezing and rhonchi present.   Abdominal:      General: Abdomen is flat. There is no distension.      Palpations: Abdomen is soft.      Tenderness: There is no abdominal tenderness.     Musculoskeletal:         General: Signs of injury present.     Skin:     Coloration: Skin is pale.     Neurological:      General: No focal deficit present.      Mental Status: She is alert and oriented to person, place, and time.     Psychiatric:         Mood and Affect: Mood normal.         Behavior: Behavior normal.           Lab Results: I have reviewed the following results:  .     06/24/25  0412   WBC 9.86   HGB 12.5   HCT 38.2      SODIUM 133*   K 4.3   CL 96   CO2 25   BUN 63*   CREATININE 1.87*   GLUC 130   AST 24   ALT 18   ALB 3.8   TBILI 0.50   ALKPHOS 52   PTT 28   INR 1.47*     ABG: No new results in last 24 hours.    Imaging Results Review: I reviewed radiology reports from this admission including: CT chest.  Other Study Results Review: EKG was reviewed.   PFT  Results Reviewed: reviewed    VTE Prophylaxis: VTE covered by:  heparin (porcine), Intravenous, 18 Units/kg/hr at 06/24/25 0732       Administrative Statements   I have spent a total time of 45 minutes in caring for this patient on the day of the visit/encounter including Diagnostic results, Instructions for management, Impressions, Reviewing/placing orders in the medical record (including tests, medications, and/or procedures), and Obtaining or reviewing history  .    Santana Recinos D.O.  PGY-5, Pulmonary/Critical Care  [facility]

## 2025-06-24 NOTE — ASSESSMENT & PLAN NOTE
Patient's last PFTs in September 2023 revealing a fixed airway obstruction of moderate severity, without any significant improvement on bronchodilators.  Patient has limited tobacco abuse in the past.  She possibly has a fixed obstruction phenotype of asthma versus COPD.  Switch home ICS/LABA to budesonide and Xopenex  No indication right now for intravenous or oral steroids  Can switch back to home Advair on discharge  Patient has follow-up with our office in 2 days, if she is not discharged by then we will arrange a new appointment

## 2025-06-24 NOTE — ED ATTENDING ATTESTATION
6/24/2025  I, Stewart Live MD, saw and evaluated the patient. I have discussed the patient with the resident/non-physician practitioner and agree with the resident's/non-physician practitioner's findings, Plan of Care, and MDM as documented in the resident's/non-physician practitioner's note, except where noted. All available labs and Radiology studies were reviewed.  I was present for key portions of any procedure(s) performed by the resident/non-physician practitioner and I was immediately available to provide assistance.       At this point I agree with the current assessment done in the Emergency Department.  I have conducted an independent evaluation of this patient a history and physical is as follows:        Final Diagnosis:  1. Ischemic rest pain of lower extremity    2. Acute lower limb ischemia      Chief Complaint   Patient presents with    Leg Pain     Patient woke up with R sided leg pain, non ambulatory at baseline, pt now unable to move R toes, and feels pins and needles, both LE cold and purple in color, color has improved with elevation of extremities per EMS           A:  - 89-year-old female presents with bilateral lower extremity pain.      P:  Using ultrasound: Unable to Doppler DP, PT, popliteal, femoral pulses on the right.  Unable to Doppler PT or PT pulses on the left.  Faint popliteal and femoral pulses on the left.      - Concern for acute ischemia bilateral lower extremities.  - Stat consult to vascular surgery.  - Start heparin drip.  - Stat CTA abdomen with runoff to evaluate extent of vascular disease.  - CBC to eval for evidence of marked leukocytosis or anemia.  - CMP to evaluate electrolytes and renal function.      H:    89-year-old female presents with bilateral lower extremity pain.  Patient woke up approximately 2 hours prior to arrival with severe right leg pain.  Developed left leg pain as well.  Called EMS.  On arrival, patient's bilateral lower extremities are blue and  mottled with diminished sensation.      PMH:  Past Medical History[1]    PSH:  Past Surgical History[2]      PE:   Vitals:    06/24/25 0545 06/24/25 0600 06/24/25 0615 06/24/25 0630   BP: 145/71 126/77 130/76 119/61   BP Location: Left arm Left arm Left arm Left arm   Pulse: (!) 108 105 96 94   Resp: 22 20 16 19   Temp:       TempSrc:       SpO2: 96% 95% 96% 96%         Constitutional: Ill-appearing.  Appears uncomfortable.  Cardiovascular: Normal rate, regular rhythm.  Pulmonary/Chest: Effort normal.   Abdominal: Soft. Normal appearance.   Neurological: Patient able to move her legs.  However, diminished sensation in bilateral feet.  Skin: Bilateral lower extremities are cool and mottled.  Psychiatric: She has a normal mood and affect. Her speech is normal and behavior is normal. Thought content normal.          - 13 point ROS was performed and all are normal unless stated in the history above.   - Nursing note reviewed. Vitals reviewed.   - Orders placed by myself and/or advanced practitioner / resident.    - Previous chart was reviewed  - No language barrier.   - History obtained from patient.   - There are no limitations to the history obtained. Reasons ROS could not be obtained:  N/A      Critical Care Time Statement: Upon my evaluation, this patient had a high probability of imminent or life-threatening deterioration due to acute bilateral lower extremity limb ischemia, which required my direct attention, intervention, and personal management.  I spent a total of 45 minutes directly providing critical care services, including interpretation of complex medical databases, evaluating for the presence of life-threatening injuries or illnesses, management of organ system failure(s) , and complex medical decision making (to support/prevent further life-threatening deterioration).. This time is exclusive of procedures, teaching, family meetings, treating other patients, and any prior time recorded by providers other  than myself.             Medications   heparin (porcine) 25,000 units in 0.45% NaCl 250 mL infusion (premix) (18 Units/kg/hr × 55 kg (Order-Specific) Intravenous New Bag 6/24/25 0422)   heparin (porcine) 2,000 Units, papaverine 60 mg in multi-electrolyte (Plasmalyte-A/Isolyte-S PH 7.4/Normosol-R) 500 mL irrigation (has no administration in time range)   chlorhexidine (PERIDEX) 0.12 % oral rinse 15 mL (has no administration in time range)   heparin (porcine) injection 4,400 Units (4,400 Units Intravenous Given 6/24/25 0421)   HYDROmorphone (DILAUDID) injection 0.5 mg (0.5 mg Intravenous Given 6/24/25 0425)   iohexol (OMNIPAQUE) 350 MG/ML injection (MULTI-DOSE) 120 mL (120 mL Intravenous Given 6/24/25 0423)   HYDROmorphone (DILAUDID) injection 0.5 mg (0.5 mg Intravenous Given 6/24/25 0517)     CTA abdominal w run off w wo contrast    (Results Pending)     Orders Placed This Encounter   Procedures    CTA abdominal w run off w wo contrast    CBC and differential    Comprehensive metabolic panel    APTT    Protime-INR    APTT    RBC Morphology Reflex Test    Diet NPO; Sips with meds    Heparin: VTE/PE  Infusion Protocol Administration Instructions    Heparin: VTE/PE  Infusion Protocol Notify Physician If:    Heparin Infusion and Platelet Monitoring Per Protocol    Insert peripheral IV    Void on call to OR    Nursing Communication CHG bath, have staff wash entire body (neck down) per pre op bathing protocol. Routine, evening prior to, and day of surgery.    Nursing Communication Swab both nares with Povidone-Iodine solution, EXCLUDE if patient has shellfish/Iodine allergy, and replace with nasal alcohol swabstick. Routine, day of surgery, on call to OR.    Inpatient consult to Vascular Surgery    ECG 12 lead    Type and screen    Prepare Leukoreduced RBC: 2 Units     Labs Reviewed   CBC AND DIFFERENTIAL - Abnormal       Result Value Ref Range Status    WBC 9.86  4.31 - 10.16 Thousand/uL Final    RBC 3.94  3.81 - 5.12  Million/uL Final    Hemoglobin 12.5  11.5 - 15.4 g/dL Final    Hematocrit 38.2  34.8 - 46.1 % Final    MCV 97  82 - 98 fL Final    MCH 31.7  26.8 - 34.3 pg Final    MCHC 32.7  31.4 - 37.4 g/dL Final    RDW 15.7 (*) 11.6 - 15.1 % Final    MPV 9.4  8.9 - 12.7 fL Final    Platelets 220  149 - 390 Thousands/uL Final   COMPREHENSIVE METABOLIC PANEL - Abnormal    Sodium 133 (*) 135 - 147 mmol/L Final    Potassium 4.3  3.5 - 5.3 mmol/L Final    Chloride 96  96 - 108 mmol/L Final    CO2 25  21 - 32 mmol/L Final    ANION GAP 12  4 - 13 mmol/L Final    BUN 63 (*) 5 - 25 mg/dL Final    Creatinine 1.87 (*) 0.60 - 1.30 mg/dL Final    Comment: Standardized to IDMS reference method    Glucose 130  65 - 140 mg/dL Final    Comment: If the patient is fasting, the ADA then defines impaired fasting glucose as > 100 mg/dL and diabetes as > or equal to 123 mg/dL.    Calcium 8.6  8.4 - 10.2 mg/dL Final    AST 24  13 - 39 U/L Final    ALT 18  7 - 52 U/L Final    Comment: Specimen collection should occur prior to Sulfasalazine administration due to the potential for falsely depressed results.     Alkaline Phosphatase 52  34 - 104 U/L Final    Total Protein 7.4  6.4 - 8.4 g/dL Final    Albumin 3.8  3.5 - 5.0 g/dL Final    Total Bilirubin 0.50  0.20 - 1.00 mg/dL Final    Comment: Use of this assay is not recommended for patients undergoing treatment with eltrombopag due to the potential for falsely elevated results.  N-acetyl-p-benzoquinone imine (metabolite of Acetaminophen) will generate erroneously low results in samples for patients that have taken an overdose of Acetaminophen.    eGFR 23  ml/min/1.73sq m Final    Narrative:     National Kid[employer] Foundation guidelines for Chronic Kidney Disease (CKD):     Stage 1 with normal or high GFR (GFR > 90 mL/min/1.73 square meters)    Stage 2 Mild CKD (GFR = 60-89 mL/min/1.73 square meters)    Stage 3A Moderate CKD (GFR = 45-59 mL/min/1.73 square meters)    Stage 3B Moderate CKD (GFR = 30-44  mL/min/1.73 square meters)    Stage 4 Severe CKD (GFR = 15-29 mL/min/1.73 square meters)    Stage 5 End Stage CKD (GFR <15 mL/min/1.73 square meters)  Note: GFR calculation is accurate only with a steady state creatinine   PROTIME-INR - Abnormal    Protime 18.0 (*) 12.3 - 15.0 seconds Final    INR 1.47 (*) 0.85 - 1.19 Final    Narrative:     INR Therapeutic Range    Indication                                             INR Range      Atrial Fibrillation                                               2.0-3.0  Hypercoagulable State                                    2.0.2.3  Left Ventricular Asist Device                            2.0-3.0  Mechanical Heart Valve                                  -    Aortic(with afib, MI, embolism, HF, LA enlargement,    and/or coagulopathy)                                     2.0-3.0 (2.5-3.5)     Mitral                                                             2.5-3.5  Prosthetic/Bioprosthetic Heart Valve               2.0-3.0  Venous thromboembolism (VTE: VT, PE        2.0-3.0   MANUAL DIFF (NON CWS) - Abnormal    Segmented % 39  % Final    Lymphocytes % 30  % Final    Monocytes % 8  4 - 12 % Final    Eosinophils % 19 (*) 0 - 6 % Final    Atypical Lymphocytes % 4 (*) 0 - 0 % Final    Absolute Neutrophils 3.85  1.81 - 6.82 Thousand/uL Final    Absolute Lymphocytes 3.35  0.60 - 4.47 Thousand/uL Final    Absolute Monocytes 0.79  0.00 - 1.22 Thousand/uL Final    Absolute Eosinophils 1.87 (*) 0.00 - 0.61 Thousand/uL Final    Total Counted 100   Final    RBC Morphology Present   Final    Platelet Estimate Adequate  Adequate Final    Pathology Review Yes (*) No Final    Comment: No    Anisocytosis Present   Final    Hamlet Cells Present   Final    Macrocytes Present   Final    Ovalocytes Present   Final    Poikilocytes Present   Final    Polychromasia Present   Final    Differential Comment see note   Final    Comment: Pathologist Review.   MANUAL DIFFERENTIAL(PHLEBS DO NOT ORDER) -  Abnormal    Segmented % 39 (*) 43 - 75 % Final    Lymphocytes % 30  14 - 44 % Final    Monocytes % 8  4 - 12 % Final    Eosinophils % 19 (*) 0 - 6 % Final    Basophils % 0  0 - 1 % Final    Atypical Lymphocytes % 4 (*) <=0 % Final    Absolute Neutrophils 3.85  1.85 - 7.62 Thousand/uL Final    Absolute Lymphocytes 3.35  0.60 - 4.47 Thousand/uL Final    Absolute Monocytes 0.79  0.00 - 1.22 Thousand/uL Final    Absolute Eosinophils 1.87 (*) 0.00 - 0.40 Thousand/uL Final    Absolute Basophils 0.00  0.00 - 0.10 Thousand/uL Final    Total Counted 100   Final    RBC Morphology Present   Final    Platelet Estimate Adequate  Adequate Final    Pathology Review Yes (*) No Final    Differential Comment see note   Final    Comment: Pathologist Review.    Anisocytosis Present   Final    Nae Cells Present   Final    Macrocytes Present   Final    Ovalocytes Present   Final    Poikilocytes Present   Final    Polychromasia Present   Final   APTT - Normal    PTT 28  23 - 34 seconds Final    Comment: Therapeutic Heparin Range =  60-90 seconds   RBC MORPHOLOGY REFLEX TEST   TYPE AND SCREEN   PREPARE LEUKOREDUCED RBC   PATH SLIDE REVIEW   PATH SLIDE REVIEW     Time reflects when diagnosis was documented in both MDM as applicable and the Disposition within this note       Time User Action Codes Description Comment    6/24/2025  3:54 AM Hao Zapata Add [M79.606,  I99.8] Ischemic rest pain of lower extremity     6/24/2025  5:58 AM Yusuf Majano Add [I99.8] Acute lower limb ischemia           ED Disposition       ED Disposition   Admit    Condition   Stable    Date/Time   Tue Jun 24, 2025  6:38 AM    Comment   --             Follow-up Information    None       Patient's Medications   Discharge Prescriptions    No medications on file     No discharge procedures on file.  Prior to Admission Medications   Prescriptions Last Dose Informant Patient Reported? Taking?   Cholecalciferol (VITAMIN D3) 1000 units CAPS  Self Yes No   Sig: Take 2,000 Units  "by mouth in the morning.   Empagliflozin (Jardiance) 10 MG TABS tablet  Self No No   Sig: TAKE 1 TABLET EVERY MORNING   Fluticasone-Salmeterol (Advair Diskus) 250-50 mcg/dose inhaler  Self No No   Sig: Inhale 1 puff 2 (two) times a day Rinse mouth after use.   albuterol (2.5 mg/3 mL) 0.083 % nebulizer solution  Self No No   Sig: Take 3 mL (2.5 mg total) by nebulization every 8 (eight) hours   albuterol (Proventil HFA) 90 mcg/act inhaler  Self No No   Sig: Inhale 2 puffs every 6 (six) hours as needed for wheezing   ammonium lactate (LAC-HYDRIN) 12 % lotion  Self No No   Sig: Apply topically 2 (two) times a day as needed for dry skin   atorvastatin (LIPITOR) 40 mg tablet  Self No No   Sig: TAKE 1 TABLET EVERY EVENING   Patient not taking: Reported on 6/18/2025   bumetanide (BUMEX) 2 mg tablet  Self No No   Sig: Take 1.5 tablets (3 mg total) by mouth 2 (two) times a day   levothyroxine 50 mcg tablet  Self No No   Sig: TAKE 1 TABLET DAILY, EXCEPT TAKE 1 AND 1/2 TABLETS ON SUNDAY AND WEDNESDAY   metoprolol succinate (TOPROL-XL) 100 mg 24 hr tablet  Self No No   Sig: Take 1 tablet (100 mg total) by mouth daily   sodium chloride 3 % inhalation solution  Self No No   Sig: Take 4 mL by nebulization 3 (three) times a day   spironolactone (ALDACTONE) 50 mg tablet  Self No No   Sig: Take 1 tablet (50 mg total) by mouth daily   warfarin (Jantoven) 2.5 mg tablet  Self No No   Sig: Take 1 tablet (2.5 mg total) by mouth daily      Facility-Administered Medications: None       Portions of the record may have been created with voice recognition software. Occasional wrong word or \"sound a like\" substitutions may have occurred due to the inherent limitations of voice recognition software. Read the chart carefully and recognize, using context, where substitutions have occurred.       ED Course         Critical Care Time  Procedures           [1]   Past Medical History:  Diagnosis Date    Abnormal ECG     Abnormal ultrasound     " RESOLVED: [id number]    Acute kidney failure (HCC) 12/29/2024    Advice given about COVID-19 virus infection 04/07/2020    Ambulates with cane     Arrhythmia     Arthritis     Asthma     Asthma with acute exacerbation     LAST ASSESSED: [id number]    Asymptomatic menopausal state     Atherosclerosis     Atrial fibrillation (HCC)     Chronic kidney disease     Chronic obstructive lung disease (HCC)     w/ chonic cough    Chronic ulcer of left foot (HCC)     Colon polyp     Congestive heart failure (HCC)     LAST ASSESSED: [id number]    COPD (chronic obstructive pulmonary disease) (HCC)     Coronary artery disease     COVID-19 01/06/2025    COVID-19 virus infection 03/25/2023    Cuboid fracture     LAST ASSESSED: [id number]    Disease of thyroid gland     Dyspepsia     Edema of both lower extremities     Equinus deformity of left foot     Fall 04/01/2024    Falls     5/2024    GERD (gastroesophageal reflux disease)     Headache(784.0)     High risk medication use     LAST ASSESSED: [id number]    Hyperlipidemia     Hypertension     Hypokalemia     Hypothyroidism     Impacted cerumen of both ears     LAST ASSESSED: [id number]    Impacted cerumen of right ear     LAST ASSESSED: [id number]    Influenza     LAST ASSESSED: [id number]    IPMN (intraductal papillary mucinous neoplasm)     RESOLVED: [id number]    Irregular heart beat     afib. Warfarin.    Leg cramps 12/21/2018    Limb swelling     LAST ASSESSED: [id number]    Navicular fracture of ankle     LAST ASSESSED: [id number]    Onychomycosis     LAST ASSESSED: [id number]    Open wound of right upper arm 10/17/2022    Osteoarthritis     Osteopenia     Osteoporosis     Paronychia, finger, unspecified laterality     LAST ASSESSED: [id number]    Paroxysmal atrial fibrillation (HCC)     LAST ASSESSED: [id number]    Peripheral vascular disease ([facility])     Plantar fasciitis     SOBOE (shortness of breath on exertion)     Stroke ([facility])     Right arm weakness that has resolved    Talus  fracture     LAST ASSESSED: [id number]    Vaccine counseling 12/19/2023    Vascular headache     Walker as ambulation aid     Wound of right foot    [2]   Past Surgical History:  Procedure Laterality Date    APPENDECTOMY      BONE BIOPSY Left 08/09/2024    Procedure: Bone bx of proximal phalanx second toe & second met with fluoroscopic guidance;  Surgeon: Jonny Marcial DPM;  Location: [facility];  Service: Podiatry    CATARACT EXTRACTION Bilateral     CHOLECYSTECTOMY      COLONOSCOPY      JOINT REPLACEMENT Bilateral     knee    NEUROPLASTY / TRANSPOSITION MEDIAN NERVE AT CARPAL TUNNEL BILATERAL Bilateral     DECOMPRESSION    OOPHORECTOMY Bilateral     age 81    PELVIC FLOOR REPAIR  2014    VA BIOPSY BONE TROCAR/NEEDLE SUPERFICIAL Left 08/09/2024    Procedure: Left foot debridement;  Surgeon: Jonny Marcial DPM;  Location: [facility];  Service: Podiatry    VA DEBRIDEMENT MUSCLE &/FASCIA 1ST 20 SQ CM/< Left 08/09/2024    Procedure: Left foot debridement;  Surgeon: Jonny Marcial DPM;  Location: [facility];  Service: Podiatry    VA OSTEOT W/WO LNGTH SHRT/CORRJ 1ST METAR Left 11/29/2024    Procedure: Left foot percutaneous osteotomy of second, third and fourth metatarsal.;  Surgeon: Jonny Marcial DPM;  [facility]: [facility];  Service: Podiatry    SALPINGECTOMY Bilateral     SINUS SURGERY      TONSILLECTOMY  1941    TOTAL KNEE ARTHROPLASTY Bilateral

## 2025-06-24 NOTE — ANESTHESIA POSTPROCEDURE EVALUATION
Post-Op Assessment Note            No anethesia notable event occurred.            Last Filed PACU Vitals:  Vitals Value Taken Time   Temp 97.7 °F (36.5 °C) 06/24/25 11:00   Pulse 92 06/24/25 14:00   BP 96/52 06/24/25 13:45   Resp 30 06/24/25 14:00   SpO2 96 % 06/24/25 14:00       Modified Kendell:     Vitals Value Taken Time   Activity 2 06/24/25 14:00   Respiration 2 06/24/25 14:00   Circulation 2 06/24/25 14:00   Consciousness 2 06/24/25 14:00   Oxygen Saturation 2 06/24/25 14:00     Modified Kendell Score: 10

## 2025-06-25 ENCOUNTER — APPOINTMENT (INPATIENT)
Dept: NON INVASIVE DIAGNOSTICS | Facility: HOSPITAL | Age: OVER 89
DRG: 252 | End: 2025-06-25
Payer: COMMERCIAL

## 2025-06-25 ENCOUNTER — APPOINTMENT (INPATIENT)
Dept: RADIOLOGY | Facility: HOSPITAL | Age: OVER 89
DRG: 252 | End: 2025-06-25
Payer: COMMERCIAL

## 2025-06-25 LAB
2HR DELTA HS TROPONIN: 0 NG/L
2HR DELTA HS TROPONIN: 5 NG/L
4HR DELTA HS TROPONIN: 0 NG/L
4HR DELTA HS TROPONIN: 6 NG/L
ALBUMIN SERPL BCG-MCNC: 2.6 G/DL (ref 3.5–5)
ALBUMIN SERPL BCG-MCNC: 3.6 G/DL (ref 3.5–5)
ALP SERPL-CCNC: 29 U/L (ref 34–104)
ALP SERPL-CCNC: 35 U/L (ref 34–104)
ALT SERPL W P-5'-P-CCNC: 7 U/L (ref 7–52)
ALT SERPL W P-5'-P-CCNC: 8 U/L (ref 7–52)
ANION GAP SERPL CALCULATED.3IONS-SCNC: 11 MMOL/L (ref 4–13)
ANION GAP SERPL CALCULATED.3IONS-SCNC: 7 MMOL/L (ref 4–13)
ANION GAP SERPL CALCULATED.3IONS-SCNC: 8 MMOL/L (ref 4–13)
AORTIC ROOT: 3.4 CM
APTT PPP: 155 SECONDS (ref 23–34)
APTT PPP: 99 SECONDS (ref 23–34)
APTT PPP: >210 SECONDS (ref 23–34)
ASCENDING AORTA: 3.4 CM
AST SERPL W P-5'-P-CCNC: 23 U/L (ref 13–39)
AST SERPL W P-5'-P-CCNC: 26 U/L (ref 13–39)
ATRIAL RATE: 139 BPM
ATRIAL RATE: 141 BPM
BASE EX.OXY STD BLDV CALC-SCNC: 58.9 % (ref 60–80)
BASE EXCESS BLDA CALC-SCNC: -3.1 MMOL/L
BASE EXCESS BLDA CALC-SCNC: -3.7 MMOL/L
BASE EXCESS BLDA CALC-SCNC: -4.5 MMOL/L
BASE EXCESS BLDV CALC-SCNC: -4.5 MMOL/L
BASOPHILS # BLD AUTO: 0.02 THOUSANDS/ÂΜL (ref 0–0.1)
BASOPHILS NFR BLD AUTO: 0 % (ref 0–1)
BILIRUB SERPL-MCNC: 0.59 MG/DL (ref 0.2–1)
BILIRUB SERPL-MCNC: 1.02 MG/DL (ref 0.2–1)
BODY TEMPERATURE: 97.3 DEGREES FEHRENHEIT
BODY TEMPERATURE: 97.3 DEGREES FEHRENHEIT
BSA FOR ECHO PROCEDURE: 1.66 M2
BUN SERPL-MCNC: 52 MG/DL (ref 5–25)
BUN SERPL-MCNC: 61 MG/DL (ref 5–25)
BUN SERPL-MCNC: 63 MG/DL (ref 5–25)
CALCIUM ALBUM COR SERPL-MCNC: 8.6 MG/DL (ref 8.3–10.1)
CALCIUM SERPL-MCNC: 7.3 MG/DL (ref 8.4–10.2)
CALCIUM SERPL-MCNC: 7.5 MG/DL (ref 8.4–10.2)
CALCIUM SERPL-MCNC: 7.7 MG/DL (ref 8.4–10.2)
CARDIAC TROPONIN I PNL SERPL HS: 65 NG/L (ref ?–50)
CARDIAC TROPONIN I PNL SERPL HS: 70 NG/L (ref ?–50)
CARDIAC TROPONIN I PNL SERPL HS: 71 NG/L (ref ?–50)
CARDIAC TROPONIN I PNL SERPL HS: 80 NG/L (ref ?–50)
CHLORIDE SERPL-SCNC: 101 MMOL/L (ref 96–108)
CO2 SERPL-SCNC: 19 MMOL/L (ref 21–32)
CO2 SERPL-SCNC: 22 MMOL/L (ref 21–32)
CO2 SERPL-SCNC: 24 MMOL/L (ref 21–32)
CORTIS SERPL-MCNC: 13.5 UG/DL
CREAT SERPL-MCNC: 1.7 MG/DL (ref 0.6–1.3)
CREAT SERPL-MCNC: 2.2 MG/DL (ref 0.6–1.3)
CREAT SERPL-MCNC: 2.31 MG/DL (ref 0.6–1.3)
CREAT UR-MCNC: 43.2 MG/DL
EOSINOPHIL # BLD AUTO: 0.12 THOUSAND/ÂΜL (ref 0–0.61)
EOSINOPHIL NFR BLD AUTO: 1 % (ref 0–6)
ERYTHROCYTE [DISTWIDTH] IN BLOOD BY AUTOMATED COUNT: 15.2 % (ref 11.6–15.1)
ERYTHROCYTE [DISTWIDTH] IN BLOOD BY AUTOMATED COUNT: 15.9 % (ref 11.6–15.1)
ERYTHROCYTE [DISTWIDTH] IN BLOOD BY AUTOMATED COUNT: 15.9 % (ref 11.6–15.1)
EST. AVERAGE GLUCOSE BLD GHB EST-MCNC: 134 MG/DL
FRACTIONAL SHORTENING: 32 (ref 28–44)
GFR SERPL CREATININE-BSD FRML MDRD: 17 ML/MIN/1.73SQ M
GFR SERPL CREATININE-BSD FRML MDRD: 18 ML/MIN/1.73SQ M
GFR SERPL CREATININE-BSD FRML MDRD: 25 ML/MIN/1.73SQ M
GLUCOSE SERPL-MCNC: 135 MG/DL (ref 65–140)
GLUCOSE SERPL-MCNC: 136 MG/DL (ref 65–140)
GLUCOSE SERPL-MCNC: 138 MG/DL (ref 65–140)
GLUCOSE SERPL-MCNC: 138 MG/DL (ref 65–140)
GLUCOSE SERPL-MCNC: 149 MG/DL (ref 65–140)
GLUCOSE SERPL-MCNC: 151 MG/DL (ref 65–140)
HBA1C MFR BLD: 6.3 %
HCO3 BLDA-SCNC: 20.5 MMOL/L (ref 22–28)
HCO3 BLDA-SCNC: 21 MMOL/L (ref 22–28)
HCO3 BLDA-SCNC: 21.2 MMOL/L (ref 22–28)
HCO3 BLDV-SCNC: 21.8 MMOL/L (ref 24–30)
HCT VFR BLD AUTO: 24.9 % (ref 34.8–46.1)
HCT VFR BLD AUTO: 25.3 % (ref 34.8–46.1)
HCT VFR BLD AUTO: 28.3 % (ref 34.8–46.1)
HGB BLD-MCNC: 8.3 G/DL (ref 11.5–15.4)
HGB BLD-MCNC: 8.7 G/DL (ref 11.5–15.4)
HGB BLD-MCNC: 9.8 G/DL (ref 11.5–15.4)
IMM GRANULOCYTES # BLD AUTO: 0.07 THOUSAND/UL (ref 0–0.2)
IMM GRANULOCYTES NFR BLD AUTO: 1 % (ref 0–2)
INTERVENTRICULAR SEPTUM IN DIASTOLE (PARASTERNAL SHORT AXIS VIEW): 1.4 CM
INTERVENTRICULAR SEPTUM: 1.4 CM (ref 0.6–1.1)
LAAS-AP2: 21.9 CM2
LAAS-AP4: 26.2 CM2
LACTATE SERPL-SCNC: 2 MMOL/L (ref 0.5–2)
LACTATE SERPL-SCNC: 2.1 MMOL/L (ref 0.5–2)
LACTATE SERPL-SCNC: 2.3 MMOL/L (ref 0.5–2)
LACTATE SERPL-SCNC: 2.9 MMOL/L (ref 0.5–2)
LACTATE SERPL-SCNC: 3.6 MMOL/L (ref 0.5–2)
LEFT ATRIUM SIZE: 4 CM
LEFT ATRIUM VOLUME (MOD BIPLANE): 70 ML
LEFT ATRIUM VOLUME INDEX (MOD BIPLANE): 42.2 ML/M2
LEFT INTERNAL DIMENSION IN SYSTOLE: 2.3 CM (ref 2.1–4)
LEFT VENTRICULAR INTERNAL DIMENSION IN DIASTOLE: 3.4 CM (ref 3.5–6)
LEFT VENTRICULAR POSTERIOR WALL IN END DIASTOLE: 1.2 CM
LEFT VENTRICULAR STROKE VOLUME: 28 ML
LV EF US.2D.A4C+ESTIMATED: 64 %
LVSV (TEICH): 28 ML
LYMPHOCYTES # BLD AUTO: 2.68 THOUSANDS/ÂΜL (ref 0.6–4.47)
LYMPHOCYTES NFR BLD AUTO: 21 % (ref 14–44)
MAGNESIUM SERPL-MCNC: 2.2 MG/DL (ref 1.9–2.7)
MCH RBC QN AUTO: 32 PG (ref 26.8–34.3)
MCH RBC QN AUTO: 32.3 PG (ref 26.8–34.3)
MCH RBC QN AUTO: 32.6 PG (ref 26.8–34.3)
MCHC RBC AUTO-ENTMCNC: 33.3 G/DL (ref 31.4–37.4)
MCHC RBC AUTO-ENTMCNC: 34.4 G/DL (ref 31.4–37.4)
MCHC RBC AUTO-ENTMCNC: 34.6 G/DL (ref 31.4–37.4)
MCV RBC AUTO: 93 FL (ref 82–98)
MCV RBC AUTO: 94 FL (ref 82–98)
MCV RBC AUTO: 97 FL (ref 82–98)
MONOCYTES # BLD AUTO: 1.45 THOUSAND/ÂΜL (ref 0.17–1.22)
MONOCYTES NFR BLD AUTO: 11 % (ref 4–12)
NASAL CANNULA: 2
NEUTROPHILS # BLD AUTO: 8.33 THOUSANDS/ÂΜL (ref 1.85–7.62)
NEUTS SEG NFR BLD AUTO: 66 % (ref 43–75)
NRBC BLD AUTO-RTO: 0 /100 WBCS
O2 CT BLDA-SCNC: 13.3 ML/DL (ref 16–23)
O2 CT BLDA-SCNC: 13.5 ML/DL (ref 16–23)
O2 CT BLDA-SCNC: 13.7 ML/DL (ref 16–23)
O2 CT BLDV-SCNC: 8.1 ML/DL
OTHER FIO2: ABNORMAL %
OTHER FIO2: ABNORMAL %
OXYHGB MFR BLDA: 96.3 % (ref 94–97)
OXYHGB MFR BLDA: 98 % (ref 94–97)
OXYHGB MFR BLDA: 98.1 % (ref 94–97)
PCO2 BLD: 44.5 MM HG (ref 42–50)
PCO2 BLDA: 34 MM HG (ref 36–44)
PCO2 BLDA: 37.2 MM HG (ref 36–44)
PCO2 BLDA: 37.7 MM HG (ref 36–44)
PCO2 BLDV: 45.9 MM HG (ref 42–50)
PCO2 TEMP ADJ BLDA: 36.6 MM HG (ref 36–44)
PH BLD: 7.3 [PH] (ref 7.3–7.4)
PH BLD: 7.38 [PH] (ref 7.35–7.45)
PH BLDA: 7.36 [PH] (ref 7.35–7.45)
PH BLDA: 7.37 [PH] (ref 7.35–7.45)
PH BLDA: 7.41 [PH] (ref 7.35–7.45)
PH BLDV: 7.29 [PH] (ref 7.3–7.4)
PHOSPHATE SERPL-MCNC: 4.9 MG/DL (ref 2.3–4.1)
PLATELET # BLD AUTO: 154 THOUSANDS/UL (ref 149–390)
PLATELET # BLD AUTO: 165 THOUSANDS/UL (ref 149–390)
PLATELET # BLD AUTO: 168 THOUSANDS/UL (ref 149–390)
PMV BLD AUTO: 9.2 FL (ref 8.9–12.7)
PMV BLD AUTO: 9.6 FL (ref 8.9–12.7)
PMV BLD AUTO: 9.7 FL (ref 8.9–12.7)
PO2 BLD: 181.4 MM HG (ref 75–129)
PO2 BLDA: 148.8 MM HG (ref 75–129)
PO2 BLDA: 185.1 MM HG (ref 75–129)
PO2 BLDA: 95.8 MM HG (ref 75–129)
PO2 BLDV: 33.8 MM HG (ref 35–45)
PO2 VENOUS TEMP CORRECTED: 32.2 MM HG (ref 35–45)
POTASSIUM SERPL-SCNC: 4.9 MMOL/L (ref 3.5–5.3)
POTASSIUM SERPL-SCNC: 5.5 MMOL/L (ref 3.5–5.3)
POTASSIUM SERPL-SCNC: 5.5 MMOL/L (ref 3.5–5.3)
PROCALCITONIN SERPL-MCNC: 0.19 NG/ML
PROT SERPL-MCNC: 4.5 G/DL (ref 6.4–8.4)
PROT SERPL-MCNC: 5.3 G/DL (ref 6.4–8.4)
QRS AXIS: -71 DEGREES
QRS AXIS: -71 DEGREES
QRSD INTERVAL: 114 MS
QRSD INTERVAL: 116 MS
QT INTERVAL: 300 MS
QT INTERVAL: 338 MS
QTC INTERVAL: 456 MS
QTC INTERVAL: 510 MS
RA PRESSURE ESTIMATED: 3 MMHG
RBC # BLD AUTO: 2.57 MILLION/UL (ref 3.81–5.12)
RBC # BLD AUTO: 2.72 MILLION/UL (ref 3.81–5.12)
RBC # BLD AUTO: 3.01 MILLION/UL (ref 3.81–5.12)
RIGHT ATRIUM AREA SYSTOLE A4C: 22.2 CM2
RIGHT VENTRICLE ID DIMENSION: 2.9 CM
RV PSP: 50 MMHG
SL CV LEFT ATRIUM LENGTH A2C: 6.6 CM
SL CV LV EF: 65
SL CV PED ECHO LEFT VENTRICLE DIASTOLIC VOLUME (MOD BIPLANE) 2D: 46 ML
SL CV PED ECHO LEFT VENTRICLE SYSTOLIC VOLUME (MOD BIPLANE) 2D: 18 ML
SODIUM SERPL-SCNC: 131 MMOL/L (ref 135–147)
SODIUM SERPL-SCNC: 131 MMOL/L (ref 135–147)
SODIUM SERPL-SCNC: 132 MMOL/L (ref 135–147)
SODIUM UR-SCNC: 29 MMOL/L
SPECIMEN SOURCE: ABNORMAL
T WAVE AXIS: 123 DEGREES
T WAVE AXIS: 135 DEGREES
TR MAX PG: 47 MMHG
TR PEAK VELOCITY: 3.4 M/S
TRICUSPID ANNULAR PLANE SYSTOLIC EXCURSION: 1.4 CM
TRICUSPID VALVE PEAK REGURGITATION VELOCITY: 3.44 M/S
TSH SERPL DL<=0.05 MIU/L-ACNC: 3.85 UIU/ML (ref 0.45–4.5)
UUN 24H UR-MCNC: 259 MG/DL
VENTRICULAR RATE: 137 BPM
VENTRICULAR RATE: 139 BPM
WBC # BLD AUTO: 10.36 THOUSAND/UL (ref 4.31–10.16)
WBC # BLD AUTO: 11.64 THOUSAND/UL (ref 4.31–10.16)
WBC # BLD AUTO: 12.67 THOUSAND/UL (ref 4.31–10.16)

## 2025-06-25 PROCEDURE — 85060 BLOOD SMEAR INTERPRETATION: CPT | Performed by: STUDENT IN AN ORGANIZED HEALTH CARE EDUCATION/TRAINING PROGRAM

## 2025-06-25 PROCEDURE — 82805 BLOOD GASES W/O2 SATURATION: CPT

## 2025-06-25 PROCEDURE — 84300 ASSAY OF URINE SODIUM: CPT

## 2025-06-25 PROCEDURE — P9016 RBC LEUKOCYTES REDUCED: HCPCS

## 2025-06-25 PROCEDURE — 71045 X-RAY EXAM CHEST 1 VIEW: CPT

## 2025-06-25 PROCEDURE — 84484 ASSAY OF TROPONIN QUANT: CPT

## 2025-06-25 PROCEDURE — NC001 PR NO CHARGE: Performed by: EMERGENCY MEDICINE

## 2025-06-25 PROCEDURE — 93306 TTE W/DOPPLER COMPLETE: CPT

## 2025-06-25 PROCEDURE — 85025 COMPLETE CBC W/AUTO DIFF WBC: CPT

## 2025-06-25 PROCEDURE — 30233N1 TRANSFUSION OF NONAUTOLOGOUS RED BLOOD CELLS INTO PERIPHERAL VEIN, PERCUTANEOUS APPROACH: ICD-10-PCS

## 2025-06-25 PROCEDURE — NC001 PR NO CHARGE: Performed by: INTERNAL MEDICINE

## 2025-06-25 PROCEDURE — 02HV33Z INSERTION OF INFUSION DEVICE INTO SUPERIOR VENA CAVA, PERCUTANEOUS APPROACH: ICD-10-PCS | Performed by: EMERGENCY MEDICINE

## 2025-06-25 PROCEDURE — 83605 ASSAY OF LACTIC ACID: CPT

## 2025-06-25 PROCEDURE — 80053 COMPREHEN METABOLIC PANEL: CPT

## 2025-06-25 PROCEDURE — 94668 MNPJ CHEST WALL SBSQ: CPT

## 2025-06-25 PROCEDURE — 99233 SBSQ HOSP IP/OBS HIGH 50: CPT | Performed by: INTERNAL MEDICINE

## 2025-06-25 PROCEDURE — 82570 ASSAY OF URINE CREATININE: CPT

## 2025-06-25 PROCEDURE — 80048 BASIC METABOLIC PNL TOTAL CA: CPT | Performed by: SURGERY

## 2025-06-25 PROCEDURE — 93005 ELECTROCARDIOGRAM TRACING: CPT

## 2025-06-25 PROCEDURE — 84443 ASSAY THYROID STIM HORMONE: CPT

## 2025-06-25 PROCEDURE — 99291 CRITICAL CARE FIRST HOUR: CPT | Performed by: EMERGENCY MEDICINE

## 2025-06-25 PROCEDURE — 84100 ASSAY OF PHOSPHORUS: CPT

## 2025-06-25 PROCEDURE — 84145 PROCALCITONIN (PCT): CPT

## 2025-06-25 PROCEDURE — 93306 TTE W/DOPPLER COMPLETE: CPT | Performed by: INTERNAL MEDICINE

## 2025-06-25 PROCEDURE — 83036 HEMOGLOBIN GLYCOSYLATED A1C: CPT

## 2025-06-25 PROCEDURE — 82533 TOTAL CORTISOL: CPT

## 2025-06-25 PROCEDURE — 85027 COMPLETE CBC AUTOMATED: CPT | Performed by: SURGERY

## 2025-06-25 PROCEDURE — 83735 ASSAY OF MAGNESIUM: CPT

## 2025-06-25 PROCEDURE — 84540 ASSAY OF URINE/UREA-N: CPT

## 2025-06-25 PROCEDURE — 85730 THROMBOPLASTIN TIME PARTIAL: CPT | Performed by: SURGERY

## 2025-06-25 PROCEDURE — 94640 AIRWAY INHALATION TREATMENT: CPT

## 2025-06-25 PROCEDURE — 82948 REAGENT STRIP/BLOOD GLUCOSE: CPT

## 2025-06-25 PROCEDURE — 85027 COMPLETE CBC AUTOMATED: CPT

## 2025-06-25 PROCEDURE — 36556 INSERT NON-TUNNEL CV CATH: CPT | Performed by: EMERGENCY MEDICINE

## 2025-06-25 PROCEDURE — 94760 N-INVAS EAR/PLS OXIMETRY 1: CPT

## 2025-06-25 PROCEDURE — 94664 DEMO&/EVAL PT USE INHALER: CPT

## 2025-06-25 PROCEDURE — 99024 POSTOP FOLLOW-UP VISIT: CPT | Performed by: SURGERY

## 2025-06-25 PROCEDURE — 93010 ELECTROCARDIOGRAM REPORT: CPT | Performed by: INTERNAL MEDICINE

## 2025-06-25 RX ORDER — HYDROCORTISONE SODIUM SUCCINATE 100 MG/2ML
50 INJECTION INTRAMUSCULAR; INTRAVENOUS EVERY 6 HOURS SCHEDULED
Status: DISCONTINUED | OUTPATIENT
Start: 2025-06-25 | End: 2025-06-28

## 2025-06-25 RX ORDER — HYDROCORTISONE SODIUM SUCCINATE 100 MG/2ML
100 INJECTION INTRAMUSCULAR; INTRAVENOUS ONCE
Status: COMPLETED | OUTPATIENT
Start: 2025-06-25 | End: 2025-06-25

## 2025-06-25 RX ORDER — SPIRONOLACTONE 50 MG/1
50 TABLET, FILM COATED ORAL DAILY
Status: DISCONTINUED | OUTPATIENT
Start: 2025-06-25 | End: 2025-06-25

## 2025-06-25 RX ORDER — NOREPINEPHRINE BITARTRATE 1 MG/ML
INJECTION, SOLUTION INTRAVENOUS
Status: DISPENSED
Start: 2025-06-25 | End: 2025-06-25

## 2025-06-25 RX ORDER — ALBUMIN HUMAN 50 G/1000ML
25 SOLUTION INTRAVENOUS ONCE
Status: COMPLETED | OUTPATIENT
Start: 2025-06-25 | End: 2025-06-26

## 2025-06-25 RX ORDER — BUMETANIDE 0.25 MG/ML
2 INJECTION, SOLUTION INTRAMUSCULAR; INTRAVENOUS ONCE
Status: COMPLETED | OUTPATIENT
Start: 2025-06-25 | End: 2025-06-25

## 2025-06-25 RX ORDER — BUMETANIDE 0.25 MG/ML
2 INJECTION INTRAMUSCULAR; INTRAVENOUS CONTINUOUS
Status: DISCONTINUED | OUTPATIENT
Start: 2025-06-25 | End: 2025-06-26

## 2025-06-25 RX ORDER — CALCIUM GLUCONATE 20 MG/ML
2 INJECTION, SOLUTION INTRAVENOUS ONCE
Status: COMPLETED | OUTPATIENT
Start: 2025-06-25 | End: 2025-06-25

## 2025-06-25 RX ORDER — ALBUMIN HUMAN 50 G/1000ML
25 SOLUTION INTRAVENOUS EVERY OTHER DAY
Status: DISCONTINUED | OUTPATIENT
Start: 2025-06-25 | End: 2025-06-27

## 2025-06-25 RX ORDER — METOPROLOL TARTRATE 1 MG/ML
5 INJECTION, SOLUTION INTRAVENOUS EVERY 6 HOURS
Status: DISCONTINUED | OUTPATIENT
Start: 2025-06-25 | End: 2025-06-28

## 2025-06-25 RX ORDER — INSULIN LISPRO 100 [IU]/ML
1-5 INJECTION, SOLUTION INTRAVENOUS; SUBCUTANEOUS
Status: DISCONTINUED | OUTPATIENT
Start: 2025-06-25 | End: 2025-07-05 | Stop reason: HOSPADM

## 2025-06-25 RX ORDER — BUMETANIDE 0.25 MG/ML
2 INJECTION, SOLUTION INTRAMUSCULAR; INTRAVENOUS ONCE
Status: DISCONTINUED | OUTPATIENT
Start: 2025-06-25 | End: 2025-06-25

## 2025-06-25 RX ADMIN — GUAIFENESIN 600 MG: 600 TABLET, EXTENDED RELEASE ORAL at 09:40

## 2025-06-25 RX ADMIN — PHENYLEPHRINE HYDROCHLORIDE 175 MCG/MIN: 50 INJECTION INTRAVENOUS at 17:41

## 2025-06-25 RX ADMIN — VASOPRESSIN 0.04 UNITS/MIN: 20 INJECTION INTRAVENOUS at 09:45

## 2025-06-25 RX ADMIN — PHENYLEPHRINE HYDROCHLORIDE 25 MCG/MIN: 50 INJECTION INTRAVENOUS at 11:19

## 2025-06-25 RX ADMIN — INSULIN LISPRO 1 UNITS: 100 INJECTION, SOLUTION INTRAVENOUS; SUBCUTANEOUS at 16:02

## 2025-06-25 RX ADMIN — LEVALBUTEROL HYDROCHLORIDE 1.25 MG: 1.25 SOLUTION RESPIRATORY (INHALATION) at 19:58

## 2025-06-25 RX ADMIN — METOPROLOL TARTRATE 5 MG: 1 INJECTION, SOLUTION INTRAVENOUS at 11:24

## 2025-06-25 RX ADMIN — HYDROCORTISONE SODIUM SUCCINATE 100 MG: 100 INJECTION, POWDER, FOR SOLUTION INTRAMUSCULAR; INTRAVENOUS at 09:42

## 2025-06-25 RX ADMIN — BUMETANIDE 2 MG: 0.25 INJECTION INTRAMUSCULAR; INTRAVENOUS at 21:16

## 2025-06-25 RX ADMIN — METOPROLOL TARTRATE 5 MG: 1 INJECTION, SOLUTION INTRAVENOUS at 16:05

## 2025-06-25 RX ADMIN — SODIUM CHLORIDE SOLN NEBU 3% 4 ML: 3 NEBU SOLN at 19:59

## 2025-06-25 RX ADMIN — SENNOSIDES 8.6 MG: 8.6 TABLET, FILM COATED ORAL at 09:41

## 2025-06-25 RX ADMIN — LEVOTHYROXINE SODIUM 50 MCG: 0.05 TABLET ORAL at 04:43

## 2025-06-25 RX ADMIN — OXYCODONE HYDROCHLORIDE 5 MG: 5 TABLET ORAL at 21:57

## 2025-06-25 RX ADMIN — HYDROCORTISONE SODIUM SUCCINATE 50 MG: 100 INJECTION, POWDER, FOR SOLUTION INTRAMUSCULAR; INTRAVENOUS at 23:06

## 2025-06-25 RX ADMIN — PHENYLEPHRINE HYDROCHLORIDE 160 MCG/MIN: 50 INJECTION INTRAVENOUS at 22:56

## 2025-06-25 RX ADMIN — CALCIUM GLUCONATE 2 G: 20 INJECTION, SOLUTION INTRAVENOUS at 09:21

## 2025-06-25 RX ADMIN — SODIUM CHLORIDE, SODIUM LACTATE, POTASSIUM CHLORIDE, AND CALCIUM CHLORIDE 500 ML: .6; .31; .03; .02 INJECTION, SOLUTION INTRAVENOUS at 02:00

## 2025-06-25 RX ADMIN — OXYCODONE HYDROCHLORIDE 5 MG: 5 TABLET ORAL at 14:04

## 2025-06-25 RX ADMIN — METOPROLOL TARTRATE 5 MG: 1 INJECTION, SOLUTION INTRAVENOUS at 23:06

## 2025-06-25 RX ADMIN — ALBUMIN (HUMAN) 25 G: 12.5 INJECTION, SOLUTION INTRAVENOUS at 18:15

## 2025-06-25 RX ADMIN — SODIUM CHLORIDE, SODIUM LACTATE, POTASSIUM CHLORIDE, AND CALCIUM CHLORIDE 1000 ML: .6; .31; .03; .02 INJECTION, SOLUTION INTRAVENOUS at 04:17

## 2025-06-25 RX ADMIN — BUDESONIDE 0.5 MG: 0.5 INHALANT RESPIRATORY (INHALATION) at 19:58

## 2025-06-25 RX ADMIN — NOREPINEPHRINE BITARTRATE 5 MCG/MIN: 1 INJECTION, SOLUTION, CONCENTRATE INTRAVENOUS at 05:43

## 2025-06-25 RX ADMIN — ALBUMIN (HUMAN) 25 G: 12.5 INJECTION, SOLUTION INTRAVENOUS at 19:38

## 2025-06-25 RX ADMIN — VASOPRESSIN 0.04 UNITS/MIN: 20 INJECTION INTRAVENOUS at 16:30

## 2025-06-25 RX ADMIN — NOREPINEPHRINE BITARTRATE 14 MCG/MIN: 1 INJECTION, SOLUTION, CONCENTRATE INTRAVENOUS at 09:29

## 2025-06-25 RX ADMIN — BUDESONIDE 0.5 MG: 0.5 INHALANT RESPIRATORY (INHALATION) at 07:34

## 2025-06-25 RX ADMIN — GUAIFENESIN 600 MG: 600 TABLET, EXTENDED RELEASE ORAL at 21:16

## 2025-06-25 RX ADMIN — ONDANSETRON 4 MG: 2 INJECTION, SOLUTION INTRAMUSCULAR; INTRAVENOUS at 06:25

## 2025-06-25 RX ADMIN — ACETAMINOPHEN 975 MG: 325 TABLET ORAL at 04:42

## 2025-06-25 RX ADMIN — SODIUM CHLORIDE, SODIUM LACTATE, POTASSIUM CHLORIDE, AND CALCIUM CHLORIDE 500 ML: .6; .31; .03; .02 INJECTION, SOLUTION INTRAVENOUS at 00:17

## 2025-06-25 RX ADMIN — OXYCODONE HYDROCHLORIDE 5 MG: 5 TABLET ORAL at 01:01

## 2025-06-25 RX ADMIN — HEPARIN SODIUM 12 UNITS/KG/HR: 10000 INJECTION, SOLUTION INTRAVENOUS at 09:17

## 2025-06-25 RX ADMIN — HYDROCORTISONE SODIUM SUCCINATE 50 MG: 100 INJECTION, POWDER, FOR SOLUTION INTRAMUSCULAR; INTRAVENOUS at 17:52

## 2025-06-25 RX ADMIN — OXYCODONE HYDROCHLORIDE 5 MG: 5 TABLET ORAL at 09:40

## 2025-06-25 RX ADMIN — ACETAMINOPHEN 975 MG: 325 TABLET ORAL at 14:02

## 2025-06-25 RX ADMIN — LEVALBUTEROL HYDROCHLORIDE 1.25 MG: 1.25 SOLUTION RESPIRATORY (INHALATION) at 07:34

## 2025-06-25 NOTE — ASSESSMENT & PLAN NOTE
Wt Readings from Last 3 Encounters:   06/24/25 60.2 kg (132 lb 11.5 oz)   06/18/25 57.2 kg (126 lb 3.2 oz)   06/12/25 54.9 kg (121 lb)     EF in August 2024 was 60%.  Patient is on beta-blocker, spironolactone, and Bumex at home.  Repeat echo pending  Patient examines volume overloaded  Diuresis and management per primary and cardiology

## 2025-06-25 NOTE — ASSESSMENT & PLAN NOTE
Maintained on hep gtt, largely rate controlled overnight, continue metoprolol succinate 100mg daily

## 2025-06-25 NOTE — ASSESSMENT & PLAN NOTE
Patient is not currently on statin therapy.  She was previously on atorvastatin 40 mg daily but it was discontinued due to potential associated side effects.  Consider restarting when able.

## 2025-06-25 NOTE — UTILIZATION REVIEW
Initial Clinical Review    Admission: Date/Time/Statement:   Admission Orders (From admission, onward)       Ordered        06/24/25 0948  Inpatient Admission  Once                          Orders Placed This Encounter   Procedures    Inpatient Admission     Standing Status:   Standing     Number of Occurrences:   1     Level of Care:   Level 1 Stepdown [13]     Estimated length of stay:   More than 2 Midnights     Certification:   I certify that inpatient services are medically necessary for this patient for a duration of greater than two midnights. See H&P and MD Progress Notes for additional information about the patient's course of treatment.     ED Arrival Information       Expected   -    Arrival   6/24/2025 03:47    Acuity   Emergent              Means of arrival   Ambulance    Escorted by   [facility]    Service   Critical Care/ICU    Admission type   Emergency              Arrival complaint   Leg Pain             Chief Complaint   Patient presents with    Leg Pain     Patient woke up with R sided leg pain, non ambulatory at baseline, pt now unable to move R toes, and feels pins and needles, both LE cold and purple in color, color has improved with elevation of extremities per EMS       Initial Presentation: 89 y.o. female PMH bronchiectasis, asthma, permanent AFib on Eliquis, bifascicular BBB, chronic HF, HTN, CKD 3b, HX CVA  to ED by EMS with acute onset right leg pain and numbness, subtherapeutic INR , Long term A Fib, Right leg has severe mottling, reports compliance to Warfarin  EXAM  Tachycardia, now unable to move R toes feeling pins/needles; bilateral LE cold, purple in color per EMS color improves w elevation of extremities  Labs subtherapeutic INR, CR 1.87   Per Vascular surgery read CT imaging for   Acute thrombotic occlusion of right distal common femoral, sfa, tibials and profunda.  Recon of peroneal.  Left popliteal occlusion with reconstitution of tibial arteries.     Given x2 IV  Dilaudid, IV Heparin bolus & GTT, Inpatient admission due to acute thrombotic occlusion of R distal common femoral, sfa, tibials & profunda, L popiteal occlusion; anesthesia pre OP Score ASA Score- 4 Emergent   Per Vascular consult PLAN Start heparin drip bolus stat  For emergency right leg embolectomy and fasciotomy, consult Pulmonology, cardiology  SURGERY DATE: 6/24/2025   Procedure(s):  Right - EMBOLECTOMY/THROMBECTOMY LOWER EXTREMITY VIA FEMORAL CUTDOWN  Right - FASCIOTOMY  Anesthesia Type:   General  Operative Findings:  Acute embolic occlusion of right distal common femoral, sfa, tibials and profunda.   Cardiology  Permanent AFib w labs significant for subtherapeutic INR despite Warfarin compliance & no change of diet. Pt is agreeable to switching to another anticoagulant such as Eliquis at this time to decrease risk of embolism   Continue heparin gtt, price check xarelto dose adjusted for her eGFR.   Date: 6/25/2025   Day 2:   Required vasopressors overnight and transferred to the ICU. No SOB  S/P right femoral cutdown, thromboembolectomy, and right lower extremity fasciotomies on IV Heparin GTT  Vascular Surgery  POD 1 s/p right femoral cutdown and thromboembolectomy, right anterior leg fasciotomy  POD 1 s/p left popliteal cutdown and thromboembolectomy  Plan:  Hypotensive on 2 pressors, DARIO due to hemodynamic stress and acute blood loss anemia  Continue therapeutic heparin drip  Follow up cardiology recs regarding change of anticoagulant agent as she has 2 separate embolic events over the past year related to inadequate warfarin.  Will close fasciotomy incision at bedside once she improves over next 1-2 days  Pulmonology  Continue with aggressive pulmonary toilet  Airway clearance protocol  Respiratory protocol  Xopenex and 3% saline 4 times daily  Continue flutter valve, incentive spirometry  High frequency chest wall oscillation  Mucinex  Wean O2 for SpO2 goal >88% or per patient comfort -if the patient  continuously requires oxygen, would perform home O2 eval prior to discharge, Consider obtaining chest x-ray. Asthma management: Switch home ICS/LABA to budesonide and Xopenex  No indication right now for intravenous or oral steroids. Can switch back to home Advair on discharge, OP Pulmonology scheduled in 2 days if no DC until then will reschedule  Cardiology  reports feeling weak and short of breath,  mild pain and numbness of her right upper extremity as well as her bilateral lower extremities but reports that her lower extremities feel better than the day prior.  Telemetry shows A-fib with heart rates in the 120s to 150s.  Monitor BP closely and wean pressors as able.  Initially on levo. Now on Colt and Vaso.   Hold home BP meds.  S/p 100 mg Solu-Cortef this morning.  Continue IVF  2U PRBC's ordered. Monitor Hb closely.   Trend perfusion markers.  Hold Toprol-XL and spironolactone given hypotension.  Not on ASA due to allergy and not on statin due to potential side effects.  IR LE angiogram ordered.  Monitor for evidence of bleeding.  TTE today 6/25 showing an LVEF of 65% with vigorous systolic function and no obvious regional wall motion abnormalities.  Continue neurovascular checks and postoperative care      ED Treatment-Medication Administration from 06/24/2025 0347 to 06/24/2025 0648         Date/Time Order Dose Route Action     06/24/2025 0421 heparin (porcine) injection 4,400 Units 4,400 Units Intravenous Given     06/24/2025 0422 heparin (porcine) 25,000 units in 0.45% NaCl 250 mL infusion (premix) 18 Units/kg/hr Intravenous New Bag     06/24/2025 0425 HYDROmorphone (DILAUDID) injection 0.5 mg 0.5 mg Intravenous Given     06/24/2025 0423 iohexol (OMNIPAQUE) 350 MG/ML injection (MULTI-DOSE) 120 mL 120 mL Intravenous Given     06/24/2025 0517 HYDROmorphone (DILAUDID) injection 0.5 mg 0.5 mg Intravenous Given            Scheduled Medications:  acetaminophen, 975 mg, Oral, Q8H GARTH  budesonide, 0.5 mg,  Nebulization, Q12H  guaiFENesin, 600 mg, Oral, Q12H GARTH  insulin lispro, 1-5 Units, Subcutaneous, TID AC  levalbuterol, 1.25 mg, Nebulization, Q6H  levothyroxine, 50 mcg, Oral, Early Morning  metoprolol, 5 mg, Intravenous, Q6H  norepinephrine, , ,   senna, 1 tablet, Oral, Daily  sodium chloride, 4 mL, Nebulization, Q6H      Continuous IV Infusions:  heparin (porcine), 3-30 Units/kg/hr (Order-Specific), Intravenous, Titrated  phenylephine,  mcg/min, Intravenous, Titrated  vasopressin, 0.04 Units/min, Intravenous, Continuous      PRN Meds:  norepinephrine, , ,   oxyCODONE, 5 mg, Oral, Q4H PRN  oxyCODONE, 2.5 mg, Oral, Q4H PRN  trimethobenzamide, 200 mg, Intramuscular, Q6H PRN      ED Triage Vitals   Temperature Pulse Respirations Blood Pressure SpO2 Pain Score   06/24/25 0409 06/24/25 0351 06/24/25 0351 06/24/25 0400 06/24/25 0400 06/24/25 0502   98.9 °F (37.2 °C) 95 20 160/87 95 % 10 - Worst Possible Pain     Weight (last 2 days)       Date/Time Weight    06/25/25 0830 59.9 (132)    06/24/25 2217 60.2 (132.72)            Vital Signs (last 3 days)       Date/Time Temp Pulse Resp BP MAP (mmHg) Arterial Line BP MAP SpO2 Calculated FIO2 (%) - Nasal Cannula O2 Flow Rate (L/min) Nasal Cannula O2 Flow Rate (L/min) O2 Device Cardiac (WDL) Patient Position - Orthostatic VS Shanita Coma Scale Score Pain    06/25/25 1245 -- 105 17 87/51 66 85/46 61 mmHg 85 % 36 -- 4 L/min -- -- -- -- --    06/25/25 1230 -- 113 12 100/56 72 95/59 74 mmHg 78 % -- -- -- -- -- -- -- --    06/25/25 1220 -- 110 17 90/54 69 74/48 59 mmHg 88 % -- -- -- -- -- -- -- --    06/25/25 1201 -- 120 -- 86/51 -- -- -- -- -- -- -- -- -- -- -- --    06/25/25 1200 97.4 °F (36.3 °C) 110 17 86/51 61 62/40 49 mmHg 90 % -- -- -- -- -- -- 15 --    06/25/25 1140 -- 118 21 71/45 55 59/40 48 mmHg 99 % -- -- -- -- -- -- -- --    06/25/25 1130 -- 116 17 63/47 52 55/38 44 mmHg 67 % -- -- -- -- -- -- -- --    06/25/25 1030 -- 147 19 118/61 82 114/62 82 mmHg 97 % -- -- --  -- -- -- -- --    06/25/25 1022 -- 142 -- 114/59 80 -- -- -- -- -- -- -- -- -- -- --    06/25/25 1000 -- 141 24 113/63 83 54/46 48 mmHg 100 % -- -- -- -- -- -- -- --    06/25/25 0940 -- -- -- -- -- -- -- -- -- -- -- -- -- -- -- 7    06/25/25 0900 -- 137 23 92/55 60 74/43 55 mmHg 96 % -- -- -- -- -- -- -- --    06/25/25 0845 98.1 °F (36.7 °C) 137 18 123/58 -- 99/53 69 mmHg 96 % -- -- -- -- -- -- -- --    06/25/25 0830 -- 137 -- 92/55 -- -- -- -- -- -- -- -- -- -- -- --    06/25/25 0800 -- 140 18 78/58 63 -- -- -- -- -- -- -- -- -- 15 --    06/25/25 0750 -- 126 16 62/44 49 86/54 66 mmHg 97 % -- -- -- -- -- -- -- --    06/25/25 0738 -- -- -- -- -- -- -- -- 28 -- 2 L/min Nasal cannula -- -- -- --    06/25/25 0630 -- 141 28 80/53 -- -- -- 93 % -- -- -- -- -- -- -- --    06/25/25 0625 -- 137 24 94/52 -- -- -- 92 % -- -- -- -- -- -- -- --    06/25/25 0617 97.5 °F (36.4 °C) 119 21 94/52 -- -- -- -- -- -- -- -- -- -- -- --    06/25/25 0545 -- 112 -- 75/40 52 66/34 46 mmHg -- -- -- -- -- -- -- -- --    06/25/25 0543 -- -- -- 69/42 -- -- -- -- -- -- -- -- -- -- -- --    06/25/25 0515 -- 100 -- 70/46 54 64/30 42 mmHg -- -- -- -- -- -- -- -- --    06/25/25 0445 -- 110 -- 68/41 51 62/36 46 mmHg -- -- -- -- -- -- -- -- --    06/25/25 0442 -- -- -- -- -- -- -- -- -- -- -- -- -- -- -- 6    06/25/25 0415 -- 114 -- 95/50 65 -- -- -- -- -- -- -- -- -- -- --    06/25/25 0400 -- 98 -- 76/49 58 70/36 48 mmHg -- -- -- -- -- -- -- -- --    06/25/25 0330 -- 98 -- 80/50 58 72/36 50 mmHg -- -- -- -- -- -- -- -- --    06/25/25 0315 -- 100 -- 75/51 57 70/36 48 mmHg -- -- -- -- -- -- -- -- --    06/25/25 0300 -- -- -- -- -- -- -- -- -- -- -- -- -- -- 15 --    06/25/25 02:46:42 97.3 °F (36.3 °C) 100 15 84/53 64 80/40 54 mmHg 100 % -- -- -- -- -- -- -- --    06/25/25 0230 -- 94 -- 93/50 65 80/38 54 mmHg -- -- -- -- -- -- -- -- --    06/25/25 0215 -- 102 -- 108/50 67 86/44 60 mmHg -- -- -- -- -- -- -- -- --    06/25/25 0150 -- 100 -- 85/53 66 78/40  54 mmHg -- -- -- -- -- -- -- -- --    06/25/25 0130 -- 106 -- 89/53 65 80/42 56 mmHg -- -- -- -- -- -- -- -- --    06/25/25 0101 -- -- -- -- -- -- -- -- -- -- -- -- -- -- -- 8    06/25/25 0047 -- 100 -- 110/60 80 95/45 65 mmHg -- -- -- -- -- -- -- -- --    06/25/25 0030 -- 102 -- 79/56 62 86/44 60 mmHg -- -- -- -- -- -- -- -- --    06/25/25 0000 -- 104 -- 93/63 71 -- -- -- -- -- -- -- -- -- 15 --    06/24/25 2330 -- 96 -- 84/52 62 88/44 62 mmHg -- -- -- -- -- -- -- -- --    06/24/25 2311 -- -- -- -- -- -- -- 98 % 28 -- 2 L/min Nasal cannula -- -- -- --    06/24/25 2300 97.2 °F (36.2 °C) 98 16 114/53 75 -- -- 100 % 28 -- 2 L/min Nasal cannula -- Sitting -- No Pain    06/24/25 2217 -- 104 18 102/58 77 106/59 78 mmHg 99 % -- -- -- -- -- -- -- --    06/24/25 2215 -- 112 20 102/58 77 106/65 81 mmHg 100 % 28 -- 2 L/min Nasal cannula WDL -- 15 2    06/24/25 2200 -- 109 18 120/65 87 99/56 74 mmHg 99 % 28 -- 2 L/min Nasal cannula WDL -- 15 5 06/24/25 2159 -- -- 20 -- -- -- -- -- -- -- -- -- -- -- -- 5 06/24/25 2150 97.2 °F (36.2 °C) 114 18 120/73 -- 110/66 -- 98 % -- 6 L/min -- Simple mask WDL -- 15 No Pain    06/24/25 1828 -- -- -- -- -- -- -- -- -- -- -- -- -- -- -- No Pain    06/24/25 1754 -- -- -- 92/58 -- -- -- -- -- -- -- -- -- Sitting -- --    06/24/25 1643 -- 103 16 87/52 65 -- -- 94 % 36 -- 4 L/min Nasal cannula -- -- -- --    06/24/25 1543 -- 90 -- 84/51 63 -- -- -- -- -- -- -- -- -- -- --    06/24/25 1500 -- 96 20 84/51 63 -- -- 91 % -- -- -- -- -- Lying -- --    06/24/25 1444 -- 92 20 107/67 78 90/45 -- 97 % -- -- -- None (Room air) -- -- -- --    06/24/25 1430 -- -- -- -- -- -- -- -- -- -- -- -- -- -- 15 No Pain    06/24/25 1428 97.6 °F (36.4 °C) 94 20 91/68 76 87/47 -- 96 % -- -- -- None (Room air) -- -- -- --    06/24/25 1422 -- -- -- -- -- -- -- 96 % -- -- -- -- -- -- -- --    06/24/25 1421 -- -- -- -- -- -- -- -- -- -- -- -- -- -- -- 8    06/24/25 1415 -- -- -- -- -- -- -- -- -- -- -- -- WDL -- 15 --     06/24/25 1400 -- 92 30 -- -- 119/73 92 mmHg 96 % -- -- -- None (Room air) WDL -- 15 No Pain    06/24/25 1345 -- 84 18 96/52 69 109/67 83 mmHg 94 % -- -- -- None (Room air) WDL -- 15 No Pain    06/24/25 1333 -- 80 18 -- -- 110/66 83 mmHg 94 % -- -- -- -- -- -- -- --    06/24/25 1330 -- 90 18 91/52 67 110/68 85 mmHg 94 % -- -- -- None (Room air) WDL -- 15 No Pain    06/24/25 1315 -- 87 14 104/60 77 101/58 75 mmHg 94 % -- -- -- None (Room air) WDL -- 15 No Pain    06/24/25 1300 -- 98 20 103/60 77 85/54 68 mmHg 93 % -- -- -- None (Room air) WDL -- 15 No Pain    06/24/25 1245 -- 96 20 111/59 81 103/64 78 mmHg 94 % -- -- -- None (Room air) WDL -- 15 No Pain    06/24/25 1230 -- 98 20 115/76 91 106/66 83 mmHg 95 % -- -- -- None (Room air) WDL -- 15 No Pain    06/24/25 1215 -- 96 20 110/59 79 113/66 85 mmHg 96 % -- -- -- None (Room air) WDL -- 15 No Pain    06/24/25 1200 -- 89 13 106/55 78 102/58 75 mmHg 94 % -- -- -- None (Room air) WDL -- 15 No Pain    06/24/25 1145 -- 97 20 111/65 78 101/55 74 mmHg 95 % -- -- -- None (Room air) -- -- -- No Pain    06/24/25 1130 -- 89 13 115/59 81 100/54 72 mmHg 93 % -- -- -- None (Room air) WDL -- 15 No Pain    06/24/25 1115 -- 91 11 115/59 81 104/55 75 mmHg 92 % -- -- -- None (Room air) -- -- -- No Pain    06/24/25 1100 97.7 °F (36.5 °C) 89 12 108/54 77 101/55 74 mmHg 92 % -- -- -- None (Room air) WDL -- 15 No Pain    06/24/25 1045 -- 96 16 -- -- 105/59 77 mmHg 92 % -- -- -- None (Room air) WDL -- 15 No Pain    06/24/25 1030 -- 95 14 111/58 83 104/55 75 mmHg 94 % -- -- -- None (Room air) WDL -- 15 No Pain    06/24/25 1015 -- 101 14 137/61 88 91/45 67 mmHg 94 % -- -- -- None (Room air) WDL -- 15 No Pain    06/24/25 1000 -- 97 18 111/68 81 -- -- 95 % -- -- -- None (Room air) WDL -- 15 No Pain    06/24/25 0958 97.7 °F (36.5 °C) 98 11 147/67 97 -- -- 98 % -- 6 L/min -- Simple mask WDL -- 15 No Pain    06/24/25 0655 -- -- -- -- -- -- -- -- 28 -- 2 L/min Nasal cannula -- -- -- No Pain     06/24/25 0630 -- 94 19 119/61 84 -- -- 96 % 28 -- 2 L/min -- -- Lying -- --    06/24/25 0615 -- 96 16 130/76 96 -- -- 96 % 28 -- 2 L/min Nasal cannula -- Lying -- --    06/24/25 0600 -- 105 20 126/77 96 -- -- 95 % 28 -- 2 L/min Nasal cannula -- -- -- --    06/24/25 0545 -- 108 22 145/71 102 -- -- 96 % 28 -- 2 L/min Nasal cannula -- Lying -- --    06/24/25 0515 -- 107 22 142/87 110 -- -- 92 % -- -- -- None (Room air) -- Lying -- --    06/24/25 0502 -- -- -- -- -- -- -- -- -- -- -- -- -- -- -- 10 - Worst Possible Pain    06/24/25 0445 -- 111 22 143/74 98 -- -- 90 % -- -- -- None (Room air) -- Lying -- --    06/24/25 0430 -- 108 20 120/67 85 -- -- 90 % -- -- -- None (Room air) -- Lying -- --    06/24/25 0409 98.9 °F (37.2 °C) -- -- -- -- -- -- -- -- -- -- -- -- -- -- --    06/24/25 0400 -- 98 20 160/87 114 -- -- 95 % -- -- -- None (Room air) -- Lying 15 --    06/24/25 0351 -- 95 20 -- -- -- -- -- -- -- -- -- -- -- -- --              Pertinent Labs/Diagnostic Test Results:   Radiology:  CTA abdominal w run off w wo contrast   Final Interpretation by Bailey Rodarte MD (06/24 1129)      RIGHT: Occlusion of the right common femoral artery with nonopacification of right lower extremity. Additional partial occlusion of the internal iliac artery as above.      LEFT: Mild to moderate calcified plaque. Nonopacification of the popliteal artery in the popliteal fossa with faint reconstitution of the tibioperoneal trunk and anterior tibial arteries although no flow to the ankle/foot.      Incidental indeterminate pancreatic cystic lesions as above appear similar to prior CT dated 8/28/2024.. Recommend continued attention on follow-up imaging/surgical evaluation.            Resident: HALIMA GARAY I, the attending radiologist, have reviewed the images and agree with the final report above.      Workstation performed: [id number]         IR lower extremity angiogram    (Results Pending)   XR chest portable ICU    (Results Pending)      Cardiology:  Echo complete w/ contrast if indicated   Final Result by Felisha Bear MD (06/25 1230)        Left Ventricle: Left ventricular cavity size is small. Wall thickness    is severely increased. The left ventricular ejection fraction is 65%.    Systolic function is vigorous. Although no diagnostic regional wall motion    abnormality was identified, this possibility cannot be completely excluded    on the basis of this study. Unable to assess diastolic function due to    atrial fibrillation.     Right Ventricle: Systolic function is mildly reduced.     Left Atrium: The atrium is severely dilated (>48 mL/m2).     Aortic Valve: The aortic valve is trileaflet. The leaflets are    moderately thickened. The leaflets are moderately calcified. There is    aortic valve sclerosis.     Tricuspid Valve: There is mild to moderate regurgitation. The right    ventricular systolic pressure is moderately elevated. The estimated right    ventricular systolic pressure is 50.00 mmHg.     Pericardium: There is a left pleural effusion.         ECG 12 lead    by Interface, Ris Results In (06/25 0650)      ECG 12 lead    by Interface, Ris Results In (06/25 0645)      ECG 12 lead   Final Result by Francisco Javier Maynard MD (06/24 5828)   Atrial fibrillation with rapid ventricular response   Right bundle branch block , plus right ventricular hypertrophy   Possible Lateral infarct (cited on or before 27-Aug-2024)   Cannot rule out Inferior infarct , age undetermined   Abnormal ECG   When compared with ECG of 27-Aug-2024 12:02,   Possible Inferior infarct is now Present   Confirmed by Francisco Javier Maynard ([id number]) on 6/24/2025 5:56:36 PM        GI:  No orders to display           Results from last 7 days   Lab Units 06/25/25  0946 06/25/25  0512 06/24/25 1953 06/24/25  0412   WBC Thousand/uL 12.67* 10.36*  --  9.86   HEMOGLOBIN g/dL 9.8* 8.3*  --  12.5   I STAT HEMOGLOBIN g/dl  --   --  8.8*  --    HEMATOCRIT % 28.3* 24.9*  --  38.2  "  HEMATOCRIT, ISTAT %  --   --  26*  --    PLATELETS Thousands/uL 168 154  --  220   TOTAL NEUT ABS Thousands/µL 8.33*  --   --  3.85         Results from last 7 days   Lab Units 06/25/25  0512 06/24/25 1953 06/24/25  0412   SODIUM mmol/L 131*  --  133*   POTASSIUM mmol/L 4.9  --  4.3   CHLORIDE mmol/L 101  --  96   CO2 mmol/L 22  --  25   CO2, I-STAT mmol/L  --  24  --    ANION GAP mmol/L 8  --  12   BUN mg/dL 52*  --  63*   CREATININE mg/dL 1.70*  --  1.87*   EGFR ml/min/1.73sq m 25  --  23   CALCIUM mg/dL 7.3*  --  8.6   CALCIUM, IONIZED, ISTAT mmol/L  --  0.95*  --    MAGNESIUM mg/dL 2.2  --   --    PHOSPHORUS mg/dL 4.9*  --   --      Results from last 7 days   Lab Units 06/24/25  0412   AST U/L 24   ALT U/L 18   ALK PHOS U/L 52   TOTAL PROTEIN g/dL 7.4   ALBUMIN g/dL 3.8   TOTAL BILIRUBIN mg/dL 0.50     Results from last 7 days   Lab Units 06/25/25  1123 06/25/25  0751 06/24/25  1010   POC GLUCOSE mg/dl 138 138 135     Results from last 7 days   Lab Units 06/25/25  0512 06/24/25  0412   GLUCOSE RANDOM mg/dL 135 130         Results from last 7 days   Lab Units 06/25/25  0625   HEMOGLOBIN A1C % 6.3*   EAG mg/dl 134     No results found for: \"BETA-HYDROXYBUTYRATE\"   Results from last 7 days   Lab Units 06/25/25  0742 06/25/25  0437   PH ART  7.359 7.409   PCO2 ART mm Hg 37.2 34.0*   PO2 ART mm Hg 95.8 148.8*   HCO3 ART mmol/L 20.5* 21.0*   BASE EXC ART mmol/L -4.5 -3.1   O2 CONTENT ART mL/dL 13.3* 13.5*   O2 HGB, ARTERIAL % 96.3 98.0*   ABG SOURCE  Line, Arterial Line, Arterial         Results from last 7 days   Lab Units 06/24/25 1953   I STAT BASE EXC mmol/L -3*   I STAT O2 SAT % 100*   ISTAT PH ART  7.348*   I STAT ART PCO2 mm HG 40.7   I STAT ART PO2 mm .0*   I STAT ART HCO3 mmol/L 22.4         Results from last 7 days   Lab Units 06/25/25  0648 06/25/25  0625 06/25/25  0416   HS TNI 0HR ng/L  --   --  65*   HS TNI 2HR ng/L  --  70*  --    HSTNI D2 ng/L  --  5  --    HS TNI 4HR ng/L 71*  --   --  "   HSTNI D4 ng/L 6  --   --          Results from last 7 days   Lab Units 06/25/25  0512 06/24/25  1030 06/24/25  0412   PROTIME seconds  --   --  18.0*   INR   --   --  1.47*   PTT seconds >210* >210* 28     Results from last 7 days   Lab Units 06/25/25  0816   TSH 3RD GENERATON uIU/mL 3.852     Results from last 7 days   Lab Units 06/25/25  0816   PROCALCITONIN ng/ml 0.19     Results from last 7 days   Lab Units 06/25/25  0625 06/25/25  0416   LACTIC ACID mmol/L 3.6* 2.3*                         Results from last 7 days   Lab Units 06/25/25  0555 06/25/25  0546   UNIT PRODUCT CODE  V2086C20  [id number] I6150E85  [id number]   UNIT NUMBER  [id number]  [id number]  [id number]   UNITABO  A  A A  A   UNITRH  POS  POS POS  POS   CROSSMATCH  Compatible  Compatible Compatible  Compatible   UNIT DISPENSE STATUS  Issued  Presumed Trans Crossmatched  Crossmatched   UNIT PRODUCT VOL mL 350  350 350  350                                                             Results from last 7 days   Lab Units 06/24/25  0412   TOTAL COUNTED  100  100               Past Medical History[1]  Present on Admission:   Bronchiectasis (HCC)   Asthma   Permanent atrial fibrillation (HCC)   Bifascicular bundle branch block   Hyperlipidemia   Primary hypertension   Chronic heart failure with preserved ejection fraction (HCC)   Stage 3b chronic kidney disease (HCC)      Admitting Diagnosis: Leg pain [M79.606]  Ischemic rest pain of lower extremity [M79.606, I99.8]  Acute lower limb ischemia [I99.8]  Age/Sex: 89 y.o. female    Network Utilization Review Department  ATTENTION: Please call with any questions or concerns to [phone] and carefully listen to the prompts so that you are directed to the right person. All voicemails are confidential.   For Discharge needs, contact Care Management DC Support Team at [phone] opt. [phone]  Send all requests for admission clinical reviews, approved or denied  determinations and any other requests to dedicated fax number below belonging to the campus where the patient is receiving treatment. List of dedicated fax numbers for the Facilities:  FACILITY NAME UR FAX NUMBER   ADMISSION DENIALS (Administrative/Medical Necessity) [phone]   DISCHARGE SUPPORT TEAM (NETWORK) [phone]   PARENT CHILD HEALTH (Maternity/NICU/Pediatrics) [phone]   [facility] [phone]   [facility] [phone]   [facility] [phone]   [facility] [phone]   [facility] [phone]   [facility] [phone]   [facility] [phone]   [facility] [phone]   [facility] [phone]   [facility] [phone]   [facility] [phone]   [facility] [phone]              [1]   Past Medical History:  Diagnosis Date    Abnormal ECG     Abnormal ultrasound     RESOLVED: [id number]    Acute kidney failure (HCC) 12/29/2024    Advice given about COVID-19 virus infection 04/07/2020    Ambulates with cane     Arrhythmia     Arthritis     Asthma     Asthma with acute exacerbation     LAST ASSESSED: [id number]    Asymptomatic menopausal state     Atherosclerosis     Atrial fibrillation (HCC)     Chronic kidney disease     Chronic obstructive lung disease (HCC)     w/ chonic cough    Chronic ulcer of left foot (HCC)     Colon polyp     Congestive heart failure (HCC)     LAST ASSESSED: [id number]    COPD (chronic obstructive pulmonary disease) (HCC)     Coronary artery disease     COVID-19 01/06/2025    COVID-19 virus infection 03/25/2023    Cuboid fracture     LAST ASSESSED: [id number]    Disease of thyroid gland     Dyspepsia     Edema of both lower  extremities     Equinus deformity of left foot     Fall 04/01/2024    Falls     5/2024    GERD (gastroesophageal reflux disease)     Headache(784.0)     High risk medication use     LAST ASSESSED: [id number]    Hyperlipidemia     Hypertension     Hypokalemia     Hypothyroidism     Impacted cerumen of both ears     LAST ASSESSED: [id number]    Impacted cerumen of right ear     LAST ASSESSED: [id number]    Influenza     LAST ASSESSED: [id number]    IPMN (intraductal papillary mucinous neoplasm)     RESOLVED: [id number]    Irregular heart beat     afib. Warfarin.    Leg cramps 12/21/2018    Limb swelling     LAST ASSESSED: [id number]    Navicular fracture of ankle     LAST ASSESSED: [id number]    Onychomycosis     LAST ASSESSED: [id number]    Open wound of right upper arm 10/17/2022    Osteoarthritis     Osteopenia     Osteoporosis     Paronychia, finger, unspecified laterality     LAST ASSESSED: [id number]    Paroxysmal atrial fibrillation (HCC)     LAST ASSESSED: 02MAR2014    Peripheral vascular disease (HCC)     Plantar fasciitis     SOBOE (shortness of breath on exertion)     Stroke (HCC)     Right arm weakness that has resolved    Talus fracture     LAST ASSESSED: [id number]    Vaccine counseling 12/19/2023    Vascular headache     Walker as ambulation aid     Wound of right foot

## 2025-06-25 NOTE — OP NOTE
VASCULAR AND ENDOVASCULAR SURGERY OP NOTE     DATE:  - 06/24/25      PREOPERATIVE DIAGNOSIS:    - LLE thromboembolism w/ R2b ischemia     POSTOPERATIVE DIAGNOSIS:   - SAME     PROCEDURE PERFORMED:   - Thromboembolectomy of left popliteal, anterior tibial, and tibioperoneal arteries with #2 and #3 Javier catheters     ATTENDING SURGEON:  - Adonay Raymond MD     RESIDENT SURGEON(S):  - Surgeons and Role:     * Adonay Raymond MD - Primary     * Vale Esparza MD - Assisting                             BLOOD LOSS:  - 200 mL     ANESTHESIA:  - General endotracheal anesthesia     SPECIMENS:  - Left popliteal artery thrombus      OPERATIVE FINDINGS:   - Appearance of acute on chronic thrombus removed from left popliteal artery.  Multiple passes with a #2 Javier catheter down the AT without return of thrombus, however AT was noted to have backbleeding.  Attempted passage of Javier catheter into TPT, but this vessel was noted to be heavily calcified and despite multiple attempts we were unable to pass the catheter.  Despite this, some backbleeding was appreciated from the TPT as well.  Following closure of the arteriotomy, a left DP signal was present (no left DP/PT signals present preoperatively).     PROCEDURE IN DETAIL:  Informed consent was obtained from the patient prior to proceeding to the operating room.  The patient was then identified in the pre-anesthesia area, then taken to the operating room, placed in the supine position on the operating table, and induced under general endotracheal anesthesia. The patient was correctly positioned, padded at all pressure points.  The bilateral groins and left lower extremity were then prepared and draped in a sterile fashion.  A pre-operative timeout was performed. Preoperative antibiotics were administered at this time.       A #15 blade was used to create a longitudinal incision on the medial aspect of the proximal left calf.  Dissection was deepened with  electrocautery.  The avascular plane was entered and the gastrocnemius was reflected posteriorly.  Dissection was continued until the neurovascular bundle was identified.  The popliteal artery was then circumferentially dissected and controlled with vessel loop.  Dissection was continued distally and the soleus muscle was taken down from its attachments to the tibia.  Bridging veins overlying the AT and TPT were divided between silk sutures.  The AT and TPT were each then circumferentially dissected and controlled with Vesseloops.    The patient was systemically heparinized and ACT was maintained greater than 250 for the duration of the case.    The tibial vessels were controlled and a #11 blade was used to create a transverse arteriotomy in the distal popliteal artery.  This was widened with Urrutia scissors.  A #3 Javier catheter was passed retrograde and popliteal artery and acute on chronic appearing thrombus was removed and robust inflow was restored.  This was repeated until we obtained 2 clean passes.  The above process was repeated for the anterior tibial artery with a #2 Javier catheter with no return of thrombus after multiple passes.  Backbleeding from the AT was appreciated.  The process was again repeated in the TPT.  Due to heavy calcific disease, we are unable to pass a Javier catheter distally despite multiple attempts.  Backbleeding was however noted from the TPT.  At this point we elected to close the arteriotomy with interrupted 6-0 Prolene sutures.  A left DP signal was identified following embolectomy.            The incision was then copiously irrigated and hemostasis was obtained with electrocautery and topical hemostatic agents.  The incision was closed in layers of Vicryl suture and the skin incision was closed with staples.     The patient tolerated the procedure well.     The patient was transported to the PACU in good condition.

## 2025-06-25 NOTE — UTILIZATION REVIEW
NOTIFICATION OF INPATIENT ADMISSION   AUTHORIZATION REQUEST   SERVICING FACILITY:   [facility]  Address: [address] [place] [address]  Tax ID: [id number]  NPI: [id number] ATTENDING PROVIDER:  Attending Name and NPI#: Arthur Wei Md [[id number]]  Address: [address] [place] [address]  Phone: [phone]   ADMISSION INFORMATION:  Place of Service: Inpatient [facility]  Place of Service Code: 21  Inpatient Admission Date/Time: 6/24/25  9:49 AM  Discharge Date/Time: No discharge date for patient encounter.  Admitting Diagnosis Code/Description:  Leg pain [M79.606]  Ischemic rest pain of lower extremity [M79.606, I99.8]  Acute lower limb ischemia [I99.8]     UTILIZATION REVIEW CONTACT:  Shiva Choe Utilization   Network Utilization Review Department  Phone: [phone]  Fax: [phone]  Email: [email or web address]  Contact for approvals/pending authorizations, clinical reviews, and discharge.     PHYSICIAN ADVISORY SERVICES:  Medical Necessity Denial & Lwft-wf-Eceu Review  Phone: [phone]  Fax: [phone]  Email: [email or web address]     DISCHARGE SUPPORT TEAM:  For Patients Discharge Needs & Updates  Phone: [phone] opt. 2 Fax: [phone]  Email: [email or web address]

## 2025-06-25 NOTE — PROCEDURES
Central Line Insertion    Date/Time: 6/25/2025 3:23 PM    Performed by: Angi Kelly MD  Authorized by: Angi Kelly MD    Patient location:  Bedside  Consent:     Consent obtained:  Written    Consent given by:  Patient    Risks discussed:  Incorrect placement, bleeding, nerve damage, infection and pneumothorax  Universal protocol:     Procedure explained and questions answered to patient or proxy's satisfaction: yes      Relevant documents present and verified: yes      Patient identity confirmed:  Hospital-assigned identification number and verbally with patient  Pre-procedure details:     Hand hygiene: Hand hygiene performed prior to insertion      Sterile barrier technique: All elements of maximal sterile technique followed      Skin preparation:  ChloraPrep    Skin preparation agent: Skin preparation agent completely dried prior to procedure    Indications:     Central line indications: medications requiring central line and hemodynamic monitoring    Anesthesia (see MAR for exact dosages):     Anesthesia method:  Local infiltration    Local anesthetic:  Lidocaine 1% w/o epi  Procedure details:     Location:  Right internal jugular    Vessel type: vein      Laterality:  Right    Approach: percutaneous technique used      Patient position:  Flat    Catheter type:  Triple lumen 16cm    Landmarks identified: yes      Ultrasound guidance: yes      Sterile ultrasound techniques: Sterile gel and sterile probe covers were used      Number of attempts:  1  Post-procedure details:     Post-procedure:  Dressing applied    Assessment:  Blood return through all ports, free fluid flow and placement verified by x-ray    Patient tolerance of procedure:  Tolerated well, no immediate complications

## 2025-06-25 NOTE — ASSESSMENT & PLAN NOTE
Wt Readings from Last 3 Encounters:   06/25/25 59.9 kg (132 lb)   06/18/25 57.2 kg (126 lb 3.2 oz)   06/12/25 54.9 kg (121 lb)   Last echo in August 2024 showing moderate concentric hypertrophy and an LVEF of 60%.  She also has moderate TR and biatrial enlargement.  TTE today 6/25 showing an LVEF of 65% with vigorous systolic function and no obvious regional wall motion abnormalities.  On p.o. Bumex 3 mg twice daily at home.  Currently on hold in setting of hypotension.  Restart when able.  Monitor I's and O's, volume status and renal function.  Monitor electrolytes and replete as needed.

## 2025-06-25 NOTE — ASSESSMENT & PLAN NOTE
Lab Results   Component Value Date    EGFR 25 06/25/2025    EGFR 23 06/24/2025    EGFR 24 06/02/2025    CREATININE 1.70 (H) 06/25/2025    CREATININE 1.87 (H) 06/24/2025    CREATININE 1.75 (H) 06/02/2025   Monitor CBC.

## 2025-06-25 NOTE — ASSESSMENT & PLAN NOTE
Lab Results   Component Value Date    EGFR 25 06/25/2025    EGFR 23 06/24/2025    EGFR 24 06/02/2025    CREATININE 1.70 (H) 06/25/2025    CREATININE 1.87 (H) 06/24/2025    CREATININE 1.75 (H) 06/02/2025   Baseline creatinine around 1.6.  Continue to trend renal function  Ins and outs  Avoid nephrotoxins

## 2025-06-25 NOTE — ASSESSMENT & PLAN NOTE
Hold home Toprol  mg daily, spironolactone 50 mg daily and Bumex 3 mg twice daily in the setting of hypotension.  Restart when able.

## 2025-06-25 NOTE — CONSULTS
Consultation - Critical Care/ICU   Name: Rebeca Banegas 89 y.o. female I MRN: [id number]  Unit/Bed#: [facility] 527-01 I Date of Admission: 6/24/2025   Date of Service: 6/25/2025 I Hospital Day: 1   Inpatient consult to Surgical Critical Care  Consult performed by: Angi Kelly MD  Consult ordered by: Joy Jiang PA-C        Physician Requesting Evaluation: Arthur Wei MD   Reason for Evaluation / Principal Problem: Acute limb ischemia    Please contact the SecureChat role for the Critical Care/ICU service with any questions/concerns.    Assessment & Plan   Active Hospital Problems    Diagnosis Date Noted POA    Acute lower limb ischemia 06/24/2025 Unknown    History of CVA (cerebrovascular accident) 08/27/2024 Not Applicable    Bronchiectasis (HCC) 09/08/2023 Yes    Stage 3b chronic kidney disease (HCC) 04/07/2020 Yes    Chronic heart failure with preserved ejection fraction (HCC) 02/13/2017 Yes    Primary hypertension 05/19/2014 Yes     Chronic    Hyperlipidemia 04/19/2013 Yes     Chronic    Permanent atrial fibrillation (HCC) 02/14/2013 Yes     Chronic    Asthma 11/12/2012 Yes     Chronic    Bifascicular bundle branch block 11/10/2012 Yes     Chronic      Resolved Hospital Problems   No resolved problems to display.     Neuro/Psych:  Diagnoses: Post operative pain   Plan:  Q4H neurovascular checks   Analgesia: Multimodal. Tylenol 975 mg Q8H Ayla. PRN oxy 2.5 / 5 mg Q4H PRN    CV:  Diagnoses: Acute limb ischemia, hx afib, chronic HFpEF, HTN  S/p R femoral cutdown, thromboembolectomy, R anterior and lateral fasciotomies, partial proximal fasciotomy closure 6/24/25 AM ([place]to)  S/p LLE thromboembolectomy of pop and AT/TPT in setting of LLE R2B ALI 2/2 popliteal occlusion ([place]) 6/24/25 PM  6/25 upgraded to IUC for hypotension, 8-10 levo  Plan:  Q4H neurovascular checks   Monitor fasciotomy sites, closure per vascular surgery  Continue heparin gtt  Continue home metoprolol 100 mg  Holding home Bumex 3mg  two times a day   Wean levo as able  Continuous cardiopulmonary monitoring. Maintain MAP >65.    Pulm:  Diagnoses: Hx of bronchiectasis, asthma   Currently on 2L NC  Plan:  Cont pulmicort inhaler  Cont mucinex  Cont Xopenex  Cont sodium chloride inhalation solution   Wean supplemental O2 as toelrated   Continuous pulse oximetry. Maintain O2 sat >92%.     GI:  Diagnoses: No active issues   Plan:  PRN zofran  Bowel regimen: Senna   GI prophylaxis: None    :  Diagnoses: Stage 3B CKD  Creatinine trend: 1.7 (1.87, 1.75, 2.15, 1.82)  Baseline creatinine: 1.6  Plan:  Monitor Cr  Fluid resuscitation   Monitor I/Os.    F/E/N:  Plan:   F: Fluid resuscitation prn.  E: Monitor and replete electrolytes for Mg >2, Phos >3, K >4.  N: Regular    Heme/Onc:  Diagnoses: Anemia   Hgb 8.3 from 12.5 this AM  Plan:  2U prbcs  VTE prophylaxis: Hep gtt    Endo:  Diagnoses: Hypothyroidism  Plan:   Cont home levothyroxine 50 mcg daily  Insulin: SSI. Monitor blood glucose.    ID:  Diagnoses: No active issues   Plan:  Monitor fever curve and WBC.    MSK/Skin:  Diagnoses: S/p B/L lower extremity fasciotomies  Plan:  Management of fasciotomy sites per vascular surgery  PT/OT when appropriate. Encourage OOB and ambulation when appropriate. Local wound care prn.    LDAs:  Lines - PIVx2, L radial holly, xiong  Drains - N/A  Airways - N/A    Disposition: Critical care    History of Present Illness   Rebeca Banegas is a 89 y.o. with Bronchiectasis, asthma, chronic HFpEF, permanent Afib (Warfarin), MGUS, CVA Aug '24 suspected cardioembolic 2/2 AC hold in the past, Bifascicular bundle branch block who is ambulatory with a cane presents with severe RLE rest pain and associated diminished sensation to SLB. Clinical exam and imaging concerning for [place] 2B acute limb ischemia of the RLE, L popliteal occlusion also noted on presentation. Now S/p R femoral cutdown, thromboembolectomy, R anterior and lateral fasciotomies, partial proximal fasciotomy  closure 6/24/25 AM ([place]to). Now also S/p LLE thromboembolectomy of pop and AT/TPT in setting of LLE R2B ALI 2/2 popliteal occlusion ([place]) 6/24/25 PM. On 6/25 AM, patient hypotensive. Upgraded to ICU level of care and started on levo. 2U PRBCs ordered.     History obtained from chart review and the patient.  Review of Systems: Review of Systems   Constitutional:  Negative for chills and fever.   HENT:  Negative for congestion.    Eyes:  Negative for discharge.   Respiratory:  Positive for shortness of breath.    Cardiovascular:  Negative for chest pain.   Gastrointestinal:  Positive for nausea. Negative for abdominal pain and vomiting.   Genitourinary:  Negative for hematuria.   Musculoskeletal:  Negative for arthralgias.   Skin:  Negative for color change.   Neurological:  Negative for dizziness and light-headedness.   Psychiatric/Behavioral:  Negative for agitation.        Historical Information   Past Medical History:  No date: Abnormal ECG  No date: Abnormal ultrasound      Comment:  RESOLVED: 26JUN2015 12/29/2024: Acute kidney failure (HCC)  04/07/2020: Advice given about COVID-19 virus infection  No date: Ambulates with cane  No date: Arrhythmia  No date: Arthritis  No date: Asthma  No date: Asthma with acute exacerbation      Comment:  LAST ASSESSED: 21FEB2013  No date: Asymptomatic menopausal state  No date: Atherosclerosis  No date: Atrial fibrillation ([facility])  No date: Chronic kidney disease  No date: Chronic obstructive lung disease ([facility])      Comment:  w/ chonic cough  No date: Chronic ulcer of left foot ([facility])  No date: Colon polyp  No date: Congestive heart failure ([facility])      Comment:  LAST ASSESSED: 13FEB2017  No date: COPD (chronic obstructive pulmonary disease) ([facility])  No date: Coronary artery disease  01/06/2025: COVID-19  03/25/2023: COVID-19 virus infection  No date: Cuboid fracture      Comment:  LAST ASSESSED: 03JUL2014  No date: Disease of thyroid gland  No date: Dyspepsia  No date: Edema of  both lower extremities  No date: Equinus deformity of left foot  04/01/2024: Fall  No date: Falls      Comment:  5/2024  No date: GERD (gastroesophageal reflux disease)  No date: Headache(784.0)  No date: High risk medication use      Comment:  LAST ASSESSED: 19MAY2014  No date: Hyperlipidemia  No date: Hypertension  No date: Hypokalemia  No date: Hypothyroidism  No date: Impacted cerumen of both ears      Comment:  LAST ASSESSED: 14FEB2013  No date: Impacted cerumen of right ear      Comment:  LAST ASSESSED: 14FEB2013  No date: Influenza      Comment:  LAST ASSESSED: 29DEC2014  No date: IPMN (intraductal papillary mucinous neoplasm)      Comment:  RESOLVED: 02OCT2015  No date: Irregular heart beat      Comment:  afib. Warfarin.  12/21/2018: Leg cramps  No date: Limb swelling      Comment:  LAST ASSESSED: 19MAY2014  No date: Navicular fracture of ankle      Comment:  LAST ASSESSED: 22MAY2014  No date: Onychomycosis      Comment:  LAST ASSESSED: [id number]  10/17/2022: Open wound of right upper arm  No date: Osteoarthritis  No date: Osteopenia  No date: Osteoporosis  No date: Paronychia, finger, unspecified laterality      Comment:  LAST ASSESSED: 19JUL2013  No date: Paroxysmal atrial fibrillation (HCC)      Comment:  LAST ASSESSED: 02MAR2014  No date: Peripheral vascular disease (HCC)  No date: Plantar fasciitis  No date: SOBOE (shortness of breath on exertion)  No date: Stroke ([facility])      Comment:  Right arm weakness that has resolved  No date: Talus fracture      Comment:  LAST ASSESSED: 03JUL2014 12/19/2023: Vaccine counseling  No date: Vascular headache  No date: Walker as ambulation aid  No date: Wound of right foot Past Surgical History:  No date: APPENDECTOMY  08/09/2024: BONE BIOPSY; Left      Comment:  Procedure: Bone bx of proximal phalanx second toe &                second met with fluoroscopic guidance;  Surgeon: Jonny Marcial DPM;  [facility];  Service:                 Podiatry  No date: CATARACT EXTRACTION; Bilateral  No date: CHOLECYSTECTOMY  No date: COLONOSCOPY  No date: JOINT REPLACEMENT; Bilateral      Comment:  knee  No date: NEUROPLASTY / TRANSPOSITION MEDIAN NERVE AT CARPAL TUNNEL   BILATERAL; Bilateral      Comment:  DECOMPRESSION  No date: OOPHORECTOMY; Bilateral      Comment:  age 81  2014: PELVIC FLOOR REPAIR  08/09/2024: NE BIOPSY BONE TROCAR/NEEDLE SUPERFICIAL; Left      Comment:  Procedure: Left foot debridement;  Surgeon: Jonny Marcial DPM;  [facility];  Service:                Podiatry  08/09/2024: NE DEBRIDEMENT MUSCLE &/FASCIA 1ST 20 SQ CM/<; Left      Comment:  Procedure: Left foot debridement;  Surgeon: Jonny Marcial DPM;  [facility];  Service:                Podiatry  11/29/2024: NE OSTEOT W/WO LNGTH SHRT/CORRJ 1ST METAR; Left      Comment:  Procedure: Left foot percutaneous osteotomy of second,                third and fourth metatarsal.;  Surgeon: Jonny Marcial DPM;  [facility];  Service: Podiatry  No date: SALPINGECTOMY; Bilateral  No date: SINUS SURGERY  1941: TONSILLECTOMY  No date: TOTAL KNEE ARTHROPLASTY; Bilateral   Current Outpatient Medications   Medication Instructions    albuterol (Proventil HFA) 90 mcg/act inhaler 2 puffs, Inhalation, Every 6 hours PRN    albuterol 2.5 mg, Nebulization, Every 8 hours (RESP)    ammonium lactate (LAC-HYDRIN) 12 % lotion Topical, 2 times daily PRN    atorvastatin (LIPITOR) 40 mg, Oral, Every evening    bumetanide (BUMEX) 3 mg, Oral, 2 times daily    Fluticasone-Salmeterol (Advair Diskus) 250-50 mcg/dose inhaler 1 puff, Inhalation, 2 times daily, Rinse mouth after use.    Jardiance 10 mg, Every morning    levothyroxine 50 mcg tablet TAKE 1 TABLET DAILY, EXCEPT TAKE 1 AND 1/2 TABLETS ON SUNDAY AND WEDNESDAY    metoprolol succinate (TOPROL-XL) 100 mg, Oral, Daily    sodium chloride 3 % inhalation solution 4 mL,  Nebulization, 3 times daily    spironolactone (ALDACTONE) 50 mg, Oral, Daily    Vitamin D3 2,000 Units, Daily    warfarin (JANTOVEN) 2.5 mg, Oral, Daily (warfarin)    Allergies[1]   Social History[2] Family History[3]       Objective :                   Vitals I/O      Most Recent Min/Max in 24hrs   Temp (!) 97.3 °F (36.3 °C) Temp  Min: 97.2 °F (36.2 °C)  Max: 97.7 °F (36.5 °C)   Pulse 100 Pulse  Min: 80  Max: 114   Resp 15 Resp  Min: 11  Max: 30   BP (!) 75/40 BP  Min: 69/42  Max: 147/67   O2 Sat 100 % SpO2  Min: 91 %  Max: 100 %      Intake/Output Summary (Last 24 hours) at 6/25/2025 0551  Last data filed at 6/25/2025 0501  Gross per 24 hour   Intake 5305.66 ml   Output 1675 ml   Net 3630.66 ml       Diet Regular; Regular House    Invasive Monitoring           Physical Exam   Physical Exam  Eyes:      Extraocular Movements: Extraocular movements intact.   Skin:     General: Skin is warm and dry.      Comments: Dressings clean, dry, intact   HENT:      Head: Normocephalic and atraumatic.      Nose: No congestion.   Cardiovascular:      Pulses: Pulses are Doppl L and R AT signals .   Abdominal:      Palpations: Abdomen is soft.   Constitutional:       General: She is not in acute distress.  Neurological:      Mental Status: She is alert. She is calm. She is not agitated.      Comments: Motor sensory grossly intact b/l LE   Genitourinary/Anorectal:  Dillon present.            Diagnostic Studies        Lab Results: I have reviewed the following results:     Medications:  Scheduled PRN   acetaminophen, 975 mg, Q8H GARTH  budesonide, 0.5 mg, Q12H  [Held by provider] bumetanide, 3 mg, BID  guaiFENesin, 600 mg, Q12H GARTH  lactated ringers, 1,000 mL, Once  lactated ringers, 500 mL, Once  levalbuterol, 1.25 mg, Q6H  levothyroxine, 50 mcg, Early Morning  metoprolol succinate, 100 mg, Daily  norepinephrine, ,   senna, 1 tablet, Daily  sodium chloride, 4 mL, Q6H  [Held by provider] spironolactone, 50 mg, Daily      lactated  ringers, 500 mL, Once PRN   And  lactated ringers, 500 mL, Once PRN  norepinephrine, ,   ondansetron, 4 mg, Q6H PRN  oxyCODONE, 5 mg, Q4H PRN  oxyCODONE, 2.5 mg, Q4H PRN  sodium chloride, 500 mL, Once PRN   And  sodium chloride, 500 mL, Once PRN       Continuous    heparin (porcine), 3-30 Units/kg/hr (Order-Specific), Last Rate: 15 Units/kg/hr (25)  lactated ringers, 100 mL/hr, Last Rate: Stopped (25)  norepinephrine, 1-30 mcg/min, Last Rate: 8 mcg/min (25 0545)         Labs:   CBC    Recent Labs     25   WBC 9.86  --    HGB 12.5 8.8*   HCT 38.2 26*     --      BMP    Recent Labs     25   SODIUM 133*  --  131*   K 4.3  --  4.9   CL 96  --  101   CO2    AGAP 12  --  8   BUN 63*  --  52*   CREATININE 1.87*  --  1.70*   CALCIUM 8.6  --  7.3*       Coags    Recent Labs     25  1030   INR 1.47*  --    PTT 28 >210*        Additional Electrolytes  Recent Labs     25   CAIONIZED 0.95*          Blood Gas    Recent Labs     25   PHART 7.409   FVV3JXM 34.0*   PO2ART 148.8*   [id number] 21.0*   BEART -3.1   SOURCE Line, Arterial     Recent Labs     25   SOURCE Line, Arterial    LFTs  Recent Labs     25   ALT 18   AST 24   ALKPHOS 52   ALB 3.8   TBILI 0.50       Infectious  No recent results  Glucose  Recent Labs     25  0512   GLUC 130 135               [1]   Allergies  Allergen Reactions    Asa [Aspirin] Shortness Of Breath    Nsaids Shortness Of Breath    Medical Tape Other (See Comments)     Skin tears with the removal of tape    [2]   Social History  Tobacco Use    Smoking status: Former     Current packs/day: 0.00     Average packs/day: 0.3 packs/day for 2.0 years (0.5 ttl pk-yrs)     Types: Cigarettes     Start date:      Quit date:      Years since quittin.5    Smokeless tobacco: Never    Tobacco comments:     On  and off socially with friends    Vaping Use    Vaping status: Never Used   Substance Use Topics    Alcohol use: Not Currently    Drug use: No   [3]   Family History  Problem Relation Name Age of Onset    Pancreatic cancer Mother Christiana 93    Heart failure Mother Christiana     Cancer Mother Christiana     Hypertension Mother Christiana     Coronary artery disease Family      Breast cancer Family      Uterine cancer Family      Hyperlipidemia Family      Osteoarthritis Family      Ovarian cancer Family      Brain cancer Son      Stroke Father Isaac     Hypertension Father Isaac     No Known Problems Sister      No Known Problems Daughter      No Known Problems Maternal Grandmother      No Known Problems Maternal Grandfather      No Known Problems Paternal Grandmother      No Known Problems Paternal Grandfather      Breast cancer Cousin  50    Breast cancer Cousin  50    Ovarian cancer Other niece 49

## 2025-06-25 NOTE — ASSESSMENT & PLAN NOTE
Continue home metoprolol, diuretics held post-op pending BMP to assess renal function, softer pressures overnight

## 2025-06-25 NOTE — ASSESSMENT & PLAN NOTE
Patient remains in A-fib.  Previously on warfarin.  Cardiology consulted for anticoagulation  Beta-blocker held in the setting of hypotension  Consider initiation of phenylephrine for rate control given pressor requirements

## 2025-06-25 NOTE — ASSESSMENT & PLAN NOTE
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 Clinical exam and imaging concerning for [place] 2B acute limb ischemia of the RLE, L popliteal occlusion also noted on presentation.    S/p R femoral cutdown, thromboembolectomy, R anterior and lateral fasciotomies, partial proximal fasciotomy closure 6/24/25 AM (Eriberto)    S/p LLE thromboembolectomy of pop and AT/TPT in setting of LLE R2B ALI 2/2 popliteal occlusion (Segundo) 6/24/25 PM    Plan:  - In setting of the above, patient doing well post-op bilateral lower extremity thromboembolectomy, RLE fasciotomies; bilateral feet pink and warm with multiphasic AT signals  - Continue heparin gtt, with subtherapeutic INR on presentation consideration for pricing out DOAC   - RLE compartments remain soft, compressible, will continue monitoring fasciotomy sites for attempted closure in the next few days. Some postoperative oozing from fasciotomy sites while maintained on hep gtt; dressing changed overnight.   - Continue q4hr neurovascular checks  - Appreciate cardiology and pulmonology input  - Continue pulm toilet, incentive spirometry, appreciate RT   - I/Os; Dillon in place  - With softer pressures overnight s/p two 500 cc fluid bolus, keep A-Line POD 1; with hypotension with continued fluid resuscitation appreciate surgical critical care input           - Will follow-up AM labs currently receiving an additional 1L bolus with improvement in cuff pressures  - hold parameters for diuretics for SBP  <130 for post-op DARIO risk reduction  - PRN analgesia, anti-emetics  - Will discuss with Dr. Wei

## 2025-06-25 NOTE — ANESTHESIA POSTPROCEDURE EVALUATION
Post-Op Assessment Note    CV Status:  Stable  Pain Score: 0    Pain management: adequate       Mental Status:  Awake and sleepy   Hydration Status:  Stable   PONV Controlled:  None   Airway Patency:  Patent     Post Op Vitals Reviewed: Yes    No anethesia notable event occurred.    Staff: Anesthesiologist, CRNA           Last Filed PACU Vitals:  Vitals Value Taken Time   Temp 97.2    Pulse 114 06/24/25 21:50   /73 06/24/25 21:50   Resp 18 06/24/25 21:50   SpO2 96        Modified Kendell:     Vitals Value Taken Time   Activity 2 06/24/25 21:50   Respiration 2 06/24/25 21:50   Circulation 2 06/24/25 21:50   Consciousness 2 06/24/25 21:50   Oxygen Saturation 1 06/24/25 21:50     Modified Kendell Score: 9

## 2025-06-25 NOTE — ASSESSMENT & PLAN NOTE
Patient is currently hypotensive requiring pressors.  Consider initiating phenylephrine  Hold antihypertensives for now

## 2025-06-25 NOTE — ASSESSMENT & PLAN NOTE
History of permanent atrial fibrillation on chronic warfarin therapy with a CVA in August 2024 (suspected embolic due to warfarin pause). She presented on 6/24 with right lower extremity pain and CTA showed a right femoral acute arterial occlusion and she underwent urgent right femoral cutdown, thromboembolectomy, and right lower extremity fasciotomy on 6/24.  Imaging also showed a left popliteal occlusion with reconstitution of the tibial arteries.    Labs on presentation were notable for a subtherapeutic INR level (1.47). Unclear etiology.  Pt reports compliance with her warfarin and denies any recent changes in medications or diet.  Cardiology was consulted for anticoagulation recommendations.  Pt is agreeable to switching to another anticoagulant (Xarelto/Eliquis) given her history of Coumadin failure.      -Later on 6/24 she was noted to experience worsening LLE numbness and decreased ROM.  She was again taken to the OR for LLE popliteal thromboembolectomy and stenting.    -Overnight on 6/24-6/25 she was noted to be hypotensive and was upgraded to the ICU for initiation of pressors.  Lactate was elevated up to 3.6.  Troponin 70->71. Hb dropped from 12.5-8.3.  Suspect acute blood loss anemia.    Plan:  Continue heparin gtt as able. Consider holding pending bleeding workup.   Price check Xarelto/Eliquis.  Given her recurrent Coumadin failure, would recommend transitioning to a DOAC once stable from a surgical standpoint.  Monitor BP closely and wean pressors as able.  Initially on levo. Now on Colt and Vaso.   Hold home BP meds.  S/p 100 mg Solu-Cortef this morning.  Continue IVF  2U PRBC's ordered. Monitor Hb closely.   Trend perfusion markers.  Hold Toprol-XL and spironolactone given hypotension.  Not on ASA due to allergy and not on statin due to potential side effects.  IR LE angiogram ordered.  Monitor for evidence of bleeding.  TTE today 6/25 showing an LVEF of 65% with vigorous systolic function and no  obvious regional wall motion abnormalities.  Continue neurovascular checks and postoperative care as per vascular surgery/ICU.

## 2025-06-25 NOTE — ASSESSMENT & PLAN NOTE
Remains in atrial fibrillation with RVR, likely related to acute blood loss anemia.  Continue heparin gtt as able.  Price check Xarelto/Eliquis.  Given her recurrent Coumadin failure, would recommend transitioning to a DOAC once stable from a surgical standpoint.  Hold Toprol  mg daily given hypotension.  Telemetry monitoring.  Monitor electrolytes and replete as needed.

## 2025-06-25 NOTE — PROGRESS NOTES
Progress Note - Cardiology   Name: Rebeca Banegas 89 y.o. female I MRN: [id number]  Unit/Bed#: [facility] 513-01 I Date of Admission: 6/24/2025   Date of Service: 6/25/2025 I Hospital Day: 1     Assessment & Plan  Acute lower limb ischemia  History of permanent atrial fibrillation on chronic warfarin therapy with a CVA in August 2024 (suspected embolic due to warfarin pause). She presented on 6/24 with right lower extremity pain and CTA showed a right femoral acute arterial occlusion and she underwent urgent right femoral cutdown, thromboembolectomy, and right lower extremity fasciotomy on 6/24.  Imaging also showed a left popliteal occlusion with reconstitution of the tibial arteries.    Labs on presentation were notable for a subtherapeutic INR level (1.47). Unclear etiology.  Pt reports compliance with her warfarin and denies any recent changes in medications or diet.  Cardiology was consulted for anticoagulation recommendations.  Pt is agreeable to switching to another anticoagulant (Xarelto/Eliquis) given her history of Coumadin failure.      -Later on 6/24 she was noted to experience worsening LLE numbness and decreased ROM.  She was again taken to the OR for LLE popliteal thromboembolectomy and stenting.    -Overnight on 6/24-6/25 she was noted to be hypotensive and was upgraded to the ICU for initiation of pressors.  Lactate was elevated up to 3.6.  Troponin 70->71. Hb dropped from 12.5-8.3.  Suspect acute blood loss anemia.    Plan:  Continue heparin gtt as able. Consider holding pending bleeding workup.   Price check Xarelto/Eliquis.  Given her recurrent Coumadin failure, would recommend transitioning to a DOAC once stable from a surgical standpoint.  Monitor BP closely and wean pressors as able.  Initially on levo. Now on Colt and Vaso.   Hold home BP meds.  S/p 100 mg Solu-Cortef this morning.  Continue IVF  2U PRBC's ordered. Monitor Hb closely.   Trend perfusion markers.  Hold Toprol-XL and spironolactone  given hypotension.  Not on ASA due to allergy and not on statin due to potential side effects.  IR LE angiogram ordered.  Monitor for evidence of bleeding.  TTE today 6/25 showing an LVEF of 65% with vigorous systolic function and no obvious regional wall motion abnormalities.  Continue neurovascular checks and postoperative care as per vascular surgery/ICU.  Permanent atrial fibrillation (HCC)  Remains in atrial fibrillation with RVR, likely related to acute blood loss anemia.  Continue heparin gtt as able.  Price check Xarelto/Eliquis.  Given her recurrent Coumadin failure, would recommend transitioning to a DOAC once stable from a surgical standpoint.  Hold Toprol  mg daily given hypotension.  Telemetry monitoring.  Monitor electrolytes and replete as needed.  Bifascicular bundle branch block  Known.  Continue telemetry monitoring.  Chronic heart failure with preserved ejection fraction (HCC)  Wt Readings from Last 3 Encounters:   06/25/25 59.9 kg (132 lb)   06/18/25 57.2 kg (126 lb 3.2 oz)   06/12/25 54.9 kg (121 lb)   Last echo in August 2024 showing moderate concentric hypertrophy and an LVEF of 60%.  She also has moderate TR and biatrial enlargement.  TTE today 6/25 showing an LVEF of 65% with vigorous systolic function and no obvious regional wall motion abnormalities.  On p.o. Bumex 3 mg twice daily at home.  Currently on hold in setting of hypotension.  Restart when able.  Monitor I's and O's, volume status and renal function.  Monitor electrolytes and replete as needed.  Hyperlipidemia  Patient is not currently on statin therapy.  She was previously on atorvastatin 40 mg daily but it was discontinued due to potential associated side effects.  Consider restarting when able.  Primary hypertension  Hold home Toprol  mg daily, spironolactone 50 mg daily and Bumex 3 mg twice daily in the setting of hypotension.  Restart when able.  Stage 3b chronic kidney disease (HCC)  Lab Results   Component  Value Date    EGFR 25 06/25/2025    EGFR 23 06/24/2025    EGFR 24 06/02/2025    CREATININE 1.70 (H) 06/25/2025    CREATININE 1.87 (H) 06/24/2025    CREATININE 1.75 (H) 06/02/2025   Monitor CBC.  History of CVA (cerebrovascular accident)  See above.     Subjective   The patient was seen and examined at bedside.  She reports feeling weak and short of breath.  She reports mild pain and numbness of her right upper extremity as well as her bilateral lower extremities but reports that her lower extremities feel better than the day prior.  Telemetry shows A-fib with heart rates in the 120s to 150s.    Objective :  Temp:  [97.2 °F (36.2 °C)-98.1 °F (36.7 °C)] 97.4 °F (36.3 °C)  HR:  [] 105  BP: ()/(40-73) 87/51  Resp:  [12-30] 17  SpO2:  [67 %-100 %] 85 %  O2 Device: Nasal cannula  Nasal Cannula O2 Flow Rate (L/min):  [2 L/min-4 L/min] 4 L/min  Orthostatic Blood Pressures      Flowsheet Row Most Recent Value   Blood Pressure 87/51 filed at 06/25/2025 1245   Patient Position - Orthostatic VS Sitting filed at 06/24/2025 2300          First Weight: Weight - Scale: 60.2 kg (132 lb 11.5 oz) (06/24/25 2217)  Vitals:    06/24/25 2217 06/25/25 0830   Weight: 60.2 kg (132 lb 11.5 oz) 59.9 kg (132 lb)     Physical Exam  Constitutional:       Appearance: She is well-developed. She is ill-appearing.   HENT:      Head: Normocephalic and atraumatic.      Nose: Nose normal.     Eyes:      Conjunctiva/sclera: Conjunctivae normal.      Pupils: Pupils are equal, round, and reactive to light.     Neck:      Vascular: No JVD.     Cardiovascular:      Rate and Rhythm: Tachycardia present. Rhythm irregular.      Heart sounds: Normal heart sounds. No murmur heard.     No friction rub. No gallop.   Pulmonary:      Effort: Pulmonary effort is normal. No respiratory distress.      Breath sounds: No stridor. Wheezing present. No rales.   Chest:      Chest wall: No tenderness.   Abdominal:      General: Bowel sounds are normal. There is no  distension.      Palpations: Abdomen is soft. There is no mass.      Tenderness: There is no abdominal tenderness. There is no guarding or rebound.      Hernia: No hernia is present.     Musculoskeletal:         General: No deformity.      Comments: ACE wrapped bilateral lower extremities.  Purple discoloration of her right hand.     Skin:     Coloration: Skin is pale.      Findings: Bruising present.     Neurological:      Mental Status: She is alert.     Psychiatric:         Behavior: Behavior normal.         Thought Content: Thought content normal.         Judgment: Judgment normal.           Lab Results: I have reviewed the following results:CBC/BMP:   .     06/24/25 1953 06/25/25 0512 06/25/25 0512 06/25/25 0946   WBC  --  10.36*   < > 12.67*   HGB 8.8* 8.3*   < > 9.8*   HCT 26* 24.9*   < > 28.3*   PLT  --  154   < > 168   SODIUM  --  131*  --   --    K  --  4.9  --   --    CL  --  101  --   --    CO2 24 22  --   --    BUN  --  52*  --   --    CREATININE  --  1.70*  --   --    GLUC  --  135  --   --    CAIONIZED 0.95*  --   --   --    MG  --  2.2  --   --    PHOS  --  4.9*  --   --     < > = values in this interval not displayed.      Results from last 7 days   Lab Units 06/25/25 0946 06/25/25 0512 06/24/25 1953 06/24/25 0412   WBC Thousand/uL 12.67* 10.36*  --  9.86   HEMOGLOBIN g/dL 9.8* 8.3*  --  12.5   I STAT HEMOGLOBIN g/dl  --   --  8.8*  --    HEMATOCRIT % 28.3* 24.9*  --  38.2   HEMATOCRIT, ISTAT %  --   --  26*  --    PLATELETS Thousands/uL 168 154  --  220     Results from last 7 days   Lab Units 06/25/25 0512 06/24/25 1953 06/24/25 0412   POTASSIUM mmol/L 4.9  --  4.3   CHLORIDE mmol/L 101  --  96   CO2 mmol/L 22  --  25   CO2, I-STAT mmol/L  --  24  --    BUN mg/dL 52*  --  63*   CREATININE mg/dL 1.70*  --  1.87*   GLUCOSE, ISTAT mg/dl  --  139  --    CALCIUM mg/dL 7.3*  --  8.6     Results from last 7 days   Lab Units 06/25/25  0512 06/24/25  1030 06/24/25  0412   INR   --   --  1.47*    PTT seconds >210* >210* 28     Lab Results   Component Value Date    HGBA1C 6.3 (H) 06/25/2025     Lab Results   Component Value Date    CKTOTAL 66 06/02/2025       Imaging Results Review: I reviewed radiology reports from this admission including: Echocardiogram.  Other Study Results Review: EKG was reviewed.     VTE Pharmacologic Prophylaxis: VTE covered by:  heparin (porcine), Intravenous, 12 Units/kg/hr at 06/25/25 0917      VTE Mechanical Prophylaxis: sequential compression device    Administrative Statements   I have spent a total time of 45 minutes in caring for this patient on the day of the visit/encounter including Impressions.

## 2025-06-25 NOTE — PROGRESS NOTES
Progress Note - Pulmonology   Name: Rebeca Banegas 89 y.o. female I MRN: [id number]  Unit/Bed#: [facility] 513-01 I Date of Admission: 6/24/2025   Date of Service: 6/25/2025 I Hospital Day: 1    Assessment & Plan  Acute lower limb ischemia  Patient presented early on 6/24/2025 for right lower extremity pain.  She was found to have mottling, decreased sensation, and decreased strength.  CTA showing right femoral acute arterial occlusion.   Status post right femoral cutdown, thromboembolectomy, and right lower extremity fasciotomies.  Currently on heparin drip  Management per primary  Bronchiectasis (HCC)  Patient with history of bronchiectasis noted on CT chest and Pseudomonas growth on previous sputum culture (38/2024 - treated with levaquin with improvement).  Last exacerbation was in March 2024, she was seen in the office on 6/18/2025.  At that time her airway clearance was increased.  Continue with aggressive pulmonary toilet  Airway clearance protocol  Respiratory protocol  Xopenex and 3% saline 4 times daily  Continue flutter valve, incentive spirometry  High frequency chest wall oscillation  Mucinex  Wean O2 for an SpO2 goal of greater than 88% or per patient comfort -if the patient continuously requires oxygen, would perform home O2 eval prior to discharge  Chest x-ray this morning with likely venous congestion, increased right paratracheal stripe  Patient may need inotropic support in the setting of diuresis, will wait for echo to be formally read  If the patient does not improve, will obtain sputum culture and consider bronchoscopy  No indication for inhaled tobramycin at this point or other antibiotics, but low threshold to start  Asthma  Patient's last PFTs in September 2023 revealing a fixed airway obstruction of moderate severity, without any significant improvement on bronchodilators.  Patient has limited tobacco abuse in the past.  She possibly has a fixed obstruction phenotype of asthma versus  COPD.  Switch home ICS/LABA to budesonide and Xopenex  No indication right now for intravenous or oral steroids  Can switch back to home Advair on discharge  Patient has follow-up with our office in 2 days, if she is not discharged by then we will arrange a new appointment  Permanent atrial fibrillation (HCC)  Patient remains in A-fib.  Previously on warfarin.  Cardiology consulted for anticoagulation  Beta-blocker held in the setting of hypotension  Consider initiation of phenylephrine for rate control given pressor requirements  Bifascicular bundle branch block  Known and stable.  Continue telemetry  Cardiology following  Chronic heart failure with preserved ejection fraction (HCC)  Wt Readings from Last 3 Encounters:   06/24/25 60.2 kg (132 lb 11.5 oz)   06/18/25 57.2 kg (126 lb 3.2 oz)   06/12/25 54.9 kg (121 lb)     EF in August 2024 was 60%.  Patient is on beta-blocker, spironolactone, and Bumex at home.  Repeat echo pending  Patient examines volume overloaded  Diuresis and management per primary and cardiology        Hyperlipidemia  Patient is not on statin therapy at home.  Management per primary and cardiology  Primary hypertension  Patient is currently hypotensive requiring pressors.  Consider initiating phenylephrine  Hold antihypertensives for now  Stage 3b chronic kidney disease (HCC)  Lab Results   Component Value Date    EGFR 25 06/25/2025    EGFR 23 06/24/2025    EGFR 24 06/02/2025    CREATININE 1.70 (H) 06/25/2025    CREATININE 1.87 (H) 06/24/2025    CREATININE 1.75 (H) 06/02/2025   Baseline creatinine around 1.6.  Continue to trend renal function  Ins and outs  Avoid nephrotoxins    24 Hour Events : Patient required vasopressors overnight and transferred to the ICU.  Subjective : Patient reports pain in her lower extremity, no shortness of breath.    Objective :  Temp:  [97.2 °F (36.2 °C)-97.7 °F (36.5 °C)] 97.5 °F (36.4 °C)  HR:  [] 140  BP: ()/(40-76) 78/58  Resp:  [11-30] 18  SpO2:   [91 %-100 %] 97 %  O2 Device: Nasal cannula  Nasal Cannula O2 Flow Rate (L/min):  [2 L/min-4 L/min] 2 L/min    Physical Exam  Vitals reviewed.   Constitutional:       Appearance: She is ill-appearing.   HENT:      Head: Normocephalic and atraumatic.      Right Ear: External ear normal.      Left Ear: External ear normal.      Nose: Nose normal.      Mouth/Throat:      Mouth: Mucous membranes are dry.      Pharynx: Oropharynx is clear.     Cardiovascular:      Rate and Rhythm: Tachycardia present. Rhythm irregular.   Pulmonary:      Effort: Pulmonary effort is normal.      Breath sounds: Rhonchi present.   Abdominal:      General: Abdomen is flat. There is no distension.      Palpations: Abdomen is soft.      Tenderness: There is no abdominal tenderness.     Musculoskeletal:      Right lower leg: Edema present.      Left lower leg: Edema present.     Skin:     General: Skin is warm and dry.      Capillary Refill: Capillary refill takes less than 2 seconds.     Neurological:      General: No focal deficit present.      Mental Status: She is alert and oriented to person, place, and time.     Psychiatric:         Mood and Affect: Mood normal.         Behavior: Behavior normal.           Lab Results: I have reviewed the following results:   .     06/24/25  1030 06/24/25  1953 06/25/25  0416 06/25/25  0512 06/25/25  0625   WBC  --   --   --  10.36*  --    HGB  --  8.8*  --  8.3*  --    HCT  --  26*  --  24.9*  --    PLT  --   --   --  154  --    SODIUM  --   --   --  131*  --    K  --   --   --  4.9  --    CL  --   --   --  101  --    CO2  --  24  --  22  --    BUN  --   --   --  52*  --    CREATININE  --   --   --  1.70*  --    GLUC  --   --   --  135  --    CAIONIZED  --  0.95*  --   --   --    MG  --   --   --  2.2  --    PHOS  --   --   --  4.9*  --    PTT  --   --   --  >210*  --    HSTNI0  --   --  65*  --   --    HSTNI2  --   --   --   --  70*   LACTICACID   < >  --  2.3*  --  3.6*    < > = values in this interval  not displayed.     ABG:   .     06/25/25  0437 06/25/25  0742   PHART 7.409 7.359   STR0UQD 34.0* 37.2   PO2ART 148.8* 95.8   GVD8QVL 21.0* 20.5*   BEART -3.1 -4.5       Imaging Results Review: I reviewed radiology reports from this admission including: chest xray.  Other Study Results Review: EKG was reviewed.   PFT Results Reviewed: reviewed.    Santana Recinos D.O.  PGY-5, Pulmonary/Critical Care  [facility]

## 2025-06-25 NOTE — PROGRESS NOTES
Progress Note - Vascular Surgery   Name: Rebeca Banegas 89 y.o. 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 Clinical exam and imaging concerning for [place] 2B acute limb ischemia of the RLE, L popliteal occlusion also noted on presentation.    S/p R femoral cutdown, thromboembolectomy, R anterior and lateral fasciotomies, partial proximal fasciotomy closure 6/24/25 AM (Eriberto)    S/p LLE thromboembolectomy of pop and AT/TPT in setting of LLE R2B ALI 2/2 popliteal occlusion (Segundo) 6/24/25 PM    Plan:  - In setting of the above, patient doing well post-op bilateral lower extremity thromboembolectomy, RLE fasciotomies; bilateral feet pink and warm with multiphasic AT signals  - Continue heparin gtt, with subtherapeutic INR on presentation consideration for pricing out DOAC   - RLE compartments remain soft, compressible, will continue monitoring fasciotomy sites for attempted closure in the next few days. Some postoperative oozing from fasciotomy sites while maintained on hep gtt; dressing changed overnight.   - Continue q4hr neurovascular checks  - Appreciate cardiology and pulmonology input  - Continue pulm toilet, incentive spirometry, appreciate RT   - I/Os; Dillon in place  - With softer pressures overnight s/p two 500 cc fluid bolus, keep A-Line POD 1; with hypotension with continued fluid resuscitation appreciate surgical critical care input           - Will follow-up AM labs currently receiving an additional 1L bolus with improvement in cuff pressures  - hold parameters for diuretics for SBP   <130 for post-op DARIO risk reduction  - PRN analgesia, anti-emetics  - Will discuss with Dr. Wei    Asthma  Does not appear to be in acute exacerbation, appreciate pulmonology recommendations  Budesonide and Xopenex while inpatient  Home Advair on discharge   Permanent atrial fibrillation (HCC)  Maintained on hep gtt, largely rate controlled overnight, continue metoprolol succinate 100mg daily  Chronic heart failure with preserved ejection fraction (HCC)  Wt Readings from Last 3 Encounters:   06/24/25 60.2 kg (132 lb 11.5 oz)   06/18/25 57.2 kg (126 lb 3.2 oz)   06/12/25 54.9 kg (121 lb)     Cardiology following, appears euvolemic at present  Hyperlipidemia  Consider initiation of statin  Primary hypertension  Continue home metoprolol, diuretics held post-op pending BMP to assess renal function, softer pressures overnight  Stage 3b chronic kidney disease (HCC)  Lab Results   Component Value Date    EGFR 23 06/24/2025    EGFR 24 06/02/2025    EGFR 19 03/25/2025    CREATININE 1.87 (H) 06/24/2025    CREATININE 1.75 (H) 06/02/2025    CREATININE 2.16 (H) 03/25/2025     Monitor for post-op DARIO, elevated Creatinine from baseline following contrast administration on presentation    Bronchiectasis (HCC)  Appreciate pulmonary recommendations  Aggressive pulmonary toilet and airway clearance protocol  Continue flutter valve, incentive spirometry  Xopenex and 3% saline 4 times daily  High frequency chest wall oscillation  Mucinex  Wean O2 for an SpO2 goal of greater than 88%    24 Hour Events : Patient return to OR following RLE thromboembolectomy with 2cpt fasciotomies in setting of LLE worsened ischemic symptoms. Is now s/p LLE thromboembolectomy as above.     Subjective : Patient with some expected pain at bilateral LE following procedure, symptoms consistent with reperfusion pain, currently well controlled. Softer pressures overnight though denying chest pain, shortness of breath, nausea, abdominal pain. Remained alert and  oriented to self, place and situation throughout the evening.     Objective :  Temp:  [97.2 °F (36.2 °C)-98.9 °F (37.2 °C)] 97.3 °F (36.3 °C)  HR:  [] 94  BP: ()/(50-87) 93/50  Resp:  [11-30] 15  SpO2:  [90 %-100 %] 100 %  O2 Device: Nasal cannula  Nasal Cannula O2 Flow Rate (L/min):  [2 L/min-4 L/min] 2 L/min     I/O         06/23 0701  06/24 0700 06/24 0701  06/25 0700    P.O.  100    I.V. (mL/kg)  2855.7 (47.4)    Blood  350    IV Piggyback  500    Total Intake(mL/kg)  3805.7 (63.2)    Urine (mL/kg/hr)  1225 (0.8)    Blood  250    Total Output  1475    Net  +2330.7                Lines/Drains/Airways       Active Status       Name Placement date Placement time Site Days    Arterial Line 06/24/25 Left Radial 06/24/25  0755  Radial  less than 1    Urethral Catheter Non-latex;Straight-tip 16 Fr. 06/24/25  0800  Non-latex;Straight-tip  less than 1                  Physical Exam  Constitutional:       General: She is not in acute distress.     Appearance: She is not toxic-appearing.   HENT:      Head: Normocephalic and atraumatic.     Eyes:      Extraocular Movements: Extraocular movements intact.       Cardiovascular:      Rate and Rhythm: Normal rate. Rhythm irregularly irregular.      Pulses:           Radial pulses are 2+ on the left side.        Femoral pulses are 2+ on the right side and 2+ on the left side.     Comments: R AT/PT multiphasic signals present  L multiphasic AT signal present  Pulmonary:      Effort: Pulmonary effort is normal.   Abdominal:      General: There is no distension.      Palpations: Abdomen is soft.      Tenderness: There is no abdominal tenderness. There is no guarding or rebound.      Comments: R groin with mepilex in place, saturated overnight and redressed at bedside, oozing noted without significant ecchymosis or hematoma     Musculoskeletal:      Cervical back: Neck supple.      Comments: Bilateral LE with ACE wraps in place, strikethrough to RLE fasciotomy sites  "    Skin:     General: Skin is warm and dry.      Comments: R hand ecchymotic and swollen     Neurological:      General: No focal deficit present.      Mental Status: She is alert.     Psychiatric:         Behavior: Behavior is cooperative.           Lab Results: I have reviewed the following results:  CBC with diff:   Lab Results   Component Value Date    WBC 9.86 06/24/2025    HGB 8.8 (L) 06/24/2025    HCT 26 (L) 06/24/2025    MCV 97 06/24/2025     06/24/2025    RBC 3.94 06/24/2025    MCH 31.7 06/24/2025    MCHC 32.7 06/24/2025    RDW 15.7 (H) 06/24/2025    MPV 9.4 06/24/2025    NRBC 0 01/22/2025   ,   BMP/CMP:  Lab Results   Component Value Date    SODIUM 133 (L) 06/24/2025    SODIUM 138 01/07/2025    SODIUM 138 01/02/2024    K 4.3 06/24/2025    K 3.6 01/07/2025    K 4.0 01/02/2024    CL 96 06/24/2025    CL 95 (L) 01/07/2025     01/02/2024    CO2 24 06/24/2025    CO2 32 (H) 01/07/2025    CO2 29 01/02/2024    BUN 63 (H) 06/24/2025    BUN 49 (H) 01/07/2025    BUN 33 (H) 01/02/2024    CREATININE 1.87 (H) 06/24/2025    CREATININE 1.35 (H) 01/02/2024    GLUCOSE 139 06/24/2025    CALCIUM 8.6 06/24/2025    CALCIUM 8.0 (L) 01/07/2025    CALCIUM 8.4 (L) 01/02/2024    AST 24 06/24/2025    AST 30 01/02/2024    ALT 18 06/24/2025    ALT 27 01/02/2024    ALKPHOS 52 06/24/2025    ALKPHOS 51 01/02/2024    PROT 6.4 10/17/2017    BILITOT 0.5 10/17/2017    EGFR 23 06/24/2025    EGFR 37 (L) 01/02/2024    EGFR 35 (L) 12/29/2020   ,   Lipid Panel:   Lab Results   Component Value Date    CHOL 205 (H) 02/07/2017   ,   Coags:   Lab Results   Component Value Date    PT 23.7 (H) 02/23/2024    PTT >210 (HH) 06/24/2025    INR 1.47 (H) 06/24/2025    INR 2.2 02/23/2024   ,   Blood Culture: No results found for: \"BLOODCX\",   Urinalysis:   Lab Results   Component Value Date    COLORU Light Yellow 12/29/2024    CLARITYU Clear 12/29/2024    SPECGRAV 1.011 12/29/2024    PHUR 5.0 12/29/2024    LEUKOCYTESUR Negative 12/29/2024    " "NITRITE Negative 12/29/2024    GLUCOSEU Negative 12/29/2024    KETONESU Negative 12/29/2024    BILIRUBINUR Negative 12/29/2024    BLOODU Negative 12/29/2024   ,   Urine Culture: No results found for: \"URINECX\",   Wound Culure:   Lab Results   Component Value Date    WOUNDCULT Few Colonies of Proteus mirabilis (A) 09/04/2024    WOUNDCULT Few Colonies of Acinetobacter baumannii (A) 09/04/2024       VTE Prophylaxis: RX contraindicated due to: hep gtt  "

## 2025-06-25 NOTE — ASSESSMENT & PLAN NOTE
Lab Results   Component Value Date    EGFR 23 06/24/2025    EGFR 24 06/02/2025    EGFR 19 03/25/2025    CREATININE 1.87 (H) 06/24/2025    CREATININE 1.75 (H) 06/02/2025    CREATININE 2.16 (H) 03/25/2025     Monitor for post-op DARIO, elevated Creatinine from baseline following contrast administration on presentation

## 2025-06-25 NOTE — ASSESSMENT & PLAN NOTE
Appreciate pulmonary recommendations  Aggressive pulmonary toilet and airway clearance protocol  Continue flutter valve, incentive spirometry  Xopenex and 3% saline 4 times daily  High frequency chest wall oscillation  Mucinex  Wean O2 for an SpO2 goal of greater than 88%

## 2025-06-25 NOTE — QUICK NOTE
Updated patient's daughter overnight regarding patient's transfer to ICU, undifferentiated shock requiring vasopressors, and patient giving consent for CVC. She is in agreement to the procedure and will plan on coming in from [place] next week. All questions answered and appreciation expressed.     Keiry Reeder PA-C

## 2025-06-25 NOTE — ASSESSMENT & PLAN NOTE
Patient with history of bronchiectasis noted on CT chest and Pseudomonas growth on previous sputum culture (38/2024 - treated with levaquin with improvement).  Last exacerbation was in March 2024, she was seen in the office on 6/18/2025.  At that time her airway clearance was increased.  Continue with aggressive pulmonary toilet  Airway clearance protocol  Respiratory protocol  Xopenex and 3% saline 4 times daily  Continue flutter valve, incentive spirometry  High frequency chest wall oscillation  Mucinex  Wean O2 for an SpO2 goal of greater than 88% or per patient comfort -if the patient continuously requires oxygen, would perform home O2 eval prior to discharge  Chest x-ray this morning with likely venous congestion, increased right paratracheal stripe  Patient may need inotropic support in the setting of diuresis, will wait for echo to be formally read  If the patient does not improve, will obtain sputum culture and consider bronchoscopy  No indication for inhaled tobramycin at this point or other antibiotics, but low threshold to start

## 2025-06-25 NOTE — ASSESSMENT & PLAN NOTE
Does not appear to be in acute exacerbation, appreciate pulmonology recommendations  Budesonide and Xopenex while inpatient  Home Advair on discharge

## 2025-06-26 PROBLEM — N18.4 CKD (CHRONIC KIDNEY DISEASE), STAGE IV (HCC): Status: ACTIVE | Noted: 2020-04-07

## 2025-06-26 PROBLEM — N17.9 AKI (ACUTE KIDNEY INJURY) (HCC): Status: ACTIVE | Noted: 2025-06-26

## 2025-06-26 PROBLEM — E83.39 HYPERPHOSPHATEMIA: Status: ACTIVE | Noted: 2025-06-26

## 2025-06-26 PROBLEM — E87.20 METABOLIC ACIDOSIS: Status: ACTIVE | Noted: 2025-06-26

## 2025-06-26 PROBLEM — R57.9 SHOCK (HCC): Status: ACTIVE | Noted: 2025-06-26

## 2025-06-26 LAB
2HR DELTA HS TROPONIN: 2 NG/L
2HR DELTA HS TROPONIN: 2 NG/L
4HR DELTA HS TROPONIN: 1 NG/L
4HR DELTA HS TROPONIN: 2 NG/L
ABO GROUP BLD BPU: NORMAL
ABO GROUP BLD BPU: NORMAL
ANION GAP SERPL CALCULATED.3IONS-SCNC: 11 MMOL/L (ref 4–13)
ANION GAP SERPL CALCULATED.3IONS-SCNC: 12 MMOL/L (ref 4–13)
APTT PPP: 50 SECONDS (ref 23–34)
APTT PPP: 56 SECONDS (ref 23–34)
APTT PPP: 63 SECONDS (ref 23–34)
ATRIAL RATE: 107 BPM
ATRIAL RATE: 138 BPM
ATRIAL RATE: 76 BPM
ATRIAL RATE: 78 BPM
ATRIAL RATE: 86 BPM
ATRIAL RATE: 87 BPM
ATRIAL RATE: 91 BPM
ATRIAL RATE: 91 BPM
BASOPHILS # BLD AUTO: 0.01 THOUSANDS/ÂΜL (ref 0–0.1)
BASOPHILS NFR BLD AUTO: 0 % (ref 0–1)
BPU ID: NORMAL
BPU ID: NORMAL
BUN SERPL-MCNC: 63 MG/DL (ref 5–25)
BUN SERPL-MCNC: 66 MG/DL (ref 5–25)
CA-I BLD-SCNC: 0.98 MMOL/L (ref 1.12–1.32)
CALCIUM SERPL-MCNC: 7.4 MG/DL (ref 8.4–10.2)
CALCIUM SERPL-MCNC: 7.4 MG/DL (ref 8.4–10.2)
CARDIAC TROPONIN I PNL SERPL HS: 77 NG/L (ref ?–50)
CARDIAC TROPONIN I PNL SERPL HS: 79 NG/L (ref ?–50)
CARDIAC TROPONIN I PNL SERPL HS: 79 NG/L (ref ?–50)
CARDIAC TROPONIN I PNL SERPL HS: 80 NG/L (ref ?–50)
CARDIAC TROPONIN I PNL SERPL HS: 81 NG/L (ref ?–50)
CARDIAC TROPONIN I PNL SERPL HS: 82 NG/L (ref ?–50)
CHLORIDE SERPL-SCNC: 103 MMOL/L (ref 96–108)
CHLORIDE SERPL-SCNC: 105 MMOL/L (ref 96–108)
CK SERPL-CCNC: 126 U/L (ref 26–192)
CK SERPL-CCNC: 148 U/L (ref 26–192)
CO2 SERPL-SCNC: 18 MMOL/L (ref 21–32)
CO2 SERPL-SCNC: 19 MMOL/L (ref 21–32)
CREAT SERPL-MCNC: 2.4 MG/DL (ref 0.6–1.3)
CREAT SERPL-MCNC: 2.45 MG/DL (ref 0.6–1.3)
CROSSMATCH: NORMAL
CROSSMATCH: NORMAL
EOSINOPHIL # BLD AUTO: 0.01 THOUSAND/ÂΜL (ref 0–0.61)
EOSINOPHIL NFR BLD AUTO: 0 % (ref 0–6)
ERYTHROCYTE [DISTWIDTH] IN BLOOD BY AUTOMATED COUNT: 15.6 % (ref 11.6–15.1)
ERYTHROCYTE [DISTWIDTH] IN BLOOD BY AUTOMATED COUNT: 15.9 % (ref 11.6–15.1)
GFR SERPL CREATININE-BSD FRML MDRD: 16 ML/MIN/1.73SQ M
GFR SERPL CREATININE-BSD FRML MDRD: 17 ML/MIN/1.73SQ M
GLUCOSE SERPL-MCNC: 114 MG/DL (ref 65–140)
GLUCOSE SERPL-MCNC: 140 MG/DL (ref 65–140)
GLUCOSE SERPL-MCNC: 147 MG/DL (ref 65–140)
GLUCOSE SERPL-MCNC: 149 MG/DL (ref 65–140)
GLUCOSE SERPL-MCNC: 163 MG/DL (ref 65–140)
HCT VFR BLD AUTO: 21.8 % (ref 34.8–46.1)
HCT VFR BLD AUTO: 31 % (ref 34.8–46.1)
HGB BLD-MCNC: 11 G/DL (ref 11.5–15.4)
HGB BLD-MCNC: 7.5 G/DL (ref 11.5–15.4)
IMM GRANULOCYTES # BLD AUTO: 0.05 THOUSAND/UL (ref 0–0.2)
IMM GRANULOCYTES NFR BLD AUTO: 1 % (ref 0–2)
LYMPHOCYTES # BLD AUTO: 1.83 THOUSANDS/ÂΜL (ref 0.6–4.47)
LYMPHOCYTES NFR BLD AUTO: 19 % (ref 14–44)
MAGNESIUM SERPL-MCNC: 2.3 MG/DL (ref 1.9–2.7)
MAGNESIUM SERPL-MCNC: 2.5 MG/DL (ref 1.9–2.7)
MCH RBC QN AUTO: 32 PG (ref 26.8–34.3)
MCH RBC QN AUTO: 32.1 PG (ref 26.8–34.3)
MCHC RBC AUTO-ENTMCNC: 34.4 G/DL (ref 31.4–37.4)
MCHC RBC AUTO-ENTMCNC: 35.5 G/DL (ref 31.4–37.4)
MCV RBC AUTO: 90 FL (ref 82–98)
MCV RBC AUTO: 93 FL (ref 82–98)
MONOCYTES # BLD AUTO: 1.54 THOUSAND/ÂΜL (ref 0.17–1.22)
MONOCYTES NFR BLD AUTO: 16 % (ref 4–12)
NEUTROPHILS # BLD AUTO: 6.3 THOUSANDS/ÂΜL (ref 1.85–7.62)
NEUTS SEG NFR BLD AUTO: 64 % (ref 43–75)
NRBC BLD AUTO-RTO: 0 /100 WBCS
P AXIS: 205 DEGREES
PHOSPHATE SERPL-MCNC: 6.1 MG/DL (ref 2.3–4.1)
PHOSPHATE SERPL-MCNC: 6.7 MG/DL (ref 2.3–4.1)
PLATELET # BLD AUTO: 122 THOUSANDS/UL (ref 149–390)
PLATELET # BLD AUTO: 125 THOUSANDS/UL (ref 149–390)
PMV BLD AUTO: 9.4 FL (ref 8.9–12.7)
PMV BLD AUTO: 9.8 FL (ref 8.9–12.7)
POTASSIUM SERPL-SCNC: 5.1 MMOL/L (ref 3.5–5.3)
POTASSIUM SERPL-SCNC: 5.1 MMOL/L (ref 3.5–5.3)
PR INTERVAL: 180 MS
PROCALCITONIN SERPL-MCNC: 0.85 NG/ML
QRS AXIS: -35 DEGREES
QRS AXIS: -86 DEGREES
QRS AXIS: -87 DEGREES
QRS AXIS: 250 DEGREES
QRS AXIS: 262 DEGREES
QRS AXIS: 263 DEGREES
QRS AXIS: 266 DEGREES
QRS AXIS: 270 DEGREES
QRSD INTERVAL: 124 MS
QRSD INTERVAL: 130 MS
QRSD INTERVAL: 134 MS
QRSD INTERVAL: 136 MS
QRSD INTERVAL: 166 MS
QRSD INTERVAL: 38 MS
QT INTERVAL: 328 MS
QT INTERVAL: 384 MS
QT INTERVAL: 402 MS
QT INTERVAL: 402 MS
QT INTERVAL: 426 MS
QT INTERVAL: 426 MS
QT INTERVAL: 428 MS
QT INTERVAL: 430 MS
QTC INTERVAL: 394 MS
QTC INTERVAL: 495 MS
QTC INTERVAL: 497 MS
QTC INTERVAL: 499 MS
QTC INTERVAL: 506 MS
QTC INTERVAL: 507 MS
QTC INTERVAL: 521 MS
QTC INTERVAL: 552 MS
RBC # BLD AUTO: 2.34 MILLION/UL (ref 3.81–5.12)
RBC # BLD AUTO: 3.44 MILLION/UL (ref 3.81–5.12)
SODIUM SERPL-SCNC: 134 MMOL/L (ref 135–147)
SODIUM SERPL-SCNC: 134 MMOL/L (ref 135–147)
T WAVE AXIS: 140 DEGREES
T WAVE AXIS: 154 DEGREES
T WAVE AXIS: 165 DEGREES
T WAVE AXIS: 166 DEGREES
T WAVE AXIS: 171 DEGREES
T WAVE AXIS: 179 DEGREES
T WAVE AXIS: 204 DEGREES
T WAVE AXIS: 223 DEGREES
UNIT DISPENSE STATUS: NORMAL
UNIT DISPENSE STATUS: NORMAL
UNIT PRODUCT CODE: NORMAL
UNIT PRODUCT CODE: NORMAL
UNIT PRODUCT VOLUME: 350 ML
UNIT PRODUCT VOLUME: 350 ML
UNIT RH: NORMAL
UNIT RH: NORMAL
VENTRICULAR RATE: 100 BPM
VENTRICULAR RATE: 100 BPM
VENTRICULAR RATE: 81 BPM
VENTRICULAR RATE: 85 BPM
VENTRICULAR RATE: 87 BPM
VENTRICULAR RATE: 90 BPM
VENTRICULAR RATE: 92 BPM
VENTRICULAR RATE: 96 BPM
WBC # BLD AUTO: 8.95 THOUSAND/UL (ref 4.31–10.16)
WBC # BLD AUTO: 9.74 THOUSAND/UL (ref 4.31–10.16)

## 2025-06-26 PROCEDURE — 99291 CRITICAL CARE FIRST HOUR: CPT | Performed by: EMERGENCY MEDICINE

## 2025-06-26 PROCEDURE — 94668 MNPJ CHEST WALL SBSQ: CPT

## 2025-06-26 PROCEDURE — 83735 ASSAY OF MAGNESIUM: CPT

## 2025-06-26 PROCEDURE — 93005 ELECTROCARDIOGRAM TRACING: CPT

## 2025-06-26 PROCEDURE — 94760 N-INVAS EAR/PLS OXIMETRY 1: CPT

## 2025-06-26 PROCEDURE — 82550 ASSAY OF CK (CPK): CPT

## 2025-06-26 PROCEDURE — 84484 ASSAY OF TROPONIN QUANT: CPT

## 2025-06-26 PROCEDURE — 99233 SBSQ HOSP IP/OBS HIGH 50: CPT | Performed by: INTERNAL MEDICINE

## 2025-06-26 PROCEDURE — P9016 RBC LEUKOCYTES REDUCED: HCPCS

## 2025-06-26 PROCEDURE — 84100 ASSAY OF PHOSPHORUS: CPT

## 2025-06-26 PROCEDURE — 85027 COMPLETE CBC AUTOMATED: CPT

## 2025-06-26 PROCEDURE — 82948 REAGENT STRIP/BLOOD GLUCOSE: CPT

## 2025-06-26 PROCEDURE — 80048 BASIC METABOLIC PNL TOTAL CA: CPT

## 2025-06-26 PROCEDURE — 84145 PROCALCITONIN (PCT): CPT

## 2025-06-26 PROCEDURE — 99024 POSTOP FOLLOW-UP VISIT: CPT | Performed by: SURGERY

## 2025-06-26 PROCEDURE — 99223 1ST HOSP IP/OBS HIGH 75: CPT | Performed by: INTERNAL MEDICINE

## 2025-06-26 PROCEDURE — 82330 ASSAY OF CALCIUM: CPT

## 2025-06-26 PROCEDURE — 94664 DEMO&/EVAL PT USE INHALER: CPT

## 2025-06-26 PROCEDURE — 85025 COMPLETE CBC W/AUTO DIFF WBC: CPT

## 2025-06-26 PROCEDURE — 85730 THROMBOPLASTIN TIME PARTIAL: CPT | Performed by: SURGERY

## 2025-06-26 PROCEDURE — 93010 ELECTROCARDIOGRAM REPORT: CPT | Performed by: INTERNAL MEDICINE

## 2025-06-26 PROCEDURE — 94640 AIRWAY INHALATION TREATMENT: CPT

## 2025-06-26 RX ORDER — SODIUM CHLORIDE 9 MG/ML
75 INJECTION, SOLUTION INTRAVENOUS CONTINUOUS
Status: DISPENSED | OUTPATIENT
Start: 2025-06-26 | End: 2025-06-26

## 2025-06-26 RX ORDER — HYDROMORPHONE HCL IN WATER/PF 6 MG/30 ML
0.2 PATIENT CONTROLLED ANALGESIA SYRINGE INTRAVENOUS
Status: DISCONTINUED | OUTPATIENT
Start: 2025-06-26 | End: 2025-07-05 | Stop reason: HOSPADM

## 2025-06-26 RX ORDER — ALBUMIN HUMAN 50 G/1000ML
25 SOLUTION INTRAVENOUS ONCE
Status: COMPLETED | OUTPATIENT
Start: 2025-06-26 | End: 2025-06-26

## 2025-06-26 RX ORDER — BUMETANIDE 0.25 MG/ML
2 INJECTION, SOLUTION INTRAMUSCULAR; INTRAVENOUS ONCE
Status: COMPLETED | OUTPATIENT
Start: 2025-06-26 | End: 2025-06-26

## 2025-06-26 RX ORDER — SODIUM CHLORIDE, SODIUM GLUCONATE, SODIUM ACETATE, POTASSIUM CHLORIDE, MAGNESIUM CHLORIDE, SODIUM PHOSPHATE, DIBASIC, AND POTASSIUM PHOSPHATE .53; .5; .37; .037; .03; .012; .00082 G/100ML; G/100ML; G/100ML; G/100ML; G/100ML; G/100ML; G/100ML
100 INJECTION, SOLUTION INTRAVENOUS CONTINUOUS
Status: DISCONTINUED | OUTPATIENT
Start: 2025-06-26 | End: 2025-06-26

## 2025-06-26 RX ADMIN — ALBUMIN (HUMAN) 25 G: 12.5 INJECTION, SOLUTION INTRAVENOUS at 01:26

## 2025-06-26 RX ADMIN — SODIUM CHLORIDE SOLN NEBU 3% 4 ML: 3 NEBU SOLN at 00:25

## 2025-06-26 RX ADMIN — HYDROCORTISONE SODIUM SUCCINATE 50 MG: 100 INJECTION, POWDER, FOR SOLUTION INTRAMUSCULAR; INTRAVENOUS at 11:38

## 2025-06-26 RX ADMIN — METOPROLOL TARTRATE 5 MG: 1 INJECTION, SOLUTION INTRAVENOUS at 17:06

## 2025-06-26 RX ADMIN — SODIUM CHLORIDE SOLN NEBU 3% 4 ML: 3 NEBU SOLN at 08:05

## 2025-06-26 RX ADMIN — SODIUM CHLORIDE SOLN NEBU 3% 4 ML: 3 NEBU SOLN at 13:40

## 2025-06-26 RX ADMIN — HYDROCORTISONE SODIUM SUCCINATE 50 MG: 100 INJECTION, POWDER, FOR SOLUTION INTRAMUSCULAR; INTRAVENOUS at 23:27

## 2025-06-26 RX ADMIN — PHENYLEPHRINE HYDROCHLORIDE 110 MCG/MIN: 50 INJECTION INTRAVENOUS at 05:25

## 2025-06-26 RX ADMIN — LEVALBUTEROL HYDROCHLORIDE 1.25 MG: 1.25 SOLUTION RESPIRATORY (INHALATION) at 13:40

## 2025-06-26 RX ADMIN — HYDROMORPHONE HYDROCHLORIDE 0.2 MG: 0.2 INJECTION, SOLUTION INTRAMUSCULAR; INTRAVENOUS; SUBCUTANEOUS at 01:09

## 2025-06-26 RX ADMIN — BUMETANIDE 2 MG: 0.25 INJECTION INTRAMUSCULAR; INTRAVENOUS at 02:26

## 2025-06-26 RX ADMIN — BUMETANIDE 3 MG: 2 TABLET ORAL at 14:23

## 2025-06-26 RX ADMIN — OXYCODONE HYDROCHLORIDE 5 MG: 5 TABLET ORAL at 23:28

## 2025-06-26 RX ADMIN — SODIUM CHLORIDE SOLN NEBU 3% 4 ML: 3 NEBU SOLN at 19:00

## 2025-06-26 RX ADMIN — LEVALBUTEROL HYDROCHLORIDE 1.25 MG: 1.25 SOLUTION RESPIRATORY (INHALATION) at 08:05

## 2025-06-26 RX ADMIN — BUDESONIDE 0.5 MG: 0.5 INHALANT RESPIRATORY (INHALATION) at 08:05

## 2025-06-26 RX ADMIN — HYDROCORTISONE SODIUM SUCCINATE 50 MG: 100 INJECTION, POWDER, FOR SOLUTION INTRAMUSCULAR; INTRAVENOUS at 17:06

## 2025-06-26 RX ADMIN — VASOPRESSIN 0.04 UNITS/MIN: 20 INJECTION INTRAVENOUS at 14:25

## 2025-06-26 RX ADMIN — METOPROLOL TARTRATE 5 MG: 1 INJECTION, SOLUTION INTRAVENOUS at 05:02

## 2025-06-26 RX ADMIN — BUDESONIDE 0.5 MG: 0.5 INHALANT RESPIRATORY (INHALATION) at 19:00

## 2025-06-26 RX ADMIN — BUMETANIDE 3 MG: 2 TABLET ORAL at 17:06

## 2025-06-26 RX ADMIN — LEVALBUTEROL HYDROCHLORIDE 1.25 MG: 1.25 SOLUTION RESPIRATORY (INHALATION) at 00:24

## 2025-06-26 RX ADMIN — GUAIFENESIN 600 MG: 600 TABLET, EXTENDED RELEASE ORAL at 21:32

## 2025-06-26 RX ADMIN — LEVALBUTEROL HYDROCHLORIDE 1.25 MG: 1.25 SOLUTION RESPIRATORY (INHALATION) at 19:00

## 2025-06-26 RX ADMIN — GUAIFENESIN 600 MG: 600 TABLET, EXTENDED RELEASE ORAL at 09:04

## 2025-06-26 RX ADMIN — VASOPRESSIN 0.04 UNITS/MIN: 20 INJECTION INTRAVENOUS at 00:35

## 2025-06-26 RX ADMIN — METOPROLOL TARTRATE 5 MG: 1 INJECTION, SOLUTION INTRAVENOUS at 23:28

## 2025-06-26 RX ADMIN — ACETAMINOPHEN 975 MG: 325 TABLET ORAL at 14:23

## 2025-06-26 RX ADMIN — ACETAMINOPHEN 975 MG: 325 TABLET ORAL at 21:32

## 2025-06-26 RX ADMIN — PHENYLEPHRINE HYDROCHLORIDE 140 MCG/MIN: 50 INJECTION INTRAVENOUS at 11:52

## 2025-06-26 RX ADMIN — METOPROLOL TARTRATE 5 MG: 1 INJECTION, SOLUTION INTRAVENOUS at 11:38

## 2025-06-26 RX ADMIN — VASOPRESSIN 0.04 UNITS/MIN: 20 INJECTION INTRAVENOUS at 07:30

## 2025-06-26 RX ADMIN — PHENYLEPHRINE HYDROCHLORIDE 100 MCG/MIN: 50 INJECTION INTRAVENOUS at 19:28

## 2025-06-26 RX ADMIN — HYDROCORTISONE SODIUM SUCCINATE 50 MG: 100 INJECTION, POWDER, FOR SOLUTION INTRAMUSCULAR; INTRAVENOUS at 05:01

## 2025-06-26 RX ADMIN — SODIUM CHLORIDE 75 ML/HR: 0.9 INJECTION, SOLUTION INTRAVENOUS at 02:26

## 2025-06-26 RX ADMIN — SENNOSIDES 8.6 MG: 8.6 TABLET, FILM COATED ORAL at 09:04

## 2025-06-26 RX ADMIN — LEVOTHYROXINE SODIUM 50 MCG: 0.05 TABLET ORAL at 05:02

## 2025-06-26 NOTE — ASSESSMENT & PLAN NOTE
On pressors  S/p IVF and albumin   Likely related to ABLA -- hgb was down to 7.5 and now up to 11 s/p transfusion

## 2025-06-26 NOTE — PROGRESS NOTES
Progress Note - Critical Care/ICU   Name: Rebeca Banegas 89 y.o. female I MRN: [id number]  Unit/Bed#: [facility] 513-01 I Date of Admission: 6/24/2025   Date of Service: 6/26/2025 I Hospital Day: 2      Assessment & Plan   Active Hospital Problems    Diagnosis Date Noted POA    Acute lower limb ischemia 06/24/2025 Unknown    History of CVA (cerebrovascular accident) 08/27/2024 Not Applicable    Bronchiectasis (HCC) 09/08/2023 Yes    Stage 3b chronic kidney disease (HCC) 04/07/2020 Yes    Chronic heart failure with preserved ejection fraction (HCC) 02/13/2017 Yes    Primary hypertension 05/19/2014 Yes     Chronic    Hyperlipidemia 04/19/2013 Yes     Chronic    Permanent atrial fibrillation (HCC) 02/14/2013 Yes     Chronic    Asthma 11/12/2012 Yes     Chronic    Bifascicular bundle branch block 11/10/2012 Yes     Chronic      Resolved Hospital Problems   No resolved problems to display.     Neuro/Psych:  Diagnoses: Post operative pain   Plan:  Q4H neurovascular checks   Analgesia: Multimodal. Tylenol 975 mg Q8H Ayla. PRN oxy 2.5 / 5 mg Q4H PRN     CV:  Diagnoses: Shock, acute limb ischemia, hx afib, chronic HFpEF, HTN  S/p R femoral cutdown, thromboembolectomy, R anterior and lateral fasciotomies, partial proximal fasciotomy closure 6/24/25 AM (Eriberto)  S/p LLE thromboembolectomy of pop and AT/TPT in setting of LLE R2B ALI 2/2 popliteal occlusion (Segundo) 6/24/25 PM  6/25 upgraded to ICU for hypotension  Currently on vaso and carlene  Plan:  Q4H neurovascular checks   Continue solucortef 50 mg IV Q6H ayla  Monitor fasciotomy sites, closure per vascular surgery  Continue heparin gtt  Holding home metoprolol 100 mg  Continue lopressor 5 mg IV Q6H  Holding home Bumex 3mg two times a day   Wean pressors as able  Continuous cardiopulmonary monitoring. Maintain MAP >65.     Pulm:  Diagnoses: Hx of bronchiectasis, asthma   Currently on 2L NC  Plan:  Cont pulmicort inhaler  Cont mucinex  Cont Xopenex  Cont sodium chloride inhalation  solution   Wean supplemental O2 as toelrated   Continuous pulse oximetry. Maintain O2 sat >92%.      GI:  Diagnoses: No active issues   Plan:  PRN zofran  Bowel regimen: Senna   GI prophylaxis: None     :  Diagnoses: Stage 3B CKD  Creatinine trend: 2.40 (2.20, 2.31, 1.7, 1.87, 1.75, 2.15, 1.82)  Baseline creatinine: 1.6  Plan:  Monitor Cr  Trend CK  Fluid resuscitation   Monitor I/Os.     F/E/N:  Plan:   F: Fluid resuscitation prn.  E: Monitor and replete electrolytes for Mg >2, Phos >3, K >4.  N: Regular     Heme/Onc:  Diagnoses: Anemia   Hgb 7.5 from 8.7 from 9.8 from 8.3 from 12.5  Plan:  Transfuse as needed  VTE prophylaxis: Hep gtt     Endo:  Diagnoses: Hypothyroidism  Plan:   Cont home levothyroxine 50 mcg daily  Insulin: SSI. Monitor blood glucose.     ID:  Diagnoses: No active issues   Plan:  Monitor fever curve and WBC.     MSK/Skin:  Diagnoses: S/p B/L lower extremity fasciotomies  Plan:  Management of fasciotomy sites per vascular surgery  Dressings taken down this AM, pictures in chart  PT/OT when appropriate. Encourage OOB and ambulation when appropriate. Local wound care prn.     LDAs:  Lines - PIVx2, L radial holly, R IJ CVC, xiong  Drains - N/A  Airways - N/A  Disposition: Critical care    ICU Core Measures     A: Assess, Prevent, and Manage Pain Has pain been assessed? Yes  Need for changes to pain regimen? No   B: Both SAT/SAT  N/A   C: Choice of Sedation RASS Goal: 0 Alert and Calm  Need for changes to sedation or analgesia regimen? No   D: Delirium CAM-ICU: Negative   E: Early Mobility  Plan for early mobility? Yes   F: Family Engagement Plan for family engagement today? Yes       Review of Invasive Devices:    Xiong Plan: Continue for accurate I/O monitoring for 48 hours  Central access plan: Patient has multiple central venous catheters.   [place] Plan: Keep arterial line for hemodynamic monitoring    Prophylaxis:  VTE VTE covered by:  heparin (porcine), Intravenous, 7 Units/kg/hr at 06/26/25  0055       Stress Ulcer  not ordered         24 Hour Events : Patient continues to require vasopressor support. RLE fasciotomy site continues to ooz, s/p 1U PRBCs overnight, transfusing 2U PRBCs 6/26 AM.  Subjective   Review of Systems: Review of Systems   Constitutional:  Negative for chills and fever.   HENT:  Negative for congestion.    Eyes:  Negative for discharge.   Respiratory:  Positive for shortness of breath.    Cardiovascular:  Negative for chest pain.   Gastrointestinal:  Negative for abdominal pain.   Genitourinary:  Negative for hematuria.   Musculoskeletal:  Negative for arthralgias.   Skin:  Positive for wound. Negative for color change.   Neurological:  Negative for dizziness and light-headedness.   Psychiatric/Behavioral:  Negative for agitation.        Objective :                   Vitals I/O      Most Recent Min/Max in 24hrs   Temp (!) 97.3 °F (36.3 °C) Temp  Min: 97.3 °F (36.3 °C)  Max: 98.1 °F (36.7 °C)   Pulse 90 Pulse  Min: 86  Max: 147   Resp 20 Resp  Min: 11  Max: 31   BP (!) 86/48 BP  Min: 62/44  Max: 137/62   O2 Sat 94 % SpO2  Min: 67 %  Max: 100 %      Intake/Output Summary (Last 24 hours) at 6/26/2025 0653  Last data filed at 6/26/2025 0527  Gross per 24 hour   Intake 2238.84 ml   Output 515 ml   Net 1723.84 ml       Diet Regular; Regular House    Invasive Monitoring   Arterial Line  Berta /58  Arterial Line BP  Min: 31/15  Max: 282/281   MAP 83 mmHg  Arterial Line MAP (mmHg)  Min: 17 mmHg  Max: 280 mmHg           Physical Exam   Physical Exam  Eyes:      Extraocular Movements: Extraocular movements intact.   Skin:     General: Skin is warm and dry.      Comments: Incisions clean   HENT:      Head: Normocephalic and atraumatic.      Nose: No rhinorrhea.   Cardiovascular:      Rate and Rhythm: Normal rate.      Comments: Doppl L and R DP signals   Abdominal:      Palpations: Abdomen is soft.   Constitutional:       General: She is not in acute distress.  Pulmonary:      Effort:  Pulmonary effort is normal.   Neurological:      Mental Status: She is alert. She is calm. She is not agitated.      Motor: No motor deficit.   Genitourinary/Anorectal:  Dillon present.        Diagnostic Studies        Lab Results: I have reviewed the following results:     Medications:  Scheduled PRN   acetaminophen, 975 mg, Q8H GARTH  Albumin 5%, 25 g, Every Other Day  budesonide, 0.5 mg, Q12H  guaiFENesin, 600 mg, Q12H GARTH  hydrocortisone sodium succinate, 50 mg, Q6H GARTH  insulin lispro, 1-5 Units, TID AC  levalbuterol, 1.25 mg, Q6H  levothyroxine, 50 mcg, Early Morning  metoprolol, 5 mg, Q6H  senna, 1 tablet, Daily  sodium chloride, 4 mL, Q6H      HYDROmorphone, 0.2 mg, Q3H PRN  oxyCODONE, 5 mg, Q4H PRN  oxyCODONE, 2.5 mg, Q4H PRN  trimethobenzamide, 200 mg, Q6H PRN       Continuous    heparin (porcine), 3-30 Units/kg/hr (Order-Specific), Last Rate: 7 Units/kg/hr (06/26/25 0055)  phenylephine,  mcg/min, Last Rate: 140 mcg/min (06/26/25 0647)  sodium chloride, 75 mL/hr, Last Rate: 75 mL/hr (06/26/25 0226)  vasopressin, 0.04 Units/min, Last Rate: 0.04 Units/min (06/26/25 0035)         Labs:   CBC    Recent Labs     06/25/25  1755 06/26/25  0506   WBC 11.64* 8.95   HGB 8.7* 7.5*   HCT 25.3* 21.8*    125*     BMP    Recent Labs     06/25/25  2212 06/26/25  0506   SODIUM 131* 134*   K 5.5* 5.1    103   CO2 19* 19*   AGAP 11 12   BUN 61* 63*   CREATININE 2.20* 2.40*   CALCIUM 7.7* 7.4*       Coags    Recent Labs     06/25/25  1356 06/25/25  2310   * 99*        Additional Electrolytes  Recent Labs     06/24/25  1953 06/25/25  0512 06/26/25  0506   MG  --  2.2 2.3   PHOS  --  4.9* 6.1*   CAIONIZED 0.95*  --  0.98*          Blood Gas    Recent Labs     06/25/25  1600   PHART 7.368   [id number] 37.7   PO2ART 185.1*   [id number] 21.2*   BEART -3.7   SOURCE Line, Arterial     Recent Labs     06/25/25  1557 06/25/25  1600   PHVEN 7.295*  --    [id number] 45.9  --    PO2VEN 33.8*  --    [id number] 21.8*  --     BEVEN -4.5  --    [id number] 58.9*  --    SOURCE  --  Line, Arterial    LFTs  Recent Labs     06/25/25  1755 06/25/25  2212   ALT 8 7   AST 26 23   ALKPHOS 35 29*   ALB 2.6* 3.6   TBILI 0.59 1.02*       Infectious  Recent Labs     06/25/25  0816 06/26/25  0506   PROCALCITONI 0.19 0.85*     Glucose  Recent Labs     06/25/25  0512 06/25/25  1755 06/25/25  2212 06/26/25  0506   GLUC 135 136 149* 147*

## 2025-06-26 NOTE — ASSESSMENT & PLAN NOTE
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 Clinical exam and imaging concerning for [place] 2B acute limb ischemia of the RLE, L popliteal occlusion also noted on presentation.    6/24 morning: R femoral cutdown, thromboembolectomy, R anterior and lateral fasciotomy, partial proximal fasciotomy closure (Eriberto)    6/24 evening: L BK pop exposure, thromboembolectomy ([place])    Plan:  -concern for persistent hypotension requiring vasopressor support, suspect ABLA to be the cause as pt is responsive to blood products and volume. Recommend 2u PRBC transfusion this AM with repeat transfusions PRN for ongoing blood loss from fasciotomy incision  -of course her respiratory status is of concern given her significant respiratory comorbidities, particularly in the setting of IVF resuscitation and DNI status. Consider intermittent Bumex pushes with transfusions to assist with her fluid and respiratory status. Would continue home nebs and restart home diuretics when appropriate  -in the setting of diuresis, would be aware and cautious given her worsening renal function. Obtain urine lytes to calculate FeNa and trend labs. Also would consider the possibility of rhabdo given her recent R2B ALI s/p revascularization. Would trend CK levels and renal function including electrolytes given her hyperphosphatemia today. Maintain xiong in place for strict I&O  -monitoring BLE for signs of compartment syndrome. She is swollen and tender in BLE with preserved signals in her feet, however, does have some recurrent mild sensory deficits. Swelling likely reperfusion related. Keep BLE with moderate compression with ACE wraps. Will continue to monitor her exam and have low threshold for completing  bilateral 4 compartment fasciotomies. This would be her 3rd round of general anesthesia in 3 days, which would obviously cause significant strain from a cardiac standpoint. NPO for now while we continue to monitor  -in the setting of Afib, hypotension, anemia, and multiple rounds of general anesthesia, would continue to closely monitor her cardiac function with troponins and EKGs  -continue pulmonary toileting, ISB  -continue dressing changes PRN saturation  -continue NV checks and make Vascular aware of any changes in exam  -continue heparin gtt, will eventually need to be considered for DOAC  -appreciate cardiology and CC for recs and ongoing critical care management

## 2025-06-26 NOTE — ASSESSMENT & PLAN NOTE
Suspect ATN in the setting of shock, contrast CT on 6/24 and acute blood loss   Creatinine 1.87 on admission and trending up to 2.45 today   S/p trial IVF and albumin  now getting intermittent IV Bumex  Urine output improving and made 150cc in the last 2 hours   Will continue to trend creatinine and urine output closely   Home jardiance and spironolactone on hold   CT: no hydronephrosis

## 2025-06-26 NOTE — PROGRESS NOTES
Progress Note - Cardiology   Name: Rebeca Banegas 89 y.o. female I MRN: [id number]  Unit/Bed#: [facility] 513-01 I Date of Admission: 6/24/2025   Date of Service: 6/26/2025 I Hospital Day: 2     Assessment & Plan  Acute lower limb ischemia  Shock (HCC)  History of permanent atrial fibrillation on chronic warfarin therapy with a CVA in August 2024 (suspected embolic due to warfarin pause). She presented on 6/24 with right lower extremity pain and CTA showed a right femoral acute arterial occlusion and she underwent urgent right femoral cutdown, thromboembolectomy, and right lower extremity fasciotomy on 6/24.  Imaging also showed a left popliteal occlusion with reconstitution of the tibial arteries.    Labs on presentation were notable for a subtherapeutic INR level (1.47). Unclear etiology.  Pt reports compliance with her warfarin and denies any recent changes in medications or diet.  Cardiology was consulted for anticoagulation recommendations.  Pt is agreeable to switching to another anticoagulant (Xarelto/Eliquis) given her history of Coumadin failure.      -Later on 6/24 she was noted to experience worsening LLE numbness and decreased ROM.  She was again taken to the OR for LLE popliteal thromboembolectomy and stenting.    -Overnight on 6/24-6/25 she was noted to be hypotensive and was upgraded to the ICU for initiation of pressors.  Lactate was elevated up to 3.6.  Troponin 70->71. Hb dropped from 12.5-8.3.  Suspect acute blood loss anemia.    - Hemoglobin again dropped to 7.5 this morning and another 2 units PRBCs were ordered.  Suspect persistent blood loss from fasciotomy incision.    Plan:  Continue heparin gtt as able.  Monitor BP closely and wean pressors as able.  Currently on vaso and carlene.   Continue Solu-Cortef.  IVF as needed.   Monitor Hb closely and transfuse as needed.   Trend perfusion markers.  Okay to continue lopressor 5 mg Q6 hrs.   Hold home BP meds.  S/p IV Bumex 2 mg x 2 overnight/this  morning.  Monitor volume status and continue diuresis as needed.   Not on ASA due to allergy and not on statin due to potential side effects.  F/u IR LE angiogram.  Monitor for evidence of bleeding.  TTE 6/25 showing an LVEF of 65% with vigorous systolic function and no obvious regional wall motion abnormalities.  Price check Xarelto/Eliquis.  Given her recurrent Coumadin failure, would recommend transitioning to a DOAC once stable from a surgical standpoint.  Continue neurovascular checks and postoperative care as per vascular surgery/ICU.  Permanent atrial fibrillation (HCC)  Remains in atrial fibrillation with RVR, likely related to acute blood loss anemia.  Continue heparin gtt as able.  Price check Xarelto/Eliquis.  Given her recurrent Coumadin failure, would recommend transitioning to a DOAC once stable from a surgical standpoint.  Hold home Toprol  mg daily given hypotension. Okay to continue IV Lopressor 5 mg every 6 hours.  Telemetry monitoring.  Monitor electrolytes and replete as needed.  Bifascicular bundle branch block  Known.  Continue telemetry monitoring.  Chronic heart failure with preserved ejection fraction (HCC)  Wt Readings from Last 3 Encounters:   06/25/25 59.9 kg (132 lb)   06/18/25 57.2 kg (126 lb 3.2 oz)   06/12/25 54.9 kg (121 lb)   Last echo in August 2024 showing moderate concentric hypertrophy and an LVEF of 60%.  She also has moderate TR and biatrial enlargement.  TTE 6/25 showing an LVEF of 65% with vigorous systolic function and no obvious regional wall motion abnormalities.  Home p.o. Bumex 3 mg twice daily currently held.  S/p intermittent doses of IV Bumex.  Monitor volume status and continue diuresis as needed.  Monitor I's and O's, volume status and renal function.  Monitor electrolytes and replete as needed.  Hyperlipidemia  Patient is not currently on statin therapy.  She was previously on atorvastatin 40 mg daily but it was discontinued due to potential associated side  effects.  Consider restarting when able.  Primary hypertension  Hold home Toprol  mg daily, spironolactone 50 mg daily and Bumex 3 mg twice daily in the setting of hypotension.  Restart when able.  Continue IV Lopressor 5 mg every 6 hours.  Stage 3b chronic kidney disease (HCC)  Lab Results   Component Value Date    EGFR 17 06/26/2025    EGFR 18 06/25/2025    EGFR 17 06/25/2025    CREATININE 2.40 (H) 06/26/2025    CREATININE 2.20 (H) 06/25/2025    CREATININE 2.31 (H) 06/25/2025   Monitor BMP.  History of CVA (cerebrovascular accident)  See above.     Subjective   Patient was seen and examined at bedside.  She does report mild shortness of breath and persistent lower and upper extremity discomfort but otherwise had no other acute complaints.  Telemetry shows rate controlled A-fib.    Objective :  Temp:  [97.3 °F (36.3 °C)-98 °F (36.7 °C)] 97.3 °F (36.3 °C)  HR:  [] 91  BP: ()/(35-80) 100/56  Resp:  [11-34] 26  SpO2:  [67 %-100 %] 97 %  O2 Device: Nasal cannula  Nasal Cannula O2 Flow Rate (L/min):  [2 L/min-6 L/min] 2.5 L/min  Orthostatic Blood Pressures      Flowsheet Row Most Recent Value   Blood Pressure 100/56 filed at 06/26/2025 0730   Patient Position - Orthostatic VS Sitting filed at 06/24/2025 2300          First Weight: Weight - Scale: 60.2 kg (132 lb 11.5 oz) (06/24/25 2217)  Vitals:    06/24/25 2217 06/25/25 0830   Weight: 60.2 kg (132 lb 11.5 oz) 59.9 kg (132 lb)     Physical Exam  Constitutional:       Appearance: She is well-developed.   HENT:      Head: Normocephalic and atraumatic.      Nose: Nose normal.     Eyes:      Conjunctiva/sclera: Conjunctivae normal.      Pupils: Pupils are equal, round, and reactive to light.     Neck:      Vascular: JVD present.     Cardiovascular:      Rate and Rhythm: Normal rate. Rhythm irregular.      Heart sounds: Normal heart sounds. No murmur heard.     No friction rub. No gallop.   Pulmonary:      Effort: Pulmonary effort is normal. No respiratory  distress.      Breath sounds: No stridor. Decreased breath sounds present. No wheezing or rales.   Chest:      Chest wall: No tenderness.   Abdominal:      General: Bowel sounds are normal. There is no distension.      Palpations: Abdomen is soft. There is no mass.      Tenderness: There is no abdominal tenderness. There is no guarding or rebound.      Hernia: No hernia is present.     Musculoskeletal:         General: No deformity.      Comments: Ace wrapping of bilateral lower and right upper extremity.     Skin:     General: Skin is warm and dry.     Neurological:      Mental Status: She is alert and oriented to person, place, and time.     Psychiatric:         Behavior: Behavior normal.         Thought Content: Thought content normal.         Judgment: Judgment normal.           Lab Results: I have reviewed the following results:CBC/BMP:   .     06/26/25  0506   WBC 8.95   HGB 7.5*   HCT 21.8*   *   SODIUM 134*   K 5.1      CO2 19*   BUN 63*   CREATININE 2.40*   GLUC 147*   CAIONIZED 0.98*   MG 2.3   PHOS 6.1*      Results from last 7 days   Lab Units 06/26/25  0506 06/25/25  1755 06/25/25  0946   WBC Thousand/uL 8.95 11.64* 12.67*   HEMOGLOBIN g/dL 7.5* 8.7* 9.8*   HEMATOCRIT % 21.8* 25.3* 28.3*   PLATELETS Thousands/uL 125* 165 168     Results from last 7 days   Lab Units 06/26/25  0506 06/25/25  2212 06/25/25  1755 06/25/25  0512 06/24/25  1953   POTASSIUM mmol/L 5.1 5.5* 5.5*   < >  --    CHLORIDE mmol/L 103 101 101   < >  --    CO2 mmol/L 19* 19* 24   < >  --    CO2, I-STAT mmol/L  --   --   --   --  24   BUN mg/dL 63* 61* 63*   < >  --    CREATININE mg/dL 2.40* 2.20* 2.31*   < >  --    GLUCOSE, ISTAT mg/dl  --   --   --   --  139   CALCIUM mg/dL 7.4* 7.7* 7.5*   < >  --     < > = values in this interval not displayed.     Results from last 7 days   Lab Units 06/26/25  0625 06/25/25  2310 06/25/25  1356 06/24/25  1030 06/24/25  0412   INR   --   --   --   --  1.47*   PTT seconds 63* 99* 155*    < > 28    < > = values in this interval not displayed.     Lab Results   Component Value Date    HGBA1C 6.3 (H) 06/25/2025     Lab Results   Component Value Date    CKTOTAL 66 06/02/2025       Imaging Results Review: I reviewed radiology reports from this admission including: Echocardiogram.  Other Study Results Review: EKG was reviewed.     VTE Pharmacologic Prophylaxis: VTE covered by:  heparin (porcine), Intravenous, 7 Units/kg/hr at 06/26/25 0055     VTE Mechanical Prophylaxis: sequential compression device    Administrative Statements   I have spent a total time of 45 minutes in caring for this patient on the day of the visit/encounter including Impressions.

## 2025-06-26 NOTE — UTILIZATION REVIEW
Date: 6/26/2025  Day 3: Has surpassed a 2nd midnight with active treatments and services. ICU level of care  Patient continues to require vasopressor support. RLE fasciotomy site continues to ooz, s/p 1U PRBCs overnight, transfusing 2U PRBCs 6/26 AM.   S/p IV Bumex 2 mg x 2 overnight/this morning.  Monitor volume status and continue diuresis as needed.   Not on ASA due to allergy and not on statin due to potential side effects.  F/u IR LE angiogram.  Monitor for evidence of bleeding.  Continue heparin gtt as able for Permanent AFib    Medications:   Scheduled Medications:  acetaminophen, 975 mg, Oral, Q8H GARTH  Albumin 5%, 25 g, Intravenous, Every Other Day  budesonide, 0.5 mg, Nebulization, Q12H  bumetanide, 3 mg, Oral, BID  guaiFENesin, 600 mg, Oral, Q12H GARTH  hydrocortisone sodium succinate, 50 mg, Intravenous, Q6H GARTH  insulin lispro, 1-5 Units, Subcutaneous, TID AC  levalbuterol, 1.25 mg, Nebulization, Q6H  levothyroxine, 50 mcg, Oral, Early Morning  metoprolol, 5 mg, Intravenous, Q6H  senna, 1 tablet, Oral, Daily  sodium chloride, 4 mL, Nebulization, Q6H      Continuous IV Infusions:  heparin (porcine), 3-30 Units/kg/hr (Order-Specific), Intravenous, Titrated  phenylephine,  mcg/min, Intravenous, Titrated  sodium chloride, 75 mL/hr, Intravenous, Continuous  vasopressin, 0.04 Units/min, Intravenous, Continuous      PRN Meds:  HYDROmorphone, 0.2 mg, Intravenous, Q3H PRN  oxyCODONE, 5 mg, Oral, Q4H PRN  oxyCODONE, 2.5 mg, Oral, Q4H PRN  trimethobenzamide, 200 mg, Intramuscular, Q6H PRN      Discharge Plan: TBD    Vital Signs (last 3 days)       Date/Time Temp Pulse Resp BP MAP (mmHg) Arterial Line BP MAP SpO2 Calculated FIO2 (%) - Nasal Cannula O2 Flow Rate (L/min) Nasal Cannula O2 Flow Rate (L/min) O2 Device Cardiac (WDL) Patient Position - Orthostatic VS Shanita Coma Scale Score Pain    06/26/25 1136 97.4 °F (36.3 °C) -- -- -- -- -- -- -- -- -- -- -- -- -- -- --    06/26/25 1015 -- 90 16 135/81 103 129/65  91 mmHg 97 % -- -- -- -- -- -- -- --    06/26/25 1000 -- 86 15 122/80 97 113/61 80 mmHg 95 % -- -- -- -- -- -- -- --    06/26/25 0945 -- 87 18 139/92 97 108/58 79 mmHg 94 % -- -- -- -- -- -- -- --    06/26/25 0940 98.2 °F (36.8 °C) 90 25 115/59 -- -- -- -- -- -- -- -- -- -- -- --    06/26/25 0930 -- 94 22 115/59 85 72/43 57 mmHg 87 % -- -- -- -- -- -- -- --    06/26/25 0915 -- 95 30 120/58 84 101/57 76 mmHg 94 % -- -- -- -- -- -- -- --    06/26/25 0900 97.7 °F (36.5 °C) 94 22 118/75 83 68/40 49 mmHg 87 % -- -- -- -- -- -- -- --    06/26/25 0830 -- 84 18 119/47 68 108/56 77 mmHg 92 % -- -- -- -- -- -- -- --    06/26/25 0815 -- 85 17 121/46 67 101/51 70 mmHg 83 % -- -- -- -- -- -- -- --    06/26/25 0811 -- -- -- -- -- -- -- -- 30 -- 2.5 L/min Nasal cannula -- -- -- --    06/26/25 0810 -- -- -- -- -- -- -- 97 % -- -- -- -- -- -- -- --    06/26/25 0800 -- 89 23 94/52 67 96/50 69 mmHg 91 % -- -- -- -- -- -- -- --    06/26/25 0745 -- 88 15 111/58 79 111/55 77 mmHg 91 % -- -- -- -- -- -- -- --    06/26/25 0730 -- 91 26 100/56 73 -- -- -- -- -- -- -- -- -- -- --    06/26/25 0715 -- 90 24 -- -- -- -- -- -- -- -- -- -- -- -- --    06/26/25 0700 -- 99 31 92/50 65 -- -- 87 % -- -- -- -- -- -- -- --    06/26/25 0650 97.3 °F (36.3 °C) 90 20 86/48 62 -- -- -- -- -- -- -- -- -- -- --    06/26/25 0645 -- 87 34 86/48 62 -- -- -- -- -- -- -- -- -- -- --    06/26/25 0639 -- 88 15 98/52 63 -- -- -- -- -- -- -- -- -- -- --    06/26/25 0630 -- 86 27 77/35 51 -- -- -- -- -- -- -- -- -- -- --    06/26/25 0615 -- 89 15 -- -- -- -- -- -- -- -- -- -- -- -- --    06/26/25 0600 -- 88 13 118/68 86 -- -- 94 % -- -- -- -- -- -- -- --    06/26/25 0545 -- 91 14 110/76 89 -- -- -- -- -- -- -- -- -- -- --    06/26/25 0530 -- 89 14 108/56 77 -- -- -- -- -- -- -- -- -- -- --    06/26/25 0515 -- 86 17 95/49 70 -- -- -- -- -- -- -- -- -- -- --    06/26/25 0500 -- 93 14 115/63 83 -- -- -- -- -- -- -- -- -- -- --    06/26/25 0445 -- 96 11 116/64 82 -- -- --  -- -- -- -- -- -- -- --    06/26/25 0430 -- 100 21 85/54 66 -- -- -- -- -- -- -- -- -- -- --    06/26/25 0415 -- 97 21 118/56 80 -- -- -- -- -- -- -- -- -- -- --    06/26/25 0400 -- 94 14 126/62 88 -- -- -- -- -- -- -- -- -- 15 --    06/26/25 0345 -- 94 14 116/74 91 -- -- -- -- -- -- -- -- -- -- --    06/26/25 0330 -- 93 14 123/68 89 -- -- -- -- -- -- -- -- -- -- --    06/26/25 0315 -- 95 17 128/67 92 -- -- -- -- -- -- -- -- -- -- --    06/26/25 0300 -- 94 15 130/70 92 122/58 83 mmHg 97 % -- -- -- -- -- -- -- --    06/26/25 0245 -- 90 16 125/64 89 119/57 82 mmHg 98 % -- -- -- -- -- -- -- --    06/26/25 0230 -- 91 16 120/67 88 119/56 81 mmHg 92 % -- -- -- -- -- -- -- --    06/26/25 0215 -- 90 15 116/74 89 118/55 80 mmHg 92 % -- -- -- -- -- -- -- --    06/26/25 0200 -- 95 16 111/59 81 111/53 76 mmHg 85 % -- -- -- -- -- -- -- --    06/26/25 0145 -- 94 15 120/58 83 111/53 76 mmHg 79 % -- -- -- -- -- -- -- --    06/26/25 0130 -- 94 15 126/58 84 117/55 80 mmHg 100 % -- -- -- -- -- -- -- --    06/26/25 0115 -- 93 15 119/57 82 114/54 78 mmHg 100 % -- -- -- -- -- -- -- --    06/26/25 0109 -- -- -- -- -- -- -- -- -- -- -- -- -- -- -- 8    06/26/25 0100 -- 98 26 108/80 85 31/15 24 mmHg 85 % -- -- -- -- -- -- -- --    06/26/25 0045 -- 96 17 -- -- 55/39 46 mmHg 93 % -- -- -- -- -- -- -- --    06/26/25 0030 -- 97 22 94/47 68 65/41 51 mmHg 98 % -- -- -- -- -- -- -- --    06/26/25 0025 -- -- -- -- -- -- -- 98 % -- -- -- -- -- -- -- --    06/26/25 0015 -- 96 21 121/56 81 101/53 74 mmHg 100 % -- -- -- -- -- -- -- --    06/26/25 0000 97.5 °F (36.4 °C) 94 21 110/68 84 -- -- 99 % -- -- -- -- -- -- 15 --    06/25/25 2345 -- 96 16 127/57 82 106/58 74 mmHg 98 % -- -- -- -- -- -- -- --    06/25/25 2330 -- 88 22 119/56 80 104/54 74 mmHg 96 % -- -- -- -- -- -- -- --    06/25/25 2315 -- 92 21 115/58 83 108/55 75 mmHg 92 % -- -- -- -- -- -- -- --    06/25/25 2300 -- 102 23 110/56 75 97/50 70 mmHg 100 % -- -- -- -- -- -- -- --    06/25/25 2245  -- 97 20 112/59 81 95/50 68 mmHg 98 % -- -- -- -- -- -- -- --    06/25/25 2230 -- 99 22 104/55 75 31/10 17 mmHg 89 % -- -- -- -- -- -- -- --    06/25/25 2215 -- 98 21 120/47 68 -- -- 93 % -- -- -- -- -- -- -- --    06/25/25 2200 -- 95 28 137/62 78 -- -- 91 % -- -- -- -- -- -- -- --    06/25/25 2157 -- 113 31 109/66 80 -- -- 90 % -- -- -- -- -- -- -- 8    06/25/25 2145 -- 98 19 109/66 80 -- -- 92 % -- -- -- -- -- -- -- --    06/25/25 2130 -- 94 22 100/54 74 -- -- 90 % -- -- -- -- -- -- -- --    06/25/25 2115 -- 96 16 113/62 83 -- -- 92 % -- -- -- -- -- -- -- --    06/25/25 2100 97.5 °F (36.4 °C) 93 21 113/57 82 -- -- 93 % -- -- -- -- -- -- -- --    06/25/25 2045 -- 93 19 96/59 72 -- -- 93 % -- -- -- -- -- -- -- --    06/25/25 2030 97.5 °F (36.4 °C) 95 27 87/55 66 -- -- 94 % -- -- -- -- -- -- -- --    06/25/25 2015 97.4 °F (36.3 °C) 96 19 89/60 70 -- -- 91 % -- -- -- -- -- -- -- --    06/25/25 2005 -- 92 17 -- -- -- -- -- -- -- -- -- -- -- -- --    06/25/25 2000 -- 90 26 83/53 63 -- -- 93 % -- -- -- -- -- -- 15 3    06/25/25 1955 98 °F (36.7 °C) 89 22 93/45 -- -- -- -- -- -- -- -- -- -- -- --    06/25/25 1945 -- 97 24 93/45 65 47/31 38 mmHg 89 % -- -- -- -- -- -- -- --    06/25/25 1930 -- 100 21 104/58 70 58/38 47 mmHg 86 % -- -- -- -- -- -- -- --    06/25/25 1900 -- 101 20 100/59 72 90/52 67 mmHg 99 % -- -- -- -- -- -- -- --    06/25/25 1850 -- 94 19 103/53 75 103/55 74 mmHg 100 % -- -- -- -- -- -- -- --    06/25/25 1830 -- 96 24 89/54 66 79/46 59 mmHg 100 % -- -- -- -- -- -- -- --    06/25/25 1815 -- 91 24 88/50 61 59/38 47 mmHg 100 % 28 -- 2 L/min -- -- -- -- --    06/25/25 1810 -- 98 17 82/50 62 90/48 64 mmHg 100 % 36 -- 4 L/min -- -- -- -- --    06/25/25 1801 -- 94 22 82/50 62 -- -- -- 44 -- 6 L/min -- -- -- -- --    06/25/25 1745 -- 90 19 96/46 66 82/49 61 mmHg 100 % -- -- -- -- -- -- -- --    06/25/25 1730 -- 92 17 87/52 65 97/53 70 mmHg 99 % -- -- -- -- -- -- -- --    06/25/25 1720 -- 92 18 82/45 57 85/51 64  mmHg 98 % -- -- -- -- -- -- -- --    06/25/25 1700 -- 89 18 79/43 55 61/32 44 mmHg 91 % -- -- -- -- -- -- -- --    06/25/25 1650 -- 93 24 83/45 61 57/36 46 mmHg 85 % -- -- -- -- -- -- -- --    06/25/25 1630 -- 88 15 88/52 64 80/41 56 mmHg 100 % -- -- -- -- -- -- -- --    06/25/25 1620 -- 95 16 82/48 61 68/35 48 mmHg 99 % -- -- -- -- -- -- -- --    06/25/25 1610 -- -- -- 84/55 -- -- -- -- -- -- -- -- -- -- -- --    06/25/25 1600 97.3 °F (36.3 °C) 94 16 84/55 62 282/281 280 mmHg 97 % -- -- -- -- -- -- 15 --          Weight (last 2 days)       Date/Time Weight    06/25/25 0830 59.9 (132)    06/24/25 2217 60.2 (132.72)            Pertinent Labs/Diagnostic Results:   Radiology:  XR chest portable ICU   Final Interpretation by Magy Rivera MD (06/26 0855)   Central venous catheter in standard position.      Persistent pleural effusions.   Retrocardiac opacity. Atelectasis versus consolidation.            Workstation performed: [id number]         XR chest portable   Final Interpretation by Magy Rivera MD (06/25 1542)   Small pleural effusions.                  Workstation performed: [id number]         XR chest portable ICU   Final Interpretation by Magy Rivera MD (06/25 1341)   No acute cardiopulmonary findings.            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 Additional partial occlusion of the internal iliac artery as above.      LEFT: Mild to moderate calcified plaque. Nonopacification of the popliteal artery in the popliteal fossa with faint reconstitution of the tibioperoneal trunk and anterior tibial arteries although no flow to the ankle/foot.      Incidental indeterminate pancreatic cystic lesions as above appear similar to prior CT dated 8/28/2024.. Recommend continued attention on follow-up imaging/surgical  evaluation.            Resident: HALIMA GARAY I, the attending radiologist, have reviewed the images and agree with the final report above.      Workstation performed: [id number]         IR lower extremity angiogram    (Results Pending)     Cardiology:  ECG 12 lead   Final Result by Francisco Javier Maynard MD (06/26 1052)   Atrial fibrillation   Possible Lateral infarct (cited on or before 27-Aug-2024)   Inferior infarct (cited on or before 24-Jun-2025)   Abnormal ECG   When compared with ECG of 26-Jun-2025 08:24, (unconfirmed)   No significant change was found   Confirmed by Francisco Javier Maynard ([id number]) on 6/26/2025 10:52:01 AM      ECG 12 lead   Final Result by Francisco Javier Maynard MD (06/26 1051)   Atrial fibrillation   Non-specific intra-ventricular conduction block   Possible Lateral infarct (cited on or before 27-Aug-2024)   Inferior infarct (cited on or before 24-Jun-2025)   Abnormal ECG   When compared with ECG of 26-Jun-2025 06:10, (unconfirmed)   No significant change was found   Confirmed by Francisco Javier Maynard ([id number]) on 6/26/2025 10:51:48 AM      ECG 12 lead   Final Result by Francisco Javier Maynard MD (06/26 1051)   Atrial fibrillation   Non-specific intra-ventricular conduction block   Possible Lateral infarct (cited on or before 27-Aug-2024)   Inferior infarct (cited on or before 24-Jun-2025)   Abnormal ECG   When compared with ECG of 26-Jun-2025 05:08, (unconfirmed)   No significant change was found   Confirmed by Francisco Javier Maynard ([id number]) on 6/26/2025 10:51:40 AM      ECG 12 lead   Final Result by Francisco Javier Maynard MD (06/26 1051)   Atrial fibrillation   Non-specific intra-ventricular conduction block   Lateral infarct , age undetermined   Inferior infarct , age undetermined   Abnormal ECG   When compared with ECG of 26-Jun-2025 02:30, (unconfirmed)   Questionable change in QRS duration   Confirmed by Francisco Javier Maynard ([id number]) on 6/26/2025 10:51:32 AM      ECG 12 lead   Final Result by Francisco Javier Maynard MD (06/26 1051)   Atrial  fibrillation   Non-specific intra-ventricular conduction block   Poor R wave progression   Cannot rule out Lateral infarct , age undetermined   Cannot rule out Inferior infarct , age undetermined   Abnormal ECG   When compared with ECG of 26-Jun-2025 00:38, (unconfirmed)   Questionable change in QRS duration   Confirmed by Francisco Javier Maynard ([id number]) on 6/26/2025 10:51:04 AM      ECG 12 lead   Final Result by Francisco Javier Maynard MD (06/26 1050)   Atrial fibrillation   Non-specific intra-ventricular conduction block   Possible Lateral infarct (cited on or before 27-Aug-2024)   Inferior infarct , age undetermined   Abnormal ECG   When compared with ECG of 25-Jun-2025 21:59, (unconfirmed)   QRS duration has decreased   Confirmed by Francisco Javier Maynard ([id number]) on 6/26/2025 10:50:19 AM      ECG 12 lead   Final Result by Francisco Javier Maynard MD (06/26 1050)   Atrial fibrillation   Left axis deviation   Non-specific intra-ventricular conduction block   Lateral infarct (cited on or before 27-Aug-2024)   Abnormal ECG   When compared with ECG of 25-Jun-2025 19:45, (unconfirmed)   QRS duration has increased   QT has lengthened   Confirmed by Francisco Javier Maynard ([id number]) on 6/26/2025 10:49:59 AM      ECG 12 lead   Final Result by Francisco Javier Maynard MD (06/26 1049)   Atrial fibrillation   Left axis deviation   Right bundle branch block   Possible Lateral infarct (cited on or before 27-Aug-2024)   Inferior infarct (cited on or before 24-Jun-2025)   Abnormal ECG   When compared with ECG of 25-Jun-2025 06:49,   No significant change was found   Confirmed by Francisco Javier Maynard ([id number]) on 6/26/2025 10:49:49 AM      Echo complete w/ contrast if indicated   Final Result by Felisha Bear MD (06/25 1230)        Left Ventricle: Left ventricular cavity size is small. Wall thickness    is severely increased. The left ventricular ejection fraction is 65%.    Systolic function is vigorous. Although no diagnostic regional wall motion    abnormality was identified, this  possibility cannot be completely excluded    on the basis of this study. Unable to assess diastolic function due to    atrial fibrillation.     Right Ventricle: Systolic function is mildly reduced.     Left Atrium: The atrium is severely dilated (>48 mL/m2).     Aortic Valve: The aortic valve is trileaflet. The leaflets are    moderately thickened. The leaflets are moderately calcified. There is    aortic valve sclerosis.     Tricuspid Valve: There is mild to moderate regurgitation. The right    ventricular systolic pressure is moderately elevated. The estimated right    ventricular systolic pressure is 50.00 mmHg.     Pericardium: There is a left pleural effusion.         ECG 12 lead   Final Result by Francisco Javier Maynard MD (06/25 1820)   Atrial fibrillation with rapid ventricular response   Left axis deviation   Non-specific intra-ventricular conduction block   Low voltage QRS   Inferior infarct (cited on or before 24-Jun-2025)   Anterolateral infarct (cited on or before 27-Aug-2024)   Abnormal ECG   When compared with ECG of 25-Jun-2025 06:44, (unconfirmed)   No significant change was found   Confirmed by Francisco Javier Maynard ([id number]) on 6/25/2025 6:20:03 PM      ECG 12 lead   Final Result by Francisco Javier Maynard MD (06/25 1819)   Atrial fibrillation with rapid ventricular response   Non-specific intra-ventricular conduction block   Left axis deviation   Inferior infarct (cited on or before 24-Jun-2025)   Anterolateral infarct (cited on or before 27-Aug-2024)   Abnormal ECG   When compared with ECG of 24-Jun-2025 04:40,   Non-specific intra-ventricular conduction block has replaced Right bundle    branch block   Consider lead placement, repeat tracing recommended   Questionable change in initial forces of Anterior leads   Confirmed by Francisco Javier Maynard ([id number]) on 6/25/2025 6:19:10 PM      ECG 12 lead   Final Result by Francisco Javier Maynard MD (06/24 9686)   Atrial fibrillation with rapid ventricular response   Right bundle branch  block , plus right ventricular hypertrophy   Possible Lateral infarct (cited on or before 27-Aug-2024)   Cannot rule out Inferior infarct , age undetermined   Abnormal ECG   When compared with ECG of 27-Aug-2024 12:02,   Possible Inferior infarct is now Present   Confirmed by Francisco Javier Maynard ([id number]) on 6/24/2025 5:56:36 PM        GI:  No orders to display           Results from last 7 days   Lab Units 06/26/25  0506 06/25/25 1755 06/25/25  0946 06/25/25  0512 06/24/25 1953 06/24/25  0412   WBC Thousand/uL 8.95 11.64* 12.67* 10.36*  --  9.86   HEMOGLOBIN g/dL 7.5* 8.7* 9.8* 8.3*  --  12.5   I STAT HEMOGLOBIN g/dl  --   --   --   --  8.8*  --    HEMATOCRIT % 21.8* 25.3* 28.3* 24.9*  --  38.2   HEMATOCRIT, ISTAT %  --   --   --   --  26*  --    PLATELETS Thousands/uL 125* 165 168 154  --  220   TOTAL NEUT ABS Thousands/µL  --   --  8.33*  --   --  3.85         Results from last 7 days   Lab Units 06/26/25  0506 06/25/25 2212 06/25/25 1755 06/25/25  0512 06/24/25 1953 06/24/25  0412   SODIUM mmol/L 134* 131* 132* 131*  --  133*   POTASSIUM mmol/L 5.1 5.5* 5.5* 4.9  --  4.3   CHLORIDE mmol/L 103 101 101 101  --  96   CO2 mmol/L 19* 19* 24 22  --  25   CO2, I-STAT mmol/L  --   --   --   --  24  --    ANION GAP mmol/L 12 11 7 8  --  12   BUN mg/dL 63* 61* 63* 52*  --  63*   CREATININE mg/dL 2.40* 2.20* 2.31* 1.70*  --  1.87*   EGFR ml/min/1.73sq m 17 18 17 25  --  23   CALCIUM mg/dL 7.4* 7.7* 7.5* 7.3*  --  8.6   CALCIUM, IONIZED mmol/L 0.98*  --   --   --   --   --    CALCIUM, IONIZED, ISTAT mmol/L  --   --   --   --  0.95*  --    MAGNESIUM mg/dL 2.3  --   --  2.2  --   --    PHOSPHORUS mg/dL 6.1*  --   --  4.9*  --   --      Results from last 7 days   Lab Units 06/25/25  2212 06/25/25  1755 06/24/25  0412   AST U/L 23 26 24   ALT U/L 7 8 18   ALK PHOS U/L 29* 35 52   TOTAL PROTEIN g/dL 5.3* 4.5* 7.4   ALBUMIN g/dL 3.6 2.6* 3.8   TOTAL BILIRUBIN mg/dL 1.02* 0.59 0.50     Results from last 7 days   Lab Units  "06/26/25  0628 06/25/25  1601 06/25/25  1123 06/25/25  0751 06/24/25  1010   POC GLUCOSE mg/dl 149* 151* 138 138 135     Results from last 7 days   Lab Units 06/26/25  0506 06/25/25  2212 06/25/25  1755 06/25/25  0512 06/24/25  0412   GLUCOSE RANDOM mg/dL 147* 149* 136 135 130         Results from last 7 days   Lab Units 06/25/25  0625   HEMOGLOBIN A1C % 6.3*   EAG mg/dl 134     No results found for: \"BETA-HYDROXYBUTYRATE\"   Results from last 7 days   Lab Units 06/25/25  1600 06/25/25  0742 06/25/25  0437   PH ART  7.368 7.359 7.409   PCO2 ART mm Hg 37.7 37.2 34.0*   PO2 ART mm Hg 185.1* 95.8 148.8*   HCO3 ART mmol/L 21.2* 20.5* 21.0*   BASE EXC ART mmol/L -3.7 -4.5 -3.1   O2 CONTENT ART mL/dL 13.7* 13.3* 13.5*   O2 HGB, ARTERIAL % 98.1* 96.3 98.0*   ABG SOURCE  Line, Arterial Line, Arterial Line, Arterial     Results from last 7 days   Lab Units 06/25/25  1557   PH LOLA  7.295*   PCO2 LOLA mm Hg 45.9   PO2 LOLA mm Hg 33.8*   HCO3 LOLA mmol/L 21.8*   BASE EXC LOLA mmol/L -4.5   O2 CONTENT LOLA ml/dL 8.1   O2 HGB, VENOUS % 58.9*     Results from last 7 days   Lab Units 06/24/25 1953   I STAT BASE EXC mmol/L -3*   I STAT O2 SAT % 100*   ISTAT PH ART  7.348*   I STAT ART PCO2 mm HG 40.7   I STAT ART PO2 mm .0*   I STAT ART HCO3 mmol/L 22.4     Results from last 7 days   Lab Units 06/26/25  0902   CK TOTAL U/L 126     Results from last 7 days   Lab Units 06/26/25  0506 06/26/25  0230 06/26/25  0036 06/25/25  2122 06/25/25  1924 06/25/25  1718 06/25/25  0648 06/25/25  0625 06/25/25  0416   HS TNI 0HR ng/L  --   --  77*  --   --  80*  --   --  65*   HS TNI 2HR ng/L  --  79*  --   --  80*  --   --  70*  --    HSTNI D2 ng/L  --  2  --   --  0  --   --  5  --    HS TNI 4HR ng/L 79*  --   --  80*  --   --  71*  --   --    HSTNI D4 ng/L 2  --   --  0  --   --  6  --   --          Results from last 7 days   Lab Units 06/26/25  0625 06/25/25  2310 06/25/25  1356 06/24/25  1030 06/24/25  0412   PROTIME seconds  --   --   --   -- "  18.0*   INR   --   --   --   --  1.47*   PTT seconds 63* 99* 155*   < > 28    < > = values in this interval not displayed.     Results from last 7 days   Lab Units 06/25/25  0816   TSH 3RD GENERATON uIU/mL 3.852     Results from last 7 days   Lab Units 06/26/25  0506 06/25/25  0816   PROCALCITONIN ng/ml 0.85* 0.19     Results from last 7 days   Lab Units 06/25/25  2212 06/25/25  2030 06/25/25  1755 06/25/25  0625 06/25/25  0416   LACTIC ACID mmol/L 2.0 2.9* 2.1* 3.6* 2.3*                         Results from last 7 days   Lab Units 06/26/25  0923 06/26/25  0638 06/26/25  0555   UNIT PRODUCT CODE  [id number]  [id number]  [id number]  [id number]   UNIT NUMBER  [id number]  [id number]  [id number]  [id number]   UNITABO  A  A A  A A  A   UNITRH  POS  POS POS  POS POS  POS   CROSSMATCH  Compatible  Compatible Compatible  Compatible Compatible  Compatible   UNIT DISPENSE STATUS  Issued  Crossmatched Issued  Presumed Trans Presumed Trans  Presumed Trans   UNIT PRODUCT VOL mL 350  350 350  350 350  350                         Results from last 7 days   Lab Units 06/25/25  2212   SODIUM UR mmol/L 29.0   CREATININE UR mg/dL 43.2       Results from last 7 days   Lab Units 06/24/25  0412   TOTAL COUNTED  100  100       Network Utilization Review Department  ATTENTION: Please call with any questions or concerns to [phone] and carefully listen to the prompts so that you are directed to the right person. All voicemails are confidential.   For Discharge needs, contact Care Management DC Support Team at [phone] opt. [phone]  Send all requests for admission clinical reviews, approved or denied determinations and any other requests to dedicated fax number below belonging to the campus where the patient is receiving treatment. List of dedicated fax numbers for the Facilities:  FACILITY NAME UR FAX NUMBER   ADMISSION DENIALS (Administrative/Medical  Necessity) [phone]   DISCHARGE SUPPORT TEAM (NETWORK) [phone]   PARENT CHILD HEALTH (Maternity/NICU/Pediatrics) [phone]   [facility] [phone]   [facility] [phone]   [facility] [phone]   [facility] [phone]   [facility] [phone]   [facility] [phone]   [facility] [phone]   [facility] [phone]   [facility] [phone]   [facility] [phone]   [facility] [phone]   [facility] [phone]

## 2025-06-26 NOTE — CASE MANAGEMENT
Case Management Discharge Planning Note    Patient name Rebeca Banegas  Location [facility] 513/[facility] 513-01 MRN [id number]  : 1932 Date 2025       Current Admission Date: 2025  Current Admission Diagnosis:Acute lower limb ischemia   Patient Active Problem List    Diagnosis Date Noted    Shock ([facility]) 2025    Acute lower limb ischemia 2025    Muscle cramp 2025    Chronic thoracic back pain 2025    Chronic midline low back pain without sciatica 2025    Arthralgia 2025    Other atherosclerosis of native arteries of extremities, bilateral legs ([facility]) 2025    Hyponatremia 2024    Severe protein-calorie malnutrition ([facility]) 2024    History of CVA (cerebrovascular accident) 2024    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 ([facility]) 2024    Chronic foot ulcer, left, with fat layer exposed ([facility]) 2024    Bronchiectasis ([facility]) 2023    MGUS (monoclonal gammopathy of unknown significance) 2023    Chronic cough 2023    Age-related osteoporosis without current pathological fracture 2023    Secondary hyperparathyroidism ([facility]) 2023    Labial abscess 2022    Vulvar cysts 2022    Osteopenia of hip 2020    Stage 3b chronic kidney disease ([facility]) 2020    Elevated serum immunoglobulin free light chains 2019    Sensorineural hearing loss (SNHL), bilateral 2019    Left asymmetrical SNHL 2019    Pain of left hand 2018    Chronic heart failure with preserved ejection fraction ([facility]) 2017    Plantar fasciitis 2016    Essential tremor 10/02/2015    Vitamin D deficiency 10/02/2015    Onychomycosis 2015    Pulmonary nodule 2015    Thyroid nodule 2015    Prediabetes 2014    Primary hypertension 2014    Leg pain 2014    Abnormal findings on diagnostic imaging of urinary organs 2014    Abnormal findings on diagnostic imaging of  other specified body structures 03/20/2014    Atherosclerosis 03/06/2014    Ovarian cyst 03/06/2014    Pancreatic cyst 03/06/2014    Backache 02/28/2014    Hoarseness 02/28/2014    Allergic rhinitis 06/11/2013    Hyperlipidemia 04/19/2013    Permanent atrial fibrillation (HCC) 02/14/2013    Arthritis 11/12/2012    Asthma 11/12/2012    Esophageal reflux 11/12/2012    Benign colon polyp 11/10/2012    Bifascicular bundle branch block 11/10/2012    Hypothyroidism 11/10/2012    Nasal polyp 11/10/2012      LOS (days): 2  Geometric Mean LOS (GMLOS) (days): 3.8  Days to GMLOS:1.8     OBJECTIVE:  Risk of Unplanned Readmission Score: 33         Current admission status: Inpatient   Preferred Pharmacy:   [facility]/pharmacy #[id number] - [place] - [address] [place] [address]  [place] [address]  Phone: [phone] Fax: [phone]    [facility] Mail [facility] - [facility] [address] [facility]  [address] [facility] [address]  Phone: [phone] Fax: [phone]    Primary Care Provider: Santana Dillon MD    Primary Insurance: [employer] RE[employer]  Secondary Insurance:     DISCHARGE DETAILS:     left for dgter to return call

## 2025-06-26 NOTE — ANESTHESIA POSTPROCEDURE EVALUATION
Post-Op Assessment Note    CV Status:  Stable    Pain management: adequate       Mental Status:  Alert   Hydration Status:  Stable   PONV Controlled:  None   Airway Patency:  Patent     Post Op Vitals Reviewed: Yes    No anethesia notable event occurred.    Staff: Anesthesiologist           Last Filed PACU Vitals:  Vitals Value Taken Time   Temp 97.2 °F (36.2 °C) 06/24/25 21:50   Pulse 104 06/24/25 22:17   /58 06/24/25 22:17   Resp 18 06/24/25 22:17   SpO2 99 % 06/24/25 22:17       Modified Kendell:     Vitals Value Taken Time   Activity 2 06/24/25 22:15   Respiration 2 06/24/25 22:15   Circulation 2 06/24/25 22:15   Consciousness 2 06/24/25 22:15   Oxygen Saturation 1 06/24/25 22:15     Modified Kendell Score: 9

## 2025-06-26 NOTE — ASSESSMENT & PLAN NOTE
Hold home Toprol  mg daily, spironolactone 50 mg daily and Bumex 3 mg twice daily in the setting of hypotension.  Restart when able.  Continue IV Lopressor 5 mg every 6 hours.

## 2025-06-26 NOTE — ASSESSMENT & PLAN NOTE
Remains in atrial fibrillation with RVR, likely related to acute blood loss anemia.  Continue heparin gtt as able.  Price check Xarelto/Eliquis.  Given her recurrent Coumadin failure, would recommend transitioning to a DOAC once stable from a surgical standpoint.  Hold home Toprol  mg daily given hypotension. Okay to continue IV Lopressor 5 mg every 6 hours.  Telemetry monitoring.  Monitor electrolytes and replete as needed.

## 2025-06-26 NOTE — PROGRESS NOTES
Progress Note - Vascular Surgery   Name: Rebeca Banegas 89 y.o. female I MRN: [id number]  Unit/Bed#: [facility] 513-01 I Date of Admission: 6/24/2025   Date of Service: 6/26/2025 I Hospital Day: 2     Assessment & Plan  Acute lower limb ischemia  89yoF with Bronchiectasis, asthma, chronic HFpEF, permanent Afib (Warfarin), MGUS, CVA Aug '24 suspected cardioembolic 2/2 AC hold in the past, Bifascicular bundle branch block who is ambulatory with a cane presents with severe RLE rest pain and associated diminished sensation to SLB. Clinical exam and imaging concerning for Elizabeth 2B acute limb ischemia of the RLE, L popliteal occlusion also noted on presentation.    6/24 morning: R femoral cutdown, thromboembolectomy, R anterior and lateral fasciotomy, partial proximal fasciotomy closure (Eriberto)    6/24 evening: L BK pop exposure, thromboembolectomy (Kimberly)    Plan:  -concern for persistent hypotension requiring vasopressor support, suspect ABLA to be the cause as pt is responsive to blood products and volume. Recommend 2u PRBC transfusion this AM with repeat transfusions PRN for ongoing blood loss from fasciotomy incision  -of course her respiratory status is of concern given her significant respiratory comorbidities, particularly in the setting of IVF resuscitation and DNI status. Consider intermittent Bumex pushes with transfusions to assist with her fluid and respiratory status. Would continue home nebs and restart home diuretics when appropriate  -in the setting of diuresis, would be aware and cautious given her worsening renal function. Obtain urine lytes to calculate FeNa and trend labs. Also would consider the possibility of rhabdo given her recent R2B ALI s/p revascularization. Would trend CK levels and renal function including electrolytes given her hyperphosphatemia today. Maintain xiong in place for strict I&O  -monitoring BLE for signs of compartment syndrome. She is swollen and tender in BLE with preserved  signals in her feet, however, does have some recurrent mild sensory deficits. Swelling likely reperfusion related. Keep BLE with moderate compression with ACE wraps. Will continue to monitor her exam and have low threshold for completing bilateral 4 compartment fasciotomies. This would be her 3rd round of general anesthesia in 3 days, which would obviously cause significant strain from a cardiac standpoint. NPO for now while we continue to monitor  -in the setting of Afib, hypotension, anemia, and multiple rounds of general anesthesia, would continue to closely monitor her cardiac function with troponins and EKGs  -continue pulmonary toileting, ISB  -continue dressing changes PRN saturation  -continue NV checks and make Vascular aware of any changes in exam  -continue heparin gtt, will eventually need to be considered for DOAC  -appreciate cardiology and CC for recs and ongoing critical care management      Subjective : No acute events overnight. Remains hypotensive on pressors. Denies any CP or abdominal pain. Reports incisional tenderness, but no recurrent LE pain as she had on presentation. Does endorse mild sensory changes in bilateral toes as well as BLE swelling. Has not been ambulating. Tolerating PO.     Objective :  Temp:  [97.3 °F (36.3 °C)-98 °F (36.7 °C)] 97.3 °F (36.3 °C)  HR:  [] 91  BP: ()/(35-80) 100/56  Resp:  [11-34] 26  SpO2:  [67 %-100 %] 97 %  O2 Device: Nasal cannula  Nasal Cannula O2 Flow Rate (L/min):  [2 L/min-6 L/min] 2.5 L/min     I/O         06/24 0701  06/25 0700 06/25 0701  06/26 0700 06/26 0701  06/27 0700    P.O. 100 130     I.V. (mL/kg) 2855.7 (47.4) 1577.2 (26.3)     Blood 350 431.7     IV Piggyback 2000 100     Total Intake(mL/kg) 5305.7 (88.1) 2238.8 (37.4)     Urine (mL/kg/hr) 1425 (1) 515 (0.4) 85 (0.7)    Blood 250      Total Output 1675 515 85    Net +3630.7 +1723.8 -85                 Lines/Drains/Airways       Active Status       Name Placement date Placement  time Site Days    CVC Central Lines 06/25/25 Triple 16cm 06/25/25  1523  --  less than 1    Arterial Line 06/24/25 Left Radial 06/24/25  0755  Radial  2    Urethral Catheter Non-latex;Straight-tip 16 Fr. 06/24/25  0800  Non-latex;Straight-tip  2                  Physical Exam  Constitutional:       General: She is not in acute distress.     Appearance: She is not ill-appearing.   HENT:      Right Ear: External ear normal.      Left Ear: External ear normal.      Nose: Nose normal.      Mouth/Throat:      Mouth: Mucous membranes are moist.     Eyes:      Extraocular Movements: Extraocular movements intact.       Cardiovascular:      Rate and Rhythm: Normal rate.      Comments: Hypotensive requiring Colt and Vaso for BP support.  Normal rate, Afib.  R DP/PT signals present. L DP signal present. Bilateral femoral pulses palpable.  Pulmonary:      Effort: No respiratory distress.      Comments: Saturating well on NC, no increased WOB  Abdominal:      General: Abdomen is flat. There is no distension.      Tenderness: There is no abdominal tenderness.   Genitourinary:     Comments: Dillon in place    Musculoskeletal:      Comments: R groin incision c/d/I with small amount of oozing, no hematoma.  R lateral fasciotomy incision with appreciated muscle bulging on initial exam, tied sutures removed and old blood evacuated from the compartment at bedside. Improved tightness of calf and decreased muscle bulging. Muscle healthy appearing.  L medial calf incisional dressing with moderate shadowing, no appreciable hematoma or active bleeding.  Bilateral lower legs moderately edematous with mild calf tenderness to palpation. Compartments fairly soft. Nontender along L anterior compartment.      Skin:     General: Skin is warm.      Coloration: Skin is not pale.     Neurological:      General: No focal deficit present.      Mental Status: She is alert and oriented to person, place, and time.      Comments: RLE with mild decreased  "sensation of the toes and dorsum of foot with decreased ankle flexion/extension which pt reports is due to the swelling.   LLE with mild decreased sensation in toes, preserved ROM in ankle   Psychiatric:         Mood and Affect: Mood normal.         Behavior: Behavior normal.             Lab Results: I have reviewed the following results:  CBC with diff:   Lab Results   Component Value Date    WBC 8.95 06/26/2025    HGB 7.5 (L) 06/26/2025    HCT 21.8 (L) 06/26/2025    MCV 93 06/26/2025     (L) 06/26/2025    RBC 2.34 (L) 06/26/2025    MCH 32.1 06/26/2025    MCHC 34.4 06/26/2025    RDW 15.9 (H) 06/26/2025    MPV 9.8 06/26/2025    NRBC 0 06/25/2025   ,   BMP/CMP:  Lab Results   Component Value Date    SODIUM 134 (L) 06/26/2025    SODIUM 138 01/07/2025    SODIUM 138 01/02/2024    K 5.1 06/26/2025    K 3.6 01/07/2025    K 4.0 01/02/2024     06/26/2025    CL 95 (L) 01/07/2025     01/02/2024    CO2 19 (L) 06/26/2025    CO2 24 06/24/2025    CO2 32 (H) 01/07/2025    CO2 29 01/02/2024    BUN 63 (H) 06/26/2025    BUN 49 (H) 01/07/2025    BUN 33 (H) 01/02/2024    CREATININE 2.40 (H) 06/26/2025    CREATININE 1.35 (H) 01/02/2024    GLUCOSE 139 06/24/2025    CALCIUM 7.4 (L) 06/26/2025    CALCIUM 8.0 (L) 01/07/2025    CALCIUM 8.4 (L) 01/02/2024    AST 23 06/25/2025    AST 30 01/02/2024    ALT 7 06/25/2025    ALT 27 01/02/2024    ALKPHOS 29 (L) 06/25/2025    ALKPHOS 51 01/02/2024    PROT 6.4 10/17/2017    BILITOT 0.5 10/17/2017    EGFR 17 06/26/2025    EGFR 37 (L) 01/02/2024    EGFR 35 (L) 12/29/2020   ,   Lipid Panel:   Lab Results   Component Value Date    CHOL 205 (H) 02/07/2017   ,   Coags:   Lab Results   Component Value Date    PT 23.7 (H) 02/23/2024    PTT 63 (H) 06/26/2025    INR 1.47 (H) 06/24/2025    INR 2.2 02/23/2024   ,   Blood Culture: No results found for: \"BLOODCX\",   Urinalysis:   Lab Results   Component Value Date    COLORU Light Yellow 12/29/2024    CLARITYU Clear 12/29/2024    SPECGRAV 1.011 " "12/29/2024    PHUR 5.0 12/29/2024    LEUKOCYTESUR Negative 12/29/2024    NITRITE Negative 12/29/2024    GLUCOSEU Negative 12/29/2024    KETONESU Negative 12/29/2024    BILIRUBINUR Negative 12/29/2024    BLOODU Negative 12/29/2024   ,   Urine Culture: No results found for: \"URINECX\",   Wound Culure:   Lab Results   Component Value Date    WOUNDCULT Few Colonies of Proteus mirabilis (A) 09/04/2024    WOUNDCULT Few Colonies of Acinetobacter baumannii (A) 09/04/2024         VTE Prophylaxis: VTE covered by:  heparin (porcine), Intravenous, 7 Units/kg/hr at 06/26/25 0055     "

## 2025-06-26 NOTE — CASE MANAGEMENT
Case Management Assessment & Discharge Planning Note    Patient name Rebeca Banegas  Location [facility] 513/[facility] 513-01 MRN [id number]  : 1932 Date 2025       Current Admission Date: 2025  Current Admission Diagnosis:Acute lower limb ischemia   Patient Active Problem List    Diagnosis Date Noted    Shock ([facility]) 2025    Acute lower limb ischemia 2025    Muscle cramp 2025    Chronic thoracic back pain 2025    Chronic midline low back pain without sciatica 2025    Arthralgia 2025    Other atherosclerosis of native arteries of extremities, bilateral legs ([facility]) 2025    Hyponatremia 2024    Severe protein-calorie malnutrition ([facility]) 2024    History of CVA (cerebrovascular accident) 2024    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 ([facility]) 2024    Chronic foot ulcer, left, with fat layer exposed ([facility]) 2024    Bronchiectasis ([facility]) 2023    MGUS (monoclonal gammopathy of unknown significance) 2023    Chronic cough 2023    Age-related osteoporosis without current pathological fracture 2023    Secondary hyperparathyroidism ([facility]) 2023    Labial abscess 2022    Vulvar cysts 2022    Osteopenia of hip 2020    Stage 3b chronic kidney disease ([facility]) 2020    Elevated serum immunoglobulin free light chains 2019    Sensorineural hearing loss (SNHL), bilateral 2019    Left asymmetrical SNHL 2019    Pain of left hand 2018    Chronic heart failure with preserved ejection fraction ([facility]) 2017    Plantar fasciitis 2016    Essential tremor 10/02/2015    Vitamin D deficiency 10/02/2015    Onychomycosis 2015    Pulmonary nodule 2015    Thyroid nodule 2015    Prediabetes 2014    Primary hypertension 2014    Leg pain 2014    Abnormal findings on diagnostic imaging of urinary organs 2014    Abnormal findings on  diagnostic imaging of other specified body structures 03/20/2014    Atherosclerosis 03/06/2014    Ovarian cyst 03/06/2014    Pancreatic cyst 03/06/2014    Backache 02/28/2014    Hoarseness 02/28/2014    Allergic rhinitis 06/11/2013    Hyperlipidemia 04/19/2013    Permanent atrial fibrillation (HCC) 02/14/2013    Arthritis 11/12/2012    Asthma 11/12/2012    Esophageal reflux 11/12/2012    Benign colon polyp 11/10/2012    Bifascicular bundle branch block 11/10/2012    Hypothyroidism 11/10/2012    Nasal polyp 11/10/2012      LOS (days): 2  Geometric Mean LOS (GMLOS) (days): 3.8  Days to GMLOS:1.8     OBJECTIVE:    Risk of Unplanned Readmission Score: 33         Current admission status: Inpatient       Preferred Pharmacy:   [facility]/pharmacy #[id number] - [place], [place] - [address] [place] [address]  [place] [address]  Phone: [phone] Fax: [phone]    [facility] - [facility] [address] [facility]  [address] [facility] [address]  Phone: [phone] Fax: [phone]    Primary Care Provider: Santana Dillon MD    Primary Insurance: [employer]  Secondary Insurance:     ASSESSMENT:  Active Health Care Proxies    There are no active Health Care Proxies on file.       Advance Directives  Does patient have a Health Care POA?: Yes  Does patient have Advance Directives?: Yes  Advance Directives: Living will  Primary Contact: NAGA Molina, friend Radha Mattson              Patient Information  Admitted from:: Home  Mental Status: Alert  Assessment information provided by:: Daughter  Primary Caregiver: Self  Support Systems: Daughter, Family members, Friend  What city do you live in?: [place]  Home entry access options. Select all that apply.: No steps to enter home  Type of Current Residence: Apartment  Floor Level: 1  Upon entering residence, is there a bedroom on the main floor (no further steps)?: Yes  Upon entering residence, is there a bathroom on the main floor  (no further steps)?: Yes  Living Arrangements: Lives Alone    Activities of Daily Living Prior to Admission  Functional Status: Independent  Completes ADLs independently?: Yes  Ambulates independently?: Yes  Does patient use assisted devices?: Yes  Assisted Devices (DME) used: Straight Cane, Walker  Does patient currently own DME?: Yes  What DME does the patient currently own?: Straight Cane, Walker  Does patient have a history of Outpatient Therapy (PT/OT)?: No  Does the patient have a history of Short-Term Rehab?: No  Does patient have a history of HHC?: No  Does patient currently have HHC?: No         Patient Information Continued  Income Source: Pension/custodial  Does patient have prescription coverage?: Yes  Can the patient afford their medications and any related supplies (such as glucometers or test strips)?: N/A  Does patient receive dialysis treatments?: No  Does patient have a history of substance abuse?: No  Does patient have a history of Mental Health Diagnosis?: No         Means of Transportation  Means of Transport to Appts:: Drives Self          DISCHARGE DETAILS:    Discharge planning discussed with:: eleonora Robledo  Roulette of Choice: Yes                   Contacts  Patient Contacts: Meena Solorzano  Relationship to Patient:: Family  Contact Method: Phone  Phone Number: [phone]  Reason/Outcome: Continuity of Care, Emergency Contact, Discharge Planning    A post acute care recommendation was made by your care team for STR.  Discussed Freedom of Choice with POA. List of facilities given to POA via phone. POA aware the list is custom filtered for them by preference  and that [facility] post acute providers are designated.     Spoke with eleonora.  PT resides  apartments, request referral [facility] should pt need IP rehab.  Same entered in [facility]                                                                        Additional Comments: [place]ides [facility] [place] apartment, I PTA, drives

## 2025-06-26 NOTE — ASSESSMENT & PLAN NOTE
History of permanent atrial fibrillation on chronic warfarin therapy with a CVA in August 2024 (suspected embolic due to warfarin pause). She presented on 6/24 with right lower extremity pain and CTA showed a right femoral acute arterial occlusion and she underwent urgent right femoral cutdown, thromboembolectomy, and right lower extremity fasciotomy on 6/24.  Imaging also showed a left popliteal occlusion with reconstitution of the tibial arteries.    Labs on presentation were notable for a subtherapeutic INR level (1.47). Unclear etiology.  Pt reports compliance with her warfarin and denies any recent changes in medications or diet.  Cardiology was consulted for anticoagulation recommendations.  Pt is agreeable to switching to another anticoagulant (Xarelto/Eliquis) given her history of Coumadin failure.      -Later on 6/24 she was noted to experience worsening LLE numbness and decreased ROM.  She was again taken to the OR for LLE popliteal thromboembolectomy and stenting.    -Overnight on 6/24-6/25 she was noted to be hypotensive and was upgraded to the ICU for initiation of pressors.  Lactate was elevated up to 3.6.  Troponin 70->71. Hb dropped from 12.5-8.3.  Suspect acute blood loss anemia.    - Hemoglobin again dropped to 7.5 this morning and another 2 units PRBCs were ordered.  Suspect persistent blood loss from fasciotomy incision.    Plan:  Continue heparin gtt as able.  Monitor BP closely and wean pressors as able.  Currently on vaso and carlene.   Continue Solu-Cortef.  IVF as needed.   Monitor Hb closely and transfuse as needed.   Trend perfusion markers.  Okay to continue lopressor 5 mg Q6 hrs.   Hold home BP meds.  S/p IV Bumex 2 mg x 2 overnight/this morning.  Monitor volume status and continue diuresis as needed.   Not on ASA due to allergy and not on statin due to potential side effects.  F/u IR LE angiogram.  Monitor for evidence of bleeding.  TTE 6/25 showing an LVEF of 65% with vigorous systolic  function and no obvious regional wall motion abnormalities.  Price check Xarelto/Eliquis.  Given her recurrent Coumadin failure, would recommend transitioning to a DOAC once stable from a surgical standpoint.  Continue neurovascular checks and postoperative care as per vascular surgery/ICU.

## 2025-06-26 NOTE — ASSESSMENT & PLAN NOTE
On home bumex 3mg po bid + intermittent IV bumex   CXR with persistent bilateral pleural effusions

## 2025-06-26 NOTE — ASSESSMENT & PLAN NOTE
Lab Results   Component Value Date    EGFR 17 06/26/2025    EGFR 18 06/25/2025    EGFR 17 06/25/2025    CREATININE 2.40 (H) 06/26/2025    CREATININE 2.20 (H) 06/25/2025    CREATININE 2.31 (H) 06/25/2025   Monitor BMP.

## 2025-06-26 NOTE — ASSESSMENT & PLAN NOTE
Wt Readings from Last 3 Encounters:   06/25/25 59.9 kg (132 lb)   06/18/25 57.2 kg (126 lb 3.2 oz)   06/12/25 54.9 kg (121 lb)   Last echo in August 2024 showing moderate concentric hypertrophy and an LVEF of 60%.  She also has moderate TR and biatrial enlargement.  TTE 6/25 showing an LVEF of 65% with vigorous systolic function and no obvious regional wall motion abnormalities.  Home p.o. Bumex 3 mg twice daily currently held.  S/p intermittent doses of IV Bumex.  Monitor volume status and continue diuresis as needed.  Monitor I's and O's, volume status and renal function.  Monitor electrolytes and replete as needed.

## 2025-06-26 NOTE — CONSULTS
Consultation - Nephrology   Rebeca Banegas 89 y.o. female MRN: [id number]  Unit/Bed#: [facility] 513-01 Encounter: [id number]    ASSESSMENT/PLAN:   Assessment & Plan  DARIO (acute kidney injury) (HCC)  Suspect ATN in the setting of shock, contrast CT on 6/24 and acute blood loss   Creatinine 1.87 on admission and trending up to 2.45 today   S/p trial IVF and albumin  now getting intermittent IV Bumex  Urine output improving and made 150cc in the last 2 hours   Will continue to trend creatinine and urine output closely   Home jardiance and spironolactone on hold   CT: no hydronephrosis     CKD (chronic kidney disease), stage IV (HCC)  Baseline creatinine since January appears to be 1.6-2.1 with GFR <30  Acute lower limb ischemia  Bilateral LE ischemia likely due to cardioembolus   S/p RLE fem embolectomy and anterolateral fasciotomy and LLE pop/AT embolectomy on 6/24  Shock (HCC)  On pressors  S/p IVF and albumin   Likely related to ABLA -- hgb was down to 7.5 and now up to 11 s/p transfusion   Chronic heart failure with preserved ejection fraction (HCC)  On home bumex 3mg po bid + intermittent IV bumex   CXR with persistent bilateral pleural effusions   Hyperphosphatemia  Phos 6.7   Low phos diet   Permanent atrial fibrillation (HCC)        Summary of Plan:   Continue to trend creatinine and urine output   Continue intermittent bumex for low urine output     HISTORY OF PRESENT ILLNESS:  Requesting Physician: Arthur Wei MD  Reason for Consult: DARIO     Rebeca Banegas is a 89 y.o. year old female who was admitted to [facility] with acute bilateral limb ischemia. She underwent surgical intervention on 6/24. Postoperatively she became hypotension and was admitted to the ICU on pressors. Now with worsening urine output and renal function so nephrology was consulted. She was given IV bumex and her urine output is picking up. She reports feeling a little better currently. Does have some shortness of breath but stable  on supplemental O2. She is aware she has some CKD but does not follow with a nephrologist.       PAST MEDICAL HISTORY:  Past Medical History[1]    PAST SURGICAL HISTORY:  Past Surgical History[2]    ALLERGIES:  Allergies[3]    SOCIAL HISTORY:  Social History     Substance and Sexual Activity   Alcohol Use Not Currently     Social History     Substance and Sexual Activity   Drug Use No     Tobacco Use History[4]    FAMILY HISTORY:  Family History[5]    MEDICATIONS:  Scheduled Meds:  Current Facility-Administered Medications   Medication Dose Route Frequency Provider Last Rate    acetaminophen  975 mg Oral Q8H [facility] Vale Esparza MD      Albumin 5%  25 g Intravenous Every Other Day Nam Celaya DO      budesonide  0.5 mg Nebulization Q12H Vale Esparza MD      bumetanide  3 mg Oral BID Kaci Dobbins PA-C      guaiFENesin  600 mg Oral Q12H [facility] Esparza MD      heparin (porcine)  3-30 Units/kg/hr (Order-Specific) Intravenous Titrated Vale Esparza MD 9 Units/kg/hr (06/26/25 1305)    hydrocortisone sodium succinate  50 mg Intravenous Q6H [facility] Keiry Reeder, PA-C      HYDROmorphone  0.2 mg Intravenous Q3H PRN Nam Celaya DO      insulin lispro  1-5 Units Subcutaneous TID AC Angi Kelly MD      levalbuterol  1.25 mg Nebulization Q6H Vale Esparza MD      levothyroxine  50 mcg Oral Early Morning Vale Esparza MD      metoprolol  5 mg Intravenous Q6H Keiry Reeder, PA-C      oxyCODONE  5 mg Oral Q4H PRN Vale Esparza MD      oxyCODONE  2.5 mg Oral Q4H PRN Vale Esparza MD      phenylephine   mcg/min Intravenous Titrated Keiry Lilli, PA-C 100 mcg/min (06/26/25 1310)    senna  1 tablet Oral Daily Vale Esparza MD      sodium chloride  4 mL Nebulization Q6H Vale Esparza MD      trimethobenzamide  200 mg Intramuscular Q6H PRN Keiry Lilli, PA-C      vasopressin  0.04 Units/min Intravenous Continuous Keiry Lilli, PA-C 0.04 Units/min (06/26/25 1425)       PRN Meds:.   HYDROmorphone    oxyCODONE    oxyCODONE    trimethobenzamide    Continuous Infusions:heparin (porcine), 3-30 Units/kg/hr (Order-Specific), Last Rate: 9 Units/kg/hr (06/26/25 1305)  phenylephine,  mcg/min, Last Rate: 100 mcg/min (06/26/25 1310)  vasopressin, 0.04 Units/min, Last Rate: 0.04 Units/min (06/26/25 1425)        REVIEW OF SYSTEMS:  A complete review of systems was done. Pertinent positives and negatives noted in the HPI but otherwise the review of systems is negative.    PHYSICAL EXAM:  Current Weight: Weight - Scale: 59.9 kg (132 lb)  First Weight: Weight - Scale: 60.2 kg (132 lb 11.5 oz)  Vitals:    06/26/25 1200   BP: 120/70   Pulse: 90   Resp: 22   Temp:    SpO2: 90%       Intake/Output Summary (Last 24 hours) at 6/26/2025 1438  Last data filed at 6/26/2025 1222  Gross per 24 hour   Intake 2993.14 ml   Output 635 ml   Net 2358.14 ml     General:  appears comfortable and in no acute distress   Skin:  No rash  Eyes:  Sclerae anicteric, no periorbital edema   ENT:  Moist mucous membranes  Neck:  Trachea midline, symmetric   Chest:  Coarse breath sounds   CVS:  Regular rate and rhythm  Abdomen:  Soft, nontender, nondistended  Neuro:  Awake and alert  Psych:  Appropriate affect  Extremities: LE wrapped    : xiong in place with clear urine output     Lab Results:   Results from last 7 days   Lab Units 06/26/25  1226 06/26/25  0506 06/25/25  2212 06/25/25  1755 06/25/25  0946 06/25/25  0512 06/24/25  1953 06/24/25  0412   WBC Thousand/uL 9.74 8.95  --  11.64* 12.67* 10.36*  --  9.86   HEMOGLOBIN g/dL 11.0* 7.5*  --  8.7* 9.8* 8.3*  --  12.5   I STAT HEMOGLOBIN g/dl  --   --   --   --   --   --  8.8*  --    HEMATOCRIT % 31.0* 21.8*  --  25.3* 28.3* 24.9*  --  38.2   HEMATOCRIT, ISTAT %  --   --   --   --   --   --  26*  --    PLATELETS Thousands/uL 122* 125*  --  165 168 154  --  220   SODIUM mmol/L 134* 134* 131* 132*  --  131*  --  133*   POTASSIUM mmol/L 5.1 5.1 5.5* 5.5*  --  4.9  --  4.3   CHLORIDE  mmol/L 105 103 101 101  --  101  --  96   CO2 mmol/L 18* 19* 19* 24  --  22  --  25   CO2, I-STAT mmol/L  --   --   --   --   --   --  24  --    BUN mg/dL 66* 63* 61* 63*  --  52*  --  63*   CREATININE mg/dL 2.45* 2.40* 2.20* 2.31*  --  1.70*  --  1.87*   CALCIUM mg/dL 7.4* 7.4* 7.7* 7.5*  --  7.3*  --  8.6   MAGNESIUM mg/dL 2.5 2.3  --   --   --  2.2  --   --    PHOSPHORUS mg/dL 6.7* 6.1*  --   --   --  4.9*  --   --    GLUCOSE, ISTAT mg/dl  --   --   --   --   --   --  139  --        Radiology Results:   XR chest portable ICU   Final Result by Magy Rivera MD (06/26 0855)   Central venous catheter in standard position.      Persistent pleural effusions.   Retrocardiac opacity. Atelectasis versus consolidation.            Workstation performed: [id number]         XR chest portable   Final Result by Magy Rivera MD (06/25 1542)   Small pleural effusions.                  Workstation performed: [id number]         XR chest portable ICU   Final Result by Magy Rivera MD (06/25 1341)   No acute cardiopulmonary findings.            Workstation performed: [id number]         CTA abdominal w run off w wo contrast   Final Result by Bailey Rodarte MD (06/24 1129)      RIGHT: Occlusion of the right common femoral artery with nonopacification of right lower extremity. Additional partial occlusion of the internal iliac artery as above.      LEFT: Mild to moderate calcified plaque. Nonopacification of the popliteal artery in the popliteal fossa with faint reconstitution of the tibioperoneal trunk and anterior tibial arteries although no flow to the ankle/foot.      Incidental indeterminate pancreatic cystic lesions as above appear similar to prior CT dated 8/28/2024.. Recommend continued attention on follow-up imaging/surgical evaluation.            Resident: HALIMA GARAY I, the attending radiologist, have reviewed the images and agree with the final report above.      Workstation performed: [id number]         IR lower  extremity angiogram    (Results Pending)                [1]   Past Medical History:  Diagnosis Date    Abnormal ECG     Abnormal ultrasound     RESOLVED: [id number]    Acute kidney failure (HCC) 12/29/2024    Advice given about COVID-19 virus infection 04/07/2020    Ambulates with cane     Arrhythmia     Arthritis     Asthma     Asthma with acute exacerbation     LAST ASSESSED: [id number]    Asymptomatic menopausal state     Atherosclerosis     Atrial fibrillation (HCC)     Chronic kidney disease     Chronic obstructive lung disease (HCC)     w/ chonic cough    Chronic ulcer of left foot (HCC)     Colon polyp     Congestive heart failure (HCC)     LAST ASSESSED: [id number]    COPD (chronic obstructive pulmonary disease) (HCC)     Coronary artery disease     COVID-19 01/06/2025    COVID-19 virus infection 03/25/2023    Cuboid fracture     LAST ASSESSED: [id number]    Disease of thyroid gland     Dyspepsia     Edema of both lower extremities     Equinus deformity of left foot     Fall 04/01/2024    Falls     5/2024    GERD (gastroesophageal reflux disease)     Headache(784.0)     High risk medication use     LAST ASSESSED: [id number]    Hyperlipidemia     Hypertension     Hypokalemia     Hypothyroidism     Impacted cerumen of both ears     LAST ASSESSED: [id number]    Impacted cerumen of right ear     LAST ASSESSED: [id number]    Influenza     LAST ASSESSED: [id number]    IPMN (intraductal papillary mucinous neoplasm)     RESOLVED: 02OCT2015    Irregular heart beat     afib. Warfarin.    Leg cramps 12/21/2018    Limb swelling     LAST ASSESSED: [id number]    Navicular fracture of ankle     LAST ASSESSED: [id number]    Onychomycosis     LAST ASSESSED: 39MDK2038    Open wound of right upper arm 10/17/2022    Osteoarthritis     Osteopenia     Osteoporosis     Paronychia, finger, unspecified laterality     LAST ASSESSED: [id number]    Paroxysmal atrial fibrillation (HCC)     LAST ASSESSED: 02MAR2014    Peripheral vascular disease  (HCC)     Plantar fasciitis     SOBOE (shortness of breath on exertion)     Stroke (HCC)     Right arm weakness that has resolved    Talus fracture     LAST ASSESSED: [id number]    Vaccine counseling 12/19/2023    Vascular headache     Walker as ambulation aid     Wound of right foot    [2]   Past Surgical History:  Procedure Laterality Date    APPENDECTOMY      BONE BIOPSY Left 08/09/2024    Procedure: Bone bx of proximal phalanx second toe & second met with fluoroscopic guidance;  Surgeon: Jonny Marcial DPM;  Location: [facility];  Service: Podiatry    CATARACT EXTRACTION Bilateral     CHOLECYSTECTOMY      COLONOSCOPY      FASCIOTOMY Right 6/24/2025    Procedure: FASCIOTOMY;  Surgeon: Arthur Wei MD;  Location: [facility];  Service: Vascular    JOINT REPLACEMENT Bilateral     knee    NEUROPLASTY / TRANSPOSITION MEDIAN NERVE AT CARPAL TUNNEL BILATERAL Bilateral     DECOMPRESSION    OOPHORECTOMY Bilateral     age 81    PELVIC FLOOR REPAIR  2014    NH BIOPSY BONE TROCAR/NEEDLE SUPERFICIAL Left 08/09/2024    Procedure: Left foot debridement;  Surgeon: Jonny Marcial DPM;  Location: [facility];  Service: Podiatry    NH DEBRIDEMENT MUSCLE &/FASCIA 1ST 20 SQ CM/< Left 08/09/2024    Procedure: Left foot debridement;  Surgeon: Jonny Marcial DPM;  [facility];  Service: Podiatry    NH OSTEOT W/WO LNGTH SHRT/CORRJ 1ST METAR Left 11/29/2024    Procedure: Left foot percutaneous osteotomy of second, third and fourth metatarsal.;  Surgeon: Jonny Marcial DPM;  Location: [facility];  Service: Podiatry    SALPINGECTOMY Bilateral     SINUS SURGERY      THROMBECTOMY W/ EMBOLECTOMY Left 6/24/2025    Procedure: LEFT LOWER EXTREMITY EMBOLECTOMY/THROMBECTOMY;  Surgeon: Adonay Raymond MD;  [facility]: [facility];  Service: Vascular    THROMBECTOMY W/ EMBOLECTOMY Right 6/24/2025    Procedure: EMBOLECTOMY/THROMBECTOMY LOWER EXTREMITY;  Surgeon: Arthur Wei MD;  Location: [facility];  Service: Vascular    TONSILLECTOMY      TOTAL KNEE ARTHROPLASTY Bilateral    [3]   Allergies  Allergen Reactions    Asa [Aspirin] Shortness Of Breath    Nsaids Shortness Of Breath    Medical Tape Other (See Comments)     Skin tears with the removal of tape    [4]   Social History  Tobacco Use   Smoking Status Former    Current packs/day: 0.00    Average packs/day: 0.3 packs/day for 2.0 years (0.5 ttl pk-yrs)    Types: Cigarettes    Start date:     Quit date:     Years since quittin.5   Smokeless Tobacco Never   Tobacco Comments    On and off socially with friends    [5]   Family History  Problem Relation Name Age of Onset    Pancreatic cancer Mother Christiana 93    Heart failure Mother Christiana     Cancer Mother Christiana     Hypertension Mother Christiana     Coronary artery disease Family      Breast cancer Family      Uterine cancer Family      Hyperlipidemia Family      Osteoarthritis Family      Ovarian cancer Family      Brain cancer Son      Stroke Father Isaac     Hypertension Father Isaac     No Known Problems Sister      No Known Problems Daughter      No Known Problems Maternal Grandmother      No Known Problems Maternal Grandfather      No Known Problems Paternal Grandmother      No Known Problems Paternal Grandfather      Breast cancer Cousin  50    Breast cancer Cousin  50    Ovarian cancer Other niece 49      congestion

## 2025-06-27 LAB
ABO GROUP BLD BPU: NORMAL
ABO GROUP BLD BPU: NORMAL
ALBUMIN SERPL BCG-MCNC: 3.5 G/DL (ref 3.5–5)
ALP SERPL-CCNC: 30 U/L (ref 34–104)
ALT SERPL W P-5'-P-CCNC: 108 U/L (ref 7–52)
ANION GAP SERPL CALCULATED.3IONS-SCNC: 10 MMOL/L (ref 4–13)
ANION GAP SERPL CALCULATED.3IONS-SCNC: 11 MMOL/L (ref 4–13)
APTT PPP: 59 SECONDS (ref 23–34)
APTT PPP: 61 SECONDS (ref 23–34)
APTT PPP: 71 SECONDS (ref 23–34)
AST SERPL W P-5'-P-CCNC: 133 U/L (ref 13–39)
BASE EX.OXY STD BLDV CALC-SCNC: 57.4 % (ref 60–80)
BASE EXCESS BLDV CALC-SCNC: -4 MMOL/L
BASOPHILS # BLD AUTO: 0.01 THOUSANDS/ÂΜL (ref 0–0.1)
BASOPHILS NFR BLD AUTO: 0 % (ref 0–1)
BILIRUB SERPL-MCNC: 0.81 MG/DL (ref 0.2–1)
BPU ID: NORMAL
BPU ID: NORMAL
BUN SERPL-MCNC: 70 MG/DL (ref 5–25)
BUN SERPL-MCNC: 70 MG/DL (ref 5–25)
CA-I BLD-SCNC: 0.93 MMOL/L (ref 1.12–1.32)
CALCIUM SERPL-MCNC: 7 MG/DL (ref 8.4–10.2)
CALCIUM SERPL-MCNC: 7.6 MG/DL (ref 8.4–10.2)
CHLORIDE SERPL-SCNC: 104 MMOL/L (ref 96–108)
CHLORIDE SERPL-SCNC: 105 MMOL/L (ref 96–108)
CO2 SERPL-SCNC: 20 MMOL/L (ref 21–32)
CO2 SERPL-SCNC: 21 MMOL/L (ref 21–32)
CREAT SERPL-MCNC: 2.25 MG/DL (ref 0.6–1.3)
CREAT SERPL-MCNC: 2.38 MG/DL (ref 0.6–1.3)
CROSSMATCH: NORMAL
CROSSMATCH: NORMAL
EOSINOPHIL # BLD AUTO: 0.02 THOUSAND/ÂΜL (ref 0–0.61)
EOSINOPHIL NFR BLD AUTO: 0 % (ref 0–6)
ERYTHROCYTE [DISTWIDTH] IN BLOOD BY AUTOMATED COUNT: 16.2 % (ref 11.6–15.1)
GFR SERPL CREATININE-BSD FRML MDRD: 17 ML/MIN/1.73SQ M
GFR SERPL CREATININE-BSD FRML MDRD: 18 ML/MIN/1.73SQ M
GLUCOSE SERPL-MCNC: 101 MG/DL (ref 65–140)
GLUCOSE SERPL-MCNC: 105 MG/DL (ref 65–140)
GLUCOSE SERPL-MCNC: 121 MG/DL (ref 65–140)
GLUCOSE SERPL-MCNC: 92 MG/DL (ref 65–140)
GLUCOSE SERPL-MCNC: 96 MG/DL (ref 65–140)
HCO3 BLDV-SCNC: 21.5 MMOL/L (ref 24–30)
HCT VFR BLD AUTO: 26.5 % (ref 34.8–46.1)
HGB BLD-MCNC: 9.6 G/DL (ref 11.5–15.4)
HGB BLD-MCNC: 9.9 G/DL (ref 11.5–15.4)
IMM GRANULOCYTES # BLD AUTO: 0.07 THOUSAND/UL (ref 0–0.2)
IMM GRANULOCYTES NFR BLD AUTO: 1 % (ref 0–2)
LACTATE SERPL-SCNC: 1.6 MMOL/L (ref 0.5–2)
LYMPHOCYTES # BLD AUTO: 2.12 THOUSANDS/ÂΜL (ref 0.6–4.47)
LYMPHOCYTES NFR BLD AUTO: 19 % (ref 14–44)
MAGNESIUM SERPL-MCNC: 2.3 MG/DL (ref 1.9–2.7)
MAGNESIUM SERPL-MCNC: 2.4 MG/DL (ref 1.9–2.7)
MCH RBC QN AUTO: 32.4 PG (ref 26.8–34.3)
MCHC RBC AUTO-ENTMCNC: 36.2 G/DL (ref 31.4–37.4)
MCV RBC AUTO: 90 FL (ref 82–98)
MONOCYTES # BLD AUTO: 1.25 THOUSAND/ÂΜL (ref 0.17–1.22)
MONOCYTES NFR BLD AUTO: 11 % (ref 4–12)
NASAL CANNULA: 3
NEUTROPHILS # BLD AUTO: 7.76 THOUSANDS/ÂΜL (ref 1.85–7.62)
NEUTS SEG NFR BLD AUTO: 69 % (ref 43–75)
NRBC BLD AUTO-RTO: 0 /100 WBCS
O2 CT BLDV-SCNC: 8.7 ML/DL
PCO2 BLDV: 40.7 MM HG (ref 42–50)
PH BLDV: 7.34 [PH] (ref 7.3–7.4)
PHOSPHATE SERPL-MCNC: 5.3 MG/DL (ref 2.3–4.1)
PHOSPHATE SERPL-MCNC: 5.4 MG/DL (ref 2.3–4.1)
PLATELET # BLD AUTO: 124 THOUSANDS/UL (ref 149–390)
PMV BLD AUTO: 9.1 FL (ref 8.9–12.7)
PO2 BLDV: 30.9 MM HG (ref 35–45)
POTASSIUM SERPL-SCNC: 3.9 MMOL/L (ref 3.5–5.3)
POTASSIUM SERPL-SCNC: 4 MMOL/L (ref 3.5–5.3)
PROT SERPL-MCNC: 5.3 G/DL (ref 6.4–8.4)
RBC # BLD AUTO: 2.96 MILLION/UL (ref 3.81–5.12)
SODIUM SERPL-SCNC: 135 MMOL/L (ref 135–147)
SODIUM SERPL-SCNC: 136 MMOL/L (ref 135–147)
UNIT DISPENSE STATUS: NORMAL
UNIT DISPENSE STATUS: NORMAL
UNIT PRODUCT CODE: NORMAL
UNIT PRODUCT CODE: NORMAL
UNIT PRODUCT VOLUME: 350 ML
UNIT PRODUCT VOLUME: 350 ML
UNIT RH: NORMAL
UNIT RH: NORMAL
WBC # BLD AUTO: 11.23 THOUSAND/UL (ref 4.31–10.16)

## 2025-06-27 PROCEDURE — 80053 COMPREHEN METABOLIC PANEL: CPT

## 2025-06-27 PROCEDURE — 82330 ASSAY OF CALCIUM: CPT | Performed by: SURGERY

## 2025-06-27 PROCEDURE — 85018 HEMOGLOBIN: CPT

## 2025-06-27 PROCEDURE — 85025 COMPLETE CBC W/AUTO DIFF WBC: CPT | Performed by: SURGERY

## 2025-06-27 PROCEDURE — 82805 BLOOD GASES W/O2 SATURATION: CPT

## 2025-06-27 PROCEDURE — 99291 CRITICAL CARE FIRST HOUR: CPT | Performed by: EMERGENCY MEDICINE

## 2025-06-27 PROCEDURE — 94669 MECHANICAL CHEST WALL OSCILL: CPT

## 2025-06-27 PROCEDURE — 84100 ASSAY OF PHOSPHORUS: CPT

## 2025-06-27 PROCEDURE — 99232 SBSQ HOSP IP/OBS MODERATE 35: CPT | Performed by: INTERNAL MEDICINE

## 2025-06-27 PROCEDURE — NC001 PR NO CHARGE: Performed by: SURGERY

## 2025-06-27 PROCEDURE — 83735 ASSAY OF MAGNESIUM: CPT

## 2025-06-27 PROCEDURE — 83605 ASSAY OF LACTIC ACID: CPT

## 2025-06-27 PROCEDURE — 85730 THROMBOPLASTIN TIME PARTIAL: CPT | Performed by: SURGERY

## 2025-06-27 PROCEDURE — 94640 AIRWAY INHALATION TREATMENT: CPT

## 2025-06-27 PROCEDURE — 94760 N-INVAS EAR/PLS OXIMETRY 1: CPT

## 2025-06-27 PROCEDURE — 84100 ASSAY OF PHOSPHORUS: CPT | Performed by: SURGERY

## 2025-06-27 PROCEDURE — 82948 REAGENT STRIP/BLOOD GLUCOSE: CPT

## 2025-06-27 PROCEDURE — 83735 ASSAY OF MAGNESIUM: CPT | Performed by: SURGERY

## 2025-06-27 PROCEDURE — 97163 PT EVAL HIGH COMPLEX 45 MIN: CPT

## 2025-06-27 PROCEDURE — 97167 OT EVAL HIGH COMPLEX 60 MIN: CPT

## 2025-06-27 PROCEDURE — 94668 MNPJ CHEST WALL SBSQ: CPT

## 2025-06-27 PROCEDURE — 99233 SBSQ HOSP IP/OBS HIGH 50: CPT | Performed by: INTERNAL MEDICINE

## 2025-06-27 PROCEDURE — 80048 BASIC METABOLIC PNL TOTAL CA: CPT | Performed by: SURGERY

## 2025-06-27 RX ORDER — CALCIUM GLUCONATE 20 MG/ML
2 INJECTION, SOLUTION INTRAVENOUS ONCE
Status: COMPLETED | OUTPATIENT
Start: 2025-06-27 | End: 2025-06-27

## 2025-06-27 RX ADMIN — GUAIFENESIN 600 MG: 600 TABLET, EXTENDED RELEASE ORAL at 09:21

## 2025-06-27 RX ADMIN — HYDROCORTISONE SODIUM SUCCINATE 50 MG: 100 INJECTION, POWDER, FOR SOLUTION INTRAMUSCULAR; INTRAVENOUS at 17:26

## 2025-06-27 RX ADMIN — GUAIFENESIN 600 MG: 600 TABLET, EXTENDED RELEASE ORAL at 21:12

## 2025-06-27 RX ADMIN — BUMETANIDE 3 MG: 2 TABLET ORAL at 17:26

## 2025-06-27 RX ADMIN — BUDESONIDE 0.5 MG: 0.5 INHALANT RESPIRATORY (INHALATION) at 07:53

## 2025-06-27 RX ADMIN — PHENYLEPHRINE HYDROCHLORIDE 25 MCG/MIN: 50 INJECTION INTRAVENOUS at 13:39

## 2025-06-27 RX ADMIN — ACETAMINOPHEN 975 MG: 325 TABLET ORAL at 05:14

## 2025-06-27 RX ADMIN — METOPROLOL TARTRATE 5 MG: 1 INJECTION, SOLUTION INTRAVENOUS at 11:56

## 2025-06-27 RX ADMIN — HYDROCORTISONE SODIUM SUCCINATE 50 MG: 100 INJECTION, POWDER, FOR SOLUTION INTRAMUSCULAR; INTRAVENOUS at 05:14

## 2025-06-27 RX ADMIN — LEVALBUTEROL HYDROCHLORIDE 1.25 MG: 1.25 SOLUTION RESPIRATORY (INHALATION) at 07:53

## 2025-06-27 RX ADMIN — HYDROCORTISONE SODIUM SUCCINATE 50 MG: 100 INJECTION, POWDER, FOR SOLUTION INTRAMUSCULAR; INTRAVENOUS at 23:25

## 2025-06-27 RX ADMIN — LEVALBUTEROL HYDROCHLORIDE 1.25 MG: 1.25 SOLUTION RESPIRATORY (INHALATION) at 14:42

## 2025-06-27 RX ADMIN — CALCIUM GLUCONATE 2 G: 20 INJECTION, SOLUTION INTRAVENOUS at 03:11

## 2025-06-27 RX ADMIN — SENNOSIDES 8.6 MG: 8.6 TABLET, FILM COATED ORAL at 09:21

## 2025-06-27 RX ADMIN — METOPROLOL TARTRATE 5 MG: 1 INJECTION, SOLUTION INTRAVENOUS at 17:26

## 2025-06-27 RX ADMIN — BUDESONIDE 0.5 MG: 0.5 INHALANT RESPIRATORY (INHALATION) at 19:22

## 2025-06-27 RX ADMIN — METOPROLOL TARTRATE 5 MG: 1 INJECTION, SOLUTION INTRAVENOUS at 23:25

## 2025-06-27 RX ADMIN — ACETAMINOPHEN 975 MG: 325 TABLET ORAL at 14:12

## 2025-06-27 RX ADMIN — HEPARIN SODIUM 13 UNITS/KG/HR: 10000 INJECTION, SOLUTION INTRAVENOUS at 02:29

## 2025-06-27 RX ADMIN — ACETAMINOPHEN 975 MG: 325 TABLET ORAL at 21:12

## 2025-06-27 RX ADMIN — HYDROCORTISONE SODIUM SUCCINATE 50 MG: 100 INJECTION, POWDER, FOR SOLUTION INTRAMUSCULAR; INTRAVENOUS at 11:56

## 2025-06-27 RX ADMIN — METOPROLOL TARTRATE 5 MG: 1 INJECTION, SOLUTION INTRAVENOUS at 05:14

## 2025-06-27 RX ADMIN — LEVALBUTEROL HYDROCHLORIDE 1.25 MG: 1.25 SOLUTION RESPIRATORY (INHALATION) at 19:22

## 2025-06-27 RX ADMIN — LEVALBUTEROL HYDROCHLORIDE 1.25 MG: 1.25 SOLUTION RESPIRATORY (INHALATION) at 00:38

## 2025-06-27 RX ADMIN — OXYCODONE HYDROCHLORIDE 5 MG: 5 TABLET ORAL at 23:25

## 2025-06-27 RX ADMIN — BUMETANIDE 3 MG: 2 TABLET ORAL at 14:12

## 2025-06-27 RX ADMIN — SODIUM CHLORIDE SOLN NEBU 3% 4 ML: 3 NEBU SOLN at 00:38

## 2025-06-27 RX ADMIN — ALBUMIN (HUMAN) 25 G: 12.5 INJECTION, SOLUTION INTRAVENOUS at 09:21

## 2025-06-27 RX ADMIN — LEVOTHYROXINE SODIUM 50 MCG: 0.05 TABLET ORAL at 05:14

## 2025-06-27 NOTE — ASSESSMENT & PLAN NOTE
Wt Readings from Last 3 Encounters:   06/25/25 59.9 kg (132 lb)   06/18/25 57.2 kg (126 lb 3.2 oz)   06/12/25 54.9 kg (121 lb)   Last echo in August 2024 showing moderate concentric hypertrophy and an LVEF of 60%.  She also has moderate TR and biatrial enlargement.  TTE 6/25 showing an LVEF of 65% with vigorous systolic function and no obvious regional wall motion abnormalities.  Continue home p.o. Bumex 3 mg twice daily. S/p intermittent doses of IV Bumex.  Monitor volume status and continue diuresis as needed.  Monitor I's and O's, volume status and renal function.  Monitor electrolytes and replete as needed.

## 2025-06-27 NOTE — QUICK NOTE
Price check for Eliquis and Xarelto sent to [employer].  Both are covered but require a $75 co-pay.

## 2025-06-27 NOTE — PLAN OF CARE
Problem: OCCUPATIONAL THERAPY ADULT  Goal: Performs self-care activities at highest level of function for planned discharge setting.  See evaluation for individualized goals.  Description: Treatment Interventions: ADL retraining, Functional transfer training, Endurance training, Patient/family training, Equipment evaluation/education, Compensatory technique education, Energy conservation, Activityengagement          See flowsheet documentation for full assessment, interventions and recommendations.   Note: Limitation: Decreased ADL status, Decreased endurance, Decreased self-care trans, Decreased high-level ADLs  Prognosis: Good  Assessment: Pt is a 89 y.o. female  admitted 6/24/2025 with Leg pain (M79.606)  Ischemic rest pain of lower extremity (M79.606, I99.8)  Acute lower limb ischemia (I99.8)  Pt underwent RLE fem embolectomy and anterolateral fasciotomy, LLE pop IAT embolectomy 6/24/25. Pod 3 w active OT eval and treat orders. PMH includes . Pt lives [facility] at [facility], was mostly ind w adl, rq a for iadl and used spc for mobility.  Currently, pt is Min Ax1 for UB ADL, Mod Ax1 for LB ADL, and completed transfers/FM w Mod Ax2. Pt is limited at this time 2* decreased endurance/activtiy tolerance, decreased cognition, decreased ADL/High-level ADL status, decreased self-care trans, decreased safety awareness, limited home support and is a fall risk. This impacts pt's ability to complete UB and LB dressing and bathing, toileting, transfers, functional mobility, community mobility, home and health maintenance, and safe engagement in typical daily routine.   The patient's raw score on the -PAC Daily Activity Inpatient Short Form is 16. A raw score of less than 19 suggests the patient may benefit from discharge to post-acute rehabilitation services. Please refer to the recommendation of the Occupational Therapist for safe discharge planning.  From OT standpoint, pt should D/C to level 2  when medically  stable. Pt will benefit from continued acute OT services 2-3 x/wk for 10-14 days to meet goals.     Rehab Resource Intensity Level, OT: II (Moderate Resource Intensity)

## 2025-06-27 NOTE — OCCUPATIONAL THERAPY NOTE
Occupational Therapy Evaluation     Patient Name: Rebeca Banegas  Today's Date: 6/27/2025  Problem List  Principal Problem:    Acute lower limb ischemia  Active Problems:    Asthma    Permanent atrial fibrillation (HCC)    Bifascicular bundle branch block    Chronic heart failure with preserved ejection fraction (HCC)    Hyperlipidemia    Primary hypertension    CKD (chronic kidney disease), stage IV (HCC)    Bronchiectasis (HCC)    History of CVA (cerebrovascular accident)    Shock (HCC)    DARIO (acute kidney injury) (HCC)    Metabolic acidosis    Hyperphosphatemia    Past Medical History  Past Medical History[1]  Past Surgical History  Past Surgical History[2]         06/27/25 0901   OT Last Visit   OT Visit Date 06/27/25   Note Type   Note type Evaluation   Pain Assessment   Pain Assessment Tool 0-10   Pain Score 4   Pain Location/Orientation Orientation: Bilateral;Location: Leg;Location: Arm   Patient's Stated Pain Goal No pain   Hospital Pain Intervention(s) Repositioned;Ambulation/increased activity;Emotional support   Restrictions/Precautions   Weight Bearing Precautions Per Order No   Other Precautions Bed Alarm;Chair Alarm;Fall Risk;Pain;Telemetry;Multiple lines   Home Living   Type of Home Assisted living  ([facility])   Home Layout One level   Bathroom Shower/Tub Walk-in shower   Bathroom Toilet Raised   Bathroom Equipment Grab bars in shower;Shower chair;Grab bars around toilet   Bathroom Accessibility Accessible   Home Equipment Walker;Cane   Prior Function   Level of [place] Independent with ADLs;Independent with functional mobility;Needs assistance with IADLS   Lives With Alone   Receives Help From   (staff)   IADLs Family/Friend/Other provides transportation;Family/Friend/Other provides meals;Family/Friend/Other provides medication management   Falls in the last 6 months 0   Vocational Retired   Lifestyle   Autonomy ind w adl, rq a for iadl, used spc prn for mobility. - .  either goes to dining escobar for meals or has them brought to room. occasionally has supervision for showers.   Reciprocal Relationships supportive staff   Service to Others retired RN   ADL   Where Assessed Edge of bed   Eating Assistance 5  Supervision/Setup   Grooming Assistance 5  Supervision/Setup   UB Bathing Assistance 4  Minimal Assistance   LB Bathing Assistance 3  Moderate Assistance   UB Dressing Assistance 4  Minimal Assistance   LB Dressing Assistance 3  Moderate Assistance   Toileting Assistance  3  Moderate Assistance   Functional Assistance 3  Moderate Assistance   Bed Mobility   Supine to Sit 3  Moderate assistance   Additional items Assist x 1;Increased time required;Verbal cues;LE management   Additional Comments found in bed, left in chair w all needs in reach and alarm on.   Transfers   Sit to Stand 3  Moderate assistance   Additional items Assist x 2;Increased time required;Verbal cues   Stand to Sit 3  Moderate assistance   Additional items Assist x 2;Increased time required;Verbal cues   Stand pivot 3  Moderate assistance   Additional items Assist x 2;Increased time required;Verbal cues   Additional Comments b/l hha   Functional Mobility   Functional Mobility 3  Moderate assistance   Additional Comments ax2, few steps but ultimately stand pivot.   Additional items Hand hold assistance   Balance   Static Sitting Fair +   Dynamic Sitting Fair   Static Standing Poor   Dynamic Standing Poor -   Ambulatory Poor -   Activity Tolerance   Activity Tolerance Patient limited by pain   Medical Staff Made Aware dpt 2' pt's med complexity, comorbidities and regression from baseline   Nurse Made Aware cleared and present.   RUE Assessment   RUE Assessment WFL   LUE Assessment   LUE Assessment WFL   Hand Function   Gross Motor Coordination Functional   Fine Motor Coordination Functional   Cognition   Overall Cognitive Status WFL   Arousal/Participation Responsive;Alert   Attention Within functional limits    Orientation Level Oriented X4   Memory Within functional limits   Following Commands Follows all commands and directions without difficulty   Comments very pleasant and cooperative w G safety awareness and insight to condition t/o session.   Assessment   Limitation Decreased ADL status;Decreased endurance;Decreased self-care trans;Decreased high-level ADLs   Prognosis Good   Assessment Pt is a 89 y.o. female  admitted 6/24/2025 with Leg pain (M79.606)  Ischemic rest pain of lower extremity (M79.606, I99.8)  Acute lower limb ischemia (I99.8)  Pt underwent RLE fem embolectomy and anterolateral fasciotomy, LLE pop IAT embolectomy 6/24/25. Pod 3 w active OT eval and treat orders. PMH includes . Pt lives alone at [facility], was mostly ind w adl, rq a for iadl and used spc for mobility.  Currently, pt is Min Ax1 for UB ADL, Mod Ax1 for LB ADL, and completed transfers/FM w Mod Ax2. Pt is limited at this time 2* decreased endurance/activtiy tolerance, decreased cognition, decreased ADL/High-level ADL status, decreased self-care trans, decreased safety awareness, limited home support and is a fall risk. This impacts pt's ability to complete UB and LB dressing and bathing, toileting, transfers, functional mobility, community mobility, home and health maintenance, and safe engagement in typical daily routine.   The patient's raw score on the -PAC Daily Activity Inpatient Short Form is 16. A raw score of less than 19 suggests the patient may benefit from discharge to post-acute rehabilitation services. Please refer to the recommendation of the Occupational Therapist for safe discharge planning.  From OT standpoint, pt should D/C to level 2  when medically stable. Pt will benefit from continued acute OT services 2-3 x/wk for 10-14 days to meet goals.   Goals   Patient Goals get better   LTG Time Frame 10-14   Plan   Treatment Interventions ADL retraining;Functional transfer training;Endurance training;Patient/family  training;Equipment evaluation/education;Compensatory technique education;Energy conservation;Activityengagement   Goal Expiration Date 07/11/25   OT Frequency 2-3x/wk   Discharge Recommendation   Rehab Resource Intensity Level, OT II (Moderate Resource Intensity)   AM-PAC Daily Activity Inpatient   Lower Body Dressing 2   Bathing 2   Toileting 2   Upper Body Dressing 3   Grooming 3   Eating 4   Daily Activity Raw Score 16   Daily Activity Standardized Score (Calc for Raw Score >=11) 35.96   AM-PAC Applied Cognition Inpatient   Following a Speech/Presentation 4   Understanding Ordinary Conversation 4   Taking Medications 4   Remembering Where Things Are Placed or Put Away 4   Remembering List of 4-5 Errands 4   Taking Care of Complicated Tasks 4   Applied Cognition Raw Score 24   Applied Cognition Standardized Score 62.21   Modified [place] Scale   Modified [place] Scale 4   End of Consult   Education Provided Yes   Patient Position at End of Consult Bedside chair;All needs within reach;Bed/Chair alarm activated   Nurse Communication Nurse aware of consult     Pt will complete functional mobility with Mod I using appropriate DME as needed.     Pt will complete UB dressing and bathing with Mod I using appropriate DME as needed.     Pt will complete LB dressing and bathing with Mod I using appropriate DME as needed.    Pt will complete transfers with Mod I using appropriate DME as needed.     Pt will complete toileting with Mod I using appropriate DME as needed.     Pt will utilize energy conservation techniques throughout functional activity/ADL s/p skilled education.     Pt will demonstrate increased safety awareness during functional tasks/ADL's s/p skilled education.     Pt will increase activity tolerance to 30 minutes in order to complete ADL's/ functional tasks, using appropriate DME as needed.     Pt will engage in ongoing cog assessment w/ G participation to assist with safe d/c planning.     Pt will inc UE ROM  +10 degrees b/l in order to increase overall ability to engage in typical daily routine.     Pt will inc UE strength +1 MMT in order to increase overall ability to engage in typical daily routine.         Antonella Khan, MOT, OTR/L            [1]   Past Medical History:  Diagnosis Date    Abnormal ECG     Abnormal ultrasound     RESOLVED: [id number]    Acute kidney failure (HCC) 12/29/2024    Advice given about COVID-19 virus infection 04/07/2020    Ambulates with cane     Arrhythmia     Arthritis     Asthma     Asthma with acute exacerbation     LAST ASSESSED: [id number]    Asymptomatic menopausal state     Atherosclerosis     Atrial fibrillation (HCC)     Chronic kidney disease     Chronic obstructive lung disease (HCC)     w/ chonic cough    Chronic ulcer of left foot (HCC)     Colon polyp     Congestive heart failure (HCC)     LAST ASSESSED: [id number]    COPD (chronic obstructive pulmonary disease) (HCC)     Coronary artery disease     COVID-19 01/06/2025    COVID-19 virus infection 03/25/2023    Cuboid fracture     LAST ASSESSED: [id number]    Disease of thyroid gland     Dyspepsia     Edema of both lower extremities     Equinus deformity of left foot     Fall 04/01/2024    Falls     5/2024    GERD (gastroesophageal reflux disease)     Headache(784.0)     High risk medication use     LAST ASSESSED: [id number]    Hyperlipidemia     Hypertension     Hypokalemia     Hypothyroidism     Impacted cerumen of both ears     LAST ASSESSED: [id number]    Impacted cerumen of right ear     LAST ASSESSED: [id number]    Influenza     LAST ASSESSED: [id number]    IPMN (intraductal papillary mucinous neoplasm)     RESOLVED: 02OCT2015    Irregular heart beat     afib. Warfarin.    Leg cramps 12/21/2018    Limb swelling     LAST ASSESSED: [id number]    Navicular fracture of ankle     LAST ASSESSED: [id number]    Onychomycosis     LAST ASSESSED: [id number]    Open wound of right upper arm 10/17/2022    Osteoarthritis     Osteopenia      Osteoporosis     Paronychia, finger, unspecified laterality     LAST ASSESSED: [id number]    Paroxysmal atrial fibrillation (HCC)     LAST ASSESSED: 02MAR2014    Peripheral vascular disease (HCC)     Plantar fasciitis     SOBOE (shortness of breath on exertion)     Stroke (HCC)     Right arm weakness that has resolved    Talus fracture     LAST ASSESSED: [id number]    Vaccine counseling 12/19/2023    Vascular headache     Walker as ambulation aid     Wound of right foot    [2]   Past Surgical History:  Procedure Laterality Date    APPENDECTOMY      BONE BIOPSY Left 08/09/2024    Procedure: Bone bx of proximal phalanx second toe & second met with fluoroscopic guidance;  Surgeon: Jonny Marcial DPM;  [facility]: [facility];  Service: Podiatry    CATARACT EXTRACTION Bilateral     CHOLECYSTECTOMY      COLONOSCOPY      FASCIOTOMY Right 6/24/2025    Procedure: FASCIOTOMY;  Surgeon: Arthur Wei MD;  [facility]: [facility];  Service: Vascular    JOINT REPLACEMENT Bilateral     knee    NEUROPLASTY / TRANSPOSITION MEDIAN NERVE AT CARPAL TUNNEL BILATERAL Bilateral     DECOMPRESSION    OOPHORECTOMY Bilateral     age 81    PELVIC FLOOR REPAIR  2014    NM BIOPSY BONE TROCAR/NEEDLE SUPERFICIAL Left 08/09/2024    Procedure: Left foot debridement;  Surgeon: Jonny Marcial DPM;  [facility];  Service: Podiatry    NM DEBRIDEMENT MUSCLE &/FASCIA 1ST 20 SQ CM/< Left 08/09/2024    Procedure: Left foot debridement;  Surgeon: Jonny Marcial DPM;  [facility];  Service: Podiatry    NM OSTEOT W/WO LNGTH SHRT/CORRJ 1ST METAR Left 11/29/2024    Procedure: Left foot percutaneous osteotomy of second, third and fourth metatarsal.;  Surgeon: Jonny Marcial DPM;  [facility];  Service: Podiatry    SALPINGECTOMY Bilateral     SINUS SURGERY      THROMBECTOMY W/ EMBOLECTOMY Left 6/24/2025    Procedure: LEFT LOWER EXTREMITY EMBOLECTOMY/THROMBECTOMY;  Surgeon: Adonay Raymond MD;   [facility];  Service: Vascular    THROMBECTOMY W/ EMBOLECTOMY Right 6/24/2025    Procedure: EMBOLECTOMY/THROMBECTOMY LOWER EXTREMITY;  Surgeon: Arthur Wei MD;  [facility];  Service: Vascular    TONSILLECTOMY  1941    TOTAL KNEE ARTHROPLASTY Bilateral

## 2025-06-27 NOTE — ASSESSMENT & PLAN NOTE
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 Clinical exam and imaging concerning for [place] 2B acute limb ischemia of the RLE, L popliteal occlusion also noted on presentation.    6/24 morning: R femoral cutdown, thromboembolectomy, R anterior and lateral fasciotomy, partial proximal fasciotomy closure (Eriberto)    6/24 evening: L BK pop exposure, thromboembolectomy (Taylor)    Plan:  -improved hypotension, continue resuscitation with colloid and intermittent bumex pushes  -continue heparin gtt, aspirin, statin. Will eventually need to be considered for DOAC  -continue to monitor UOP and renal function  -continue to monitor respiratory status, start home nebs and diuretics when able  -no further CK levels required, low suspicion for compartment syndrome or rhabdo  -continue to monitor NV evam  -ambulate pt, OOB, PT/OT, ISB  -continue ICU level of care, appreciate critical care assistance

## 2025-06-27 NOTE — PROGRESS NOTES
NEPHROLOGY HOSPITAL PROGRESS NOTE   Rebeca Banegas 89 y.o. female MRN: [id number]  Unit/Bed#: [facility] 513-01 Encounter: [id number]  Reason for Consult: DARIO    Assessment & Plan  DARIO (acute kidney injury) ([facility])  Suspect ATN in the setting of shock, contrast CT on 6/24 and acute blood loss   Creatinine 1.87 on admission and slightly improved today at 2.25  Urine output adequate, recent administration of IV loop diuretic therapy  Home jardiance and spironolactone on hold   CT: no hydronephrosis     CKD (chronic kidney disease), stage IV ([facility])  Baseline creatinine since January appears to be 1.6-2.1 with GFR <30  Acute lower limb ischemia  Bilateral LE ischemia likely due to cardioembolus   S/p RLE fem embolectomy and anterolateral fasciotomy and LLE pop/AT embolectomy on 6/24  Shock ([facility])  Remains on phenylephrine.  Wean as tolerated.  S/p IVF and albumin   Chronic heart failure with preserved ejection fraction ([facility])  On home bumex 3mg po bid + intermittent IV bumex   CXR with persistent bilateral pleural effusions   Continue with loop diuretic therapy as needed.  Hyperphosphatemia  Phosphorus improving at 5.4.  Low phos diet   Permanent atrial fibrillation ([facility])      Summary:  Renal function overall slightly improved, nonoliguric with urine output of 2.3 L  Continue with loop diuretic therapy as needed  Hold Jardiance  Wean pressors as tolerated.    SUBJECTIVE / 24H INTERVAL HISTORY:  Seen and examined.  Patient awake alert.  Offers no new complaints.  Slept well last evening.  Pain seems to be controlled.  No evidence of shortness of breath.  No reported chest pain.    OBJECTIVE:  Current Weight: Weight - Scale: 59.9 kg (132 lb)  Vitals:    06/27/25 1100 06/27/25 1200 06/27/25 1241 06/27/25 1500   BP: 103/67 113/78     BP Location:       Pulse: 100 (!) 106     Resp: (!) 25 (!) 26     Temp:   97.5 °F (36.4 °C) 97.9 °F (36.6 °C)   TempSrc:    Oral   SpO2: 99% (!) 86%     Weight:       Height:            Intake/Output Summary (Last 24 hours) at 6/27/2025 1527  Last data filed at 6/27/2025 1415  Gross per 24 hour   Intake 2437.3 ml   Output 2905 ml   Net -467.7 ml       Physical Exam    Eyes:      General: No scleral icterus.      Cardiovascular:      Rate and Rhythm: Tachycardia present. Rhythm irregular.   Pulmonary:      Effort: Pulmonary effort is normal.      Breath sounds: Normal breath sounds.   Abdominal:      General: There is no distension.      Palpations: Abdomen is soft.      Tenderness: There is no abdominal tenderness.     Musculoskeletal:      Right lower leg: Edema present.      Left lower leg: Edema present.     Skin:     General: Skin is warm and dry.      Coloration: Skin is not jaundiced.      Findings: No rash.     Neurological:      Mental Status: She is oriented to person, place, and time.         Medications:  Current Medications[1]    Laboratory Results:  Results from last 7 days   Lab Units 06/27/25  0846 06/27/25  0234 06/26/25  1226 06/26/25  0506 06/25/25  2212 06/25/25  1755 06/25/25  0946 06/25/25  0512 06/24/25  1953 06/24/25  0412   WBC Thousand/uL  --  11.23* 9.74 8.95  --  11.64* 12.67* 10.36*  --  9.86   HEMOGLOBIN g/dL 9.9* 9.6* 11.0* 7.5*  --  8.7* 9.8* 8.3*  --  12.5   I STAT HEMOGLOBIN g/dl  --   --   --   --   --   --   --   --  8.8*  --    HEMATOCRIT %  --  26.5* 31.0* 21.8*  --  25.3* 28.3* 24.9*  --  38.2   HEMATOCRIT, ISTAT %  --   --   --   --   --   --   --   --  26*  --    PLATELETS Thousands/uL  --  124* 122* 125*  --  165 168 154  --  220   POTASSIUM mmol/L 4.0 3.9 5.1 5.1 5.5* 5.5*  --  4.9  --  4.3   CHLORIDE mmol/L 104 105 105 103 101 101  --  101  --  96   CO2 mmol/L 21 20* 18* 19* 19* 24  --  22  --  25   CO2, I-STAT mmol/L  --   --   --   --   --   --   --   --  24  --    BUN mg/dL 70* 70* 66* 63* 61* 63*  --  52*  --  63*   CREATININE mg/dL 2.25* 2.38* 2.45* 2.40* 2.20* 2.31*  --  1.70*  --  1.87*   CALCIUM mg/dL 7.6* 7.0* 7.4* 7.4* 7.7* 7.5*  --  7.3*   "--  8.6   MAGNESIUM mg/dL 2.4 2.3 2.5 2.3  --   --   --  2.2  --   --    PHOSPHORUS mg/dL 5.4* 5.3* 6.7* 6.1*  --   --   --  4.9*  --   --    GLUCOSE, ISTAT mg/dl  --   --   --   --   --   --   --   --  139  --        Portions of the record may have been created with voice recognition software. Occasional wrong word or \"sound a like\" substitutions may have occurred due to the inherent limitations of voice recognition software. Read the chart carefully and recognize, using context, where substitutions have occurred.If you have any questions, please contact the dictating provider.       [1]   Current Facility-Administered Medications:     acetaminophen (TYLENOL) tablet 975 mg, 975 mg, Oral, Q8H [facility], Vale Esparza MD, 975 mg at 06/27/25 1412    budesonide (PULMICORT) inhalation solution 0.5 mg, 0.5 mg, Nebulization, Q12H, Vale Esparza MD, 0.5 mg at 06/27/25 0753    bumetanide (BUMEX) tablet 3 mg, 3 mg, Oral, BID, Kaci Dobbins PA-C, 3 mg at 06/27/25 1412    guaiFENesin (MUCINEX) 12 hr tablet 600 mg, 600 mg, Oral, Q12H [facility], Vale Esparza MD, 600 mg at 06/27/25 0921    heparin (porcine) 25,000 units in 0.45% NaCl 250 mL infusion (premix), 3-30 Units/kg/hr (Order-Specific), Intravenous, Titrated, Vale Esparza MD, Last Rate: 7.2 mL/hr at 06/27/25 0600, 13 Units/kg/hr at 06/27/25 0600    hydrocortisone (Solu-CORTEF) injection 50 mg, 50 mg, Intravenous, Q6H [facility], Keiry Reeder PA-C, 50 mg at 06/27/25 1156    HYDROmorphone HCl (DILAUDID) injection 0.2 mg, 0.2 mg, Intravenous, Q3H PRN, Nam Celaya DO, 0.2 mg at 06/26/25 0109    insulin lispro (HumALOG/ADMELOG) 100 units/mL subcutaneous injection 1-5 Units, 1-5 Units, Subcutaneous, TID AC, 1 Units at 06/25/25 1602 **AND** Fingerstick Glucose (POCT), , , TID AC, Angi Kelly MD    levalbuterol (XOPENEX) inhalation solution 1.25 mg, 1.25 mg, Nebulization, Q6H, Vale Esparza MD, 1.25 mg at 06/27/25 1442    levothyroxine tablet 50 mcg, 50 mcg, Oral, Early " Morning, Vale Esparza MD, 50 mcg at 06/27/25 0514    metoprolol (LOPRESSOR) injection 5 mg, 5 mg, Intravenous, Q6H, Keiry Reeder PA-C, 5 mg at 06/27/25 1156    oxyCODONE (ROXICODONE) IR tablet 5 mg, 5 mg, Oral, Q4H PRN, Vale Esparza MD, 5 mg at 06/26/25 2328    oxyCODONE (ROXICODONE) split tablet 2.5 mg, 2.5 mg, Oral, Q4H PRN, Vale Esparza MD    phenylephrine (SUSY-SYNEPHRINE) 50 mg (STANDARD CONCENTRATION) in sodium chloride 0.9% 250 mL,  mcg/min, Intravenous, Titrated, Keiry Reeder PA-C, Last Rate: 7.5 mL/hr at 06/27/25 1339, 25 mcg/min at 06/27/25 1339    senna (SENOKOT) tablet 8.6 mg, 1 tablet, Oral, Daily, Vale Esparza MD, 8.6 mg at 06/27/25 0921    trimethobenzamide (TIGAN) IM injection 200 mg, 200 mg, Intramuscular, Q6H PRN, Keiry Reeder PA-C    vasopressin (PITRESSIN) 20 Units in sodium chloride 0.9 % 100 mL infusion, 0.04 Units/min, Intravenous, Continuous, Keiry Reeder PA-C, Held at 06/26/25 7948

## 2025-06-27 NOTE — ASSESSMENT & PLAN NOTE
History of permanent atrial fibrillation on chronic warfarin therapy with a CVA in August 2024 (suspected embolic due to warfarin pause). She presented on 6/24 with right lower extremity pain and CTA showed a right femoral acute arterial occlusion and she underwent urgent right femoral cutdown, thromboembolectomy, and right lower extremity fasciotomy on 6/24.  Imaging also showed a left popliteal occlusion with reconstitution of the tibial arteries.    Labs on presentation were notable for a subtherapeutic INR level (1.47). Unclear etiology.  Pt reports compliance with her warfarin and denies any recent changes in medications or diet.  Cardiology was consulted for anticoagulation recommendations.  Pt is agreeable to switching to another anticoagulant (Xarelto/Eliquis) given her history of Coumadin failure.      -Later on 6/24 she was noted to experience worsening LLE numbness and decreased ROM.  She was again taken to the OR for LLE popliteal thromboembolectomy and stenting.    -Overnight on 6/24-6/25 she was noted to be hypotensive and was upgraded to the ICU for initiation of pressors.  Lactate was elevated up to 3.6.  Troponin 70->71. Hb dropped from 12.5-8.3. 2U PRBCs ordered. Suspect acute blood loss anemia.    -6/26: Hemoglobin again dropped to 7.5 and another 2 units PRBCs were ordered.  Suspect persistent blood loss from fasciotomy incision.    - 6/27: Hemoglobin appears stable today.  Only requiring colt this morning.    Plan:  Continue heparin gtt as able.  Monitor BP closely and wean pressors as able.  Currently on Colt. Vaso held 6/26.    Continue Solu-Cortef.  IVF as needed.   Monitor Hb closely and transfuse as needed.   Trend perfusion markers.  Okay to continue lopressor 5 mg Q6 hrs.   Hold home BP meds.  Continue home Bumex 3 mg BID.  Monitor volume status and continue diuresis as needed.   Nephrology consulted for DARIO.   Not on ASA due to allergy and not on statin due to potential side effects.  F/u  IR LE angiogram.  Monitor for evidence of bleeding.  TTE 6/25 showing an LVEF of 65% with vigorous systolic function and no obvious regional wall motion abnormalities.  Price check Xarelto/Eliquis.  Given her recurrent Coumadin failure, would recommend transitioning to a DOAC once stable from a surgical standpoint.  Continue neurovascular checks and postoperative care as per vascular surgery/ICU.

## 2025-06-27 NOTE — ASSESSMENT & PLAN NOTE
Bilateral LE ischemia likely due to cardioembolus   S/p RLE fem embolectomy and anterolateral fasciotomy and LLE pop/AT embolectomy on 6/24

## 2025-06-27 NOTE — ASSESSMENT & PLAN NOTE
Lab Results   Component Value Date    EGFR 18 06/27/2025    EGFR 17 06/27/2025    EGFR 16 06/26/2025    CREATININE 2.25 (H) 06/27/2025    CREATININE 2.38 (H) 06/27/2025    CREATININE 2.45 (H) 06/26/2025   Monitor BMP.  Suspect ATN in the setting of shock.   Nephrology consulted.  Continue Bumex.  Consider HD if indicated.

## 2025-06-27 NOTE — PROGRESS NOTES
Progress Note - Critical Care/ICU   Name: Rebeca Banegas 89 y.o. female I MRN: [id number]  Unit/Bed#: [facility] 513-01 I Date of Admission: 6/24/2025   Date of Service: 6/27/2025 I Hospital Day: 3       Assessment & Plan   Active Hospital Problems    Diagnosis Date Noted POA    Acute lower limb ischemia 06/24/2025 Unknown    Shock (HCC) 06/26/2025 Unknown    DARIO (acute kidney injury) ([facility]) 06/26/2025 Unknown    Metabolic acidosis 06/26/2025 Unknown    Hyperphosphatemia 06/26/2025 Unknown    History of CVA (cerebrovascular accident) 08/27/2024 Not Applicable    Bronchiectasis ([facility]) 09/08/2023 Yes    CKD (chronic kidney disease), stage IV ([facility]) 04/07/2020 Yes    Chronic heart failure with preserved ejection fraction ([facility]) 02/13/2017 Yes    Primary hypertension 05/19/2014 Yes     Chronic    Hyperlipidemia 04/19/2013 Yes     Chronic    Permanent atrial fibrillation ([facility]) 02/14/2013 Yes     Chronic    Asthma 11/12/2012 Yes     Chronic    Bifascicular bundle branch block 11/10/2012 Yes     Chronic      Resolved Hospital Problems   No resolved problems to display.     Neuro/Psych:  Diagnoses: Post operative pain   Plan:  Q4H neurovascular checks   Analgesia: Multimodal. Tylenol 975 mg Q8H Ayla. PRN oxy 2.5 / 5 mg Q4H PRN.  Dilantin PRNs     CV:  Diagnoses: Shock, acute limb ischemia, hx afib (now with RVR), chronic HFpEF, HTN  S/p R femoral cutdown, thromboembolectomy, R anterior and lateral fasciotomies, partial proximal fasciotomy closure 6/24/25 AM (Eriberto)  S/p LLE thromboembolectomy of pop and AT/TPT in setting of LLE R2B ALI 2/2 popliteal occlusion 6/24/25 PM ([place])  6/25 upgraded to ICU for hypotension  6/26 last PT signals RLE.  Muscle bulging at fasciotomy site.  Hematoma evacuated by vascular  Currently on Colt-Synephrine 25  Plan:  Q4H neurovascular checks   Continue solucortef 50 mg IV Q6H ayla  Monitor fasciotomy sites, closure per vascular surgery  Continue heparin gtt  Continue lopressor 5 mg IV Q6H  Continue  home Bumex 3 mg BID  Holding home metoprolol 100 mg  Wean pressors as able  Continuous cardiopulmonary monitoring. Maintain MAP >65.     Pulm:  Diagnoses: Hx of bronchiectasis, asthma   Currently on 2L NC  Plan:  Continue budesonide, Xopenex, Mucinex  Cont sodium chloride inhalation solution   Wean supplemental O2 as tolerated  Continuous pulse oximetry. Maintain O2 sat >92%.      GI:  Diagnoses: No active issues   Plan:  PRN Tigan  Bowel regimen: Senna   GI prophylaxis: None     :  Diagnoses: DARIO, likely secondary to ATN, on stage 3B CKD  Creatinine trend: 2.38 from 2.45 from 2.40  Baseline creatinine: 1.6  UOP 2300 cc 24 hours  Plan:  Monitor Cr  Albumin 25 g IV every other day for 5 doses.  Today dose 2/5  Monitor I/Os.     F/E/N:  Plan:   F: Fluid resuscitation prn.  E: Monitor and replete electrolytes for Mg >2, Phos >3, K >4.  N: Regular     Heme/Onc:  Diagnoses: Anemia   Hgb 9.6 from 11.0 from 7.5  6/26 transfused 2 units PRBCs  Plan:  Transfuse as needed  VTE prophylaxis: Hep gtt     Endo:  Diagnoses: Hypothyroidism  Plan:   Cont home levothyroxine 50 mcg daily  Insulin: SSI. Monitor blood glucose.     ID:  Diagnoses: No active issues   Plan:  Monitor fever curve and WBC.     MSK/Skin:  Diagnoses: S/p B/L lower extremity fasciotomies  Plan:  Management of fasciotomy sites per vascular surgery  PT/OT when appropriate. Encourage OOB and ambulation when appropriate. Local wound care prn.     LDAs:  Lines - PIVx2, L radial holly, R IJ CVC, xiong  Drains - N/A  Airways - N/A      Disposition: Critical care    ICU Core Measures     A: Assess, Prevent, and Manage Pain Has pain been assessed? Yes  Need for changes to pain regimen? No   B: Both SAT/SAT  N/A   C: Choice of Sedation RASS Goal: 0 Alert and Calm or N/A patient not on sedation  Need for changes to sedation or analgesia regimen? N/A   D: Delirium CAM-ICU: Negative   E: Early Mobility  Plan for early mobility? Yes   F: Family Engagement Plan for family  engagement today? Yes       Review of Invasive Devices:    Dillon Plan: Continue for accurate I/O monitoring for 48 hours  Central access plan: Medications requiring central line  [place] Plan: Keep arterial line for hemodynamic monitoring, frequent ABGs, and frequent labs    Prophylaxis:  VTE VTE covered by:  heparin (porcine), Intravenous, 13 Units/kg/hr at 06/27/25 0600       Stress Ulcer  not ordered         24 Hour Events : Overnight weaned off pressors, then returned to low-dose Colt-Synephrine for hypotension  Subjective   Review of Systems: Review of Systems   Constitutional:  Negative for diaphoresis and fever.   Respiratory:  Negative for cough, shortness of breath and wheezing.    Cardiovascular:  Negative for chest pain, palpitations and leg swelling.   Gastrointestinal:  Negative for abdominal pain, diarrhea, nausea and vomiting.   Genitourinary:  Negative for decreased urine volume, difficulty urinating and frequency.   Skin:  Negative for pallor and wound.   Neurological:  Negative for dizziness and headaches.       Objective :                   Vitals I/O      Most Recent Min/Max in 24hrs   Temp 97.7 °F (36.5 °C) Temp  Min: 97.3 °F (36.3 °C)  Max: 98.2 °F (36.8 °C)   Pulse 84 Pulse  Min: 84  Max: 105   Resp 13 Resp  Min: 12  Max: 34   /59 BP  Min: 84/45  Max: 146/78   O2 Sat 99 % SpO2  Min: 83 %  Max: 100 %      Intake/Output Summary (Last 24 hours) at 6/27/2025 0643  Last data filed at 6/27/2025 0600  Gross per 24 hour   Intake 2862.25 ml   Output 2300 ml   Net 562.25 ml       Diet Regular; Regular House; Lo Phosphorus    Invasive Monitoring   Arterial Line  [place] /50  Arterial Line BP  Min: 17/6  Max: 292/292   MAP 68 mmHg  Arterial Line MAP (mmHg)  Min: 10 mmHg  Max: 292 mmHg           Physical Exam   Physical Exam  Vitals and nursing note reviewed.   Eyes:      General: No visual field deficit.     Extraocular Movements: Extraocular movements intact.      Right eye: Normal extraocular  motion and no nystagmus.      Left eye: Normal extraocular motion and no nystagmus.      Pupils: Pupils are equal.   Skin:     General: Skin is warm and dry.      Comments: fasciotomy   HENT:      Head: Normocephalic and atraumatic. No right periorbital erythema or left periorbital erythema.      Mouth/Throat:      Mouth: Mucous membranes are moist.   Cardiovascular:      Rate and Rhythm: Normal rate and regular rhythm.      Pulses: No decreased pulses.           Carotid pulses are 2+ on the right side and 2+ on the left side.       Dorsalis pedis pulses are detected w/ Doppler on the right side and detected w/ Doppler on the left side.      Heart sounds: Normal heart sounds.   Musculoskeletal:      Right lower leg: No edema.      Left lower leg: No edema.   Abdominal:      Palpations: Abdomen is soft.      Tenderness: There is no abdominal tenderness.   Constitutional:       General: She is not in acute distress.     Appearance: She is not ill-appearing.      Interventions: She is not sedated and not intubated.     Comments: A line  Cvc  xiong   Pulmonary:      Effort: No tachypnea, bradypnea, accessory muscle usage, respiratory distress, retractions or accessory muscle usage. She is not intubated.      Breath sounds: No stridor or decreased air movement. No decreased breath sounds, wheezing, rhonchi or rales.   Neurological:      Mental Status: She is alert.      Sensory: No sensory deficit.      Motor: No weakness.          Diagnostic Studies        Lab Results: I have reviewed the following results:     Medications:  Scheduled PRN   acetaminophen, 975 mg, Q8H GARTH  Albumin 5%, 25 g, Every Other Day  budesonide, 0.5 mg, Q12H  bumetanide, 3 mg, BID  guaiFENesin, 600 mg, Q12H GARTH  hydrocortisone sodium succinate, 50 mg, Q6H GARTH  insulin lispro, 1-5 Units, TID AC  levalbuterol, 1.25 mg, Q6H  levothyroxine, 50 mcg, Early Morning  metoprolol, 5 mg, Q6H  senna, 1 tablet, Daily  sodium chloride, 4 mL, Q6H       HYDROmorphone, 0.2 mg, Q3H PRN  oxyCODONE, 5 mg, Q4H PRN  oxyCODONE, 2.5 mg, Q4H PRN  trimethobenzamide, 200 mg, Q6H PRN       Continuous    heparin (porcine), 3-30 Units/kg/hr (Order-Specific), Last Rate: 13 Units/kg/hr (06/27/25 0600)  phenylephine,  mcg/min, Last Rate: 25 mcg/min (06/27/25 0600)  vasopressin, 0.04 Units/min, Last Rate: Stopped (06/26/25 1546)         Labs:   CBC    Recent Labs     06/26/25  1226 06/27/25  0234   WBC 9.74 11.23*   HGB 11.0* 9.6*   HCT 31.0* 26.5*   * 124*     BMP    Recent Labs     06/26/25  1226 06/27/25  0234   SODIUM 134* 136   K 5.1 3.9    105   CO2 18* 20*   AGAP 11 11   BUN 66* 70*   CREATININE 2.45* 2.38*   CALCIUM 7.4* 7.0*       Coags    Recent Labs     06/26/25  1927 06/27/25  0153   PTT 56* 59*        Additional Electrolytes  Recent Labs     06/26/25  0506 06/26/25  1226 06/27/25  0234   MG 2.3 2.5 2.3   PHOS 6.1* 6.7* 5.3*   CAIONIZED 0.98*  --  0.93*          Blood Gas    Recent Labs     06/25/25  1600   PHART 7.368   LIA3POF 37.7   PO2ART 185.1*   SAO1TAO 21.2*   BEART -3.7   SOURCE Line, Arterial     Recent Labs     06/25/25  1557 06/25/25  1600   PHVEN 7.295*  --    FIE7TBU 45.9  --    PO2VEN 33.8*  --    ETI1YIB 21.8*  --    BEVEN -4.5  --    F6JQZIT 58.9*  --    SOURCE  --  Line, Arterial    LFTs  Recent Labs     06/25/25  1755 06/25/25  2212   ALT 8 7   AST 26 23   ALKPHOS 35 29*   ALB 2.6* 3.6   TBILI 0.59 1.02*       Infectious  Recent Labs     06/25/25  0816 06/26/25  0506   PROCALCITONI 0.19 0.85*     Glucose  Recent Labs     06/25/25  2212 06/26/25  0506 06/26/25  1226 06/27/25  0234   GLUC 149* 147* 163* 105

## 2025-06-27 NOTE — CASE MANAGEMENT
Case Management Discharge Planning Note    Patient name Rebeca Banegas  Location [facility] 513/[facility] 513-01 MRN [id number]  : 1932 Date 2025       Current Admission Date: 2025  Current Admission Diagnosis:Acute lower limb ischemia   Patient Active Problem List    Diagnosis Date Noted    Shock ([facility]) 2025    DARIO (acute kidney injury) ([facility]) 2025    Metabolic acidosis 2025    Hyperphosphatemia 2025    Acute lower limb ischemia 2025    Muscle cramp 2025    Chronic thoracic back pain 2025    Chronic midline low back pain without sciatica 2025    Arthralgia 2025    Other atherosclerosis of native arteries of extremities, bilateral legs ([facility]) 2025    Hyponatremia 2024    Severe protein-calorie malnutrition ([facility]) 2024    History of CVA (cerebrovascular accident) 2024    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 ([facility]) 2024    Chronic foot ulcer, left, with fat layer exposed ([facility]) 2024    Bronchiectasis ([facility]) 2023    MGUS (monoclonal gammopathy of unknown significance) 2023    Chronic cough 2023    Age-related osteoporosis without current pathological fracture 2023    Secondary hyperparathyroidism ([facility]) 2023    Labial abscess 2022    Vulvar cysts 2022    Osteopenia of hip 2020    CKD (chronic kidney disease), stage IV ([facility]) 2020    Elevated serum immunoglobulin free light chains 2019    Sensorineural hearing loss (SNHL), bilateral 2019    Left asymmetrical SNHL 2019    Pain of left hand 2018    Chronic heart failure with preserved ejection fraction ([facility]) 2017    Plantar fasciitis 2016    Essential tremor 10/02/2015    Vitamin D deficiency 10/02/2015    Onychomycosis 2015    Pulmonary nodule 2015    Thyroid nodule 2015    Prediabetes 2014    Primary hypertension 2014    Leg pain 2014     Abnormal findings on diagnostic imaging of urinary organs 04/04/2014    Abnormal findings on diagnostic imaging of other specified body structures 03/20/2014    Atherosclerosis 03/06/2014    Ovarian cyst 03/06/2014    Pancreatic cyst 03/06/2014    Backache 02/28/2014    Hoarseness 02/28/2014    Allergic rhinitis 06/11/2013    Hyperlipidemia 04/19/2013    Permanent atrial fibrillation (HCC) 02/14/2013    Arthritis 11/12/2012    Asthma 11/12/2012    Esophageal reflux 11/12/2012    Benign colon polyp 11/10/2012    Bifascicular bundle branch block 11/10/2012    Hypothyroidism 11/10/2012    Nasal polyp 11/10/2012      LOS (days): 3  Geometric Mean LOS (GMLOS) (days): 5.5  Days to GMLOS:2.3     OBJECTIVE:  Risk of Unplanned Readmission Score: 29.53         Current admission status: Inpatient   Preferred Pharmacy:   [facility]/pharmacy #[id number] - [place], [place] - [address] [place] [id number]  Phone: [phone] Fax: [phone]    [facility] - [facility] [address] [facility]  [address] [address]  Phone: [phone] Fax: [phone]    Primary Care Provider: Santana Dillon MD    Primary Insurance: [employer]  Secondary Insurance:     DISCHARGE DETAILS:    Discharge planning discussed with:: Meena- daughter  Freedom of Choice: Yes  Comments - Freedom of Choice: Agreeable to [facility] contacted family/caregiver?: Yes  Were Treatment Team discharge recommendations reviewed with patient/caregiver?: Yes  Did patient/caregiver verbalize understanding of patient care needs?: N/A- going to facility  Were patient/caregiver advised of the risks associated with not following Treatment Team discharge recommendations?: Yes    Contacts  Patient Contacts: Meena Solorzano  Relationship to Patient:: Family  Contact Method: Phone  Phone Number: [phone]  Reason/Outcome: Discharge Planning, Referral              Other  Referral/Resources/Interventions Provided:  Interventions: Short Term Rehab  Referral Comments: [facility]oul Ramirez reserved for STR.         Treatment Team Recommendation: Short Term Rehab  Expected Discharge Disposition: Short Term Rehab  Additional Discharge Dispositions: Short Term Rehab         Additional Comments: STR is reserved with Raoul Ramirez.

## 2025-06-27 NOTE — ASSESSMENT & PLAN NOTE
On home bumex 3mg po bid + intermittent IV bumex   CXR with persistent bilateral pleural effusions   Continue with loop diuretic therapy as needed.

## 2025-06-27 NOTE — PROGRESS NOTES
Patient:    MRN:  [id number]    Chau Request ID:  [id number]    Level of care reserved:  Skilled Nursing Facility    Partner Reserved:  [facility], [place] [address] [phone]    Clinical needs requested:    Geography searched:  10 miles around [id number]    Start of Service:    Request sent:  10:16am EDT on 6/26/2025 by Betsy Germain    Partner reserved:  12:58pm EDT on 6/26/2025 by Betsy Germain    Choice list shared:

## 2025-06-27 NOTE — PROGRESS NOTES
Progress Note - Vascular Surgery   Name: Rebeca Banegas 89 y.o. female I MRN: [id number]  Unit/Bed#: [facility] 513-01 I Date of Admission: 6/24/2025   Date of Service: 6/27/2025 I Hospital Day: 3     Assessment & Plan  Acute lower limb ischemia  89yoF with Bronchiectasis, asthma, chronic HFpEF, permanent Afib (Warfarin), MGUS, CVA Aug '24 suspected cardioembolic 2/2 AC hold in the past, Bifascicular bundle branch block who is ambulatory with a cane presents with severe RLE rest pain and associated diminished sensation to SLB. Clinical exam and imaging concerning for Elizabeth 2B acute limb ischemia of the RLE, L popliteal occlusion also noted on presentation.    6/24 morning: R femoral cutdown, thromboembolectomy, R anterior and lateral fasciotomy, partial proximal fasciotomy closure (Eriberto)    6/24 evening: L BK pop exposure, thromboembolectomy ([place])    Plan:  -improved hypotension, continue resuscitation with colloid and intermittent bumex pushes  -continue heparin gtt, aspirin, statin. Will eventually need to be considered for DOAC  -continue to monitor UOP and renal function  -continue to monitor respiratory status, start home nebs and diuretics when able  -no further CK levels required, low suspicion for compartment syndrome or rhabdo  -continue to monitor NV evam  -ambulate pt, OOB, PT/OT, ISB  -continue ICU level of care, appreciate critical care assistance    Subjective : No acute events overnight. Pt feels much better this morning and is motor/sensory intact. She denies any recurrent LE pain, CP, SOB. Has not been OOB.     Objective :  Temp:  [97.4 °F (36.3 °C)-98.2 °F (36.8 °C)] 97.7 °F (36.5 °C)  HR:  [] 84  BP: ()/(45-92) 100/59  Resp:  [12-34] 13  SpO2:  [83 %-100 %] 99 %  O2 Device: None (Room air)  Nasal Cannula O2 Flow Rate (L/min):  [1 L/min-6 L/min] 1 L/min     I/O         06/25 0701 06/26 0700 06/26 0701 06/27 0700 06/27 0701 06/28 0700    P.O. 130 570     I.V. (mL/kg) 1577.2  (26.3) 1463.1 (24.4)     Blood 431.7 729.2     IV Piggyback 100 100     Total Intake(mL/kg) 2238.8 (37.4) 2862.3 (47.8)     Urine (mL/kg/hr) 515 (0.4) 2300 (1.6)     Blood       Total Output 515 2300     Net +1723.8 +562.3                  Lines/Drains/Airways       Active Status       Name Placement date Placement time Site Days    CVC Central Lines 06/25/25 Triple 16cm 06/25/25  1523  --  1    Arterial Line 06/24/25 Left Radial 06/24/25  0755  Radial  2    Urethral Catheter Non-latex;Straight-tip 16 Fr. 06/24/25  0800  Non-latex;Straight-tip  2                  Physical Exam  Constitutional:       General: She is not in acute distress.     Appearance: She is not ill-appearing.   HENT:      Right Ear: External ear normal.      Left Ear: External ear normal.      Nose: Nose normal.      Mouth/Throat:      Mouth: Mucous membranes are moist.      Eyes:      Extraocular Movements: Extraocular movements intact.         Cardiovascular:      Rate and Rhythm: Normal rate.      Comments: BP improved, remains mild hypotensive on minimal Colt  Normal rate, Afib.  R DP/PT signals present. L DP signal present. Bilateral femoral pulses palpable.  Pulmonary:      Effort: No respiratory distress.      Comments: Saturating well on RA, no increased WOB  Abdominal:      General: Abdomen is flat. There is no distension.      Tenderness: There is no abdominal tenderness.   Genitourinary:     Comments: Dillon in place     Musculoskeletal:      Comments: R groin incision c/d/I with no active bleeding.  R lateral fasciotomy incision without muscle bulging on initial exam, no active bleeding. Muscle healthy appearing. Compartments soft  L medial calf incision c/d/I, no appreciable hematoma or active bleeding.  Bilateral lower legs moderately edematous with appropriate argentina-incisional mild tenderness to palpation.     Skin:     General: Skin is warm.      Coloration: Skin is not pale.      Neurological:      General: No focal deficit present.  "     Mental Status: She is alert and oriented to person, place, and time.      Comments: BLE M/S intact  Psychiatric:         Mood and Affect: Mood normal.         Behavior: Behavior normal.       Lab Results: I have reviewed the following results:  CBC with diff:   Lab Results   Component Value Date    WBC 11.23 (H) 06/27/2025    HGB 9.6 (L) 06/27/2025    HCT 26.5 (L) 06/27/2025    MCV 90 06/27/2025     (L) 06/27/2025    RBC 2.96 (L) 06/27/2025    MCH 32.4 06/27/2025    MCHC 36.2 06/27/2025    RDW 16.2 (H) 06/27/2025    MPV 9.1 06/27/2025    NRBC 0 06/27/2025   ,   BMP/CMP:  Lab Results   Component Value Date    SODIUM 136 06/27/2025    SODIUM 138 01/07/2025    SODIUM 138 01/02/2024    K 3.9 06/27/2025    K 3.6 01/07/2025    K 4.0 01/02/2024     06/27/2025    CL 95 (L) 01/07/2025     01/02/2024    CO2 20 (L) 06/27/2025    CO2 24 06/24/2025    CO2 32 (H) 01/07/2025    CO2 29 01/02/2024    BUN 70 (H) 06/27/2025    BUN 49 (H) 01/07/2025    BUN 33 (H) 01/02/2024    CREATININE 2.38 (H) 06/27/2025    CREATININE 1.35 (H) 01/02/2024    GLUCOSE 139 06/24/2025    CALCIUM 7.0 (L) 06/27/2025    CALCIUM 8.0 (L) 01/07/2025    CALCIUM 8.4 (L) 01/02/2024    AST 23 06/25/2025    AST 30 01/02/2024    ALT 7 06/25/2025    ALT 27 01/02/2024    ALKPHOS 29 (L) 06/25/2025    ALKPHOS 51 01/02/2024    PROT 6.4 10/17/2017    BILITOT 0.5 10/17/2017    EGFR 17 06/27/2025    EGFR 37 (L) 01/02/2024    EGFR 35 (L) 12/29/2020   ,   Lipid Panel:   Lab Results   Component Value Date    CHOL 205 (H) 02/07/2017   ,   Coags:   Lab Results   Component Value Date    PT 23.7 (H) 02/23/2024    PTT 59 (H) 06/27/2025    INR 1.47 (H) 06/24/2025    INR 2.2 02/23/2024   ,   Blood Culture: No results found for: \"BLOODCX\",   Urinalysis:   Lab Results   Component Value Date    COLORU Light Yellow 12/29/2024    CLARITYU Clear 12/29/2024    SPECGRAV 1.011 12/29/2024    PHUR 5.0 12/29/2024    LEUKOCYTESUR Negative 12/29/2024    NITRITE Negative " "12/29/2024    GLUCOSEU Negative 12/29/2024    KETONESU Negative 12/29/2024    BILIRUBINUR Negative 12/29/2024    BLOODU Negative 12/29/2024   ,   Urine Culture: No results found for: \"URINECX\",   Wound Culure:   Lab Results   Component Value Date    WOUNDCULT Few Colonies of Proteus mirabilis (A) 09/04/2024    WOUNDCULT Few Colonies of Acinetobacter baumannii (A) 09/04/2024         VTE Prophylaxis: VTE covered by:  heparin (porcine), Intravenous, 13 Units/kg/hr at 06/27/25 0600     "

## 2025-06-27 NOTE — PHYSICAL THERAPY NOTE
Physical Therapy Evaluation     Patient's Name: Rebeca Banegas    Leg pain [M79.606]  Ischemic rest pain of lower extremity [M79.606, I99.8]  Acute lower limb ischemia [I99.8]    Problem List[1]    Past Medical History[2]    Past Surgical History[3]         06/27/25 0902   PT Last Visit   PT Visit Date 06/27/25   Note Type   Note type Evaluation   Pain Assessment   Pain Assessment Tool 0-10   Pain Score 4   Pain Location/Orientation Orientation: Bilateral;Location: Leg;Location: Arm   Pain Onset/Description Onset: Ongoing;Frequency: Constant/Continuous;Descriptor: Discomfort   Effect of Pain on Daily Activities limits comfort and mobility   Patient's Stated Pain Goal No pain   Hospital Pain Intervention(s) Repositioned;Ambulation/increased activity;Emotional support   Restrictions/Precautions   Weight Bearing Precautions Per Order No   Other Precautions Fall Risk;Bed Alarm;Chair Alarm;Multiple lines;Telemetry;O2;Pain   Home Living   Type of Home Other (Comment)  ([facility])   Home Layout One level;Access;Elevator   Bathroom Shower/Tub Walk-in shower   Bathroom Toilet Raised   Bathroom Equipment Grab bars in shower;Shower chair;Grab bars around toilet   Bathroom Accessibility Accessible   Home Equipment Walker;Cane  (uses cane PTA)   Prior Function   Level of [place] Independent with ADLs;Independent with functional mobility;Needs assistance with IADLS   Lives With Alone   Receives Help From   (staff)   IADLs Family/Friend/Other provides transportation;Family/Friend/Other provides meals;Family/Friend/Other provides medication management   Falls in the last 6 months 0   Vocational Retired   General   Family/Caregiver Present No   Cognition   Overall Cognitive Status WFL   Arousal/Participation Alert   Attention Within functional limits   Orientation Level Oriented X4   Memory Within functional limits   Following Commands Follows all commands and directions without difficulty   Comments Patient  pleasant and cooperative   Subjective   Subjective Patient agreeable to PT eval   RUE Assessment   RUE Assessment WFL   LUE Assessment   LUE Assessment WFL   RLE Assessment   RLE Assessment X   Strength RLE   RLE Overall Strength 4-/5   LLE Assessment   LLE Assessment X   Strength LLE   LLE Overall Strength 4-/5   Bed Mobility   Supine to Sit 3  Moderate assistance   Additional items Assist x 1;Increased time required;Verbal cues   Transfers   Sit to Stand 3  Moderate assistance   Additional items Assist x 2;Increased time required;Verbal cues   Stand to Sit 3  Moderate assistance   Additional items Assist x 2;Increased time required;Verbal cues   Stand pivot 3  Moderate assistance   Additional items Assist x 2;Increased time required;Verbal cues   Additional Comments B/L HHA   Ambulation/Elevation   Gait pattern Decreased foot clearance;Short stride;Excessively slow;Forward Flexion;Improper Weight shift   Gait Assistance 3  Moderate assist   Additional items Assist x 2;Verbal cues   Assistive Device   (B/L HHA)   Distance 2'   Balance   Static Sitting Fair +   Dynamic Sitting Fair   Static Standing Poor   Dynamic Standing Poor -   Ambulatory Poor -   Endurance Deficit   Endurance Deficit Yes   Endurance Deficit Description fatigue, weakness   Activity Tolerance   Activity Tolerance Patient limited by fatigue   Medical Staff Made Aware OT   Nurse Made Aware RN cleared   Assessment   Prognosis Good   Problem List Decreased strength;Decreased endurance;Impaired balance;Decreased mobility;Pain   Assessment Pt is a 89 y.o. female seen for a high complexity PT evaluation due to Ongoing medical management for primary dx, Increased reliance on more restrictive AD compared to baseline, Decreased activity tolerance compared to baseline, Fall risk, Increased assistance needed from caregiver at current time, Ongoing telemetry monitoring, Increased O2 via NC from pts baseline, s/p surgical intervention. Patient is s/p admit to  [facility] on 6[facility]24/[facility]25 for Leg pain (M79.606)  Ischemic rest pain of lower extremity (M79.606, I99.8)  Acute lower limb ischemia (I99.8).  Please see above for PHM.     PT now consulted to assess functional mobility and needs for safe d/c planning. Prior to admission, pt independent with functional mobility, independent ADLs, and needs assistance IADLs. Personal factors affecting status include fall risk, limited caregiver/social support, assist for IADLs, and medical status     Currently pt requires moderate assistance x1 for bed mobility, moderate assistance x2 for functional transfers with two hand hold ; moderate assistance x2 for ambulation with two hand hold. Pt presents functioning below baseline and w/ overall mobility deficits 2* to: decreased strength, decreased endurance, decreased mobility, impaired balance. These impairments place pt at risk for falls.     Pt will continue to benefit from skilled PT interventions to address stated impairments; to maximize functional potential; for ongoing pt/family education; and DME needs. The patient's AM-PAC Basic Mobility Inpatient Short Form Raw Score Is 8. PT is currently recommending Level 2 - Moderate Resource Intensity on d/c from hospital. Will continue to follow as able.   Barriers to Discharge Decreased caregiver support   Goals   Patient Goals to be independent again   STG Expiration Date 07/11/25   Short Term Goal #1 In 14 days, patient will 1) increase strength in BUE/BLE by 1/2 to 1 full grade for increased strength and stability needed for functional mobility 2) improve bed mobility to MI for improved mobility and decreased need for assist 3) sit EOB x30' with MI to facilitate trunk stability and safety for completion of ADL tasks 4) increase functional transfers to MI for improved safety and functional mobility 5) ambulate 250ft with MI using LRAD for increased endurance and safety ambulating home and community environments 6) improve  balance by 1 grade for improved safety and stability and decreased risk for falls.   PT Treatment Day 0   Plan   Treatment/Interventions ADL retraining;Functional transfer training;LE strengthening/ROM;Therapeutic exercise;Endurance training;Patient/family training;Equipment eval/education;Bed mobility;Gait training;Spoke to nursing;Spoke to case management;OT;Elevations   PT Frequency 3-5x/wk   Discharge Recommendation   Rehab Resource Intensity Level, PT II (Moderate Resource Intensity)   Equipment Recommended Walker  (owns)   AM-PAC Basic Mobility Inpatient   Turning in Flat Bed Without Bedrails 2   Lying on Back to Sitting on Edge of Flat Bed Without Bedrails 2   Moving Bed to Chair 1   Standing Up From Chair Using Arms 1   Walk in Room 1   Climb 3-5 Stairs With Railing 1   Basic Mobility Inpatient Raw Score 8   Turning Head Towards Sound 4   Follow Simple Instructions 4   Low Function Basic Mobility Raw Score  16   Low Function Basic Mobility Standardized Score  25.72   [facility] Highest Level Of Mobility   -HL Goal 3: Sit at edge of bed   -HLM Achieved 4: Move to chair/commode   Modified David Scale   Modified Talbot Scale 4   End of Consult   Patient Position at End of Consult All needs within reach;Bed/Chair alarm activated;Bedside chair       Estela Lozano, PT, DPT         [1]   Patient Active Problem List  Diagnosis    Abnormal findings on diagnostic imaging of other specified body structures    Abnormal findings on diagnostic imaging of urinary organs    Allergic rhinitis    Arthritis    Asthma    Atherosclerosis    Permanent atrial fibrillation (HCC)    Backache    Benign colon polyp    Bifascicular bundle branch block    Chronic heart failure with preserved ejection fraction (HCC)    Esophageal reflux    Essential tremor    Hoarseness    Hyperlipidemia    Primary hypertension    Hypothyroidism    Leg pain    Nasal polyp    Onychomycosis    Ovarian cyst    Pancreatic cyst    Plantar fasciitis     Prediabetes    Pulmonary nodule    Thyroid nodule    Vitamin D deficiency    Pain of left hand    Sensorineural hearing loss (SNHL), bilateral    Left asymmetrical SNHL    Elevated serum immunoglobulin free light chains    Osteopenia of hip    CKD (chronic kidney disease), stage IV (HCC)    Labial abscess    Vulvar cysts    Secondary hyperparathyroidism (HCC)    Age-related osteoporosis without current pathological fracture    Chronic cough    MGUS (monoclonal gammopathy of unknown significance)    Bronchiectasis (HCC)    Chronic foot ulcer, left, with fat layer exposed (HCC)    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)    Severe protein-calorie malnutrition (HCC)    Hyponatremia    Other atherosclerosis of native arteries of extremities, bilateral legs (HCC)    Muscle cramp    Chronic thoracic back pain    Chronic midline low back pain without sciatica    Arthralgia    History of CVA (cerebrovascular accident)    Acute lower limb ischemia    Shock (HCC)    DARIO (acute kidney injury) (HCC)    Metabolic acidosis    Hyperphosphatemia   [2]   Past Medical History:  Diagnosis Date    Abnormal ECG     Abnormal ultrasound     RESOLVED: [id number]    Acute kidney failure (HCC) 12/29/2024    Advice given about COVID-19 virus infection 04/07/2020    Ambulates with cane     Arrhythmia     Arthritis     Asthma     Asthma with acute exacerbation     LAST ASSESSED: [id number]    Asymptomatic menopausal state     Atherosclerosis     Atrial fibrillation (HCC)     Chronic kidney disease     Chronic obstructive lung disease (HCC)     w/ chonic cough    Chronic ulcer of left foot (HCC)     Colon polyp     Congestive heart failure (HCC)     LAST ASSESSED: [id number]    COPD (chronic obstructive pulmonary disease) (HCC)     Coronary artery disease     COVID-19 01/06/2025    COVID-19 virus infection 03/25/2023    Cuboid fracture     LAST ASSESSED: 43PFY6207    Disease of thyroid gland     Dyspepsia     Edema of both  lower extremities     Equinus deformity of left foot     Fall 04/01/2024    Falls     5/2024    GERD (gastroesophageal reflux disease)     Headache(784.0)     High risk medication use     LAST ASSESSED: [id number]    Hyperlipidemia     Hypertension     Hypokalemia     Hypothyroidism     Impacted cerumen of both ears     LAST ASSESSED: 95GMV7279    Impacted cerumen of right ear     LAST ASSESSED: 78RDN7629    Influenza     LAST ASSESSED: 36KGQ9859    IPMN (intraductal papillary mucinous neoplasm)     RESOLVED: 02OCT2015    Irregular heart beat     afib. Warfarin.    Leg cramps 12/21/2018    Limb swelling     LAST ASSESSED: 52TXS4386    Navicular fracture of ankle     LAST ASSESSED: 50CFZ3379    Onychomycosis     LAST ASSESSED: 08KLY3324    Open wound of right upper arm 10/17/2022    Osteoarthritis     Osteopenia     Osteoporosis     Paronychia, finger, unspecified laterality     LAST ASSESSED: 70LYD9952    Paroxysmal atrial fibrillation (HCC)     LAST ASSESSED: 02MAR2014    Peripheral vascular disease (HCC)     Plantar fasciitis     SOBOE (shortness of breath on exertion)     Stroke (HCC)     Right arm weakness that has resolved    Talus fracture     LAST ASSESSED: 65THU0175    Vaccine counseling 12/19/2023    Vascular headache     Walker as ambulation aid     Wound of right foot    [3]   Past Surgical History:  Procedure Laterality Date    APPENDECTOMY      BONE BIOPSY Left 08/09/2024    Procedure: Bone bx of proximal phalanx second toe & second met with fluoroscopic guidance;  Surgeon: Jonny Marcial DPM;  Location: [facility];  Service: Podiatry    CATARACT EXTRACTION Bilateral     CHOLECYSTECTOMY      COLONOSCOPY      FASCIOTOMY Right 6/24/2025    Procedure: FASCIOTOMY;  Surgeon: Arthur Wei MD;  [facility]: [facility];  Service: Vascular    JOINT REPLACEMENT Bilateral     knee    NEUROPLASTY / TRANSPOSITION MEDIAN NERVE AT CARPAL TUNNEL BILATERAL Bilateral     DECOMPRESSION    OOPHORECTOMY  Bilateral     age 81    PELVIC FLOOR REPAIR  2014    HI BIOPSY BONE TROCAR/NEEDLE SUPERFICIAL Left 08/09/2024    Procedure: Left foot debridement;  Surgeon: Jonny Marcial DPM;  Location: [facility];  Service: Podiatry    HI DEBRIDEMENT MUSCLE &/FASCIA 1ST 20 SQ CM/< Left 08/09/2024    Procedure: Left foot debridement;  Surgeon: Jonny Marcial DPM;  [facility];  Service: Podiatry    HI OSTEOT W/WO LNGTH SHRT/CORRJ 1ST METAR Left 11/29/2024    Procedure: Left foot percutaneous osteotomy of second, third and fourth metatarsal.;  Surgeon: Jonny Marcial DPM;  Location: [facility];  Service: Podiatry    SALPINGECTOMY Bilateral     SINUS SURGERY      THROMBECTOMY W/ EMBOLECTOMY Left 6/24/2025    Procedure: LEFT LOWER EXTREMITY EMBOLECTOMY/THROMBECTOMY;  Surgeon: Adonay Raymond MD;  [facility];  Service: Vascular    THROMBECTOMY W/ EMBOLECTOMY Right 6/24/2025    Procedure: EMBOLECTOMY/THROMBECTOMY LOWER EXTREMITY;  Surgeon: Arthur Wei MD;  [facility]: [facility];  Service: Vascular    TONSILLECTOMY  1941    TOTAL KNEE ARTHROPLASTY Bilateral

## 2025-06-27 NOTE — ASSESSMENT & PLAN NOTE
Suspect ATN in the setting of shock, contrast CT on 6/24 and acute blood loss   Creatinine 1.87 on admission and slightly improved today at 2.25  Urine output adequate, recent administration of IV loop diuretic therapy  Home jardiance and spironolactone on hold   CT: no hydronephrosis

## 2025-06-27 NOTE — PLAN OF CARE
Problem: PHYSICAL THERAPY ADULT  Goal: Performs mobility at highest level of function for planned discharge setting.  See evaluation for individualized goals.  Description: Treatment/Interventions: ADL retraining, Functional transfer training, LE strengthening/ROM, Therapeutic exercise, Endurance training, Patient/family training, Equipment eval/education, Bed mobility, Gait training, Spoke to nursing, Spoke to case management, OT, Elevations  Equipment Recommended: Walker (owns)       See flowsheet documentation for full assessment, interventions and recommendations.  Outcome: Progressing  Note: Prognosis: Good  Problem List: Decreased strength, Decreased endurance, Impaired balance, Decreased mobility, Pain  Assessment: Pt is a 89 y.o. female seen for a high complexity PT evaluation due to Ongoing medical management for primary dx, Increased reliance on more restrictive AD compared to baseline, Decreased activity tolerance compared to baseline, Fall risk, Increased assistance needed from caregiver at current time, Ongoing telemetry monitoring, Increased O2 via NC from pts baseline, s/p surgical intervention. Patient is s/p admit to [facility] on 6/24/2025 for Leg pain (M79.606)  Ischemic rest pain of lower extremity (M79.606, I99.8)  Acute lower limb ischemia (I99.8).  Please see above for PHM.     PT now consulted to assess functional mobility and needs for safe d/c planning. Prior to admission, pt independent with functional mobility, independent ADLs, and needs assistance IADLs. Personal factors affecting status include fall risk, limited caregiver/social support, assist for IADLs, and medical status     Currently pt requires moderate assistance x1 for bed mobility, moderate assistance x2 for functional transfers with two hand hold ; moderate assistance x2 for ambulation with two hand hold. Pt presents functioning below baseline and w/ overall mobility deficits 2* to: decreased strength, decreased  endurance, decreased mobility, impaired balance. These impairments place pt at risk for falls.     Pt will continue to benefit from skilled PT interventions to address stated impairments; to maximize functional potential; for ongoing pt/family education; and DME needs. The patient's AM-PAC Basic Mobility Inpatient Short Form Raw Score Is 8. PT is currently recommending Level 2 - Moderate Resource Intensity on d/c from hospital. Will continue to follow as able.  Barriers to Discharge: Decreased caregiver support     Rehab Resource Intensity Level, PT: II (Moderate Resource Intensity)    See flowsheet documentation for full assessment.

## 2025-06-27 NOTE — PROGRESS NOTES
Progress Note - Cardiology   Name: Rebeca Banegas 89 y.o. female I MRN: [id number]  Unit/Bed#: [facility] 513-01 I Date of Admission: 6/24/2025   Date of Service: 6/27/2025 I Hospital Day: 3     Assessment & Plan  Acute lower limb ischemia  Shock (HCC)  History of permanent atrial fibrillation on chronic warfarin therapy with a CVA in August 2024 (suspected embolic due to warfarin pause). She presented on 6/24 with right lower extremity pain and CTA showed a right femoral acute arterial occlusion and she underwent urgent right femoral cutdown, thromboembolectomy, and right lower extremity fasciotomy on 6/24.  Imaging also showed a left popliteal occlusion with reconstitution of the tibial arteries.    Labs on presentation were notable for a subtherapeutic INR level (1.47). Unclear etiology.  Pt reports compliance with her warfarin and denies any recent changes in medications or diet.  Cardiology was consulted for anticoagulation recommendations.  Pt is agreeable to switching to another anticoagulant (Xarelto/Eliquis) given her history of Coumadin failure.      -Later on 6/24 she was noted to experience worsening LLE numbness and decreased ROM.  She was again taken to the OR for LLE popliteal thromboembolectomy and stenting.    -Overnight on 6/24-6/25 she was noted to be hypotensive and was upgraded to the ICU for initiation of pressors.  Lactate was elevated up to 3.6.  Troponin 70->71. Hb dropped from 12.5-8.3. 2U PRBCs ordered. Suspect acute blood loss anemia.    -6/26: Hemoglobin again dropped to 7.5 and another 2 units PRBCs were ordered.  Suspect persistent blood loss from fasciotomy incision.    - 6/27: Hemoglobin appears stable today.  Only requiring carlene this morning.    Plan:  Continue heparin gtt as able.  Monitor BP closely and wean pressors as able.  Currently on Carlene. Vaso held 6/26.    Continue Solu-Cortef.  IVF as needed.   Monitor Hb closely and transfuse as needed.   Trend perfusion markers.  Okay to  continue lopressor 5 mg Q6 hrs.   Hold home BP meds.  Continue home Bumex 3 mg BID.  Monitor volume status and continue diuresis as needed.   Nephrology consulted for DARIO.   Not on ASA due to allergy and not on statin due to potential side effects.  F/u IR LE angiogram.  Monitor for evidence of bleeding.  TTE 6/25 showing an LVEF of 65% with vigorous systolic function and no obvious regional wall motion abnormalities.  Price check Xarelto/Eliquis.  Given her recurrent Coumadin failure, would recommend transitioning to a DOAC once stable from a surgical standpoint.  Continue neurovascular checks and postoperative care as per vascular surgery/ICU.  Permanent atrial fibrillation (HCC)  Remains in atrial fibrillation with RVR, likely related to acute blood loss anemia.  Continue heparin gtt as able.  Price check Xarelto/Eliquis.  Given her recurrent Coumadin failure, would recommend transitioning to a DOAC once stable from a surgical standpoint.  Hold home Toprol  mg daily given hypotension. Okay to continue IV Lopressor 5 mg every 6 hours.  Telemetry monitoring.  Monitor electrolytes and replete as needed.  Bifascicular bundle branch block  Known.  Continue telemetry monitoring.  Chronic heart failure with preserved ejection fraction (HCC)  Wt Readings from Last 3 Encounters:   06/25/25 59.9 kg (132 lb)   06/18/25 57.2 kg (126 lb 3.2 oz)   06/12/25 54.9 kg (121 lb)   Last echo in August 2024 showing moderate concentric hypertrophy and an LVEF of 60%.  She also has moderate TR and biatrial enlargement.  TTE 6/25 showing an LVEF of 65% with vigorous systolic function and no obvious regional wall motion abnormalities.  Continue home p.o. Bumex 3 mg twice daily. S/p intermittent doses of IV Bumex.  Monitor volume status and continue diuresis as needed.  Monitor I's and O's, volume status and renal function.  Monitor electrolytes and replete as needed.  Hyperlipidemia  Patient is not currently on statin therapy.  She  was previously on atorvastatin 40 mg daily but it was discontinued due to potential associated side effects.  Consider restarting when able.  Primary hypertension  Hold home Toprol  mg daily and spironolactone 50 mg daily  Continue IV Lopressor 5 mg every 6 hours.  CKD (chronic kidney disease), stage IV (HCC)  DARIO (acute kidney injury) ([facility])  Lab Results   Component Value Date    EGFR 18 06/27/2025    EGFR 17 06/27/2025    EGFR 16 06/26/2025    CREATININE 2.25 (H) 06/27/2025    CREATININE 2.38 (H) 06/27/2025    CREATININE 2.45 (H) 06/26/2025   Monitor BMP.  Suspect ATN in the setting of shock.   Nephrology consulted.  Continue Bumex.  Consider HD if indicated.  History of CVA (cerebrovascular accident)  See above.     Subjective   The patient was seen and examined at bedside.  Overall she reports feeling well.  She did complain of some shortness of breath and extremity heaviness but otherwise had no other acute complaints.  Telemetry shows A-fib with heart rates in the 70s to 100s.    Objective :  Temp:  [97.4 °F (36.3 °C)-98.1 °F (36.7 °C)] 97.5 °F (36.4 °C)  HR:  [] 106  BP: ()/(45-85) 113/78  Resp:  [12-34] 26  SpO2:  [86 %-100 %] 86 %  O2 Device: Nasal cannula  Nasal Cannula O2 Flow Rate (L/min):  [1 L/min-6 L/min] 2 L/min  Orthostatic Blood Pressures      Flowsheet Row Most Recent Value   Blood Pressure 113/78 filed at 06/27/2025 1200   Patient Position - Orthostatic VS Lying filed at 06/27/2025 0400          First Weight: Weight - Scale: 60.2 kg (132 lb 11.5 oz) (06/24/25 2217)  Vitals:    06/24/25 2217 06/25/25 0830   Weight: 60.2 kg (132 lb 11.5 oz) 59.9 kg (132 lb)     Physical Exam  Constitutional:       Appearance: She is well-developed.   HENT:      Head: Normocephalic and atraumatic.      Nose: Nose normal.     Eyes:      Conjunctiva/sclera: Conjunctivae normal.      Pupils: Pupils are equal, round, and reactive to light.     Neck:      Vascular: JVD present.     Cardiovascular:       Rate and Rhythm: Normal rate. Rhythm irregular.      Heart sounds: Normal heart sounds. No murmur heard.     No friction rub. No gallop.   Pulmonary:      Effort: Pulmonary effort is normal. No respiratory distress.      Breath sounds: No stridor. No wheezing or rales.      Comments: Course breath sounds  Chest:      Chest wall: No tenderness.   Abdominal:      General: Bowel sounds are normal. There is no distension.      Palpations: Abdomen is soft. There is no mass.      Tenderness: There is no abdominal tenderness. There is no guarding or rebound.      Hernia: No hernia is present.     Musculoskeletal:         General: No deformity.      Comments: ACE wrapped right upper and bilateral lower extremities.     Skin:     General: Skin is warm and dry.     Neurological:      Mental Status: She is alert and oriented to person, place, and time.     Psychiatric:         Behavior: Behavior normal.         Thought Content: Thought content normal.         Judgment: Judgment normal.           Lab Results: I have reviewed the following results:CBC/BMP:   .     06/27/25  0234 06/27/25  0846   WBC 11.23*  --    HGB 9.6* 9.9*   HCT 26.5*  --    *  --    SODIUM 136 135   K 3.9 4.0    104   CO2 20* 21   BUN 70* 70*   CREATININE 2.38* 2.25*   GLUC 105 121   CAIONIZED 0.93*  --    MG 2.3 2.4   PHOS 5.3* 5.4*      Results from last 7 days   Lab Units 06/27/25  0846 06/27/25  0234 06/26/25  1226 06/26/25  0506   WBC Thousand/uL  --  11.23* 9.74 8.95   HEMOGLOBIN g/dL 9.9* 9.6* 11.0* 7.5*   HEMATOCRIT %  --  26.5* 31.0* 21.8*   PLATELETS Thousands/uL  --  124* 122* 125*     Results from last 7 days   Lab Units 06/27/25  0846 06/27/25  0234 06/26/25  1226 06/25/25  0512 06/24/25  1953   POTASSIUM mmol/L 4.0 3.9 5.1   < >  --    CHLORIDE mmol/L 104 105 105   < >  --    CO2 mmol/L 21 20* 18*   < >  --    CO2, I-STAT mmol/L  --   --   --   --  24   BUN mg/dL 70* 70* 66*   < >  --    CREATININE mg/dL 2.25* 2.38* 2.45*   < >   --    GLUCOSE, ISTAT mg/dl  --   --   --   --  139   CALCIUM mg/dL 7.6* 7.0* 7.4*   < >  --     < > = values in this interval not displayed.     Results from last 7 days   Lab Units 06/27/25  0846 06/27/25  0153 06/26/25  1927 06/24/25  1030 06/24/25  0412   INR   --   --   --   --  1.47*   PTT seconds 71* 59* 56*   < > 28    < > = values in this interval not displayed.     Lab Results   Component Value Date    HGBA1C 6.3 (H) 06/25/2025     Lab Results   Component Value Date    CKTOTAL 148 06/26/2025       Imaging Results Review: I reviewed radiology reports from this admission including: Echocardiogram.  Other Study Results Review: EKG was reviewed.     VTE Pharmacologic Prophylaxis: VTE covered by:  heparin (porcine), Intravenous, 13 Units/kg/hr at 06/27/25 0600      VTE Mechanical Prophylaxis: sequential compression device    Administrative Statements   I have spent a total time of 45 minutes in caring for this patient on the day of the visit/encounter including Impressions.

## 2025-06-27 NOTE — ASSESSMENT & PLAN NOTE
Hold home Toprol  mg daily and spironolactone 50 mg daily  Continue IV Lopressor 5 mg every 6 hours.

## 2025-06-28 ENCOUNTER — APPOINTMENT (INPATIENT)
Dept: RADIOLOGY | Facility: HOSPITAL | Age: OVER 89
DRG: 252 | End: 2025-06-28
Payer: COMMERCIAL

## 2025-06-28 LAB
ABO GROUP BLD BPU: NORMAL
ABO GROUP BLD BPU: NORMAL
ANION GAP SERPL CALCULATED.3IONS-SCNC: 13 MMOL/L (ref 4–13)
APTT PPP: 51 SECONDS (ref 23–34)
APTT PPP: 56 SECONDS (ref 23–34)
APTT PPP: 67 SECONDS (ref 23–34)
BPU ID: NORMAL
BPU ID: NORMAL
BUN SERPL-MCNC: 63 MG/DL (ref 5–25)
CA-I BLD-SCNC: 0.96 MMOL/L (ref 1.12–1.32)
CALCIUM SERPL-MCNC: 6.7 MG/DL (ref 8.4–10.2)
CHLORIDE SERPL-SCNC: 104 MMOL/L (ref 96–108)
CO2 SERPL-SCNC: 25 MMOL/L (ref 21–32)
CREAT SERPL-MCNC: 1.95 MG/DL (ref 0.6–1.3)
CROSSMATCH: NORMAL
CROSSMATCH: NORMAL
ERYTHROCYTE [DISTWIDTH] IN BLOOD BY AUTOMATED COUNT: 16 % (ref 11.6–15.1)
GFR SERPL CREATININE-BSD FRML MDRD: 21 ML/MIN/1.73SQ M
GLUCOSE SERPL-MCNC: 104 MG/DL (ref 65–140)
GLUCOSE SERPL-MCNC: 110 MG/DL (ref 65–140)
GLUCOSE SERPL-MCNC: 118 MG/DL (ref 65–140)
GLUCOSE SERPL-MCNC: 129 MG/DL (ref 65–140)
HCT VFR BLD AUTO: 26.5 % (ref 34.8–46.1)
HGB BLD-MCNC: 9.1 G/DL (ref 11.5–15.4)
MAGNESIUM SERPL-MCNC: 2.1 MG/DL (ref 1.9–2.7)
MCH RBC QN AUTO: 32.2 PG (ref 26.8–34.3)
MCHC RBC AUTO-ENTMCNC: 34.3 G/DL (ref 31.4–37.4)
MCV RBC AUTO: 94 FL (ref 82–98)
PHOSPHATE SERPL-MCNC: 4.5 MG/DL (ref 2.3–4.1)
PLATELET # BLD AUTO: 114 THOUSANDS/UL (ref 149–390)
PMV BLD AUTO: 9.5 FL (ref 8.9–12.7)
POTASSIUM SERPL-SCNC: 3.7 MMOL/L (ref 3.5–5.3)
RBC # BLD AUTO: 2.83 MILLION/UL (ref 3.81–5.12)
SODIUM SERPL-SCNC: 142 MMOL/L (ref 135–147)
UNIT DISPENSE STATUS: NORMAL
UNIT DISPENSE STATUS: NORMAL
UNIT PRODUCT CODE: NORMAL
UNIT PRODUCT CODE: NORMAL
UNIT PRODUCT VOLUME: 350 ML
UNIT PRODUCT VOLUME: 350 ML
UNIT RH: NORMAL
UNIT RH: NORMAL
WBC # BLD AUTO: 9.58 THOUSAND/UL (ref 4.31–10.16)

## 2025-06-28 PROCEDURE — 99024 POSTOP FOLLOW-UP VISIT: CPT | Performed by: SURGERY

## 2025-06-28 PROCEDURE — 85730 THROMBOPLASTIN TIME PARTIAL: CPT | Performed by: PHYSICIAN ASSISTANT

## 2025-06-28 PROCEDURE — 94668 MNPJ CHEST WALL SBSQ: CPT

## 2025-06-28 PROCEDURE — 82948 REAGENT STRIP/BLOOD GLUCOSE: CPT

## 2025-06-28 PROCEDURE — 83735 ASSAY OF MAGNESIUM: CPT | Performed by: PHYSICIAN ASSISTANT

## 2025-06-28 PROCEDURE — 94664 DEMO&/EVAL PT USE INHALER: CPT

## 2025-06-28 PROCEDURE — 99232 SBSQ HOSP IP/OBS MODERATE 35: CPT | Performed by: INTERNAL MEDICINE

## 2025-06-28 PROCEDURE — 82330 ASSAY OF CALCIUM: CPT | Performed by: PHYSICIAN ASSISTANT

## 2025-06-28 PROCEDURE — 99233 SBSQ HOSP IP/OBS HIGH 50: CPT | Performed by: SURGERY

## 2025-06-28 PROCEDURE — 85027 COMPLETE CBC AUTOMATED: CPT | Performed by: PHYSICIAN ASSISTANT

## 2025-06-28 PROCEDURE — 94640 AIRWAY INHALATION TREATMENT: CPT

## 2025-06-28 PROCEDURE — NC001 PR NO CHARGE: Performed by: SURGERY

## 2025-06-28 PROCEDURE — 94760 N-INVAS EAR/PLS OXIMETRY 1: CPT

## 2025-06-28 PROCEDURE — 80048 BASIC METABOLIC PNL TOTAL CA: CPT | Performed by: PHYSICIAN ASSISTANT

## 2025-06-28 PROCEDURE — 85730 THROMBOPLASTIN TIME PARTIAL: CPT | Performed by: FAMILY MEDICINE

## 2025-06-28 PROCEDURE — 84100 ASSAY OF PHOSPHORUS: CPT | Performed by: PHYSICIAN ASSISTANT

## 2025-06-28 PROCEDURE — 85730 THROMBOPLASTIN TIME PARTIAL: CPT | Performed by: SURGERY

## 2025-06-28 PROCEDURE — 71045 X-RAY EXAM CHEST 1 VIEW: CPT

## 2025-06-28 RX ORDER — ALBUMIN HUMAN 50 G/1000ML
25 SOLUTION INTRAVENOUS EVERY OTHER DAY
Status: DISCONTINUED | OUTPATIENT
Start: 2025-06-28 | End: 2025-06-28

## 2025-06-28 RX ORDER — POTASSIUM CHLORIDE 20MEQ/15ML
40 LIQUID (ML) ORAL ONCE
Status: COMPLETED | OUTPATIENT
Start: 2025-06-28 | End: 2025-06-28

## 2025-06-28 RX ORDER — ONDANSETRON 2 MG/ML
4 INJECTION INTRAMUSCULAR; INTRAVENOUS EVERY 6 HOURS PRN
Status: DISCONTINUED | OUTPATIENT
Start: 2025-06-28 | End: 2025-07-05 | Stop reason: HOSPADM

## 2025-06-28 RX ORDER — HYDROCORTISONE SODIUM SUCCINATE 100 MG/2ML
50 INJECTION INTRAMUSCULAR; INTRAVENOUS EVERY 12 HOURS SCHEDULED
Status: COMPLETED | OUTPATIENT
Start: 2025-06-28 | End: 2025-06-29

## 2025-06-28 RX ADMIN — HEPARIN SODIUM 15 UNITS/KG/HR: 10000 INJECTION, SOLUTION INTRAVENOUS at 11:38

## 2025-06-28 RX ADMIN — HYDROCORTISONE SODIUM SUCCINATE 50 MG: 100 INJECTION, POWDER, FOR SOLUTION INTRAMUSCULAR; INTRAVENOUS at 17:15

## 2025-06-28 RX ADMIN — POTASSIUM CHLORIDE 40 MEQ: 20 SOLUTION ORAL at 08:16

## 2025-06-28 RX ADMIN — ACETAMINOPHEN 975 MG: 325 TABLET ORAL at 05:03

## 2025-06-28 RX ADMIN — LEVALBUTEROL HYDROCHLORIDE 1.25 MG: 1.25 SOLUTION RESPIRATORY (INHALATION) at 22:06

## 2025-06-28 RX ADMIN — LEVALBUTEROL HYDROCHLORIDE 1.25 MG: 1.25 SOLUTION RESPIRATORY (INHALATION) at 14:05

## 2025-06-28 RX ADMIN — LEVOTHYROXINE SODIUM 50 MCG: 0.05 TABLET ORAL at 05:03

## 2025-06-28 RX ADMIN — SENNOSIDES 8.6 MG: 8.6 TABLET, FILM COATED ORAL at 08:16

## 2025-06-28 RX ADMIN — METOPROLOL TARTRATE 5 MG: 1 INJECTION, SOLUTION INTRAVENOUS at 05:03

## 2025-06-28 RX ADMIN — ACETAMINOPHEN 975 MG: 325 TABLET ORAL at 14:40

## 2025-06-28 RX ADMIN — Medication 12.5 MG: at 17:15

## 2025-06-28 RX ADMIN — LEVALBUTEROL HYDROCHLORIDE 1.25 MG: 1.25 SOLUTION RESPIRATORY (INHALATION) at 07:24

## 2025-06-28 RX ADMIN — HYDROCORTISONE SODIUM SUCCINATE 50 MG: 100 INJECTION, POWDER, FOR SOLUTION INTRAMUSCULAR; INTRAVENOUS at 05:03

## 2025-06-28 RX ADMIN — GUAIFENESIN 600 MG: 600 TABLET, EXTENDED RELEASE ORAL at 20:23

## 2025-06-28 RX ADMIN — ALBUMIN (HUMAN) 25 G: 12.5 INJECTION, SOLUTION INTRAVENOUS at 09:21

## 2025-06-28 RX ADMIN — BUDESONIDE 0.5 MG: 0.5 INHALANT RESPIRATORY (INHALATION) at 22:06

## 2025-06-28 RX ADMIN — GUAIFENESIN 600 MG: 600 TABLET, EXTENDED RELEASE ORAL at 08:16

## 2025-06-28 RX ADMIN — ACETAMINOPHEN 975 MG: 325 TABLET ORAL at 21:43

## 2025-06-28 RX ADMIN — Medication 12.5 MG: at 22:34

## 2025-06-28 RX ADMIN — BUMETANIDE 3 MG: 2 TABLET ORAL at 14:40

## 2025-06-28 RX ADMIN — BUDESONIDE 0.5 MG: 0.5 INHALANT RESPIRATORY (INHALATION) at 07:24

## 2025-06-28 NOTE — ASSESSMENT & PLAN NOTE
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 Clinical exam and imaging concerning for Hartley 2B acute limb ischemia of the RLE, L popliteal occlusion also noted on presentation.    6/24 morning: R femoral cutdown, thromboembolectomy, R anterior and lateral fasciotomy, partial proximal fasciotomy closure (Eriberto)  6/24 evening: L BK pop exposure, thromboembolectomy (Segundo)    Plan:  -improved hypotension, continue resuscitation with colloid and intermittent bumex pushes   -appreciate continued Nephrology recs  -continue heparin gtt, aspirin, statin. Will eventually need to be considered for DOAC   -Cardiology consulted and planning to transition to eliquis & xarelto  -continue to monitor UOP and renal function  -continue to monitor respiratory status, start home nebs and diuretics when able  -no further CK levels required, low suspicion for compartment syndrome or rhabdo  -okay to leave LLE incision open to air  -compressive therapy lower extremity prn  -continue to monitor NV evam  -ambulate pt, OOB, PT/OT, ISB  -appreciate critical care assistance while ICU status

## 2025-06-28 NOTE — PROGRESS NOTES
Progress Note - Critical Care/ICU   Name: Rebeca Banegas 89 y.o. female I MRN: [id number]  Unit/Bed#: [facility] 513-01 I Date of Admission: 6/24/2025   Date of Service: 6/28/2025 I Hospital Day: 4       Assessment & Plan   Active Hospital Problems    Diagnosis Date Noted POA    Acute lower limb ischemia 06/24/2025 Unknown    Shock (HCC) 06/26/2025 Unknown    DARIO (acute kidney injury) ([facility]) 06/26/2025 Unknown    Metabolic acidosis 06/26/2025 Unknown    Hyperphosphatemia 06/26/2025 Unknown    History of CVA (cerebrovascular accident) 08/27/2024 Not Applicable    Bronchiectasis ([facility]) 09/08/2023 Yes    CKD (chronic kidney disease), stage IV ([facility]) 04/07/2020 Yes    Chronic heart failure with preserved ejection fraction ([facility]) 02/13/2017 Yes    Primary hypertension 05/19/2014 Yes     Chronic    Hyperlipidemia 04/19/2013 Yes     Chronic    Permanent atrial fibrillation ([facility]) 02/14/2013 Yes     Chronic    Asthma 11/12/2012 Yes     Chronic    Bifascicular bundle branch block 11/10/2012 Yes     Chronic      Resolved Hospital Problems   No resolved problems to display.       Neuro/Psych:  Diagnoses: Analgesia   Plan:  Q4H neurovascular checks   Analgesia: Multimodal. Tylenol 975 mg Q8H Ayla. PRN oxy 2.5 / 5 mg Q4H PRN.  Dilaudid PRNs     CV:  Diagnoses: Shock of uncertain etiology, acute limb ischemia, hx afib (now with RVR), chronic HFpEF, HTN  S/p R femoral cutdown, thromboembolectomy, R anterior and lateral fasciotomies, partial proximal fasciotomy closure 6/24/25 AM (Eriberto)  S/p LLE thromboembolectomy of pop and AT/TPT in setting of LLE acute limb ischemia 2/2 popliteal occlusion 6/24/25 PM ([facility])  6/25 upgraded to ICU for hypotension  6/26 last PT signals RLE.  Muscle bulging at fasciotomy site.  Hematoma evacuated by vascular  6/28 B/L LE neurovascularly intact  Off pressors  Plan:  Q4H neurovascular checks   Monitor fasciotomy sites, closure per vascular surgery  Discontinue stress dose steroids in setting of resolved  hypotension  Consider resuming home metoprolol, consider d/c IV lopressor  Continue heparin gtt  Continue home Bumex 3 mg BID  Continuous cardiopulmonary monitoring. Maintain MAP >65.  Discontinue arterial line  Discontinue central line     Pulm:  Diagnoses: Hx of bronchiectasis, asthma   Currently on 2L NC  Plan:  Continue budesonide, Xopenex, Mucinex  Cont sodium chloride inhalation solution   Wean supplemental O2 as tolerated  Continuous pulse oximetry. Maintain O2 sat >92%.      GI:   Diagnoses: No active issues   Plan:  PRN Tigan  Bowel regimen: Senna   GI prophylaxis: None     :  Diagnoses: DARIO, likely secondary to ATN, on stage 3B CKD  Creatinine trend: 1.95 from 2.25 from 2.38 from 2.45 from 2.40  Baseline creatinine: 1.6  UOP 3983 cc 24 hours  Plan:  Monitor Cr  Monitor I/Os.     F/E/N:  Diagnosis: hyperphosphatemia, improving  Plan:   F: Fluid resuscitation prn.  E: Monitor and replete electrolytes for Mg >2, Phos >3, K >4.  N: Regular     Heme/Onc:  Diagnoses: Anemia   Hgb 9.1 from 9.9 from 9.6 from 11.0 from 7.5  6/26 transfused 2 units PRBCs  Plan:  Transfuse as needed  VTE prophylaxis: Hep gtt     Endo:  Diagnoses: Hypothyroidism  Plan:   Cont home levothyroxine 50 mcg daily  Insulin: SSI. Monitor blood glucose.     ID:  Diagnoses: No active issues   Plan:  Monitor fever curve and WBC.     MSK/Skin:  Diagnoses: S/p B/L lower extremity fasciotomies  Plan:  Management of fasciotomy sites per vascular surgery  PT/OT when appropriate. Encourage OOB and ambulation when appropriate. Local wound care prn.     LDAs:  Lines - PIVx2, L radial holly, R IJ CVC  Drains - N/A  Airways - N/A  Disposition: Stepdown Level 2    ICU Core Measures     A: Assess, Prevent, and Manage Pain Has pain been assessed? Yes  Need for changes to pain regimen? No   B: Both SAT/SAT  N/A   C: Choice of Sedation RASS Goal: 0 Alert and Calm or N/A patient not on sedation  Need for changes to sedation or analgesia regimen? No   D:  Delirium CAM-ICU: Negative   E: Early Mobility  Plan for early mobility? Yes   F: Family Engagement Plan for family engagement today? Yes       Review of Invasive Devices:      Central access plan: possible removal today  Berta Plan: Discontinue arterial line    Prophylaxis:  VTE VTE covered by:  heparin (porcine), Intravenous, 15 Units/kg/hr at 06/28/25 0526       Stress Ulcer  not ordered         24 Hour Events : No overnight events. Resting comfortably. Off pressors. Tolerating diet. Intact DP and PT pulses bilaterally.   Subjective   Review of Systems: Review of Systems   Constitutional:  Negative for diaphoresis and fever.   Respiratory:  Positive for cough, shortness of breath and wheezing. Negative for stridor.    Cardiovascular:  Negative for chest pain, palpitations and leg swelling.   Gastrointestinal:  Negative for abdominal pain, diarrhea, nausea and vomiting.   Genitourinary:  Negative for decreased urine volume, difficulty urinating and frequency.   Skin:  Negative for pallor and wound.   Neurological:  Negative for dizziness and headaches.       Objective :                   Vitals I/O      Most Recent Min/Max in 24hrs   Temp 97.9 °F (36.6 °C) Temp  Min: 97.4 °F (36.3 °C)  Max: 97.9 °F (36.6 °C)   Pulse 101 Pulse  Min: 86  Max: 146   Resp 16 Resp  Min: 13  Max: 33   /65 BP  Min: 68/49  Max: 126/69   O2 Sat 100 % SpO2  Min: 86 %  Max: 100 %      Intake/Output Summary (Last 24 hours) at 6/28/2025 0613  Last data filed at 6/28/2025 0457  Gross per 24 hour   Intake 2592.46 ml   Output 3983 ml   Net -1390.54 ml       Diet Regular; Regular House; Lo Phosphorus    Invasive Monitoring   Arterial Line  Berta /54  Arterial Line BP  Min: 38/24  Max: 118/61   MAP 77 mmHg  Arterial Line MAP (mmHg)  Min: 30 mmHg  Max: 85 mmHg           Physical Exam   Physical Exam  Vitals and nursing note reviewed.   Eyes:      Pupils: Pupils are equal.   Skin:     General: Skin is warm and dry.   HENT:      Head: No  right periorbital erythema or left periorbital erythema.   Cardiovascular:      Rate and Rhythm: Tachycardia present. Rhythm irregularly irregular.      Pulses: No decreased pulses.           Radial pulses are 2+ on the right side and 2+ on the left side.        Dorsalis pedis pulses are 1+ on the right side and 1+ on the left side.        Posterior tibial pulses are detected w/ Doppler on the right side and detected w/ Doppler on the left side.      Heart sounds: Normal heart sounds.   Musculoskeletal:      Right lower leg: No edema.      Left lower leg: No edema.   Abdominal:      Palpations: Abdomen is soft.      Tenderness: There is no abdominal tenderness.   Constitutional:       General: She is not in acute distress.     Interventions: She is not sedated and not intubated.  Pulmonary:      Effort: No tachypnea, bradypnea, accessory muscle usage, respiratory distress, retractions or accessory muscle usage. She is not intubated.      Breath sounds: No stridor or decreased air movement. Wheezing present. No decreased breath sounds, rhonchi or rales.   Neurological:      Mental Status: She is alert.          Diagnostic Studies        Lab Results: I have reviewed the following results:     Medications:  Scheduled PRN   acetaminophen, 975 mg, Q8H GARTH  budesonide, 0.5 mg, Q12H  bumetanide, 3 mg, BID  guaiFENesin, 600 mg, Q12H GARTH  hydrocortisone sodium succinate, 50 mg, Q6H GARTH  insulin lispro, 1-5 Units, TID AC  levalbuterol, 1.25 mg, Q6H  levothyroxine, 50 mcg, Early Morning  metoprolol, 5 mg, Q6H  senna, 1 tablet, Daily      HYDROmorphone, 0.2 mg, Q3H PRN  oxyCODONE, 5 mg, Q4H PRN  oxyCODONE, 2.5 mg, Q4H PRN  trimethobenzamide, 200 mg, Q6H PRN       Continuous    heparin (porcine), 3-30 Units/kg/hr (Order-Specific), Last Rate: 15 Units/kg/hr (06/28/25 0526)  phenylephine,  mcg/min, Last Rate: Stopped (06/27/25 1533)         Labs:   CBC    Recent Labs     06/27/25  0234 06/27/25  0846 06/28/25  0438   WBC  11.23*  --  9.58   HGB 9.6* 9.9* 9.1*   HCT 26.5*  --  26.5*   *  --  114*     BMP    Recent Labs     06/27/25  0846 06/28/25  0438   SODIUM 135 142   K 4.0 3.7    104   CO2 21 25   AGAP 10 13   BUN 70* 63*   CREATININE 2.25* 1.95*   CALCIUM 7.6* 6.7*       Coags    Recent Labs     06/27/25  1420 06/28/25  0438   PTT 61* 56*        Additional Electrolytes  Recent Labs     06/27/25  0234 06/27/25  0846 06/28/25  0438   MG 2.3 2.4 2.1   PHOS 5.3* 5.4* 4.5*   CAIONIZED 0.93*  --  0.96*          Blood Gas    No recent results  Recent Labs     06/27/25  0848   PHVEN 7.340   [id number] 40.7*   PO2VEN 30.9*   [id number] 21.5*   BEVEN -4.0   M2IHSEG 57.4*    LFTs  Recent Labs     06/27/25  0846   *   *   ALKPHOS 30*   ALB 3.5   TBILI 0.81       Infectious  No recent results  Glucose  Recent Labs     06/26/25  1226 06/27/25  0234 06/27/25  0846 06/28/25  0438   GLUC 163* 105 121 110

## 2025-06-28 NOTE — ASSESSMENT & PLAN NOTE
Lab Results   Component Value Date    EGFR 21 06/28/2025    EGFR 18 06/27/2025    EGFR 17 06/27/2025    CREATININE 1.95 (H) 06/28/2025    CREATININE 2.25 (H) 06/27/2025    CREATININE 2.38 (H) 06/27/2025   Monitor BMP.  Suspect ATN in the setting of shock.   Nephrology consulted.  Continue Bumex.  Consider HD if indicated.

## 2025-06-28 NOTE — PROGRESS NOTES
Progress Note - Critical Care/ICU   Name: Rebeca Banegas 89 y.o. female I MRN: [id number]  Unit/Bed#: [facility] 513-01 I Date of Admission: 6/24/2025   Date of Service: 6/28/2025 I Hospital Day: 4       Critical Care Interval Transfer Note:    Brief Hospital Summary: 89F who presented to the hospital with acute bilateral limb pain. Found to have bilateral acute limb ischemia is status post bilateral thromboembolectomy, bilateral fasciotomies on 6/24. Was upgraded on 6/25 to the ICU for hypotension, was on Colt-Synephrine and vasopressin, now off vasopressors. Vascular continuing to follow. Requires every 4 hours neurovascular checks. Palpable DP and PT pulses. On heparin drip. Had been on stress dose steroids, now on a taper.     Additional history of atrial fibrillation with RVR transitioning to oral metoprolol, HFpEF, HTN, bronchiectasis, asthma, DARIO on CKD which is resolving, hypothyroidism.     Barriers to discharge:   Fasciotomy closure per vascular     Consults: IP CONSULT TO VASCULAR SURGERY  IP CONSULT TO CARDIOLOGY  IP CONSULT TO PULMONOLOGY  IP CONSULT TO SURGICAL CRITICAL CARE  IP CONSULT TO NEPHROLOGY    Recommended to review admission imaging for incidental findings and document in discharge navigator: Chart reviewed, no known incidental findings noted at this time.      Discharge Plan: Anticipate discharge in >72 hrs to discharge location to be determined pending rehab evaluations.  Central access plan: removal       Patient seen and evaluated by Critical Care today and deemed to be appropriate for transfer to Med Surg. Spoke to Dr. Campos from [facility] to accept transfer. Critical care can be contacted via SecureChat with any questions or concerns. Please use the Critical Care AP Role in Secure Chat for any provider inquires until the patient is transferred out of the ICU or until tomorrow at 0600.

## 2025-06-28 NOTE — PROGRESS NOTES
Progress Note - Vascular Surgery   Name: Rebeca Banegas 89 y.o. 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 Clinical exam and imaging concerning for [place] 2B acute limb ischemia of the RLE, L popliteal occlusion also noted on presentation.    6/24 morning: R femoral cutdown, thromboembolectomy, R anterior and lateral fasciotomy, partial proximal fasciotomy closure (Eriberto)  6/24 evening: L BK pop exposure, thromboembolectomy (Segundo)    Plan:  -improved hypotension, continue resuscitation with colloid and intermittent bumex pushes   -appreciate continued Nephrology recs  -continue heparin gtt, aspirin, statin. Will eventually need to be considered for DOAC   -Cardiology consulted and planning to transition to eliquis & xarelto  -continue to monitor UOP and renal function  -continue to monitor respiratory status, start home nebs and diuretics when able  -no further CK levels required, low suspicion for compartment syndrome or rhabdo  -okay to leave LLE incision open to air  -compressive therapy lower extremity prn  -continue to monitor NV evam  -ambulate pt, OOB, PT/OT, ISB  -appreciate critical care assistance while ICU status      24 Hour Events : NAEON. Afebrile. VSS on RA this morning. Appropriate UOP in last 24 hours.  Subjective : No acute concerns this morning. Sitting in chair. No pressor requirements since yesterday afternoon. Pain well controlled. Tolerating oral diet without nausea/vomiting.    Objective :  Temp:  [97.4 °F (36.3 °C)-98 °F (36.7 °C)] 97.4 °F (36.3 °C)  HR:  []  112  BP: ()/(50-78) 86/52  Resp:  [11-43] 21  SpO2:  [81 %-100 %] 97 %  O2 Device: None (Room air)  Nasal Cannula O2 Flow Rate (L/min):  [1 L/min-2 L/min] 1 L/min     I/O         06/26 0701  06/27 0700 06/27 0701  06/28 0700 06/28 0701  06/29 0700    P.O. 570 1840     I.V. (mL/kg) 1463.1 (24.4) 767.6 (12.8)     Blood 729.2      IV Piggyback 100      Total Intake(mL/kg) 2862.3 (47.8) 2607.6 (43.4)     Urine (mL/kg/hr) 2300 (1.6) 4133 (2.9)     Stool  0     Total Output 2300 4133     Net +562.3 -1525.4            Unmeasured Stool Occurrence  1 x           Lines/Drains/Airways       Active Status       Name Placement date Placement time Site Days    CVC Central Lines 06/25/25 Triple 16cm 06/25/25  1523  --  2    Arterial Line 06/24/25 Left Radial 06/24/25  0755  Radial  4    External Urinary Catheter 06/27/25  1155  -- less than 1                  Physical Exam  Vitals and nursing note reviewed.   Constitutional:       General: She is not in acute distress.     Appearance: Normal appearance. She is well-developed.   HENT:      Head: Normocephalic and atraumatic.      Right Ear: External ear normal.      Left Ear: External ear normal.      Nose: Nose normal.      Mouth/Throat:      Mouth: Mucous membranes are moist.     Eyes:      General: No scleral icterus.     Extraocular Movements: Extraocular movements intact.      Conjunctiva/sclera: Conjunctivae normal.       Cardiovascular:      Rate and Rhythm: Normal rate.      Pulses:           Dorsalis pedis pulses are 2+ on the right side and 2+ on the left side.        Posterior tibial pulses are 2+ on the right side.      Heart sounds: No murmur heard.  Pulmonary:      Effort: Pulmonary effort is normal. No respiratory distress.      Comments: No increased WOB  Abdominal:      Palpations: Abdomen is soft.      Tenderness: There is no abdominal tenderness.     Musculoskeletal:         General: No swelling.      Cervical back: Neck supple.      Right lower leg: No  edema.      Left lower leg: No edema.      Comments: RLE fasciotomy without significant venous ooze and healthy appearing tissue throughout wound bed; sutures placed left untied. LLE stapled incision well-approximated without surrounding swelling. No significant lower extremity edema. BLE ACE wraps c/d/i.     Skin:     General: Skin is warm and dry.      Capillary Refill: Capillary refill takes less than 2 seconds.      Coloration: Skin is not jaundiced.      Findings: No rash.      Comments: R groin incision well approximated with post-operative ecchymosis, no pulsatile swelling, induration, firmness, or bleeding     Neurological:      General: No focal deficit present.      Mental Status: She is alert and oriented to person, place, and time. Mental status is at baseline.      Sensory: No sensory deficit.     Psychiatric:         Mood and Affect: Mood normal.         Behavior: Behavior normal.         Thought Content: Thought content normal.         Judgment: Judgment normal.         Lab Results: I have reviewed the following results:  CBC with diff:   Lab Results   Component Value Date    WBC 9.58 06/28/2025    HGB 9.1 (L) 06/28/2025    HCT 26.5 (L) 06/28/2025    MCV 94 06/28/2025     (L) 06/28/2025    RBC 2.83 (L) 06/28/2025    MCH 32.2 06/28/2025    MCHC 34.3 06/28/2025    RDW 16.0 (H) 06/28/2025    MPV 9.5 06/28/2025    NRBC 0 06/27/2025   ,   BMP/CMP:  Lab Results   Component Value Date    SODIUM 142 06/28/2025    SODIUM 138 01/07/2025    SODIUM 138 01/02/2024    K 3.7 06/28/2025    K 3.6 01/07/2025    K 4.0 01/02/2024     06/28/2025    CL 95 (L) 01/07/2025     01/02/2024    CO2 25 06/28/2025    CO2 24 06/24/2025    CO2 32 (H) 01/07/2025    CO2 29 01/02/2024    BUN 63 (H) 06/28/2025    BUN 49 (H) 01/07/2025    BUN 33 (H) 01/02/2024    CREATININE 1.95 (H) 06/28/2025    CREATININE 1.35 (H) 01/02/2024    GLUCOSE 139 06/24/2025    CALCIUM 6.7 (L) 06/28/2025    CALCIUM 8.0 (L) 01/07/2025     "CALCIUM 8.4 (L) 01/02/2024     (H) 06/27/2025    AST 30 01/02/2024     (H) 06/27/2025    ALT 27 01/02/2024    ALKPHOS 30 (L) 06/27/2025    ALKPHOS 51 01/02/2024    PROT 6.4 10/17/2017    BILITOT 0.5 10/17/2017    EGFR 21 06/28/2025    EGFR 37 (L) 01/02/2024    EGFR 35 (L) 12/29/2020   ,   Lipid Panel:   Lab Results   Component Value Date    CHOL 205 (H) 02/07/2017   ,   Coags:   Lab Results   Component Value Date    PT 23.7 (H) 02/23/2024    PTT 56 (H) 06/28/2025    INR 1.47 (H) 06/24/2025    INR 2.2 02/23/2024   ,   Blood Culture: No results found for: \"BLOODCX\",   Urinalysis:   Lab Results   Component Value Date    COLORU Light Yellow 12/29/2024    CLARITYU Clear 12/29/2024    SPECGRAV 1.011 12/29/2024    PHUR 5.0 12/29/2024    LEUKOCYTESUR Negative 12/29/2024    NITRITE Negative 12/29/2024    GLUCOSEU Negative 12/29/2024    KETONESU Negative 12/29/2024    BILIRUBINUR Negative 12/29/2024    BLOODU Negative 12/29/2024   ,   Urine Culture: No results found for: \"URINECX\",   Wound Culure:   Lab Results   Component Value Date    WOUNDCULT Few Colonies of Proteus mirabilis (A) 09/04/2024    WOUNDCULT Few Colonies of Acinetobacter baumannii (A) 09/04/2024     Imaging Results Review: No pertinent imaging studies reviewed.  Other Study Results Review: No additional pertinent studies reviewed.    VTE Prophylaxis: VTE covered by:  heparin (porcine), Intravenous, 15 Units/kg/hr at 06/28/25 0526      "

## 2025-06-28 NOTE — PLAN OF CARE
Problem: Potential for Falls  Goal: Patient will remain free of falls  Description: INTERVENTIONS:  - Educate patient/family on patient safety including physical limitations  - Instruct patient to call for assistance with activity   - Consider consulting OT/PT to assist with strengthening/mobility based on AM PAC & JH-HLM score  - Consult OT/PT to assist with strengthening/mobility   - Keep Call bell within reach  - Keep bed low and locked with side rails adjusted as appropriate  - Keep care items and personal belongings within reach  - Initiate and maintain comfort rounds  - Make Fall Risk Sign visible to staff  - Offer Toileting every 2 Hours, in advance of need  - Initiate/Maintain bed alarm  - Obtain necessary fall risk management equipment: non-slip socks  - Apply yellow socks and bracelet for high fall risk patients  - Consider moving patient to room near nurses station  Outcome: Progressing     Problem: Prexisting or High Potential for Compromised Skin Integrity  Goal: Skin integrity is maintained or improved  Description: INTERVENTIONS:  - Identify patients at risk for skin breakdown  - Assess and monitor skin integrity including under and around medical devices   - Assess and monitor nutrition and hydration status  - Monitor labs  - Assess for incontinence   - Turn and reposition patient  - Assist with mobility/ambulation  - Relieve pressure over wale prominences   - Avoid friction and shearing  - Provide appropriate hygiene as needed including keeping skin clean and dry  - Evaluate need for skin moisturizer/barrier cream  - Collaborate with interdisciplinary team  - Patient/family teaching  - Consider wound care consult       Problem: PAIN - ADULT  Goal: Verbalizes/displays adequate comfort level or baseline comfort level  Description: Interventions:  - Encourage patient to monitor pain and request assistance  - Assess pain using appropriate pain scale  - Administer analgesics as ordered based on type  and severity of pain and evaluate response  - Implement non-pharmacological measures as appropriate and evaluate response  - Consider cultural and social influences on pain and pain management  - Notify physician/advanced practitioner if interventions unsuccessful or patient reports new pain  - Educate patient/family on pain management process including their role and importance of  reporting pain   - Provide non-pharmacologic/complimentary pain relief interventions  Outcome: Progressing     Problem: INFECTION - ADULT  Goal: Absence or prevention of progression during hospitalization  Description: INTERVENTIONS:  - Assess and monitor for signs and symptoms of infection  - Monitor lab/diagnostic results  - Monitor all insertion sites, i.e. indwelling lines, tubes, and drains  - Monitor endotracheal if appropriate and nasal secretions for changes in amount and color  - [place] appropriate cooling/warming therapies per order  - Administer medications as ordered  - Instruct and encourage patient and family to use good hand hygiene technique  - Identify and instruct in appropriate isolation precautions for identified infection/condition  Outcome: Progressing  Goal: Absence of fever/infection during neutropenic period  Description: INTERVENTIONS:  - Monitor WBC  - Perform strict hand hygiene  - Limit to healthy visitors only  - No plants, dried, fresh or silk flowers with horne in patient room  Outcome: Progressing     Problem: SAFETY ADULT  Goal: Patient will remain free of falls  Description: INTERVENTIONS:  - Educate patient/family on patient safety including physical limitations  - Instruct patient to call for assistance with activity   - Consider consulting OT/PT to assist with strengthening/mobility based on AM PAC & JH-HLM score  - Consult OT/PT to assist with strengthening/mobility   - Keep Call bell within reach  - Keep bed low and locked with side rails adjusted as appropriate  - Keep care items and personal  belongings within reach  - Initiate and maintain comfort rounds  - Make Fall Risk Sign visible to staff  - Offer Toileting every 2 Hours, in advance of need  - Initiate/Maintain bed alarm  - Obtain necessary fall risk management equipment: non-slip socks  - Apply yellow socks and bracelet for high fall risk patients  - Consider moving patient to room near nurses station  Outcome: Progressing  Goal: Maintain or return to baseline ADL function  Description: INTERVENTIONS:  -  Assess patient's ability to carry out ADLs; assess patient's baseline for ADL function and identify physical deficits which impact ability to perform ADLs (bathing, care of mouth/teeth, toileting, grooming, dressing, etc.)  - Assess/evaluate cause of self-care deficits   - Assess range of motion  - Assess patient's mobility; develop plan if impaired  - Assess patient's need for assistive devices and provide as appropriate  - Encourage maximum independence but intervene and supervise when necessary  - Involve family in performance of ADLs  - Assess for home care needs following discharge   - Consider OT consult to assist with ADL evaluation and planning for discharge  - Provide patient education as appropriate  - Monitor functional capacity and physical performance, use of AM PAC & JH-HLM   - Monitor gait, balance and fatigue with ambulation    Outcome: Progressing  Goal: Maintains/Returns to pre admission functional level  Description: INTERVENTIONS:  - Perform AM-PAC 6 Click Basic Mobility/ Daily Activity assessment daily.  - Set and communicate daily mobility goal to care team and patient/family/caregiver.   - Collaborate with rehabilitation services on mobility goals if consulted  - Perform Range of Motion 4 times a day.  - Reposition patient every 2 hours.  - Dangle patient 3 times a day  - Stand patient 3 times a day  - Ambulate patient 3 times a day  - Out of bed to chair 3 times a day   - Out of bed for meals 3 times a day  - Out of bed  for toileting  - Record patient progress and toleration of activity level   Outcome: Progressing     Problem: DISCHARGE PLANNING  Goal: Discharge to home or other facility with appropriate resources  Description: INTERVENTIONS:  - Identify barriers to discharge w/patient and caregiver  - Arrange for needed discharge resources and transportation as appropriate  - Identify discharge learning needs (meds, wound care, etc.)  - Arrange for interpretive services to assist at discharge as needed  - Refer to Case Management Department for coordinating discharge planning if the patient needs post-hospital services based on physician/advanced practitioner order or complex needs related to functional status, cognitive ability, or social support system  Outcome: Progressing     Problem: Knowledge Deficit  Goal: Patient/family/caregiver demonstrates understanding of disease process, treatment plan, medications, and discharge instructions  Description: Complete learning assessment and assess knowledge base.  Interventions:  - Provide teaching at level of understanding  - Provide teaching via preferred learning methods  Outcome: Progressing

## 2025-06-28 NOTE — ASSESSMENT & PLAN NOTE
Suspect ATN in the setting of shock, contrast CT on 6/24 and acute blood loss   Creatinine continues to improve currently 1.95.  Urine output adequate  Home jardiance and spironolactone on hold   CT: no hydronephrosis

## 2025-06-28 NOTE — PROGRESS NOTES
NEPHROLOGY HOSPITAL PROGRESS NOTE   Rebeca Banegas 89 y.o. female MRN: [id number]  Unit/Bed#: [facility] 513-01 Encounter: [id number]  Reason for Consult: DARIO    Assessment & Plan  DARIO (acute kidney injury) ([facility])  Suspect ATN in the setting of shock, contrast CT on 6/24 and acute blood loss   Creatinine continues to improve currently 1.95.  Urine output adequate  Home jardiance and spironolactone on hold   CT: no hydronephrosis  CKD (chronic kidney disease), stage IV ([facility])  Baseline creatinine since January appears to be 1.6-2.1 with GFR <30  Acute lower limb ischemia  Bilateral LE ischemia likely due to cardioembolus   S/p RLE fem embolectomy and anterolateral fasciotomy and LLE pop/AT embolectomy on 6/24  Shock ([facility])  Overall has resolved, phenylephrine now has been weaned off.  Chronic heart failure with preserved ejection fraction ([facility])  On home bumex 3mg po bid + intermittent IV bumex   CXR with persistent bilateral pleural effusions   Continue with loop diuretic therapy as needed.  Hyperphosphatemia  Phosphorus improved at 4.5  Permanent atrial fibrillation ([facility])      Summary:  Renal function overall appears to be improved  Patient remains nonoliguric  Loop diuretic therapy as needed  No changes from nephrology standpoint    SUBJECTIVE / 24H INTERVAL HISTORY:  Seen and examined.  Patient awake alert.  Complains of mild shortness of breath especially with exertion.  Denies any chest pain or pressure.  Denies abdominal pain.    OBJECTIVE:  Current Weight: Weight - Scale: 60.1 kg (132 lb 7.9 oz)  Vitals:    06/28/25 0700 06/28/25 0800 06/28/25 0840 06/28/25 0900   BP: 95/54 102/59 100/62 (!) 86/52   BP Location:       Pulse: 99 (!) 114 (!) 120 (!) 112   Resp: (!) 11 19 (!) 43 21   Temp:  (!) 97.4 °F (36.3 °C)     TempSrc:  Oral     SpO2: 96% 96% (!) 81% 97%   Weight:       Height:           Intake/Output Summary (Last 24 hours) at 6/28/2025 0920  Last data filed at 6/28/2025 0800  Gross per 24 hour   Intake 2310.68  ml   Output 3983 ml   Net -1672.32 ml       Physical Exam  Constitutional:       Appearance: She is not ill-appearing.     Cardiovascular:      Rate and Rhythm: Normal rate and regular rhythm.   Pulmonary:      Breath sounds: Examination of the right-lower field reveals decreased breath sounds. Examination of the left-lower field reveals decreased breath sounds. Decreased breath sounds present.   Abdominal:      Palpations: Abdomen is soft.      Tenderness: There is no abdominal tenderness.     Musculoskeletal:      Right lower leg: No edema.      Left lower leg: No edema.     Skin:     General: Skin is warm and dry.      Findings: No rash.     Neurological:      Mental Status: She is alert and oriented to person, place, and time.         Medications:  Current Medications[1]    Laboratory Results:  Results from last 7 days   Lab Units 06/28/25  0438 06/27/25  0846 06/27/25  0234 06/26/25  1226 06/26/25  0506 06/25/25  2212 06/25/25  1755 06/25/25  0946 06/25/25  0512 06/24/25  1953   WBC Thousand/uL 9.58  --  11.23* 9.74 8.95  --  11.64* 12.67* 10.36*  --    HEMOGLOBIN g/dL 9.1* 9.9* 9.6* 11.0* 7.5*  --  8.7* 9.8* 8.3*  --    I STAT HEMOGLOBIN g/dl  --   --   --   --   --   --   --   --   --  8.8*   HEMATOCRIT % 26.5*  --  26.5* 31.0* 21.8*  --  25.3* 28.3* 24.9*  --    HEMATOCRIT, ISTAT %  --   --   --   --   --   --   --   --   --  26*   PLATELETS Thousands/uL 114*  --  124* 122* 125*  --  165 168 154  --    POTASSIUM mmol/L 3.7 4.0 3.9 5.1 5.1 5.5* 5.5*  --  4.9  --    CHLORIDE mmol/L 104 104 105 105 103 101 101  --  101  --    CO2 mmol/L 25 21 20* 18* 19* 19* 24  --  22  --    CO2, I-STAT mmol/L  --   --   --   --   --   --   --   --   --  24   BUN mg/dL 63* 70* 70* 66* 63* 61* 63*  --  52*  --    CREATININE mg/dL 1.95* 2.25* 2.38* 2.45* 2.40* 2.20* 2.31*  --  1.70*  --    CALCIUM mg/dL 6.7* 7.6* 7.0* 7.4* 7.4* 7.7* 7.5*  --  7.3*  --    MAGNESIUM mg/dL 2.1 2.4 2.3 2.5 2.3  --   --   --  2.2  --    PHOSPHORUS  "mg/dL 4.5* 5.4* 5.3* 6.7* 6.1*  --   --   --  4.9*  --    GLUCOSE, ISTAT mg/dl  --   --   --   --   --   --   --   --   --  139       Portions of the record may have been created with voice recognition software. Occasional wrong word or \"sound a like\" substitutions may have occurred due to the inherent limitations of voice recognition software. Read the chart carefully and recognize, using context, where substitutions have occurred.If you have any questions, please contact the dictating provider.       [1]   Current Facility-Administered Medications:     acetaminophen (TYLENOL) tablet 975 mg, 975 mg, Oral, Q8H [facility], Vale Esparza MD, 975 mg at 06/28/25 0503    albumin human (FLEXBUMIN) 5 % injection 25 g, 25 g, Intravenous, Every Other Day, Nam Celaya DO    budesonide (PULMICORT) inhalation solution 0.5 mg, 0.5 mg, Nebulization, Q12H, Vale Esparza MD, 0.5 mg at 06/28/25 0724    bumetanide (BUMEX) tablet 3 mg, 3 mg, Oral, BID, Kaci Dobbins PA-C, 3 mg at 06/27/25 1726    guaiFENesin (MUCINEX) 12 hr tablet 600 mg, 600 mg, Oral, Q12H [facility], Vale Esparza MD, 600 mg at 06/28/25 0816    heparin (porcine) 25,000 units in 0.45% NaCl 250 mL infusion (premix), 3-30 Units/kg/hr (Order-Specific), Intravenous, Titrated, Vale Esparza MD, Last Rate: 8.3 mL/hr at 06/28/25 0526, 15 Units/kg/hr at 06/28/25 0526    hydrocortisone (Solu-CORTEF) injection 50 mg, 50 mg, Intravenous, Q6H [facility], Keiry Reeder PA-C, 50 mg at 06/28/25 0503    HYDROmorphone HCl (DILAUDID) injection 0.2 mg, 0.2 mg, Intravenous, Q3H PRN, Nam Celaya DO, 0.2 mg at 06/26/25 0109    insulin lispro (HumALOG/ADMELOG) 100 units/mL subcutaneous injection 1-5 Units, 1-5 Units, Subcutaneous, TID AC, 1 Units at 06/25/25 1602 **AND** Fingerstick Glucose (POCT), , , TID AC, Angi Kelly MD    levalbuterol (XOPENEX) inhalation solution 1.25 mg, 1.25 mg, Nebulization, Q6H, Vale Esparza MD, 1.25 mg at 06/28/25 0724    levothyroxine tablet 50 " mcg, 50 mcg, Oral, Early Morning, Vale Esparza MD, 50 mcg at 06/28/25 0503    metoprolol (LOPRESSOR) injection 5 mg, 5 mg, Intravenous, Q6H, Keiry Reeder PA-C, 5 mg at 06/28/25 0503    oxyCODONE (ROXICODONE) IR tablet 5 mg, 5 mg, Oral, Q4H PRN, Vale Esparza MD, 5 mg at 06/27/25 2325    oxyCODONE (ROXICODONE) split tablet 2.5 mg, 2.5 mg, Oral, Q4H PRN, Vale Esparza MD    senna (SENOKOT) tablet 8.6 mg, 1 tablet, Oral, Daily, Vale Esparza MD, 8.6 mg at 06/28/25 0816    trimethobenzamide (TIGAN) IM injection 200 mg, 200 mg, Intramuscular, Q6H PRN, Keiry Reeder PA-C

## 2025-06-28 NOTE — ASSESSMENT & PLAN NOTE
Wt Readings from Last 3 Encounters:   06/28/25 60.1 kg (132 lb 7.9 oz)   06/18/25 57.2 kg (126 lb 3.2 oz)   06/12/25 54.9 kg (121 lb)   Last echo in August 2024 showing moderate concentric hypertrophy and an LVEF of 60%.  She also has moderate TR and biatrial enlargement.  TTE 6/25 showing an LVEF of 65% with vigorous systolic function and no obvious regional wall motion abnormalities.  Continue home p.o. Bumex 3 mg twice daily. S/p intermittent doses of IV Bumex.  Monitor volume status and continue diuresis as needed.  Monitor I's and O's, volume status and renal function.  Monitor electrolytes and replete as needed.

## 2025-06-29 PROBLEM — E87.20 METABOLIC ACIDOSIS: Status: RESOLVED | Noted: 2025-06-26 | Resolved: 2025-06-29

## 2025-06-29 PROBLEM — R57.9 SHOCK (HCC): Status: RESOLVED | Noted: 2025-06-26 | Resolved: 2025-06-29

## 2025-06-29 PROBLEM — D64.9 ANEMIA: Status: ACTIVE | Noted: 2025-06-29

## 2025-06-29 LAB
ALBUMIN SERPL BCG-MCNC: 3.9 G/DL (ref 3.5–5)
ALP SERPL-CCNC: 29 U/L (ref 34–104)
ALT SERPL W P-5'-P-CCNC: 124 U/L (ref 7–52)
ANION GAP SERPL CALCULATED.3IONS-SCNC: 13 MMOL/L (ref 4–13)
APTT PPP: 59 SECONDS (ref 23–34)
APTT PPP: 81 SECONDS (ref 23–34)
APTT PPP: 90 SECONDS (ref 23–34)
AST SERPL W P-5'-P-CCNC: 98 U/L (ref 13–39)
BASOPHILS # BLD AUTO: 0.01 THOUSANDS/ÂΜL (ref 0–0.1)
BASOPHILS NFR BLD AUTO: 0 % (ref 0–1)
BILIRUB SERPL-MCNC: 1 MG/DL (ref 0.2–1)
BUN SERPL-MCNC: 62 MG/DL (ref 5–25)
CALCIUM SERPL-MCNC: 7 MG/DL (ref 8.4–10.2)
CHLORIDE SERPL-SCNC: 103 MMOL/L (ref 96–108)
CO2 SERPL-SCNC: 24 MMOL/L (ref 21–32)
CREAT SERPL-MCNC: 1.62 MG/DL (ref 0.6–1.3)
EOSINOPHIL # BLD AUTO: 0.12 THOUSAND/ÂΜL (ref 0–0.61)
EOSINOPHIL NFR BLD AUTO: 1 % (ref 0–6)
ERYTHROCYTE [DISTWIDTH] IN BLOOD BY AUTOMATED COUNT: 16.3 % (ref 11.6–15.1)
GFR SERPL CREATININE-BSD FRML MDRD: 27 ML/MIN/1.73SQ M
GLUCOSE SERPL-MCNC: 105 MG/DL (ref 65–140)
GLUCOSE SERPL-MCNC: 118 MG/DL (ref 65–140)
GLUCOSE SERPL-MCNC: 131 MG/DL (ref 65–140)
GLUCOSE SERPL-MCNC: 97 MG/DL (ref 65–140)
HCT VFR BLD AUTO: 27.1 % (ref 34.8–46.1)
HGB BLD-MCNC: 9.2 G/DL (ref 11.5–15.4)
IMM GRANULOCYTES # BLD AUTO: 0.05 THOUSAND/UL (ref 0–0.2)
IMM GRANULOCYTES NFR BLD AUTO: 0 % (ref 0–2)
LYMPHOCYTES # BLD AUTO: 1.93 THOUSANDS/ÂΜL (ref 0.6–4.47)
LYMPHOCYTES NFR BLD AUTO: 16 % (ref 14–44)
MAGNESIUM SERPL-MCNC: 2.1 MG/DL (ref 1.9–2.7)
MCH RBC QN AUTO: 32.3 PG (ref 26.8–34.3)
MCHC RBC AUTO-ENTMCNC: 33.9 G/DL (ref 31.4–37.4)
MCV RBC AUTO: 95 FL (ref 82–98)
MONOCYTES # BLD AUTO: 1.04 THOUSAND/ÂΜL (ref 0.17–1.22)
MONOCYTES NFR BLD AUTO: 9 % (ref 4–12)
NEUTROPHILS # BLD AUTO: 8.84 THOUSANDS/ÂΜL (ref 1.85–7.62)
NEUTS SEG NFR BLD AUTO: 74 % (ref 43–75)
NRBC BLD AUTO-RTO: 0 /100 WBCS
PHOSPHATE SERPL-MCNC: 3.1 MG/DL (ref 2.3–4.1)
PLATELET # BLD AUTO: 126 THOUSANDS/UL (ref 149–390)
PMV BLD AUTO: 9.5 FL (ref 8.9–12.7)
POTASSIUM SERPL-SCNC: 3.4 MMOL/L (ref 3.5–5.3)
PROT SERPL-MCNC: 5.8 G/DL (ref 6.4–8.4)
RBC # BLD AUTO: 2.85 MILLION/UL (ref 3.81–5.12)
SODIUM SERPL-SCNC: 140 MMOL/L (ref 135–147)
WBC # BLD AUTO: 11.99 THOUSAND/UL (ref 4.31–10.16)

## 2025-06-29 PROCEDURE — 99233 SBSQ HOSP IP/OBS HIGH 50: CPT | Performed by: FAMILY MEDICINE

## 2025-06-29 PROCEDURE — 99232 SBSQ HOSP IP/OBS MODERATE 35: CPT | Performed by: INTERNAL MEDICINE

## 2025-06-29 PROCEDURE — 94664 DEMO&/EVAL PT USE INHALER: CPT

## 2025-06-29 PROCEDURE — 85730 THROMBOPLASTIN TIME PARTIAL: CPT | Performed by: FAMILY MEDICINE

## 2025-06-29 PROCEDURE — NC001 PR NO CHARGE: Performed by: SURGERY

## 2025-06-29 PROCEDURE — 94668 MNPJ CHEST WALL SBSQ: CPT

## 2025-06-29 PROCEDURE — 83735 ASSAY OF MAGNESIUM: CPT | Performed by: FAMILY MEDICINE

## 2025-06-29 PROCEDURE — 85025 COMPLETE CBC W/AUTO DIFF WBC: CPT | Performed by: FAMILY MEDICINE

## 2025-06-29 PROCEDURE — 82948 REAGENT STRIP/BLOOD GLUCOSE: CPT

## 2025-06-29 PROCEDURE — 84100 ASSAY OF PHOSPHORUS: CPT | Performed by: FAMILY MEDICINE

## 2025-06-29 PROCEDURE — 94640 AIRWAY INHALATION TREATMENT: CPT

## 2025-06-29 PROCEDURE — 94760 N-INVAS EAR/PLS OXIMETRY 1: CPT

## 2025-06-29 PROCEDURE — 80053 COMPREHEN METABOLIC PANEL: CPT | Performed by: FAMILY MEDICINE

## 2025-06-29 RX ORDER — POTASSIUM CHLORIDE 1500 MG/1
40 TABLET, EXTENDED RELEASE ORAL ONCE
Status: COMPLETED | OUTPATIENT
Start: 2025-06-29 | End: 2025-06-29

## 2025-06-29 RX ORDER — LIDOCAINE HYDROCHLORIDE 10 MG/ML
10 INJECTION, SOLUTION EPIDURAL; INFILTRATION; INTRACAUDAL; PERINEURAL ONCE
Status: COMPLETED | OUTPATIENT
Start: 2025-06-29 | End: 2025-06-29

## 2025-06-29 RX ADMIN — ACETAMINOPHEN 975 MG: 325 TABLET ORAL at 14:30

## 2025-06-29 RX ADMIN — LEVALBUTEROL HYDROCHLORIDE 1.25 MG: 1.25 SOLUTION RESPIRATORY (INHALATION) at 02:24

## 2025-06-29 RX ADMIN — LEVALBUTEROL HYDROCHLORIDE 1.25 MG: 1.25 SOLUTION RESPIRATORY (INHALATION) at 08:14

## 2025-06-29 RX ADMIN — OXYCODONE HYDROCHLORIDE 5 MG: 5 TABLET ORAL at 00:16

## 2025-06-29 RX ADMIN — LIDOCAINE HYDROCHLORIDE 10 ML: 10 INJECTION, SOLUTION EPIDURAL; INFILTRATION; INTRACAUDAL at 07:56

## 2025-06-29 RX ADMIN — Medication 12.5 MG: at 05:24

## 2025-06-29 RX ADMIN — BUMETANIDE 3 MG: 2 TABLET ORAL at 17:17

## 2025-06-29 RX ADMIN — Medication 12.5 MG: at 17:17

## 2025-06-29 RX ADMIN — HYDROCORTISONE SODIUM SUCCINATE 50 MG: 100 INJECTION, POWDER, FOR SOLUTION INTRAMUSCULAR; INTRAVENOUS at 08:03

## 2025-06-29 RX ADMIN — GUAIFENESIN 600 MG: 600 TABLET, EXTENDED RELEASE ORAL at 08:00

## 2025-06-29 RX ADMIN — LEVOTHYROXINE SODIUM 50 MCG: 0.05 TABLET ORAL at 05:24

## 2025-06-29 RX ADMIN — Medication 12.5 MG: at 11:22

## 2025-06-29 RX ADMIN — Medication 12.5 MG: at 23:18

## 2025-06-29 RX ADMIN — GUAIFENESIN 600 MG: 600 TABLET, EXTENDED RELEASE ORAL at 22:06

## 2025-06-29 RX ADMIN — BUDESONIDE 0.5 MG: 0.5 INHALANT RESPIRATORY (INHALATION) at 08:14

## 2025-06-29 RX ADMIN — ACETAMINOPHEN 975 MG: 325 TABLET ORAL at 22:06

## 2025-06-29 RX ADMIN — OXYCODONE HYDROCHLORIDE 5 MG: 5 TABLET ORAL at 07:53

## 2025-06-29 RX ADMIN — LEVALBUTEROL HYDROCHLORIDE 1.25 MG: 1.25 SOLUTION RESPIRATORY (INHALATION) at 20:47

## 2025-06-29 RX ADMIN — LEVALBUTEROL HYDROCHLORIDE 1.25 MG: 1.25 SOLUTION RESPIRATORY (INHALATION) at 13:50

## 2025-06-29 RX ADMIN — POTASSIUM CHLORIDE 40 MEQ: 1500 TABLET, EXTENDED RELEASE ORAL at 05:24

## 2025-06-29 RX ADMIN — OXYCODONE HYDROCHLORIDE 5 MG: 5 TABLET ORAL at 22:05

## 2025-06-29 RX ADMIN — SENNOSIDES 8.6 MG: 8.6 TABLET, FILM COATED ORAL at 08:00

## 2025-06-29 RX ADMIN — BUMETANIDE 3 MG: 2 TABLET ORAL at 14:30

## 2025-06-29 RX ADMIN — BUDESONIDE 0.5 MG: 0.5 INHALANT RESPIRATORY (INHALATION) at 20:47

## 2025-06-29 NOTE — ASSESSMENT & PLAN NOTE
Wt Readings from Last 3 Encounters:   06/28/25 60.1 kg (132 lb 7.9 oz)   06/18/25 57.2 kg (126 lb 3.2 oz)   06/12/25 54.9 kg (121 lb)     Currently on Bumex 3 mg twice daily, which his home dose

## 2025-06-29 NOTE — ASSESSMENT & PLAN NOTE
89-year-old female with PMH of HFpEF, permanent A-fib on Coumadin, history of CVA, bronchiectasis originally presented to ED with complaint of right leg pain.  CTA of lower extremity was concerning for right femoral acute arterial occlusion.  Noted to have subtherapeutic INR at 1.4  Patient started on heparin infusion  underwent emergent right leg embolectomy/thrombectomy, fasciotomy and left popliteal thromboembolectomy on 6/24/2025 by vascular surgery.  Postoperatively patient noted to be hypotensive, therefore patient transferred to critical care for vasopressors.  Patient subsequently weaned off of pressors, and downgraded to MedSurg  Continue heparin infusion, eventually will need to switch to DOAC, Eliquis/Xarelto once vascular surgery clears.  Price check completed  Continue aspirin and statin  Vascular surgery following, underwent bedside closure of right lower extremity fasciotomies today, left lower extremity incision remains open for drainage

## 2025-06-29 NOTE — PROGRESS NOTES
Progress Note - Hospitalist   Name: Rebeca Banegas 89 y.o. female I MRN: [id number]  Unit/Bed#: [facility] 512-01 I Date of Admission: 6/24/2025   Date of Service: 6/29/2025 I Hospital Day: 5    Assessment & Plan  Acute lower limb ischemia  89-year-old female with PMH of HFpEF, permanent A-fib on Coumadin, history of CVA, bronchiectasis originally presented to ED with complaint of right leg pain.  CTA of lower extremity was concerning for right femoral acute arterial occlusion.  Noted to have subtherapeutic INR at 1.4  Patient started on heparin infusion  underwent emergent right leg embolectomy/thrombectomy, fasciotomy and left popliteal thromboembolectomy on 6/24/2025 by vascular surgery.  Postoperatively patient noted to be hypotensive, therefore patient transferred to critical care for vasopressors.  Patient subsequently weaned off of pressors, and downgraded to MedSurg  Continue heparin infusion, eventually will need to switch to DOAC, Eliquis/Xarelto once vascular surgery clears.  Price check completed  Continue aspirin and statin  Vascular surgery following, underwent bedside closure of right lower extremity fasciotomies today, left lower extremity incision remains open for drainage    Asthma  Currently not in exacerbation, continue inhalers  Permanent atrial fibrillation (HCC)  Cardiology consult appreciated  Due to concern for subtherapeutic INR described of being compliant with Coumadin resulting in acute lower limb ischemia, cardiology is recommending DOAC on discharge.  Pending price check  Remain on heparin infusion  Lopressor 12.5 mg every 6 hours  Bifascicular bundle branch block    Chronic heart failure with preserved ejection fraction (HCC)  Wt Readings from Last 3 Encounters:   06/28/25 60.1 kg (132 lb 7.9 oz)   06/18/25 57.2 kg (126 lb 3.2 oz)   06/12/25 54.9 kg (121 lb)     Currently on Bumex 3 mg twice daily, which his home dose        Primary hypertension  Currently on p.o. Lopressor and  Bumex  Bronchiectasis (HCC)  Currently on room air  Continue Pulmicort/Xopenex  History of CVA (cerebrovascular accident)  History of CVA in August 2024, thought to be embolic in the setting of warfarin interruption with underlying permanent A-fib.  Currently on heparin infusion, cardiology recommending DOAC on discharge  DARIO (acute kidney injury) (HCC)  Patient noted to have DARIO in critical care secondary to contrast associated nephropathy, acute blood loss anemia, and undifferentiated shock requiring vasopressors.  Baseline creatinine 1.6-2.1  Currently creatinine improving, today 1.62  Hyperphosphatemia    Anemia  Postoperative anemia, status post 2 unit PRBC and critical care  Shock (HCC)  Noted to be hypotensive postoperatively required critical care stay and vasopressors.  Currently resolved    VTE Pharmacologic Prophylaxis:   High Risk (Score >/= 5) - Pharmacological DVT Prophylaxis Ordered: heparin drip. Sequential Compression Devices Ordered.    Mobility:   Basic Mobility Inpatient Raw Score: 13  JH-HLM Goal: 4: Move to chair/commode  JH-HLM Achieved: 4: Move to chair/commode  JH-HLM Goal achieved. Continue to encourage appropriate mobility.    Patient Centered Rounds: I performed bedside rounds with nursing staff today.   Discussions with Specialists or Other Care Team Provider: Vascular surgery    Education and Discussions with Family / Patient: Attempted to update  (daughter) via phone. Left voicemail.     Current Length of Stay: 5 day(s)  Current Patient Status: Inpatient   Certification Statement: The patient will continue to require additional inpatient hospital stay due to pending vascular surgery clearance  Discharge Plan: Anticipate discharge in 48-72 hrs to discharge location to be determined pending rehab evaluations.    Code Status: Level 3 - DNAR and DNI    Subjective   Patient examined at bedside.  Patient is sitting on bedside sofa.  Denies any significant pain in lower  extremity.  Patient had fasciotomy closure this morning at bedside.  Currently in room air.  Denies any dyspnea.    Objective :  Temp:  [96.7 °F (35.9 °C)-97.6 °F (36.4 °C)] 97.5 °F (36.4 °C)  HR:  [] 107  BP: (104-136)/(52-78) 121/64  Resp:  [14-18] 14  SpO2:  [96 %-100 %] 100 %  O2 Device: Nasal cannula  Nasal Cannula O2 Flow Rate (L/min):  [0 L/min-2 L/min] 2 L/min    Body mass index is 22.05 kg/m².     Input and Output Summary (last 24 hours):     Intake/Output Summary (Last 24 hours) at 6/29/2025 1414  Last data filed at 6/29/2025 1301  Gross per 24 hour   Intake 704.08 ml   Output 1500 ml   Net -795.92 ml       Physical Exam  Constitutional:       Appearance: She is well-developed.   HENT:      Head: Normocephalic and atraumatic.   Pulmonary:      Effort: Pulmonary effort is normal. No respiratory distress.      Breath sounds: Normal breath sounds.     Musculoskeletal:      Cervical back: Normal range of motion.     Skin:     General: Skin is warm and dry.          Lines/Drains:  Lines/Drains/Airways       Active Status       Name Placement date Placement time Site Days    External Urinary Catheter 06/27/25  1155  -- 2                            Lab Results: I have reviewed the following results:   Results from last 7 days   Lab Units 06/29/25  0130   WBC Thousand/uL 11.99*   HEMOGLOBIN g/dL 9.2*   HEMATOCRIT % 27.1*   PLATELETS Thousands/uL 126*   SEGS PCT % 74   LYMPHO PCT % 16   MONO PCT % 9   EOS PCT % 1     Results from last 7 days   Lab Units 06/29/25  0130   SODIUM mmol/L 140   POTASSIUM mmol/L 3.4*   CHLORIDE mmol/L 103   CO2 mmol/L 24   BUN mg/dL 62*   CREATININE mg/dL 1.62*   ANION GAP mmol/L 13   CALCIUM mg/dL 7.0*   ALBUMIN g/dL 3.9   TOTAL BILIRUBIN mg/dL 1.00   ALK PHOS U/L 29*   ALT U/L 124*   AST U/L 98*   GLUCOSE RANDOM mg/dL 118     Results from last 7 days   Lab Units 06/24/25  0412   INR  1.47*     Results from last 7 days   Lab Units 06/29/25  1051 06/29/25  0633 06/28/25  4751  06/28/25  1133 06/28/25  0607 06/27/25  1635 06/27/25  1153 06/27/25  0554 06/26/25  1614 06/26/25  1137 06/26/25  0628 06/25/25  1601   POC GLUCOSE mg/dl 105 97 104 129 118 92 96 101 140 114 149* 151*     Results from last 7 days   Lab Units 06/25/25  0625   HEMOGLOBIN A1C % 6.3*     Results from last 7 days   Lab Units 06/27/25  0846 06/26/25  0506 06/25/25  2212 06/25/25  2030 06/25/25  1755 06/25/25  0816   LACTIC ACID mmol/L 1.6  --  2.0 2.9* 2.1*  --    PROCALCITONIN ng/ml  --  0.85*  --   --   --  0.19       Recent Cultures (last 7 days):            Last 24 Hours Medication List:     Current Facility-Administered Medications:     acetaminophen (TYLENOL) tablet 975 mg, Q8H GARTH    budesonide (PULMICORT) inhalation solution 0.5 mg, Q12H    bumetanide (BUMEX) tablet 3 mg, BID    guaiFENesin (MUCINEX) 12 hr tablet 600 mg, Q12H GARTH    heparin (porcine) 25,000 units in 0.45% NaCl 250 mL infusion (premix), Titrated, Last Rate: 19 Units/kg/hr (06/29/25 0212)    HYDROmorphone HCl (DILAUDID) injection 0.2 mg, Q3H PRN    insulin lispro (HumALOG/ADMELOG) 100 units/mL subcutaneous injection 1-5 Units, TID AC **AND** Fingerstick Glucose (POCT), TID AC    levalbuterol (XOPENEX) inhalation solution 1.25 mg, Q6H    levothyroxine tablet 50 mcg, Early Morning    metoprolol tartrate (LOPRESSOR) partial tablet 12.5 mg, Q6H    ondansetron (ZOFRAN) injection 4 mg, Q6H PRN    oxyCODONE (ROXICODONE) IR tablet 5 mg, Q4H PRN    oxyCODONE (ROXICODONE) split tablet 2.5 mg, Q4H PRN    senna (SENOKOT) tablet 8.6 mg, Daily    Administrative Statements   Today, Patient Was Seen By: Amanda Marx MD      **Please Note: This note may have been constructed using a voice recognition system.**

## 2025-06-29 NOTE — PROGRESS NOTES
Progress Note - Vascular Surgery   Name: Rebeca Banegas 89 y.o. female I MRN: [id number]  Unit/Bed#: [facility] 512-01 I Date of Admission: 6/24/2025   Date of Service: 6/29/2025 I Hospital Day: 5    Assessment & Plan  Acute lower limb ischemia  89yoF with Bronchiectasis, asthma, chronic HFpEF, permanent Afib (Warfarin), MGUS, CVA Aug '24 suspected cardioembolic 2/2 AC hold in the past, Bifascicular bundle branch block who is ambulatory with a cane presents with severe RLE rest pain and associated diminished sensation to SLB. Clinical exam and imaging concerning for [place] 2B acute limb ischemia of the RLE, L popliteal occlusion also noted on presentation.    6/24 morning: R femoral cutdown, thromboembolectomy, R anterior and lateral fasciotomy, partial proximal fasciotomy closure (Eriberto)  6/24 evening: L BK pop exposure, thromboembolectomy ([place])  6/29 bedside closure of fasciotomies (Cj Majano)    Plan:  -continue to perform neurovascular checks in lower extremities  -improved hypotension   -appreciate continued Nephrology recs  -continue heparin gtt, aspirin, statin. Will eventually need to be considered for DOAC   -Cardiology consulted and planning to transition to eliquis & xarelto  -continue to monitor UOP and renal function  -continue to monitor respiratory status, start home nebs and diuretics when able  -no further CK levels required, low suspicion for compartment syndrome or rhabdo  -okay to leave LLE incision open to air  -compressive therapy lower extremity prn  -continue to monitor NV evam  -ambulate pt, OOB, PT/OT, ISB      24 Hour Events : Changed RLE fasciotomy site overnight. Patient involved and assisted throughout change. Transferred out to floor from ICU yesterday. NAEON. Afebrile, VSS on RA. Appropriate UOP.  Subjective : No acute pain. Tolerating oral diet without nausea/vomiting.    Objective :  Temp:  [96.7 °F (35.9 °C)-97.6 °F (36.4 °C)] 97.6 °F (36.4 °C)  HR:  [] 92  BP:  ()/(52-78) 130/66  Resp:  [14-43] 18  SpO2:  [81 %-99 %] 96 %  O2 Device: None (Room air)  Nasal Cannula O2 Flow Rate (L/min):  [0 L/min-2 L/min] 0 L/min     I/O         06/27 0701  06/28 0700 06/28 0701  06/29 0700 06/29 0701  06/30 0700    P.O. 1840 360     I.V. (mL/kg) 767.6 (12.8) 183.8 (3.1)     Blood       IV Piggyback  500     Total Intake(mL/kg) 2607.6 (43.4) 1043.8 (17.4)     Urine (mL/kg/hr) 4133 (2.9) 1850 (1.3)     Stool 0      Total Output 4133 1850     Net -1525.4 -806.2            Unmeasured Stool Occurrence 1 x            Lines/Drains/Airways       Active Status       Name Placement date Placement time Site Days    External Urinary Catheter 06/27/25  1155  -- 1                  Physical Exam  Vitals and nursing note reviewed.   Constitutional:       General: She is not in acute distress.     Appearance: Normal appearance. She is well-developed.   HENT:      Head: Normocephalic and atraumatic.      Right Ear: External ear normal.      Left Ear: External ear normal.      Nose: Nose normal.      Mouth/Throat:      Mouth: Mucous membranes are moist.     Eyes:      General: No scleral icterus.     Extraocular Movements: Extraocular movements intact.      Conjunctiva/sclera: Conjunctivae normal.       Cardiovascular:      Rate and Rhythm: Normal rate.   Pulmonary:      Effort: Pulmonary effort is normal. No respiratory distress.      Comments: No increased WOB on RA  Abdominal:      Palpations: Abdomen is soft.      Tenderness: There is no abdominal tenderness.     Musculoskeletal:         General: No swelling.      Cervical back: Neck supple.      Right lower leg: No edema.      Left lower leg: No edema.      Comments: Closed RLE fasciotomy covered with meliplex. LLE stapled incision well-approximated without surrounding swelling. No significant lower extremity edema.     Skin:     General: Skin is warm and dry.      Capillary Refill: Capillary refill takes less than 2 seconds. Bilateral lower  extremities warm & well perfused     Coloration: Skin is not jaundiced.      Findings: No rash.      Comments: R groin incision well approximated with post-operative ecchymosis, no pulsatile swelling, induration, firmness, or bleeding     Neurological:      General: No focal deficit present.      Mental Status: She is alert and oriented to person, place, and time. Mental status is at baseline.      Sensory: No sensory deficit.      Motor: No weakness.     Psychiatric:         Mood and Affect: Mood normal.         Behavior: Behavior normal.         Thought Content: Thought content normal.         Judgment: Judgment normal.     RLE Fasciotomy Closure:        Lab Results: I have reviewed the following results:  CBC with diff:   Lab Results   Component Value Date    WBC 11.99 (H) 06/29/2025    HGB 9.2 (L) 06/29/2025    HCT 27.1 (L) 06/29/2025    MCV 95 06/29/2025     (L) 06/29/2025    RBC 2.85 (L) 06/29/2025    MCH 32.3 06/29/2025    MCHC 33.9 06/29/2025    RDW 16.3 (H) 06/29/2025    MPV 9.5 06/29/2025    NRBC 0 06/29/2025   ,   BMP/CMP:  Lab Results   Component Value Date    SODIUM 140 06/29/2025    SODIUM 138 01/07/2025    SODIUM 138 01/02/2024    K 3.4 (L) 06/29/2025    K 3.6 01/07/2025    K 4.0 01/02/2024     06/29/2025    CL 95 (L) 01/07/2025     01/02/2024    CO2 24 06/29/2025    CO2 24 06/24/2025    CO2 32 (H) 01/07/2025    CO2 29 01/02/2024    BUN 62 (H) 06/29/2025    BUN 49 (H) 01/07/2025    BUN 33 (H) 01/02/2024    CREATININE 1.62 (H) 06/29/2025    CREATININE 1.35 (H) 01/02/2024    GLUCOSE 139 06/24/2025    CALCIUM 7.0 (L) 06/29/2025    CALCIUM 8.0 (L) 01/07/2025    CALCIUM 8.4 (L) 01/02/2024    AST 98 (H) 06/29/2025    AST 30 01/02/2024     (H) 06/29/2025    ALT 27 01/02/2024    ALKPHOS 29 (L) 06/29/2025    ALKPHOS 51 01/02/2024    PROT 6.4 10/17/2017    BILITOT 0.5 10/17/2017    EGFR 27 06/29/2025    EGFR 37 (L) 01/02/2024    EGFR 35 (L) 12/29/2020   ,   Lipid Panel:   Lab Results  "  Component Value Date    CHOL 205 (H) 02/07/2017   ,   Coags:   Lab Results   Component Value Date    PT 23.7 (H) 02/23/2024    PTT 59 (H) 06/29/2025    INR 1.47 (H) 06/24/2025    INR 2.2 02/23/2024   ,   Blood Culture: No results found for: \"BLOODCX\",   Urinalysis:   Lab Results   Component Value Date    COLORU Light Yellow 12/29/2024    CLARITYU Clear 12/29/2024    SPECGRAV 1.011 12/29/2024    PHUR 5.0 12/29/2024    LEUKOCYTESUR Negative 12/29/2024    NITRITE Negative 12/29/2024    GLUCOSEU Negative 12/29/2024    KETONESU Negative 12/29/2024    BILIRUBINUR Negative 12/29/2024    BLOODU Negative 12/29/2024   ,   Urine Culture: No results found for: \"URINECX\",   Wound Culure:   Lab Results   Component Value Date    WOUNDCULT Few Colonies of Proteus mirabilis (A) 09/04/2024    WOUNDCULT Few Colonies of Acinetobacter baumannii (A) 09/04/2024     Imaging Results Review: No pertinent imaging studies reviewed.  Other Study Results Review: No additional pertinent studies reviewed.    VTE Prophylaxis: VTE covered by:  heparin (porcine), Intravenous, 19 Units/kg/hr at 06/29/25 0212      "

## 2025-06-29 NOTE — ASSESSMENT & PLAN NOTE
Cardiology consult appreciated  Due to concern for subtherapeutic INR described of being compliant with Coumadin resulting in acute lower limb ischemia, cardiology is recommending DOAC on discharge.  Pending price check  Remain on heparin infusion  Lopressor 12.5 mg every 6 hours

## 2025-06-29 NOTE — ASSESSMENT & PLAN NOTE
History of CVA in August 2024, thought to be embolic in the setting of warfarin interruption with underlying permanent A-fib.  Currently on heparin infusion, cardiology recommending DOAC on discharge

## 2025-06-29 NOTE — PROGRESS NOTES
NEPHROLOGY HOSPITAL PROGRESS NOTE   Rebeca Banegas 89 y.o. female MRN: [id number]  Unit/Bed#: [facility] 512-01 Encounter: [id number]  Reason for Consult: DARIO    Assessment & Plan  DARIO (acute kidney injury) (Former[facility])  Suspect ATN in the setting of shock, contrast CT on 6/24 and acute blood loss   Creatinine continues to improve currently 1.62  Urine output adequate  Home jardiance and spironolactone on hold   CT: no hydronephrosis  CKD (chronic kidney disease), stage IV ([facility])  Baseline creatinine since January appears to be 1.6-2.1 with GFR <30  Acute lower limb ischemia  Bilateral LE ischemia likely due to cardioembolus   S/p RLE fem embolectomy and anterolateral fasciotomy and LLE pop/AT embolectomy on 6/24  Status post bedside fasciotomy closure   Shock ([facility])  Overall has resolved, phenylephrine now has been weaned off.  Chronic heart failure with preserved ejection fraction (Former[facility])  Home: Home bumex 3mg po bid   CXR with persistent bilateral pleural effusions   Currently on Bumex 3 mg twice daily  Hyperphosphatemia  Phosphorus improved at 3.1  Permanent atrial fibrillation ([facility])      Summary:  Renal function has continued to improve  Recommend potassium replacement  Continue with current diuretic regimen  No changes from nephrology standpoint.    SUBJECTIVE / 24H INTERVAL HISTORY:  Seen and examined.  Patient awake alert.  Offers no new complaints.  Denies any chest pain, shortness of breath at rest.  No abdominal pain.    OBJECTIVE:  Current Weight: Weight - Scale: 60.1 kg (132 lb 7.9 oz)  Vitals:    06/28/25 2210 06/28/25 2234 06/29/25 0520 06/29/25 0725   BP: 136/78  104/62 130/66   BP Location:       Pulse: 102 101 101 92   Resp: 18   18   Temp: (!) 96.7 °F (35.9 °C)   97.6 °F (36.4 °C)   TempSrc:    Oral   SpO2:   96% 96%   Weight:       Height:           Intake/Output Summary (Last 24 hours) at 6/29/2025 0943  Last data filed at 6/29/2025 0520  Gross per 24 hour   Intake 1027.35 ml   Output 1600 ml   Net -572.65 ml        Physical Exam  Constitutional:       Appearance: She is not ill-appearing.     Eyes:      General: No scleral icterus.      Cardiovascular:      Rate and Rhythm: Normal rate and regular rhythm.   Pulmonary:      Effort: Pulmonary effort is normal.      Breath sounds: Normal breath sounds.   Abdominal:      General: There is no distension.      Palpations: Abdomen is soft.     Musculoskeletal:      Right lower leg: No edema.      Left lower leg: No edema.     Skin:     General: Skin is warm and dry.      Findings: No rash.     Neurological:      Mental Status: She is alert and oriented to person, place, and time.         Medications:  Current Medications[1]    Laboratory Results:  Results from last 7 days   Lab Units 06/29/25  0130 06/28/25  0438 06/27/25  0846 06/27/25  0234 06/26/25  1226 06/26/25  0506 06/25/25  2212 06/25/25  1755 06/25/25  0946 06/25/25  0512 06/24/25  1953   WBC Thousand/uL 11.99* 9.58  --  11.23* 9.74 8.95  --  11.64* 12.67* 10.36*  --    HEMOGLOBIN g/dL 9.2* 9.1* 9.9* 9.6* 11.0* 7.5*  --  8.7* 9.8* 8.3*  --    I STAT HEMOGLOBIN g/dl  --   --   --   --   --   --   --   --   --   --  8.8*   HEMATOCRIT % 27.1* 26.5*  --  26.5* 31.0* 21.8*  --  25.3* 28.3* 24.9*  --    HEMATOCRIT, ISTAT %  --   --   --   --   --   --   --   --   --   --  26*   PLATELETS Thousands/uL 126* 114*  --  124* 122* 125*  --  165 168 154  --    POTASSIUM mmol/L 3.4* 3.7 4.0 3.9 5.1 5.1 5.5* 5.5*  --  4.9  --    CHLORIDE mmol/L 103 104 104 105 105 103 101 101  --  101  --    CO2 mmol/L 24 25 21 20* 18* 19* 19* 24  --  22  --    CO2, I-STAT mmol/L  --   --   --   --   --   --   --   --   --   --  24   BUN mg/dL 62* 63* 70* 70* 66* 63* 61* 63*  --  52*  --    CREATININE mg/dL 1.62* 1.95* 2.25* 2.38* 2.45* 2.40* 2.20* 2.31*  --  1.70*  --    CALCIUM mg/dL 7.0* 6.7* 7.6* 7.0* 7.4* 7.4* 7.7* 7.5*  --  7.3*  --    MAGNESIUM mg/dL 2.1 2.1 2.4 2.3 2.5 2.3  --   --   --  2.2  --    PHOSPHORUS mg/dL 3.1 4.5* 5.4* 5.3* 6.7*  "6.1*  --   --   --  4.9*  --    GLUCOSE, ISTAT mg/dl  --   --   --   --   --   --   --   --   --   --  139       Portions of the record may have been created with voice recognition software. Occasional wrong word or \"sound a like\" substitutions may have occurred due to the inherent limitations of voice recognition software. Read the chart carefully and recognize, using context, where substitutions have occurred.If you have any questions, please contact the dictating provider.       [1]   Current Facility-Administered Medications:     acetaminophen (TYLENOL) tablet 975 mg, 975 mg, Oral, Q8H GARTH, Nam Celaya, DO, 975 mg at 06/28/25 2143    budesonide (PULMICORT) inhalation solution 0.5 mg, 0.5 mg, Nebulization, Q12H, Nam Celaya, DO, 0.5 mg at 06/29/25 0814    bumetanide (BUMEX) tablet 3 mg, 3 mg, Oral, BID, Nam Celaya DO, 3 mg at 06/28/25 1440    guaiFENesin (MUCINEX) 12 hr tablet 600 mg, 600 mg, Oral, Q12H GARTH, Nam Lugounzinger, DO, 600 mg at 06/29/25 0800    heparin (porcine) 25,000 units in 0.45% NaCl 250 mL infusion (premix), 3-30 Units/kg/hr (Order-Specific), Intravenous, Titrated, Nam Celaya DO, Last Rate: 10.5 mL/hr at 06/29/25 0212, 19 Units/kg/hr at 06/29/25 0212    HYDROmorphone HCl (DILAUDID) injection 0.2 mg, 0.2 mg, Intravenous, Q3H PRN, Nam Celaya DO, 0.2 mg at 06/26/25 0109    insulin lispro (HumALOG/ADMELOG) 100 units/mL subcutaneous injection 1-5 Units, 1-5 Units, Subcutaneous, TID AC, 1 Units at 06/25/25 1602 **AND** Fingerstick Glucose (POCT), , , TID AC, Nam Celaya DO    levalbuterol (XOPENEX) inhalation solution 1.25 mg, 1.25 mg, Nebulization, Q6H, Nam Celaya, DO, 1.25 mg at 06/29/25 0814    levothyroxine tablet 50 mcg, 50 mcg, Oral, Early Morning, Nam Celaya DO, 50 mcg at 06/29/25 0524    metoprolol tartrate (LOPRESSOR) partial tablet 12.5 mg, 12.5 mg, Oral, Q6H, Nam Celaya DO, 12.5 mg at 06/29/25 " 0524    ondansetron (ZOFRAN) injection 4 mg, 4 mg, Intravenous, Q6H PRN, Nam Celaya DO    oxyCODONE (ROXICODONE) IR tablet 5 mg, 5 mg, Oral, Q4H PRN, Nam Celaya DO, 5 mg at 06/29/25 0753    oxyCODONE (ROXICODONE) split tablet 2.5 mg, 2.5 mg, Oral, Q4H PRN, Nam Celaya DO    senna (SENOKOT) tablet 8.6 mg, 1 tablet, Oral, Daily, Nam Celaya DO, 8.6 mg at 06/29/25 0800

## 2025-06-29 NOTE — ASSESSMENT & PLAN NOTE
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 Clinical exam and imaging concerning for [place] 2B acute limb ischemia of the RLE, L popliteal occlusion also noted on presentation.    6/24 morning: R femoral cutdown, thromboembolectomy, R anterior and lateral fasciotomy, partial proximal fasciotomy closure (Eriberto)  6/24 evening: L BK pop exposure, thromboembolectomy ([place])  6/29 bedside closure of fasciotomies (Cj Majano)    Plan:  -continue to perform neurovascular checks in lower extremities  -improved hypotension   -appreciate continued Nephrology recs  -continue heparin gtt, aspirin, statin. Will eventually need to be considered for DOAC   -Cardiology consulted and planning to transition to eliquis & xarelto  -continue to monitor UOP and renal function  -continue to monitor respiratory status, start home nebs and diuretics when able  -no further CK levels required, low suspicion for compartment syndrome or rhabdo  -okay to leave LLE incision open to air  -compressive therapy lower extremity prn  -continue to monitor NV evam  -ambulate pt, OOB, PT/OT, ISB

## 2025-06-29 NOTE — ASSESSMENT & PLAN NOTE
Patient noted to have DARIO in critical care secondary to contrast associated nephropathy, acute blood loss anemia, and undifferentiated shock requiring vasopressors.  Baseline creatinine 1.6-2.1  Currently creatinine improving, today 1.62

## 2025-06-29 NOTE — ASSESSMENT & PLAN NOTE
Bilateral LE ischemia likely due to cardioembolus   S/p RLE fem embolectomy and anterolateral fasciotomy and LLE pop/AT embolectomy on 6/24  Status post bedside fasciotomy closure

## 2025-06-29 NOTE — ASSESSMENT & PLAN NOTE
Noted to be hypotensive postoperatively required critical care stay and vasopressors.  Currently resolved

## 2025-06-29 NOTE — ASSESSMENT & PLAN NOTE
Home: Home bumex 3mg po bid   CXR with persistent bilateral pleural effusions   Currently on Bumex 3 mg twice daily

## 2025-06-30 LAB
ANION GAP SERPL CALCULATED.3IONS-SCNC: 7 MMOL/L (ref 4–13)
APTT PPP: 55 SECONDS (ref 23–34)
APTT PPP: 67 SECONDS (ref 23–34)
APTT PPP: 92 SECONDS (ref 23–34)
BUN SERPL-MCNC: 54 MG/DL (ref 5–25)
CALCIUM SERPL-MCNC: 7 MG/DL (ref 8.4–10.2)
CHLORIDE SERPL-SCNC: 104 MMOL/L (ref 96–108)
CO2 SERPL-SCNC: 30 MMOL/L (ref 21–32)
CREAT SERPL-MCNC: 1.34 MG/DL (ref 0.6–1.3)
ERYTHROCYTE [DISTWIDTH] IN BLOOD BY AUTOMATED COUNT: 17.2 % (ref 11.6–15.1)
GFR SERPL CREATININE-BSD FRML MDRD: 34 ML/MIN/1.73SQ M
GLUCOSE SERPL-MCNC: 103 MG/DL (ref 65–140)
GLUCOSE SERPL-MCNC: 106 MG/DL (ref 65–140)
GLUCOSE SERPL-MCNC: 65 MG/DL (ref 65–140)
GLUCOSE SERPL-MCNC: 74 MG/DL (ref 65–140)
GLUCOSE SERPL-MCNC: 85 MG/DL (ref 65–140)
HCT VFR BLD AUTO: 28.6 % (ref 34.8–46.1)
HGB BLD-MCNC: 9.4 G/DL (ref 11.5–15.4)
MCH RBC QN AUTO: 32.1 PG (ref 26.8–34.3)
MCHC RBC AUTO-ENTMCNC: 32.9 G/DL (ref 31.4–37.4)
MCV RBC AUTO: 98 FL (ref 82–98)
PLATELET # BLD AUTO: 157 THOUSANDS/UL (ref 149–390)
PMV BLD AUTO: 9.5 FL (ref 8.9–12.7)
POTASSIUM SERPL-SCNC: 3.3 MMOL/L (ref 3.5–5.3)
RBC # BLD AUTO: 2.93 MILLION/UL (ref 3.81–5.12)
SODIUM SERPL-SCNC: 141 MMOL/L (ref 135–147)
WBC # BLD AUTO: 13.4 THOUSAND/UL (ref 4.31–10.16)

## 2025-06-30 PROCEDURE — NC001 PR NO CHARGE: Performed by: SURGERY

## 2025-06-30 PROCEDURE — 97535 SELF CARE MNGMENT TRAINING: CPT

## 2025-06-30 PROCEDURE — 94664 DEMO&/EVAL PT USE INHALER: CPT

## 2025-06-30 PROCEDURE — 85730 THROMBOPLASTIN TIME PARTIAL: CPT | Performed by: FAMILY MEDICINE

## 2025-06-30 PROCEDURE — 94760 N-INVAS EAR/PLS OXIMETRY 1: CPT

## 2025-06-30 PROCEDURE — 94640 AIRWAY INHALATION TREATMENT: CPT

## 2025-06-30 PROCEDURE — 88304 TISSUE EXAM BY PATHOLOGIST: CPT | Performed by: PATHOLOGY

## 2025-06-30 PROCEDURE — 97116 GAIT TRAINING THERAPY: CPT

## 2025-06-30 PROCEDURE — 94668 MNPJ CHEST WALL SBSQ: CPT

## 2025-06-30 PROCEDURE — 85027 COMPLETE CBC AUTOMATED: CPT | Performed by: FAMILY MEDICINE

## 2025-06-30 PROCEDURE — 80048 BASIC METABOLIC PNL TOTAL CA: CPT | Performed by: FAMILY MEDICINE

## 2025-06-30 PROCEDURE — 88304 TISSUE EXAM BY PATHOLOGIST: CPT | Performed by: STUDENT IN AN ORGANIZED HEALTH CARE EDUCATION/TRAINING PROGRAM

## 2025-06-30 PROCEDURE — 82948 REAGENT STRIP/BLOOD GLUCOSE: CPT

## 2025-06-30 PROCEDURE — 99233 SBSQ HOSP IP/OBS HIGH 50: CPT | Performed by: FAMILY MEDICINE

## 2025-06-30 PROCEDURE — 99232 SBSQ HOSP IP/OBS MODERATE 35: CPT | Performed by: INTERNAL MEDICINE

## 2025-06-30 PROCEDURE — 85730 THROMBOPLASTIN TIME PARTIAL: CPT | Performed by: STUDENT IN AN ORGANIZED HEALTH CARE EDUCATION/TRAINING PROGRAM

## 2025-06-30 PROCEDURE — 97530 THERAPEUTIC ACTIVITIES: CPT

## 2025-06-30 RX ORDER — POTASSIUM CHLORIDE 1500 MG/1
40 TABLET, EXTENDED RELEASE ORAL ONCE
Status: COMPLETED | OUTPATIENT
Start: 2025-06-30 | End: 2025-06-30

## 2025-06-30 RX ADMIN — LEVALBUTEROL HYDROCHLORIDE 1.25 MG: 1.25 SOLUTION RESPIRATORY (INHALATION) at 07:31

## 2025-06-30 RX ADMIN — SENNOSIDES 8.6 MG: 8.6 TABLET, FILM COATED ORAL at 08:56

## 2025-06-30 RX ADMIN — LEVALBUTEROL HYDROCHLORIDE 1.25 MG: 1.25 SOLUTION RESPIRATORY (INHALATION) at 13:52

## 2025-06-30 RX ADMIN — Medication 12.5 MG: at 12:32

## 2025-06-30 RX ADMIN — BUMETANIDE 3 MG: 2 TABLET ORAL at 13:36

## 2025-06-30 RX ADMIN — LEVOTHYROXINE SODIUM 50 MCG: 0.05 TABLET ORAL at 06:08

## 2025-06-30 RX ADMIN — BUMETANIDE 3 MG: 2 TABLET ORAL at 22:31

## 2025-06-30 RX ADMIN — POTASSIUM CHLORIDE 40 MEQ: 1500 TABLET, EXTENDED RELEASE ORAL at 12:32

## 2025-06-30 RX ADMIN — Medication 12.5 MG: at 22:31

## 2025-06-30 RX ADMIN — BUDESONIDE 0.5 MG: 0.5 INHALANT RESPIRATORY (INHALATION) at 19:19

## 2025-06-30 RX ADMIN — Medication 12.5 MG: at 06:08

## 2025-06-30 RX ADMIN — BUDESONIDE 0.5 MG: 0.5 INHALANT RESPIRATORY (INHALATION) at 07:32

## 2025-06-30 RX ADMIN — GUAIFENESIN 600 MG: 600 TABLET, EXTENDED RELEASE ORAL at 08:56

## 2025-06-30 RX ADMIN — Medication 12.5 MG: at 17:47

## 2025-06-30 RX ADMIN — LEVALBUTEROL HYDROCHLORIDE 1.25 MG: 1.25 SOLUTION RESPIRATORY (INHALATION) at 19:19

## 2025-06-30 RX ADMIN — ACETAMINOPHEN 975 MG: 325 TABLET ORAL at 22:31

## 2025-06-30 RX ADMIN — ACETAMINOPHEN 975 MG: 325 TABLET ORAL at 06:08

## 2025-06-30 RX ADMIN — GUAIFENESIN 600 MG: 600 TABLET, EXTENDED RELEASE ORAL at 22:32

## 2025-06-30 NOTE — PHYSICAL THERAPY NOTE
PHYSICAL THERAPY NOTE          Patient Name: Rebeca Banegas  Today's Date: 6/30/2025 06/30/25 0855   PT Last Visit   PT Visit Date 06/30/25   Note Type   Note Type Treatment   Pain Assessment   Pain Assessment Tool 0-10   Pain Score No Pain   Restrictions/Precautions   Weight Bearing Precautions Per Order No   Other Precautions Fall Risk;Chair Alarm;Bed Alarm;Multiple lines   General   Chart Reviewed Yes   Family/Caregiver Present No   Cognition   Overall Cognitive Status WFL   Arousal/Participation Alert;Cooperative   Attention Within functional limits   Orientation Level Oriented X4   Memory Within functional limits   Following Commands Follows all commands and directions without difficulty   Comments Patient is pleasant and cooperative   Subjective   Subjective Patient agreeable to PT tx   Bed Mobility   Supine to Sit 4  Minimal assistance   Additional items Assist x 1;Increased time required;Verbal cues;LE management;HOB elevated;Bedrails   Transfers   Sit to Stand 3  Moderate assistance   Additional items Assist x 1;Increased time required;Verbal cues   Stand to Sit 3  Moderate assistance   Additional items Assist x 1;Increased time required;Verbal cues   Additional Comments RW   Ambulation/Elevation   Gait pattern Decreased foot clearance;Short stride;Excessively slow;Forward Flexion;Improper Weight shift   Gait Assistance 4  Minimal assist   Additional items Assist x 1;Verbal cues   Assistive Device Rolling walker   Distance 3'+40'+40'   Balance   Static Sitting Fair +   Dynamic Sitting Fair   Static Standing Fair -   Dynamic Standing Poor +   Ambulatory Poor +   Endurance Deficit   Endurance Deficit Yes   Endurance Deficit Description fatigue, weakness   Activity Tolerance   Activity Tolerance Patient tolerated treatment well;Patient limited by fatigue   Medical Staff Made Aware OT   Nurse Made Aware RN cleared    Assessment   Prognosis Good   Problem List Decreased strength;Decreased endurance;Impaired balance;Decreased mobility;Pain   Assessment Patient received in bed. Patient agreeable to therapy. Patient performs bed mobility with minimal assistance x1. Patient performs functional transfers with moderate assistance x1 using rolling walker. Patient performs ambulation with minimal assistance x1 using rolling walker, 3'+40'+40'. Patient requires seated rest period between each gait trial 2* fatigue. Patient requires assist for RW management throughout gait.  Patient left in bedside chair with all needs met and call bell/personal items within reach. The patient's -[facility] Basic Mobility Inpatient Short Form Raw Score is 14 showing further need for skilled PT services in order to improve functional mobility, decrease need for assistance, and return to PLOF. PT is recommending Level 2 - Moderate Resource Intensity on d/c from hospital.  Will continue to follow as able.   Barriers to Discharge Decreased caregiver support   Goals   Patient Goals to continue improving   PT Treatment Day 1   Plan   Treatment/Interventions Functional transfer training;ADL retraining;LE strengthening/ROM;Elevations;Therapeutic exercise;Endurance training;Patient/family training;Equipment eval/education;Bed mobility;Gait training;Spoke to nursing;Spoke to case management;OT   Progress Progressing toward goals   PT Frequency 3-5x/wk   Discharge Recommendation   Rehab Resource Intensity Level, PT II (Moderate Resource Intensity)   AM-PAC Basic Mobility Inpatient   Turning in Flat Bed Without Bedrails 3   Lying on Back to Sitting on Edge of Flat Bed Without Bedrails 3   Moving Bed to Chair 2   Standing Up From Chair Using Arms 2   Walk in Room 3   Climb 3-5 Stairs With Railing 1   Basic Mobility Inpatient Raw Score 14   Basic Mobility Standardized Score 35.55   [facility] Level Of Mobility   [facility] Goal 4: Move to chair/commode   [facility]  Achieved 7: Walk 25 feet or more   End of Consult   Patient Position at End of Consult All needs within reach;Bedside chair;Bed/Chair alarm activated         Estela Lozano, PT, DPT

## 2025-06-30 NOTE — ASSESSMENT & PLAN NOTE
Cardiology consult appreciated  Due to concern for subtherapeutic INR described of being compliant with Coumadin resulting in acute lower limb ischemia, cardiology is recommending DOAC on discharge.  Lopressor 12.5 mg every 6 hours  DC heparin drip. Started on loading dose of Eliquis for above

## 2025-06-30 NOTE — PROGRESS NOTES
Progress Note - Vascular Surgery   Name: Rebeca Banegas 89 y.o. 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 Clinical exam and imaging concerning for [place] 2B acute limb ischemia of the RLE, L popliteal occlusion also noted on presentation.    6/24 morning: R femoral cutdown, thromboembolectomy, R anterior and lateral fasciotomy, partial proximal fasciotomy closure (Eriberto)  6/24 evening: L BK pop exposure, thromboembolectomy (Segundo)  6/29 bedside closure of fasciotomies (Cj Majano)    Plan:  -pain control PRN  -currently on heparin gtt, ok to transition to DOAC from the Vascular standpoint  -continue statin  -ace wrap BLE for edema  -change groin dressing PRN saturation, serosanguineous drainage is normal  -continue to monitor NV evam  -ambulate pt, OOB, PT/OT, ISB  -remainder of care per primary team    Subjective : No acute events overnight. Denies any new complaints including LE pain, numbness, tingling, motor deficits. Denies any new extremity pain, abd pain, CP, SOB.    Objective :  Temp:  [97.5 °F (36.4 °C)-97.8 °F (36.6 °C)] 97.6 °F (36.4 °C)  HR:  [] 92  BP: (101-130)/(64-79) 101/64  Resp:  [14-18] 16  SpO2:  [96 %-100 %] 100 %  O2 Device: None (Room air)  Nasal Cannula O2 Flow Rate (L/min):  [0 L/min-2 L/min] 0 L/min     I/O         06/28 0701  06/29 0700 06/29 0701 06/30 0700    P.O. 360 540    I.V. (mL/kg) 183.8 (3.1) 73.3 (1.2)    IV Piggyback 500     Total Intake(mL/kg) 1043.8 (17.4) 613.3 (10.2)    Urine (mL/kg/hr) 1850 (1.3) 2000 (1.4)    Total Output 1850 2000    Net  -806.2 -1386.7                Lines/Drains/Airways       Active Status       Name Placement date Placement time Site Days    External Urinary Catheter 06/27/25  1155  -- 2                  Physical Exam  Vitals and nursing note reviewed.   Constitutional:       General: She is not in acute distress.     Appearance: Normal appearance. She is well-developed.   HENT:      Head: Normocephalic and atraumatic.      Right Ear: External ear normal.      Left Ear: External ear normal.      Nose: Nose normal.      Mouth/Throat:      Mouth: Mucous membranes are moist.      Eyes:      General: No scleral icterus.     Extraocular Movements: Extraocular movements intact.      Conjunctiva/sclera: Conjunctivae normal.         Cardiovascular:      Rate and Rhythm: Normal rate.   Pulmonary:      Effort: Pulmonary effort is normal. No respiratory distress.      Comments: No increased WOB on RA  Abdominal:      Palpations: Abdomen is soft.      Tenderness: There is no abdominal tenderness.      Musculoskeletal:         General: No swelling.      Cervical back: Neck supple.      Right lower leg: No edema.      Left lower leg: No edema.      Comments: Closed RLE fasciotomy dressing c/d/i. LLE stapled incision well-approximated without signs of bleeding or appreciable hematoma. Mild BLE edema     Skin:     General: Skin is warm and dry.      Capillary Refill: Capillary refill takes less than 2 seconds. Bilateral lower extremities warm & well perfused     Coloration: Skin is not jaundiced.      Findings: No rash.      Comments: R groin incision well approximated with post-operative ecchymosis, no pulsatile swelling, induration, firmness, or bleeding      Neurological:      General: No focal deficit present.      Mental Status: She is alert and oriented to person, place, and time. Mental status is at baseline.      Sensory: No sensory deficit.      Motor: No weakness.      Psychiatric:         Mood and Affect: Mood normal.         Behavior:  "Behavior normal.         Thought Content: Thought content normal.         Judgment: Judgment normal.       Lab Results: I have reviewed the following results:  CBC with diff:   Lab Results   Component Value Date    WBC 13.40 (H) 06/30/2025    HGB 9.4 (L) 06/30/2025    HCT 28.6 (L) 06/30/2025    MCV 98 06/30/2025     06/30/2025    RBC 2.93 (L) 06/30/2025    MCH 32.1 06/30/2025    MCHC 32.9 06/30/2025    RDW 17.2 (H) 06/30/2025    MPV 9.5 06/30/2025    NRBC 0 06/29/2025   ,   BMP/CMP:  Lab Results   Component Value Date    SODIUM 141 06/30/2025    SODIUM 138 01/07/2025    SODIUM 138 01/02/2024    K 3.3 (L) 06/30/2025    K 3.6 01/07/2025    K 4.0 01/02/2024     06/30/2025    CL 95 (L) 01/07/2025     01/02/2024    CO2 30 06/30/2025    CO2 24 06/24/2025    CO2 32 (H) 01/07/2025    CO2 29 01/02/2024    BUN 54 (H) 06/30/2025    BUN 49 (H) 01/07/2025    BUN 33 (H) 01/02/2024    CREATININE 1.34 (H) 06/30/2025    CREATININE 1.35 (H) 01/02/2024    GLUCOSE 139 06/24/2025    CALCIUM 7.0 (L) 06/30/2025    CALCIUM 8.0 (L) 01/07/2025    CALCIUM 8.4 (L) 01/02/2024    AST 98 (H) 06/29/2025    AST 30 01/02/2024     (H) 06/29/2025    ALT 27 01/02/2024    ALKPHOS 29 (L) 06/29/2025    ALKPHOS 51 01/02/2024    PROT 6.4 10/17/2017    BILITOT 0.5 10/17/2017    EGFR 34 06/30/2025    EGFR 37 (L) 01/02/2024    EGFR 35 (L) 12/29/2020   ,   Lipid Panel:   Lab Results   Component Value Date    CHOL 205 (H) 02/07/2017   ,   Coags:   Lab Results   Component Value Date    PT 23.7 (H) 02/23/2024    PTT 92 (H) 06/30/2025    INR 1.47 (H) 06/24/2025    INR 2.2 02/23/2024   ,   Blood Culture: No results found for: \"BLOODCX\",   Urinalysis:   Lab Results   Component Value Date    COLORU Light Yellow 12/29/2024    CLARITYU Clear 12/29/2024    SPECGRAV 1.011 12/29/2024    PHUR 5.0 12/29/2024    LEUKOCYTESUR Negative 12/29/2024    NITRITE Negative 12/29/2024    GLUCOSEU Negative 12/29/2024    KETONESU Negative 12/29/2024    " "BILIRUBINUR Negative 12/29/2024    BLOODU Negative 12/29/2024   ,   Urine Culture: No results found for: \"URINECX\",   Wound Culure:   Lab Results   Component Value Date    WOUNDCULT Few Colonies of Proteus mirabilis (A) 09/04/2024    WOUNDCULT Few Colonies of Acinetobacter baumannii (A) 09/04/2024       VTE Prophylaxis: VTE covered by:  heparin (porcine), Intravenous, 17 Units/kg/hr at 06/30/25 0615     "

## 2025-06-30 NOTE — ASSESSMENT & PLAN NOTE
Suspect ATN in the setting of shock, contrast CT on 6/24 and acute blood loss   Creatinine continues to improve now 1.3  Urine output adequate  Home jardiance and spironolactone on hold   CT: no hydronephrosis  Creatinine is within baseline

## 2025-06-30 NOTE — PLAN OF CARE
Problem: OCCUPATIONAL THERAPY ADULT  Goal: Performs self-care activities at highest level of function for planned discharge setting.  See evaluation for individualized goals.  Description: Treatment Interventions: ADL retraining, Functional transfer training, Endurance training, Patient/family training, Equipment evaluation/education, Compensatory technique education, Energy conservation, Activityengagement          See flowsheet documentation for full assessment, interventions and recommendations.   Outcome: Progressing  Note: Limitation: Decreased ADL status, Decreased endurance, Decreased self-care trans, Decreased high-level ADLs  Prognosis: Good  Assessment: Pt seen on this date for OT session focusing on ADL retraining,  body mechanics, transfer retraining, increasing activity tolerance/endurance and EOB sitting to increase ability to participate in ADL/functional tasks. Pt was found in bed and was left in chair w/ all needs within reach, chair alarm on. Pt completed bed mob w min ax1, sts w mod ax1 c rw, fm w min ax1 c rw. Sat eob while completing adls- ub w min a, lb w mod a. Pt w/ improvements in endurance, transfer ability, adl task completion, however is still limited 2* decreased ADL/High-level ADL status, decreased activity tolerance/endurance, decreased cognition, decreased self-care trans, decreased safety awareness and insight to condition.   The patient's raw score on the AM-PAC Daily Activity Inpatient Short Form is 15. A raw score of less than 19 suggests the patient may benefit from discharge to post-acute rehabilitation services. Please refer to the recommendation of the Occupational Therapist for safe discharge planning.   Recommending pt D/C to level 2 when medically stable. Pt will continue to benefit from acute OT services to meet goals.     Rehab Resource Intensity Level, OT: II (Moderate Resource Intensity)

## 2025-06-30 NOTE — ASSESSMENT & PLAN NOTE
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 Clinical exam and imaging concerning for [place] 2B acute limb ischemia of the RLE, L popliteal occlusion also noted on presentation.    6/24 morning: R femoral cutdown, thromboembolectomy, R anterior and lateral fasciotomy, partial proximal fasciotomy closure (Eriberto)  6/24 evening: L BK pop exposure, thromboembolectomy (Segundo)  6/29 bedside closure of fasciotomies (Cj Majano)    Plan:  -pain control PRN  -currently on heparin gtt, ok to transition to DOAC from the Vascular standpoint  -continue statin  -ace wrap BLE for edema  -change groin dressing PRN saturation, serosanguineous drainage is normal  -continue to monitor NV evam  -ambulate pt, OOB, PT/OT, ISB  -remainder of care per primary team

## 2025-06-30 NOTE — PLAN OF CARE
Problem: PHYSICAL THERAPY ADULT  Goal: Performs mobility at highest level of function for planned discharge setting.  See evaluation for individualized goals.  Description: Treatment/Interventions: ADL retraining, Functional transfer training, LE strengthening/ROM, Therapeutic exercise, Endurance training, Patient/family training, Equipment eval/education, Bed mobility, Gait training, Spoke to nursing, Spoke to case management, OT, Elevations  Equipment Recommended: Walker (owns)       See flowsheet documentation for full assessment, interventions and recommendations.  Outcome: Progressing  Note: Prognosis: Good  Problem List: Decreased strength, Decreased endurance, Impaired balance, Decreased mobility, Pain  Assessment: Patient received in bed. Patient agreeable to therapy. Patient performs bed mobility with minimal assistance x1. Patient performs functional transfers with moderate assistance x1 using rolling walker. Patient performs ambulation with minimal assistance x1 using rolling walker, 3'+40'+40'. Patient requires seated rest period between each gait trial 2* fatigue. Patient requires assist for RW management throughout gait.  Patient left in bedside chair with all needs met and call bell/personal items within reach. The patient's AM-PAC Basic Mobility Inpatient Short Form Raw Score is 14 showing further need for skilled PT services in order to improve functional mobility, decrease need for assistance, and return to PLOF. PT is recommending Level 2 - Moderate Resource Intensity on d/c from hospital.  Will continue to follow as able.  Barriers to Discharge: Decreased caregiver support     Rehab Resource Intensity Level, PT: II (Moderate Resource Intensity)    See flowsheet documentation for full assessment.

## 2025-06-30 NOTE — ASSESSMENT & PLAN NOTE
89-year-old female with PMH of HFpEF, permanent A-fib on Coumadin, history of CVA, bronchiectasis originally presented to ED with complaint of right leg pain.  CTA of lower extremity was concerning for right femoral acute arterial occlusion.  Noted to have subtherapeutic INR at 1.4  Patient started on heparin infusion  underwent emergent right leg embolectomy/thrombectomy, fasciotomy and left popliteal thromboembolectomy on 6/24/2025 by vascular surgery.  Postoperatively patient noted to be hypotensive, therefore patient transferred to critical care for vasopressors.  Patient subsequently weaned off of pressors, and downgraded to enosiX to discontinue home Warfarin.   Eliquis/Xarelto Price check completed, both $75  Discussed with vascular surgery today. Ok to stop heparin infusion and start loading dose of Eliquis. Will discuss with pharmacy the dosing.   Vascular surgery following, underwent bedside closure of b/l lower extremity fasciotomies 6/29, left lower extremity incision remains open for drainage

## 2025-06-30 NOTE — DISCHARGE INSTR - OTHER ORDERS
Incision site:   -- Shower daily. Wash incisions daily w/ soap and water. Pat dry thoroughly.  --Place clean, dry gauze to cover groin incision to keep area clean and dry.  This gauze should be changed every shift and prn soilage/ saturation  -- Some bruising and swelling is normal, monitor for worsening redness, pain, bulging, oozing, or murky drainage. If present, please call vascular office.   -- Do not pick or scrub at incisions, allow to heal over time  -- You will have your staples removed at an office appointment when indicated

## 2025-06-30 NOTE — PROGRESS NOTES
Progress Note - Hospitalist   Name: Rebeca Banegas 89 y.o. female I MRN: [id number]  Unit/Bed#: [facility] 512-01 I Date of Admission: 6/24/2025   Date of Service: 6/30/2025 I Hospital Day: 6    Assessment & Plan  Acute lower limb ischemia  89-year-old female with PMH of HFpEF, permanent A-fib on Coumadin, history of CVA, bronchiectasis originally presented to ED with complaint of right leg pain.  CTA of lower extremity was concerning for right femoral acute arterial occlusion.  Noted to have subtherapeutic INR at 1.4  Patient started on heparin infusion  underwent emergent right leg embolectomy/thrombectomy, fasciotomy and left popliteal thromboembolectomy on 6/24/2025 by vascular surgery.  Postoperatively patient noted to be hypotensive, therefore patient transferred to critical care for vasopressors.  Patient subsequently weaned off of pressors, and downgraded to [facility]  Plant to discontinue home Warfarin.   Eliquis/Xarelto Price check completed, both $75  Discussed with vascular surgery today. Ok to stop heparin infusion and start loading dose of Eliquis. Will discuss with pharmacy the dosing.   Vascular surgery following, underwent bedside closure of b/l lower extremity fasciotomies 6/29, left lower extremity incision remains open for drainage    Asthma  Currently not in exacerbation, continue inhalers  Permanent atrial fibrillation (HCC)  Cardiology consult appreciated  Due to concern for subtherapeutic INR described of being compliant with Coumadin resulting in acute lower limb ischemia, cardiology is recommending DOAC on discharge.  Lopressor 12.5 mg every 6 hours  DC heparin drip. Started on loading dose of Eliquis for above  Bifascicular bundle branch block    Chronic heart failure with preserved ejection fraction (HCC)  Wt Readings from Last 3 Encounters:   06/28/25 60.1 kg (132 lb 7.9 oz)   06/18/25 57.2 kg (126 lb 3.2 oz)   06/12/25 54.9 kg (121 lb)     Currently on Bumex 3 mg twice daily, which his home  dose        Primary hypertension  Currently on p.o. Lopressor and Bumex  Bronchiectasis (HCC)  Currently on room air  Continue Pulmicort/Xopenex  History of CVA (cerebrovascular accident)  History of CVA in August 2024, thought to be embolic in the setting of warfarin interruption with underlying permanent A-fib.  Currently on heparin infusion, cardiology recommending DOAC on discharge  DARIO (acute kidney injury) (HCC)  Patient noted to have DARIO in critical care secondary to contrast associated nephropathy, acute blood loss anemia, and undifferentiated shock requiring vasopressors.  Baseline creatinine 1.6-2.1  Currently creatinine improving, today 1.3  Hyperphosphatemia    Anemia  Postoperative anemia, status post 2 unit PRBC and critical care  Shock (HCC)  Noted to be hypotensive postoperatively required critical care stay and vasopressors.  Currently resolved    VTE Pharmacologic Prophylaxis:   Moderate Risk (Score 3-4) - Pharmacological DVT Prophylaxis Ordered: heparin drip.    Mobility:   Basic Mobility Inpatient Raw Score: 14  JH-HLM Goal: 4: Move to chair/commode  JH-HLM Achieved: 7: Walk 25 feet or more  JH-HLM Goal achieved. Continue to encourage appropriate mobility.    Patient Centered Rounds: I performed bedside rounds with nursing staff today.   Discussions with Specialists or Other Care Team Provider: vascular surg    Education and Discussions with Family / Patient: Updated  (friend) at bedside. Updated daughter john on the phone    Current Length of Stay: 6 day(s)  Current Patient Status: Inpatient   Certification Statement: The patient will continue to require additional inpatient hospital stay due to pending vascular clearance  Discharge Plan: Anticipate discharge in 24-48 hrs to rehab facility.    Code Status: Level 3 - DNAR and DNI    Subjective     Patient examined at bedside.   Denies any significant pain associated with lower extremity wounds.       Objective :  Temp:  [97.6 °F  (36.4 °C)-97.8 °F (36.6 °C)] 97.8 °F (36.6 °C)  HR:  [] 99  BP: ()/(59-79) 111/70  Resp:  [16] 16  SpO2:  [94 %-100 %] 97 %  O2 Device: None (Room air)  Nasal Cannula O2 Flow Rate (L/min):  [0 L/min] 0 L/min    Body mass index is 22.05 kg/m².     Input and Output Summary (last 24 hours):     Intake/Output Summary (Last 24 hours) at 6/30/2025 1815  Last data filed at 6/30/2025 1356  Gross per 24 hour   Intake 180 ml   Output 1850 ml   Net -1670 ml       Physical Exam  Constitutional:       Appearance: She is well-developed.   HENT:      Head: Normocephalic and atraumatic.   Pulmonary:      Effort: Pulmonary effort is normal. No respiratory distress.      Breath sounds: Normal breath sounds.     Musculoskeletal:      Cervical back: Normal range of motion.     Skin:     General: Skin is warm and dry.           Lines/Drains:  Lines/Drains/Airways       Active Status       Name Placement date Placement time Site Days    External Urinary Catheter 06/27/25  1155  -- 3                            Lab Results: I have reviewed the following results:   Results from last 7 days   Lab Units 06/30/25  0524 06/29/25  0130   WBC Thousand/uL 13.40* 11.99*   HEMOGLOBIN g/dL 9.4* 9.2*   HEMATOCRIT % 28.6* 27.1*   PLATELETS Thousands/uL 157 126*   SEGS PCT %  --  74   LYMPHO PCT %  --  16   MONO PCT %  --  9   EOS PCT %  --  1     Results from last 7 days   Lab Units 06/30/25  0524 06/29/25  0130   SODIUM mmol/L 141 140   POTASSIUM mmol/L 3.3* 3.4*   CHLORIDE mmol/L 104 103   CO2 mmol/L 30 24   BUN mg/dL 54* 62*   CREATININE mg/dL 1.34* 1.62*   ANION GAP mmol/L 7 13   CALCIUM mg/dL 7.0* 7.0*   ALBUMIN g/dL  --  3.9   TOTAL BILIRUBIN mg/dL  --  1.00   ALK PHOS U/L  --  29*   ALT U/L  --  124*   AST U/L  --  98*   GLUCOSE RANDOM mg/dL 85 118     Results from last 7 days   Lab Units 06/24/25  0412   INR  1.47*     Results from last 7 days   Lab Units 06/30/25  1541 06/30/25  1200 06/30/25  1041 06/30/25  0613 06/29/25  1556  06/29/25  1051 06/29/25  0633 06/28/25  1713 06/28/25  1133 06/28/25  0607 06/27/25  1635 06/27/25  1153   POC GLUCOSE mg/dl 103 106 65 74 131 105 97 104 129 118 92 96     Results from last 7 days   Lab Units 06/25/25  0625   HEMOGLOBIN A1C % 6.3*     Results from last 7 days   Lab Units 06/27/25  0846 06/26/25  0506 06/25/25  2212 06/25/25  2030 06/25/25  1755 06/25/25  0816   LACTIC ACID mmol/L 1.6  --  2.0 2.9* 2.1*  --    PROCALCITONIN ng/ml  --  0.85*  --   --   --  0.19       Recent Cultures (last 7 days):           Last 24 Hours Medication List:     Current Facility-Administered Medications:     acetaminophen (TYLENOL) tablet 975 mg, Q8H GARTH    budesonide (PULMICORT) inhalation solution 0.5 mg, Q12H    bumetanide (BUMEX) tablet 3 mg, BID    guaiFENesin (MUCINEX) 12 hr tablet 600 mg, Q12H GARTH    heparin (porcine) 25,000 units in 0.45% NaCl 250 mL infusion (premix), Titrated, Last Rate: 19 Units/kg/hr (06/30/25 1445)    HYDROmorphone HCl (DILAUDID) injection 0.2 mg, Q3H PRN    insulin lispro (HumALOG/ADMELOG) 100 units/mL subcutaneous injection 1-5 Units, TID AC **AND** Fingerstick Glucose (POCT), TID AC    levalbuterol (XOPENEX) inhalation solution 1.25 mg, Q6H    levothyroxine tablet 50 mcg, Early Morning    metoprolol tartrate (LOPRESSOR) partial tablet 12.5 mg, Q6H    ondansetron (ZOFRAN) injection 4 mg, Q6H PRN    oxyCODONE (ROXICODONE) IR tablet 5 mg, Q4H PRN    oxyCODONE (ROXICODONE) split tablet 2.5 mg, Q4H PRN    senna (SENOKOT) tablet 8.6 mg, Daily    Administrative Statements   Today, Patient Was Seen By: Amanda Marx MD      **Please Note: This note may have been constructed using a voice recognition system.**

## 2025-06-30 NOTE — DISCHARGE INSTR - AVS FIRST PAGE
DISCHARGE INSTRUCTIONS  LEG/BYPASS SURGERY    ACTIVITY:   Limit your physical activity to walking for the first week and then increase your activity as tolerated.  If you become short of breath or tired, stop and rest.  You may require help with walking or feel more secure with something to lean on.  Walking up steps and normal activities may be resumed as you feel ready.  Most people tire easily for the first few weeks following leg surgery.  This improves as conditioning returns.  Avoid strenuous activity such as vigorous exercise.  Avoid heavy lifting (do not lift more than 15 pounds) for the first four weeks after surgery.  You should not drive a car for at least two weeks following discharge from the hospital and you are off all narcotic pain medication.  You may ride in a car.    DIET:  Resume your normal diet.  Good nutrition is important for healing of your incision.  If you are discharged on narcotics for pain control, continue taking your stool softeners until you are having regular bowel movements.    INCISION:  You should shower daily.  Wash incision daily with soap and water, but do not rub or scrub the incision; rinse thoroughly and pat dry.  You may have stitches or staples to close your incision and it is okay for these to get wet.  Do not bathe in a tub or swim for the first 4 week following surgery or if you have any open wounds.  It is normal to have swelling or discoloration around the incision.   If increasing redness or pain develops, call our office immediately.  Numbness in the region of the incision may occur following the surgery.  This normally improves over six to twelve months.    You may have surgical glue over your incisions. There are stitches present under the skin which will absorb on their own.   The glue is used to cover the access, assist in closure, and prevent contamination. This adhesive will darken and peel away on its own within one to two weeks. Do not pick at it.    If you  have a groin wound/incision, place a clean dry piece of gauze to cover your groin incision to keep incision clean and dry and prevent your skin from sticking together.  Change gauze daily.  You may have staples or stitches at your incisions.  These will be removed at your follow-up appointment or when they are ready to come out.  If you have a dressing over your surgical site, remove this on the second day after surgery.  If you have foot or leg wounds, please follow your podiatrist/wound care doctor's instructions for care.  If any of your incisions are open and require dressing changes, you will be given instructions for your daily incision care. If you are not able to change the dressings, a visiting nurse will be arranged.    DO NOT put any powders, creams, ointments, or lotions on your incision.    LEG SWELLING: Most patients have noticeable leg swelling after leg surgery. This usually improves within a few weeks. If swelling is present, elevate the leg whenever possible. Avoid sitting with the leg hanging down for prolonged periods of time.  Walking is beneficial.  An ACE bandage or support stocking may be helpful, but this should be discussed with your physician prior to use if you have a bypass.    FOLLOW UP STUDIES:  Doppler ultrasound studies are very important for long-term management.  Your surgeon will arrange this at your first postoperative visit.  Repeat studies are then scheduled every three months for the first year and periodically after this.    FOLLOW UP APPOINTMENTS:  Making and keeping follow up appointments and ultrasound tests are important to your recovery.  If you have difficulty making it to or keeping your follow up appointments, call the office.    If you have increased pain, fever >101.5, increased drainage, redness or a bad smell at your surgery site, new coldness/numbness of your arm or leg, please call us immediately and GO directly to the ER.      Appt w/ Dr. Raymond: 7/22/2025 at  4pm, [place] office    PLEASE CALL THE OFFICE IF YOU HAVE ANY QUESTIONS  [phone]  [phone] [address], [address], [place] [address]  [address] [place] [address]  [address] [facility], [place] [address]  [address] [facility], [address]st Floor, [place] [address]  [address] [facility], [address], [place] [address]  [address] [facility], [address], [place] [address]  [address] [facility], [address], 2nd Floor, [place] [address]  [address] [facility], 1st Floor, [address], [place] [address]  [address] [facility], [facility], [place] [address]  [address] [facility], [address], [place] [address]

## 2025-06-30 NOTE — PLAN OF CARE
Problem: Potential for Falls  Goal: Patient will remain free of falls  Description: INTERVENTIONS:  - Educate patient/family on patient safety including physical limitations  - Instruct patient to call for assistance with activity   - Consider consulting OT/PT to assist with strengthening/mobility based on AM PAC & JH-HLM score  - Consult OT/PT to assist with strengthening/mobility   - Keep Call bell within reach  - Keep bed low and locked with side rails adjusted as appropriate  - Keep care items and personal belongings within reach  - Initiate and maintain comfort rounds  - Make Fall Risk Sign visible to staff  - Offer Toileting every Hours, in advance of need  - Initiate/Maintain alarm  - Obtain necessary fall risk management equipment:   - Apply yellow socks and bracelet for high fall risk patients  - Consider moving patient to room near nurses station  Outcome: Progressing

## 2025-06-30 NOTE — OCCUPATIONAL THERAPY NOTE
Occupational Therapy Progress Note     Patient Name: Rebeca Banegas  Today's Date: 6/30/2025  Problem List  Principal Problem:    Acute lower limb ischemia  Active Problems:    Asthma    Permanent atrial fibrillation (HCC)    Bifascicular bundle branch block    Chronic heart failure with preserved ejection fraction (HCC)    Primary hypertension    CKD (chronic kidney disease), stage IV (HCC)    Bronchiectasis (HCC)    History of CVA (cerebrovascular accident)    Shock (HCC)    DARIO (acute kidney injury) (HCC)    Hyperphosphatemia    Anemia           06/30/25 0856   OT Last Visit   OT Visit Date 06/30/25   Note Type   Note Type Treatment   Pain Assessment   Pain Assessment Tool 0-10   Pain Score No Pain   Restrictions/Precautions   Weight Bearing Precautions Per Order No   Other Precautions Fall Risk;Chair Alarm;Bed Alarm;Multiple lines   ADL   Where Assessed Edge of bed   Grooming Assistance 5  Supervision/Setup   Grooming Deficit Setup;Increased time to complete;Wash/dry face;Teeth care   Grooming Comments pt able to complete w S while sitting eob.   UB Bathing Assistance 4  Minimal Assistance   UB Bathing Deficit Setup;Increased time to complete;Chest;Right arm;Abdomen;Left arm   UB Bathing Comments min a for back and lower abdomen   LB Bathing Assistance 3  Moderate Assistance   LB Bathing Deficit Setup;Increased time to complete;Perineal area;Buttocks;Right upper leg;Left upper leg;Right lower leg including foot;Left lower leg including foot   LB Bathing Comments mod a for lower LE and feet   UB Dressing Assistance 4  Minimal Assistance   UB Dressing Deficit Thread RUE;Thread LUE   LB Dressing Assistance 2  Maximal Assistance   LB Dressing Deficit Don/doff L sock;Don/doff R sock   Bed Mobility   Supine to Sit 4  Minimal assistance   Additional items Assist x 1;Increased time required;Verbal cues;LE management   Additional Comments found in bed, left in chair w all needs in reach and alarm on.   Transfers    Sit to Stand 3  Moderate assistance   Additional items Assist x 1;Increased time required;Verbal cues   Stand to Sit 3  Moderate assistance   Additional items Assist x 1;Increased time required;Verbal cues   Additional Comments rw   Functional Mobility   Functional Mobility 4  Minimal assistance   Additional Comments ax1, short household distances w seated rest break, 2x.   Additional items Rolling walker   Cognition   Overall Cognitive Status WFL   Arousal/Participation Alert;Responsive   Attention Within functional limits   Orientation Level Oriented X4   Memory Within functional limits   Following Commands Follows all commands and directions without difficulty   Comments pt cooperative w overeall fair safety awareness and insight to condition.   Activity Tolerance   Activity Tolerance Patient tolerated treatment well   Medical Staff Made Aware dpt 2' pt's med complexity, comorbidities and regression from baseline. rn cleared   Assessment   Assessment Pt seen on this date for OT session focusing on ADL retraining,  body mechanics, transfer retraining, increasing activity tolerance/endurance and EOB sitting to increase ability to participate in ADL/functional tasks. Pt was found in bed and was left in chair w/ all needs within reach, chair alarm on. Pt completed bed mob w min ax1, sts w mod ax1 c rw, fm w min ax1 c rw. Sat eob while completing adls- ub w min a, lb w mod a. Pt w/ improvements in endurance, transfer ability, adl task completion, however is still limited 2* decreased ADL/High-level ADL status, decreased activity tolerance/endurance, decreased cognition, decreased self-care trans, decreased safety awareness and insight to condition.   The patient's raw score on the AM-PAC Daily Activity Inpatient Short Form is 15. A raw score of less than 19 suggests the patient may benefit from discharge to post-acute rehabilitation services. Please refer to the recommendation of the Occupational Therapist for safe  discharge planning.   Recommending pt D/C to level 2 when medically stable. Pt will continue to benefit from acute OT services to meet goals.   Plan   Treatment Interventions ADL retraining;Functional transfer training;Endurance training;Patient/family training;Equipment evaluation/education;Compensatory technique education;Energy conservation;Activityengagement   Goal Expiration Date 07/11/25   OT Treatment Day 1   OT Frequency 2-3x/wk   Discharge Recommendation   Rehab Resource Intensity Level, OT II (Moderate Resource Intensity)   AM-PAC Daily Activity Inpatient   Lower Body Dressing 2   Bathing 2   Toileting 2   Upper Body Dressing 3   Grooming 3   Eating 3   Daily Activity Raw Score 15   Daily Activity Standardized Score (Calc for Raw Score >=11) 34.69   AM-PAC Applied Cognition Inpatient   Following a Speech/Presentation 4   Understanding Ordinary Conversation 4   Taking Medications 4   Remembering Where Things Are Placed or Put Away 4   Remembering List of 4-5 Errands 4   Taking Care of Complicated Tasks 4   Applied Cognition Raw Score 24   Applied Cognition Standardized Score 62.21   Modified David Scale   Modified Hendry Scale 4   End of Consult   Education Provided Yes   Patient Position at End of Consult Bedside chair;Bed/Chair alarm activated;All needs within reach   Nurse Communication Nurse aware of consult       JIGAR Vanegas, OTR/L

## 2025-06-30 NOTE — PROGRESS NOTES
Progress Note - Nephrology   Name: Rebeca Banegas 89 y.o. female I MRN: [id number]  Unit/Bed#: [facility] 512-01 I Date of Admission: 6/24/2025   Date of Service: 6/30/2025 I Hospital Day: 6    Assessment & Plan  DARIO (acute kidney injury) (HCC)  Suspect ATN in the setting of shock, contrast CT on 6/24 and acute blood loss   Creatinine continues to improve now 1.3  Urine output adequate  Home jardiance and spironolactone on hold   CT: no hydronephrosis  Creatinine is within baseline  CKD (chronic kidney disease), stage IV (HCC)  Baseline creatinine since January appears to be 1.6-2.1 with GFR <30  Acute lower limb ischemia  Bilateral LE ischemia likely due to cardioembolus   S/p RLE fem embolectomy and anterolateral fasciotomy and LLE pop/AT embolectomy on 6/24  Status post bedside fasciotomy closure   Shock (HCC)  Overall has resolved, phenylephrine now has been weaned off.  Chronic heart failure with preserved ejection fraction (HCC)  Home: Home bumex 3mg po bid   CXR with persistent bilateral pleural effusions   Currently on Bumex 3 mg twice daily  Hyperphosphatemia  Phosphorus improved at 3.1     Please contact the SecureChat role for the Nephrology service with any questions/concerns.    Subjective     Patient was seen today.  Sitting up resting comfortably  No acute complaints    Objective :  Temp:  [97.6 °F (36.4 °C)-97.8 °F (36.6 °C)] 97.6 °F (36.4 °C)  HR:  [] 83  BP: ()/(59-79) 94/59  Resp:  [16-18] 16  SpO2:  [97 %-100 %] 99 %  O2 Device: None (Room air)  Nasal Cannula O2 Flow Rate (L/min):  [0 L/min] 0 L/min    Current Weight: Weight - Scale: 60.1 kg (132 lb 7.9 oz)  First Weight: Weight - Scale: 60.2 kg (132 lb 11.5 oz)  I/O         06/28 0701 06/29 0700 06/29 0701 06/30 0700 06/30 0701  07/01 0700    P.O. 360 540 180    I.V. (mL/kg) 183.8 (3.1) 73.3 (1.2)     IV Piggyback 500      Total Intake(mL/kg) 1043.8 (17.4) 613.3 (10.2) 180 (3)    Urine (mL/kg/hr) 1850 (1.3) 2000 (1.4)     Stool        Total Output 1850 2000     Net -806.2 -1386.7 +180                 Physical Exam  Vitals and nursing note reviewed.   Constitutional:       General: She is not in acute distress.     Appearance: She is well-developed.   HENT:      Head: Normocephalic and atraumatic.     Eyes:      Conjunctiva/sclera: Conjunctivae normal.       Cardiovascular:      Rate and Rhythm: Normal rate and regular rhythm.      Heart sounds: No murmur heard.  Pulmonary:      Effort: Pulmonary effort is normal. No respiratory distress.      Breath sounds: Normal breath sounds.   Abdominal:      Palpations: Abdomen is soft.      Tenderness: There is no abdominal tenderness.     Musculoskeletal:         General: No swelling.      Cervical back: Neck supple.     Skin:     General: Skin is warm and dry.      Capillary Refill: Capillary refill takes less than 2 seconds.     Neurological:      Mental Status: She is alert.     Psychiatric:         Mood and Affect: Mood normal.       Medications:  Current Medications[1]      Lab Results: I have reviewed the following results:  Results from last 7 days   Lab Units 06/30/25  0524 06/29/25  0130 06/28/25  0438 06/27/25  0846 06/27/25  0234 06/26/25  1226 06/26/25  0506 06/25/25  2212 06/25/25  1755 06/25/25  0946 06/25/25  0512 06/24/25  1953 06/24/25  0412   WBC Thousand/uL 13.40* 11.99* 9.58  --  11.23* 9.74 8.95  --  11.64*   < > 10.36*  --  9.86   HEMOGLOBIN g/dL 9.4* 9.2* 9.1* 9.9* 9.6* 11.0* 7.5*  --  8.7*   < > 8.3*  --  12.5   I STAT HEMOGLOBIN g/dl  --   --   --   --   --   --   --   --   --   --   --  8.8*  --    HEMATOCRIT % 28.6* 27.1* 26.5*  --  26.5* 31.0* 21.8*  --  25.3*   < > 24.9*  --  38.2   HEMATOCRIT, ISTAT %  --   --   --   --   --   --   --   --   --   --   --  26*  --    PLATELETS Thousands/uL 157 126* 114*  --  124* 122* 125*  --  165   < > 154  --  220   POTASSIUM mmol/L 3.3* 3.4* 3.7 4.0 3.9 5.1 5.1 5.5* 5.5*  --  4.9  --  4.3   CHLORIDE mmol/L 104 103 104 104 105 105 103 101  "101  --  101  --  96   CO2 mmol/L 30 24 25 21 20* 18* 19* 19* 24  --  22  --  25   CO2, I-STAT mmol/L  --   --   --   --   --   --   --   --   --   --   --  24  --    BUN mg/dL 54* 62* 63* 70* 70* 66* 63* 61* 63*  --  52*  --  63*   CREATININE mg/dL 1.34* 1.62* 1.95* 2.25* 2.38* 2.45* 2.40* 2.20* 2.31*  --  1.70*  --  1.87*   CALCIUM mg/dL 7.0* 7.0* 6.7* 7.6* 7.0* 7.4* 7.4* 7.7* 7.5*  --  7.3*  --  8.6   MAGNESIUM mg/dL  --  2.1 2.1 2.4 2.3 2.5 2.3  --   --   --  2.2  --   --    PHOSPHORUS mg/dL  --  3.1 4.5* 5.4* 5.3* 6.7* 6.1*  --   --   --  4.9*  --   --    ALBUMIN g/dL  --  3.9  --  3.5  --   --   --  3.6 2.6*  --   --   --  3.8   GLUCOSE, ISTAT mg/dl  --   --   --   --   --   --   --   --   --   --   --  139  --     < > = values in this interval not displayed.       Administrative Statements     Portions of the record may have been created with voice recognition software. Occasional wrong word or \"sound a like\" substitutions may have occurred due to the inherent limitations of voice recognition software. Read the chart carefully and recognize, using context, where substitutions have occurred.If you have any questions, please contact the dictating provider.       [1]   Current Facility-Administered Medications:     acetaminophen (TYLENOL) tablet 975 mg, 975 mg, Oral, Q8H [facility], 975 mg at 06/30/25 0608    budesonide (PULMICORT) inhalation solution 0.5 mg, 0.5 mg, Nebulization, Q12H, Nam Celaya DO, 0.5 mg at 06/30/25 0732    bumetanide (BUMEX) tablet 3 mg, 3 mg, Oral, BID, Nam Celaya DO, 3 mg at 06/30/25 1336    guaiFENesin (MUCINEX) 12 hr tablet 600 mg, 600 mg, Oral, Q12H GARTH, Nam Celaya DO, 600 mg at 06/30/25 0856    heparin (porcine) 25,000 units in 0.45% NaCl 250 mL infusion (premix), 3-30 Units/kg/hr (Order-Specific), Intravenous, Titrated, Nam Celaya DO, Last Rate: 9.4 mL/hr at 06/30/25 0615, 17 Units/kg/hr at 06/30/25 0615    HYDROmorphone " HCl (DILAUDID) injection 0.2 mg, 0.2 mg, Intravenous, Q3H PRN, Nam Celaya DO, 0.2 mg at 06/26/25 0109    insulin lispro (HumALOG/ADMELOG) 100 units/mL subcutaneous injection 1-5 Units, 1-5 Units, Subcutaneous, TID AC, 1 Units at 06/25/25 1602 **AND** Fingerstick Glucose (POCT), , , TID AC, Nam Celaya DO    levalbuterol (XOPENEX) inhalation solution 1.25 mg, 1.25 mg, Nebulization, Q6H, Nam Celaya DO, 1.25 mg at 06/30/25 0731    levothyroxine tablet 50 mcg, 50 mcg, Oral, Early Morning, Nam Celaya DO, 50 mcg at 06/30/25 0608    metoprolol tartrate (LOPRESSOR) partial tablet 12.5 mg, 12.5 mg, Oral, Q6H, Nam Celaya DO, 12.5 mg at 06/30/25 1232    ondansetron (ZOFRAN) injection 4 mg, 4 mg, Intravenous, Q6H PRN, Nam Celaya DO    oxyCODONE (ROXICODONE) IR tablet 5 mg, 5 mg, Oral, Q4H PRN, Nam Celaya DO, 5 mg at 06/29/25 2205    oxyCODONE (ROXICODONE) split tablet 2.5 mg, 2.5 mg, Oral, Q4H PRN, Nam Celaya DO    senna (SENOKOT) tablet 8.6 mg, 1 tablet, Oral, Daily, Nam Celaya DO, 8.6 mg at 06/30/25 0856

## 2025-07-01 ENCOUNTER — DOCUMENTATION (OUTPATIENT)
Dept: VASCULAR SURGERY | Facility: CLINIC | Age: OVER 89
End: 2025-07-01

## 2025-07-01 PROBLEM — I12.9 BENIGN HYPERTENSION WITH CKD (CHRONIC KIDNEY DISEASE) STAGE IV (HCC): Status: ACTIVE | Noted: 2025-07-01

## 2025-07-01 PROBLEM — I12.9 BENIGN HYPERTENSION WITH CKD (CHRONIC KIDNEY DISEASE) STAGE IV (HCC): Status: ACTIVE | Noted: 2020-04-07

## 2025-07-01 PROBLEM — N18.4 BENIGN HYPERTENSION WITH CKD (CHRONIC KIDNEY DISEASE) STAGE IV (HCC): Status: ACTIVE | Noted: 2025-07-01

## 2025-07-01 LAB
ANION GAP SERPL CALCULATED.3IONS-SCNC: 11 MMOL/L (ref 4–13)
BUN SERPL-MCNC: 45 MG/DL (ref 5–25)
CALCIUM SERPL-MCNC: 6.6 MG/DL (ref 8.4–10.2)
CHLORIDE SERPL-SCNC: 102 MMOL/L (ref 96–108)
CO2 SERPL-SCNC: 28 MMOL/L (ref 21–32)
CREAT SERPL-MCNC: 1.17 MG/DL (ref 0.6–1.3)
ERYTHROCYTE [DISTWIDTH] IN BLOOD BY AUTOMATED COUNT: 17.3 % (ref 11.6–15.1)
GFR SERPL CREATININE-BSD FRML MDRD: 40 ML/MIN/1.73SQ M
GLUCOSE SERPL-MCNC: 76 MG/DL (ref 65–140)
GLUCOSE SERPL-MCNC: 82 MG/DL (ref 65–140)
GLUCOSE SERPL-MCNC: 86 MG/DL (ref 65–140)
GLUCOSE SERPL-MCNC: 90 MG/DL (ref 65–140)
HCT VFR BLD AUTO: 28.6 % (ref 34.8–46.1)
HGB BLD-MCNC: 9.6 G/DL (ref 11.5–15.4)
MCH RBC QN AUTO: 32.9 PG (ref 26.8–34.3)
MCHC RBC AUTO-ENTMCNC: 33.6 G/DL (ref 31.4–37.4)
MCV RBC AUTO: 98 FL (ref 82–98)
PLATELET # BLD AUTO: 173 THOUSANDS/UL (ref 149–390)
PMV BLD AUTO: 9.4 FL (ref 8.9–12.7)
POTASSIUM SERPL-SCNC: 3.5 MMOL/L (ref 3.5–5.3)
RBC # BLD AUTO: 2.92 MILLION/UL (ref 3.81–5.12)
SODIUM SERPL-SCNC: 141 MMOL/L (ref 135–147)
WBC # BLD AUTO: 12.64 THOUSAND/UL (ref 4.31–10.16)

## 2025-07-01 PROCEDURE — 85027 COMPLETE CBC AUTOMATED: CPT | Performed by: FAMILY MEDICINE

## 2025-07-01 PROCEDURE — 99232 SBSQ HOSP IP/OBS MODERATE 35: CPT | Performed by: INTERNAL MEDICINE

## 2025-07-01 PROCEDURE — 97535 SELF CARE MNGMENT TRAINING: CPT

## 2025-07-01 PROCEDURE — 94664 DEMO&/EVAL PT USE INHALER: CPT

## 2025-07-01 PROCEDURE — 94640 AIRWAY INHALATION TREATMENT: CPT

## 2025-07-01 PROCEDURE — 82948 REAGENT STRIP/BLOOD GLUCOSE: CPT

## 2025-07-01 PROCEDURE — 99024 POSTOP FOLLOW-UP VISIT: CPT

## 2025-07-01 PROCEDURE — 94760 N-INVAS EAR/PLS OXIMETRY 1: CPT

## 2025-07-01 PROCEDURE — 97530 THERAPEUTIC ACTIVITIES: CPT

## 2025-07-01 PROCEDURE — 80048 BASIC METABOLIC PNL TOTAL CA: CPT | Performed by: FAMILY MEDICINE

## 2025-07-01 PROCEDURE — 99232 SBSQ HOSP IP/OBS MODERATE 35: CPT | Performed by: NURSE PRACTITIONER

## 2025-07-01 RX ORDER — ALBUTEROL SULFATE 0.83 MG/ML
2.5 SOLUTION RESPIRATORY (INHALATION) EVERY 6 HOURS PRN
Status: DISCONTINUED | OUTPATIENT
Start: 2025-07-01 | End: 2025-07-05 | Stop reason: HOSPADM

## 2025-07-01 RX ORDER — BUMETANIDE 2 MG/1
2 TABLET ORAL 2 TIMES DAILY
Status: DISCONTINUED | OUTPATIENT
Start: 2025-07-01 | End: 2025-07-02

## 2025-07-01 RX ORDER — LEVALBUTEROL INHALATION SOLUTION 1.25 MG/3ML
1.25 SOLUTION RESPIRATORY (INHALATION)
Status: DISCONTINUED | OUTPATIENT
Start: 2025-07-01 | End: 2025-07-05 | Stop reason: HOSPADM

## 2025-07-01 RX ADMIN — GUAIFENESIN 600 MG: 600 TABLET, EXTENDED RELEASE ORAL at 08:57

## 2025-07-01 RX ADMIN — OXYCODONE HYDROCHLORIDE 5 MG: 5 TABLET ORAL at 22:29

## 2025-07-01 RX ADMIN — BUDESONIDE 0.5 MG: 0.5 INHALANT RESPIRATORY (INHALATION) at 07:54

## 2025-07-01 RX ADMIN — Medication 12.5 MG: at 18:08

## 2025-07-01 RX ADMIN — BUMETANIDE 2 MG: 2 TABLET ORAL at 18:08

## 2025-07-01 RX ADMIN — Medication 12.5 MG: at 11:09

## 2025-07-01 RX ADMIN — APIXABAN 5 MG: 5 TABLET, FILM COATED ORAL at 18:08

## 2025-07-01 RX ADMIN — OXYCODONE HYDROCHLORIDE 5 MG: 5 TABLET ORAL at 00:05

## 2025-07-01 RX ADMIN — GUAIFENESIN 600 MG: 600 TABLET, EXTENDED RELEASE ORAL at 22:29

## 2025-07-01 RX ADMIN — LEVOTHYROXINE SODIUM 50 MCG: 0.05 TABLET ORAL at 05:55

## 2025-07-01 RX ADMIN — BUMETANIDE 2 MG: 2 TABLET ORAL at 13:12

## 2025-07-01 RX ADMIN — OXYCODONE HYDROCHLORIDE 5 MG: 5 TABLET ORAL at 10:05

## 2025-07-01 RX ADMIN — SENNOSIDES 8.6 MG: 8.6 TABLET, FILM COATED ORAL at 08:57

## 2025-07-01 RX ADMIN — APIXABAN 5 MG: 5 TABLET, FILM COATED ORAL at 08:57

## 2025-07-01 RX ADMIN — LEVALBUTEROL HYDROCHLORIDE 1.25 MG: 1.25 SOLUTION RESPIRATORY (INHALATION) at 14:04

## 2025-07-01 RX ADMIN — ACETAMINOPHEN 975 MG: 325 TABLET ORAL at 05:55

## 2025-07-01 RX ADMIN — ACETAMINOPHEN 975 MG: 325 TABLET ORAL at 22:29

## 2025-07-01 RX ADMIN — Medication 12.5 MG: at 05:55

## 2025-07-01 RX ADMIN — LEVALBUTEROL HYDROCHLORIDE 1.25 MG: 1.25 SOLUTION RESPIRATORY (INHALATION) at 07:54

## 2025-07-01 RX ADMIN — LEVALBUTEROL HYDROCHLORIDE 1.25 MG: 1.25 SOLUTION RESPIRATORY (INHALATION) at 19:08

## 2025-07-01 RX ADMIN — BUDESONIDE 0.5 MG: 0.5 INHALANT RESPIRATORY (INHALATION) at 19:08

## 2025-07-01 RX ADMIN — OXYCODONE HYDROCHLORIDE 5 MG: 5 TABLET ORAL at 06:01

## 2025-07-01 RX ADMIN — ACETAMINOPHEN 975 MG: 325 TABLET ORAL at 13:10

## 2025-07-01 RX ADMIN — LEVALBUTEROL HYDROCHLORIDE 1.25 MG: 1.25 SOLUTION RESPIRATORY (INHALATION) at 01:37

## 2025-07-01 NOTE — ASSESSMENT & PLAN NOTE
Blood pressure has been running somewhat low  Heart rate is up, therefore needs current beta-blockade dose  Will decrease diuretic to prevent overdiuresis resulting in worsening hypotension

## 2025-07-01 NOTE — PROGRESS NOTES
Vascular Nurse Navigator Post Op Education    Met with patient at bedside to introduce myself as Vascular Nurse Navigator and explained my role.  Patient is appropriate and accepting to education. Patient was educated with Review of written materials provided, Teachback, Explanation, Demonstration, and Question & Answer on expectations of post op care and recovery on R femoral cutdown, thromboembolectomy, R anterior and lateral fasciotomy, partial proximal fasciotomy closure; L BK pop exposure, thromboembolectomy; and bedside closure of fasciotomies. Patient is a former smoker, as such Smoking effects on the lungs, tobacco triggers, and Smoking cessation was reviewed. Education provided to patient on infection prevention, activity limitations, when to call the office, importance of follow up, and incisional care.  Discharge instruction handout provided to patient to review.  Provided patient with a pack of disposable washcloths, a pack of guaze, and a bottle of chlorhexidine for incision care upon discharge.

## 2025-07-01 NOTE — ASSESSMENT & PLAN NOTE
Home: Home bumex 3mg po bid   CXR with persistent bilateral pleural effusions   Continue Bumex 3 mg twice daily and trend renal function and volume status

## 2025-07-01 NOTE — ASSESSMENT & PLAN NOTE
Wt Readings from Last 3 Encounters:   06/28/25 60.1 kg (132 lb 7.9 oz)   06/18/25 57.2 kg (126 lb 3.2 oz)   06/12/25 54.9 kg (121 lb)     Currently on Bumex 3 mg twice daily, decreased to prevent over diuresis resulting in worsening hypotension   Now on Bumex 2 mg BID

## 2025-07-01 NOTE — PLAN OF CARE
Problem: PAIN - ADULT  Goal: Verbalizes/displays adequate comfort level or baseline comfort level  Description: Interventions:  - Encourage patient to monitor pain and request assistance  - Assess pain using appropriate pain scale  - Administer analgesics as ordered based on type and severity of pain and evaluate response  - Implement non-pharmacological measures as appropriate and evaluate response  - Consider cultural and social influences on pain and pain management  - Notify physician/advanced practitioner if interventions unsuccessful or patient reports new pain  - Educate patient/family on pain management process including their role and importance of  reporting pain   - Provide non-pharmacologic/complimentary pain relief interventions  Outcome: Progressing     Problem: INFECTION - ADULT  Goal: Absence or prevention of progression during hospitalization  Description: INTERVENTIONS:  - Assess and monitor for signs and symptoms of infection  - Monitor lab/diagnostic results  - Monitor all insertion sites, i.e. indwelling lines, tubes, and drains  - Monitor endotracheal if appropriate and nasal secretions for changes in amount and color  - Kamiah appropriate cooling/warming therapies per order  - Administer medications as ordered  - Instruct and encourage patient and family to use good hand hygiene technique  - Identify and instruct in appropriate isolation precautions for identified infection/condition  Outcome: Progressing  Goal: Absence of fever/infection during neutropenic period  Description: INTERVENTIONS:  - Monitor WBC  - Perform strict hand hygiene  - Limit to healthy visitors only  - No plants, dried, fresh or silk flowers with horne in patient room  Outcome: Progressing    Goal: Maintain or return to baseline ADL function  Description: INTERVENTIONS:  -  Assess patient's ability to carry out ADLs; assess patient's baseline for ADL function and identify physical deficits which impact ability to  perform ADLs (bathing, care of mouth/teeth, toileting, grooming, dressing, etc.)  - Assess/evaluate cause of self-care deficits   - Assess range of motion  - Assess patient's mobility; develop plan if impaired  - Assess patient's need for assistive devices and provide as appropriate  - Encourage maximum independence but intervene and supervise when necessary  - Involve family in performance of ADLs  - Assess for home care needs following discharge   - Consider OT consult to assist with ADL evaluation and planning for discharge  - Provide patient education as appropriate  - Monitor functional capacity and physical performance, use of AM PAC & JH-HLM   - Monitor gait, balance and fatigue with ambulation    Outcome: Progressing     Problem: DISCHARGE PLANNING  Goal: Discharge to home or other facility with appropriate resources  Description: INTERVENTIONS:  - Identify barriers to discharge w/patient and caregiver  - Arrange for needed discharge resources and transportation as appropriate  - Identify discharge learning needs (meds, wound care, etc.)  - Arrange for interpretive services to assist at discharge as needed  - Refer to Case Management Department for coordinating discharge planning if the patient needs post-hospital services based on physician/advanced practitioner order or complex needs related to functional status, cognitive ability, or social support system  Outcome: Progressing     Problem: Knowledge Deficit  Goal: Patient/family/caregiver demonstrates understanding of disease process, treatment plan, medications, and discharge instructions  Description: Complete learning assessment and assess knowledge base.  Interventions:  - Provide teaching at level of understanding  - Provide teaching via preferred learning methods  Outcome: Progressing

## 2025-07-01 NOTE — ASSESSMENT & PLAN NOTE
Suspect ATN in the setting of shock, contrast CT on 6/24 and acute blood loss   Creatinine continues to improve, currently 1.17  CAT scan without hydronephrosis  Continue to hold Jardiance and spironolactone  Repeat BMP in a.m.  Monitor renal function closely with diuresis  Avoid relative hypotension and nephrotoxic medications

## 2025-07-01 NOTE — ASSESSMENT & PLAN NOTE
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 Clinical exam and imaging concerning for [place] 2B acute limb ischemia of the RLE, L popliteal occlusion also noted on presentation.    6/24 morning: R femoral cutdown, thromboembolectomy, R anterior and lateral fasciotomy, partial proximal fasciotomy closure (Eriberto)  6/24 evening: L BK pop exposure, thromboembolectomy (Segundo)  6/29 bedside closure of fasciotomies (Cj Majano)    LLE:    RLE lateral fasciotomy site:         Diffuse BLE edema and ecchymosis, BLE are soft.   Mepilexes are saturated, RLE incison with bloody drainage, LLE with serous drainage.   R groin soft, ecchymotic, no active ooze. Dressing CDI.  Bilateral DP/PT signals, motor/sensory intact.    Plan:  -Pain control PRN  -Started on PO eliquis today  -Continue statin  -Ace wrap BLE for edema, can elevate for comfort  -Change groin dressing PRN saturation, serosanguineous drainage is normal  -Continue to monitor NV evam  -Ambulate pt, OOB, PT/OT, ISB  -Remainder of care per primary team

## 2025-07-01 NOTE — PLAN OF CARE
Problem: OCCUPATIONAL THERAPY ADULT  Goal: Performs self-care activities at highest level of function for planned discharge setting.  See evaluation for individualized goals.  Description: Treatment Interventions: ADL retraining, Functional transfer training, Endurance training, Patient/family training, Equipment evaluation/education, Compensatory technique education, Energy conservation, Activityengagement          See flowsheet documentation for full assessment, interventions and recommendations.   Outcome: Progressing  Note: Limitation: Decreased ADL status, Decreased endurance, Decreased self-care trans, Decreased high-level ADLs  Prognosis: Good  Assessment: Pt seen on this date for OT session focusing on ADL retraining,  body mechanics, transfer retraining, increasing activity tolerance/endurance and EOB sitting to increase ability to participate in ADL/functional tasks. Pt was found in chair and was left in bed w/ all needs within reach, bed alarm on. Pt completed transfers and fm w min ax1, toileting and lb dressing w min ax1 as well.  Pt w/ improvements in endurance, adl task completion, attn to task however is still limited 2* decreased ADL/High-level ADL status, decreased activity tolerance/endurance, decreased self-care trans, decreased safety awareness and insight to condition.   The patient's raw score on the AM-PAC Daily Activity Inpatient Short Form is 20. A raw score of less than 19 suggests the patient may benefit from discharge to post-acute rehabilitation services. Please refer to the recommendation of the Occupational Therapist for safe discharge planning.   Recommending pt D/C to level 3 when medically stable. Pt will continue to benefit from acute OT services to meet goals.     Rehab Resource Intensity Level, OT: III (Minimum Resource Intensity)

## 2025-07-01 NOTE — OCCUPATIONAL THERAPY NOTE
Occupational Therapy Progress Note     Patient Name: Rebeca Banegas  Today's Date: 7/1/2025  Problem List  Principal Problem:    Acute lower limb ischemia  Active Problems:    Asthma    Permanent atrial fibrillation (HCC)    Bifascicular bundle branch block    Chronic heart failure with preserved ejection fraction (HCC)    Primary hypertension    CKD (chronic kidney disease) stage 4, GFR 15-29 ml/min (HCC)    Bronchiectasis (HCC)    History of CVA (cerebrovascular accident)    Shock (HCC)    DARIO (acute kidney injury) (HCC)    Hyperphosphatemia    Anemia    Benign hypertension with CKD (chronic kidney disease) stage IV (HCC)           07/01/25 1354   OT Last Visit   OT Visit Date 07/01/25   Note Type   Note Type Treatment   Pain Assessment   Pain Assessment Tool 0-10   Pain Score 3   Pain Location/Orientation Orientation: Bilateral;Location: Leg   Patient's Stated Pain Goal No pain   Hospital Pain Intervention(s) Repositioned;Ambulation/increased activity;Emotional support   Restrictions/Precautions   Weight Bearing Precautions Per Order No   Other Precautions Telemetry;Fall Risk   ADL   Where Assessed Edge of bed   LB Dressing Assistance 4  Minimal Assistance   LB Dressing Deficit   (doff underwear)   Toileting Assistance  4  Minimal Assistance   Toileting Deficit Perineal hygiene   Bed Mobility   Sit to Supine 5  Supervision   Additional items Increased time required   Additional Comments found in chair, left in bed w all needs in reach and alarm on.   Transfers   Sit to Stand 4  Minimal assistance   Stand to Sit 4  Minimal assistance   Additional Comments rw   Functional Mobility   Functional Mobility 4  Minimal assistance   Additional Comments ax1, community distance w seated rest break.   Additional items Rolling walker   Cognition   Overall Cognitive Status WFL   Arousal/Participation Alert;Responsive   Attention Within functional limits   Orientation Level Oriented X4   Memory Within functional limits    Following Commands Follows all commands and directions without difficulty   Comments pt cooperative w overall fair safety awareness and insight to condition t/o session.   Activity Tolerance   Activity Tolerance Patient tolerated treatment well   Medical Staff Made Aware rn   Assessment   Assessment Pt seen on this date for OT session focusing on ADL retraining,  body mechanics, transfer retraining, increasing activity tolerance/endurance and EOB sitting to increase ability to participate in ADL/functional tasks. Pt was found in chair and was left in bed w/ all needs within reach, bed alarm on. Pt completed transfers and fm w min ax1, toileting and lb dressing w min ax1 as well.  Pt w/ improvements in endurance, adl task completion, attn to task however is still limited 2* decreased ADL/High-level ADL status, decreased activity tolerance/endurance, decreased self-care trans, decreased safety awareness and insight to condition.   The patient's raw score on the AM-PAC Daily Activity Inpatient Short Form is 20. A raw score of less than 19 suggests the patient may benefit from discharge to post-acute rehabilitation services. Please refer to the recommendation of the Occupational Therapist for safe discharge planning.   Recommending pt D/C to level 3 when medically stable. Pt will continue to benefit from acute OT services to meet goals.   Plan   Treatment Interventions ADL retraining;Functional transfer training;Endurance training;Energy conservation;Activityengagement;Compensatory technique education;Equipment evaluation/education   Goal Expiration Date 07/11/25   OT Treatment Day 2   OT Frequency 2-3x/wk   Discharge Recommendation   Rehab Resource Intensity Level, OT III (Minimum Resource Intensity)   AM-PAC Daily Activity Inpatient   Lower Body Dressing 3   Bathing 3   Toileting 3   Upper Body Dressing 3   Grooming 4   Eating 4   Daily Activity Raw Score 20   Daily Activity Standardized Score (Calc for Raw Score  >=11) 42.03   AM-PAC Applied Cognition Inpatient   Following a Speech/Presentation 4   Understanding Ordinary Conversation 4   Taking Medications 4   Remembering Where Things Are Placed or Put Away 4   Remembering List of 4-5 Errands 4   Taking Care of Complicated Tasks 4   Applied Cognition Raw Score 24   Applied Cognition Standardized Score 62.21   Modified [place] Scale   Modified [place] Scale 4   End of Consult   Education Provided Yes   Patient Position at End of Consult Bed/Chair alarm activated;All needs within reach;Supine   Nurse Communication Nurse aware of consult         JIGAR Vanegas, OTR/L

## 2025-07-01 NOTE — ASSESSMENT & PLAN NOTE
>>ASSESSMENT AND PLAN FOR BENIGN HYPERTENSION WITH CKD (CHRONIC KIDNEY DISEASE) STAGE IV (HCC) WRITTEN ON 7/1/2025  1:19 PM BY DEEJAY COMBS PA-C    Lab Results   Component Value Date    EGFR 40 07/01/2025    EGFR 34 06/30/2025    EGFR 27 06/29/2025    CREATININE 1.17 07/01/2025    CREATININE 1.34 (H) 06/30/2025    CREATININE 1.62 (H) 06/29/2025

## 2025-07-01 NOTE — PROGRESS NOTES
Progress Note - Vascular Surgery   Name: Rebeca Banegas 89 y.o. female I MRN: [id number]  Unit/Bed#: 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 Clinical exam and imaging concerning for [place] 2B acute limb ischemia of the RLE, L popliteal occlusion also noted on presentation.    6/24 morning: R femoral cutdown, thromboembolectomy, R anterior and lateral fasciotomy, partial proximal fasciotomy closure ([place])  6/24 evening: L BK pop exposure, thromboembolectomy ([place])  6/29 bedside closure of fasciotomies (Cj Majano)    LLE:    RLE lateral fasciotomy site:         Diffuse BLE edema and ecchymosis, BLE are soft.   Mepilexes are saturated, RLE incison with bloody drainage, LLE with serous drainage.   R groin soft, ecchymotic, no active ooze. Dressing CDI.  Bilateral DP/PT signals, motor/sensory intact.    Plan:  -Pain control PRN  -Started on PO eliquis today  -Continue statin  -Ace wrap BLE for edema, can elevate for comfort  -Change groin dressing PRN saturation, serosanguineous drainage is normal  -Continue to monitor NV evam  -Ambulate pt, OOB, PT/OT, ISB  -Remainder of care per primary team  Benign hypertension with CKD (chronic kidney disease) stage IV (HCC)  Lab Results   Component Value Date    EGFR 40 07/01/2025    EGFR 34 06/30/2025    EGFR 27 06/29/2025    CREATININE 1.17 07/01/2025    CREATININE 1.34 (H) 06/30/2025    CREATININE 1.62 (H) 06/29/2025       24 Hour Events :   Subjective : Pt resting comfortably in recliner chair, denies significant pain to BLE, denies nausea, dysuria, numbness or tingling to BLE. She  "endorses a \"fullness\" to her legs, which she acknowledges is from the edema. She reports that she has not had a bowel movement in 1 week, and that she is tolerating a diet well. She endorses starting eliquis today, which will be much easier for her than taking coumadin.     Objective :  Temp:  [97.8 °F (36.6 °C)-98 °F (36.7 °C)] 97.8 °F (36.6 °C)  HR:  [] 106  BP: ()/(54-71) 97/54  Resp:  [16-18] 18  SpO2:  [93 %-98 %] 97 %  O2 Device: None (Room air)     I/O         06/29 0701 06/30 0700 06/30 0701 07/01 0700 07/01 0701 07/02 0700    P.O. 540 180 480    I.V. (mL/kg) 73.3 (1.2)      IV Piggyback       Total Intake(mL/kg) 613.3 (10.2) 180 (3) 480 (8)    Urine (mL/kg/hr) 2000 (1.4) 1800 (1.2) 250 (0.7)    Total Output 2000 1800 250    Net -1386.7 -1620 +230                 Lines/Drains/Airways       Active Status       Name Placement date Placement time Site Days    External Urinary Catheter 06/27/25  1155  -- 4                  Physical Exam  Vitals and nursing note reviewed.   Constitutional:       General: She is not in acute distress.     Appearance: She is well-developed.   HENT:      Head: Normocephalic and atraumatic.     Eyes:      Conjunctiva/sclera: Conjunctivae normal.       Cardiovascular:      Rate and Rhythm: Normal rate.   Pulmonary:      Effort: Pulmonary effort is normal. No respiratory distress.      Comments: Productive cough  Abdominal:      General: Abdomen is flat.      Palpations: Abdomen is soft.      Tenderness: There is no abdominal tenderness.      Comments: R groin incision soft, less firm than previous. Diffuse ecchymosis     Musculoskeletal:         General: Swelling present.      Cervical back: Neck supple.      Right lower leg: Edema present.      Left lower leg: Edema present.     Skin:     General: Skin is warm and dry.      Capillary Refill: Capillary refill takes 2 to 3 seconds.      Comments: Bilateral LE incisions intact, serous drainage from LLE incision and bloody " "drainage noted from RLE incision site. Mepilexes appear saturated, both replaced. Ace to BLE.     Neurological:      Mental Status: She is alert and oriented to person, place, and time. Mental status is at baseline.      Sensory: No sensory deficit.      Motor: No weakness.     Psychiatric:         Mood and Affect: Mood normal.           Lab Results: I have reviewed the following results:  CBC with diff:   Lab Results   Component Value Date    WBC 12.64 (H) 07/01/2025    HGB 9.6 (L) 07/01/2025    HCT 28.6 (L) 07/01/2025    MCV 98 07/01/2025     07/01/2025    RBC 2.92 (L) 07/01/2025    MCH 32.9 07/01/2025    MCHC 33.6 07/01/2025    RDW 17.3 (H) 07/01/2025    MPV 9.4 07/01/2025    NRBC 0 06/29/2025   ,   BMP/CMP:  Lab Results   Component Value Date    SODIUM 141 07/01/2025    SODIUM 138 01/07/2025    SODIUM 138 01/02/2024    K 3.5 07/01/2025    K 3.6 01/07/2025    K 4.0 01/02/2024     07/01/2025    CL 95 (L) 01/07/2025     01/02/2024    CO2 28 07/01/2025    CO2 24 06/24/2025    CO2 32 (H) 01/07/2025    CO2 29 01/02/2024    BUN 45 (H) 07/01/2025    BUN 49 (H) 01/07/2025    BUN 33 (H) 01/02/2024    CREATININE 1.17 07/01/2025    CREATININE 1.35 (H) 01/02/2024    GLUCOSE 139 06/24/2025    CALCIUM 6.6 (L) 07/01/2025    CALCIUM 8.0 (L) 01/07/2025    CALCIUM 8.4 (L) 01/02/2024    AST 98 (H) 06/29/2025    AST 30 01/02/2024     (H) 06/29/2025    ALT 27 01/02/2024    ALKPHOS 29 (L) 06/29/2025    ALKPHOS 51 01/02/2024    PROT 6.4 10/17/2017    BILITOT 0.5 10/17/2017    EGFR 40 07/01/2025    EGFR 37 (L) 01/02/2024    EGFR 35 (L) 12/29/2020   ,   Lipid Panel:   Lab Results   Component Value Date    CHOL 205 (H) 02/07/2017   ,   Coags:   Lab Results   Component Value Date    PT 23.7 (H) 02/23/2024    PTT 67 (H) 06/30/2025    INR 1.47 (H) 06/24/2025    INR 2.2 02/23/2024   ,   Blood Culture: No results found for: \"BLOODCX\",   Urinalysis:   Lab Results   Component Value Date    COLORU Light Yellow 12/29/2024 " "   CLARITYU Clear 12/29/2024    SPECGRAV 1.011 12/29/2024    PHUR 5.0 12/29/2024    LEUKOCYTESUR Negative 12/29/2024    NITRITE Negative 12/29/2024    GLUCOSEU Negative 12/29/2024    KETONESU Negative 12/29/2024    BILIRUBINUR Negative 12/29/2024    BLOODU Negative 12/29/2024   ,   Urine Culture: No results found for: \"URINECX\",   Wound Culure:   Lab Results   Component Value Date    WOUNDCULT Few Colonies of Proteus mirabilis (A) 09/04/2024    WOUNDCULT Few Colonies of Acinetobacter baumannii (A) 09/04/2024       Imaging Results Review: No pertinent imaging studies reviewed.  Other Study Results Review: No additional pertinent studies reviewed.    VTE Prophylaxis: VTE covered by:  apixaban, Oral, 5 mg at 07/01/25 0857   In followed-by linked group with  [START ON 7/7/2025] apixaban, Oral     "

## 2025-07-01 NOTE — ASSESSMENT & PLAN NOTE
Cardiology consult appreciated  Due to concern for subtherapeutic INR described of being compliant with Coumadin resulting in acute lower limb ischemia, cardiology is recommending DOAC on discharge.  Cardiology and vascular as well as patient in agreement started on loading dose of renally dosed Eliquis   Lopressor 12.5 mg every 6 hours   DC heparin drip. Started on loading dose of Eliquis

## 2025-07-01 NOTE — PROGRESS NOTES
Progress Note - Nephrology   Rebeca Banegas 89 y.o. female MRN: [id number]  Unit/Bed#: [facility] 512-01 Encounter: [id number]      Assessment & Plan  DARIO (acute kidney injury) ([facility])  Suspect ATN in the setting of shock, contrast CT on 6/24 and acute blood loss   Creatinine continues to improve, currently 1.17  CAT scan without hydronephrosis  Continue to hold Jardiance and spironolactone  Repeat BMP in a.m.  Monitor renal function closely with diuresis  Avoid relative hypotension and nephrotoxic medications  CKD (chronic kidney disease) stage 4, GFR 15-29 ml/min ([facility])  Baseline creatinine since January appears to be 1.6-2.1 with GFR <30  Benign hypertension with CKD (chronic kidney disease) stage IV ([facility])  Blood pressure has been running somewhat low  Heart rate is up, therefore needs current beta-blockade dose  Will decrease diuretic to prevent overdiuresis resulting in worsening hypotension  Acute lower limb ischemia  Bilateral LE ischemia likely due to cardioembolus   S/p RLE fem embolectomy and anterolateral fasciotomy and LLE pop/AT embolectomy on 6/24  Status post bedside fasciotomy closure   Shock ([facility])  Status post prior phenylephrine infusion  Shock resolved  Chronic heart failure with preserved ejection fraction ([facility])  Home: Home bumex 3mg po bid   CXR with persistent bilateral pleural effusions   Continue Bumex 3 mg twice daily and trend renal function and volume status  Hyperphosphatemia  Resolved-phosphorus improved at 3.1               Subjective:   The patient was seen and examined earlier this morning.  She had some dyspnea with exertion greater than 100 feet but stated she had this at home as well.  She does have a slight cough which she also has chronically at home as well.  She denies any chest pain or pressure, abdominal pain, chills, sweats, paroxysmal nocturnal dyspnea orthopnea      Objective:     Vitals: Blood pressure 97/54, pulse (!) 106, temperature 97.8 °F (36.6 °C), resp. rate 18, height  "5' 5\" (1.651 m), weight 60.1 kg (132 lb 7.9 oz), SpO2 97%.,Body mass index is 22.05 kg/m².Temp (24hrs), Av.9 °F (36.6 °C), Min:97.8 °F (36.6 °C), Max:98 °F (36.7 °C)      Temp:  [97.8 °F (36.6 °C)-98 °F (36.7 °C)] 97.8 °F (36.6 °C)  HR:  [] 106  Resp:  [16-18] 18  BP: ()/(54-71) 97/54  SpO2:  [93 %-98 %] 97 %    Weight (last 2 days)       None                 I/O last 24 hours:  In: 180 [P.O.:180]  Out: 1800 [Urine:1800]        Physical Exam    BP 97/54   Pulse (!) 106   Temp 97.8 °F (36.6 °C)   Resp 18   Ht 5' 5\" (1.651 m)   Wt 60.1 kg (132 lb 7.9 oz)   LMP  (LMP Unknown)   SpO2 97%   BMI 22.05 kg/m²   Vital signs were reviewed  Constitutional: The patient was awake, alert, pleasant, cooperative and in no apparent distress  Cardiovascular: Jugular venous distention was noted, bilateral lower extremities were Ace wrapped but there was edema noted, S1-S2, no pericardial friction rub S3 were appreciated  Pulmonary: Adequate inspiratory effort, scattered rhonchi                Invasive Devices       Peripheral Intravenous Line  Duration             Peripheral IV 25 Right;Upper;Ventral (anterior) Arm 2 days              Drain  Duration             External Urinary Catheter 3 days                    Medications:    Scheduled Meds:  Current Facility-Administered Medications   Medication Dose Route Frequency Provider Last Rate    acetaminophen  975 mg Oral Q8H [facility] Nam Celaya DO      albuterol  2.5 mg Nebulization Q6H PRN LUZ Lawson      apixaban  5 mg Oral BID Deborah Bro PA-C      Followed by    [START ON 2025] apixaban  2.5 mg Oral BID Deborah Bro PA-C      budesonide  0.5 mg Nebulization Q12H Nam Celaya DO      bumetanide  3 mg Oral BID Nam Celaya DO      guaiFENesin  600 mg Oral Q12H GARTH Nam Celaya,       HYDROmorphone  0.2 mg Intravenous Q3H PRN Nam Celaya, DO      insulin lispro  1-5 Units " Subcutaneous TID AC Nam Celaya, DO      levalbuterol  1.25 mg Nebulization TID LUZ Lawson      levothyroxine  50 mcg Oral Early Morning Nam Celaya, DO      metoprolol tartrate  12.5 mg Oral Q6H Nam Celaya, DO      ondansetron  4 mg Intravenous Q6H PRN Nam Celaya, DO      oxyCODONE  5 mg Oral Q4H PRN Nam Celaya, DO      oxyCODONE  2.5 mg Oral Q4H PRN Nam Celaya, DO      senna  1 tablet Oral Daily Nam Celaya, DO         PRN Meds:.  albuterol    HYDROmorphone    ondansetron    oxyCODONE    oxyCODONE    Continuous Infusions:         LAB RESULTS:      Results from last 7 days   Lab Units 07/01/25  0441 06/30/25  0524 06/29/25  0130 06/28/25  0438 06/27/25  0846 06/27/25  0234 06/26/25  1226 06/26/25  0506 06/25/25  2212 06/25/25  1755 06/25/25  0946 06/25/25  0512 06/25/25  0512 06/24/25  1953   WBC Thousand/uL 12.64* 13.40* 11.99* 9.58  --  11.23* 9.74 8.95  --  11.64* 12.67*   < > 10.36*  --    HEMOGLOBIN g/dL 9.6* 9.4* 9.2* 9.1* 9.9* 9.6* 11.0* 7.5*  --  8.7* 9.8*   < > 8.3*  --    I STAT HEMOGLOBIN g/dl  --   --   --   --   --   --   --   --   --   --   --   --   --  8.8*   HEMATOCRIT % 28.6* 28.6* 27.1* 26.5*  --  26.5* 31.0* 21.8*  --  25.3* 28.3*   < > 24.9*  --    HEMATOCRIT, ISTAT %  --   --   --   --   --   --   --   --   --   --   --   --   --  26*   PLATELETS Thousands/uL 173 157 126* 114*  --  124* 122* 125*  --  165 168   < > 154  --    SEGS PCT %  --   --  74  --   --  69 64  --   --   --  66  --   --   --    LYMPHO PCT %  --   --  16  --   --  19 19  --   --   --  21  --   --   --    MONO PCT %  --   --  9  --   --  11 16*  --   --   --  11  --   --   --    EOS PCT %  --   --  1  --   --  0 0  --   --   --  1  --   --   --    POTASSIUM mmol/L 3.5 3.3* 3.4* 3.7 4.0 3.9 5.1 5.1 5.5* 5.5*  --    < > 4.9  --    CHLORIDE mmol/L 102 104 103 104 104 105 105 103 101 101  --    < > 101  --    CO2 mmol/L 28 30 24 25 21 20* 18* 19* 19*  "24  --    < > 22  --    CO2, I-STAT mmol/L  --   --   --   --   --   --   --   --   --   --   --   --   --  24   BUN mg/dL 45* 54* 62* 63* 70* 70* 66* 63* 61* 63*  --    < > 52*  --    CREATININE mg/dL 1.17 1.34* 1.62* 1.95* 2.25* 2.38* 2.45* 2.40* 2.20* 2.31*  --    < > 1.70*  --    CALCIUM mg/dL 6.6* 7.0* 7.0* 6.7* 7.6* 7.0* 7.4* 7.4* 7.7* 7.5*  --    < > 7.3*  --    ALK PHOS U/L  --   --  29*  --  30*  --   --   --  29* 35  --   --   --   --    ALT U/L  --   --  124*  --  108*  --   --   --  7 8  --   --   --   --    AST U/L  --   --  98*  --  133*  --   --   --  23 26  --   --   --   --    GLUCOSE, ISTAT mg/dl  --   --   --   --   --   --   --   --   --   --   --   --   --  139   MAGNESIUM mg/dL  --   --  2.1 2.1 2.4 2.3 2.5 2.3  --   --   --   --  2.2  --    PHOSPHORUS mg/dL  --   --  3.1 4.5* 5.4* 5.3* 6.7* 6.1*  --   --   --   --  4.9*  --     < > = values in this interval not displayed.       CUTURES:  No results found for: \"BLOODCX\", \"URINECX\"              PLEASE NOTE:  This encounter was completed utilizing the [employer] Direct Speech Voice Recognition Software. Grammatical errors, random word insertions, pronoun errors and incomplete sentences are occasional consequences of the system due to software limitations, ambient noise and hardware issues.These may be missed by proof reading prior to affixing electronic signature. Any questions or concerns about the content, text or information contained within the body of this dictation should be directly addressed to the physician for clarification. Please do not hesitate to call me directly if you have any any questions or concerns.  "

## 2025-07-01 NOTE — ASSESSMENT & PLAN NOTE
89-year-old female with PMH of HFpEF, permanent A-fib on Coumadin, history of CVA, bronchiectasis originally presented to ED with complaint of right leg pain.  CTA of lower extremity was concerning for right femoral acute arterial occlusion.  Noted to have subtherapeutic INR at 1.4  Patient started on heparin infusion at that time   underwent emergent right leg embolectomy/thrombectomy, fasciotomy and left popliteal thromboembolectomy on 6/24/2025 by vascular surgery.  Postoperatively patient noted to be hypotensive, then transferred to critical care for vasopressors.  Patient subsequently weaned off of pressors, and downgraded to MedSurg  Per vascular discontinued warfarin and started on appropriately renally dosed Eliquis 5 mg BID x 7 days and then 2.5 mg BID ongoing   Eliquis/Xarelto Price check completed, both $75  Vascular surgery following, underwent bedside closure of b/l lower extremity fasciotomies 6/29  Not on ASA dt allergy and not on statin dt potential side effects

## 2025-07-01 NOTE — RESTORATIVE TECHNICIAN NOTE
Restorative Technician Note      Patient Name: Rebeca Banegas     Restorative Tech Visit Date: 07/01/25  Note Type: Mobility  Patient Position Upon Consult: Supine  Activity Performed: Ambulated  Assistive Device: Roller walker  Patient Position at End of Consult: Bedside chair; All needs within reach; Bed/Chair alarm activated

## 2025-07-01 NOTE — ASSESSMENT & PLAN NOTE
Patient noted to have DARIO in critical care secondary to contrast associated nephropathy, acute blood loss anemia, and undifferentiated shock requiring vasopressors.  Baseline creatinine 1.6-2.1  Currently creatinine improving, 1.34-> 1.17  Per nephrology continue to hold jardiance and spironolactone   Avoid hypotension and repeat bmp in the am

## 2025-07-01 NOTE — CASE MANAGEMENT
Case Management Progress Note    Patient name Rebeca Banegas  [facility] [id number]  : 1932 Date 2025       LOS (days): 7  Geometric Mean LOS (GMLOS) (days): 5.5  Days to GMLOS:-1.8        OBJECTIVE:        Current admission status: Inpatient  Preferred Pharmacy:   [facility] #[id number] - [place], [place] - [address] [place] [address]  [place] [address]  Phone: [phone] Fax: [phone]    [facility] - [facility] [address] [facility]  [address] [facility] [address]  Phone: [phone] Fax: [phone]    Primary Care Provider: Santana Dillon MD    Primary Insurance: [employer]  Secondary Insurance:     PROGRESS NOTE:  As per Mana Xavier NP, pt is medically cleared for d/c.   [facility] for STR.

## 2025-07-01 NOTE — ASSESSMENT & PLAN NOTE
Lab Results   Component Value Date    EGFR 40 07/01/2025    EGFR 34 06/30/2025    EGFR 27 06/29/2025    CREATININE 1.17 07/01/2025    CREATININE 1.34 (H) 06/30/2025    CREATININE 1.62 (H) 06/29/2025

## 2025-07-01 NOTE — ASSESSMENT & PLAN NOTE
>>ASSESSMENT AND PLAN FOR BENIGN HYPERTENSION WITH CKD (CHRONIC KIDNEY DISEASE) STAGE IV (HCC) WRITTEN ON 7/1/2025 11:41 AM BY ENOCH DIANA MD    Blood pressure has been running somewhat low  Heart rate is up, therefore needs current beta-blockade dose  Will decrease diuretic to prevent overdiuresis resulting in worsening hypotension

## 2025-07-01 NOTE — PROGRESS NOTES
Progress Note - Hospitalist   Name: Rebeca Banegas 89 y.o. female I MRN: [id number]  Unit/Bed#: [facility] 512-01 I Date of Admission: 6/24/2025   Date of Service: 7/1/2025 I Hospital Day: 7    Assessment & Plan  Acute lower limb ischemia  89-year-old female with PMH of HFpEF, permanent A-fib on Coumadin, history of CVA, bronchiectasis originally presented to ED with complaint of right leg pain.  CTA of lower extremity was concerning for right femoral acute arterial occlusion.  Noted to have subtherapeutic INR at 1.4  Patient started on heparin infusion at that time   underwent emergent right leg embolectomy/thrombectomy, fasciotomy and left popliteal thromboembolectomy on 6/24/2025 by vascular surgery.  Postoperatively patient noted to be hypotensive, then transferred to critical care for vasopressors.  Patient subsequently weaned off of pressors, and downgraded to MedSurg  Per vascular discontinued warfarin and started on appropriately renally dosed Eliquis 5 mg BID x 7 days and then 2.5 mg BID ongoing   Eliquis/Xarelto Price check completed, both $75  Vascular surgery following, underwent bedside closure of b/l lower extremity fasciotomies 6/29  Not on ASA dt allergy and not on statin dt potential side effects     Asthma  Currently not in exacerbation, continue inhalers  Permanent atrial fibrillation (HCC)  Cardiology consult appreciated  Due to concern for subtherapeutic INR described of being compliant with Coumadin resulting in acute lower limb ischemia, cardiology is recommending DOAC on discharge.  Cardiology and vascular as well as patient in agreement started on loading dose of renally dosed Eliquis   Lopressor 12.5 mg every 6 hours   DC heparin drip. Started on loading dose of Eliquis   Bifascicular bundle branch block    Chronic heart failure with preserved ejection fraction (HCC)  Wt Readings from Last 3 Encounters:   06/28/25 60.1 kg (132 lb 7.9 oz)   06/18/25 57.2 kg (126 lb 3.2 oz)   06/12/25 54.9 kg  (121 lb)     Currently on Bumex 3 mg twice daily, decreased to prevent over diuresis resulting in worsening hypotension   Now on Bumex 2 mg BID     DARIO (acute kidney injury) (HCC)  Patient noted to have DARIO in critical care secondary to contrast associated nephropathy, acute blood loss anemia, and undifferentiated shock requiring vasopressors.  Baseline creatinine 1.6-2.1  Currently creatinine improving, 1.34-> 1.17  Per nephrology continue to hold jardiance and spironolactone   Avoid hypotension and repeat bmp in the am   Primary hypertension  Currently on p.o. Lopressor and Bumex  Bronchiectasis (HCC)  Currently on room air  Continue Pulmicort/Xopenex  History of CVA (cerebrovascular accident)  History of CVA in August 2024, thought to be embolic in the setting of warfarin interruption with underlying permanent A-fib.  Currently on heparin infusion, cardiology recommending DOAC on discharge  Hyperphosphatemia  Improved   Anemia  Postoperative anemia, status post 2 unit PRBC and critical care  Shock (HCC)  Noted to be hypotensive postoperatively required critical care stay and vasopressors.  Currently resolved    VTE Pharmacologic Prophylaxis:   High Risk (Score >/= 5) - Pharmacological DVT Prophylaxis Ordered: apixaban (Eliquis). Sequential Compression Devices Ordered.    Mobility:   Basic Mobility Inpatient Raw Score: 14  JH-HLM Goal: 4: Move to chair/commode  JH-HLM Achieved: 7: Walk 25 feet or more  JH-HLM Goal achieved. Continue to encourage appropriate mobility.    Patient Centered Rounds: I performed bedside rounds with nursing staff today.   Discussions with Specialists or Other Care Team Provider: nursing     Education and Discussions with Family / Patient: Updated  (daughter) via phone. Meena updated today     Current Length of Stay: 7 day(s)  Current Patient Status: Inpatient   Certification Statement: The patient will continue to require additional inpatient hospital stay due to pending  "clearance from all specialities   Discharge Plan: Anticipate discharge in 48-72 hrs to prior assisted or independent living facility.    Code Status: Level 3 - DNAR and DNI    Subjective   Pt is pleasant , she is surprised by her ability to get oob today with therapy. She seems to be in better spirits . I had discussion with her in regard to her ? Depression or reported desire to \"be done with this\". She reports just being frustrated with not really understanding what is going on with her. We discussed all of what has happened and what the plan is moving forward. She is agreeable to further discussion with Raoul coulter when she returns. She does feel like she is in better spirits right now. She asked that I call her daughter. Which I agreed to pain in both lower legs .     Objective :  Temp:  [97.8 °F (36.6 °C)-98 °F (36.7 °C)] 97.8 °F (36.6 °C)  HR:  [] 97  BP: (102-111)/(62-71) 104/62  Resp:  [16-18] 18  SpO2:  [93 %-98 %] 97 %  O2 Device: None (Room air)    Body mass index is 22.05 kg/m².     Input and Output Summary (last 24 hours):     Intake/Output Summary (Last 24 hours) at 7/1/2025 0914  Last data filed at 7/1/2025 0624  Gross per 24 hour   Intake --   Output 1800 ml   Net -1800 ml       Physical Exam  Constitutional:       General: She is not in acute distress.     Appearance: She is not ill-appearing, toxic-appearing or diaphoretic.   HENT:      Head: Normocephalic and atraumatic.      Nose: No congestion.     Eyes:      General:         Right eye: No discharge.         Left eye: No discharge.       Cardiovascular:      Rate and Rhythm: Normal rate.      Heart sounds: No murmur heard.     No friction rub. No gallop.   Pulmonary:      Effort: No respiratory distress.      Breath sounds: No stridor. No wheezing, rhonchi or rales.   Chest:      Chest wall: No tenderness.   Abdominal:      General: There is no distension.      Palpations: There is no mass.      Tenderness: There is no abdominal " tenderness. There is no guarding or rebound.      Hernia: No hernia is present.     Musculoskeletal:         General: No swelling, tenderness, deformity or signs of injury.      Right lower leg: No edema.      Left lower leg: No edema.     Skin:     Coloration: Skin is not jaundiced or pale.      Findings: Bruising (significant bruising right arm through fingers . swelling in the fingers on right hand. Bruising noted in right medial leg light) present. No erythema, lesion or rash.      Comments: Bl lower extremities with ace wrap right lower extremity with sterile dressing some ss drainage on dressing .      Neurological:      Mental Status: She is alert and oriented to person, place, and time.     Psychiatric:         Behavior: Behavior normal.           Lines/Drains:  Lines/Drains/Airways       Active Status       Name Placement date Placement time Site Days    External Urinary Catheter 06/27/25  1155  -- 3                            Lab Results: I have reviewed the following results:   Results from last 7 days   Lab Units 07/01/25  0441 06/30/25  0524 06/29/25  0130   WBC Thousand/uL 12.64*   < > 11.99*   HEMOGLOBIN g/dL 9.6*   < > 9.2*   HEMATOCRIT % 28.6*   < > 27.1*   PLATELETS Thousands/uL 173   < > 126*   SEGS PCT %  --   --  74   LYMPHO PCT %  --   --  16   MONO PCT %  --   --  9   EOS PCT %  --   --  1    < > = values in this interval not displayed.     Results from last 7 days   Lab Units 07/01/25  0441 06/30/25  0524 06/29/25  0130   SODIUM mmol/L 141   < > 140   POTASSIUM mmol/L 3.5   < > 3.4*   CHLORIDE mmol/L 102   < > 103   CO2 mmol/L 28   < > 24   BUN mg/dL 45*   < > 62*   CREATININE mg/dL 1.17   < > 1.62*   ANION GAP mmol/L 11   < > 13   CALCIUM mg/dL 6.6*   < > 7.0*   ALBUMIN g/dL  --   --  3.9   TOTAL BILIRUBIN mg/dL  --   --  1.00   ALK PHOS U/L  --   --  29*   ALT U/L  --   --  124*   AST U/L  --   --  98*   GLUCOSE RANDOM mg/dL 82   < > 118    < > = values in this interval not displayed.          Results from last 7 days   Lab Units 07/01/25  0627 06/30/25  1541 06/30/25  1200 06/30/25  1041 06/30/25  0613 06/29/25  1556 06/29/25  1051 06/29/25  0633 06/28/25  1713 06/28/25  1133 06/28/25  0607 06/27/25  1635   POC GLUCOSE mg/dl 86 103 106 65 74 131 105 97 104 129 118 92     Results from last 7 days   Lab Units 06/25/25  0625   HEMOGLOBIN A1C % 6.3*     Results from last 7 days   Lab Units 06/27/25  0846 06/26/25  0506 06/25/25  2212 06/25/25  2030 06/25/25  1755 06/25/25  0816   LACTIC ACID mmol/L 1.6  --  2.0 2.9* 2.1*  --    PROCALCITONIN ng/ml  --  0.85*  --   --   --  0.19       Recent Cultures (last 7 days):         Imaging Results Review: I reviewed radiology reports from this admission including: chest xray.  Other Study Results Review: No additional pertinent studies reviewed.    Last 24 Hours Medication List:     Current Facility-Administered Medications:     acetaminophen (TYLENOL) tablet 975 mg, Q8H GARTH    apixaban (ELIQUIS) tablet 5 mg, BID **FOLLOWED BY** [START ON 7/7/2025] apixaban (ELIQUIS) tablet 2.5 mg, BID    budesonide (PULMICORT) inhalation solution 0.5 mg, Q12H    bumetanide (BUMEX) tablet 3 mg, BID    guaiFENesin (MUCINEX) 12 hr tablet 600 mg, Q12H GARTH    HYDROmorphone HCl (DILAUDID) injection 0.2 mg, Q3H PRN    insulin lispro (HumALOG/ADMELOG) 100 units/mL subcutaneous injection 1-5 Units, TID AC **AND** Fingerstick Glucose (POCT), TID AC    levalbuterol (XOPENEX) inhalation solution 1.25 mg, Q6H    levothyroxine tablet 50 mcg, Early Morning    metoprolol tartrate (LOPRESSOR) partial tablet 12.5 mg, Q6H    ondansetron (ZOFRAN) injection 4 mg, Q6H PRN    oxyCODONE (ROXICODONE) IR tablet 5 mg, Q4H PRN    oxyCODONE (ROXICODONE) split tablet 2.5 mg, Q4H PRN    senna (SENOKOT) tablet 8.6 mg, Daily    Administrative Statements   Today, Patient Was Seen By: LUZ Lawson      **Please Note: This note may have been constructed using a voice recognition system.**

## 2025-07-02 LAB
ALBUMIN SERPL BCG-MCNC: 3.3 G/DL (ref 3.5–5)
ALP SERPL-CCNC: 39 U/L (ref 34–104)
ALT SERPL W P-5'-P-CCNC: 78 U/L (ref 7–52)
ANION GAP SERPL CALCULATED.3IONS-SCNC: 7 MMOL/L (ref 4–13)
AST SERPL W P-5'-P-CCNC: 33 U/L (ref 13–39)
BASOPHILS # BLD AUTO: 0.01 THOUSANDS/ÂΜL (ref 0–0.1)
BASOPHILS NFR BLD AUTO: 0 % (ref 0–1)
BILIRUB SERPL-MCNC: 1.24 MG/DL (ref 0.2–1)
BUN SERPL-MCNC: 45 MG/DL (ref 5–25)
CALCIUM ALBUM COR SERPL-MCNC: 7.3 MG/DL (ref 8.3–10.1)
CALCIUM SERPL-MCNC: 6.7 MG/DL (ref 8.4–10.2)
CHLORIDE SERPL-SCNC: 101 MMOL/L (ref 96–108)
CO2 SERPL-SCNC: 31 MMOL/L (ref 21–32)
CREAT SERPL-MCNC: 1.29 MG/DL (ref 0.6–1.3)
EOSINOPHIL # BLD AUTO: 0.74 THOUSAND/ÂΜL (ref 0–0.61)
EOSINOPHIL NFR BLD AUTO: 7 % (ref 0–6)
ERYTHROCYTE [DISTWIDTH] IN BLOOD BY AUTOMATED COUNT: 17.7 % (ref 11.6–15.1)
GFR SERPL CREATININE-BSD FRML MDRD: 36 ML/MIN/1.73SQ M
GLUCOSE SERPL-MCNC: 111 MG/DL (ref 65–140)
GLUCOSE SERPL-MCNC: 114 MG/DL (ref 65–140)
GLUCOSE SERPL-MCNC: 80 MG/DL (ref 65–140)
GLUCOSE SERPL-MCNC: 81 MG/DL (ref 65–140)
HCT VFR BLD AUTO: 28 % (ref 34.8–46.1)
HGB BLD-MCNC: 9 G/DL (ref 11.5–15.4)
IMM GRANULOCYTES # BLD AUTO: 0.07 THOUSAND/UL (ref 0–0.2)
IMM GRANULOCYTES NFR BLD AUTO: 1 % (ref 0–2)
LYMPHOCYTES # BLD AUTO: 2.84 THOUSANDS/ÂΜL (ref 0.6–4.47)
LYMPHOCYTES NFR BLD AUTO: 27 % (ref 14–44)
MCH RBC QN AUTO: 32.1 PG (ref 26.8–34.3)
MCHC RBC AUTO-ENTMCNC: 32.1 G/DL (ref 31.4–37.4)
MCV RBC AUTO: 100 FL (ref 82–98)
MONOCYTES # BLD AUTO: 0.85 THOUSAND/ÂΜL (ref 0.17–1.22)
MONOCYTES NFR BLD AUTO: 8 % (ref 4–12)
NEUTROPHILS # BLD AUTO: 5.84 THOUSANDS/ÂΜL (ref 1.85–7.62)
NEUTS SEG NFR BLD AUTO: 57 % (ref 43–75)
NRBC BLD AUTO-RTO: 0 /100 WBCS
PLATELET # BLD AUTO: 215 THOUSANDS/UL (ref 149–390)
PMV BLD AUTO: 9.3 FL (ref 8.9–12.7)
POTASSIUM SERPL-SCNC: 3.9 MMOL/L (ref 3.5–5.3)
PROT SERPL-MCNC: 5.4 G/DL (ref 6.4–8.4)
RBC # BLD AUTO: 2.8 MILLION/UL (ref 3.81–5.12)
SODIUM SERPL-SCNC: 139 MMOL/L (ref 135–147)
WBC # BLD AUTO: 10.35 THOUSAND/UL (ref 4.31–10.16)

## 2025-07-02 PROCEDURE — 97116 GAIT TRAINING THERAPY: CPT

## 2025-07-02 PROCEDURE — 85025 COMPLETE CBC W/AUTO DIFF WBC: CPT | Performed by: NURSE PRACTITIONER

## 2025-07-02 PROCEDURE — 94664 DEMO&/EVAL PT USE INHALER: CPT

## 2025-07-02 PROCEDURE — 94640 AIRWAY INHALATION TREATMENT: CPT

## 2025-07-02 PROCEDURE — 82948 REAGENT STRIP/BLOOD GLUCOSE: CPT

## 2025-07-02 PROCEDURE — 94760 N-INVAS EAR/PLS OXIMETRY 1: CPT

## 2025-07-02 PROCEDURE — 99233 SBSQ HOSP IP/OBS HIGH 50: CPT | Performed by: INTERNAL MEDICINE

## 2025-07-02 PROCEDURE — 97530 THERAPEUTIC ACTIVITIES: CPT

## 2025-07-02 PROCEDURE — 80053 COMPREHEN METABOLIC PANEL: CPT | Performed by: NURSE PRACTITIONER

## 2025-07-02 PROCEDURE — 99232 SBSQ HOSP IP/OBS MODERATE 35: CPT | Performed by: INTERNAL MEDICINE

## 2025-07-02 RX ORDER — BISACODYL 10 MG
10 SUPPOSITORY, RECTAL RECTAL DAILY PRN
Status: DISCONTINUED | OUTPATIENT
Start: 2025-07-02 | End: 2025-07-03

## 2025-07-02 RX ORDER — POLYETHYLENE GLYCOL 3350 17 G/17G
17 POWDER, FOR SOLUTION ORAL DAILY
Status: DISCONTINUED | OUTPATIENT
Start: 2025-07-02 | End: 2025-07-05 | Stop reason: HOSPADM

## 2025-07-02 RX ADMIN — ACETAMINOPHEN 975 MG: 325 TABLET ORAL at 21:48

## 2025-07-02 RX ADMIN — Medication 12.5 MG: at 00:00

## 2025-07-02 RX ADMIN — LEVALBUTEROL HYDROCHLORIDE 1.25 MG: 1.25 SOLUTION RESPIRATORY (INHALATION) at 08:07

## 2025-07-02 RX ADMIN — OXYCODONE HYDROCHLORIDE 5 MG: 5 TABLET ORAL at 21:48

## 2025-07-02 RX ADMIN — APIXABAN 5 MG: 5 TABLET, FILM COATED ORAL at 17:01

## 2025-07-02 RX ADMIN — Medication 12.5 MG: at 05:18

## 2025-07-02 RX ADMIN — Medication 12.5 MG: at 16:48

## 2025-07-02 RX ADMIN — ACETAMINOPHEN 975 MG: 325 TABLET ORAL at 05:18

## 2025-07-02 RX ADMIN — SENNOSIDES 8.6 MG: 8.6 TABLET, FILM COATED ORAL at 08:46

## 2025-07-02 RX ADMIN — BUDESONIDE 0.5 MG: 0.5 INHALANT RESPIRATORY (INHALATION) at 08:07

## 2025-07-02 RX ADMIN — BUDESONIDE 0.5 MG: 0.5 INHALANT RESPIRATORY (INHALATION) at 19:37

## 2025-07-02 RX ADMIN — APIXABAN 5 MG: 5 TABLET, FILM COATED ORAL at 08:46

## 2025-07-02 RX ADMIN — POLYETHYLENE GLYCOL 3350 17 G: 17 POWDER, FOR SOLUTION ORAL at 08:46

## 2025-07-02 RX ADMIN — GUAIFENESIN 600 MG: 600 TABLET, EXTENDED RELEASE ORAL at 08:46

## 2025-07-02 RX ADMIN — LEVOTHYROXINE SODIUM 50 MCG: 0.05 TABLET ORAL at 05:18

## 2025-07-02 RX ADMIN — LEVALBUTEROL HYDROCHLORIDE 1.25 MG: 1.25 SOLUTION RESPIRATORY (INHALATION) at 14:33

## 2025-07-02 RX ADMIN — LEVALBUTEROL HYDROCHLORIDE 1.25 MG: 1.25 SOLUTION RESPIRATORY (INHALATION) at 19:37

## 2025-07-02 RX ADMIN — GUAIFENESIN 600 MG: 600 TABLET, EXTENDED RELEASE ORAL at 21:48

## 2025-07-02 NOTE — PLAN OF CARE
Problem: PHYSICAL THERAPY ADULT  Goal: Performs mobility at highest level of function for planned discharge setting.  See evaluation for individualized goals.  Description: Treatment/Interventions: ADL retraining, Functional transfer training, LE strengthening/ROM, Therapeutic exercise, Endurance training, Patient/family training, Equipment eval/education, Bed mobility, Gait training, Spoke to nursing, Spoke to case management, OT, Elevations  Equipment Recommended: Walker (owns)       See flowsheet documentation for full assessment, interventions and recommendations.  Outcome: Progressing  Note: Prognosis: Good  Problem List: Decreased strength, Decreased endurance, Impaired balance, Decreased mobility, Pain  Assessment: Patient received in bedside chair. Patient agreeable to therapy.  Patient performs functional transfers with minimal assistance x1 using rolling walker. Patient performs ambulation with minimal assistance x1 using rolling walker, 50'x2. Patient requires seated rest period between gait trials 2* fatigue.  Continued deficits noted in strength and endurance.  Patient left in bedside chair with all needs met and call bell/personal items within reach. The patient's AM-PAC Basic Mobility Inpatient Short Form Raw Score is 17 showing further need for skilled PT services in order to improve functional mobility, decrease need for assistance, and return to PLOF. PT is recommending Level 2 - Moderate Resource Intensity on d/c from hospital.  Will continue to follow as able.  Barriers to Discharge: Decreased caregiver support     Rehab Resource Intensity Level, PT: II (Moderate Resource Intensity)    See flowsheet documentation for full assessment.

## 2025-07-02 NOTE — CASE MANAGEMENT
Case Management Discharge Planning Note    Patient name Rebeca Banegas  Location [facility] 512/[facility] 512-01 MRN [id number]  : 1932 Date 2025       Current Admission Date: 2025  Current Admission Diagnosis:Acute lower limb ischemia   Patient Active Problem List    Diagnosis Date Noted    Benign hypertension with CKD (chronic kidney disease) stage IV ([facility]) 2025    Anemia 2025    Shock ([facility]) 2025    DARIO (acute kidney injury) ([facility]) 2025    Hyperphosphatemia 2025    Acute lower limb ischemia 2025    Muscle cramp 2025    Chronic thoracic back pain 2025    Chronic midline low back pain without sciatica 2025    Arthralgia 2025    Other atherosclerosis of native arteries of extremities, bilateral legs ([facility]) 2025    Hyponatremia 2024    Severe protein-calorie malnutrition ([facility]) 2024    History of CVA (cerebrovascular accident) 2024    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 ([facility]) 2024    Chronic foot ulcer, left, with fat layer exposed ([facility]) 2024    Bronchiectasis ([facility]) 2023    MGUS (monoclonal gammopathy of unknown significance) 2023    Chronic cough 2023    Age-related osteoporosis without current pathological fracture 2023    Secondary hyperparathyroidism ([facility]) 2023    Labial abscess 2022    Vulvar cysts 2022    Osteopenia of hip 2020    CKD (chronic kidney disease) stage 4, GFR 15-29 ml/min ([facility]) 2020    Elevated serum immunoglobulin free light chains 2019    Sensorineural hearing loss (SNHL), bilateral 2019    Left asymmetrical SNHL 2019    Pain of left hand 2018    Chronic heart failure with preserved ejection fraction ([facility]) 2017    Plantar fasciitis 2016    Essential tremor 10/02/2015    Vitamin D deficiency 10/02/2015    Onychomycosis 2015    Pulmonary nodule 2015    Thyroid nodule 2015     Prediabetes 09/23/2014    Primary hypertension 05/19/2014    Leg pain 05/19/2014    Abnormal findings on diagnostic imaging of urinary organs 04/04/2014    Abnormal findings on diagnostic imaging of other specified body structures 03/20/2014    Atherosclerosis 03/06/2014    Ovarian cyst 03/06/2014    Pancreatic cyst 03/06/2014    Backache 02/28/2014    Hoarseness 02/28/2014    Allergic rhinitis 06/11/2013    Hyperlipidemia 04/19/2013    Permanent atrial fibrillation (HCC) 02/14/2013    Arthritis 11/12/2012    Asthma 11/12/2012    Esophageal reflux 11/12/2012    Benign colon polyp 11/10/2012    Bifascicular bundle branch block 11/10/2012    Hypothyroidism 11/10/2012    Nasal polyp 11/10/2012      LOS (days): 8  Geometric Mean LOS (GMLOS) (days): 5.5  Days to GMLOS:-2.7     OBJECTIVE:  Risk of Unplanned Readmission Score: 26.58         Current admission status: Inpatient   Preferred Pharmacy:   [facility]/pharmacy #[id number] - [place], [place] - [address] [place] [address]  Phone: [phone] Fax: [phone]    [facility] - [facility] [address] [facility]  [address] [facility] [address]  Phone: [phone] Fax: [phone]    Primary Care Provider: Santana Dillon MD    Primary Insurance: [employer]  Secondary Insurance:     DISCHARGE DETAILS:    Discharge planning discussed with:: pt at bedside, daughterMeena via phon. Dharmesh. Galindo SWAN contacted family/caregiver?: Yes  Were Treatment Team discharge recommendations reviewed with patient/caregiver?: Yes          Contacts  Patient Contacts: Meena Solorzano  Relationship to Patient:: Family  Contact Method: Phone, In Person  Reason/Outcome: Emergency Contact, Referral, Discharge Planning              Other Referral/Resources/Interventions Provided:  Interventions: Short Term Rehab         Treatment Team Recommendation: Short Term Rehab  Expected Discharge Disposition: Short Term Rehab     Transport at  Discharge : Wheelchair van        Transported by (Company and Unit #): [employer]  ETA of Transport (Date): 07/03/25  ETA of Transport (Time): 1100        Additional Comments: pt's d/c held by nephrology for bumped creatinine. [facility] is able to accept tomorrow. [facility] arrange for tomorrow 7/3/25 @ 1100 w/ [employer]. Dtr Dr. Galindo Robledo and nursing aware

## 2025-07-02 NOTE — DISCHARGE SUPPORT
Case Management Assessment & Discharge Planning Note    Patient name Rebeca Banegas  Location [facility] 512/[facility] 512-01 MRN [id number]  : 1932 Date 2025       Current Admission Date: 2025  Current Admission Diagnosis:Acute lower limb ischemia   Patient Active Problem List    Diagnosis Date Noted    Benign hypertension with CKD (chronic kidney disease) stage IV ([facility]) 2025    Anemia 2025    Shock ([facility]) 2025    DARIO (acute kidney injury) ([facility]) 2025    Hyperphosphatemia 2025    Acute lower limb ischemia 2025    Muscle cramp 2025    Chronic thoracic back pain 2025    Chronic midline low back pain without sciatica 2025    Arthralgia 2025    Other atherosclerosis of native arteries of extremities, bilateral legs ([facility]) 2025    Hyponatremia 2024    Severe protein-calorie malnutrition ([facility]) 2024    History of CVA (cerebrovascular accident) 2024    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 ([facility]) 2024    Chronic foot ulcer, left, with fat layer exposed ([facility]) 2024    Bronchiectasis ([facility]) 2023    MGUS (monoclonal gammopathy of unknown significance) 2023    Chronic cough 2023    Age-related osteoporosis without current pathological fracture 2023    Secondary hyperparathyroidism ([facility]) 2023    Labial abscess 2022    Vulvar cysts 2022    Osteopenia of hip 2020    CKD (chronic kidney disease) stage 4, GFR 15-29 ml/min ([facility]) 2020    Elevated serum immunoglobulin free light chains 2019    Sensorineural hearing loss (SNHL), bilateral 2019    Left asymmetrical SNHL 2019    Pain of left hand 2018    Chronic heart failure with preserved ejection fraction ([facility]) 2017    Plantar fasciitis 2016    Essential tremor 10/02/2015    Vitamin D deficiency 10/02/2015    Onychomycosis 2015    Pulmonary nodule 2015    Thyroid  nodule 02/20/2015    Prediabetes 09/23/2014    Primary hypertension 05/19/2014    Leg pain 05/19/2014    Abnormal findings on diagnostic imaging of urinary organs 04/04/2014    Abnormal findings on diagnostic imaging of other specified body structures 03/20/2014    Atherosclerosis 03/06/2014    Ovarian cyst 03/06/2014    Pancreatic cyst 03/06/2014    Backache 02/28/2014    Hoarseness 02/28/2014    Allergic rhinitis 06/11/2013    Hyperlipidemia 04/19/2013    Permanent atrial fibrillation (HCC) 02/14/2013    Arthritis 11/12/2012    Asthma 11/12/2012    Esophageal reflux 11/12/2012    Benign colon polyp 11/10/2012    Bifascicular bundle branch block 11/10/2012    Hypothyroidism 11/10/2012    Nasal polyp 11/10/2012      LOS (days): 8  Geometric Mean LOS (GMLOS) (days): 5.5  Days to GMLOS:-2.6   Facility Authorization Approved  [facility] received approved auth for: SNF  Insurance: [employer]  Determination made after peer to peer was completed?: Not applicable  Authorization Obtained Via: Phone  Name/Phone # of Rep who called in determination: Mariel WONG#: [phone]  Facility Name: [facility]  Approved Authorization Number: [id number]  Start of Care Date: 07/02/25  Next Review Date: 07/07/25  Continued Stay Care Coordinator Name: Liyah WONG#: [phone]  Submit Next Review to: F#: [phone]   notified: Alba Bullock     Updates to authorization status will be noted in chart.    Please reach out to CM for updates on any clinical information.

## 2025-07-02 NOTE — ASSESSMENT & PLAN NOTE
Home: Home bumex 3mg po bid   CXR with persistent bilateral pleural effusions   Bumex placed on hold today

## 2025-07-02 NOTE — ASSESSMENT & PLAN NOTE
>>ASSESSMENT AND PLAN FOR BENIGN HYPERTENSION WITH CKD (CHRONIC KIDNEY DISEASE) STAGE IV (HCC) WRITTEN ON 7/2/2025 12:40 PM BY ENOCH DIANA MD    Blood pressure occasionally dipping to the systolic in the 90s  Heart rate is up, therefore needs current beta-blockade dose  Hold diuretic today and reassess volume status, blood pressure and renal function on 7/3 for reinitiation of diuretic

## 2025-07-02 NOTE — ASSESSMENT & PLAN NOTE
89-year-old female with PMH of HFpEF, permanent A-fib on Coumadin, history of CVA, bronchiectasis originally presented to ED with complaint of right leg pain.  CTA of lower extremity was concerning for right femoral acute arterial occlusion.  Noted to have subtherapeutic INR at 1.4  Patient started on heparin infusion at that time   underwent emergent right leg embolectomy/thrombectomy, fasciotomy and left popliteal thromboembolectomy on 6/24/2025 by vascular surgery.  Postoperatively patient noted to be hypotensive, then transferred to critical care for vasopressors.  Patient subsequently weaned off of pressors, and downgraded to MedSurg  Per vascular discontinued warfarin and started on appropriately renally dosed Eliquis 5 mg BID x 7 days and then 2.5 mg BID ongoing   Eliquis/Xarelto Price check completed, both $75  Vascular surgery following, underwent bedside closure of b/l lower extremity fasciotomies 6/29  Not on ASA dt allergy and not on statin dt potential side effects   Ongoing vascular surgery follow-up in wound care

## 2025-07-02 NOTE — ASSESSMENT & PLAN NOTE
History of CVA in August 2024, thought to be embolic in the setting of warfarin interruption with underlying permanent A-fib.  Transitioned  to Eliquis

## 2025-07-02 NOTE — ASSESSMENT & PLAN NOTE
>>ASSESSMENT AND PLAN FOR PRIMARY HYPERTENSION WRITTEN ON 7/2/2025 12:53 PM BY SATHYA GARCIA MD    Currently on p.o. Lopressor and Bumex     >>ASSESSMENT AND PLAN FOR BENIGN HYPERTENSION WITH CKD (CHRONIC KIDNEY DISEASE) STAGE IV (HCC) WRITTEN ON 7/2/2025 12:53 PM BY SATHYA GARCIA MD    Lab Results   Component Value Date    EGFR 36 07/02/2025    EGFR 40 07/01/2025    EGFR 34 06/30/2025    CREATININE 1.29 07/02/2025    CREATININE 1.17 07/01/2025    CREATININE 1.34 (H) 06/30/2025

## 2025-07-02 NOTE — ASSESSMENT & PLAN NOTE
Wt Readings from Last 3 Encounters:   06/28/25 60.1 kg (132 lb 7.9 oz)   06/18/25 57.2 kg (126 lb 3.2 oz)   06/12/25 54.9 kg (121 lb)     Currently on Bumex 3 mg twice daily, decreased to prevent over diuresis resulting in worsening hypotension   Now on Bumex 2 mg BID   Diuretics on hold secondary to relative hypotension.

## 2025-07-02 NOTE — PLAN OF CARE
Problem: Potential for Falls  Goal: Patient will remain free of falls  Description: INTERVENTIONS:  - Educate patient/family on patient safety including physical limitations  - Instruct patient to call for assistance with activity   - Consider consulting OT/PT to assist with strengthening/mobility based on AM PAC & JH-HLM score  - Consult OT/PT to assist with strengthening/mobility   - Keep Call bell within reach  - Keep bed low and locked with side rails adjusted as appropriate  - Keep care items and personal belongings within reach  - Initiate and maintain comfort rounds  - Make Fall Risk Sign visible to staff  - Offer Toileting every 2 Hours, in advance of need  - Initiate/Maintain 2 alarm  - Obtain necessary fall risk management equipment: 2  - Apply yellow socks and bracelet for high fall risk patients  - Consider moving patient to room near nurses station  Outcome: Progressing     Problem: Prexisting or High Potential for Compromised Skin Integrity  Goal: Skin integrity is maintained or improved  Description: INTERVENTIONS:  - Identify patients at risk for skin breakdown  - Assess and monitor skin integrity including under and around medical devices   - Assess and monitor nutrition and hydration status  - Monitor labs  - Assess for incontinence   - Turn and reposition patient  - Assist with mobility/ambulation  - Relieve pressure over wale prominences   - Avoid friction and shearing  - Provide appropriate hygiene as needed including keeping skin clean and dry  - Evaluate need for skin moisturizer/barrier cream  - Collaborate with interdisciplinary team  - Patient/family teaching  - Consider wound care consult    Assess:  - Review Bandar scale daily  - Clean and moisturize skin every 2  - Inspect skin when repositioning, toileting, and assisting with ADLS  - Assess under medical devices such as 2 every 2  - Assess extremities for adequate circulation and sensation     Bed Management:  - Have minimal linens  on bed & keep smooth, unwrinkled  - Change linens as needed when moist or perspiring  - Avoid sitting or lying in one position for more than 2 hours while in bed?Keep HOB at 2 degrees   - Toileting:  - Offer bedside commode  - Assess for incontinence every 2  - Use incontinent care products after each incontinent episode such as 2    Activity:  - Mobilize patient 2 times a day  - Encourage activity and walks on unit  - Encourage or provide ROM exercises   - Turn and reposition patient every 2 Hours  - Use appropriate equipment to lift or move patient in bed  - Instruct/ Assist with weight shifting every 2 when out of bed in chair  - Consider limitation of chair time 2 hour intervals    Skin Care:  - Avoid use of baby powder, tape, friction and shearing, hot water or constrictive clothing  - Relieve pressure over bony prominences using 2  - Do not massage red bony areas    Next Steps:  - Teach patient strategies to minimize risks such as 2  - Consider consults to  interdisciplinary teams such as 2  Outcome: Progressing     Problem: PAIN - ADULT  Goal: Verbalizes/displays adequate comfort level or baseline comfort level  Description: Interventions:  - Encourage patient to monitor pain and request assistance  - Assess pain using appropriate pain scale  - Administer analgesics as ordered based on type and severity of pain and evaluate response  - Implement non-pharmacological measures as appropriate and evaluate response  - Consider cultural and social influences on pain and pain management  - Notify physician/advanced practitioner if interventions unsuccessful or patient reports new pain  - Educate patient/family on pain management process including their role and importance of  reporting pain   - Provide non-pharmacologic/complimentary pain relief interventions  Outcome: Progressing     Problem: INFECTION - ADULT  Goal: Absence or prevention of progression during hospitalization  Description: INTERVENTIONS:  - Assess  and monitor for signs and symptoms of infection  - Monitor lab/diagnostic results  - Monitor all insertion sites, i.e. indwelling lines, tubes, and drains  - Monitor endotracheal if appropriate and nasal secretions for changes in amount and color  - [place] appropriate cooling/warming therapies per order  - Administer medications as ordered  - Instruct and encourage patient and family to use good hand hygiene technique  - Identify and instruct in appropriate isolation precautions for identified infection/condition  Outcome: Progressing  Goal: Absence of fever/infection during neutropenic period  Description: INTERVENTIONS:  - Monitor WBC  - Perform strict hand hygiene  - Limit to healthy visitors only  - No plants, dried, fresh or silk flowers with horne in patient room  Outcome: Progressing     Problem: SAFETY ADULT  Goal: Patient will remain free of falls  Description: INTERVENTIONS:  - Educate patient/family on patient safety including physical limitations  - Instruct patient to call for assistance with activity   - Consider consulting OT/PT to assist with strengthening/mobility based on AM PAC & JH-HLM score  - Consult OT/PT to assist with strengthening/mobility   - Keep Call bell within reach  - Keep bed low and locked with side rails adjusted as appropriate  - Keep care items and personal belongings within reach  - Initiate and maintain comfort rounds  - Make Fall Risk Sign visible to staff  - Offer Toileting every 2 Hours, in advance of need  - Initiate/Maintain 2alarm  - Obtain necessary fall risk management equipment: 2  - Apply yellow socks and bracelet for high fall risk patients  - Consider moving patient to room near nurses station  Outcome: Progressing  Goal: Maintain or return to baseline ADL function  Description: INTERVENTIONS:  -  Assess patient's ability to carry out ADLs; assess patient's baseline for ADL function and identify physical deficits which impact ability to perform ADLs (bathing, care  of mouth/teeth, toileting, grooming, dressing, etc.)  - Assess/evaluate cause of self-care deficits   - Assess range of motion  - Assess patient's mobility; develop plan if impaired  - Assess patient's need for assistive devices and provide as appropriate  - Encourage maximum independence but intervene and supervise when necessary  - Involve family in performance of ADLs  - Assess for home care needs following discharge   - Consider OT consult to assist with ADL evaluation and planning for discharge  - Provide patient education as appropriate  - Monitor functional capacity and physical performance, use of AM PAC & JH-HLM   - Monitor gait, balance and fatigue with ambulation    Outcome: Progressing  Goal: Maintains/Returns to pre admission functional level  Description: INTERVENTIONS:  - Perform AM-PAC 6 Click Basic Mobility/ Daily Activity assessment daily.  - Set and communicate daily mobility goal to care team and patient/family/caregiver.   - Collaborate with rehabilitation services on mobility goals if consulted  - Perform Range of Motion 2 times a day.  - Reposition patient every 2 hours.  - Dangle patient 2 times a day  - Stand patient 2 times a day  - Ambulate patient 2 times a day  - Out of bed to chair 2 times a day   - Out of bed for meals 2 times a day  - Out of bed for toileting  - Record patient progress and toleration of activity level   Outcome: Progressing     Problem: Knowledge Deficit  Goal: Patient/family/caregiver demonstrates understanding of disease process, treatment plan, medications, and discharge instructions  Description: Complete learning assessment and assess knowledge base.  Interventions:  - Provide teaching at level of understanding  - Provide teaching via preferred learning methods  Outcome: Progressing     Problem: Nutrition/Hydration-ADULT  Goal: Nutrient/Hydration intake appropriate for improving, restoring or maintaining nutritional needs  Description: Monitor and assess  patient's nutrition/hydration status for malnutrition. Collaborate with interdisciplinary team and initiate plan and interventions as ordered.  Monitor patient's weight and dietary intake as ordered or per policy. Utilize nutrition screening tool and intervene as necessary. Determine patient's food preferences and provide high-protein, high-caloric foods as appropriate.     INTERVENTIONS:  - Monitor oral intake, urinary output, labs, and treatment plans  - Assess nutrition and hydration status and recommend course of action  - Evaluate amount of meals eaten  - Assist patient with eating if necessary   - Allow adequate time for meals  - Recommend/ encourage appropriate diets, oral nutritional supplements, and vitamin/mineral supplements  - Order, calculate, and assess calorie counts as needed  - Recommend, monitor, and adjust tube feedings and TPN/PPN based on assessed needs  - Assess need for intravenous fluids  - Provide specific nutrition/hydration education as appropriate  - Include patient/family/caregiver in decisions related to nutrition  Outcome: Progressing

## 2025-07-02 NOTE — ASSESSMENT & PLAN NOTE
Lab Results   Component Value Date    EGFR 36 07/02/2025    EGFR 40 07/01/2025    EGFR 34 06/30/2025    CREATININE 1.29 07/02/2025    CREATININE 1.17 07/01/2025    CREATININE 1.34 (H) 06/30/2025

## 2025-07-02 NOTE — RESTORATIVE TECHNICIAN NOTE
Restorative Technician Note      Patient Name: Rebeca Banegas     Restorative Tech Visit Date: 07/02/25  Note Type: Mobility  Patient Position Upon Consult: Supine  Activity Performed: Transferred  Assistive Device: Roller walker  Patient Position at End of Consult: Bedside chair; All needs within reach; Bed/Chair alarm activated

## 2025-07-02 NOTE — ASSESSMENT & PLAN NOTE
Blood pressure occasionally dipping to the systolic in the 90s  Heart rate is up, therefore needs current beta-blockade dose  Hold diuretic today and reassess volume status, blood pressure and renal function on 7/3 for reinitiation of diuretic

## 2025-07-02 NOTE — DISCHARGE SUPPORT
Case Management Assessment & Discharge Planning Note    Patient name Rebeca Banegas  Location [facility]-01 MRN [id number]  : 1932 Date 2025       Current Admission Date: 2025  Current Admission Diagnosis:Acute lower limb ischemia   Patient Active Problem List    Diagnosis Date Noted    Benign hypertension with CKD (chronic kidney disease) stage IV ([facility]) 2025    Anemia 2025    Shock ([facility]ly [facility]) 2025    DARIO (acute kidney injury) (Formerly [facility]) 2025    Hyperphosphatemia 2025    Acute lower limb ischemia 2025    Muscle cramp 2025    Chronic thoracic back pain 2025    Chronic midline low back pain without sciatica 2025    Arthralgia 2025    Other atherosclerosis of native arteries of extremities, bilateral legs ([facility]) 2025    Hyponatremia 2024    Severe protein-calorie malnutrition ([facility]) 2024    History of CVA (cerebrovascular accident) 2024    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 ([facility]) 2024    Chronic foot ulcer, left, with fat layer exposed ([facility]) 2024    Bronchiectasis ([facility]) 2023    MGUS (monoclonal gammopathy of unknown significance) 2023    Chronic cough 2023    Age-related osteoporosis without current pathological fracture 2023    Secondary hyperparathyroidism ([facility]) 2023    Labial abscess 2022    Vulvar cysts 2022    Osteopenia of hip 2020    CKD (chronic kidney disease) stage 4, GFR 15-29 ml/min ([facility]) 2020    Elevated serum immunoglobulin free light chains 2019    Sensorineural hearing loss (SNHL), bilateral 2019    Left asymmetrical SNHL 2019    Pain of left hand 2018    Chronic heart failure with preserved ejection fraction ([facility]) 2017    Plantar fasciitis 2016    Essential tremor 10/02/2015    Vitamin D deficiency 10/02/2015    Onychomycosis 2015    Pulmonary nodule 2015    Thyroid  nodule 02/20/2015    Prediabetes 09/23/2014    Primary hypertension 05/19/2014    Leg pain 05/19/2014    Abnormal findings on diagnostic imaging of urinary organs 04/04/2014    Abnormal findings on diagnostic imaging of other specified body structures 03/20/2014    Atherosclerosis 03/06/2014    Ovarian cyst 03/06/2014    Pancreatic cyst 03/06/2014    Backache 02/28/2014    Hoarseness 02/28/2014    Allergic rhinitis 06/11/2013    Hyperlipidemia 04/19/2013    Permanent atrial fibrillation (HCC) 02/14/2013    Arthritis 11/12/2012    Asthma 11/12/2012    Esophageal reflux 11/12/2012    Benign colon polyp 11/10/2012    Bifascicular bundle branch block 11/10/2012    Hypothyroidism 11/10/2012    Nasal polyp 11/10/2012      LOS (days): 8  Geometric Mean LOS (GMLOS) (days): 5.5  Days to GMLOS:-2.4   Facility Authorization Initiated  [facility] received request for auth from : Alba Bullock  Authorization Request Submitted for: SNF  Requested Start of Care Date: 07/02/25  Facility Name: [facility]  Facility NPI: [id number]  Facility MD: Dr. Gomez  Facility MD NPI: [id number]  Authorization initiated by contacting insurance: [employer]  Via: [employer] Portal  Clinicals submitted via: Portal Attachment  Pending reference #: [id number]   notified: Alba Bullock     Updates to authorization status will be noted in chart.    Please reach out to CM for updates on any clinical information.

## 2025-07-02 NOTE — PHYSICAL THERAPY NOTE
PHYSICAL THERAPY NOTE          Patient Name: Rebeca Banegas  Today's Date: 7/2/2025 07/02/25 1402   PT Last Visit   PT Visit Date 07/02/25   Note Type   Note Type Treatment   Pain Assessment   Pain Assessment Tool 0-10   Pain Score No Pain   Restrictions/Precautions   Weight Bearing Precautions Per Order No   Other Precautions Fall Risk;Chair Alarm;Bed Alarm   General   Chart Reviewed Yes   Family/Caregiver Present No   Cognition   Overall Cognitive Status WFL   Arousal/Participation Alert;Cooperative   Attention Within functional limits   Orientation Level Oriented X4   Memory Within functional limits   Following Commands Follows all commands and directions without difficulty   Comments Patient is very pleasant and cooperative   Subjective   Subjective Patient agreeable to PT tx   Bed Mobility   Additional Comments OOB in chair on arrival   Transfers   Sit to Stand 4  Minimal assistance   Additional items Assist x 1;Increased time required;Verbal cues   Stand to Sit 4  Minimal assistance   Additional items Assist x 1;Increased time required;Verbal cues   Additional Comments RW   Ambulation/Elevation   Gait pattern Decreased foot clearance;Short stride;Excessively slow;Forward Flexion;Improper Weight shift   Gait Assistance 4  Minimal assist   Additional items Assist x 1;Verbal cues   Assistive Device Rolling walker   Distance 50'x2   Balance   Static Sitting Fair +   Dynamic Sitting Fair   Static Standing Fair -   Dynamic Standing Fair -   Ambulatory Poor +   Endurance Deficit   Endurance Deficit Yes   Endurance Deficit Description fatigue, weakness   Activity Tolerance   Activity Tolerance Patient tolerated treatment well   Nurse Made Aware RN cleared   Assessment   Prognosis Good   Problem List Decreased strength;Decreased endurance;Impaired balance;Decreased mobility;Pain   Assessment Patient received in bedside chair.  Patient agreeable to therapy.  Patient performs functional transfers with minimal assistance x1 using rolling walker. Patient performs ambulation with minimal assistance x1 using rolling walker, 50'x2. Patient requires seated rest period between gait trials 2* fatigue.  Continued deficits noted in strength and endurance.  Patient left in bedside chair with all needs met and call bell/personal items within reach. The patient's AM-PAC Basic Mobility Inpatient Short Form Raw Score is 17 showing further need for skilled PT services in order to improve functional mobility, decrease need for assistance, and return to PLOF. PT is recommending Level 2 - Moderate Resource Intensity on d/c from hospital.  Will continue to follow as able.   Barriers to Discharge Decreased caregiver support   Goals   Patient Goals to get stronger   PT Treatment Day 2   Plan   Treatment/Interventions Functional transfer training;ADL retraining;LE strengthening/ROM;Elevations;Therapeutic exercise;Endurance training;Patient/family training;Equipment eval/education;Bed mobility;Gait training;Spoke to nursing;Spoke to case management;OT   Progress Progressing toward goals   PT Frequency 3-5x/wk   Discharge Recommendation   Rehab Resource Intensity Level, PT II (Moderate Resource Intensity)   Equipment Recommended Walker   AM-[facility] Basic Mobility Inpatient   Turning in Flat Bed Without Bedrails 3   Lying on Back to Sitting on Edge of Flat Bed Without Bedrails 3   Moving Bed to Chair 3   Standing Up From Chair Using Arms 3   Walk in Room 3   Climb 3-5 Stairs With Railing 2   Basic Mobility Inpatient Raw Score 17   Basic Mobility Standardized Score 39.67   [facility] Highest Level Of Mobility   -HLM Goal 5: Stand one or more mins   -HLM Achieved 7: Walk 25 feet or more   End of Consult   Patient Position at End of Consult All needs within reach;Bed/Chair alarm activated;Bedside chair       Estela Lozano, PT, DPT

## 2025-07-02 NOTE — PROGRESS NOTES
Progress Note - Nephrology   Rebeca Banegas 89 y.o. female MRN: [id number]  Unit/Bed#: [facility] 512-01 Encounter: [id number]      Assessment & Plan  DARIO (acute kidney injury) ([facility])  Suspect ATN in the setting of shock, contrast CT on 6/24 and acute blood loss   Creatinine has slightly worsened over the last 24 hours, likely secondary to relative hypotension and significant diuresis over the last few days  CAT scan without hydronephrosis  Continue to hold Jardiance and spironolactone  Will place pneumatics on hold today and reassess for reinstitution on 7/3  Repeat BMP in a.m. to monitor renal function closely  Avoid relative hypotension or nephrotoxic medications.  Assess blood pressure response to withholding diuretics  CKD (chronic kidney disease) stage 4, GFR 15-29 ml/min ([facility])  Baseline creatinine since January appears to be 1.6-2.1 with GFR <30  Benign hypertension with CKD (chronic kidney disease) stage IV ([facility])  Blood pressure occasionally dipping to the systolic in the 90s  Heart rate is up, therefore needs current beta-blockade dose  Hold diuretic today and reassess volume status, blood pressure and renal function on 7/3 for reinitiation of diuretic  Acute lower limb ischemia  Bilateral LE ischemia likely due to cardioembolus   S/p RLE fem embolectomy and anterolateral fasciotomy and LLE pop/AT embolectomy on 6/24  Status post bedside fasciotomy closure   Shock ([facility])  Status post prior phenylephrine infusion  Shock has resolved  Chronic heart failure with preserved ejection fraction ([facility])  Home: Home bumex 3mg po bid   CXR with persistent bilateral pleural effusions   Bumex placed on hold today  Hyperphosphatemia  Resolved               Subjective:   The patient was seen and examined earlier this morning.  She denies chest pain or pressure, paroxysmal nocturnal dyspnea, orthopnea, abdominal pain, nauseousness.  Her cough persists but she states it is unchanged from her outpatient baseline.  She does have  "some shortness of breath with exertion which again, she states she has as an outpatient as well.  Overall, she feels that her peripheral edema has improved      Objective:     Vitals: Blood pressure 96/64, pulse 96, temperature 98.1 °F (36.7 °C), temperature source Oral, resp. rate 18, height 5' 5\" (1.651 m), weight 60.1 kg (132 lb 7.9 oz), SpO2 99%.,Body mass index is 22.05 kg/m².Temp (24hrs), Av.9 °F (36.6 °C), Min:97.4 °F (36.3 °C), Max:98.6 °F (37 °C)      Temp:  [97.4 °F (36.3 °C)-98.6 °F (37 °C)] 98.1 °F (36.7 °C)  HR:  [] 96  Resp:  [16-18] 18  BP: ()/(51-69) 96/64  SpO2:  [95 %-99 %] 99 %    Weight (last 2 days)       None                 I/O last 24 hours:  In: 1340 [P.O.:1340]  Out: 1700 [Urine:1700]        Physical Exam    BP 96/64 (BP Location: Right arm)   Pulse 96   Temp 98.1 °F (36.7 °C) (Oral)   Resp 18   Ht 5' 5\" (1.651 m)   Wt 60.1 kg (132 lb 7.9 oz)   LMP  (LMP Unknown)   SpO2 99%   BMI 22.05 kg/m²   Vital signs were reviewed  Constitutional: The patient was awake, alert, pleasant, cooperative and in no apparent distress  Cardiovascular: Difficult to assess jugular venous distention due to bandage on neck, S1-S2, no pericardial friction rub S3 appreciated, legs with Ace wraps and difficult to assess edema Dillon but there was some puckering of the skin above the ACE bandage consistent with recent diuresis and overall leg edema seems to have improved  Pulmonary: Adequate inspiratory effort, scattered rhonchi                Invasive Devices       Peripheral Intravenous Line  Duration             Peripheral IV 25 Right;Upper;Ventral (anterior) Arm 3 days              Drain  Duration             External Urinary Catheter 5 days                    Medications:    Scheduled Meds:  Current Facility-Administered Medications   Medication Dose Route Frequency Provider Last Rate    acetaminophen  975 mg Oral Q8H GARTH Celaya,       albuterol  2.5 mg Nebulization Q6H " PRN LUZ Lawson      apixaban  5 mg Oral BID Deborah Bro PA-C      Followed by    [START ON 7/7/2025] apixaban  2.5 mg Oral BID Deborah Bro PA-C      bisacodyl  10 mg Rectal Daily PRN LUZ Higgins      budesonide  0.5 mg Nebulization Q12H Nam Celaya,       [Held by provider] bumetanide  2 mg Oral BID Josafat Lim MD      guaiFENesin  600 mg Oral Q12H GARTH Nam Celaya, DO      HYDROmorphone  0.2 mg Intravenous Q3H PRN Nam Celaya, DO      insulin lispro  1-5 Units Subcutaneous TID AC Nam Celaya, DO      levalbuterol  1.25 mg Nebulization TID LUZ Lawson      levothyroxine  50 mcg Oral Early Morning Nam Celaya, DO      metoprolol tartrate  12.5 mg Oral Q6H Nam Celaya, DO      ondansetron  4 mg Intravenous Q6H PRN Nam Celaya, DO      oxyCODONE  5 mg Oral Q4H PRN Nam Celaya, DO      oxyCODONE  2.5 mg Oral Q4H PRN Nam Celaya,       polyethylene glycol  17 g Oral Daily LUZ Higgins      senna  1 tablet Oral Daily Nam Celaya,          PRN Meds:.  albuterol    bisacodyl    HYDROmorphone    ondansetron    oxyCODONE    oxyCODONE    Continuous Infusions:         LAB RESULTS:      Results from last 7 days   Lab Units 07/02/25  0517 07/01/25  0441 06/30/25  0524 06/29/25  0130 06/28/25  0438 06/27/25  0846 06/27/25  0234 06/26/25  1226 06/26/25  0506 06/25/25  2212 06/25/25  1755   WBC Thousand/uL 10.35* 12.64* 13.40* 11.99* 9.58  --  11.23* 9.74 8.95  --  11.64*   HEMOGLOBIN g/dL 9.0* 9.6* 9.4* 9.2* 9.1* 9.9* 9.6* 11.0* 7.5*  --  8.7*   HEMATOCRIT % 28.0* 28.6* 28.6* 27.1* 26.5*  --  26.5* 31.0* 21.8*  --  25.3*   PLATELETS Thousands/uL 215 173 157 126* 114*  --  124* 122* 125*  --  165   SEGS PCT % 57  --   --  74  --   --  69 64  --   --   --    LYMPHO PCT % 27  --   --  16  --   --  19 19  --   --   --    MONO PCT % 8  --   --  9  --   --  11 16*  --   --   --   "  EOS PCT % 7*  --   --  1  --   --  0 0  --   --   --    POTASSIUM mmol/L 3.9 3.5 3.3* 3.4* 3.7 4.0 3.9 5.1 5.1 5.5* 5.5*   CHLORIDE mmol/L 101 102 104 103 104 104 105 105 103 101 101   CO2 mmol/L 31 28 30 24 25 21 20* 18* 19* 19* 24   BUN mg/dL 45* 45* 54* 62* 63* 70* 70* 66* 63* 61* 63*   CREATININE mg/dL 1.29 1.17 1.34* 1.62* 1.95* 2.25* 2.38* 2.45* 2.40* 2.20* 2.31*   CALCIUM mg/dL 6.7* 6.6* 7.0* 7.0* 6.7* 7.6* 7.0* 7.4* 7.4* 7.7* 7.5*   ALK PHOS U/L 39  --   --  29*  --  30*  --   --   --  29* 35   ALT U/L 78*  --   --  124*  --  108*  --   --   --  7 8   AST U/L 33  --   --  98*  --  133*  --   --   --  23 26   MAGNESIUM mg/dL  --   --   --  2.1 2.1 2.4 2.3 2.5 2.3  --   --    PHOSPHORUS mg/dL  --   --   --  3.1 4.5* 5.4* 5.3* 6.7* 6.1*  --   --        CUTURES:  No results found for: \"BLOODCX\", \"URINECX\"              PLEASE NOTE:  This encounter was completed utilizing the [employer] Direct Speech Voice Recognition Software. Grammatical errors, random word insertions, pronoun errors and incomplete sentences are occasional consequences of the system due to software limitations, ambient noise and hardware issues.These may be missed by proof reading prior to affixing electronic signature. Any questions or concerns about the content, text or information contained within the body of this dictation should be directly addressed to the physician for clarification. Please do not hesitate to call me directly if you have any any questions or concerns.  "

## 2025-07-02 NOTE — PROGRESS NOTES
Progress Note - Hospitalist   Name: Rebeca Banegas 89 y.o. female I MRN: [id number]  Unit/Bed#: [facility] 512-01 I Date of Admission: 6/24/2025   Date of Service: 7/2/2025 I Hospital Day: 8    Assessment & Plan  Acute lower limb ischemia  89-year-old female with PMH of HFpEF, permanent A-fib on Coumadin, history of CVA, bronchiectasis originally presented to ED with complaint of right leg pain.  CTA of lower extremity was concerning for right femoral acute arterial occlusion.  Noted to have subtherapeutic INR at 1.4  Patient started on heparin infusion at that time   underwent emergent right leg embolectomy/thrombectomy, fasciotomy and left popliteal thromboembolectomy on 6/24/2025 by vascular surgery.  Postoperatively patient noted to be hypotensive, then transferred to critical care for vasopressors.  Patient subsequently weaned off of pressors, and downgraded to MedSurg  Per vascular discontinued warfarin and started on appropriately renally dosed Eliquis 5 mg BID x 7 days and then 2.5 mg BID ongoing   Eliquis/Xarelto Price check completed, both $75  Vascular surgery following, underwent bedside closure of b/l lower extremity fasciotomies 6/29  Not on ASA dt allergy and not on statin dt potential side effects   Ongoing vascular surgery follow-up in wound care    Asthma  Currently not in exacerbation, continue inhalers  Continue with outpatient inhaler and nebulizer treatment  Continue with respiratory and airway clearance protocol and pulmonary vest therapy  Permanent atrial fibrillation (HCC)  Cardiology consult appreciated  Due to concern for subtherapeutic INR described of being compliant with Coumadin resulting in acute lower limb ischemia, cardiology is recommending DOAC on discharge.  Cardiology and vascular as well as patient in agreement started on loading dose of renally dosed Eliquis   Lopressor 12.5 mg every 6 hours   DC heparin drip. Started on loading dose of Eliquis   Bifascicular bundle branch  block    Chronic heart failure with preserved ejection fraction (HCC)  Wt Readings from Last 3 Encounters:   06/28/25 60.1 kg (132 lb 7.9 oz)   06/18/25 57.2 kg (126 lb 3.2 oz)   06/12/25 54.9 kg (121 lb)     Currently on Bumex 3 mg twice daily, decreased to prevent over diuresis resulting in worsening hypotension   Now on Bumex 2 mg BID   Diuretics on hold secondary to relative hypotension.    DARIO (acute kidney injury) ([facility])  Patient noted to have DARIO in critical care secondary to contrast associated nephropathy, acute blood loss anemia, and undifferentiated shock requiring vasopressors.  Baseline creatinine 1.6-2.1  Currently creatinine improving, 1.34-> 1.17  Per nephrology continue to hold jardiance and spironolactone   Avoid hypotension and repeat bmp in the am   Primary hypertension  Currently on p.o. Lopressor and Bumex  Bronchiectasis (HCC)  Currently on room air  Continue Pulmicort/Xopenex  History of CVA (cerebrovascular accident)  History of CVA in August 2024, thought to be embolic in the setting of warfarin interruption with underlying permanent A-fib.  Transitioned  to Eliquis  Hyperphosphatemia  Improved   Anemia  Postoperative anemia, status post 2 unit PRBC and critical care  Shock ([facility])  Noted to be hypotensive postoperatively required critical care stay and vasopressors.  Currently resolved  Benign hypertension with CKD (chronic kidney disease) stage IV ([facility])  Lab Results   Component Value Date    EGFR 36 07/02/2025    EGFR 40 07/01/2025    EGFR 34 06/30/2025    CREATININE 1.29 07/02/2025    CREATININE 1.17 07/01/2025    CREATININE 1.34 (H) 06/30/2025       VTE Pharmacologic Prophylaxis:   High Risk (Score >/= 5) - Pharmacological DVT Prophylaxis Ordered: apixaban (Eliquis). Sequential Compression Devices Ordered.    Mobility:   Basic Mobility Inpatient Raw Score: 15  JH-HLM Goal: 4: Move to chair/commode  JH-HLM Achieved: 4: Move to chair/commode  JH-HLM Goal achieved. Continue to encourage  appropriate mobility.    Patient Centered Rounds: I performed bedside rounds with nursing staff today.   Discussions with Specialists or Other Care Team Provider: Discussed with vascular team    Education and Discussions with Family / Patient: Updated  (daughter) via phone.    Current Length of Stay: 8 day(s)  Current Patient Status: Inpatient   Certification Statement: The patient will continue to require additional inpatient hospital stay due to monitoring of renal functions and electrolytes  Discharge Plan: Anticipate discharge tomorrow to rehab facility.    Code Status: Level 3 - DNAR and DNI    Subjective   Patient seen and examined at bedside.  No acute events overnight.  Denies any shortness of breath.    Objective :  Temp:  [97.4 °F (36.3 °C)-98.6 °F (37 °C)] 98.1 °F (36.7 °C)  HR:  [] 96  BP: ()/(51-69) 96/64  Resp:  [16-18] 18  SpO2:  [95 %-99 %] 99 %  O2 Device: None (Room air)    Body mass index is 22.05 kg/m².     Input and Output Summary (last 24 hours):     Intake/Output Summary (Last 24 hours) at 7/2/2025 1250  Last data filed at 7/2/2025 0713  Gross per 24 hour   Intake 1340 ml   Output 1700 ml   Net -360 ml       Physical Exam  Constitutional:       Appearance: She is ill-appearing.   HENT:      Head: Normocephalic.      Nose: Nose normal.      Mouth/Throat:      Mouth: Mucous membranes are moist.     Eyes:      Extraocular Movements: Extraocular movements intact.      Pupils: Pupils are equal, round, and reactive to light.       Cardiovascular:      Rate and Rhythm: Normal rate and regular rhythm.   Pulmonary:      Effort: Pulmonary effort is normal.      Comments: Diminished breath sounds at bases with scattered expiratory wheezing  Abdominal:      General: There is no distension.      Palpations: Abdomen is soft.      Tenderness: There is no abdominal tenderness.     Musculoskeletal:      Cervical back: Neck supple.      Right lower leg: Edema present.      Left lower  leg: Edema present.     Skin:     General: Skin is warm.     Neurological:      General: No focal deficit present.      Mental Status: She is alert and oriented to person, place, and time. Mental status is at baseline.           Lines/Drains:  Lines/Drains/Airways       Active Status       Name Placement date Placement time Site Days    External Urinary Catheter 06/27/25  1155  -- 5                            Lab Results: I have reviewed the following results:   Results from last 7 days   Lab Units 07/02/25  0517   WBC Thousand/uL 10.35*   HEMOGLOBIN g/dL 9.0*   HEMATOCRIT % 28.0*   PLATELETS Thousands/uL 215   SEGS PCT % 57   LYMPHO PCT % 27   MONO PCT % 8   EOS PCT % 7*     Results from last 7 days   Lab Units 07/02/25  0517   SODIUM mmol/L 139   POTASSIUM mmol/L 3.9   CHLORIDE mmol/L 101   CO2 mmol/L 31   BUN mg/dL 45*   CREATININE mg/dL 1.29   ANION GAP mmol/L 7   CALCIUM mg/dL 6.7*   ALBUMIN g/dL 3.3*   TOTAL BILIRUBIN mg/dL 1.24*   ALK PHOS U/L 39   ALT U/L 78*   AST U/L 33   GLUCOSE RANDOM mg/dL 81         Results from last 7 days   Lab Units 07/02/25  1021 07/02/25  0544 07/01/25  1636 07/01/25  1029 07/01/25  0627 06/30/25  1541 06/30/25  1200 06/30/25  1041 06/30/25  0613 06/29/25  1556 06/29/25  1051 06/29/25  0633   POC GLUCOSE mg/dl 114 80 76 90 86 103 106 65 74 131 105 97         Results from last 7 days   Lab Units 06/27/25  0846 06/26/25  0506 06/25/25  2212 06/25/25  2030 06/25/25  1755   LACTIC ACID mmol/L 1.6  --  2.0 2.9* 2.1*   PROCALCITONIN ng/ml  --  0.85*  --   --   --        Recent Cultures (last 7 days):               Last 24 Hours Medication List:     Current Facility-Administered Medications:     acetaminophen (TYLENOL) tablet 975 mg, Q8H GARTH    albuterol inhalation solution 2.5 mg, Q6H PRN    apixaban (ELIQUIS) tablet 5 mg, BID **FOLLOWED BY** [START ON 7/7/2025] apixaban (ELIQUIS) tablet 2.5 mg, BID    bisacodyl (DULCOLAX) rectal suppository 10 mg, Daily PRN    budesonide (PULMICORT)  inhalation solution 0.5 mg, Q12H    guaiFENesin (MUCINEX) 12 hr tablet 600 mg, Q12H GARTH    HYDROmorphone HCl (DILAUDID) injection 0.2 mg, Q3H PRN    insulin lispro (HumALOG/ADMELOG) 100 units/mL subcutaneous injection 1-5 Units, TID AC **AND** Fingerstick Glucose (POCT), TID AC    levalbuterol (XOPENEX) inhalation solution 1.25 mg, TID    levothyroxine tablet 50 mcg, Early Morning    metoprolol tartrate (LOPRESSOR) partial tablet 12.5 mg, Q6H    ondansetron (ZOFRAN) injection 4 mg, Q6H PRN    oxyCODONE (ROXICODONE) IR tablet 5 mg, Q4H PRN    oxyCODONE (ROXICODONE) split tablet 2.5 mg, Q4H PRN    polyethylene glycol (MIRALAX) packet 17 g, Daily    senna (SENOKOT) tablet 8.6 mg, Daily    Administrative Statements   Today, Patient Was Seen By: Alaina Medley MD      **Please Note: This note may have been constructed using a voice recognition system.**

## 2025-07-02 NOTE — ASSESSMENT & PLAN NOTE
Currently not in exacerbation, continue inhalers  Continue with outpatient inhaler and nebulizer treatment  Continue with respiratory and airway clearance protocol and pulmonary vest therapy

## 2025-07-02 NOTE — ASSESSMENT & PLAN NOTE
Suspect ATN in the setting of shock, contrast CT on 6/24 and acute blood loss   Creatinine has slightly worsened over the last 24 hours, likely secondary to relative hypotension and significant diuresis over the last few days  CAT scan without hydronephrosis  Continue to hold Jardiance and spironolactone  Will place pneumatics on hold today and reassess for reinstitution on 7/3  Repeat BMP in a.m. to monitor renal function closely  Avoid relative hypotension or nephrotoxic medications.  Assess blood pressure response to withholding diuretics

## 2025-07-02 NOTE — CASE MANAGEMENT
Case Management Discharge Planning Note    Patient name Rebeca Banegas  Location [facility] 512-01 MRN [id number]  : 1932 Date 2025       Current Admission Date: 2025  Current Admission Diagnosis:Acute lower limb ischemia   Patient Active Problem List    Diagnosis Date Noted    Benign hypertension with CKD (chronic kidney disease) stage IV ([facility]) 2025    Anemia 2025    Shock ([facility]) 2025    DARIO (acute kidney injury) ([facility]) 2025    Hyperphosphatemia 2025    Acute lower limb ischemia 2025    Muscle cramp 2025    Chronic thoracic back pain 2025    Chronic midline low back pain without sciatica 2025    Arthralgia 2025    Other atherosclerosis of native arteries of extremities, bilateral legs ([facility]) 2025    Hyponatremia 2024    Severe protein-calorie malnutrition ([facility]) 2024    History of CVA (cerebrovascular accident) 2024    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 ([facility]) 2024    Chronic foot ulcer, left, with fat layer exposed ([facility]) 2024    Bronchiectasis ([facility]) 2023    MGUS (monoclonal gammopathy of unknown significance) 2023    Chronic cough 2023    Age-related osteoporosis without current pathological fracture 2023    Secondary hyperparathyroidism ([facility]) 2023    Labial abscess 2022    Vulvar cysts 2022    Osteopenia of hip 2020    CKD (chronic kidney disease) stage 4, GFR 15-29 ml/min ([facility]) 2020    Elevated serum immunoglobulin free light chains 2019    Sensorineural hearing loss (SNHL), bilateral 2019    Left asymmetrical SNHL 2019    Pain of left hand 2018    Chronic heart failure with preserved ejection fraction ([facility]) 2017    Plantar fasciitis 2016    Essential tremor 10/02/2015    Vitamin D deficiency 10/02/2015    Onychomycosis 2015    Pulmonary nodule 2015    Thyroid nodule 2015     Prediabetes 09/23/2014    Primary hypertension 05/19/2014    Leg pain 05/19/2014    Abnormal findings on diagnostic imaging of urinary organs 04/04/2014    Abnormal findings on diagnostic imaging of other specified body structures 03/20/2014    Atherosclerosis 03/06/2014    Ovarian cyst 03/06/2014    Pancreatic cyst 03/06/2014    Backache 02/28/2014    Hoarseness 02/28/2014    Allergic rhinitis 06/11/2013    Hyperlipidemia 04/19/2013    Permanent atrial fibrillation (HCC) 02/14/2013    Arthritis 11/12/2012    Asthma 11/12/2012    Esophageal reflux 11/12/2012    Benign colon polyp 11/10/2012    Bifascicular bundle branch block 11/10/2012    Hypothyroidism 11/10/2012    Nasal polyp 11/10/2012      LOS (days): 8  Geometric Mean LOS (GMLOS) (days): 5.5  Days to GMLOS:-2.6     OBJECTIVE:  Risk of Unplanned Readmission Score: 26.58         Current admission status: Inpatient   Preferred Pharmacy:   [facility]/pharmacy #[id number] - [place], [place] - [address] [place] [address]  [place] [id number]  Phone: [phone] Fax: [phone]    [facility] - [facility] [address] [facility]  [address] [facility] [address]  Phone: [phone] Fax: [phone]    Primary Care Provider: Santana Dillon MD    Primary Insurance: [employer]  Secondary Insurance:     DISCHARGE DETAILS:    Discharge planning discussed with:: patient at bedside     Requested Home Health Care         Is the patient interested in HHC at discharge?: No    DME Referral Provided  Referral made for DME?: No    Other Referral/Resources/Interventions Provided:  Interventions: Short Term Rehab         Treatment Team Recommendation: Short Term Rehab  Expected Discharge Disposition: Short Term Rehab     Transport at Discharge : Wheelchair van (pt is aware of fee)     IMM Given (Date):: 07/02/25  IMM Given to:: Patient     Additional Comments: pt is medically cleared for d/c per Dr. Medley in rounds today. After rounds CM  did receive authorization for approval for pt to go to [facility] for STR. Discussed same with patient who agrees that she is not able to walk or take care of herself to be able to return to her apartment. Dr. Medley to call daughter, Meena to discuss same and CM will f/u and set up transport.    Accepting Facility Name, City & State : [facility], [place]  Receiving Facility/Agency Phone Number: [phone] extension [phone]  Facility/Agency Fax Number: Fax # [phone].

## 2025-07-03 PROBLEM — K59.00 CONSTIPATION: Status: ACTIVE | Noted: 2025-07-03

## 2025-07-03 LAB
ANION GAP SERPL CALCULATED.3IONS-SCNC: 8 MMOL/L (ref 4–13)
BUN SERPL-MCNC: 43 MG/DL (ref 5–25)
CALCIUM SERPL-MCNC: 6.6 MG/DL (ref 8.4–10.2)
CHLORIDE SERPL-SCNC: 100 MMOL/L (ref 96–108)
CO2 SERPL-SCNC: 28 MMOL/L (ref 21–32)
CREAT SERPL-MCNC: 1.28 MG/DL (ref 0.6–1.3)
GFR SERPL CREATININE-BSD FRML MDRD: 36 ML/MIN/1.73SQ M
GLUCOSE SERPL-MCNC: 88 MG/DL (ref 65–140)
GLUCOSE SERPL-MCNC: 90 MG/DL (ref 65–140)
GLUCOSE SERPL-MCNC: 91 MG/DL (ref 65–140)
GLUCOSE SERPL-MCNC: 97 MG/DL (ref 65–140)
POTASSIUM SERPL-SCNC: 4.5 MMOL/L (ref 3.5–5.3)
SODIUM SERPL-SCNC: 136 MMOL/L (ref 135–147)

## 2025-07-03 PROCEDURE — 94664 DEMO&/EVAL PT USE INHALER: CPT

## 2025-07-03 PROCEDURE — 94668 MNPJ CHEST WALL SBSQ: CPT

## 2025-07-03 PROCEDURE — 99232 SBSQ HOSP IP/OBS MODERATE 35: CPT | Performed by: INTERNAL MEDICINE

## 2025-07-03 PROCEDURE — 80048 BASIC METABOLIC PNL TOTAL CA: CPT | Performed by: INTERNAL MEDICINE

## 2025-07-03 PROCEDURE — 94760 N-INVAS EAR/PLS OXIMETRY 1: CPT

## 2025-07-03 PROCEDURE — 94640 AIRWAY INHALATION TREATMENT: CPT

## 2025-07-03 PROCEDURE — 82948 REAGENT STRIP/BLOOD GLUCOSE: CPT

## 2025-07-03 RX ORDER — BUMETANIDE 1 MG/1
1 TABLET ORAL
Status: DISCONTINUED | OUTPATIENT
Start: 2025-07-03 | End: 2025-07-05 | Stop reason: HOSPADM

## 2025-07-03 RX ORDER — ALBUMIN (HUMAN) 12.5 G/50ML
50 SOLUTION INTRAVENOUS ONCE
Status: COMPLETED | OUTPATIENT
Start: 2025-07-03 | End: 2025-07-03

## 2025-07-03 RX ORDER — BISACODYL 10 MG
10 SUPPOSITORY, RECTAL RECTAL DAILY
Status: DISCONTINUED | OUTPATIENT
Start: 2025-07-03 | End: 2025-07-04

## 2025-07-03 RX ORDER — BUMETANIDE 1 MG/1
1 TABLET ORAL 2 TIMES DAILY
Status: DISCONTINUED | OUTPATIENT
Start: 2025-07-03 | End: 2025-07-03

## 2025-07-03 RX ADMIN — ACETAMINOPHEN 975 MG: 325 TABLET ORAL at 06:13

## 2025-07-03 RX ADMIN — BUDESONIDE 0.5 MG: 0.5 INHALANT RESPIRATORY (INHALATION) at 08:12

## 2025-07-03 RX ADMIN — LEVALBUTEROL HYDROCHLORIDE 1.25 MG: 1.25 SOLUTION RESPIRATORY (INHALATION) at 18:53

## 2025-07-03 RX ADMIN — POLYETHYLENE GLYCOL 3350 17 G: 17 POWDER, FOR SOLUTION ORAL at 09:08

## 2025-07-03 RX ADMIN — Medication 12.5 MG: at 11:38

## 2025-07-03 RX ADMIN — LEVALBUTEROL HYDROCHLORIDE 1.25 MG: 1.25 SOLUTION RESPIRATORY (INHALATION) at 14:28

## 2025-07-03 RX ADMIN — LEVALBUTEROL HYDROCHLORIDE 1.25 MG: 1.25 SOLUTION RESPIRATORY (INHALATION) at 08:12

## 2025-07-03 RX ADMIN — SENNOSIDES 8.6 MG: 8.6 TABLET, FILM COATED ORAL at 09:09

## 2025-07-03 RX ADMIN — GUAIFENESIN 600 MG: 600 TABLET, EXTENDED RELEASE ORAL at 09:08

## 2025-07-03 RX ADMIN — ACETAMINOPHEN 975 MG: 325 TABLET ORAL at 22:23

## 2025-07-03 RX ADMIN — BISACODYL 10 MG: 10 SUPPOSITORY RECTAL at 10:09

## 2025-07-03 RX ADMIN — APIXABAN 5 MG: 5 TABLET, FILM COATED ORAL at 17:56

## 2025-07-03 RX ADMIN — Medication 12.5 MG: at 06:13

## 2025-07-03 RX ADMIN — Medication 12.5 MG: at 17:56

## 2025-07-03 RX ADMIN — BUDESONIDE 0.5 MG: 0.5 INHALANT RESPIRATORY (INHALATION) at 18:53

## 2025-07-03 RX ADMIN — APIXABAN 5 MG: 5 TABLET, FILM COATED ORAL at 09:08

## 2025-07-03 RX ADMIN — ALBUMIN (HUMAN) 50 G: 0.25 INJECTION, SOLUTION INTRAVENOUS at 22:33

## 2025-07-03 RX ADMIN — LEVOTHYROXINE SODIUM 50 MCG: 0.05 TABLET ORAL at 06:13

## 2025-07-03 RX ADMIN — GUAIFENESIN 600 MG: 600 TABLET, EXTENDED RELEASE ORAL at 22:23

## 2025-07-03 NOTE — ASSESSMENT & PLAN NOTE
Lab Results   Component Value Date    EGFR 36 07/03/2025    EGFR 36 07/02/2025    EGFR 40 07/01/2025    CREATININE 1.28 07/03/2025    CREATININE 1.29 07/02/2025    CREATININE 1.17 07/01/2025

## 2025-07-03 NOTE — RESTORATIVE TECHNICIAN NOTE
Restorative Technician Note      Patient Name: Rebeca Banegas     Restorative Tech Visit Date: 07/03/25  Note Type: Mobility  Patient Position Upon Consult: Supine  Mobility / Activity Provided: assisted pt with bathing, brushing teeth, and washing hair  Activity Performed: Ambulated  Assistive Device: Roller walker  Patient Position at End of Consult: Bedside chair; All needs within reach; Bed/Chair alarm activated

## 2025-07-03 NOTE — ASSESSMENT & PLAN NOTE
Blood pressure occasionally dipping to the systolic in the 90s  Heart rate is up, therefore needs current beta-blockade dose  Continue to monitor with restarting diuretics

## 2025-07-03 NOTE — PLAN OF CARE
Problem: Potential for Falls  Goal: Patient will remain free of falls  Description: INTERVENTIONS:  - Educate patient/family on patient safety including physical limitations  - Instruct patient to call for assistance with activity   - Consider consulting OT/PT to assist with strengthening/mobility based on AM PAC & JH-HLM score  - Consult OT/PT to assist with strengthening/mobility   - Keep Call bell within reach  - Keep bed low and locked with side rails adjusted as appropriate  - Keep care items and personal belongings within reach  - Initiate and maintain comfort rounds  - Make Fall Risk Sign visible to staff  - Offer Toileting every 2 Hours, in advance of need  - Initiate/Maintain bed/chair alarm  - Obtain necessary fall risk management equipment:   - Apply yellow socks and bracelet for high fall risk patients  - Consider moving patient to room near nurses station  Outcome: Progressing     Problem: Prexisting or High Potential for Compromised Skin Integrity  Goal: Skin integrity is maintained or improved  Description: INTERVENTIONS:  - Identify patients at risk for skin breakdown  - Assess and monitor skin integrity including under and around medical devices   - Assess and monitor nutrition and hydration status  - Monitor labs  - Assess for incontinence   - Turn and reposition patient  - Assist with mobility/ambulation  - Relieve pressure over wale prominences   - Avoid friction and shearing  - Provide appropriate hygiene as needed including keeping skin clean and dry  - Evaluate need for skin moisturizer/barrier cream  - Collaborate with interdisciplinary team  - Patient/family teaching  - Consider wound care consult    Assess:  - Review Bandar scale daily  - Clean and moisturize skin every shift   - Inspect skin when repositioning, toileting, and assisting with ADLS  - Assess under medical devices   - Assess extremities for adequate circulation and sensation     Bed Management:  - Have minimal linens on bed  & keep smooth, unwrinkled  - Change linens as needed when moist or perspiring  - Avoid sitting or lying in one position for more than 2 hours while in bed?Keep HOB at 30 degrees   - Toileting:  - Offer bedside commode  - Assess for incontinence   - Use incontinent care products after each incontinent episode     Activity:  - Mobilize patient 3 times a day  - Encourage activity and walks on unit  - Encourage or provide ROM exercises   - Turn and reposition patient every 2 Hours  - Use appropriate equipment to lift or move patient in bed  - Instruct/ Assist with weight shifting every 20 minutes  when out of bed in chair  - Consider limitation of chair time 2 hour intervals    Skin Care:  - Avoid use of baby powder, tape, friction and shearing, hot water or constrictive clothing  - Relieve pressure over bony prominences   - Do not massage red bony areas    Next Steps:  - Teach patient strategies to minimize risks   - Consider consults to  interdisciplinary teams   Outcome: Progressing     Problem: PAIN - ADULT  Goal: Verbalizes/displays adequate comfort level or baseline comfort level  Description: Interventions:  - Encourage patient to monitor pain and request assistance  - Assess pain using appropriate pain scale  - Administer analgesics as ordered based on type and severity of pain and evaluate response  - Implement non-pharmacological measures as appropriate and evaluate response  - Consider cultural and social influences on pain and pain management  - Notify physician/advanced practitioner if interventions unsuccessful or patient reports new pain  - Educate patient/family on pain management process including their role and importance of  reporting pain   - Provide non-pharmacologic/complimentary pain relief interventions  Outcome: Progressing     Problem: INFECTION - ADULT  Goal: Absence or prevention of progression during hospitalization  Description: INTERVENTIONS:  - Assess and monitor for signs and symptoms of  infection  - Monitor lab/diagnostic results  - Monitor all insertion sites, i.e. indwelling lines, tubes, and drains  - Monitor endotracheal if appropriate and nasal secretions for changes in amount and color  - [place] appropriate cooling/warming therapies per order  - Administer medications as ordered  - Instruct and encourage patient and family to use good hand hygiene technique  - Identify and instruct in appropriate isolation precautions for identified infection/condition  Outcome: Progressing  Goal: Absence of fever/infection during neutropenic period  Description: INTERVENTIONS:  - Monitor WBC  - Perform strict hand hygiene  - Limit to healthy visitors only  - No plants, dried, fresh or silk flowers with horne in patient room  Outcome: Progressing     Problem: SAFETY ADULT  Goal: Patient will remain free of falls  Description: INTERVENTIONS:  - Educate patient/family on patient safety including physical limitations  - Instruct patient to call for assistance with activity   - Consider consulting OT/PT to assist with strengthening/mobility based on AM PAC & -HLM score  - Consult OT/PT to assist with strengthening/mobility   - Keep Call bell within reach  - Keep bed low and locked with side rails adjusted as appropriate  - Keep care items and personal belongings within reach  - Initiate and maintain comfort rounds  - Make Fall Risk Sign visible to staff  - Offer Toileting every 2 Hours, in advance of need  - Initiate/Maintain bed/chair alarm  - Obtain necessary fall risk management equipment:   - Apply yellow socks and bracelet for high fall risk patients  - Consider moving patient to room near nurses station  Outcome: Progressing  Goal: Maintain or return to baseline ADL function  Description: INTERVENTIONS:  -  Assess patient's ability to carry out ADLs; assess patient's baseline for ADL function and identify physical deficits which impact ability to perform ADLs (bathing, care of mouth/teeth, toileting,  grooming, dressing, etc.)  - Assess/evaluate cause of self-care deficits   - Assess range of motion  - Assess patient's mobility; develop plan if impaired  - Assess patient's need for assistive devices and provide as appropriate  - Encourage maximum independence but intervene and supervise when necessary  - Involve family in performance of ADLs  - Assess for home care needs following discharge   - Consider OT consult to assist with ADL evaluation and planning for discharge  - Provide patient education as appropriate  - Monitor functional capacity and physical performance, use of AM PAC & JH-HLM   - Monitor gait, balance and fatigue with ambulation    Outcome: Progressing  Goal: Maintains/Returns to pre admission functional level  Description: INTERVENTIONS:  - Perform AM-PAC 6 Click Basic Mobility/ Daily Activity assessment daily.  - Set and communicate daily mobility goal to care team and patient/family/caregiver.   - Collaborate with rehabilitation services on mobility goals if consulted  - Perform Range of Motion 3 times a day.  - Reposition patient every 2 hours.  - Dangle patient 3 times a day  - Stand patient 3 times a day  - Ambulate patient 3 times a day  - Out of bed to chair 3 times a day   - Out of bed for meals 3 times a day  - Out of bed for toileting  - Record patient progress and toleration of activity level   Outcome: Progressing     Problem: DISCHARGE PLANNING  Goal: Discharge to home or other facility with appropriate resources  Description: INTERVENTIONS:  - Identify barriers to discharge w/patient and caregiver  - Arrange for needed discharge resources and transportation as appropriate  - Identify discharge learning needs (meds, wound care, etc.)  - Arrange for interpretive services to assist at discharge as needed  - Refer to Case Management Department for coordinating discharge planning if the patient needs post-hospital services based on physician/advanced practitioner order or complex needs  related to functional status, cognitive ability, or social support system  Outcome: Progressing     Problem: Knowledge Deficit  Goal: Patient/family/caregiver demonstrates understanding of disease process, treatment plan, medications, and discharge instructions  Description: Complete learning assessment and assess knowledge base.  Interventions:  - Provide teaching at level of understanding  - Provide teaching via preferred learning methods  Outcome: Progressing     Problem: Nutrition/Hydration-ADULT  Goal: Nutrient/Hydration intake appropriate for improving, restoring or maintaining nutritional needs  Description: Monitor and assess patient's nutrition/hydration status for malnutrition. Collaborate with interdisciplinary team and initiate plan and interventions as ordered.  Monitor patient's weight and dietary intake as ordered or per policy. Utilize nutrition screening tool and intervene as necessary. Determine patient's food preferences and provide high-protein, high-caloric foods as appropriate.     INTERVENTIONS:  - Monitor oral intake, urinary output, labs, and treatment plans  - Assess nutrition and hydration status and recommend course of action  - Evaluate amount of meals eaten  - Assist patient with eating if necessary   - Allow adequate time for meals  - Recommend/ encourage appropriate diets, oral nutritional supplements, and vitamin/mineral supplements  - Order, calculate, and assess calorie counts as needed  - Recommend, monitor, and adjust tube feedings and TPN/PPN based on assessed needs  - Assess need for intravenous fluids  - Provide specific nutrition/hydration education as appropriate  - Include patient/family/caregiver in decisions related to nutrition  Outcome: Progressing     Problem: HEMATOLOGIC - ADULT  Goal: Maintains hematologic stability  Description: INTERVENTIONS  - Assess for signs and symptoms of bleeding or hemorrhage  - Monitor labs  - Administer supportive blood products/factors as  ordered and appropriate  Outcome: Progressing     Problem: MUSCULOSKELETAL - ADULT  Goal: Maintain or return mobility to safest level of function  Description: INTERVENTIONS:  - Assess patient's ability to carry out ADLs; assess patient's baseline for ADL function and identify physical deficits which impact ability to perform ADLs (bathing, care of mouth/teeth, toileting, grooming, dressing, etc.)  - Assess/evaluate cause of self-care deficits   - Assess range of motion  - Assess patient's mobility  - Assess patient's need for assistive devices and provide as appropriate  - Encourage maximum independence but intervene and supervise when necessary  - Involve family in performance of ADLs  - Assess for home care needs following discharge   - Consider OT consult to assist with ADL evaluation and planning for discharge  - Provide patient education as appropriate  Outcome: Progressing  Goal: Maintain proper alignment of affected body part  Description: INTERVENTIONS:  - Support, maintain and protect limb and body alignment  - Provide patient/ family with appropriate education  Outcome: Progressing

## 2025-07-03 NOTE — PLAN OF CARE
Problem: PAIN - ADULT  Goal: Verbalizes/displays adequate comfort level or baseline comfort level  Description: Interventions:  - Encourage patient to monitor pain and request assistance  - Assess pain using appropriate pain scale  - Administer analgesics as ordered based on type and severity of pain and evaluate response  - Implement non-pharmacological measures as appropriate and evaluate response  - Consider cultural and social influences on pain and pain management  - Notify physician/advanced practitioner if interventions unsuccessful or patient reports new pain  - Educate patient/family on pain management process including their role and importance of  reporting pain   - Provide non-pharmacologic/complimentary pain relief interventions  Outcome: Progressing     Problem: INFECTION - ADULT  Goal: Absence or prevention of progression during hospitalization  Description: INTERVENTIONS:  - Assess and monitor for signs and symptoms of infection  - Monitor lab/diagnostic results  - Monitor all insertion sites, i.e. indwelling lines, tubes, and drains  - Monitor endotracheal if appropriate and nasal secretions for changes in amount and color  - [place] appropriate cooling/warming therapies per order  - Administer medications as ordered  - Instruct and encourage patient and family to use good hand hygiene technique  - Identify and instruct in appropriate isolation precautions for identified infection/condition  Outcome: Progressing  Goal: Absence of fever/infection during neutropenic period  Description: INTERVENTIONS:  - Monitor WBC  - Perform strict hand hygiene  - Limit to healthy visitors only  - No plants, dried, fresh or silk flowers with horne in patient room  Outcome: Progressing

## 2025-07-03 NOTE — RESPIRATORY THERAPY NOTE
"RT Protocol Note  Rebeca Banegas 89 y.o. female MRN: [id number]  Unit/Bed#: [facility] 512-01 Encounter: [id number]    Assessment    Principal Problem:    Acute lower limb ischemia  Active Problems:    Asthma    Permanent atrial fibrillation (HCC)    Bifascicular bundle branch block    Chronic heart failure with preserved ejection fraction (HCC)    Primary hypertension    CKD (chronic kidney disease) stage 4, GFR 15-29 ml/min (HCC)    Bronchiectasis (HCC)    History of CVA (cerebrovascular accident)    Shock (HCC)    DARIO (acute kidney injury) (HCC)    Hyperphosphatemia    Anemia    Benign hypertension with CKD (chronic kidney disease) stage IV ([facility])      Home Pulmonary Medications:         Past Medical History[1]  Social History[2]    Subjective         Objective    Physical Exam:   Assessment Type: Post-treatment  General Appearance: Alert, Awake  Respiratory Pattern: Normal  Chest Assessment: Chest expansion symmetrical  Bilateral Breath Sounds: Coarse, Scattered, Rhonchi  Cough: (P) Moist, Productive, Strong    Vitals:  Blood pressure 110/62, pulse 85, temperature 98.2 °F (36.8 °C), temperature source Oral, resp. rate 17, height 5' 5\" (1.651 m), weight 60.1 kg (132 lb 7.9 oz), SpO2 98%.          Imaging and other studies:     O2 Device: room air     Plan    Respiratory Plan: Home Bronchodilator Patient pathway  Airway Clearance Plan: Flutter     Resp Comments: (P) Pt has refused vest therapy on several occasions during stay.  Pt refusing now and states she does not like the therapy and has better success with the flutter valve.  Latest CXR is from 6/28 showing [place] bilateral pleural effusions with adjacent parenchymal atelectasis.  Pt has a strong productive cough and is able to expectorate.  She is currently being mobilized and is out of bed in chair.  Will DC vest therapy and cont with flutter valve/mobilization as both of these methods are more beneficial at this time.        [1]   Past Medical " History:  Diagnosis Date    Abnormal ECG     Abnormal ultrasound     RESOLVED: [id number]    Acute kidney failure (HCC) 12/29/2024    Advice given about COVID-19 virus infection 04/07/2020    Ambulates with cane     Arrhythmia     Arthritis     Asthma     Asthma with acute exacerbation     LAST ASSESSED: [id number]    Asymptomatic menopausal state     Atherosclerosis     Atrial fibrillation (HCC)     Chronic kidney disease     Chronic obstructive lung disease (HCC)     w/ chonic cough    Chronic ulcer of left foot (HCC)     Colon polyp     Congestive heart failure (HCC)     LAST ASSESSED: [id number]    COPD (chronic obstructive pulmonary disease) (HCC)     Coronary artery disease     COVID-19 01/06/2025    COVID-19 virus infection 03/25/2023    Cuboid fracture     LAST ASSESSED: [id number]    Disease of thyroid gland     Dyspepsia     Edema of both lower extremities     Equinus deformity of left foot     Fall 04/01/2024    Falls     5/2024    GERD (gastroesophageal reflux disease)     Headache(784.0)     High risk medication use     LAST ASSESSED: [id number]    Hyperlipidemia     Hypertension     Hypokalemia     Hypothyroidism     Impacted cerumen of both ears     LAST ASSESSED: [id number]    Impacted cerumen of right ear     LAST ASSESSED: [id number]    Influenza     LAST ASSESSED: [id number]    IPMN (intraductal papillary mucinous neoplasm)     RESOLVED: 02OCT2015    Irregular heart beat     afib. Warfarin.    Leg cramps 12/21/2018    Limb swelling     LAST ASSESSED: [id number]    Navicular fracture of ankle     LAST ASSESSED: [id number]    Onychomycosis     LAST ASSESSED: [id number]    Open wound of right upper arm 10/17/2022    Osteoarthritis     Osteopenia     Osteoporosis     Paronychia, finger, unspecified laterality     LAST ASSESSED: [id number]    Paroxysmal atrial fibrillation (HCC)     LAST ASSESSED: 02MAR2014    Peripheral vascular disease (HCC)     Plantar fasciitis     SOBOE (shortness of breath on exertion)      Stroke (HCC)     Right arm weakness that has resolved    Talus fracture     LAST ASSESSED: [id number]    Vaccine counseling 2023    Vascular headache     Walker as ambulation aid     Wound of right foot    [2]   Social History  Socioeconomic History    Marital status:    Occupational History    Occupation: retired   Tobacco Use    Smoking status: Former     Current packs/day: 0.00     Average packs/day: 0.3 packs/day for 2.0 years (0.5 ttl pk-yrs)     Types: Cigarettes     Start date:      Quit date:      Years since quittin.5    Smokeless tobacco: Never    Tobacco comments:     On and off socially with friends    Vaping Use    Vaping status: Never Used   Substance and Sexual Activity    Alcohol use: Not Currently    Drug use: No    Sexual activity: Not Currently     Partners: Male     Birth control/protection: Post-menopausal   Social History Narrative    DAILY COFFEE CONSUMPTION (2 CUPS/DAY)    EXERCISING REGULARLY     Social Drivers of Health     Food Insecurity: No Food Insecurity (2025)    Nursing - Inadequate Food Risk Classification     Worried About Running Out of Food in the Last Year: Never true     Ran Out of Food in the Last Year: Never true     Ran Out of Food in the Last Year: Never true   Transportation Needs: No Transportation Needs (2025)    Nursing - Transportation Risk Classification     Lack of Transportation: No   Intimate Partner Violence: Unknown (2025)    Nursing IPS     Physically Hurt by Someone: No     Hurt or Threatened by Someone: No   Housing Stability: Unknown (2025)    Nursing: Inadequate Housing Risk Classification     Unable to Pay for Housing in the Last Year: No     Has Housing: No

## 2025-07-03 NOTE — CASE MANAGEMENT
Case Management Progress Note    Patient name Rebeca Banegas  Location [facility] 512-01 MRN [id number]  : 1932 Date 7/3/2025       LOS (days): 9  Geometric Mean LOS (GMLOS) (days): 5.5  Days to GMLOS:-3.7        OBJECTIVE:        Current admission status: Inpatient  Preferred Pharmacy:   [id number] - [place], [place] - [address] [place] [address]  [address] [id number]  Phone: [phone] Fax: [phone]    [facility] - [facility] [address] [facility]  [address] [facility] [address]  Phone: [phone] Fax: [phone]    Primary Care Provider: Santana Dillon MD    Primary Insurance: [employer] REP  Secondary Insurance:     PROGRESS NOTE:  D/c held until tomorrow due to no [facility] notifed and message sent to see if they are able to accept tomorrow. Await response.

## 2025-07-03 NOTE — ASSESSMENT & PLAN NOTE
Etiology: Suspect ATN in the setting of shock, contrast CT on 6/24 and acute blood loss   Overall creatinine has improved and currently 1.28  likely secondary to relative hypotension and significant diuresis over the last few days  CT scan without hydronephrosis  Renal function back at baseline  Continue to hold Jardiance and spironolactone  Bumex 1 mg 2 times daily restarted today  Repeat BMP in a.m. to monitor renal function closely  Avoid relative hypotension or nephrotoxic medications.    Continue to hold spironolactone  Will need follow up on discharge

## 2025-07-03 NOTE — PROGRESS NOTES
Progress Note - Nephrology   Name: Rebeca Banegas 89 y.o. female I MRN: [id number]  Unit/Bed#: [facility] 512-01 I Date of Admission: 6/24/2025   Date of Service: 7/3/2025 I Hospital Day: 9     Assessment & Plan  DARIO (acute kidney injury) ([facility])  Etiology: Suspect ATN in the setting of shock, contrast CT on 6/24 and acute blood loss   Overall creatinine has improved and currently 1.28  likely secondary to relative hypotension and significant diuresis over the last few days  CT scan without hydronephrosis  Renal function back at baseline  Continue to hold Jardiance and spironolactone  Bumex 1 mg 2 times daily restarted today  Repeat BMP in a.m. to monitor renal function closely  Avoid relative hypotension or nephrotoxic medications.    Continue to hold spironolactone  Will need follow up on discharge  CKD (chronic kidney disease) stage 4, GFR 15-29 ml/min ([facility])  Etiology: Likely in the setting of hypertensive nephrosclerosis, arterionephrosclerosis, and age-related nephron loss  Baseline creatinine around 1.6-2.1 with GFR <30 dating back to 2024  Benign hypertension with CKD (chronic kidney disease) stage IV ([facility])  Blood pressure occasionally dipping to the systolic in the 90s  Heart rate is up, therefore needs current beta-blockade dose  Continue to monitor with restarting diuretics  Acute lower limb ischemia  Bilateral LE ischemia likely due to cardioembolus   S/p RLE fem embolectomy and anterolateral fasciotomy and LLE pop/AT embolectomy on 6/24  Status post bedside fasciotomy closure   Shock ([facility])  Status post prior phenylephrine infusion  Shock has resolved  Chronic heart failure with preserved ejection fraction ([facility])  Home: Home bumex 3mg po bid   CXR with persistent bilateral pleural effusions   Restarted Bumex at 1 mg BId  Hyperphosphatemia  Resolved  Constipation  Management per primary team    Overall plan and recommendations has been reviewed with primary team and I agree with the plan as stated below  Agree  with initiating Bumex 1 mg 2 times daily  Continue to monitor BMP daily  Appears creatinine is back at baseline  Recommend nephrology follow-up on discharge-message sent to office to arrange long with BMP in 1 week    Review of Systems  Patient seen and examined at bedside.  Reports overall feeling okay except for she has not had a bowel movement  Review of Systems   Constitutional: Negative.  Negative for activity change, appetite change, chills, diaphoresis, fatigue and fever.   HENT: Negative.  Negative for congestion and facial swelling.    Respiratory: Negative.     Cardiovascular: Negative.    Gastrointestinal:  Positive for constipation.   Endocrine: Negative.    Genitourinary: Negative.    Musculoskeletal: Negative.    Skin: Negative.    Allergic/Immunologic: Negative.    Neurological: Negative.    Hematological: Negative.    Psychiatric/Behavioral: Negative.           Physical Exam:  Current Weight: Weight - Scale: 60.1 kg (132 lb 7.9 oz)  Vitals:    07/03/25 0058 07/03/25 0312 07/03/25 0708 07/03/25 1141   BP: 101/62 111/62 110/62 130/79   BP Location:  Right arm Right arm    Pulse: 97 97 85 (!) 125   Resp:  16 17    Temp:  97.8 °F (36.6 °C) 98.2 °F (36.8 °C)    TempSrc:  Oral Oral    SpO2: 97% 97% 98% 95%   Weight:       Height:           Physical Exam  Vitals and nursing note reviewed.   Constitutional:       Appearance: Normal appearance.      Comments: No acute distress sitting out of bed   HENT:      Head: Normocephalic and atraumatic.      Nose: Nose normal.      Mouth/Throat:      Mouth: Mucous membranes are moist.      Pharynx: Oropharynx is clear.     Eyes:      Extraocular Movements: Extraocular movements intact.      Conjunctiva/sclera: Conjunctivae normal.       Cardiovascular:      Rate and Rhythm: Regular rhythm. Tachycardia present.      Pulses: Normal pulses.      Heart sounds: Normal heart sounds.   Pulmonary:      Effort: Pulmonary effort is normal.      Breath sounds: Normal breath  "sounds.      Comments: Moist nonproductive cough, coarse with cough  Abdominal:      Palpations: Abdomen is soft.     Musculoskeletal:         General: Normal range of motion.      Cervical back: Normal range of motion and neck supple.      Right lower leg: Edema present.      Left lower leg: Edema present.      Comments: Bilateral legs wrapped with Ace wraps.  Trace lower extremity edema noted     Skin:     General: Skin is warm and dry.     Neurological:      General: No focal deficit present.      Mental Status: She is alert and oriented to person, place, and time.     Psychiatric:         Mood and Affect: Mood normal.         Behavior: Behavior normal.            Medications:  Current Medications[1]    Laboratory Results:  Results from last 7 days   Lab Units 07/03/25  0447 07/02/25  0517 07/01/25  0441 06/30/25  0524 06/29/25  0130 06/28/25  0438 06/27/25  0846 06/27/25  0234   WBC Thousand/uL  --  10.35* 12.64* 13.40* 11.99* 9.58  --  11.23*   HEMOGLOBIN g/dL  --  9.0* 9.6* 9.4* 9.2* 9.1* 9.9* 9.6*   HEMATOCRIT %  --  28.0* 28.6* 28.6* 27.1* 26.5*  --  26.5*   PLATELETS Thousands/uL  --  215 173 157 126* 114*  --  124*   SODIUM mmol/L 136 139 141 141 140 142 135 136   POTASSIUM mmol/L 4.5 3.9 3.5 3.3* 3.4* 3.7 4.0 3.9   CHLORIDE mmol/L 100 101 102 104 103 104 104 105   CO2 mmol/L 28 31 28 30 24 25 21 20*   BUN mg/dL 43* 45* 45* 54* 62* 63* 70* 70*   CREATININE mg/dL 1.28 1.29 1.17 1.34* 1.62* 1.95* 2.25* 2.38*   CALCIUM mg/dL 6.6* 6.7* 6.6* 7.0* 7.0* 6.7* 7.6* 7.0*   MAGNESIUM mg/dL  --   --   --   --  2.1 2.1 2.4 2.3   PHOSPHORUS mg/dL  --   --   --   --  3.1 4.5* 5.4* 5.3*       Medical records have been reviewed through [facility] and care everywhere for this patient encounter    Portions of the record may have been created with voice recognition software. Occasional wrong word or \"sound a like\" substitutions may have occurred due to the inherent limitations of voice recognition software. Read the chart " carefully and recognize,        [1]   Current Facility-Administered Medications:     acetaminophen (TYLENOL) tablet 975 mg, 975 mg, Oral, Q8H GARTH, Nam Celaya DO, 975 mg at 07/03/25 0613    albuterol inhalation solution 2.5 mg, 2.5 mg, Nebulization, Q6H PRN, LUZ Lawson    apixaban (ELIQUIS) tablet 5 mg, 5 mg, Oral, BID, 5 mg at 07/03/25 0908 **FOLLOWED BY** [START ON 7/7/2025] apixaban (ELIQUIS) tablet 2.5 mg, 2.5 mg, Oral, BID, Deborah Bro PA-C    bisacodyl (DULCOLAX) rectal suppository 10 mg, 10 mg, Rectal, Daily, Alaina Medley MD, 10 mg at 07/03/25 1009    budesonide (PULMICORT) inhalation solution 0.5 mg, 0.5 mg, Nebulization, Q12H, Nam Celaya DO, 0.5 mg at 07/03/25 0812    bumetanide (BUMEX) tablet 1 mg, 1 mg, Oral, BID, Alaina Medley MD    guaiFENesin (MUCINEX) 12 hr tablet 600 mg, 600 mg, Oral, Q12H GRATH, Nam Celaya DO, 600 mg at 07/03/25 0908    HYDROmorphone HCl (DILAUDID) injection 0.2 mg, 0.2 mg, Intravenous, Q3H PRN, Nam Celaya DO, 0.2 mg at 06/26/25 0109    insulin lispro (HumALOG/ADMELOG) 100 units/mL subcutaneous injection 1-5 Units, 1-5 Units, Subcutaneous, TID AC, 1 Units at 06/25/25 1602 **AND** Fingerstick Glucose (POCT), , , TID AC, Nam Celaya DO    levalbuterol (XOPENEX) inhalation solution 1.25 mg, 1.25 mg, Nebulization, TID, LUZ Lawson, 1.25 mg at 07/03/25 1428    levothyroxine tablet 50 mcg, 50 mcg, Oral, Early Morning, Nam Celaya DO, 50 mcg at 07/03/25 0613    metoprolol tartrate (LOPRESSOR) partial tablet 12.5 mg, 12.5 mg, Oral, Q6H, Nam Celaya DO, 12.5 mg at 07/03/25 1138    ondansetron (ZOFRAN) injection 4 mg, 4 mg, Intravenous, Q6H PRN, Nam Celaya DO    oxyCODONE (ROXICODONE) IR tablet 5 mg, 5 mg, Oral, Q4H PRN, Nam Celaya DO, 5 mg at 07/02/25 2148    oxyCODONE (ROXICODONE) split tablet 2.5 mg, 2.5 mg, Oral, Q4H PRN, Nam Celaya DO    polyethylene  glycol (MIRALAX) packet 17 g, 17 g, Oral, Daily, LUZ Higgins, 17 g at 07/03/25 0908    senna (SENOKOT) tablet 8.6 mg, 1 tablet, Oral, Daily, Nam Celaya DO, 8.6 mg at 07/03/25 0909

## 2025-07-03 NOTE — ASSESSMENT & PLAN NOTE
Etiology: Likely in the setting of hypertensive nephrosclerosis, arterionephrosclerosis, and age-related nephron loss  Baseline creatinine around 1.6-2.1 with GFR <30 dating back to 2024

## 2025-07-03 NOTE — ASSESSMENT & PLAN NOTE
>>ASSESSMENT AND PLAN FOR PRIMARY HYPERTENSION WRITTEN ON 7/3/2025  1:07 PM BY SATHYA GARCIA MD    Currently on p.o. Lopressor and Bumex     >>ASSESSMENT AND PLAN FOR BENIGN HYPERTENSION WITH CKD (CHRONIC KIDNEY DISEASE) STAGE IV (HCC) WRITTEN ON 7/3/2025  1:07 PM BY SATHYA GARCIA MD    Lab Results   Component Value Date    EGFR 36 07/03/2025    EGFR 36 07/02/2025    EGFR 40 07/01/2025    CREATININE 1.28 07/03/2025    CREATININE 1.29 07/02/2025    CREATININE 1.17 07/01/2025

## 2025-07-03 NOTE — ASSESSMENT & PLAN NOTE
Wt Readings from Last 3 Encounters:   06/28/25 60.1 kg (132 lb 7.9 oz)   06/18/25 57.2 kg (126 lb 3.2 oz)   06/12/25 54.9 kg (121 lb)     Currently on Bumex 3 mg twice daily, decreased to prevent over diuresis resulting in worsening hypotension   Diuretics on hold secondary to relative hypotension.  Blood pressure has improved and renal functions have remained stable.  Discussed with nephrology.  Will resume Bumex at 1 mg twice daily.  Continue to hold Jardiance for now.

## 2025-07-03 NOTE — ASSESSMENT & PLAN NOTE
>>ASSESSMENT AND PLAN FOR BENIGN HYPERTENSION WITH CKD (CHRONIC KIDNEY DISEASE) STAGE IV (HCC) WRITTEN ON 7/3/2025  2:47 PM BY LUZ HUBBARD    Blood pressure occasionally dipping to the systolic in the 90s  Heart rate is up, therefore needs current beta-blockade dose  Continue to monitor with restarting diuretics

## 2025-07-03 NOTE — PROGRESS NOTES
Progress Note - Hospitalist   Name: Rebeca Banegas 89 y.o. female I MRN: [id number]  Unit/Bed#: [facility] 512-01 I Date of Admission: 6/24/2025   Date of Service: 7/3/2025 I Hospital Day: 9    Assessment & Plan  Acute lower limb ischemia  89-year-old female with PMH of HFpEF, permanent A-fib on Coumadin, history of CVA, bronchiectasis originally presented to ED with complaint of right leg pain.  CTA of lower extremity was concerning for right femoral acute arterial occlusion.  Noted to have subtherapeutic INR at 1.4  Patient started on heparin infusion at that time   underwent emergent right leg embolectomy/thrombectomy, fasciotomy and left popliteal thromboembolectomy on 6/24/2025 by vascular surgery.  Postoperatively patient noted to be hypotensive, then transferred to critical care for vasopressors.  Patient subsequently weaned off of pressors, and downgraded to MedSurg  Per vascular discontinued warfarin and started on appropriately renally dosed Eliquis 5 mg BID x 7 days and then 2.5 mg BID ongoing   Eliquis/Xarelto Price check completed, both $75  Vascular surgery following, underwent bedside closure of b/l lower extremity fasciotomies 6/29  Not on ASA dt allergy and not on statin dt potential side effects   Ongoing vascular surgery follow-up in wound care    Asthma  Currently not in exacerbation, continue inhalers  Continue with outpatient inhaler and nebulizer treatment  Continue with respiratory and airway clearance protocol and pulmonary vest therapy  Permanent atrial fibrillation (HCC)  Cardiology consult appreciated  Due to concern for subtherapeutic INR described of being compliant with Coumadin resulting in acute lower limb ischemia, cardiology is recommending DOAC on discharge.  Cardiology and vascular as well as patient in agreement started on loading dose of renally dosed Eliquis   Lopressor 12.5 mg every 6 hours   DC heparin drip. Started on loading dose of Eliquis   Bifascicular bundle branch  block    Chronic heart failure with preserved ejection fraction (HCC)  Wt Readings from Last 3 Encounters:   06/28/25 60.1 kg (132 lb 7.9 oz)   06/18/25 57.2 kg (126 lb 3.2 oz)   06/12/25 54.9 kg (121 lb)     Currently on Bumex 3 mg twice daily, decreased to prevent over diuresis resulting in worsening hypotension   Diuretics on hold secondary to relative hypotension.  Blood pressure has improved and renal functions have remained stable.  Discussed with nephrology.  Will resume Bumex at 1 mg twice daily.  Continue to hold Jardiance for now.    DARIO (acute kidney injury) ([facility])  Patient noted to have DARIO in critical care secondary to contrast associated nephropathy, acute blood loss anemia, and undifferentiated shock requiring vasopressors.  Baseline creatinine 1.6-2.1  Currently creatinine improving, 1.34-> 1.17  Per nephrology continue to hold jardiance and spironolactone   Avoid hypotension and repeat bmp in the am   Primary hypertension  Currently on p.o. Lopressor and Bumex  Bronchiectasis (HCC)  Currently on room air  Continue Pulmicort/Xopenex  History of CVA (cerebrovascular accident)  History of CVA in August 2024, thought to be embolic in the setting of warfarin interruption with underlying permanent A-fib.  Transitioned  to Eliquis  Hyperphosphatemia  Improved   Anemia  Postoperative anemia, status post 2 unit PRBC and critical care  Shock (HCC)  Noted to be hypotensive postoperatively required critical care stay and vasopressors.  Currently resolved  Benign hypertension with CKD (chronic kidney disease) stage IV ([facility])  Lab Results   Component Value Date    EGFR 36 07/03/2025    EGFR 36 07/02/2025    EGFR 40 07/01/2025    CREATININE 1.28 07/03/2025    CREATININE 1.29 07/02/2025    CREATININE 1.17 07/01/2025     Constipation  Bowel regimen instituted    VTE Pharmacologic Prophylaxis:   High Risk (Score >/= 5) - Pharmacological DVT Prophylaxis Ordered: apixaban (Eliquis). Sequential Compression Devices  Ordered.    Mobility:   Basic Mobility Inpatient Raw Score: 17  JH-HLM Goal: 5: Stand one or more mins  JH-HLM Achieved: 5: Stand (1 or more minutes)  JH-HLM Goal achieved. Continue to encourage appropriate mobility.    Patient Centered Rounds: I performed bedside rounds with nursing staff today.   Discussions with Specialists or Other Care Team Provider: Discussed with     Education and Discussions with Family / Patient: Updated  (daughter) via phone.    Current Length of Stay: 9 day(s)  Current Patient Status: Inpatient   Certification Statement: The patient will continue to require additional inpatient hospital stay due to await ongoing management of constipation  Discharge Plan: Anticipate discharge tomorrow to rehab facility.    Code Status: Level 3 - DNAR and DNI    Subjective   Examined at bedside.  Denies any shortness of breath or leg discomfort however uncomfortable because of constipation with last bowel movement on 6/27.    Objective :  Temp:  [97.5 °F (36.4 °C)-98.2 °F (36.8 °C)] 98.2 °F (36.8 °C)  HR:  [] 125  BP: ()/(55-79) 130/79  Resp:  [14-18] 17  SpO2:  [91 %-98 %] 95 %  O2 Device: None (Room air)    Body mass index is 22.05 kg/m².     Input and Output Summary (last 24 hours):     Intake/Output Summary (Last 24 hours) at 7/3/2025 1302  Last data filed at 7/3/2025 1219  Gross per 24 hour   Intake 290 ml   Output 150 ml   Net 140 ml       Physical Exam  Constitutional:       General: She is not in acute distress.  HENT:      Head: Normocephalic.      Mouth/Throat:      Mouth: Mucous membranes are moist.     Eyes:      Pupils: Pupils are equal, round, and reactive to light.       Cardiovascular:      Rate and Rhythm: Normal rate and regular rhythm.   Pulmonary:      Effort: Pulmonary effort is normal.      Breath sounds: Normal breath sounds.   Abdominal:      General: There is no distension.      Tenderness: There is no abdominal tenderness. There is no  guarding.      Comments: Bowel sounds audible.  No     Musculoskeletal:      Cervical back: Neck supple.      Right lower leg: Edema present.      Left lower leg: Edema present.     Neurological:      General: No focal deficit present.      Mental Status: She is alert and oriented to person, place, and time. Mental status is at baseline.      Cranial Nerves: No cranial nerve deficit.           Lines/Drains:  Lines/Drains/Airways       Active Status       Name Placement date Placement time Site Days    External Urinary Catheter 06/27/25  1155  -- 6                            Lab Results: I have reviewed the following results:   Results from last 7 days   Lab Units 07/02/25  0517   WBC Thousand/uL 10.35*   HEMOGLOBIN g/dL 9.0*   HEMATOCRIT % 28.0*   PLATELETS Thousands/uL 215   SEGS PCT % 57   LYMPHO PCT % 27   MONO PCT % 8   EOS PCT % 7*     Results from last 7 days   Lab Units 07/03/25  0447 07/02/25  0517   SODIUM mmol/L 136 139   POTASSIUM mmol/L 4.5 3.9   CHLORIDE mmol/L 100 101   CO2 mmol/L 28 31   BUN mg/dL 43* 45*   CREATININE mg/dL 1.28 1.29   ANION GAP mmol/L 8 7   CALCIUM mg/dL 6.6* 6.7*   ALBUMIN g/dL  --  3.3*   TOTAL BILIRUBIN mg/dL  --  1.24*   ALK PHOS U/L  --  39   ALT U/L  --  78*   AST U/L  --  33   GLUCOSE RANDOM mg/dL 91 81         Results from last 7 days   Lab Units 07/03/25  1035 07/03/25  0625 07/02/25  1625 07/02/25  1021 07/02/25  0544 07/01/25  1636 07/01/25  1029 07/01/25  0627 06/30/25  1541 06/30/25  1200 06/30/25  1041 06/30/25  0613   POC GLUCOSE mg/dl 97 88 111 114 80 76 90 86 103 106 65 74         Results from last 7 days   Lab Units 06/27/25  0846   LACTIC ACID mmol/L 1.6       Recent Cultures (last 7 days):               Last 24 Hours Medication List:     Current Facility-Administered Medications:     acetaminophen (TYLENOL) tablet 975 mg, Q8H GARTH    albuterol inhalation solution 2.5 mg, Q6H PRN    apixaban (ELIQUIS) tablet 5 mg, BID **FOLLOWED BY** [START ON 7/7/2025] apixaban  (ELIQUIS) tablet 2.5 mg, BID    bisacodyl (DULCOLAX) rectal suppository 10 mg, Daily    budesonide (PULMICORT) inhalation solution 0.5 mg, Q12H    guaiFENesin (MUCINEX) 12 hr tablet 600 mg, Q12H GARTH    HYDROmorphone HCl (DILAUDID) injection 0.2 mg, Q3H PRN    insulin lispro (HumALOG/ADMELOG) 100 units/mL subcutaneous injection 1-5 Units, TID AC **AND** Fingerstick Glucose (POCT), TID AC    levalbuterol (XOPENEX) inhalation solution 1.25 mg, TID    levothyroxine tablet 50 mcg, Early Morning    metoprolol tartrate (LOPRESSOR) partial tablet 12.5 mg, Q6H    ondansetron (ZOFRAN) injection 4 mg, Q6H PRN    oxyCODONE (ROXICODONE) IR tablet 5 mg, Q4H PRN    oxyCODONE (ROXICODONE) split tablet 2.5 mg, Q4H PRN    polyethylene glycol (MIRALAX) packet 17 g, Daily    senna (SENOKOT) tablet 8.6 mg, Daily    Administrative Statements   Today, Patient Was Seen By: Alaina Medley MD      **Please Note: This note may have been constructed using a voice recognition system.**

## 2025-07-03 NOTE — ASSESSMENT & PLAN NOTE
Home: Home bumex 3mg po bid   CXR with persistent bilateral pleural effusions   Restarted Bumex at 1 mg BId

## 2025-07-04 LAB
ANION GAP SERPL CALCULATED.3IONS-SCNC: 11 MMOL/L (ref 4–13)
BUN SERPL-MCNC: 41 MG/DL (ref 5–25)
CALCIUM SERPL-MCNC: 6.8 MG/DL (ref 8.4–10.2)
CHLORIDE SERPL-SCNC: 99 MMOL/L (ref 96–108)
CO2 SERPL-SCNC: 26 MMOL/L (ref 21–32)
CREAT SERPL-MCNC: 1.22 MG/DL (ref 0.6–1.3)
GFR SERPL CREATININE-BSD FRML MDRD: 38 ML/MIN/1.73SQ M
GLUCOSE SERPL-MCNC: 108 MG/DL (ref 65–140)
GLUCOSE SERPL-MCNC: 111 MG/DL (ref 65–140)
GLUCOSE SERPL-MCNC: 129 MG/DL (ref 65–140)
GLUCOSE SERPL-MCNC: 90 MG/DL (ref 65–140)
GLUCOSE SERPL-MCNC: 91 MG/DL (ref 65–140)
GLUCOSE SERPL-MCNC: 93 MG/DL (ref 65–140)
POTASSIUM SERPL-SCNC: 3.8 MMOL/L (ref 3.5–5.3)
SODIUM SERPL-SCNC: 136 MMOL/L (ref 135–147)

## 2025-07-04 PROCEDURE — 94640 AIRWAY INHALATION TREATMENT: CPT

## 2025-07-04 PROCEDURE — 99232 SBSQ HOSP IP/OBS MODERATE 35: CPT | Performed by: INTERNAL MEDICINE

## 2025-07-04 PROCEDURE — 94760 N-INVAS EAR/PLS OXIMETRY 1: CPT

## 2025-07-04 PROCEDURE — 94668 MNPJ CHEST WALL SBSQ: CPT

## 2025-07-04 PROCEDURE — 94664 DEMO&/EVAL PT USE INHALER: CPT

## 2025-07-04 PROCEDURE — 80048 BASIC METABOLIC PNL TOTAL CA: CPT | Performed by: INTERNAL MEDICINE

## 2025-07-04 PROCEDURE — 82948 REAGENT STRIP/BLOOD GLUCOSE: CPT

## 2025-07-04 RX ORDER — BISACODYL 10 MG
10 SUPPOSITORY, RECTAL RECTAL DAILY
Status: DISCONTINUED | OUTPATIENT
Start: 2025-07-04 | End: 2025-07-05 | Stop reason: HOSPADM

## 2025-07-04 RX ORDER — AMOXICILLIN 250 MG
1 CAPSULE ORAL 2 TIMES DAILY
Status: DISCONTINUED | OUTPATIENT
Start: 2025-07-04 | End: 2025-07-05 | Stop reason: HOSPADM

## 2025-07-04 RX ADMIN — GUAIFENESIN 600 MG: 600 TABLET, EXTENDED RELEASE ORAL at 09:25

## 2025-07-04 RX ADMIN — LEVALBUTEROL HYDROCHLORIDE 1.25 MG: 1.25 SOLUTION RESPIRATORY (INHALATION) at 07:41

## 2025-07-04 RX ADMIN — BUDESONIDE 0.5 MG: 0.5 INHALANT RESPIRATORY (INHALATION) at 19:45

## 2025-07-04 RX ADMIN — SENNOSIDES AND DOCUSATE SODIUM 1 TABLET: 50; 8.6 TABLET ORAL at 18:09

## 2025-07-04 RX ADMIN — LEVOTHYROXINE SODIUM 50 MCG: 0.05 TABLET ORAL at 05:41

## 2025-07-04 RX ADMIN — SENNOSIDES AND DOCUSATE SODIUM 1 TABLET: 50; 8.6 TABLET ORAL at 09:25

## 2025-07-04 RX ADMIN — GUAIFENESIN 600 MG: 600 TABLET, EXTENDED RELEASE ORAL at 22:17

## 2025-07-04 RX ADMIN — BUDESONIDE 0.5 MG: 0.5 INHALANT RESPIRATORY (INHALATION) at 07:41

## 2025-07-04 RX ADMIN — ACETAMINOPHEN 975 MG: 325 TABLET ORAL at 22:13

## 2025-07-04 RX ADMIN — LEVALBUTEROL HYDROCHLORIDE 1.25 MG: 1.25 SOLUTION RESPIRATORY (INHALATION) at 13:42

## 2025-07-04 RX ADMIN — ACETAMINOPHEN 975 MG: 325 TABLET ORAL at 05:41

## 2025-07-04 RX ADMIN — APIXABAN 5 MG: 5 TABLET, FILM COATED ORAL at 18:09

## 2025-07-04 RX ADMIN — Medication 12.5 MG: at 11:06

## 2025-07-04 RX ADMIN — Medication 12.5 MG: at 05:41

## 2025-07-04 RX ADMIN — APIXABAN 5 MG: 5 TABLET, FILM COATED ORAL at 09:25

## 2025-07-04 RX ADMIN — ACETAMINOPHEN 975 MG: 325 TABLET ORAL at 13:26

## 2025-07-04 RX ADMIN — Medication 12.5 MG: at 18:09

## 2025-07-04 RX ADMIN — LEVALBUTEROL HYDROCHLORIDE 1.25 MG: 1.25 SOLUTION RESPIRATORY (INHALATION) at 19:45

## 2025-07-04 RX ADMIN — BISACODYL 10 MG: 10 SUPPOSITORY RECTAL at 09:25

## 2025-07-04 RX ADMIN — POLYETHYLENE GLYCOL 3350 17 G: 17 POWDER, FOR SOLUTION ORAL at 09:29

## 2025-07-04 NOTE — ASSESSMENT & PLAN NOTE
Lab Results   Component Value Date    EGFR 38 07/04/2025    EGFR 36 07/03/2025    EGFR 36 07/02/2025    CREATININE 1.22 07/04/2025    CREATININE 1.28 07/03/2025    CREATININE 1.29 07/02/2025

## 2025-07-04 NOTE — PROGRESS NOTES
Progress Note - Hospitalist   Name: Rebeca Banegas 89 y.o. female I MRN: [id number]  Unit/Bed#: [facility] 512-01 I Date of Admission: 6/24/2025   Date of Service: 7/4/2025 I Hospital Day: 10    Assessment & Plan  Acute lower limb ischemia  89-year-old female with PMH of HFpEF, permanent A-fib on Coumadin, history of CVA, bronchiectasis originally presented to ED with complaint of right leg pain.  CTA of lower extremity was concerning for right femoral acute arterial occlusion.  Noted to have subtherapeutic INR at 1.4  Patient started on heparin infusion at that time   underwent emergent right leg embolectomy/thrombectomy, fasciotomy and left popliteal thromboembolectomy on 6/24/2025 by vascular surgery.  Postoperatively patient noted to be hypotensive, then transferred to critical care for vasopressors.  Patient subsequently weaned off of pressors, and downgraded to MedSurg  Per vascular discontinued warfarin and started on appropriately renally dosed Eliquis 5 mg BID x 7 days and then 2.5 mg BID ongoing   Eliquis/Xarelto Price check completed, both $75  Vascular surgery following, underwent bedside closure of b/l lower extremity fasciotomies 6/29  Not on ASA dt allergy and not on statin dt potential side effects   Ongoing vascular surgery follow-up in wound care    Asthma  Currently not in exacerbation, continue inhalers  Continue with outpatient inhaler and nebulizer treatment  Continue with respiratory and airway clearance protocol and pulmonary vest therapy  Permanent atrial fibrillation (HCC)  Cardiology consult appreciated  Due to concern for subtherapeutic INR described of being compliant with Coumadin resulting in acute lower limb ischemia, cardiology is recommending DOAC on discharge.  Cardiology and vascular as well as patient in agreement started on loading dose of renally dosed Eliquis   Lopressor 12.5 mg every 6 hours   DC heparin drip. Started on loading dose of Eliquis   Bifascicular bundle branch  block    Chronic heart failure with preserved ejection fraction (HCC)  Wt Readings from Last 3 Encounters:   06/28/25 60.1 kg (132 lb 7.9 oz)   06/18/25 57.2 kg (126 lb 3.2 oz)   06/12/25 54.9 kg (121 lb)     Currently on Bumex 3 mg twice daily, decreased to prevent over diuresis resulting in worsening hypotension   Diuretics on hold secondary to relative hypotension.  Blood pressure has improved and renal functions have remained stable.  Discussed with nephrology.  Will resume Bumex at 1 mg twice daily.  Continue to hold Jardiance for now.    DARIO (acute kidney injury) ([facility])  Patient noted to have DARIO in critical care secondary to contrast associated nephropathy, acute blood loss anemia, and undifferentiated shock requiring vasopressors.  Baseline creatinine 1.6-2.1  Currently creatinine improving, 1.34-> 1.17  Per nephrology continue to hold jardiance and spironolactone   Avoid hypotension and repeat bmp in the am   Primary hypertension  Currently on p.o. Lopressor and Bumex  Bronchiectasis (HCC)  Currently on room air  Continue Pulmicort/Xopenex  History of CVA (cerebrovascular accident)  History of CVA in August 2024, thought to be embolic in the setting of warfarin interruption with underlying permanent A-fib.  Transitioned  to Eliquis  Hyperphosphatemia  Improved   Anemia  Postoperative anemia, status post 2 unit PRBC and critical care  Shock (HCC)  Noted to be hypotensive postoperatively required critical care stay and vasopressors.  Currently resolved  Benign hypertension with CKD (chronic kidney disease) stage IV ([facility])  Lab Results   Component Value Date    EGFR 38 07/04/2025    EGFR 36 07/03/2025    EGFR 36 07/02/2025    CREATININE 1.22 07/04/2025    CREATININE 1.28 07/03/2025    CREATININE 1.29 07/02/2025     Constipation  Bowel regimen instituted.  Suppository and enema today.    VTE Pharmacologic Prophylaxis:   High Risk (Score >/= 5) - Pharmacological DVT Prophylaxis Ordered: apixaban (Eliquis).  Sequential Compression Devices Ordered.    Mobility:   Basic Mobility Inpatient Raw Score: 17  JH-HLM Goal: 5: Stand one or more mins  JH-HLM Achieved: 5: Stand (1 or more minutes)  JH-HLM Goal achieved. Continue to encourage appropriate mobility.    Patient Centered Rounds: I performed bedside rounds with nursing staff today.   Discussions with Specialists or Other Care Team Provider: Discussed with nephrology and nurse    Education and Discussions with Family / Patient: Patient declined call to .     Current Length of Stay: 10 day(s)  Current Patient Status: Inpatient   Certification Statement: The patient will continue to require additional inpatient hospital stay due to ongoing bowel regimen and monitoring of symptoms  Discharge Plan: Anticipate discharge tomorrow to rehab facility.    Code Status: Level 3 - DNAR and DNI    Subjective   Seen and examined at bedside.  Still uncomfortable because of constipation    Objective :  Temp:  [98 °F (36.7 °C)-98.3 °F (36.8 °C)] 98 °F (36.7 °C)  HR:  [] 98  BP: ()/(57-79) 117/79  Resp:  [18-20] 20  SpO2:  [93 %-98 %] 94 %  O2 Device: None (Room air)    Body mass index is 22.05 kg/m².     Input and Output Summary (last 24 hours):     Intake/Output Summary (Last 24 hours) at 7/4/2025 1316  Last data filed at 7/4/2025 1203  Gross per 24 hour   Intake 180 ml   Output 300 ml   Net -120 ml       Physical Exam  HENT:      Head: Normocephalic.      Nose: Nose normal.      Mouth/Throat:      Mouth: Mucous membranes are moist.     Eyes:      Extraocular Movements: Extraocular movements intact.      Pupils: Pupils are equal, round, and reactive to light.       Cardiovascular:      Rate and Rhythm: Normal rate and regular rhythm.   Pulmonary:      Effort: Pulmonary effort is normal.   Abdominal:      General: Abdomen is flat.     Musculoskeletal:      Right lower leg: Edema present.      Left lower leg: Edema present.     Skin:     General: Skin is warm.      Neurological:      General: No focal deficit present.      Mental Status: She is alert and oriented to person, place, and time. Mental status is at baseline.           Lines/Drains:              Lab Results: I have reviewed the following results:   Results from last 7 days   Lab Units 07/02/25  0517   WBC Thousand/uL 10.35*   HEMOGLOBIN g/dL 9.0*   HEMATOCRIT % 28.0*   PLATELETS Thousands/uL 215   SEGS PCT % 57   LYMPHO PCT % 27   MONO PCT % 8   EOS PCT % 7*     Results from last 7 days   Lab Units 07/04/25  0434 07/03/25  0447 07/02/25  0517   SODIUM mmol/L 136   < > 139   POTASSIUM mmol/L 3.8   < > 3.9   CHLORIDE mmol/L 99   < > 101   CO2 mmol/L 26   < > 31   BUN mg/dL 41*   < > 45*   CREATININE mg/dL 1.22   < > 1.29   ANION GAP mmol/L 11   < > 7   CALCIUM mg/dL 6.8*   < > 6.7*   ALBUMIN g/dL  --   --  3.3*   TOTAL BILIRUBIN mg/dL  --   --  1.24*   ALK PHOS U/L  --   --  39   ALT U/L  --   --  78*   AST U/L  --   --  33   GLUCOSE RANDOM mg/dL 91   < > 81    < > = values in this interval not displayed.         Results from last 7 days   Lab Units 07/04/25  1100 07/04/25  0729 07/04/25  0612 07/03/25  1558 07/03/25  1035 07/03/25  0625 07/02/25  1625 07/02/25  1021 07/02/25  0544 07/01/25  1636 07/01/25  1029 07/01/25  0627   POC GLUCOSE mg/dl 129 111 93 90 97 88 111 114 80 76 90 86               Recent Cultures (last 7 days):               Last 24 Hours Medication List:     Current Facility-Administered Medications:     acetaminophen (TYLENOL) tablet 975 mg, Q8H GARTH    albuterol inhalation solution 2.5 mg, Q6H PRN    apixaban (ELIQUIS) tablet 5 mg, BID **FOLLOWED BY** [START ON 7/7/2025] apixaban (ELIQUIS) tablet 2.5 mg, BID    bisacodyl (DULCOLAX) rectal suppository 10 mg, Daily    budesonide (PULMICORT) inhalation solution 0.5 mg, Q12H    [Held by provider] bumetanide (BUMEX) tablet 1 mg, BID (diuretic)    guaiFENesin (MUCINEX) 12 hr tablet 600 mg, Q12H GARTH    HYDROmorphone HCl (DILAUDID) injection 0.2 mg,  Q3H PRN    insulin lispro (HumALOG/ADMELOG) 100 units/mL subcutaneous injection 1-5 Units, TID AC **AND** Fingerstick Glucose (POCT), TID AC    levalbuterol (XOPENEX) inhalation solution 1.25 mg, TID    levothyroxine tablet 50 mcg, Early Morning    metoprolol tartrate (LOPRESSOR) partial tablet 12.5 mg, Q6H    ondansetron (ZOFRAN) injection 4 mg, Q6H PRN    oxyCODONE (ROXICODONE) IR tablet 5 mg, Q4H PRN    oxyCODONE (ROXICODONE) split tablet 2.5 mg, Q4H PRN    polyethylene glycol (MIRALAX) packet 17 g, Daily    senna-docusate sodium (SENOKOT S) 8.6-50 mg per tablet 1 tablet, BID    Administrative Statements   Today, Patient Was Seen By: Alaina Medley MD      **Please Note: This note may have been constructed using a voice recognition system.**

## 2025-07-04 NOTE — CASE MANAGEMENT
Case Management Discharge Planning Note    Patient name Rebeca Banegas  Location [facility] 512/[facility] 512-01 MRN [id number]  : 1932 Date 2025       Current Admission Date: 2025  Current Admission Diagnosis:Acute lower limb ischemia   Patient Active Problem List    Diagnosis Date Noted    Constipation 2025    Benign hypertension with CKD (chronic kidney disease) stage IV ([facility]) 2025    Anemia 2025    Shock ([facility]) 2025    DARIO (acute kidney injury) ([facility]) 2025    Hyperphosphatemia 2025    Acute lower limb ischemia 2025    Muscle cramp 2025    Chronic thoracic back pain 2025    Chronic midline low back pain without sciatica 2025    Arthralgia 2025    Other atherosclerosis of native arteries of extremities, bilateral legs ([facility]) 2025    Hyponatremia 2024    Severe protein-calorie malnutrition ([facility]) 2024    History of CVA (cerebrovascular accident) 2024    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 ([facility]) 2024    Chronic foot ulcer, left, with fat layer exposed ([facility]) 2024    Bronchiectasis ([facility]) 2023    MGUS (monoclonal gammopathy of unknown significance) 2023    Chronic cough 2023    Age-related osteoporosis without current pathological fracture 2023    Secondary hyperparathyroidism ([facility]) 2023    Labial abscess 2022    Vulvar cysts 2022    Osteopenia of hip 2020    CKD (chronic kidney disease) stage 4, GFR 15-29 ml/min ([facility]) 2020    Elevated serum immunoglobulin free light chains 2019    Sensorineural hearing loss (SNHL), bilateral 2019    Left asymmetrical SNHL 2019    Pain of left hand 2018    Chronic heart failure with preserved ejection fraction ([facility]) 2017    Plantar fasciitis 2016    Essential tremor 10/02/2015    Vitamin D deficiency 10/02/2015    Onychomycosis 2015    Pulmonary nodule 2015     Thyroid nodule 02/20/2015    Prediabetes 09/23/2014    Primary hypertension 05/19/2014    Leg pain 05/19/2014    Abnormal findings on diagnostic imaging of urinary organs 04/04/2014    Abnormal findings on diagnostic imaging of other specified body structures 03/20/2014    Atherosclerosis 03/06/2014    Ovarian cyst 03/06/2014    Pancreatic cyst 03/06/2014    Backache 02/28/2014    Hoarseness 02/28/2014    Allergic rhinitis 06/11/2013    Hyperlipidemia 04/19/2013    Permanent atrial fibrillation (HCC) 02/14/2013    Arthritis 11/12/2012    Asthma 11/12/2012    Esophageal reflux 11/12/2012    Benign colon polyp 11/10/2012    Bifascicular bundle branch block 11/10/2012    Hypothyroidism 11/10/2012    Nasal polyp 11/10/2012      LOS (days): 10  Geometric Mean LOS (GMLOS) (days): 5.5  Days to GMLOS:-4.7     OBJECTIVE:  Risk of Unplanned Readmission Score: 25.56         Current admission status: Inpatient   Preferred Pharmacy:   [facility]/pharmacy #[id number] - [place] - [address] [place] [address]  [place] [address]  Phone: [phone] Fax: [phone]    [facility] - [facility] [address] [facility] [address]  [address] [facility] [address]  Phone: [phone] Fax: [phone]    Primary Care Provider: Santana Dillon MD    Primary Insurance: [employer]  Secondary Insurance:     DISCHARGE DETAILS:                  Patient has not yet had BM- no anticipated d/c today  [facility] following-

## 2025-07-04 NOTE — ASSESSMENT & PLAN NOTE
Etiology: Suspect ATN in the setting of shock, contrast CT on 6/24 and acute blood loss   Overall creatinine has improved and currently 1.28  likely secondary to relative hypotension and significant diuresis over the last few days  CT scan without hydronephrosis  Renal function back at baseline  Continue to hold Jardiance and spironolactone  Bumex 1 mg 2 times daily can be restarted on discharge once patient has a bowel movement

## 2025-07-04 NOTE — ASSESSMENT & PLAN NOTE
>>ASSESSMENT AND PLAN FOR PRIMARY HYPERTENSION WRITTEN ON 7/4/2025  1:19 PM BY SATHYA GARCIA MD    Currently on p.o. Lopressor and Bumex     >>ASSESSMENT AND PLAN FOR BENIGN HYPERTENSION WITH CKD (CHRONIC KIDNEY DISEASE) STAGE IV (HCC) WRITTEN ON 7/4/2025  1:19 PM BY SATHYA GARCIA MD    Lab Results   Component Value Date    EGFR 38 07/04/2025    EGFR 36 07/03/2025    EGFR 36 07/02/2025    CREATININE 1.22 07/04/2025    CREATININE 1.28 07/03/2025    CREATININE 1.29 07/02/2025

## 2025-07-04 NOTE — ASSESSMENT & PLAN NOTE
>>ASSESSMENT AND PLAN FOR BENIGN HYPERTENSION WITH CKD (CHRONIC KIDNEY DISEASE) STAGE IV (HCC) WRITTEN ON 7/4/2025  1:09 PM BY SHIN GARCIA, DO    Blood pressure occasionally dipping to the systolic in the 90s  Heart rate is up, therefore needs current beta-blockade dose  Continue to monitor with restarting diuretics

## 2025-07-04 NOTE — PROGRESS NOTES
Progress Note - Nephrology   Name: Rebeca Banegas 89 y.o. female I MRN: [id number]  Unit/Bed#: [facility] 512-01 I Date of Admission: 6/24/2025   Date of Service: 7/4/2025 I Hospital Day: 10    Assessment & Plan  DARIO (acute kidney injury) ([facility])  Etiology: Suspect ATN in the setting of shock, contrast CT on 6/24 and acute blood loss   Overall creatinine has improved and currently 1.28  likely secondary to relative hypotension and significant diuresis over the last few days  CT scan without hydronephrosis  Renal function back at baseline  Continue to hold Jardiance and spironolactone  Bumex 1 mg 2 times daily can be restarted on discharge once patient has a bowel movement  CKD (chronic kidney disease) stage 4, GFR 15-29 ml/min ([facility])  Etiology: Likely in the setting of hypertensive nephrosclerosis, arterionephrosclerosis, and age-related nephron loss  Baseline creatinine around 1.6-2.1 with GFR <30 dating back to 2024  Benign hypertension with CKD (chronic kidney disease) stage IV ([facility])  Blood pressure occasionally dipping to the systolic in the 90s  Heart rate is up, therefore needs current beta-blockade dose  Continue to monitor with restarting diuretics  Acute lower limb ischemia  Bilateral LE ischemia likely due to cardioembolus   S/p RLE fem embolectomy and anterolateral fasciotomy and LLE pop/AT embolectomy on 6/24  Status post bedside fasciotomy closure   Shock ([facility])  Status post prior phenylephrine infusion  Shock has resolved  Chronic heart failure with preserved ejection fraction ([facility])  Home: Home bumex 3mg po bid   CXR with persistent bilateral pleural effusions   Restarted Bumex at 1 mg BId on discharge  Hyperphosphatemia  Resolved  Constipation  Management per primary team    I have reviewed the nephrology recommendations including diuretic management, with primary team, and we are in agreement with renal plan including the information outlined above. Nephrology service signing off.  No active issues being  managed by nephrology please call with questions    Subjective     Patient was seen today.  Sitting up resting comfortably.  No acute chest pain or shortness of breath.  Still has not had a bowel movement.  Objective :  Temp:  [98 °F (36.7 °C)-98.3 °F (36.8 °C)] 98 °F (36.7 °C)  HR:  [] 98  BP: ()/(57-79) 117/79  Resp:  [18-20] 20  SpO2:  [93 %-98 %] 94 %  O2 Device: None (Room air)    Current Weight: Weight - Scale: 60.1 kg (132 lb 7.9 oz)  First Weight: Weight - Scale: 60.2 kg (132 lb 11.5 oz)  I/O         07/02 0701 07/03 0700 07/03 0701 07/04 0700 07/04 0701 07/05 0700    P.O. 230 200 180    Total Intake(mL/kg) 230 (3.8) 200 (3.3) 180 (3)    Urine (mL/kg/hr) 150 (0.1)  300 (0.8)    Total Output 150  300    Net +80 +200 -120           Unmeasured Urine Occurrence  1 x           Physical Exam  Vitals and nursing note reviewed.   Constitutional:       General: She is not in acute distress.     Appearance: She is well-developed.   HENT:      Head: Normocephalic and atraumatic.     Eyes:      Conjunctiva/sclera: Conjunctivae normal.       Cardiovascular:      Rate and Rhythm: Normal rate and regular rhythm.      Heart sounds: No murmur heard.  Pulmonary:      Effort: Pulmonary effort is normal. No respiratory distress.      Breath sounds: Normal breath sounds.   Abdominal:      Palpations: Abdomen is soft.      Tenderness: There is no abdominal tenderness.     Musculoskeletal:         General: No swelling.      Cervical back: Neck supple.     Skin:     General: Skin is warm and dry.      Capillary Refill: Capillary refill takes less than 2 seconds.     Neurological:      Mental Status: She is alert.     Psychiatric:         Mood and Affect: Mood normal.         Medications:  Current Medications[1]      Lab Results: I have reviewed the following results:  Results from last 7 days   Lab Units 07/04/25  0434 07/03/25  0447 07/02/25  0517 07/01/25  0441 06/30/25  0524 06/29/25  0130 06/28/25  0438   WBC  "Thousand/uL  --   --  10.35* 12.64* 13.40* 11.99* 9.58   HEMOGLOBIN g/dL  --   --  9.0* 9.6* 9.4* 9.2* 9.1*   HEMATOCRIT %  --   --  28.0* 28.6* 28.6* 27.1* 26.5*   PLATELETS Thousands/uL  --   --  215 173 157 126* 114*   POTASSIUM mmol/L 3.8 4.5 3.9 3.5 3.3* 3.4* 3.7   CHLORIDE mmol/L 99 100 101 102 104 103 104   CO2 mmol/L 26 28 31 28 30 24 25   BUN mg/dL 41* 43* 45* 45* 54* 62* 63*   CREATININE mg/dL 1.22 1.28 1.29 1.17 1.34* 1.62* 1.95*   CALCIUM mg/dL 6.8* 6.6* 6.7* 6.6* 7.0* 7.0* 6.7*   MAGNESIUM mg/dL  --   --   --   --   --  2.1 2.1   PHOSPHORUS mg/dL  --   --   --   --   --  3.1 4.5*   ALBUMIN g/dL  --   --  3.3*  --   --  3.9  --        Administrative Statements     Portions of the record may have been created with voice recognition software. Occasional wrong word or \"sound a like\" substitutions may have occurred due to the inherent limitations of voice recognition software. Read the chart carefully and recognize, using context, where substitutions have occurred.If you have any questions, please contact the dictating provider.       [1]   Current Facility-Administered Medications:     acetaminophen (TYLENOL) tablet 975 mg, 975 mg, Oral, Q8H GARTH, Nam Celaya DO, 975 mg at 07/04/25 0541    albuterol inhalation solution 2.5 mg, 2.5 mg, Nebulization, Q6H PRN, LUZ Lawson    apixaban (ELIQUIS) tablet 5 mg, 5 mg, Oral, BID, 5 mg at 07/04/25 0925 **FOLLOWED BY** [START ON 7/7/2025] apixaban (ELIQUIS) tablet 2.5 mg, 2.5 mg, Oral, BID, Debroah Bro PA-C    bisacodyl (DULCOLAX) rectal suppository 10 mg, 10 mg, Rectal, Daily, Alaina Medley MD, 10 mg at 07/04/25 0925    budesonide (PULMICORT) inhalation solution 0.5 mg, 0.5 mg, Nebulization, Q12H, Nam Celaya DO, 0.5 mg at 07/04/25 0741    bumetanide (BUMEX) tablet 1 mg, 1 mg, Oral, BID (diuretic), Chava Lange DO    guaiFENesin (MUCINEX) 12 hr tablet 600 mg, 600 mg, Oral, Q12H GARTH, Nam Celaya DO, 600 mg at 07/04/25 " 0925    HYDROmorphone HCl (DILAUDID) injection 0.2 mg, 0.2 mg, Intravenous, Q3H PRN, Nam Celaya DO, 0.2 mg at 06/26/25 0109    insulin lispro (HumALOG/ADMELOG) 100 units/mL subcutaneous injection 1-5 Units, 1-5 Units, Subcutaneous, TID AC, 1 Units at 06/25/25 1602 **AND** Fingerstick Glucose (POCT), , , TID AC, Nam Celaya DO    levalbuterol (XOPENEX) inhalation solution 1.25 mg, 1.25 mg, Nebulization, TID, LUZ Lawson, 1.25 mg at 07/04/25 0741    levothyroxine tablet 50 mcg, 50 mcg, Oral, Early Morning, Nam Celaya DO, 50 mcg at 07/04/25 0541    metoprolol tartrate (LOPRESSOR) partial tablet 12.5 mg, 12.5 mg, Oral, Q6H, Nam Celaya DO, 12.5 mg at 07/04/25 1106    ondansetron (ZOFRAN) injection 4 mg, 4 mg, Intravenous, Q6H PRN, Nam Celaya DO    oxyCODONE (ROXICODONE) IR tablet 5 mg, 5 mg, Oral, Q4H PRN, Nam Celaya DO, 5 mg at 07/02/25 2148    oxyCODONE (ROXICODONE) split tablet 2.5 mg, 2.5 mg, Oral, Q4H PRN, Nam Celaya DO    polyethylene glycol (MIRALAX) packet 17 g, 17 g, Oral, Daily, LUZ Higgins, 17 g at 07/04/25 0929    senna-docusate sodium (SENOKOT S) 8.6-50 mg per tablet 1 tablet, 1 tablet, Oral, BID, Alaina Medley MD, 1 tablet at 07/04/25 0925

## 2025-07-04 NOTE — ASSESSMENT & PLAN NOTE
Home: Home bumex 3mg po bid   CXR with persistent bilateral pleural effusions   Restarted Bumex at 1 mg BId on discharge

## 2025-07-05 ENCOUNTER — TELEPHONE (OUTPATIENT)
Dept: OTHER | Facility: OTHER | Age: OVER 89
End: 2025-07-05

## 2025-07-05 VITALS
DIASTOLIC BLOOD PRESSURE: 70 MMHG | HEIGHT: 65 IN | OXYGEN SATURATION: 97 % | TEMPERATURE: 97.9 F | HEART RATE: 100 BPM | WEIGHT: 132.5 LBS | RESPIRATION RATE: 17 BRPM | BODY MASS INDEX: 22.08 KG/M2 | SYSTOLIC BLOOD PRESSURE: 114 MMHG

## 2025-07-05 LAB
GLUCOSE SERPL-MCNC: 103 MG/DL (ref 65–140)
GLUCOSE SERPL-MCNC: 91 MG/DL (ref 65–140)

## 2025-07-05 PROCEDURE — 94640 AIRWAY INHALATION TREATMENT: CPT

## 2025-07-05 PROCEDURE — 82948 REAGENT STRIP/BLOOD GLUCOSE: CPT

## 2025-07-05 PROCEDURE — 94664 DEMO&/EVAL PT USE INHALER: CPT

## 2025-07-05 PROCEDURE — 99239 HOSP IP/OBS DSCHRG MGMT >30: CPT | Performed by: INTERNAL MEDICINE

## 2025-07-05 PROCEDURE — 94760 N-INVAS EAR/PLS OXIMETRY 1: CPT

## 2025-07-05 RX ORDER — GUAIFENESIN 600 MG/1
600 TABLET, EXTENDED RELEASE ORAL EVERY 12 HOURS SCHEDULED
Start: 2025-07-05

## 2025-07-05 RX ORDER — BUMETANIDE 2 MG/1
1 TABLET ORAL 2 TIMES DAILY
Qty: 270 TABLET | Refills: 0
Start: 2025-07-05

## 2025-07-05 RX ORDER — ACETAMINOPHEN 325 MG/1
975 TABLET ORAL EVERY 8 HOURS SCHEDULED
Start: 2025-07-05

## 2025-07-05 RX ORDER — POLYETHYLENE GLYCOL 3350 17 G/17G
17 POWDER, FOR SOLUTION ORAL DAILY
Start: 2025-07-06

## 2025-07-05 RX ORDER — AMOXICILLIN 250 MG
1 CAPSULE ORAL 2 TIMES DAILY
Start: 2025-07-05

## 2025-07-05 RX ORDER — METOPROLOL TARTRATE 25 MG/1
12.5 TABLET, FILM COATED ORAL EVERY 12 HOURS SCHEDULED
Start: 2025-07-05

## 2025-07-05 RX ORDER — BISACODYL 10 MG
10 SUPPOSITORY, RECTAL RECTAL DAILY PRN
Qty: 12 SUPPOSITORY | Refills: 0
Start: 2025-07-05

## 2025-07-05 RX ADMIN — Medication 12.5 MG: at 05:35

## 2025-07-05 RX ADMIN — APIXABAN 5 MG: 5 TABLET, FILM COATED ORAL at 09:14

## 2025-07-05 RX ADMIN — HYDROMORPHONE HYDROCHLORIDE 0.2 MG: 0.2 INJECTION, SOLUTION INTRAMUSCULAR; INTRAVENOUS; SUBCUTANEOUS at 01:11

## 2025-07-05 RX ADMIN — GUAIFENESIN 600 MG: 600 TABLET, EXTENDED RELEASE ORAL at 09:13

## 2025-07-05 RX ADMIN — SENNOSIDES AND DOCUSATE SODIUM 1 TABLET: 50; 8.6 TABLET ORAL at 09:14

## 2025-07-05 RX ADMIN — BUDESONIDE 0.5 MG: 0.5 INHALANT RESPIRATORY (INHALATION) at 07:47

## 2025-07-05 RX ADMIN — ACETAMINOPHEN 975 MG: 325 TABLET ORAL at 05:36

## 2025-07-05 RX ADMIN — LEVOTHYROXINE SODIUM 50 MCG: 0.05 TABLET ORAL at 05:36

## 2025-07-05 RX ADMIN — LEVALBUTEROL HYDROCHLORIDE 1.25 MG: 1.25 SOLUTION RESPIRATORY (INHALATION) at 07:47

## 2025-07-05 RX ADMIN — Medication 12.5 MG: at 12:19

## 2025-07-05 NOTE — CASE MANAGEMENT
Case Management Discharge Planning Note    Patient name Rebeca Banegas  Location [facility] 512/[facility] 512-01 MRN [id number]  : 1932 Date 2025       Current Admission Date: 2025  Current Admission Diagnosis:Acute lower limb ischemia   Patient Active Problem List    Diagnosis Date Noted    Constipation 2025    Benign hypertension with CKD (chronic kidney disease) stage IV ([facility]) 2025    Anemia 2025    Shock ([facility]) 2025    DARIO (acute kidney injury) ([facility]) 2025    Hyperphosphatemia 2025    Acute lower limb ischemia 2025    Muscle cramp 2025    Chronic thoracic back pain 2025    Chronic midline low back pain without sciatica 2025    Arthralgia 2025    Other atherosclerosis of native arteries of extremities, bilateral legs ([facility]) 2025    Hyponatremia 2024    Severe protein-calorie malnutrition ([facility]) 2024    History of CVA (cerebrovascular accident) 2024    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 ([facility]) 2024    Chronic foot ulcer, left, with fat layer exposed ([facility]) 2024    Bronchiectasis ([facility]) 2023    MGUS (monoclonal gammopathy of unknown significance) 2023    Chronic cough 2023    Age-related osteoporosis without current pathological fracture 2023    Secondary hyperparathyroidism ([facility]) 2023    Labial abscess 2022    Vulvar cysts 2022    Osteopenia of hip 2020    CKD (chronic kidney disease) stage 4, GFR 15-29 ml/min ([facility]) 2020    Elevated serum immunoglobulin free light chains 2019    Sensorineural hearing loss (SNHL), bilateral 2019    Left asymmetrical SNHL 2019    Pain of left hand 2018    Chronic heart failure with preserved ejection fraction ([facility]) 2017    Plantar fasciitis 2016    Essential tremor 10/02/2015    Vitamin D deficiency 10/02/2015    Onychomycosis 2015    Pulmonary nodule 2015     Thyroid nodule 02/20/2015    Prediabetes 09/23/2014    Primary hypertension 05/19/2014    Leg pain 05/19/2014    Abnormal findings on diagnostic imaging of urinary organs 04/04/2014    Abnormal findings on diagnostic imaging of other specified body structures 03/20/2014    Atherosclerosis 03/06/2014    Ovarian cyst 03/06/2014    Pancreatic cyst 03/06/2014    Backache 02/28/2014    Hoarseness 02/28/2014    Allergic rhinitis 06/11/2013    Hyperlipidemia 04/19/2013    Permanent atrial fibrillation (HCC) 02/14/2013    Arthritis 11/12/2012    Asthma 11/12/2012    Esophageal reflux 11/12/2012    Benign colon polyp 11/10/2012    Bifascicular bundle branch block 11/10/2012    Hypothyroidism 11/10/2012    Nasal polyp 11/10/2012      LOS (days): 11  Geometric Mean LOS (GMLOS) (days): 5.5  Days to GMLOS:-5.5     OBJECTIVE:  Risk of Unplanned Readmission Score: 23.58         Current admission status: Inpatient   Preferred Pharmacy:   [facility][id number] - [place], [place] - [address] [place] [id number]  Phone: [phone] Fax: [phone]    [facility] - [facility] [address] [facility]  [address] [facility] [address]  Phone: [phone] Fax: [phone]    Primary Care Provider: Santana Dillon MD    Primary Insurance: [employer]  Secondary Insurance:     DISCHARGE DETAILS:    Discharge planning discussed with:: Meena ( left), patient at bedside  Freedom of Choice: Yes  Comments - Freedom of Choice: Agreeable to MV STR for today  CM contacted family/caregiver?: Yes  Were Treatment Team discharge recommendations reviewed with patient/caregiver?: Yes  Did patient/caregiver verbalize understanding of patient care needs?: N/A- going to facility  Were patient/caregiver advised of the risks associated with not following Treatment Team discharge recommendations?: Yes    Contacts  Patient Contacts: Meena Solorzano  Relationship to Patient:: Family  Contact Method:  Phone  Phone Number: [phone]  Reason/Outcome: Discharge Planning, Referral              Other Referral/Resources/Interventions Provided:  Interventions: Short Term Rehab, Transportation  Referral Comments: CM spoke to Charisma at  who states pt can be accepted today.  CM placed [facility] transport request in [facility].  Waiting for confirmed  time.         Treatment Team Recommendation: Short Term Rehab  Expected Discharge Disposition: Short Term Rehab  Additional Discharge Dispositions: Short Term Rehab  Transport at Discharge : Wheelchair van         dehiscence of surgical incision

## 2025-07-05 NOTE — ASSESSMENT & PLAN NOTE
Cardiology consult appreciated  Due to concern for subtherapeutic INR described of being compliant with Coumadin resulting in acute lower limb ischemia, cardiology is recommending DOAC on discharge.  Cardiology and vascular as well as patient in agreement started on loading dose of renally dosed Eliquis   Lopressor 12.5 mg every 6 hours   DC heparin drip. Started on loading dose of Eliquis    PRINCIPAL DISCHARGE DIAGNOSIS  Diagnosis: Chronic pain of right knee  Assessment and Plan of Treatment: Diet: Continue regular diet upon discharge.   Activity: No heavy lifting > 25 lbs for 4 weeks. Avoid straining or excessive activity x 6 weeks.   -Continue to use your walker when ambulating until your postoperative follow up appointment.   Dressings: Keep dressing clean, dry, and intact. Your doctor will remove your bandage at your post-operative follow up appointment.   Other Care:   -You may shower when you get home but DO NOT soak dressing and/or incision. The water may run over your dressing/incision but DO NOT let the water directly hit your dressing/incision (take a shower with your wound away from the direct stream of water). NO hot tubes, NO bath tubs, NO swimming pools.   -Elevate your operative leg 2 feet above heart level for 2 hours in the morning, 2 hours in the afternoon, and 2 hours in the evening.   -Apply ice for 20min every time you elevate.   -Sit for 90 min/day: 45mins x2 or 30min x3  -DO NOT sit for more than the 90min/day. Walk or lay down when not elevating your leg.   -DO NOT place the elevation pillow behind your knees. Only place it under your calf and heel.   -DO NOT bend more than 45 degrees at the waist

## 2025-07-05 NOTE — ASSESSMENT & PLAN NOTE
89-year-old female with PMH of HFpEF, permanent A-fib on Coumadin, history of CVA, bronchiectasis originally presented to ED with complaint of right leg pain.  CTA of lower extremity was concerning for right femoral acute arterial occlusion.  Noted to have subtherapeutic INR at 1.4  Patient started on heparin infusion at that time   underwent emergent right leg embolectomy/thrombectomy, fasciotomy and left popliteal thromboembolectomy on 6/24/2025 by vascular surgery.  Postoperatively patient noted to be hypotensive, then transferred to critical care for vasopressors.  Patient subsequently weaned off of pressors, and downgraded to MedSurg  Per vascular discontinued warfarin and started on appropriately renally dosed Eliquis 5 mg BID x 7 days and then 2.5 mg BID ongoing   Eliquis/Xarelto Price check completed, both $75  Vascular surgery following, underwent bedside closure of b/l lower extremity fasciotomies 6/29  Not on ASA dt allergy and not on statin dt potential side effects   Ongoing vascular surgery follow-up in wound care.  Patient has an appointment scheduled for vascular surgery follow-up on 7/22/2025.  Stable for discharge to rehab today.

## 2025-07-05 NOTE — ASSESSMENT & PLAN NOTE
Wt Readings from Last 3 Encounters:   06/28/25 60.1 kg (132 lb 7.9 oz)   06/18/25 57.2 kg (126 lb 3.2 oz)   06/12/25 54.9 kg (121 lb)     Currently on Bumex 3 mg twice daily, decreased to prevent over diuresis resulting in worsening hypotension   Diuretics on hold secondary to relative hypotension.  Blood pressure has improved and renal functions have remained stable.  Discussed with nephrology.  Will resume Bumex at 1 mg twice daily.  Continue to hold Jardiance for now.  Output patient nephrology follow-up

## 2025-07-05 NOTE — TELEPHONE ENCOUNTER
Nursing home or Independent living name:[facility]  If its not nursing home or Independent living, do they see PCP in Network:  Who is calling:Mariel Nurse Supervisor  Callback number: [phone] ext [phone]  Symptoms: Review Admission orders  Did you page oncall or place in triage hub:On call paged

## 2025-07-05 NOTE — PLAN OF CARE
Problem: Prexisting or High Potential for Compromised Skin Integrity  Goal: Skin integrity is maintained or improved  Description: INTERVENTIONS:  - Identify patients at risk for skin breakdown  - Assess and monitor skin integrity including under and around medical devices   - Assess and monitor nutrition and hydration status  - Monitor labs  - Assess for incontinence   - Turn and reposition patient  - Assist with mobility/ambulation  - Relieve pressure over wale prominences   - Avoid friction and shearing  - Provide appropriate hygiene as needed including keeping skin clean and dry  - Evaluate need for skin moisturizer/barrier cream  - Collaborate with interdisciplinary team  - Patient/family teaching  - Consider wound care consult    Assess:  - Review Bandar scale daily  - Clean and moisturize skin every 8  - Inspect skin when repositioning, toileting, and assisting with ADLS  - Assess under medical devices such as 2 every tubing  - Assess extremities for adequate circulation and sensation     Bed Management:  - Have minimal linens on bed & keep smooth, unwrinkled  - Change linens as needed when moist or perspiring  - Avoid sitting or lying in one position for more than 2 hours while in bed?Keep HOB at 45 degrees   - Toileting:  - Offer bedside commode    Activity:  - Mobilize patient 3 times a day  - Encourage activity and walks on unit  - Encourage or provide ROM exercises   - Turn and reposition patient every 2 Hours  - Use appropriate equipment to lift or move patient in bed  - Instruct/ Assist with weight shifting every 2 when out of bed in chair  - Consider limitation of chair time 2 hour intervals    Skin Care:  - Avoid use of baby powder, tape, friction and shearing, hot water or constrictive clothing  - Relieve pressure over bony prominences using pillows  - Do not massage red bony areas    Next Steps:  - Teach patient strategies to minimize risks such as   - Consider consults to  interdisciplinary  teams such as wound/ PT  Outcome: Progressing     Problem: PAIN - ADULT  Goal: Verbalizes/displays adequate comfort level or baseline comfort level  Description: Interventions:  - Encourage patient to monitor pain and request assistance  - Assess pain using appropriate pain scale  - Administer analgesics as ordered based on type and severity of pain and evaluate response  - Implement non-pharmacological measures as appropriate and evaluate response  - Consider cultural and social influences on pain and pain management  - Notify physician/advanced practitioner if interventions unsuccessful or patient reports new pain  - Educate patient/family on pain management process including their role and importance of  reporting pain   - Provide non-pharmacologic/complimentary pain relief interventions  Outcome: Progressing

## 2025-07-05 NOTE — DISCHARGE SUMMARY
Discharge Summary - Hospitalist   Name: Rebeca Banegas 89 y.o. female I MRN: [id number]  Unit/Bed#: [facility] 512-01 I Date of Admission: 6/24/2025   Date of Service: 7/5/2025 I Hospital Day: 11     Assessment & Plan  Acute lower limb ischemia  89-year-old female with PMH of HFpEF, permanent A-fib on Coumadin, history of CVA, bronchiectasis originally presented to ED with complaint of right leg pain.  CTA of lower extremity was concerning for right femoral acute arterial occlusion.  Noted to have subtherapeutic INR at 1.4  Patient started on heparin infusion at that time   underwent emergent right leg embolectomy/thrombectomy, fasciotomy and left popliteal thromboembolectomy on 6/24/2025 by vascular surgery.  Postoperatively patient noted to be hypotensive, then transferred to critical care for vasopressors.  Patient subsequently weaned off of pressors, and downgraded to MedSurg  Per vascular discontinued warfarin and started on appropriately renally dosed Eliquis 5 mg BID x 7 days and then 2.5 mg BID ongoing   Eliquis/Xarelto Price check completed, both $75  Vascular surgery following, underwent bedside closure of b/l lower extremity fasciotomies 6/29  Not on ASA dt allergy and not on statin dt potential side effects   Ongoing vascular surgery follow-up in wound care.  Patient has an appointment scheduled for vascular surgery follow-up on 7/22/2025.  Stable for discharge to rehab today.    Asthma  Currently not in exacerbation, continue inhalers  Continue with outpatient inhaler and nebulizer treatment  Continue with respiratory and airway clearance protocol and pulmonary vest therapy  Permanent atrial fibrillation (HCC)  Cardiology consult appreciated  Due to concern for subtherapeutic INR described of being compliant with Coumadin resulting in acute lower limb ischemia, cardiology is recommending DOAC on discharge.  Cardiology and vascular as well as patient in agreement started on loading dose of renally dosed  Eliquis   Lopressor 12.5 mg every 6 hours   DC heparin drip. Started on loading dose of Eliquis   Bifascicular bundle branch block    Chronic heart failure with preserved ejection fraction (HCC)  Wt Readings from Last 3 Encounters:   06/28/25 60.1 kg (132 lb 7.9 oz)   06/18/25 57.2 kg (126 lb 3.2 oz)   06/12/25 54.9 kg (121 lb)     Currently on Bumex 3 mg twice daily, decreased to prevent over diuresis resulting in worsening hypotension   Diuretics on hold secondary to relative hypotension.  Blood pressure has improved and renal functions have remained stable.  Discussed with nephrology.  Will resume Bumex at 1 mg twice daily.  Continue to hold Jardiance for now.  Output patient nephrology follow-up    DARIO (acute kidney injury) ([facility])  Patient noted to have DARIO in critical care secondary to contrast associated nephropathy, acute blood loss anemia, and undifferentiated shock requiring vasopressors.  Baseline creatinine 1.6-2.1  Currently creatinine improving, 1.34-> 1.17  Per nephrology continue to hold jardiance and spironolactone   Avoid hypotension and repeat bmp in the am   Primary hypertension  Currently on p.o. Lopressor and Bumex  Bronchiectasis (HCC)  Currently on room air  Continue Pulmicort/Xopenex  History of CVA (cerebrovascular accident)  History of CVA in August 2024, thought to be embolic in the setting of warfarin interruption with underlying permanent A-fib.  Transitioned  to Eliquis  Hyperphosphatemia  Improved   Anemia  Postoperative anemia, status post 2 unit PRBC and critical care  Shock ([facility])  Noted to be hypotensive postoperatively required critical care stay and vasopressors.  Currently resolved  Benign hypertension with CKD (chronic kidney disease) stage IV ([facility])  Lab Results   Component Value Date    EGFR 38 07/04/2025    EGFR 36 07/03/2025    EGFR 36 07/02/2025    CREATININE 1.22 07/04/2025    CREATININE 1.28 07/03/2025    CREATININE 1.29 07/02/2025     Constipation  Bowel regimen  instituted.  Suppository and enema today.     Medical Problems       Resolved Problems  Date Reviewed: 5/15/2025          Resolved    Metabolic acidosis 6/29/2025     Resolved by  Amanda Marx MD        Discharging Physician / Practitioner: Alaina Medley MD  PCP: Santana Dillon MD  Admission Date:   Admission Orders (From admission, onward)       Ordered        06/24/25 0948  Inpatient Admission  Once                          Discharge Date: 07/05/25    Next Steps for Physician/AP Assuming Care:  Vascular surgery nephrology follow-up    Test Results Pending at Discharge (will require follow up):  NA    Medication Changes for Discharge & Rationale:   Eliquis 5 mg twice daily for 7 days followed by 2.5 mg twice daily  See after visit summary for reconciled discharge medications provided to patient and/or family.     Consultations During Hospital Stay:  Vascular surgery  Nephrology  Cardiology  Pulmonology    Procedures Performed:   Right lower extremity femoral embolectomy and anterior fasciotomy with left lower extremity bucktail/AT embolectomy.     Significant Findings / Test Results:   CT abdomen RIGHT: Occlusion of the right common femoral artery with nonopacification of right lower extremity. Additional partial occlusion of the internal iliac artery as above.     LEFT: Mild to moderate calcified plaque. Nonopacification of the popliteal artery in the popliteal fossa with faint reconstitution of the tibioperoneal trunk and anterior tibial arteries although no flow to the ankle/foot.    Incidental Findings:   NA       Hospital Course:   Rebeca Banegas is a 89 y.o. female patient who originally presented to the hospital on 6/24/2025 due to right lower extremity pain.  CTA of the right lower extremity was concerning for right femoral acute arterial occlusion.  Presented with subtherapeutic INR of 1.4.  Patient was initially started on heparin infusion.  Underwent emergent right leg embolectomy/thrombectomy,  "fasciotomy and left popliteal thromboembolectomy on 624 by vascular surgery.  Postoperatively required ICU stay for vasopressor support.  Subsequently the underwent bilateral lower extremity fasciotomies on 629.  Postoperatively patient was started on Eliquis 5 mg twice daily for 7 days followed by 2.5 mg twice daily.  Vascular surgery recommended continuing close outpatient follow-up and wound care.  Patient also had some DARIO which had improved and diuretic dose was decreased with Jardiance spironolactone held on discharge.  Stable for discharge to rehab today.          Please see above list of diagnoses and related plan for additional information.     Discharge Day Visit / Exam:   Subjective: Patient seen and examined at bedside.  No acute events overnight.  Constipation has been relieved.  Vitals: Blood Pressure: (!) 85/49 (07/05/25 0744)  Pulse: 95 (07/05/25 0744)  Temperature: 97.9 °F (36.6 °C) (07/05/25 0750)  Temp Source: Oral (07/05/25 0750)  Respirations: 17 (07/05/25 0750)  Height: 5' 5\" (165.1 cm) (06/25/25 0830)  Weight - Scale: 60.1 kg (132 lb 7.9 oz) (06/28/25 0505)  SpO2: 97 % (07/05/25 0750)  Physical Exam  Constitutional:       General: She is not in acute distress.  HENT:      Head: Normocephalic.      Nose: Nose normal.      Mouth/Throat:      Mouth: Mucous membranes are moist.     Eyes:      Extraocular Movements: Extraocular movements intact.      Pupils: Pupils are equal, round, and reactive to light.       Cardiovascular:      Rate and Rhythm: Normal rate and regular rhythm.   Pulmonary:      Effort: Pulmonary effort is normal.   Abdominal:      General: Abdomen is flat.      Palpations: Abdomen is soft.     Musculoskeletal:      Cervical back: Neck supple.      Right lower leg: No edema.      Left lower leg: No edema.     Skin:     General: Skin is warm.     Neurological:      General: No focal deficit present.      Mental Status: She is alert and oriented to person, place, and time. Mental " status is at baseline.          Discussion with Family: Updated  (daughter) via phone.    Discharge instructions/Information to patient and family:   See after visit summary for information provided to patient and family.      Provisions for Follow-Up Care:  See after visit summary for information related to follow-up care and any pertinent home health orders.      Mobility at time of Discharge:   Basic Mobility Inpatient Raw Score: 16  JH-HLM Goal: 5: Stand one or more mins  JH-HLM Achieved: 6: Walk 10 steps or more  HLM Goal achieved. Continue to encourage appropriate mobility.     Disposition:   Other Skilled Nursing Facility at [facility]    Planned Readmission: No    Administrative Statements   Discharge Statement:  I have spent a total time of >30 minutes in caring for this patient on the day of the visit/encounter. >30 minutes of time was spent on: Diagnostic results, Prognosis, Risks and benefits of tx options, Instructions for management, Patient and family education, Importance of tx compliance, Risk factor reductions, Impressions, Counseling / Coordination of care, Documenting in the medical record, Reviewing / ordering tests, medicine, procedures  , and Communicating with other healthcare professionals .    **Please Note: This note may have been constructed using a voice recognition system**

## 2025-07-05 NOTE — ASSESSMENT & PLAN NOTE
>>ASSESSMENT AND PLAN FOR PRIMARY HYPERTENSION WRITTEN ON 7/5/2025 12:23 PM BY SATHYA GARCIA MD    Currently on p.o. Lopressor and Bumex     >>ASSESSMENT AND PLAN FOR BENIGN HYPERTENSION WITH CKD (CHRONIC KIDNEY DISEASE) STAGE IV (HCC) WRITTEN ON 7/5/2025 12:23 PM BY SATHYA GARCIA MD    Lab Results   Component Value Date    EGFR 38 07/04/2025    EGFR 36 07/03/2025    EGFR 36 07/02/2025    CREATININE 1.22 07/04/2025    CREATININE 1.28 07/03/2025    CREATININE 1.29 07/02/2025

## 2025-07-07 ENCOUNTER — NURSING HOME VISIT (OUTPATIENT)
Dept: GERIATRICS | Facility: OTHER | Age: OVER 89
End: 2025-07-07
Payer: COMMERCIAL

## 2025-07-07 DIAGNOSIS — L89.890 PRESSURE INJURY OF TOE OF LEFT FOOT, UNSTAGEABLE (HCC): ICD-10-CM

## 2025-07-07 DIAGNOSIS — N18.4 CKD (CHRONIC KIDNEY DISEASE) STAGE 4, GFR 15-29 ML/MIN (HCC): ICD-10-CM

## 2025-07-07 DIAGNOSIS — D64.9 ANEMIA, UNSPECIFIED TYPE: ICD-10-CM

## 2025-07-07 DIAGNOSIS — I10 PRIMARY HYPERTENSION: Chronic | ICD-10-CM

## 2025-07-07 DIAGNOSIS — N17.9 AKI (ACUTE KIDNEY INJURY) (HCC): ICD-10-CM

## 2025-07-07 DIAGNOSIS — I70.293 OTHER ATHEROSCLEROSIS OF NATIVE ARTERIES OF EXTREMITIES, BILATERAL LEGS (HCC): ICD-10-CM

## 2025-07-07 DIAGNOSIS — J47.9 BRONCHIECTASIS WITHOUT COMPLICATION (HCC): ICD-10-CM

## 2025-07-07 DIAGNOSIS — I48.21 PERMANENT ATRIAL FIBRILLATION (HCC): Chronic | ICD-10-CM

## 2025-07-07 DIAGNOSIS — I99.8 ACUTE LOWER LIMB ISCHEMIA: Primary | ICD-10-CM

## 2025-07-07 DIAGNOSIS — E03.8 OTHER SPECIFIED HYPOTHYROIDISM: Chronic | ICD-10-CM

## 2025-07-07 DIAGNOSIS — J45.20 MILD INTERMITTENT ASTHMA WITHOUT COMPLICATION: Chronic | ICD-10-CM

## 2025-07-07 DIAGNOSIS — I50.32 CHRONIC HEART FAILURE WITH PRESERVED EJECTION FRACTION (HCC): ICD-10-CM

## 2025-07-07 DIAGNOSIS — L89.626 PRESSURE INJURY OF DEEP TISSUE OF LEFT HEEL: ICD-10-CM

## 2025-07-07 DIAGNOSIS — Z98.890 HISTORY OF EMBOLECTOMY: ICD-10-CM

## 2025-07-07 PROCEDURE — 99306 1ST NF CARE HIGH MDM 50: CPT | Performed by: FAMILY MEDICINE

## 2025-07-07 NOTE — PROGRESS NOTES
"[facility]  [address]  [place] [address]  [facility]  History and Physical  NAME: Rebeca Banegas  AGE: 89 y.o. SEX: female [id number]  DATE OF ENCOUNTER: 7/7/2025  Pain: \"tolerable pain in legs\"  Rehab Potential: fair  Patient Informed of Medical Condition: yes  Patient is Capable of Understanding Their Right: yes  Prognosis: poor  Discharge Plan: [facility]  Surrogate Decision Maker: daughter, Meena Solorzano  Advanced Directives: yes  Code status: DNR  PCP: Santana Dillon MD  Assessment and Plan   Acute lower limb ischemia  With recent hospitalization for right leg pain was found to have right femoral acute arterial occlusion, s/p emergent right LE embolectomy and fasciotomy and left popliteal thromboembolectomy on 6/24/25, bedside closure bilateral lower extremity fasciotomies on 6/29  Continue Eliquis 2.5 mg twice daily  Follow-up with vascular surgery on 7/22/2025    Chronic heart failure with preserved ejection fraction (HCC)  Home Bumex 3 mg BID was held due to hypotension in the hospital, was resumed at reduced dose on discharge.  Continue Bumex 1 mg twice daily, Lopressor 12.5 mg twice daily  Monitor weight, BMP    Permanent atrial fibrillation (HCC)  Continue lopressor 12.5 mg twice daily. Warfarin was d/c, Eliquis started recent hospitalization  Monitor vitals, CBC, adverse bleeding    Primary hypertension  Post-op hypotension required vasopressor in ICU.  Soft BP 90s/60s on SNF admission  Continue reduced dose of bumex, lopressor 12.5 mg bid  Monitor BP closely. Avoid hypotension    Asthma  On albuterol prn     Bronchiectasis (HCC)  Continue wixela inhub BID    Hypothyroidism  TSH WNL 3.8 (6/25/25)  Continue home dose of levothyroxine     DARIO (acute kidney injury) (HCC)  Elevated Creatinine 2/2 contrast and acute blood loss anemia, improving  Recent Creatinine 1.22, eGFR 38  Continue holding jardiance and aldactone as per nephrology  Monitor BMP    CKD " (chronic kidney disease) stage 4, GFR 15-29 ml/min ([facility])  Follows nephrology    Anemia  Acute blood loss anemia, s/p 2U PRBC in the hospital  Hgb 9.0 on 7/2/25.  Will repeat CBC    Pressure injury of deep tissue of left heel  Wound Care following  All medications and routine orders were reviewed and updated as needed.  Plan discussed with: Patient. Coordination of care with nursing, OT/PT, SW, dietician.  Chief Complaint   Seen for admission at [facility]  History of Present Illness   89 y.o. female who is seen today, following hospitalization at [facility] from 6/24/2025 to 7/5/2025 for acute lower limb ischemia, requiring emergent surgical intervention including embolectomy and fasciotomy. Post-operative course was complicated by hypotension and acute kidney injury, which improved by July 3, 2025. She was discharged to [facility] on July 5, 2025. She remains on anticoagulation therapy and is scheduled for follow-up with Vascular Surgery on July 22, 2025.  She is sitting in recliner, reports sleeping well at night. Appetite is fair. She admits being depressed with limited ambulation. She denies HA, lightheadedness, CP, or palpitations. SOB with standing or ambulation.     HISTORY:  Past Medical History[1]  Family History[2]  Social History[3]    Allergies:  Allergies[4]    Review of Systems   As per HPI, otherwise negative.  Medications and orders   All medications reviewed and updated in [facility] EMR.  Objective   Physical Exam  Vitals and nursing note in [place] EHR reviewed.   General: no acute distress.  HENT:      Head: Normocephalic.      Nose: Nose normal.      Mouth: Mucous membranes are moist.   Eyes:       Right eye: No discharge.         Left eye: No discharge.      Conjunctivae normal.   Cardiovascular:      Normal rate and regular rhythm. No murmur heard.  Pulmonary:      Pulmonary effort is normal. No wheezing. Rhonchi or rales b/l.   Abdominal:      Bowel sounds are  normal. There is no distension.      Abdomen is soft. There is no abdominal tenderness.   Musculoskeletal:      Right lower le+ edema.      Left lower le+ edema.      Pulses  Skin:     General: Skin is warm. Hematoma posterior right forearm. Scattered ecchymoses b/l UE and LE.  Neurological: alert.      Oriented to person, place, and time. Cooperative, follow commands      Behavior: normal.     Pertinent Laboratory/Diagnostic Studies:   The following labs/studies were reviewed please see chart or hospital paperwork for details.    Labs & Imaging:  Lab Results   Component Value Date    WBC 10.35 (H) 2025    HGB 9.0 (L) 2025    HCT 28.0 (L) 2025     (H) 2025     2025     Lab Results   Component Value Date     10/17/2017    SODIUM 136 2025    K 3.8 2025    CL 99 2025    CO2 26 2025    AGAP 11 2025    BUN 41 (H) 2025    CREATININE 1.22 2025    GLUC 91 2025    GLUF 70 2025    CALCIUM 6.8 (L) 2025    AST 33 2025    ALT 78 (H) 2025    ALKPHOS 39 2025    PROT 6.4 10/17/2017    TP 5.4 (L) 2025    BILITOT 0.5 10/17/2017    TBILI 1.24 (H) 2025    EGFR 38 2025     Lab Results   Component Value Date    HGBA1C 6.3 (H) 2025     Lab Results   Component Value Date    CHOLESTEROL 158 2024    CHOLESTEROL 194 2020    CHOLESTEROL 190 2018     Lab Results   Component Value Date    HDL 66 2024    HDL 76 (H) 2018    HDL 74 2018     Lab Results   Component Value Date    TRIG 82 2024    TRIG 75 2020    TRIG 70 2018     Lab Results   Component Value Date    LDLCALC 76 2024    LDLDIRECT 97 2018    LDLDIRECT 102 (H) 2018     Lab Results   Component Value Date    DEBVXHOB35 707 2024     Lab Results   Component Value Date    NBO8ZZJRAJYH 3.852 2025    TSH 6.33 (H) 2024     Lab Results   Component  Value Date    ZETN94BCRBFQ 61.3 04/04/2024      I have spent a total time of 75 minutes in caring for this patient on the day of the visit/encounter including diagnostic results, prognosis, risks and benefits of tx options, instructions for management, patient and family education, counseling / coordination of care, documenting in the medical record, reviewing / ordering tests, medicine, procedures , obtaining and reviewing history, communicating with other healthcare professionals.  Maura Gomez MD         [1]   Past Medical History:  Diagnosis Date    Abnormal ECG     Abnormal ultrasound     RESOLVED: 26JUN2015    Acute kidney failure (HCC) 12/29/2024    Advice given about COVID-19 virus infection 04/07/2020    Ambulates with cane     Arrhythmia     Arthritis     Asthma     Asthma with acute exacerbation     LAST ASSESSED: [id number]    Asymptomatic menopausal state     Atherosclerosis     Atrial fibrillation (HCC)     Chronic kidney disease     Chronic obstructive lung disease (HCC)     w/ chonic cough    Chronic ulcer of left foot (HCC)     Colon polyp     Congestive heart failure (HCC)     LAST ASSESSED: 17NNY8921    COPD (chronic obstructive pulmonary disease) (HCC)     Coronary artery disease     COVID-19 01/06/2025    COVID-19 virus infection 03/25/2023    Cuboid fracture     LAST ASSESSED: 19LOJ8726    Disease of thyroid gland     Dyspepsia     Edema of both lower extremities     Equinus deformity of left foot     Fall 04/01/2024    Falls     5/2024    GERD (gastroesophageal reflux disease)     Headache(784.0)     High risk medication use     LAST ASSESSED: 19IEC2069    Hyperlipidemia     Hypertension     Hypokalemia     Hypothyroidism     Impacted cerumen of both ears     LAST ASSESSED: 78BKW3437    Impacted cerumen of right ear     LAST ASSESSED: 78GDA1998    Influenza     LAST ASSESSED: 68NQM6984    IPMN (intraductal papillary mucinous neoplasm)     RESOLVED: 02OCT2015    Irregular heart beat      afib. Warfarin.    Labial abscess 2022    Leg cramps 2018    Limb swelling     LAST ASSESSED: 13QNT9103    Navicular fracture of ankle     LAST ASSESSED: 79UQG6407    Onychomycosis     LAST ASSESSED: 57ATS0595    Open wound of right upper arm 10/17/2022    Osteoarthritis     Osteopenia     Osteoporosis     Paronychia, finger, unspecified laterality     LAST ASSESSED: 54CLC2172    Paroxysmal atrial fibrillation (HCC)     LAST ASSESSED: 2014    Peripheral vascular disease (HCC)     Plantar fasciitis     Shock (HCC) 2025    SOBOE (shortness of breath on exertion)     Stroke (HCC)     Right arm weakness that has resolved    Talus fracture     LAST ASSESSED: 54VQY0630    Vaccine counseling 2023    Vascular headache     Walker as ambulation aid     Wound of right foot    [2]   Family History  Problem Relation Name Age of Onset    Pancreatic cancer Mother Christiana 93    Heart failure Mother Christiana     Cancer Mother Christiana     Hypertension Mother Christiana     Coronary artery disease Family      Breast cancer Family      Uterine cancer Family      Hyperlipidemia Family      Osteoarthritis Family      Ovarian cancer Family      Brain cancer Son      Stroke Father Isaac     Hypertension Father Isaac     No Known Problems Sister      No Known Problems Daughter      No Known Problems Maternal Grandmother      No Known Problems Maternal Grandfather      No Known Problems Paternal Grandmother      No Known Problems Paternal Grandfather      Breast cancer Cousin  50    Breast cancer Cousin  50    Ovarian cancer Other niece 49   [3]   Social History  Socioeconomic History    Marital status:    Occupational History    Occupation: retired   Tobacco Use    Smoking status: Former     Current packs/day: 0.00     Average packs/day: 0.3 packs/day for 2.0 years (0.5 ttl pk-yrs)     Types: Cigarettes     Start date:      Quit date:      Years since quittin.5    Smokeless tobacco:  Never    Tobacco comments:     On and off socially with friends    Vaping Use    Vaping status: Never Used   Substance and Sexual Activity    Alcohol use: Not Currently    Drug use: No    Sexual activity: Not Currently     Partners: Male     Birth control/protection: Post-menopausal   Social History Narrative    DAILY COFFEE CONSUMPTION (2 CUPS/DAY)    EXERCISING REGULARLY     Social Drivers of Health     Food Insecurity: No Food Insecurity (6/24/2025)    Nursing - Inadequate Food Risk Classification     Worried About Running Out of Food in the Last Year: Never true     Ran Out of Food in the Last Year: Never true     Ran Out of Food in the Last Year: Never true   Transportation Needs: No Transportation Needs (6/24/2025)    Nursing - Transportation Risk Classification     Lack of Transportation: No   Intimate Partner Violence: Unknown (6/24/2025)    Nursing IPS     Physically Hurt by Someone: No     Hurt or Threatened by Someone: No   Housing Stability: Unknown (6/24/2025)    Nursing: Inadequate Housing Risk Classification     Unable to Pay for Housing in the Last Year: No     Has Housing: No   [4]   Allergies  Allergen Reactions    Asa [Aspirin] Shortness Of Breath    Nsaids Shortness Of Breath    Medical Tape Other (See Comments)     Skin tears with the removal of tape

## 2025-07-08 ENCOUNTER — HOSPITAL ENCOUNTER (INPATIENT)
Facility: HOSPITAL | Age: OVER 89
LOS: 7 days | Discharge: NURSING, REHAB OR LONG-TERM CARE | DRG: 920 | End: 2025-07-15
Attending: EMERGENCY MEDICINE | Admitting: INTERNAL MEDICINE
Payer: COMMERCIAL

## 2025-07-08 ENCOUNTER — NURSING HOME VISIT (OUTPATIENT)
Dept: WOUND CARE | Facility: HOSPITAL | Age: OVER 89
End: 2025-07-08
Payer: COMMERCIAL

## 2025-07-08 ENCOUNTER — TELEPHONE (OUTPATIENT)
Dept: VASCULAR SURGERY | Facility: CLINIC | Age: OVER 89
End: 2025-07-08

## 2025-07-08 DIAGNOSIS — L89.890 PRESSURE INJURY OF TOE OF LEFT FOOT, UNSTAGEABLE (HCC): ICD-10-CM

## 2025-07-08 DIAGNOSIS — T81.31XA WOUND DISRUPTION, POST-OP, SKIN, INITIAL ENCOUNTER: ICD-10-CM

## 2025-07-08 DIAGNOSIS — T81.9XXA POST-OPERATIVE COMPLICATION: Primary | ICD-10-CM

## 2025-07-08 DIAGNOSIS — L89.626 PRESSURE INJURY OF DEEP TISSUE OF LEFT HEEL: ICD-10-CM

## 2025-07-08 DIAGNOSIS — I99.8 ACUTE LOWER LIMB ISCHEMIA: Primary | ICD-10-CM

## 2025-07-08 DIAGNOSIS — I48.21 PERMANENT ATRIAL FIBRILLATION (HCC): Chronic | ICD-10-CM

## 2025-07-08 DIAGNOSIS — Z98.890 HISTORY OF EMBOLECTOMY: ICD-10-CM

## 2025-07-08 DIAGNOSIS — Z98.890 HISTORY OF EMBOLECTOMY: Primary | ICD-10-CM

## 2025-07-08 DIAGNOSIS — R26.2 AMBULATORY DYSFUNCTION: ICD-10-CM

## 2025-07-08 PROBLEM — L89.622 PRESSURE INJURY OF LEFT HEEL, STAGE 2 (HCC): Status: ACTIVE | Noted: 2025-07-08

## 2025-07-08 LAB
PLATELET # BLD AUTO: 327 THOUSANDS/UL (ref 149–390)
PMV BLD AUTO: 8 FL (ref 8.9–12.7)

## 2025-07-08 PROCEDURE — 99223 1ST HOSP IP/OBS HIGH 75: CPT | Performed by: INTERNAL MEDICINE

## 2025-07-08 PROCEDURE — 99284 EMERGENCY DEPT VISIT MOD MDM: CPT | Performed by: EMERGENCY MEDICINE

## 2025-07-08 PROCEDURE — 36415 COLL VENOUS BLD VENIPUNCTURE: CPT | Performed by: INTERNAL MEDICINE

## 2025-07-08 PROCEDURE — 99305 1ST NF CARE MODERATE MDM 35: CPT | Performed by: NURSE PRACTITIONER

## 2025-07-08 PROCEDURE — 99283 EMERGENCY DEPT VISIT LOW MDM: CPT

## 2025-07-08 PROCEDURE — 99024 POSTOP FOLLOW-UP VISIT: CPT | Performed by: SURGERY

## 2025-07-08 PROCEDURE — 85049 AUTOMATED PLATELET COUNT: CPT | Performed by: INTERNAL MEDICINE

## 2025-07-08 RX ORDER — ALBUTEROL SULFATE 0.83 MG/ML
2.5 SOLUTION RESPIRATORY (INHALATION)
Status: DISCONTINUED | OUTPATIENT
Start: 2025-07-09 | End: 2025-07-09

## 2025-07-08 RX ORDER — FLUTICASONE FUROATE AND VILANTEROL 200; 25 UG/1; UG/1
1 POWDER RESPIRATORY (INHALATION) DAILY
Status: DISCONTINUED | OUTPATIENT
Start: 2025-07-09 | End: 2025-07-15 | Stop reason: HOSPADM

## 2025-07-08 RX ORDER — ONDANSETRON 2 MG/ML
4 INJECTION INTRAMUSCULAR; INTRAVENOUS EVERY 6 HOURS PRN
Status: DISCONTINUED | OUTPATIENT
Start: 2025-07-08 | End: 2025-07-15 | Stop reason: HOSPADM

## 2025-07-08 RX ORDER — LEVOTHYROXINE SODIUM 50 UG/1
50 TABLET ORAL
Status: DISCONTINUED | OUTPATIENT
Start: 2025-07-09 | End: 2025-07-15 | Stop reason: HOSPADM

## 2025-07-08 RX ORDER — AMOXICILLIN 250 MG
1 CAPSULE ORAL 2 TIMES DAILY
Status: DISCONTINUED | OUTPATIENT
Start: 2025-07-08 | End: 2025-07-09

## 2025-07-08 RX ORDER — ENOXAPARIN SODIUM 100 MG/ML
40 INJECTION SUBCUTANEOUS DAILY
Status: DISCONTINUED | OUTPATIENT
Start: 2025-07-09 | End: 2025-07-09

## 2025-07-08 RX ORDER — OXYCODONE HYDROCHLORIDE 5 MG/1
5 TABLET ORAL EVERY 4 HOURS PRN
Refills: 0 | Status: DISCONTINUED | OUTPATIENT
Start: 2025-07-08 | End: 2025-07-15 | Stop reason: HOSPADM

## 2025-07-08 RX ORDER — BISACODYL 10 MG
10 SUPPOSITORY, RECTAL RECTAL DAILY PRN
Status: DISCONTINUED | OUTPATIENT
Start: 2025-07-08 | End: 2025-07-15 | Stop reason: HOSPADM

## 2025-07-08 RX ORDER — POLYETHYLENE GLYCOL 3350 17 G/17G
17 POWDER, FOR SOLUTION ORAL DAILY
Status: DISCONTINUED | OUTPATIENT
Start: 2025-07-09 | End: 2025-07-15 | Stop reason: HOSPADM

## 2025-07-08 RX ORDER — BUMETANIDE 1 MG/1
1 TABLET ORAL 2 TIMES DAILY
Status: DISCONTINUED | OUTPATIENT
Start: 2025-07-08 | End: 2025-07-12

## 2025-07-08 RX ORDER — ACETAMINOPHEN 325 MG/1
650 TABLET ORAL EVERY 6 HOURS PRN
Status: DISCONTINUED | OUTPATIENT
Start: 2025-07-08 | End: 2025-07-15 | Stop reason: HOSPADM

## 2025-07-08 RX ADMIN — CEFTRIAXONE 1000 MG: 1 INJECTION, POWDER, FOR SOLUTION INTRAMUSCULAR; INTRAVENOUS at 16:51

## 2025-07-08 RX ADMIN — Medication 12.5 MG: at 20:54

## 2025-07-08 RX ADMIN — BUMETANIDE 1 MG: 1 TABLET ORAL at 20:52

## 2025-07-08 RX ADMIN — OXYCODONE HYDROCHLORIDE 5 MG: 5 TABLET ORAL at 22:32

## 2025-07-08 NOTE — LETTER
Patient:  Rebeca Banegas   7/22/1932           LUZ Albert saw Rebeca Banegas for a wound care visit on 7/8/2025. See below for information relating to this visit.      Chief Complaint   Patient presents with    New Patient Visit        Assessment/Plan:  1. History of embolectomy  Assessment & Plan:  Patient was admitted on June 24, 2025 at [facility] for acute right leg pain due to a right femoral acute arterial occlusion, leading to an emergent embolectomy/thrombectomy and fasciotomy on June 24, 2025]    Assessment and plan:  The surgical incision on the right lower leg is not approximated, with small dehisced area, positive erythema on the periwound area  The surgical incision on the left lower leg is approximated, stable, positive erythema, possible infection  Right groin: Large amount of serous drainage  Wound culture specimen from right groin wound was sent  Local wound care with silver alginate for wound on the bilateral lower legs and right groin  Sent Tiger text to patient's surgeon ( Dr. Adonay Raymond), provided update.  Recommended to send patient to emergency room for washout.  Dr. Gomez, Senior care physician was made aware.  Follow-up next week if patient is back in the facility                2. Ambulatory dysfunction  3. Pressure injury of deep tissue of left heel  Assessment & Plan:  Left heel  Purple, nonblanchable, with no obvious sign of infection  Local wound care with Skin-Prep  Continue to offload  Order open heels shoes  Heel boots when in bed  4. Pressure injury of toe of left foot, unstageable (HCC)  Assessment & Plan:  Left great toe  100% eschar, stable, nonfluctuant, with no obvious sign of infection  Local wound care with Skin-Prep  Continue to offload         Orders:  Rebeca Banegas  7/22/1932  Wound: Bilateral lower legs and right groin  Discontinue previous wound order  Cleanse the wound bed with NSS   Apply non-sting skin prep to periwound area  Apply  silver alginate to wound bed, then cover with ABD and rolled gauze (bilateral lower legs), ABD and Tegaderm (right groin)  Frequency : Daily and prn for soiling    Wound: Left heel and left great toe  Cleanse with normal saline solution  Apply Skin-Prep to wound bed and periwound area  Daily as needed for soiling    Order open heel shoes for ambulation  Heel boots when in bed    Offload all wounds  Turn and reposition frequently  Instruct / Assist with weight shifting in wheelchair  Increase protein intake.  Monitor for any sign of infection or worsening, inform PCP or patient's primary physician in your facility.      Follow Up:  Return in about 1 week (around 7/15/2025).       [facility] hours are 8:00 am - 4:30 pm Monday through Friday. The center phone number is [phone]. You can also contact me directly thru my email at [email or web address] or thru tiger text. If it is an emergency, please contact the PCP or patient's attending physician in your facility.     Sincerely,    Electronically signed by LUZ Albert    Patient : Rebeca Banegas    7/22/1932

## 2025-07-08 NOTE — UTILIZATION REVIEW
NOTIFICATION OF ADMISSION DISCHARGE   This is a Notification of Discharge from [facility]. Please be advised that this patient has been discharge from our facility. Below you will find the admission and discharge date and time including the patient’s disposition.   UTILIZATION REVIEW CONTACT:  Utilization Review Assistants  Network Utilization Review Department  Phone: [phone] x carefully listen to the prompts. All voicemails are confidential.  Email: [email or web address]     ADMISSION INFORMATION  PRESENTATION DATE: 6/24/2025  3:47 AM  OBERVATION ADMISSION DATE: N/A  INPATIENT ADMISSION DATE: 6/24/25  9:49 AM   DISCHARGE DATE: 7/5/2025  2:38 PM   DISPOSITION:Non [facility] SNF/TCU/SNU    Network Utilization Review Department  ATTENTION: Please call with any questions or concerns to [phone] and carefully listen to the prompts so that you are directed to the right person. All voicemails are confidential.   For Discharge needs, contact Care Management DC Support Team at [phone] opt. 2  Send all requests for admission clinical reviews, approved or denied determinations and any other requests to dedicated fax number below belonging to the campus where the patient is receiving treatment. List of dedicated fax numbers for the Facilities:  FACILITY NAME UR FAX NUMBER   ADMISSION DENIALS (Administrative/Medical Necessity) [phone]   DISCHARGE SUPPORT TEAM ([facility]) [phone]   PARENT CHILD HEALTH (Maternity/NICU/Pediatrics) [phone]   General acute hospital [phone]   [facility] [phone]   Count includes the [facility] [phone]   [facility] [phone]   [facility] [phone]   [facility] [phone]   [facility] [phone]   [facility] [phone]   [facility]  [facility] [phone]   [facility] [phone]   [facility] [phone]   [facility] [phone]

## 2025-07-08 NOTE — CONSULTS
Consultation - Vascular Surgery   Name: eRbeca Banegas 89 y.o. female I MRN: [id number]  Unit/Bed#: Z1HA I Date of Admission: 7/8/2025   Date of Service: 7/8/2025 I Hospital Day: 0   Inpatient consult to Vascular Surgery  Consult performed by: Shivani Whittington MD  Consult ordered by: Delisa Gardner MD        Physician Requesting Evaluation: Delisa Gardner MD   Reason for Evaluation / Principal Problem: excessive serous drainage from right groin incision    Assessment & Plan  Wound disruption, post-op, skin, initial encounter  89 y.o. female with PMH of HFpEF, permanent A-fib on Eliquis, history of CVA, bronchiectasis recently admitted with acute lower limb ischemia. She underwent emergent right leg embolectomy/lobectomy, fasciotomy and left popliteal thrombectomy on 6/24/2025 by our team, now with copious serous drainage from right groin wound.    Plan  Recommend medicine admission given multiple chronic comorbidities  Okay for diet, NPO at midnight  Hold anticoagulation (Eliquis)   AM labs  Plan for OR tomorrow, 7/9, for right groin washout and wound VAC placement  Remainder of care per medicine  Please contact the SecureChat role for the Vascular Surgery service with any questions/concerns.    History of Present Illness   Rebeca Banegas is a 89 y.o. female  with PMH of HFpEF, permanent A-fib on Eliquis, history of CVA, bronchiectasis recently admitted with acute lower limb ischemia. She underwent emergent right leg embolectomy/lobectomy, fasciotomy and left popliteal thrombectomy on 6/24/2025 by our team.  Patient has been undergoing local dressing changes to right groin wound as well as right leg fasciotomy site and left leg access site just medial to the knee.  Nursing noted copious serous drainage from right groin prompting call to the office who recommended presentation to the ED for further evaluation.  Per patient she has had significant serous drainage from right groin since surgery.  Additionally  patient notes some mild tenderness posterior to left leg incision as well as some increase in redness of right leg incision with minimal serous drainage.  Denies any additional complaints at this time.    Review of Systems   Constitutional:  Negative for appetite change, chills and fever.   Respiratory:  Negative for shortness of breath.    Cardiovascular:  Negative for chest pain.   Gastrointestinal:  Negative for abdominal pain, nausea and vomiting.   Genitourinary:  Negative for difficulty urinating and dysuria.   Skin:  Positive for wound.        Right groin wound with copious straw-colored drainage     Medical History Review: I have reviewed the patient's PMH, PSH, Social History, Family History, Meds, and Allergies     Objective :  Temp:  [98.1 °F (36.7 °C)] 98.1 °F (36.7 °C)  HR:  [106] 106  BP: (126)/(63) 126/63  Resp:  [18] 18  SpO2:  [95 %] 95 %  O2 Device: None (Room air)    I/O       None            Physical Exam  Constitutional:       Appearance: Normal appearance.   HENT:      Head: Normocephalic and atraumatic.      Mouth/Throat:      Mouth: Mucous membranes are moist.      Pharynx: Oropharynx is clear.     Eyes:      Extraocular Movements: Extraocular movements intact.      Pupils: Pupils are equal, round, and reactive to light.       Cardiovascular:      Rate and Rhythm: Normal rate.   Pulmonary:      Effort: Pulmonary effort is normal.   Abdominal:      General: Abdomen is flat.     Musculoskeletal:         General: Normal range of motion.      Cervical back: Normal range of motion.     Skin:     General: Skin is warm.      Comments: Right groin wound with saturated dressing with straw-colored drainage removed.  Noted easily expressible serous drainage from wound.  Right lower leg fasciotomy site with serous drainage.  Slight separation of the skin between sutures as well as mild erythema surrounding incision..  Left lower leg access incision with staples in place and slight erythema/tenderness  posterior to incision.     Neurological:      General: No focal deficit present.      Mental Status: She is alert and oriented to person, place, and time.     Psychiatric:         Mood and Affect: Mood normal.         Behavior: Behavior normal.            Lab Results: I have reviewed the following results:  Recent Labs     07/08/25  0814   SODIUM 137   K 3.8      CO2 24   BUN 37*   CREATININE 1.24*   GLUC 103*   AST 16   ALT 20   ALB 3.7   TBILI 2.1*   ALKPHOS 60       VTE Prophylaxis: VTE covered by:    None       Shivani Whittington MD  General Surgery Resident

## 2025-07-08 NOTE — ASSESSMENT & PLAN NOTE
89 y.o. female with PMH of HFpEF, permanent A-fib on Eliquis, history of CVA, bronchiectasis recently admitted with acute lower limb ischemia. She underwent emergent right leg embolectomy/lobectomy, fasciotomy and left popliteal thrombectomy on 6/24/2025 by our team, now with copious serous drainage from right groin wound.    Plan  Recommend medicine admission given multiple chronic comorbidities  Okay for diet, NPO at midnight  Hold anticoagulation (Eliquis)   AM labs  Plan for OR tomorrow, 7/9, for right groin washout and wound VAC placement  Remainder of care per medicine

## 2025-07-08 NOTE — ED ATTENDING ATTESTATION
7/8/2025  I, Delisa Gardner MD, saw and evaluated the patient. I have discussed the patient with the resident/non-physician practitioner and agree with the resident's/non-physician practitioner's findings, Plan of Care, and MDM as documented in the resident's/non-physician practitioner's note, except where noted. All available labs and Radiology studies were reviewed.  I was present for key portions of any procedure(s) performed by the resident/non-physician practitioner and I was immediately available to provide assistance.       At this point I agree with the current assessment done in the Emergency Department.  I have conducted an independent evaluation of this patient a history and physical is as follows:    ED Course         Critical Care Time  Procedures    89 yo female with right groin serous drainage, changed by wound care this morning. Pt with recent admission for thrombectomy and had cath site in femoral area. Pt with warmth to area.  Pt with no fever. No n/v/d.  Vss, afebrile, lungs cta, rrr, abdomen soft nontender, incisions intact, mild erythema.  Labs, discuss with vascular

## 2025-07-08 NOTE — TELEPHONE ENCOUNTER
Vascular Nurse Navigator Post Op Call        Contacted [facility] and left message for the 1st floor nurses station to return call.      Procedure: Right - EMBOLECTOMY/THROMBECTOMY LOWER EXTREMITY VIA FEMORAL CUTDOWN  Right - FASCIOTOMY    Date of Procedure: 6/24/25    Surgeon: Panel 1:     * Arthur Wei MD - Primary     * Vale Esparza MD - Assisting  Panel 2:     * Arthur Wei MD - Primary    Procedure: - Thromboembolectomy of left popliteal, anterior tibial, and tibioperoneal arteries with #2 and #3 Javier catheters     Date of Procedure: 6/24/25    Surgeon:   * Adonay Raymond MD - Primary     * Vale Esparza MD - Assisting    Discharge Date: 7/5/25    Discharge Disposition: Other Skilled Nursing Facility at [facility]      Anticoagulation pt was discharged on post op?: Apixaban (Eliquis)    NEXT OFFICE VISIT SCHEDULED:  7/22/25 at 4 pm with Dr. Raymond at [facility]

## 2025-07-08 NOTE — ED PROVIDER NOTES
Time reflects when diagnosis was documented in both MDM as applicable and the Disposition within this note       Time User Action Codes Description Comment    7/8/2025  1:23 PM Ollie Pack Add [T81.9XXA] Post-operative complication           ED Disposition       ED Disposition   Admit    Condition   Stable    Date/Time   Tue Jul 8, 2025  2:42 PM    Comment                  Assessment & Plan       Medical Decision Making  Patient is a 89-year-old female presenting for postoperative complication.    Differential includes but not limited to seroma, cellulitis, poor wound healing, doubt abscess.  Vascular surgery consulted to evaluate patient.  Patient had basic labs ordered earlier today without acute concerns, no need to recheck at this time.    Patient admitted for further management and workup.    Amount and/or Complexity of Data Reviewed  Labs: ordered.    Risk  Decision regarding hospitalization.             Medications   ceftriaxone (ROCEPHIN) 1 g/50 mL in dextrose IVPB (1,000 mg Intravenous New Bag 7/8/25 4172)   acetaminophen (TYLENOL) tablet 650 mg (has no administration in time range)   ondansetron (ZOFRAN) injection 4 mg (has no administration in time range)   enoxaparin (LOVENOX) subcutaneous injection 40 mg (has no administration in time range)       ED Risk Strat Scores                    No data recorded                            History of Present Illness       Chief Complaint   Patient presents with    Post-op Problem     Patient reports surgery on June 23rd. Now surgical sites are painful and weeping. Patient right hand also swollen and warm where IV site was.        Past Medical History[1]   Past Surgical History[2]   Family History[3]   Social History[4]   E-Cigarette/Vaping    E-Cigarette Use Never User       E-Cigarette/Vaping Substances    Nicotine No     THC No     CBD No     Flavoring No     Other No     Unknown No       I have reviewed and agree with the history as documented.      Patient is a 89-year-old female with past medical history of acute limb ischemia requiring surgery/hospitalization recently discharged presenting for postoperative complication.  Patient has had persistent serous drainage from her right groin incision.  Patient was at rehab facility when wound care nurse noted this and contacted vascular surgery.  Vascular surgeon recommended that patient come in for further evaluation.  Patient states that the other 2 wounds have been doing okay until this point.  She has had some increased pain in the left leg wound.  No other acute concerns at this time.        Review of Systems        Objective       ED Triage Vitals [07/08/25 1242]   Temperature Pulse Blood Pressure Respirations SpO2 Patient Position - Orthostatic VS   98.1 °F (36.7 °C) (!) 106 126/63 18 95 % Lying      Temp Source Heart Rate Source BP Location FiO2 (%) Pain Score    Temporal Monitor Right arm -- No Pain      Vitals      Date and Time Temp Pulse SpO2 Resp BP Pain Score FACES Pain Rating User   07/08/25 1414 -- -- -- -- -- 4 -- TW   07/08/25 1409 -- -- -- -- -- No Pain -- TW   07/08/25 1246 -- -- -- -- -- No Pain -- TW   07/08/25 1242 98.1 °F (36.7 °C) 106 95 % 18 126/63 No Pain -- TW            Physical Exam  Vitals and nursing note reviewed.   Constitutional:       General: She is not in acute distress.     Appearance: She is well-developed. She is not ill-appearing.   HENT:      Head: Normocephalic and atraumatic.     Eyes:      Conjunctiva/sclera: Conjunctivae normal.       Cardiovascular:      Rate and Rhythm: Normal rate and regular rhythm.      Heart sounds: No murmur heard.  Pulmonary:      Effort: Pulmonary effort is normal. No respiratory distress.      Breath sounds: Normal breath sounds.   Abdominal:      Palpations: Abdomen is soft.      Tenderness: There is no abdominal tenderness.     Musculoskeletal:      Cervical back: Neck supple.     Skin:     General: Skin is warm.      Capillary Refill:  Capillary refill takes less than 2 seconds.      Comments: Bilateral lower extremity incisions-left lower extremity incision with staples and mild erythema/swelling/tenderness around it, right lower extremity incision with sutures without erythema/swelling/tenderness.  Right groin wound without signs of infection with copious amounts of straw-colored fluid drainage     Neurological:      Mental Status: She is alert.     Psychiatric:         Mood and Affect: Mood normal.         Results Reviewed       Procedure Component Value Units Date/Time    Platelet count [[id number]]     Lab Status: No result Specimen: Blood             No orders to display       Procedures    ED Medication and Procedure Management   Prior to Admission Medications   Prescriptions Last Dose Informant Patient Reported? Taking?   Cholecalciferol (VITAMIN D3) 1000 units CAPS  Self Yes No   Sig: Take 2,000 Units by mouth in the morning.   Fluticasone-Salmeterol (Advair Diskus) 250-50 mcg/dose inhaler  Self No No   Sig: Inhale 1 puff 2 (two) times a day Rinse mouth after use.   acetaminophen (TYLENOL) 325 mg tablet   No No   Sig: Take 3 tablets (975 mg total) by mouth every 8 (eight) hours   albuterol (2.5 mg/3 mL) 0.083 % nebulizer solution  Self No No   Sig: Take 3 mL (2.5 mg total) by nebulization every 8 (eight) hours   albuterol (Proventil HFA) 90 mcg/act inhaler  Self No No   Sig: Inhale 2 puffs every 6 (six) hours as needed for wheezing   ammonium lactate (LAC-HYDRIN) 12 % lotion  Self No No   Sig: Apply topically 2 (two) times a day as needed for dry skin   apixaban (ELIQUIS) 2.5 mg   No No   Sig: Take 2 tablets (5 mg total) by mouth 2 (two) times a day for 2 days, THEN 1 tablet (2.5 mg total) 2 (two) times a day.   bisacodyl (DULCOLAX) 10 mg suppository   No No   Sig: Insert 1 suppository (10 mg total) into the rectum daily as needed for constipation   bumetanide (BUMEX) 2 mg tablet   No No   Sig: Take 0.5 tablets (1 mg total) by mouth 2  (two) times a day   guaiFENesin (MUCINEX) 600 mg 12 hr tablet   No No   Sig: Take 1 tablet (600 mg total) by mouth every 12 (twelve) hours   levothyroxine 50 mcg tablet  Self No No   Sig: TAKE 1 TABLET DAILY, EXCEPT TAKE 1 AND 1/2 TABLETS ON SUNDAY AND WEDNESDAY   metoprolol tartrate (LOPRESSOR) 25 mg tablet   No No   Sig: Take 0.5 tablets (12.5 mg total) by mouth every 12 (twelve) hours   polyethylene glycol (MIRALAX) 17 g packet   No No   Sig: Take 17 g by mouth daily   senna-docusate sodium (SENOKOT S) 8.6-50 mg per tablet   No No   Sig: Take 1 tablet by mouth 2 (two) times a day   sodium chloride 3 % inhalation solution  Self No No   Sig: Take 4 mL by nebulization 3 (three) times a day      Facility-Administered Medications: None     Patient's Medications   Discharge Prescriptions    No medications on file     No discharge procedures on file.  ED SEPSIS DOCUMENTATION   Time reflects when diagnosis was documented in both MDM as applicable and the Disposition within this note       Time User Action Codes Description Comment    7/8/2025  1:23 PM Ollie Pack Add [T81.9XXA] Post-operative complication                      [1]   Past Medical History:  Diagnosis Date    Abnormal ECG     Abnormal ultrasound     RESOLVED: [id number]    Acute kidney failure (HCC) 12/29/2024    Advice given about COVID-19 virus infection 04/07/2020    Ambulates with cane     Arrhythmia     Arthritis     Asthma     Asthma with acute exacerbation     LAST ASSESSED: [id number]    Asymptomatic menopausal state     Atherosclerosis     Atrial fibrillation (HCC)     Chronic kidney disease     Chronic obstructive lung disease (HCC)     w/ chonic cough    Chronic ulcer of left foot (HCC)     Colon polyp     Congestive heart failure (HCC)     LAST ASSESSED: [id number]    COPD (chronic obstructive pulmonary disease) (HCC)     Coronary artery disease     COVID-19 01/06/2025    COVID-19 virus infection 03/25/2023    Cuboid fracture     LAST ASSESSED:  [id number]    Disease of thyroid gland     Dyspepsia     Edema of both lower extremities     Equinus deformity of left foot     Fall 04/01/2024    Falls     5/2024    GERD (gastroesophageal reflux disease)     Headache(784.0)     High risk medication use     LAST ASSESSED: [id number]    Hyperlipidemia     Hypertension     Hypokalemia     Hypothyroidism     Impacted cerumen of both ears     LAST ASSESSED: 36AMQ4730    Impacted cerumen of right ear     LAST ASSESSED: 85QGU1528    Influenza     LAST ASSESSED: 59TGD3941    IPMN (intraductal papillary mucinous neoplasm)     RESOLVED: 02OCT2015    Irregular heart beat     afib. Warfarin.    Leg cramps 12/21/2018    Limb swelling     LAST ASSESSED: 02CHI6712    Navicular fracture of ankle     LAST ASSESSED: 34JXQ4467    Onychomycosis     LAST ASSESSED: 53KFK0558    Open wound of right upper arm 10/17/2022    Osteoarthritis     Osteopenia     Osteoporosis     Paronychia, finger, unspecified laterality     LAST ASSESSED: 76IIB2873    Paroxysmal atrial fibrillation (HCC)     LAST ASSESSED: 02MAR2014    Peripheral vascular disease (HCC)     Plantar fasciitis     SOBOE (shortness of breath on exertion)     Stroke (HCC)     Right arm weakness that has resolved    Talus fracture     LAST ASSESSED: 25LWD1755    Vaccine counseling 12/19/2023    Vascular headache     Walker as ambulation aid     Wound of right foot    [2]   Past Surgical History:  Procedure Laterality Date    APPENDECTOMY      BONE BIOPSY Left 08/09/2024    Procedure: Bone bx of proximal phalanx second toe & second met with fluoroscopic guidance;  Surgeon: Jonny Marcial DPM;  [facility];  Service: Podiatry    CATARACT EXTRACTION Bilateral     CHOLECYSTECTOMY      COLONOSCOPY      FASCIOTOMY Right 6/24/2025    Procedure: FASCIOTOMY;  Surgeon: Arthur Wei MD;  [facility];  Service: Vascular    JOINT REPLACEMENT Bilateral     knee    NEUROPLASTY / TRANSPOSITION MEDIAN NERVE AT [facility]  TUNNEL BILATERAL Bilateral     DECOMPRESSION    OOPHORECTOMY Bilateral     age 81    PELVIC FLOOR REPAIR  2014    NE BIOPSY BONE TROCAR/NEEDLE SUPERFICIAL Left 08/09/2024    Procedure: Left foot debridement;  Surgeon: Jonny Marcial DPM;  [facility];  Service: Podiatry    NE DEBRIDEMENT MUSCLE &/FASCIA 1ST 20 SQ CM/< Left 08/09/2024    Procedure: Left foot debridement;  Surgeon: Jonny Marcial DPM;  [facility];  Service: Podiatry    NE OSTEOT W/WO LNGTH SHRT/CORRJ 1ST METAR Left 11/29/2024    Procedure: Left foot percutaneous osteotomy of second, third and fourth metatarsal.;  Surgeon: Jonny Marcial DPM;  [facility];  Service: Podiatry    SALPINGECTOMY Bilateral     SINUS SURGERY      THROMBECTOMY W/ EMBOLECTOMY Left 6/24/2025    Procedure: LEFT LOWER EXTREMITY EMBOLECTOMY/THROMBECTOMY;  Surgeon: Adonay Raymond MD;  [facility];  Service: Vascular    THROMBECTOMY W/ EMBOLECTOMY Right 6/24/2025    Procedure: EMBOLECTOMY/THROMBECTOMY LOWER EXTREMITY;  Surgeon: Arthur Wei MD;  [facility];  Service: Vascular    TONSILLECTOMY  1941    TOTAL KNEE ARTHROPLASTY Bilateral    [3]   Family History  Problem Relation Name Age of Onset    Pancreatic cancer Mother Christiana 93    Heart failure Mother Christiana     Cancer Mother Christiana     Hypertension Mother Christiana     Coronary artery disease Family      Breast cancer Family      Uterine cancer Family      Hyperlipidemia Family      Osteoarthritis Family      Ovarian cancer Family      Brain cancer Son      Stroke Father Isaac     Hypertension Father Isaac     No Known Problems Sister      No Known Problems Daughter      No Known Problems Maternal Grandmother      No Known Problems Maternal Grandfather      No Known Problems Paternal Grandmother      No Known Problems Paternal Grandfather      Breast cancer Cousin  50    Breast cancer Cousin  50    Ovarian cancer Other niece 49   [4]    Social History  Tobacco Use    Smoking status: Former     Current packs/day: 0.00     Average packs/day: 0.3 packs/day for 2.0 years (0.5 ttl pk-yrs)     Types: Cigarettes     Start date:      Quit date:      Years since quittin.5    Smokeless tobacco: Never    Tobacco comments:     On and off socially with friends    Vaping Use    Vaping status: Never Used   Substance Use Topics    Alcohol use: Not Currently    Drug use: No        Ollie Pack MD  25 0434

## 2025-07-08 NOTE — ED NOTES
Patient ambulated to bathroom with walker without much assistance at this time.      Briseida Flores RN  07/08/25 1390

## 2025-07-09 ENCOUNTER — ANESTHESIA (INPATIENT)
Dept: PERIOP | Facility: HOSPITAL | Age: OVER 89
DRG: 920 | End: 2025-07-09
Payer: COMMERCIAL

## 2025-07-09 ENCOUNTER — ANESTHESIA EVENT (INPATIENT)
Dept: PERIOP | Facility: HOSPITAL | Age: OVER 89
DRG: 920 | End: 2025-07-09
Payer: COMMERCIAL

## 2025-07-09 PROBLEM — R57.9 SHOCK (HCC): Status: RESOLVED | Noted: 2025-06-26 | Resolved: 2025-07-09

## 2025-07-09 PROBLEM — N76.4 LABIAL ABSCESS: Status: RESOLVED | Noted: 2022-06-29 | Resolved: 2025-07-09

## 2025-07-09 PROBLEM — L97.522 CHRONIC FOOT ULCER, LEFT, WITH FAT LAYER EXPOSED (HCC): Status: RESOLVED | Noted: 2024-05-21 | Resolved: 2025-07-09

## 2025-07-09 PROBLEM — M79.642 PAIN OF LEFT HAND: Status: RESOLVED | Noted: 2018-12-21 | Resolved: 2025-07-09

## 2025-07-09 PROBLEM — N90.7 VULVAR CYSTS: Status: RESOLVED | Noted: 2022-06-29 | Resolved: 2025-07-09

## 2025-07-09 PROBLEM — E87.1 HYPONATREMIA: Status: RESOLVED | Noted: 2024-12-29 | Resolved: 2025-07-09

## 2025-07-09 LAB
ANION GAP SERPL CALCULATED.3IONS-SCNC: 7 MMOL/L (ref 4–13)
APTT PPP: 38 SECONDS (ref 23–34)
BASOPHILS # BLD AUTO: 0.03 THOUSANDS/ÂΜL (ref 0–0.1)
BASOPHILS NFR BLD AUTO: 0 % (ref 0–1)
BUN SERPL-MCNC: 35 MG/DL (ref 5–25)
CALCIUM SERPL-MCNC: 7.2 MG/DL (ref 8.4–10.2)
CHLORIDE SERPL-SCNC: 101 MMOL/L (ref 96–108)
CO2 SERPL-SCNC: 27 MMOL/L (ref 21–32)
CREAT SERPL-MCNC: 1.12 MG/DL (ref 0.6–1.3)
EOSINOPHIL # BLD AUTO: 0.18 THOUSAND/ÂΜL (ref 0–0.61)
EOSINOPHIL NFR BLD AUTO: 2 % (ref 0–6)
ERYTHROCYTE [DISTWIDTH] IN BLOOD BY AUTOMATED COUNT: 19.4 % (ref 11.6–15.1)
GFR SERPL CREATININE-BSD FRML MDRD: 42 ML/MIN/1.73SQ M
GLUCOSE SERPL-MCNC: 82 MG/DL (ref 65–140)
HCT VFR BLD AUTO: 28.2 % (ref 34.8–46.1)
HGB BLD-MCNC: 9.1 G/DL (ref 11.5–15.4)
IMM GRANULOCYTES # BLD AUTO: 0.07 THOUSAND/UL (ref 0–0.2)
IMM GRANULOCYTES NFR BLD AUTO: 1 % (ref 0–2)
INR PPP: 1.36 (ref 0.85–1.19)
LYMPHOCYTES # BLD AUTO: 2.01 THOUSANDS/ÂΜL (ref 0.6–4.47)
LYMPHOCYTES NFR BLD AUTO: 20 % (ref 14–44)
MCH RBC QN AUTO: 33.1 PG (ref 26.8–34.3)
MCHC RBC AUTO-ENTMCNC: 32.3 G/DL (ref 31.4–37.4)
MCV RBC AUTO: 103 FL (ref 82–98)
MONOCYTES # BLD AUTO: 0.87 THOUSAND/ÂΜL (ref 0.17–1.22)
MONOCYTES NFR BLD AUTO: 9 % (ref 4–12)
NEUTROPHILS # BLD AUTO: 6.74 THOUSANDS/ÂΜL (ref 1.85–7.62)
NEUTS SEG NFR BLD AUTO: 68 % (ref 43–75)
NRBC BLD AUTO-RTO: 0 /100 WBCS
PLATELET # BLD AUTO: 343 THOUSANDS/UL (ref 149–390)
PMV BLD AUTO: 8.6 FL (ref 8.9–12.7)
POTASSIUM SERPL-SCNC: 3.6 MMOL/L (ref 3.5–5.3)
PROTHROMBIN TIME: 17 SECONDS (ref 12.3–15)
RBC # BLD AUTO: 2.75 MILLION/UL (ref 3.81–5.12)
SODIUM SERPL-SCNC: 135 MMOL/L (ref 135–147)
WBC # BLD AUTO: 9.9 THOUSAND/UL (ref 4.31–10.16)

## 2025-07-09 PROCEDURE — 94760 N-INVAS EAR/PLS OXIMETRY 1: CPT

## 2025-07-09 PROCEDURE — 11043 DBRDMT MUSC&/FSCA 1ST 20/<: CPT | Performed by: SURGERY

## 2025-07-09 PROCEDURE — 87075 CULTR BACTERIA EXCEPT BLOOD: CPT | Performed by: SURGERY

## 2025-07-09 PROCEDURE — 2W26X4Z DRESSING OF RIGHT INGUINAL REGION USING BANDAGE: ICD-10-PCS | Performed by: SURGERY

## 2025-07-09 PROCEDURE — 99232 SBSQ HOSP IP/OBS MODERATE 35: CPT | Performed by: STUDENT IN AN ORGANIZED HEALTH CARE EDUCATION/TRAINING PROGRAM

## 2025-07-09 PROCEDURE — 99024 POSTOP FOLLOW-UP VISIT: CPT | Performed by: SURGERY

## 2025-07-09 PROCEDURE — 97605 NEG PRS WND THER DME<=50SQCM: CPT | Performed by: SURGERY

## 2025-07-09 PROCEDURE — 85025 COMPLETE CBC W/AUTO DIFF WBC: CPT

## 2025-07-09 PROCEDURE — 87077 CULTURE AEROBIC IDENTIFY: CPT | Performed by: SURGERY

## 2025-07-09 PROCEDURE — 87070 CULTURE OTHR SPECIMN AEROBIC: CPT | Performed by: SURGERY

## 2025-07-09 PROCEDURE — 94664 DEMO&/EVAL PT USE INHALER: CPT

## 2025-07-09 PROCEDURE — 87186 SC STD MICRODIL/AGAR DIL: CPT | Performed by: SURGERY

## 2025-07-09 PROCEDURE — 94640 AIRWAY INHALATION TREATMENT: CPT

## 2025-07-09 PROCEDURE — 11046 DBRDMT MUSC&/FSCA EA ADDL: CPT | Performed by: SURGERY

## 2025-07-09 PROCEDURE — 85610 PROTHROMBIN TIME: CPT | Performed by: INTERNAL MEDICINE

## 2025-07-09 PROCEDURE — 80048 BASIC METABOLIC PNL TOTAL CA: CPT

## 2025-07-09 PROCEDURE — 0JBL3ZZ EXCISION OF RIGHT UPPER LEG SUBCUTANEOUS TISSUE AND FASCIA, PERCUTANEOUS APPROACH: ICD-10-PCS | Performed by: SURGERY

## 2025-07-09 PROCEDURE — 85730 THROMBOPLASTIN TIME PARTIAL: CPT | Performed by: INTERNAL MEDICINE

## 2025-07-09 PROCEDURE — 87205 SMEAR GRAM STAIN: CPT | Performed by: SURGERY

## 2025-07-09 RX ORDER — ALBUTEROL SULFATE 0.83 MG/ML
2.5 SOLUTION RESPIRATORY (INHALATION) EVERY 6 HOURS PRN
Status: DISCONTINUED | OUTPATIENT
Start: 2025-07-09 | End: 2025-07-15 | Stop reason: HOSPADM

## 2025-07-09 RX ORDER — CEFAZOLIN SODIUM 1 G/50ML
1000 SOLUTION INTRAVENOUS EVERY 8 HOURS
Status: DISCONTINUED | OUTPATIENT
Start: 2025-07-09 | End: 2025-07-10

## 2025-07-09 RX ORDER — ONDANSETRON 2 MG/ML
INJECTION INTRAMUSCULAR; INTRAVENOUS AS NEEDED
Status: DISCONTINUED | OUTPATIENT
Start: 2025-07-09 | End: 2025-07-09

## 2025-07-09 RX ORDER — MAGNESIUM HYDROXIDE 1200 MG/15ML
LIQUID ORAL AS NEEDED
Status: DISCONTINUED | OUTPATIENT
Start: 2025-07-09 | End: 2025-07-09 | Stop reason: HOSPADM

## 2025-07-09 RX ORDER — VANCOMYCIN HYDROCHLORIDE 1 G/200ML
15 INJECTION, SOLUTION INTRAVENOUS EVERY 12 HOURS
Status: DISCONTINUED | OUTPATIENT
Start: 2025-07-09 | End: 2025-07-09

## 2025-07-09 RX ORDER — FENTANYL CITRATE/PF 50 MCG/ML
12.5 SYRINGE (ML) INJECTION
Status: DISCONTINUED | OUTPATIENT
Start: 2025-07-09 | End: 2025-07-09 | Stop reason: HOSPADM

## 2025-07-09 RX ORDER — LEVALBUTEROL INHALATION SOLUTION 1.25 MG/3ML
1.25 SOLUTION RESPIRATORY (INHALATION)
Status: DISCONTINUED | OUTPATIENT
Start: 2025-07-09 | End: 2025-07-15 | Stop reason: HOSPADM

## 2025-07-09 RX ORDER — SODIUM CHLORIDE, SODIUM GLUCONATE, SODIUM ACETATE, POTASSIUM CHLORIDE, MAGNESIUM CHLORIDE, SODIUM PHOSPHATE, DIBASIC, AND POTASSIUM PHOSPHATE .53; .5; .37; .037; .03; .012; .00082 G/100ML; G/100ML; G/100ML; G/100ML; G/100ML; G/100ML; G/100ML
500 INJECTION, SOLUTION INTRAVENOUS ONCE
Status: COMPLETED | OUTPATIENT
Start: 2025-07-09 | End: 2025-07-10

## 2025-07-09 RX ORDER — SENNOSIDES 8.6 MG
2 TABLET ORAL
Status: DISCONTINUED | OUTPATIENT
Start: 2025-07-09 | End: 2025-07-15 | Stop reason: HOSPADM

## 2025-07-09 RX ORDER — LIDOCAINE HYDROCHLORIDE 10 MG/ML
INJECTION, SOLUTION EPIDURAL; INFILTRATION; INTRACAUDAL; PERINEURAL AS NEEDED
Status: DISCONTINUED | OUTPATIENT
Start: 2025-07-09 | End: 2025-07-09

## 2025-07-09 RX ORDER — ONDANSETRON 2 MG/ML
4 INJECTION INTRAMUSCULAR; INTRAVENOUS ONCE AS NEEDED
Status: DISCONTINUED | OUTPATIENT
Start: 2025-07-09 | End: 2025-07-09 | Stop reason: HOSPADM

## 2025-07-09 RX ORDER — DOCUSATE SODIUM 100 MG/1
100 CAPSULE, LIQUID FILLED ORAL 2 TIMES DAILY
Status: DISCONTINUED | OUTPATIENT
Start: 2025-07-09 | End: 2025-07-15 | Stop reason: HOSPADM

## 2025-07-09 RX ORDER — VANCOMYCIN HYDROCHLORIDE 1 G/200ML
1000 INJECTION, SOLUTION INTRAVENOUS ONCE
Status: DISCONTINUED | OUTPATIENT
Start: 2025-07-09 | End: 2025-07-09

## 2025-07-09 RX ORDER — CEFOXITIN SODIUM 2 G/50ML
INJECTION, SOLUTION INTRAVENOUS AS NEEDED
Status: DISCONTINUED | OUTPATIENT
Start: 2025-07-09 | End: 2025-07-09

## 2025-07-09 RX ORDER — PROPOFOL 10 MG/ML
INJECTION, EMULSION INTRAVENOUS AS NEEDED
Status: DISCONTINUED | OUTPATIENT
Start: 2025-07-09 | End: 2025-07-09

## 2025-07-09 RX ORDER — DOCUSATE SODIUM 100 MG/1
100 CAPSULE, LIQUID FILLED ORAL 2 TIMES DAILY
Status: DISCONTINUED | OUTPATIENT
Start: 2025-07-09 | End: 2025-07-09

## 2025-07-09 RX ORDER — SODIUM CHLORIDE, SODIUM LACTATE, POTASSIUM CHLORIDE, CALCIUM CHLORIDE 600; 310; 30; 20 MG/100ML; MG/100ML; MG/100ML; MG/100ML
INJECTION, SOLUTION INTRAVENOUS CONTINUOUS PRN
Status: DISCONTINUED | OUTPATIENT
Start: 2025-07-09 | End: 2025-07-09

## 2025-07-09 RX ORDER — FENTANYL CITRATE 50 UG/ML
INJECTION, SOLUTION INTRAMUSCULAR; INTRAVENOUS AS NEEDED
Status: DISCONTINUED | OUTPATIENT
Start: 2025-07-09 | End: 2025-07-09

## 2025-07-09 RX ADMIN — FENTANYL CITRATE 37.5 MCG: 50 INJECTION INTRAMUSCULAR; INTRAVENOUS at 10:02

## 2025-07-09 RX ADMIN — Medication 12.5 MG: at 11:48

## 2025-07-09 RX ADMIN — FLUTICASONE FUROATE AND VILANTEROL TRIFENATATE 1 PUFF: 200; 25 POWDER RESPIRATORY (INHALATION) at 11:48

## 2025-07-09 RX ADMIN — PROPOFOL 40 MG: 10 INJECTION, EMULSION INTRAVENOUS at 09:43

## 2025-07-09 RX ADMIN — OXYCODONE HYDROCHLORIDE 5 MG: 5 TABLET ORAL at 12:13

## 2025-07-09 RX ADMIN — ONDANSETRON 4 MG: 2 INJECTION INTRAMUSCULAR; INTRAVENOUS at 09:43

## 2025-07-09 RX ADMIN — LEVALBUTEROL HYDROCHLORIDE 1.25 MG: 1.25 SOLUTION RESPIRATORY (INHALATION) at 19:59

## 2025-07-09 RX ADMIN — DOCUSATE SODIUM 100 MG: 100 CAPSULE, LIQUID FILLED ORAL at 16:51

## 2025-07-09 RX ADMIN — LEVALBUTEROL HYDROCHLORIDE 1.25 MG: 1.25 SOLUTION RESPIRATORY (INHALATION) at 08:34

## 2025-07-09 RX ADMIN — CEFOXITIN SODIUM 2000 MG: 2 INJECTION, SOLUTION INTRAVENOUS at 09:42

## 2025-07-09 RX ADMIN — FENTANYL CITRATE 12.5 MCG: 50 INJECTION INTRAMUSCULAR; INTRAVENOUS at 09:48

## 2025-07-09 RX ADMIN — APIXABAN 2.5 MG: 2.5 TABLET, FILM COATED ORAL at 17:12

## 2025-07-09 RX ADMIN — PROPOFOL 10 MG: 10 INJECTION, EMULSION INTRAVENOUS at 09:44

## 2025-07-09 RX ADMIN — CEFAZOLIN SODIUM 1000 MG: 1 SOLUTION INTRAVENOUS at 17:12

## 2025-07-09 RX ADMIN — PROPOFOL 10 MG: 10 INJECTION, EMULSION INTRAVENOUS at 10:02

## 2025-07-09 RX ADMIN — LEVOTHYROXINE SODIUM 50 MCG: 0.05 TABLET ORAL at 05:47

## 2025-07-09 RX ADMIN — OXYCODONE HYDROCHLORIDE 5 MG: 5 TABLET ORAL at 21:58

## 2025-07-09 RX ADMIN — BUMETANIDE 1 MG: 1 TABLET ORAL at 17:12

## 2025-07-09 RX ADMIN — SODIUM CHLORIDE, SODIUM GLUCONATE, SODIUM ACETATE, POTASSIUM CHLORIDE, MAGNESIUM CHLORIDE, SODIUM PHOSPHATE, DIBASIC, AND POTASSIUM PHOSPHATE 500 ML: .53; .5; .37; .037; .03; .012; .00082 INJECTION, SOLUTION INTRAVENOUS at 23:23

## 2025-07-09 RX ADMIN — SODIUM CHLORIDE, SODIUM LACTATE, POTASSIUM CHLORIDE, AND CALCIUM CHLORIDE: .6; .31; .03; .02 INJECTION, SOLUTION INTRAVENOUS at 09:33

## 2025-07-09 RX ADMIN — LIDOCAINE HYDROCHLORIDE 40 MG: 10 INJECTION, SOLUTION EPIDURAL; INFILTRATION; INTRACAUDAL; PERINEURAL at 09:43

## 2025-07-09 RX ADMIN — Medication 12.5 MG: at 21:12

## 2025-07-09 RX ADMIN — ACETAMINOPHEN 650 MG: 325 TABLET ORAL at 12:13

## 2025-07-09 RX ADMIN — LEVALBUTEROL HYDROCHLORIDE 1.25 MG: 1.25 SOLUTION RESPIRATORY (INHALATION) at 13:11

## 2025-07-09 NOTE — ANESTHESIA PREPROCEDURE EVALUATION
Procedure:  Right groin washout, possible vac placement (Right: Leg Upper)    Relevant Problems   CARDIO   (+) Acute lower limb ischemia   (+) Atherosclerosis   (+) Bifascicular bundle branch block   (+) Other atherosclerosis of native arteries of extremities, bilateral legs (HCC)   (+) Permanent atrial fibrillation (HCC)   (+) Primary hypertension      ENDO   (+) Hypothyroidism   (+) Secondary hyperparathyroidism (HCC)      GI/HEPATIC   (+) Esophageal reflux   (+) Pancreatic cyst      /RENAL   (+) DARIO (acute kidney injury) (HCC)   (+) CKD (chronic kidney disease) stage 4, GFR 15-29 ml/min (HCC)      HEMATOLOGY   (+) Anemia      MUSCULOSKELETAL   (+) Arthritis   (+) Backache   (+) Chronic midline low back pain without sciatica   (+) Chronic thoracic back pain      NEURO/PSYCH   (+) Chronic midline low back pain without sciatica   (+) Chronic thoracic back pain      PULMONARY   (+) Asthma      ECHO 6/25/2025:    Left Ventricle: Left ventricular cavity size is small. Wall thickness is severely increased. The left ventricular ejection fraction is 65%. Systolic function is vigorous. Although no diagnostic regional wall motion abnormality was identified, this possibility cannot be completely excluded on the basis of this study. Unable to assess diastolic function due to atrial fibrillation.    Right Ventricle: Systolic function is mildly reduced.    Left Atrium: The atrium is severely dilated (>48 mL/m2).    Aortic Valve: The aortic valve is trileaflet. The leaflets are moderately thickened. The leaflets are moderately calcified. There is aortic valve sclerosis.    Tricuspid Valve: There is mild to moderate regurgitation. The right ventricular systolic pressure is moderately elevated. The estimated right ventricular systolic pressure is 50.00 mmHg.    Pericardium: There is a left pleural effusion.       Physical Exam    Airway     Mallampati score: II  TM Distance: <3 FB  Neck ROM: full  Mouth opening: >= 4  cm      Cardiovascular      Dental   No notable dental hx     Pulmonary      Neurological      Other Findings  post-pubertal.      Anesthesia Plan  ASA Score- 3     Anesthesia Type- general with ASA Monitors.         Additional Monitors:     Airway Plan: LMA, Oral ETT and LMA.           Plan Factors-Exercise tolerance (METS): <4 METS.    Chart reviewed.  Imaging results reviewed. Existing labs reviewed. Patient summary reviewed.                  Induction-     Postoperative Plan- .   Monitoring Plan - Monitoring plan - standard ASA monitoring      Perioperative Resuscitation Plan - Level 1 - Full Code.       Informed Consent- Anesthetic plan and risks discussed with patient.  I personally reviewed this patient with the CRNA. Discussed and agreed on the Anesthesia Plan with the CRNA..      NPO Status:  Vitals Value Taken Time   Date of last liquid 07/08/25 07/08/25 22:00   Time of last liquid 2359 07/08/25 22:00   Date of last solid     Time of last solid

## 2025-07-09 NOTE — PROGRESS NOTES
Progress Note - Vascular Surgery   Name: Rebeca Banegas 89 y.o. female I MRN: [id number]  Unit/Bed#: [facility] 511-01 I Date of Admission: 7/8/2025   Date of Service: 7/9/2025 I Hospital Day: 1    Assessment & Plan  Wound disruption, post-op, skin, initial encounter  89 y.o. female with PMH of HFpEF, permanent A-fib on Eliquis, history of CVA, bronchiectasis recently admitted with acute lower limb ischemia. She underwent emergent right leg embolectomy/lobectomy, fasciotomy and left popliteal thrombectomy on 6/24/2025 by our team, now with copious serous drainage from right groin wound.    Plan  NPO   Hold anticoagulation (Eliquis)   OR today, 7/9, for right groin washout and wound VAC placement  Remainder of care per medicine    24 Hour Events : none  Subjective :   NAEON. AVSS. Patient reports pain is controlled. Tolerating diet. Voiding spontaneously. Still reporting some bilateral lower leg discomfort. Denies fever, chills, nausea, vomiting.     Objective :  Temp:  [97.7 °F (36.5 °C)-98.2 °F (36.8 °C)] 97.7 °F (36.5 °C)  HR:  [103-111] 111  BP: (109-127)/(63-81) 127/81  Resp:  [17-20] 17  SpO2:  [95 %-100 %] 98 %  O2 Device: None (Room air)     I/O         07/07 0701 07/08 0700 07/08 0701  07/09 0700 07/09 0701  07/10 0700    P.O.  0     Total Intake(mL/kg)  0 (0)     Urine (mL/kg/hr)   300 (6.4)    Total Output   300    Net  0 -300           Unmeasured Urine Occurrence  1 x             Physical Exam  General: NAD  HENT: NCAT MMM  Neck: supple, no JVD  CV: nl rate  Lungs: nl wob. No resp distress  ABD: Soft, nontender, nondistended. R groin w/ saturated dressing that was changed.  Extrem: No CCE. Bilateral LE wrapped. Bilateral DP/PT signals   Neuro: AAOx3        Lab Results: I have reviewed the following results:  CBC with diff:   Lab Results   Component Value Date    WBC 9.90 07/09/2025    HGB 9.1 (L) 07/09/2025    HCT 28.2 (L) 07/09/2025     (H) 07/09/2025     07/09/2025    RBC 2.75 (L) 07/09/2025  "   MCH 33.1 07/09/2025    MCHC 32.3 07/09/2025    RDW 19.4 (H) 07/09/2025    MPV 8.6 (L) 07/09/2025    NRBC 0 07/09/2025   ,   BMP/CMP:  Lab Results   Component Value Date    SODIUM 135 07/09/2025    SODIUM 137 07/08/2025    K 3.6 07/09/2025    K 3.8 07/08/2025     07/09/2025     07/08/2025    CO2 27 07/09/2025    CO2 24 07/08/2025    BUN 35 (H) 07/09/2025    BUN 37 (H) 07/08/2025    CREATININE 1.12 07/09/2025    CREATININE 1.24 (H) 07/08/2025    GLUCOSE 139 06/24/2025    CALCIUM 7.2 (L) 07/09/2025    CALCIUM 7.4 (L) 07/08/2025    AST 16 07/08/2025    ALT 20 07/08/2025    ALKPHOS 60 07/08/2025    PROT 6.4 10/17/2017    BILITOT 0.5 10/17/2017    EGFR 42 07/09/2025    EGFR 41 (L) 07/08/2025    EGFR 35 (L) 12/29/2020   ,   Lipid Panel:   Lab Results   Component Value Date    CHOL 205 (H) 02/07/2017   ,   Coags:   Lab Results   Component Value Date    PT 23.7 (H) 02/23/2024    PTT 38 (H) 07/09/2025    INR 1.36 (H) 07/09/2025    INR 2.2 02/23/2024   ,   Blood Culture: No results found for: \"BLOODCX\",   Urinalysis:   Lab Results   Component Value Date    COLORU Light Yellow 12/29/2024    CLARITYU Clear 12/29/2024    SPECGRAV 1.011 12/29/2024    PHUR 5.0 12/29/2024    LEUKOCYTESUR Negative 12/29/2024    NITRITE Negative 12/29/2024    GLUCOSEU Negative 12/29/2024    KETONESU Negative 12/29/2024    BILIRUBINUR Negative 12/29/2024    BLOODU Negative 12/29/2024   ,   Urine Culture: No results found for: \"URINECX\",   Wound Culure:   Lab Results   Component Value Date    WOUNDCULT Few Colonies of Proteus mirabilis (A) 09/04/2024    WOUNDCULT Few Colonies of Acinetobacter baumannii (A) 09/04/2024       VTE Prophylaxis: VTE covered by:  [Held by provider] apixaban, Oral  enoxaparin, Subcutaneous       Shivani Whittington MD  General Surgery Resident    "

## 2025-07-09 NOTE — ASSESSMENT & PLAN NOTE
Left heel  Purple, nonblanchable, with no obvious sign of infection  Local wound care with Skin-Prep  Continue to offload  Order open heels shoes  Heel boots when in bed

## 2025-07-09 NOTE — OP NOTE
OPERATIVE REPORT - Podiatry  PATIENT NAME: Rebeca Banegas    :  1932  MRN: [id number]  Pt Location: [facility] ROOM 12    SURGERY DATE: 2025    Surgeons and Role:     * Faizan Gomes DO - Primary     * Santana Driver DPM - Assisting    Pre-op Diagnosis:  Post-operative complication [T81.9XXA]    Post-Op Diagnosis Codes:     * Post-operative complication [T81.9XXA]    Procedure(s) (LRB):  Right groin washout, possible vac placement (Right)    Specimen(s):  ID Type Source Tests Collected by Time Destination   A : right groin Tissue Groin ANAEROBIC CULTURE AND GRAM STAIN, CULTURE, TISSUE AND GRAM STAIN Faizan Gomes DO 2025 1005        Estimated Blood Loss:   Minimal    Drains:  None    Hemostasis:  Direct compression, electrocautery    Materials:  1 singular black vac sponge with exuderm surrounding the surgical site and vac draping    Injectables:  None    Operative Findings:  1) Right groin washout, culture and application of wound vac dressing  2) Wound debridement note: Wound located at right groin was excisionally debrided with sharp curettage/scissors of all nonviable, devitalized tissue w/ excision of absorbable suture, subcutaneous tissue and fascia to depth of  Deep fascia. Post debridement wound measurements 6 x 5 x 3cm, with appearance of wound with fresh bleeding viable tissue with 100% of the wound base debrided.  3) Wound VAC application  1. Number of sponges: 1  2. Pressure settin continuous  3. Size of wound: 6 cm x 5 cm x 3 cm  4. Description of wound: fibrogranular     Complications:   None    Procedure and Technique:     Under mild sedation, the patient was brought into the operating room and placed on the OR bed in the supine position. IV sedation was achieved by anesthesia team and a universal timeout was performed where all parties are in agreement of correct patient, correct procedure and correct site. The right groin, abdomen and thigh were prepped with betadine solution  "and draped with sterile draping.     Following a procedural timeout, attention was directed to the right groin where a dehisced surgical site wound was noted with serous exudate. Subcutaneous vicryl was noted and they were removed using sterile scissors. The wound base was examined to be fibro-granular in nature without any hematoma and minimal sign of purulent drainage. There was no active bleeding noted. Cultures of the exudate were taken. The surgical site was fully debrided of all non-viable tissue sharply through use of curettes and scissors. Subcutaneous and superficial fascia tissue were removed sharply down to the level of deep fascia. There were no deep arterial or venous structures noted in the wound base. Electrocautery was used for hemostasis. Extensive irrigation was done with sterile saline. Post-debridement measurements of 6cm x 5 cm x 3 cm. The surgical site was cleansed and dried. The decision was made to apply a wound vac to the surgical site and the [place]-wound was prepped with Exudern and a singular black foam piece was cut to the size and depth of the wound. Vac dressing was applied and excellent suction was noted to be achieved through the use of a wound vac.    The patient tolerated the procedure and anesthesia well without immediate complications and transferred to PACU with vital signs stable.     Dr. Gomes was present during the entire procedure and participated in all key aspects.    SIGNATURE: Santana Driver DPM  DATE: July 9, 2025  TIME: 10:20 AM      Portions of the record may have been created with voice recognition software. Occasional wrong word or \"sound a like\" substitutions may have occurred due to the inherent limitations of voice recognition software. Read the chart carefully and recognize, using context, where substitutions have occurred.    "

## 2025-07-09 NOTE — PROGRESS NOTES
Patient:    MRN:  [id number]    [facility] Request ID:  [id number]    Level of care reserved:  Skilled Nursing Facility    Partner Reserved:  [facility] [place], [place] [address] [phone]    Clinical needs requested:    Geography searched:  10 miles around [id number]    Start of Service:    Request sent:  8:59am EDT on 7/9/2025 by Alba Meter    Partner reserved:  1:18pm EDT on 7/9/2025 by Alba Meter    Choice list shared:  1:16pm EDT on 7/9/2025 by Alba Meter

## 2025-07-09 NOTE — ASSESSMENT & PLAN NOTE
Possible surgical site infection  Patient to go for washout and wound VAC placement with vascular surgery tomorrow 7/9/2025  N.p.o. past midnight  Multimodal pain control  IV fluid hydration

## 2025-07-09 NOTE — WOUND OSTOMY CARE
Consult Note - Wound   Rebeca Banegas 89 y.o. female MRN: [id number]  Unit/Bed#: [facility] 511-01 Encounter: [id number]        Assessment :   Patient admitted to \[facility]\"" due to wound disruption. Wound care nurse consulted for heel injury. Patient is agreeable to assessment, alert and oriented x4, continent of bowel and bladder, assist of 1 with walker, is an assist with care. Vascular surgery managing right groin wound VAC and healing incisions to bilateral lower extremity.     1. Bilateral sacrum, buttock, hips, and elbows- Skin is dry, intact, blanchable.    2. Pressure injury right heel, evolving DTI-POA- Wounds are oval in shape, approx. 20% non-blanchable purple intact skin and 80% non-blanchable pink tissue, with scant amount of serosanguineous drainage noted. Susu-wound is dry, intact, blanchable. This wound has the potential to evolve to a full thickness injury, stage 3 or 4.    3. Pressure injury left heel, unstageable-POA- Wound is oval in shape, approx. 20% brown adhered tissue and 80% pink partial thickness tissue, with scant amount of serosanguineous drainage noted. Susu-wound is dry, intact, blanchable.     Educated patient on importance of frequent offloading of pressure via turning, repositioning, and weight-shifting. Verbalized understanding of plan of care.    No induration, fluctuance, odor, warmth, redness, or purulence noted to the above noted wounds. New dressings applied. Patient tolerated well, reports mild to moderate pain to the wounds. See flow sheets for more detailed assessment findings. Will follow along.    Skin care plans:  1-Hydraguard/Silicone Cream to bilateral sacrum, buttock BID and PRN  2-Elevate heels to offload pressure.  3-Ehob cushion in chair when out of bed.  4-Moisturize skin daily with skin nourishing cream.  5-Turn/reposition q2h for pressure re-distribution on skin.   6-B/L heel wounds- Cleanse with NSS, pat dry. Apply single layer of Xeroform over wound bed and cover with  silicone foam dressing. Change every other day and as needed for soilage/displacement.       Wound 07/04/25 Pressure Injury Heel Left (Active)   Wound Image   07/09/25 1502   Wound Description Brown;Pink 07/09/25 1502   Pressure Injury Stage U 07/09/25 1502   Non-staged Wound Description Partial thickness 07/09/25 1502   Wound Length (cm) 1 cm 07/09/25 1502   Wound Width (cm) 1 cm 07/09/25 1502   Wound Depth (cm) 0.1 cm 07/09/25 1502   Wound Surface Area (cm^2) 0.79 cm^2 07/09/25 1502   Wound Volume (cm^3) 0.052 cm^3 07/09/25 1502   Calculated Wound Volume (cm^3) 0.1 cm^3 07/09/25 1502   Drainage Amount None 07/09/25 1502   Susu-wound Assessment Dry;Intact 07/09/25 1502   Treatments Cleansed;Site care 07/09/25 1502   Dressing Dermagran gauze;Foam, Silicon (eg. Allevyn, etc) 07/09/25 1502   Wound packed? No 07/09/25 1502   Dressing Changed Changed 07/09/25 1502   Patient Tolerance Tolerated well 07/09/25 1502   Dressing Status Clean;Dry;Intact 07/09/25 1502       Wound 07/08/25 Pressure Injury Heel Right (Active)   Wound Image   07/09/25 1503   Wound Description Beefy red;Pink;Light purple 07/09/25 1503   Pressure Injury Stage DTPI 07/09/25 1503   Non-staged Wound Description Partial thickness 07/09/25 1503   Wound Length (cm) 2 cm 07/09/25 1503   Wound Width (cm) 1 cm 07/09/25 1503   Wound Depth (cm) 0.2 cm 07/09/25 1503   Wound Surface Area (cm^2) 1.57 cm^2 07/09/25 1503   Wound Volume (cm^3) 0.209 cm^3 07/09/25 1503   Calculated Wound Volume (cm^3) 0.4 cm^3 07/09/25 1503   Drainage Amount Scant 07/09/25 1503   Drainage Description Serosanguineous 07/09/25 1503   Susu-wound Assessment Dry;Intact 07/09/25 1503   Treatments Cleansed;Site care 07/09/25 1503   Dressing Dermagran gauze;Foam, Silicon (eg. Allevyn, etc) 07/09/25 1503   Wound packed? No 07/09/25 1503   Dressing Changed Changed 07/09/25 1503   Patient Tolerance Tolerated well 07/09/25 1503   Dressing Status Clean;Dry;Intact 07/09/25 1503     Contact through  Epic Secure Chat with any questions  Wound Care will continue to follow while inpatient    Ingrid GONZALEZN RN CWON  Wound and Ostomy care

## 2025-07-09 NOTE — ASSESSMENT & PLAN NOTE
With recent hospitalization for right leg pain was found to have right femoral acute arterial occlusion, s/p emergent right LE embolectomy and fasciotomy and left popliteal thromboembolectomy on 6/24/25, bedside closure bilateral lower extremity fasciotomies on 6/29  Continue Eliquis 2.5 mg twice daily  Follow-up with vascular surgery on 7/22/2025

## 2025-07-09 NOTE — ASSESSMENT & PLAN NOTE
Patient was admitted on June 24, 2025 at [facility] for acute right leg pain due to a right femoral acute arterial occlusion, leading to an emergent embolectomy/thrombectomy and fasciotomy on June 24, 2025]    Assessment and plan:  The surgical incision on the right lower leg is not approximated, with small dehisced area, positive erythema on the periwound area  The surgical incision on the left lower leg is approximated, stable, positive erythema, possible infection  Right groin: Large amount of serous drainage  Wound culture specimen from right groin wound was sent  Local wound care with silver alginate for wound on the bilateral lower legs and right groin  Sent Tiger text to patient's surgeon ( Dr. Adonay Raymond), provided update.  Recommended to send patient to emergency room for washout.  Dr. Gomez, Senior care physician was made aware.  Follow-up next week if patient is back in the facility

## 2025-07-09 NOTE — ASSESSMENT & PLAN NOTE
Recent emergent right leg embolectomy/lobectomy, fasciotomy and left popliteal thrombectomy on 6/24/2025 by vascular surgery complicated by right groin wound infection  S/p right groin washout with VAC placement  Continue IV cefazolin  Follow wound culture  Analgesia as needed  PT/OT

## 2025-07-09 NOTE — UTILIZATION REVIEW
Initial Clinical Review    Admission: Date/Time/Statement:   Admission Orders (From admission, onward)       Ordered        07/08/25 1526  INPATIENT ADMISSION  Once                          Orders Placed This Encounter   Procedures    INPATIENT ADMISSION     Standing Status:   Standing     Number of Occurrences:   1     Level of Care:   Med Surg [16]     Estimated length of stay:   More than 2 Midnights     Certification:   I certify that inpatient services are medically necessary for this patient for a duration of greater than two midnights. See H&P and MD Progress Notes for additional information about the patient's course of treatment.     ED Arrival Information       Expected   -    Arrival   7/8/2025 12:29    Acuity   Urgent              Means of arrival   Ambulance    Escorted by   [facility]    Service   Hospitalist    Admission type   Emergency              Arrival complaint   wound check             Chief Complaint   Patient presents with    Post-op Problem     Patient reports surgery on June 23rd. Now surgical sites are painful and weeping. Patient right hand also swollen and warm where IV site was.        Initial Presentation: 89 y.o. female with a PMH of HFpEF, permanent A-fib on Eliquis, history of CVA, bronchiectasis, asthma, hypertension, hypothyroidism presented to the ED from the nursing home via EMS w/ drainage from right groin wound status post surgery.   Pt was recently admitted to the hospital for acute lower limb ischemia and underwent emergent right leg embolectomy/fasciotomy and left popliteal thrombectomy. She was dc'd to SNF. Noted today w/ increasing serous drainage from right groin wound. Reports significant pain from her surgery.   In the ED, . On exam, Right groin wound with with mild surrounding erythema and serous drainage. Dressing saturated.   Given IV CTX.    Admit as Inpatient for evaluation and treatment of wound disruption, post op.  Plan: Vasc Surg consulted.  Plan for  washout and wound VAC placement with vascular surgery tomorrow 2025.   N.p.o. past midnight. Multimodal pain control. IV fluid hydration    Anticipated Length of Stay/Certification Statement: Patient will be admitted on an inpatient basis with an anticipated length of stay of greater than 2 midnights secondary to surgical site infection.     Vasc Surg Consult: Pt underwent emergent right leg embolectomy/lobectomy, fasciotomy and left popliteal thrombectomy on 2025, now with copious serous drainage from right groin wound.   Plan: NPO at MN. Hold Eliquis. AM labs. Plan for OR tomorrow, , for right groin washout and wound VAC placement     Date:    Day 2:   Vasc Surg Notes: Pt reports pain is controlled. Tolerating diet. Voiding spontaneously. Still reporting some bilateral lower leg discomfort.   Plan: NPO. Cont to hold Eliquis. OR today, , for right groin washout and wound VAC placement     OP Note:  SURGERY DATE: 2025   Procedure(s) (LRB): Right groin washout, possible vac placement (Right)  Operative Findings:  1) Right groin washout, culture and application of wound vac dressing  2) Wound debridement note: Wound located at right groin was excisionally debrided with sharp curettage/scissors of all nonviable, devitalized tissue w/ excision of absorbable suture, subcutaneous tissue and fascia to depth of  Deep fascia. Post debridement wound measurements 6 x 5 x 3cm, with appearance of wound with fresh bleeding viable tissue with 100% of the wound base debrided.  3) Wound VAC application  1. Number of sponges: 1  2. Pressure settin continuous  3. Size of wound: 6 cm x 5 cm x 3 cm  4. Description of wound: fibrogranular     Date: 07/10  Day 3: Has surpassed a 2nd midnight with active treatments and services.  Pt reports pain is controlled, just with mild R groin discomfort. Tolerating diet.   Plan: Diet as tolerated. Continue Eliquis. R groin wound VAC change Monday, Wednesday,  Friday. Continue bilateral lower extremity Ace wrapping for light compression    ID Consult: GNR growing in fluid culture post-op   Plan: cont Cefazolin x 10 days total. No indication for further imaging or blood cultures at this time     PT recommending Level 2 rehab, pending OT eval.     ED Treatment-Medication Administration from 07/08/2025 1229 to 07/08/2025 2017         Date/Time Order Dose Route Action     07/08/2025 1651 ceftriaxone (ROCEPHIN) 1 g/50 mL in dextrose IVPB 1,000 mg Intravenous New Bag            Scheduled Medications:  apixaban, 2.5 mg, Oral, BID  bumetanide, 1 mg, Oral, BID  cefazolin, 1,000 mg, Intravenous, Q8H  fluticasone-vilanterol, 1 puff, Inhalation, Daily  levalbuterol, 1.25 mg, Nebulization, TID  levothyroxine, 50 mcg, Oral, Early Morning  metoprolol tartrate, 12.5 mg, Oral, Q12H GARTH  polyethylene glycol, 17 g, Oral, Daily  senna-docusate sodium, 1 tablet, Oral, BID      Continuous IV Infusions: none     PRN Meds:  acetaminophen, 650 mg, Oral, Q6H PRN 07/09 x 1  albuterol, 2.5 mg, Nebulization, Q6H PRN  bisacodyl, 10 mg, Rectal, Daily PRN  morphine injection, 2 mg, Intravenous, Q2H PRN  ondansetron, 4 mg, Intravenous, Q6H PRN  oxyCODONE, 2.5 mg, Oral, Q4H PRN   Or  oxyCODONE, 5 mg, Oral, Q4H PRN 07/08 x 1, 07/09 x 2      ED Triage Vitals [07/08/25 1242]   Temperature Pulse Respirations Blood Pressure SpO2 Pain Score   98.1 °F (36.7 °C) (!) 106 18 126/63 95 % No Pain     Weight (last 2 days)       Date/Time Weight    07/08/25 2100 59.9 (132)            Vital Signs (last 3 days)       Date/Time Temp Pulse Resp BP MAP (mmHg) SpO2 Calculated FIO2 (%) - Nasal Cannula Nasal Cannula O2 Flow Rate (L/min) O2 Device Cardiac (WDL) Patient Position - Orthostatic VS Shanita Coma Scale Score Pain    07/09/25 15:15:37 97.8 °F (36.6 °C) 106 19 91/57 68 96 % -- -- -- -- Lying -- --    07/09/25 1311 -- -- -- -- -- 97 % -- -- -- -- -- -- --    07/09/25 1213 -- -- -- -- -- -- -- -- -- -- -- -- 10 - Worst  Possible Pain    07/09/25 11:49:21 -- 113 16 114/79 91 100 % -- -- -- -- -- -- --    07/09/25 1148 -- 113 -- 114/79 -- -- -- -- -- -- -- -- --    07/09/25 1115 -- 106 18 117/60 81 100 % -- -- -- -- -- -- --    07/09/25 1100 -- 111 24 130/62 89 100 % -- -- -- -- -- -- --    07/09/25 1058 97.5 °F (36.4 °C) 112 16 -- -- 99 % 32 3 L/min Nasal cannula X -- -- 3    07/09/25 1045 -- 108 29 121/66 86 98 % -- -- -- -- -- -- 3    07/09/25 1030 -- 102 21 114/76 91 100 % -- -- -- -- -- -- 3    07/09/25 1025 97.7 °F (36.5 °C) 107 18 116/63 83 97 % 32 3 L/min Nasal cannula X -- -- --    07/09/25 0915 -- -- -- -- -- -- -- -- -- -- -- 15 --    07/09/25 0904 -- -- -- -- -- -- -- -- -- -- -- -- 8    07/09/25 0836 -- -- -- -- -- 100 % -- -- -- -- -- -- --    07/09/25 07:20:17 97.7 °F (36.5 °C) -- 17 127/81 96 -- -- -- -- -- Lying -- --    07/09/25 0335 -- -- -- -- -- -- -- -- -- -- -- -- No Pain    07/09/25 0004 -- 111 -- -- -- 98 % -- -- -- -- -- -- --    07/08/25 22:38:18 97.7 °F (36.5 °C) -- 19 127/76 93 -- -- -- -- -- Lying -- --    07/08/25 22:38:05 97.7 °F (36.5 °C) -- -- 127/76 93 -- -- -- -- -- -- -- --    07/08/25 2232 -- -- -- -- -- -- -- -- -- -- -- -- 8 07/08/25 20:46:36 -- 103 -- 109/65 80 100 % -- -- -- -- -- -- --    07/08/25 20:32:29 98.2 °F (36.8 °C) -- 20 109/68 82 97 % -- -- -- -- -- -- --    07/08/25 2032 -- -- -- -- -- -- -- -- None (Room air) -- -- -- 8 07/08/25 2025 -- -- -- -- -- -- -- -- -- -- -- -- 8 07/08/25 1414 -- -- -- -- -- -- -- -- -- -- -- -- 4    07/08/25 1409 -- -- -- -- -- -- -- -- -- -- -- -- No Pain    07/08/25 1246 -- -- -- -- -- -- -- -- -- -- -- 15 No Pain    07/08/25 1242 98.1 °F (36.7 °C) 106 18 126/63 88 95 % -- -- None (Room air) -- Lying -- No Pain              Pertinent Labs/Diagnostic Test Results:   Radiology:  No orders to display     Cardiology:  No orders to display     GI:  No orders to display           Results from last 7 days   Lab Units 07/09/25  6618 07/08/25  5997    WBC Thousand/uL 9.90  --    HEMOGLOBIN g/dL 9.1*  --    HEMATOCRIT % 28.2*  --    PLATELETS Thousands/uL 343 327   TOTAL NEUT ABS Thousands/µL 6.74  --          Results from last 7 days   Lab Units 07/09/25  0448 07/08/25  0814 07/04/25  0434 07/03/25  0447   SODIUM mmol/L 135 137 136 136   POTASSIUM mmol/L 3.6 3.8 3.8 4.5   CHLORIDE mmol/L 101 101 99 100   CO2 mmol/L 27 24 26 28   ANION GAP mmol/L 7 12* 11 8   BUN mg/dL 35* 37* 41* 43*   CREATININE mg/dL 1.12 1.24* 1.22 1.28   EGFR ml/min/1.73sq m 42 41* 38 36   CALCIUM mg/dL 7.2* 7.4* 6.8* 6.6*     Results from last 7 days   Lab Units 07/08/25  0814   AST U/L 16   ALT U/L 20   ALK PHOS U/L 60   TOTAL PROTEIN g/dL 5.9*   ALBUMIN g/dL 3.7   TOTAL BILIRUBIN mg/dL 2.1*     Results from last 7 days   Lab Units 07/05/25  1120 07/05/25  0610 07/04/25  2046 07/04/25  1615 07/04/25  1100 07/04/25  0729 07/04/25  0612 07/03/25  1558 07/03/25  1035 07/03/25  0625 07/02/25  1625   POC GLUCOSE mg/dl 103 91 108 90 129 111 93 90 97 88 111     Results from last 7 days   Lab Units 07/09/25  0448 07/08/25  0814 07/04/25  0434 07/03/25  0447   GLUCOSE RANDOM mg/dL 82 103* 91 91             Results from last 7 days   Lab Units 07/09/25  0448   PROTIME seconds 17.0*   INR  1.36*   PTT seconds 38*             Results from last 7 days   Lab Units 07/09/25  1005   GRAM STAIN RESULT  1+ Polys*  2+ Gram negative rods*                   Past Medical History[1]  Present on Admission:   Wound disruption, post-op, skin, initial encounter   Asthma   Permanent atrial fibrillation (HCC)   Hypothyroidism   Primary hypertension      Admitting Diagnosis: Post-operative complication [T81.9XXA]  Age/Sex: 89 y.o. female    Network Utilization Review Department  ATTENTION: Please call with any questions or concerns to [phone] and carefully listen to the prompts so that you are directed to the right person. All voicemails are confidential.   For Discharge needs, contact Care Management DC Support  Team at [phone] opt. 2  Send all requests for admission clinical reviews, approved or denied determinations and any other requests to dedicated fax number below belonging to the campus where the patient is receiving treatment. List of dedicated fax numbers for the Facilities:  FACILITY NAME UR FAX NUMBER   ADMISSION DENIALS (Administrative/Medical Necessity) [phone]   DISCHARGE SUPPORT TEAM (NETWORK) [phone]   PARENT CHILD HEALTH (Maternity/NICU/Pediatrics) [phone]   [facility] [phone]   [facility] [phone]   [facility] [phone]   [facility] [phone]   [facility] [phone]   [facility] [phone]   [facility] [phone]   [facility] [phone]   [facility] [phone]   [facility] [phone]   [facility] [phone]   [facility] [phone]              [1]   Past Medical History:  Diagnosis Date    Abnormal ECG     Abnormal ultrasound     RESOLVED: [id number]    Acute kidney failure (HCC) 12/29/2024    Advice given about COVID-19 virus infection 04/07/2020    Ambulates with cane     Arrhythmia     Arthritis     Asthma     Asthma with acute exacerbation     LAST ASSESSED: [id number]    Asymptomatic menopausal state     Atherosclerosis     Atrial fibrillation (HCC)     Chronic kidney disease     Chronic obstructive lung disease (HCC)     w/ chonic cough    Chronic ulcer of left foot (HCC)     Colon polyp     Congestive heart failure (HCC)     LAST ASSESSED: [id number]    COPD (chronic obstructive pulmonary disease) (HCC)     Coronary artery disease     COVID-19 01/06/2025    COVID-19 virus infection 03/25/2023    Cuboid fracture      LAST ASSESSED: [id number]    Disease of thyroid gland     Dyspepsia     Edema of both lower extremities     Equinus deformity of left foot     Fall 04/01/2024    Falls     5/2024    GERD (gastroesophageal reflux disease)     Headache(784.0)     High risk medication use     LAST ASSESSED: [id number]    Hyperlipidemia     Hypertension     Hypokalemia     Hypothyroidism     Impacted cerumen of both ears     LAST ASSESSED: [id number]    Impacted cerumen of right ear     LAST ASSESSED: [id number]    Influenza     LAST ASSESSED: [id number]    IPMN (intraductal papillary mucinous neoplasm)     RESOLVED: 02OCT2015    Irregular heart beat     afib. Warfarin.    Labial abscess 06/29/2022    Leg cramps 12/21/2018    Limb swelling     LAST ASSESSED: [id number]    Navicular fracture of ankle     LAST ASSESSED: [id number]    Onychomycosis     LAST ASSESSED: [id number]    Open wound of right upper arm 10/17/2022    Osteoarthritis     Osteopenia     Osteoporosis     Paronychia, finger, unspecified laterality     LAST ASSESSED: [id number]    Paroxysmal atrial fibrillation (HCC)     LAST ASSESSED: 02MAR2014    Peripheral vascular disease (HCC)     Plantar fasciitis     Shock (HCC) 06/26/2025    SOBOE (shortness of breath on exertion)     Stroke (HCC)     Right arm weakness that has resolved    Talus fracture     LAST ASSESSED: [id number]    Vaccine counseling 12/19/2023    Vascular headache     Walker as ambulation aid     Wound of right foot

## 2025-07-09 NOTE — ANESTHESIA POSTPROCEDURE EVALUATION
Post-Op Assessment Note    CV Status:  Stable  Pain Score: 1    Pain management: satisfactory to patient    Multimodal analgesia used between 6 hours prior to anesthesia start to PACU discharge    Mental Status:  Sleepy   Hydration Status:  Stable   PONV Controlled:  None   Airway Patency:  Patent  Two or more mitigation strategies used for obstructive sleep apnea   Post Op Vitals Reviewed: Yes    No anethesia notable event occurred.    Staff: CRNA           Last Filed PACU Vitals:  Vitals Value Taken Time   Temp 97.5 °F (36.4 °C) 07/09/25 10:58   Pulse 110 07/09/25 10:59   /66 07/09/25 10:45   Resp 62 07/09/25 10:59   SpO2 94 % 07/09/25 10:59   Vitals shown include unfiled device data.    Modified Kendell:     Vitals Value Taken Time   Activity 2 07/09/25 10:58   Respiration 2 07/09/25 10:58   Circulation 2 07/09/25 10:58   Consciousness 2 07/09/25 10:58   Oxygen Saturation 1 07/09/25 10:58     Modified Kendell Score: 9

## 2025-07-09 NOTE — QUICK NOTE
"Post-Op Check - Vascular Surgery   Rebeca Banegas 89 y.o. female MRN: [id number]  Unit/Bed#: [facility] 511-01 Encounter: [id number]    Assessment:  89yoF s/p R groin washout, wound vac placement in setting of incisional site poor wound healing, drainage 7/9/25 (Mireya)    Plan:  - Incentive spirometry  - Diet as tolerated  - PRN analgesia, anti-emetics  - Restarted on Eliquis 2.5mg BID by primary team  - Continue wound vac -125mmHg continuous  - Follow-up wound cultures  - Remainder of care per primary medicine team    Subjective: Patient seen and examined at bedside, comfortable without acute complaints. Questions/concerns answered to her satisfaction    Vitals:  /99   Pulse (!) 107   Temp 97.8 °F (36.6 °C) (Oral)   Resp 19   Ht 5' 4\" (1.626 m)   Wt 59.9 kg (132 lb)   LMP  (LMP Unknown)   SpO2 96%   BMI 22.66 kg/m²     Physical Exam:  General appearance: Alert and oriented in no acute distress  Neurologic: Grossly neurologically intact no noted focal deficit  Neck: supple, symmetrical, trachea midline  Lungs: Some coughing noted, normal work of breathing, no accessory muscle use  Heart: Irregularly irregular rhythm, tachycardic.   Abdomen: Soft, nondistended abdomen. R groin with wound vac with exuderm in place with appropriate seal  Extremities: Bilateral LE with c/d/I dressings in place, Bilateral LE M/S intact    I/Os:  No intake/output data recorded.  I/O this shift:  In: 826 [P.O.:476; I.V.:350]  Out: 500 [Urine:400; Drains:100]    Invasive Lines/Tubes:  Invasive Devices       Peripheral Intravenous Line  Duration             Peripheral IV 07/08/25 Right;Ventral (anterior) Forearm 1 day                    VTE Prophylaxis: Reason for no pharmacologic prophylaxis : Anticoagulated on Eliquis     Joy Jiang PA-C  7/9/2025    "

## 2025-07-09 NOTE — PROGRESS NOTES
Progress Note - Hospitalist   Name: Rebeca Banegas 89 y.o. female I MRN: [id number]  Unit/Bed#: [facility] 511-01 I Date of Admission: 7/8/2025   Date of Service: 7/9/2025 I Hospital Day: 1    Assessment & Plan  Wound disruption, post-op, skin, initial encounter  Recent emergent right leg embolectomy/lobectomy, fasciotomy and left popliteal thrombectomy on 6/24/2025 by vascular surgery complicated by right groin wound infection  S/p right groin washout with VAC placement  Continue IV cefazolin  Follow wound culture  Analgesia as needed  PT/OT  Asthma  Resume nebulizers  No acute respiratory distress  Permanent atrial fibrillation (HCC)  Rate controlled with metoprolol  Cleared for reinitiation of Eliquis by surgery  Primary hypertension  Continue oral hypertensive regimen  BP reviewed and acceptable  Hypothyroidism  Continue Synthroid    VTE Pharmacologic Prophylaxis: VTE Score: 8 High Risk (Score >/= 5) - Pharmacological DVT Prophylaxis Ordered: apixaban (Eliquis). Sequential Compression Devices Ordered.    Mobility:   Basic Mobility Inpatient Raw Score: 20  JH-HLM Goal: 6: Walk 10 steps or more  JH-HLM Achieved: 6: Walk 10 steps or more  JH-HLM Goal achieved. Continue to encourage appropriate mobility.    Patient Centered Rounds: I performed bedside rounds with nursing staff today.   Discussions with Specialists or Other Care Team Provider: , vascular surgery    Education and Discussions with Family / Patient: Updated  (daughter) via phone.    Current Length of Stay: 1 day(s)  Current Patient Status: Inpatient   Certification Statement: The patient will continue to require additional inpatient hospital stay due to IV antibiotics, follow-up wound cultures, vascular surgery final plan  Discharge Plan: Anticipate discharge in 48-72 hrs to discharge location to be determined pending rehab evaluations.    Code Status: Level 3 - DNAR and DNI    Subjective   No events overnight.  Patient evaluated  postop.  Patient currently reports feeling well, no pain.  Tolerating oral intake with no nausea or vomiting.    Objective :  Temp:  [97.5 °F (36.4 °C)-98.2 °F (36.8 °C)] 97.5 °F (36.4 °C)  HR:  [102-113] 113  BP: (109-130)/(60-81) 114/79  Resp:  [16-29] 16  SpO2:  [97 %-100 %] 97 %  O2 Device: Nasal cannula  Nasal Cannula O2 Flow Rate (L/min):  [3 L/min] 3 L/min    Body mass index is 22.66 kg/m².     Input and Output Summary (last 24 hours):     Intake/Output Summary (Last 24 hours) at 7/9/2025 1348  Last data filed at 7/9/2025 1214  Gross per 24 hour   Intake 590 ml   Output 300 ml   Net 290 ml       Physical Exam  Vitals and nursing note reviewed.   Constitutional:       General: She is not in acute distress.     Appearance: She is well-developed.   HENT:      Head: Normocephalic and atraumatic.     Eyes:      Conjunctiva/sclera: Conjunctivae normal.       Cardiovascular:      Rate and Rhythm: Normal rate and regular rhythm.   Pulmonary:      Effort: No respiratory distress.      Breath sounds: Normal breath sounds.     Musculoskeletal:         General: No swelling.      Cervical back: Neck supple.     Skin:     General: Skin is warm and dry.      Capillary Refill: Capillary refill takes less than 2 seconds.     Neurological:      Mental Status: She is alert.     Psychiatric:         Mood and Affect: Mood normal.         Behavior: Behavior normal.           Lines/Drains:              Lab Results: I have reviewed the following results:   Results from last 7 days   Lab Units 07/09/25  0448   WBC Thousand/uL 9.90   HEMOGLOBIN g/dL 9.1*   HEMATOCRIT % 28.2*   PLATELETS Thousands/uL 343   SEGS PCT % 68   LYMPHO PCT % 20   MONO PCT % 9   EOS PCT % 2     Results from last 7 days   Lab Units 07/09/25  0448 07/08/25  0814   SODIUM mmol/L 135 137   POTASSIUM mmol/L 3.6 3.8   CHLORIDE mmol/L 101 101   CO2 mmol/L 27 24   BUN mg/dL 35* 37*   CREATININE mg/dL 1.12 1.24*   ANION GAP mmol/L 7 12*   CALCIUM mg/dL 7.2* 7.4*    ALBUMIN g/dL  --  3.7   TOTAL BILIRUBIN mg/dL  --  2.1*   ALK PHOS U/L  --  60   ALT U/L  --  20   AST U/L  --  16   GLUCOSE RANDOM mg/dL 82 103*     Results from last 7 days   Lab Units 07/09/25  0448   INR  1.36*     Results from last 7 days   Lab Units 07/05/25  1120 07/05/25  0610 07/04/25  2046 07/04/25  1615 07/04/25  1100 07/04/25  0729 07/04/25  0612 07/03/25  1558 07/03/25  1035 07/03/25  0625 07/02/25  1625   POC GLUCOSE mg/dl 103 91 108 90 129 111 93 90 97 88 111               Recent Cultures (last 7 days):         Imaging Results Review: No pertinent imaging studies reviewed.  Other Study Results Review: No additional pertinent studies reviewed.    Last 24 Hours Medication List:     Current Facility-Administered Medications:     acetaminophen (TYLENOL) tablet 650 mg, Q6H PRN    albuterol inhalation solution 2.5 mg, Q6H PRN    apixaban (ELIQUIS) tablet 2.5 mg, BID    bisacodyl (DULCOLAX) rectal suppository 10 mg, Daily PRN    bumetanide (BUMEX) tablet 1 mg, BID    ceFAZolin (ANCEF) IVPB (premix in dextrose) 1,000 mg 50 mL, Q8H    fluticasone-vilanterol 200-25 mcg/actuation 1 puff, Daily    levalbuterol (XOPENEX) inhalation solution 1.25 mg, TID    levothyroxine tablet 50 mcg, Early Morning    metoprolol tartrate (LOPRESSOR) partial tablet 12.5 mg, Q12H GARTH    morphine injection 2 mg, Q2H PRN    ondansetron (ZOFRAN) injection 4 mg, Q6H PRN    oxyCODONE (ROXICODONE) split tablet 2.5 mg, Q4H PRN **OR** oxyCODONE (ROXICODONE) IR tablet 5 mg, Q4H PRN    polyethylene glycol (MIRALAX) packet 17 g, Daily    senna-docusate sodium (SENOKOT S) 8.6-50 mg per tablet 1 tablet, BID    Administrative Statements   Today, Patient Was Seen By: Jenise Vo MD      **Please Note: This note may have been constructed using a voice recognition system.**

## 2025-07-09 NOTE — PLAN OF CARE
Problem: Potential for Falls  Goal: Patient will remain free of falls  Description: INTERVENTIONS:  - Educate patient/family on patient safety including physical limitations  - Instruct patient to call for assistance with activity   - Consider consulting OT/PT to assist with strengthening/mobility based on AM PAC & JH-HLM score  - Consult OT/PT to assist with strengthening/mobility   - Keep Call bell within reach  - Keep bed low and locked with side rails adjusted as appropriate  - Keep care items and personal belongings within reach  - Initiate and maintain comfort rounds  - Make Fall Risk Sign visible to staff  - Offer Toileting every  Hours, in advance of need  - Initiate/Maintain alarm  - Obtain necessary fall risk management equipment:   - Apply yellow socks and bracelet for high fall risk patients  - Consider moving patient to room near nurses station  Outcome: Progressing     Problem: PAIN - ADULT  Goal: Verbalizes/displays adequate comfort level or baseline comfort level  Description: Interventions:  - Encourage patient to monitor pain and request assistance  - Assess pain using appropriate pain scale  - Administer analgesics as ordered based on type and severity of pain and evaluate response  - Implement non-pharmacological measures as appropriate and evaluate response  - Consider cultural and social influences on pain and pain management  - Notify physician/advanced practitioner if interventions unsuccessful or patient reports new pain  - Educate patient/family on pain management process including their role and importance of  reporting pain   - Provide non-pharmacologic/complimentary pain relief interventions  Outcome: Progressing     Problem: INFECTION - ADULT  Goal: Absence or prevention of progression during hospitalization  Description: INTERVENTIONS:  - Assess and monitor for signs and symptoms of infection  - Monitor lab/diagnostic results  - Monitor all insertion sites, i.e. indwelling lines,  tubes, and drains  - Monitor endotracheal if appropriate and nasal secretions for changes in amount and color  - [place] appropriate cooling/warming therapies per order  - Administer medications as ordered  - Instruct and encourage patient and family to use good hand hygiene technique  - Identify and instruct in appropriate isolation precautions for identified infection/condition  Outcome: Progressing  Goal: Absence of fever/infection during neutropenic period  Description: INTERVENTIONS:  - Monitor WBC  - Perform strict hand hygiene  - Limit to healthy visitors only  - No plants, dried, fresh or silk flowers with horne in patient room  Outcome: Progressing     Problem: SAFETY ADULT  Goal: Patient will remain free of falls  Description: INTERVENTIONS:  - Educate patient/family on patient safety including physical limitations  - Instruct patient to call for assistance with activity   - Consider consulting OT/PT to assist with strengthening/mobility based on AM PAC & -HLM score  - Consult OT/PT to assist with strengthening/mobility   - Keep Call bell within reach  - Keep bed low and locked with side rails adjusted as appropriate  - Keep care items and personal belongings within reach  - Initiate and maintain comfort rounds  - Make Fall Risk Sign visible to staff  - Offer Toileting every  Hours, in advance of need  - Initiate/Maintain alarm  - Obtain necessary fall risk management equipment:   - Apply yellow socks and bracelet for high fall risk patients  - Consider moving patient to room near nurses station  Outcome: Progressing  Goal: Maintain or return to baseline ADL function  Description: INTERVENTIONS:  -  Assess patient's ability to carry out ADLs; assess patient's baseline for ADL function and identify physical deficits which impact ability to perform ADLs (bathing, care of mouth/teeth, toileting, grooming, dressing, etc.)  - Assess/evaluate cause of self-care deficits   - Assess range of motion  - Assess  patient's mobility; develop plan if impaired  - Assess patient's need for assistive devices and provide as appropriate  - Encourage maximum independence but intervene and supervise when necessary  - Involve family in performance of ADLs  - Assess for home care needs following discharge   - Consider OT consult to assist with ADL evaluation and planning for discharge  - Provide patient education as appropriate  - Monitor functional capacity and physical performance, use of AM PAC & JH-HLM   - Monitor gait, balance and fatigue with ambulation    Outcome: Progressing  Goal: Maintains/Returns to pre admission functional level  Description: INTERVENTIONS:  - Perform AM-PAC 6 Click Basic Mobility/ Daily Activity assessment daily.  - Set and communicate daily mobility goal to care team and patient/family/caregiver.   - Collaborate with rehabilitation services on mobility goals if consulted  - Perform Range of Motion  times a day.  - Reposition patient every  hours.  - Dangle patient  times a day  - Stand patient  times a day  - Ambulate patient  times a day  - Out of bed to chair  times a day   - Out of bed for meals  times a day  - Out of bed for toileting  - Record patient progress and toleration of activity level   Outcome: Progressing     Problem: DISCHARGE PLANNING  Goal: Discharge to home or other facility with appropriate resources  Description: INTERVENTIONS:  - Identify barriers to discharge w/patient and caregiver  - Arrange for needed discharge resources and transportation as appropriate  - Identify discharge learning needs (meds, wound care, etc.)  - Arrange for interpretive services to assist at discharge as needed  - Refer to Case Management Department for coordinating discharge planning if the patient needs post-hospital services based on physician/advanced practitioner order or complex needs related to functional status, cognitive ability, or social support system  Outcome: Progressing     Problem: Knowledge  Deficit  Goal: Patient/family/caregiver demonstrates understanding of disease process, treatment plan, medications, and discharge instructions  Description: Complete learning assessment and assess knowledge base.  Interventions:  - Provide teaching at level of understanding  - Provide teaching via preferred learning methods  Outcome: Progressing     Problem: SKIN/TISSUE INTEGRITY - ADULT  Goal: Skin Integrity remains intact(Skin Breakdown Prevention)  Description: Assess:  -Perform Bandar assessment every  -Clean and moisturize skin every   -Inspect skin when repositioning, toileting, and assisting with ADLS  -Assess under medical devices such as  every   -Assess extremities for adequate circulation and sensation     Bed Management:  -Have minimal linens on bed & keep smooth, unwrinkled  -Change linens as needed when moist or perspiring  -Avoid sitting or lying in one position for more than  hours while in bed  -Keep HOB at degrees     Toileting:  -Offer bedside commode  -Assess for incontinence every   -Use incontinent care products after each incontinent episode such as     Activity:  -Mobilize patient  times a day  -Encourage activity and walks on unit  -Encourage or provide ROM exercises   -Turn and reposition patient every  Hours  -Use appropriate equipment to lift or move patient in bed  -Instruct/ Assist with weight shifting every  when out of bed in chair  -Consider limitation of chair time  hour intervals    Skin Care:  -Avoid use of baby powder, tape, friction and shearing, hot water or constrictive clothing  -Relieve pressure over bony prominences using   -Do not massage red bony areas    Next Steps:  -Teach patient strategies to minimize risks such as    -Consider consults to  interdisciplinary teams such as   Outcome: Progressing  Goal: Incision(s), wounds(s) or drain site(s) healing without S/S of infection  Description: INTERVENTIONS  - Assess and document dressing, incision, wound bed, drain sites  and surrounding tissue  - Provide patient and family education  - Perform skin care/dressing changes every   Outcome: Progressing  Goal: Pressure injury heals and does not worsen  Description: Interventions:  - Implement low air loss mattress or specialty surface (Criteria met)  - Apply silicone foam dressing  - Instruct/assist with weight shifting every  minutes when in chair   - Limit chair time to  hour intervals  - Use special pressure reducing interventions such as  when in chair   - Apply fecal or urinary incontinence containment device   - Perform passive or active ROM every   - Turn and reposition patient & offload bony prominences every  hours   - Utilize friction reducing device or surface for transfers   - Consider consults to  interdisciplinary teams such as   - Use incontinent care products after each incontinent episode such as   - Consider nutrition services referral as needed  Outcome: Progressing

## 2025-07-09 NOTE — PHYSICAL THERAPY NOTE
PHYSICAL THERAPY NOTE          Patient Name: Rebeca Banegas  Today's Date: 7/9/2025 07/09/25 0912   PT Last Visit   PT Visit Date 07/09/25   Note Type   Note type Cancelled Session   Cancel Reasons Patient to operating room       Off floor at OR. Will continue to follow and see post op as able.    Estela Lozano, PT, DPT

## 2025-07-09 NOTE — PROGRESS NOTES
[facility] WOUND CARE MANAGEMENT   AND HYPERBARIC MEDICINE CENTER       Patient ID: Rebeca Banegas is a 89 y.o. female Date of Birth 7/22/1932     [facility]    Performed wound round with: Wound team     Chief Complaint : surgical wounds    Wound Instructions:  Wound: Bilateral lower legs and right groin  Discontinue previous wound order  Cleanse the wound bed with NSS   Apply non-sting skin prep to periwound area  Apply silver alginate to wound bed, then cover with ABD and rolled gauze (bilateral lower legs), ABD and Tegaderm (right groin)  Frequency : Daily and prn for soiling    Wound: Left heel and left great toe  Cleanse with normal saline solution  Apply Skin-Prep to wound bed and periwound area  Daily as needed for soiling    Order open heel shoes for ambulation  Heel boots when in bed    Offload all wounds  Turn and reposition frequently  Instruct / Assist with weight shifting in wheelchair  Increase protein intake.  Monitor for any sign of infection or worsening, inform PCP or patient's primary physician in your facility.      Allergies  Asa [aspirin], Nsaids, and Medical tape      Assessment & Plan:  1. History of embolectomy  Assessment & Plan:  Patient was admitted on June 24, 2025 at [facility] for acute right leg pain due to a right femoral acute arterial occlusion, leading to an emergent embolectomy/thrombectomy and fasciotomy on June 24, 2025]    Assessment and plan:  The surgical incision on the right lower leg is not approximated, with small dehisced area, positive erythema on the periwound area  The surgical incision on the left lower leg is approximated, stable, positive erythema, possible infection  Right groin: Large amount of serous drainage  Wound culture specimen from right groin wound was sent  Local wound care with silver alginate for wound on the bilateral lower legs and right groin  Sent Tiger text to patient's surgeon  ( Dr. Adonay Raymond), provided update.  Recommended to send patient to emergency room for washout.  Dr. Gomez, Senior care physician was made aware.  Follow-up next week if patient is back in the facility                2. Ambulatory dysfunction  3. Pressure injury of deep tissue of left heel  Assessment & Plan:  Left heel  Purple, nonblanchable, with no obvious sign of infection  Local wound care with Skin-Prep  Continue to offload  Order open heels shoes  Heel boots when in bed  4. Pressure injury of toe of left foot, unstageable (HCC)  Assessment & Plan:  Left great toe  100% eschar, stable, nonfluctuant, with no obvious sign of infection  Local wound care with Skin-Prep  Continue to offload           Subjective:   July 8, 2025.  New consult for wound on the right lower leg, left lower leg and right groin.  Patient was referred by Senior care team.  Medical problem includes but not limited to asthma, permanent atrial fibrillation, and congestive heart failure.  I introduced myself to patient and patient agreed to be seen for wound consult.    Wound history: Patient was admitted on June 24, 2025 at [facility] for acute right leg pain due to a right femoral acute arterial occlusion, leading to an emergent embolectomy/thrombectomy and fasciotomy on June 24, 2025]. Postoperatively, she experienced hypotension and required critical care support. By July 3, 2025, her renal function improved, and she was stable with ongoing management for acute limb ischemia    Received patient in bed, complaining of pain on the left lower leg and continuous drainage on the right groin.  Denies fever.        Review of Systems   Constitutional: Negative.    Respiratory: Negative.     Cardiovascular: Negative.    Musculoskeletal:  Positive for gait problem.   Skin:  Positive for wound.       Objective:    Physical Exam  Constitutional:       Appearance: Normal appearance.     Cardiovascular:      Rate and Rhythm: Normal rate.  "  Pulmonary:      Effort: Pulmonary effort is normal.     Skin:     Findings: Lesion present.      Comments: Right groin: Wound size is 7 x 0.1 x 0.5 cm.,  Large amount of serous drainage, periwound normal, no erythema, no malodor    Right lower leg: Wound size is 14 x 0.2 cm.,  Stapled, not approximated, moderate amount of serosanguineous drainage, periwound mildly red, positive pain on palpation    Left lower leg: Wound size is 12 x 0.2 cm.,  Stapled, intact, approximated, moderate amount of serosanguineous drainage, positive erythema on the periwound area    Left heel: Wound size is 0.5 x 1 cm palpable, with no obvious purulent    Left great toe: Wound size is 0.5 x 0.3 cm.,  100% eschar, with no obvious sign of infection     Neurological:      Mental Status: She is alert.              Procedures           Patient's care was coordinated with nursing facility staff. Recent vitals, labs and updated medications were reviewed on EMR or chart system of facility. Past Medical, surgical, social, medication and allergy history and patient's previous records were reviewed and updated as appropriate: Most up-to date information is available in the facility EMR where the patient is currently admitted.    Problem List[1]  Past Medical History[2]  Past Surgical History[3]  Social History[4]   Current Medications[5]  Family History[6]           Coordination of Care: Wound team aware of the treatment plan. Facility nurse will provide wound treatment and monitor the wound for any changes.     Patient / Staff education : Patient / Staff was given education on sign of infection and pressure ulcer prevention. Patient/ Staff verbalized understanding     Follow up :  Next week    Voice-recognition software may have been used in the preparation of this document. Occasional wrong word or \"sound-alike\" substitutions may have occurred due to the inherent limitations of voice recognition software. Interpretation should be guided by " context.      LUZ Albert         [1]   Patient Active Problem List  Diagnosis    Abnormal findings on diagnostic imaging of other specified body structures    Abnormal findings on diagnostic imaging of urinary organs    Allergic rhinitis    Arthritis    Asthma    Atherosclerosis    Permanent atrial fibrillation (HCC)    Backache    Benign colon polyp    Bifascicular bundle branch block    Chronic heart failure with preserved ejection fraction (HCC)    Esophageal reflux    Essential tremor    Hoarseness    Hyperlipidemia    Primary hypertension    Hypothyroidism    Leg pain    Nasal polyp    Onychomycosis    Ovarian cyst    Pancreatic cyst    Plantar fasciitis    Prediabetes    Pulmonary nodule    Thyroid nodule    Vitamin D deficiency    Pain of left hand    Sensorineural hearing loss (SNHL), bilateral    Left asymmetrical SNHL    Elevated serum immunoglobulin free light chains    Osteopenia of hip    CKD (chronic kidney disease) stage 4, GFR 15-29 ml/min (HCC)    Labial abscess    Vulvar cysts    Secondary hyperparathyroidism (HCC)    Age-related osteoporosis without current pathological fracture    Chronic cough    MGUS (monoclonal gammopathy of unknown significance)    Bronchiectasis (HCC)    Chronic foot ulcer, left, with fat layer exposed (HCC)    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)    Severe protein-calorie malnutrition (HCC)    Hyponatremia    Other atherosclerosis of native arteries of extremities, bilateral legs (HCC)    Muscle cramp    Chronic thoracic back pain    Chronic midline low back pain without sciatica    Arthralgia    History of CVA (cerebrovascular accident)    Acute lower limb ischemia    Shock (HCC)    DARIO (acute kidney injury) (HCC)    Hyperphosphatemia    Anemia    Benign hypertension with CKD (chronic kidney disease) stage IV (HCC)    Constipation    Wound disruption, post-op, skin, initial encounter    History of embolectomy    Ambulatory dysfunction     Pressure injury of toe of left foot, unstageable (HCC)    Pressure injury of deep tissue of left heel   [2]   Past Medical History:  Diagnosis Date    Abnormal ECG     Abnormal ultrasound     RESOLVED: [id number]    Acute kidney failure (HCC) 12/29/2024    Advice given about COVID-19 virus infection 04/07/2020    Ambulates with cane     Arrhythmia     Arthritis     Asthma     Asthma with acute exacerbation     LAST ASSESSED: [id number]    Asymptomatic menopausal state     Atherosclerosis     Atrial fibrillation (HCC)     Chronic kidney disease     Chronic obstructive lung disease (HCC)     w/ chonic cough    Chronic ulcer of left foot (HCC)     Colon polyp     Congestive heart failure (HCC)     LAST ASSESSED: [id number]    COPD (chronic obstructive pulmonary disease) (HCC)     Coronary artery disease     COVID-19 01/06/2025    COVID-19 virus infection 03/25/2023    Cuboid fracture     LAST ASSESSED: [id number]    Disease of thyroid gland     Dyspepsia     Edema of both lower extremities     Equinus deformity of left foot     Fall 04/01/2024    Falls     5/2024    GERD (gastroesophageal reflux disease)     Headache(784.0)     High risk medication use     LAST ASSESSED: [id number]    Hyperlipidemia     Hypertension     Hypokalemia     Hypothyroidism     Impacted cerumen of both ears     LAST ASSESSED: [id number]    Impacted cerumen of right ear     LAST ASSESSED: [id number]    Influenza     LAST ASSESSED: [id number]    IPMN (intraductal papillary mucinous neoplasm)     RESOLVED: 02OCT2015    Irregular heart beat     afib. Warfarin.    Leg cramps 12/21/2018    Limb swelling     LAST ASSESSED: [id number]    Navicular fracture of ankle     LAST ASSESSED: [id number]    Onychomycosis     LAST ASSESSED: [id number]    Open wound of right upper arm 10/17/2022    Osteoarthritis     Osteopenia     Osteoporosis     Paronychia, finger, unspecified laterality     LAST ASSESSED: [id number]    Paroxysmal atrial fibrillation (HCC)     LAST  ASSESSED: 02MAR2014    Peripheral vascular disease (HCC)     Plantar fasciitis     SOBOE (shortness of breath on exertion)     Stroke (HCC)     Right arm weakness that has resolved    Talus fracture     LAST ASSESSED: [id number]    Vaccine counseling 12/19/2023    Vascular headache     Walker as ambulation aid     Wound of right foot    [3]   Past Surgical History:  Procedure Laterality Date    APPENDECTOMY      BONE BIOPSY Left 08/09/2024    Procedure: Bone bx of proximal phalanx second toe & second met with fluoroscopic guidance;  Surgeon: Jonny Marcial DPM;  Location: [facility];  Service: Podiatry    CATARACT EXTRACTION Bilateral     CHOLECYSTECTOMY      COLONOSCOPY      FASCIOTOMY Right 6/24/2025    Procedure: FASCIOTOMY;  Surgeon: Arthur Wei MD;  Location: [facility];  Service: Vascular    JOINT REPLACEMENT Bilateral     knee    NEUROPLASTY / TRANSPOSITION MEDIAN NERVE AT CARPAL TUNNEL BILATERAL Bilateral     DECOMPRESSION    OOPHORECTOMY Bilateral     age 81    PELVIC FLOOR REPAIR  2014    VA BIOPSY BONE TROCAR/NEEDLE SUPERFICIAL Left 08/09/2024    Procedure: Left foot debridement;  Surgeon: Jonny Marcial DPM;  [facility];  Service: Podiatry    VA DEBRIDEMENT MUSCLE &/FASCIA 1ST 20 SQ CM/< Left 08/09/2024    Procedure: Left foot debridement;  Surgeon: Jonny Marcial DPM;  [facility];  Service: Podiatry    VA OSTEOT W/WO LNGTH SHRT/CORRJ 1ST METAR Left 11/29/2024    Procedure: Left foot percutaneous osteotomy of second, third and fourth metatarsal.;  Surgeon: Jonny Marcial DPM;  Location[facility];  Service: Podiatry    SALPINGECTOMY Bilateral     SINUS SURGERY      THROMBECTOMY W/ EMBOLECTOMY Left 6/24/2025    Procedure: LEFT LOWER EXTREMITY EMBOLECTOMY/THROMBECTOMY;  Surgeon: Adonay Raymond MD;  [facility];  Service: Vascular    THROMBECTOMY W/ EMBOLECTOMY Right 6/24/2025    Procedure: EMBOLECTOMY/THROMBECTOMY LOWER EXTREMITY;   Surgeon: Arthur Wei MD;  [facility];  Service: Vascular    TONSILLECTOMY      TOTAL KNEE ARTHROPLASTY Bilateral    [4]   Social History  Socioeconomic History    Marital status:    Occupational History    Occupation: retired   Tobacco Use    Smoking status: Former     Current packs/day: 0.00     Average packs/day: 0.3 packs/day for 2.0 years (0.5 ttl pk-yrs)     Types: Cigarettes     Start date:      Quit date:      Years since quittin.5    Smokeless tobacco: Never    Tobacco comments:     On and off socially with friends    Vaping Use    Vaping status: Never Used   Substance and Sexual Activity    Alcohol use: Not Currently    Drug use: No    Sexual activity: Not Currently     Partners: Male     Birth control/protection: Post-menopausal   Social History Narrative    DAILY COFFEE CONSUMPTION (2 CUPS/DAY)    EXERCISING REGULARLY     Social Drivers of Health     Food Insecurity: No Food Insecurity (2025)    Nursing - Inadequate Food Risk Classification     Worried About Running Out of Food in the Last Year: Never true     Ran Out of Food in the Last Year: Never true     Ran Out of Food in the Last Year: Never true   Transportation Needs: No Transportation Needs (2025)    Nursing - Transportation Risk Classification     Lack of Transportation: No   Intimate Partner Violence: Unknown (2025)    Nursing IPS     Physically Hurt by Someone: No     Hurt or Threatened by Someone: No   Housing Stability: Unknown (2025)    Nursing: Inadequate Housing Risk Classification     Unable to Pay for Housing in the Last Year: No     Has Housing: No   [5] No current facility-administered medications for this visit.  No current outpatient medications on file.    Facility-Administered Medications Ordered in Other Visits:     acetaminophen (TYLENOL) tablet 650 mg, 650 mg, Oral, Q6H PRN, Shweta Valverde MD    [START ON 2025] albuterol inhalation solution 2.5 mg, 2.5 mg,  Nebulization, Q8H, Shweta Valverde MD    [Held by provider] apixaban (ELIQUIS) tablet 2.5 mg, 2.5 mg, Oral, BID, Shweta Valverde MD    bisacodyl (DULCOLAX) rectal suppository 10 mg, 10 mg, Rectal, Daily PRN, Shweta Valverde MD    bumetanide (BUMEX) tablet 1 mg, 1 mg, Oral, BID, Shweta Valverde MD, 1 mg at 07/08/25 2052    ceftriaxone (ROCEPHIN) 1 g/50 mL in dextrose IVPB, 1,000 mg, Intravenous, Q24H, Shweta Valverde MD, Stopped at 07/08/25 1848    [START ON 7/9/2025] enoxaparin (LOVENOX) subcutaneous injection 40 mg, 40 mg, Subcutaneous, Daily, Shweta Valverde MD    [START ON 7/9/2025] fluticasone-vilanterol 200-25 mcg/actuation 1 puff, 1 puff, Inhalation, Daily, Shweta Valverde MD    [START ON 7/9/2025] levothyroxine tablet 50 mcg, 50 mcg, Oral, Early Morning, Shweta Valverde MD    metoprolol tartrate (LOPRESSOR) partial tablet 12.5 mg, 12.5 mg, Oral, Q12H GARTH, Shweta Valverde MD, 12.5 mg at 07/08/25 2054    morphine injection 2 mg, 2 mg, Intravenous, Q2H PRN, Shweta Valverde MD    ondansetron (ZOFRAN) injection 4 mg, 4 mg, Intravenous, Q6H PRN, Shweta Valverde MD    oxyCODONE (ROXICODONE) split tablet 2.5 mg, 2.5 mg, Oral, Q4H PRN **OR** oxyCODONE (ROXICODONE) IR tablet 5 mg, 5 mg, Oral, Q4H PRN, Shweta Valverde MD    [START ON 7/9/2025] polyethylene glycol (MIRALAX) packet 17 g, 17 g, Oral, Daily, Shweta Valverde MD    senna-docusate sodium (SENOKOT S) 8.6-50 mg per tablet 1 tablet, 1 tablet, Oral, BID, Devanal FERDINAND Valverde MD  [6]   Family History  Problem Relation Name Age of Onset    Pancreatic cancer Mother Christiana 93    Heart failure Mother Christiana     Cancer Mother Christiana     Hypertension Mother Christiana     Coronary artery disease Family      Breast cancer Family      Uterine cancer Family      Hyperlipidemia Family      Osteoarthritis Family      Ovarian cancer Family      Brain cancer Son      Stroke Father Isaac     Hypertension Father Isaac     No Known Problems Sister      No Known Problems  Daughter      No Known Problems Maternal Grandmother      No Known Problems Maternal Grandfather      No Known Problems Paternal Grandmother      No Known Problems Paternal Grandfather      Breast cancer Cousin  50    Breast cancer Cousin  50    Ovarian cancer Other niece 49

## 2025-07-09 NOTE — DISCHARGE INSTR - OTHER ORDERS
Skin care plans:  1-Hydraguard/Silicone Cream to bilateral sacrum, buttock BID and PRN  2-Elevate heels to offload pressure.  3-Ehob cushion in chair when out of bed.  4-Moisturize skin daily with skin nourishing cream.  5-Turn/reposition q2h for pressure re-distribution on skin.   6-B/L heel wounds- Cleanse with NSS, pat dry. Apply single layer of Xeroform over wound bed and cover with silicone foam dressing. Change every other day and as needed for soilage/displacement.     Wound VAC therapy:  --Upon admission to facility, remove wet-to-dry dressing, assess wound and replace VAC  --Black foam to wound  --150 mmHg continuous suction  --To be changed by licensed/trained clinician every 2-3 days  --No showers/no tub baths  --Keep dressing clean, dry, and intact

## 2025-07-09 NOTE — ASSESSMENT & PLAN NOTE
Post-op hypotension required vasopressor in ICU.  Soft BP 90s/60s on SNF admission  Continue reduced dose of bumex, lopressor 12.5 mg bid  Monitor BP closely. Avoid hypotension   Him/He

## 2025-07-09 NOTE — ASSESSMENT & PLAN NOTE
Home Bumex 3 mg BID was held due to hypotension in the hospital, was resumed at reduced dose on discharge.  Continue Bumex 1 mg twice daily, Lopressor 12.5 mg twice daily  Monitor weight, BMP

## 2025-07-09 NOTE — ASSESSMENT & PLAN NOTE
89 y.o. female with PMH of HFpEF, permanent A-fib on Eliquis, history of CVA, bronchiectasis recently admitted with acute lower limb ischemia. She underwent emergent right leg embolectomy/lobectomy, fasciotomy and left popliteal thrombectomy on 6/24/2025 by our team, now with copious serous drainage from right groin wound.    Plan  NPO   Hold anticoagulation (Eliquis)   OR today, 7/9, for right groin washout and wound VAC placement  Remainder of care per medicine

## 2025-07-09 NOTE — ASSESSMENT & PLAN NOTE
Continue lopressor 12.5 mg twice daily. Warfarin was d/c, Eliquis started recent hospitalization  Monitor vitals, CBC, adverse bleeding

## 2025-07-09 NOTE — H&P
H&P - Hospitalist   Name: Rebeca Banegas 89 y.o. female I MRN: [id number]  Unit/Bed#: TRELL I Date of Admission: 7/8/2025   Date of Service: 7/8/2025 I Hospital Day: 0     Assessment & Plan  Asthma  Resume nebulizers  No acute respiratory distress  Permanent atrial fibrillation (HCC)  Rate controlled with metoprolol  Will hold Eliquis as surgery is planned for tomorrow  Primary hypertension  Resume oral hypertensive regimen  Hypothyroidism  Resume Synthroid  Wound disruption, post-op, skin, initial encounter  Possible surgical site infection  Patient to go for washout and wound VAC placement with vascular surgery tomorrow 7/9/2025  N.p.o. past midnight  Multimodal pain control  IV fluid hydration      VTE Pharmacologic Prophylaxis:   Lovenox  Code Status: Level 1 - Full Code     Anticipated Length of Stay: Patient will be admitted on an inpatient basis with an anticipated length of stay of greater than 2 midnights secondary to surgical site infection.    History of Present Illness   Chief Complaint: Surgical site drainage    Rebeca Banegas is a 89 y.o. female with a PMH of HFpEF, permanent A-fib on Eliquis, history of CVA, bronchiectasis, asthma, hypertension, hypothyroidism who presents skilled nursing facility for drainage from right groin wound status post surgery.    Patient was recently admitted to the hospital for acute lower limb ischemia and underwent emergent right leg embolectomy/fasciotomy and left popliteal thrombectomy.  Patient was discharged to skilled nursing facility.  Today she presents with increasing serous drainage from right groin wound.  No reported fevers, chills, nausea, vomiting, diarrhea.  Patient does report of significant pain from her surgery.    Patient was evaluated by vascular surgery in the ER and recommended OR tomorrow for right groin washout and wound VAC placement.    Review of Systems  12 point review of systems is grossly negative unless stated above in HPI  Historical  Information   Past Medical History[1]  Past Surgical History[2]  Social History[3]  E-Cigarette/Vaping    E-Cigarette Use Never User      E-Cigarette/Vaping Substances    Nicotine No     THC No     CBD No     Flavoring No     Other No     Unknown No      Family history non-contributory  Social History:  Marital Status:    Patient Pre-hospital Living Situation: Skilled Nursing Facility:      Meds/Allergies   I have reviewed home medications using recent Epic encounter.  Prior to Admission medications    Medication Sig Start Date End Date Taking? Authorizing Provider   acetaminophen (TYLENOL) 325 mg tablet Take 3 tablets (975 mg total) by mouth every 8 (eight) hours 7/5/25   Alaina Medley MD   albuterol (2.5 mg/3 mL) 0.083 % nebulizer solution Take 3 mL (2.5 mg total) by nebulization every 8 (eight) hours 4/1/25   Paulie Wasserman MD   albuterol (Proventil HFA) 90 mcg/act inhaler Inhale 2 puffs every 6 (six) hours as needed for wheezing 2/19/25   Santana Dillon MD   ammonium lactate (LAC-HYDRIN) 12 % lotion Apply topically 2 (two) times a day as needed for dry skin 2/5/25   Jonny Marcial DPM   apixaban (ELIQUIS) 2.5 mg Take 2 tablets (5 mg total) by mouth 2 (two) times a day for 2 days, THEN 1 tablet (2.5 mg total) 2 (two) times a day. 7/5/25 8/6/25  Alaina Medley MD   bisacodyl (DULCOLAX) 10 mg suppository Insert 1 suppository (10 mg total) into the rectum daily as needed for constipation 7/5/25   Alaina Medley MD   bumetanide (BUMEX) 2 mg tablet Take 0.5 tablets (1 mg total) by mouth 2 (two) times a day 7/5/25   Alaina Medley MD   Cholecalciferol (VITAMIN D3) 1000 units CAPS Take 2,000 Units by mouth in the morning.    Historical Provider, MD   Fluticasone-Salmeterol (Advair Diskus) 250-50 mcg/dose inhaler Inhale 1 puff 2 (two) times a day Rinse mouth after use. 3/25/25 6/18/25  Paulie Wasserman MD   guaiFENesin (MUCINEX) 600 mg 12 hr tablet Take 1 tablet (600 mg total) by mouth every 12  (twelve) hours 7/5/25   Alaina Medley MD   levothyroxine 50 mcg tablet TAKE 1 TABLET DAILY, EXCEPT TAKE 1 AND 1/2 TABLETS ON SUNDAY AND WEDNESDAY 5/21/25   Santana Dillon MD   metoprolol tartrate (LOPRESSOR) 25 mg tablet Take 0.5 tablets (12.5 mg total) by mouth every 12 (twelve) hours 7/5/25   Alaina Medley MD   polyethylene glycol (MIRALAX) 17 g packet Take 17 g by mouth daily 7/6/25   Alaina Medley MD   senna-docusate sodium (SENOKOT S) 8.6-50 mg per tablet Take 1 tablet by mouth 2 (two) times a day 7/5/25   Alaina Medley MD   sodium chloride 3 % inhalation solution Take 4 mL by nebulization 3 (three) times a day 4/1/25   Paulie Wasserman MD     Allergies   Allergen Reactions    Asa [Aspirin] Shortness Of Breath    Nsaids Shortness Of Breath    Medical Tape Other (See Comments)     Skin tears with the removal of tape        Objective :  Temp:  [98.1 °F (36.7 °C)] 98.1 °F (36.7 °C)  HR:  [106] 106  BP: (126)/(63) 126/63  Resp:  [18] 18  SpO2:  [95 %] 95 %  O2 Device: None (Room air)    Physical Exam     Gen: NAD, elderly female  HEENT: EOMI, MMM  Resp: CTAB  CV: s1s2, RRR  Abd: Soft, NT, ND, BS+  MSK: FROM  Skin: Right groin wound with with mild surrounding erythema and serous drainage.  Dressing saturated.  Neuro: No acute focal deficits  Psych: Mood/affect WNL        Lab Results: I have reviewed the following results:  Results from last 7 days   Lab Units 07/08/25  1911 07/02/25  0517   WBC Thousand/uL  --  10.35*   HEMOGLOBIN g/dL  --  9.0*   HEMATOCRIT %  --  28.0*   PLATELETS Thousands/uL 327 215   SEGS PCT %  --  57   LYMPHO PCT %  --  27   MONO PCT %  --  8   EOS PCT %  --  7*     Results from last 7 days   Lab Units 07/08/25  0814   SODIUM mmol/L 137   POTASSIUM mmol/L 3.8   CHLORIDE mmol/L 101   CO2 mmol/L 24   BUN mg/dL 37*   CREATININE mg/dL 1.24*   ANION GAP  12*   CALCIUM mg/dL 7.4*   ALBUMIN g/dL 3.7   TOTAL BILIRUBIN mg/dL 2.1*   ALK PHOS U/L 60   ALT U/L 20   AST U/L 16   GLUCOSE RANDOM  mg/dL 103*         Results from last 7 days   Lab Units 07/05/25  1120 07/05/25  0610 07/04/25  2046 07/04/25  1615 07/04/25  1100 07/04/25  0729 07/04/25  0612 07/03/25  1558 07/03/25  1035 07/03/25  0625 07/02/25  1625 07/02/25  1021   POC GLUCOSE mg/dl 103 91 108 90 129 111 93 90 97 88 111 114     Lab Results   Component Value Date    HGBA1C 6.3 (H) 06/25/2025    HGBA1C 6.4 (H) 08/27/2024    HGBA1C 6.2 (H) 02/07/2023           Imaging Results Review: I reviewed radiology reports from this admission including: xray(s).  Other Study Results Review: EKG was reviewed.     Administrative Statements   I have spent a total time of 42 minutes in caring for this patient on the day of the visit/encounter including Diagnostic results, Impressions, Counseling / Coordination of care, Documenting in the medical record, Reviewing/placing orders in the medical record (including tests, medications, and/or procedures), Obtaining or reviewing history  , and Communicating with other healthcare professionals .    ** Please Note: This note has been constructed using a voice recognition system. **         [1]   Past Medical History:  Diagnosis Date    Abnormal ECG     Abnormal ultrasound     RESOLVED: [id number]    Acute kidney failure (HCC) 12/29/2024    Advice given about COVID-19 virus infection 04/07/2020    Ambulates with cane     Arrhythmia     Arthritis     Asthma     Asthma with acute exacerbation     LAST ASSESSED: [id number]    Asymptomatic menopausal state     Atherosclerosis     Atrial fibrillation (HCC)     Chronic kidney disease     Chronic obstructive lung disease (HCC)     w/ chonic cough    Chronic ulcer of left foot (HCC)     Colon polyp     Congestive heart failure (HCC)     LAST ASSESSED: [id number]    COPD (chronic obstructive pulmonary disease) (HCC)     Coronary artery disease     COVID-19 01/06/2025    COVID-19 virus infection 03/25/2023    Cuboid fracture     LAST ASSESSED: 35HIT1164    Disease of thyroid gland      Dyspepsia     Edema of both lower extremities     Equinus deformity of left foot     Fall 04/01/2024    Falls     5/2024    GERD (gastroesophageal reflux disease)     Headache(784.0)     High risk medication use     LAST ASSESSED: 19MAY2014    Hyperlipidemia     Hypertension     Hypokalemia     Hypothyroidism     Impacted cerumen of both ears     LAST ASSESSED: [id number]    Impacted cerumen of right ear     LAST ASSESSED: 77VZD6461    Influenza     LAST ASSESSED: 82WIQ3404    IPMN (intraductal papillary mucinous neoplasm)     RESOLVED: 02OCT2015    Irregular heart beat     afib. Warfarin.    Leg cramps 12/21/2018    Limb swelling     LAST ASSESSED: 97VVB9258    Navicular fracture of ankle     LAST ASSESSED: 33TPJ5815    Onychomycosis     LAST ASSESSED: 22FYU5580    Open wound of right upper arm 10/17/2022    Osteoarthritis     Osteopenia     Osteoporosis     Paronychia, finger, unspecified laterality     LAST ASSESSED: 77JWE3241    Paroxysmal atrial fibrillation (HCC)     LAST ASSESSED: 02MAR2014    Peripheral vascular disease (HCC)     Plantar fasciitis     SOBOE (shortness of breath on exertion)     Stroke (HCC)     Right arm weakness that has resolved    Talus fracture     LAST ASSESSED: 29XGV7502    Vaccine counseling 12/19/2023    Vascular headache     Walker as ambulation aid     Wound of right foot    [2]   Past Surgical History:  Procedure Laterality Date    APPENDECTOMY      BONE BIOPSY Left 08/09/2024    Procedure: Bone bx of proximal phalanx second toe & second met with fluoroscopic guidance;  Surgeon: Jonny Marcial DPM;  [facility];  Service: Podiatry    CATARACT EXTRACTION Bilateral     CHOLECYSTECTOMY      COLONOSCOPY      FASCIOTOMY Right 6/24/2025    Procedure: FASCIOTOMY;  Surgeon: Arthur Wei MD;  Location:  MAIN OR;  Service: Vascular    JOINT REPLACEMENT Bilateral     knee    NEUROPLASTY / TRANSPOSITION MEDIAN NERVE AT CARPAL TUNNEL BILATERAL Bilateral      DECOMPRESSION    OOPHORECTOMY Bilateral     age 81    PELVIC FLOOR REPAIR  2014    KY BIOPSY BONE TROCAR/NEEDLE SUPERFICIAL Left 2024    Procedure: Left foot debridement;  Surgeon: Jonyn Marcial DPM;  [facility];  Service: Podiatry    KY DEBRIDEMENT MUSCLE &/FASCIA 1ST 20 SQ CM/< Left 2024    Procedure: Left foot debridement;  Surgeon: Jonny Marcial DPM;  [facility];  Service: Podiatry    KY OSTEOT W/WO LNGTH SHRT/CORRJ 1ST METAR Left 2024    Procedure: Left foot percutaneous osteotomy of second, third and fourth metatarsal.;  Surgeon: Jonny Marcial DPM;  [facility];  Service: Podiatry    SALPINGECTOMY Bilateral     SINUS SURGERY      THROMBECTOMY W/ EMBOLECTOMY Left 2025    Procedure: LEFT LOWER EXTREMITY EMBOLECTOMY/THROMBECTOMY;  Surgeon: Adonay Raymond MD;  [facility];  Service: Vascular    THROMBECTOMY W/ EMBOLECTOMY Right 2025    Procedure: EMBOLECTOMY/THROMBECTOMY LOWER EXTREMITY;  Surgeon: Arthur Wei MD;  [facility];  Service: Vascular    TONSILLECTOMY  194    TOTAL KNEE ARTHROPLASTY Bilateral    [3]   Social History  Tobacco Use    Smoking status: Former     Current packs/day: 0.00     Average packs/day: 0.3 packs/day for 2.0 years (0.5 ttl pk-yrs)     Types: Cigarettes     Start date:      Quit date:      Years since quittin.5    Smokeless tobacco: Never    Tobacco comments:     On and off socially with friends    Vaping Use    Vaping status: Never Used   Substance and Sexual Activity    Alcohol use: Not Currently    Drug use: No    Sexual activity: Not Currently     Partners: Male     Birth control/protection: Post-menopausal

## 2025-07-09 NOTE — CASE MANAGEMENT
Case Management Assessment    Patient name Rebeca Banegas  Location OR POOL/OR POOL MRN [id number]  : 1932 Date 2025       Current Admission Date: 2025  Current Admission Diagnosis:Wound disruption, post-op, skin, initial encounter   Patient Active Problem List    Diagnosis Date Noted    Wound disruption, post-op, skin, initial encounter 2025    History of embolectomy 2025    Ambulatory dysfunction 2025    Pressure injury of toe of left foot, unstageable ([facility]) 2025    Pressure injury of deep tissue of left heel 2025    Constipation 2025    Anemia 2025    DARIO (acute kidney injury) ([facility]) 2025    Hyperphosphatemia 2025    Acute lower limb ischemia 2025    Muscle cramp 2025    Chronic thoracic back pain 2025    Chronic midline low back pain without sciatica 2025    Arthralgia 2025    Other atherosclerosis of native arteries of extremities, bilateral legs ([facility]) 2025    Severe protein-calorie malnutrition ([facility]) 2024    History of CVA (cerebrovascular accident) 2024    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 ([facility]) 2024    Bronchiectasis ([facility]) 2023    MGUS (monoclonal gammopathy of unknown significance) 2023    Chronic cough 2023    Age-related osteoporosis without current pathological fracture 2023    Secondary hyperparathyroidism ([facility]) 2023    Osteopenia of hip 2020    CKD (chronic kidney disease) stage 4, GFR 15-29 ml/min ([facility]) 2020    Elevated serum immunoglobulin free light chains 2019    Sensorineural hearing loss (SNHL), bilateral 2019    Left asymmetrical SNHL 2019    Chronic heart failure with preserved ejection fraction ([facility]) 2017    Plantar fasciitis 2016    Essential tremor 10/02/2015    Onychomycosis 2015    Pulmonary nodule 2015    Thyroid nodule 2015    Primary hypertension  05/19/2014    Atherosclerosis 03/06/2014    Ovarian cyst 03/06/2014    Pancreatic cyst 03/06/2014    Backache 02/28/2014    Permanent atrial fibrillation (HCC) 02/14/2013    Arthritis 11/12/2012    Asthma 11/12/2012    Esophageal reflux 11/12/2012    Bifascicular bundle branch block 11/10/2012    Hypothyroidism 11/10/2012      LOS (days): 1  Geometric Mean LOS (GMLOS) (days):   Days to GMLOS:     OBJECTIVE:  PATIENT READMITTED TO HOSPITAL  Risk of Unplanned Readmission Score: 27.5         Current admission status: Inpatient       Preferred Pharmacy:   [facility]/pharmacy #[id number] - [place], [place] - [address]  [place] [address]  Phone: [phone] Fax: [phone]    [facility] - [facility] [address] [facility]  [address] [facility] [address]  Phone: [phone] Fax: [phone]    Primary Care Provider: Santana Dillon MD    Primary Insurance: [employer]  Secondary Insurance:     ASSESSMENT:  Active Health Care Proxies       Meena Solorzano Health Care Agent - Daughter   Primary Phone: [phone] (Mobile)                 Advance Directives  Does patient have a Health Care POA?: Yes  Does patient have Advance Directives?: Yes  Advance Directives: Living will, Power of  for health care, Power of  for finance  Primary Contact: Meena kessler         Readmission Root Cause  30 Day Readmission: Yes  During previous admission, was a post-acute recommendation made?: Yes  What post-acute resources were offered?: STR  Patient was readmitted due to: drainage from R groin sugical site  Action Plan: OR for w/o and wnd VAC, IV abx    Patient Information  Admitted from:: Facility ([facility])  Mental Status: Alert  During Assessment patient was accompanied by: Not accompanied during assessment  Assessment information provided by:: Other - please comment (pt had been hospitalized 6/24-7/5/25 for acute RLE ischemia s/p RLE embolectomy,  thrombactomy and fasciotomy. Assessment information obtained from chart.)  Primary Caregiver: Self  Support Systems: Daughter, Family members, Friend  What city do you live in?: [place]  Home entry access options. Select all that apply.: No steps to enter home  Type of Current Residence: Apartment  Upon entering residence, is there a bedroom on the main floor (no further steps)?: Yes  Upon entering residence, is there a bathroom on the main floor (no further steps)?: Yes  Living Arrangements: Lives Alone  Is patient a ?: No    Activities of Daily Living Prior to Admission  Functional Status: Assistance  Completes ADLs independently?: No  Level of ADL dependence: Assistance (at baseline p/t 6/24 admission pt is independent with all ADLs and ambulation)  Ambulates independently?: No  Does patient use assisted devices?: Yes  Assisted Devices (DME) used: Straight Cane, Walker  Does patient have a history of Outpatient Therapy (PT/OT)?: No  Does the patient have a history of Short-Term Rehab?: Yes (pt is re-admitted fro  SNF)  Does patient have a history of HHC?: No         Patient Information Continued  Income Source: Pension/FPC  Does patient have prescription coverage?: Yes  Can the patient afford their medications and any related supplies (such as glucometers or test strips)?: Yes  Does patient receive dialysis treatments?: No  Does patient have a history of substance abuse?: No  Does patient have a history of Mental Health Diagnosis?: No         Means of Transportation  Means of Transport to [facility]:: Friends    Pt lives alone in apartment at [facility]. She had been independent with all ADLS and ambulation w/ walker p/t 6/24 admission. She had been discharged to  SNF.   DME: walker, cane  No prior HHC or outpt rehab  No h/o MH or SA treatment.   DaughterMeena is POA. Transports.   Referrals sent back to  SNF for SWETHA  Pt may be discharged with wound VAC

## 2025-07-09 NOTE — ASSESSMENT & PLAN NOTE
Left great toe  100% eschar, stable, nonfluctuant, with no obvious sign of infection  Local wound care with Skin-Prep  Continue to offload

## 2025-07-09 NOTE — ASSESSMENT & PLAN NOTE
Elevated Creatinine 2/2 contrast and acute blood loss anemia, improving  Recent Creatinine 1.22, eGFR 38  Continue holding jardiance and aldactone as per nephrology  Monitor BMP

## 2025-07-10 ENCOUNTER — DOCUMENTATION (OUTPATIENT)
Dept: VASCULAR SURGERY | Facility: CLINIC | Age: OVER 89
End: 2025-07-10

## 2025-07-10 LAB
ANION GAP SERPL CALCULATED.3IONS-SCNC: 5 MMOL/L (ref 4–13)
BUN SERPL-MCNC: 33 MG/DL (ref 5–25)
CALCIUM SERPL-MCNC: 7.1 MG/DL (ref 8.4–10.2)
CHLORIDE SERPL-SCNC: 102 MMOL/L (ref 96–108)
CO2 SERPL-SCNC: 28 MMOL/L (ref 21–32)
CREAT SERPL-MCNC: 1.26 MG/DL (ref 0.6–1.3)
ERYTHROCYTE [DISTWIDTH] IN BLOOD BY AUTOMATED COUNT: 19.6 % (ref 11.6–15.1)
GFR SERPL CREATININE-BSD FRML MDRD: 37 ML/MIN/1.73SQ M
GLUCOSE SERPL-MCNC: 106 MG/DL (ref 65–140)
HCT VFR BLD AUTO: 31 % (ref 34.8–46.1)
HGB BLD-MCNC: 9.6 G/DL (ref 11.5–15.4)
MCH RBC QN AUTO: 33 PG (ref 26.8–34.3)
MCHC RBC AUTO-ENTMCNC: 31 G/DL (ref 31.4–37.4)
MCV RBC AUTO: 107 FL (ref 82–98)
PLATELET # BLD AUTO: 324 THOUSANDS/UL (ref 149–390)
PMV BLD AUTO: 8.9 FL (ref 8.9–12.7)
POTASSIUM SERPL-SCNC: 3.9 MMOL/L (ref 3.5–5.3)
RBC # BLD AUTO: 2.91 MILLION/UL (ref 3.81–5.12)
SODIUM SERPL-SCNC: 135 MMOL/L (ref 135–147)
WBC # BLD AUTO: 9.01 THOUSAND/UL (ref 4.31–10.16)

## 2025-07-10 PROCEDURE — 99024 POSTOP FOLLOW-UP VISIT: CPT | Performed by: SURGERY

## 2025-07-10 PROCEDURE — 94640 AIRWAY INHALATION TREATMENT: CPT

## 2025-07-10 PROCEDURE — 99232 SBSQ HOSP IP/OBS MODERATE 35: CPT | Performed by: INTERNAL MEDICINE

## 2025-07-10 PROCEDURE — 85027 COMPLETE CBC AUTOMATED: CPT | Performed by: SURGERY

## 2025-07-10 PROCEDURE — 80048 BASIC METABOLIC PNL TOTAL CA: CPT | Performed by: SURGERY

## 2025-07-10 PROCEDURE — G0545 PR INHERENT VISIT TO INPT: HCPCS | Performed by: INTERNAL MEDICINE

## 2025-07-10 PROCEDURE — 99222 1ST HOSP IP/OBS MODERATE 55: CPT | Performed by: INTERNAL MEDICINE

## 2025-07-10 PROCEDURE — 94760 N-INVAS EAR/PLS OXIMETRY 1: CPT

## 2025-07-10 PROCEDURE — 94664 DEMO&/EVAL PT USE INHALER: CPT

## 2025-07-10 PROCEDURE — 97163 PT EVAL HIGH COMPLEX 45 MIN: CPT

## 2025-07-10 PROCEDURE — 97167 OT EVAL HIGH COMPLEX 60 MIN: CPT

## 2025-07-10 RX ADMIN — DOCUSATE SODIUM 100 MG: 100 CAPSULE, LIQUID FILLED ORAL at 17:02

## 2025-07-10 RX ADMIN — CEFAZOLIN SODIUM 1000 MG: 1 SOLUTION INTRAVENOUS at 09:56

## 2025-07-10 RX ADMIN — Medication 12.5 MG: at 09:56

## 2025-07-10 RX ADMIN — CEFEPIME 1000 MG: 1 INJECTION, POWDER, FOR SOLUTION INTRAMUSCULAR; INTRAVENOUS at 20:36

## 2025-07-10 RX ADMIN — DOCUSATE SODIUM 100 MG: 100 CAPSULE, LIQUID FILLED ORAL at 09:57

## 2025-07-10 RX ADMIN — LEVALBUTEROL HYDROCHLORIDE 1.25 MG: 1.25 SOLUTION RESPIRATORY (INHALATION) at 14:30

## 2025-07-10 RX ADMIN — BUMETANIDE 1 MG: 1 TABLET ORAL at 09:57

## 2025-07-10 RX ADMIN — LEVALBUTEROL HYDROCHLORIDE 1.25 MG: 1.25 SOLUTION RESPIRATORY (INHALATION) at 08:35

## 2025-07-10 RX ADMIN — FLUTICASONE FUROATE AND VILANTEROL TRIFENATATE 1 PUFF: 200; 25 POWDER RESPIRATORY (INHALATION) at 09:55

## 2025-07-10 RX ADMIN — OXYCODONE HYDROCHLORIDE 5 MG: 5 TABLET ORAL at 21:47

## 2025-07-10 RX ADMIN — Medication 12.5 MG: at 20:38

## 2025-07-10 RX ADMIN — APIXABAN 2.5 MG: 2.5 TABLET, FILM COATED ORAL at 17:02

## 2025-07-10 RX ADMIN — POLYETHYLENE GLYCOL 3350 17 G: 17 POWDER, FOR SOLUTION ORAL at 09:56

## 2025-07-10 RX ADMIN — CEFAZOLIN SODIUM 1000 MG: 1 SOLUTION INTRAVENOUS at 02:50

## 2025-07-10 RX ADMIN — APIXABAN 2.5 MG: 2.5 TABLET, FILM COATED ORAL at 09:57

## 2025-07-10 RX ADMIN — LEVOTHYROXINE SODIUM 50 MCG: 0.05 TABLET ORAL at 02:50

## 2025-07-10 RX ADMIN — LEVALBUTEROL HYDROCHLORIDE 1.25 MG: 1.25 SOLUTION RESPIRATORY (INHALATION) at 19:41

## 2025-07-10 RX ADMIN — SENNOSIDES 17.2 MG: 8.6 TABLET, FILM COATED ORAL at 20:36

## 2025-07-10 NOTE — CASE MANAGEMENT
Case Management Discharge Planning Note    Patient name Rebeca Banegas  [facility] [id number]  : 1932 Date 7/10/2025       Current Admission Date: 2025  Current Admission Diagnosis:Wound disruption, post-op, skin, initial encounter   Patient Active Problem List    Diagnosis Date Noted    Wound disruption, post-op, skin, initial encounter 2025    History of embolectomy 2025    Ambulatory dysfunction 2025    Pressure injury of toe of left foot, unstageable ([facility]) 2025    Pressure injury of deep tissue of left heel 2025    Constipation 2025    Anemia 2025    DARIO (acute kidney injury) ([facility]) 2025    Hyperphosphatemia 2025    Acute lower limb ischemia 2025    Muscle cramp 2025    Chronic thoracic back pain 2025    Chronic midline low back pain without sciatica 2025    Arthralgia 2025    Other atherosclerosis of native arteries of extremities, bilateral legs ([facility]) 2025    Severe protein-calorie malnutrition ([facility]) 2024    History of CVA (cerebrovascular accident) 2024    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 ([facility]) 2024    Bronchiectasis ([facility]) 2023    MGUS (monoclonal gammopathy of unknown significance) 2023    Chronic cough 2023    Age-related osteoporosis without current pathological fracture 2023    Secondary hyperparathyroidism ([facility]) 2023    Osteopenia of hip 2020    CKD (chronic kidney disease) stage 4, GFR 15-29 ml/min ([facility]) 2020    Elevated serum immunoglobulin free light chains 2019    Sensorineural hearing loss (SNHL), bilateral 2019    Left asymmetrical SNHL 2019    Chronic heart failure with preserved ejection fraction ([facility]) 2017    Plantar fasciitis 2016    Essential tremor 10/02/2015    Onychomycosis 2015    Pulmonary nodule 2015    Thyroid nodule 2015    Primary  hypertension 05/19/2014    Atherosclerosis 03/06/2014    Ovarian cyst 03/06/2014    Pancreatic cyst 03/06/2014    Backache 02/28/2014    Permanent atrial fibrillation (HCC) 02/14/2013    Arthritis 11/12/2012    Asthma 11/12/2012    Esophageal reflux 11/12/2012    Bifascicular bundle branch block 11/10/2012    Hypothyroidism 11/10/2012      LOS (days): 2  Geometric Mean LOS (GMLOS) (days): 2.9  Days to GMLOS:0.9     OBJECTIVE:  Risk of Unplanned Readmission Score: 28.16         Current admission status: Inpatient   Preferred Pharmacy:   [facility]/pharmacy #[id number] - [place], [place] - [address]  [place] [address]  Phone: [phone] Fax: [phone]    [facility] - [facility] [address] [facility]  [address] [facility] [address]  Phone: [phone] Fax: [phone]    Primary Care Provider: Santana Dillon MD    Primary Insurance: [employer]  Secondary Insurance:     DISCHARGE DETAILS:    Discharge planning discussed with:: patient at bedside  Freedom of Choice: Yes  Comments - Freedom of Choice: Pt may be medicallycleared 24-48 hours with wound VAC. Met with pt to discuss DCP for return to  SNF w/ wound VAC upon d/lc. She is agreeable to DCP but weary about d/c-she is not sure if she will feel ready tomorrow or next day. CM discussed IMM. Message sent to  via [employer] to check on bed availabilty. No auth needed.  has copy of wound VAC paperwork        Treatment Team Recommendation: Short Term Rehab  Expected Discharge Disposition: Skilled Nursing Facility        IMM Given (Date):: 07/10/25  IMM Given to:: Patient                             increase oxycontin 10mg tid with oxy ir 7.5mg prn  would benefit from steroids but awaiting path  RT until 9/3

## 2025-07-10 NOTE — PHYSICAL THERAPY NOTE
Physical Therapy Evaluation     Patient's Name: Rebeca Banegas    Post-operative complication [T81.9XXA]    Problem List[1]    Past Medical History[2]    Past Surgical History[3]         07/10/25 0929   PT Last Visit   PT Visit Date 07/10/25   Note Type   Note type Evaluation   Pain Assessment   Pain Assessment Tool 0-10   Pain Score 6   Pain Location/Orientation Location: Groin   Pain Onset/Description Onset: Ongoing;Frequency: Constant/Continuous;Descriptor: Discomfort   Effect of Pain on Daily Activities limits comfort and mobility   Patient's Stated Pain Goal No pain   Hospital Pain Intervention(s) Repositioned;Ambulation/increased activity;Emotional support   Restrictions/Precautions   Weight Bearing Precautions Per Order No   Other Precautions Fall Risk;Bed Alarm;Chair Alarm;Multiple lines;Pain  (wound vac)   Home Living   Type of Home Other (Comment)  ([facility])   Home Layout One level;Access   Bathroom Shower/Tub Walk-in shower   Bathroom Toilet Raised   Bathroom Equipment Grab bars in shower;Shower chair;Grab bars around toilet   Bathroom Accessibility Accessible   Home Equipment Walker;Cane   Prior Function   Level of [place] Independent with ADLs;Independent with functional mobility;Needs assistance with IADLS   Lives With Alone   Receives Help From   (staff)   IADLs Family/Friend/Other provides transportation;Family/Friend/Other provides medication management;Family/Friend/Other provides meals   Falls in the last 6 months 0   Vocational Retired   General   Family/Caregiver Present No   Cognition   Overall Cognitive Status WFL   Arousal/Participation Alert   Orientation Level Oriented X4   Memory Within functional limits   Following Commands Follows all commands and directions without difficulty   Comments Patient pleasant and cooperative   Subjective   Subjective Patient agreeable to PT eval   RUE Assessment   RUE Assessment WFL   LUE Assessment   LUE Assessment WFL   RLE  Assessment   RLE Assessment X   Strength RLE   RLE Overall Strength 4-/5   LLE Assessment   LLE Assessment X   Strength LLE   LLE Overall Strength 4-/5   Bed Mobility   Supine to Sit 4  Minimal assistance   Additional items Increased time required;Verbal cues;Assist x 1   Transfers   Sit to Stand 5  Supervision   Additional items Increased time required;Verbal cues   Stand to Sit 5  Supervision   Additional items Increased time required;Verbal cues   Additional Comments RW   Ambulation/Elevation   Gait pattern Decreased foot clearance;Shuffling;Short stride;Excessively slow   Gait Assistance 4  Minimal assist   Additional items Assist x 1;Verbal cues   Assistive Device Rolling walker   Distance 40'x2   Balance   Static Sitting Good   Dynamic Sitting Fair +   Static Standing Fair   Dynamic Standing Fair -   Ambulatory Fair -   Endurance Deficit   Endurance Deficit Yes   Endurance Deficit Description fatigue, weakness   Activity Tolerance   Activity Tolerance Patient limited by fatigue;Patient limited by pain   Medical Staff Made Aware OT   Nurse Made Aware RN cleared   Assessment   Prognosis Good   Problem List Decreased strength;Decreased endurance;Impaired balance;Decreased mobility;Pain   Assessment Pt is a 89 y.o. female seen for a high complexity PT evaluation due to Ongoing medical management for primary dx, Increased reliance on more restrictive AD compared to baseline, Decreased activity tolerance compared to baseline, Fall risk, Increased assistance needed from caregiver at current time, s/p surgical intervention. Patient is s/p admit to [facility] on 7/8/2025 for Post-operative complication (T81.9XXA). Patient  has a past medical history of Abnormal ECG, Abnormal ultrasound, Acute kidney failure, Arrhythmia, Arthritis, Asthma, Asymptomatic menopausal state, Atherosclerosis, Atrial fibrillation, Chronic kidney disease, Chronic obstructive lung disease, Chronic ulcer of left foot, Colon polyp,  Congestive heart failure, COPD, Coronary artery disease, COVID-19, Cuboid fracture, Disease of thyroid gland, Dyspepsia, Edema of lower extremities, Equinus deformity of left foot, Falls, GERD, Headache, High risk medication use, Hyperlipidemia, Hypertension, Hypokalemia, Hypothyroidism, Influenza, IPMN, Irregular heart beat, Labial abscess, Leg cramps, Limb swelling, Navicular fracture of ankle, Onychomycosis, Open wound of right upper arm, Osteoarthritis, Osteopenia, Osteoporosis, Paronychia, finger, Paroxysmal atrial fibrillation, Peripheral vascular disease, Plantar fasciitis, Shock, SOBOE , Stroke, Talus fracture, Vascular headache, and Wound of right foot.     PT now consulted to assess functional mobility and needs for safe d/c planning. Prior to admission, pt independent with functional mobility, independent ADLs, and needs assistance IADLs. Personal factors affecting status include fall risk, assist for IADLs, and medical status     Currently pt requires minimal assistance x1 for bed mobility, supervision for functional transfers with rolling walker ; minimal assistance x1 for ambulation with rolling walker. Pt presents functioning below baseline and w/ overall mobility deficits 2* to: decreased strength, decreased endurance, decreased mobility, impaired balance. These impairments place pt at risk for falls.     Pt will continue to benefit from skilled PT interventions to address stated impairments; to maximize functional potential; for ongoing pt/family education; and DME needs. The patient's AM-PAC Basic Mobility Inpatient Short Form Raw Score Is 17. PT is currently recommending Level 2 - Moderate Resource Intensity on d/c from hospital. Will continue to follow as able.   Goals   Patient Goals to go home   STG Expiration Date 07/24/25   Short Term Goal #1 In 14 days, patient will 1) increase strength in BUE/BLE by 1/2 to 1 full grade for increased strength and stability needed for functional mobility 2)  improve bed mobility to MI for improved mobility and decreased need for assist 3) sit EOB x30' with MI to facilitate trunk stability and safety for completion of ADL tasks 4) increase functional transfers to MI for improved safety and functional mobility 5) ambulate 250ft with MI using LRAD for increased endurance and safety ambulating home and community environments 6) improve balance by 1 grade for improved safety and stability and decreased risk for falls.   PT Treatment Day 0   Plan   Treatment/Interventions ADL retraining;Functional transfer training;LE strengthening/ROM;Therapeutic exercise;Endurance training;Patient/family training;Equipment eval/education;Bed mobility;Gait training;Spoke to nursing;Spoke to case management;OT;Elevations   PT Frequency 3-5x/wk   Discharge Recommendation   Rehab Resource Intensity Level, PT II (Moderate Resource Intensity)   Equipment Recommended Walker  (owns)   AM-PAC Basic Mobility Inpatient   Turning in Flat Bed Without Bedrails 3   Lying on Back to Sitting on Edge of Flat Bed Without Bedrails 3   Moving Bed to Chair 3   Standing Up From Chair Using Arms 3   Walk in Room 3   Climb 3-5 Stairs With Railing 2   Basic Mobility Inpatient Raw Score 17   Basic Mobility Standardized Score 39.67   [facility] Highest Level Of Mobility   -HLM Goal 5: Stand one or more mins   -HLM Achieved 7: Walk 25 feet or more   Modified Tom Green Scale   Modified David Scale 4   End of Consult   Patient Position at End of Consult All needs within reach;Bed/Chair alarm activated;Bedside chair       Estela Lozano, PT, DPT         [1]   Patient Active Problem List  Diagnosis    Arthritis    Asthma    Atherosclerosis    Permanent atrial fibrillation (HCC)    Backache    Bifascicular bundle branch block    Chronic heart failure with preserved ejection fraction (HCC)    Esophageal reflux    Essential tremor    Primary hypertension    Hypothyroidism    Onychomycosis    Ovarian cyst    Pancreatic  cyst    Plantar fasciitis    Pulmonary nodule    Thyroid nodule    Sensorineural hearing loss (SNHL), bilateral    Left asymmetrical SNHL    Elevated serum immunoglobulin free light chains    Osteopenia of hip    CKD (chronic kidney disease) stage 4, GFR 15-29 ml/min (HCC)    Secondary hyperparathyroidism (HCC)    Age-related osteoporosis without current pathological fracture    Chronic cough    MGUS (monoclonal gammopathy of unknown significance)    Bronchiectasis (HCC)    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)    Severe protein-calorie malnutrition (HCC)    Other atherosclerosis of native arteries of extremities, bilateral legs (HCC)    Muscle cramp    Chronic thoracic back pain    Chronic midline low back pain without sciatica    Arthralgia    History of CVA (cerebrovascular accident)    Acute lower limb ischemia    DARIO (acute kidney injury) (HCC)    Hyperphosphatemia    Anemia    Constipation    Wound disruption, post-op, skin, initial encounter    History of embolectomy    Ambulatory dysfunction    Pressure injury of toe of left foot, unstageable (HCC)    Pressure injury of deep tissue of left heel   [2]   Past Medical History:  Diagnosis Date    Abnormal ECG     Abnormal ultrasound     RESOLVED: [id number]    Acute kidney failure (HCC) 12/29/2024    Advice given about COVID-19 virus infection 04/07/2020    Ambulates with cane     Arrhythmia     Arthritis     Asthma     Asthma with acute exacerbation     LAST ASSESSED: [id number]    Asymptomatic menopausal state     Atherosclerosis     Atrial fibrillation (HCC)     Chronic kidney disease     Chronic obstructive lung disease (HCC)     w/ chonic cough    Chronic ulcer of left foot (HCC)     Colon polyp     Congestive heart failure (HCC)     LAST ASSESSED: [id number]    COPD (chronic obstructive pulmonary disease) (HCC)     Coronary artery disease     COVID-19 01/06/2025    COVID-19 virus infection 03/25/2023    Cuboid fracture     LAST ASSESSED:  [id number]    Disease of thyroid gland     Dyspepsia     Edema of both lower extremities     Equinus deformity of left foot     Fall 04/01/2024    Falls     5/2024    GERD (gastroesophageal reflux disease)     Headache(784.0)     High risk medication use     LAST ASSESSED: [id number]    Hyperlipidemia     Hypertension     Hypokalemia     Hypothyroidism     Impacted cerumen of both ears     LAST ASSESSED: [id number]    Impacted cerumen of right ear     LAST ASSESSED: [id number]    Influenza     LAST ASSESSED: [id number]    IPMN (intraductal papillary mucinous neoplasm)     RESOLVED: 02OCT2015    Irregular heart beat     afib. Warfarin.    Labial abscess 06/29/2022    Leg cramps 12/21/2018    Limb swelling     LAST ASSESSED: [id number]    Navicular fracture of ankle     LAST ASSESSED: [id number]    Onychomycosis     LAST ASSESSED: [id number]    Open wound of right upper arm 10/17/2022    Osteoarthritis     Osteopenia     Osteoporosis     Paronychia, finger, unspecified laterality     LAST ASSESSED: [id number]    Paroxysmal atrial fibrillation (HCC)     LAST ASSESSED: 02MAR2014    Peripheral vascular disease (HCC)     Plantar fasciitis     Shock (HCC) 06/26/2025    SOBOE (shortness of breath on exertion)     Stroke (HCC)     Right arm weakness that has resolved    Talus fracture     LAST ASSESSED: [id number]    Vaccine counseling 12/19/2023    Vascular headache     Walker as ambulation aid     Wound of right foot    [3]   Past Surgical History:  Procedure Laterality Date    APPENDECTOMY      BONE BIOPSY Left 08/09/2024    Procedure: Bone bx of proximal phalanx second toe & second met with fluoroscopic guidance;  Surgeon: Jonny Marcial DPM;  [facility];  Service: Podiatry    CATARACT EXTRACTION Bilateral     CHOLECYSTECTOMY      COLONOSCOPY      FASCIOTOMY Right 6/24/2025    Procedure: FASCIOTOMY;  Surgeon: Arthur Wei MD;  [facility]: [facility];  Service: Vascular    JOINT REPLACEMENT Bilateral      knee    NEUROPLASTY / TRANSPOSITION MEDIAN NERVE AT CARPAL TUNNEL BILATERAL Bilateral     DECOMPRESSION    OOPHORECTOMY Bilateral     age 81    PELVIC FLOOR REPAIR  2014    PA BIOPSY BONE TROCAR/NEEDLE SUPERFICIAL Left 08/09/2024    Procedure: Left foot debridement;  Surgeon: Jonny Marcial DPM;  [facility];  Service: Podiatry    PA DEBRIDEMENT MUSCLE &/FASCIA 1ST 20 SQ CM/< Left 08/09/2024    Procedure: Left foot debridement;  Surgeon: Jonny Marcial DPM;  [facility];  Service: Podiatry    PA OSTEOT W/WO LNGTH SHRT/CORRJ 1ST METAR Left 11/29/2024    Procedure: Left foot percutaneous osteotomy of second, third and fourth metatarsal.;  Surgeon: Jonny Marcial DPM;  [facility];  Service: Podiatry    SALPINGECTOMY Bilateral     SINUS SURGERY      THROMBECTOMY W/ EMBOLECTOMY Left 6/24/2025    Procedure: LEFT LOWER EXTREMITY EMBOLECTOMY/THROMBECTOMY;  Surgeon: Adonay Raymond MD;  [facility];  Service: Vascular    THROMBECTOMY W/ EMBOLECTOMY Right 6/24/2025    Procedure: EMBOLECTOMY/THROMBECTOMY LOWER EXTREMITY;  Surgeon: Arthur Wei MD;  [facility];  Service: Vascular    TONSILLECTOMY  1941    TOTAL KNEE ARTHROPLASTY Bilateral

## 2025-07-10 NOTE — DISCHARGE INSTR - AVS FIRST PAGE
DISCHARGE INSTRUCTIONS  NEGATIVE PRESSURE WOUND THERAPY/WOUND VAC    ACTIVITY:   Limit your physical activity with the limb with the Wound VAC therapy.  Walking up steps and normal activities may be resumed as you feel ready.  Avoid strenuous activity such as vigorous exercise.  Avoid heavy lifting (do not lift more than 15 pounds) while you have the Wound VAC in place.  You should not drive a car while you have the Wound VAC in place.  Your provider will instruct you when it is safe to drive.  You may ride in a car.    DIET:  Resume your normal diet.  Good nutrition is important for healing of your incision.  If you are discharged on narcotics for pain control, continue taking your stool softeners until you are having regular bowel movements.    INCISION:  Wound VAC will stay in place 24 hours a day.  A licensed professional (Home Health Care Worker) will come to your home a few times a week to change the bandage and check the machine.  You may need to have the bandage/sponge changed more often if there is a lot of drainage.  You may NOT shower or bathe while wound VAC is in place.  Do NOT remove dressing unless your provider instructs you to.    DO NOT put any powders, creams, ointments, or lotions around the dressing of the Wound VAC.    SWELLING: Most patients have noticeable swelling after leg surgery. This usually improves within a few weeks. If swelling is present, elevate the affected limb whenever possible.     FOLLOW UP APPOINTMENTS:  Making and keeping follow up appointments and ultrasound tests are important to your recovery.  If you have difficulty making it to or keeping your follow up appointments, call the office.    If you have increased pain, fever >101.5, nausea, vomiting, increased drainage, redness, warmth, swelling, change in color of drainage/pus, or a bad smell at your Wound VAC site, new coldness/numbness of your arm or leg, or become dizzy or confused please call us immediately and GO  directly to the ER.    PLEASE CALL THE OFFICE IF YOU HAVE ANY QUESTIONS  [phone]  [phone] [address], [address], [place] [address]  [address] [place] [address]  [address] [place] [address]  [address] [facility], 1st Floor, [place] [address]  [address] [facility], [address], [place] [address]  [address] [facility], [address], [place] [address]  [address] [facility], Entrance [address], 2nd Floor, [place] [address]  [address] [facility], 1st Floor, [address], [place] [address]  [address] [facility], [facility] [place] [address]  [address] [facility], [address], [place] [address]    ***Please bring wound vac supplies to your follow up appointment (sponge kit, machine, cannister, etc), 07/22/25 with Dr. Raymond, Vascular office [place]***

## 2025-07-10 NOTE — PLAN OF CARE
Problem: PHYSICAL THERAPY ADULT  Goal: Performs mobility at highest level of function for planned discharge setting.  See evaluation for individualized goals.  Description: Treatment/Interventions: ADL retraining, Functional transfer training, LE strengthening/ROM, Therapeutic exercise, Endurance training, Patient/family training, Equipment eval/education, Bed mobility, Gait training, Spoke to nursing, Spoke to case management, OT, Elevations  Equipment Recommended: Walker (owns)       See flowsheet documentation for full assessment, interventions and recommendations.  Outcome: Progressing  Note: Prognosis: Good  Problem List: Decreased strength, Decreased endurance, Impaired balance, Decreased mobility, Pain  Assessment: Pt is a 89 y.o. female seen for a high complexity PT evaluation due to Ongoing medical management for primary dx, Increased reliance on more restrictive AD compared to baseline, Decreased activity tolerance compared to baseline, Fall risk, Increased assistance needed from caregiver at current time, s/p surgical intervention. Patient is s/p admit to [facility] on 7/8/2025 for Post-operative complication (T81.9XXA). Patient  has a past medical history of Abnormal ECG, Abnormal ultrasound, Acute kidney failure, Arrhythmia, Arthritis, Asthma, Asymptomatic menopausal state, Atherosclerosis, Atrial fibrillation, Chronic kidney disease, Chronic obstructive lung disease, Chronic ulcer of left foot, Colon polyp, Congestive heart failure, COPD, Coronary artery disease, COVID-19, Cuboid fracture, Disease of thyroid gland, Dyspepsia, Edema of lower extremities, Equinus deformity of left foot, Falls, GERD, Headache, High risk medication use, Hyperlipidemia, Hypertension, Hypokalemia, Hypothyroidism, Influenza, IPMN, Irregular heart beat, Labial abscess, Leg cramps, Limb swelling, Navicular fracture of ankle, Onychomycosis, Open wound of right upper arm, Osteoarthritis, Osteopenia, Osteoporosis,  Paronychia, finger, Paroxysmal atrial fibrillation, Peripheral vascular disease, Plantar fasciitis, Shock, SOBOE , Stroke, Talus fracture, Vascular headache, and Wound of right foot.     PT now consulted to assess functional mobility and needs for safe d/c planning. Prior to admission, pt independent with functional mobility, independent ADLs, and needs assistance IADLs. Personal factors affecting status include fall risk, assist for IADLs, and medical status     Currently pt requires minimal assistance x1 for bed mobility, supervision for functional transfers with rolling walker ; minimal assistance x1 for ambulation with rolling walker. Pt presents functioning below baseline and w/ overall mobility deficits 2* to: decreased strength, decreased endurance, decreased mobility, impaired balance. These impairments place pt at risk for falls.     Pt will continue to benefit from skilled PT interventions to address stated impairments; to maximize functional potential; for ongoing pt/family education; and DME needs. The patient's AM-PAC Basic Mobility Inpatient Short Form Raw Score Is 17. PT is currently recommending Level 2 - Moderate Resource Intensity on d/c from hospital. Will continue to follow as able.        Rehab Resource Intensity Level, PT: II (Moderate Resource Intensity)    See flowsheet documentation for full assessment.

## 2025-07-10 NOTE — PLAN OF CARE
Problem: OCCUPATIONAL THERAPY ADULT  Goal: Performs self-care activities at highest level of function for planned discharge setting.  See evaluation for individualized goals.  Description:            See flowsheet documentation for full assessment, interventions and recommendations.   Outcome: Progressing  Note: Limitation: Decreased ADL status, Decreased endurance  Prognosis: Good  Assessment: Pt is a 89 y.o. female seen for Occupational Therapy Evaluation due decreased activity tolerance and increased assist required for pt to participate in and perform functional activities from baseline. Pt  was admitted to [facility]  for concern of increasing serous drainage from right groin wound and report of significant pain following her recent surgery. The pt had recently been admitted to the hospital for acute lower limb ischemia and underwent emergent right leg embolectomy/fasciotomy and left popliteal thrombectomy.  She was then discharged to a SNF.  The pt then returned to the ER and Vascular surgery evaluated and recommended pt for OR for right groin washout and wound VAC placement. The pt was admitted on 7/8/2025 with Principal Problem: Wound disruption, post-op, skin, initial encounter  and diagnosis of Post-operative complication (T81.9XXA)  Pt now + for activity as tolerated, out of bed and active OT orders.At baseline pt was completing all ADLs  and functional mobility Independently as well completed IADLs Independently. Pt  has  DME : cane and walker  and + . Pt lives in a [facility] in 2nd floor apartment with elevator access.  Pt currently requires S for overall ADLS and Min A functional mobility/transfers. Pt currently presents with impairments in the following categories -difficulty performing ADLS activity tolerance, endurance, and standing balance/tolerance. These impairments, as well as pt's fatigue and risk for falls  limit pt's ability to safely engage in all baseline areas of  occupation, including grooming, bathing, dressing, toileting, and functional mobility/transfers The patient's raw score on the AM-PAC Daily Activity Inpatient Short Form is  . A raw score of greater than or equal to 19 suggests the patient may benefit from discharge to home. Please refer to the recommendation of the Occupational Therapist for safe discharge planning.From OT standpoint, pt is recommend for Level III (Minimal Intensity Resources) and return to rehab upon D/C . OT will continue to follow to address the below stated goals.     Rehab Resource Intensity Level, OT: II (Moderate Resource Intensity)

## 2025-07-10 NOTE — PROGRESS NOTES
Progress Note - Hospitalist   Name: Rebeca Banegas 89 y.o. female I MRN: [id number]  Unit/Bed#: [facility] 511-01 I Date of Admission: 7/8/2025   Date of Service: 7/10/2025 I Hospital Day: 2    Assessment & Plan  Wound disruption, post-op, skin, initial encounter  Recent emergent right leg embolectomy/lobectomy, fasciotomy and left popliteal thrombectomy on 6/24/2025 by vascular surgery complicated by right groin wound infection  S/p right groin washout with VAC placement 7/9    Wound cultures growing GNR  Currently on IV cefazolin and afebrile  Infectious disease consulted for further recommendations  Monitor WBC count and fever curve  Wound VAC exchanges Monday/Wednesday/Friday  Anticipate discharge back to subacute rehab  Asthma  Not in exacerbation  Continue home inhaler/nebulizer regimen  Permanent atrial fibrillation (HCC)  Rate controlled with metoprolol  Continue on Eliquis for stroke prevention  Primary hypertension  Controlled on home Bumex, metoprolol  Continue to monitor  Hypothyroidism  Continue Synthroid    VTE Pharmacologic Prophylaxis: VTE Score: 8 High Risk (Score >/= 5) - Pharmacological DVT Prophylaxis Ordered: apixaban (Eliquis). Sequential Compression Devices Ordered.    Mobility:   Basic Mobility Inpatient Raw Score: 20  JH-HLM Goal: 6: Walk 10 steps or more  JH-HLM Achieved: 6: Walk 10 steps or more  JH-HLM Goal achieved. Continue to encourage appropriate mobility.    Patient Centered Rounds: I performed bedside rounds with nursing staff today.   Discussions with Specialists or Other Care Team Provider: CM. Vascular.     Education and Discussions with Family / Patient: Updated  (daughter) via phone.    Current Length of Stay: 2 day(s)  Current Patient Status: Inpatient   Certification Statement: The patient will continue to require additional inpatient hospital stay due to IV antibiotics, wound VAC exchanges, clinical monitoring, serial lab monitoring, dispo planning  Discharge  Plan: Anticipate discharge in 24-48 hrs to rehab facility.    Code Status: Level 3 - DNAR and DNI    Subjective   Patient seen and examined.  She remains afebrile.  Pain is controlled.  She is feeling well today.  No acute complaints.    Objective :  Temp:  [97.5 °F (36.4 °C)-98.9 °F (37.2 °C)] 97.9 °F (36.6 °C)  HR:  [] 119  BP: ()/(50-99) 121/76  Resp:  [16-29] 16  SpO2:  [95 %-100 %] 96 %  O2 Device: Nasal cannula  Nasal Cannula O2 Flow Rate (L/min):  [3 L/min] 3 L/min    Body mass index is 22.66 kg/m².     Input and Output Summary (last 24 hours):     Intake/Output Summary (Last 24 hours) at 7/10/2025 1010  Last data filed at 7/10/2025 0705  Gross per 24 hour   Intake 1816 ml   Output 500 ml   Net 1316 ml     PHYSICAL EXAM:    Vitals signs reviewed  Constitutional   Awake and cooperative. NAD.   Head/Neck   Normocephalic. Atraumatic.   HEENT   No scleral icterus. EOMI.   Heart   Regular rate and rhythm. No murmurs.   Lungs   Clear to auscultation bilaterally. Respirations unlaboured.   Abdomen   Soft. Nontender. Nondistended.   Wound VAC in place in right groin/inguinal region   Skin   Skin color normal. No rashes.   Extremities   No deformities. No peripheral edema.   Neuro   Alert and oriented. No new deficits.   Psych   Mood stable. Affect normal.           Lab Results: I have reviewed the following results:   Results from last 7 days   Lab Units 07/10/25  0617 07/09/25  0448   WBC Thousand/uL 9.01 9.90   HEMOGLOBIN g/dL 9.6* 9.1*   HEMATOCRIT % 31.0* 28.2*   PLATELETS Thousands/uL 324 343   SEGS PCT %  --  68   LYMPHO PCT %  --  20   MONO PCT %  --  9   EOS PCT %  --  2     Results from last 7 days   Lab Units 07/10/25  0617 07/09/25 0448 07/08/25  0814   SODIUM mmol/L 135   < > 137   POTASSIUM mmol/L 3.9   < > 3.8   CHLORIDE mmol/L 102   < > 101   CO2 mmol/L 28   < > 24   BUN mg/dL 33*   < > 37*   CREATININE mg/dL 1.26   < > 1.24*   ANION GAP mmol/L 5   < > 12*   CALCIUM mg/dL 7.1*   < > 7.4*    ALBUMIN g/dL  --   --  3.7   TOTAL BILIRUBIN mg/dL  --   --  2.1*   ALK PHOS U/L  --   --  60   ALT U/L  --   --  20   AST U/L  --   --  16   GLUCOSE RANDOM mg/dL 106   < > 103*    < > = values in this interval not displayed.     Results from last 7 days   Lab Units 07/09/25  0448   INR  1.36*     Results from last 7 days   Lab Units 07/05/25  1120 07/05/25  0610 07/04/25  2046 07/04/25  1615 07/04/25  1100 07/04/25  0729 07/04/25  0612 07/03/25  1558 07/03/25  1035   POC GLUCOSE mg/dl 103 91 108 90 129 111 93 90 97               Recent Cultures (last 7 days):   Results from last 7 days   Lab Units 07/09/25  1005   GRAM STAIN RESULT  1+ Polys*  2+ Gram negative rods*       Imaging Results Review: No pertinent imaging studies reviewed.  Other Study Results Review: No additional pertinent studies reviewed.    Last 24 Hours Medication List:     Current Facility-Administered Medications:     acetaminophen (TYLENOL) tablet 650 mg, Q6H PRN    albuterol inhalation solution 2.5 mg, Q6H PRN    apixaban (ELIQUIS) tablet 2.5 mg, BID    bisacodyl (DULCOLAX) rectal suppository 10 mg, Daily PRN    bumetanide (BUMEX) tablet 1 mg, BID    ceFAZolin (ANCEF) IVPB (premix in dextrose) 1,000 mg 50 mL, Q8H, Last Rate: 1,000 mg (07/10/25 0956)    docusate sodium (COLACE) capsule 100 mg, BID    fluticasone-vilanterol 200-25 mcg/actuation 1 puff, Daily    levalbuterol (XOPENEX) inhalation solution 1.25 mg, TID    levothyroxine tablet 50 mcg, Early Morning    metoprolol tartrate (LOPRESSOR) partial tablet 12.5 mg, Q12H GARTH    morphine injection 2 mg, Q2H PRN    ondansetron (ZOFRAN) injection 4 mg, Q6H PRN    oxyCODONE (ROXICODONE) split tablet 2.5 mg, Q4H PRN **OR** oxyCODONE (ROXICODONE) IR tablet 5 mg, Q4H PRN    polyethylene glycol (MIRALAX) packet 17 g, Daily    senna (SENOKOT) tablet 17.2 mg, HS    Administrative Statements   Today, Patient Was Seen By: Baldomero Vinson, DO  I have spent a total time of 38 minutes in caring for  this patient on the day of the visit/encounter including Diagnostic results, Prognosis, Risks and benefits of tx options, Instructions for management, Patient and family education, Importance of tx compliance, Risk factor reductions, Impressions, Counseling / Coordination of care, Documenting in the medical record, Reviewing/placing orders in the medical record (including tests, medications, and/or procedures), Obtaining or reviewing history  , and Communicating with other healthcare professionals .    **Please Note: This note may have been constructed using a voice recognition system.**

## 2025-07-10 NOTE — OCCUPATIONAL THERAPY NOTE
Occupational Therapy Evaluation     Patient Name: Rebeca Banegas  Today's Date: 7/10/2025  Problem List  Principal Problem:    Wound disruption, post-op, skin, initial encounter  Active Problems:    Asthma    Permanent atrial fibrillation (HCC)    Primary hypertension    Hypothyroidism    Past Medical History  Past Medical History[1]  Past Surgical History  Past Surgical History[2]       OCCUPATIONAL THERAPY       07/10/25 0930   OT Last Visit   OT Visit Date 07/10/25   Note Type   Note type Evaluation   Pain Assessment   Pain Assessment Tool 0-10   Pain Score 6   Pain Location/Orientation Location: [facility] Pain Intervention(s) Repositioned;Emotional support   Restrictions/Precautions   Weight Bearing Precautions Per Order No   Other Precautions Chair Alarm;Bed Alarm;Multiple lines;Fall Risk;Pain  (wound vac)   Home Living   Type of Home SNF  ([facility])   Home Layout One level;Able to live on main level with bedroom/bathroom   Bathroom Shower/Tub Walk-in shower   Bathroom Toilet Raised   Bathroom Equipment Grab bars in shower   Bathroom Accessibility Accessible   Home Equipment Walker;Cane   Prior Function   Level of [place] Independent with ADLs;Independent with functional mobility;Needs assistance with IADLS   Lives With Alone   Receives Help From   (staff)   IADLs Family/Friend/Other provides transportation;Family/Friend/Other provides medication management;Family/Friend/Other provides meals   Falls in the last 6 months 0   Vocational Retired   Lifestyle   Autonomy PTA Pt reported was Independent with ADL and functional mob. Receives assist for IADL and + .   Reciprocal Relationships Supportive Facility Staff   Service to Others Retired   Intrinsic Gratification Enjoys reading   General   Additional Pertinent History Patient   has a past medical history of Abnormal ECG, Abnormal ultrasound, Acute kidney failure (HCC), Advice given about COVID-19 virus infection, Ambulates with  "cane, Arrhythmia, Arthritis, Asthma, Asthma with acute exacerbation, Asymptomatic menopausal state, Atherosclerosis, Atrial fibrillation (HCC), Chronic kidney disease, Chronic obstructive lung disease (HCC), Chronic ulcer of left foot (HCC), Colon polyp, Congestive heart failure (HCC), COPD (chronic obstructive pulmonary disease) (HCC), Coronary artery disease, COVID-19, COVID-19 virus infection, Cuboid fracture, Disease of thyroid gland, Dyspepsia, Edema of both lower extremities, Equinus deformity of left foot, Fall, Falls, GERD (gastroesophageal reflux disease), Headache(784.0), High risk medication use, Hyperlipidemia, Hypertension, Hypokalemia, Hypothyroidism, Impacted cerumen of both ears, Impacted cerumen of right ear, Influenza, IPMN (intraductal papillary mucinous neoplasm), Irregular heart beat, Labial abscess, Leg cramps, Limb swelling, Navicular fracture of ankle, Onychomycosis, Open wound of right upper arm, Osteoarthritis, Osteopenia, Osteoporosis, Paronychia, finger, unspecified laterality, Paroxysmal atrial fibrillation (HCC), Peripheral vascular disease (HCC), Plantar fasciitis, Shock (HCC), SOBOE (shortness of breath on exertion), Stroke ([facility]), Talus fracture, Vaccine counseling, Vascular headache, Walker as ambulation aid, and Wound of right foot.   Family/Caregiver Present No   Subjective   Subjective \"I live on the second floor. There's an elevator\"   ADL   Grooming Assistance 5  Supervision/Setup   UB Bathing Assistance 5  Supervision/Setup   LB Bathing Assistance 4  Minimal Assistance   UB Dressing Assistance 5  Supervision/Setup   LB Dressing Assistance 5  Supervision/Setup   Toileting Assistance  4  Minimal Assistance   Bed Mobility   Supine to Sit 4  Minimal assistance   Additional items Increased time required   Transfers   Sit to Stand 5  Supervision   Additional items Increased time required   Stand to Sit 5  Supervision   Additional items Increased time required   Additional Comments RW "   Functional Mobility   Functional Mobility 4  Minimal assistance   Additional Comments RW short community distance   Balance   Static Sitting Good   Dynamic Sitting Fair +   Static Standing Fair   Dynamic Standing Fair -   Ambulatory Fair -   Activity Tolerance   Activity Tolerance Patient limited by fatigue;Patient limited by pain   Medical Staff Made Aware PT Estela -Physical Therapy present for co-eval 2* medical complexity, comorbidities and limited overall tolerance to activities   Nurse Made Aware BHAKTI Pederson cleared   RUE Assessment   RUE Assessment WFL   LUE Assessment   LUE Assessment WFL   Cognition   Overall Cognitive Status WFL   Arousal/Participation Alert;Responsive;Cooperative   Attention Within functional limits   Orientation Level Oriented X4   Memory Within functional limits   Following Commands Follows all commands and directions without difficulty   Comments Pt pleasant and cooperative with all therapy requests.   Assessment   Limitation Decreased ADL status;Decreased endurance   Prognosis Good   Assessment Pt is a 89 y.o. female seen for Occupational Therapy Evaluation due decreased activity tolerance and increased assist required for pt to participate in and perform functional activities from baseline. Pt  was admitted to [facility]  for concern of increasing serous drainage from right groin wound and report of significant pain following her recent surgery. The pt had recently been admitted to the hospital for acute lower limb ischemia and underwent emergent right leg embolectomy/fasciotomy and left popliteal thrombectomy.  She was then discharged to a SNF for rehab.  The pt then returned to the ER and Vascular surgery evaluated and recommended pt for OR for right groin washout and wound VAC placement. The pt was admitted on 7/8/2025 with Principal Problem: Wound disruption, post-op, skin, initial encounter  and diagnosis of Post-operative complication (T81.9XXA)  Pt now + for  activity as tolerated, out of bed and active OT orders.At baseline pt was completing all ADLs  and functional mobility Independently as well completed IADLs Independently. Pt  has  DME : cane and walker  and + . Pt lives in a [facility] [place] in 2nd floor apartment with elevator access.  Pt currently requires S for overall ADLS and Min A functional mobility/transfers. Pt currently presents with impairments in the following categories -difficulty performing ADLS activity tolerance, endurance, and standing balance/tolerance. These impairments, as well as pt's fatigue and risk for falls  limit pt's ability to safely engage in all baseline areas of occupation, including grooming, bathing, dressing, toileting, and functional mobility/transfers The patient's raw score on the AM-PAC Daily Activity Inpatient Short Form is  . A raw score of greater than or equal to 19 suggests the patient may benefit from discharge to home. Please refer to the recommendation of the Occupational Therapist for safe discharge planning.From OT standpoint, pt is recommend for Level III (Minimal Intensity Resources) and return to rehab upon D/C . OT will continue to follow to address the below stated goals.   Goals   Patient Goals to return home   LTG Time Frame 10-14   Long Term Goal #1 SEE PATIENT OCCUPATIONAL THERAPY GOALS BELOW   Plan   Goal Expiration Date 07/24/25   OT Frequency 2-3x/wk   Discharge Recommendation   Rehab Resource Intensity Level, OT II (Moderate Resource Intensity)   -PAC Daily Activity Inpatient   Lower Body Dressing 3   Bathing 3   Toileting 3   Upper Body Dressing 3   Grooming 4   Eating 4   Daily Activity Raw Score 20   Daily Activity Standardized Score (Calc for Raw Score >=11) 42.03   Modified David Scale   Modified David Scale 4   End of Consult   Patient Position at End of Consult Bedside chair;Bed/Chair alarm activated;All needs within reach   Within 14 days pt will complete the following goals:     Pt  will complete bed mobility  w/ Mod I to maximize pt independence to return home.    Pt will complete functional transfers and mobility with Mod I to/from all surfaces and with Good dynamic balance and safety for pt participation in ADL and leisure interest tasks    Pt will complete ADL with Mod I using appropriate AD and any AE as needed.     Pt will complete toileting routine (transfers, hygiene, and clothing management) with Mod I using AD/DME as needed with good safety and balance  to maximize pt independence.     Pt will increase standing tolerance to 20+ minutes to maximize independence w/ ADL tasks standing at the sink.    aAliyah Villanueva OTR/L                [1]   Past Medical History:  Diagnosis Date    Abnormal ECG     Abnormal ultrasound     RESOLVED: [id number]    Acute kidney failure (HCC) 12/29/2024    Advice given about COVID-19 virus infection 04/07/2020    Ambulates with cane     Arrhythmia     Arthritis     Asthma     Asthma with acute exacerbation     LAST ASSESSED: [id number]    Asymptomatic menopausal state     Atherosclerosis     Atrial fibrillation (HCC)     Chronic kidney disease     Chronic obstructive lung disease (HCC)     w/ chonic cough    Chronic ulcer of left foot (HCC)     Colon polyp     Congestive heart failure (HCC)     LAST ASSESSED: [id number]    COPD (chronic obstructive pulmonary disease) (HCC)     Coronary artery disease     COVID-19 01/06/2025    COVID-19 virus infection 03/25/2023    Cuboid fracture     LAST ASSESSED: [id number]    Disease of thyroid gland     Dyspepsia     Edema of both lower extremities     Equinus deformity of left foot     Fall 04/01/2024    Falls     5/2024    GERD (gastroesophageal reflux disease)     Headache(784.0)     High risk medication use     LAST ASSESSED: [id number]    Hyperlipidemia     Hypertension     Hypokalemia     Hypothyroidism     Impacted cerumen of both ears     LAST ASSESSED: [id number]    Impacted cerumen of right ear     LAST ASSESSED:  [id number]    Influenza     LAST ASSESSED: [id number]    IPMN (intraductal papillary mucinous neoplasm)     RESOLVED: [id number]    Irregular heart beat     afib. Warfarin.    Labial abscess 06/29/2022    Leg cramps 12/21/2018    Limb swelling     LAST ASSESSED: [id number]    Navicular fracture of ankle     LAST ASSESSED: [id number]    Onychomycosis     LAST ASSESSED: [id number]    Open wound of right upper arm 10/17/2022    Osteoarthritis     Osteopenia     Osteoporosis     Paronychia, finger, unspecified laterality     LAST ASSESSED: [id number]    Paroxysmal atrial fibrillation (HCC)     LAST ASSESSED: 02MAR2014    Peripheral vascular disease (HCC)     Plantar fasciitis     Shock (HCC) 06/26/2025    SOBOE (shortness of breath on exertion)     Stroke (HCC)     Right arm weakness that has resolved    Talus fracture     LAST ASSESSED: [id number]    Vaccine counseling 12/19/2023    Vascular headache     Walker as ambulation aid     Wound of right foot    [2]   Past Surgical History:  Procedure Laterality Date    APPENDECTOMY      BONE BIOPSY Left 08/09/2024    Procedure: Bone bx of proximal phalanx second toe & second met with fluoroscopic guidance;  Surgeon: Jonny Marcial DPM;  [facility]: [facility];  Service: Podiatry    CATARACT EXTRACTION Bilateral     CHOLECYSTECTOMY      COLONOSCOPY      FASCIOTOMY Right 6/24/2025    Procedure: FASCIOTOMY;  Surgeon: Arthur Wei MD;  [facility]: [facility];  Service: Vascular    JOINT REPLACEMENT Bilateral     knee    NEUROPLASTY / TRANSPOSITION MEDIAN NERVE AT CARPAL TUNNEL BILATERAL Bilateral     DECOMPRESSION    OOPHORECTOMY Bilateral     age 81    PELVIC FLOOR REPAIR  2014    DE BIOPSY BONE TROCAR/NEEDLE SUPERFICIAL Left 08/09/2024    Procedure: Left foot debridement;  Surgeon: Jonny Marcial DPM;  [facility];  Service: Podiatry    DE DEBRIDEMENT MUSCLE &/FASCIA 1ST 20 SQ CM/< Left 08/09/2024    Procedure: Left foot debridement;  Surgeon: Jonny  Dirk Marcial DPM;  [facility];  Service: Podiatry    CT OSTEOT W/WO LNGTH SHRT/CORRJ 1ST METAR Left 11/29/2024    Procedure: Left foot percutaneous osteotomy of second, third and fourth metatarsal.;  Surgeon: Jonny Marcial DPM;  [facility];  Service: Podiatry    SALPINGECTOMY Bilateral     SINUS SURGERY      THROMBECTOMY W/ EMBOLECTOMY Left 6/24/2025    Procedure: LEFT LOWER EXTREMITY EMBOLECTOMY/THROMBECTOMY;  Surgeon: Adonay Raymond MD;  [facility];  Service: Vascular    THROMBECTOMY W/ EMBOLECTOMY Right 6/24/2025    Procedure: EMBOLECTOMY/THROMBECTOMY LOWER EXTREMITY;  Surgeon: Arthur Wei MD;  [facility];  Service: Vascular    TONSILLECTOMY  1941    TOTAL KNEE ARTHROPLASTY Bilateral     WOUND DEBRIDEMENT Right 7/9/2025    Procedure: Right groin washout, possible vac placement;  Surgeon: Faizan Gomes DO;  [facility];  Service: Vascular

## 2025-07-10 NOTE — ASSESSMENT & PLAN NOTE
Recent emergent right leg embolectomy/lobectomy, fasciotomy and left popliteal thrombectomy on 6/24/2025 by vascular surgery complicated by right groin wound infection  S/p right groin washout with VAC placement 7/9    Wound cultures growing GNR  Currently on IV cefazolin and afebrile  Infectious disease consulted for further recommendations  Monitor WBC count and fever curve  Wound VAC exchanges Monday/Wednesday/Friday  Anticipate discharge back to subacute rehab

## 2025-07-10 NOTE — ASSESSMENT & PLAN NOTE
89 y.o. female with PMH of HFpEF, permanent A-fib on Eliquis, history of CVA, bronchiectasis recently admitted with acute lower limb ischemia. She underwent emergent right leg embolectomy/lobectomy, fasciotomy and left popliteal thrombectomy on 6/24/2025 by our team, now with copious serous drainage from right groin wound.    Plan  Diet as tolerated  Continue Eliquis  R groin wound VAC change Monday, Wednesday, Friday  Continue bilateral lower extremity Ace wrapping for light compression  Remainder of care per medicine

## 2025-07-10 NOTE — CONSULTS
Medical Oncology/Hematology Consult Note  Rebeca Banegas, female, 89 y.o., 7/22/1932,  [facility] 511-01, [id number]     Reason for consultation: GNR growing in fluid culture post-op    Assessment and Plan  1. Gram negative rods growing in culture   2. Surgical site wound  Little clinical suspicion of surgical site infection - there is tenderness but no erythema, edema, purulence, heat at site of infection. Drainage likely due to small segment of unhealed incision at the site. However, given the growth of GNR, an atypical organism at that location, we will elect to treat.     Plan:   - Continue current regimen of cefazolin for total of 10 days due to complicated surgery     - Despite limited GNR coverage compared to larger generation cephalosporins, patient seems to be responding well to current    - No indication for further imaging or blood cultures at this time   - Wound vac/wound management per wound care and vascular surgery     Antibiotics:  Day 3 of antibiotics, on cefazolin currently     History of present illness:    Rebeca Banegas is a 89 y.o. female w/ PMH of HFpEF, permanent A-fib on Eliquis, CBA, bronchiectasis who presented on 07/08 for drainage from R groin wound. She is currently being treated for post-op wound infection and we are consulted for growth of GNR on groin tissue sample.    She was initially admitted on 06/24 for R sided acute limb ischemia and underwent an emergent R leg embolectomy/thrombectomy and fasciotomy, and left popliteal thromboembolectomy. Subsequently underwent a repeat fasciotomy bilaterally on 06/29. She was discharged on 07/05 and wound care first saw her on 07/08, for which she was reporting continued R groin wound drainage. She is reporting bilateral lower extremity pain that is unchanged since being discharged from the hospital - but is localized posteriorly on the back of the legs . She lives at [facility] but no known recent sick contacts and has a  remote history of smoking cigarettes in her 20s but hasn't since. She drinks alcohol once a month or so and no other drug use. She has a history of dog jumping on her and scratching her L arm about three weeks ago but no bite. Otherwise she does not have exposure to outdoors/outside animals.      She came back in to the hospital on 07/08. She has been afebrile throughout the stay with a initial WBC of 9.9, down to 9.1 today. She was given one dose of ceftriaxone and one dose of cefoxitin, but switched over to cefazolin and is currently receiving cefazolin. She has been on antibiotics for 3 days. Vascular was consulted and they performed R groin washout with VAC placement on 07/09, with 400 mL of serous straw colored fluid from the wound VAC since. Fluid sample gram stain grew 2+ GNR and 1+ polys, while cultures and anaerobic cultures are drawn and pending. Wound is 6  cm x 5 cm x 3 cm in size.             Past Medical History:   Past Medical History[1]    Past Surgical History[2]    Family History[3]    Social History[4]    Current Medications[5]      Medications Prior to Admission:     acetaminophen (TYLENOL) 325 mg tablet    albuterol (2.5 mg/3 mL) 0.083 % nebulizer solution    apixaban (ELIQUIS) 2.5 mg    bisacodyl (DULCOLAX) 10 mg suppository    bumetanide (BUMEX) 2 mg tablet    guaiFENesin (MUCINEX) 600 mg 12 hr tablet    levothyroxine 50 mcg tablet    metoprolol tartrate (LOPRESSOR) 25 mg tablet    polyethylene glycol (MIRALAX) 17 g packet    senna-docusate sodium (SENOKOT S) 8.6-50 mg per tablet    albuterol (Proventil HFA) 90 mcg/act inhaler    ammonium lactate (LAC-HYDRIN) 12 % lotion    Fluticasone-Salmeterol (Advair Diskus) 250-50 mcg/dose inhaler    sodium chloride 3 % inhalation solution    Allergies[6]      Physical Exam:   Afebrile, pulse 110s with no recorded EKG this visit, 110s-120s systolic with decreases overnight to 80s-90s, and satting 97 - 100 percent     /76   Pulse (!) 119   Temp  "97.9 °F (36.6 °C) (Oral)   Resp 16   Ht 5' 4\" (1.626 m)   Wt 59.9 kg (132 lb)   LMP  (LMP Unknown)   SpO2 96%   BMI 22.66 kg/m²     Physical Exam    Cardiovascular:      Rate and Rhythm: Regular rhythm. Tachycardia present.      Pulses: Normal pulses.      Heart sounds: No murmur heard.     No friction rub. No gallop.   Pulmonary:      Effort: Pulmonary effort is normal.      Breath sounds: No stridor. Wheezing and rhonchi present.     Skin:     Comments: Wound at groin was incisional with minimal drainage. Upon further exam, there was no erythema, edema, fluctuance, or heat at the site. There was tenderness at the site. Legs bilaterally were wrapped and unable to be assesed           Recent Results (from the past 48 hours)   Platelet count    Collection Time: 07/08/25  7:11 PM   Result Value Ref Range    Platelets 327 149 - 390 Thousands/uL    MPV 8.0 (L) 8.9 - 12.7 fL   CBC and differential    Collection Time: 07/09/25  4:48 AM   Result Value Ref Range    WBC 9.90 4.31 - 10.16 Thousand/uL    RBC 2.75 (L) 3.81 - 5.12 Million/uL    Hemoglobin 9.1 (L) 11.5 - 15.4 g/dL    Hematocrit 28.2 (L) 34.8 - 46.1 %     (H) 82 - 98 fL    MCH 33.1 26.8 - 34.3 pg    MCHC 32.3 31.4 - 37.4 g/dL    RDW 19.4 (H) 11.6 - 15.1 %    MPV 8.6 (L) 8.9 - 12.7 fL    Platelets 343 149 - 390 Thousands/uL    nRBC 0 /100 WBCs    Segmented % 68 43 - 75 %    Immature Grans % 1 0 - 2 %    Lymphocytes % 20 14 - 44 %    Monocytes % 9 4 - 12 %    Eosinophils Relative 2 0 - 6 %    Basophils Relative 0 0 - 1 %    Absolute Neutrophils 6.74 1.85 - 7.62 Thousands/µL    Absolute Immature Grans 0.07 0.00 - 0.20 Thousand/uL    Absolute Lymphocytes 2.01 0.60 - 4.47 Thousands/µL    Absolute Monocytes 0.87 0.17 - 1.22 Thousand/µL    Eosinophils Absolute 0.18 0.00 - 0.61 Thousand/µL    Basophils Absolute 0.03 0.00 - 0.10 Thousands/µL   Basic metabolic panel    Collection Time: 07/09/25  4:48 AM   Result Value Ref Range    Sodium 135 135 - 147 mmol/L    " Potassium 3.6 3.5 - 5.3 mmol/L    Chloride 101 96 - 108 mmol/L    CO2 27 21 - 32 mmol/L    ANION GAP 7 4 - 13 mmol/L    BUN 35 (H) 5 - 25 mg/dL    Creatinine 1.12 0.60 - 1.30 mg/dL    Glucose 82 65 - 140 mg/dL    Calcium 7.2 (L) 8.4 - 10.2 mg/dL    eGFR 42 ml/min/1.73sq m   Protime-INR    Collection Time: 07/09/25  4:48 AM   Result Value Ref Range    Protime 17.0 (H) 12.3 - 15.0 seconds    INR 1.36 (H) 0.85 - 1.19   APTT    Collection Time: 07/09/25  4:48 AM   Result Value Ref Range    PTT 38 (H) 23 - 34 seconds   Anaerobic culture and Gram stain    Collection Time: 07/09/25 10:05 AM    Specimen: Groin; Tissue   Result Value Ref Range    Anaerobic Culture Culture results to follow.    Culture, tissue and Gram stain    Collection Time: 07/09/25 10:05 AM    Specimen: Groin; Tissue   Result Value Ref Range    Gram Stain Result 1+ Polys (A)     Gram Stain Result 2+ Gram negative rods (A)    CBC    Collection Time: 07/10/25  6:17 AM   Result Value Ref Range    WBC 9.01 4.31 - 10.16 Thousand/uL    RBC 2.91 (L) 3.81 - 5.12 Million/uL    Hemoglobin 9.6 (L) 11.5 - 15.4 g/dL    Hematocrit 31.0 (L) 34.8 - 46.1 %     (H) 82 - 98 fL    MCH 33.0 26.8 - 34.3 pg    MCHC 31.0 (L) 31.4 - 37.4 g/dL    RDW 19.6 (H) 11.6 - 15.1 %    Platelets 324 149 - 390 Thousands/uL    MPV 8.9 8.9 - 12.7 fL   Basic metabolic panel    Collection Time: 07/10/25  6:17 AM   Result Value Ref Range    Sodium 135 135 - 147 mmol/L    Potassium 3.9 3.5 - 5.3 mmol/L    Chloride 102 96 - 108 mmol/L    CO2 28 21 - 32 mmol/L    ANION GAP 5 4 - 13 mmol/L    BUN 33 (H) 5 - 25 mg/dL    Creatinine 1.26 0.60 - 1.30 mg/dL    Glucose 106 65 - 140 mg/dL    Calcium 7.1 (L) 8.4 - 10.2 mg/dL    eGFR 37 ml/min/1.73sq m       XR chest portable  Result Date: 6/30/2025  Narrative: XR CHEST PORTABLE INDICATION: sob. COMPARISON: 6/25/2025 FINDINGS: The right IJ approach catheter is unchanged in position. Layering pleural effusions with adjacent parenchymal consolidation  Enlarged cardiac silhouette, unchanged. Bones are unremarkable for age. Normal upper abdomen.     Impression: Layering bilateral pleural effusions with adjacent parenchymal atelectasis, similar to prior study. Workstation performed: [id number]     XR chest portable ICU  Result Date: 6/26/2025  Narrative: XR CHEST PORTABLE ICU INDICATION: cvc. COMPARISON: 6/25/2025 and 12/28/2024 radiographs. FINDINGS: Right IJ central venous catheter at the cavoatrial junction. Persistent small bilateral pleural effusions. Presumed compressive atelectasis. Difficult to exclude retrocardiac consolidation. No pneumothorax. Stable appearance of the cardiomediastinal silhouette. Cardiomegaly and atherosclerosis. No acute osseous or soft tissue pathology.     Impression: Central venous catheter in standard position. Persistent pleural effusions. Retrocardiac opacity. Atelectasis versus consolidation. Workstation performed: [id number]     XR chest portable  Result Date: 6/25/2025  Narrative: XR CHEST PORTABLE INDICATION: Hypoxia. COMPARISON: 6/25/2025 at 6:57 a.m., 12/28/2024 FINDINGS: Lungs are clear. Small pleural effusions. No pneumothorax. Stable appearance of the cardiomediastinal silhouette. No acute osseous or soft tissue pathology.     Impression: Small pleural effusions. Workstation performed: [id number]     XR chest portable ICU  Result Date: 6/25/2025  Narrative: XR CHEST PORTABLE ICU INDICATION: Sob. COMPARISON: 12/28/2024 FINDINGS: Rotated to the right. Lungs are clear. No effusion or pneumothorax. Stable appearance of the cardiomediastinal silhouette given technique differences. Cardiomegaly and aortic knob atherosclerosis. No acute osseous or soft tissue pathology.     Impression: No acute cardiopulmonary findings. Workstation performed: [id number]     Echo complete w/ contrast if indicated  Result Date: 6/25/2025  Narrative:   Left Ventricle: Left ventricular cavity size is small. Wall thickness is severely increased. The  left ventricular ejection fraction is 65%. Systolic function is vigorous. Although no diagnostic regional wall motion abnormality was identified, this possibility cannot be completely excluded on the basis of this study. Unable to assess diastolic function due to atrial fibrillation.   Right Ventricle: Systolic function is mildly reduced.   Left Atrium: The atrium is severely dilated (>48 mL/m2).   Aortic Valve: The aortic valve is trileaflet. The leaflets are moderately thickened. The leaflets are moderately calcified. There is aortic valve sclerosis.   Tricuspid Valve: There is mild to moderate regurgitation. The right ventricular systolic pressure is moderately elevated. The estimated right ventricular systolic pressure is 50.00 mmHg.   Pericardium: There is a left pleural effusion.     CTA abdominal w run off w wo contrast  Result Date: 6/24/2025  Narrative: CT ANGIOGRAM OF THE AORTA AND LOWER EXTREMITIES WITH IV CONTRAST INDICATION: Bilateral lower extremity pain, decreased sensation. COMPARISON: CT chest, abdomen and pelvis with contrast 8/20/2024 TECHNIQUE: CT angiogram examination of the abdomen, pelvis, and lower extremities was performed according to standard protocol with intravenous contrast. This examination, like all CT scans performed in the [facility], was performed utilizing techniques to minimize radiation dose exposure, including the use of iterative reconstruction and automated exposure control. 3D reconstructions were performed an independent workstation, and are supplied for review. Rad dose 2088.73 mGy-cm. IV Contrast: 120 mL of iohexol (OMNIPAQUE) Enteric Contrast: Not administered. FINDINGS: VESSELS: Atherosclerotic calcifications of the visualized thoracic and abdominal aorta without an aneurysm. Patent celiac axis, SMA and CALOS. Fusiform dilatation of the celiac artery measures 8 mm. Patent renal arteries bilaterally. Note is made of 2 right renal arteries. RIGHT: Common  iliac artery is widely patent. Intraluminal thrombus in the right internal iliac artery at the level of the bifurcation into the anterior and posterior divisions with extension into the anterior division (series 3, image 379). Intraluminal thrombus with occlusion of the right common femoral artery at its origin (series 3, image 493). The thrombus extends distally with occlusion of the superficial femoral artery, popliteal artery and anterior and posterior tibial and peroneal arteries. Short segment reconstitution of the distal peroneal artery with nonopacification at the ankle/foot. There is also occlusion of the deep femoral artery at its origin with reconstitution although multifocal areas of nonopacification more distally. LEFT: The left common, internal, and external iliac arteries are widely patent with mild atherosclerotic plaque. Common femoral, profunda, and superficial femoral artery demonstrate mild to moderate atherosclerotic calcification without significant luminal narrowing. There is up to moderate narrowing of the popliteal artery on series 2, image 255. Complete occlusion of the popliteal artery beginning on series 2, image 277 with partial distal reconstitution although evaluation is limited by artifact from prosthesis. Minimal opacification of the anterior tibial artery proximally with distal nonopacification. Tibioperoneal trunk demonstrates partial opacification with essential nonopacification of the posterior tibial and peroneal arteries. OTHER FINDINGS ABDOMEN LOWER CHEST: Similar-appearing scarring in the right middle lobe, lingula and both lower lobes with associated areas of traction bronchiectasis. No new or enlarging pulmonary nodules. Redemonstration of multiple pulmonary nodules, not significantly changed from prior. And example solid nodule in the left lower lobe measuring 5 mm (series 3, image 72). Similar cardiomegaly. Coronary artery calcifications. Small hiatal hernia. LIVER/BILIARY  TREE: Scattered calcified granulomata. No suspicious hepatic lesions. No biliary dilatation. GALLBLADDER: Post cholecystectomy. SPLEEN: Scattered calcified granulomata. Otherwise unremarkable. PANCREAS: Redemonstration of multiple cystic lesions with a complex lesion in the head of the pancreas measuring 3.7 cm (series 201, image 215), not significantly changed from prior. Normal caliber main pancreatic duct. ADRENAL GLANDS: Low density lobulated thickening of the left adrenal gland, similar to prior and statistically likely due to small adenomas or adenomatous hyperplasia. Right adrenal gland is normal. KIDNEYS/URETERS: No hydronephrosis or urinary tract calculi. Simple cysts in the left kidney with additional subcentimeter hypoattenuating renal lesions, too small to characterize but statistically likely benign, which do not warrant follow-up (Radiology  June 2019). PELVIS REPRODUCTIVE ORGANS: Unremarkable for patient's age. URINARY BLADDER: Unremarkable. ADDITIONAL ABDOMINAL AND PELVIC STRUCTURES STOMACH AND BOWEL: Colonic diverticulosis without findings of acute diverticulitis. APPENDIX: Surgically absent. ABDOMINOPELVIC CAVITY: No ascites. No pneumoperitoneum. No lymphadenopathy. ABDOMINAL WALL/INGUINAL REGIONS: Unremarkable. BONES: No acute fracture or suspicious osseous lesion. Chronic fracture deformities of the second through third left metatarsal bones. Spinal degenerative changes. Bilateral knee arthroplasties.     Impression: RIGHT: Occlusion of the right common femoral artery with nonopacification of right lower extremity. Additional partial occlusion of the internal iliac artery as above. LEFT: Mild to moderate calcified plaque. Nonopacification of the popliteal artery in the popliteal fossa with faint reconstitution of the tibioperoneal trunk and anterior tibial arteries although no flow to the ankle/foot. Incidental indeterminate pancreatic cystic lesions as above appear similar to prior CT dated  8/28/2024.. Recommend continued attention on follow-up imaging/surgical evaluation. Resident: HALIMA GARAY I, the attending radiologist, have reviewed the images and agree with the final report above. Workstation performed: [id number]       Labs and pertinent reports reviewed.      This note has been generated by voice recognition software system.  Therefore, there may be spelling, grammar, and or syntax errors. Please contact if questions arise.    Salma Moore PGY-II Internal Medicine   [facility]           [1]   Past Medical History:  Diagnosis Date    Abnormal ECG     Abnormal ultrasound     RESOLVED: [id number]    Acute kidney failure (HCC) 12/29/2024    Advice given about COVID-19 virus infection 04/07/2020    Ambulates with cane     Arrhythmia     Arthritis     Asthma     Asthma with acute exacerbation     LAST ASSESSED: [id number]    Asymptomatic menopausal state     Atherosclerosis     Atrial fibrillation (HCC)     Chronic kidney disease     Chronic obstructive lung disease (HCC)     w/ chonic cough    Chronic ulcer of left foot (HCC)     Colon polyp     Congestive heart failure (HCC)     LAST ASSESSED: [id number]    COPD (chronic obstructive pulmonary disease) (HCC)     Coronary artery disease     COVID-19 01/06/2025    COVID-19 virus infection 03/25/2023    Cuboid fracture     LAST ASSESSED: [id number]    Disease of thyroid gland     Dyspepsia     Edema of both lower extremities     Equinus deformity of left foot     Fall 04/01/2024    Falls     5/2024    GERD (gastroesophageal reflux disease)     Headache(784.0)     High risk medication use     LAST ASSESSED: [id number]    Hyperlipidemia     Hypertension     Hypokalemia     Hypothyroidism     Impacted cerumen of both ears     LAST ASSESSED: [id number]    Impacted cerumen of right ear     LAST ASSESSED: [id number]    Influenza     LAST ASSESSED: [id number]    IPMN (intraductal papillary mucinous neoplasm)     RESOLVED: 02OCT2015     Irregular heart beat     afib. Warfarin.    Labial abscess 06/29/2022    Leg cramps 12/21/2018    Limb swelling     LAST ASSESSED: [id number]    Navicular fracture of ankle     LAST ASSESSED: [id number]    Onychomycosis     LAST ASSESSED: [id number]    Open wound of right upper arm 10/17/2022    Osteoarthritis     Osteopenia     Osteoporosis     Paronychia, finger, unspecified laterality     LAST ASSESSED: [id number]    Paroxysmal atrial fibrillation (HCC)     LAST ASSESSED: 02MAR2014    Peripheral vascular disease (HCC)     Plantar fasciitis     Shock (HCC) 06/26/2025    SOBOE (shortness of breath on exertion)     Stroke ([facility])     Right arm weakness that has resolved    Talus fracture     LAST ASSESSED: [id number]    Vaccine counseling 12/19/2023    Vascular headache     Walker as ambulation aid     Wound of right foot    [2]   Past Surgical History:  Procedure Laterality Date    APPENDECTOMY      BONE BIOPSY Left 08/09/2024    Procedure: Bone bx of proximal phalanx second toe & second met with fluoroscopic guidance;  Surgeon: Jonny Marcial DPM;  [facility]: [facility];  Service: Podiatry    CATARACT EXTRACTION Bilateral     CHOLECYSTECTOMY      COLONOSCOPY      FASCIOTOMY Right 6/24/2025    Procedure: FASCIOTOMY;  Surgeon: Arthur Wei MD;  [facility]: [facility];  Service: Vascular    JOINT REPLACEMENT Bilateral     knee    NEUROPLASTY / TRANSPOSITION MEDIAN NERVE AT CARPAL TUNNEL BILATERAL Bilateral     DECOMPRESSION    OOPHORECTOMY Bilateral     age 81    PELVIC FLOOR REPAIR  2014    MO BIOPSY BONE TROCAR/NEEDLE SUPERFICIAL Left 08/09/2024    Procedure: Left foot debridement;  Surgeon: Jonny Marcial DPM;  [facility];  Service: Podiatry    MO DEBRIDEMENT MUSCLE &/FASCIA 1ST 20 SQ CM/< Left 08/09/2024    Procedure: Left foot debridement;  Surgeon: Jonny Marcial DPM;  [facility];  Service: Podiatry    MO OSTEOT W/WO LNGTH SHRT/CORRJ 1ST METAR Left 11/29/2024     Procedure: Left foot percutaneous osteotomy of second, third and fourth metatarsal.;  Surgeon: Jonny Marcial DPM;  [facility];  Service: Podiatry    SALPINGECTOMY Bilateral     SINUS SURGERY      THROMBECTOMY W/ EMBOLECTOMY Left 2025    Procedure: LEFT LOWER EXTREMITY EMBOLECTOMY/THROMBECTOMY;  Surgeon: Adonay Raymond MD;  [facility];  Service: Vascular    THROMBECTOMY W/ EMBOLECTOMY Right 2025    Procedure: EMBOLECTOMY/THROMBECTOMY LOWER EXTREMITY;  Surgeon: Arthur Wei MD;  [facility];  Service: Vascular    TONSILLECTOMY      TOTAL KNEE ARTHROPLASTY Bilateral    [3]   Family History  Problem Relation Name Age of Onset    Pancreatic cancer Mother Christiana 93    Heart failure Mother Christiana     Cancer Mother Christiana     Hypertension Mother Christiana     Coronary artery disease Family      Breast cancer Family      Uterine cancer Family      Hyperlipidemia Family      Osteoarthritis Family      Ovarian cancer Family      Brain cancer Son      Stroke Father Isaac     Hypertension Father Isaac     No Known Problems Sister      No Known Problems Daughter      No Known Problems Maternal Grandmother      No Known Problems Maternal Grandfather      No Known Problems Paternal Grandmother      No Known Problems Paternal Grandfather      Breast cancer Cousin  50    Breast cancer Cousin  50    Ovarian cancer Other niece 49   [4]   Social History  Socioeconomic History    Marital status:    Occupational History    Occupation: retired   Tobacco Use    Smoking status: Former     Current packs/day: 0.00     Average packs/day: 0.3 packs/day for 2.0 years (0.5 ttl pk-yrs)     Types: Cigarettes     Start date:      Quit date:      Years since quittin.5    Smokeless tobacco: Never    Tobacco comments:     On and off socially with friends    Vaping Use    Vaping status: Never Used   Substance and Sexual Activity    Alcohol use: Not Currently     Drug use: No    Sexual activity: Not Currently     Partners: Male     Birth control/protection: Post-menopausal   Social History Narrative    DAILY COFFEE CONSUMPTION (2 CUPS/DAY)    EXERCISING REGULARLY     Social Drivers of Health     Food Insecurity: Patient Declined (7/9/2025)    Nursing - Inadequate Food Risk Classification     Worried About Running Out of Food in the Last Year: Never true     Ran Out of Food in the Last Year: Never true     Ran Out of Food in the Last Year: Patient declined   Transportation Needs: Patient Declined (7/9/2025)    Nursing - Transportation Risk Classification     Lack of Transportation: Patient declined   Intimate Partner Violence: Patient Declined (7/9/2025)    Nursing IPS     Physically Hurt by Someone: Patient declined     Hurt or Threatened by Someone: Patient declined   Housing Stability: Patient Declined (7/9/2025)    Nursing: Inadequate Housing Risk Classification     Unable to Pay for Housing in the Last Year: Patient declined     Has Housing: Patient declined   [5]   Current Facility-Administered Medications:     acetaminophen (TYLENOL) tablet 650 mg, 650 mg, Oral, Q6H PRN, Jenise Vo MD, 650 mg at 07/09/25 1213    albuterol inhalation solution 2.5 mg, 2.5 mg, Nebulization, Q6H PRN, Jenise Vo MD    apixaban (ELIQUIS) tablet 2.5 mg, 2.5 mg, Oral, BID, Jenise Vo MD, 2.5 mg at 07/10/25 0957    bisacodyl (DULCOLAX) rectal suppository 10 mg, 10 mg, Rectal, Daily PRN, Jenise Vo MD    bumetanide (BUMEX) tablet 1 mg, 1 mg, Oral, BID, Jenise Vo MD, 1 mg at 07/10/25 0957    ceFAZolin (ANCEF) IVPB (premix in dextrose) 1,000 mg 50 mL, 1,000 mg, Intravenous, Q8H, Jenise Vo MD, Last Rate: 100 mL/hr at 07/10/25 0956, 1,000 mg at 07/10/25 0956    docusate sodium (COLACE) capsule 100 mg, 100 mg, Oral, BID, Jenise Vo MD, 100 mg at 07/10/25 0957    fluticasone-vilanterol 200-25 mcg/actuation 1 puff, 1 puff, Inhalation, Daily,  Jenise Vo MD, 1 puff at 07/10/25 0955    levalbuterol (XOPENEX) inhalation solution 1.25 mg, 1.25 mg, Nebulization, TID, Jenise Vo MD, 1.25 mg at 07/10/25 0835    levothyroxine tablet 50 mcg, 50 mcg, Oral, Early Morning, Jenise Vo MD, 50 mcg at 07/10/25 0250    metoprolol tartrate (LOPRESSOR) partial tablet 12.5 mg, 12.5 mg, Oral, Q12H GARTH, Jenise Vo MD, 12.5 mg at 07/10/25 0956    morphine injection 2 mg, 2 mg, Intravenous, Q2H PRN, Jenise Vo MD    ondansetron (ZOFRAN) injection 4 mg, 4 mg, Intravenous, Q6H PRN, Jenise Vo MD    oxyCODONE (ROXICODONE) split tablet 2.5 mg, 2.5 mg, Oral, Q4H PRN **OR** oxyCODONE (ROXICODONE) IR tablet 5 mg, 5 mg, Oral, Q4H PRN, Jenise Vo MD, 5 mg at 07/09/25 2158    polyethylene glycol (MIRALAX) packet 17 g, 17 g, Oral, Daily, Jenise Vo MD, 17 g at 07/10/25 0956    senna (SENOKOT) tablet 17.2 mg, 2 tablet, Oral, HS, Jenise Vo MD  [6]   Allergies  Allergen Reactions    Asa [Aspirin] Shortness Of Breath    Nsaids Shortness Of Breath    Medical Tape Other (See Comments)     Skin tears with the removal of tape

## 2025-07-10 NOTE — PROGRESS NOTES
Progress Note - Vascular Surgery   Name: Rebeca Banegas 89 y.o. female I MRN: [id number]  Unit/Bed#: [facility] 511-01 I Date of Admission: 7/8/2025   Date of Service: 7/10/2025 I Hospital Day: 2    Assessment & Plan  Wound disruption, post-op, skin, initial encounter  89 y.o. female with PMH of HFpEF, permanent A-fib on Eliquis, history of CVA, bronchiectasis recently admitted with acute lower limb ischemia. She underwent emergent right leg embolectomy/lobectomy, fasciotomy and left popliteal thrombectomy on 6/24/2025 by our team, now with copious serous drainage from right groin wound.    Plan  Diet as tolerated  Continue Eliquis  R groin wound VAC change Monday, Wednesday, Friday  Continue bilateral lower extremity Ace wrapping for light compression  Remainder of care per medicine    24 Hour Events : none  Subjective :   Patient reports pain is controlled, just with mild R groin discomfort. Tolerating diet. Voiding spontaneously. Denies fever, chills, nausea, vomiting.     Objective :  Temp:  [97.5 °F (36.4 °C)-98.9 °F (37.2 °C)] 97.9 °F (36.6 °C)  HR:  [] 110  BP: ()/(50-99) 107/66  Resp:  [16-29] 16  SpO2:  [95 %-100 %] 95 %  O2 Device: Nasal cannula  Nasal Cannula O2 Flow Rate (L/min):  [3 L/min] 3 L/min     I/O         07/08 0701 07/09 0700 07/09 0701  07/10 0700 07/10 0701  07/11 0700    P.O. 0 716     I.V. (mL/kg)  850 (14.2)     IV Piggyback  50     Total Intake(mL/kg) 0 (0) 1616 (27)     Urine (mL/kg/hr)  400 (0.3)     Drains  400     Total Output  800     Net 0 +816            Unmeasured Urine Occurrence 1 x              Physical Exam  General: NAD  HENT: NCAT MMM  Neck: supple, no JVD  CV: nl rate  Lungs: nl wob. No resp distress  ABD: flat & nondistended. R groin with wound VAC in place with good seal.   Extrem: No CCE. RLE with improving edema and incision with minimal erythema. LLE with improving edema and incision with minimal erythema, small area of tenderness at distal posterior aspect  "of the incision  Neuro: AAOx3      Lab Results: I have reviewed the following results:  CBC with diff:   Lab Results   Component Value Date    WBC 9.01 07/10/2025    HGB 9.6 (L) 07/10/2025    HCT 31.0 (L) 07/10/2025     (H) 07/10/2025     07/10/2025    RBC 2.91 (L) 07/10/2025    MCH 33.0 07/10/2025    MCHC 31.0 (L) 07/10/2025    RDW 19.6 (H) 07/10/2025    MPV 8.9 07/10/2025    NRBC 0 07/09/2025   ,   BMP/CMP:  Lab Results   Component Value Date    SODIUM 135 07/09/2025    SODIUM 137 07/08/2025    K 3.6 07/09/2025    K 3.8 07/08/2025     07/09/2025     07/08/2025    CO2 27 07/09/2025    CO2 24 07/08/2025    BUN 35 (H) 07/09/2025    BUN 37 (H) 07/08/2025    CREATININE 1.12 07/09/2025    CREATININE 1.24 (H) 07/08/2025    GLUCOSE 139 06/24/2025    CALCIUM 7.2 (L) 07/09/2025    CALCIUM 7.4 (L) 07/08/2025    AST 16 07/08/2025    ALT 20 07/08/2025    ALKPHOS 60 07/08/2025    PROT 6.4 10/17/2017    BILITOT 0.5 10/17/2017    EGFR 42 07/09/2025    EGFR 41 (L) 07/08/2025    EGFR 35 (L) 12/29/2020   ,   Lipid Panel:   Lab Results   Component Value Date    CHOL 205 (H) 02/07/2017   ,   Coags:   Lab Results   Component Value Date    PT 23.7 (H) 02/23/2024    PTT 38 (H) 07/09/2025    INR 1.36 (H) 07/09/2025    INR 2.2 02/23/2024   ,   Blood Culture: No results found for: \"BLOODCX\",   Urinalysis:   Lab Results   Component Value Date    COLORU Light Yellow 12/29/2024    CLARITYU Clear 12/29/2024    SPECGRAV 1.011 12/29/2024    PHUR 5.0 12/29/2024    LEUKOCYTESUR Negative 12/29/2024    NITRITE Negative 12/29/2024    GLUCOSEU Negative 12/29/2024    KETONESU Negative 12/29/2024    BILIRUBINUR Negative 12/29/2024    BLOODU Negative 12/29/2024   ,   Urine Culture: No results found for: \"URINECX\",   Wound Culure:   Lab Results   Component Value Date    WOUNDCULT Few Colonies of Proteus mirabilis (A) 09/04/2024    WOUNDCULT Few Colonies of Acinetobacter baumannii (A) 09/04/2024     VTE Prophylaxis: VTE covered " by:  apixaban, Oral, 2.5 mg at 07/09/25 1712       Shivani Whittington MD  General Surgery Resident

## 2025-07-10 NOTE — PROGRESS NOTES
Vascular Nurse Navigator Post Op Education    Met with patient at bedside to introduce myself as Vascular Nurse Navigator and explained my role.  Patient is appropriate and accepting to education. Patient was educated with Review of written materials provided, Teachback, Explanation, Demonstration, and Question & Answer on expectations of post op care and recovery on R groin washout, wound vac placement. Patient is a former smoker, as such Smoking effects on the lungs, tobacco triggers, and Smoking cessation was reviewed. Education provided to patient on infection prevention, activity limitations, when to call the office, importance of follow up, and incisional care.  Discharge instruction handout provided to patient to review.

## 2025-07-10 NOTE — UTILIZATION REVIEW
NOTIFICATION OF INPATIENT ADMISSION   AUTHORIZATION REQUEST   SERVICING FACILITY:   [facility]  Address: [address] [place] [address]  Tax ID: [id number]  NPI: [id number] ATTENDING PROVIDER:  Attending Name and NPI#: Baldomero Vinosn Do [[id number]]  Address: [address] [place] [address]  Phone: [phone]   ADMISSION INFORMATION:  Place of Service: Inpatient [facility]  Place of Service Code: 21  Inpatient Admission Date/Time: 7/8/25  3:26 PM  Discharge Date/Time: No discharge date for patient encounter.  Admitting Diagnosis Code/Description:  Post-operative complication [T81.9XXA]     UTILIZATION REVIEW CONTACT:  Shiva Choe Utilization   Network Utilization Review Department  Phone: [phone]  Fax: [phone]  Email: [email or web address]  Contact for approvals/pending authorizations, clinical reviews, and discharge.     PHYSICIAN ADVISORY SERVICES:  Medical Necessity Denial & Hbcp-ok-Oswj Review  Phone: [phone]  Fax: [phone]  Email: [email or web address]     DISCHARGE SUPPORT TEAM:  For Patients Discharge Needs & Updates  Phone: [phone] opt. 2 Fax: [phone]  Email: [email or web address]

## 2025-07-11 PROBLEM — T81.49XA SURGICAL WOUND INFECTION: Status: ACTIVE | Noted: 2025-07-08

## 2025-07-11 LAB
ANION GAP SERPL CALCULATED.3IONS-SCNC: 8 MMOL/L (ref 4–13)
BACTERIA SPEC ANAEROBE CULT: NORMAL
BACTERIA TISS AEROBE CULT: ABNORMAL
BUN SERPL-MCNC: 33 MG/DL (ref 5–25)
CALCIUM SERPL-MCNC: 7.2 MG/DL (ref 8.4–10.2)
CHLORIDE SERPL-SCNC: 100 MMOL/L (ref 96–108)
CO2 SERPL-SCNC: 25 MMOL/L (ref 21–32)
CREAT SERPL-MCNC: 1.09 MG/DL (ref 0.6–1.3)
ERYTHROCYTE [DISTWIDTH] IN BLOOD BY AUTOMATED COUNT: 19.7 % (ref 11.6–15.1)
GFR SERPL CREATININE-BSD FRML MDRD: 44 ML/MIN/1.73SQ M
GLUCOSE SERPL-MCNC: 96 MG/DL (ref 65–140)
GRAM STN SPEC: ABNORMAL
GRAM STN SPEC: ABNORMAL
HCT VFR BLD AUTO: 29.9 % (ref 34.8–46.1)
HGB BLD-MCNC: 9.5 G/DL (ref 11.5–15.4)
MAGNESIUM SERPL-MCNC: 1.9 MG/DL (ref 1.9–2.7)
MCH RBC QN AUTO: 32.9 PG (ref 26.8–34.3)
MCHC RBC AUTO-ENTMCNC: 31.8 G/DL (ref 31.4–37.4)
MCV RBC AUTO: 104 FL (ref 82–98)
PLATELET # BLD AUTO: 288 THOUSANDS/UL (ref 149–390)
PMV BLD AUTO: 8.3 FL (ref 8.9–12.7)
POTASSIUM SERPL-SCNC: 3.7 MMOL/L (ref 3.5–5.3)
RBC # BLD AUTO: 2.89 MILLION/UL (ref 3.81–5.12)
SODIUM SERPL-SCNC: 133 MMOL/L (ref 135–147)
WBC # BLD AUTO: 7.43 THOUSAND/UL (ref 4.31–10.16)

## 2025-07-11 PROCEDURE — 83735 ASSAY OF MAGNESIUM: CPT | Performed by: INTERNAL MEDICINE

## 2025-07-11 PROCEDURE — 85027 COMPLETE CBC AUTOMATED: CPT | Performed by: INTERNAL MEDICINE

## 2025-07-11 PROCEDURE — G0545 PR INHERENT VISIT TO INPT: HCPCS | Performed by: INTERNAL MEDICINE

## 2025-07-11 PROCEDURE — 94664 DEMO&/EVAL PT USE INHALER: CPT

## 2025-07-11 PROCEDURE — 94640 AIRWAY INHALATION TREATMENT: CPT

## 2025-07-11 PROCEDURE — 80048 BASIC METABOLIC PNL TOTAL CA: CPT | Performed by: INTERNAL MEDICINE

## 2025-07-11 PROCEDURE — NC001 PR NO CHARGE: Performed by: SURGERY

## 2025-07-11 PROCEDURE — 94760 N-INVAS EAR/PLS OXIMETRY 1: CPT

## 2025-07-11 PROCEDURE — 99024 POSTOP FOLLOW-UP VISIT: CPT | Performed by: SURGERY

## 2025-07-11 PROCEDURE — 99232 SBSQ HOSP IP/OBS MODERATE 35: CPT | Performed by: INTERNAL MEDICINE

## 2025-07-11 RX ADMIN — CEFEPIME 1000 MG: 1 INJECTION, POWDER, FOR SOLUTION INTRAMUSCULAR; INTRAVENOUS at 07:44

## 2025-07-11 RX ADMIN — DOCUSATE SODIUM 100 MG: 100 CAPSULE, LIQUID FILLED ORAL at 08:00

## 2025-07-11 RX ADMIN — OXYCODONE HYDROCHLORIDE 5 MG: 5 TABLET ORAL at 20:08

## 2025-07-11 RX ADMIN — BISACODYL 10 MG: 10 SUPPOSITORY RECTAL at 18:20

## 2025-07-11 RX ADMIN — DOCUSATE SODIUM 100 MG: 100 CAPSULE, LIQUID FILLED ORAL at 17:29

## 2025-07-11 RX ADMIN — LEVALBUTEROL HYDROCHLORIDE 1.25 MG: 1.25 SOLUTION RESPIRATORY (INHALATION) at 08:21

## 2025-07-11 RX ADMIN — SENNOSIDES 17.2 MG: 8.6 TABLET, FILM COATED ORAL at 20:08

## 2025-07-11 RX ADMIN — LEVOTHYROXINE SODIUM 50 MCG: 0.05 TABLET ORAL at 05:04

## 2025-07-11 RX ADMIN — FLUTICASONE FUROATE AND VILANTEROL TRIFENATATE 1 PUFF: 200; 25 POWDER RESPIRATORY (INHALATION) at 08:00

## 2025-07-11 RX ADMIN — APIXABAN 2.5 MG: 2.5 TABLET, FILM COATED ORAL at 17:29

## 2025-07-11 RX ADMIN — CEFEPIME 1000 MG: 1 INJECTION, POWDER, FOR SOLUTION INTRAMUSCULAR; INTRAVENOUS at 20:23

## 2025-07-11 RX ADMIN — BUMETANIDE 1 MG: 1 TABLET ORAL at 17:29

## 2025-07-11 RX ADMIN — POLYETHYLENE GLYCOL 3350 17 G: 17 POWDER, FOR SOLUTION ORAL at 08:00

## 2025-07-11 RX ADMIN — LEVALBUTEROL HYDROCHLORIDE 1.25 MG: 1.25 SOLUTION RESPIRATORY (INHALATION) at 20:22

## 2025-07-11 RX ADMIN — LEVALBUTEROL HYDROCHLORIDE 1.25 MG: 1.25 SOLUTION RESPIRATORY (INHALATION) at 14:36

## 2025-07-11 RX ADMIN — Medication 12.5 MG: at 20:08

## 2025-07-11 RX ADMIN — APIXABAN 2.5 MG: 2.5 TABLET, FILM COATED ORAL at 08:00

## 2025-07-11 NOTE — PROCEDURES
Vascular Surgery  Bedside V.A.CMarianna Procedure Note    Location of wound: Right groin    Dressings and Foam removed:  1 Black Foam    Dimensions of wound: 6.5 cm x 3.5 cm x 3 cm    Description of wound: On inspection of the wound today, noted healthy granulation tissue throughout wound. The wound extends down to fascia. Approximately 95% of the wound base is now pink and healthy and there is granulation tissue. The periwound skin remains clean and intact and unremarkable.    VAC dressing application:  The periwound skin was cleaned and dried. 1 black foam, were cut to size of the wound and placed into the wound. The dressings were then covered with VAC drape. The track pad was then placed over the base of black foam. The VAC was then set to 125 mmHg low Continuous suction. The patient tolerated the procedure well and there were no complications. The patient did not require any excisional debridement during today's dressing change.    VAC settings:  125 mmHg  Continuous    Wound Images:      Bilateral lower extremity wounds were also evaluated and pictured below. Improving erythema and minimal to no tenderness surrounding the incisions. Bilateral LE compression with ACE wraps placed.           Additional Notes:  The VAC sticker placed over the dressing per protocol.  The next VAC dressing change will be planned for Monday 7/14.  The VAC paperwork was completed and give to the case management staff to arrange home VAC therapy.    This dressing change took greater than 15 minutes to complete.    Shivani Whittington MD  7/11/2025 12:54 PM

## 2025-07-11 NOTE — PROGRESS NOTES
Progress Note - Vascular Surgery   Name: Rebeca Banegas 89 y.o. female I MRN: [id number]  Unit/Bed#: [facility] 511-01 I Date of Admission: 7/8/2025   Date of Service: 7/11/2025 I Hospital Day: 3    Assessment & Plan  Wound disruption, post-op, skin, initial encounter  89 y.o. female with PMH of HFpEF, permanent A-fib on Eliquis, history of CVA, bronchiectasis recently admitted with acute lower limb ischemia. She underwent emergent right leg embolectomy/lobectomy, fasciotomy and left popliteal thrombectomy on 6/24/2025 by our team, now with copious serous drainage from right groin wound.    Plan  Diet as tolerated  Continue Eliquis  R groin wound VAC change today with Monday, Wednesday, Friday schdule  Continue bilateral lower extremity Ace wrapping for light compression  ID consulted given positive wound cultures, appreciate assistance  Remainder of care per medicine    24 Hour Events : none  Subjective :   AVSS on room air. Patient reports pain is controlled. Tolerating diet. Voiding spontaneously and having bowel function. Denies fever, chills, nausea, vomiting.     Objective :  Temp:  [97.7 °F (36.5 °C)-98.2 °F (36.8 °C)] 97.7 °F (36.5 °C)  HR:  [] 105  BP: ()/(53-71) 102/62  Resp:  [18] 18  SpO2:  [91 %-98 %] 95 %  O2 Device: None (Room air)     I/O         07/09 0701  07/10 0700 07/10 0701 07/11 0700 07/11 0701  07/12 0700    P.O. 716 836 420    I.V. (mL/kg) 850 (14.2)      IV Piggyback 50      Total Intake(mL/kg) 1616 (27) 836 (14) 420 (7)    Urine (mL/kg/hr) 400 (0.3) 400 (0.3) 300 (0.9)    Drains 400 400     Total Output 800 800 300    Net +816 +36 +120                   Physical Exam  General: NAD  HENT: NCAT MMM  Neck: supple, no JVD  CV: nl rate  Lungs: nl wob. No resp distress  ABD: Soft, nontender, nondistended. R groin with wound VAC in place.   Extrem: No CCE. RLE with improving edema and incision with minimal erythema. LLE with improving edema and incision with minimal erythema, small area  "of tenderness at distal posterior aspect of the incision   Neuro: AAOx3        Lab Results: I have reviewed the following results:  CBC with diff:   Lab Results   Component Value Date    WBC 7.43 07/11/2025    HGB 9.5 (L) 07/11/2025    HCT 29.9 (L) 07/11/2025     (H) 07/11/2025     07/11/2025    RBC 2.89 (L) 07/11/2025    MCH 32.9 07/11/2025    MCHC 31.8 07/11/2025    RDW 19.7 (H) 07/11/2025    MPV 8.3 (L) 07/11/2025    NRBC 0 07/09/2025   ,   BMP/CMP:  Lab Results   Component Value Date    SODIUM 133 (L) 07/11/2025    SODIUM 137 07/08/2025    K 3.7 07/11/2025    K 3.8 07/08/2025     07/11/2025     07/08/2025    CO2 25 07/11/2025    CO2 24 07/08/2025    BUN 33 (H) 07/11/2025    BUN 37 (H) 07/08/2025    CREATININE 1.09 07/11/2025    CREATININE 1.24 (H) 07/08/2025    GLUCOSE 139 06/24/2025    CALCIUM 7.2 (L) 07/11/2025    CALCIUM 7.4 (L) 07/08/2025    AST 16 07/08/2025    ALT 20 07/08/2025    ALKPHOS 60 07/08/2025    PROT 6.4 10/17/2017    BILITOT 0.5 10/17/2017    EGFR 44 07/11/2025    EGFR 41 (L) 07/08/2025    EGFR 35 (L) 12/29/2020   ,   Lipid Panel:   Lab Results   Component Value Date    CHOL 205 (H) 02/07/2017   ,   Coags:   Lab Results   Component Value Date    PT 23.7 (H) 02/23/2024    PTT 38 (H) 07/09/2025    INR 1.36 (H) 07/09/2025    INR 2.2 02/23/2024   ,   Blood Culture: No results found for: \"BLOODCX\",   Urinalysis:   Lab Results   Component Value Date    COLORU Light Yellow 12/29/2024    CLARITYU Clear 12/29/2024    SPECGRAV 1.011 12/29/2024    PHUR 5.0 12/29/2024    LEUKOCYTESUR Negative 12/29/2024    NITRITE Negative 12/29/2024    GLUCOSEU Negative 12/29/2024    KETONESU Negative 12/29/2024    BILIRUBINUR Negative 12/29/2024    BLOODU Negative 12/29/2024   ,   Urine Culture: No results found for: \"URINECX\",   Wound Culure:   Lab Results   Component Value Date    WOUNDCULT Few Colonies of Proteus mirabilis (A) 09/04/2024    WOUNDCULT Few Colonies of Acinetobacter baumannii (A) " 09/04/2024       VTE Prophylaxis: VTE covered by:  apixaban, Oral, 2.5 mg at 07/11/25 0800       Shivani Whittington MD  General Surgery Resident

## 2025-07-11 NOTE — ASSESSMENT & PLAN NOTE
Recent emergent right leg embolectomy/lobectomy, fasciotomy and left popliteal thrombectomy on 6/24/2025 by vascular surgery complicated by right groin wound infection  S/p I&D 7/9, right groin washout with VAC placement 7/9  Wound cultures growing GNR resistant to cefazolin  IV cefazolin switched to IV cefepime for broad-spectrum coverage  Susceptibilities reviewed  Plan to transition to Bactrim 100 mg twice daily on discharge  Infectious disease following  Monitor WBC count and fever curve  Wound VAC exchanges Monday/Wednesday/Friday  Anticipate discharge back to subacute rehab-pending insurance authorization

## 2025-07-11 NOTE — PROGRESS NOTES
Progress Note - Infectious Disease   Name: Rebeca Banegas 89 y.o. female I MRN: [id number]  Unit/Bed#: [facility] 511-01 I Date of Admission: 7/8/2025   Date of Service: 7/11/2025 I Hospital Day: 3     Assessment & Plan  Wound disruption, post-op, skin, initial encounter  Surgical site infection - there is tenderness but no erythema, edema, purulence, heat at site of infection. Drainage likely due to small segment of unhealed incision at the site. Growth of citrobacteria amalonaticus complex - which can typically be treated by later generation cephalosporins but can be resistant to some beta lactams. Will follow with the susceptibilities before changing antibiotic to PO      Plan:              - Continue current regimen of cefeprime and transition per susceptibilties, can treat for total of 10 days due to complicated surgery               - No indication for further imaging or blood cultures at this time   - Wound vac/wound management per wound care and vascular surgery   Asthma    Permanent atrial fibrillation (HCC)    Primary hypertension    Hypothyroidism          Antibiotics:  Cefepime day 2 (total abx day 4)    Subjective   Patient has no fever, chills, sweats; no nausea, vomiting, diarrhea; she does reported continue leg pain posteriorly. No new symptoms.    Objective :  Temp:  [97.7 °F (36.5 °C)-98.2 °F (36.8 °C)] 97.7 °F (36.5 °C)  HR:  [] 105  BP: ()/(53-76) 102/62  Resp:  [18] 18  SpO2:  [91 %-98 %] 95 %  O2 Device: None (Room air)    General:  No acute distress  Psychiatric:  Awake and alert  Pulmonary:  Normal respiratory excursion without accessory muscle use  Abdomen:  Soft, nontender  Extremities:  No edema  Skin:     Comments: Wound at groin was incisional with minimal drainage. Upon further exam, there was no erythema, edema, fluctuance, or heat at the site. There was tenderness at the site. Legs bilaterally were wrapped and unable to be assesed           Lab Results: I have reviewed the  following results:  Results from last 7 days   Lab Units 07/11/25  0514 07/10/25  0617 07/09/25  0448   WBC Thousand/uL 7.43 9.01 9.90   HEMOGLOBIN g/dL 9.5* 9.6* 9.1*   PLATELETS Thousands/uL 288 324 343     Results from last 7 days   Lab Units 07/11/25 0514 07/10/25  0617 07/09/25  0448 07/09/25 0448 07/08/25  0814   SODIUM mmol/L 133* 135  --  135 137   POTASSIUM mmol/L 3.7 3.9   < > 3.6 3.8   CHLORIDE mmol/L 100 102   < > 101 101   CO2 mmol/L 25 28   < > 27 24   BUN mg/dL 33* 33*   < > 35* 37*   CREATININE mg/dL 1.09 1.26   < > 1.12 1.24*   EGFR ml/min/1.73sq m 44 37   < > 42 41*   CALCIUM mg/dL 7.2* 7.1*   < > 7.2* 7.4*   AST U/L  --   --   --   --  16   ALT U/L  --   --   --   --  20   ALK PHOS U/L  --   --   --   --  60   ALBUMIN g/dL  --   --   --   --  3.7    < > = values in this interval not displayed.     Results from last 7 days   Lab Units 07/09/25  1005   GRAM STAIN RESULT  1+ Polys*  2+ Gram negative rods*

## 2025-07-11 NOTE — ASSESSMENT & PLAN NOTE
89 y.o. female with PMH of HFpEF, permanent A-fib on Eliquis, history of CVA, bronchiectasis recently admitted with acute lower limb ischemia. She underwent emergent right leg embolectomy/lobectomy, fasciotomy and left popliteal thrombectomy on 6/24/2025 by our team, now with copious serous drainage from right groin wound.    Plan  Diet as tolerated  Continue Eliquis  R groin wound VAC change today with Monday, Wednesday, Friday schdule  Continue bilateral lower extremity Ace wrapping for light compression  ID consulted given positive wound cultures, appreciate assistance  Remainder of care per medicine

## 2025-07-11 NOTE — PROGRESS NOTES
Progress Note - Hospitalist   Name: Rebeca Banegas 89 y.o. female I MRN: [id number]  Unit/Bed#: [facility] 511-01 I Date of Admission: 7/8/2025   Date of Service: 7/11/2025 I Hospital Day: 3    Assessment & Plan  Surgical wound infection  Recent emergent right leg embolectomy/lobectomy, fasciotomy and left popliteal thrombectomy on 6/24/2025 by vascular surgery complicated by right groin wound infection  S/p I&D 7/9, right groin washout with VAC placement 7/9  Wound cultures growing GNR resistant to cefazolin  IV cefazolin switched to IV cefepime for broad-spectrum coverage  Susceptibilities reviewed  Plan to transition to Bactrim 100 mg twice daily on discharge  Infectious disease following  Monitor WBC count and fever curve  Wound VAC exchanges Monday/Wednesday/Friday  Anticipate discharge back to subacute rehab-pending insurance authorization  Asthma  Not in exacerbation  Continue home inhaler/nebulizer regimen  Permanent atrial fibrillation (HCC)  Rate controlled with metoprolol  Continue on Eliquis for stroke prevention  Primary hypertension  Controlled on home Bumex, metoprolol  Continue to monitor  Hypothyroidism  Continue Synthroid    VTE Pharmacologic Prophylaxis: VTE Score: 8 High Risk (Score >/= 5) - Pharmacological DVT Prophylaxis Ordered: apixaban (Eliquis). Sequential Compression Devices Ordered.    Mobility:   Basic Mobility Inpatient Raw Score: 17  JH-HLM Goal: 5: Stand one or more mins  JH-HLM Achieved: 6: Walk 10 steps or more  JH-HLM Goal achieved. Continue to encourage appropriate mobility.    Patient Centered Rounds: I performed bedside rounds with nursing staff today.   Discussions with Specialists or Other Care Team Provider: ID    Education and Discussions with Family / Patient: Updated  (daughter) via phone.    Current Length of Stay: 3 day(s)  Current Patient Status: Inpatient   Certification Statement: The patient will continue to require additional inpatient hospital stay due  to surgical wound infection requiring IV antibiotics  Discharge Plan: Anticipate discharge tomorrow to rehab facility.    Code Status: Level 3 - DNAR and DNI    Subjective   Seen and examined.  Denies new concerns.  Status post wound VAC change today without complications.  No fevers or chills.  Pain controlled.  Tolerating p.o.    Objective :  Temp:  [97.7 °F (36.5 °C)-98.2 °F (36.8 °C)] 98.2 °F (36.8 °C)  HR:  [] 113  BP: ()/(53-71) 115/68  Resp:  [18] 18  SpO2:  [91 %-99 %] 99 %  O2 Device: None (Room air)    Body mass index is 22.66 kg/m².     Input and Output Summary (last 24 hours):     Intake/Output Summary (Last 24 hours) at 7/11/2025 1656  Last data filed at 7/11/2025 1557  Gross per 24 hour   Intake 656 ml   Output 880 ml   Net -224 ml       Physical Exam  Vitals and nursing note reviewed.   Constitutional:       General: She is not in acute distress.     Appearance: She is well-developed.   HENT:      Head: Normocephalic and atraumatic.     Eyes:      Conjunctiva/sclera: Conjunctivae normal.       Cardiovascular:      Rate and Rhythm: Normal rate and regular rhythm.      Heart sounds: No murmur heard.  Pulmonary:      Effort: Pulmonary effort is normal. No respiratory distress.      Breath sounds: Normal breath sounds.   Abdominal:      Palpations: Abdomen is soft.      Tenderness: There is no abdominal tenderness.     Musculoskeletal:         General: No swelling.      Cervical back: Neck supple.     Skin:     General: Skin is warm and dry.      Capillary Refill: Capillary refill takes less than 2 seconds.      Comments: Wound VAC in place     Neurological:      Mental Status: She is alert.     Psychiatric:         Mood and Affect: Mood normal.           Lines/Drains:              Lab Results: I have reviewed the following results:   Results from last 7 days   Lab Units 07/11/25  0514 07/10/25  0617 07/09/25  0448   WBC Thousand/uL 7.43   < > 9.90   HEMOGLOBIN g/dL 9.5*   < > 9.1*    HEMATOCRIT % 29.9*   < > 28.2*   PLATELETS Thousands/uL 288   < > 343   SEGS PCT %  --   --  68   LYMPHO PCT %  --   --  20   MONO PCT %  --   --  9   EOS PCT %  --   --  2    < > = values in this interval not displayed.     Results from last 7 days   Lab Units 07/11/25  0514 07/09/25  0448 07/08/25  0814   SODIUM mmol/L 133*   < > 137   POTASSIUM mmol/L 3.7   < > 3.8   CHLORIDE mmol/L 100   < > 101   CO2 mmol/L 25   < > 24   BUN mg/dL 33*   < > 37*   CREATININE mg/dL 1.09   < > 1.24*   ANION GAP mmol/L 8   < > 12*   CALCIUM mg/dL 7.2*   < > 7.4*   ALBUMIN g/dL  --   --  3.7   TOTAL BILIRUBIN mg/dL  --   --  2.1*   ALK PHOS U/L  --   --  60   ALT U/L  --   --  20   AST U/L  --   --  16   GLUCOSE RANDOM mg/dL 96   < > 103*    < > = values in this interval not displayed.     Results from last 7 days   Lab Units 07/09/25  0448   INR  1.36*     Results from last 7 days   Lab Units 07/05/25  1120 07/05/25  0610 07/04/25  2046   POC GLUCOSE mg/dl 103 91 108               Recent Cultures (last 7 days):   Results from last 7 days   Lab Units 07/09/25  1005   GRAM STAIN RESULT  1+ Polys*  2+ Gram negative rods*             Last 24 Hours Medication List:     Current Facility-Administered Medications:     acetaminophen (TYLENOL) tablet 650 mg, Q6H PRN    albuterol inhalation solution 2.5 mg, Q6H PRN    apixaban (ELIQUIS) tablet 2.5 mg, BID    bisacodyl (DULCOLAX) rectal suppository 10 mg, Daily PRN    bumetanide (BUMEX) tablet 1 mg, BID    cefepime (MAXIPIME) 1,000 mg in dextrose 5 % 50 mL IVPB, Q12H, Last Rate: 1,000 mg (07/11/25 0744)    docusate sodium (COLACE) capsule 100 mg, BID    fluticasone-vilanterol 200-25 mcg/actuation 1 puff, Daily    levalbuterol (XOPENEX) inhalation solution 1.25 mg, TID    levothyroxine tablet 50 mcg, Early Morning    metoprolol tartrate (LOPRESSOR) partial tablet 12.5 mg, Q12H GARTH    morphine injection 2 mg, Q2H PRN    ondansetron (ZOFRAN) injection 4 mg, Q6H PRN    oxyCODONE (ROXICODONE)  split tablet 2.5 mg, Q4H PRN **OR** oxyCODONE (ROXICODONE) IR tablet 5 mg, Q4H PRN    polyethylene glycol (MIRALAX) packet 17 g, Daily    senna (SENOKOT) tablet 17.2 mg, HS    Administrative Statements   Today, Patient Was Seen By: Elba Burgos DO      **Please Note: This note may have been constructed using a voice recognition system.**

## 2025-07-11 NOTE — RESTORATIVE TECHNICIAN NOTE
Restorative Technician Note      Patient Name: Rebeca Banegas     Restorative Tech Visit Date: 07/11/25  Note Type: Mobility  Patient Position Upon Consult: Supine  Activity Performed: Ambulated  Assistive Device: Roller walker  Patient Position at End of Consult: Bedside chair; All needs within reach; Bed/Chair alarm activated

## 2025-07-11 NOTE — DISCHARGE SUPPORT
Case Management Assessment & Discharge Planning Note    Patient name Rebeca Banegas  Location [facility] 511/[facility] 511-01 MRN [id number]  : 1932 Date 2025       Current Admission Date: 2025  Current Admission Diagnosis:Wound disruption, post-op, skin, initial encounter   Patient Active Problem List    Diagnosis Date Noted    Wound disruption, post-op, skin, initial encounter 2025    History of embolectomy 2025    Ambulatory dysfunction 2025    Pressure injury of toe of left foot, unstageable ([facility]) 2025    Pressure injury of deep tissue of left heel 2025    Constipation 2025    Anemia 2025    DARIO (acute kidney injury) ([facility]) 2025    Hyperphosphatemia 2025    Acute lower limb ischemia 2025    Muscle cramp 2025    Chronic thoracic back pain 2025    Chronic midline low back pain without sciatica 2025    Arthralgia 2025    Other atherosclerosis of native arteries of extremities, bilateral legs ([facility]) 2025    Severe protein-calorie malnutrition ([facility]) 2024    History of CVA (cerebrovascular accident) 2024    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 ([facility]) 2024    Bronchiectasis ([facility]) 2023    MGUS (monoclonal gammopathy of unknown significance) 2023    Chronic cough 2023    Age-related osteoporosis without current pathological fracture 2023    Secondary hyperparathyroidism ([facility]) 2023    Osteopenia of hip 2020    CKD (chronic kidney disease) stage 4, GFR 15-29 ml/min ([facility]) 2020    Elevated serum immunoglobulin free light chains 2019    Sensorineural hearing loss (SNHL), bilateral 2019    Left asymmetrical SNHL 2019    Chronic heart failure with preserved ejection fraction ([facility]) 2017    Plantar fasciitis 2016    Essential tremor 10/02/2015    Onychomycosis 2015    Pulmonary nodule 2015    Thyroid nodule  02/20/2015    Primary hypertension 05/19/2014    Atherosclerosis 03/06/2014    Ovarian cyst 03/06/2014    Pancreatic cyst 03/06/2014    Backache 02/28/2014    Permanent atrial fibrillation (HCC) 02/14/2013    Arthritis 11/12/2012    Asthma 11/12/2012    Esophageal reflux 11/12/2012    Bifascicular bundle branch block 11/10/2012    Hypothyroidism 11/10/2012      LOS (days): 3  Geometric Mean LOS (GMLOS) (days): 2.9  Days to GMLOS:-0.1   Facility Authorization Initiated  DC Support Center received request for auth from : Azalea lafleur  Authorization Request Submitted for: SNF  Requested Start of Care Date: 07/12/25  Facility Name: [facility]  Facility NPI: [id number]  Facility MD: dr hart  Facility MD NPI: [id number]  Authorization initiated by contacting insurance: [employer]  Via: [employer]  Clinicals submitted via: Portal Attachment  Pending reference #: [id number]   notified: azalea lafleur     Updates to authorization status will be noted in chart.    Please reach out to CM for updates on any clinical information.

## 2025-07-11 NOTE — PLAN OF CARE
Problem: Potential for Falls  Goal: Patient will remain free of falls  Description: INTERVENTIONS:  - Educate patient/family on patient safety including physical limitations  - Instruct patient to call for assistance with activity   - Consider consulting OT/PT to assist with strengthening/mobility based on AM PAC & JH-HLM score  - Consult OT/PT to assist with strengthening/mobility   - Keep Call bell within reach  - Keep bed low and locked with side rails adjusted as appropriate  - Keep care items and personal belongings within reach  - Initiate and maintain comfort rounds  - Make Fall Risk Sign visible to staff  - Offer Toileting every  Hours, in advance of need  - Initiate/Maintain alarm  - Obtain necessary fall risk management equipment:   - Apply yellow socks and bracelet for high fall risk patients  - Consider moving patient to room near nurses station  Outcome: Progressing     Problem: PAIN - ADULT  Goal: Verbalizes/displays adequate comfort level or baseline comfort level  Description: Interventions:  - Encourage patient to monitor pain and request assistance  - Assess pain using appropriate pain scale  - Administer analgesics as ordered based on type and severity of pain and evaluate response  - Implement non-pharmacological measures as appropriate and evaluate response  - Consider cultural and social influences on pain and pain management  - Notify physician/advanced practitioner if interventions unsuccessful or patient reports new pain  - Educate patient/family on pain management process including their role and importance of  reporting pain   - Provide non-pharmacologic/complimentary pain relief interventions  Outcome: Progressing     Problem: INFECTION - ADULT  Goal: Absence or prevention of progression during hospitalization  Description: INTERVENTIONS:  - Assess and monitor for signs and symptoms of infection  - Monitor lab/diagnostic results  - Monitor all insertion sites, i.e. indwelling lines,  tubes, and drains  - Monitor endotracheal if appropriate and nasal secretions for changes in amount and color  - Douglass appropriate cooling/warming therapies per order  - Administer medications as ordered  - Instruct and encourage patient and family to use good hand hygiene technique  - Identify and instruct in appropriate isolation precautions for identified infection/condition  Outcome: Progressing  Goal: Absence of fever/infection during neutropenic period  Description: INTERVENTIONS:  - Monitor WBC  - Perform strict hand hygiene  - Limit to healthy visitors only  - No plants, dried, fresh or silk flowers with horne in patient room  Outcome: Progressing     Problem: SAFETY ADULT  Goal: Patient will remain free of falls  Description: INTERVENTIONS:  - Educate patient/family on patient safety including physical limitations  - Instruct patient to call for assistance with activity   - Consider consulting OT/PT to assist with strengthening/mobility based on AM PAC & -HLM score  - Consult OT/PT to assist with strengthening/mobility   - Keep Call bell within reach  - Keep bed low and locked with side rails adjusted as appropriate  - Keep care items and personal belongings within reach  - Initiate and maintain comfort rounds  - Make Fall Risk Sign visible to staff  - Offer Toileting every  Hours, in advance of need  - Initiate/Maintain alarm  - Obtain necessary fall risk management equipment:   - Apply yellow socks and bracelet for high fall risk patients  - Consider moving patient to room near nurses station  Outcome: Progressing  Goal: Maintain or return to baseline ADL function  Description: INTERVENTIONS:  -  Assess patient's ability to carry out ADLs; assess patient's baseline for ADL function and identify physical deficits which impact ability to perform ADLs (bathing, care of mouth/teeth, toileting, grooming, dressing, etc.)  - Assess/evaluate cause of self-care deficits   - Assess range of motion  - Assess  patient's mobility; develop plan if impaired  - Assess patient's need for assistive devices and provide as appropriate  - Encourage maximum independence but intervene and supervise when necessary  - Involve family in performance of ADLs  - Assess for home care needs following discharge   - Consider OT consult to assist with ADL evaluation and planning for discharge  - Provide patient education as appropriate  - Monitor functional capacity and physical performance, use of AM PAC & JH-HLM   - Monitor gait, balance and fatigue with ambulation    Outcome: Progressing  Goal: Maintains/Returns to pre admission functional level  Description: INTERVENTIONS:  - Perform AM-PAC 6 Click Basic Mobility/ Daily Activity assessment daily.  - Set and communicate daily mobility goal to care team and patient/family/caregiver.   - Collaborate with rehabilitation services on mobility goals if consulted  - Perform Range of Motion  times a day.  - Reposition patient every  hours.  - Dangle patient  times a day  - Stand patient  times a day  - Ambulate patient  times a day  - Out of bed to chair  times a day   - Out of bed for meals  times a day  - Out of bed for toileting  - Record patient progress and toleration of activity level   Outcome: Progressing     Problem: DISCHARGE PLANNING  Goal: Discharge to home or other facility with appropriate resources  Description: INTERVENTIONS:  - Identify barriers to discharge w/patient and caregiver  - Arrange for needed discharge resources and transportation as appropriate  - Identify discharge learning needs (meds, wound care, etc.)  - Arrange for interpretive services to assist at discharge as needed  - Refer to Case Management Department for coordinating discharge planning if the patient needs post-hospital services based on physician/advanced practitioner order or complex needs related to functional status, cognitive ability, or social support system  Outcome: Progressing     Problem: Knowledge  Deficit  Goal: Patient/family/caregiver demonstrates understanding of disease process, treatment plan, medications, and discharge instructions  Description: Complete learning assessment and assess knowledge base.  Interventions:  - Provide teaching at level of understanding  - Provide teaching via preferred learning methods  Outcome: Progressing     Problem: SKIN/TISSUE INTEGRITY - ADULT  Goal: Skin Integrity remains intact(Skin Breakdown Prevention)  Description: Assess:  -Perform Bandar assessment every   -Clean and moisturize skin every   -Inspect skin when repositioning, toileting, and assisting with ADLS  -Assess under medical devices such as  every   -Assess extremities for adequate circulation and sensation     Bed Management:  -Have minimal linens on bed & keep smooth, unwrinkled  -Change linens as needed when moist or perspiring  -Avoid sitting or lying in one position for more than  hours while in bed  -Keep HOB at degrees     Toileting:  -Offer bedside commode  -Assess for incontinence every   -Use incontinent care products after each incontinent episode such as     Activity:  -Mobilize patient  times a day  -Encourage activity and walks on unit  -Encourage or provide ROM exercises   -Turn and reposition patient every  Hours  -Use appropriate equipment to lift or move patient in bed  -Instruct/ Assist with weight shifting every  when out of bed in chair  -Consider limitation of chair time  hour intervals    Skin Care:  -Avoid use of baby powder, tape, friction and shearing, hot water or constrictive clothing  -Relieve pressure over bony prominences using   -Do not massage red bony areas    Next Steps:  -Teach patient strategies to minimize risks such as    -Consider consults to  interdisciplinary teams such as   Outcome: Progressing  Goal: Incision(s), wounds(s) or drain site(s) healing without S/S of infection  Description: INTERVENTIONS  - Assess and document dressing, incision, wound bed, drain sites  and surrounding tissue  - Provide patient and family education  - Perform skin care/dressing changes every   Outcome: Progressing  Goal: Pressure injury heals and does not worsen  Description: Interventions:  - Implement low air loss mattress or specialty surface (Criteria met)  - Apply silicone foam dressing  - Instruct/assist with weight shifting every  minutes when in chair   - Limit chair time to  hour intervals  - Use special pressure reducing interventions such as  when in chair   - Apply fecal or urinary incontinence containment device   - Perform passive or active ROM every   - Turn and reposition patient & offload bony prominences every  hours   - Utilize friction reducing device or surface for transfers   - Consider consults to  interdisciplinary teams such as   - Use incontinent care products after each incontinent episode such as   - Consider nutrition services referral as needed  Outcome: Progressing     Problem: Prexisting or High Potential for Compromised Skin Integrity  Goal: Skin integrity is maintained or improved  Description: INTERVENTIONS:  - Identify patients at risk for skin breakdown  - Assess and monitor skin integrity including under and around medical devices   - Assess and monitor nutrition and hydration status  - Monitor labs  - Assess for incontinence   - Turn and reposition patient  - Assist with mobility/ambulation  - Relieve pressure over wale prominences   - Avoid friction and shearing  - Provide appropriate hygiene as needed including keeping skin clean and dry  - Evaluate need for skin moisturizer/barrier cream  - Collaborate with interdisciplinary team  - Patient/family teaching  - Consider wound care consult    Assess:  - Review Bandar scale daily  - Clean and moisturize skin every   - Inspect skin when repositioning, toileting, and assisting with ADLS  - Assess under medical devices such as  every   - Assess extremities for adequate circulation and sensation     Bed  Management:  - Have minimal linens on bed & keep smooth, unwrinkled  - Change linens as needed when moist or perspiring  - Avoid sitting or lying in one position for more than  hours while in bed?Keep HOB at degrees   - Toileting:  - Offer bedside commode  - Assess for incontinence every   - Use incontinent care products after each incontinent episode such as     Activity:  - Mobilize patient  times a day  - Encourage activity and walks on unit  - Encourage or provide ROM exercises   - Turn and reposition patient every  Hours  - Use appropriate equipment to lift or move patient in bed  - Instruct/ Assist with weight shifting every  when out of bed in chair  - Consider limitation of chair time  hour intervals    Skin Care:  - Avoid use of baby powder, tape, friction and shearing, hot water or constrictive clothing  - Relieve pressure over bony prominences using   - Do not massage red bony areas    Next Steps:  - Teach patient strategies to minimize risks such as   - Consider consults to  interdisciplinary teams such as   Outcome: Progressing     Problem: Nutrition/Hydration-ADULT  Goal: Nutrient/Hydration intake appropriate for improving, restoring or maintaining nutritional needs  Description: Monitor and assess patient's nutrition/hydration status for malnutrition. Collaborate with interdisciplinary team and initiate plan and interventions as ordered.  Monitor patient's weight and dietary intake as ordered or per policy. Utilize nutrition screening tool and intervene as necessary. Determine patient's food preferences and provide high-protein, high-caloric foods as appropriate.     INTERVENTIONS:  - Monitor oral intake, urinary output, labs, and treatment plans  - Assess nutrition and hydration status and recommend course of action  - Evaluate amount of meals eaten  - Assist patient with eating if necessary   - Allow adequate time for meals  - Recommend/ encourage appropriate diets, oral nutritional supplements, and  vitamin/mineral supplements  - Order, calculate, and assess calorie counts as needed  - Recommend, monitor, and adjust tube feedings and TPN/PPN based on assessed needs  - Assess need for intravenous fluids  - Provide specific nutrition/hydration education as appropriate  - Include patient/family/caregiver in decisions related to nutrition  Outcome: Progressing

## 2025-07-11 NOTE — ASSESSMENT & PLAN NOTE
----- Message from Erin Castillo Mount Zion campusMARIO ALBERTO: [phone]   Surgical site infection - there is tenderness but no erythema, edema, purulence, heat at site of infection. Drainage likely due to small segment of unhealed incision at the site. Growth of citrobacteria amalonaticus complex - which can typically be treated by later generation cephalosporins but can be resistant to some beta lactams. Will follow with the susceptibilities before changing antibiotic to PO      Plan:              - Continue current regimen of cefeprime and transition per susceptibilties, can treat for total of 10 days due to complicated surgery               - No indication for further imaging or blood cultures at this time   - Wound vac/wound management per wound care and vascular surgery

## 2025-07-11 NOTE — CASE MANAGEMENT
Case Management ED Progress Note    Patient name Rebeca Banegas  Location [facility] 511-01 MRN [id number]  : 1932 Date 2025        OBJECTIVE:  Predictive Model Details          80%  Factor Value    Risk of Hospital Admission or ED Visit Model 30% Number of Hospitalizations 5+     11% Is in Relationship No     11% Has Chronic Kidney Disease Yes     9% Has Atrial Fibrillation Yes     9% Has Peripheral Vascular Disease Yes     8% Has COPD Yes     7% Has Anemia Yes     6% Has CVD Yes     5% Has Asthma Yes     4% Has CHF Yes     2% Has PCP Yes            Chief Complaint: Wound disruption, post-op, skin, initial encounter .  Patient Class: Inpatient  Preferred Pharmacy:   [facility]/pharm[facility] #[id number] - [place], [place] - [address] [place] [address]  [address] [id number]  Phone: [phone] Fax: [phone]    [facility] - [place], [place] - [address]  [address] [facility] [address]  Phone: [phone] Fax: [phone]    Primary Care Provider: Santana Dillon MD    Primary Insurance: [email or web address]  Secondary Insurance:     ED Progress Note:  Pt is medically cleared for d/c  Raoul Ramirez responded this am that pt can return to prior room.  Tasked to DC support for auth

## 2025-07-12 PROBLEM — D53.9 MACROCYTIC ANEMIA: Status: ACTIVE | Noted: 2025-06-29

## 2025-07-12 LAB
ANION GAP SERPL CALCULATED.3IONS-SCNC: 8 MMOL/L (ref 4–13)
BASOPHILS # BLD AUTO: 0.03 THOUSANDS/ÂΜL (ref 0–0.1)
BASOPHILS NFR BLD AUTO: 0 % (ref 0–1)
BUN SERPL-MCNC: 30 MG/DL (ref 5–25)
CALCIUM SERPL-MCNC: 7.7 MG/DL (ref 8.4–10.2)
CHLORIDE SERPL-SCNC: 100 MMOL/L (ref 96–108)
CO2 SERPL-SCNC: 26 MMOL/L (ref 21–32)
CREAT SERPL-MCNC: 1.04 MG/DL (ref 0.6–1.3)
EOSINOPHIL # BLD AUTO: 0.15 THOUSAND/ÂΜL (ref 0–0.61)
EOSINOPHIL NFR BLD AUTO: 2 % (ref 0–6)
ERYTHROCYTE [DISTWIDTH] IN BLOOD BY AUTOMATED COUNT: 19.3 % (ref 11.6–15.1)
FERRITIN SERPL-MCNC: 143 NG/ML (ref 30–307)
FOLATE SERPL-MCNC: 16.6 NG/ML
GFR SERPL CREATININE-BSD FRML MDRD: 46 ML/MIN/1.73SQ M
GLUCOSE SERPL-MCNC: 101 MG/DL (ref 65–140)
HCT VFR BLD AUTO: 29.3 % (ref 34.8–46.1)
HGB BLD-MCNC: 9.3 G/DL (ref 11.5–15.4)
IMM GRANULOCYTES # BLD AUTO: 0.04 THOUSAND/UL (ref 0–0.2)
IMM GRANULOCYTES NFR BLD AUTO: 1 % (ref 0–2)
IRON SATN MFR SERPL: 16 % (ref 15–50)
IRON SERPL-MCNC: 42 UG/DL (ref 50–212)
LYMPHOCYTES # BLD AUTO: 1.68 THOUSANDS/ÂΜL (ref 0.6–4.47)
LYMPHOCYTES NFR BLD AUTO: 22 % (ref 14–44)
MCH RBC QN AUTO: 33.1 PG (ref 26.8–34.3)
MCHC RBC AUTO-ENTMCNC: 31.7 G/DL (ref 31.4–37.4)
MCV RBC AUTO: 104 FL (ref 82–98)
MONOCYTES # BLD AUTO: 0.84 THOUSAND/ÂΜL (ref 0.17–1.22)
MONOCYTES NFR BLD AUTO: 11 % (ref 4–12)
NEUTROPHILS # BLD AUTO: 5.09 THOUSANDS/ÂΜL (ref 1.85–7.62)
NEUTS SEG NFR BLD AUTO: 64 % (ref 43–75)
NRBC BLD AUTO-RTO: 0 /100 WBCS
PLATELET # BLD AUTO: 263 THOUSANDS/UL (ref 149–390)
PMV BLD AUTO: 8.9 FL (ref 8.9–12.7)
POTASSIUM SERPL-SCNC: 4.2 MMOL/L (ref 3.5–5.3)
RBC # BLD AUTO: 2.81 MILLION/UL (ref 3.81–5.12)
SODIUM SERPL-SCNC: 134 MMOL/L (ref 135–147)
TIBC SERPL-MCNC: 267.4 UG/DL (ref 250–450)
TRANSFERRIN SERPL-MCNC: 191 MG/DL (ref 203–362)
UIBC SERPL-MCNC: 225 UG/DL (ref 155–355)
VIT B12 SERPL-MCNC: 503 PG/ML (ref 180–914)
WBC # BLD AUTO: 7.83 THOUSAND/UL (ref 4.31–10.16)

## 2025-07-12 PROCEDURE — 82728 ASSAY OF FERRITIN: CPT | Performed by: STUDENT IN AN ORGANIZED HEALTH CARE EDUCATION/TRAINING PROGRAM

## 2025-07-12 PROCEDURE — 80048 BASIC METABOLIC PNL TOTAL CA: CPT

## 2025-07-12 PROCEDURE — 82746 ASSAY OF FOLIC ACID SERUM: CPT | Performed by: STUDENT IN AN ORGANIZED HEALTH CARE EDUCATION/TRAINING PROGRAM

## 2025-07-12 PROCEDURE — 99024 POSTOP FOLLOW-UP VISIT: CPT

## 2025-07-12 PROCEDURE — 85025 COMPLETE CBC W/AUTO DIFF WBC: CPT

## 2025-07-12 PROCEDURE — 85046 RETICYTE/HGB CONCENTRATE: CPT | Performed by: STUDENT IN AN ORGANIZED HEALTH CARE EDUCATION/TRAINING PROGRAM

## 2025-07-12 PROCEDURE — 94640 AIRWAY INHALATION TREATMENT: CPT

## 2025-07-12 PROCEDURE — 94760 N-INVAS EAR/PLS OXIMETRY 1: CPT

## 2025-07-12 PROCEDURE — 83550 IRON BINDING TEST: CPT | Performed by: STUDENT IN AN ORGANIZED HEALTH CARE EDUCATION/TRAINING PROGRAM

## 2025-07-12 PROCEDURE — 82607 VITAMIN B-12: CPT

## 2025-07-12 PROCEDURE — 99233 SBSQ HOSP IP/OBS HIGH 50: CPT | Performed by: STUDENT IN AN ORGANIZED HEALTH CARE EDUCATION/TRAINING PROGRAM

## 2025-07-12 PROCEDURE — 83540 ASSAY OF IRON: CPT | Performed by: STUDENT IN AN ORGANIZED HEALTH CARE EDUCATION/TRAINING PROGRAM

## 2025-07-12 PROCEDURE — 94664 DEMO&/EVAL PT USE INHALER: CPT

## 2025-07-12 RX ORDER — BUMETANIDE 1 MG/1
1 TABLET ORAL 2 TIMES DAILY
Status: DISCONTINUED | OUTPATIENT
Start: 2025-07-12 | End: 2025-07-15 | Stop reason: HOSPADM

## 2025-07-12 RX ADMIN — APIXABAN 2.5 MG: 2.5 TABLET, FILM COATED ORAL at 17:23

## 2025-07-12 RX ADMIN — BUMETANIDE 1 MG: 1 TABLET ORAL at 17:23

## 2025-07-12 RX ADMIN — FLUTICASONE FUROATE AND VILANTEROL TRIFENATATE 1 PUFF: 200; 25 POWDER RESPIRATORY (INHALATION) at 09:11

## 2025-07-12 RX ADMIN — DOCUSATE SODIUM 100 MG: 100 CAPSULE, LIQUID FILLED ORAL at 08:52

## 2025-07-12 RX ADMIN — CEFEPIME 1000 MG: 1 INJECTION, POWDER, FOR SOLUTION INTRAMUSCULAR; INTRAVENOUS at 19:43

## 2025-07-12 RX ADMIN — CEFEPIME 1000 MG: 1 INJECTION, POWDER, FOR SOLUTION INTRAMUSCULAR; INTRAVENOUS at 08:52

## 2025-07-12 RX ADMIN — LEVALBUTEROL HYDROCHLORIDE 1.25 MG: 1.25 SOLUTION RESPIRATORY (INHALATION) at 07:56

## 2025-07-12 RX ADMIN — DOCUSATE SODIUM 100 MG: 100 CAPSULE, LIQUID FILLED ORAL at 17:23

## 2025-07-12 RX ADMIN — LEVALBUTEROL HYDROCHLORIDE 1.25 MG: 1.25 SOLUTION RESPIRATORY (INHALATION) at 19:14

## 2025-07-12 RX ADMIN — SENNOSIDES 17.2 MG: 8.6 TABLET, FILM COATED ORAL at 21:27

## 2025-07-12 RX ADMIN — OXYCODONE HYDROCHLORIDE 5 MG: 5 TABLET ORAL at 21:27

## 2025-07-12 RX ADMIN — Medication 12.5 MG: at 08:58

## 2025-07-12 RX ADMIN — LEVALBUTEROL HYDROCHLORIDE 1.25 MG: 1.25 SOLUTION RESPIRATORY (INHALATION) at 14:37

## 2025-07-12 RX ADMIN — APIXABAN 2.5 MG: 2.5 TABLET, FILM COATED ORAL at 08:52

## 2025-07-12 RX ADMIN — Medication 12.5 MG: at 20:19

## 2025-07-12 RX ADMIN — LEVOTHYROXINE SODIUM 50 MCG: 0.05 TABLET ORAL at 04:35

## 2025-07-12 RX ADMIN — POLYETHYLENE GLYCOL 3350 17 G: 17 POWDER, FOR SOLUTION ORAL at 08:52

## 2025-07-12 NOTE — PROGRESS NOTES
Progress Note - Vascular Surgery   Name: Rebeca Banegas 89 y.o. female I MRN: [id number]  Unit/Bed#: [facility] 511-01 I Date of Admission: 7/8/2025   Date of Service: 7/12/2025 I Hospital Day: 4    Assessment & Plan  Surgical wound infection  89 y.o. female with PMH of HFpEF, permanent A-fib on Eliquis, history of CVA, bronchiectasis recently admitted with acute lower limb ischemia. She underwent emergent right leg embolectomy/lobectomy, fasciotomy and left popliteal thrombectomy on 6/24/2025 by our team, then developed copious serous drainage from right groin wound.    POD#3 s/p R groin washout and VAC placement 7/9: Wound vac is suctioning appropriately. Her LLE thrombectomy incision is well approxiamated but has mild erythema to monitor. Her RLE fasciotomy incision is showing signs of ischemic skin changes at the suture line and is at risk of not healing.    WBC:  7.83  Hgb 9.3  Cr 1.04    Plan:  -Wound Vac change due Monday, Wed, Fri schedule  -Wound care following, input appreciated  -Prevalon boots ordered  -Cont eliquis  -Pain control as needed  -ID following, cont IV cefepime and transition to PO doxy at discharge (10d course of ABX, last dose on 7/19)  -Remainder of care per medicine, input appreciated  -D/W Dr. Cohen Doctor      Subjective : Pt was awake when I saw her this morning and denies pain in the BLE and groin vac. She has no concerns at this time.     Objective :  Temp:  [97.8 °F (36.6 °C)-98.2 °F (36.8 °C)] 97.8 °F (36.6 °C)  HR:  [107-119] 107  BP: (104-129)/(68-73) 104/69  Resp:  [17-18] 17  SpO2:  [93 %-100 %] 99 %  O2 Device: None (Room air)     I/O         07/10 0701  07/11 0700 07/11 0701 07/12 0700 07/12 0701  07/13 0700    P.O. 836 840     I.V. (mL/kg)       IV Piggyback  50     Total Intake(mL/kg) 836 (14) 890 (14.9)     Urine (mL/kg/hr) 400 (0.3) 555 (0.4)     Drains 400 450     Stool  0     Total Output 800 1005     Net +36 -115            Unmeasured Urine Occurrence  1 x      Unmeasured Stool Occurrence  1 x             Physical Exam  Vitals and nursing note reviewed.   Constitutional:       General: She is not in acute distress.     Appearance: She is well-developed.   HENT:      Head: Normocephalic and atraumatic.     Eyes:      Conjunctiva/sclera: Conjunctivae normal.       Cardiovascular:      Rate and Rhythm: Normal rate and regular rhythm.      Heart sounds: No murmur heard.  Pulmonary:      Effort: Pulmonary effort is normal. No respiratory distress.      Breath sounds: Normal breath sounds.   Abdominal:      Palpations: Abdomen is soft.      Tenderness: There is no abdominal tenderness.     Musculoskeletal:         General: No swelling.      Cervical back: Neck supple.     Skin:     General: Skin is warm and dry.      Capillary Refill: Capillary refill takes less than 2 seconds.      Comments: RLE fasciotomy incision has darkened skin at the lateral incision border, still intact and well approximated.    LLE thrombectomy incision is intact, with mild erythema surround incision, unchanged from yesterday.   R groin wound vac is functioning appropriately.      Neurological:      Mental Status: She is alert.     Psychiatric:         Mood and Affect: Mood normal.             Lab Results: I have reviewed the following results:  CBC with diff:   Lab Results   Component Value Date    WBC 7.83 07/12/2025    HGB 9.3 (L) 07/12/2025    HCT 29.3 (L) 07/12/2025     (H) 07/12/2025     07/12/2025    RBC 2.81 (L) 07/12/2025    MCH 33.1 07/12/2025    MCHC 31.7 07/12/2025    RDW 19.3 (H) 07/12/2025    MPV 8.9 07/12/2025    NRBC 0 07/12/2025     BMP/CMP:  Lab Results   Component Value Date    SODIUM 134 (L) 07/12/2025    SODIUM 137 07/08/2025    K 4.2 07/12/2025    K 3.8 07/08/2025     07/12/2025     07/08/2025    CO2 26 07/12/2025    CO2 24 07/08/2025    BUN 30 (H) 07/12/2025    BUN 37 (H) 07/08/2025    CREATININE 1.04 07/12/2025    CREATININE 1.24 (H) 07/08/2025     GLUCOSE 139 06/24/2025    CALCIUM 7.7 (L) 07/12/2025    CALCIUM 7.4 (L) 07/08/2025    AST 16 07/08/2025    ALT 20 07/08/2025    ALKPHOS 60 07/08/2025    PROT 6.4 10/17/2017    BILITOT 0.5 10/17/2017    EGFR 46 07/12/2025    EGFR 41 (L) 07/08/2025    EGFR 35 (L) 12/29/2020     Coags:   Lab Results   Component Value Date    PT 23.7 (H) 02/23/2024    PTT 38 (H) 07/09/2025    INR 1.36 (H) 07/09/2025    INR 2.2 02/23/2024

## 2025-07-12 NOTE — ASSESSMENT & PLAN NOTE
"Recent emergent right leg embolectomy/lobectomy, fasciotomy and left popliteal thrombectomy on 6/24/2025 by vascular surgery complicated by right groin wound infection  S/p I&D 7/9, right groin washout with VAC placement 7/9  Wound cultures growing GNR resistant to cefazolin.   Vascular Surgery and Infectious disease are following  IV cefazolin switched to IV cefepime for broad-spectrum coverage  Susceptibilities reviewed  Potential plan to transition to Bactrim 100 mg twice daily on discharge  Monitor WBC count and fever curve  Wound VAC exchanges Monday/Wednesday/Friday  As per  Doctor \"suspect debridement will be needed for R leg fasciotomy incision but will continue wound care for now\"  7/12 undergoing IR LE Angiogram  Anticipate discharge back to subacute rehab - when cleared by surgery - pending insurance authorization  "

## 2025-07-12 NOTE — ASSESSMENT & PLAN NOTE
Baseline 12-14 , s/p embolectomy/thrombectomy on 6/24 , and Hgb down to 8.3 since 6/25/25 - suspected blood loss component +/- due to acute illness, infection and medications. Wound vac in place with some serosanguinous drainage collected.     Recent TSH WNL; folate & B12 WNL; iron panel more consistent with anemia of inflammatory/chronic disease, no overt deficiency.   Checking retic count.   Treat underlying etiology, on ABX as above. Transfuse p-RBC if < 7

## 2025-07-12 NOTE — ASSESSMENT & PLAN NOTE
89 y.o. female with PMH of HFpEF, permanent A-fib on Eliquis, history of CVA, bronchiectasis recently admitted with acute lower limb ischemia. She underwent emergent right leg embolectomy/lobectomy, fasciotomy and left popliteal thrombectomy on 6/24/2025 by our team, then developed copious serous drainage from right groin wound.    POD#3 s/p R groin washout and VAC placement 7/9: Wound vac is suctioning appropriately. Her LLE thrombectomy incision is well approxiamated but has mild erythema to monitor. Her RLE fasciotomy incision is showing signs of ischemic skin changes at the suture line and is at risk of not healing.    WBC:  7.83  Hgb 9.3  Cr 1.04    Plan:  -Wound Vac change due Monday, Wed, Fri schedule  -Wound care following, input appreciated  -Prevalon boots ordered  -Cont eliquis  -Pain control as needed  -ID following, cont IV cefepime and transition to PO doxy at discharge (10d course of ABX, last dose on 7/19)  -Remainder of care per medicine, input appreciated  -D/W Dr. Cohen Doctor

## 2025-07-12 NOTE — PROGRESS NOTES
"Progress Note - Hospitalist   Name: Rebeca Banegas 89 y.o. female I MRN: [id number]  Unit/Bed#: [facility] 511-01 I Date of Admission: 7/8/2025   Date of Service: 7/12/2025 I Hospital Day: 4    Assessment & Plan  Surgical wound infection  Recent emergent right leg embolectomy/lobectomy, fasciotomy and left popliteal thrombectomy on 6/24/2025 by vascular surgery complicated by right groin wound infection  S/p I&D 7/9, right groin washout with VAC placement 7/9  Wound cultures growing GNR resistant to cefazolin.   Vascular Surgery and Infectious disease are following  IV cefazolin switched to IV cefepime for broad-spectrum coverage  Susceptibilities reviewed  Potential plan to transition to Bactrim 100 mg twice daily on discharge  Monitor WBC count and fever curve  Wound VAC exchanges Monday/Wednesday/Friday  As per Dr. Doctor \"suspect debridement will be needed for R leg fasciotomy incision but will continue wound care for now\"  7/12 undergoing IR LE Angiogram  Anticipate discharge back to subacute rehab - when cleared by surgery - pending insurance authorization  Asthma  Not in exacerbation  Continue home inhaler/nebulizer regimen  Permanent atrial fibrillation (HCC)  Rate controlled with metoprolol  Continue on Eliquis for stroke prevention  Primary hypertension  Controlled on home Bumex, metoprolol  Continue to monitor  Hypothyroidism  Continue Synthroid  Macrocytic anemia  Baseline 12-14 , s/p embolectomy/thrombectomy on 6/24 , and Hgb down to 8.3 since 6/25/25 - suspected blood loss component +/- due to acute illness, infection and medications. Wound vac in place with some serosanguinous drainage collected.     Recent TSH WNL; folate & B12 WNL; iron panel more consistent with anemia of inflammatory/chronic disease, no overt deficiency.   Checking retic count.   Treat underlying etiology, on ABX as above. Transfuse p-RBC if < 7     __________________      VTE Pharmacologic Prophylaxis: VTE Score: 8 Moderate Risk " (Score 3-4) - Pharmacological DVT Prophylaxis Ordered: apixaban (Eliquis).    Mobility:   Basic Mobility Inpatient Raw Score: 17  JH-HLM Goal: 5: Stand one or more mins  JH-HLM Achieved: 7: Walk 25 feet or more  JH-HLM Goal achieved. Continue to encourage appropriate mobility.    Patient Centered Rounds: I performed bedside rounds with nursing staff today.   Discussions with Specialists or Other Care Team Provider: ID and vascular surgery input noted.     Education and Discussions with Family / Patient: Updated  (daughter) via phone. Meena, and updated her on the above.     Current Length of Stay: 4 day(s)  Current Patient Status: Inpatient   Certification Statement: on Abx as above   Discharge Plan: Anticipate discharge in 24-48 hrs to rehab facility.    Code Status: Level 3 - DNAR and DNI    Subjective   Patient was seen and examined today - sitting in recliner in no acute distress. Wound vac in place. She denies any pain at time of exam, and denies any new symptoms or complain. She feels better overall. With her permission, I called and updated her daughter Meena on above A&P.     Objective :  Temp:  [97.8 °F (36.6 °C)-98.2 °F (36.8 °C)] 97.8 °F (36.6 °C)  HR:  [107-119] 107  BP: ()/(57-73) 96/57  Resp:  [17-18] 17  SpO2:  [93 %-100 %] 99 %  O2 Device: None (Room air)    Body mass index is 22.66 kg/m².     Input and Output Summary (last 24 hours):     Intake/Output Summary (Last 24 hours) at 7/12/2025 1415  Last data filed at 7/12/2025 1210  Gross per 24 hour   Intake 650 ml   Output 905 ml   Net -255 ml       Physical Exam  - GEN: Appears well, alert and oriented x 3, pleasant and cooperative, in no acute distress  - HEENT: Anicteric, mucous membranes moist, PERRL and EOMI   - NECK: No lymphadenopathy, JVD or carotid bruits   - HEART: RRR, normal S1 and S2, no murmurs, clicks, gallops or rubs   - LUNGS: Clear to auscultation bilaterally; no wheezes, rales, or rhonchi  - ABDOMEN: Normal bowel  sounds, soft, no tenderness, no distention, no organomegaly or masses felt on exam.   - EXTREMITIES: Peripheral pulses normal; no clubbing, cyanosis, or edema  - NEURO: No focal findings, CN II-XII are grossly intact.   - Musculoskeletal: 5/5 strength, normal ROM, no swollen or erythematous joints.   - SKIN: right leg wound clean and dry, wrapped; wound vac in place with serosanguinous drainage collected.    Lines/Drains:              Lab Results: I have reviewed the following results:   Results from last 7 days   Lab Units 07/12/25  0443   WBC Thousand/uL 7.83   HEMOGLOBIN g/dL 9.3*   HEMATOCRIT % 29.3*   PLATELETS Thousands/uL 263   SEGS PCT % 64   LYMPHO PCT % 22   MONO PCT % 11   EOS PCT % 2     Results from last 7 days   Lab Units 07/12/25  0443 07/09/25  0448 07/08/25  0814   SODIUM mmol/L 134*   < > 137   POTASSIUM mmol/L 4.2   < > 3.8   CHLORIDE mmol/L 100   < > 101   CO2 mmol/L 26   < > 24   BUN mg/dL 30*   < > 37*   CREATININE mg/dL 1.04   < > 1.24*   ANION GAP mmol/L 8   < > 12*   CALCIUM mg/dL 7.7*   < > 7.4*   ALBUMIN g/dL  --   --  3.7   TOTAL BILIRUBIN mg/dL  --   --  2.1*   ALK PHOS U/L  --   --  60   ALT U/L  --   --  20   AST U/L  --   --  16   GLUCOSE RANDOM mg/dL 101   < > 103*    < > = values in this interval not displayed.     Results from last 7 days   Lab Units 07/09/25  0448   INR  1.36*                   Recent Cultures (last 7 days):   Results from last 7 days   Lab Units 07/09/25  1005   GRAM STAIN RESULT  1+ Polys*  2+ Gram negative rods*       Imaging Results Review: I reviewed radiology reports from this admission including: CTA Abdomen.  Other Study Results Review: No additional pertinent studies reviewed.    Last 24 Hours Medication List:     Current Facility-Administered Medications:     acetaminophen (TYLENOL) tablet 650 mg, Q6H PRN    albuterol inhalation solution 2.5 mg, Q6H PRN    apixaban (ELIQUIS) tablet 2.5 mg, BID    bisacodyl (DULCOLAX) rectal suppository 10 mg, Daily  PRN    bumetanide (BUMEX) tablet 1 mg, BID    cefepime (MAXIPIME) 1,000 mg in dextrose 5 % 50 mL IVPB, Q12H, Last Rate: 1,000 mg (07/12/25 0852)    docusate sodium (COLACE) capsule 100 mg, BID    fluticasone-vilanterol 200-25 mcg/actuation 1 puff, Daily    levalbuterol (XOPENEX) inhalation solution 1.25 mg, TID    levothyroxine tablet 50 mcg, Early Morning    metoprolol tartrate (LOPRESSOR) partial tablet 12.5 mg, Q12H GARTH    morphine injection 2 mg, Q2H PRN    ondansetron (ZOFRAN) injection 4 mg, Q6H PRN    oxyCODONE (ROXICODONE) split tablet 2.5 mg, Q4H PRN **OR** oxyCODONE (ROXICODONE) IR tablet 5 mg, Q4H PRN    polyethylene glycol (MIRALAX) packet 17 g, Daily    senna (SENOKOT) tablet 17.2 mg, HS    Administrative Statements   Today, Patient Was Seen By: Paul Durbin DO  I have spent a total time of 50 minutes in caring for this patient on the day of the visit/encounter including Diagnostic results, Prognosis, Risks and benefits of tx options, Instructions for management, Patient and family education, Importance of tx compliance, Risk factor reductions, Impressions, Counseling / Coordination of care, Documenting in the medical record, Reviewing/placing orders in the medical record (including tests, medications, and/or procedures), and Obtaining or reviewing history  .    **Please Note: This note may have been constructed using a voice recognition system.**

## 2025-07-13 LAB
ALBUMIN SERPL BCG-MCNC: 2.8 G/DL (ref 3.5–5)
ALP SERPL-CCNC: 49 U/L (ref 34–104)
ALT SERPL W P-5'-P-CCNC: 3 U/L (ref 7–52)
ANION GAP SERPL CALCULATED.3IONS-SCNC: 8 MMOL/L (ref 4–13)
AST SERPL W P-5'-P-CCNC: 13 U/L (ref 13–39)
BASOPHILS # BLD AUTO: 0.03 THOUSANDS/ÂΜL (ref 0–0.1)
BASOPHILS NFR BLD AUTO: 1 % (ref 0–1)
BILIRUB SERPL-MCNC: 1.1 MG/DL (ref 0.2–1)
BUN SERPL-MCNC: 26 MG/DL (ref 5–25)
CALCIUM ALBUM COR SERPL-MCNC: 8.6 MG/DL (ref 8.3–10.1)
CALCIUM SERPL-MCNC: 7.6 MG/DL (ref 8.4–10.2)
CHLORIDE SERPL-SCNC: 101 MMOL/L (ref 96–108)
CO2 SERPL-SCNC: 26 MMOL/L (ref 21–32)
CREAT SERPL-MCNC: 0.93 MG/DL (ref 0.6–1.3)
EOSINOPHIL # BLD AUTO: 0.2 THOUSAND/ÂΜL (ref 0–0.61)
EOSINOPHIL NFR BLD AUTO: 3 % (ref 0–6)
ERYTHROCYTE [DISTWIDTH] IN BLOOD BY AUTOMATED COUNT: 19.4 % (ref 11.6–15.1)
GFR SERPL CREATININE-BSD FRML MDRD: 53 ML/MIN/1.73SQ M
GLUCOSE SERPL-MCNC: 91 MG/DL (ref 65–140)
HCT VFR BLD AUTO: 28.6 % (ref 34.8–46.1)
HGB BLD-MCNC: 9.2 G/DL (ref 11.5–15.4)
HGB RETIC QN AUTO: 34.4 PG (ref 30–38.3)
IMM GRANULOCYTES # BLD AUTO: 0.02 THOUSAND/UL (ref 0–0.2)
IMM GRANULOCYTES NFR BLD AUTO: 0 % (ref 0–2)
IMM RETICS NFR: 18.4 % (ref 0–14)
LYMPHOCYTES # BLD AUTO: 1.82 THOUSANDS/ÂΜL (ref 0.6–4.47)
LYMPHOCYTES NFR BLD AUTO: 28 % (ref 14–44)
MCH RBC QN AUTO: 33.5 PG (ref 26.8–34.3)
MCHC RBC AUTO-ENTMCNC: 32.2 G/DL (ref 31.4–37.4)
MCV RBC AUTO: 104 FL (ref 82–98)
MONOCYTES # BLD AUTO: 0.79 THOUSAND/ÂΜL (ref 0.17–1.22)
MONOCYTES NFR BLD AUTO: 12 % (ref 4–12)
NEUTROPHILS # BLD AUTO: 3.63 THOUSANDS/ÂΜL (ref 1.85–7.62)
NEUTS SEG NFR BLD AUTO: 56 % (ref 43–75)
NRBC BLD AUTO-RTO: 0 /100 WBCS
PLATELET # BLD AUTO: 241 THOUSANDS/UL (ref 149–390)
PMV BLD AUTO: 8.5 FL (ref 8.9–12.7)
POTASSIUM SERPL-SCNC: 4 MMOL/L (ref 3.5–5.3)
PROT SERPL-MCNC: 5.1 G/DL (ref 6.4–8.4)
RBC # BLD AUTO: 2.75 MILLION/UL (ref 3.81–5.12)
RETICS # AUTO: ABNORMAL 10*3/UL (ref 14097–95744)
RETICS # CALC: 2.89 % (ref 0.37–1.87)
SODIUM SERPL-SCNC: 135 MMOL/L (ref 135–147)
WBC # BLD AUTO: 6.49 THOUSAND/UL (ref 4.31–10.16)

## 2025-07-13 PROCEDURE — 99024 POSTOP FOLLOW-UP VISIT: CPT | Performed by: SURGERY

## 2025-07-13 PROCEDURE — 80053 COMPREHEN METABOLIC PANEL: CPT | Performed by: STUDENT IN AN ORGANIZED HEALTH CARE EDUCATION/TRAINING PROGRAM

## 2025-07-13 PROCEDURE — 99231 SBSQ HOSP IP/OBS SF/LOW 25: CPT

## 2025-07-13 PROCEDURE — 94664 DEMO&/EVAL PT USE INHALER: CPT

## 2025-07-13 PROCEDURE — 85025 COMPLETE CBC W/AUTO DIFF WBC: CPT | Performed by: STUDENT IN AN ORGANIZED HEALTH CARE EDUCATION/TRAINING PROGRAM

## 2025-07-13 PROCEDURE — 94760 N-INVAS EAR/PLS OXIMETRY 1: CPT

## 2025-07-13 PROCEDURE — 94640 AIRWAY INHALATION TREATMENT: CPT

## 2025-07-13 RX ADMIN — CEFEPIME 1000 MG: 1 INJECTION, POWDER, FOR SOLUTION INTRAMUSCULAR; INTRAVENOUS at 08:45

## 2025-07-13 RX ADMIN — DOCUSATE SODIUM 100 MG: 100 CAPSULE, LIQUID FILLED ORAL at 08:45

## 2025-07-13 RX ADMIN — OXYCODONE HYDROCHLORIDE 5 MG: 5 TABLET ORAL at 20:20

## 2025-07-13 RX ADMIN — APIXABAN 2.5 MG: 2.5 TABLET, FILM COATED ORAL at 08:45

## 2025-07-13 RX ADMIN — LEVALBUTEROL HYDROCHLORIDE 1.25 MG: 1.25 SOLUTION RESPIRATORY (INHALATION) at 14:18

## 2025-07-13 RX ADMIN — CEFEPIME 1000 MG: 1 INJECTION, POWDER, FOR SOLUTION INTRAMUSCULAR; INTRAVENOUS at 20:19

## 2025-07-13 RX ADMIN — DOCUSATE SODIUM 100 MG: 100 CAPSULE, LIQUID FILLED ORAL at 17:07

## 2025-07-13 RX ADMIN — Medication 12.5 MG: at 08:45

## 2025-07-13 RX ADMIN — APIXABAN 2.5 MG: 2.5 TABLET, FILM COATED ORAL at 17:07

## 2025-07-13 RX ADMIN — OXYCODONE HYDROCHLORIDE 5 MG: 5 TABLET ORAL at 01:30

## 2025-07-13 RX ADMIN — Medication 12.5 MG: at 20:20

## 2025-07-13 RX ADMIN — SENNOSIDES 17.2 MG: 8.6 TABLET, FILM COATED ORAL at 20:20

## 2025-07-13 RX ADMIN — POLYETHYLENE GLYCOL 3350 17 G: 17 POWDER, FOR SOLUTION ORAL at 08:45

## 2025-07-13 RX ADMIN — FLUTICASONE FUROATE AND VILANTEROL TRIFENATATE 1 PUFF: 200; 25 POWDER RESPIRATORY (INHALATION) at 08:50

## 2025-07-13 RX ADMIN — LEVOTHYROXINE SODIUM 50 MCG: 0.05 TABLET ORAL at 05:24

## 2025-07-13 RX ADMIN — LEVALBUTEROL HYDROCHLORIDE 1.25 MG: 1.25 SOLUTION RESPIRATORY (INHALATION) at 20:33

## 2025-07-13 RX ADMIN — BUMETANIDE 1 MG: 1 TABLET ORAL at 08:45

## 2025-07-13 RX ADMIN — BUMETANIDE 1 MG: 1 TABLET ORAL at 17:07

## 2025-07-13 NOTE — ASSESSMENT & PLAN NOTE
"Recent emergent right leg embolectomy/lobectomy, fasciotomy and left popliteal thrombectomy on 6/24/2025 by vascular surgery complicated by right groin wound infection  S/p I&D 7/9, right groin washout with VAC placement 7/9  Wound cultures growing GNR resistant to cefazolin.   Vascular Surgery and Infectious disease are following  IV cefazolin switched to IV cefepime (day 5 7/13) for broad-spectrum coverage  Susceptibilities reviewed  Right lower extremity fasciotomy incision is showing signs of skin ischemia at the suture line and is at high risk of nonhealing    Plan:  Plan to transition to doxycycline x 10 days total EOT 7/18 (starting from 1st day cefepime 7/9)  Outpatient follow-up with vascular  Monitor WBC count and fever curve  Wound VAC exchanges Monday/Wednesday/Friday  As per  Doctor \"suspect debridement will be needed for R leg fasciotomy incision but will continue wound care for now\"    Plan:  Wound VAC changes Monday Wednesday Friday  May need eventual debridement of right lower extremity incision per vascular   Continue Eliquis  Pending SNF auth  Anticipate discharge back to subacute rehab - when cleared by surgery - pending insurance authorization  "

## 2025-07-13 NOTE — PROGRESS NOTES
"Progress Note - Hospitalist   Name: Rebeca Banegas 89 y.o. female I MRN: [id number]  Unit/Bed#: [facility] 511-01 I Date of Admission: 7/8/2025   Date of Service: 7/13/2025 I Hospital Day: 5    Assessment & Plan  Surgical wound infection  Recent emergent right leg embolectomy/lobectomy, fasciotomy and left popliteal thrombectomy on 6/24/2025 by vascular surgery complicated by right groin wound infection  S/p I&D 7/9, right groin washout with VAC placement 7/9  Wound cultures growing GNR resistant to cefazolin.   Vascular Surgery and Infectious disease are following  IV cefazolin switched to IV cefepime (day 5 7/13) for broad-spectrum coverage  Susceptibilities reviewed  Right lower extremity fasciotomy incision is showing signs of skin ischemia at the suture line and is at high risk of nonhealing    Plan:  Plan to transition to doxycycline x 10 days total EOT 7/18 (starting from 1st day cefepime 7/9)  Outpatient follow-up with vascular  Monitor WBC count and fever curve  Wound VAC exchanges Monday/Wednesday/Friday  As per Dr. Doctor \"suspect debridement will be needed for R leg fasciotomy incision but will continue wound care for now\"    Plan:  Wound VAC changes Monday Wednesday Friday  May need eventual debridement of right lower extremity incision per vascular   Continue Eliquis  Pending SNF auth  Anticipate discharge back to subacute rehab - when cleared by surgery - pending insurance authorization  Asthma  Not in exacerbation  Continue home inhaler/nebulizer regimen  Permanent atrial fibrillation (HCC)  Rate controlled with metoprolol  Continue on Eliquis for stroke prevention  Primary hypertension  Controlled on home Bumex, metoprolol  Continue to monitor  Hypothyroidism  Continue Synthroid  Macrocytic anemia  Baseline 12-14 , s/p embolectomy/thrombectomy on 6/24 , and Hgb down to 8.3 since 6/25/25 - suspected blood loss component +/- due to acute illness, infection and medications. Wound vac in place with some " serosanguinous drainage collected.     Recent TSH WNL; folate & B12 WNL; iron panel more consistent with anemia of inflammatory/chronic disease, no overt deficiency.   Reticulocyte count is elevated  Treat underlying etiology, on ABX as above. Transfuse p-RBC if < 7     VTE Pharmacologic Prophylaxis: VTE Score: 8 High Risk (Score >/= 5) - Pharmacological DVT Prophylaxis Ordered: apixaban (Eliquis). Sequential Compression Devices Ordered.    Mobility:   Basic Mobility Inpatient Raw Score: 18  JH-HLM Goal: 6: Walk 10 steps or more  JH-HLM Achieved: 7: Walk 25 feet or more  JH-HLM Goal achieved. Continue to encourage appropriate mobility.    Patient Centered Rounds: I performed bedside rounds with nursing staff today.   Discussions with Specialists or Other Care Team Provider: vascular    Education and Discussions with Family / Patient: Updated  (daughter) via phone.    Current Length of Stay: 5 day(s)  Current Patient Status: Inpatient   Certification Statement: The patient will continue to require additional inpatient hospital stay due to need for vascular wound management, insurance authorization for rehab pending.  Ongoing ischemic changes of suture line at RLE fasciotomy incision requiring further clinical monitoring and exams; vascular has confirmed outpatient follow up for above changes is OK (spoke w  Doctor)  Discharge Plan: Anticipate discharge in 24-48 hrs to rehab facility.  SNF auth pending    Code Status: Level 3 - DNAR and DNI    Subjective   Patient denies fevers/chills, chest pain, shortness of breath, heart palpitations, nausea, vomiting, abdominal pain, weakness, or numbness. Poor quality of sleep o/n.      Objective :  Temp:  [97.3 °F (36.3 °C)-98 °F (36.7 °C)] 97.3 °F (36.3 °C)  HR:  [] 107  BP: ()/(58-74) 108/60  Resp:  [16-20] 18  SpO2:  [93 %-99 %] 94 %  O2 Device: None (Room air)    Body mass index is 22.66 kg/m².     Input and Output Summary (last 24 hours):      Intake/Output Summary (Last 24 hours) at 7/13/2025 0859  Last data filed at 7/13/2025 0525  Gross per 24 hour   Intake 770 ml   Output 650 ml   Net 120 ml       Physical Exam  Vitals reviewed.   Constitutional:       General: She is not in acute distress.  HENT:      Head: Normocephalic and atraumatic.      Mouth/Throat:      Mouth: Mucous membranes are moist.      Pharynx: Oropharynx is clear.     Eyes:      General: No scleral icterus.     Extraocular Movements: Extraocular movements intact.       Cardiovascular:      Rate and Rhythm: Normal rate and regular rhythm.      Heart sounds: No murmur heard.     No friction rub. No gallop.   Pulmonary:      Effort: Pulmonary effort is normal. No respiratory distress.      Breath sounds: No stridor. No wheezing or rales.   Abdominal:      General: There is no distension.      Palpations: There is no mass.      Tenderness: There is no abdominal tenderness. There is no guarding.     Musculoskeletal:      Right lower leg: No edema.      Left lower leg: No edema.      Comments: RLE fasciotomy site appears nonhealing     Skin:     General: Skin is warm and dry.      Findings: No rash.     Neurological:      Mental Status: She is alert.     Psychiatric:         Mood and Affect: Mood normal.         Behavior: Behavior normal.         Thought Content: Thought content normal.           Lines/Drains:              Lab Results: I have reviewed the following results:   Results from last 7 days   Lab Units 07/13/25  0523   WBC Thousand/uL 6.49   HEMOGLOBIN g/dL 9.2*   HEMATOCRIT % 28.6*   PLATELETS Thousands/uL 241   SEGS PCT % 56   LYMPHO PCT % 28   MONO PCT % 12   EOS PCT % 3     Results from last 7 days   Lab Units 07/13/25  0523   SODIUM mmol/L 135   POTASSIUM mmol/L 4.0   CHLORIDE mmol/L 101   CO2 mmol/L 26   BUN mg/dL 26*   CREATININE mg/dL 0.93   ANION GAP mmol/L 8   CALCIUM mg/dL 7.6*   ALBUMIN g/dL 2.8*   TOTAL BILIRUBIN mg/dL 1.10*   ALK PHOS U/L 49   ALT U/L 3*   AST U/L  13   GLUCOSE RANDOM mg/dL 91     Results from last 7 days   Lab Units 07/09/25  0448   INR  1.36*                   Recent Cultures (last 7 days):   Results from last 7 days   Lab Units 07/09/25  1005   GRAM STAIN RESULT  1+ Polys*  2+ Gram negative rods*       Imaging Results Review: No pertinent imaging studies reviewed.  Other Study Results Review: No additional pertinent studies reviewed.    Last 24 Hours Medication List:     Current Facility-Administered Medications:     acetaminophen (TYLENOL) tablet 650 mg, Q6H PRN    albuterol inhalation solution 2.5 mg, Q6H PRN    apixaban (ELIQUIS) tablet 2.5 mg, BID    bisacodyl (DULCOLAX) rectal suppository 10 mg, Daily PRN    bumetanide (BUMEX) tablet 1 mg, BID    cefepime (MAXIPIME) 1,000 mg in dextrose 5 % 50 mL IVPB, Q12H, Last Rate: 1,000 mg (07/13/25 0845)    docusate sodium (COLACE) capsule 100 mg, BID    fluticasone-vilanterol 200-25 mcg/actuation 1 puff, Daily    levalbuterol (XOPENEX) inhalation solution 1.25 mg, TID    levothyroxine tablet 50 mcg, Early Morning    metoprolol tartrate (LOPRESSOR) partial tablet 12.5 mg, Q12H GARTH    morphine injection 2 mg, Q2H PRN    ondansetron (ZOFRAN) injection 4 mg, Q6H PRN    oxyCODONE (ROXICODONE) split tablet 2.5 mg, Q4H PRN **OR** oxyCODONE (ROXICODONE) IR tablet 5 mg, Q4H PRN    polyethylene glycol (MIRALAX) packet 17 g, Daily    senna (SENOKOT) tablet 17.2 mg, HS    Administrative Statements   Today, Patient Was Seen By: Angela Ahmadi MD  I have spent a total time of 40 minutes in caring for this patient on the day of the visit/encounter including Impressions, Counseling / Coordination of care, Documenting in the medical record, Reviewing/placing orders in the medical record (including tests, medications, and/or procedures), Obtaining or reviewing history  , and Communicating with other healthcare professionals .    **Please Note: This note may have been constructed using a voice recognition system.**

## 2025-07-13 NOTE — PROGRESS NOTES
Progress Note - Vascular Surgery   Name: Rebeca Banegas 89 y.o. female I MRN: [id number]  Unit/Bed#: [facility] 511-01 I Date of Admission: 7/8/2025   Date of Service: 7/13/2025 I Hospital Day: 5    Assessment & Plan  Surgical wound infection  89 y.o. female with PMH of HFpEF, permanent A-fib on Eliquis, history of CVA, bronchiectasis recently admitted with acute lower limb ischemia. She underwent emergent right leg embolectomy/lobectomy, fasciotomy and left popliteal thrombectomy on 6/24/2025 by our team, then developed copious serous drainage from right groin wound.    POD#4 s/p R groin washout and VAC placement 7/9: Wound vac is suctioning appropriately. Her LLE thrombectomy incision is well approximated but has mild erythema to monitor. Her RLE fasciotomy incision is showing signs of ischemic skin changes at the suture line and is at risk of not healing.    Plan:  -Wound Vac change due Monday, Wed, Fri schedule  -Wound care following, input appreciated. May need debridement of RLE incision  -Cont eliquis  -Pain control as needed  -ID following, cont IV cefepime and transition to PO doxy at discharge (10d course of ABX, last dose on 7/19)  -Remainder of care per medicine, input appreciated  -D/W Dr. Cohen Doctor  - Please use [facility] Vascular Resident/AP ESC role for communications.  Macrocytic anemia      24 Hour Events : No acute events overnight  Subjective : Patient comfortable in bed with no issues.    Objective :  Temp:  [97.8 °F (36.6 °C)-98 °F (36.7 °C)] 98 °F (36.7 °C)  HR:  [107-123] 109  BP: ()/(57-74) 90/58  Resp:  [16-20] 20  SpO2:  [93 %-100 %] 99 %  O2 Device: None (Room air)     I/O         07/11 0701 07/12 0700 07/12 0701 07/13 0700    P.O. 840 720    IV Piggyback 50 50    Total Intake(mL/kg) 890 (14.9) 770 (12.9)    Urine (mL/kg/hr) 555 (0.4) 200 (0.1)    Drains 450 450    Stool 0     Total Output 1005 650    Net -115 +120          Unmeasured Urine Occurrence 1 x 2 x    Unmeasured Stool  "Occurrence 1 x             Physical Exam  Vitals and nursing note reviewed.   Constitutional:       General: She is not in acute distress.     Appearance: Normal appearance. She is not ill-appearing.     Eyes:      Extraocular Movements: Extraocular movements intact.       Cardiovascular:      Rate and Rhythm: Normal rate.   Pulmonary:      Effort: Pulmonary effort is normal.     Skin:     General: Skin is warm and dry.      Comments: RLE incision with some necrotic skin edges  Groin vac in place, due to be changed tomorrow     Neurological:      Mental Status: She is alert.             Lab Results: I have reviewed the following results:  CBC with diff:   Lab Results   Component Value Date    WBC 6.49 07/13/2025    HGB 9.2 (L) 07/13/2025    HCT 28.6 (L) 07/13/2025     (H) 07/13/2025     07/13/2025    RBC 2.75 (L) 07/13/2025    MCH 33.5 07/13/2025    MCHC 32.2 07/13/2025    RDW 19.4 (H) 07/13/2025    MPV 8.5 (L) 07/13/2025    NRBC 0 07/13/2025   ,   BMP/CMP:  Lab Results   Component Value Date    SODIUM 135 07/13/2025    SODIUM 137 07/08/2025    K 4.0 07/13/2025    K 3.8 07/08/2025     07/13/2025     07/08/2025    CO2 26 07/13/2025    CO2 24 07/08/2025    BUN 26 (H) 07/13/2025    BUN 37 (H) 07/08/2025    CREATININE 0.93 07/13/2025    CREATININE 1.24 (H) 07/08/2025    GLUCOSE 139 06/24/2025    CALCIUM 7.6 (L) 07/13/2025    CALCIUM 7.4 (L) 07/08/2025    AST 13 07/13/2025    AST 16 07/08/2025    ALT 3 (L) 07/13/2025    ALT 20 07/08/2025    ALKPHOS 49 07/13/2025    ALKPHOS 60 07/08/2025    PROT 6.4 10/17/2017    BILITOT 0.5 10/17/2017    EGFR 53 07/13/2025    EGFR 41 (L) 07/08/2025    EGFR 35 (L) 12/29/2020   ,   Lipid Panel:   Lab Results   Component Value Date    CHOL 205 (H) 02/07/2017   ,   Coags:   Lab Results   Component Value Date    PT 23.7 (H) 02/23/2024    PTT 38 (H) 07/09/2025    INR 1.36 (H) 07/09/2025    INR 2.2 02/23/2024   ,   Blood Culture: No results found for: \"BLOODCX\", " "  Urinalysis:   Lab Results   Component Value Date    COLORU Light Yellow 12/29/2024    CLARITYU Clear 12/29/2024    SPECGRAV 1.011 12/29/2024    PHUR 5.0 12/29/2024    LEUKOCYTESUR Negative 12/29/2024    NITRITE Negative 12/29/2024    GLUCOSEU Negative 12/29/2024    KETONESU Negative 12/29/2024    BILIRUBINUR Negative 12/29/2024    BLOODU Negative 12/29/2024   ,   Urine Culture: No results found for: \"URINECX\",   Wound Culure:   Lab Results   Component Value Date    WOUNDCULT Few Colonies of Proteus mirabilis (A) 09/04/2024    WOUNDCULT Few Colonies of Acinetobacter baumannii (A) 09/04/2024       Imaging Results Review: No pertinent imaging studies reviewed.  Other Study Results Review: No additional pertinent studies reviewed.    VTE Prophylaxis: VTE covered by:  apixaban, Oral, 2.5 mg at 07/12/25 1724     "

## 2025-07-13 NOTE — ASSESSMENT & PLAN NOTE
89 y.o. female with PMH of HFpEF, permanent A-fib on Eliquis, history of CVA, bronchiectasis recently admitted with acute lower limb ischemia. She underwent emergent right leg embolectomy/lobectomy, fasciotomy and left popliteal thrombectomy on 6/24/2025 by our team, then developed copious serous drainage from right groin wound.    POD#4 s/p R groin washout and VAC placement 7/9: Wound vac is suctioning appropriately. Her LLE thrombectomy incision is well approximated but has mild erythema to monitor. Her RLE fasciotomy incision is showing signs of ischemic skin changes at the suture line and is at risk of not healing.    Plan:  -Wound Vac change due Monday, Wed, Fri schedule  -Wound care following, input appreciated. May need debridement of RLE incision  -Cont eliquis  -Pain control as needed  -ID following, cont IV cefepime and transition to PO doxy at discharge (10d course of ABX, last dose on 7/19)  -Remainder of care per medicine, input appreciated  -D/W Dr. Cohen Doctor  - Please use [facility] Vascular Resident/AP ESC role for communications.

## 2025-07-13 NOTE — PLAN OF CARE
Problem: Potential for Falls  Goal: Patient will remain free of falls  Description: INTERVENTIONS:  - Educate patient/family on patient safety including physical limitations  - Instruct patient to call for assistance with activity   - Consider consulting OT/PT to assist with strengthening/mobility based on AM PAC & JH-HLM score  - Consult OT/PT to assist with strengthening/mobility   - Keep Call bell within reach  - Keep bed low and locked with side rails adjusted as appropriate  - Keep care items and personal belongings within reach  - Initiate and maintain comfort rounds  - Make Fall Risk Sign visible to staff    - Apply yellow socks and bracelet for high fall risk patients  - Consider moving patient to room near nurses station  Outcome: Progressing     Problem: PAIN - ADULT  Goal: Verbalizes/displays adequate comfort level or baseline comfort level  Description: Interventions:  - Encourage patient to monitor pain and request assistance  - Assess pain using appropriate pain scale  - Administer analgesics as ordered based on type and severity of pain and evaluate response  - Implement non-pharmacological measures as appropriate and evaluate response  - Consider cultural and social influences on pain and pain management  - Notify physician/advanced practitioner if interventions unsuccessful or patient reports new pain  - Educate patient/family on pain management process including their role and importance of  reporting pain   - Provide non-pharmacologic/complimentary pain relief interventions  7/12/2025 2046 by Alireza Jovel RN  Outcome: Progressing  7/12/2025 2045 by Alireza Jovel RN  Outcome: Progressing     Problem: INFECTION - ADULT  Goal: Absence of fever/infection during neutropenic period  Description: INTERVENTIONS:  - Monitor WBC  - Perform strict hand hygiene  - Limit to healthy visitors only  - No plants, dried, fresh or silk flowers with horne in patient room  Outcome: Progressing     Problem:  SAFETY ADULT  Goal: Patient will remain free of falls  Description: INTERVENTIONS:  - Educate patient/family on patient safety including physical limitations  - Instruct patient to call for assistance with activity   - Consider consulting OT/PT to assist with strengthening/mobility based on AM PAC & JH-HLM score  - Consult OT/PT to assist with strengthening/mobility   - Keep Call bell within reach  - Keep bed low and locked with side rails adjusted as appropriate  - Keep care items and personal belongings within reach  - Initiate and maintain comfort rounds  - Make Fall Risk Sign visible to staff  - Apply yellow socks and bracelet for high fall risk patients  - Consider moving patient to room near nurses station  Outcome: Progressing     Problem: Prexisting or High Potential for Compromised Skin Integrity  Goal: Skin integrity is maintained or improved  Description: INTERVENTIONS:  - Identify patients at risk for skin breakdown  - Assess and monitor skin integrity including under and around medical devices   - Assess and monitor nutrition and hydration status  - Monitor labs  - Assess for incontinence   - Turn and reposition patient  - Assist with mobility/ambulation  - Relieve pressure over wale prominences   - Avoid friction and shearing  - Provide appropriate hygiene as needed including keeping skin clean and dry  - Evaluate need for skin moisturizer/barrier cream  - Collaborate with interdisciplinary team  - Patient/family teaching  - Consider wound care consult

## 2025-07-13 NOTE — ASSESSMENT & PLAN NOTE
Baseline 12-14 , s/p embolectomy/thrombectomy on 6/24 , and Hgb down to 8.3 since 6/25/25 - suspected blood loss component +/- due to acute illness, infection and medications. Wound vac in place with some serosanguinous drainage collected.     Recent TSH WNL; folate & B12 WNL; iron panel more consistent with anemia of inflammatory/chronic disease, no overt deficiency.   Reticulocyte count is elevated  Treat underlying etiology, on ABX as above. Transfuse p-RBC if < 7

## 2025-07-14 PROBLEM — T81.30XA WOUND DEHISCENCE: Status: ACTIVE | Noted: 2025-07-14

## 2025-07-14 LAB
ANION GAP SERPL CALCULATED.3IONS-SCNC: 8 MMOL/L (ref 4–13)
BASOPHILS # BLD AUTO: 0.03 THOUSANDS/ÂΜL (ref 0–0.1)
BASOPHILS NFR BLD AUTO: 1 % (ref 0–1)
BUN SERPL-MCNC: 28 MG/DL (ref 5–25)
CALCIUM SERPL-MCNC: 8.2 MG/DL (ref 8.4–10.2)
CHLORIDE SERPL-SCNC: 100 MMOL/L (ref 96–108)
CO2 SERPL-SCNC: 28 MMOL/L (ref 21–32)
CREAT SERPL-MCNC: 0.97 MG/DL (ref 0.6–1.3)
EOSINOPHIL # BLD AUTO: 0.17 THOUSAND/ÂΜL (ref 0–0.61)
EOSINOPHIL NFR BLD AUTO: 3 % (ref 0–6)
ERYTHROCYTE [DISTWIDTH] IN BLOOD BY AUTOMATED COUNT: 19 % (ref 11.6–15.1)
GFR SERPL CREATININE-BSD FRML MDRD: 50 ML/MIN/1.73SQ M
GLUCOSE SERPL-MCNC: 93 MG/DL (ref 65–140)
HCT VFR BLD AUTO: 26.5 % (ref 34.8–46.1)
HGB BLD-MCNC: 8.4 G/DL (ref 11.5–15.4)
IMM GRANULOCYTES # BLD AUTO: 0.02 THOUSAND/UL (ref 0–0.2)
IMM GRANULOCYTES NFR BLD AUTO: 0 % (ref 0–2)
LYMPHOCYTES # BLD AUTO: 1.62 THOUSANDS/ÂΜL (ref 0.6–4.47)
LYMPHOCYTES NFR BLD AUTO: 27 % (ref 14–44)
MCH RBC QN AUTO: 33.3 PG (ref 26.8–34.3)
MCHC RBC AUTO-ENTMCNC: 31.7 G/DL (ref 31.4–37.4)
MCV RBC AUTO: 105 FL (ref 82–98)
MONOCYTES # BLD AUTO: 0.77 THOUSAND/ÂΜL (ref 0.17–1.22)
MONOCYTES NFR BLD AUTO: 13 % (ref 4–12)
NEUTROPHILS # BLD AUTO: 3.34 THOUSANDS/ÂΜL (ref 1.85–7.62)
NEUTS SEG NFR BLD AUTO: 56 % (ref 43–75)
NRBC BLD AUTO-RTO: 0 /100 WBCS
PLATELET # BLD AUTO: 230 THOUSANDS/UL (ref 149–390)
PMV BLD AUTO: 8.7 FL (ref 8.9–12.7)
POTASSIUM SERPL-SCNC: 4.5 MMOL/L (ref 3.5–5.3)
RBC # BLD AUTO: 2.52 MILLION/UL (ref 3.81–5.12)
SODIUM SERPL-SCNC: 136 MMOL/L (ref 135–147)
WBC # BLD AUTO: 5.95 THOUSAND/UL (ref 4.31–10.16)

## 2025-07-14 PROCEDURE — 99232 SBSQ HOSP IP/OBS MODERATE 35: CPT | Performed by: INTERNAL MEDICINE

## 2025-07-14 PROCEDURE — 99232 SBSQ HOSP IP/OBS MODERATE 35: CPT | Performed by: STUDENT IN AN ORGANIZED HEALTH CARE EDUCATION/TRAINING PROGRAM

## 2025-07-14 PROCEDURE — 97605 NEG PRS WND THER DME<=50SQCM: CPT | Performed by: PHYSICIAN ASSISTANT

## 2025-07-14 PROCEDURE — 94640 AIRWAY INHALATION TREATMENT: CPT

## 2025-07-14 PROCEDURE — 94760 N-INVAS EAR/PLS OXIMETRY 1: CPT

## 2025-07-14 PROCEDURE — 85025 COMPLETE CBC W/AUTO DIFF WBC: CPT

## 2025-07-14 PROCEDURE — 97535 SELF CARE MNGMENT TRAINING: CPT

## 2025-07-14 PROCEDURE — G0545 PR INHERENT VISIT TO INPT: HCPCS | Performed by: INTERNAL MEDICINE

## 2025-07-14 PROCEDURE — 80048 BASIC METABOLIC PNL TOTAL CA: CPT

## 2025-07-14 PROCEDURE — 97530 THERAPEUTIC ACTIVITIES: CPT

## 2025-07-14 PROCEDURE — 97116 GAIT TRAINING THERAPY: CPT

## 2025-07-14 RX ORDER — ALBUMIN (HUMAN) 12.5 G/50ML
25 SOLUTION INTRAVENOUS ONCE
Status: COMPLETED | OUTPATIENT
Start: 2025-07-14 | End: 2025-07-14

## 2025-07-14 RX ORDER — DOXYCYCLINE 100 MG/1
100 CAPSULE ORAL EVERY 12 HOURS SCHEDULED
Status: DISCONTINUED | OUTPATIENT
Start: 2025-07-14 | End: 2025-07-15 | Stop reason: HOSPADM

## 2025-07-14 RX ADMIN — FLUTICASONE FUROATE AND VILANTEROL TRIFENATATE 1 PUFF: 200; 25 POWDER RESPIRATORY (INHALATION) at 08:13

## 2025-07-14 RX ADMIN — DOXYCYCLINE 100 MG: 100 CAPSULE ORAL at 20:35

## 2025-07-14 RX ADMIN — Medication 12.5 MG: at 08:12

## 2025-07-14 RX ADMIN — ALBUMIN (HUMAN) 25 G: 0.25 INJECTION, SOLUTION INTRAVENOUS at 02:54

## 2025-07-14 RX ADMIN — Medication 12.5 MG: at 20:35

## 2025-07-14 RX ADMIN — LEVALBUTEROL HYDROCHLORIDE 1.25 MG: 1.25 SOLUTION RESPIRATORY (INHALATION) at 07:56

## 2025-07-14 RX ADMIN — DOXYCYCLINE 100 MG: 100 CAPSULE ORAL at 12:30

## 2025-07-14 RX ADMIN — BUMETANIDE 1 MG: 1 TABLET ORAL at 08:12

## 2025-07-14 RX ADMIN — BUMETANIDE 1 MG: 1 TABLET ORAL at 17:11

## 2025-07-14 RX ADMIN — OXYCODONE HYDROCHLORIDE 5 MG: 5 TABLET ORAL at 01:20

## 2025-07-14 RX ADMIN — DOCUSATE SODIUM 100 MG: 100 CAPSULE, LIQUID FILLED ORAL at 08:12

## 2025-07-14 RX ADMIN — SENNOSIDES 17.2 MG: 8.6 TABLET, FILM COATED ORAL at 20:35

## 2025-07-14 RX ADMIN — ACETAMINOPHEN 650 MG: 325 TABLET ORAL at 20:40

## 2025-07-14 RX ADMIN — DOCUSATE SODIUM 100 MG: 100 CAPSULE, LIQUID FILLED ORAL at 17:11

## 2025-07-14 RX ADMIN — LEVOTHYROXINE SODIUM 50 MCG: 0.05 TABLET ORAL at 04:11

## 2025-07-14 RX ADMIN — LEVALBUTEROL HYDROCHLORIDE 1.25 MG: 1.25 SOLUTION RESPIRATORY (INHALATION) at 19:26

## 2025-07-14 RX ADMIN — POLYETHYLENE GLYCOL 3350 17 G: 17 POWDER, FOR SOLUTION ORAL at 08:13

## 2025-07-14 RX ADMIN — APIXABAN 2.5 MG: 2.5 TABLET, FILM COATED ORAL at 17:11

## 2025-07-14 RX ADMIN — CEFEPIME 1000 MG: 1 INJECTION, POWDER, FOR SOLUTION INTRAMUSCULAR; INTRAVENOUS at 08:19

## 2025-07-14 RX ADMIN — APIXABAN 2.5 MG: 2.5 TABLET, FILM COATED ORAL at 08:12

## 2025-07-14 NOTE — ASSESSMENT & PLAN NOTE
Right groin wound dehiscence.  Patient's status post I&D.  Currently, her wound has VAC in place.  Drainage is copious but all serous.  Wound care/VAC per vascular surgery.

## 2025-07-14 NOTE — PROGRESS NOTES
Progress Note - Hospitalist   Name: Rebeca Banegas 89 y.o. female I MRN: [id number]  Unit/Bed#: [facility] 511-01 I Date of Admission: 7/8/2025   Date of Service: 7/14/2025 I Hospital Day: 6    Assessment & Plan  Surgical wound infection  Recent emergent right leg embolectomy/lobectomy, fasciotomy and left popliteal thrombectomy on 6/24/2025 by vascular surgery complicated by right groin wound infection  S/p I&D 7/9, right groin washout with VAC placement 7/9  OR culture with Citrobacter amalonaticus, susceptibilities reviewed  Vascular Surgery and Infectious disease following, input reviewed  S/p IV antibiotics, now on doxycycline to complete 10 days through 7/19  Outpatient follow-up with vascular, will likely need debridement but will be discussed in the outpatient setting  Wound VAC exchanges Monday/Wednesday/Friday  Anticipate discharge back to subacute rehab - pending insurance authorization  Asthma  Not in exacerbation  Continue home inhaler/nebulizer regimen  Permanent atrial fibrillation (HCC)  Rate controlled with metoprolol  Continue on Eliquis for stroke prevention  Primary hypertension  Controlled on home Bumex, metoprolol  BP reviewed and stable  Hypothyroidism  Continue Synthroid  Macrocytic anemia  Baseline 12-14   In the setting of acute blood loss anemia, anemia of inflammatory disease  Recent TSH WNL; folate & B12 WNL  Stable, continue to monitor and transfuse less than 7    VTE Pharmacologic Prophylaxis: VTE Score: 8 High Risk (Score >/= 5) - Pharmacological DVT Prophylaxis Ordered: apixaban (Eliquis). Sequential Compression Devices Ordered.    Mobility:   Basic Mobility Inpatient Raw Score: 18  JH-HLM Goal: 6: Walk 10 steps or more  JH-HLM Achieved: 8: Walk 250 feet ot more  JH-HLM Goal achieved. Continue to encourage appropriate mobility.    Patient Centered Rounds: I performed bedside rounds with nursing staff today.   Discussions with Specialists or Other Care Team Provider: Case  manager    Education and Discussions with Family / Patient: Attempted to update  (daughter) via phone. Left voicemail.     Current Length of Stay: 6 day(s)  Current Patient Status: Inpatient   Certification Statement: The patient will continue to require additional inpatient hospital stay due to dispo planning  Discharge Plan: Anticipate discharge in 24-48 hrs to rehab facility.    Code Status: Level 3 - DNAR and DNI    Subjective   No events overnight.  Patient currently reports pain is controlled.  Is been tolerating oral intake with no nausea or vomiting.    Objective :  Temp:  [97.4 °F (36.3 °C)-98.4 °F (36.9 °C)] 97.4 °F (36.3 °C)  HR:  [] 96  BP: ()/(52-71) 114/71  Resp:  [18-20] 18  SpO2:  [94 %-100 %] 98 %  O2 Device: None (Room air)    Body mass index is 22.66 kg/m².     Input and Output Summary (last 24 hours):     Intake/Output Summary (Last 24 hours) at 7/14/2025 1314  Last data filed at 7/14/2025 1122  Gross per 24 hour   Intake 330 ml   Output 1330 ml   Net -1000 ml       Physical Exam  Vitals and nursing note reviewed.   Constitutional:       General: She is not in acute distress.     Appearance: She is well-developed.   HENT:      Head: Normocephalic and atraumatic.     Eyes:      Conjunctiva/sclera: Conjunctivae normal.       Cardiovascular:      Rate and Rhythm: Normal rate and regular rhythm.      Heart sounds: No murmur heard.  Pulmonary:      Effort: No respiratory distress.      Breath sounds: Normal breath sounds. No wheezing or rhonchi.     Musculoskeletal:         General: No swelling.      Cervical back: Neck supple.     Skin:     General: Skin is warm and dry.      Capillary Refill: Capillary refill takes less than 2 seconds.     Neurological:      Mental Status: She is alert.     Psychiatric:         Mood and Affect: Mood normal.         Behavior: Behavior normal.         Lines/Drains:          Lab Results: I have reviewed the following results:   Results from  last 7 days   Lab Units 07/14/25  0417   WBC Thousand/uL 5.95   HEMOGLOBIN g/dL 8.4*   HEMATOCRIT % 26.5*   PLATELETS Thousands/uL 230   SEGS PCT % 56   LYMPHO PCT % 27   MONO PCT % 13*   EOS PCT % 3     Results from last 7 days   Lab Units 07/14/25  0417 07/13/25  0523   SODIUM mmol/L 136 135   POTASSIUM mmol/L 4.5 4.0   CHLORIDE mmol/L 100 101   CO2 mmol/L 28 26   BUN mg/dL 28* 26*   CREATININE mg/dL 0.97 0.93   ANION GAP mmol/L 8 8   CALCIUM mg/dL 8.2* 7.6*   ALBUMIN g/dL  --  2.8*   TOTAL BILIRUBIN mg/dL  --  1.10*   ALK PHOS U/L  --  49   ALT U/L  --  3*   AST U/L  --  13   GLUCOSE RANDOM mg/dL 93 91     Results from last 7 days   Lab Units 07/09/25  0448   INR  1.36*                   Recent Cultures (last 7 days):   Results from last 7 days   Lab Units 07/09/25  1005   GRAM STAIN RESULT  1+ Polys*  2+ Gram negative rods*       Imaging Results Review: No pertinent imaging studies reviewed.  Other Study Results Review: No additional pertinent studies reviewed.    Last 24 Hours Medication List:     Current Facility-Administered Medications:     acetaminophen (TYLENOL) tablet 650 mg, Q6H PRN    albuterol inhalation solution 2.5 mg, Q6H PRN    apixaban (ELIQUIS) tablet 2.5 mg, BID    bisacodyl (DULCOLAX) rectal suppository 10 mg, Daily PRN    bumetanide (BUMEX) tablet 1 mg, BID    docusate sodium (COLACE) capsule 100 mg, BID    doxycycline hyclate (VIBRAMYCIN) capsule 100 mg, Q12H GARTH    fluticasone-vilanterol 200-25 mcg/actuation 1 puff, Daily    levalbuterol (XOPENEX) inhalation solution 1.25 mg, TID    levothyroxine tablet 50 mcg, Early Morning    metoprolol tartrate (LOPRESSOR) partial tablet 12.5 mg, Q12H GARTH    morphine injection 2 mg, Q2H PRN    ondansetron (ZOFRAN) injection 4 mg, Q6H PRN    oxyCODONE (ROXICODONE) split tablet 2.5 mg, Q4H PRN **OR** oxyCODONE (ROXICODONE) IR tablet 5 mg, Q4H PRN    polyethylene glycol (MIRALAX) packet 17 g, Daily    senna (SENOKOT) tablet 17.2 mg, HS    Administrative  Statements   Today, Patient Was Seen By: Jenise Vo MD  I have spent a total time of 35 minutes in caring for this patient on the day of the visit/encounter including Risks and benefits of tx options, Instructions for management, Patient and family education, Importance of tx compliance, Impressions, Counseling / Coordination of care, Documenting in the medical record, Obtaining or reviewing history  , and Communicating with other healthcare professionals .    **Please Note: This note may have been constructed using a voice recognition system.**

## 2025-07-14 NOTE — CASE MANAGEMENT
Case Management Discharge Planning Note    Patient name Rebeca Banegas  [facility]N [id number]  : 1932 Date 2025       Current Admission Date: 2025  Current Admission Diagnosis:Surgical wound infection   Patient Active Problem List    Diagnosis Date Noted    Surgical wound infection 2025    History of embolectomy 2025    Ambulatory dysfunction 2025    Pressure injury of toe of left foot, unstageable ([facility]) 2025    Pressure injury of deep tissue of left heel 2025    Constipation 2025    Macrocytic anemia 2025    DARIO (acute kidney injury) ([facility]) 2025    Hyperphosphatemia 2025    Acute lower limb ischemia 2025    Muscle cramp 2025    Chronic thoracic back pain 2025    Chronic midline low back pain without sciatica 2025    Arthralgia 2025    Other atherosclerosis of native arteries of extremities, bilateral legs ([facility]) 2025    Severe protein-calorie malnutrition ([facility]) 2024    History of CVA (cerebrovascular accident) 2024    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 ([facility]) 2024    Bronchiectasis ([facility]) 2023    MGUS (monoclonal gammopathy of unknown significance) 2023    Chronic cough 2023    Age-related osteoporosis without current pathological fracture 2023    Secondary hyperparathyroidism ([facility]) 2023    Osteopenia of hip 2020    CKD (chronic kidney disease) stage 4, GFR 15-29 ml/min ([facility]) 2020    Elevated serum immunoglobulin free light chains 2019    Sensorineural hearing loss (SNHL), bilateral 2019    Left asymmetrical SNHL 2019    Chronic heart failure with preserved ejection fraction ([facility]) 2017    Plantar fasciitis 2016    Essential tremor 10/02/2015    Onychomycosis 2015    Pulmonary nodule 2015    Thyroid nodule 2015    Primary hypertension 2014     Atherosclerosis 03/06/2014    Ovarian cyst 03/06/2014    Pancreatic cyst 03/06/2014    Backache 02/28/2014    Permanent atrial fibrillation (HCC) 02/14/2013    Arthritis 11/12/2012    Asthma 11/12/2012    Esophageal reflux 11/12/2012    Bifascicular bundle branch block 11/10/2012    Hypothyroidism 11/10/2012      LOS (days): 6  Geometric Mean LOS (GMLOS) (days): 2.9  Days to GMLOS:-2.9     OBJECTIVE:  Risk of Unplanned Readmission Score: 26.87         Current admission status: Inpatient   Preferred Pharmacy:   [facility]/pharmacy #[id number] - [place], [place] - [address]  [place] [address]  Phone: [phone] Fax: [phone]    [facility] - [facility] [address] beds at the [facility]  [address] [facility] [address]  Phone: [phone] Fax: [phone]    Primary Care Provider: Santana Dillon MD    Primary Insurance: [employer]  Secondary Insurance:     DISCHARGE DETAILS:    CM notified by DC support insurance requesting updated therapy notes in order to continue processing SNF auth. Outreach to PT/OT requesting updated therapy visit today. CM to update discharge support once PT/OT notes are available. Update provided to MV via AIDIN and pt at bedside.     Per rounds with SLIM, pt remains medically cleared for discharge pending auth. CM team will continue to follow.

## 2025-07-14 NOTE — OCCUPATIONAL THERAPY NOTE
Occupational Therapy Progress Note     Patient Name: Rebeca Banegas  Today's Date: 7/14/2025  Problem List  Principal Problem:    Surgical wound infection  Active Problems:    Asthma    Permanent atrial fibrillation (HCC)    Primary hypertension    Hypothyroidism    Macrocytic anemia    History of embolectomy    Wound dehiscence              07/14/25 1326   OT Last Visit   OT Visit Date 07/14/25   Note Type   Note Type Treatment   Pain Assessment   Pain Assessment Tool 0-10   Pain Score No Pain   Restrictions/Precautions   Weight Bearing Precautions Per Order No   Other Precautions Fall Risk;Multiple lines;Chair Alarm;Bed Alarm  (wound vac)   ADL   Where Assessed Chair   UB Bathing Assistance 5  Supervision/Setup   UB Bathing Deficit Setup;Increased time to complete;Chest;Right arm;Left arm;Abdomen   LB Bathing Assistance 3  Moderate Assistance   LB Bathing Deficit Setup;Increased time to complete;Perineal area;Right upper leg;Left lower leg including foot;Buttocks;Right lower leg including foot;Left upper leg   LB Bathing Comments mod a for feet   UB Dressing Assistance 5  Supervision/Setup   UB Dressing Deficit Increased time to complete;Thread LUE;Thread RUE   LB Dressing Assistance 3  Moderate Assistance   LB Dressing Deficit Thread LLE into underwear;Thread RLE into underwear;Thread LLE into pants;Thread RLE into pants;Pull up over hips;Don/doff R sock;Don/doff L sock   LB Dressing Comments mod a for threading LE's   Transfers   Sit to Stand 5  Supervision   Stand to Sit 5  Supervision   Additional Comments rw   Functional Mobility   Functional Mobility 4  Minimal assistance   Additional Comments ax1, community distance.   Additional items Rolling walker   Cognition   Overall Cognitive Status WFL   Arousal/Participation Alert;Responsive   Attention Within functional limits   Orientation Level Oriented X4   Memory Within functional limits   Following Commands Follows all commands and directions without  difficulty   Comments pt cooperative w overall G safety awareness and insight to condition t/o session.   Activity Tolerance   Activity Tolerance Patient tolerated treatment well   Medical Staff Made Aware dpt, rn   Assessment   Assessment Pt seen on this date for OT session focusing on ADL retraining,   body mechanics, transfer retraining, increasing activity tolerance/endurance and EOB sitting to increase ability to participate in ADL/functional tasks. Pt was found in chair and was left in chair w/ all needs within reach, chair alarm on. Pt completed transfers w s, fm w min ax1 c rw. Adls completed in chair- ub w S, lb w mod A.  Pt w/ improvements in endurance, transfer ability, adl task completion, however is still limited 2* decreased ADL/High-level ADL status, decreased activity tolerance/endurance,  decreased self-care trans, decreased safety awareness and insight to condition.   The patient's raw score on the AM-PAC Daily Activity Inpatient Short Form is 17. A raw score of less than 19 suggests the patient may benefit from discharge to post-acute rehabilitation services. Please refer to the recommendation of the Occupational Therapist for safe discharge planning.  Recommending pt D/C to level 2 when medically stable. Pt will continue to benefit from acute OT services to meet goals.   Plan   Treatment Interventions ADL retraining;Functional transfer training;Endurance training;Patient/family training;Equipment evaluation/education;Compensatory technique education;Activityengagement;Energy conservation   Goal Expiration Date 07/24/25   OT Treatment Day 1   OT Frequency 2-3x/wk   Discharge Recommendation   Rehab Resource Intensity Level, OT II (Moderate Resource Intensity)   AM-PAC Daily Activity Inpatient   Lower Body Dressing 2   Bathing 2   Toileting 3   Upper Body Dressing 3   Grooming 3   Eating 4   Daily Activity Raw Score 17   Daily Activity Standardized Score (Calc for Raw Score >=11) 37.26   AM-PAC  Applied Cognition Inpatient   Following a Speech/Presentation 4   Understanding Ordinary Conversation 4   Taking Medications 4   Remembering Where Things Are Placed or Put Away 4   Remembering List of 4-5 Errands 4   Taking Care of Complicated Tasks 4   Applied Cognition Raw Score 24   Applied Cognition Standardized Score 62.21   Modified David Scale   Modified David Scale 4   End of Consult   Education Provided Yes   Patient Position at End of Consult Bedside chair;All needs within reach;Bed/Chair alarm activated   Nurse Communication Nurse aware of consult         JIGAR Vanegas, OTR/L

## 2025-07-14 NOTE — PHYSICAL THERAPY NOTE
PHYSICAL THERAPY NOTE          Patient Name: Rebeca Banegas  Today's Date: 7/14/2025 07/14/25 1328   PT Last Visit   PT Visit Date 07/14/25   Note Type   Note Type Treatment   Pain Assessment   Pain Assessment Tool 0-10   Pain Score No Pain   Restrictions/Precautions   Weight Bearing Precautions Per Order No   Other Precautions Fall Risk;Multiple lines;Bed Alarm;Chair Alarm  (wound vac)   General   Chart Reviewed Yes   Family/Caregiver Present No   Cognition   Overall Cognitive Status WFL   Arousal/Participation Alert;Cooperative   Attention Within functional limits   Orientation Level Oriented X4   Memory Within functional limits   Following Commands Follows all commands and directions without difficulty   Comments Patient is pleasant and cooperative   Subjective   Subjective Patient agreeable to PT tx   Bed Mobility   Additional Comments OOB in chair on arrival   Transfers   Sit to Stand 5  Supervision   Additional items Verbal cues   Stand to Sit 5  Supervision   Additional items Verbal cues   Additional Comments RW   Ambulation/Elevation   Gait pattern Decreased foot clearance;Shuffling;Short stride;Excessively slow   Gait Assistance 4  Minimal assist   Additional items Assist x 1;Verbal cues   Assistive Device Rolling walker   Distance 50'x4   Balance   Static Sitting Good   Dynamic Sitting Fair +   Static Standing Fair   Dynamic Standing Fair -   Ambulatory Fair -   Endurance Deficit   Endurance Deficit Yes   Endurance Deficit Description fatigue, weakness   Activity Tolerance   Activity Tolerance Patient tolerated treatment well;Patient limited by fatigue   Nurse Made Aware RN cleared   Assessment   Prognosis Good   Problem List Decreased strength;Decreased endurance;Impaired balance;Decreased mobility;Pain   Assessment Patient received in bedside chair. Patient agreeable to therapy. Patient performs functional  transfers with supervision using rolling walker. Patient performs ambulation with minimal assistance x1 using rolling walker, 50'x4. Patient requiring intermittent standing rest periods 2* fatigue. Patient with overall improvements in ambulation tolerance and safety. Patient continues to have deficits in strength, balance, and endurance, but is showing improvement.  Patient left in bedside chair with all needs met and call bell/personal items within reach. The patient's AM-PAC Basic Mobility Inpatient Short Form Raw Score is 17 showing further need for skilled PT services in order to improve functional mobility, decrease need for assistance, and return to OF. PT is recommending Level 2 - Moderate Resource Intensity on d/c from hospital.  Will continue to follow as able.   Goals   Patient Goals to keep improving   PT Treatment Day 1   Plan   Treatment/Interventions Functional transfer training;ADL retraining;LE strengthening/ROM;Elevations;Therapeutic exercise;Endurance training;Patient/family training;Equipment eval/education;Bed mobility;Gait training;Spoke to nursing;Spoke to case management;OT   Progress Progressing toward goals   PT Frequency 3-5x/wk   Discharge Recommendation   Rehab Resource Intensity Level, PT II (Moderate Resource Intensity)   Equipment Recommended Walker   AM-[facility] Basic Mobility Inpatient   Turning in Flat Bed Without Bedrails 3   Lying on Back to Sitting on Edge of Flat Bed Without Bedrails 3   Moving Bed to Chair 3   Standing Up From Chair Using Arms 3   Walk in Room 3   Climb 3-5 Stairs With Railing 2   Basic Mobility Inpatient Raw Score 17   Basic Mobility Standardized Score 39.67   [facility] Highest Level Of Mobility   -HL Goal 5: Stand one or more mins   -HLM Achieved 7: Walk 25 feet or more   End of Consult   Patient Position at End of Consult All needs within reach;Bed/Chair alarm activated;Bedside chair       Estela Lozano, PT, DPT

## 2025-07-14 NOTE — RESTORATIVE TECHNICIAN NOTE
Restorative Technician Note      Patient Name: Rebeca Banegas     Restorative Tech Visit Date: 07/14/25  Note Type: Mobility  Patient Position Upon Consult: Supine  Activity Performed: Ambulated  Assistive Device: Roller walker  Patient Position at End of Consult: Bedside chair; All needs within reach

## 2025-07-14 NOTE — ASSESSMENT & PLAN NOTE
Baseline 12-14   In the setting of acute blood loss anemia, anemia of inflammatory disease  Recent TSH WNL; folate & B12 WNL  Stable, continue to monitor and transfuse less than 7

## 2025-07-14 NOTE — PROGRESS NOTES
Progress Note - Infectious Disease   Name: Rebeca Banegas 89 y.o. female I MRN: [id number]  Unit/Bed#: [facility] 511-01 I Date of Admission: 7/8/2025   Date of Service: 7/14/2025 I Hospital Day: 6    Assessment & Plan  Surgical wound infection  Right groin surgical wound infection.  Patient had her right femoral embolectomy/thrombectomy on 6/24, without insertion of any prosthetic material.  Postop, she had wound dehiscence.  Patient is status post I&D on 7/9.  OR note reviewed, with no evidence of deep purulence or extension beyond deep fascia and without exposed vessels.  This appears to be superficial infection.  OR culture grew Citrobacter amalonaticus, relatively resistant but sensitive to a few p.o. antibiotics.  Patient remains clinically and systemically well, without sepsis or systemic toxicity.  Continues to p.o. doxycycline.  Wound care/VAC per vascular surgery.  Treat x 10 days antibiotic course, through 7/19.  Wound dehiscence  Right groin wound dehiscence.  Patient's status post I&D.  Currently, her wound has VAC in place.  Drainage is copious but all serous.  Wound care/VAC per vascular surgery.  History of embolectomy  Status post right femoral embolectomy and thrombectomy in June of this year.  Vascular surgery follow-up.    Discussed with patient in detail regarding the above plan.  Okay for discharge from ID viewpoint.      Antibiotics:  Cefepime  POD # 5    Subjective   Patient feels well.  No pain in right groin  Temperature stays down.  No chills.  She is tolerating antibiotic well.  No nausea, vomiting or diarrhea.    Objective :  Temp:  [97.4 °F (36.3 °C)-98.4 °F (36.9 °C)] 97.4 °F (36.3 °C)  HR:  [] 96  BP: ()/(52-71) 114/71  Resp:  [18-20] 18  SpO2:  [94 %-100 %] 98 %  O2 Device: None (Room air)    Physical Exam:     General: Awake, alert, cooperative, no distress.   Neck:  Supple. No mass.  No lymphadenopathy.   Lungs: Expansion symmetric, no rales, no wheezing, respirations  unlabored.   Heart:  Regular rate and rhythm, S1 and S2 normal, no murmur.   Abdomen: Soft, nondistended, non-tender, bowel sounds active all four quadrants, no masses, no organomegaly.   Extremities: Right groin wound with VAC.  No erythema/warmth.  Minimal tenderness.  VAC with moderate serous drainage.   Skin:  No rash.   Neuro: Moves all extremities.        Lab Results: I have reviewed the following results:  Results from last 7 days   Lab Units 07/14/25 0417 07/13/25 0523 07/12/25 0443   WBC Thousand/uL 5.95 6.49 7.83   HEMOGLOBIN g/dL 8.4* 9.2* 9.3*   PLATELETS Thousands/uL 230 241 263     Results from last 7 days   Lab Units 07/14/25 0417 07/13/25 0523 07/12/25 0443 07/09/25  0448 07/08/25  0814   SODIUM mmol/L 136 135 134*   < > 137   POTASSIUM mmol/L 4.5 4.0 4.2   < > 3.8   CHLORIDE mmol/L 100 101 100   < > 101   CO2 mmol/L 28 26 26   < > 24   BUN mg/dL 28* 26* 30*   < > 37*   CREATININE mg/dL 0.97 0.93 1.04   < > 1.24*   EGFR ml/min/1.73sq m 50 53 46   < > 41*   CALCIUM mg/dL 8.2* 7.6* 7.7*   < > 7.4*   AST U/L  --  13  --   --  16   ALT U/L  --  3*  --   --  20   ALK PHOS U/L  --  49  --   --  60   ALBUMIN g/dL  --  2.8*  --   --  3.7    < > = values in this interval not displayed.     Results from last 7 days   Lab Units 07/09/25  1005   GRAM STAIN RESULT  1+ Polys*  2+ Gram negative rods*             Results from last 7 days   Lab Units 07/12/25  0443   FERRITIN ng/mL 143

## 2025-07-14 NOTE — PLAN OF CARE
Problem: OCCUPATIONAL THERAPY ADULT  Goal: Performs self-care activities at highest level of function for planned discharge setting.  See evaluation for individualized goals.  Description:            See flowsheet documentation for full assessment, interventions and recommendations.   Outcome: Progressing  Note: Limitation: Decreased ADL status, Decreased endurance  Prognosis: Good  Assessment: Pt seen on this date for OT session focusing on ADL retraining,   body mechanics, transfer retraining, increasing activity tolerance/endurance and EOB sitting to increase ability to participate in ADL/functional tasks. Pt was found in chair and was left in chair w/ all needs within reach, chair alarm on. Pt completed transfers w s, fm w min ax1 c rw. Adls completed in chair- ub w S, lb w mod A.  Pt w/ improvements in endurance, transfer ability, adl task completion, however is still limited 2* decreased ADL/High-level ADL status, decreased activity tolerance/endurance,  decreased self-care trans, decreased safety awareness and insight to condition.   The patient's raw score on the -PAC Daily Activity Inpatient Short Form is 17. A raw score of less than 19 suggests the patient may benefit from discharge to post-acute rehabilitation services. Please refer to the recommendation of the Occupational Therapist for safe discharge planning.  Recommending pt D/C to level 2 when medically stable. Pt will continue to benefit from acute OT services to meet goals.     Rehab Resource Intensity Level, OT: II (Moderate Resource Intensity)

## 2025-07-14 NOTE — PLAN OF CARE
Problem: PHYSICAL THERAPY ADULT  Goal: Performs mobility at highest level of function for planned discharge setting.  See evaluation for individualized goals.  Description: Treatment/Interventions: ADL retraining, Functional transfer training, LE strengthening/ROM, Therapeutic exercise, Endurance training, Patient/family training, Equipment eval/education, Bed mobility, Gait training, Spoke to nursing, Spoke to case management, OT, Elevations  Equipment Recommended: Walker (owns)       See flowsheet documentation for full assessment, interventions and recommendations.  Outcome: Progressing  Note: Prognosis: Good  Problem List: Decreased strength, Decreased endurance, Impaired balance, Decreased mobility, Pain  Assessment: Patient received in bedside chair. Patient agreeable to therapy. Patient performs functional transfers with supervision using rolling walker. Patient performs ambulation with minimal assistance x1 using rolling walker, 50'x4. Patient requiring intermittent standing rest periods 2* fatigue. Patient with overall improvements in ambulation tolerance and safety. Patient continues to have deficits in strength, balance, and endurance, but is showing improvement.  Patient left in bedside chair with all needs met and call bell/personal items within reach. The patient's AM-PAC Basic Mobility Inpatient Short Form Raw Score is 17 showing further need for skilled PT services in order to improve functional mobility, decrease need for assistance, and return to PLOF. PT is recommending Level 2 - Moderate Resource Intensity on d/c from hospital.  Will continue to follow as able.        Rehab Resource Intensity Level, PT: II (Moderate Resource Intensity)    See flowsheet documentation for full assessment.

## 2025-07-14 NOTE — ASSESSMENT & PLAN NOTE
Recent emergent right leg embolectomy/lobectomy, fasciotomy and left popliteal thrombectomy on 6/24/2025 by vascular surgery complicated by right groin wound infection  S/p I&D 7/9, right groin washout with VAC placement 7/9  OR culture with Citrobacter amalonaticus, susceptibilities reviewed  Vascular Surgery and Infectious disease following, input reviewed  S/p IV antibiotics, now on doxycycline to complete 10 days through 7/19  Outpatient follow-up with vascular, will likely need debridement but will be discussed in the outpatient setting  Wound VAC exchanges Monday/Wednesday/Friday  Anticipate discharge back to subacute rehab - pending insurance authorization

## 2025-07-14 NOTE — DISCHARGE SUPPORT
Received call from [email or web address] stating they are in need of updated pt/ot notes to continue processing the auth. Andrés notified kelton mayen

## 2025-07-14 NOTE — ASSESSMENT & PLAN NOTE
Right groin surgical wound infection.  Patient had her right femoral embolectomy/thrombectomy on 6/24, without insertion of any prosthetic material.  Postop, she had wound dehiscence.  Patient is status post I&D on 7/9.  OR note reviewed, with no evidence of deep purulence or extension beyond deep fascia and without exposed vessels.  This appears to be superficial infection.  OR culture grew Citrobacter amalonaticus, relatively resistant but sensitive to a few p.o. antibiotics.  Patient remains clinically and systemically well, without sepsis or systemic toxicity.  Continues to p.o. doxycycline.  Wound care/VAC per vascular surgery.  Treat x 10 days antibiotic course, through 7/19.

## 2025-07-14 NOTE — ASSESSMENT & PLAN NOTE
Status post right femoral embolectomy and thrombectomy in June of this year.  Vascular surgery follow-up.   No/Not applicable

## 2025-07-14 NOTE — PROCEDURES
Vascular Surgery  Rebeca Banegas 89 y.o. female MRN: [id number]  Unit/Bed#: [facility] 511-01 Encounter: [id number]    --Notified by RN, Yobany Gallego: VAC alarming blockage/ occlusion.  Upon bedside inspection, dressing w/ accumulation of serous fluid within clear draping/ tape      V.A.C. Procedure Note    Date: 7/14/2025    Time: 14:00    Location of wound: R groin    Sponges removed:  1 Black Sponges  0 White Sponges    Dimensions of wound: 6.5 cm x 4 cm x 4 cm    Description of wound: Clean. Serous drainage. (-) malodor      Sponges placed:  1 Black Sponges  0 White Sponges    VAC settings:  150 mmHg  Continuous    Pt tolerated procedure well  VAC sticker placed to wound dressing, per protocol      Deborah Bro PA-C  7/14/2025

## 2025-07-15 VITALS
HEIGHT: 64 IN | DIASTOLIC BLOOD PRESSURE: 71 MMHG | TEMPERATURE: 98 F | RESPIRATION RATE: 18 BRPM | WEIGHT: 132 LBS | HEART RATE: 111 BPM | BODY MASS INDEX: 22.53 KG/M2 | SYSTOLIC BLOOD PRESSURE: 111 MMHG | OXYGEN SATURATION: 99 %

## 2025-07-15 PROCEDURE — 94664 DEMO&/EVAL PT USE INHALER: CPT

## 2025-07-15 PROCEDURE — 99239 HOSP IP/OBS DSCHRG MGMT >30: CPT | Performed by: FAMILY MEDICINE

## 2025-07-15 PROCEDURE — 94760 N-INVAS EAR/PLS OXIMETRY 1: CPT

## 2025-07-15 PROCEDURE — 94640 AIRWAY INHALATION TREATMENT: CPT

## 2025-07-15 PROCEDURE — 99232 SBSQ HOSP IP/OBS MODERATE 35: CPT | Performed by: INTERNAL MEDICINE

## 2025-07-15 PROCEDURE — G0545 PR INHERENT VISIT TO INPT: HCPCS | Performed by: INTERNAL MEDICINE

## 2025-07-15 RX ORDER — OXYCODONE HYDROCHLORIDE 5 MG/1
5 TABLET ORAL EVERY 6 HOURS PRN
Qty: 10 TABLET | Refills: 0 | Status: SHIPPED | OUTPATIENT
Start: 2025-07-15 | End: 2025-07-24 | Stop reason: SDUPTHER

## 2025-07-15 RX ORDER — OXYCODONE HYDROCHLORIDE 5 MG/1
5 TABLET ORAL EVERY 6 HOURS PRN
Qty: 10 TABLET | Refills: 0 | Status: SHIPPED | OUTPATIENT
Start: 2025-07-15 | End: 2025-07-15

## 2025-07-15 RX ORDER — DOXYCYCLINE 100 MG/1
100 CAPSULE ORAL EVERY 12 HOURS SCHEDULED
Start: 2025-07-15 | End: 2025-07-19

## 2025-07-15 RX ADMIN — DOCUSATE SODIUM 100 MG: 100 CAPSULE, LIQUID FILLED ORAL at 09:17

## 2025-07-15 RX ADMIN — APIXABAN 2.5 MG: 2.5 TABLET, FILM COATED ORAL at 09:17

## 2025-07-15 RX ADMIN — BUMETANIDE 1 MG: 1 TABLET ORAL at 09:17

## 2025-07-15 RX ADMIN — LEVOTHYROXINE SODIUM 50 MCG: 0.05 TABLET ORAL at 05:17

## 2025-07-15 RX ADMIN — Medication 12.5 MG: at 09:17

## 2025-07-15 RX ADMIN — DOXYCYCLINE 100 MG: 100 CAPSULE ORAL at 09:17

## 2025-07-15 RX ADMIN — FLUTICASONE FUROATE AND VILANTEROL TRIFENATATE 1 PUFF: 200; 25 POWDER RESPIRATORY (INHALATION) at 09:17

## 2025-07-15 RX ADMIN — LEVALBUTEROL HYDROCHLORIDE 1.25 MG: 1.25 SOLUTION RESPIRATORY (INHALATION) at 13:03

## 2025-07-15 RX ADMIN — LEVALBUTEROL HYDROCHLORIDE 1.25 MG: 1.25 SOLUTION RESPIRATORY (INHALATION) at 08:06

## 2025-07-15 NOTE — PLAN OF CARE
Problem: PAIN - ADULT  Goal: Verbalizes/displays adequate comfort level or baseline comfort level  Description: Interventions:  - Encourage patient to monitor pain and request assistance  - Assess pain using appropriate pain scale  - Administer analgesics as ordered based on type and severity of pain and evaluate response  - Implement non-pharmacological measures as appropriate and evaluate response  - Consider cultural and social influences on pain and pain management  - Notify physician/advanced practitioner if interventions unsuccessful or patient reports new pain  - Educate patient/family on pain management process including their role and importance of  reporting pain   - Provide non-pharmacologic/complimentary pain relief interventions  Outcome: Progressing     Problem: INFECTION - ADULT  Goal: Absence or prevention of progression during hospitalization  Description: INTERVENTIONS:  - Assess and monitor for signs and symptoms of infection  - Monitor lab/diagnostic results  - Monitor all insertion sites, i.e. indwelling lines, tubes, and drains  - Monitor endotracheal if appropriate and nasal secretions for changes in amount and color  - Cornell appropriate cooling/warming therapies per order  - Administer medications as ordered  - Instruct and encourage patient and family to use good hand hygiene technique  - Identify and instruct in appropriate isolation precautions for identified infection/condition  Outcome: Progressing     Problem: SKIN/TISSUE INTEGRITY - ADULT  Goal: Incision(s), wounds(s) or drain site(s) healing without S/S of infection  Description: INTERVENTIONS  - Assess and document dressing, incision, wound bed, drain sites and surrounding tissue  - Provide patient and family education  - Perform skin care/dressing changes per order  Outcome: Progressing

## 2025-07-16 ENCOUNTER — TELEPHONE (OUTPATIENT)
Dept: NEPHROLOGY | Facility: CLINIC | Age: OVER 89
End: 2025-07-16

## 2025-07-16 NOTE — TELEPHONE ENCOUNTER
----- Message from LUZ Ortega sent at 7/3/2025  2:50 PM EDT -----  Patient to be discharged next 24 to 48 hours.  Will need hospital follow-up for acute kidney injury.  [facility] already ordered  lauren

## 2025-07-16 NOTE — UTILIZATION REVIEW
NOTIFICATION OF ADMISSION DISCHARGE   This is a Notification of Discharge from [facility]. Please be advised that this patient has been discharge from our facility. Below you will find the admission and discharge date and time including the patient’s disposition.   UTILIZATION REVIEW CONTACT:  Utilization Review Assistants  Network Utilization Review Department  Phone: [phone] x carefully listen to the prompts. All voicemails are confidential.  Email: [email or web address]     ADMISSION INFORMATION  PRESENTATION DATE: 7/8/2025 12:29 PM  OBERVATION ADMISSION DATE: N/A  INPATIENT ADMISSION DATE: 7/8/25  3:26 PM   DISCHARGE DATE: 7/15/2025  2:39 PM   DISPOSITION:Non [facility] SNF/TCU/SNU    Network Utilization Review Department  ATTENTION: Please call with any questions or concerns to [phone] and carefully listen to the prompts so that you are directed to the right person. All voicemails are confidential.   For Discharge needs, contact Care Management DC Support Team at [phone] opt. 2  Send all requests for admission clinical reviews, approved or denied determinations and any other requests to dedicated fax number below belonging to the campus where the patient is receiving treatment. List of dedicated fax numbers for the Facilities:  FACILITY NAME UR FAX NUMBER   ADMISSION DENIALS (Administrative/Medical Necessity) [phone]   DISCHARGE SUPPORT TEAM ([facility]) [phone]   PARENT CHILD HEALTH (Maternity/NICU/Pediatrics) [phone]   [facility] [phone]   [facility] [phone]   [facility] [phone]   [facility] [phone]   [facility] [phone]   [facility] [phone]   [facility] [phone]   [facility] [phone]   [facility]  [facility] [phone]   [facility] [phone]   [facility] [phone]   [facility] [phone]

## 2025-07-16 NOTE — TELEPHONE ENCOUNTER
Called [facility]  and spoke with Ashlee. Faxed bmp at [phone] and was able to schedule hospital follow up in [place] with Virgen Mancini wed 8/6 2:30 pm.

## 2025-07-17 ENCOUNTER — NURSING HOME VISIT (OUTPATIENT)
Dept: GERIATRICS | Facility: OTHER | Age: OVER 89
End: 2025-07-17
Payer: COMMERCIAL

## 2025-07-17 ENCOUNTER — NURSE TRIAGE (OUTPATIENT)
Age: OVER 89
End: 2025-07-17

## 2025-07-17 DIAGNOSIS — I48.21 PERMANENT ATRIAL FIBRILLATION (HCC): Chronic | ICD-10-CM

## 2025-07-17 DIAGNOSIS — D47.2 MGUS (MONOCLONAL GAMMOPATHY OF UNKNOWN SIGNIFICANCE): ICD-10-CM

## 2025-07-17 DIAGNOSIS — J47.9 BRONCHIECTASIS WITHOUT COMPLICATION (HCC): ICD-10-CM

## 2025-07-17 DIAGNOSIS — T81.30XA WOUND DEHISCENCE: ICD-10-CM

## 2025-07-17 DIAGNOSIS — Z86.73 HISTORY OF CVA (CEREBROVASCULAR ACCIDENT): ICD-10-CM

## 2025-07-17 DIAGNOSIS — E03.8 OTHER SPECIFIED HYPOTHYROIDISM: Chronic | ICD-10-CM

## 2025-07-17 DIAGNOSIS — I10 PRIMARY HYPERTENSION: Chronic | ICD-10-CM

## 2025-07-17 DIAGNOSIS — N18.4 CKD (CHRONIC KIDNEY DISEASE) STAGE 4, GFR 15-29 ML/MIN (HCC): ICD-10-CM

## 2025-07-17 DIAGNOSIS — T81.49XA SURGICAL WOUND INFECTION: Primary | ICD-10-CM

## 2025-07-17 DIAGNOSIS — L89.890 PRESSURE INJURY OF TOE OF LEFT FOOT, UNSTAGEABLE (HCC): ICD-10-CM

## 2025-07-17 DIAGNOSIS — I50.32 CHRONIC HEART FAILURE WITH PRESERVED EJECTION FRACTION (HCC): ICD-10-CM

## 2025-07-17 DIAGNOSIS — L89.626 PRESSURE INJURY OF DEEP TISSUE OF LEFT HEEL: ICD-10-CM

## 2025-07-17 DIAGNOSIS — E43 SEVERE PROTEIN-CALORIE MALNUTRITION (HCC): ICD-10-CM

## 2025-07-17 DIAGNOSIS — I99.8 ACUTE LOWER LIMB ISCHEMIA: ICD-10-CM

## 2025-07-17 PROCEDURE — 99305 1ST NF CARE MODERATE MDM 35: CPT | Performed by: FAMILY MEDICINE

## 2025-07-17 NOTE — TELEPHONE ENCOUNTER
What kind of drainage are they seeing in canister?  Is it bloody drainage?  Serosanguineous drainage?  Does patient have any other symptoms, increased pain?  When was dressing changed last or due to be changed?

## 2025-07-17 NOTE — TELEPHONE ENCOUNTER
"REASON FOR CONVERSATION: Post-op Problem    SYMPTOMS: Sofia from [facility] called.  Pt has a wound vac, placed 7/9.  She called to inform that pt still having a lot of drainage, they are having to change pt's canister twice daily.  Denies pt having pain or any s/s infection.    OTHER HEALTH INFORMATION: pt's post op appt is 7/22.    PROTOCOL DISPOSITION: Callback by PCP Today    CARE ADVICE PROVIDED: advised I would send a message to provider.    PRACTICE FOLLOW-UP: Sofia asking if there are any concerns with drainage, or if this is to be expected.  Please call [facility] back with recommendations, # [phone]      Reason for Disposition   Caller has NON-URGENT question and triager unable to answer question    Answer Assessment - Initial Assessment Questions  1. SYMPTOM: \"What's the main symptom you're concerned about?\" (e.g., pain, fever, vomiting)      Wound vac drainage  2. ONSET: \"When did wound vac  start?\"      7/9  3. SURGERY: \"What surgery did you have?\"      Wound wash out, wound vac placement  4. DATE of SURGERY: \"When was the surgery?\"       7/9  5. ANESTHESIA: \"What type of anesthesia did you have?\" (e.g., general, spinal, epidural, local)      General   6. DRAINS: \"Were any drains place in or around the wound?\" (e.g., Hemovac, Kushal-Pascual, Penrose)      Wound vac  7. PAIN: \"Is there any pain?\" If Yes, ask: \"How bad is it?\"  (Scale 1-10; or mild, moderate, severe)      Denies   8. FEVER: \"Do you have a fever?\" If Yes, ask: \"What is your temperature, how was it measured, and when did it start?\"      Denies   9. VOMITING: \"Is there any vomiting?\" If Yes, ask: \"How many times?\"      Denies   10. BLEEDING: \"Is there any bleeding?\" If Yes, ask: \"How much?\" and \"Where?\"        Drainage- changing canister twice daily  11. OTHER SYMPTOMS: \"Do you have any other symptoms?\" (e.g., drainage from wound, painful urination, constipation)        Denies    Protocols used: Post-Op Symptoms and " Questions-Adult-[place]

## 2025-07-17 NOTE — PROGRESS NOTES
[facility]  [address]  [place] [address]  [facility]  History and Physical  NAME: Rebeca Banegas  AGE: 89 y.o. SEX: female [phone]  DATE OF ENCOUNTER: 7/17/2025  Pain: ***  Rehab Potential: ***  Patient Informed of Medical Condition: ***  Patient is Capable of Understanding Their Right: ***  Prognosis: ***  Discharge Plan: ***  Surrogate Decision Maker: ***  Advanced Directives: ***  Code status: ***  PCP: Santana Dillon MD  Assessment and Plan   Surgical wound infection  Recent emergent right leg embolectomy/lobectomy, fasciotomy and left popliteal thrombectomy on 6/24/2025 by vascular surgery complicated by right groin wound infection  S/p right groin washout with VAC placement 7/9  Wound culture growth of Citrobacter amalonaticus, S/p IV antibiotics in the hospital, transitioned to doxycycline to complete 10 days through 7/19  Outpatient follow-up with vascular outpatient  Wound VAC exchanges as schedule  Wound care team following    Wound dehiscence  Right groin wound dehiscence, status post I&D, VAC placement 9/7   See above    Acute lower limb ischemia  With recent hospitalization for right leg pain was found to have right femoral acute arterial occlusion, s/p emergent right LE embolectomy and fasciotomy and left popliteal thromboembolectomy on 6/24/25, bedside closure bilateral lower extremity fasciotomies on 6/29, post-operative complication with surgical wound infection and wound dehiscence as above regular follow-up  Continue Eliquis 2.5 mg twice daily  Follow-up with vascular surgery on 7/22/2025  All medications and routine orders were reviewed and updated as needed.  Plan discussed with: Patient. Coordination of care with nursing, OT/PT, SW, dietician.  Chief Complaint   Seen for re-admission at Skilled Nursing Facility  History of Present Illness   89 y.o. female who is seen today, following hospitalization at *** from ***/2025 to  "***/2025 for   ***    HISTORY:  Past Medical History[1]  Family History[2]  Social History[3]    Allergies:  Allergies[4]    Review of Systems   As per HPI, otherwise negative.  Medications and orders   All medications reviewed and updated in [facility] EMR.  Objective   LMP  (LMP Unknown)   Physical Exam  ***  Pertinent Laboratory/Diagnostic Studies:   The following labs/studies were reviewed please see chart or hospital paperwork for details.    Labs & Imaging:  Lab Results   Component Value Date    WBC 5.95 07/14/2025    HGB 8.4 (L) 07/14/2025    HCT 26.5 (L) 07/14/2025     (H) 07/14/2025     07/14/2025     Lab Results   Component Value Date     10/17/2017    SODIUM 136 07/14/2025    K 4.5 07/14/2025     07/14/2025    CO2 28 07/14/2025    AGAP 8 07/14/2025    BUN 28 (H) 07/14/2025    CREATININE 0.97 07/14/2025    GLUC 93 07/14/2025    GLUF 70 03/25/2025    CALCIUM 8.2 (L) 07/14/2025    AST 13 07/13/2025    ALT 3 (L) 07/13/2025    ALKPHOS 49 07/13/2025    PROT 6.4 10/17/2017    TP 5.1 (L) 07/13/2025    BILITOT 0.5 10/17/2017    TBILI 1.10 (H) 07/13/2025    EGFR 50 07/14/2025     Lab Results   Component Value Date    HGBA1C 6.3 (H) 06/25/2025     Lab Results   Component Value Date    CHOLESTEROL 158 08/27/2024    CHOLESTEROL 194 08/07/2020    CHOLESTEROL 190 12/12/2018     Lab Results   Component Value Date    HDL 66 08/27/2024    HDL 76 (H) 12/12/2018    HDL 74 02/20/2018     Lab Results   Component Value Date    TRIG 82 08/27/2024    TRIG 75 08/07/2020    TRIG 70 12/12/2018     No results found for: \"NONHDLC\"  Lab Results   Component Value Date    LDLCALC 76 08/27/2024    LDLDIRECT 97 12/12/2018    LDLDIRECT 102 (H) 02/20/2018     Lab Results   Component Value Date    TDQRTYWK49 503 07/12/2025     Lab Results   Component Value Date    HJG7GOXVKGSH 3.852 06/25/2025    TSH 6.33 (H) 01/02/2024     No results found for: \"SYPHILISAB\"  No results found for: \"RPR\"      Lab Results   Component " Value Date    LZUS14GGMUPS 61.3 04/04/2024      No results found for this or any previous visit.    No results found for this or any previous visit.    No results found for this or any previous visit.    Results for orders placed during the hospital encounter of 08/27/24    MRI brain wo contrast    Narrative  MRI BRAIN WITHOUT CONTRAST    INDICATION: stroke.    COMPARISON:   CTA head and neck 8/27/2024    TECHNIQUE:  Multiplanar, multisequence imaging of the brain was performed.      IMAGE QUALITY:  Diagnostic.    FINDINGS:    BRAIN PARENCHYMA:    There are foci of acute infarcts in the right occipital lobe and right cerebellum. There is mild edema without mass effect or hemorrhagic transformation.    . Foci of T2/FLAIR hyperintensities involving periventricular and subcortical white matter, most compatible with moderate microangiopathic change.     There is no discrete mass, mass effect or midline shift. There is no intracranial hemorrhage.    VENTRICLES:  Normal for the patient's age.    SELLA AND PITUITARY GLAND:  Normal.    ORBITS: Bilateral lens replacement.    PARANASAL SINUSES: Mucosal thickening of the sinuses most pronounced in the ethmoidal air cells and the right sphenoid sinus. No air-fluid level..    VASCULATURE:  Evaluation of the major intracranial vasculature demonstrates appropriate flow voids.    CALVARIUM AND SKULL BASE:  Normal.    Left mastoid effusion.    EXTRACRANIAL SOFT TISSUES:  Normal.    Impression  1.  Foci of acute infarcts in the right occipital lobe and right cerebellum. No mass effect or hemorrhagic transformation.  2.  Chronic microangiopathic change.  3.  Left mastoid effusion.  4.  Sinus disease.    Workstation performed: [id number]    No results found for this or any previous visit.    No results found for this or any previous visit.    No results found for this or any previous visit.      I have spent a total time of *** minutes in caring for this patient on the day of the  visit/encounter including diagnostic results, prognosis, risks and benefits of tx options, instructions for management, patient and family education, counseling / coordination of care, documenting in the medical record, reviewing / ordering tests, medicine, procedures , obtaining and reviewing history, communicating with other healthcare professionals.  Maura Gomez MD         [1]   Past Medical History:  Diagnosis Date    Abnormal ECG     Abnormal ultrasound     RESOLVED: [id number]    Acute kidney failure (HCC) 12/29/2024    Advice given about COVID-19 virus infection 04/07/2020    Ambulates with cane     Arrhythmia     Arthritis     Asthma     Asthma with acute exacerbation     LAST ASSESSED: [id number]    Asymptomatic menopausal state     Atherosclerosis     Atrial fibrillation (HCC)     Chronic kidney disease     Chronic obstructive lung disease (HCC)     w/ chonic cough    Chronic ulcer of left foot (HCC)     Colon polyp     Congestive heart failure (HCC)     LAST ASSESSED: [id number]    COPD (chronic obstructive pulmonary disease) (HCC)     Coronary artery disease     COVID-19 01/06/2025    COVID-19 virus infection 03/25/2023    Cuboid fracture     LAST ASSESSED: [id number]    Disease of thyroid gland     Dyspepsia     Edema of both lower extremities     Equinus deformity of left foot     Fall 04/01/2024    Falls     5/2024    GERD (gastroesophageal reflux disease)     Headache(784.0)     High risk medication use     LAST ASSESSED: 87VMQ2530    Hyperlipidemia     Hypertension     Hypokalemia     Hypothyroidism     Impacted cerumen of both ears     LAST ASSESSED: 78KFH0357    Impacted cerumen of right ear     LAST ASSESSED: 75VQP5502    Influenza     LAST ASSESSED: 99MXY8665    IPMN (intraductal papillary mucinous neoplasm)     RESOLVED: 02OCT2015    Irregular heart beat     afib. Warfarin.    Labial abscess 06/29/2022    Leg cramps 12/21/2018    Limb swelling     LAST ASSESSED: 53VJX5470    Navicular  fracture of ankle     LAST ASSESSED: 66PGB9992    Onychomycosis     LAST ASSESSED: 01ZZS1654    Open wound of right upper arm 10/17/2022    Osteoarthritis     Osteopenia     Osteoporosis     Paronychia, finger, unspecified laterality     LAST ASSESSED: 95VJB8186    Paroxysmal atrial fibrillation (HCC)     LAST ASSESSED: 2014    Peripheral vascular disease (HCC)     Plantar fasciitis     Shock (HCC) 2025    SOBOE (shortness of breath on exertion)     Stroke (HCC)     Right arm weakness that has resolved    Talus fracture     LAST ASSESSED: 72HKG0921    Vaccine counseling 2023    Vascular headache     Walker as ambulation aid     Wound of right foot    [2]   Family History  Problem Relation Name Age of Onset    Pancreatic cancer Mother Christiana 93    Heart failure Mother Christiana     Cancer Mother Christiana     Hypertension Mother Christiana     Coronary artery disease Family      Breast cancer Family      Uterine cancer Family      Hyperlipidemia Family      Osteoarthritis Family      Ovarian cancer Family      Brain cancer Son      Stroke Father Isaac     Hypertension Father Isaac     No Known Problems Sister      No Known Problems Daughter      No Known Problems Maternal Grandmother      No Known Problems Maternal Grandfather      No Known Problems Paternal Grandmother      No Known Problems Paternal Grandfather      Breast cancer Cousin  50    Breast cancer Cousin  50    Ovarian cancer Other niece 49   [3]   Social History  Socioeconomic History    Marital status:    Occupational History    Occupation: retired   Tobacco Use    Smoking status: Former     Current packs/day: 0.00     Average packs/day: 0.3 packs/day for 2.0 years (0.5 ttl pk-yrs)     Types: Cigarettes     Start date:      Quit date:      Years since quittin.5    Smokeless tobacco: Never    Tobacco comments:     On and off socially with friends    Vaping Use    Vaping status: Never Used   Substance and Sexual  Activity    Alcohol use: Not Currently    Drug use: No    Sexual activity: Not Currently     Partners: Male     Birth control/protection: Post-menopausal   Social History Narrative    DAILY COFFEE CONSUMPTION (2 CUPS/DAY)    EXERCISING REGULARLY     Social Drivers of Health     Food Insecurity: Patient Declined (7/9/2025)    Nursing - Inadequate Food Risk Classification     Worried About Running Out of Food in the Last Year: Never true     Ran Out of Food in the Last Year: Never true     Ran Out of Food in the Last Year: Patient declined   Transportation Needs: Patient Declined (7/9/2025)    Nursing - Transportation Risk Classification     Lack of Transportation: Patient declined   Intimate Partner Violence: Patient Declined (7/9/2025)    Nursing IPS     Physically Hurt by Someone: Patient declined     Hurt or Threatened by Someone: Patient declined   Housing Stability: Patient Declined (7/9/2025)    Nursing: Inadequate Housing Risk Classification     Unable to Pay for Housing in the Last Year: Patient declined     Has Housing: Patient declined   [4]   Allergies  Allergen Reactions    Asa [Aspirin] Shortness Of Breath    Nsaids Shortness Of Breath    Medical Tape Other (See Comments)     Skin tears with the removal of tape

## 2025-07-17 NOTE — TELEPHONE ENCOUNTER
Reviewed hospital notes, per notes patient was also having large amounts of serosanguineous drainage  This is not new.  Continue to monitor.  Notify office with any new or worsening concerns, or issues with VAC change.

## 2025-07-17 NOTE — TELEPHONE ENCOUNTER
"Called and Sofia, she is having \"light orange\" drainage, denies pain. She is having the vac dressing changed 3 times a week, and is scheduled to have it done later today. Sofia said they did not have issues with changing the dressing and it was just draining.   "

## 2025-07-18 ENCOUNTER — TELEPHONE (OUTPATIENT)
Dept: VASCULAR SURGERY | Facility: CLINIC | Age: OVER 89
End: 2025-07-18

## 2025-07-18 NOTE — TELEPHONE ENCOUNTER
Vascular Nurse Navigator Post Op Call    Procedure: Right groin washout, possible vac placement (Right)     Date of Procedure: 7/9/25    Surgeon:   * Faizan Gomes DO - Primary     * Santana Driver DPM - Assisting    Discharge Date: 7/15/25    Discharge Disposition: Other Skilled Nursing Facility at [facility]    Leg Weakness?: No    Leg Swelling?: No    Leg Numbness?: No    Chest Pain?: No    Shortness of Breath?: No    Orthopnea?: No    Anticoagulation pt was discharged on post op?: Apixaban (Eliquis)    Bleeding?: No    Uncontrolled Pain?: No    Incision Concerns?: see below    Fever or Chills?: No      Reviewed discharge instructions and incision care with patient.      NEXT OFFICE VISIT SCHEDULED:  7/22/25 at 4 pm with Dr. Raymond at [facility] Confirmed?: Yes      Any further questions/concerns?  Spoke with Kushal at [facility] in regards to above.  He stated that patient is doing good since discharge.  He stated they had to order a higher volume canister for patient's wound vac due to the amount of drainage from patient's wound.  He denies any signs of infection with wound or drainage.  He stated the drainage is serosanguinous.  All questions answered.

## 2025-07-18 NOTE — ASSESSMENT & PLAN NOTE
Recent emergent right leg embolectomy/lobectomy, fasciotomy and left popliteal thrombectomy on 6/24/2025 by vascular surgery complicated by right groin wound infection  S/p right groin washout with VAC placement 7/9  Wound culture growth of Citrobacter amalonaticus, S/p IV antibiotics in the hospital, transitioned to doxycycline to complete 10 days through 7/19  Outpatient follow-up with vascular outpatient  Wound VAC exchanges as schedule  Wound care team following

## 2025-07-18 NOTE — ASSESSMENT & PLAN NOTE
With recent hospitalization for right leg pain was found to have right femoral acute arterial occlusion, s/p emergent right LE embolectomy and fasciotomy and left popliteal thromboembolectomy on 6/24/25, bedside closure bilateral lower extremity fasciotomies on 6/29, post-operative complication with surgical wound infection and wound dehiscence as above regular follow-up  Continue Eliquis 2.5 mg twice daily  Follow-up with vascular surgery on 7/22/2025

## 2025-07-19 NOTE — PROGRESS NOTES
[facility]  [address]  [place] [address]  [place] [facility] [address]  History and Physical  NAME: Rebeca Banegas  AGE: 89 y.o. SEX: female [id number]  DATE OF ENCOUNTER: 7/17/2025  Pain: none reported  Rehab Potential: fair  Patient Informed of Medical Condition: yes  Patient is Capable of Understanding Their Right: yes  Prognosis: poor  Discharge Plan: [facility]  Surrogate Decision Maker: daughter, Meena Solorzano   Advanced Directives: yes  Code status: DNR  PCP: Santana Dillon MD  Assessment and Plan   Surgical wound infection  Recent emergent right leg embolectomy/lobectomy, fasciotomy and left popliteal thrombectomy on 6/24/2025 by vascular surgery complicated by right groin wound infection  S/p right groin washout with VAC placement 7/9  Wound culture growth of Citrobacter amalonaticus, S/p IV antibiotics in the hospital, transitioned to doxycycline to complete 10 days through 7/19  Outpatient follow-up with vascular outpatient  Wound VAC exchanges as schedule  Wound care team following    Wound dehiscence  Right groin wound dehiscence, status post I&D, VAC placement 9/7   See above    Acute lower limb ischemia  With recent hospitalization for right leg pain was found to have right femoral acute arterial occlusion, s/p emergent right LE embolectomy and fasciotomy and left popliteal thromboembolectomy on 6/24/25, bedside closure bilateral lower extremity fasciotomies on 6/29, post-operative complication with surgical wound infection and wound dehiscence as above regular follow-up  Continue Eliquis 2.5 mg twice daily  Follow-up with vascular surgery on 7/22/2025    Chronic heart failure with preserved ejection fraction (HCC)  Home Bumex 3 mg BID was held due to hypotension in the hospital, was resumed at reduced dose on previous hospital discharge.  Continue Bumex 1 mg twice daily, Lopressor 12.5 mg twice daily  Monitor weight, BMP    Permanent atrial fibrillation  (HCC)  Continue lopressor 12.5 mg twice daily. Warfarin was d/c, Eliquis started recent hospitalization. Continue Eliquis 2.5 mg BID  Monitor vitals, CBC, adverse bleeding    Primary hypertension  Continue Bumex 1 mg BID, lopressor 12.5 mg bid  Monitor BP closely. Avoid hypotension    Hypothyroidism  TSH WNL 3.8 (6/25/25)  Continue home dose of levothyroxine     CKD (chronic kidney disease) stage 4, GFR 15-29 ml/min ([facility])  Lab Results   Component Value Date    EGFR 50 07/14/2025    EGFR 53 07/13/2025    EGFR 46 07/12/2025    CREATININE 0.97 07/14/2025    CREATININE 0.93 07/13/2025    CREATININE 1.04 07/12/2025   Baseline Creatinine 1.6 - 2.1, eGFR <30 since January  Home jardiance and aldactone were held previous hospitalization due to DARIO.   Renal dose medications. Avoid NSAIDs. Encourage oral fluid intake.  Monitor BMP  Needs f/u with nephrology.    Pressure injury of deep tissue of left heel  Wound Care following    Pressure injury of toe of left foot, unstageable (HCC)  Continue local wound care as per Wound Care    History of CVA (cerebrovascular accident)  Continue Eliquis. Not on statin due to side effects    Severe protein-calorie malnutrition (HCC)  With weight loss, muscle wasting, and subcutaneous fat loss 2/2 acute on chronic medical conditions  Nutrition Service follows in SNF  Monitor weight    Bronchiectasis (HCC)  Maintained on Wixela Inhub 1 puff BID, albuterol neb TID, and NaCl 3% neb TID as per pulmonology  All medications and routine orders were reviewed and updated as needed.  Plan discussed with: Patient. Coordination of care with nursing, OT/PT, SW, dietician.  Chief Complaint   Seen for re-admission at Skilled Nursing Facility  History of Present Illness   89 y.o. female who is seen today, following hospitalization at [facility] from 7/8/2025 to 7/15/2025 for postoperative complication with surgical wound infection and wound dehiscence s/p right groin washout and  VAC placement.  Wound culture positive for Citrobacter amalonaticus for which she was treated with IV antibiotics, transition to doxycycline oral until 7/19.  She was discharged back to [facility] to continue her rehab course.  She is lying in bed, nurse changing her wound VAC as ordered. She reports good sleep, tolerating diet with no nausea, vomiting, or abdominal pain.  She denies CP, SOB, palpitations, pain, or paresthesia in her legs.  HISTORY:  Past Medical History:   Diagnosis Date    Abnormal ECG     Abnormal ultrasound     RESOLVED: [id number]    Acute kidney failure (HCC) 12/29/2024    Advice given about COVID-19 virus infection 04/07/2020    Ambulates with cane     Arrhythmia     Arthritis     Asthma     Asthma with acute exacerbation     LAST ASSESSED: [id number]    Asymptomatic menopausal state     Atherosclerosis     Atrial fibrillation (HCC)     Chronic kidney disease     Chronic obstructive lung disease (HCC)     w/ chonic cough    Chronic ulcer of left foot (HCC)     Colon polyp     Congestive heart failure (HCC)     LAST ASSESSED: [id number]    COPD (chronic obstructive pulmonary disease) (HCC)     Coronary artery disease     COVID-19 01/06/2025    COVID-19 virus infection 03/25/2023    Cuboid fracture     LAST ASSESSED: [id number]    Disease of thyroid gland     Dyspepsia     Edema of both lower extremities     Equinus deformity of left foot     Fall 04/01/2024    Falls     5/2024    GERD (gastroesophageal reflux disease)     Headache(784.0)     High risk medication use     LAST ASSESSED: [id number]    Hyperlipidemia     Hypertension     Hypokalemia     Hypothyroidism     Impacted cerumen of both ears     LAST ASSESSED: [id number]    Impacted cerumen of right ear     LAST ASSESSED: 52EUS1676    Influenza     LAST ASSESSED: 82FPS6971    IPMN (intraductal papillary mucinous neoplasm)     RESOLVED: 02OCT2015    Irregular heart beat     afib. Warfarin.    Labial abscess 06/29/2022    Leg cramps  2018    Limb swelling     LAST ASSESSED: [id number]    Navicular fracture of ankle     LAST ASSESSED: [id number]    Onychomycosis     LAST ASSESSED: 01OWD1412    Open wound of right upper arm 10/17/2022    Osteoarthritis     Osteopenia     Osteoporosis     Paronychia, finger, unspecified laterality     LAST ASSESSED: 47NXU9258    Paroxysmal atrial fibrillation (HCC)     LAST ASSESSED: 2014    Peripheral vascular disease (HCC)     Plantar fasciitis     Shock (HCC) 2025    SOBOE (shortness of breath on exertion)     Stroke (HCC)     Right arm weakness that has resolved    Talus fracture     LAST ASSESSED: 99HJD2269    Vaccine counseling 2023    Vascular headache     Walker as ambulation aid     Wound of right foot      Family History   Problem Relation Name Age of Onset    Pancreatic cancer Mother Christiana 93    Heart failure Mother Christiana     Cancer Mother Christiana     Hypertension Mother Christiana     Coronary artery disease Family      Breast cancer Family      Uterine cancer Family      Hyperlipidemia Family      Osteoarthritis Family      Ovarian cancer Family      Brain cancer Son      Stroke Father Isaac     Hypertension Father Isaac     No Known Problems Sister      No Known Problems Daughter      No Known Problems Maternal Grandmother      No Known Problems Maternal Grandfather      No Known Problems Paternal Grandmother      No Known Problems Paternal Grandfather      Breast cancer Cousin  50    Breast cancer Cousin  50    Ovarian cancer Other niece 49     Social History     Socioeconomic History    Marital status:    Occupational History    Occupation: retired   Tobacco Use    Smoking status: Former     Current packs/day: 0.00     Average packs/day: 0.3 packs/day for 2.0 years (0.5 ttl pk-yrs)     Types: Cigarettes     Start date:      Quit date:      Years since quittin.5    Smokeless tobacco: Never    Tobacco comments:     On and off socially with friends     Vaping Use    Vaping status: Never Used   Substance and Sexual Activity    Alcohol use: Not Currently    Drug use: No    Sexual activity: Not Currently     Partners: Male     Birth control/protection: Post-menopausal   Social History Narrative    DAILY COFFEE CONSUMPTION (2 CUPS/DAY)    EXERCISING REGULARLY     Social Drivers of Health     Food Insecurity: Patient Declined (7/9/2025)    Nursing - Inadequate Food Risk Classification     Worried About Running Out of Food in the Last Year: Never true     Ran Out of Food in the Last Year: Never true     Ran Out of Food in the Last Year: Patient declined   Transportation Needs: Patient Declined (7/9/2025)    Nursing - Transportation Risk Classification     Lack of Transportation: Patient declined   Intimate Partner Violence: Patient Declined (7/9/2025)    Nursing IPS     Physically Hurt by Someone: Patient declined     Hurt or Threatened by Someone: Patient declined   Housing Stability: Patient Declined (7/9/2025)    Nursing: Inadequate Housing Risk Classification     Unable to Pay for Housing in the Last Year: Patient declined     Has Housing: Patient declined     Allergies:  Allergies   Allergen Reactions    Asa [Aspirin] Shortness Of Breath    Nsaids Shortness Of Breath    Medical Tape Other (See Comments)     Skin tears with the removal of tape      Review of Systems   As per HPI, otherwise negative.  Medications and orders   All medications reviewed and updated in senior care EMR.  Objective   /67, heart rate 102, Temp 97.6, Wt 133 lb, RR 18, SpO2 98% RA  Physical Exam  Vitals and nursing note reviewed.   General: no acute distress.  HENT:      Head: Normocephalic.      Nose: Nose normal.      Mouth: Mucous membranes are moist.   Eyes:       Right eye: No discharge.         Left eye: No discharge.      Conjunctivae normal.   Cardiovascular:      Tachycardia and regular rhythm.   Pulmonary:      Pulmonary effort is normal. No wheezing or rales. Rhonchi  present.  Abdominal:      Bowel sounds are normal. There is no distension.      Abdomen is soft. There is no abdominal tenderness.   Right groin wound with granulation, large serosanguinous discharge. No edema, erythema, or tenderness [place] wound.   Musculoskeletal:      Cervical back: Neck supple.      Right lower leg: No edema.      Left lower leg: No edema.      B/L LE in dressing and ace wrap     B/l feet: pulses weak, normal movement in toes. Gross sensation intact.  Skin:     General: Skin is warm.   Neurological: alert.      Oriented to person, place, and time. Cooperative, follow commands      Behavior: normal.   Pertinent Laboratory/Diagnostic Studies:   The following labs/studies were reviewed please see chart or hospital paperwork for details.    Labs & Imaging:  Lab Results   Component Value Date    WBC 5.95 07/14/2025    HGB 8.4 (L) 07/14/2025    HCT 26.5 (L) 07/14/2025     (H) 07/14/2025     07/14/2025     Lab Results   Component Value Date     10/17/2017    SODIUM 136 07/14/2025    K 4.5 07/14/2025     07/14/2025    CO2 28 07/14/2025    AGAP 8 07/14/2025    BUN 28 (H) 07/14/2025    CREATININE 0.97 07/14/2025    GLUC 93 07/14/2025    GLUF 70 03/25/2025    CALCIUM 8.2 (L) 07/14/2025    AST 13 07/13/2025    ALT 3 (L) 07/13/2025    ALKPHOS 49 07/13/2025    PROT 6.4 10/17/2017    TP 5.1 (L) 07/13/2025    BILITOT 0.5 10/17/2017    TBILI 1.10 (H) 07/13/2025    EGFR 50 07/14/2025     Lab Results   Component Value Date    HGBA1C 6.3 (H) 06/25/2025     Lab Results   Component Value Date    CHOLESTEROL 158 08/27/2024    CHOLESTEROL 194 08/07/2020    CHOLESTEROL 190 12/12/2018     Lab Results   Component Value Date    HDL 66 08/27/2024    HDL 76 (H) 12/12/2018    HDL 74 02/20/2018     Lab Results   Component Value Date    TRIG 82 08/27/2024    TRIG 75 08/07/2020    TRIG 70 12/12/2018     Lab Results   Component Value Date    LDLCALC 76 08/27/2024    LDLDIRECT 97 12/12/2018    LDLDIRECT  102 (H) 02/20/2018     Lab Results   Component Value Date    JRHIBKES42 503 07/12/2025     Lab Results   Component Value Date    WBO6ZBNKHYAJ 3.852 06/25/2025    TSH 6.33 (H) 01/02/2024     Lab Results   Component Value Date    SHIP20YTDANU 61.3 04/04/2024      I have spent a total time of 60 minutes in caring for this patient on the day of the visit/encounter including diagnostic results, prognosis, risks and benefits of tx options, instructions for management, patient and family education, counseling / coordination of care, documenting in the medical record, reviewing / ordering tests, medicine, procedures , obtaining and reviewing history, communicating with other healthcare professionals.  Maura Gomez MD

## 2025-07-19 NOTE — ASSESSMENT & PLAN NOTE
Home Bumex 3 mg BID was held due to hypotension in the hospital, was resumed at reduced dose on previous hospital discharge.  Continue Bumex 1 mg twice daily, Lopressor 12.5 mg twice daily  Monitor weight, BMP

## 2025-07-19 NOTE — ASSESSMENT & PLAN NOTE
Lab Results   Component Value Date    EGFR 50 07/14/2025    EGFR 53 07/13/2025    EGFR 46 07/12/2025    CREATININE 0.97 07/14/2025    CREATININE 0.93 07/13/2025    CREATININE 1.04 07/12/2025   Baseline Creatinine 1.6 - 2.1, eGFR <30 since January  Home jardiance and aldactone were held previous hospitalization due to DARIO.   Renal dose medications. Avoid NSAIDs. Encourage oral fluid intake.  Monitor BMP  Needs f/u with nephrology.

## 2025-07-19 NOTE — ASSESSMENT & PLAN NOTE
With weight loss, muscle wasting, and subcutaneous fat loss 2/2 acute on chronic medical conditions  Nutrition Service follows in SNF  Monitor weight

## 2025-07-19 NOTE — ASSESSMENT & PLAN NOTE
Continue lopressor 12.5 mg twice daily. Warfarin was d/c, Eliquis started recent hospitalization. Continue Eliquis 2.5 mg BID  Monitor vitals, CBC, adverse bleeding

## 2025-07-21 ENCOUNTER — ANTICOAG VISIT (OUTPATIENT)
Dept: CARDIOLOGY CLINIC | Facility: CLINIC | Age: OVER 89
End: 2025-07-21

## 2025-07-21 ENCOUNTER — NURSING HOME VISIT (OUTPATIENT)
Dept: GERIATRICS | Facility: OTHER | Age: OVER 89
End: 2025-07-21
Payer: COMMERCIAL

## 2025-07-21 DIAGNOSIS — E03.8 OTHER SPECIFIED HYPOTHYROIDISM: Chronic | ICD-10-CM

## 2025-07-21 DIAGNOSIS — E43 SEVERE PROTEIN-CALORIE MALNUTRITION (HCC): ICD-10-CM

## 2025-07-21 DIAGNOSIS — I48.21 PERMANENT ATRIAL FIBRILLATION (HCC): Primary | Chronic | ICD-10-CM

## 2025-07-21 DIAGNOSIS — I50.32 CHRONIC HEART FAILURE WITH PRESERVED EJECTION FRACTION (HCC): ICD-10-CM

## 2025-07-21 DIAGNOSIS — I99.8 ACUTE LOWER LIMB ISCHEMIA: Primary | ICD-10-CM

## 2025-07-21 DIAGNOSIS — N18.4 CKD (CHRONIC KIDNEY DISEASE) STAGE 4, GFR 15-29 ML/MIN (HCC): ICD-10-CM

## 2025-07-21 DIAGNOSIS — T81.49XA SURGICAL WOUND INFECTION: ICD-10-CM

## 2025-07-21 DIAGNOSIS — T81.30XA WOUND DEHISCENCE: ICD-10-CM

## 2025-07-21 PROCEDURE — 99309 SBSQ NF CARE MODERATE MDM 30: CPT | Performed by: FAMILY MEDICINE

## 2025-07-21 NOTE — PROGRESS NOTES
Facility: [facility]  POS: 31  Progress Note 25    Chief Complaint/Reason for visit: STR follow-up  Code status: DNR  History of Present Illness: The patient is seen in her room today for STR f/u.  She is sitting in bed, c/o mild discomfort in her wound vac at night time. Reports good appetite. No nausea, vomiting reported. No CP, SOB, or palpitations. 1000 ml drainage from Wound VAC as per nursing report.  Past Medical History: unchanged from history and physical  Past Medical History[1]  Family History: Unchanged from history and physical  Social History: Unchanged from history and physical  Review of systems: As per review of medical illness, all other systems reviewed and negative.  Medications: All medication and routine orders were reviewed and updated  Allergies: Reviewed and unchanged  Consults reviewed:PT, OT, Speech, Nutrition, and Other  Labs/Diagnostics (reviewed by this provider): Copy in Chart  Imaging Reviewed: no new imaging  Vitals and nursing note reviewed.   General: no acute distress. cachectic  HENT:      Head: Normocephalic.      Nose: Nose normal.      Mouth: Mucous membranes are moist.   Eyes:       Right eye: No discharge.         Left eye: No discharge.      Conjunctivae normal.   Cardiovascular:      Normal rate and regular rhythm. systolic murmur heard.  Pulmonary:      Pulmonary effort is normal. No wheezing or rales. Scattered rhonchi b/l  Abdominal:      Bowel sounds are normal. There is no distension.      Abdomen is soft. There is no abdominal tenderness.      RLQ wound VAC in place, draining serous fluid.  Musculoskeletal:      Right lower le+ edema.      Left lower le+ edema.   Skin:     General: Skin is warm.   Neurological: alert.      Oriented to person, place, and time. Cooperative, follow commands      Behavior: normal.     Assessment/Plan:    Surgical wound infection  Recent emergent right leg embolectomy/lobectomy, fasciotomy and left popliteal  thrombectomy on 6/24/2025 by vascular surgery complicated by right groin wound infection  S/p right groin washout with VAC placement 7/9  Wound culture growth of Citrobacter amalonaticus, S/p IV antibiotics in the hospital, transitioned to doxycycline to complete 10 days through 7/19  Outpatient follow-up with vascular outpatient  Wound VAC exchanges as schedule  Wound care team following    Wound dehiscence  Right groin wound dehiscence, status post I&D, VAC placement 9/7   See above    Acute lower limb ischemia  With recent hospitalization for right leg pain was found to have right femoral acute arterial occlusion, s/p emergent right LE embolectomy and fasciotomy and left popliteal thromboembolectomy on 6/24/25, bedside closure bilateral lower extremity fasciotomies on 6/29, post-operative complication with surgical wound infection and wound dehiscence as above   Continue Eliquis 2.5 mg twice daily  Follow-up with vascular surgery on 7/22/2025    Chronic heart failure with preserved ejection fraction (HCC)  Home Bumex 3 mg BID was held due to hypotension in the hospital, was resumed at reduced dose on previous hospital discharge.  Continue Bumex 1 mg twice daily, Lopressor 12.5 mg twice daily  Monitor weight, BMP    Hypothyroidism  TSH WNL 3.8 (6/25/25)  Continue home dose of levothyroxine     Severe protein-calorie malnutrition (HCC)  With weight loss, muscle wasting, and subcutaneous fat loss 2/2 acute on chronic medical conditions  Nutrition Service follows in SNF  Monitor weight     Maura Gomez MD  7/21/2025         [1]   Past Medical History:  Diagnosis Date    Abnormal ECG     Abnormal ultrasound     RESOLVED: [id number]    Acute kidney failure (HCC) 12/29/2024    Advice given about COVID-19 virus infection 04/07/2020    Ambulates with cane     Arrhythmia     Arthritis     Asthma     Asthma with acute exacerbation     LAST ASSESSED: [id number]    Asymptomatic menopausal state     Atherosclerosis      Atrial fibrillation (HCC)     Chronic kidney disease     Chronic obstructive lung disease (HCC)     w/ chonic cough    Chronic ulcer of left foot (HCC)     Colon polyp     Congestive heart failure (HCC)     LAST ASSESSED: [id number]    COPD (chronic obstructive pulmonary disease) (HCC)     Coronary artery disease     COVID-19 01/06/2025    COVID-19 virus infection 03/25/2023    Cuboid fracture     LAST ASSESSED: 25HYH4924    Disease of thyroid gland     Dyspepsia     Edema of both lower extremities     Equinus deformity of left foot     Fall 04/01/2024    Falls     5/2024    GERD (gastroesophageal reflux disease)     Headache(784.0)     High risk medication use     LAST ASSESSED: 16XWD1675    Hyperlipidemia     Hypertension     Hypokalemia     Hypothyroidism     Impacted cerumen of both ears     LAST ASSESSED: [id number]    Impacted cerumen of right ear     LAST ASSESSED: 42TYQ1566    Influenza     LAST ASSESSED: 38CQX1086    IPMN (intraductal papillary mucinous neoplasm)     RESOLVED: 02OCT2015    Irregular heart beat     afib. Warfarin.    Labial abscess 06/29/2022    Leg cramps 12/21/2018    Limb swelling     LAST ASSESSED: [id number]    Navicular fracture of ankle     LAST ASSESSED: 80JLY5989    Onychomycosis     LAST ASSESSED: 96OJK8149    Open wound of right upper arm 10/17/2022    Osteoarthritis     Osteopenia     Osteoporosis     Paronychia, finger, unspecified laterality     LAST ASSESSED: 35UGR1247    Paroxysmal atrial fibrillation (HCC)     LAST ASSESSED: 02MAR2014    Peripheral vascular disease (HCC)     Plantar fasciitis     Shock (HCC) 06/26/2025    SOBOE (shortness of breath on exertion)     Stroke ([facility])     Right arm weakness that has resolved    Talus fracture     LAST ASSESSED: [id number]    Vaccine counseling 12/19/2023    Vascular headache     Walker as ambulation aid     Wound of right foot

## 2025-07-22 ENCOUNTER — TELEPHONE (OUTPATIENT)
Age: OVER 89
End: 2025-07-22

## 2025-07-22 ENCOUNTER — TELEPHONE (OUTPATIENT)
Dept: VASCULAR SURGERY | Facility: CLINIC | Age: OVER 89
End: 2025-07-22

## 2025-07-22 ENCOUNTER — OFFICE VISIT (OUTPATIENT)
Dept: VASCULAR SURGERY | Facility: CLINIC | Age: OVER 89
End: 2025-07-22

## 2025-07-22 ENCOUNTER — NURSING HOME VISIT (OUTPATIENT)
Dept: WOUND CARE | Facility: HOSPITAL | Age: OVER 89
End: 2025-07-22
Payer: COMMERCIAL

## 2025-07-22 VITALS
DIASTOLIC BLOOD PRESSURE: 68 MMHG | SYSTOLIC BLOOD PRESSURE: 110 MMHG | HEART RATE: 110 BPM | HEIGHT: 64 IN | BODY MASS INDEX: 22.66 KG/M2 | OXYGEN SATURATION: 98 %

## 2025-07-22 DIAGNOSIS — T81.30XA WOUND DEHISCENCE: Primary | ICD-10-CM

## 2025-07-22 DIAGNOSIS — L89.890 PRESSURE INJURY OF TOE OF LEFT FOOT, UNSTAGEABLE (HCC): ICD-10-CM

## 2025-07-22 DIAGNOSIS — L89.626 PRESSURE INJURY OF DEEP TISSUE OF LEFT HEEL: ICD-10-CM

## 2025-07-22 DIAGNOSIS — R26.2 AMBULATORY DYSFUNCTION: ICD-10-CM

## 2025-07-22 DIAGNOSIS — Z98.890 HISTORY OF EMBOLECTOMY: Primary | ICD-10-CM

## 2025-07-22 PROCEDURE — 99024 POSTOP FOLLOW-UP VISIT: CPT | Performed by: STUDENT IN AN ORGANIZED HEALTH CARE EDUCATION/TRAINING PROGRAM

## 2025-07-22 PROCEDURE — 99309 SBSQ NF CARE MODERATE MDM 30: CPT | Performed by: NURSE PRACTITIONER

## 2025-07-22 RX ORDER — CEFAZOLIN SODIUM 2 G/50ML
2000 SOLUTION INTRAVENOUS ONCE
OUTPATIENT
Start: 2025-07-22 | End: 2025-07-22

## 2025-07-22 RX ORDER — CHLORHEXIDINE GLUCONATE ORAL RINSE 1.2 MG/ML
15 SOLUTION DENTAL ONCE
OUTPATIENT
Start: 2025-07-22 | End: 2025-07-22

## 2025-07-22 NOTE — ASSESSMENT & PLAN NOTE
Patient was admitted on June 24, 2025 at [facility] for acute right leg pain due to a right femoral acute arterial occlusion, leading to an emergent embolectomy/thrombectomy and fasciotomy on June 24, 2025]  July 9, 2025: Patient had right groin washout with VAC placement    Assessment and plan:  The surgical incision on the right lower leg is not approximated, with necrotic tissue.  Ordered silver alginate for wound dressing.  The surgical incision on the left lower leg is stapled, with no obvious sign of infection.  Ordered Betadine  Right groin: With extra large amount of serous drainage, 100% granulation, with no obvious sign of infection, current wound treatment is wound VAC.   Patient have appointment with surgeon today.  I removed the wound VAC on the right groin and replaced it with calcium alginate.  Wound VAC is not allowed during transport of patient to appointment.  Facility informed also the physician's office that there is no order for suture or staple removal for wound on the lower legs  Ordered albumin and prealbumin due to increased drainage.  Patient had not been taking her protein supplement.  Education provided to patient that it is important for her to drink her protein supplement.  Follow-up next week if patient is in the facility

## 2025-07-22 NOTE — ASSESSMENT & PLAN NOTE
Left great toe  100% eschar, stable, nonfluctuant, with no obvious sign of infection, stable  Local wound care with Skin-Prep  Continue to offload

## 2025-07-22 NOTE — PROGRESS NOTES
Vascular Surgery Post Op Visit  Date: 07/22/25      Assessment:  Rebeca Banegas is a 89 y.o. female with non-healing surgical incisions on her BLEs.  She has had several weeks of local wound care, but the wounds do not appear to be healing as would be expected.      Plan:  - Plan for debridement in the operating room next week    Diagnoses and all orders for this visit:    Wound dehiscence  -     Case request operating room: DEBRIDEMENT LOWER EXTREMITY (WASH OUT) - bilateral lower extremities; Standing  -     Case request operating room: DEBRIDEMENT LOWER EXTREMITY (WASH OUT) - bilateral lower extremities    Other orders  -     Diet NPO; Sips with meds; Standing  -     Void on call to OR; Standing  -     Insert peripheral IV; Standing  -     Nursing Communication CHG bath, have staff wash entire body (neck down) per pre op bathing protocol. Routine, evening prior to, and day of surgery.; Standing  -     Nursing Communication Swab both nares with Povidone-Iodine solution, EXCLUDE if patient has shellfish/Iodine allergy, and replace with nasal alcohol swabstick. Routine, day of surgery, on call to OR.; Standing  -     chlorhexidine (PERIDEX) 0.12 % oral rinse 15 mL  -     Place sequential compression device; Standing  -     ceFAZolin (ANCEF) IVPB (premix in dextrose) 2,000 mg 50 mL       Operative Scheduling Information:    Hospital:  [facility]    Physician:  Me    Surgery: debridement BLE wounds    Urgency:  Standard    Level:  Level 4: Outpatients to be scheduled for screening procedures and elective surgery that can be delayed for longer than one month without reasonable expectation of detriment to patient.    Case Length:  Normal    Post-op Bed:  Floor with tele    OR Table:  Standard    Equipment Needs:  None    Medication Instructions:  Eliquis:  Hold for 2 days prior to procedure    Hydration:  No    Contrast Allergy:  no      Subjective:     HPI:  Rebeca Banegas is a 89 y.o. female who experienced an  "acute bilateral lower extremity thromboembolic event and is now s/p right CFA and BK pop cutdown/thromboembolectomy with fasciotomies in addition to left BK pop cutdown/thrombectomy.  Unfortunately she had a dehiscence of her right groin incision, which was debrided in the operating room and a VAC was placed.  She returns today to evaluate her remaining lower extremity surgical incisions.  The groin incision appears to be healing, but will require continued VAC use for now.  Her right calf incision has overlying eschar and does not appear to be healing well.  There is a small amount of eschar over her left calf incision, though this is to a lesser degree.      Objective:    ROS:  All systems were reviewed and are negative except those mentioned in HPI and below.      Vitals: /68 (BP Location: Left arm, Patient Position: Sitting, Cuff Size: Standard)   Pulse (!) 110   Ht 5' 4\" (1.626 m)   LMP  (LMP Unknown)   SpO2 98%   BMI 22.66 kg/m²      General: female appears stated age, no apparent distress, alert and oriented   HEENT: normocephalic, atraumatic   Cardiovascular: hemodynamically stable   Chest/Lungs: no increased work of breathing, chest rise equal bilaterally   Abdomen: Soft, ND, NT, no pulsatile masses   Extremities: L femoral pulse  Unable to assess right femoral pulse due to groin incision.    right calf incision has overlying eschar and does not appear to be healing well.  There is a small amount of eschar over her left calf incision, though this is to a lesser degree   Skin: warm and dry   Neuro: no gross deficits      Medications:  Current Outpatient Medications   Medication Sig Dispense Refill    acetaminophen (TYLENOL) 325 mg tablet Take 3 tablets (975 mg total) by mouth every 8 (eight) hours      albuterol (2.5 mg/3 mL) 0.083 % nebulizer solution Take 3 mL (2.5 mg total) by nebulization every 8 (eight) hours 270 mL 3    albuterol (Proventil HFA) 90 mcg/act inhaler Inhale 2 puffs every 6 " (six) hours as needed for wheezing 20.1 g 6    ammonium lactate (LAC-HYDRIN) 12 % lotion Apply topically 2 (two) times a day as needed for dry skin 225 g 5    apixaban (ELIQUIS) 2.5 mg Take 1 tablet (2.5 mg total) by mouth 2 (two) times a day      bisacodyl (DULCOLAX) 10 mg suppository Insert 1 suppository (10 mg total) into the rectum daily as needed for constipation 12 suppository 0    bumetanide (BUMEX) 2 mg tablet Take 0.5 tablets (1 mg total) by mouth 2 (two) times a day 270 tablet 0    guaiFENesin (MUCINEX) 600 mg 12 hr tablet Take 1 tablet (600 mg total) by mouth every 12 (twelve) hours      levothyroxine 50 mcg tablet TAKE 1 TABLET DAILY, EXCEPT TAKE 1 AND 1/2 TABLETS ON SUNDAY AND WEDNESDAY 100 tablet 1    metoprolol tartrate (LOPRESSOR) 25 mg tablet Take 0.5 tablets (12.5 mg total) by mouth every 12 (twelve) hours      oxyCODONE (ROXICODONE) 5 immediate release tablet Take 1 tablet (5 mg total) by mouth every 6 (six) hours as needed for severe pain for up to 10 days Max Daily Amount: 20 mg 10 tablet 0    polyethylene glycol (MIRALAX) 17 g packet Take 17 g by mouth daily      senna-docusate sodium (SENOKOT S) 8.6-50 mg per tablet Take 1 tablet by mouth 2 (two) times a day      sodium chloride 3 % inhalation solution Take 4 mL by nebulization 3 (three) times a day 360 mL 11    Fluticasone-Salmeterol (Advair Diskus) 250-50 mcg/dose inhaler Inhale 1 puff 2 (two) times a day Rinse mouth after use. 180 blister 0     No current facility-administered medications for this visit.       Allergies:  Allergies[1]     PMH:  Past Medical History[2]     PSH:  Past Surgical History[3]     FHx:  Family History[4]     SHx:  Social History     Socioeconomic History    Marital status:      Spouse name: Not on file    Number of children: Not on file    Years of education: Not on file    Highest education level: Not on file   Occupational History    Occupation: retired   Tobacco Use    Smoking status: Former     Current  packs/day: 0.00     Average packs/day: 0.3 packs/day for 2.0 years (0.5 ttl pk-yrs)     Types: Cigarettes     Start date:      Quit date:      Years since quittin.6    Smokeless tobacco: Never    Tobacco comments:     On and off socially with friends    Vaping Use    Vaping status: Never Used   Substance and Sexual Activity    Alcohol use: Not Currently    Drug use: No    Sexual activity: Not Currently     Partners: Male     Birth control/protection: Post-menopausal   Other Topics Concern    Not on file   Social History Narrative    DAILY COFFEE CONSUMPTION (2 CUPS/DAY)    EXERCISING REGULARLY     Social Drivers of Health     Financial Resource Strain: Not on file   Food Insecurity: Patient Declined (2025)    Nursing - Inadequate Food Risk Classification     Worried About Running Out of Food in the Last Year: Never true     Ran Out of Food in the Last Year: Never true     Ran Out of Food in the Last Year: Patient declined   Transportation Needs: Patient Declined (2025)    Nursing - Transportation Risk Classification     Lack of Transportation: Not on file     Lack of Transportation: Patient declined   Physical Activity: Not on file   Stress: Not on file   Social Connections: Not on file   Intimate Partner Violence: Patient Declined (2025)    Nursing IPS     Feels Physically and Emotionally Safe: Not on file     Physically Hurt by Someone: Not on file     Humiliated or Emotionally Abused by Someone: Not on file     Physically Hurt by Someone: Patient declined     Hurt or Threatened by Someone: Patient declined   Housing Stability: Patient Declined (2025)    Nursing: Inadequate Housing Risk Classification     Has Housing: Not on file     Worried About Losing Housing: Not on file     Unable to Get Utilities: Not on file     Unable to Pay for Housing in the Last Year: Patient declined     Has Housing: Patient declined          [1]   Allergies  Allergen Reactions    Asa [Aspirin] Shortness Of  Breath    Nsaids Shortness Of Breath    Medical Tape Other (See Comments)     Skin tears with the removal of tape    [2]   Past Medical History:  Diagnosis Date    Abnormal ECG     Abnormal ultrasound     RESOLVED: [id number]    Acute kidney failure (HCC) 12/29/2024    Advice given about COVID-19 virus infection 04/07/2020    Ambulates with cane     Arrhythmia     Arthritis     Asthma     Asthma with acute exacerbation     LAST ASSESSED: [id number]    Asymptomatic menopausal state     Atherosclerosis     Atrial fibrillation (HCC)     Chronic kidney disease     Chronic obstructive lung disease (HCC)     w/ chonic cough    Chronic ulcer of left foot (HCC)     Colon polyp     Congestive heart failure (HCC)     LAST ASSESSED: [id number]    COPD (chronic obstructive pulmonary disease) (HCC)     Coronary artery disease     COVID-19 01/06/2025    COVID-19 virus infection 03/25/2023    Cuboid fracture     LAST ASSESSED: [id number]    Disease of thyroid gland     Dyspepsia     Edema of both lower extremities     Equinus deformity of left foot     Fall 04/01/2024    Falls     5/2024    GERD (gastroesophageal reflux disease)     Headache(784.0)     High risk medication use     LAST ASSESSED: [id number]    Hyperlipidemia     Hypertension     Hypokalemia     Hypothyroidism     Impacted cerumen of both ears     LAST ASSESSED: [id number]    Impacted cerumen of right ear     LAST ASSESSED: [id number]    Influenza     LAST ASSESSED: 39VMM1259    IPMN (intraductal papillary mucinous neoplasm)     RESOLVED: 02OCT2015    Irregular heart beat     afib. Warfarin.    Labial abscess 06/29/2022    Leg cramps 12/21/2018    Limb swelling     LAST ASSESSED: [id number]    Navicular fracture of ankle     LAST ASSESSED: 73FWP0191    Onychomycosis     LAST ASSESSED: 82HHD2246    Open wound of right upper arm 10/17/2022    Osteoarthritis     Osteopenia     Osteoporosis     Paronychia, finger, unspecified laterality     LAST ASSESSED: [id number]     Paroxysmal atrial fibrillation (HCC)     LAST ASSESSED: 02MAR2014    Peripheral vascular disease (HCC)     Plantar fasciitis     Shock (HCC) 06/26/2025    SOBOE (shortness of breath on exertion)     Stroke (HCC)     Right arm weakness that has resolved    Talus fracture     LAST ASSESSED: [id number]    Vaccine counseling 12/19/2023    Vascular headache     Walker as ambulation aid     Wound of right foot    [3]   Past Surgical History:  Procedure Laterality Date    APPENDECTOMY      BONE BIOPSY Left 08/09/2024    Procedure: Bone bx of proximal phalanx second toe & second met with fluoroscopic guidance;  Surgeon: Jonny Marcial DPM;  [facility];  Service: Podiatry    CATARACT EXTRACTION Bilateral     CHOLECYSTECTOMY      COLONOSCOPY      FASCIOTOMY Right 6/24/2025    Procedure: FASCIOTOMY;  Surgeon: Arthur Wei MD;  [facility];  Service: Vascular    JOINT REPLACEMENT Bilateral     knee    NEUROPLASTY / TRANSPOSITION MEDIAN NERVE AT CARPAL TUNNEL BILATERAL Bilateral     DECOMPRESSION    OOPHORECTOMY Bilateral     age 81    PELVIC FLOOR REPAIR  2014    NV BIOPSY BONE TROCAR/NEEDLE SUPERFICIAL Left 08/09/2024    Procedure: Left foot debridement;  Surgeon: Jonny Marcial DPM;  [facility];  Service: Podiatry    NV DEBRIDEMENT MUSCLE &/FASCIA 1ST 20 SQ CM/< Left 08/09/2024    Procedure: Left foot debridement;  Surgeon: Jonny Marcial DPM;  [facility];  Service: Podiatry    NV OSTEOT W/WO LNGTH SHRT/CORRJ 1ST METAR Left 11/29/2024    Procedure: Left foot percutaneous osteotomy of second, third and fourth metatarsal.;  Surgeon: Jonny Marcial DPM;  [facility];  Service: Podiatry    SALPINGECTOMY Bilateral     SINUS SURGERY      THROMBECTOMY W/ EMBOLECTOMY Left 6/24/2025    Procedure: LEFT LOWER EXTREMITY EMBOLECTOMY/THROMBECTOMY;  Surgeon: Adonay Raymond MD;  [facility];  Service: Vascular    THROMBECTOMY W/ EMBOLECTOMY  Right 6/24/2025    Procedure: EMBOLECTOMY/THROMBECTOMY LOWER EXTREMITY;  Surgeon: Arthur Wei MD;  [facility];  Service: Vascular    TONSILLECTOMY  1941    TOTAL KNEE ARTHROPLASTY Bilateral     WOUND DEBRIDEMENT Right 7/9/2025    Procedure: Right groin washout, possible vac placement;  Surgeon: Faizan Gomes DO;  [facility];  Service: Vascular    WOUND VAC  7/11/2025   [4]   Family History  Problem Relation Name Age of Onset    Pancreatic cancer Mother Christiana 93    Heart failure Mother Christiana     Cancer Mother Christiana     Hypertension Mother Christiana     Coronary artery disease Family      Breast cancer Family      Uterine cancer Family      Hyperlipidemia Family      Osteoarthritis Family      Ovarian cancer Family      Brain cancer Son      Stroke Father Isaac     Hypertension Father Isaac     No Known Problems Sister      No Known Problems Daughter      No Known Problems Maternal Grandmother      No Known Problems Maternal Grandfather      No Known Problems Paternal Grandmother      No Known Problems Paternal Grandfather      Breast cancer Cousin  50    Breast cancer Cousin  50    Ovarian cancer Other niece 49

## 2025-07-22 NOTE — LETTER
Patient:  Rebeca Banegas   7/22/1932           LUZ Albert saw Rebeca Banegas for a wound care visit on 7/22/2025. See below for information relating to this visit.      Chief Complaint   Patient presents with    Follow Up Wound Care Visit        Assessment/Plan:  1. History of embolectomy  Assessment & Plan:  Patient was admitted on June 24, 2025 at [facility] for acute right leg pain due to a right femoral acute arterial occlusion, leading to an emergent embolectomy/thrombectomy and fasciotomy on June 24, 2025]  July 9, 2025: Patient had right groin washout with VAC placement    Assessment and plan:  The surgical incision on the right lower leg is not approximated, with necrotic tissue.  Ordered silver alginate for wound dressing.  The surgical incision on the left lower leg is stapled, with no obvious sign of infection.  Ordered Betadine  Right groin: With extra large amount of serous drainage, 100% granulation, with no obvious sign of infection, current wound treatment is wound VAC.   Patient have appointment with surgeon today.  I removed the wound VAC on the right groin and replaced it with calcium alginate.  Wound VAC is not allowed during transport of patient to appointment.  Facility informed also the physician's office that there is no order for suture or staple removal for wound on the lower legs  Ordered albumin and prealbumin due to increased drainage.  Patient had not been taking her protein supplement.  Education provided to patient that it is important for her to drink her protein supplement.  Follow-up next week if patient is in the facility                        2. Ambulatory dysfunction  Assessment & Plan:  On short-term rehab  3. Pressure injury of deep tissue of left heel  Assessment & Plan:  Left heel  Partial-thickness, with no obvious sign of infection  Local wound care -change to Triad paste and bordered foam  Continue to offload  Order open heels shoes  Heel  boots when in bed  4. Pressure injury of toe of left foot, unstageable (HCC)  Assessment & Plan:  Left great toe  100% eschar, stable, nonfluctuant, with no obvious sign of infection, stable  Local wound care with Skin-Prep  Continue to offload         Orders:  Rebeca Gallagherrd  7/22/1932  Wound: Right lower leg  Discontinue previous wound order  Cleanse the wound bed with NSS   Apply non-sting skin prep to periwound area  Apply Adaptic and silver alginate to wound bed, then cover with ABD and rolled gauze  Frequency : Daily and prn for soiling    Wound: Left lower leg  Discontinue previous wound order  Cleanse the wound bed with normal saline solution of wound cleanser  Apply Betadine to surgical wound  Daily as needed for soiling    Wound: Right groin  Continue wound VAC ordered by surgeon    Wound: Left heel and left great toe  Cleanse with normal saline solution  Apply Skin-Prep to wound bed and periwound area  Daily as needed for soiling    Heel boots when in bed    Offload all wounds  Turn and reposition frequently  Instruct / Assist with weight shifting in wheelchair  Increase protein intake.  Monitor for any sign of infection or worsening, inform PCP or patient's primary physician in your facility.      Follow Up:  Return in about 1 week (around 7/29/2025).       [facility] hours are 8:00 am - 4:30 pm Monday through Friday. The center phone number is [phone]. You can also contact me directly thru my email at [email or web address] or thru tiger text. If it is an emergency, please contact the PCP or patient's attending physician in your facility.     Sincerely,    Electronically signed by LUZ Albert    Patient : Rebeca Gallagherrd    7/22/1932

## 2025-07-22 NOTE — LETTER
Patient:  Rebeca Banegas   7/22/1932           LUZ Albert saw Rebeca Banegas for a wound care visit on 7/22/2025. See below for information relating to this visit.      Chief Complaint   Patient presents with    Follow Up Wound Care Visit        Assessment/Plan:  1. History of embolectomy  Assessment & Plan:  Patient was admitted on June 24, 2025 at [facility] for acute right leg pain due to a right femoral acute arterial occlusion, leading to an emergent embolectomy/thrombectomy and fasciotomy on June 24, 2025]  July 9, 2025: Patient had right groin washout with VAC placement    Assessment and plan:  The surgical incision on the right lower leg is not approximated, with necrotic tissue.  Ordered silver alginate for wound dressing.  The surgical incision on the left lower leg is stapled, with no obvious sign of infection.  Ordered Betadine  Right groin: With extra large amount of serous drainage, 100% granulation, with no obvious sign of infection, current wound treatment is wound VAC.   Patient have appointment with surgeon today.  I removed the wound VAC on the right groin and replaced it with calcium alginate.  Wound VAC is not allowed during transport of patient to appointment.  Facility informed also the physician's office that there is no order for suture or staple removal for wound on the lower legs  Ordered albumin and prealbumin due to increased drainage.  Patient had not been taking her protein supplement.  Education provided to patient that it is important for her to drink her protein supplement.  Follow-up next week if patient is in the facility                        2. Ambulatory dysfunction  Assessment & Plan:  On short-term rehab  3. Pressure injury of deep tissue of left heel  Assessment & Plan:  Left heel  Partial-thickness, with no obvious sign of infection  Local wound care -change to Triad paste and bordered foam  Continue to offload  Order open heels shoes  Heel  boots when in bed  4. Pressure injury of toe of left foot, unstageable (HCC)  Assessment & Plan:  Left great toe  100% eschar, stable, nonfluctuant, with no obvious sign of infection, stable  Local wound care with Skin-Prep  Continue to offload         Orders:  Rebeca Gallagherrd  7/22/1932  Wound: Right lower leg  Discontinue previous wound order  Cleanse the wound bed with NSS   Apply non-sting skin prep to periwound area  Apply Adaptic and silver alginate to wound bed, then cover with ABD and rolled gauze  Frequency : Daily and prn for soiling    Order prealbumin    Wound: Left lower leg  Discontinue previous wound order  Cleanse the wound bed with normal saline solution of wound cleanser  Apply Betadine to surgical wound  Daily as needed for soiling    Wound: Right groin  Continue wound VAC ordered by surgeon    Wound: Left heel and left great toe  Cleanse with normal saline solution  Apply Skin-Prep to wound bed and periwound area  Daily as needed for soiling    Heel boots when in bed    Offload all wounds  Turn and reposition frequently  Instruct / Assist with weight shifting in wheelchair  Increase protein intake.  Monitor for any sign of infection or worsening, inform PCP or patient's primary physician in your facility.        Follow Up:  Return in about 1 week (around 7/29/2025).       [facility] hours are 8:00 am - 4:30 pm Monday through Friday. The center phone number is [phone]. You can also contact me directly thru my email at [email or web address] or thru tiger text. If it is an emergency, please contact the PCP or patient's attending physician in your facility.     Sincerely,    Electronically signed by LUZ Albert    Patient : Rebeca Gallagherrd    7/22/1932

## 2025-07-22 NOTE — TELEPHONE ENCOUNTER
Jammie COVINGTON for [facility] called.  Pt has apt this afternoon w/ Dr. Raymond.  They have instructions to send VAC w/ pt, she cannot allow VAC to leave the building, it is a rental.  If VAC needs to be reapplied after apt they will do so.    Also wound care saw pt at facility this am and removed VAC. Wound was packed w/ calcium alginate until visit vs reapplying VAC.    Jammie states pt has sutures and staples and they have no orders for removal. Advised incisions will be evaluated in office today, if ready sutures/staples will be removed at that time.    Attempted to call clinical in [place] office to inform of above, was unable to reach anyone. I did s/w Paula at check in to alert her to VAC issue, will also forward this message to staff/Dr. Raymond.

## 2025-07-22 NOTE — ASSESSMENT & PLAN NOTE
Left heel  Partial-thickness, with no obvious sign of infection  Local wound care -change to Triad paste and bordered foam  Continue to offload  Order open heels shoes  Heel boots when in bed

## 2025-07-22 NOTE — PROGRESS NOTES
[facility] WOUND CARE MANAGEMENT   AND HYPERBARIC MEDICINE CENTER       Patient ID: Rebeca Banegas is a 89 y.o. female Date of Birth 7/22/1932     Location of Service: [facility]    Performed wound round with: Wound team     Chief Complaint : surgical wounds    Wound Instructions:  Wound: Right lower leg  Discontinue previous wound order  Cleanse the wound bed with NSS   Apply non-sting skin prep to periwound area  Apply Adaptic and silver alginate to wound bed, then cover with ABD and rolled gauze  Frequency : Daily and prn for soiling    Order prealbumin    Wound: Left lower leg  Discontinue previous wound order  Cleanse the wound bed with normal saline solution of wound cleanser  Apply Betadine to surgical wound  Daily as needed for soiling    Wound: Right groin  Continue wound VAC ordered by surgeon    Wound: Left heel and left great toe  Cleanse with normal saline solution  Apply Skin-Prep to wound bed and periwound area  Daily as needed for soiling    Heel boots when in bed    Offload all wounds  Turn and reposition frequently  Instruct / Assist with weight shifting in wheelchair  Increase protein intake.  Monitor for any sign of infection or worsening, inform PCP or patient's primary physician in your facility.      Allergies  Asa [aspirin], Nsaids, and Medical tape      Assessment & Plan:  1. History of embolectomy  Assessment & Plan:  Patient was admitted on June 24, 2025 at [facility] for acute right leg pain due to a right femoral acute arterial occlusion, leading to an emergent embolectomy/thrombectomy and fasciotomy on June 24, 2025]  July 9, 2025: Patient had right groin washout with VAC placement    Assessment and plan:  The surgical incision on the right lower leg is not approximated, with necrotic tissue.  Ordered silver alginate for wound dressing.  The surgical incision on the left lower leg is stapled, with no obvious sign of infection.  Ordered  Betadine  Right groin: With extra large amount of serous drainage, 100% granulation, with no obvious sign of infection, current wound treatment is wound VAC.   Patient have appointment with surgeon today.  I removed the wound VAC on the right groin and replaced it with calcium alginate.  Wound VAC is not allowed during transport of patient to appointment.  Facility informed also the physician's office that there is no order for suture or staple removal for wound on the lower legs  Ordered albumin and prealbumin due to increased drainage.  Patient had not been taking her protein supplement.  Education provided to patient that it is important for her to drink her protein supplement.  Follow-up next week if patient is in the facility                        2. Ambulatory dysfunction  Assessment & Plan:  On short-term rehab  3. Pressure injury of deep tissue of left heel  Assessment & Plan:  Left heel  Partial-thickness, with no obvious sign of infection  Local wound care -change to Triad paste and bordered foam  Continue to offload  Order open heels shoes  Heel boots when in bed  4. Pressure injury of toe of left foot, unstageable (HCC)  Assessment & Plan:  Left great toe  100% eschar, stable, nonfluctuant, with no obvious sign of infection, stable  Local wound care with Skin-Prep  Continue to offload             Subjective:   July 8, 2025.  New consult for wound on the right lower leg, left lower leg and right groin.  Patient was referred by Senior care team.  Medical problem includes but not limited to asthma, permanent atrial fibrillation, and congestive heart failure.  I introduced myself to patient and patient agreed to be seen for wound consult.    Wound history: Patient was admitted on June 24, 2025 at [facility] for acute right leg pain due to a right femoral acute arterial occlusion, leading to an emergent embolectomy/thrombectomy and fasciotomy on June 24, 2025]. Postoperatively, she experienced  hypotension and required critical care support. By July 3, 2025, her renal function improved, and she was stable with ongoing management for acute limb ischemia    Received patient in bed, complaining of pain on the left lower leg and continuous drainage on the right groin.  Denies fever.    July 22, 2025.  Follow-up for wound on the right groin and bilateral lower legs.  As per report, patient have appointment with vascular today.  Patient was admitted on July 8, 2025 due to increased drainage on the right groin wound.  On July 9, 2025, patient had right groin washout with VAC placement.  Patient denies pain, complaining of increased drainage on the right groin.        Review of Systems   Constitutional: Negative.    Respiratory: Negative.     Cardiovascular: Negative.    Musculoskeletal:  Positive for gait problem.   Skin:  Positive for wound.       Objective:    Physical Exam  Constitutional:       Appearance: Normal appearance.     Cardiovascular:      Rate and Rhythm: Normal rate.   Pulmonary:      Effort: Pulmonary effort is normal.     Skin:     Findings: Lesion present.      Comments: Right groin: Wound size is 5 x 2.6 x 3 cm, undermining from 12-6, deepest at 12, 1 cm, with extra large amount of serous drainage, 100% granulation, with no obvious sign of infection, denies pain on palpation    Right lower leg anterior: 13 x 3 cm, sutured, 50% necrotic tissue, 50% granulation, with small amount of serosanguineous drainage    Left lower leg: Surgical incision 12 x 0.5 cm, stapled, intact, not approximated, with eschar, no drainage, with no obvious sign of infection    Left heel: Wound size is 0.5 x 0.7 cm.,  Partial-thickness, no drainage, periwound normal, with no obvious sign of infection    Left great toe: Wound sizes 0.2 x 0.2 cm.,  100% eschar, no drainage, no obvious sign of infection     Neurological:      Mental Status: She is alert.      Gait: Gait abnormal.              Procedures           Patient's  "care was coordinated with nursing facility staff. Recent vitals, labs and updated medications were reviewed on EMR or chart system of facility. Past Medical, surgical, social, medication and allergy history and patient's previous records were reviewed and updated as appropriate: Most up-to date information is available in the facility EMR where the patient is currently admitted.    Problem List[1]  Past Medical History[2]  Past Surgical History[3]  Social History[4]   Current Medications[5]  Family History[6]           Coordination of Care: Wound team aware of the treatment plan. Facility nurse will provide wound treatment and monitor the wound for any changes.     Patient / Staff education : Patient / Staff was given education on sign of infection and pressure ulcer prevention. Patient/ Staff verbalized understanding     Follow up :  Next week    Voice-recognition software may have been used in the preparation of this document. Occasional wrong word or \"sound-alike\" substitutions may have occurred due to the inherent limitations of voice recognition software. Interpretation should be guided by context.      LUZ Albert           [1]   Patient Active Problem List  Diagnosis    Arthritis    Asthma    Atherosclerosis    Permanent atrial fibrillation (HCC)    Backache    Bifascicular bundle branch block    Chronic heart failure with preserved ejection fraction (HCC)    Esophageal reflux    Essential tremor    Primary hypertension    Hypothyroidism    Onychomycosis    Ovarian cyst    Pancreatic cyst    Plantar fasciitis    Pulmonary nodule    Thyroid nodule    Sensorineural hearing loss (SNHL), bilateral    Left asymmetrical SNHL    Elevated serum immunoglobulin free light chains    Osteopenia of hip    CKD (chronic kidney disease) stage 4, GFR 15-29 ml/min ([facility])    Secondary hyperparathyroidism (HCC)    Age-related osteoporosis without current pathological fracture    Chronic cough    MGUS (monoclonal " gammopathy of unknown significance)    Bronchiectasis (HCC)    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)    Severe protein-calorie malnutrition (HCC)    Other atherosclerosis of native arteries of extremities, bilateral legs (HCC)    Muscle cramp    Chronic thoracic back pain    Chronic midline low back pain without sciatica    Arthralgia    History of CVA (cerebrovascular accident)    Acute lower limb ischemia    DARIO (acute kidney injury) (HCC)    Hyperphosphatemia    Macrocytic anemia    Constipation    Surgical wound infection    History of embolectomy    Ambulatory dysfunction    Pressure injury of toe of left foot, unstageable (HCC)    Pressure injury of deep tissue of left heel    Wound dehiscence   [2]   Past Medical History:  Diagnosis Date    Abnormal ECG     Abnormal ultrasound     RESOLVED: [id number]    Acute kidney failure (HCC) 12/29/2024    Advice given about COVID-19 virus infection 04/07/2020    Ambulates with cane     Arrhythmia     Arthritis     Asthma     Asthma with acute exacerbation     LAST ASSESSED: [id number]    Asymptomatic menopausal state     Atherosclerosis     Atrial fibrillation (HCC)     Chronic kidney disease     Chronic obstructive lung disease (HCC)     w/ chonic cough    Chronic ulcer of left foot (HCC)     Colon polyp     Congestive heart failure (HCC)     LAST ASSESSED: [id number]    COPD (chronic obstructive pulmonary disease) (HCC)     Coronary artery disease     COVID-19 01/06/2025    COVID-19 virus infection 03/25/2023    Cuboid fracture     LAST ASSESSED: 55QHA0618    Disease of thyroid gland     Dyspepsia     Edema of both lower extremities     Equinus deformity of left foot     Fall 04/01/2024    Falls     5/2024    GERD (gastroesophageal reflux disease)     Headache(784.0)     High risk medication use     LAST ASSESSED: [id number]    Hyperlipidemia     Hypertension     Hypokalemia     Hypothyroidism     Impacted cerumen of both ears     LAST ASSESSED:  75NIQ1573    Impacted cerumen of right ear     LAST ASSESSED: 68UKN9876    Influenza     LAST ASSESSED: 66IYP4851    IPMN (intraductal papillary mucinous neoplasm)     RESOLVED: 02OCT2015    Irregular heart beat     afib. Warfarin.    Labial abscess 06/29/2022    Leg cramps 12/21/2018    Limb swelling     LAST ASSESSED: 46WWU5206    Navicular fracture of ankle     LAST ASSESSED: 50JVP4490    Onychomycosis     LAST ASSESSED: 80VLE3527    Open wound of right upper arm 10/17/2022    Osteoarthritis     Osteopenia     Osteoporosis     Paronychia, finger, unspecified laterality     LAST ASSESSED: 50DHA0179    Paroxysmal atrial fibrillation (HCC)     LAST ASSESSED: 02MAR2014    Peripheral vascular disease (HCC)     Plantar fasciitis     Shock (HCC) 06/26/2025    SOBOE (shortness of breath on exertion)     Stroke (HCC)     Right arm weakness that has resolved    Talus fracture     LAST ASSESSED: [id number]    Vaccine counseling 12/19/2023    Vascular headache     Walker as ambulation aid     Wound of right foot    [3]   Past Surgical History:  Procedure Laterality Date    APPENDECTOMY      BONE BIOPSY Left 08/09/2024    Procedure: Bone bx of proximal phalanx second toe & second met with fluoroscopic guidance;  Surgeon: Jonny Marcial DPM;  [facility];  Service: Podiatry    CATARACT EXTRACTION Bilateral     CHOLECYSTECTOMY      COLONOSCOPY      FASCIOTOMY Right 6/24/2025    Procedure: FASCIOTOMY;  Surgeon: Arthur Wei MD;  [facility]: [facility];  Service: Vascular    JOINT REPLACEMENT Bilateral     knee    NEUROPLASTY / TRANSPOSITION MEDIAN NERVE AT CARPAL TUNNEL BILATERAL Bilateral     DECOMPRESSION    OOPHORECTOMY Bilateral     age 81    PELVIC FLOOR REPAIR  2014    IL BIOPSY BONE TROCAR/NEEDLE SUPERFICIAL Left 08/09/2024    Procedure: Left foot debridement;  Surgeon: Jonny Marcial DPM;  [facility];  Service: Podiatry    IL DEBRIDEMENT MUSCLE &/FASCIA 1ST 20 SQ CM/< Left  2024    Procedure: Left foot debridement;  Surgeon: Jonny Marcial DPM;  [facility];  Service: Podiatry    WI OSTEOT W/WO LNGTH SHRT/CORRJ 1ST METAR Left 2024    Procedure: Left foot percutaneous osteotomy of second, third and fourth metatarsal.;  Surgeon: Jonny Marcial DPM;  [facility];  Service: Podiatry    SALPINGECTOMY Bilateral     SINUS SURGERY      THROMBECTOMY W/ EMBOLECTOMY Left 2025    Procedure: LEFT LOWER EXTREMITY EMBOLECTOMY/THROMBECTOMY;  Surgeon: Adonay Raymond MD;  [facility];  Service: Vascular    THROMBECTOMY W/ EMBOLECTOMY Right 2025    Procedure: EMBOLECTOMY/THROMBECTOMY LOWER EXTREMITY;  Surgeon: Arthur Wei MD;  [facility];  Service: Vascular    TONSILLECTOMY      TOTAL KNEE ARTHROPLASTY Bilateral     WOUND DEBRIDEMENT Right 2025    Procedure: Right groin washout, possible vac placement;  Surgeon: Faizan Gomes DO;  [facility];  Service: Vascular    WOUND VAC  2025   [4]   Social History  Socioeconomic History    Marital status:    Occupational History    Occupation: retired   Tobacco Use    Smoking status: Former     Current packs/day: 0.00     Average packs/day: 0.3 packs/day for 2.0 years (0.5 ttl pk-yrs)     Types: Cigarettes     Start date:      Quit date:      Years since quittin.6    Smokeless tobacco: Never    Tobacco comments:     On and off socially with friends    Vaping Use    Vaping status: Never Used   Substance and Sexual Activity    Alcohol use: Not Currently    Drug use: No    Sexual activity: Not Currently     Partners: Male     Birth control/protection: Post-menopausal   Social History Narrative    DAILY COFFEE CONSUMPTION (2 CUPS/DAY)    EXERCISING REGULARLY     Social Drivers of Health     Food Insecurity: Patient Declined (2025)    Nursing - Inadequate Food Risk Classification     Worried About Running Out of Food in the Last Year:  Never true     Ran Out of Food in the Last Year: Never true     Ran Out of Food in the Last Year: Patient declined   Transportation Needs: Patient Declined (7/9/2025)    Nursing - Transportation Risk Classification     Lack of Transportation: Patient declined   Intimate Partner Violence: Patient Declined (7/9/2025)    Nursing IPS     Physically Hurt by Someone: Patient declined     Hurt or Threatened by Someone: Patient declined   Housing Stability: Patient Declined (7/9/2025)    Nursing: Inadequate Housing Risk Classification     Unable to Pay for Housing in the Last Year: Patient declined     Has Housing: Patient declined   [5]   Current Outpatient Medications:     acetaminophen (TYLENOL) 325 mg tablet, Take 3 tablets (975 mg total) by mouth every 8 (eight) hours, Disp: , Rfl:     albuterol (2.5 mg/3 mL) 0.083 % nebulizer solution, Take 3 mL (2.5 mg total) by nebulization every 8 (eight) hours, Disp: 270 mL, Rfl: 3    albuterol (Proventil HFA) 90 mcg/act inhaler, Inhale 2 puffs every 6 (six) hours as needed for wheezing, Disp: 20.1 g, Rfl: 6    ammonium lactate (LAC-HYDRIN) 12 % lotion, Apply topically 2 (two) times a day as needed for dry skin, Disp: 225 g, Rfl: 5    apixaban (ELIQUIS) 2.5 mg, Take 1 tablet (2.5 mg total) by mouth 2 (two) times a day, Disp: , Rfl:     bisacodyl (DULCOLAX) 10 mg suppository, Insert 1 suppository (10 mg total) into the rectum daily as needed for constipation, Disp: 12 suppository, Rfl: 0    bumetanide (BUMEX) 2 mg tablet, Take 0.5 tablets (1 mg total) by mouth 2 (two) times a day, Disp: 270 tablet, Rfl: 0    Fluticasone-Salmeterol (Advair Diskus) 250-50 mcg/dose inhaler, Inhale 1 puff 2 (two) times a day Rinse mouth after use., Disp: 180 blister, Rfl: 0    guaiFENesin (MUCINEX) 600 mg 12 hr tablet, Take 1 tablet (600 mg total) by mouth every 12 (twelve) hours, Disp: , Rfl:     levothyroxine 50 mcg tablet, TAKE 1 TABLET DAILY, EXCEPT TAKE 1 AND 1/2 TABLETS ON SUNDAY AND WEDNESDAY,  Disp: 100 tablet, Rfl: 1    metoprolol tartrate (LOPRESSOR) 25 mg tablet, Take 0.5 tablets (12.5 mg total) by mouth every 12 (twelve) hours, Disp: , Rfl:     oxyCODONE (ROXICODONE) 5 immediate release tablet, Take 1 tablet (5 mg total) by mouth every 6 (six) hours as needed for severe pain for up to 10 days Max Daily Amount: 20 mg, Disp: 10 tablet, Rfl: 0    polyethylene glycol (MIRALAX) 17 g packet, Take 17 g by mouth daily, Disp: , Rfl:     senna-docusate sodium (SENOKOT S) 8.6-50 mg per tablet, Take 1 tablet by mouth 2 (two) times a day, Disp: , Rfl:     sodium chloride 3 % inhalation solution, Take 4 mL by nebulization 3 (three) times a day, Disp: 360 mL, Rfl: 11  [6]   Family History  Problem Relation Name Age of Onset    Pancreatic cancer Mother Christiana 93    Heart failure Mother Christiana     Cancer Mother Christiana     Hypertension Mother Christiana     Coronary artery disease Family      Breast cancer Family      Uterine cancer Family      Hyperlipidemia Family      Osteoarthritis Family      Ovarian cancer Family      Brain cancer Son      Stroke Father Isaac     Hypertension Father Isaac     No Known Problems Sister      No Known Problems Daughter      No Known Problems Maternal Grandmother      No Known Problems Maternal Grandfather      No Known Problems Paternal Grandmother      No Known Problems Paternal Grandfather      Breast cancer Cousin  50    Breast cancer Cousin  50    Ovarian cancer Other niece 49

## 2025-07-23 ENCOUNTER — TELEPHONE (OUTPATIENT)
Age: OVER 89
End: 2025-07-23

## 2025-07-23 ENCOUNTER — PREP FOR PROCEDURE (OUTPATIENT)
Dept: VASCULAR SURGERY | Facility: CLINIC | Age: OVER 89
End: 2025-07-23

## 2025-07-23 NOTE — TELEPHONE ENCOUNTER
Ashlee calling from [facility]. Patient was seen by Dr. Raymond yesterday, Left inner knee incision sutures were not removed during visit. Ashlee is asking for the reason the sutures were not removed, as well as a copy of the OV note to be faxed to [facility].   Advised note from yesterday needs to be completed.     Please review and fax OV note when complete, and advise on suture removal.     [facility]  CB# [phone]  Fax#[phone]

## 2025-07-23 NOTE — TELEPHONE ENCOUNTER
Verified patient's insurance   CONFIRMED - Patient's insurance is [employer]   Is patient requesting a call when authorization has been obtained? Patient did not request a call.    Surgery Date: 7-30-25  Primary Surgeon: DUARTE // Adonay Raymond (NPI: [id number])  Assisting Surgeon: Not Applicable (N/A)  Facility: [place] (Tax: [id number] / NPI: [id number])  Inpatient / Outpatient: Outpatient  Level: 2    Clearance Received: No clearance ordered.  Consent Received: Consent will be signed day of procedure.  Medication Hold / Last Dose: Hold Eliquis 2 days prior  IR Notified: Not Applicable (N/A)  Rep. Notified: Not Applicable (N/A)  Equipment Needs: Not Applicable (N/A)  Vas Lab Requested: Not Applicable (N/A)  Patient Contacted: 7-23-25    Lyft Ride: NO  Pickup Date/Time: Not Applicable (N/A)    Diagnosis: T81.30XA  Procedure/ CPT Code(s): Wound Debridement // CPT: 73314    For varicose vein related procedures:   Last LEVDR: Not Applicable (N/A)  CEAP Classification: Not Applicable (N/A)  VCSS: Not Applicable (N/A)  PICTURES: Not Applicable (N/A)    Post Operative Date/ Time: TBD     *Please review medication hold(s), PATs, and check H&P with patient.*  PATIENT WAS MAILED SURGERY/SHOWERING/DISCHARGE/COVID INSTRUCTIONS AFTER REVIEWING WITH THEM VIA PHONE CALL.

## 2025-07-23 NOTE — TELEPHONE ENCOUNTER
"  (Newest Message First)             View All Conversations on this Encounter  Eduardo Crews PA-C to [facility] (Selected Message)        7/23/25  4:43 PM  Reviewed. Please see Dr. Raymond's comment below.      \"Please let Ashlee know the patient's wounds are healing poorly and she will need debridement in the operating room, which is being arranged for next week. I will have my note completed later today \"  Adonay Raymond MD to Eduardo Crews PA-C        7/23/25  2:37 PM  Please let Ashlee know the patient's wounds are healing poorly and she will need debridement in the operating room, which is being arranged for next week.  I will have my note completed later today.  Eduardo Crews PA-C to Adonay Raymond MD        7/23/25 10:22 AM  Hello, please complete this pt's note from yesterday so the office can fax it over to [facility].      \"  Ashlee calling from [facility]. Patient was seen by Dr. Raymond yesterday, Left inner knee incision sutures were not removed during visit. Ashlee is asking for the reason the sutures were not removed, as well as a copy of the OV note to be faxed to [facility].   Advised note from yesterday needs to be completed.      Please review and fax OV note when complete, and advise on suture removal.    \"  "

## 2025-07-24 ENCOUNTER — ANESTHESIA EVENT (OUTPATIENT)
Dept: PERIOP | Facility: HOSPITAL | Age: OVER 89
End: 2025-07-24
Payer: COMMERCIAL

## 2025-07-24 ENCOUNTER — TELEPHONE (OUTPATIENT)
Dept: VASCULAR SURGERY | Facility: CLINIC | Age: OVER 89
End: 2025-07-24

## 2025-07-24 DIAGNOSIS — T81.9XXA POST-OPERATIVE COMPLICATION: ICD-10-CM

## 2025-07-24 RX ORDER — OXYCODONE HYDROCHLORIDE 5 MG/1
5 TABLET ORAL EVERY 6 HOURS PRN
Qty: 20 TABLET | Refills: 0 | Status: SHIPPED | OUTPATIENT
Start: 2025-07-24

## 2025-07-24 NOTE — ASSESSMENT & PLAN NOTE
With recent hospitalization for right leg pain was found to have right femoral acute arterial occlusion, s/p emergent right LE embolectomy and fasciotomy and left popliteal thromboembolectomy on 6/24/25, bedside closure bilateral lower extremity fasciotomies on 6/29, post-operative complication with surgical wound infection and wound dehiscence as above   Continue Eliquis 2.5 mg twice daily  Follow-up with vascular surgery on 7/22/2025

## 2025-07-24 NOTE — ASSESSMENT & PLAN NOTE
Eye Tests: Recent emergent right leg embolectomy/lobectomy, fasciotomy and left popliteal thrombectomy on 6/24/2025 by vascular surgery complicated by right groin wound infection  S/p right groin washout with VAC placement 7/9  Wound culture growth of Citrobacter amalonaticus, S/p IV antibiotics in the hospital, transitioned to doxycycline to complete 10 days through 7/19  Outpatient follow-up with vascular outpatient  Wound VAC exchanges as schedule  Wound care team following

## 2025-07-25 NOTE — TELEPHONE ENCOUNTER
Ashlee calling back from [facility] requesting office visit note from OV with Dr. Raymond from 7/22/25.  Faxed OV note and messages from Dr. Raymond to [facility] at [phone].

## 2025-07-28 ENCOUNTER — NURSING HOME VISIT (OUTPATIENT)
Dept: GERIATRICS | Facility: OTHER | Age: OVER 89
End: 2025-07-28
Payer: COMMERCIAL

## 2025-07-28 DIAGNOSIS — N18.4 CKD (CHRONIC KIDNEY DISEASE) STAGE 4, GFR 15-29 ML/MIN (HCC): ICD-10-CM

## 2025-07-28 DIAGNOSIS — I48.21 PERMANENT ATRIAL FIBRILLATION (HCC): Chronic | ICD-10-CM

## 2025-07-28 DIAGNOSIS — T81.30XA WOUND DEHISCENCE: ICD-10-CM

## 2025-07-28 DIAGNOSIS — R53.81 PHYSICAL DECONDITIONING: ICD-10-CM

## 2025-07-28 DIAGNOSIS — I99.8 ACUTE LOWER LIMB ISCHEMIA: Primary | ICD-10-CM

## 2025-07-28 DIAGNOSIS — S81.801D OPEN WOUND OF BOTH LOWER EXTREMITIES, SUBSEQUENT ENCOUNTER: ICD-10-CM

## 2025-07-28 DIAGNOSIS — I50.32 CHRONIC HEART FAILURE WITH PRESERVED EJECTION FRACTION (HCC): ICD-10-CM

## 2025-07-28 DIAGNOSIS — E43 SEVERE PROTEIN-CALORIE MALNUTRITION (HCC): ICD-10-CM

## 2025-07-28 DIAGNOSIS — S81.802D OPEN WOUND OF BOTH LOWER EXTREMITIES, SUBSEQUENT ENCOUNTER: ICD-10-CM

## 2025-07-28 DIAGNOSIS — T81.49XA SURGICAL WOUND INFECTION: ICD-10-CM

## 2025-07-28 PROBLEM — S81.801A OPEN WOUND OF BOTH LOWER EXTREMITIES: Status: ACTIVE | Noted: 2025-07-28

## 2025-07-28 PROBLEM — S81.802A OPEN WOUND OF BOTH LOWER EXTREMITIES: Status: ACTIVE | Noted: 2025-07-28

## 2025-07-28 PROCEDURE — 99309 SBSQ NF CARE MODERATE MDM 30: CPT | Performed by: NURSE PRACTITIONER

## 2025-07-30 ENCOUNTER — ANESTHESIA (OUTPATIENT)
Dept: PERIOP | Facility: HOSPITAL | Age: OVER 89
End: 2025-07-30
Payer: COMMERCIAL

## 2025-07-30 ENCOUNTER — HOSPITAL ENCOUNTER (OUTPATIENT)
Facility: HOSPITAL | Age: OVER 89
Setting detail: OUTPATIENT SURGERY
Discharge: NURSING, REHAB OR LONG-TERM CARE | End: 2025-07-30
Attending: STUDENT IN AN ORGANIZED HEALTH CARE EDUCATION/TRAINING PROGRAM | Admitting: STUDENT IN AN ORGANIZED HEALTH CARE EDUCATION/TRAINING PROGRAM
Payer: COMMERCIAL

## 2025-07-30 VITALS
SYSTOLIC BLOOD PRESSURE: 102 MMHG | RESPIRATION RATE: 16 BRPM | HEIGHT: 65 IN | HEART RATE: 99 BPM | TEMPERATURE: 97 F | OXYGEN SATURATION: 99 % | WEIGHT: 133.38 LBS | BODY MASS INDEX: 22.22 KG/M2 | DIASTOLIC BLOOD PRESSURE: 63 MMHG

## 2025-07-30 PROCEDURE — 11042 DBRDMT SUBQ TIS 1ST 20SQCM/<: CPT | Performed by: STUDENT IN AN ORGANIZED HEALTH CARE EDUCATION/TRAINING PROGRAM

## 2025-07-30 PROCEDURE — NC001 PR NO CHARGE: Performed by: STUDENT IN AN ORGANIZED HEALTH CARE EDUCATION/TRAINING PROGRAM

## 2025-07-30 PROCEDURE — 97605 NEG PRS WND THER DME<=50SQCM: CPT | Performed by: STUDENT IN AN ORGANIZED HEALTH CARE EDUCATION/TRAINING PROGRAM

## 2025-07-30 RX ORDER — LIDOCAINE HYDROCHLORIDE 20 MG/ML
INJECTION, SOLUTION EPIDURAL; INFILTRATION; INTRACAUDAL; PERINEURAL AS NEEDED
Status: DISCONTINUED | OUTPATIENT
Start: 2025-07-30 | End: 2025-07-30

## 2025-07-30 RX ORDER — PROPOFOL 10 MG/ML
INJECTION, EMULSION INTRAVENOUS AS NEEDED
Status: DISCONTINUED | OUTPATIENT
Start: 2025-07-30 | End: 2025-07-30

## 2025-07-30 RX ORDER — ACETAMINOPHEN 325 MG/1
975 TABLET ORAL EVERY 6 HOURS PRN
Status: DISCONTINUED | OUTPATIENT
Start: 2025-07-30 | End: 2025-07-30 | Stop reason: HOSPADM

## 2025-07-30 RX ORDER — ONDANSETRON 2 MG/ML
INJECTION INTRAMUSCULAR; INTRAVENOUS AS NEEDED
Status: DISCONTINUED | OUTPATIENT
Start: 2025-07-30 | End: 2025-07-30

## 2025-07-30 RX ORDER — CHLORHEXIDINE GLUCONATE ORAL RINSE 1.2 MG/ML
15 SOLUTION DENTAL ONCE
Status: COMPLETED | OUTPATIENT
Start: 2025-07-30 | End: 2025-07-30

## 2025-07-30 RX ORDER — OXYCODONE HYDROCHLORIDE 5 MG/1
5 TABLET ORAL EVERY 4 HOURS PRN
Refills: 0 | Status: DISCONTINUED | OUTPATIENT
Start: 2025-07-30 | End: 2025-07-30 | Stop reason: HOSPADM

## 2025-07-30 RX ORDER — SODIUM CHLORIDE, SODIUM LACTATE, POTASSIUM CHLORIDE, CALCIUM CHLORIDE 600; 310; 30; 20 MG/100ML; MG/100ML; MG/100ML; MG/100ML
125 INJECTION, SOLUTION INTRAVENOUS CONTINUOUS
Status: DISCONTINUED | OUTPATIENT
Start: 2025-07-30 | End: 2025-07-30 | Stop reason: HOSPADM

## 2025-07-30 RX ORDER — CEFAZOLIN SODIUM 2 G/50ML
2000 SOLUTION INTRAVENOUS ONCE
Status: COMPLETED | OUTPATIENT
Start: 2025-07-30 | End: 2025-07-30

## 2025-07-30 RX ORDER — FENTANYL CITRATE 50 UG/ML
INJECTION, SOLUTION INTRAMUSCULAR; INTRAVENOUS AS NEEDED
Status: DISCONTINUED | OUTPATIENT
Start: 2025-07-30 | End: 2025-07-30

## 2025-07-30 RX ORDER — FENTANYL CITRATE/PF 50 MCG/ML
25 SYRINGE (ML) INJECTION
Status: DISCONTINUED | OUTPATIENT
Start: 2025-07-30 | End: 2025-07-30 | Stop reason: HOSPADM

## 2025-07-30 RX ORDER — DEXAMETHASONE SODIUM PHOSPHATE 10 MG/ML
INJECTION, SOLUTION INTRAMUSCULAR; INTRAVENOUS AS NEEDED
Status: DISCONTINUED | OUTPATIENT
Start: 2025-07-30 | End: 2025-07-30

## 2025-07-30 RX ORDER — ACETAMINOPHEN 10 MG/ML
INJECTION, SOLUTION INTRAVENOUS AS NEEDED
Status: DISCONTINUED | OUTPATIENT
Start: 2025-07-30 | End: 2025-07-30

## 2025-07-30 RX ORDER — ONDANSETRON 2 MG/ML
4 INJECTION INTRAMUSCULAR; INTRAVENOUS ONCE AS NEEDED
Status: DISCONTINUED | OUTPATIENT
Start: 2025-07-30 | End: 2025-07-30 | Stop reason: HOSPADM

## 2025-07-30 RX ORDER — SODIUM CHLORIDE, SODIUM LACTATE, POTASSIUM CHLORIDE, CALCIUM CHLORIDE 600; 310; 30; 20 MG/100ML; MG/100ML; MG/100ML; MG/100ML
20 INJECTION, SOLUTION INTRAVENOUS CONTINUOUS
Status: DISCONTINUED | OUTPATIENT
Start: 2025-07-30 | End: 2025-07-30 | Stop reason: HOSPADM

## 2025-07-30 RX ADMIN — ACETAMINOPHEN 1000 MG: 10 INJECTION, SOLUTION INTRAVENOUS at 08:20

## 2025-07-30 RX ADMIN — PROPOFOL 80 MG: 10 INJECTION, EMULSION INTRAVENOUS at 07:44

## 2025-07-30 RX ADMIN — OXYCODONE HYDROCHLORIDE 5 MG: 5 TABLET ORAL at 10:12

## 2025-07-30 RX ADMIN — SODIUM CHLORIDE, SODIUM LACTATE, POTASSIUM CHLORIDE, AND CALCIUM CHLORIDE 125 ML/HR: .6; .31; .03; .02 INJECTION, SOLUTION INTRAVENOUS at 06:40

## 2025-07-30 RX ADMIN — LIDOCAINE HYDROCHLORIDE 100 MG: 20 INJECTION, SOLUTION EPIDURAL; INFILTRATION; INTRACAUDAL at 07:44

## 2025-07-30 RX ADMIN — CEFAZOLIN SODIUM 2000 MG: 2 SOLUTION INTRAVENOUS at 07:45

## 2025-07-30 RX ADMIN — PROPOFOL 20 MG: 10 INJECTION, EMULSION INTRAVENOUS at 07:46

## 2025-07-30 RX ADMIN — DEXAMETHASONE SODIUM PHOSPHATE 10 MG: 10 INJECTION INTRAMUSCULAR; INTRAVENOUS at 07:50

## 2025-07-30 RX ADMIN — ONDANSETRON 4 MG: 2 INJECTION INTRAMUSCULAR; INTRAVENOUS at 07:50

## 2025-07-30 RX ADMIN — FENTANYL CITRATE 25 MCG: 50 INJECTION INTRAMUSCULAR; INTRAVENOUS at 08:09

## 2025-07-30 RX ADMIN — FENTANYL CITRATE 25 MCG: 50 INJECTION INTRAMUSCULAR; INTRAVENOUS at 07:54

## 2025-07-30 RX ADMIN — FENTANYL CITRATE 25 MCG: 50 INJECTION INTRAMUSCULAR; INTRAVENOUS at 08:57

## 2025-07-30 RX ADMIN — FENTANYL CITRATE 25 MCG: 50 INJECTION INTRAMUSCULAR; INTRAVENOUS at 09:07

## 2025-07-30 RX ADMIN — CHLORHEXIDINE GLUCONATE 15 ML: 1.2 SOLUTION ORAL at 06:20

## 2025-07-31 ENCOUNTER — TELEPHONE (OUTPATIENT)
Age: OVER 89
End: 2025-07-31

## 2025-07-31 ENCOUNTER — NURSING HOME VISIT (OUTPATIENT)
Dept: GERIATRICS | Facility: OTHER | Age: OVER 89
End: 2025-07-31
Payer: COMMERCIAL

## 2025-07-31 ENCOUNTER — TELEPHONE (OUTPATIENT)
Dept: NEPHROLOGY | Facility: CLINIC | Age: OVER 89
End: 2025-07-31

## 2025-07-31 DIAGNOSIS — J47.9 BRONCHIECTASIS WITHOUT COMPLICATION (HCC): ICD-10-CM

## 2025-07-31 DIAGNOSIS — I48.21 PERMANENT ATRIAL FIBRILLATION (HCC): Chronic | ICD-10-CM

## 2025-07-31 DIAGNOSIS — K59.01 SLOW TRANSIT CONSTIPATION: ICD-10-CM

## 2025-07-31 DIAGNOSIS — T81.49XA SURGICAL WOUND INFECTION: ICD-10-CM

## 2025-07-31 DIAGNOSIS — T81.30XA WOUND DEHISCENCE: ICD-10-CM

## 2025-07-31 DIAGNOSIS — R53.81 PHYSICAL DECONDITIONING: ICD-10-CM

## 2025-07-31 DIAGNOSIS — Z98.890 S/P DEBRIDEMENT: Primary | ICD-10-CM

## 2025-07-31 PROCEDURE — 99309 SBSQ NF CARE MODERATE MDM 30: CPT | Performed by: NURSE PRACTITIONER

## 2025-08-01 ENCOUNTER — TELEPHONE (OUTPATIENT)
Dept: VASCULAR SURGERY | Facility: CLINIC | Age: OVER 89
End: 2025-08-01

## 2025-08-05 ENCOUNTER — NURSING HOME VISIT (OUTPATIENT)
Dept: GERIATRICS | Facility: OTHER | Age: OVER 89
End: 2025-08-05
Payer: COMMERCIAL

## 2025-08-05 VITALS — WEIGHT: 132.6 LBS | BODY MASS INDEX: 22.07 KG/M2

## 2025-08-05 DIAGNOSIS — N18.4 CKD (CHRONIC KIDNEY DISEASE) STAGE 4, GFR 15-29 ML/MIN (HCC): ICD-10-CM

## 2025-08-05 DIAGNOSIS — Z98.890 S/P DEBRIDEMENT: Primary | ICD-10-CM

## 2025-08-05 DIAGNOSIS — T81.49XA SURGICAL WOUND INFECTION: ICD-10-CM

## 2025-08-05 DIAGNOSIS — R53.81 PHYSICAL DECONDITIONING: ICD-10-CM

## 2025-08-05 DIAGNOSIS — T81.30XA WOUND DEHISCENCE: ICD-10-CM

## 2025-08-05 DIAGNOSIS — I50.32 CHRONIC HEART FAILURE WITH PRESERVED EJECTION FRACTION (HCC): ICD-10-CM

## 2025-08-05 DIAGNOSIS — J47.9 BRONCHIECTASIS WITHOUT COMPLICATION (HCC): ICD-10-CM

## 2025-08-05 DIAGNOSIS — I48.21 PERMANENT ATRIAL FIBRILLATION (HCC): Chronic | ICD-10-CM

## 2025-08-05 PROCEDURE — 99309 SBSQ NF CARE MODERATE MDM 30: CPT | Performed by: NURSE PRACTITIONER

## 2025-08-06 ENCOUNTER — TELEMEDICINE (OUTPATIENT)
Dept: NEPHROLOGY | Facility: CLINIC | Age: OVER 89
End: 2025-08-06
Payer: COMMERCIAL

## 2025-08-06 VITALS — HEIGHT: 65 IN | BODY MASS INDEX: 21.99 KG/M2 | WEIGHT: 132 LBS

## 2025-08-06 DIAGNOSIS — N25.81 SECONDARY HYPERPARATHYROIDISM (HCC): ICD-10-CM

## 2025-08-06 DIAGNOSIS — N18.4 ANEMIA IN STAGE 4 CHRONIC KIDNEY DISEASE  (HCC): ICD-10-CM

## 2025-08-06 DIAGNOSIS — E83.39 HYPERPHOSPHATEMIA: ICD-10-CM

## 2025-08-06 DIAGNOSIS — I50.32 CHRONIC HEART FAILURE WITH PRESERVED EJECTION FRACTION (HCC): ICD-10-CM

## 2025-08-06 DIAGNOSIS — N18.4 CKD (CHRONIC KIDNEY DISEASE) STAGE 4, GFR 15-29 ML/MIN (HCC): ICD-10-CM

## 2025-08-06 DIAGNOSIS — I99.8 ACUTE LOWER LIMB ISCHEMIA: ICD-10-CM

## 2025-08-06 DIAGNOSIS — D63.1 ANEMIA IN STAGE 4 CHRONIC KIDNEY DISEASE  (HCC): ICD-10-CM

## 2025-08-06 DIAGNOSIS — N17.9 AKI (ACUTE KIDNEY INJURY) (HCC): ICD-10-CM

## 2025-08-06 DIAGNOSIS — I10 PRIMARY HYPERTENSION: Chronic | ICD-10-CM

## 2025-08-06 DIAGNOSIS — D47.2 MGUS (MONOCLONAL GAMMOPATHY OF UNKNOWN SIGNIFICANCE): ICD-10-CM

## 2025-08-06 DIAGNOSIS — I50.33 ACUTE ON CHRONIC HEART FAILURE WITH PRESERVED EJECTION FRACTION (HCC): ICD-10-CM

## 2025-08-06 DIAGNOSIS — N18.4 STAGE 4 CHRONIC KIDNEY DISEASE (HCC): Primary | ICD-10-CM

## 2025-08-06 PROCEDURE — 99214 OFFICE O/P EST MOD 30 MIN: CPT | Performed by: NURSE PRACTITIONER

## 2025-08-06 RX ORDER — BUMETANIDE 2 MG/1
TABLET ORAL
Qty: 270 TABLET | Refills: 0 | Status: SHIPPED | OUTPATIENT
Start: 2025-08-06 | End: 2025-08-07 | Stop reason: SDUPTHER

## 2025-08-07 ENCOUNTER — TELEPHONE (OUTPATIENT)
Age: OVER 89
End: 2025-08-07

## 2025-08-07 ENCOUNTER — NURSING HOME VISIT (OUTPATIENT)
Dept: GERIATRICS | Facility: OTHER | Age: OVER 89
End: 2025-08-07
Payer: COMMERCIAL

## 2025-08-07 VITALS
SYSTOLIC BLOOD PRESSURE: 110 MMHG | WEIGHT: 132.6 LBS | TEMPERATURE: 98.3 F | HEART RATE: 90 BPM | RESPIRATION RATE: 16 BRPM | DIASTOLIC BLOOD PRESSURE: 74 MMHG | BODY MASS INDEX: 22.07 KG/M2

## 2025-08-07 DIAGNOSIS — K59.01 SLOW TRANSIT CONSTIPATION: ICD-10-CM

## 2025-08-07 DIAGNOSIS — T81.49XA SURGICAL WOUND INFECTION: ICD-10-CM

## 2025-08-07 DIAGNOSIS — S81.801D WOUND OF RIGHT LOWER EXTREMITY, SUBSEQUENT ENCOUNTER: Primary | ICD-10-CM

## 2025-08-07 DIAGNOSIS — I50.32 CHRONIC HEART FAILURE WITH PRESERVED EJECTION FRACTION (HCC): ICD-10-CM

## 2025-08-07 DIAGNOSIS — N18.4 CKD (CHRONIC KIDNEY DISEASE) STAGE 4, GFR 15-29 ML/MIN (HCC): ICD-10-CM

## 2025-08-07 DIAGNOSIS — T81.30XA WOUND DEHISCENCE: ICD-10-CM

## 2025-08-07 DIAGNOSIS — S81.802D LEG WOUND, LEFT, SUBSEQUENT ENCOUNTER: ICD-10-CM

## 2025-08-07 DIAGNOSIS — R26.2 AMBULATORY DYSFUNCTION: ICD-10-CM

## 2025-08-07 DIAGNOSIS — I50.33 ACUTE ON CHRONIC HEART FAILURE WITH PRESERVED EJECTION FRACTION (HCC): ICD-10-CM

## 2025-08-07 DIAGNOSIS — J47.9 BRONCHIECTASIS WITHOUT COMPLICATION (HCC): ICD-10-CM

## 2025-08-07 PROCEDURE — 99309 SBSQ NF CARE MODERATE MDM 30: CPT | Performed by: NURSE PRACTITIONER

## 2025-08-07 RX ORDER — BUMETANIDE 2 MG/1
TABLET ORAL
Qty: 270 TABLET | Refills: 0 | Status: SHIPPED | OUTPATIENT
Start: 2025-08-07

## 2025-08-11 ENCOUNTER — TELEPHONE (OUTPATIENT)
Dept: NEPHROLOGY | Facility: CLINIC | Age: OVER 89
End: 2025-08-11

## 2025-08-11 ENCOUNTER — NURSING HOME VISIT (OUTPATIENT)
Dept: GERIATRICS | Facility: OTHER | Age: OVER 89
End: 2025-08-11
Payer: COMMERCIAL

## 2025-08-12 ENCOUNTER — NURSING HOME VISIT (OUTPATIENT)
Dept: WOUND CARE | Facility: HOSPITAL | Age: OVER 89
End: 2025-08-12
Payer: COMMERCIAL

## 2025-08-12 ENCOUNTER — TELEPHONE (OUTPATIENT)
Age: OVER 89
End: 2025-08-12

## 2025-08-12 PROBLEM — R53.81 DEBILITY: Status: ACTIVE | Noted: 2025-08-12

## 2025-08-14 ENCOUNTER — OFFICE VISIT (OUTPATIENT)
Dept: VASCULAR SURGERY | Facility: CLINIC | Age: OVER 89
End: 2025-08-14

## 2025-08-15 ENCOUNTER — TELEPHONE (OUTPATIENT)
Dept: VASCULAR SURGERY | Facility: CLINIC | Age: OVER 89
End: 2025-08-15

## 2025-08-18 ENCOUNTER — TELEPHONE (OUTPATIENT)
Dept: VASCULAR SURGERY | Facility: CLINIC | Age: OVER 89
End: 2025-08-18

## 2025-08-18 ENCOUNTER — NURSE TRIAGE (OUTPATIENT)
Age: OVER 89
End: 2025-08-18

## 2025-08-18 ENCOUNTER — TELEPHONE (OUTPATIENT)
Age: OVER 89
End: 2025-08-18

## 2025-08-18 PROBLEM — T14.8XXA WOUND, OPEN WITH COMPLICATION: Status: ACTIVE | Noted: 2025-08-18

## 2025-08-19 ENCOUNTER — TELEPHONE (OUTPATIENT)
Age: OVER 89
End: 2025-08-19

## 2025-08-19 PROBLEM — N18.4 CKD (CHRONIC KIDNEY DISEASE), STAGE IV (HCC): Status: ACTIVE | Noted: 2025-08-19

## 2025-08-23 ENCOUNTER — TELEPHONE (OUTPATIENT)
Dept: OTHER | Facility: OTHER | Age: OVER 89
End: 2025-08-23

## (undated) DEVICE — Device

## (undated) DEVICE — ABDOMINAL PAD: Brand: DERMACEA

## (undated) DEVICE — SUT PROLENE 3-0 SH 36 IN 8522H

## (undated) DEVICE — GLOVE INDICATOR PI UNDERGLOVE SZ 8.5 BLUE

## (undated) DEVICE — ACE WRAP 4 IN STERILE

## (undated) DEVICE — REM POLYHESIVE ADULT PATIENT RETURN ELECTRODE: Brand: VALLEYLAB

## (undated) DEVICE — PENCIL ELECTROSURG E-Z CLEAN -0035H

## (undated) DEVICE — SUT ETHILON 4-0 PS-2 18 IN 1667H

## (undated) DEVICE — GLOVE SRG BIOGEL ORTHOPEDIC 8.5

## (undated) DEVICE — INTENDED FOR TISSUE SEPARATION, AND OTHER PROCEDURES THAT REQUIRE A SHARP SURGICAL BLADE TO PUNCTURE OR CUT.: Brand: BARD-PARKER ® CARBON RIB-BACK BLADES

## (undated) DEVICE — STERILE BETHLEHEM FEM POP PACK: Brand: CARDINAL HEALTH

## (undated) DEVICE — OCCLUSIVE GAUZE STRIP,3% BISMUTH TRIBROMOPHENATE IN PETROLATUM BLEND: Brand: XEROFORM

## (undated) DEVICE — CURITY NON-ADHERENT STRIPS: Brand: CURITY

## (undated) DEVICE — DRAPE SHEET THREE QUARTER

## (undated) DEVICE — KERLIX BANDAGE ROLL: Brand: KERLIX

## (undated) DEVICE — EXOFIN PRECISION PEN HIGH VISCOSITY TOPICAL SKIN ADHESIVE: Brand: EXOFIN PRECISION PEN, 1G

## (undated) DEVICE — INTENDED FOR TISSUE SEPARATION, AND OTHER PROCEDURES THAT REQUIRE A SHARP SURGICAL BLADE TO PUNCTURE OR CUT.: Brand: BARD-PARKER SAFETY BLADES SIZE 15, STERILE

## (undated) DEVICE — PROBE COVER: Brand: STERILE PROBE COVER

## (undated) DEVICE — SNAP KOVER: Brand: UNBRANDED

## (undated) DEVICE — SPONGE STICK WITH PVP-I: Brand: KENDALL

## (undated) DEVICE — SPLIT SHEET: Brand: CONVERTORS

## (undated) DEVICE — PACK CUSTOM CARDIOVASCULAR

## (undated) DEVICE — ACE WRAP 4 IN UNSTERILE

## (undated) DEVICE — HEMOSTATIC MATRIX SURGIFLO 8ML W/THROMBIN

## (undated) DEVICE — BONE MARROW ASPIRATION NEEDLE WITH BULLET TIP, BULLET TIP STYLET, STYLET: Brand: IMBIBE

## (undated) DEVICE — FLUID MANAGEMENT KIT - IR

## (undated) DEVICE — BETHLEHEM UNIVERSAL  MIONR EXT: Brand: CARDINAL HEALTH

## (undated) DEVICE — RADIFOCUS GLIDEWIRE ADVANTAGE GUIDEWIRE: Brand: GLIDEWIRE ADVANTAGE

## (undated) DEVICE — DECANTER: Brand: UNBRANDED

## (undated) DEVICE — SYRINGE KIT,PACKAGED,,150FT,MK 7(ANGIO-ARTERION, 150ML SYR KIT W/QFT,MC)(60729385): Brand: MEDRAD® MARK 7 ARTERION DISPOSABLE SYRINGE 150 ML WITH QUICK FILL TUBE

## (undated) DEVICE — TUBING SUCTION 5MM X 12 FT

## (undated) DEVICE — FOGARTY ARTERIAL EMBOLECTOMY CATHETER 3F 80CM: Brand: FOGARTY

## (undated) DEVICE — GLOVE SRG BIOGEL 8.5

## (undated) DEVICE — MEDI-VAC YANK SUCT HNDL W/TPRD BULBOUS TIP: Brand: CARDINAL HEALTH

## (undated) DEVICE — ANTIBACTERIAL UNDYED BRAIDED (POLYGLACTIN 910), SYNTHETIC ABSORBABLE SUTURE: Brand: COATED VICRYL

## (undated) DEVICE — PACK PBDS GENERAL MINOR RF

## (undated) DEVICE — INSTRUMENT POUCH: Brand: CONVERTORS

## (undated) DEVICE — SUT SILK 2-0 18 IN A185H

## (undated) DEVICE — POV-IOD SOLUTION 4OZ BT

## (undated) DEVICE — BETHLEHEM UNIVERSAL MINOR GEN: Brand: CARDINAL HEALTH

## (undated) DEVICE — GAUZE SPONGES,16 PLY: Brand: CURITY

## (undated) DEVICE — VAC DRESSING SENSATRAC SMALL

## (undated) DEVICE — SYRINGE 10ML LL

## (undated) DEVICE — FLEXCIL HIGH PRESSURE CONTRAST INJECTION LINE: Brand: NAMIC

## (undated) DEVICE — FOGARTY ARTERIAL EMBOLECTOMY CATHETER 4F 80CM: Brand: FOGARTY

## (undated) DEVICE — PETRI DISH STERILE

## (undated) DEVICE — PACK UNIVERSAL DRAPES SUB-Q ICD

## (undated) DEVICE — NEEDLE 25G X 1 1/2

## (undated) DEVICE — RADIFOCUS TORQUE DEVICE MULTI-TORQUE VISE: Brand: RADIFOCUS TORQUE DEVICE

## (undated) DEVICE — 40601 PROLONGED POSITIONING SYSTEM: Brand: 40601 PROLONGED POSITIONING SYSTEM

## (undated) DEVICE — SURGICEL FIBRILLAR 1 X 2

## (undated) DEVICE — NON-DEHP HIGH FLOW RATE EXTENSION SET, MALE LUER LOCK ADAPTER

## (undated) DEVICE — 10FR FRAZIER SUCTION HANDLE: Brand: CARDINAL HEALTH

## (undated) DEVICE — FOGARTY ARTERIAL EMBOLECTOMY CATHETER 2F 60CM: Brand: FOGARTY

## (undated) DEVICE — 3M™ DURAPORE™ SURGICAL TAPE 2 INCHES X 10YARDS (5.0CM X 9.1M) 6ROLLS/CARTON 10CARTONS/CASE 1538-2: Brand: 3M™ DURAPORE™

## (undated) DEVICE — NEPTUNE E-SEP SMOKE EVACUATION PENCIL, COATED, 70MM BLADE, PUSH BUTTON SWITCH: Brand: NEPTUNE E-SEP